# Patient Record
Sex: MALE | Race: WHITE | NOT HISPANIC OR LATINO | Employment: UNEMPLOYED | URBAN - METROPOLITAN AREA
[De-identification: names, ages, dates, MRNs, and addresses within clinical notes are randomized per-mention and may not be internally consistent; named-entity substitution may affect disease eponyms.]

---

## 2023-05-18 ENCOUNTER — TRANSFERRED RECORDS (OUTPATIENT)
Dept: HEALTH INFORMATION MANAGEMENT | Facility: CLINIC | Age: 63
End: 2023-05-18

## 2023-05-18 ENCOUNTER — HOSPITAL ENCOUNTER (INPATIENT)
Facility: CLINIC | Age: 63
LOS: 3 days | Discharge: PSYCHIATRIC OR SUBSTANCE USE FACILITY | DRG: 885 | End: 2023-05-26
Attending: FAMILY MEDICINE | Admitting: FAMILY MEDICINE
Payer: COMMERCIAL

## 2023-05-18 ENCOUNTER — TELEPHONE (OUTPATIENT)
Dept: BEHAVIORAL HEALTH | Facility: CLINIC | Age: 63
End: 2023-05-18

## 2023-05-18 DIAGNOSIS — R41.82 ALTERED MENTAL STATUS, UNSPECIFIED ALTERED MENTAL STATUS TYPE: ICD-10-CM

## 2023-05-18 DIAGNOSIS — Z20.822 LAB TEST NEGATIVE FOR COVID-19 VIRUS: ICD-10-CM

## 2023-05-18 DIAGNOSIS — R33.9 URINARY RETENTION: ICD-10-CM

## 2023-05-18 DIAGNOSIS — F20.9 SCHIZOPHRENIA, UNSPECIFIED TYPE (H): ICD-10-CM

## 2023-05-18 LAB
ALBUMIN SERPL BCG-MCNC: 4.2 G/DL (ref 3.5–5.2)
ALP SERPL-CCNC: 68 U/L (ref 40–129)
ALT SERPL W P-5'-P-CCNC: 16 U/L (ref 10–50)
ANION GAP SERPL CALCULATED.3IONS-SCNC: 13 MMOL/L (ref 7–15)
AST SERPL W P-5'-P-CCNC: 28 U/L (ref 10–50)
BASOPHILS # BLD AUTO: 0.1 10E3/UL (ref 0–0.2)
BASOPHILS NFR BLD AUTO: 1 %
BILIRUB SERPL-MCNC: 0.7 MG/DL
BUN SERPL-MCNC: 25.9 MG/DL (ref 8–23)
CALCIUM SERPL-MCNC: 9 MG/DL (ref 8.8–10.2)
CHLORIDE SERPL-SCNC: 103 MMOL/L (ref 98–107)
CREAT SERPL-MCNC: 1 MG/DL (ref 0.67–1.17)
DEPRECATED HCO3 PLAS-SCNC: 24 MMOL/L (ref 22–29)
EOSINOPHIL # BLD AUTO: 0 10E3/UL (ref 0–0.7)
EOSINOPHIL NFR BLD AUTO: 0 %
ERYTHROCYTE [DISTWIDTH] IN BLOOD BY AUTOMATED COUNT: 15.4 % (ref 10–15)
ETHANOL SERPL-MCNC: <0.01 G/DL
GFR SERPL CREATININE-BSD FRML MDRD: 85 ML/MIN/1.73M2
GLUCOSE SERPL-MCNC: 97 MG/DL (ref 70–99)
HCT VFR BLD AUTO: 42.9 % (ref 40–53)
HGB BLD-MCNC: 13.6 G/DL (ref 13.3–17.7)
HOLD SPECIMEN: NORMAL
HOLD SPECIMEN: NORMAL
IMM GRANULOCYTES # BLD: 0.1 10E3/UL
IMM GRANULOCYTES NFR BLD: 1 %
LYMPHOCYTES # BLD AUTO: 1.9 10E3/UL (ref 0.8–5.3)
LYMPHOCYTES NFR BLD AUTO: 19 %
MCH RBC QN AUTO: 26.9 PG (ref 26.5–33)
MCHC RBC AUTO-ENTMCNC: 31.7 G/DL (ref 31.5–36.5)
MCV RBC AUTO: 85 FL (ref 78–100)
MONOCYTES # BLD AUTO: 1 10E3/UL (ref 0–1.3)
MONOCYTES NFR BLD AUTO: 9 %
NEUTROPHILS # BLD AUTO: 7.2 10E3/UL (ref 1.6–8.3)
NEUTROPHILS NFR BLD AUTO: 70 %
NRBC # BLD AUTO: 0 10E3/UL
NRBC BLD AUTO-RTO: 0 /100
PLATELET # BLD AUTO: 179 10E3/UL (ref 150–450)
POTASSIUM SERPL-SCNC: 3.9 MMOL/L (ref 3.4–5.3)
PROT SERPL-MCNC: 6.2 G/DL (ref 6.4–8.3)
RBC # BLD AUTO: 5.05 10E6/UL (ref 4.4–5.9)
SARS-COV-2 RNA RESP QL NAA+PROBE: NEGATIVE
SODIUM SERPL-SCNC: 140 MMOL/L (ref 136–145)
WBC # BLD AUTO: 10.2 10E3/UL (ref 4–11)

## 2023-05-18 PROCEDURE — 99285 EMERGENCY DEPT VISIT HI MDM: CPT | Mod: 25 | Performed by: FAMILY MEDICINE

## 2023-05-18 PROCEDURE — 250N000013 HC RX MED GY IP 250 OP 250 PS 637: Performed by: FAMILY MEDICINE

## 2023-05-18 PROCEDURE — 85025 COMPLETE CBC W/AUTO DIFF WBC: CPT | Performed by: FAMILY MEDICINE

## 2023-05-18 PROCEDURE — 90791 PSYCH DIAGNOSTIC EVALUATION: CPT

## 2023-05-18 PROCEDURE — 87635 SARS-COV-2 COVID-19 AMP PRB: CPT | Performed by: FAMILY MEDICINE

## 2023-05-18 PROCEDURE — 99285 EMERGENCY DEPT VISIT HI MDM: CPT | Performed by: FAMILY MEDICINE

## 2023-05-18 PROCEDURE — 250N000013 HC RX MED GY IP 250 OP 250 PS 637: Performed by: EMERGENCY MEDICINE

## 2023-05-18 PROCEDURE — 36415 COLL VENOUS BLD VENIPUNCTURE: CPT | Performed by: FAMILY MEDICINE

## 2023-05-18 PROCEDURE — 82077 ASSAY SPEC XCP UR&BREATH IA: CPT | Performed by: FAMILY MEDICINE

## 2023-05-18 PROCEDURE — C9803 HOPD COVID-19 SPEC COLLECT: HCPCS | Performed by: FAMILY MEDICINE

## 2023-05-18 PROCEDURE — 80053 COMPREHEN METABOLIC PANEL: CPT | Performed by: FAMILY MEDICINE

## 2023-05-18 RX ORDER — LORAZEPAM 1 MG/1
1 TABLET ORAL 3 TIMES DAILY
Status: DISCONTINUED | OUTPATIENT
Start: 2023-05-18 | End: 2023-05-26 | Stop reason: HOSPADM

## 2023-05-18 RX ORDER — NAPROXEN 250 MG/1
250 TABLET ORAL 2 TIMES DAILY PRN
Status: DISCONTINUED | OUTPATIENT
Start: 2023-05-18 | End: 2023-05-26 | Stop reason: HOSPADM

## 2023-05-18 RX ORDER — LORAZEPAM 1 MG/1
1 TABLET ORAL 3 TIMES DAILY
Status: ON HOLD | COMMUNITY
End: 2023-11-10

## 2023-05-18 RX ORDER — OLANZAPINE 5 MG/1
5 TABLET ORAL AT BEDTIME
Status: ON HOLD | COMMUNITY
End: 2023-05-26

## 2023-05-18 RX ORDER — LANOLIN ALCOHOL/MO/W.PET/CERES
1000 CREAM (GRAM) TOPICAL DAILY
Status: ON HOLD | COMMUNITY
End: 2023-11-10

## 2023-05-18 RX ORDER — OLANZAPINE 10 MG/1
10 TABLET, ORALLY DISINTEGRATING ORAL ONCE
Status: COMPLETED | OUTPATIENT
Start: 2023-05-18 | End: 2023-05-18

## 2023-05-18 RX ORDER — DOCUSATE SODIUM 100 MG/1
100 CAPSULE, LIQUID FILLED ORAL 2 TIMES DAILY PRN
Status: DISCONTINUED | OUTPATIENT
Start: 2023-05-18 | End: 2023-05-24

## 2023-05-18 RX ORDER — LANOLIN ALCOHOL/MO/W.PET/CERES
1000 CREAM (GRAM) TOPICAL DAILY
Status: DISCONTINUED | OUTPATIENT
Start: 2023-05-19 | End: 2023-05-26 | Stop reason: HOSPADM

## 2023-05-18 RX ORDER — OLANZAPINE 2.5 MG/1
2.5 TABLET, FILM COATED ORAL EVERY MORNING
Status: ON HOLD | COMMUNITY
End: 2023-05-26

## 2023-05-18 RX ORDER — OLANZAPINE 5 MG/1
5 TABLET, ORALLY DISINTEGRATING ORAL AT BEDTIME
Status: DISCONTINUED | OUTPATIENT
Start: 2023-05-18 | End: 2023-05-24

## 2023-05-18 RX ADMIN — OLANZAPINE 10 MG: 10 TABLET, ORALLY DISINTEGRATING ORAL at 17:33

## 2023-05-18 RX ADMIN — OLANZAPINE 5 MG: 5 TABLET, ORALLY DISINTEGRATING ORAL at 22:36

## 2023-05-18 RX ADMIN — LORAZEPAM 1 MG: 1 TABLET ORAL at 20:09

## 2023-05-18 ASSESSMENT — COLUMBIA-SUICIDE SEVERITY RATING SCALE - C-SSRS
6. HAVE YOU EVER DONE ANYTHING, STARTED TO DO ANYTHING, OR PREPARED TO DO ANYTHING TO END YOUR LIFE?: NO
ATTEMPT LIFETIME: NO
2. HAVE YOU ACTUALLY HAD ANY THOUGHTS OF KILLING YOURSELF?: NO
TOTAL  NUMBER OF ABORTED OR SELF INTERRUPTED ATTEMPTS LIFETIME: NO
1. HAVE YOU WISHED YOU WERE DEAD OR WISHED YOU COULD GO TO SLEEP AND NOT WAKE UP?: NO
TOTAL  NUMBER OF INTERRUPTED ATTEMPTS LIFETIME: NO

## 2023-05-18 ASSESSMENT — ACTIVITIES OF DAILY LIVING (ADL)
ADLS_ACUITY_SCORE: 35
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
## 2023-05-18 NOTE — ED NOTES
"Pt stated \"I don't feel safe at home,\" later specifying \"I'm going to hell. I'm either in hell or going to hell.\"   "
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
## 2023-05-19 ENCOUNTER — TELEPHONE (OUTPATIENT)
Dept: BEHAVIORAL HEALTH | Facility: CLINIC | Age: 63
End: 2023-05-19
Payer: COMMERCIAL

## 2023-05-19 LAB
ATRIAL RATE - MUSE: 73 BPM
DIASTOLIC BLOOD PRESSURE - MUSE: NORMAL MMHG
INTERPRETATION ECG - MUSE: NORMAL
P AXIS - MUSE: 67 DEGREES
PR INTERVAL - MUSE: 142 MS
QRS DURATION - MUSE: 156 MS
QT - MUSE: 458 MS
QTC - MUSE: 504 MS
R AXIS - MUSE: -48 DEGREES
SYSTOLIC BLOOD PRESSURE - MUSE: NORMAL MMHG
T AXIS - MUSE: 81 DEGREES
VENTRICULAR RATE- MUSE: 73 BPM

## 2023-05-19 PROCEDURE — 99222 1ST HOSP IP/OBS MODERATE 55: CPT | Performed by: PSYCHIATRY & NEUROLOGY

## 2023-05-19 PROCEDURE — 250N000013 HC RX MED GY IP 250 OP 250 PS 637: Performed by: EMERGENCY MEDICINE

## 2023-05-19 PROCEDURE — 99284 EMERGENCY DEPT VISIT MOD MDM: CPT | Performed by: PSYCHIATRY & NEUROLOGY

## 2023-05-19 RX ORDER — OLANZAPINE 10 MG/1
10 TABLET, ORALLY DISINTEGRATING ORAL ONCE
Status: COMPLETED | OUTPATIENT
Start: 2023-05-19 | End: 2023-05-19

## 2023-05-19 RX ADMIN — OLANZAPINE 2.5 MG: 5 TABLET, ORALLY DISINTEGRATING ORAL at 07:21

## 2023-05-19 RX ADMIN — OLANZAPINE 10 MG: 10 TABLET, ORALLY DISINTEGRATING ORAL at 07:19

## 2023-05-19 RX ADMIN — SERTRALINE HYDROCHLORIDE 150 MG: 100 TABLET ORAL at 07:20

## 2023-05-19 RX ADMIN — LORAZEPAM 1 MG: 1 TABLET ORAL at 06:52

## 2023-05-19 RX ADMIN — CYANOCOBALAMIN TAB 1000 MCG 1000 MCG: 1000 TAB at 08:34

## 2023-05-19 RX ADMIN — LORAZEPAM 1 MG: 1 TABLET ORAL at 20:08

## 2023-05-19 RX ADMIN — NAPROXEN 250 MG: 250 TABLET ORAL at 08:34

## 2023-05-19 RX ADMIN — OLANZAPINE 5 MG: 5 TABLET, ORALLY DISINTEGRATING ORAL at 21:07

## 2023-05-19 RX ADMIN — LORAZEPAM 1 MG: 1 TABLET ORAL at 14:07

## 2023-05-19 ASSESSMENT — ACTIVITIES OF DAILY LIVING (ADL)
ADLS_ACUITY_SCORE: 35

## 2023-05-19 NOTE — PHARMACY-ADMISSION MEDICATION HISTORY
Pharmacist Admission Medication History    Admission medication history is complete. The information provided in this note is only as accurate as the sources available at the time of the update.    Medication reconciliation/reorder completed by provider prior to medication history? Yes    Information Source(s): Facility (U/NH/) medication list/MAR via phone - [facility] (Phone: [phone])    Pertinent Information: Patient refused his noon dose of lorazepam today 5/18/23 prior to coming to the ED.     Changes made to PTA medication list:    Added: vitamin B12, lorazepam, olanzapine, sertraline (per group home)    Deleted: None    Changed: None    Allergies reviewed with patient and updates made in EHR: no    Medication History Completed By: Rosalia Romo RPH 5/18/2023 8:47 PM    Prior to Admission medications    Medication Sig Last Dose Taking? Auth Provider Long Term End Date   cyanocobalamin (VITAMIN B-12) 1000 MCG tablet Take 1,000 mcg by mouth daily 5/18/2023 at AM Yes Unknown, Entered By History     LORazepam (ATIVAN) 1 MG tablet Take 1 mg by mouth 3 times daily 5/18/2023 at AM Yes Unknown, Entered By History     OLANZapine (ZYPREXA) 2.5 MG tablet Take 2.5 mg by mouth every morning 5/18/2023 at AM Yes Unknown, Entered By History     OLANZapine (ZYPREXA) 5 MG tablet Take 5 mg by mouth At Bedtime 5/17/2023 at PM Yes Unknown, Entered By History     sertraline (ZOLOFT) 50 MG tablet Take 150 mg by mouth daily 5/18/2023 at AM Yes Unknown, Entered By History

## 2023-05-19 NOTE — ED NOTES
IP MH Referral Acuity Rating Score (RARS)    LMHP complete at referral to IP MH, with DEC; and, daily while awaiting IP MH placement. Call score to PPS.  CRITERIA SCORING   New 72 HH and Involuntary for IP MH (not adolescent) 0/1   Boarding over 24 hours 0/1   Vulnerable adult at least 55+ with multiple co morbidities; or, Patient age 11 or under 1/1   Suicide ideation without relief of precipitating factors 0/1   Current plan for suicide 0/1   Current plan for homicide 0/1   Imminent risk or actual attempt to seriously harm another without relief of factors precipitating the attempt 1/1   Severe dysfunction in daily living (ex: complete neglect for self care, extreme disruption in vegetative function, extreme deterioration in social interactions) 0/1   Recent (last 2 weeks) or current physical aggression in the ED 1/1   Restraints or seclusion episode in ED 0/1   Verbal aggression, agitation, yelling, etc., while in the ED 1/1   Active psychosis with psychomotor agitation or catatonia 1/1   Need for constant or near constant redirection (from leaving, from others, etc).  0/1   Intrusive or disruptive behaviors 0/1   TOTAL Acuity Total Score: 5

## 2023-05-19 NOTE — TELEPHONE ENCOUNTER
R:  No appropriate [facility] beds available.    [facility] Access Inpatient Bed Call Log 5/19/23 @7:30 AM        Intake has called facilities that have not updated their bed status within the last 12 hours.        Adults:         [facility] is posting 0 beds.      [facility] is posting 0 [phone]      [facility] is posting 0 beds. [phone]     [facility] is posting 0 beds. [phone]      [facility] is posting 0 beds. [phone]     [facility] is posting 0 beds. [phone]     [facility] is posting 0 beds. [phone]     [facility] is posting 0 beds. [phone]     [facility] is posting 0 beds. [phone]     [facility] is posting 0 beds. [phone]         [facility] is posting  1 beds. Mixed unit 12+. Low acuity only.  [phone] -Pt being reviewed     [facility] is positing 0 beds. No aggression.  [phone]     [facility] is posting 0 beds. [phone] per call at 7:30 am, voicemail said NOT to leave a message     [facility] is posting 2 beds. Low acuity only. [phone]     [facility] is posting 2 beds. Low acuity only. No current aggression. [phone]     [facility] is posting 0 beds. Low acuity. [phone]     [facility] is posting 1 bed. 72 hr hold preferred. Low acuity. [phone]      [facility] is posting  4 beds. Very low acuity. No aggression. [phone]          [facility] is posting 3 beds. Vol only, No Hx of aggression, violence, or assault. No sexual offenders. No 72 hr holds. [phone]     [facility] is posting 8 beds. (Must have the cognitive ability to do programming. No aggressive or violent behavior or recent HX in the last 2 yrs. MH must be primary.) [phone]     [facility] is posting 5 beds. Low acuity only. Violence and aggression capped. [phone]     [facility] 0 [facility]. Low acuity, Neg Covid. [phone]       [facility] is posting 2 beds. Covid neg. Vol only. Combined adolescent and adult unit. No aggressive or violent behavior. No registered sex offenders. [phone] per call at 6:31 am to Kyra, they have 3 beds.     [facility] posting 0 beds. Negative covid.     [facility] is posting 1 bed. No lines, drains, tubes, Cpap s; no hx of aggresion; [phone] per call at 6:35 am to Merlin, they have 5 beds     [facility] is posting 10 beds. Call for details. [phone] per call at 6:38 am to Joe, no beds avail but they may have discharges later; per call at 1:31 pm to Candice, they have  a few  beds avail     [facility] is posting 3 beds. Mixed unit. [phone] per call at 6:40 am to Jasmina, they have 2 beds. No aggression or darryl.        Pt remains on work list pending appropriate bed availability.

## 2023-05-19 NOTE — ED NOTES
Marija from [facility] called to get an update about patient admission. Marija will be sending documents that have full medication list and Providers that currently see the patient. Patient has been residing at [facility] since 4/24/23.

## 2023-05-19 NOTE — PROGRESS NOTES
Pt has period where he yells periodically in his room. Pt denies pain, nutritional needs or bathroom use. 1:1 is present. Ativan given early, due to outburst of yelling.
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
## 2023-05-19 NOTE — ED PROVIDER NOTES
"[facility] ED Mental Health Handoff Note:       Brief HPI:  This is a 62 year old male signed out to me by the previous provider.  See initial ED Provider note for full details of the presentation.     Home meds reviewed and ordered/administered: Yes    Medically stable for inpatient mental health admission: Yes.    Evaluated by mental health: Yes. The recommendation is for inpatient mental health treatment. Bed search in process    Safety concerns: At the time I received sign out, there were no safety concerns.    Hold Status:  Active Orders   N/A       Exam:   Patient Vitals for the past 24 hrs:   BP Temp Temp src Pulse Resp SpO2 Height Weight   05/19/23 0734 -- -- -- -- -- -- 1.753 m (5' 9\") 79.4 kg (175 lb)   05/19/23 0729 118/77 97.9  F (36.6  C) Oral 95 18 98 % -- --   05/18/23 2117 (!) 164/76 -- -- 72 18 99 % -- --   05/18/23 1741 138/71 98.1  F (36.7  C) Oral 78 16 99 % -- --       ED Course:    Medications   LORazepam (ATIVAN) tablet 1 mg ( Oral Canceled Entry 5/19/23 0711)   OLANZapine zydis (zyPREXA) ODT half-tab 2.5 mg (2.5 mg Oral $Given 5/19/23 0721)   OLANZapine zydis (zyPREXA) ODT tab 5 mg (5 mg Oral $Given 5/18/23 2236)   sertraline (ZOLOFT) tablet 150 mg (150 mg Oral $Given 5/19/23 0720)   cyanocobalamin (VITAMIN B-12) tablet 1,000 mcg (1,000 mcg Oral $Given 5/19/23 0834)   naproxen (NAPROSYN) tablet 250 mg (250 mg Oral $Given 5/19/23 0834)   docusate sodium (COLACE) capsule 100 mg (has no administration in time range)   OLANZapine zydis (zyPREXA) ODT tab 10 mg (10 mg Oral $Given 5/18/23 1733)   OLANZapine zydis (zyPREXA) ODT tab 10 mg (10 mg Oral $Given 5/19/23 0719)       There no significant events during my shift.    Patient was signed out to the oncoming provider.    Impression:    ICD-10-CM    1. Altered mental status, unspecified altered mental status type  R41.82       2. Schizophrenia, unspecified type (H)  F20.9           Plan:    1. Awaiting inpatient mental health " admission/transfer.     Psychiatry consult requested neurology consult for evaluation of patient's parkinsonian symptoms with significant tremor.  Patient had reported being on Parkinson's medications in the past but stopped them on his own accord.  I am unable to see any records from neurology providers in the patient's chart.  Neurology consult order placed.      MD Huan José Jill C, MD  05/19/23 0916
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
## 2023-05-19 NOTE — ED NOTES
IP MH Referral Acuity Rating Score (RARS)    LMHP complete at referral to IP MH, with DEC; and, daily while awaiting IP MH placement. Call score to PPS.  CRITERIA SCORING   New 72 HH and Involuntary for IP MH (not adolescent) 0/1   Boarding over 24 hours 1/1   Vulnerable adult at least 55+ with multiple co morbidities; or, Patient age 11 or under 1/1   Suicide ideation without relief of precipitating factors 1/1   Current plan for suicide 0/1   Current plan for homicide 0/1   Imminent risk or actual attempt to seriously harm another without relief of factors precipitating the attempt 1/1   Severe dysfunction in daily living (ex: complete neglect for self care, extreme disruption in vegetative function, extreme deterioration in social interactions) 1/1   Recent (last 2 weeks) or current physical aggression in the ED 1/1   Restraints or seclusion episode in ED 0/1   Verbal aggression, agitation, yelling, etc., while in the ED 1/1   Active psychosis with psychomotor agitation or catatonia 1/1   Need for constant or near constant redirection (from leaving, from others, etc).  0/1   Intrusive or disruptive behaviors 0/1   TOTAL Acuity Total Score: 8
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
## 2023-05-19 NOTE — ED NOTES
"Pt refuses to eat, since being here. Pt states, \"it won't help\". Writer encouraged pt to eat and drink. Pt continues to have a  at bedside.   "

## 2023-05-19 NOTE — TELEPHONE ENCOUNTER
ADULTS      No appropriate beds are currently available within the  system. Bed search update @ 12:50AM:      [facility]: @ cap per website   [facility]: @ cap per website   [facility]: @ cap per website. Per Jasmina @ 00:55 they are full   [facility]: @ cap per website   [facility]: @ cap per website   [facility]: @ cap per website   [facility]: @ cap per website   [facility]: @ cap per website   [facility]: Posting 2 beds. Mixed unit/Low acuity only.   [facility]: @ cap per website   [facility]: @ cap per website   [facility]: Posting 2 beds. Per Marcia @ 00:56, they are full    [facility]: Does not review after 10PM.     [facility]: Posting 2 beds. Per previous call w/ Monica @ 11:11PM, they have low acuity beds in [facility]: @ cap per website   [facility]: Posting 3 beds. Per previous call w/ Monica @ 11:11PM, they have low acuity beds in [facility]: Posting 3 beds (No hx of aggression. Voluntary only).    [facility]: Posting 8 beds (Low acuity only. Must have the cognitive ability to do programming. No aggressive or violent behavior or recent HX in the last 2 yrs. MH must be primary).   [facility]: Posting 5 beds; Low acuity only/Capped on aggression.    [facility]: @ cap per website. Low acuity, Neg Covid.   [facility]: Posting 1 bed (Covid neg. Voluntary only. Mixed unit. No aggressive or violent behavior. No registered sex offenders).   [facility]: Posting 1 bed (No hx of aggression. No CD treatment or detox).    [facility]: Posting 6 beds. Facility is in [place].   [facility]: Posting 3 beds. Mixed unit.           Pt remains on work list until appropriate placement is available
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
## 2023-05-20 ENCOUNTER — TELEPHONE (OUTPATIENT)
Dept: BEHAVIORAL HEALTH | Facility: CLINIC | Age: 63
End: 2023-05-20
Payer: COMMERCIAL

## 2023-05-20 PROCEDURE — 250N000013 HC RX MED GY IP 250 OP 250 PS 637: Performed by: EMERGENCY MEDICINE

## 2023-05-20 RX ORDER — HALOPERIDOL 5 MG/1
5 TABLET ORAL
Status: COMPLETED | OUTPATIENT
Start: 2023-05-20 | End: 2023-05-20

## 2023-05-20 RX ADMIN — CYANOCOBALAMIN TAB 1000 MCG 1000 MCG: 1000 TAB at 09:41

## 2023-05-20 RX ADMIN — SERTRALINE HYDROCHLORIDE 150 MG: 100 TABLET ORAL at 09:41

## 2023-05-20 RX ADMIN — HALOPERIDOL 5 MG: 5 TABLET ORAL at 03:05

## 2023-05-20 RX ADMIN — OLANZAPINE 5 MG: 5 TABLET, ORALLY DISINTEGRATING ORAL at 21:18

## 2023-05-20 RX ADMIN — LORAZEPAM 1 MG: 1 TABLET ORAL at 21:18

## 2023-05-20 RX ADMIN — OLANZAPINE 2.5 MG: 5 TABLET, ORALLY DISINTEGRATING ORAL at 09:48

## 2023-05-20 RX ADMIN — LORAZEPAM 1 MG: 1 TABLET ORAL at 09:48

## 2023-05-20 RX ADMIN — NAPROXEN 250 MG: 250 TABLET ORAL at 14:38

## 2023-05-20 RX ADMIN — LORAZEPAM 1 MG: 1 TABLET ORAL at 13:18

## 2023-05-20 ASSESSMENT — ACTIVITIES OF DAILY LIVING (ADL)
ADLS_ACUITY_SCORE: 35

## 2023-05-20 NOTE — ED NOTES
Patient was lying in bed, masturbating. He was talking loud. He talked about what he was doing. He was sexually pre occupied. He was threatening to get of his room again, naked.

## 2023-05-20 NOTE — ED PROVIDER NOTES
[facility] ED Mental Health Handoff Note:       Brief HPI:  This is a 62 year old male signed out to me by the previous provider.  See initial ED Provider note for full details of the presentation.     Home meds reviewed and ordered/administered: Yes    Medically stable for inpatient mental health admission: Yes.    Evaluated by mental health: Yes. The recommendation is for inpatient mental health treatment. Bed search in process    Safety concerns: At the time I received sign out, there were no safety concerns.    Hold Status:  Active Orders   N/A       Exam:   Patient Vitals for the past 24 hrs:   BP Temp Temp src Pulse Resp SpO2   05/19/23 2320 135/65 -- -- 69 -- 99 %   05/19/23 1703 131/79 97.7  F (36.5  C) Oral 70 18 98 %       ED Course:    Medications   LORazepam (ATIVAN) tablet 1 mg (1 mg Oral $Given 5/19/23 2008)   OLANZapine zydis (zyPREXA) ODT half-tab 2.5 mg (2.5 mg Oral $Given 5/19/23 0721)   OLANZapine zydis (zyPREXA) ODT tab 5 mg (5 mg Oral $Given 5/19/23 2107)   sertraline (ZOLOFT) tablet 150 mg (150 mg Oral $Given 5/19/23 0720)   cyanocobalamin (VITAMIN B-12) tablet 1,000 mcg (1,000 mcg Oral $Given 5/19/23 0834)   naproxen (NAPROSYN) tablet 250 mg (250 mg Oral $Given 5/19/23 0834)   docusate sodium (COLACE) capsule 100 mg (has no administration in time range)   OLANZapine zydis (zyPREXA) ODT tab 10 mg (10 mg Oral $Given 5/18/23 1733)   OLANZapine zydis (zyPREXA) ODT tab 10 mg (10 mg Oral $Given 5/19/23 0719)   haloperidol (HALDOL) tablet 5 mg (5 mg Oral $Given 5/20/23 0305)       There no significant events during my shift.    Patient was signed out to the oncoming provider.    Impression:    ICD-10-CM    1. Altered mental status, unspecified altered mental status type  R41.82       2. Schizophrenia, unspecified type (H)  F20.9           Plan:    1. Awaiting inpatient mental health admission/transfer.          MD Huan José Jill C, MD  05/20/23 0883

## 2023-05-20 NOTE — ED PROVIDER NOTES
IP MH Referral Acuity Rating Score (RARS)    LMHP complete at referral to IP MH, with DEC; and, daily while awaiting IP MH placement. Call score to PPS.  CRITERIA SCORING   New 71 HH and Involuntary for IP MH (not adolescent) 0/1   Boarding over 24 hours 1/1   Vulnerable adult at least 55+ with multiple co morbidities; or, Patient age 11 or under 1/1   Suicide ideation without relief of precipitating factors 0/1   Current plan for suicide 0/1   Current plan for homicide 0/1   Imminent risk or actual attempt to seriously harm another without relief of factors precipitating the attempt 1/1   Severe dysfunction in daily living (ex: complete neglect for self care, extreme disruption in vegetative function, extreme deterioration in social interactions) 1/1   Recent (last 2 weeks) or current physical aggression in the ED 1/1   Restraints or seclusion episode in ED 0/1   Verbal aggression, agitation, yelling, etc., while in the ED 1/1   Active psychosis with psychomotor agitation or catatonia 1/1   Need for constant or near constant redirection (from leaving, from others, etc).  0/1   Intrusive or disruptive behaviors 0/1   TOTAL Acuity Total Score: 7

## 2023-05-20 NOTE — ED NOTES
"Pt was talking and singing when awake in the room. He would yell out. Pt once get up and run away from his room. Pt was assisted to go back to his room. Pt didn't eat breakfast and lunch. Meds given. Pt took it. This afternoon, pt was standing by the door, and yelled, \"I want a knife. I want to cut my balls.\" RN told pt that we can't give him knife. RN/staff wanted his safety. Directed to his room. Pt was ok and lie down on his bed.   "

## 2023-05-20 NOTE — TELEPHONE ENCOUNTER
R:  No appropriate beds within [place].  Bed Search update Statewide 9:00PM    [facility]: @ cap per website  [facility]: @cap per website.  - Per Garth, no beds  [facility]: @ cap per website  [facility]: @ cap per website- Per Garth, no beds  [facility]: @ cap per website  [facility]: @ cap per website- Per Garth, no beds  [facility]: @ cap per website  [facility]:  @ cap per website- Per Garth, no beds  [facility]: @ 3 bed posted.   Mixed unit with children.  Pt not appropriate due to acuity  [facility]: @ cap per website  [facility]: @ cap per website  [facility]: 2 beds posted- Per Garth, LOW ACUITY shared M and F available.  Not appropriate.  [facility]:  1 LOW ACUITY beds posted.  Not appropriate  [facility]:  1 bed posted.  Spoke to Winnie, bed no longer available  [facility]: 4 LOW ACUITY beds posted.  Per Winnie, not appropriate due to aggression with neighbor and acuity  [facility]: 4 LOW ACUITY beds posted.  Per Winnie, not appropriate  [facility]: 6 LOW ACUITY beds available.  Voluntary pts only.  Not appropriate  [facility]: 9 LOW ACUITY beds posted.  Not appropriate  [facility]: @ cap per website   [facility]: @ cap per website  [facility]: 2 bed available.  Voluntary pts only.  Mixed unit with children.   Not appropriate  [facility]: 10 beds posted  Out of state.    [facility]: 4 beds available.   Mixed unit with children.  Not appropriate      Pt remains on PPS work list awaiting appropriate placement.

## 2023-05-20 NOTE — TELEPHONE ENCOUNTER
R:  [facility] has no available [facility] to review pt for.   Bed Search completed @ 7:30am   [facility] is posting 0 [phone]  per call at 7:04 am to [facility],they are at cap   [facility]  is posting 0 beds. [phone]    [facility] is posting 0 beds. [phone]  per call at 7:05 am to Patsy, they have 3 male stepdown and 4 female stepdown beds avail   [facility] (to include [facility],  [place]) is posting 0 beds. [phone]    [facility] is posting 0 beds. [phone]    [facility] is posting 0 beds. [phone]    [facility] is posting  3 beds. Mixed unit 12+. Low acuity only.  [phone]    [facility] is posting 0 beds. [phone]     [facility] (to include [facility] and [place])  is posting 3 beds, but ONLY LOW acuity at [place].  [phone]    [facility] is posting 1 bed. 72 hr hold preferred. Low acuity. [phone]  per call at 7:14 am to [place], they have 1 bed.   [facility] is posting 6 beds. Vol only, No Hx of aggression, violence, or assault. No sexual offenders. No 72 hr holds. [phone]    [facility] is posting 9 beds. (Must have the cognitive ability to do programming. No aggressive or violent behavior or recent HX in the last 2 yrs. MH must be primary.) [phone]    [facility] is posting 0 beds. Low acuity only. Violence and aggression capped. [phone]    [facility] is posting 0 beds. Low acuity, Neg Covid. [phone]  per call at 7:18 am to Ollie Morgan, she will ask her charge Rn to call us back in 15-20 minutes approx    [facility] is posting 2 beds. Covid neg. Vol only. Combined adolescent and adult unit. No aggressive or violent behavior. No registered sex offenders. [phone] per call at 7:21 am to Yudith, they have 2 beds and may have more later if  they have d/c s    [facility] [place] posting 0 beds. Negative covid.    [facility] is posting 1 bed. No lines, drains, tubes, Cpap s; no hx of aggression; [phone] per call at 7:25 am to  Sarah, they do not accept pts on weekends   [facility] is posting 10 beds. Call for details. [phone]  per call at 7:27 am to Donovan, 2 beds for adults and 2 for adol/children   [facility] is posting 4 beds. Mixed unit. [phone] per call at 7:29 am to Mallika, they have 3 beds avail/no high acuity No aggression or darryl.     Pt remains on work list pending appropriate bed availability.

## 2023-05-20 NOTE — TELEPHONE ENCOUNTER
No appropriate beds are currently available within the  system. Bed search update @ 1:35AM:     Placement preference: Statewide. Pt is voluntary    [facility]: @ cap per website  [facility]: @ cap per website  [facility]: @ cap per website  [facility]: @ cap per website  [facility]: @ cap per website  [facility]: @ cap per website  [facility]: @ cap per website  [facility]: @ cap per website  [facility]: @ cap per website  [facility]: Posting 3 beds. Mixed unit/Low acuity only. Pt not appropriate for mixed unit  [facility]: @ cap per website  [facility]: @ cap per website  [facility]: Posting 2 beds.   [facility]: Does not review after 10PM.    [facility]: Posting 3 beds. Per Chris @ 01:48AM, they have low-mod acuity beds available but cannot accommodate sexually inappropriate behaviors. Pt not appropriate  [facility]: Posting 1 bed. Per Chris @ 01:48AM, no beds  [facility]: Posting 4 beds. Per Chris @ 01:48AM, low acuity only. Pt not appropriate  [facility]: Posting 6 beds (No hx of aggression. Voluntary admissions only).   [facility]: Posting 9 beds (Low acuity only. Must have the cognitive ability to do programming. No aggressive or violent behavior or recent HX in the last 2 yrs. MH must be primary). Pt not appropriate  [facility]: @ cap per website  [facility]: @ [facility] per [facility]. Low acuity, Neg Covid.  [facility]: Posting 2 beds (Covid neg. Voluntary only. Mixed unit. No aggressive or violent behavior. No registered sex offenders). Pt not appropriate  [facility]: Posting 1 bed (No hx of aggression. No lines, drains or tubes. No CD tx or detox).   [facility]: Posting 10 beds. Facility is in [place]. Per Donovan @ 01:53 they have beds available. Per chart review, pt is voluntary but requested statewide for placement.   [facility]: Posting 4 beds. Mixed unit/Low acuity. Pt not  appropriate    Called ED @ 01:55 to inquire about possible [facility] for placement. RN asked pt who declined seeking placement in [place]       Pt remains on work list until appropriate placement is available

## 2023-05-20 NOTE — PROGRESS NOTES
Outside chart reveiwed.  Cannot see that he saw neurology since 2015.  Was on Sinemet but appears impression was this was drug induced parkinsonism and it is noted in 11/29/22 note that he was not responding to sinemet and was felt to be worsening his psychiatric status.  May be worth considering a retrial in the future, but would not do while psychiatrically unstable.  Follow up in neurology and consideration of RAFFAELE scan may also be beneficial.  If tremors particularly problematic there is a action component as well so low doses or propranolol could be considered.  Neurology to sign off at this time.      Aria Haney, DO

## 2023-05-20 NOTE — PROGRESS NOTES
"Triage & Transition Services, Extended Care     Therapy Progress Note    Patient: Vinod goes by \"Vinod,\" uses he/him pronouns  Date of Service: May 20, 2023  Site of Service: [facility] ED   Patient was seen in-person.     Presenting problem:   Vinod is followed related to Long wait time for admission: over 24 hours. Please see initial DEC/[facility] Crisis Assessment completed by Leilani Shaver on 5/18/23 for complete assessment information.     Individuals Present: Vinod & Sudha Mead [facility]    Session start: 0938  Session end: 0954  Session duration in minutes: 16  Session number: 1  Anticipated number of sessions or this episode of care: 3  CPT utilized: 42920 - Psychotherapy (with patient) - 30 (16-37*) min    Current Presentation:      Pt presented with a blunt affect. Pts mood appeared to be congruent with this presentation. Pt was oriented to place and person. Thought out session it appears that Pt was responding to internal stimuli. Pt was experiencing thought blocking. Pt did present with disorganized thought content and appears to be a poor historian.  Pt did not endorse any current SI/SIB/HI. Pt endorsed delusions that his parents were trying to kill him and that it is the end of the world. Pt would occassionally start to mumble \"Blah, blah blah\" and widen his eyes and stick his tongue out. Pt also would randomly yell racial slurs and sexually inappropriate statements. Pt was somewhat redirectable. Pt appears to be only minimally caring for his ADLs. Per RN Pt did take morning medications but has not eaten.       Mental Status Exam:   Appearance: awake, alert and dressed in hospital scrubs  Attitude: guarded and somewhat cooperative  Eye Contact: intense at times  Mood: anxious  Affect: intensity is blunted and guarded  Speech: mumbling and rambling  Psychomotor Behavior: no evidence of tardive dyskinesia, dystonia, or tics  Thought Process:  disorganized and evidence of thought blocking " "present  Associations: loosening of associations present  Thought Content: patient appears to be responding to internal stimuli  Insight: limited  Judgement: poor  Oriented to: time, person, and place  Attention Span and Concentration: fair  Recent and Remote Memory: poor    Diagnosis:   298.9 (F29)  Unspecified Schizophrenia Spectrum   300.09 (F41.8) Other Specified Anxiety Disorder  - by history (OCD)     Therapeutic Intervention(s):   Provided active listening, unconditional positive regard, and validation. Identified and practiced coping skills. Pt appears to minimally engaged in therapeutic interventions due to his current disorganization and psychosis sx.     Treatment Objective(s) Addressed:   The focus of this session was on rapport building and assessing safety.     Progress Towards Goals:   Patient reports stable symptoms. Patient is not making progress towards treatment goals as evidenced by Pt is still experiencing acute psychosis sx and disorganization.     Case Management:   None at time of session.     \"Spoke with Dr. Puente, consulting psychiatrist.  He reports being asked to see patient to assess for capacity.  Dr. Puente reports patient does not have capacity.  Patient will need an alternate decision maker.      Spoke with patient's mother, Alberta ([phone]).  She reports her and her /patient's father had just visited with patient yesterday.  She reports being very concerned about his mental health.  She reports patient's psychiatrist, Dr. Santana, took him off his psychiatric medications.  She does not know why those medications were stopped.  She states he has decompensated very quickly.  Mother states patient never  and has no children.  She reports he has one sister, Sandra Treadwell, in [place] ([phone]).     Consulted with internal resources.  Due to patient's lack of capacity, patient's next of kin are his decision maker and his next of kin is his parents, Alberta and " "Ino [phone], Next of kin are unable to make decisions regarding neuroleptic medications.\"    General Recommendations:   Continue to monitor for harm. Consider: Consider 1:1 staffing, Increase frequency of staff rounding, Use a positive, direct and calm approach. Pt's tend to match the energy/mood of the staff. Keep focus positive and upbeat, Use clear and concise directions, too many words can be overwhelming and Provide the pt with options to provide a sense of control. Try to tell the pt what they can do instead of what they can't do    Plan:   Inpatient Mental Health: Pt appears to be experiencing acute psychosis sx and disorganization. Pts sx have been impacting Pts ability to care for self and daily living.       Plan for Care reviewed with Assigned Medical Provider? Yes. Provider, MD Pressley, response: in agreement.      Sudha Mead, [facility] on 5/20/2023 at 12:10 PM  Licensed Mental Health Professional (LMHP), [facility]  [phone]      "

## 2023-05-21 ENCOUNTER — TELEPHONE (OUTPATIENT)
Dept: BEHAVIORAL HEALTH | Facility: CLINIC | Age: 63
End: 2023-05-21
Payer: COMMERCIAL

## 2023-05-21 LAB
ALBUMIN UR-MCNC: 20 MG/DL
AMPHETAMINES UR QL SCN: ABNORMAL
APPEARANCE UR: CLEAR
BACTERIA #/AREA URNS HPF: ABNORMAL /HPF
BARBITURATES UR QL SCN: ABNORMAL
BENZODIAZ UR QL SCN: ABNORMAL
BILIRUB UR QL STRIP: NEGATIVE
BZE UR QL SCN: ABNORMAL
CANNABINOIDS UR QL SCN: ABNORMAL
COLOR UR AUTO: YELLOW
GLUCOSE UR STRIP-MCNC: NEGATIVE MG/DL
HGB UR QL STRIP: ABNORMAL
HYALINE CASTS: 9 /LPF
KETONES UR STRIP-MCNC: ABNORMAL MG/DL
LEUKOCYTE ESTERASE UR QL STRIP: NEGATIVE
MUCOUS THREADS #/AREA URNS LPF: PRESENT /LPF
NITRATE UR QL: NEGATIVE
OPIATES UR QL SCN: ABNORMAL
PH UR STRIP: 5.5 [PH] (ref 5–7)
RBC URINE: 1 /HPF
SP GR UR STRIP: 1.03 (ref 1–1.03)
TRANSITIONAL EPI: <1 /HPF
UROBILINOGEN UR STRIP-MCNC: NORMAL MG/DL
WBC URINE: 3 /HPF

## 2023-05-21 PROCEDURE — 81001 URINALYSIS AUTO W/SCOPE: CPT | Performed by: EMERGENCY MEDICINE

## 2023-05-21 PROCEDURE — 250N000013 HC RX MED GY IP 250 OP 250 PS 637: Performed by: EMERGENCY MEDICINE

## 2023-05-21 PROCEDURE — 250N000013 HC RX MED GY IP 250 OP 250 PS 637: Performed by: FAMILY MEDICINE

## 2023-05-21 PROCEDURE — 80307 DRUG TEST PRSMV CHEM ANLYZR: CPT | Performed by: FAMILY MEDICINE

## 2023-05-21 RX ORDER — ACETAMINOPHEN 325 MG/1
650 TABLET ORAL EVERY 4 HOURS PRN
Status: DISCONTINUED | OUTPATIENT
Start: 2023-05-21 | End: 2023-05-26 | Stop reason: HOSPADM

## 2023-05-21 RX ORDER — TAMSULOSIN HYDROCHLORIDE 0.4 MG/1
0.4 CAPSULE ORAL DAILY
Status: DISCONTINUED | OUTPATIENT
Start: 2023-05-21 | End: 2023-05-26 | Stop reason: HOSPADM

## 2023-05-21 RX ORDER — RISPERIDONE 0.5 MG/1
0.5 TABLET, ORALLY DISINTEGRATING ORAL 2 TIMES DAILY
Status: DISCONTINUED | OUTPATIENT
Start: 2023-05-21 | End: 2023-05-25

## 2023-05-21 RX ORDER — LORAZEPAM 1 MG/1
1 TABLET ORAL ONCE
Status: COMPLETED | OUTPATIENT
Start: 2023-05-21 | End: 2023-05-21

## 2023-05-21 RX ADMIN — LORAZEPAM 1 MG: 1 TABLET ORAL at 15:56

## 2023-05-21 RX ADMIN — OLANZAPINE 2.5 MG: 5 TABLET, ORALLY DISINTEGRATING ORAL at 06:08

## 2023-05-21 RX ADMIN — SERTRALINE HYDROCHLORIDE 150 MG: 100 TABLET ORAL at 06:18

## 2023-05-21 RX ADMIN — ACETAMINOPHEN 650 MG: 325 TABLET, FILM COATED ORAL at 18:58

## 2023-05-21 RX ADMIN — TAMSULOSIN HYDROCHLORIDE 0.4 MG: 0.4 CAPSULE ORAL at 18:58

## 2023-05-21 RX ADMIN — LORAZEPAM 1 MG: 1 TABLET ORAL at 10:35

## 2023-05-21 RX ADMIN — CYANOCOBALAMIN TAB 1000 MCG 1000 MCG: 1000 TAB at 06:18

## 2023-05-21 RX ADMIN — RISPERIDONE 0.5 MG: 0.5 TABLET, ORALLY DISINTEGRATING ORAL at 20:36

## 2023-05-21 RX ADMIN — OLANZAPINE 5 MG: 5 TABLET, ORALLY DISINTEGRATING ORAL at 20:20

## 2023-05-21 RX ADMIN — LORAZEPAM 1 MG: 1 TABLET ORAL at 06:18

## 2023-05-21 RX ADMIN — LORAZEPAM 1 MG: 1 TABLET ORAL at 19:34

## 2023-05-21 ASSESSMENT — ACTIVITIES OF DAILY LIVING (ADL)
ADLS_ACUITY_SCORE: 35

## 2023-05-21 NOTE — ED NOTES
"\"I'm just fine. I'm taking my punishment.\" Pt expresses delusions. Pt received from night shift. He is awake in room. Per report pt did not sleep at all throughout the night. He is malodorous, offered to change clothing which he accepted. Writer encouraged therapeutic environment. He denies acute complaint. He accepted breakfast. Nursing will continue to monitor. He remains on 1:1  "

## 2023-05-21 NOTE — ED NOTES
"Pt up and attempting to run out of department multiple times stating \"I've got to get out of here\", redirected back to room by ED staff and security. Pt tremulous, unsteady gait, 1:1 remains at bedside to redirect patient. Per MD give morning medications at this time.  "

## 2023-05-21 NOTE — ED PROVIDER NOTES
[facility] ED Mental Health Handoff Note:       Brief HPI:  This is a 62 year old male signed out to me by Dr. Arreola.  See initial ED Provider note for full details of the presentation. Interval history is pertinent for no recent events.  Patient presented with acute psychosis, agitation, and aggressive behavior.  Has been deemed in need of admission.  Currently awaiting bed placement and has calmed with meds in the ED. he also has a history of parkinsonism but is not currently on medications.  Per provider yesterday neurology consult was placed.  I reviewed their documentation, they state he had not seen neurology since 2015.  Previous diagnosis of parkinsonism which was felt to be medication induced.  Has not been on Sinemet or other meds for quite some time.  The recommendation is to use propanolol if needed for any tremor and have him follow-up as an outpatient, currently they have signed off.    Home meds reviewed and ordered/administered: Yes    Medically stable for inpatient mental health admission: Yes.    Evaluated by mental health: Yes. The recommendation is for inpatient mental health treatment. Bed search in process    Safety concerns: At the time I received sign out, the patient had been aggressive/combative/agitated, but has calmed.    Hold Status:  Active Orders   N/A            Exam:   Patient Vitals for the past 24 hrs:   BP Temp Temp src Pulse Resp SpO2   05/20/23 2120 121/52 97.7  F (36.5  C) Oral 76 16 97 %   05/20/23 0935 (!) 153/80 -- -- 73 16 96 %       General: Patient is in no acute distress and is resting comfortably.  HEENT: Normocephalic atraumatic  Neck: Supple  Cardiovascular: Heart rate normal  Pulmonary: Patient is in no respiratory distress  Extremities: No signs of any significant or life-threatening trauma.  Neurologic: No new focal neurologic deficits.      ED Course:    Medications   LORazepam (ATIVAN) tablet 1 mg (0 mg Oral Hold 5/21/23 0800)   OLANZapine zydis (zyPREXA)  ODT half-tab 2.5 mg (0 mg Oral Hold 5/21/23 0800)   OLANZapine zydis (zyPREXA) ODT tab 5 mg (5 mg Oral $Given 5/20/23 2118)   sertraline (ZOLOFT) tablet 150 mg (0 mg Oral Hold 5/21/23 0800)   cyanocobalamin (VITAMIN B-12) tablet 1,000 mcg (0 mcg Oral Hold 5/21/23 0800)   naproxen (NAPROSYN) tablet 250 mg (250 mg Oral $Given 5/20/23 1438)   docusate sodium (COLACE) capsule 100 mg (has no administration in time range)   OLANZapine zydis (zyPREXA) ODT tab 10 mg (10 mg Oral $Given 5/18/23 1733)   OLANZapine zydis (zyPREXA) ODT tab 10 mg (10 mg Oral $Given 5/19/23 0719)   haloperidol (HALDOL) tablet 5 mg (5 mg Oral $Given 5/20/23 0305)            There were no significant events during my shift.  As noted above, reviewed recommendations from neurology.    Patient was signed out to the oncoming provider      Impression:    ICD-10-CM    1. Altered mental status, unspecified altered mental status type  R41.82       2. Schizophrenia, unspecified type (H)  F20.9           Plan:    1. Awaiting inpatient mental health admission/transfer.      RESULTS:   Results for orders placed or performed during the hospital encounter of 05/18/23 (from the past 24 hour(s))   Urine Drugs of Abuse Screen     Status: Abnormal    Collection Time: 05/21/23  2:17 AM    Narrative    The following orders were created for panel order Urine Drugs of Abuse Screen.  Procedure                               Abnormality         Status                     ---------                               -----------         ------                     Drug abuse screen 1 urin...[[id number]]  Abnormal            Final result                 Please view results for these tests on the individual orders.   Drug abuse screen 1 urine (ED)     Status: Abnormal    Collection Time: 05/21/23  2:17 AM   Result Value Ref Range    Amphetamines Urine Screen Negative Screen Negative    Barbituates Urine Screen Negative Screen Negative    Benzodiazepine Urine Screen Positive (A)  Screen Negative    Cannabinoids Urine Screen Negative Screen Negative    Cocaine Urine Screen Negative Screen Negative    Opiates Urine Screen Negative Screen Negative             MD Ratna Gallardo David, MD  05/21/23 0726       Yemi Segundo MD  05/21/23 0728

## 2023-05-21 NOTE — ED NOTES
Patient signed out to me at the end of my partner shift.  Patient waiting for a mental health bed.    Nursing personnel brought to my attention the patient was straight cath last night for 1200 cc of urine after he was noted to not have any urine output.  At that time his bladder scan revealed a bladder of 999 cc.  Nursing personnel came to me and stated that he has not had any urine output since he was straight cath last night and therefore he was scanned again showing 400 cc of urine.  At this time the patient will be straight cath again and urology was consulted.    Over the phone the patient's case was discussed with urology and they recommended starting Flomax on the patient and putting in an indwelling Barrios catheter should the patient require a third catheterization.  Full urology consult will be done in the morning.    Orders Placed This Encounter   Procedures     Comprehensive metabolic panel     Ethyl Alcohol Level     Asymptomatic COVID-19 Virus (Coronavirus) by PCR     CBC with platelets and differential     [place] Draw     Extra Blue Top Tube     Extra Red Top Tube     Drug abuse screen 1 urine (ED)     UA with Microscopic reflex to Culture     EKG 12-lead, complete     Straight cath for urine     Straight cath for urine     [facility] ([facility]) Assessment Consult Order:     Psychiatry IP Consult: medication recommendations.  apparently was taking off of clozapine recently; Consultant may enter orders: Yes; Requesting provider? Attending physician; Name: louis early     Neurology General Adult IP Consult: h/o parkinsons, off meds, no neuro records, tremor and psychosis, eval parkinson's treatment; Consultant may enter orders: Yes; Requesting provider? ED Provider     Psychiatry IP Consult: capacity evaluation; Consultant may enter orders: Yes; Requesting provider? Hospitalist (if different from attending physician)     Urology IP Consult: urinary retention in Mental Health Admit patient  boarding in [facility] ER; Consultant may enter orders: Yes; Requesting provider? ED Provider     CBC with platelets differential     Urine Drugs of Abuse Screen       Results for orders placed or performed during the hospital encounter of 05/18/23   Comprehensive metabolic panel     Status: Abnormal   Result Value Ref Range    Sodium 140 136 - 145 mmol/L    Potassium 3.9 3.4 - 5.3 mmol/L    Chloride 103 98 - 107 mmol/L    Carbon Dioxide (CO2) 24 22 - 29 mmol/L    Anion Gap 13 7 - 15 mmol/L    Urea Nitrogen 25.9 (H) 8.0 - 23.0 mg/dL    Creatinine 1.00 0.67 - 1.17 mg/dL    Calcium 9.0 8.8 - 10.2 mg/dL    Glucose 97 70 - 99 mg/dL    Alkaline Phosphatase 68 40 - 129 U/L    AST 28 10 - 50 U/L    ALT 16 10 - 50 U/L    Protein Total 6.2 (L) 6.4 - 8.3 g/dL    Albumin 4.2 3.5 - 5.2 g/dL    Bilirubin Total 0.7 <=1.2 mg/dL    GFR Estimate 85 >60 mL/min/1.73m2   Ethyl Alcohol Level     Status: Normal   Result Value Ref Range    Alcohol ethyl <0.01 <=0.01 g/dL   Asymptomatic COVID-19 Virus (Coronavirus) by PCR Nasopharyngeal     Status: Normal    Specimen: Nasopharyngeal; Swab   Result Value Ref Range    SARS CoV2 PCR Negative Negative    Narrative    Testing was performed using the Xpert Xpress SARS-CoV-2 Assay on the Cepheid Gene-Xpert Instrument Systems. Additional information about this Emergency Use Authorization (EUA) assay can be found via the Lab Guide. This test should be ordered for the detection of SARS-CoV-2 in individuals who meet SARS-CoV-2 clinical and/or epidemiological criteria as well as from individuals without symptoms or other reasons to suspect COVID-19. Test performance for asymptomatic patients has only been established in anterior nasal swab specimens. This test is for in vitro diagnostic use under the FDA EUA for laboratories certified under CLIA to perform high complexity testing. This test has not been FDA cleared or approved. A negative result does not rule out the presence of PCR inhibitors in the  specimen or target RNA concentration below the limit of detection for the assay. The possibility of a false negative should be considered if the patient's recent exposure or clinical presentation suggests COVID-19. This test was validated by the [facility]. This laboratory is certified under the Clinical Laboratory Improvement Amendments (CLIA) as qualified to perform high complexity laboratory testing.     CBC with platelets and differential     Status: Abnormal   Result Value Ref Range    WBC Count 10.2 4.0 - 11.0 10e3/uL    RBC Count 5.05 4.40 - 5.90 10e6/uL    Hemoglobin 13.6 13.3 - 17.7 g/dL    Hematocrit 42.9 40.0 - 53.0 %    MCV 85 78 - 100 fL    MCH 26.9 26.5 - 33.0 pg    MCHC 31.7 31.5 - 36.5 g/dL    RDW 15.4 (H) 10.0 - 15.0 %    Platelet Count 179 150 - 450 10e3/uL    % Neutrophils 70 %    % Lymphocytes 19 %    % Monocytes 9 %    % Eosinophils 0 %    % Basophils 1 %    % Immature Granulocytes 1 %    NRBCs per 100 WBC 0 <1 /100    Absolute Neutrophils 7.2 1.6 - 8.3 10e3/uL    Absolute Lymphocytes 1.9 0.8 - 5.3 10e3/uL    Absolute Monocytes 1.0 0.0 - 1.3 10e3/uL    Absolute Eosinophils 0.0 0.0 - 0.7 10e3/uL    Absolute Basophils 0.1 0.0 - 0.2 10e3/uL    Absolute Immature Granulocytes 0.1 <=0.4 10e3/uL    Absolute NRBCs 0.0 10e3/uL   [place] Draw     Status: None    Narrative    The following orders were created for panel order [place] Draw.  Procedure                               Abnormality         Status                     ---------                               -----------         ------                     Extra Blue Top Tube[[id number]]                              Final result               Extra Red Top Tube[[id number]]                               Final result                 Please view results for these tests on the individual orders.   Extra Blue Top Tube     Status: None   Result Value Ref Range    Hold Specimen [facility]    Extra Red Top Tube     Status: None    Result Value Ref Range    Hold Specimen [facility]    Drug abuse screen 1 urine (ED)     Status: Abnormal   Result Value Ref Range    Amphetamines Urine Screen Negative Screen Negative    Barbituates Urine Screen Negative Screen Negative    Benzodiazepine Urine Screen Positive (A) Screen Negative    Cannabinoids Urine Screen Negative Screen Negative    Cocaine Urine Screen Negative Screen Negative    Opiates Urine Screen Negative Screen Negative   EKG 12-lead, complete     Status: None   Result Value Ref Range    Systolic Blood Pressure  mmHg    Diastolic Blood Pressure  mmHg    Ventricular Rate 73 BPM    Atrial Rate 73 BPM    [place] Interval 142 ms    QRS Duration 156 ms     ms    QTc 504 ms    P Axis 67 degrees    R AXIS -48 degrees    T Axis 81 degrees    Interpretation ECG       Sinus rhythm  Left axis deviation  Left bundle branch block  Abnormal ECG  Unconfirmed report - interpretation of this ECG is computer generated - see medical record for final interpretation  Confirmed by - EMERGENCY ROOM, PHYSICIAN ([id number]),  AG VELÁZQUEZ ([id number]) on 5/19/2023 10:58:21 AM     CBC with platelets differential     Status: Abnormal    Narrative    The following orders were created for panel order CBC with platelets differential.  Procedure                               Abnormality         Status                     ---------                               -----------         ------                     CBC with platelets and d...[[id number]]  Abnormal            Final result                 Please view results for these tests on the individual orders.   Urine Drugs of Abuse Screen     Status: Abnormal    Narrative    The following orders were created for panel order Urine Drugs of Abuse Screen.  Procedure                               Abnormality         Status                     ---------                               -----------         ------                     Drug abuse screen 1 urin...[[id number]]  Abnormal             Final result                 Please view results for these tests on the individual orders.       Patient will be catheterized and a urinalysis will be sent to the lab.    Patient will be observed for spontaneous urinary output over the next 8 to 10 hours.      Final diagnoses:   Altered mental status, unspecified altered mental status type   Schizophrenia, unspecified type (H)   Urinary retention       Michael Metz MD, MD Metz, Michael Thomas MD  05/21/23 7113

## 2023-05-21 NOTE — TELEPHONE ENCOUNTER
R:  No beds within [facility].    Bed Search completed @ 7:30am  & Again at 1pm.     No available beds for review identified.  Pt remains on adult worklist pending bed availability.

## 2023-05-21 NOTE — ED NOTES
Pt awake throughout the day, he denies acute complaints. He ambulates independently however requires prompting to eat and is notably tremulous. He has 1:1 at bedside for ADLs and safety. Per 1:1 pt goes to restroom and doesn't urinate, just flushes the toilet. Pt is malodorous and when offered a clean shirt, he says he doesn't need it. He appears in stable condition. Bladder scan = 455Ml , per MD pt to be Straight cath'ed now.

## 2023-05-21 NOTE — TELEPHONE ENCOUNTER
Called ED @ 00:39 to request UDS be collected when able for outside hospitals to review    No appropriate beds are currently available within the  system. Bed search update @ 12:30AM:        [facility]: @ cap per website  [facility]: @ cap per website  [facility]: @ cap per website  [facility]: @ cap per website  [facility]: @ cap per website  [facility]: @ cap per website  [facility]: @ cap per website  [facility]: @ cap per website  [facility]: @ cap per website  [facility]: Posting 3 beds. Mixed unit/Low acuity only.  [facility]: @ cap per website  [facility]: @ cap per website  [facility]: Posting 2 beds; Low acuity only. Per Marcia @ 00:30, they are full at least 10AM  [facility]: Does not review after 10PM.    [facility]: Posting beds at all 3 sites. Per Marni @ 00:31, they have beds available; no ayaka-psych beds, cannot accommodate any patients that required restraints; needs all labs for review  [facility]: Posting 5 beds (No hx of aggression. No sexual offenders. Voluntary patients only).  [facility]: Posting 9 beds (Low acuity only. Must have the cognitive ability to do programming. No aggressive or violent behavior or recent HX in the last 2 yrs. MH must be primary).  [facility]: @ cap per website  [facility]: @ cap per website. Low acuity, Neg Covid.  [facility]: Posting 2 beds (Covid neg. Voluntary only. Mixed unit. No aggressive or violent behavior. No registered sex offenders).  [facility]: Posting 1 bed (No hx of aggression/assault. No lines, drains or tubes. Does not provide detox or CD treatment).  [facility]: Posting 10 beds. Facility is in [place]. Pt is voluntary and does not want to seek placement here  [facility]: Posting 3 beds (Mixed unit/Low acuity).        Pt remains on work list until appropriate placement is available

## 2023-05-21 NOTE — ED NOTES
RN notified by 1:1 staff that patient has been making multiple attempts to urinate in urinal without success. Bladder scanner showed >999ml, pt reports hx of prostate issues and reports having to have catheter in the past. Agreeable to have straight cath.

## 2023-05-21 NOTE — ED NOTES
Patient remains restless and attempting to get up despite unsteady gait, offered books and music for distraction activities, pt appears to respond positively to sound of music soundtrack.

## 2023-05-21 NOTE — ED NOTES
Brief period of rest, approximately 15 minutes, however is awake in room otherwise. Appears in stable condition.

## 2023-05-21 NOTE — TELEPHONE ENCOUNTER
R:  No appropriate beds within [place].  Bed Search update Statewide 10:00PM    [facility]: @ cap per website  [facility]: @cap per website.  - Per Passang, no beds  [facility]: @ cap per website  [facility]: @ cap per website- Per Passang, no beds  [facility]: @ cap per website  [facility]: @ cap per website- Per Passang, no beds  [facility]: @ cap per website  [facility]:  @ cap per website- Per Passang, no beds  [facility]: @ 3 bed posted.   Mixed unit with children.  Pt not appropriate due to acuity  [facility]: @ cap per website  [facility]: @ cap per website  [facility]: 2 beds posted- Per Passang, LOW ACUITY shared M and F available.  Not appropriate.  [facility]:  1 LOW ACUITY beds posted.  Per Preeti, no beds available  [facility]:  7 bed posted.  Per Winnie, not appropriate due to vulnerability  [facility]: 3 LOW ACUITY beds posted.  Per Winnie, not appropriate  [facility]: 5 LOW ACUITY beds posted.  Per Winnie, not appropriate  [facility]: 6 LOW ACUITY beds available.  Voluntary pts only.  Not appropriate  [facility]: 9 LOW ACUITY beds posted.  Not appropriate  [facility]: @ cap per website   [facility]: @ cap per website  [facility]: 2 bed available.  Voluntary pts only.  Mixed unit with children.   Not appropriate  [facility]: 10 beds posted  Out of state.    [facility]: 3 beds available.   Mixed unit with children.  Not appropriate      Pt remains on PPS work list awaiting appropriate placement.

## 2023-05-21 NOTE — PROGRESS NOTES
"Triage & Transition Services, Extended Care     Therapy Progress Note    Patient: Vinod goes by \"Vinod,\" uses he/him pronouns  Date of Service: May 21, 2023  Site of Service: [facility] ED   Patient was seen in-person.      Presenting problem:   Vinod is followed related to Long wait time for admission: over 24 hours. Please see initial DEC/[facility] Crisis Assessment completed by Leilani Shaver on 5/18/23 for complete assessment information.      Individuals Present: Vinod & Sudha Mead [facility]    Session start: 1020  Session end: 1036  Session duration in minutes: 16  Session number: 2  Anticipated number of sessions or this episode of care: 3  CPT utilized: 40316 - Psychotherapy (with patient) - 30 (16-37*) min    Current Presentation:    Pt presented with a blunt affect. Pts mood appeared to be congruent with this presentation. Pt was oriented to place and person. Thought out session it appears that Pt was responding to internal stimuli. Pt was experiencing thought blocking. Pt did present with disorganized thought content and appears to be a poor historian.  Pt did not endorse any current SI/SIB/HI.  But when writer asked these safety assessment questions Pt stated \"this is not going to be good for anybody.\"  Per RN Pt was up most of the night did not sleep, patient did eat some of his breakfast this morning. Pt appeared to be hesitant to take medications but eventually agreed. Pt still presents with delusional thoughts and psychotic symptoms that are grossly impairing his ability to function in his daily activities.      Mental Status Exam:   Appearance: awake, alert and slightly unkempt  Attitude: guarded and slightly uncooperative  Eye Contact: fair  Mood: anxious  Affect: restricted range  Speech: mumbling  Psychomotor Behavior: no evidence of tardive dyskinesia, dystonia, or tics  Thought Process:  disorganized and evidence of thought blocking present  Associations: loosening of associations " "present  Thought Content: patient appears to be responding to internal stimuli  Insight: limited  Judgement: poor  Oriented to: time, person, and place  Attention Span and Concentration: limited  Recent and Remote Memory: limited    Diagnosis:   298.9 (F29)  Unspecified Schizophrenia Spectrum   300.09 (F41.8) Other Specified Anxiety Disorder  - by history (OCD)    Therapeutic Intervention(s):   Provided active listening, unconditional positive regard, and validation.  Pt appears to minimally engaged in therapeutic interventions due to his current disorganization and psychosis sx.     Treatment Objective(s) Addressed:   The focus of this session was on rapport building and assessing safety.     Progress Towards Goals:   Patient reports stable symptoms. Patient is not making progress towards treatment goals as evidenced by Pt is still experiencing acute psychosis sx and disorganization.     Case Management:   None at time of session.     \"Spoke with patient's mother, Alberta ([phone]).  She reports her and her /patient's father had just visited with patient yesterday.  She reports being very concerned about his mental health.  She reports patient's psychiatrist, Dr. Santana, took him off his psychiatric medications.  She does not know why those medications were stopped.  She states he has decompensated very quickly.  Mother states patient never  and has no children.  She reports he has one sister, Sandra Treadwell, in [place] ([phone]).     Consulted with internal resources.  Due to patient's lack of capacity, patient's next of kin are his decision maker and his next of kin is his parents, Alberta and Ino [phone], Next of kin are unable to make decisions regarding neuroleptic medications.\"      General Recommendations:   Continue to monitor for harm. Consider: Consider 1:1 staffing, Increase frequency of staff rounding, Use a positive, direct and calm approach. Pt's tend to match the " energy/mood of the staff. Keep focus positive and upbeat, Use clear and concise directions, too many words can be overwhelming and Provide the pt with options to provide a sense of control. Try to tell the pt what they can do instead of what they can't do    Plan:   Inpatient Mental Health: Pt appears to be experiencing acute psychosis sx and disorganization. Pts sx have been impacting Pts ability to care for self and daily living. Pt is currently voluntary.     Plan for Care reviewed with Assigned Medical Provider? Yes. Provider, MD Segundo, response: In agreement     Sudha Mead, [facility]   Licensed Mental Health Professional (LMHP), [facility]  [phone]

## 2023-05-22 ENCOUNTER — TELEPHONE (OUTPATIENT)
Dept: BEHAVIORAL HEALTH | Facility: CLINIC | Age: 63
End: 2023-05-22
Payer: COMMERCIAL

## 2023-05-22 ENCOUNTER — APPOINTMENT (OUTPATIENT)
Dept: CT IMAGING | Facility: CLINIC | Age: 63
DRG: 885 | End: 2023-05-22
Attending: EMERGENCY MEDICINE
Payer: COMMERCIAL

## 2023-05-22 LAB — VIT B12 SERPL-MCNC: 617 PG/ML (ref 232–1245)

## 2023-05-22 PROCEDURE — 250N000013 HC RX MED GY IP 250 OP 250 PS 637: Performed by: EMERGENCY MEDICINE

## 2023-05-22 PROCEDURE — 70450 CT HEAD/BRAIN W/O DYE: CPT

## 2023-05-22 PROCEDURE — 250N000011 HC RX IP 250 OP 636: Performed by: EMERGENCY MEDICINE

## 2023-05-22 PROCEDURE — 87389 HIV-1 AG W/HIV-1&-2 AB AG IA: CPT | Performed by: STUDENT IN AN ORGANIZED HEALTH CARE EDUCATION/TRAINING PROGRAM

## 2023-05-22 PROCEDURE — 84443 ASSAY THYROID STIM HORMONE: CPT | Performed by: STUDENT IN AN ORGANIZED HEALTH CARE EDUCATION/TRAINING PROGRAM

## 2023-05-22 PROCEDURE — 99222 1ST HOSP IP/OBS MODERATE 55: CPT | Mod: GC | Performed by: STUDENT IN AN ORGANIZED HEALTH CARE EDUCATION/TRAINING PROGRAM

## 2023-05-22 PROCEDURE — 258N000003 HC RX IP 258 OP 636: Performed by: EMERGENCY MEDICINE

## 2023-05-22 PROCEDURE — 82607 VITAMIN B-12: CPT | Performed by: EMERGENCY MEDICINE

## 2023-05-22 PROCEDURE — 250N000009 HC RX 250: Performed by: EMERGENCY MEDICINE

## 2023-05-22 PROCEDURE — 36415 COLL VENOUS BLD VENIPUNCTURE: CPT | Performed by: EMERGENCY MEDICINE

## 2023-05-22 RX ORDER — RISPERIDONE 0.5 MG/1
0.5 TABLET, ORALLY DISINTEGRATING ORAL ONCE
Status: COMPLETED | OUTPATIENT
Start: 2023-05-22 | End: 2023-05-22

## 2023-05-22 RX ADMIN — CYANOCOBALAMIN TAB 1000 MCG 1000 MCG: 1000 TAB at 10:48

## 2023-05-22 RX ADMIN — LORAZEPAM 1 MG: 1 TABLET ORAL at 19:39

## 2023-05-22 RX ADMIN — FOLIC ACID: 5 INJECTION, SOLUTION INTRAMUSCULAR; INTRAVENOUS; SUBCUTANEOUS at 11:06

## 2023-05-22 RX ADMIN — RISPERIDONE 0.5 MG: 0.5 TABLET, ORALLY DISINTEGRATING ORAL at 10:48

## 2023-05-22 RX ADMIN — RISPERIDONE 0.5 MG: 0.5 TABLET, ORALLY DISINTEGRATING ORAL at 19:39

## 2023-05-22 RX ADMIN — LORAZEPAM 1 MG: 1 TABLET ORAL at 14:49

## 2023-05-22 RX ADMIN — RISPERIDONE 0.5 MG: 0.5 TABLET, ORALLY DISINTEGRATING ORAL at 01:00

## 2023-05-22 RX ADMIN — THIAMINE HYDROCHLORIDE 400 MG: 100 INJECTION, SOLUTION INTRAMUSCULAR; INTRAVENOUS at 12:48

## 2023-05-22 RX ADMIN — LORAZEPAM 1 MG: 1 TABLET ORAL at 10:47

## 2023-05-22 RX ADMIN — OLANZAPINE 2.5 MG: 5 TABLET, ORALLY DISINTEGRATING ORAL at 10:49

## 2023-05-22 RX ADMIN — OLANZAPINE 5 MG: 5 TABLET, ORALLY DISINTEGRATING ORAL at 21:09

## 2023-05-22 RX ADMIN — SERTRALINE HYDROCHLORIDE 150 MG: 100 TABLET ORAL at 10:47

## 2023-05-22 RX ADMIN — TAMSULOSIN HYDROCHLORIDE 0.4 MG: 0.4 CAPSULE ORAL at 10:49

## 2023-05-22 ASSESSMENT — ACTIVITIES OF DAILY LIVING (ADL)
ADLS_ACUITY_SCORE: 35

## 2023-05-22 NOTE — ED NOTES
Patient yelling loudly in room. Repeating similar phrases. Provider notified of increased agitation. Medication ordered. Will continue to monitor.

## 2023-05-22 NOTE — ED NOTES
Patient had agreed to take morning medication, however, when nurse brought them patient refused to take them.

## 2023-05-22 NOTE — TELEPHONE ENCOUNTER
R:  No appropriate beds within [place].  Bed Search update Statewide 10:00PM    [facility]: @ cap per website  [facility]: @cap per website.  - Per Migue[place]na, no beds  [facility]: @ cap per website  [facility]: @ cap per website- Per Miguelina, no beds  [facility]: @ cap per website  [facility]: @ cap per website- Per Miguelina, no beds  [facility]: @ cap per website  [facility]:  @ cap per website- Per Miguelina, no beds  [facility]: @ 3 bed posted.   Mixed unit with children.  Pt not appropriate due to acuity  [facility]: @ cap per website  [facility]: @ cap per website  [facility]: 3 beds posted- Per Miguelina, no beds available  [facility]:  1 LOW ACUITY beds posted.    [facility]:  2 bed posted.  Per Patricio, at capacity  [facility]: 2 LOW ACUITY beds posted.  Per Patricio, at capacity  [facility]: 6 LOW ACUITY beds posted.  Per Patricio, not appropriate  [facility]: 5 LOW ACUITY beds available.  Voluntary pts only.  Not appropriate  [facility]: 9 LOW ACUITY beds posted.  Not appropriate  [facility]: @ cap per website   [facility]: @ cap per website  [facility]: 2 bed available.  Voluntary pts only.  Mixed unit with children.   Not appropriate  [facility]: 2 beds posted  Out of state.    [facility]: 4 beds available.   Mixed unit with children.  Not appropriate      Pt remains on PPS work list awaiting appropriate placement.

## 2023-05-22 NOTE — ED NOTES
When writer arrived in am patient responsive and up and out of bed.  Patient agreed to take meds, however, when writer went to administer, patient became unresponsive.  Writer attempted to awake patient.  RR regular, vitals stable.  Dr Luu informed.  Jus also unable to awaken patient and placed nasal airway and ordered head CT.  While waiting for CT patient has woken, however, still not verbalizing normally and slow to respond.

## 2023-05-22 NOTE — TELEPHONE ENCOUNTER
No appropriate beds are currently available within the  system. Bed search update @ 2:20AM:       [facility]: @ cap per website  [facility]: @ cap per website  [facility]: @ cap per website. Patsy @ 00:23 they cannot review until later this AM when their referral coordinator is in  [facility]: @ cap per website  [facility]: @ cap per website  [facility]: @ cap per website  [place]: @ cap per website  [facility]: @ cap per website  [facility]: @ cap per website  [facility]: Posting 3 beds. Mixed unit/Low acuity only. Pt not appropriate d/t acuity  [facility]: @ cap per website  [facility]: @ cap per website  [facility]: Posting 3 beds. Marcia @ 00:09 reports they are at [facility]: Does not review after 10PM.    [facility]: @ cap per website  [facility]: @ cap per website  [facility]: Posting 6 beds. Efren @ 00:10 reports low acuity, male bed avail - Pt not appropriate for current beds available  [facility]: Posting 5 beds (No hx of aggression. No sexual offenders. Voluntary patients only). Pt not appropriate d/t aggression towards another  resident   [facility]: Posting 7 beds (Low acuity only. Must have the cognitive ability to do programming. No aggressive or violent behavior or recent HX in the last 2 yrs. MH must be primary). Pt not appropriate d/t aggression towards another  resident   [facility]: @ cap per website  [facility]: @ cap per website. Low acuity, Neg Covid.  [facility]: Posting 2 beds (Covid neg. Voluntary only. Mixed unit. No aggressive or violent behavior. No registered sex offenders). Pt not appropriate d/t aggression towards another  resident   [facility]: @ cap per website (No hx of aggression/assault. No lines, drains or tubes. Does not provide detox or CD treatment).   [facility]: Posting 2 beds. Facility is in [place]. Pt does not want to seek  placement here  [facility]: Posting 4 beds (Mixed unit/Low acuity. No lines, tubes or drains). Pt not appropriate d/t aggression towards another  resident      Pt remains on work list until appropriate placement is available

## 2023-05-22 NOTE — ED NOTES
Patient continues to yell and banging hands on bed rail. Patient accepted Risperdal. Bladder scan for 223 ml. Patient has not voided since straight cath at 1730. Provider notified. Plan to bladder scan again at 0500 to evaluate if patient requires lowery.

## 2023-05-22 NOTE — CONSULTS
Urology Consult    Name: Vinod Lee    MRN: [id number]   YOB: 1960               Chief Complaint:     History is obtained from the patient and chart review          History of Present Illness:   Vinod Lee is a 62 year old male with PMH of parkinson's disease, schizophrenia, currently admitted for acute disorientation/darryl.    Yesterday patient was straight catheterized for 1L, has been undergoing CIC since requiring a few catheterizations. Per nursing staff this is not difficult. He is non-verbal and non-responsive this AM. He has no urologic hx per chart review.      Tamsulosin started yesterday.           Past Medical History:     Past Medical History:   Diagnosis Date     Parkinson's disease (H)      Schizophrenia (H)             Past Surgical History:   History reviewed. No pertinent surgical history.         Social History:     Social History     Tobacco Use     Smoking status: Never     Smokeless tobacco: Never   Vaping Use     Vaping status: Not on file   Substance Use Topics     Alcohol use: Not on file     Comment: GRACE            Family History:   History reviewed. No pertinent family history.         Allergies:   No Known Allergies         Medications:     Current Facility-Administered Medications   Medication     acetaminophen (TYLENOL) tablet 650 mg     cyanocobalamin (VITAMIN B-12) tablet 1,000 mcg     docusate sodium (COLACE) capsule 100 mg     LORazepam (ATIVAN) tablet 1 mg     naproxen (NAPROSYN) tablet 250 mg     OLANZapine zydis (zyPREXA) ODT half-tab 2.5 mg     OLANZapine zydis (zyPREXA) ODT tab 5 mg     risperiDONE (risperDAL M-TABS) ODT tab 0.5 mg     sertraline (ZOLOFT) tablet 150 mg     tamsulosin (FLOMAX) capsule 0.4 mg     Current Outpatient Medications   Medication Sig     cyanocobalamin (VITAMIN B-12) 1000 MCG tablet Take 1,000 mcg by mouth daily     LORazepam (ATIVAN) 1 MG tablet Take 1 mg by mouth 3 times daily     OLANZapine (ZYPREXA) 2.5 MG tablet Take 2.5 mg by  mouth every morning     OLANZapine (ZYPREXA) 5 MG tablet Take 5 mg by mouth At Bedtime     sertraline (ZOLOFT) 50 MG tablet Take 150 mg by mouth daily             Review of Systems:    ROS: 10 point ROS neg other than the symptoms noted above in the HPI           Physical Exam:   VS:  T: 98    HR: 76    BP: 151/65    RR: 16   GEN:  AOx0.  NAD.    CV:  RRR  LUNGS: Non-labored breathing.          Data:   All laboratory data reviewed:    Recent Labs   Lab 05/18/23 2025   WBC 10.2   HGB 13.6        Recent Labs   Lab 05/18/23 2025      POTASSIUM 3.9   CHLORIDE 103   CO2 24   BUN 25.9*   CR 1.00   GLC 97   BRENDA 9.0     Recent Labs   Lab 05/21/23  1808   COLOR Yellow   APPEARANCE Clear   URINEGLC Negative   URINEBILI Negative   URINEKETONE Trace*   SG 1.032   URINEPH 5.5   PROTEIN 20*   NITRITE Negative   LEUKEST Negative   RBCU 1   WBCU 3       All pertinent imaging reviewed:    CT scan of the abdomen:        Renal ultrasound:               Impression and Plan:   Impression:   Vinod Lee is a 62 year old male with PMH of parkinson's disease, schizophrenia, currently admitted for acute disorientation/darryl. Urology consulted for acute urinary retention.    Unclear what baseline voiding function is. He is on many medications that could be contributing including ativan, zyprexa, resperidone. Additionally his hx of parkinson's could be contributing as well. Given age, BPH could be a contributor. Would recommend attempting a trial of void when patient returns to baseline function closer to discharge, if he fails, would need to be discharged with CIC vs lowery catheter w/ plans for f/u in urology clinic.       Plan:  -continue CIC or place lowery catheter based on patient toleration of intermittent catheterization   -perform a trial of void when patient returns to baseline function closer to discharge/day of discharge, please perform trial of void in the AM  -continue tamsulosin  -if patient fails TOV on  discharge, place outpatient urology consult and have patient perform CIC or place a lowery catheter depending on their ability to perform CIC   -urology will s/o, please page/call with questoins    This patient's exam findings, labs, and imaging discussed with urology staff surgeon Dr. Melisa MD, who developed the treatment plan.    Chuy Michael MD  Urology Resident

## 2023-05-22 NOTE — TELEPHONE ENCOUNTER
R:  Pt continues to be agitated in ED - yelling and banging hands on bed rails.  Pt intermittently is compliant at times, and other times not - with taking meds.   Pt having difficulty voiding and has been straight cathed.       Intake paged Meagan @ 8:39am to present for unit 12  Dr Alba called intake back @ 10:11am and feels pt is too medically complex for [facility].    This am - notes reflect pt was unresponsive this am nasal airway, has a hx of parkinsons disease which is a type of Dementia, requiring straight caths in the ED, and not appropriate for [facility] and should go medical.       Dr alba would like this Pt   Also does not feel pt is appropriate for unit 12, but could go to a lower acuity unit IF ED Dr medically clears after a CT Scan this am, and review of all lab levels.  Agreed that ED DR will call intake once feels pt 'is indeed' medically stable for transfer and will represent to a lower acuity unit vs 12.

## 2023-05-22 NOTE — ED NOTES
"Patient yelling out loudly. Calling himself Sharla and repeating \"Sandra the mother of Sharla.\" Patient pulling on beard hair. Writer and attendant redirecting patient. Provider notified. Given bedtime Zyprexa early. Waiting on further medication orders.   "

## 2023-05-22 NOTE — ED NOTES
Pt bladder scanned for 301 ml of urine, MD notified.  Pt straight cathed for 250 ml using sterile technique and 16 Kinyarwanda coude.  Pt tolerated well.

## 2023-05-22 NOTE — ED NOTES
Patient continued to scream out during my shift that he was Satan.  Medications were reviewed and I decided to add Risperdal 0.5 mg orally disintegrating tablet twice daily to his medication regimen.  Patient received a dose here and after 60 minutes was napping and more controlled.    Michael Metz MD, Michael Nguyen MD  05/21/23 9645

## 2023-05-22 NOTE — ED PROVIDER NOTES
Emergency Department Patient Sign-out       Brief HPI:  This is a 62 year old male signed out to me by previous provider.  See initial ED Provider note for details of the presentation.          Patient is medically cleared for admission to a Behavioral Health unit.      The patient is not on a hold.      The patient has not required medication for agitation.    Awaiting Transfer to Mental Health Facility        Significant Events prior to my assuming care: per Epic    During the course of ED observation I was notified the patient had an episode of decreased responsiveness, and my bedside evaluation he was responsive to physical stimuli including placing nasal airway.  Patient had a follow-up CT head which was unremarkable.  Case discussed with behavioral health team and given his history of B12 deficiency we will go ahead and provide vitamin repletion here in the emergency department including thiamine folate and B12.  Urology consult noted, urine Barrios catheter placed.  Patient remains medically clear awaiting inpatient bed.      Exam:   Patient Vitals for the past 24 hrs:   BP Temp Temp src Pulse Resp SpO2   05/22/23 1543 (!) 144/77 96.8  F (36  C) Oral 86 20 95 %   05/22/23 0901 138/80 -- -- 75 17 100 %   05/22/23 0823 -- -- -- 80 19 98 %   05/22/23 0800 133/71 -- -- -- -- --   05/22/23 0732 (!) 151/65 -- -- 76 16 99 %   05/22/23 0231 128/74 98  F (36.7  C) Oral 70 18 96 %   05/21/23 1920 133/60 97.5  F (36.4  C) Oral 81 18 98 %           ED RESULTS:   Results for orders placed or performed during the hospital encounter of 05/18/23 (from the past 24 hour(s))   Urology IP Consult: urinary retention in Mental Health [facility]; Consultant may enter orders: Yes; Requesting provider? ED Provider     Status: None ()    Collection Time: 05/21/23  5:54 PM    Chuy Pickett MD     5/22/2023  8:14 AM  Urology Consult    Name: Vinod Lee    MRN: [id number]   Date of Birth:  1960               Chief Complaint:     History is obtained from the patient and chart review          History of Present Illness:   Vinod Lee is a 62 year old male with PMH of parkinson's   disease, schizophrenia, currently admitted for acute   disorientation/darryl.    Yesterday patient was straight catheterized for 1L, has been   undergoing CIC since requiring a few catheterizations. Per   nursing staff this is not difficult. He is non-verbal and   non-responsive this AM. He has no urologic hx per chart review.      Tamsulosin started yesterday.           Past Medical History:     Past Medical History:   Diagnosis Date     Parkinson's disease (H)      Schizophrenia (H)             Past Surgical History:   History reviewed. No pertinent surgical history.         Social History:     Social History     Tobacco Use     Smoking status: Never     Smokeless tobacco: Never   Vaping Use     Vaping status: Not on file   Substance Use Topics     Alcohol use: Not on file     Comment: GRACE            Family History:   History reviewed. No pertinent family history.         Allergies:   No Known Allergies         Medications:     Current Facility-Administered Medications   Medication     acetaminophen (TYLENOL) tablet 650 mg     cyanocobalamin (VITAMIN B-12) tablet 1,000 mcg     docusate sodium (COLACE) capsule 100 mg     LORazepam (ATIVAN) tablet 1 mg     naproxen (NAPROSYN) tablet 250 mg     OLANZapine zydis (zyPREXA) ODT half-tab 2.5 mg     OLANZapine zydis (zyPREXA) ODT tab 5 mg     risperiDONE (risperDAL M-TABS) ODT tab 0.5 mg     sertraline (ZOLOFT) tablet 150 mg     tamsulosin (FLOMAX) capsule 0.4 mg     Current Outpatient Medications   Medication Sig     cyanocobalamin (VITAMIN B-12) 1000 MCG tablet Take 1,000 mcg by   mouth daily     LORazepam (ATIVAN) 1 MG tablet Take 1 mg by mouth 3 times daily       OLANZapine (ZYPREXA) 2.5 MG tablet Take 2.5 mg by mouth every   morning     OLANZapine (ZYPREXA) 5 MG tablet  Take 5 mg by mouth At Bedtime     sertraline (ZOLOFT) 50 MG tablet Take 150 mg by mouth daily             Review of Systems:    ROS: 10 point ROS neg other than the symptoms noted above in the   HPI           Physical Exam:   VS:  T: 98    HR: 76    BP: 151/65    RR: 16   GEN:  AOx0.  NAD.    CV:  RRR  LUNGS: Non-labored breathing.          Data:   All laboratory data reviewed:    Recent Labs   Lab 05/18/23 2025   WBC 10.2   HGB 13.6        Recent Labs   Lab 05/18/23 2025      POTASSIUM 3.9   CHLORIDE 103   CO2 24   BUN 25.9*   CR 1.00   GLC 97   BRENDA 9.0     Recent Labs   Lab 05/21/23  1808   COLOR Yellow   APPEARANCE Clear   URINEGLC Negative   URINEBILI Negative   URINEKETONE Trace*   SG 1.032   URINEPH 5.5   PROTEIN 20*   NITRITE Negative   LEUKEST Negative   RBCU 1   WBCU 3       All pertinent imaging reviewed:    CT scan of the abdomen:        Renal ultrasound:               Impression and Plan:   Impression:   Vinod Lee is a 62 year old male with PMH of parkinson's   disease, schizophrenia, currently admitted for acute   disorientation/darryl. Urology consulted for acute urinary   retention.    Unclear what baseline voiding function is. He is on many   medications that could be contributing including ativan, zyprexa,   resperidone. Additionally his hx of parkinson's could be   contributing as well. Given age, BPH could be a contributor.   Would recommend attempting a trial of void when patient returns   to baseline function closer to discharge, if he fails, would need   to be discharged with CIC vs lowery catheter w/ plans for f/u in   urology clinic.       Plan:  -continue CIC or place lowery catheter based on patient toleration   of intermittent catheterization   -perform a trial of void when patient returns to baseline   function closer to discharge/day of discharge, please perform   trial of void in the AM  -continue tamsulosin  -if patient fails TOV on discharge, place outpatient  urology   consult and have patient perform CIC or place a lowery catheter   depending on their ability to perform CIC   -urology will s/o, please page/call with questoins    This patient's exam findings, labs, and imaging discussed with   urology staff surgeon Dr. Melisa MD, who developed the treatment   plan.    Chuy Michael MD  Urology Resident              UA with Microscopic reflex to Culture     Status: Abnormal    Collection Time: 05/21/23  6:08 PM    Specimen: Urine, Clean Catch   Result Value Ref Range    Color Urine Yellow Colorless, Straw, Light Yellow, Yellow    Appearance Urine Clear Clear    Glucose Urine Negative Negative mg/dL    Bilirubin Urine Negative Negative    Ketones Urine Trace (A) Negative mg/dL    Specific Gravity Urine 1.032 1.003 - 1.035    Blood Urine Small (A) Negative    pH Urine 5.5 5.0 - 7.0    Protein Albumin Urine 20 (A) Negative mg/dL    Urobilinogen Urine Normal Normal, 2.0 mg/dL    Nitrite Urine Negative Negative    Leukocyte Esterase Urine Negative Negative    Bacteria Urine Few (A) None Seen /HPF    Mucus Urine Present (A) None Seen /LPF    RBC Urine 1 <=2 /HPF    WBC Urine 3 <=5 /HPF    Transitional Epithelials Urine <1 <=1 /HPF    Hyaline Casts Urine 9 (H) <=2 /LPF    Narrative    Urine Culture not indicated   Head CT w/o contrast     Status: None    Collection Time: 05/22/23 10:07 AM    Narrative    CT OF THE HEAD WITHOUT CONTRAST   5/22/2023 10:07 AM     COMPARISON: None    HISTORY:  AMS     TECHNIQUE:  Axial CT images of the head from the skull base to the  vertex were acquired without IV contrast. Dose reduction techniques  were used.     FINDINGS:   INTRACRANIAL CONTENTS: No intracranial hemorrhage. Mild age-related  diffuse cerebral parenchymal volume loss. No midline shift. The  basilar cisterns are patent. No cerebellar tonsillar ectopia. No CT  evidence for an acute infarct.    VISUALIZED ORBITS/SINUSES/MASTOIDS: No significant orbital  abnormality.  Moderate  chronic mucosal thickening of the ethmoid air  cells. Mild mucosal thickening of the right sphenoid sinus.    OSSEOUS STRUCTURES/SOFT TISSUES: No significant abnormality.      Impression    IMPRESSION:  1.  No intracranial hemorrhage, mass lesions, hydrocephalus CT  evidence for an acute infarct.  2.  Mild age-related changes.  3.  Moderate chronic mucosal thickening of the ethmoid air cells. Mild  chronic mucosal thickening of the right sphenoid sinus.    SERGE EDEN MD         SYSTEM ID:  [id number]       ED MEDICATIONS:   Medications   LORazepam (ATIVAN) tablet 1 mg (1 mg Oral $Given 5/22/23 1449)   OLANZapine zydis (zyPREXA) ODT half-tab 2.5 mg (2.5 mg Oral $Given 5/22/23 1049)   OLANZapine zydis (zyPREXA) ODT tab 5 mg ( Oral Not Given 5/22/23 0030)   sertraline (ZOLOFT) tablet 150 mg (150 mg Oral $Given 5/22/23 1047)   cyanocobalamin (VITAMIN B-12) tablet 1,000 mcg (1,000 mcg Oral $Given 5/22/23 1048)   naproxen (NAPROSYN) tablet 250 mg (250 mg Oral $Given 5/20/23 1438)   docusate sodium (COLACE) capsule 100 mg (has no administration in time range)   tamsulosin (FLOMAX) capsule 0.4 mg (0.4 mg Oral $Given 5/22/23 1049)   acetaminophen (TYLENOL) tablet 650 mg (650 mg Oral $Given 5/21/23 1858)   risperiDONE (risperDAL M-TABS) ODT tab 0.5 mg (0.5 mg Sublingual $Given 5/22/23 1048)   OLANZapine zydis (zyPREXA) ODT tab 10 mg (10 mg Oral $Given 5/18/23 1733)   OLANZapine zydis (zyPREXA) ODT tab 10 mg (10 mg Oral $Given 5/19/23 0719)   haloperidol (HALDOL) tablet 5 mg (5 mg Oral $Given 5/20/23 0305)   LORazepam (ATIVAN) tablet 1 mg (1 mg Oral $Given 5/21/23 1035)   risperiDONE (risperDAL M-TABS) ODT tab 0.5 mg (0.5 mg Oral $Given 5/22/23 0100)   thiamine (B-1) 400 mg in sodium chloride 0.9 % 50 mL intermittent infusion (400 mg Intravenous $New Bag 5/22/23 1248)   dextrose 5% and 0.45% NaCl 1,000 mL with Infuvite Adult 10 mL, thiamine 100 mg, folic acid 1 mg infusion ( Intravenous Stopped 5/22/23 1256)          Impression:    ICD-10-CM    1. Altered mental status, unspecified altered mental status type  R41.82       2. Schizophrenia, unspecified type (H)  F20.9       3. Urinary retention  R33.9           Plan:    Pending studies include B12 level.        MD Jus Chaparro, Glenna TAYLOR MD  05/22/23 9476

## 2023-05-23 ENCOUNTER — APPOINTMENT (OUTPATIENT)
Dept: GENERAL RADIOLOGY | Facility: CLINIC | Age: 63
DRG: 885 | End: 2023-05-23
Attending: EMERGENCY MEDICINE
Payer: COMMERCIAL

## 2023-05-23 PROBLEM — R33.9 URINARY RETENTION: Status: ACTIVE | Noted: 2023-05-23

## 2023-05-23 PROBLEM — R41.82 ALTERED MENTAL STATUS, UNSPECIFIED ALTERED MENTAL STATUS TYPE: Status: ACTIVE | Noted: 2023-05-23

## 2023-05-23 PROBLEM — F20.9 SCHIZOPHRENIA, UNSPECIFIED TYPE (H): Status: ACTIVE | Noted: 2023-05-23

## 2023-05-23 LAB
ALBUMIN SERPL BCG-MCNC: 3.7 G/DL (ref 3.5–5.2)
ALBUMIN UR-MCNC: 20 MG/DL
ALP SERPL-CCNC: 61 U/L (ref 40–129)
ALT SERPL W P-5'-P-CCNC: 12 U/L (ref 10–50)
ANION GAP SERPL CALCULATED.3IONS-SCNC: 10 MMOL/L (ref 7–15)
APPEARANCE UR: CLEAR
AST SERPL W P-5'-P-CCNC: 14 U/L (ref 10–50)
BASOPHILS # BLD AUTO: 0.1 10E3/UL (ref 0–0.2)
BASOPHILS NFR BLD AUTO: 1 %
BILIRUB SERPL-MCNC: 0.5 MG/DL
BILIRUB UR QL STRIP: NEGATIVE
BUN SERPL-MCNC: 20.8 MG/DL (ref 8–23)
CALCIUM SERPL-MCNC: 8.9 MG/DL (ref 8.8–10.2)
CHLORIDE SERPL-SCNC: 106 MMOL/L (ref 98–107)
COLOR UR AUTO: YELLOW
CREAT SERPL-MCNC: 0.97 MG/DL (ref 0.67–1.17)
CRP SERPL-MCNC: 3.32 MG/L
DEPRECATED HCO3 PLAS-SCNC: 25 MMOL/L (ref 22–29)
EOSINOPHIL # BLD AUTO: 0 10E3/UL (ref 0–0.7)
EOSINOPHIL NFR BLD AUTO: 0 %
ERYTHROCYTE [DISTWIDTH] IN BLOOD BY AUTOMATED COUNT: 15.2 % (ref 10–15)
GFR SERPL CREATININE-BSD FRML MDRD: 88 ML/MIN/1.73M2
GLUCOSE BLDC GLUCOMTR-MCNC: 100 MG/DL (ref 70–99)
GLUCOSE SERPL-MCNC: 103 MG/DL (ref 70–99)
GLUCOSE UR STRIP-MCNC: NEGATIVE MG/DL
HCT VFR BLD AUTO: 38 % (ref 40–53)
HGB BLD-MCNC: 12.7 G/DL (ref 13.3–17.7)
HGB UR QL STRIP: ABNORMAL
HIV 1+2 AB+HIV1 P24 AG SERPL QL IA: NONREACTIVE
IMM GRANULOCYTES # BLD: 0 10E3/UL
IMM GRANULOCYTES NFR BLD: 0 %
KETONES UR STRIP-MCNC: NEGATIVE MG/DL
LEUKOCYTE ESTERASE UR QL STRIP: ABNORMAL
LYMPHOCYTES # BLD AUTO: 1.3 10E3/UL (ref 0.8–5.3)
LYMPHOCYTES NFR BLD AUTO: 16 %
MCH RBC QN AUTO: 28 PG (ref 26.5–33)
MCHC RBC AUTO-ENTMCNC: 33.4 G/DL (ref 31.5–36.5)
MCV RBC AUTO: 84 FL (ref 78–100)
MONOCYTES # BLD AUTO: 0.7 10E3/UL (ref 0–1.3)
MONOCYTES NFR BLD AUTO: 8 %
MUCOUS THREADS #/AREA URNS LPF: PRESENT /LPF
NEUTROPHILS # BLD AUTO: 6.4 10E3/UL (ref 1.6–8.3)
NEUTROPHILS NFR BLD AUTO: 75 %
NITRATE UR QL: NEGATIVE
NRBC # BLD AUTO: 0 10E3/UL
NRBC BLD AUTO-RTO: 0 /100
PH UR STRIP: 5.5 [PH] (ref 5–7)
PLATELET # BLD AUTO: 154 10E3/UL (ref 150–450)
POTASSIUM SERPL-SCNC: 3.5 MMOL/L (ref 3.4–5.3)
PROT SERPL-MCNC: 5.5 G/DL (ref 6.4–8.3)
RBC # BLD AUTO: 4.53 10E6/UL (ref 4.4–5.9)
RBC URINE: 5 /HPF
SODIUM SERPL-SCNC: 141 MMOL/L (ref 136–145)
SP GR UR STRIP: 1.03 (ref 1–1.03)
TSH SERPL DL<=0.005 MIU/L-ACNC: 1.8 UIU/ML (ref 0.3–4.2)
UROBILINOGEN UR STRIP-MCNC: NORMAL MG/DL
WBC # BLD AUTO: 8.5 10E3/UL (ref 4–11)
WBC URINE: 15 /HPF

## 2023-05-23 PROCEDURE — 250N000013 HC RX MED GY IP 250 OP 250 PS 637: Performed by: STUDENT IN AN ORGANIZED HEALTH CARE EDUCATION/TRAINING PROGRAM

## 2023-05-23 PROCEDURE — 80053 COMPREHEN METABOLIC PANEL: CPT | Performed by: STUDENT IN AN ORGANIZED HEALTH CARE EDUCATION/TRAINING PROGRAM

## 2023-05-23 PROCEDURE — 85004 AUTOMATED DIFF WBC COUNT: CPT | Performed by: EMERGENCY MEDICINE

## 2023-05-23 PROCEDURE — 71046 X-RAY EXAM CHEST 2 VIEWS: CPT

## 2023-05-23 PROCEDURE — 36415 COLL VENOUS BLD VENIPUNCTURE: CPT | Performed by: STUDENT IN AN ORGANIZED HEALTH CARE EDUCATION/TRAINING PROGRAM

## 2023-05-23 PROCEDURE — 250N000013 HC RX MED GY IP 250 OP 250 PS 637: Performed by: EMERGENCY MEDICINE

## 2023-05-23 PROCEDURE — 81001 URINALYSIS AUTO W/SCOPE: CPT | Performed by: STUDENT IN AN ORGANIZED HEALTH CARE EDUCATION/TRAINING PROGRAM

## 2023-05-23 PROCEDURE — 120N000002 HC R&B MED SURG/OB UMMC

## 2023-05-23 PROCEDURE — 82962 GLUCOSE BLOOD TEST: CPT

## 2023-05-23 PROCEDURE — 250N000011 HC RX IP 250 OP 636: Performed by: EMERGENCY MEDICINE

## 2023-05-23 PROCEDURE — 99222 1ST HOSP IP/OBS MODERATE 55: CPT | Mod: GC | Performed by: FAMILY MEDICINE

## 2023-05-23 PROCEDURE — 87086 URINE CULTURE/COLONY COUNT: CPT | Performed by: STUDENT IN AN ORGANIZED HEALTH CARE EDUCATION/TRAINING PROGRAM

## 2023-05-23 PROCEDURE — 93010 ELECTROCARDIOGRAM REPORT: CPT | Performed by: INTERNAL MEDICINE

## 2023-05-23 PROCEDURE — 86140 C-REACTIVE PROTEIN: CPT | Performed by: STUDENT IN AN ORGANIZED HEALTH CARE EDUCATION/TRAINING PROGRAM

## 2023-05-23 PROCEDURE — 96372 THER/PROPH/DIAG INJ SC/IM: CPT | Performed by: EMERGENCY MEDICINE

## 2023-05-23 PROCEDURE — 86780 TREPONEMA PALLIDUM: CPT | Performed by: STUDENT IN AN ORGANIZED HEALTH CARE EDUCATION/TRAINING PROGRAM

## 2023-05-23 PROCEDURE — 93005 ELECTROCARDIOGRAM TRACING: CPT

## 2023-05-23 RX ORDER — OLANZAPINE 5 MG/1
5 TABLET, ORALLY DISINTEGRATING ORAL 2 TIMES DAILY PRN
Status: DISCONTINUED | OUTPATIENT
Start: 2023-05-23 | End: 2023-05-24

## 2023-05-23 RX ORDER — NITROFURANTOIN 25; 75 MG/1; MG/1
100 CAPSULE ORAL EVERY 12 HOURS SCHEDULED
Status: DISCONTINUED | OUTPATIENT
Start: 2023-05-23 | End: 2023-05-26 | Stop reason: HOSPADM

## 2023-05-23 RX ORDER — LIDOCAINE 40 MG/G
CREAM TOPICAL
Status: DISCONTINUED | OUTPATIENT
Start: 2023-05-23 | End: 2023-05-26 | Stop reason: HOSPADM

## 2023-05-23 RX ORDER — OLANZAPINE 10 MG/2ML
10 INJECTION, POWDER, FOR SOLUTION INTRAMUSCULAR ONCE
Status: COMPLETED | OUTPATIENT
Start: 2023-05-23 | End: 2023-05-23

## 2023-05-23 RX ORDER — LORAZEPAM 2 MG/ML
1 INJECTION INTRAMUSCULAR ONCE
Status: COMPLETED | OUTPATIENT
Start: 2023-05-23 | End: 2023-05-23

## 2023-05-23 RX ADMIN — RISPERIDONE 0.5 MG: 0.5 TABLET, ORALLY DISINTEGRATING ORAL at 10:35

## 2023-05-23 RX ADMIN — LORAZEPAM 1 MG: 1 TABLET ORAL at 21:15

## 2023-05-23 RX ADMIN — LORAZEPAM 1 MG: 1 TABLET ORAL at 17:12

## 2023-05-23 RX ADMIN — OLANZAPINE 2.5 MG: 5 TABLET, ORALLY DISINTEGRATING ORAL at 10:36

## 2023-05-23 RX ADMIN — OLANZAPINE 5 MG: 5 TABLET, ORALLY DISINTEGRATING ORAL at 21:15

## 2023-05-23 RX ADMIN — NITROFURANTOIN (MONOHYDRATE/MACROCRYSTALS) 100 MG: 75; 25 CAPSULE ORAL at 21:14

## 2023-05-23 RX ADMIN — LORAZEPAM 1 MG: 2 INJECTION INTRAMUSCULAR; INTRAVENOUS at 11:09

## 2023-05-23 RX ADMIN — RISPERIDONE 0.5 MG: 0.5 TABLET, ORALLY DISINTEGRATING ORAL at 21:14

## 2023-05-23 RX ADMIN — OLANZAPINE 10 MG: 10 INJECTION, POWDER, FOR SOLUTION INTRAMUSCULAR at 06:42

## 2023-05-23 RX ADMIN — SERTRALINE HYDROCHLORIDE 150 MG: 100 TABLET ORAL at 10:35

## 2023-05-23 RX ADMIN — TAMSULOSIN HYDROCHLORIDE 0.4 MG: 0.4 CAPSULE ORAL at 17:12

## 2023-05-23 RX ADMIN — LORAZEPAM 1 MG: 1 TABLET ORAL at 10:35

## 2023-05-23 ASSESSMENT — ACTIVITIES OF DAILY LIVING (ADL)
ADLS_ACUITY_SCORE: 39
ADLS_ACUITY_SCORE: 35
WEAR_GLASSES_OR_BLIND: NO
ADLS_ACUITY_SCORE: 37
ADLS_ACUITY_SCORE: 35
ADLS_ACUITY_SCORE: 37
ADLS_ACUITY_SCORE: 35
CONCENTRATING,_REMEMBERING_OR_MAKING_DECISIONS_DIFFICULTY: YES

## 2023-05-23 NOTE — TELEPHONE ENCOUNTER
[facility] Access Inpatient Bed Call Log 5/23/2023 12:12 AM      Intake has called facilities that have not updated their bed status within the last 12 hours.     Adults:         [facility] is posting 0 beds.      [facility] is posting 0 beds. [phone]      [facility] is posting 0 beds. [phone]     [facility] is posting 0 beds. [phone]     [facility] is posting 0 beds. [phone]     [facility] is posting 0 bed. [phone]      [facility] is posting 0 beds. [phone]     [facility] is posting 0 beds. [phone]     [facility] is posting 2 beds. [phone]     [facility] is posting 0 beds. [phone]       [facility] is posting 6 beds. Mixed unit 12+. Low acuity only.  [phone]     [facility] is posting 0 beds. No aggression.  [phone]     [facility] is posting 0 beds. [phone]      [facility] is posting 1 beds. [phone]      [facility] is posting 2 bed. [phone]      [facility] is posting 1 beds. Low acuity. [phone]     [facility] is posting 2 beds. 72 hr hold preferred. [phone]     [facility] is posting 4 bed.  [phone]        [facility] is posting 7 bed. Voluntary only- no holds/commitments, No Hx of aggression, violence, or assault. No sexual offenders. No 72 hr holds. [phone]     [facility] is posting 7 beds. (Must have the cognitive ability to do programming. No aggressive or violent behavior or recent HX in the last 2 yrs. MH must be primary.) [phone]    [facility] is posting 4 beds. Low acuity only. Violence and aggression capped. [phone]      [facility] is posting 0 bed. Low acuity, Neg Covid. [phone]     [facility] is posting 2 beds. Covid neg. Vol only. Combined adolescent and adult unit. No aggressive or violent behavior. No registered sex offenders. [phone]     [facility] posting 0 beds. Negative covid.      [facility] is posting 6 beds. Call for details. [phone]      [facility] is posting 4 beds. [phone]       Pt remains on work list pending appropriate bed availability.
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
## 2023-05-23 NOTE — ED NOTES
Pt awake and very restless. Kept on getting out of bed. Unable to redirect. Tried distraction with music. Pt still very restless. Required IM Zyprexa due to restless and swinging at sitter. Will continue to monitor.
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
## 2023-05-23 NOTE — PLAN OF CARE
"Frequent masturbation when staff in room  1;1  Yels at staffs \"you want me to beat my meat dont you\"  Mitts on - hitting himself in the head    Stands up impulsively with penis out   Yelled \"Im going to cut off my balls\" \"im going to eternal damnation\"  Ate chk noodle soup and rice for dinner   L IV on hand   Barrios in place    "

## 2023-05-23 NOTE — PROGRESS NOTES
Triage & Transition Services, Extended Care     Vinod Lee  May 23, 2023    Vinod is followed related to long wait time for admission. Please see initial DEC Crisis Assessment completed for complete assessment information. Medical record is reviewed. While patient is in the ED, care team is working towards Learn and Demonstrate at [employer] Focused on Crisis Stabilization.     Attempted to meet with Patient but he was sleeping.    Spoke with Patient's  Vicky and gathered further collateral information.Per CM, Patient was on Clozaril and was able to maintain stability for 5 years but his outpatient psychiatrist stopped the Clozaril and Patient declined rapidly. She also reports that urinary retention is not normal for Patient and she is unaware of this happening before.     Notified CM and Parents of transfer to Med Surg 5.   Mother is Alberta, Father is Ino. Contact #: [phone]  KIRA Abel with [facility] [phone]    Plan:  Inpatient Medical: Will be transferring medical due to lowery cath being exclusionary criteria for inpatient mental health.     Plan for Care reviewed with Assigned Medical Provider? Yes. Provider, Dr. Palma, response: agreeable.    [facility] will follow and meet with patient/family/care team as able or requested.     Patient has been determined to not have a capacity. Due to this, per supervisors, next of kin have decision making power (his parents) including signing ROIs. Parents do not have access to email and are not planning to visit soon due to difficulties driving. Due to this, verbal permission was obtained to speak with Vicky Abel. This verbal KING is good for verbal information only NOT physical records.       Corinne Romitti, [facility], Extended Care   [phone]

## 2023-05-23 NOTE — UTILIZATION REVIEW
Admission Status; Secondary Review Determination         Under the authority of the Utilization Management Committee, the utilization review process indicated a secondary review on the above patient.  The review outcome is based on review of the medical records, discussions with staff, and applying clinical experience noted on the date of the review.        (x)      Inpatient Status Appropriate - This patient's medical care is consistent with medical management for inpatient care and reasonable inpatient medical practice.      RATIONALE FOR DETERMINATION   The patient is a 62-year-old male admitted on 5/18/2023.  H&P was done on 5/23/2023.  Patient had been in the ED prior to that time according to notes previously the patient did have a bladder scan with 1000 cc of fluid and was straight cathed.  Urology was consulted.  The patient has a current Tmax of 100.3 and 104 pulse at that time but is currently afebrile with a normal white count.  Patient does have a significant mental health diagnosis of schizophrenia and Parkinson's disease.  Patient is getting additional testing including a urinalysis.  Urology note is not available at this time.  CT of the head does not show any significant lesions or cause for the patient's encephalopathic findings on exam.  Neurology consultation is noted to have been done on 5/19/2023 and recommended holding off on Parkinson's medications.  Based on current diagnoses and current lab values, would recommend repeat neurology exam looking for causes for sensorium change and additional urology evaluation for urinary retention issues.  Strongly recommend additional utilization review in the next 1 to 2 days to follow-up these other consultations.      The severity of illness, intensity of service provided, expected LOS and risk for adverse outcome make the care complex, high risk and appropriate for hospital admission.        The information on this document is developed by the  utilization review team in order for the business office to ensure compliance.  This only denotes the appropriateness of proper admission status and does not reflect the quality of care rendered.         The definitions of Inpatient Status and Observation Status used in making the determination above are those provided in the CMS Coverage Manual, Chapter 1 and Chapter 6, section 70.4.      Sincerely,     Donovan Chang MD  Physician Advisor  Utilization Review/ Case Management  [facility].

## 2023-05-23 NOTE — PHARMACY-CONSULT NOTE
Pharmacy Delirium Chart Review    Upon chart review, the following medications may contribute to possible patient delirium:     Lorazepam: sedative hypnotics (including benzodiazepines) are known to cause several CNS side effects, including impaired memory, amnesia, drowsiness, and delirium. Lorazepam binds to the gamma-aminobutyric acid (CHRISTY)-A receptors,which increased frequency of chloride channel opening and produces CHRISTY s inhibitory effect in CNS.     Sertraline: <2% risk of psychomotor agitation, irritability and confusion     Risperidone: both activating and sedating side effects can occur, although lowest risk compared to other antipsychotics. Sedation is believed to be due to H1 antagonism leading to potential CNS depressant effects.     Please consult unit pharmacist with further questions.    WING SCOTT [facility]  Phone/Pager: *[phone]

## 2023-05-23 NOTE — TELEPHONE ENCOUNTER
10:28 AM      Case escalated to PPS Supervisor who confirms Barrios Catheter is exclusionary criteria  for IPMH.     Writer spoke with provider regarding patient has an exclusionary Barrios Catheter and will need to go medical.    2:35 PM    Dec  called to inform intake that patient will be discharging to medical unit.    Intake no longer following patient will be removed from work list.

## 2023-05-23 NOTE — PLAN OF CARE
ADMISSION to [facility]IT:    iVnod Lee was admitted from ED for urinary retention, catatonia   2 RN skin assessment: completed by Jeanie LUZ and margaret HAM  Result of skin assessment and interventions/actions: no skin concerns, pt changed and wiped up, lowery care done   Height, weight: completed? yes  Patient belongings & admission documents: see Flowsheets, completed? Clothes & slippers  MDRO education added to care plan:will do        Pt not talking much, nodding yes/no, able to shake head   Sprite given

## 2023-05-24 ENCOUNTER — TELEPHONE (OUTPATIENT)
Dept: BEHAVIORAL HEALTH | Facility: CLINIC | Age: 63
End: 2023-05-24
Payer: COMMERCIAL

## 2023-05-24 LAB
ALBUMIN SERPL BCG-MCNC: 3.6 G/DL (ref 3.5–5.2)
ALP SERPL-CCNC: 60 U/L (ref 40–129)
ALT SERPL W P-5'-P-CCNC: 11 U/L (ref 10–50)
ANION GAP SERPL CALCULATED.3IONS-SCNC: 10 MMOL/L (ref 7–15)
AST SERPL W P-5'-P-CCNC: 13 U/L (ref 10–50)
ATRIAL RATE - MUSE: 80 BPM
BILIRUB SERPL-MCNC: 0.3 MG/DL
BUN SERPL-MCNC: 18.8 MG/DL (ref 8–23)
CALCIUM SERPL-MCNC: 8.9 MG/DL (ref 8.8–10.2)
CHLORIDE SERPL-SCNC: 105 MMOL/L (ref 98–107)
CREAT SERPL-MCNC: 1.05 MG/DL (ref 0.67–1.17)
DEPRECATED HCO3 PLAS-SCNC: 27 MMOL/L (ref 22–29)
DIASTOLIC BLOOD PRESSURE - MUSE: NORMAL MMHG
ERYTHROCYTE [DISTWIDTH] IN BLOOD BY AUTOMATED COUNT: 15.3 % (ref 10–15)
GFR SERPL CREATININE-BSD FRML MDRD: 80 ML/MIN/1.73M2
GLUCOSE SERPL-MCNC: 105 MG/DL (ref 70–99)
HCT VFR BLD AUTO: 39.5 % (ref 40–53)
HGB BLD-MCNC: 12.6 G/DL (ref 13.3–17.7)
INTERPRETATION ECG - MUSE: NORMAL
MCH RBC QN AUTO: 27.8 PG (ref 26.5–33)
MCHC RBC AUTO-ENTMCNC: 31.9 G/DL (ref 31.5–36.5)
MCV RBC AUTO: 87 FL (ref 78–100)
P AXIS - MUSE: 59 DEGREES
PLATELET # BLD AUTO: 131 10E3/UL (ref 150–450)
POTASSIUM SERPL-SCNC: 3.6 MMOL/L (ref 3.4–5.3)
PR INTERVAL - MUSE: 152 MS
PROT SERPL-MCNC: 5.3 G/DL (ref 6.4–8.3)
QRS DURATION - MUSE: 152 MS
QT - MUSE: 430 MS
QTC - MUSE: 495 MS
R AXIS - MUSE: -21 DEGREES
RBC # BLD AUTO: 4.54 10E6/UL (ref 4.4–5.9)
SODIUM SERPL-SCNC: 142 MMOL/L (ref 136–145)
SYSTOLIC BLOOD PRESSURE - MUSE: NORMAL MMHG
T AXIS - MUSE: 111 DEGREES
T PALLIDUM AB SER QL: NONREACTIVE
VENTRICULAR RATE- MUSE: 80 BPM
WBC # BLD AUTO: 7.9 10E3/UL (ref 4–11)

## 2023-05-24 PROCEDURE — 250N000013 HC RX MED GY IP 250 OP 250 PS 637: Performed by: STUDENT IN AN ORGANIZED HEALTH CARE EDUCATION/TRAINING PROGRAM

## 2023-05-24 PROCEDURE — 120N000002 HC R&B MED SURG/OB UMMC

## 2023-05-24 PROCEDURE — 250N000013 HC RX MED GY IP 250 OP 250 PS 637

## 2023-05-24 PROCEDURE — 99233 SBSQ HOSP IP/OBS HIGH 50: CPT

## 2023-05-24 PROCEDURE — 250N000011 HC RX IP 250 OP 636: Performed by: STUDENT IN AN ORGANIZED HEALTH CARE EDUCATION/TRAINING PROGRAM

## 2023-05-24 PROCEDURE — 99232 SBSQ HOSP IP/OBS MODERATE 35: CPT | Mod: GC | Performed by: FAMILY MEDICINE

## 2023-05-24 PROCEDURE — 85027 COMPLETE CBC AUTOMATED: CPT | Performed by: STUDENT IN AN ORGANIZED HEALTH CARE EDUCATION/TRAINING PROGRAM

## 2023-05-24 PROCEDURE — 80053 COMPREHEN METABOLIC PANEL: CPT | Performed by: STUDENT IN AN ORGANIZED HEALTH CARE EDUCATION/TRAINING PROGRAM

## 2023-05-24 PROCEDURE — 36415 COLL VENOUS BLD VENIPUNCTURE: CPT | Performed by: STUDENT IN AN ORGANIZED HEALTH CARE EDUCATION/TRAINING PROGRAM

## 2023-05-24 RX ORDER — DIPHENHYDRAMINE HCL 25 MG
25 CAPSULE ORAL EVERY 6 HOURS PRN
Status: DISCONTINUED | OUTPATIENT
Start: 2023-05-24 | End: 2023-05-24

## 2023-05-24 RX ORDER — OLANZAPINE 10 MG/2ML
5 INJECTION, POWDER, FOR SOLUTION INTRAMUSCULAR ONCE
Status: COMPLETED | OUTPATIENT
Start: 2023-05-24 | End: 2023-05-24

## 2023-05-24 RX ORDER — DIPHENHYDRAMINE HYDROCHLORIDE 50 MG/ML
25 INJECTION INTRAMUSCULAR; INTRAVENOUS EVERY 6 HOURS PRN
Status: DISCONTINUED | OUTPATIENT
Start: 2023-05-24 | End: 2023-05-26 | Stop reason: HOSPADM

## 2023-05-24 RX ORDER — DIPHENHYDRAMINE HCL 25 MG
25 CAPSULE ORAL EVERY 6 HOURS PRN
Status: DISCONTINUED | OUTPATIENT
Start: 2023-05-24 | End: 2023-05-26 | Stop reason: HOSPADM

## 2023-05-24 RX ORDER — OLANZAPINE 5 MG/1
5 TABLET, ORALLY DISINTEGRATING ORAL 2 TIMES DAILY
Status: DISCONTINUED | OUTPATIENT
Start: 2023-05-24 | End: 2023-05-25

## 2023-05-24 RX ORDER — LORAZEPAM 2 MG/ML
1 INJECTION INTRAMUSCULAR 2 TIMES DAILY PRN
Status: DISCONTINUED | OUTPATIENT
Start: 2023-05-24 | End: 2023-05-26 | Stop reason: HOSPADM

## 2023-05-24 RX ORDER — POLYETHYLENE GLYCOL 3350 17 G/17G
17 POWDER, FOR SOLUTION ORAL DAILY
Status: DISCONTINUED | OUTPATIENT
Start: 2023-05-24 | End: 2023-05-26 | Stop reason: HOSPADM

## 2023-05-24 RX ORDER — AMOXICILLIN 250 MG
1 CAPSULE ORAL 2 TIMES DAILY PRN
Status: DISCONTINUED | OUTPATIENT
Start: 2023-05-24 | End: 2023-05-26 | Stop reason: HOSPADM

## 2023-05-24 RX ORDER — CARBOXYMETHYLCELLULOSE SODIUM 5 MG/ML
1 SOLUTION/ DROPS OPHTHALMIC
Status: DISCONTINUED | OUTPATIENT
Start: 2023-05-24 | End: 2023-05-26 | Stop reason: HOSPADM

## 2023-05-24 RX ORDER — OLANZAPINE 5 MG/1
5 TABLET, ORALLY DISINTEGRATING ORAL 3 TIMES DAILY PRN
Status: DISCONTINUED | OUTPATIENT
Start: 2023-05-24 | End: 2023-05-26 | Stop reason: HOSPADM

## 2023-05-24 RX ADMIN — OLANZAPINE 5 MG: 5 TABLET, ORALLY DISINTEGRATING ORAL at 19:24

## 2023-05-24 RX ADMIN — DOCUSATE SODIUM AND SENNOSIDES 1 TABLET: 8.6; 5 TABLET, FILM COATED ORAL at 19:25

## 2023-05-24 RX ADMIN — DIPHENHYDRAMINE HYDROCHLORIDE 25 MG: 50 INJECTION, SOLUTION INTRAMUSCULAR; INTRAVENOUS at 21:31

## 2023-05-24 RX ADMIN — RISPERIDONE 0.5 MG: 0.5 TABLET, ORALLY DISINTEGRATING ORAL at 09:08

## 2023-05-24 RX ADMIN — OLANZAPINE 5 MG: 5 TABLET, ORALLY DISINTEGRATING ORAL at 12:21

## 2023-05-24 RX ADMIN — NITROFURANTOIN (MONOHYDRATE/MACROCRYSTALS) 100 MG: 75; 25 CAPSULE ORAL at 09:57

## 2023-05-24 RX ADMIN — OLANZAPINE 2.5 MG: 5 TABLET, ORALLY DISINTEGRATING ORAL at 09:08

## 2023-05-24 RX ADMIN — POLYETHYLENE GLYCOL 3350 17 G: 17 POWDER, FOR SOLUTION ORAL at 09:57

## 2023-05-24 RX ADMIN — DIPHENHYDRAMINE HYDROCHLORIDE 25 MG: 25 CAPSULE ORAL at 19:25

## 2023-05-24 RX ADMIN — NITROFURANTOIN (MONOHYDRATE/MACROCRYSTALS) 100 MG: 75; 25 CAPSULE ORAL at 19:25

## 2023-05-24 RX ADMIN — LORAZEPAM 1 MG: 2 INJECTION INTRAMUSCULAR; INTRAVENOUS at 21:32

## 2023-05-24 RX ADMIN — OLANZAPINE 5 MG: 10 INJECTION, POWDER, FOR SOLUTION INTRAMUSCULAR at 21:40

## 2023-05-24 RX ADMIN — LORAZEPAM 1 MG: 1 TABLET ORAL at 19:29

## 2023-05-24 RX ADMIN — SERTRALINE HYDROCHLORIDE 150 MG: 100 TABLET ORAL at 09:08

## 2023-05-24 RX ADMIN — LORAZEPAM 1 MG: 1 TABLET ORAL at 13:12

## 2023-05-24 RX ADMIN — TAMSULOSIN HYDROCHLORIDE 0.4 MG: 0.4 CAPSULE ORAL at 09:08

## 2023-05-24 RX ADMIN — LORAZEPAM 1 MG: 2 INJECTION INTRAMUSCULAR; INTRAVENOUS at 21:31

## 2023-05-24 RX ADMIN — RISPERIDONE 0.5 MG: 0.5 TABLET, ORALLY DISINTEGRATING ORAL at 19:25

## 2023-05-24 RX ADMIN — CYANOCOBALAMIN TAB 1000 MCG 1000 MCG: 1000 TAB at 09:08

## 2023-05-24 RX ADMIN — LORAZEPAM 1 MG: 1 TABLET ORAL at 09:08

## 2023-05-24 ASSESSMENT — ACTIVITIES OF DAILY LIVING (ADL)
ADLS_ACUITY_SCORE: 37
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
## 2023-05-24 NOTE — PLAN OF CARE
"Goal Outcome Evaluation:    Vinod Lee is a 62 year old male admitted on 5/18/2023. He has a history of Parkinson's, schizophrenia and is admitted for evaluation of acute encephalopathy.         NURSING ASSESSMENT: [id number]    Respiratory: WDL    GI/: Pt had a lowery in, he pull out the lowery. Pt has no urine output since lowery removal.    LDAs: no line  Activity/Musculoskeletal: Pt is a SBA. Pt gait is steady but pt can be unsteady at time.     SKIN: Pt skin is WDL.    Psychosocial: Pt has a bedside attendant.     ADDITIONAL INFO/ NURSING PLAN:   Pt pull out lowery and took off cloth pt went out into the hallway naked per bedside attendant report. Pt said  \"I am going to hell\" \" You're a N word bitch and you're scary, I do not like N word\". to writer\" Continue POC   DISCHARGE DISPOSITION:  Estimated discharge date: TBD.  "

## 2023-05-24 NOTE — PLAN OF CARE
"Patient is alert and oriented x3, disoriented to time. Patient denies any pain. Left PIV dislodged, IV removed. Patient has a sitter in the room at bedside. Mittens on, patient has been trying to masturbate and pull on lowery. Lowery in place. Patient is able to make needs known. Continue with the plan of care.    /70   Pulse 82   Temp 97  F (36.1  C) (Oral)   Resp 16   Ht 1.753 m (5' 9\")   Wt 78.6 kg (173 lb 4.5 oz)   SpO2 96%   BMI 25.59 kg/m      "

## 2023-05-24 NOTE — TELEPHONE ENCOUNTER
4:42 PM Pt placed back on [facility] waitlist with the original date from initial presentation and placement on worklist.     Pt was removed from waitlist 5/23, but was removed due to lowery cath. Cath removed, Pt now able to transfer to [facility] when bed available.     Intake continues to seek placement.

## 2023-05-24 NOTE — CONSULTS
IP MH Referral Acuity Rating Score (RARS)    LMHP complete at referral to IP MH, with DEC; and, daily while awaiting IP MH placement. Call score to PPS.  CRITERIA SCORING   New 72 HH and Involuntary for IP MH (not adolescent) 1/1   Boarding over 24 hours 0/1   Vulnerable adult at least 55+ with multiple co morbidities; or, Patient age 11 or under 1/1   Suicide ideation without relief of precipitating factors 0/1   Current plan for suicide 0/1   Current plan for homicide 0/1   Imminent risk or actual attempt to seriously harm another without relief of factors precipitating the attempt 1/1   Severe dysfunction in daily living (ex: complete neglect for self care, extreme disruption in vegetative function, extreme deterioration in social interactions) 1/1   Recent (last 2 weeks) or current physical aggression in the ED 1/1   Restraints or seclusion episode in ED 1/1   Verbal aggression, agitation, yelling, etc., while in the ED 1/1   Active psychosis with psychomotor agitation or catatonia 1/1   Need for constant or near constant redirection (from leaving, from others, etc).  1/1   Intrusive or disruptive behaviors 1/1   TOTAL Acuity Total Score: 10
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
## 2023-05-24 NOTE — PLAN OF CARE
Goal Outcome Evaluation:      Plan of Care Reviewed With: patient    Overall Patient Progress: no change    Outcome Evaluation: DTV    Diagnosis: Urinary retention, active psychosis s/t schizophrenia  Mental Status: Alert, oriented to self at times. Speaking limited this shift.  Activity/dangle: Independent with 2 bedside attendants due to attempts to leave  Diet: Regular  Pain: Denies  Barrios/Voiding: Barrios partially removed by patient and then removed by nursing at about 1230. Bladder scanned for 207 at 1700. Due to void.  Tele/Restraints/Iso: Mitts on. 02/LDA: No PIV access  D/C Date: Estimated 1 day to psych  Other Info: Patient relatively calm most of shiftt, some yelling for short time at beginning of shift, otherwise staring at ceiling and nodding yes/no to questions rather than answering verbally. Mid shift staff reported patient attempting to masturbate in hallway. At 1900 staff reported he was becoming more agitated and scheduled HS meds given including ativan and risperidone, and PRN benadryl. Pt was yelling he was filthy, that he was Satan, and was trying to push past staff out of room at that time. Checked in later and patient was calm and had been for about 15 minutes.     At 2100 resource nurse reported he was in the hallway running and called code 21 while writer and preceptor were occupied. Pt had witnessed fall. At 2130, IM injections of ativan, benadryl and zyprexa were given. Pt tolerated well and apparently can be redirected well with music from [employer]. CT ordered due to possible head impact during fall.

## 2023-05-24 NOTE — PROGRESS NOTES
"VS: /70   Pulse 82   Temp 97  F (36.1  C) (Oral)   Resp 16   Ht 1.753 m (5' 9\")   Wt 78.6 kg (173 lb 4.5 oz)   SpO2 96%   BMI 25.59 kg/m       O2: Sating >90% on RA. Lung sounds clear. Denies chest pain and SOB.   Output: Barrios in place    Last BM: GRACE    Activity: Pt. IND steady gait.    Skin: No concerns    Pain: Pt. Denied pain this shift .    CMS: Pt. Alert to self  Disoriented to situation and place    Dressing: N/A   Diet: Regular Diet    LDA: NO PIV     Equipment: IV pole, FWW, gait belt, and personal belongings. Call light within reach and uses appropriately.   Plan:  TBD    Additional Info: At the start of shift pt. Appeared to be having grandiose delusions referring to himself as \"Satan\" Pt. Also was not following re-direction. Trying to go into other Patient rooms. Pt. Has bedside sitter 1:1 Continue POC.         "

## 2023-05-24 NOTE — PROGRESS NOTES
"[facility]   BRIEF PROGRESS NOTE    SUBJECTIVE  Patient partially pulled out lowery. Fully removed by RN team.   Ongoing psychosis.     OBJECTIVE:  /66 (BP Location: Left arm)   Pulse 82   Temp 98.1  F (36.7  C) (Oral)   Resp 16   Ht 1.753 m (5' 9\")   Wt 78.6 kg (173 lb 4.5 oz)   SpO2 96%   BMI 25.59 kg/m      Exam:   See progress note from today.      ASSESSMENT/PLAN:    Delirium/ AMS workup complete. Patient with ongoing psychosis requiring inpatient psych. On nitrofurantoin x5d for presumed UTI, awaiting cultures. Ongoing urinary retention, likely related to presumed UTI and psych medications, now with lowery out.     Patient medically stable for discharge to inpatient psych provided unit is able to bladder scan and cath if retaining urine.    - bladder scan and straight cath qshift if not voiding  - continue nitrofurantion 100mg BID x5d, follow-up ucx for susceptibilities  - charge RN to notify psych intake patient medically stable    Please see daily rounding note for full A/P.  William Adhikari MD  12:52 PM      "

## 2023-05-25 ENCOUNTER — TELEPHONE (OUTPATIENT)
Dept: BEHAVIORAL HEALTH | Facility: CLINIC | Age: 63
End: 2023-05-25

## 2023-05-25 ENCOUNTER — APPOINTMENT (OUTPATIENT)
Dept: CT IMAGING | Facility: CLINIC | Age: 63
DRG: 885 | End: 2023-05-25
Attending: STUDENT IN AN ORGANIZED HEALTH CARE EDUCATION/TRAINING PROGRAM
Payer: COMMERCIAL

## 2023-05-25 LAB
ALBUMIN SERPL BCG-MCNC: 3.7 G/DL (ref 3.5–5.2)
ALP SERPL-CCNC: 62 U/L (ref 40–129)
ALT SERPL W P-5'-P-CCNC: 11 U/L (ref 10–50)
ANION GAP SERPL CALCULATED.3IONS-SCNC: 9 MMOL/L (ref 7–15)
AST SERPL W P-5'-P-CCNC: 18 U/L (ref 10–50)
BACTERIA UR CULT: NO GROWTH
BILIRUB SERPL-MCNC: 0.3 MG/DL
BUN SERPL-MCNC: 20 MG/DL (ref 8–23)
CALCIUM SERPL-MCNC: 8.7 MG/DL (ref 8.8–10.2)
CHLORIDE SERPL-SCNC: 106 MMOL/L (ref 98–107)
CREAT SERPL-MCNC: 1.03 MG/DL (ref 0.67–1.17)
DEPRECATED HCO3 PLAS-SCNC: 26 MMOL/L (ref 22–29)
ERYTHROCYTE [DISTWIDTH] IN BLOOD BY AUTOMATED COUNT: 15.3 % (ref 10–15)
GFR SERPL CREATININE-BSD FRML MDRD: 82 ML/MIN/1.73M2
GLUCOSE BLDC GLUCOMTR-MCNC: 111 MG/DL (ref 70–99)
GLUCOSE SERPL-MCNC: 104 MG/DL (ref 70–99)
HCT VFR BLD AUTO: 34.5 % (ref 40–53)
HGB BLD-MCNC: 11.3 G/DL (ref 13.3–17.7)
MAGNESIUM SERPL-MCNC: 2.1 MG/DL (ref 1.7–2.3)
MCH RBC QN AUTO: 28.1 PG (ref 26.5–33)
MCHC RBC AUTO-ENTMCNC: 32.8 G/DL (ref 31.5–36.5)
MCV RBC AUTO: 86 FL (ref 78–100)
PHOSPHATE SERPL-MCNC: 3.8 MG/DL (ref 2.5–4.5)
PLATELET # BLD AUTO: 133 10E3/UL (ref 150–450)
POTASSIUM SERPL-SCNC: 3.5 MMOL/L (ref 3.4–5.3)
PROT SERPL-MCNC: 5.1 G/DL (ref 6.4–8.3)
RBC # BLD AUTO: 4.02 10E6/UL (ref 4.4–5.9)
SODIUM SERPL-SCNC: 141 MMOL/L (ref 136–145)
WBC # BLD AUTO: 8.7 10E3/UL (ref 4–11)

## 2023-05-25 PROCEDURE — 99232 SBSQ HOSP IP/OBS MODERATE 35: CPT | Mod: GC | Performed by: FAMILY MEDICINE

## 2023-05-25 PROCEDURE — 99207 PR NO CHARGE LOS: CPT

## 2023-05-25 PROCEDURE — 250N000013 HC RX MED GY IP 250 OP 250 PS 637

## 2023-05-25 PROCEDURE — 70450 CT HEAD/BRAIN W/O DYE: CPT

## 2023-05-25 PROCEDURE — 70450 CT HEAD/BRAIN W/O DYE: CPT | Mod: 26 | Performed by: RADIOLOGY

## 2023-05-25 PROCEDURE — 250N000013 HC RX MED GY IP 250 OP 250 PS 637: Performed by: STUDENT IN AN ORGANIZED HEALTH CARE EDUCATION/TRAINING PROGRAM

## 2023-05-25 PROCEDURE — 80053 COMPREHEN METABOLIC PANEL: CPT | Performed by: STUDENT IN AN ORGANIZED HEALTH CARE EDUCATION/TRAINING PROGRAM

## 2023-05-25 PROCEDURE — 85027 COMPLETE CBC AUTOMATED: CPT | Performed by: STUDENT IN AN ORGANIZED HEALTH CARE EDUCATION/TRAINING PROGRAM

## 2023-05-25 PROCEDURE — 120N000002 HC R&B MED SURG/OB UMMC

## 2023-05-25 PROCEDURE — 83735 ASSAY OF MAGNESIUM: CPT | Performed by: STUDENT IN AN ORGANIZED HEALTH CARE EDUCATION/TRAINING PROGRAM

## 2023-05-25 PROCEDURE — 36415 COLL VENOUS BLD VENIPUNCTURE: CPT | Performed by: STUDENT IN AN ORGANIZED HEALTH CARE EDUCATION/TRAINING PROGRAM

## 2023-05-25 PROCEDURE — 99233 SBSQ HOSP IP/OBS HIGH 50: CPT

## 2023-05-25 PROCEDURE — 84100 ASSAY OF PHOSPHORUS: CPT | Performed by: STUDENT IN AN ORGANIZED HEALTH CARE EDUCATION/TRAINING PROGRAM

## 2023-05-25 RX ADMIN — OLANZAPINE 7.5 MG: 5 TABLET, ORALLY DISINTEGRATING ORAL at 20:30

## 2023-05-25 RX ADMIN — NITROFURANTOIN (MONOHYDRATE/MACROCRYSTALS) 100 MG: 75; 25 CAPSULE ORAL at 20:30

## 2023-05-25 RX ADMIN — SERTRALINE HYDROCHLORIDE 150 MG: 100 TABLET ORAL at 11:15

## 2023-05-25 RX ADMIN — LORAZEPAM 1 MG: 1 TABLET ORAL at 18:24

## 2023-05-25 RX ADMIN — CYANOCOBALAMIN TAB 1000 MCG 1000 MCG: 1000 TAB at 11:15

## 2023-05-25 RX ADMIN — NITROFURANTOIN (MONOHYDRATE/MACROCRYSTALS) 100 MG: 75; 25 CAPSULE ORAL at 11:15

## 2023-05-25 RX ADMIN — TAMSULOSIN HYDROCHLORIDE 0.4 MG: 0.4 CAPSULE ORAL at 11:15

## 2023-05-25 RX ADMIN — OLANZAPINE 5 MG: 5 TABLET, ORALLY DISINTEGRATING ORAL at 11:13

## 2023-05-25 RX ADMIN — RISPERIDONE 0.5 MG: 0.5 TABLET, ORALLY DISINTEGRATING ORAL at 11:12

## 2023-05-25 RX ADMIN — LORAZEPAM 1 MG: 1 TABLET ORAL at 11:15

## 2023-05-25 RX ADMIN — LORAZEPAM 1 MG: 1 TABLET ORAL at 20:31

## 2023-05-25 ASSESSMENT — ACTIVITIES OF DAILY LIVING (ADL)
ADLS_ACUITY_SCORE: 37

## 2023-05-25 NOTE — PLAN OF CARE
Patient has been sleeping on and off this shift.  Gets up and walks independently in his room and hallway.  Has 2 bedside attendents.  Is tolerating regular diet.  Has been able to void a good amount today.  Call light is within reach.  Will continue with plan of care.

## 2023-05-25 NOTE — CONSULTS
[facility] COURT- PROBATE/MENTAL HEALTH DIVISION  TENTH ([place])   _________________________________________________________________________    In the Matter of the Civil Commitment of: Vinod Lee    :1960     Respondent: Vinod Lee     WRITTEN REQUEST FOR AUTHORIZATION TO IMPOSE TREATMENT AND REQUEST FOR HEARING  DC File No.  [id number]___ [place]  File No. __________       I, PRESTON Mcdonald CNP to the best of my knowledge, information, and belief respectfully represent:    1.I am a member of the treatment team for the respondent.    2.Respondent was born on 1960     3.Respondent is presently receiving care and treatment at: [facility]     4.Diagnostic studies performed by PRESTON Mcdonald CNP on respondent appear to indicate that respondent suffers from:    Schizophrenia     5.   PRESTON Mcdonald CNP  has determined neuroleptic medication(s) to be medically necessary.    6.   I have determined that the benefits of administration of the proposed treatment to the respondent outweigh the risks and therefore this procedure is reasonable.    7.   Respondent s written informed consent to the administration of the above procedure has notbeen obtained because of incompetency to make a rational decision regarding the proposedtreatment.    8.The objective of the proposed treatment is to treat the symptoms of the mental illness that interfere with the respondent s ability to function.    9.Petitioner requests that a hearing be scheduled, and that authorization to administer the proposed treatment be granted according to law.     Dated: May 25, 2023    Providers s Signature: _________________________  Print Name: PRESTON Mcdonald CNP PMHNP-BC    [facility] MED SURG  [facility] [facility] [id number] [phone]
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
## 2023-05-25 NOTE — PROGRESS NOTES
"[facility]   BRIEF PROGRESS NOTE    SUBJECTIVE  Responded to Code 21 called at approximately 2130. Patient was acutely agitated, pushing past sitters and into the hallway where he was running in his underwear with a blanket draped around his shoulders. He was shouting \"I'm evil, I'm Satan the Devil!\" and when asked if he would like to sit down, would regularly respond with illogical obscenities. While being escorted by the behavioral intervention team back to his room, the patient turned and ran once more - but unfortunately tripped on the blanket and fell forward onto his arms and hands.    He quickly stood back up (with some help from staff) and continued to argue and shout. When asked if he had hit his head he responded with more illogical obscenities, but when directly asked about pain he did deny pain. He was led back to the room by the behavioral intervention staff and was able to be directed back into bed while medications were prepared.    OBJECTIVE:  /58 (BP Location: Left arm)   Pulse 86   Temp 98.1  F (36.7  C) (Oral)   Resp 20   Ht 1.753 m (5' 9\")   Wt 78.6 kg (173 lb 4.5 oz)   SpO2 97%   BMI 25.59 kg/m      Exam:  Physical Exam  Vitals reviewed.   Constitutional:       General: He is not in acute distress.  HENT:      Head: Normocephalic and atraumatic.      Comments: No tenderness to palpation over skull, sinuses. No ecchymoses, erythema, lesions, or bleeding.     Nose: Nose normal.   Eyes:      Extraocular Movements: Extraocular movements intact.      Conjunctiva/sclera: Conjunctivae normal.   Cardiovascular:      Rate and Rhythm: Normal rate.   Pulmonary:      Effort: Pulmonary effort is normal. No respiratory distress.   Abdominal:      General: Abdomen is flat.      Palpations: Abdomen is soft.   Musculoskeletal:         General: No signs of injury. Normal range of motion.      Cervical back: Normal range of motion.   Skin:     Findings: No bruising, lesion or rash. " "  Neurological:      General: No focal deficit present.      Mental Status: He is oriented to person, place, and time.   Psychiatric:         Attention and Perception: He is inattentive.         Mood and Affect: Affect is labile, angry and inappropriate.         Speech: Speech is rapid and pressured and tangential.         Behavior: Behavior is uncooperative, agitated and hyperactive.         Thought Content: Thought content is delusional.         Cognition and Memory: Cognition is impaired.         Judgment: Judgment is impulsive and inappropriate.         ASSESSMENT/PLAN:  # Acute Psychosis  Given the florid psychosis with inability to respond to appropriate stimuli, answer questions appropriately, or demonstrate understanding of his actions it was determined that patient did not have medical decision-making capacity. As he was simultaneously posing a threat to his and others safety, it was determined necessary to proceed with PRN IM medication for agitation.  - IM Benadryl 25mg  - IM Ativan 1mg  - IM Zyprexa 5mg    # Fall  During the code 21 patient fell forward. Per witness reports, it did not appear that he struck the ground with his head directly, though it was difficult to completely rule out.  - CT head w/o contrast    Please see daily rounding note for full A/P.  DEYVI DELCID,   12:07 AM      ADDENDUM  4:30 AM    Paged by RN that patient has woken up slightly, demonstrated ability to follow commands, but was unable to speak. Assessed patient at bedside. As reported, patient's eyes were open and tracking appropriately with crossing of midline, he was able to squeeze hands on demand (albeit very weakly), was able to wiggle his toes, and could give a weak \"thumbs up\". He did not display waxy rigidity but was unable to keep his arm lifted up. Throughout the exam, he was lying in bed with eyes open, mouth open, and jaw quivering constantly. Vitally he was stable with O2 sats 98 or above, BP and HR all " normal.    Given his presentation, there is a long differential of potential etiologies for this presentation. It could be related to oversedation from prior medications, it could be anticholinergic delirium, it could be catatonia, it could be behaviors, it could be metabolic encephalopathy, or potentially underlying psychosis exacerbated by an arrhythmia. At this time, given vital stability, will proceed with close observation and increased monitoring.  -Vitals q2h  -Continuous pulse ox  -stat EKG  -BMP  -Psych consult
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
## 2023-05-25 NOTE — PLAN OF CARE
Goal Outcome Evaluation:    A&O to self - orientation/mentation can vary     Unable to void overnight, straight cathed once around 0400 for 475mL     Head CT completed overnight     Patient up SBA but can be unsteady at times. Refused to wear gait belt     2:1 sitter overnight for safety     No PIV    Patient unable to speak and answer questions intermittently (see previous note)

## 2023-05-25 NOTE — TELEPHONE ENCOUNTER
R:  I  Intake called Dr Portillo @ 12:25pm.  Dr Portillo said she will call intake back after meeting in 5 min.      pt is followed by [facility] - pager [phone], there is no specific doc, the group is familiar and following the pt     Dr Portillo called intake back @ 1:39pm and accepted pt.  Dr Portillo would like intake to discuss with charge for 1:1 or 2:1 and review.    Doc to Doc completed with Dr Portillo and Sabi Lepe @ 1: 44pm    Pt does continue to be delusional is hospitalized with decompensation of schizophrenia.      Code was called at midnight as pt believes he is the devil and was shouting in hallway    Pt placed in units queue and charge called with Disposition.  Charge will call intake back - unavailable    Unit 30 charge called intake @ 2:34pm.   Gave disposition and phone number to pts - Medical unit he is currently on      Unit 30 Charge called intake again @ 2:58pm and report they talked to medical floor nursing-   Pt all set to come to  and 1:1 from medical will [facility] to [facility] on PM Shift.   30 Charge gave placement info too -  To Medical unit
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
## 2023-05-25 NOTE — PROGRESS NOTES
9043  Writer attempted to wake patient to inform him of straight catheter procedure. Upon awaking patient unable to speak but could track movement around the room. Eyes reactive to light and able to squeeze with both hands. Jose Kaplan notified of inability to speak.     9628  Patient now able to verbalize words and seen walking hallway.

## 2023-05-25 NOTE — TELEPHONE ENCOUNTER
Potential bed avail on station 30   1246pm - Intake called med unit for doc to doc information, pt is followed by [facility] - pager [phone], there is no specific doc, the group is familiar and following the pt   1252pm - Intake placed call to Lindsey, she will call back in 5 mins

## 2023-05-25 NOTE — TELEPHONE ENCOUNTER
[facility] Access Inpatient Bed Call Log 5/25/2023 1:48 AM      Intake has called facilities that have not updated their bed status within the last 12 hours.     Adults:         [facility] is posting 0 beds.      [facility] is posting 0 beds. [phone]      [facility] is posting 0 beds. [phone]     [facility] is posting 0 beds. [phone] - 1:49am Per Rolando, they are capped.    [facility] is posting 0 beds. [phone]     [facility] is posting 0 bed. [phone]      [facility] is posting 0 beds. [phone]     [facility] is posting 0 beds. [phone]     [facility] is posting 2 beds. [phone]     [facility] is posting 0 beds. [phone]       [facility] is posting 4 beds. Mixed unit 12+. Low acuity only.  [phone]     [facility] is posting 0 beds. No aggression.  [phone]     [facility] is posting 0 beds. [phone]      [facility] is posting 1 beds. [phone]      [facility] is posting 2 bed. [phone]      [facility] is posting 1 beds. Low acuity. [phone]     Critical access hospital is posting 2 beds. 72 hr hold preferred. [phone] - 1:51am Per Sandra, call back after 7:30am.     [facility] is posting 3 bed.  [phone]        [facility] is posting 6 bed. Voluntary only- no holds/commitments, No Hx of aggression, violence, or assault. No sexual offenders. No 72 hr holds. [phone]     [facility] is posting 5 beds. (Must have the cognitive ability to do programming. No aggressive or violent behavior or recent HX in the last 2 yrs.  must be primary.) [phone]    [facility] is posting 2 beds. Low acuity only. Violence and aggression capped. [phone]      [facility] is posting 2 bed. Low acuity, Neg Covid. [phone]     [facility] is posting 2 beds. Covid neg. Vol only. Combined adolescent and adult  unit. No aggressive or violent behavior. No registered sex offenders. [phone]     [facility] posting 0 beds. Negative covid.      [facility] is posting 14 beds. Call for details. [phone]      [facility] is posting 4 beds. [phone]  - 1:53am Per Sheila, they are open to review.      Pt remains on work list pending appropriate bed availability.
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
## 2023-05-25 NOTE — CONSULTS
[facility] COURT- PROBATE/MENTAL HEALTH DIVISION  TENTH ([place])     _________________________________________________________________________  PETITION FOR JUDICIAL COMMITMENT    In the Matter of the Civil Commitment of:   Vinod Lee  1960    D.C. File No. [id number]__   C.A. File No. _____________     DONATO Jewell ,MS, DONATO of [facility] is interested in the respondent as a Licensed Mental Health Professional, and to the best of his/her knowledge, information, and belief, petitioner respectfully represents:     1. Respondent was born on 1960    2. Respondent lives at [address] [place] [address]  and has residence in [place], [place] for the purpose of judicial commitment;     3. Respondent s spouse and nearest dontae are:   NAME/RELATIONSHIP: Alberta and Ino, parents  CONTACT INFO:   [phone]      4. The observations of respondent s behavior showing that respondent is Mentally Ill  and there is no suitable alternative to involuntary commitment are set forth in Exhibit A and the examiner s statement attached.     5. An examiner s statement supporting respondent s commitment is attached.     WHEREFORE, Petitioner prays the Court commit the respondent to the Commissioner of Human Services, or other treatment facility according to law.     I declare under penalty of perjury under the laws of the [facility] that the foregoing is true and correct.     Executed on May 25, 2023 in the [facility] the [facility].         Signature: ________________________________           Examiner s name: DONATO Jewell MS, DOANTO          [facility] MED SURG  [facility] [address] [phone]  [phone]

## 2023-05-26 ENCOUNTER — HOSPITAL ENCOUNTER (INPATIENT)
Facility: CLINIC | Age: 63
LOS: 171 days | Discharge: HOME | DRG: 885 | End: 2023-11-13
Attending: PSYCHIATRY & NEUROLOGY | Admitting: PSYCHIATRY & NEUROLOGY
Payer: COMMERCIAL

## 2023-05-26 ENCOUNTER — DOCUMENTATION ONLY (OUTPATIENT)
Dept: OTHER | Facility: CLINIC | Age: 63
End: 2023-05-26
Payer: COMMERCIAL

## 2023-05-26 VITALS
HEIGHT: 69 IN | WEIGHT: 173.28 LBS | OXYGEN SATURATION: 98 % | DIASTOLIC BLOOD PRESSURE: 55 MMHG | SYSTOLIC BLOOD PRESSURE: 97 MMHG | TEMPERATURE: 98 F | RESPIRATION RATE: 18 BRPM | BODY MASS INDEX: 25.67 KG/M2 | HEART RATE: 92 BPM

## 2023-05-26 DIAGNOSIS — E53.8 VITAMIN B12 DEFICIENCY (NON ANEMIC): ICD-10-CM

## 2023-05-26 DIAGNOSIS — R21 RASH: ICD-10-CM

## 2023-05-26 DIAGNOSIS — E55.9 VITAMIN D DEFICIENCY: ICD-10-CM

## 2023-05-26 DIAGNOSIS — R52 PAIN: ICD-10-CM

## 2023-05-26 DIAGNOSIS — T88.7XXA SIDE EFFECT OF MEDICATION: ICD-10-CM

## 2023-05-26 DIAGNOSIS — K21.00 GASTROESOPHAGEAL REFLUX DISEASE WITH ESOPHAGITIS, UNSPECIFIED WHETHER HEMORRHAGE: ICD-10-CM

## 2023-05-26 DIAGNOSIS — R33.9 URINARY RETENTION: ICD-10-CM

## 2023-05-26 DIAGNOSIS — J44.9 CHRONIC OBSTRUCTIVE PULMONARY DISEASE, UNSPECIFIED COPD TYPE (H): ICD-10-CM

## 2023-05-26 DIAGNOSIS — K59.00 CONSTIPATION, UNSPECIFIED CONSTIPATION TYPE: ICD-10-CM

## 2023-05-26 DIAGNOSIS — F20.0 PARANOID SCHIZOPHRENIA, CHRONIC CONDITION WITH ACUTE EXACERBATION (H): Primary | ICD-10-CM

## 2023-05-26 PROBLEM — R45.1 AGITATION: Status: ACTIVE | Noted: 2023-05-26

## 2023-05-26 PROBLEM — R45.86 MOOD CHANGES: Status: ACTIVE | Noted: 2023-05-26

## 2023-05-26 PROBLEM — G21.11 NEUROLEPTIC-INDUCED PARKINSONISM (H): Status: ACTIVE | Noted: 2023-05-26

## 2023-05-26 PROBLEM — T43.505A NEUROLEPTIC-INDUCED PARKINSONISM (H): Status: ACTIVE | Noted: 2023-05-26

## 2023-05-26 LAB
GLUCOSE BLDC GLUCOMTR-MCNC: 102 MG/DL (ref 70–99)
GLUCOSE BLDC GLUCOMTR-MCNC: 127 MG/DL (ref 70–99)

## 2023-05-26 PROCEDURE — 250N000013 HC RX MED GY IP 250 OP 250 PS 637: Performed by: PSYCHIATRY & NEUROLOGY

## 2023-05-26 PROCEDURE — 250N000011 HC RX IP 250 OP 636: Performed by: STUDENT IN AN ORGANIZED HEALTH CARE EDUCATION/TRAINING PROGRAM

## 2023-05-26 PROCEDURE — 250N000013 HC RX MED GY IP 250 OP 250 PS 637

## 2023-05-26 PROCEDURE — 99223 1ST HOSP IP/OBS HIGH 75: CPT | Performed by: PHYSICIAN ASSISTANT

## 2023-05-26 PROCEDURE — 250N000013 HC RX MED GY IP 250 OP 250 PS 637: Performed by: STUDENT IN AN ORGANIZED HEALTH CARE EDUCATION/TRAINING PROGRAM

## 2023-05-26 PROCEDURE — 99223 1ST HOSP IP/OBS HIGH 75: CPT | Mod: AI | Performed by: PSYCHIATRY & NEUROLOGY

## 2023-05-26 PROCEDURE — 250N000013 HC RX MED GY IP 250 OP 250 PS 637: Performed by: PHYSICIAN ASSISTANT

## 2023-05-26 PROCEDURE — 99239 HOSP IP/OBS DSCHRG MGMT >30: CPT | Mod: GC | Performed by: FAMILY MEDICINE

## 2023-05-26 PROCEDURE — 250N000011 HC RX IP 250 OP 636: Mod: JZ | Performed by: PSYCHIATRY & NEUROLOGY

## 2023-05-26 PROCEDURE — 124N000002 HC R&B MH UMMC

## 2023-05-26 PROCEDURE — 99232 SBSQ HOSP IP/OBS MODERATE 35: CPT

## 2023-05-26 RX ORDER — OLANZAPINE 10 MG/1
10 TABLET ORAL 3 TIMES DAILY PRN
Status: DISCONTINUED | OUTPATIENT
Start: 2023-05-26 | End: 2023-05-26

## 2023-05-26 RX ORDER — OLANZAPINE 7.5 MG/1
7.5 TABLET, FILM COATED ORAL 2 TIMES DAILY
Status: ON HOLD | DISCHARGE
Start: 2023-05-26 | End: 2023-11-10

## 2023-05-26 RX ORDER — OLANZAPINE 10 MG/2ML
10 INJECTION, POWDER, FOR SOLUTION INTRAMUSCULAR 3 TIMES DAILY PRN
Status: DISCONTINUED | OUTPATIENT
Start: 2023-05-26 | End: 2023-05-26

## 2023-05-26 RX ORDER — AMOXICILLIN 250 MG
1 CAPSULE ORAL 2 TIMES DAILY PRN
Status: DISCONTINUED | OUTPATIENT
Start: 2023-05-26 | End: 2023-11-13 | Stop reason: HOSPADM

## 2023-05-26 RX ORDER — NITROFURANTOIN 25; 75 MG/1; MG/1
100 CAPSULE ORAL EVERY 12 HOURS
Status: ON HOLD | DISCHARGE
Start: 2023-05-26 | End: 2023-11-10

## 2023-05-26 RX ORDER — OLANZAPINE 10 MG/2ML
5 INJECTION, POWDER, FOR SOLUTION INTRAMUSCULAR 3 TIMES DAILY PRN
Status: DISCONTINUED | OUTPATIENT
Start: 2023-05-26 | End: 2023-06-05

## 2023-05-26 RX ORDER — POLYETHYLENE GLYCOL 3350 17 G/17G
17 POWDER, FOR SOLUTION ORAL DAILY
Status: DISCONTINUED | OUTPATIENT
Start: 2023-05-26 | End: 2023-07-26

## 2023-05-26 RX ORDER — GABAPENTIN 100 MG/1
100 CAPSULE ORAL EVERY 6 HOURS PRN
Status: DISCONTINUED | OUTPATIENT
Start: 2023-05-26 | End: 2023-11-13 | Stop reason: HOSPADM

## 2023-05-26 RX ORDER — LANOLIN ALCOHOL/MO/W.PET/CERES
1000 CREAM (GRAM) TOPICAL DAILY
Status: DISCONTINUED | OUTPATIENT
Start: 2023-05-26 | End: 2023-11-03

## 2023-05-26 RX ORDER — OLANZAPINE 5 MG/1
5 TABLET ORAL 3 TIMES DAILY PRN
Status: DISCONTINUED | OUTPATIENT
Start: 2023-05-26 | End: 2023-06-05

## 2023-05-26 RX ORDER — AMOXICILLIN 250 MG
1 CAPSULE ORAL 2 TIMES DAILY PRN
Status: ON HOLD | DISCHARGE
Start: 2023-05-26 | End: 2023-11-10

## 2023-05-26 RX ORDER — ACETAMINOPHEN 325 MG/1
650 TABLET ORAL EVERY 4 HOURS PRN
Status: DISCONTINUED | OUTPATIENT
Start: 2023-05-26 | End: 2023-11-13 | Stop reason: HOSPADM

## 2023-05-26 RX ORDER — LORAZEPAM 1 MG/1
1 TABLET ORAL 3 TIMES DAILY
Status: DISCONTINUED | OUTPATIENT
Start: 2023-05-26 | End: 2023-05-30

## 2023-05-26 RX ORDER — LORAZEPAM 1 MG/1
1 TABLET ORAL 3 TIMES DAILY
Status: DISCONTINUED | OUTPATIENT
Start: 2023-05-26 | End: 2023-05-26

## 2023-05-26 RX ORDER — OLANZAPINE 5 MG/1
5 TABLET ORAL 3 TIMES DAILY PRN
Status: DISCONTINUED | OUTPATIENT
Start: 2023-05-26 | End: 2023-05-26

## 2023-05-26 RX ORDER — TAMSULOSIN HYDROCHLORIDE 0.4 MG/1
0.4 CAPSULE ORAL DAILY
Status: DISCONTINUED | OUTPATIENT
Start: 2023-05-26 | End: 2023-05-26

## 2023-05-26 RX ORDER — NITROFURANTOIN 25; 75 MG/1; MG/1
100 CAPSULE ORAL EVERY 12 HOURS SCHEDULED
Status: COMPLETED | OUTPATIENT
Start: 2023-05-26 | End: 2023-05-28

## 2023-05-26 RX ORDER — TAMSULOSIN HYDROCHLORIDE 0.4 MG/1
0.4 CAPSULE ORAL DAILY
Status: DISCONTINUED | OUTPATIENT
Start: 2023-05-26 | End: 2023-11-13 | Stop reason: HOSPADM

## 2023-05-26 RX ORDER — TAMSULOSIN HYDROCHLORIDE 0.4 MG/1
0.4 CAPSULE ORAL DAILY
Status: ON HOLD | DISCHARGE
Start: 2023-05-27 | End: 2023-11-10

## 2023-05-26 RX ORDER — POLYETHYLENE GLYCOL 3350 17 G/17G
17 POWDER, FOR SOLUTION ORAL DAILY
Status: ON HOLD | DISCHARGE
Start: 2023-05-27 | End: 2023-11-10

## 2023-05-26 RX ORDER — TRAZODONE HYDROCHLORIDE 50 MG/1
50 TABLET, FILM COATED ORAL
Status: DISCONTINUED | OUTPATIENT
Start: 2023-05-26 | End: 2023-11-13 | Stop reason: HOSPADM

## 2023-05-26 RX ORDER — OLANZAPINE 5 MG/1
5 TABLET, ORALLY DISINTEGRATING ORAL 3 TIMES DAILY PRN
Status: ON HOLD | DISCHARGE
Start: 2023-05-26 | End: 2023-11-10

## 2023-05-26 RX ORDER — MAGNESIUM HYDROXIDE/ALUMINUM HYDROXICE/SIMETHICONE 120; 1200; 1200 MG/30ML; MG/30ML; MG/30ML
30 SUSPENSION ORAL EVERY 4 HOURS PRN
Status: DISCONTINUED | OUTPATIENT
Start: 2023-05-26 | End: 2023-09-24

## 2023-05-26 RX ADMIN — OLANZAPINE 7.5 MG: 5 TABLET, FILM COATED ORAL at 21:29

## 2023-05-26 RX ADMIN — OLANZAPINE 10 MG: 10 TABLET, FILM COATED ORAL at 16:08

## 2023-05-26 RX ADMIN — NITROFURANTOIN MONOHYDRATE/MACROCRYSTALS 100 MG: 75; 25 CAPSULE ORAL at 21:30

## 2023-05-26 RX ADMIN — LORAZEPAM 1 MG: 1 TABLET ORAL at 19:20

## 2023-05-26 RX ADMIN — OLANZAPINE 7.5 MG: 5 TABLET, ORALLY DISINTEGRATING ORAL at 08:42

## 2023-05-26 RX ADMIN — OLANZAPINE 10 MG: 10 INJECTION, POWDER, FOR SOLUTION INTRAMUSCULAR at 13:53

## 2023-05-26 RX ADMIN — CYANOCOBALAMIN TAB 1000 MCG 1000 MCG: 1000 TAB at 08:42

## 2023-05-26 RX ADMIN — SERTRALINE HYDROCHLORIDE 150 MG: 100 TABLET ORAL at 08:42

## 2023-05-26 RX ADMIN — LORAZEPAM 1 MG: 2 INJECTION INTRAMUSCULAR; INTRAVENOUS at 06:42

## 2023-05-26 RX ADMIN — TAMSULOSIN HYDROCHLORIDE 0.4 MG: 0.4 CAPSULE ORAL at 08:42

## 2023-05-26 RX ADMIN — LORAZEPAM 1 MG: 1 TABLET ORAL at 08:42

## 2023-05-26 RX ADMIN — TAMSULOSIN HYDROCHLORIDE 0.4 MG: 0.4 CAPSULE ORAL at 16:08

## 2023-05-26 RX ADMIN — NITROFURANTOIN (MONOHYDRATE/MACROCRYSTALS) 100 MG: 75; 25 CAPSULE ORAL at 10:21

## 2023-05-26 ASSESSMENT — ACTIVITIES OF DAILY LIVING (ADL)
ADLS_ACUITY_SCORE: 37
HYGIENE/GROOMING: WITH ASSISTANCE
ORAL_HYGIENE: WITH ASSISTANCE
WALKING_OR_CLIMBING_STAIRS: AMBULATION DIFFICULTY, ASSISTANCE 1 PERSON
ADLS_ACUITY_SCORE: 37
ORAL_HYGIENE: WITH ASSISTANCE
ADLS_ACUITY_SCORE: 37
TOILETING_ASSISTANCE: TOILETING DIFFICULTY, ASSISTANCE 1 PERSON
DRESSING/BATHING: BATHING DIFFICULTY, ASSISTANCE 1 PERSON;DRESSING DIFFICULTY, ASSISTANCE 1 PERSON
ADLS_ACUITY_SCORE: 45
CONCENTRATING,_REMEMBERING_OR_MAKING_DECISIONS_DIFFICULTY: YES
DIFFICULTY_EATING/SWALLOWING: YES
ADLS_ACUITY_SCORE: 37
WALKING_OR_CLIMBING_STAIRS_DIFFICULTY: YES
ADLS_ACUITY_SCORE: 37
EATING/SWALLOWING: EATING
ADLS_ACUITY_SCORE: 79
DOING_ERRANDS_INDEPENDENTLY_DIFFICULTY: YES
DRESS: WITH ASSISTANCE
ADLS_ACUITY_SCORE: 83
ADLS_ACUITY_SCORE: 37
ADLS_ACUITY_SCORE: 83
LAUNDRY: UNABLE TO COMPLETE
DRESSING/BATHING_DIFFICULTY: YES
HYGIENE/GROOMING: WITH ASSISTANCE
FALL_HISTORY_WITHIN_LAST_SIX_MONTHS: YES
EQUIPMENT_CURRENTLY_USED_AT_HOME: OTHER (SEE COMMENTS)
TOILETING_ISSUES: YES
DRESS: WITH ASSISTANCE
LAUNDRY: UNABLE TO COMPLETE
ADLS_ACUITY_SCORE: 83
ADLS_ACUITY_SCORE: 45
WEAR_GLASSES_OR_BLIND: NO

## 2023-05-26 ASSESSMENT — LIFESTYLE VARIABLES
SKIP TO QUESTIONS 9-10: 1
AUDIT-C TOTAL SCORE: 0

## 2023-05-26 NOTE — CARE PLAN
05/26/23 1245   Patient Belongings   Patient Belongings locker   Patient Belongings Put in Hospital Secure Location (Security or Locker, etc.) clothing;shoes   Belongings Search Yes   Clothing Search Yes     In Locker:  Grey shoes  Jeans   Belt  Socks  Purple donovan sling   Orange shirt  Underwear       ..A               Admission:  I am responsible for any personal items that are not sent to the safe or pharmacy.  [facility] is not responsible for loss, theft or damage of any property in my possession.    Signature:  _________________________________ Date: _______  Time: _____                                              Staff Signature:  ____________________________ Date: ________  Time: _____      2nd Staff person, if patient is unable/unwilling to sign:    Signature: ________________________________ Date: ________  Time: _____     Discharge:  [facility] has returned all of my personal belongings:    Signature: _________________________________ Date: ________  Time: _____                                          Staff Signature:  ____________________________ Date: ________  Time: _____

## 2023-05-26 NOTE — PROGRESS NOTES
"  VS: /63 (BP Location: Left arm)   Pulse 77   Temp 97.9  F (36.6  C) (Oral)   Resp 16   Ht 1.753 m (5' 9\")   Wt 78.6 kg (173 lb 4.5 oz)   SpO2 98%   BMI 25.59 kg/m       O2: RA, lung sounds clear and equal, denies CP and SOB   Output: Pull up in place for mild incontinence, voiding in BR   Last BM: 5/25 per report   Activity: Up ad jose, steady gait    Skin: Intact    Pain: Denies    Neuro: A/O self, denies n/t, inattentive and inappropriate   Dressing: None    Diet: Regular, good appetite    LDA: None    Equipment: 2 sitters at bedside, call light in reach   Plan: Transfer to inpatient psych   Additional Info:        "

## 2023-05-26 NOTE — PHARMACY-ADMISSION MEDICATION HISTORY
Please see Admission Medication History completed on 5/18/2023  under previous encounter at [facility] for information regarding prior to admission medications.     Andrzej BeckD
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
## 2023-05-26 NOTE — PLAN OF CARE
"Problem: Confusion Chronic  Goal: Optimal Cognitive Function  Outcome: Not Progressing    Vinod is labile, verbally abusive. He will be polite and say \"thank you\" one minute, but then seconds later scream, \"fuck you! I hate you!\" When asked about mental health symptoms, he refuses to respond, but occasionally seems to be mouthing words silently. It is unclear what he is mouthing. He appears internally preoccupied. He is disheveled. He has been drinking fluids but his appetite is poor. He refused to eat dinner. He ate one applesauce at . He spent the shift watching TV in the lounge, lying in bed resting, or sitting in his wheelchair. Staff are using a gait belt to help him ambulate but he seems to need minimal assistance ambulating.    Pt was saying the N word loudly to his O staff. When redirected, pt started screaming the word louder. He was repeating it over and over while smiling. At this time he accepted PRN Zyprexa, which did seem to calm him down, as he was not yelling as frequently after this.     At dinnertime, he was offered his scheduled Ativan but then then started to refuse to talk to staff at all. [facility] with his eyes closed and his hands started to shake, so he was wheeled back to his room. His still refused to respond to staff verbally, but he was able to squeeze my hands when instructed. Upper and lower extremities were strong and equal. He would occasionally nod his head when asked if he could hear us, but otherwise did not talk. When asked if he would take the Ativan, he replied by shaking his head no. He, overall, seemed calm. Vitals were WNL. /90. HR 73-80. RR 16. O2 %. .     This episode lasted for 45 minutes. After the episode, vitals were /72. HR 75-83. RR 16. O2 %. He denied pain. He said his name was Satan and then refused to answer any other questions, but he was awake and alert.     Later, he was masturbating, squeezing his penis " between his hands tightly, and screaming. He would jump out of his bed and try to run down the aragon. He accepted scheduled HS medications at this time. He was then straight cathed (see previous note) and is now lying in his bed talking to himself.

## 2023-05-26 NOTE — CARE PLAN
S:  Rapid Response is called and is updated to a Code Blue.     B:  Patient is a new transfer from medical.  Per report.  Patient is alert and ambulatory.  Patient walks independently.  Patient has verbal outburst.  Patient has a history of attempting to run down the hallway, naked.  Patient is redirectable.    A:  Patient arrived to the unit, nonverbal.  Staff of two males assisted the patinent with a  Search and change into scrubs.  Patient is unable to follow directions to sit down safely.  Patient became tired.  Staff assisted patient to his room and notified writer, they were concerned with the patients behavior.      Writer approached the patients room.  Patient is sitting in the wheelchair.  All four extremities are hyperflexed, rigid, and shaking forcefully, to be heard against the arms of the wheel chair.  Patient back and head is stiff.  Patient eyes are closed. Sternal rub is applied.  Patient is not responding.     R: Rapid response is called.  Patients provider is updated.  Patients provider requested the Rapid response to be updated to a Code Blue.  Code Blue team responded and took over.      Blood pressure (!) 176/57, pulse (!) 141, temperature 97.5  F (36.4  C), temperature source Oral, SpO2 98 %.    B

## 2023-05-26 NOTE — CONSULTS
Mental Health Transition and Triage-Consult and Liaison  Civil Commitment Status Plan of Care    Writer confirmed paperwork was received yesterday at [facility] for commitment.     They needed additional support documents. Those were sent to Chauncey at Intake. Verbal report completed via intake as well.     Updated Vicky that patient has been accepted to Station 30 and provided phone number to unit directly.     Next steps:   -Continue with 72 hour hold  -Continue with inpatient hospitalization recommendation  - to continue to follow for support and disposition. KIRA Perry would like to be contacted from  at [phone]  --Commitment LMHP signing off as PPS has been started. Please contact if further needs are required related to PPS.       AIDA DALTON, LICSW, Psychotherapist  Extended Care- Triage & Transition Services  Callback: [phone]
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
## 2023-05-26 NOTE — DISCHARGE INSTRUCTIONS
Behavioral Discharge Planning and Instructions    Summary: You were admitted on 5/26/2023  due to Schizophrenia.  You were treated by Dr. Rhodes and Dr. Melo and discharged on 11/13/2023 from [facility] [address] [place] [id number].     Main Diagnosis: Schizophrenia    You were dually committed to the [facility] and the Commissioner of Human Services on 07/06/2023 and you are being discharged on a Provisional Discharge Agreement which shall remain in effect for the duration of the Commitment which expires on 01/06/2024. You are also court ordered to take the medications that the doctor ordered for you. You also had a [place] court order granting electroconvulsive therapy, (ECT).    Health Care Follow-up:   Appointment type: Primary Care  Date/time: Friday November 24th, 2023 @ 8:40 AM In person  Provider:  Franklyn Estrella MD   Address:  [address] [address]  Phone: [phone]    Psychiatry appointment: Monday, November 20, 2023 @ 11am via Phone  **For this appointment, you will receive a phone call from your provider @ [phone].   Provider: Dolly Santana MD  Location: [facility] ([facility])  [address] [place] [address]  Phone: [phone]   Fax: [phone]  HUC TO FAX AVS to above appointment, please    Attend all scheduled appointments with your outpatient providers. Call at least 24 hours in advance if you need to reschedule an appointment to ensure continued access to your outpatient providers.     Follow up with your CADI , Regina Wong at [phone] or [email or web address].    Follow up with your mental health , Vicky Valdez at [phone] or [email or web address].    Major Treatments, Procedures and Findings:  You were provided with: a psychiatric assessment, assessed  "for medical stability, medication evaluation and/or management, milieu management, and medical interventions    You had a Code Blue with Rapid Response on 5/26/23.    You were seen by Internal Medicine on 5/26/23.    Symptoms to Report: feeling more aggressive, increased confusion, losing more sleep, or mood getting worse    Early warning signs can include: increased depression or anxiety sleep disturbances increased thoughts or behaviors of suicide or self-harm  increased unusual thinking, such as paranoia or hearing voices    Safety and Wellness:  Take all medicines as directed. Make no changes unless your doctor suggests them. Follow treatment recommendations. Refrain from alcohol and non-prescribed drugs. If there is a concern for safety, call 911.    Resources:   Crisis Intervention: 405.168.9604 or 253-580-7721 (TTY: 558.881.4163).  Call anytime for help.  National [place] on Mental Illness (www.[place].jordan.org): 518.470.1075 or 783-085-4573.  Suicide Awareness Voices of Education (SAVE) (www.save.org): 103-855-OPSO (9308)  National Suicide Prevention Line (www.mentalhealthmn.org): 597-377-SMEI (5510)  Mental Health Consumer/Survivor Network of [place] (www.mhcsn.net): 815.436.1576 or 021-347-6102  Mental Health Association of [place] (www.mentalhealth.org): 186.578.2591 or 634-088-0056  Text 4 Life: txt \"LIFE\" to 31384 for immediate support and crisis intervention  Crisis text line: Text \"[place]\" to 640661. Free, confidential, 24/7.  Crisis Intervention: 301.196.2611 or 924-353-1064. Call anytime for help.     General Medication Instructions:   See your medication sheet(s) for instructions.   Take all medicines as directed.  Make no changes unless your doctor suggests them.   Go to all your doctor visits.  Be sure to have all your required lab tests. This way, your medicines can be refilled on time.  Do not use any drugs not prescribed by your doctor.  Avoid alcohol.    Advance Directives:   Scanned document on file with " Roberto?    Is document scanned?    Honoring Choices Your Rights Handout:    Was more information offered?      The Treatment team has appreciated the opportunity to work with you. If you have any questions or concerns about your recent admission, you can contact the unit which can receive your call 24 hours a day, 7 days a week. They will be able to get in touch with a Provider if needed. The unit number is [phone]

## 2023-05-26 NOTE — PLAN OF CARE
Vitals: BP (!) 145/73  Pulse 77   Temp 97.6  F (36.4  C) (Oral)   Resp 16  SpO2 97%   On RA.     CMS/Neuro: disoriented to place, time and situation. Denies numbness and tingling. Impulses and innapropiate thoughts.     Resp/pulm: Denies SOB, chest pain, declined auscultating lungs.     GI/: Denies N/V. Unable void, bladder scanned 538mL at 7am. Attempted straight cathing x2 but was unable to obtain urine.     LDA: No PIV.     Activity: Independent, baseline tremors.     Pt yelling and escalating around 6am, gave PRN ativan. Pt de-escalated 30 minutes later.      2:1 sitters at bedside for safety. Safe trays no utensils, suicide precautions maintained. 72 hour hold until 5/27 1540.     Plan to transfer to psych today.
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
## 2023-05-26 NOTE — H&P
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 and inherent to software.    Misty Portillo MD    Initial Certification I certify that the inpatient psychiatric facility admission was medically necessary for treatment which could reasonably be expected to improve the patient s condition.      I estimate TBD days of hospitalization is necessary for proper treatment of the patient. My plans for post-hospital care this patient are:medications, appointments .     Misty Portillo MD
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
## 2023-05-26 NOTE — PLAN OF CARE
Urinary Retention:     Pt was able to void a large amount approximately 1500. Medical notified. Order for Barrios changed to PRN. Hat placed on toilet to monitor output. Pt remains on Status Individual Observation (2:1 ratio) for safety.
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
## 2023-05-27 LAB — LACTATE SERPL-SCNC: 1 MMOL/L (ref 0.7–2)

## 2023-05-27 PROCEDURE — 99233 SBSQ HOSP IP/OBS HIGH 50: CPT | Performed by: PHYSICIAN ASSISTANT

## 2023-05-27 PROCEDURE — 83605 ASSAY OF LACTIC ACID: CPT | Performed by: PHYSICIAN ASSISTANT

## 2023-05-27 PROCEDURE — 99233 SBSQ HOSP IP/OBS HIGH 50: CPT | Performed by: PSYCHIATRY & NEUROLOGY

## 2023-05-27 PROCEDURE — 99221 1ST HOSP IP/OBS SF/LOW 40: CPT | Performed by: STUDENT IN AN ORGANIZED HEALTH CARE EDUCATION/TRAINING PROGRAM

## 2023-05-27 PROCEDURE — 124N000002 HC R&B MH UMMC

## 2023-05-27 PROCEDURE — 250N000013 HC RX MED GY IP 250 OP 250 PS 637: Performed by: PSYCHIATRY & NEUROLOGY

## 2023-05-27 PROCEDURE — 36415 COLL VENOUS BLD VENIPUNCTURE: CPT | Performed by: PHYSICIAN ASSISTANT

## 2023-05-27 PROCEDURE — 250N000011 HC RX IP 250 OP 636: Performed by: PHYSICIAN ASSISTANT

## 2023-05-27 RX ORDER — OLANZAPINE 10 MG/2ML
10 INJECTION, POWDER, FOR SOLUTION INTRAMUSCULAR ONCE
Status: COMPLETED | OUTPATIENT
Start: 2023-05-27 | End: 2023-05-27

## 2023-05-27 RX ORDER — OLANZAPINE 10 MG/2ML
10 INJECTION, POWDER, FOR SOLUTION INTRAMUSCULAR 2 TIMES DAILY
Status: DISCONTINUED | OUTPATIENT
Start: 2023-05-27 | End: 2023-06-05

## 2023-05-27 RX ADMIN — OLANZAPINE 5 MG: 5 TABLET, FILM COATED ORAL at 21:29

## 2023-05-27 RX ADMIN — LORAZEPAM 1 MG: 1 TABLET ORAL at 12:22

## 2023-05-27 RX ADMIN — TRAZODONE HYDROCHLORIDE 50 MG: 50 TABLET ORAL at 21:28

## 2023-05-27 RX ADMIN — OLANZAPINE 10 MG: 10 INJECTION, POWDER, FOR SOLUTION INTRAMUSCULAR at 09:56

## 2023-05-27 RX ADMIN — NITROFURANTOIN MONOHYDRATE/MACROCRYSTALS 100 MG: 75; 25 CAPSULE ORAL at 19:24

## 2023-05-27 RX ADMIN — LORAZEPAM 1 MG: 1 TABLET ORAL at 19:24

## 2023-05-27 RX ADMIN — NITROFURANTOIN MONOHYDRATE/MACROCRYSTALS 100 MG: 75; 25 CAPSULE ORAL at 12:54

## 2023-05-27 RX ADMIN — OLANZAPINE 7.5 MG: 5 TABLET, ORALLY DISINTEGRATING ORAL at 19:24

## 2023-05-27 ASSESSMENT — ACTIVITIES OF DAILY LIVING (ADL)
ADLS_ACUITY_SCORE: 79
ADLS_ACUITY_SCORE: 73
HYGIENE/GROOMING: INDEPENDENT
ADLS_ACUITY_SCORE: 89
ADLS_ACUITY_SCORE: 79
ORAL_HYGIENE: WITH ASSISTANCE
ADLS_ACUITY_SCORE: 83
ADLS_ACUITY_SCORE: 79
ADLS_ACUITY_SCORE: 83
DRESS: WITH ASSISTANCE;SCRUBS (BEHAVIORAL HEALTH)
ADLS_ACUITY_SCORE: 83
LAUNDRY: UNABLE TO COMPLETE
ADLS_ACUITY_SCORE: 79
ADLS_ACUITY_SCORE: 79
HYGIENE/GROOMING: WITH ASSISTANCE

## 2023-05-27 NOTE — PLAN OF CARE
"Goal Outcome Evaluation:    Pt now yelling and repeating phrases such as \"SATAN\" SATAN over and over again. Pt agreed to walk to bathroom. Voided some in toilet but missed the measure hat.  ml. Medical flyer called to catheterize patient. Pt needs Caude cath for straight cath.    Pt making nonsensical comments, difficult to re direct to task. Pt is on medical bed due to physical limitations, and inability to move when he has these seizure like episodes.     Pt masturbating , unable to re direct. Pt swearing, I will try to give scheduled ativan now with Dr Caputo's okay.    Pt missed his morning antibiotic.(due to unresponsiveness) Discussed with pharmacy and I will still give morning Macrobid this afternoon per their recommendation.                  "
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
## 2023-05-27 NOTE — PROGRESS NOTES
RN flyer called to straight cath pt for urinary retention. Bladder scan from unit staff was 528 ml. Pt was difficult straight cath. Recommend coude tip if later catheterizations are needed. Emptied approximately 750 ml from pt bladder. Noted skin irritation around urethra and pink tinge to lube well removing catheter after. RN updated.

## 2023-05-27 NOTE — PROGRESS NOTES
"   05/26/23 2200   Group Therapy Session   Group Attendance attended group session   Time Session Began 1700   Time Session Ended 1800   Total Time (minutes) 60   Total # Attendees 6   Group Type psychotherapeutic   Group Topic Covered cognitive therapy techniques   Group Session Detail DBT process   Patient Response/Contribution cooperative with task;confused;discussed personal experience with topic;unable to interrupt patient;verbalizations were off topic     Pt reported mood as \"not so good.\" With support of peer, he did some abstract drawing. His hands were very tremulous. Writer suggested he make small marks and dots. This seemed to contain and focus him. He did say nonsensical things, such as not seeing the universe or not seeing what was the view out the window and a few other comments writer didn't understand.  "
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
## 2023-05-27 NOTE — PROGRESS NOTES
Unable to wake pt this morning. Pt VSS and RA sats 99% . Pt shaky and  Mouth very tremulous. Does not respond to sternal rub or loud voice. Pt arms rigid but can move them on his own and there is resistance on his part with movement. I asked Dr. Caputo to help with assessment as I have not met this pt before. Dr. Caputo saw pt and medical IM provider called for help in assessment. IM saw at bedside. IM reports this is similar to presentation of last evening. Will give now order of IM Zyprexa.    Pt has bilateral shakiness to arms, and very tremulous lips. No verbal response. Respirations even and easy.
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
## 2023-05-27 NOTE — CONSULTS
[facility]  Neurology Consultation    Patient Name:  Vinod Lee  MRN:  [id number]    :  1960  Date of Service:  May 27, 2023  Primary care provider:  No [facility]-Primary, Physician      Neurology consultation service was asked to see Vinod Lee by Dr. Jimenez to evaluate for episodes of shaking/ convulsion.    Chief Complaint: Periods of shaking and nonresponsiveness    History of Present Illness:   Vinod Lee is a 62 year old male with history of schizophrenia and drug-induced parkinsonism who was admitted to psychiatry  for encephalopathy by the medicine team.  He had a full work-up which was unremarkable in terms of metabolic, endocrine, structural/imaging, or infectious etiologies to explain his encephalopathy.  He was then transferred to inpatient psychiatry  since his symptoms were attributed to worsening psychosis in the context of his known schizophrenia.  The medicine team was consulted again yesterday, , for what they thought was most consistent with a psychogenic nonepileptic spell.  At that time he was unresponsive, had shaking in the bilateral upper extremities, though would occasionally move his arms up and down in a purposeful manner.  This episode lasted for at least 20 minutes if not longer, and he had no postictal period.    Today on my examination I talked with one of his nurses who described that earlier this morning she had difficulty waking up the patient.  He was laying flat, eyes closed, and had shaking movements of his bilateral hands.  He was not responding to sternal rub for over an hour.  He was given an additional dose of Zyprexa without much benefit, and then 15 to 20 minutes after the Zyprexa he awoke and was able to ambulate without difficulty or without any clear postictal state.    My colleague Dr. Haney was also consulted during this admission regarding his Parkinson medication.  He discovered that while the patient had  been prescribed Sinemet, he had not been taking it for the 3 months leading up to his hospitalization, and given the patient's psychosis he did not recommend resuming his carbidopa/levodopa.     ROS  A comprehensive ROS was performed and pertinent findings were included in HPI.     PMH  Past Medical History:   Diagnosis Date     Parkinson's disease (H)      Schizophrenia (H)      No past surgical history on file.    Medications   I have personally reviewed the patient's medication list.     Allergies  I have personally reviewed the patient's allergy list.     Social History  Social History     Socioeconomic History     Marital status: Single     Spouse name: Not on file     Number of children: Not on file     Years of education: Not on file     Highest education level: Not on file   Occupational History     Not on file   Tobacco Use     Smoking status: Never     Smokeless tobacco: Never   Vaping Use     Vaping status: Not on file   Substance and Sexual Activity     Alcohol use: Not on file     Comment: GRACE     Drug use: Not on file     Comment: GRACE     Sexual activity: Not Currently   Other Topics Concern     Not on file   Social History Narrative     Not on file     Social Determinants of Health     Financial Resource Strain: Not on file   Food Insecurity: Not on file   Transportation Needs: Not on file   Physical Activity: Not on file   Stress: Not on file   Social Connections: Not on file   Intimate Partner Violence: Not on file   Housing Stability: Not on file       Family History    No family history on file.      Physical Examination   Vitals: BP (!) 145/71 (BP Location: Left arm, Patient Position: Supine, Cuff Size: Adult Small)   Pulse 77   Temp 97.6  F (36.4  C) (Oral)   Resp 16   SpO2 98%   General: Walking through the wards without assistance  Head: NC/AT  Eyes: no icterus, op pink and moist  Cardiac: RRR. Extremities warm, no edema.   Respiratory: non-labored on RA  GI: S/NT/ND  Skin: No rash or  lesion on exposed skin  Neuro:  Mental status:   As patient was walking to the gardner, his nurse explained that the neurologist was here to see him so he should turn around and return to his room.  The patient turned and walked to his room and then sat down.  Soon thereafter the patient closed his eyes and did not answer any of my questions nor did he follow any commands.  He initially allowed me to lift his left eyelid to examine his pupil, and then actively squeezed his eyelids closed.  His hands were crossed, interlaced within 1 another, and he actively resisted any attempted movements.  Cranial nerves: Pupils are equal and reactive, gaze is conjugate, unable to further assess extraocular movements.  Face appears symmetrical, though I was not able to observe it with action.  I was not able to hear his voice, assess his tongue movements, palate, head turn, or shoulder shrug.  Motor: Unable to assess formally, though patient is able to move all extremities antigravity on observation.  Bilateral hand tremor present at rest.  Reflexes:   Unable to reliably assess reflexes as patient was contracted in response to touch.  Sensory: Unable to assess  Coordination: Unable to assess  Gait: Walked independently, stride was somewhat short, though base was normal.  Bilateral hand tremor noted, accentuated when walking    Investigations   I have personally reviewed pertinent labs, tests, and radiological imaging. Discussion of notable findings is included under Impression.     Was patient transferred from outside hospital?   No    Impression  #Psychogenic nonepileptic spells, probable  #Episodes of unresponsiveness  #Psychosis  Mr. Lee is a 62-year-old man with a history of psychosis and schizophrenia with either drug-induced or idiopathic parkinsonism.  He has now developed spells of unresponsiveness with the current evidence leaning towards these being secondary to his known psychiatric disease.  Given his normal lactate  following 45 minutes of shaking, as well as the absence of a postictal state, I do not suspect epileptic/electrographic seizures as the cause of these events.  If these spells remain similar to what has been observed, with normal vital signs and blood sugars, from a neurologic perspective there is no reason to intervene with sedating medications.    He is resting tremor was again observed today, which may be mistaken for convulsions when he is awake and resisting examinations or resisting interacting with others.  I would again recommend against treating with dopaminergic medications due to the patient's ongoing psychosis, especially since the benefit of Parkinson medications is symptomatic relief of some of the motor symptoms without changing the course or progression of disease.    Recommendations  -lactate completed and normal  -hold benzodiazepines for now with low suspicion for seizure disorder  - Provide reassurance during these shaking spells, as long as vitals are within normal limits, and if the appearance of these spells is similar to what has been observed, there is no urgency to intervene from a neurologic perspective      Thank you for involving Neurology in the care of Vinod Lee.  Please do not hesitate to call with questions.    Kranthi ePrdue MD    I spent 50 minutes on this consultation including chart review, examination of patient, and document preparation.

## 2023-05-27 NOTE — PLAN OF CARE
It was reported pt voided a large amount of urine at 1500 but when encouraged to urinate throughout the evening, he would start masturbating instead. He also kept squeezing his penis tightly with his hands and screaming. He was unable to urinate after sitting on the toilet for several minutes. When I asked if he can urinate, he shook his head no and screamed. Bladder was scanned and showed 528 ml retained. IM paged and ordered PRN straight cath which was completed by medical nurse and 750 ml was emptied.

## 2023-05-27 NOTE — PROGRESS NOTES
"Pt now opened eyes. Said his name and responded to my questions. Reports he feels \"very relaxed\"  Improvement noted in LOC . Zyprexa was given a little over one hour ago. Will continue to monitor closely.  "

## 2023-05-27 NOTE — PLAN OF CARE
Goal Outcome Evaluation:    Vinod was able to eat some lunch. Drank water, toast and applesauce. Pt restless, continues to be easily frustrated and repeats swear words over and over again. Will sometimes repeat  his name over and over again.    Pt was straight cathed at 1230 pm for 400 ml urine with pink bloody tinge to urine. Will need to monitor. Please note pt was catheterized last night and has also been masturbating a lot this shift.     Presently pt has finally calmed down some since Ativan was given . Still answers my questions and still talking some, but when neurologist arrived for interview, pt suddenly sat in chair and again would not respond. Once Neurologist left unit the Pt woke up again and started masturbating again.) Behavior very difficult to assess due to frequent changes in LOC.

## 2023-05-28 PROCEDURE — 250N000013 HC RX MED GY IP 250 OP 250 PS 637: Performed by: PSYCHIATRY & NEUROLOGY

## 2023-05-28 PROCEDURE — 250N000013 HC RX MED GY IP 250 OP 250 PS 637: Performed by: PHYSICIAN ASSISTANT

## 2023-05-28 PROCEDURE — 124N000002 HC R&B MH UMMC

## 2023-05-28 PROCEDURE — 250N000011 HC RX IP 250 OP 636: Mod: JZ | Performed by: PSYCHIATRY & NEUROLOGY

## 2023-05-28 RX ADMIN — OLANZAPINE 7.5 MG: 5 TABLET, ORALLY DISINTEGRATING ORAL at 18:57

## 2023-05-28 RX ADMIN — OLANZAPINE 5 MG: 5 TABLET, FILM COATED ORAL at 23:02

## 2023-05-28 RX ADMIN — LORAZEPAM 1 MG: 1 TABLET ORAL at 18:57

## 2023-05-28 RX ADMIN — POLYETHYLENE GLYCOL 3350 17 G: 17 POWDER, FOR SOLUTION ORAL at 08:00

## 2023-05-28 RX ADMIN — TRAZODONE HYDROCHLORIDE 50 MG: 50 TABLET ORAL at 03:12

## 2023-05-28 RX ADMIN — OLANZAPINE 10 MG: 10 INJECTION, POWDER, FOR SOLUTION INTRAMUSCULAR at 08:19

## 2023-05-28 RX ADMIN — ACETAMINOPHEN 650 MG: 325 TABLET, FILM COATED ORAL at 02:37

## 2023-05-28 RX ADMIN — NITROFURANTOIN MONOHYDRATE/MACROCRYSTALS 100 MG: 75; 25 CAPSULE ORAL at 18:57

## 2023-05-28 ASSESSMENT — ACTIVITIES OF DAILY LIVING (ADL)
ADLS_ACUITY_SCORE: 89
ADLS_ACUITY_SCORE: 89
LAUNDRY: WITH SUPERVISION
ADLS_ACUITY_SCORE: 89
ADLS_ACUITY_SCORE: 89
DRESS: WITH ASSISTANCE
ADLS_ACUITY_SCORE: 89
HYGIENE/GROOMING: WITH ASSISTANCE
ADLS_ACUITY_SCORE: 89
LAUNDRY: WITH SUPERVISION
ORAL_HYGIENE: WITH ASSISTANCE
ADLS_ACUITY_SCORE: 89
ADLS_ACUITY_SCORE: 89
HYGIENE/GROOMING: WITH ASSISTANCE
ORAL_HYGIENE: WITH ASSISTANCE
ADLS_ACUITY_SCORE: 89
ADLS_ACUITY_SCORE: 89
DRESS: WITH ASSISTANCE
ADLS_ACUITY_SCORE: 89
ADLS_ACUITY_SCORE: 89

## 2023-05-28 NOTE — PROGRESS NOTES
RN flyer called to straight cath pt for urinary retention. Bladder scan from unit staff was >800 ml. Coude tip cath used. Emptied approximately 1400 ml from pt bladder. Pt was premedicated with ativan and zyprexa. RN updated.

## 2023-05-28 NOTE — PROGRESS NOTES
Brief Medicine Note    Medicine service following up on neurology consult as well as patient's urinary retention.     Psychogenic nonepileptic spells, probable  Recurrent episodic shaking of bilateral arms  Psychosis  Patient had a few more episodes of shaking yesterday. Episode when I assessed him in the morning lasted for over an hour. Patient's lactic acid was not elevated despite over an hour of shaking, making epileptic type seizures very unlikely.   - appreciate neurology recs, rec reassurance. No urgent need to intervene from neurologic standpoint as long as spells appear similar in nature.   - rec continued treatment for psychosis per psychiatry    Urinary retention  Patient has been able to void intermittently, but it seems majority of the time required straight cath. Urology did assess patient on 5/22. Suspect multifactorial including medication related (Zyprexa), Parkinson's related. Also possible there is a behavioral component.   - recommend titrating Zyprexa to lowest dose appropriate per psychiatry  - Continue bladder scan every 4-6 hours, straight cath if volume is greater than 300 mL and patient unable to void, or if PVR >300 mL.   - cont tamsulosin  - consider indwelling lowery if patient becomes more reliable and less likely to pull it out from a psychiatric standpoint, and if nursing able to accommodate cares  - rec f/u OP with urology once discharged, if retention remains an issue.       Medicine service will sign off at this time.  Thank you for involving us in the care of this patient.  Please contact or consult us with any new medical questions or concerns.      Ernestine Jimenez PA-C  [facility]  5/28/2023 11:12 AM           Reason For Visit  Reason For Visit:   Patient presents for follow-up .   Chaperone: PHYLICIA CARLOS is accompanied by her spouse.        Progress Note  Progress Note:   Session Type: Individual   Date: 2/15/2018   Start Time: 11:15 am   End Time: 12:15 pm   Mood: Angry and Depressed.   Recent Behavior: No Problem Indicated.   Symptom Change: None.   Diagnoses: V61.10 Relationship distress with spouse   Mental Status: No Problem Indicated   Risk of Harm: None   Type of Treatment: Cognitive Behavioral Therapy and Couples therapy.   Session Summary: 60 min individual therapy    Phylicia and Luis A were engaged in the dreams within conflict intervention. Time was taken setting this up along with guidelines and rules for having the conversation. Initially, Luis A tended to assume that conflict I had selected, parenting Xochilt, was more of an issue for Phylicia. It was necessary to encourage Luis A to understand the task at hand was to simply talk about the meaning of parenting and child-rearing Xochilt for themselves and to explain the importance of it to the partner. Phylicia tended to drill down with Luis A regarding his upbringing and how this relates to his parenting beliefs. At one point, Luis A's voice began to escalate and he was encouraged to identify any emotions he was having in the moment and discuss. He initially denied but after some reflection acknowledged experiencing frustration and talked about his beliefs that Xochilt should have learned self-care skills long ago. Phylicia asked to learn more about this frustration and Luis A began to blame her and criticize, but then caught himself in this and was coached in trying to be less accusatory. It was pointed out that for him the list is more than a list of what Xochilt needs to do in order to be functional. It is a symbol of fear for her future and shame for their failings is parents. Phylicia then took a turn as the speaker and expressed her greater attention to emotional and  psychological functioning and well-being as opposed to strict behavioral indicators. Her main goal is that Xocihlt be happy. This was difficult for Luis A because he wanted to immediately question weather self-care was a part of happiness and it was necessary to encourage him to phase his question in more of an open-ended way, such as asking Phylicia what happiness means to her. One issue for Phylicia is the number of people in her life who have been suicidal and her sensitivity to that possibility. Luis A had difficulty eliciting Phylicia's thoughts and feelings about this issue. He tended to presume knowledge and awareness of how Phylicia was thinking and feeling. He was encouraged to paraphrase and summarize as a way of showing empathy and ensuring his understanding. Both spouses verbalized it was helpful to engage in this activity. Both acknowledged having learned something important about the other's position of which they had not been aware. Phylicia expressed a desire for more frequent appointments, noting that they need to get some skills to help them be able to resolve some problems sooner rather than later. We agreed to increase to 2 points per week a scheduling permits.   Plan: Continue with weekly counseling at this time.      Signatures   Electronically signed by : MAYLIN KEVIN, PhD; Feb 15 2018  2:27PM CST    Electronically signed by : MAYLIN KEVIN, PhD; Feb 22 2018  7:57AM CST

## 2023-05-28 NOTE — PLAN OF CARE
Problem: Sleep Disturbance  Goal: Adequate Sleep/Rest  Outcome: Progressing   Goal Outcome Evaluation:    Pt continues on 2:1 SIO for assault risk, impulsivity, verbal outbursts and threatening behavior, among many.     Pt has a pt care order for bladder scan every 4-6 hours due to urinary retention.   Pt was scanned at midnight for 95ml  and 260ml.  Drank about 100 of juice and sips of water with med pass.      pt did not get adequate sleep this shift. Pt was awake, restless, loud noises, climbing out of bed, thoughts disorganized, no safety awareness, difficult to redirect with 2:1 requiring 3:1 majority of the time.     Prn tylenol and trazodone given with minimum effect. Pt slept in short naps totaling 3.25 hours.

## 2023-05-28 NOTE — PROGRESS NOTES
Brief Medicine Cross Cover Note   27 May 2023      Paged by RN that pt straight cath for 1400cc.  Changed order to bladder scan Q4-6 hours and continue straight cath for residuals > 300.  Patient is unable to urinate independently.          Didi Wynne, CNP, APRN  Internal Medicine [facility]  Pager [phone]

## 2023-05-28 NOTE — PLAN OF CARE
"  Problem: Psychotic Signs/Symptoms  Goal: Improved Psychomotor Symptoms (Psychotic Signs/Symptoms)  Intervention: Manage Psychomotor Movement  Recent Flowsheet Documentation  Taken 5/28/2023 1000 by Nimo Amaya RN  Activity (Behavioral Health): up ad jose   Goal Outcome Evaluation:               Presentation: Patient is resting in bed.  Patient in on 2:1 SIO.  Patient is dressed in behavioral scrubs. Writer introduced their self.  Patient did not response.  Patients eyes are closed.  All extremities are tense.  Patient is encouraged to sit up for medication administration and breakfast.  Patient refused to sit up, to accept his oral medications, and to eat breakfast.  Patient kept his mouth tightly closed.Patient has an order for Emergency Mediation to administer IM Zyprexa 10 mg.  Patient is given the IM Zyprexa.      Patient has an order to bladder scan q. 4-6 hours.  Bladder scan at 0830 is 126 ml.  Patient is bladder scanned at 1315 with results of 219 ml. Patients abdomen  is soft. Writer requested the [facility] Nurse to assess a second time for accuracy.  The [facility] Nurse bladder scan is 50 ml.    Patients Blood pressure (!) 156/72, pulse 77, temperature 98.6  F (37  C), temperature source Axillary, resp. rate 20, SpO2 97 %.    Patient refused lunch at this time.  Internal Medicine is paged and updated on poor oral intake and Bladder scan results. No new orders received at this time.      At 1400, SIO staff reported the patient ate 100% of lunch.          Mental health symptoms: Due to patients refusal to participate.  Writer is unable to assess patient mental health symptoms.         Medication compliance: Patient refused his am medications.  Patient is given Zyprexa IM 10mg.           Other:  Patients parents called.  Patient was brought the unit phone to his room.  This is the one time, the patient opened his eyes.  Responded \"Yes\" when asked if he would like to speak with his mom and dad.  " Patient was able to maintain a brief conversation with his parents.  Parents had questions regarding patient care.  Patient has not signed an KING for parents.  The parents are encouraged to call back on Tuesday and speak with the unit Provider.       Continue with plan of care

## 2023-05-29 PROCEDURE — 250N000013 HC RX MED GY IP 250 OP 250 PS 637: Performed by: PHYSICIAN ASSISTANT

## 2023-05-29 PROCEDURE — 124N000002 HC R&B MH UMMC

## 2023-05-29 PROCEDURE — 250N000011 HC RX IP 250 OP 636: Mod: JZ | Performed by: PSYCHIATRY & NEUROLOGY

## 2023-05-29 PROCEDURE — 250N000013 HC RX MED GY IP 250 OP 250 PS 637: Performed by: PSYCHIATRY & NEUROLOGY

## 2023-05-29 RX ADMIN — LORAZEPAM 1 MG: 1 TABLET ORAL at 08:39

## 2023-05-29 RX ADMIN — GABAPENTIN 100 MG: 100 CAPSULE ORAL at 02:35

## 2023-05-29 RX ADMIN — TRAZODONE HYDROCHLORIDE 50 MG: 50 TABLET ORAL at 23:10

## 2023-05-29 RX ADMIN — OLANZAPINE 5 MG: 5 TABLET, FILM COATED ORAL at 10:59

## 2023-05-29 RX ADMIN — TAMSULOSIN HYDROCHLORIDE 0.4 MG: 0.4 CAPSULE ORAL at 08:39

## 2023-05-29 RX ADMIN — SERTRALINE HYDROCHLORIDE 50 MG: 50 TABLET ORAL at 08:39

## 2023-05-29 RX ADMIN — OLANZAPINE 10 MG: 10 INJECTION, POWDER, FOR SOLUTION INTRAMUSCULAR at 21:30

## 2023-05-29 RX ADMIN — POLYETHYLENE GLYCOL 3350 17 G: 17 POWDER, FOR SOLUTION ORAL at 08:39

## 2023-05-29 RX ADMIN — OLANZAPINE 5 MG: 5 TABLET, FILM COATED ORAL at 01:17

## 2023-05-29 RX ADMIN — CYANOCOBALAMIN TAB 1000 MCG 1000 MCG: 1000 TAB at 08:39

## 2023-05-29 RX ADMIN — OLANZAPINE 7.5 MG: 5 TABLET, ORALLY DISINTEGRATING ORAL at 08:39

## 2023-05-29 RX ADMIN — GABAPENTIN 100 MG: 100 CAPSULE ORAL at 23:10

## 2023-05-29 RX ADMIN — LORAZEPAM 1 MG: 1 TABLET ORAL at 13:17

## 2023-05-29 RX ADMIN — TRAZODONE HYDROCHLORIDE 50 MG: 50 TABLET ORAL at 02:35

## 2023-05-29 RX ADMIN — TRAZODONE HYDROCHLORIDE 50 MG: 50 TABLET ORAL at 01:17

## 2023-05-29 RX ADMIN — LORAZEPAM 1 MG: 1 TABLET ORAL at 21:42

## 2023-05-29 ASSESSMENT — ACTIVITIES OF DAILY LIVING (ADL)
ADLS_ACUITY_SCORE: 89

## 2023-05-29 NOTE — PROVIDER NOTIFICATION
Patient has increased impulsive behavior.  At 2300, patient began to walk the hallway.  Patient would suddenly run down the hallway.  Patient is not immediately redirected by the SIO staff.  Patient is given Zyprexa 5 mg PRN.  Is instruct not to run.  Patient has no evidence of learning.  Patient has returned to his room, by the end of the shift.  The next shift is aware of the patients status.  Continue to monitor.

## 2023-05-29 NOTE — PROGRESS NOTES
0215: Vinod was awake in room. Unable to sleep. Came out to [facility] with 2:1 staff.  Offered and accepted Gabapentin 100mg for anxiety and repeat trazodone 50mg for sleep. Also accepted pudding and water to take with medications.  Walked back to room with 2:1 staff using gait belt.  Given noise machine to help with relaxation.    Addendum:  Vinod was able to calm and was observed sleeping @ 0400.

## 2023-05-29 NOTE — CARE PLAN
05/28/23 1903   Group Therapy Session   Group Attendance refused to attend group session   Time Session Began 1715   Time Session Ended 1815   Total Time (minutes) 0   Total # Attendees 7   Group Type psychotherapeutic   Group Topic Covered coping skills/lifestyle management;emotions/expression   Group Session Detail CTC-Group members completed/discussed worksheets about their patterns regarding things that are constantly happening, why are they happening, and exploring steps to prevent them from happening.

## 2023-05-29 NOTE — PLAN OF CARE
"  Problem: Psychotic Symptoms  Goal: Psychotic Symptoms  Description: Signs and symptoms of listed problems will be absent or manageable.  Outcome: Progressing   Goal Outcome Evaluation:     Pt. Continues on a SIO 2:1 @1 feet, for disorientation.       Pt. Is restless and impulsive, abruptly walked out of his room and started swiftly pacing the hallway, intermittently jogging, speech is rambling, Tangential, and hyper verbal, often dictates to himself for tasks, thought process is disorganized and impulsive, PRN Zyprexa helpful. Out in the milieu pulling down his pants and masturbating,  easily redirected back to his room for the remainder of the shift. Continent of bowel and bladder, urinating in the toilet this morning  medium size BM x 1.  Bladder scan @1445 71ML Unsteady with his hands and shakes when drinking fluids, eat 100% of breakfast than starred at his tray and stated he's not finished yet, declined lunch, Denies auditory hallucinations, reports seeing scribbling lines on the ceiling and states \"Im not delusional. Denies anxiety and depression, denies pain and all other psych symptoms. Medication compliance and contracted for safety.            Input - 750 cc           "

## 2023-05-29 NOTE — PLAN OF CARE
"Problem: Psychotic Signs/Symptoms  Goal: Improved Behavioral Control (Psychotic Signs/Symptoms)  Outcome: Mariely Brock continues to be confused, irritable. He endorses AH, says he hears voices \"and I have heard them for years.\" He endorses VH, says he sees lines and flying airplanes on his ceiling. He denies depression/anxiety/HI. When asked about SI, he says \"I don't have it now, but I will later when you try to kill me!\" He does seem paranoid, suspicious. He is oriented to self but not time or situation, said he did not know what year it was or why he was here. He thought he was at [facility]. He is impulsive, often getting up quickly. He ambulates mostly well on his own, but can be unsteady when he decides to run fast up and down the halls. He needs to be redirected from masturbating. He has bilateral hand tremor. He was more withdrawn this shift, spent time resting in his room, talking to himself, and eating. Appetite is good.    He drank about 260 mL this shift. At 1930, he urinated in the toilet and bladder scan after this showed PVR of 4 mL.    He had 1 episode of shaking that started about 3 hours ago and has continued as of writing this note. His presentation is the same as it has been the past 3 days. He closes his eyes tightly and furrows his brows. Sometimes his eyelids flutter. His arms are down at his sides, with fists clenched and shaking (similar to the tremors he displays during intentional movements while awake). He does not speak or respond to his name. When offered medication, he shook his head \"no.\" When trying to get vitals at one point he pulled his arm away and shook his head and frowned. Vitals were stable throughout. Per provider recommendations, staff continue to provide reassurance throughout these episodes.    At , he declined his scheduled PO Ativan and Zyprexa, shaking his head \"no\" and frowning. Zyprexa 10 mg was given IM per psychiatric emergency order. He " remains on 2:1 for self-injury risk, assault risk, and medical equipment/ligature risk. He is a fall risk but ambulation is improving.

## 2023-05-29 NOTE — PLAN OF CARE
"Goal Outcome Evaluation:    Problem: Adult Behavioral Health Plan of Care  Goal: Plan of Care Review  Outcome: Progressing     Pt in bed at the beginning of the shift, breathing normal. Pt, became restless, started masturbating while stating \" I am relaxing\", Pt was redirected several times. Pt stated getting out of bed trying to get to the dinning room. Pt was assisted with staff, prn trazodone and Zyprexa given, pt was redirected, offered drinks and music therapy, Pt was tying to get out of the room walking fast and attempting to run, staff redirected the pt, Pt would lower himself to the floor and get up himself. Another dose of trazadone was given with gapabentin 0235 hr, eventually settled at 0400 hr, Pt is on 2:1. Will continue to monitor.  Pt slept for 2.25 hrs  Precaution: Suicide, SIB, fall, assault, elopement.   "
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
## 2023-05-30 ENCOUNTER — MEDICAL CORRESPONDENCE (OUTPATIENT)
Dept: HEALTH INFORMATION MANAGEMENT | Facility: CLINIC | Age: 63
End: 2023-05-30

## 2023-05-30 PROCEDURE — 250N000013 HC RX MED GY IP 250 OP 250 PS 637: Performed by: PHYSICIAN ASSISTANT

## 2023-05-30 PROCEDURE — 124N000002 HC R&B MH UMMC

## 2023-05-30 PROCEDURE — 250N000013 HC RX MED GY IP 250 OP 250 PS 637: Performed by: PSYCHIATRY & NEUROLOGY

## 2023-05-30 PROCEDURE — 99233 SBSQ HOSP IP/OBS HIGH 50: CPT | Performed by: PSYCHIATRY & NEUROLOGY

## 2023-05-30 RX ORDER — LORAZEPAM 1 MG/1
2 TABLET ORAL
Status: COMPLETED | OUTPATIENT
Start: 2023-05-30 | End: 2023-05-30

## 2023-05-30 RX ORDER — LORAZEPAM 2 MG/ML
2 INJECTION INTRAMUSCULAR
Status: COMPLETED | OUTPATIENT
Start: 2023-05-30 | End: 2023-05-31

## 2023-05-30 RX ORDER — TAMSULOSIN HYDROCHLORIDE 0.4 MG/1
0.4 CAPSULE ORAL DAILY
Status: DISCONTINUED | OUTPATIENT
Start: 2023-05-30 | End: 2023-05-30

## 2023-05-30 RX ORDER — AMOXICILLIN 250 MG
1 CAPSULE ORAL 2 TIMES DAILY PRN
Status: DISCONTINUED | OUTPATIENT
Start: 2023-05-30 | End: 2023-05-30

## 2023-05-30 RX ORDER — POLYETHYLENE GLYCOL 3350 17 G/17G
17 POWDER, FOR SOLUTION ORAL DAILY
Status: DISCONTINUED | OUTPATIENT
Start: 2023-05-30 | End: 2023-05-30

## 2023-05-30 RX ADMIN — POLYETHYLENE GLYCOL 3350 17 G: 17 POWDER, FOR SOLUTION ORAL at 12:19

## 2023-05-30 RX ADMIN — TRAZODONE HYDROCHLORIDE 50 MG: 50 TABLET ORAL at 01:42

## 2023-05-30 RX ADMIN — TAMSULOSIN HYDROCHLORIDE 0.4 MG: 0.4 CAPSULE ORAL at 12:19

## 2023-05-30 RX ADMIN — OLANZAPINE 5 MG: 5 TABLET, FILM COATED ORAL at 14:20

## 2023-05-30 RX ADMIN — TRAZODONE HYDROCHLORIDE 50 MG: 50 TABLET ORAL at 19:42

## 2023-05-30 RX ADMIN — ACETAMINOPHEN 650 MG: 325 TABLET, FILM COATED ORAL at 12:16

## 2023-05-30 RX ADMIN — OLANZAPINE 7.5 MG: 5 TABLET, ORALLY DISINTEGRATING ORAL at 12:18

## 2023-05-30 RX ADMIN — OLANZAPINE 7.5 MG: 5 TABLET, ORALLY DISINTEGRATING ORAL at 19:42

## 2023-05-30 RX ADMIN — CYANOCOBALAMIN TAB 1000 MCG 1000 MCG: 1000 TAB at 12:18

## 2023-05-30 RX ADMIN — OLANZAPINE 5 MG: 5 TABLET, FILM COATED ORAL at 01:43

## 2023-05-30 ASSESSMENT — ACTIVITIES OF DAILY LIVING (ADL)
ADLS_ACUITY_SCORE: 89
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
## 2023-05-30 NOTE — PLAN OF CARE
Vinod woke up around 2305 and jumped out of bed and started running up and down the halls very fast. He did not say why he was running. He said he was not tired and was afraid. He received his previously refused HS Ativan. He also accepted PRN trazodone for sleep and PRN gabapentin for anxiety. He eventually went back to bed.    At 2325 it was reported that he urinated in the toilet.
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
## 2023-05-30 NOTE — PLAN OF CARE
"    Assessment/Intervention/Current Symtoms and Care Coordination  Current Symptoms include the following: Psychosis      Discharge Plan or Goal  Pending stabilization & development of a safe discharge plan.  Considerations include: group home      Barriers to Discharge  Patient requires further psychiatric stabilization due to current symptomology      Referral Status  group home      Legal Status  I facilitated the zoom meeting for the pt with the PPS screener who is also his  of 5 years.  Pt to;d me he did not want to talk to her as it \"wouldn't do any good.\"  She is supporting the commitment.  She has sent the paperwork to the [facility]'s Office for a court hold.  Vicky Abel   /snf Liaison   [phone] or [phone]    Now on a court hold.    Papers from [facility] were sent via e-mail as fax was not working for them.  I gave pt his copy.  I placed a copy in the chart and sent a copy to scanning.                      "

## 2023-05-30 NOTE — PLAN OF CARE
Problem: Psychotic Symptoms  Goal: Psychotic Symptoms  Description: Signs and symptoms of listed problems will be absent or manageable.  Outcome: Progressing   Goal Outcome Evaluation:         Pt. Continues on a 2:1 SIO @ 1 feet for disorientation.       Pt. Stayed in his room all shift, awake at @1200 noon, unable to wake him earlier for his scheduled AM medications, Dr. Dominguez aware AM meds administered @1215, pt. Chewed and swallowed his meds.  Pt. Thought process is disorganized, speech is rapid/rambling and illogical, laying in bed staring at the ceiling and mumbling to himself, periods of shouting PRN Zyprexa administered, pt. appears to be responding to internal stimulus unable to answer assessment questions, denies pain but reports his penis being sore PRN Tylenol administered. Eat 50% of lunch in his own room. Ambulates around his room with a steady gait.      Pt. Sat on the toilet, unable to urinate bladder scan showed 487 ML, Medical flyer straight cath 325 ML clear yellow urine @1430 pt. Had minimal shouting during the procedure.    Input - 450 ML fluids
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
## 2023-05-30 NOTE — PLAN OF CARE
"  Problem: Adult Behavioral Health Plan of Care  Goal: Plan of Care Review  Outcome: Progressing     Problem: Psychotic Signs/Symptoms  Goal: Improved Sleep (Psychotic Signs/Symptoms)  Intervention: Promote Healthy Sleep Hygiene  Recent Flowsheet Documentation  Taken 5/30/2023 0617 by Sanjay Roberts RN  Sleep Hygiene Promotion:   noise level reduced   regular sleep pattern promoted     Problem: Sleep Disturbance  Goal: Adequate Sleep/Rest  Outcome: Progressing   Goal Outcome Evaluation:       Up and out on the unit with assist of two and a gait belt. Pt was attempting to run, but he was held back by the two [facility] staff who were holding onto the gait belt. He returned to room and laid down for a while. He then got up and went to the bathroom. He took off all his clothes and started masturbating. He started screaming laud. When asked why he was screaming, pt stated that \"I wants to tear myself apart from shred to shred\".  Pt was encouraged to put on his scrubs and return to bed, but he declined. He continues to scream at the top of his voice. Zyprexa, 5 mg and Trazodone, 50 mg administered with no relief. MD was contacted and two mg of ativan ordered, but pt declined to take them. Please see charge nurse's note on this. Pt was finally convinced to go to bed which he did. Bladder scan = 10 ml. Pt slept a total of 1.75 hours. Pt is currently in bed sleeping. No other concerns noted or verbalized. Will continue with same plan of care.                     "

## 2023-05-30 NOTE — PROGRESS NOTES
0300:  Vindo is awake in his room.  He is sitting on the toilet nude, refusing to get dress. He is screaming out loud at intervals, denies pain. Paranoid and delusional.  Contacted on call provider for prn medication..  Received order for ativan 2mg.  Vinod is refusing to take oral medication. Requesting order for IM ativan for agitation and paranoia.     Addendum:  Order received for IM ativan.  Vinod is now back in bed and sleeping.  Will continue to monitor.

## 2023-05-31 PROCEDURE — 250N000011 HC RX IP 250 OP 636: Performed by: PSYCHIATRY & NEUROLOGY

## 2023-05-31 PROCEDURE — 124N000002 HC R&B MH UMMC

## 2023-05-31 PROCEDURE — 99232 SBSQ HOSP IP/OBS MODERATE 35: CPT | Performed by: PSYCHIATRY & NEUROLOGY

## 2023-05-31 PROCEDURE — 250N000013 HC RX MED GY IP 250 OP 250 PS 637: Performed by: PSYCHIATRY & NEUROLOGY

## 2023-05-31 PROCEDURE — 250N000011 HC RX IP 250 OP 636: Mod: JZ | Performed by: PSYCHIATRY & NEUROLOGY

## 2023-05-31 PROCEDURE — 250N000013 HC RX MED GY IP 250 OP 250 PS 637: Performed by: PHYSICIAN ASSISTANT

## 2023-05-31 RX ADMIN — OLANZAPINE 7.5 MG: 5 TABLET, ORALLY DISINTEGRATING ORAL at 20:54

## 2023-05-31 RX ADMIN — OLANZAPINE 5 MG: 10 INJECTION, POWDER, FOR SOLUTION INTRAMUSCULAR at 06:42

## 2023-05-31 RX ADMIN — OLANZAPINE 7.5 MG: 5 TABLET, ORALLY DISINTEGRATING ORAL at 10:26

## 2023-05-31 RX ADMIN — TRAZODONE HYDROCHLORIDE 50 MG: 50 TABLET ORAL at 22:08

## 2023-05-31 RX ADMIN — LORAZEPAM 2 MG: 2 INJECTION INTRAMUSCULAR; INTRAVENOUS at 11:59

## 2023-05-31 RX ADMIN — OLANZAPINE 5 MG: 5 TABLET, FILM COATED ORAL at 18:17

## 2023-05-31 RX ADMIN — CYANOCOBALAMIN TAB 1000 MCG 1000 MCG: 1000 TAB at 07:56

## 2023-05-31 RX ADMIN — TAMSULOSIN HYDROCHLORIDE 0.4 MG: 0.4 CAPSULE ORAL at 07:55

## 2023-05-31 RX ADMIN — POLYETHYLENE GLYCOL 3350 17 G: 17 POWDER, FOR SOLUTION ORAL at 07:56

## 2023-05-31 ASSESSMENT — ACTIVITIES OF DAILY LIVING (ADL)
HYGIENE/GROOMING: WITH ASSISTANCE
ADLS_ACUITY_SCORE: 89

## 2023-05-31 NOTE — PLAN OF CARE
Assessment/Intervention/Current Symtoms and Care Coordination  Current Symptoms include the following: Psychosis      Discharge Plan or Goal  Pending stabilization & development of a safe discharge plan.  Considerations include:  assisted living      Barriers to Discharge  Patient requires further psychiatric stabilization due to current symptomology      Referral Status  Assisted living         Legal Status  Patient is on a court hold in [facility]      Preliminary hearing tomorrow morning.

## 2023-05-31 NOTE — PLAN OF CARE
"  Problem: Adult Behavioral Health Plan of Care  Goal: Plan of Care Review  Outcome: Progressing  Flowsheets (Taken 5/30/2023 1700)  Patient Agreement with Plan of Care: unable to participate     Problem: Confusion Chronic  Goal: Optimal Cognitive Function  Outcome: Progressing     Problem: Psychotic Signs/Symptoms  Goal: Improved Behavioral Control (Psychotic Signs/Symptoms)  Outcome: Progressing   Goal Outcome Evaluation:    Plan of Care Reviewed With: patient        Pt endorsed SI, but with no plan. He denied SIB, but noted pt pulling on the right fifth digit finger nail. He stated that it is painful, but choose not to rate his pain and he also declined taking Tylenol for pain intervention. When asked why he is pulling on his finger nail, pt stated \"I want to tear myself up from limb to limb and I want to start by pulling off the nail on my finger\". He was observed pulling on his 5th digit finger nail as he responded to the question. He also stated \" I am worst than Satan\". Pt's finger was assessed for injury, but none found. Pt was advised to stop pulling on his finger nail and to remember that he is better than satan. When asked if pt is hearing voices or seeing things out of the ordinally, pt stated \"not now\". He denied HI and contracted for safety. Pt is pleasant with a flat and restricted affect. Mood is restless. He appears unkempt. Thought process presents as disorganized, illogical, and thought blocking. Judgment and insight not appropriate to situation. He appears to be confused and disoriented to situation. Speech presents as rambling and tangential. Pt was unable to void X 6 hours. BUS = 638 ml. Pt voided later as staff awaits for a medical flyer. Re scanned pt's  bladder for 60 ml. Pt was medication compliant. No other concerns noted or verbalized. Will continue with same plan of care.                   "

## 2023-05-31 NOTE — PLAN OF CARE
"  Problem: Psychotic Symptoms  Goal: Psychotic Symptoms  Description: Signs and symptoms of listed problems will be absent or manageable.  Outcome: Progressing   Goal Outcome Evaluation             Earlier in the shift pt. Sat at the edge of the bed shaking, refused his AM medications stating \"I will never take meds again\" laid down in bed and continued to shake x 30 minutes  staring at the ceiling and mumbling. AM medications administered @87924 with much encouragement eat 50% of breakfast assist x 1. Ambulating around his room with his eyes shut shouting, Multiple attempts to urinate sat on the toilet for several minutes shouting out racial comments.. Pt. Started to bang his head against the wall PRN IM Ativan administered @1215 noon, helpful. Late afternoon pt. Urinated in the toilet. Continues to yell out racial comments \"niggers niggers\"  Slowly paces the room with his eye open. Eat 100% of lunch with hand tremors, over an hour duration.  Denies pain, denies anxiety and depression, denies all other psych symptoms, appears to be responding to internal stimulus.      Bladder scan @1400 showed 10ML   Input - 650 ML                 "
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
## 2023-05-31 NOTE — CARE PLAN
NONATTENDANCE OCCUPATIONAL THERAPY NOTE     05/31/23 1300   General Information   Has Not Attended OT as of: 05/30/23     Pt has not attended scheduled occupational therapy sessions. When appropriate, will encourage attendance, orient to groups and purpose and offer options for meeting their needs.
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
## 2023-06-01 PROCEDURE — 250N000011 HC RX IP 250 OP 636: Performed by: PSYCHIATRY & NEUROLOGY

## 2023-06-01 PROCEDURE — 250N000013 HC RX MED GY IP 250 OP 250 PS 637: Performed by: PHYSICIAN ASSISTANT

## 2023-06-01 PROCEDURE — 250N000013 HC RX MED GY IP 250 OP 250 PS 637: Performed by: PSYCHIATRY & NEUROLOGY

## 2023-06-01 PROCEDURE — 124N000002 HC R&B MH UMMC

## 2023-06-01 PROCEDURE — 99232 SBSQ HOSP IP/OBS MODERATE 35: CPT | Performed by: PSYCHIATRY & NEUROLOGY

## 2023-06-01 PROCEDURE — 250N000011 HC RX IP 250 OP 636: Mod: JZ | Performed by: PSYCHIATRY & NEUROLOGY

## 2023-06-01 RX ORDER — LORAZEPAM 2 MG/ML
1-2 INJECTION INTRAMUSCULAR EVERY 8 HOURS PRN
Status: DISCONTINUED | OUTPATIENT
Start: 2023-06-01 | End: 2023-06-02

## 2023-06-01 RX ORDER — HALOPERIDOL 5 MG/ML
5 INJECTION INTRAMUSCULAR EVERY 8 HOURS PRN
Status: DISCONTINUED | OUTPATIENT
Start: 2023-06-01 | End: 2023-06-14

## 2023-06-01 RX ORDER — DIPHENHYDRAMINE HCL 50 MG
50 CAPSULE ORAL EVERY 8 HOURS PRN
Status: DISCONTINUED | OUTPATIENT
Start: 2023-06-01 | End: 2023-06-14

## 2023-06-01 RX ORDER — HALOPERIDOL 5 MG/1
5 TABLET ORAL EVERY 8 HOURS PRN
Status: DISCONTINUED | OUTPATIENT
Start: 2023-06-01 | End: 2023-06-14

## 2023-06-01 RX ORDER — DIPHENHYDRAMINE HYDROCHLORIDE 50 MG/ML
50 INJECTION INTRAMUSCULAR; INTRAVENOUS EVERY 8 HOURS PRN
Status: DISCONTINUED | OUTPATIENT
Start: 2023-06-01 | End: 2023-06-14

## 2023-06-01 RX ORDER — LORAZEPAM 1 MG/1
1-2 TABLET ORAL EVERY 8 HOURS PRN
Status: DISCONTINUED | OUTPATIENT
Start: 2023-06-01 | End: 2023-06-02

## 2023-06-01 RX ADMIN — LORAZEPAM 2 MG: 2 INJECTION INTRAMUSCULAR; INTRAVENOUS at 20:38

## 2023-06-01 RX ADMIN — LORAZEPAM 2 MG: 2 INJECTION INTRAMUSCULAR; INTRAVENOUS at 13:44

## 2023-06-01 RX ADMIN — HALOPERIDOL LACTATE 5 MG: 5 INJECTION, SOLUTION INTRAMUSCULAR at 20:38

## 2023-06-01 RX ADMIN — HALOPERIDOL LACTATE 5 MG: 5 INJECTION, SOLUTION INTRAMUSCULAR at 13:44

## 2023-06-01 RX ADMIN — DIPHENHYDRAMINE HYDROCHLORIDE 50 MG: 50 INJECTION, SOLUTION INTRAMUSCULAR; INTRAVENOUS at 13:43

## 2023-06-01 RX ADMIN — OLANZAPINE 7.5 MG: 5 TABLET, ORALLY DISINTEGRATING ORAL at 19:37

## 2023-06-01 RX ADMIN — DIPHENHYDRAMINE HYDROCHLORIDE 50 MG: 50 INJECTION, SOLUTION INTRAMUSCULAR; INTRAVENOUS at 20:38

## 2023-06-01 RX ADMIN — OLANZAPINE 5 MG: 5 TABLET, FILM COATED ORAL at 07:11

## 2023-06-01 RX ADMIN — POLYETHYLENE GLYCOL 3350 17 G: 17 POWDER, FOR SOLUTION ORAL at 08:11

## 2023-06-01 RX ADMIN — OLANZAPINE 10 MG: 10 INJECTION, POWDER, FOR SOLUTION INTRAMUSCULAR at 08:39

## 2023-06-01 ASSESSMENT — ACTIVITIES OF DAILY LIVING (ADL)
ADLS_ACUITY_SCORE: 89
HYGIENE/GROOMING: WITH ASSISTANCE
ADLS_ACUITY_SCORE: 89
ORAL_HYGIENE: WITH ASSISTANCE
LAUNDRY: UNABLE TO COMPLETE
ADLS_ACUITY_SCORE: 90
LAUNDRY: UNABLE TO COMPLETE
DRESS: WITH ASSISTANCE
ORAL_HYGIENE: WITH ASSISTANCE
ADLS_ACUITY_SCORE: 89
ADLS_ACUITY_SCORE: 89
ADLS_ACUITY_SCORE: 90
ADLS_ACUITY_SCORE: 90
ADLS_ACUITY_SCORE: 89
ADLS_ACUITY_SCORE: 90
HYGIENE/GROOMING: WITH ASSISTANCE
ADLS_ACUITY_SCORE: 89
ADLS_ACUITY_SCORE: 89
DRESS: WITH ASSISTANCE
ADLS_ACUITY_SCORE: 89

## 2023-06-01 NOTE — PLAN OF CARE
"Goal Outcome Evaluation:    Patient continues on SIO 2:1 status d/t Self Injury Risk, Assault Risk, Medical Equipment/Ligature Risk, and Fall Risk. Presents with a labile and frustrated affect. Mood presents as occasionally irritable. Patient is confused, paranoid, suspicious, mistrustful, and guarded. Thought content presents as compulsion and suspiciousness. Thought process presents as disorganized, tangential, and occasionally illogical. Speech presents as rambling; and occasionally illogical and incoherent. Eye contact is fair. Patient denies depression, anxiety, suicidal ideation, SIB, homicidal ideation, and auditory/visual hallucinations. Vital signs stable. Bilateral arm and hand tremors noted. Patient adamantly refused bladder scan this shift. Staff reported that patient voided and had a bowel movement this shift. Medication compliant. Patient showered this shift. Did not attend group this shift. Ate evening meal. Patient spent the entire shift in his room with SIO 2:1 staff inattendance. Patient social with SIO 2:1 staff.        BP (!) 156/77 (BP Location: Right arm, Patient Position: Sitting, Cuff Size: Adult Regular)   Pulse 76   Temp 97  F (36.1  C) (Temporal)   Resp 17   SpO2 99%      Later in this shift patient was becoming increaslingly agitated. Patient was swearing, banged is head x1; and stating repeatedly \"I stink. I need a shower\" and attempting to go to the shower room. Patient had already showered earlier in the shift and was not redirectable. PRN IM Ativan, PRN IM Haldol, and PRN IM Benadryl administered for increased agitation. Patient was cooperative with IM medication administration.     Patient is calm upon follow-up. Will continue to monitor.             "
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
## 2023-06-01 NOTE — PLAN OF CARE
Assessment/Intervention/Current Symtoms and Care Coordination  Current Symptoms include the following: Psychosis and aggressive      Discharge Plan or Goal  Pending stabilization & development of a safe discharge plan.  Considerations include: assisted living      Barriers to Discharge  Patient requires further psychiatric stabilization due to current symptomology    Referral Status  assisted living          Legal Status  Patient is on a court hold in [facility]    Pt has court for preliminary hearing at 9Am this morning.    I received call from [place]  for update.  I received call from pt's . I informed pt that his  was on the phone but the pt was not in a clinical position to take this call.    I messaged Vicky to ask if a [place] petition had also been filed.  Vicky Gallego   /skilled nursing Liaison   [phone] or [phone]
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
## 2023-06-01 NOTE — PROVIDER NOTIFICATION
"   06/01/23 1346   Seclusion or Restraint Order   In Person Face to Face Assessment Conducted Yes-Eval of pt's immediate situation, reaction to intervention, complete review of systems assessment, behavioral assessment & review/assessment of hx, drugs & meds, recent labs, etc, behavioral condition, need to continue/terminate restraint/seclusion   Patient Experienced No adverse physical outcome from seclusion/restraint initiation  (No adverse outcomes from physical hold, pt not placed in restraints)     RN FACE TO FACE FOR PHYSICAL HOLD NOTE:    Within an hour of initiation of pt's physical hold, writer completed an in-person face to face assessment including an evaluation of the patient's immediate reaction to being physically held, as well as a behavioral assessment, and review/assessment of history, drugs and medications, recent labs, and behavioral condition.     The pt experienced no physical harm during this event. Pt also denies any pain to his head, as well as the remainder of his body. Pt states, \"I feel nothing!\" No signs of physical distress noted. No side effects of medications noted at this time. Pt's medical history was taken into account. At this time, pt observed to be less agitated and did not make any attempts to hurt self or others. Pt continues to make delusional and nonsensical statements while sitting on his bed. SIO staff present with patient. The intervention of physical hold was discontinued @ 1346.     Provider was informed of the results and updated on pt's current status. Will continue to monitor and assist as needed.     BP (!) 156/77 (BP Location: Right arm, Patient Position: Sitting, Cuff Size: Adult Regular)   Pulse 76   Temp 97  F (36.1  C) (Temporal)   Resp 17   SpO2 99%       "
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
## 2023-06-01 NOTE — PROVIDER NOTIFICATION
06/01/23 1346   Seclusion or Restraint Order   Continuation of Seclus/Restraint indicated at this time No   Describe actions taken Pt released from physical hold @ 1346   Restraint Type   Physical Hold (Comment) () Discontinued   Debriefing   Debriefing DO   Does patient understand why the event happened? Patient unable to answer   Does patient agree to safe behaviors? Patient unable to answer   What can we do differently so this doesn't happen again? Patient unable to answer   Plan of care reviewed and modified Yes     RN DISCONTINUATION OF PHYSICAL HOLD NOTE:     Debriefing occurred with pt; however, pt unable to participate in debriefing. Pt continued to make delusional and nonsensical statements. Pt unable to understand why the event happened. Pt unable to agree to safe behavior at this time. Pt continues on a 2:1 for SIB risk, assault risk, and medical/ligature risk. SIO staff present with pt.    Program modification/ideas to prevent similar or future events: Provider updated on this event. No other ideas for similar or future events at this time - staff will continue to offer less restrictive measures, if able, prior to physical hold for IM medication.     Pt was released from physical hold @ 1346. Will continue to monitor and assist as needed.

## 2023-06-01 NOTE — PLAN OF CARE
"  Problem: Adult Behavioral Health Plan of Care  Goal: Absence of New-Onset Illness or Injury  Intervention: Identify and Manage Fall Risk  Recent Flowsheet Documentation  Taken 6/1/2023 1405 by Casalenda, Gina R, RN  Safety Measures: safety rounds completed     Problem: Seclusion/Restraint, Behavioral  Goal: Absence of Harm or Injury  Outcome: Progressing     Pt denies anxiety and depression. Pt denies SI/SIB/HI/AH/VH. Pt appears to be responding to internal stimuli. Pt observed talking to self. Pt also states, \"I see scribbles on the walls.\" Pt denies pain. Pt observed to be confused but is A/O to person and place. Pt unable to verbally contract for safety; however, pt remains on a 2:1. Per NOC shift report, pt voided during change of shift, output unknown. Bladder scan was not done due to bladder scanner not being on unit and once bladder scanner was obtained, pt refused bladder scan. [facility] staff reported pt had a BM this morning - unsure as to whether pt also voided at this time. Writer contacted IM to inform them that writer has been unable to determine urine output and also has been unable to bladder scan pt due to his refusal throughout the day. Per IM, continue to monitor pt and continue to attempt to bladder scan each shift. Pt denies any urges to void. Pt accepted scheduled miralax but refused the remainder of his scheduled medications, including his scheduled flomax. Pt states, \"I have only been going to the bathroom to masturbate.\" Pt observed attempting to masturbate intermittently. Pt observed to be unkempt and unshaven; however, pt declined to shower and stated, \"I will just masturbate!\" Pt presents with a labile affect. Pt observed to be distrustful/suspicious, irritable, and tense. Pt's eye contact observed to be intermittent and tense. Pt's speech observed to be loud intermittently, as well as incoherent at times. Pt's speech observed to be flight of ideas, tangential, and rambling. Pt's " concentration observed to be impaired. Pt observed to be forgetful. Pt observed to be unable to understand reasoning. Pt observed to be uncooperative majority of this shift. Pt continues to make delusional statements. Pt's delusional statements observed to be sexual, Caodaism, paranoid, grandiose, and persecutory. Thought content observed to be perseveration, preoccupation, suspiciousness, and compulsion. Pt observed to be agitated and impulsive this shift. Pt had to be physically held x 2 this shift to receive IM medications - see writer's previous notes for further details. Pt continues to have a no roommate order. Pt remains on 2:1 SIO for SIB risk, assault risk, and medical/ligature risk. Will continue to monitor and assist as needed.

## 2023-06-01 NOTE — PROVIDER NOTIFICATION
06/01/23 0840   Seclusion or Restraint Order   Continuation of Seclus/Restraint indicated at this time No   Describe actions taken Pt released from physical hold @ 0840   Restraint Type   Physical Hold (Comment) () Discontinued   Debriefing   Debriefing DO   Does patient understand why the event happened? Patient unable to answer   Does patient agree to safe behaviors? Patient unable to answer   What can we do differently so this doesn't happen again? Patient unable to answer   Plan of care reviewed and modified Yes     RN DISCONTINUATION OF PHYSICAL HOLD NOTE:       Debriefing occurred with pt. Pt unable to participate in debriefing - continued to make delusional and nonsensical statements. Pt unable to understand why the event happened. Pt unable to agree to safe behavior at this time. Pt remains on 2:1 for SIB risk, assault risk, and medical/ligature risk. SIO staff present with pt.       Program modification/ideas to prevent similar future events: Provider made modifications to pt's medication - see MAR for further details. No other ideas for future events - continue to offer less restrictive measures, if able, prior to physical hold and IM medication.      Pt was released from physical hold @ 0840. Will continue to monitor and assist as needed.

## 2023-06-01 NOTE — PROVIDER NOTIFICATION
06/01/23 0840   Seclusion or Restraint Order   In Person Face to Face Assessment Conducted Yes-Eval of pt's immediate situation, reaction to intervention, complete review of systems assessment, behavioral assessment & review/assessment of hx, drugs & meds, recent labs, etc, behavioral condition, need to continue/terminate restraint/seclusion   Patient Experienced No adverse physical outcome from seclusion/restraint initiation  (No adverse physical outcomes from physical hold, pt not placed in restraints)       RN FACE TO FACE FOR PHYSICAL HOLD NOTE:      Within an hour of initiation of pt's physical hold, writer completed an in-person face to face assessment including an evaluation of the patient's immediate reaction to being physically held, as well as a behavioral assessment, and review/assessment of history, drugs and medications, recent labs, and behavioral condition.      The pt experienced no physical harm during this event. Pt was visualized moving all extremities independently with adequate circulation and ROM. Pt denied pain at this time. No signs of physical distress noted. No side effects of medication were noted at this time. Pt's medical history taken into account. At this time, pt observed to be less agitated, although, continues to make delusional and nonsensical statements. The intervention of physical hold was discontinued at 0840.     Pt's provider was notified of results of this face to face assessment. Will continue to monitor and assist as needed.      /68 (BP Location: Left arm, Patient Position: Sitting)   Pulse 79   Temp 98  F (36.7  C) (Temporal)   Resp 17   SpO2 99%

## 2023-06-02 PROCEDURE — 250N000011 HC RX IP 250 OP 636: Performed by: PSYCHIATRY & NEUROLOGY

## 2023-06-02 PROCEDURE — 99232 SBSQ HOSP IP/OBS MODERATE 35: CPT | Performed by: PSYCHIATRY & NEUROLOGY

## 2023-06-02 PROCEDURE — 250N000011 HC RX IP 250 OP 636: Mod: JZ | Performed by: PSYCHIATRY & NEUROLOGY

## 2023-06-02 PROCEDURE — 124N000002 HC R&B MH UMMC

## 2023-06-02 PROCEDURE — 250N000013 HC RX MED GY IP 250 OP 250 PS 637: Performed by: PSYCHIATRY & NEUROLOGY

## 2023-06-02 RX ORDER — PHENOBARBITAL 32.4 MG/1
32.4 TABLET ORAL 3 TIMES DAILY
Status: DISCONTINUED | OUTPATIENT
Start: 2023-06-02 | End: 2023-06-02

## 2023-06-02 RX ORDER — PHENOBARBITAL 32.4 MG/1
32.4 TABLET ORAL 3 TIMES DAILY
Status: DISCONTINUED | OUTPATIENT
Start: 2023-06-03 | End: 2023-06-07

## 2023-06-02 RX ADMIN — OLANZAPINE 10 MG: 10 INJECTION, POWDER, FOR SOLUTION INTRAMUSCULAR at 20:12

## 2023-06-02 RX ADMIN — HALOPERIDOL LACTATE 5 MG: 5 INJECTION, SOLUTION INTRAMUSCULAR at 19:07

## 2023-06-02 RX ADMIN — OLANZAPINE 10 MG: 10 INJECTION, POWDER, FOR SOLUTION INTRAMUSCULAR at 08:47

## 2023-06-02 RX ADMIN — LORAZEPAM 2 MG: 2 INJECTION INTRAMUSCULAR; INTRAVENOUS at 19:07

## 2023-06-02 RX ADMIN — OLANZAPINE 5 MG: 5 TABLET, FILM COATED ORAL at 16:21

## 2023-06-02 RX ADMIN — DIPHENHYDRAMINE HYDROCHLORIDE 50 MG: 50 INJECTION, SOLUTION INTRAMUSCULAR; INTRAVENOUS at 19:07

## 2023-06-02 ASSESSMENT — ACTIVITIES OF DAILY LIVING (ADL)
ADLS_ACUITY_SCORE: 90
ORAL_HYGIENE: WITH ASSISTANCE
ADLS_ACUITY_SCORE: 90
DRESS: WITH ASSISTANCE
ADLS_ACUITY_SCORE: 90
ADLS_ACUITY_SCORE: 90
HYGIENE/GROOMING: WITH ASSISTANCE
LAUNDRY: UNABLE TO COMPLETE
ADLS_ACUITY_SCORE: 90

## 2023-06-02 NOTE — PLAN OF CARE
Assessment/Intervention/Current Symtoms and Care Coordination  Current Symptoms include the following: Psychosis, manic, disorganization, and aggressive      Discharge Plan or Goal  Pending stabilization & development of a safe discharge plan.  Considerations include:  return to assisted living      Barriers to Discharge  Patient requires further psychiatric stabilization due to current symptomology      Referral Status  Assisted living      Legal Status  Patient is on a court hold in [facility]    I clarified that there is a [facility] petition.

## 2023-06-02 NOTE — PLAN OF CARE
Goal Outcome Evaluation:       Problem: Psychotic Symptoms  Goal: Social and Therapeutic (Psychotic Symptoms)  Description: Signs and symptoms of listed problems will be absent or manageable.  Outcome: Progressing   Pt in bed at the beginning of the shift, breathing normal. Pt slept throughout the shift. No pt complains or concerns at this time. No PRN's given, Pt is on 2:1. Will continue to monitor. Pt slept for 6.75 hrs    Bladder scan performed vol-259 ml. Pt encourage to void, was able to use the bathroom x1.

## 2023-06-02 NOTE — PLAN OF CARE
"See previous progress notes regarding pts behavior this am.   Since pt was assisted from the shower to his room, he has been primarily resting in bed. Intermittent periods of not responding verbally. Occasionally impulsive and tries to get out of bed quickly. Less preoccupied with masturbating. Has commented about it but not actively grabbing at his penis since the shower this morning.     AOx1-  States he is \"at [facility]\", time is \"a billion years from now\".   Continues to be disorganized and illogical. States, \"Its time to be fearful. Are you ready? Did you really invite me thru the [place]?\"  He describes his mood as \"neutral\". He denies feeling anxious or depressed. When asked about hallucinations, states \"not yet\" and smiles.     Pt was reapproached this afternoon with scheduled medications but again refused to acknowledge writer.     He denies any pain.   Notable tremor.     Refused breakfast. Ate most of his lunch tray. Has been drinking fluids with encouragement and prompting. Due to tremors, ate better when assisted by his 2:1 staff.     He continues on SIO 2:1 for self injury risk/assault risk/medical equipment ligature risk. He uses a standard medical bed due to mobility issues.   Refused VS assessment.     Problem: Psychotic Signs/Symptoms  Goal: Optimal Cognitive Function (Psychotic Signs/Symptoms)  Outcome: Not Progressing     Problem: Adult Behavioral Health Plan of Care  Goal: Plan of Care Review  Outcome: Progressing   Goal Outcome Evaluation:                        "

## 2023-06-02 NOTE — PROGRESS NOTES
Per SIO staff-pt had a bowel movement and voided a large amount, unmeasured as pt removed the measuring hat from his toilet.
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
## 2023-06-02 NOTE — PROGRESS NOTES
"Pt assisted to the shower this morning with 2:1 staff.   He has been chanting delusional and nonsensical statements-  \"Im supposed to tear my hand off...\", \"Lord of the Flies!\"  Also reportedly perseverating on masturbating. Difficult to redirect.  At approximately 0840 pt placed himself on the floor of the shower, prone, and refused to verbally respond to staff.     Writer approached pt in the shower area with his scheduled medications including Zyprexa. He was offered po zyprexa, however he continues to not respond to staff verbally. He covered his face with his arm. Per chart review, pt has had similar presentations in the recent days.   Per emergency order, pt was informed that Zyprexa IM would be administered.   Zyprexa 10mg IM administered into his left ventrogluteal area without any resistance. Pt tolerated well.   Will continue to monitor closely for pt to respond to medication and cooperate with exiting the shower area.     0930-Pt continued to lay on the floor of the bathroom, unable/unwilling to follow staff commands. His eyes would intermittently open. He eventually verbalized, \"I dont know what to do.\"   Staff assisted him to a wheelchair and transported him to his room. He was assisted to dress and to lay in bed. He is now spontaneously speaking and responding to staff.   "

## 2023-06-02 NOTE — PROGRESS NOTES
"Bladder scan completed-resulted 300ml.   Attempted to have pt void and he was cooperative but unable to void. He reports no urge to void.   Medical flyer RN paged to complete straight cath per orders.     Addendum 1420-  Medical flyjorge alberto RN arrived. Pt was approached at the bedside.   He is lying in bed, very rigid, eyes closed. Mild tremor noted.   He is again not responding to verbal cues/prompts. Holding his eyes tightly closed. When writer and medical flyer attempted to position his legs in attempt to catheterize, pt became even more rigid, nodded his head \"no\" and swiped his hand to shoe us away.     SIO staff were asked to inform Nursing when pt is more alert and communicative so that we can reattempt to have him void or complete straight catheterization.   "

## 2023-06-02 NOTE — PROGRESS NOTES
Pt has not voided yet this shift.   Approached him at the bedside with bladder scan.   Pt is lying on his side, very rigid and will not follow prompts to lie flat so that I can obtain a bladder scan at this time. Will reattempt as pt will tolerate.

## 2023-06-03 PROCEDURE — 250N000011 HC RX IP 250 OP 636: Mod: JZ | Performed by: PSYCHIATRY & NEUROLOGY

## 2023-06-03 PROCEDURE — 124N000002 HC R&B MH UMMC

## 2023-06-03 PROCEDURE — 250N000013 HC RX MED GY IP 250 OP 250 PS 637: Performed by: PSYCHIATRY & NEUROLOGY

## 2023-06-03 PROCEDURE — 250N000013 HC RX MED GY IP 250 OP 250 PS 637: Performed by: PHYSICIAN ASSISTANT

## 2023-06-03 RX ADMIN — TRAZODONE HYDROCHLORIDE 50 MG: 50 TABLET ORAL at 00:27

## 2023-06-03 RX ADMIN — CYANOCOBALAMIN TAB 1000 MCG 1000 MCG: 1000 TAB at 08:02

## 2023-06-03 RX ADMIN — OLANZAPINE 5 MG: 10 INJECTION, POWDER, FOR SOLUTION INTRAMUSCULAR at 16:42

## 2023-06-03 RX ADMIN — DIPHENHYDRAMINE HYDROCHLORIDE 50 MG: 50 INJECTION, SOLUTION INTRAMUSCULAR; INTRAVENOUS at 21:40

## 2023-06-03 RX ADMIN — OLANZAPINE 2.5 MG: 5 TABLET, ORALLY DISINTEGRATING ORAL at 08:02

## 2023-06-03 RX ADMIN — OLANZAPINE 7.5 MG: 5 TABLET, ORALLY DISINTEGRATING ORAL at 20:15

## 2023-06-03 RX ADMIN — TAMSULOSIN HYDROCHLORIDE 0.4 MG: 0.4 CAPSULE ORAL at 08:02

## 2023-06-03 RX ADMIN — PHENOBARBITAL 32.4 MG: 32.4 TABLET ORAL at 08:02

## 2023-06-03 RX ADMIN — OLANZAPINE 5 MG: 10 INJECTION, POWDER, FOR SOLUTION INTRAMUSCULAR at 00:27

## 2023-06-03 RX ADMIN — GABAPENTIN 100 MG: 100 CAPSULE ORAL at 00:27

## 2023-06-03 RX ADMIN — OLANZAPINE 5 MG: 5 TABLET, FILM COATED ORAL at 13:30

## 2023-06-03 RX ADMIN — PHENOBARBITAL 32.4 MG: 32.4 TABLET ORAL at 20:16

## 2023-06-03 RX ADMIN — HALOPERIDOL LACTATE 5 MG: 5 INJECTION, SOLUTION INTRAMUSCULAR at 21:40

## 2023-06-03 RX ADMIN — PHENOBARBITAL 32.4 MG: 32.4 TABLET ORAL at 13:32

## 2023-06-03 ASSESSMENT — ACTIVITIES OF DAILY LIVING (ADL)
DRESS: WITH ASSISTANCE
ADLS_ACUITY_SCORE: 90
ADLS_ACUITY_SCORE: 90
LAUNDRY: UNABLE TO COMPLETE
ADLS_ACUITY_SCORE: 90
HYGIENE/GROOMING: WITH ASSISTANCE
ADLS_ACUITY_SCORE: 90
ORAL_HYGIENE: WITH ASSISTANCE
ADLS_ACUITY_SCORE: 90

## 2023-06-03 NOTE — PLAN OF CARE
Problem: Suicidal Behavior  Goal: Suicidal Behavior is Absent or Managed  Outcome: Progressing   Goal Outcome Evaluation:    Pt was restless at the beginning of the beginning of the shift, repeatedly in the bathroom washing his teeth. Pt was redirected several times by staff but declined, offers oral tablets for Zyprexa, trazodone and gabapentin but pt declined. Pt was reproached with IM Zyprexa and was agreeable to have it with oral gabapentin and trazodone.  Pt settled afterwords and was able to sleep throughout the shift, Pt remain 2:1 SIO. Pt slept for 5.75 hrs.  0700 hr- pt trying to use but unable to void, bladder scan done vol. 190 ml, pt calm and cooperative with care.

## 2023-06-03 NOTE — PLAN OF CARE
"Goal Outcome Evaluation: Patient oriented to self and [place]. Did not know the date/year/day. Disrobed early am and wanted to go out into the hallway. Was redirected several times and patient then went back to bed for 2 hours of sleep. Ate only nichols for breakfast and reluctantly took his am medication. \"Why should I take them, they're just sugar pills. They don't do anything for me.\" Accepted doing a bladder scan at 1100, which was 222. Patient has not voided so far this shift. Has made statements that he is a bad person, especially at meal time and that he would not eat. Ate 20 % of lunch, and drank small amount of water. Coaxed into drinking a chocolate Ensure, which he said that he liked. Speech was clear. Content of speech is rambling and often contains a sexual reference. Patient was calmed by listening to music. 2nd bladder scan done with a reading of 404 ml. Patient sat on toilet and had loose stool and voided 400 ml in hat, dark gopi.     Stated that he was the first man to become pregnant.                  "

## 2023-06-03 NOTE — PLAN OF CARE
"Goal Outcome Evaluation    Patient continues on SIO 2:1 status d/t Self Injury Risk, Assault Risk, Medical Equipment/Ligature Risk, and Fall Risk. Presents with a labile affect. Mood presents as labile, tense, irritable, and restless. Patient is confused, paranoid, suspicious, mistrustful, and guarded. Thought content presents as suspiciousness. Thought process presents as disorganized and illogical. Speech presents as rambling; and occasionally illogical and incoherent. Eye contact is fair. Patient refused to answer assesment questions upon check-in. Vital signs stable. Bilateral arm and hand tremors noted. Tremors appear to be decreasing in intensity. Bladder scan this shift was 5 mL. Medication compliant. Ate 75% of evening meal. Did not attend group this shift. Patient spent the entire shift in his room with SIO 2:1 staff inattendance. Patient social with SIO 2:1 staff.     /66   Pulse 78   Temp 98.4  F (36.9  C) (Oral)   Resp 14   SpO2 95%      Early in this shift patient again became increasingly agitated. Patient was screaming, cursing, and attempted to scratch bilateral eyes. Patient was not redirectable. PRN Zyprexa x1 administered. Patient was able to self calm. Will continue to monitor.    Late this shift patient again became increasingly agitated. Was sitting on his toilet masturbating and screaming \"Fuck you \" repeatedly and non-stop. PRN IM Haldol and PRN IM Benadryl administered for increased agitation. Patient was cooperative with IM medication administration. Patient is calm upon follow-up. Will continue to monitor.                 "

## 2023-06-04 PROCEDURE — 124N000002 HC R&B MH UMMC

## 2023-06-04 PROCEDURE — 250N000013 HC RX MED GY IP 250 OP 250 PS 637: Performed by: PSYCHIATRY & NEUROLOGY

## 2023-06-04 PROCEDURE — 250N000013 HC RX MED GY IP 250 OP 250 PS 637: Performed by: PHYSICIAN ASSISTANT

## 2023-06-04 PROCEDURE — 250N000011 HC RX IP 250 OP 636: Mod: JZ | Performed by: PSYCHIATRY & NEUROLOGY

## 2023-06-04 RX ADMIN — OLANZAPINE 5 MG: 5 TABLET, FILM COATED ORAL at 16:46

## 2023-06-04 RX ADMIN — OLANZAPINE 10 MG: 10 INJECTION, POWDER, FOR SOLUTION INTRAMUSCULAR at 08:01

## 2023-06-04 RX ADMIN — CYANOCOBALAMIN TAB 1000 MCG 1000 MCG: 1000 TAB at 07:49

## 2023-06-04 RX ADMIN — PHENOBARBITAL 32.4 MG: 32.4 TABLET ORAL at 20:09

## 2023-06-04 RX ADMIN — OLANZAPINE 7.5 MG: 5 TABLET, ORALLY DISINTEGRATING ORAL at 20:09

## 2023-06-04 RX ADMIN — PHENOBARBITAL 32.4 MG: 32.4 TABLET ORAL at 07:49

## 2023-06-04 RX ADMIN — TAMSULOSIN HYDROCHLORIDE 0.4 MG: 0.4 CAPSULE ORAL at 07:49

## 2023-06-04 RX ADMIN — PHENOBARBITAL 32.4 MG: 32.4 TABLET ORAL at 13:04

## 2023-06-04 ASSESSMENT — ACTIVITIES OF DAILY LIVING (ADL)
ADLS_ACUITY_SCORE: 90
ORAL_HYGIENE: WITH ASSISTANCE
HYGIENE/GROOMING: WITH ASSISTANCE
ADLS_ACUITY_SCORE: 90
DRESS: WITH ASSISTANCE
LAUNDRY: UNABLE TO COMPLETE
ADLS_ACUITY_SCORE: 90
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
## 2023-06-04 NOTE — PLAN OF CARE
Goal Outcome Evaluation:    Pt in bed at the beginning of the shift, breathing normal. Pt slept throughout the shift. No pt complains or concerns at this time. No PRN's given, Pt is on 2:1.  Pt woke up at 0500 hr, went to the bathroom and started masturbating, but spend about 40 minutes with the staff trying to be redirected. Pt refused gabapentin and Zyprexa for agitation and restlessness. Pt declined the to take medications, and declined the bladder scanning. Pt was closing his eyes while ambulating, redirected, was put to bed, resting comfortably in his room,  Will continue to monitor. Pt slept for 6.25 hrs

## 2023-06-05 PROCEDURE — 250N000013 HC RX MED GY IP 250 OP 250 PS 637: Performed by: PSYCHIATRY & NEUROLOGY

## 2023-06-05 PROCEDURE — 124N000002 HC R&B MH UMMC

## 2023-06-05 PROCEDURE — 250N000013 HC RX MED GY IP 250 OP 250 PS 637: Performed by: PHYSICIAN ASSISTANT

## 2023-06-05 PROCEDURE — 250N000011 HC RX IP 250 OP 636: Performed by: PSYCHIATRY & NEUROLOGY

## 2023-06-05 PROCEDURE — 99233 SBSQ HOSP IP/OBS HIGH 50: CPT | Performed by: PSYCHIATRY & NEUROLOGY

## 2023-06-05 PROCEDURE — 250N000011 HC RX IP 250 OP 636: Mod: JZ | Performed by: PSYCHIATRY & NEUROLOGY

## 2023-06-05 RX ORDER — OLANZAPINE 10 MG/1
10 TABLET, ORALLY DISINTEGRATING ORAL 2 TIMES DAILY
Status: DISCONTINUED | OUTPATIENT
Start: 2023-06-05 | End: 2023-06-11

## 2023-06-05 RX ORDER — OLANZAPINE 10 MG/2ML
10 INJECTION, POWDER, FOR SOLUTION INTRAMUSCULAR 2 TIMES DAILY
Status: DISCONTINUED | OUTPATIENT
Start: 2023-06-05 | End: 2023-06-11

## 2023-06-05 RX ORDER — OLANZAPINE 5 MG/1
5-10 TABLET ORAL 3 TIMES DAILY PRN
Status: DISCONTINUED | OUTPATIENT
Start: 2023-06-05 | End: 2023-11-07

## 2023-06-05 RX ORDER — OLANZAPINE 10 MG/2ML
5-10 INJECTION, POWDER, FOR SOLUTION INTRAMUSCULAR 3 TIMES DAILY PRN
Status: DISCONTINUED | OUTPATIENT
Start: 2023-06-05 | End: 2023-11-07

## 2023-06-05 RX ORDER — LORAZEPAM 1 MG/1
1-2 TABLET ORAL 3 TIMES DAILY PRN
Status: DISCONTINUED | OUTPATIENT
Start: 2023-06-05 | End: 2023-06-07

## 2023-06-05 RX ORDER — LORAZEPAM 2 MG/ML
1-2 INJECTION INTRAMUSCULAR 3 TIMES DAILY PRN
Status: DISCONTINUED | OUTPATIENT
Start: 2023-06-05 | End: 2023-06-07

## 2023-06-05 RX ADMIN — LORAZEPAM 2 MG: 2 INJECTION INTRAMUSCULAR; INTRAVENOUS at 14:34

## 2023-06-05 RX ADMIN — ACETAMINOPHEN 650 MG: 325 TABLET, FILM COATED ORAL at 09:00

## 2023-06-05 RX ADMIN — TAMSULOSIN HYDROCHLORIDE 0.4 MG: 0.4 CAPSULE ORAL at 07:48

## 2023-06-05 RX ADMIN — PHENOBARBITAL 32.4 MG: 32.4 TABLET ORAL at 07:48

## 2023-06-05 RX ADMIN — CYANOCOBALAMIN TAB 1000 MCG 1000 MCG: 1000 TAB at 07:48

## 2023-06-05 RX ADMIN — HALOPERIDOL LACTATE 5 MG: 5 INJECTION, SOLUTION INTRAMUSCULAR at 16:35

## 2023-06-05 RX ADMIN — OLANZAPINE 5 MG: 5 TABLET, FILM COATED ORAL at 12:58

## 2023-06-05 RX ADMIN — DIPHENHYDRAMINE HYDROCHLORIDE 50 MG: 50 INJECTION, SOLUTION INTRAMUSCULAR; INTRAVENOUS at 16:35

## 2023-06-05 RX ADMIN — DIPHENHYDRAMINE HYDROCHLORIDE 50 MG: 50 INJECTION, SOLUTION INTRAMUSCULAR; INTRAVENOUS at 08:43

## 2023-06-05 RX ADMIN — OLANZAPINE 10 MG: 10 INJECTION, POWDER, FOR SOLUTION INTRAMUSCULAR at 20:07

## 2023-06-05 RX ADMIN — HALOPERIDOL LACTATE 5 MG: 5 INJECTION, SOLUTION INTRAMUSCULAR at 08:42

## 2023-06-05 RX ADMIN — OLANZAPINE 7.5 MG: 5 TABLET, ORALLY DISINTEGRATING ORAL at 07:48

## 2023-06-05 RX ADMIN — PHENOBARBITAL 32.4 MG: 32.4 TABLET ORAL at 13:01

## 2023-06-05 ASSESSMENT — ACTIVITIES OF DAILY LIVING (ADL)
ADLS_ACUITY_SCORE: 90
ADLS_ACUITY_SCORE: 90
LAUNDRY: UNABLE TO COMPLETE
ADLS_ACUITY_SCORE: 90
HYGIENE/GROOMING: WITH ASSISTANCE
ADLS_ACUITY_SCORE: 90
DRESS: WITH ASSISTANCE
ADLS_ACUITY_SCORE: 90
ORAL_HYGIENE: WITH ASSISTANCE
ADLS_ACUITY_SCORE: 90

## 2023-06-05 NOTE — PROGRESS NOTES
SPIRITUAL HEALTH SERVICES  SPIRITUAL ASSESSMENT Progress Note  [facility]     REFERRAL SOURCE: I did visit this morning patient Vinod per epic consult order. Pt was with one on one and sleeping in his room. I introduced myself as the unit  and shared all info about the SH[facility]. However, pt mentioned a lot of stuff out of the reality such as, he claim himself as God and as Srinivas Bernardo and as the Holy Spirit. Pt talked about Daphnie and his zhane back ground is Mosque. When I asked him if his zhane back ground Mosque and he said yes. When I asked him if he needs a  he said, I do need a  for Confession and forgiveness. I told him that I will arrange a  to visit him during his stay in the unit he is in and he agreed on that.     PLAN: I will arrange the  to come over and visit the pt as he possible as he can.    Leyla Spence M.Div. ([place]), M.Th., D.Min., [facility]  Staff   Pager [phone]
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
## 2023-06-05 NOTE — PLAN OF CARE
Assessment/Intervention/Current Symtoms and Care Coordination  Current Symptoms include the following: Psychosis and aggressive    I reviewed chart to see if we ever got the court order for continuing hold and the time of hte final hearing with gaviota.  I did not see this.  I called the  TCM who is also the PPS.  She said the order has not been issued yet.  She thinks the next hearing is 6/13 but they do not have notice/order  of this.    I informed TCM that the MD would like someone to pursue guardianship and she will talk to the team.    I e-mailed her the [address] phone and fax and asked her to inform the court of this change.       I called the AL facility and spoke kwadwo Strickland the nurse. They are holding on to his belongings until they know how he is doing. I did say he is doing poorly and is transferring to a more intensive unit/ They wants [facility] to keep them updated.  [facility] ([place] [facility])  [facility]  [email or web address]  [address] [place] [address]   ~28.4 mi  [phone]  Fax: [phone]      Discharge Plan or Goal  Pending stabilization & development of a safe discharge plan.  Considerations include: Return to group home      Barriers to Discharge  Patient requires further psychiatric stabilization due to current symptomology      Referral Status  Psychiatry      Legal Status  Patient is on a court hold in [facility]

## 2023-06-05 NOTE — PLAN OF CARE
Goal Outcome Evaluation:    Problem: Sleep Disturbance  Goal: Adequate Sleep/Rest  6/5/2023 0323 by MUKUL MARQUES  Outcome: Progressing  6/5/2023 0323 by MUKUL MARQUES  Outcome: Progressing   Pt in bed at the beginning of the shift, breathing normal. Pt slept throughout the shift. No pt complains or concerns at this time. No PRN's given, Pt is on 2:1.  Pt  Voided x2,  ml.  Will continue to monitor. Pt slept for 6.25 hrs.

## 2023-06-05 NOTE — PLAN OF CARE
"Goal Outcome Evaluation:    Patient continues on SIO 2:1 status d/t Self Injury Risk, Assault Risk, Medical Equipment/Ligature Risk, and Fall Risk. Presents with a blunted affect. Reports mood as \"Bad.\" Mood presents as distrustful and suspicious. Patient is confused, paranoid, suspicious, mistrustful, and guarded. Thought content presents as suspiciousness. Thought process presents as disorganized and illogical. Speech presents as rambling and occasionally illogical. Eye contact is fair. Patient refused to answer assesment questions upon check-in. Vital signs stable. Bilateral arm and hand tremors noted. Tremors appear to be decreasing in intensity. Bladder scan this shift was 1 mL. Medication compliant. Ate 75% of evening meal. Patient spent the entire shift in his room with SIO 2:1 staff in attendance. Patient social with SIO 2:1 staff.    /64   Pulse 87   Temp 98.6  F (37  C) (Oral)   Resp 14   SpO2 96%      Patient had episode of yelling and swearing x1 this shift. HS medications administered. Patient was able to self calm following medication administration. No further episodes noted.                    "
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
## 2023-06-05 NOTE — PROVIDER NOTIFICATION
"   06/05/23 1453   Seclusion or Restraint Order   In Person Face to Face Assessment Conducted Yes-Eval of pt's immediate situation, reaction to intervention, complete review of systems assessment, behavioral assessment & review/assessment of hx, drugs & meds, recent labs, etc, behavioral condition, need to continue/terminate restraint/seclusion     Pt lying supine in 5 point restraints, walked to bed and lay down per staff direction during code. Pt was asked if he was in any pain, states \"fuck you you stupid ugly cunt.\" Pt VS obtained at this time, within normal limits, yelling at staff and attempting to move mask off his mouth and spit at staff in room.     Within an hour after restraint an in person face to face assessment was completed at 1453, including an evaluation of the patient's immediate reaction to the intervention, behavioral assessment and review/assessment of history, drugs and medications, recent labs, etc., and behavioral condition.  The patient experienced: No observed physical sequelae, unwilling to respond to writer assessment at this time due to agitation.  The intervention of restraint or seclusion needs to continue, attending provider Alberto was notified of results of face to face assessment within 1 hour of initiation of restraints.   "

## 2023-06-05 NOTE — TELEPHONE ENCOUNTER
3:10 PM Intake received call from Dr. Rhodes that patient will transfer to station 12 from station 30.     3:43 PM Intake called charge 30 Sandra with transfer disposition. 3:47 PM Intake called station 12 with update on transfer, awaiting station 12 charge RN to call back.      4:35 PM Per chart review, report has been called.
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
## 2023-06-06 PROCEDURE — 250N000011 HC RX IP 250 OP 636: Performed by: PSYCHIATRY & NEUROLOGY

## 2023-06-06 PROCEDURE — 250N000013 HC RX MED GY IP 250 OP 250 PS 637: Performed by: PHYSICIAN ASSISTANT

## 2023-06-06 PROCEDURE — 250N000009 HC RX 250: Performed by: PSYCHIATRY & NEUROLOGY

## 2023-06-06 PROCEDURE — 250N000013 HC RX MED GY IP 250 OP 250 PS 637: Performed by: PSYCHIATRY & NEUROLOGY

## 2023-06-06 PROCEDURE — 99232 SBSQ HOSP IP/OBS MODERATE 35: CPT | Performed by: PSYCHIATRY & NEUROLOGY

## 2023-06-06 PROCEDURE — 124N000002 HC R&B MH UMMC

## 2023-06-06 RX ORDER — LIDOCAINE HYDROCHLORIDE 20 MG/ML
JELLY TOPICAL ONCE
Status: COMPLETED | OUTPATIENT
Start: 2023-06-06 | End: 2023-06-06

## 2023-06-06 RX ADMIN — TRAZODONE HYDROCHLORIDE 50 MG: 50 TABLET ORAL at 03:49

## 2023-06-06 RX ADMIN — TAMSULOSIN HYDROCHLORIDE 0.4 MG: 0.4 CAPSULE ORAL at 08:40

## 2023-06-06 RX ADMIN — PHENOBARBITAL 32.4 MG: 32.4 TABLET ORAL at 08:40

## 2023-06-06 RX ADMIN — LORAZEPAM 2 MG: 2 INJECTION INTRAMUSCULAR; INTRAVENOUS at 16:44

## 2023-06-06 RX ADMIN — PHENOBARBITAL 32.4 MG: 32.4 TABLET ORAL at 13:08

## 2023-06-06 RX ADMIN — LIDOCAINE HYDROCHLORIDE 10 ML: 20 JELLY TOPICAL at 23:55

## 2023-06-06 RX ADMIN — POLYETHYLENE GLYCOL 3350 17 G: 17 POWDER, FOR SOLUTION ORAL at 08:40

## 2023-06-06 RX ADMIN — OLANZAPINE 10 MG: 10 INJECTION, POWDER, FOR SOLUTION INTRAMUSCULAR at 20:35

## 2023-06-06 RX ADMIN — OLANZAPINE 10 MG: 10 TABLET, ORALLY DISINTEGRATING ORAL at 08:40

## 2023-06-06 RX ADMIN — GABAPENTIN 100 MG: 100 CAPSULE ORAL at 03:50

## 2023-06-06 RX ADMIN — ALUMINUM HYDROXIDE, MAGNESIUM HYDROXIDE, AND SIMETHICONE 30 ML: 200; 200; 20 SUSPENSION ORAL at 13:08

## 2023-06-06 RX ADMIN — DIPHENHYDRAMINE HYDROCHLORIDE 50 MG: 50 INJECTION, SOLUTION INTRAMUSCULAR; INTRAVENOUS at 16:44

## 2023-06-06 RX ADMIN — HALOPERIDOL LACTATE 5 MG: 5 INJECTION, SOLUTION INTRAMUSCULAR at 16:43

## 2023-06-06 ASSESSMENT — ACTIVITIES OF DAILY LIVING (ADL)
DRESS: WITH ASSISTANCE
HYGIENE/GROOMING: WITH ASSISTANCE
DRESS: WITH ASSISTANCE
ADLS_ACUITY_SCORE: 90
ORAL_HYGIENE: WITH ASSISTANCE
ADLS_ACUITY_SCORE: 90
ORAL_HYGIENE: WITH ASSISTANCE
ADLS_ACUITY_SCORE: 90
HYGIENE/GROOMING: WITH ASSISTANCE
ADLS_ACUITY_SCORE: 90
LAUNDRY: UNABLE TO COMPLETE
ADLS_ACUITY_SCORE: 90
ADLS_ACUITY_SCORE: 90

## 2023-06-06 NOTE — PLAN OF CARE
"Assessment/Intervention/Current Symtoms and Care Coordination:  Reviewed chart. Met with patient. Received notice of final hearing, copy given to patient and copy put in chart.      Discharge Plan or Goal:  Return to [facility], [address] [place] [address] (28[place]4 miles away)     Barriers to Discharge:  Patient requires further psychiatric stabilization due to current symptomology      Referral Status:  Psychiatry     Legal Status:  Court Hold    [facility]: [place]  File Number: [id number]  Final hearin/13 at 9am    Contacts:  Assisted Living: [facility], [phone]  Per H&P: Vicky Valdez , [phone], psychiatric provider Dr. Santana at [facility], [phone], primary care provider Attila Urena, DO       Upcoming Meetings and Dates/Important Information and next steps:  From Station 30, \"I informed TCM that the MD would like someone to pursue guardianship and she will talk to the team.\"           "

## 2023-06-06 NOTE — PROGRESS NOTES
"Patient has transferred to station 12 via gurney in 5 point restraints. Upon arrival restraints were removed. Patient was assisted off the gurney to a standing position. He then abruptly turned around and started talking to staff. He made statements such as \"I am not trying to commit suicide.\" He is in a room with a medical bed. He was reported to be SBA of one, upon admission his gait is steady and he lifted himself onto the side table as if it were a chair. He has been restless in the room. He remains on the 2:1 staffing for safety and falls.   "
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
## 2023-06-06 NOTE — PLAN OF CARE
Problem: Sleep Disturbance  Goal: Adequate Sleep/Rest  Outcome: Progressing   Goal Outcome Evaluation:      Patient slept 1.75 during the night.  Affect was flat mood calm on approached. Endorse  anxiety and depression patient was not willing  to rate his anxiety and depression writer offer PRN patient refused at first, later took the medication at 0350 am PRN Gabapentin 100 mg for anxiety and Trazodone 50 mg for for sleep. Patient was offer bathroom right twice voided once decline from using the urinal. Post  bladder scanning was done at 0316 result was 329 ml. Medical flyer was informed came and straight catheter the patient urine output was 500 ml  Kavita in color no sediment noted at 0415. Shortly Patient fell asleep.
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
## 2023-06-06 NOTE — PROVIDER NOTIFICATION
06/06/23 1650   Seclusion or Restraint Order   Attending Physician's Name Patito   In Person Face to Face Assessment Conducted Yes-Eval of pt's immediate situation, reaction to intervention, complete review of systems assessment, behavioral assessment & review/assessment of hx, drugs & meds, recent labs, etc, behavioral condition, need to continue/terminate restraint/seclusion   Patient Experienced No adverse physical outcome from seclusion/restraint initiation   Continuation of Seclus/Restraint indicated at this time Yes   In person face to face assessment completed with no adverse physical outcome reported or observed. Upon approach patient in restraints positioned prone due to spitting at staff,  visibly agitated, resisting restraints, and using profanity towards RN writer and staff. Patient refused to answer RN's assessment questions only responding with profanity. RN writer attempted to reassure and reorient patient to restraints and expectations for behavior but he was not receptive and remained agitated. Provider Dr. Caputo notified of results of face to face and restraint continued at this time due to harm towards others as well as being on an SIO for fall risk. Will continue to monitor.
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
## 2023-06-07 LAB
ALBUMIN SERPL BCG-MCNC: 3.9 G/DL (ref 3.5–5.2)
ALP SERPL-CCNC: 61 U/L (ref 40–129)
ALT SERPL W P-5'-P-CCNC: 18 U/L (ref 10–50)
ANION GAP SERPL CALCULATED.3IONS-SCNC: 10 MMOL/L (ref 7–15)
AST SERPL W P-5'-P-CCNC: 22 U/L (ref 10–50)
BASOPHILS # BLD AUTO: 0.1 10E3/UL (ref 0–0.2)
BASOPHILS NFR BLD AUTO: 1 %
BILIRUB SERPL-MCNC: 0.5 MG/DL
BUN SERPL-MCNC: 17.7 MG/DL (ref 8–23)
CALCIUM SERPL-MCNC: 9 MG/DL (ref 8.8–10.2)
CHLORIDE SERPL-SCNC: 101 MMOL/L (ref 98–107)
CK SERPL-CCNC: 245 U/L (ref 39–308)
CREAT SERPL-MCNC: 0.97 MG/DL (ref 0.67–1.17)
DEPRECATED HCO3 PLAS-SCNC: 26 MMOL/L (ref 22–29)
EOSINOPHIL # BLD AUTO: 0 10E3/UL (ref 0–0.7)
EOSINOPHIL NFR BLD AUTO: 0 %
ERYTHROCYTE [DISTWIDTH] IN BLOOD BY AUTOMATED COUNT: 14.8 % (ref 10–15)
FOLATE SERPL-MCNC: 9.1 NG/ML (ref 4.6–34.8)
GFR SERPL CREATININE-BSD FRML MDRD: 88 ML/MIN/1.73M2
GLUCOSE SERPL-MCNC: 132 MG/DL (ref 70–99)
HCT VFR BLD AUTO: 39.9 % (ref 40–53)
HGB BLD-MCNC: 12.9 G/DL (ref 13.3–17.7)
IMM GRANULOCYTES # BLD: 0 10E3/UL
IMM GRANULOCYTES NFR BLD: 1 %
LACTATE SERPL-SCNC: 1.5 MMOL/L (ref 0.7–2)
LYMPHOCYTES # BLD AUTO: 1 10E3/UL (ref 0.8–5.3)
LYMPHOCYTES NFR BLD AUTO: 15 %
MCH RBC QN AUTO: 27.7 PG (ref 26.5–33)
MCHC RBC AUTO-ENTMCNC: 32.3 G/DL (ref 31.5–36.5)
MCV RBC AUTO: 86 FL (ref 78–100)
MONOCYTES # BLD AUTO: 0.5 10E3/UL (ref 0–1.3)
MONOCYTES NFR BLD AUTO: 7 %
NEUTROPHILS # BLD AUTO: 5 10E3/UL (ref 1.6–8.3)
NEUTROPHILS NFR BLD AUTO: 76 %
NRBC # BLD AUTO: 0 10E3/UL
NRBC BLD AUTO-RTO: 0 /100
PLATELET # BLD AUTO: 195 10E3/UL (ref 150–450)
POTASSIUM SERPL-SCNC: 4.5 MMOL/L (ref 3.4–5.3)
PROT SERPL-MCNC: 5.9 G/DL (ref 6.4–8.3)
RBC # BLD AUTO: 4.65 10E6/UL (ref 4.4–5.9)
SODIUM SERPL-SCNC: 137 MMOL/L (ref 136–145)
VIT B12 SERPL-MCNC: 560 PG/ML (ref 232–1245)
WBC # BLD AUTO: 6.6 10E3/UL (ref 4–11)

## 2023-06-07 PROCEDURE — 250N000013 HC RX MED GY IP 250 OP 250 PS 637: Performed by: PHYSICIAN ASSISTANT

## 2023-06-07 PROCEDURE — 250N000013 HC RX MED GY IP 250 OP 250 PS 637: Performed by: PSYCHIATRY & NEUROLOGY

## 2023-06-07 PROCEDURE — 85025 COMPLETE CBC W/AUTO DIFF WBC: CPT | Performed by: PSYCHIATRY & NEUROLOGY

## 2023-06-07 PROCEDURE — 82746 ASSAY OF FOLIC ACID SERUM: CPT | Performed by: PSYCHIATRY & NEUROLOGY

## 2023-06-07 PROCEDURE — 250N000011 HC RX IP 250 OP 636: Performed by: PSYCHIATRY & NEUROLOGY

## 2023-06-07 PROCEDURE — 124N000002 HC R&B MH UMMC

## 2023-06-07 PROCEDURE — 82550 ASSAY OF CK (CPK): CPT | Performed by: PSYCHIATRY & NEUROLOGY

## 2023-06-07 PROCEDURE — 83605 ASSAY OF LACTIC ACID: CPT | Performed by: PSYCHIATRY & NEUROLOGY

## 2023-06-07 PROCEDURE — 82607 VITAMIN B-12: CPT | Performed by: PSYCHIATRY & NEUROLOGY

## 2023-06-07 PROCEDURE — 36415 COLL VENOUS BLD VENIPUNCTURE: CPT | Performed by: PSYCHIATRY & NEUROLOGY

## 2023-06-07 PROCEDURE — 80053 COMPREHEN METABOLIC PANEL: CPT | Performed by: PSYCHIATRY & NEUROLOGY

## 2023-06-07 PROCEDURE — 99233 SBSQ HOSP IP/OBS HIGH 50: CPT | Performed by: PSYCHIATRY & NEUROLOGY

## 2023-06-07 RX ORDER — PHENOBARBITAL 32.4 MG/1
32.4 TABLET ORAL 2 TIMES DAILY
Status: COMPLETED | OUTPATIENT
Start: 2023-06-07 | End: 2023-06-08

## 2023-06-07 RX ORDER — PHENOBARBITAL 16.2 MG/1
16.2 TABLET ORAL ONCE
Status: COMPLETED | OUTPATIENT
Start: 2023-06-07 | End: 2023-06-07

## 2023-06-07 RX ORDER — CHLORPROMAZINE HYDROCHLORIDE 25 MG/ML
25 INJECTION INTRAMUSCULAR
Status: DISCONTINUED | OUTPATIENT
Start: 2023-06-07 | End: 2023-06-12

## 2023-06-07 RX ADMIN — PHENOBARBITAL 16.2 MG: 16.2 TABLET ORAL at 16:33

## 2023-06-07 RX ADMIN — CYANOCOBALAMIN TAB 1000 MCG 1000 MCG: 1000 TAB at 08:32

## 2023-06-07 RX ADMIN — POLYETHYLENE GLYCOL 3350 17 G: 17 POWDER, FOR SOLUTION ORAL at 08:31

## 2023-06-07 RX ADMIN — TAMSULOSIN HYDROCHLORIDE 0.4 MG: 0.4 CAPSULE ORAL at 08:32

## 2023-06-07 RX ADMIN — OLANZAPINE 10 MG: 10 TABLET, ORALLY DISINTEGRATING ORAL at 08:31

## 2023-06-07 RX ADMIN — SENNOSIDES AND DOCUSATE SODIUM 1 TABLET: 50; 8.6 TABLET ORAL at 20:27

## 2023-06-07 RX ADMIN — HALOPERIDOL 5 MG: 5 TABLET ORAL at 18:44

## 2023-06-07 RX ADMIN — OLANZAPINE 10 MG: 10 INJECTION, POWDER, FOR SOLUTION INTRAMUSCULAR at 22:06

## 2023-06-07 RX ADMIN — DIPHENHYDRAMINE HYDROCHLORIDE 50 MG: 50 INJECTION, SOLUTION INTRAMUSCULAR; INTRAVENOUS at 22:06

## 2023-06-07 RX ADMIN — OLANZAPINE 10 MG: 10 TABLET, ORALLY DISINTEGRATING ORAL at 20:26

## 2023-06-07 RX ADMIN — Medication 5 MG: at 20:26

## 2023-06-07 RX ADMIN — PHENOBARBITAL 32.4 MG: 32.4 TABLET ORAL at 20:26

## 2023-06-07 RX ADMIN — PHENOBARBITAL 32.4 MG: 32.4 TABLET ORAL at 08:31

## 2023-06-07 ASSESSMENT — ACTIVITIES OF DAILY LIVING (ADL)
ADLS_ACUITY_SCORE: 90
ORAL_HYGIENE: PROMPTS
HYGIENE/GROOMING: PROMPTS
DRESS: WITH ASSISTANCE
ADLS_ACUITY_SCORE: 90
LAUNDRY: UNABLE TO COMPLETE
ADLS_ACUITY_SCORE: 90
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
## 2023-06-07 NOTE — PLAN OF CARE
"Assessment/Intervention/Current Symtoms and Care Coordination:  Reviewed chart. Met with team to review patient's psychiatric functioning and care. Patient continues to present with delusional and paranoid thought content.      Discharge Plan or Goal:  Return to [facility], [address] [place] [address] (28[place]4 miles away)     Barriers to Discharge:  Patient requires further psychiatric stabilization due to current symptomology      Referral Status:  Psychiatry     Legal Status:  Court Hold    County: [place]  File Number: [id number]  Final hearin/13 at 9am    Contacts:  Assisted Living: [facility], [phone]  Per H&P: Vicky Valdez , [phone], psychiatric provider Dr. Santana at [facility], [phone], primary care provider Attila Urena, DO       Upcoming Meetings and Dates/Important Information and next steps:  From Station 30, \"I informed TCM that the MD would like someone to pursue guardianship and she will talk to the team.\"             "
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
## 2023-06-07 NOTE — PLAN OF CARE
"Nursing Assessment    Recent Vitals: B/P: 124/81, T: 97.9, P: 72, R: 16     Sleep:  Hours of sleep at night: 4    General Shift Summary  Patient presents with a flat to hostile affect. Responses are slow. Behavior is irritable. Concentration is poor. Incite and judgment are limited. Patient is alert to self. Disoriented to place, time, situation. Upon bladder scan at 1200 there was 276 ml of urine present, writer took the highest reading over three scans. Patient didn't eat breakfast but ate 100% of lunch. He showered after lunch and stated he had a bowel movement in the shower. Upon bladder scan at 1315 there was only 14 ml present. Writer did several scans and frequently got 0 ml. Patient declined urinating in the shower however the shower did have a strong urine smell after patient was done. No bowel movement was present in the shower.    When approaching patient he stated \"Who are you?\" Writer informed patient that I was his nurse. He stated \"I haven't seen you before\". Writer told patient that she had interacted with him a few times today but had a yellow gown on, he stated \"No my nurse had a different hair color.\".     Patient refused his morning medications initially. After a few minutes of education and encouragement he accepted all his medications.    Tamar Mars RN MSN  "

## 2023-06-07 NOTE — PROVIDER NOTIFICATION
06/06/23 2209   Seclusion or Restraint Order   Patient Experienced No adverse physical outcome from seclusion/restraint initiation   Continuation of Seclus/Restraint indicated at this time No   Describe actions taken   (Pt released from physical hold.)   Restraint Type   Physical Hold (Comment) () Discontinued     Post procedure physical hold was discontinued as patient ceased attempting to strike out at staff and displayed decreased agitation. Patient refused to speak with wtr any further about discontinuation criteria and only stated that he was going back to sleep.
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
## 2023-06-07 NOTE — PROGRESS NOTES
Face to Face    Face to face assessment performed by this writer at 2105. Pt did not sustain any injures while going into restraints. Writer reviewed pt's labs, medications, vital signs, medical conditions and diagnoses. No apparent medical conditions or past history that would make pt unsafe to be in restraints. Pt swearing and cursing the writer during face to face assessment. Pt informed of behavior that led to restraints and expectations on unit. An explanation of discontinuation criteria was provided but patient continued cursing and was loud. Continuation of restraints was needed at this time due to patient's aggressive behavior towards others.
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
## 2023-06-07 NOTE — PLAN OF CARE
Pt asleep at start of shift.     Breathing quiet and unlabored when sleeping.     Pt had no c/o pain or discomfort during the HS.     Appears to have slept 4 hours.     Straight cath output 675mL with physical hold (see note).    0651 bladder scan 0mL. Pt pleasant and cooperative but suspicious of wtrs intent.     Pt continues on 2:1 SIO/10ft space distance.     Goal Outcome Evaluation:  Problem: Sleep Disturbance  Goal: Adequate Sleep/Rest  Outcome: Progressing
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
## 2023-06-08 PROCEDURE — 99233 SBSQ HOSP IP/OBS HIGH 50: CPT | Mod: GC | Performed by: PSYCHIATRY & NEUROLOGY

## 2023-06-08 PROCEDURE — 99231 SBSQ HOSP IP/OBS SF/LOW 25: CPT | Performed by: INTERNAL MEDICINE

## 2023-06-08 PROCEDURE — 250N000013 HC RX MED GY IP 250 OP 250 PS 637: Performed by: STUDENT IN AN ORGANIZED HEALTH CARE EDUCATION/TRAINING PROGRAM

## 2023-06-08 PROCEDURE — 250N000013 HC RX MED GY IP 250 OP 250 PS 637: Performed by: PSYCHIATRY & NEUROLOGY

## 2023-06-08 PROCEDURE — 250N000013 HC RX MED GY IP 250 OP 250 PS 637: Performed by: PHYSICIAN ASSISTANT

## 2023-06-08 PROCEDURE — 93010 ELECTROCARDIOGRAM REPORT: CPT | Performed by: INTERNAL MEDICINE

## 2023-06-08 PROCEDURE — 93005 ELECTROCARDIOGRAM TRACING: CPT

## 2023-06-08 PROCEDURE — 124N000002 HC R&B MH UMMC

## 2023-06-08 RX ORDER — PHENOBARBITAL 16.2 MG/1
32.4 TABLET ORAL AT BEDTIME
Status: DISCONTINUED | OUTPATIENT
Start: 2023-06-09 | End: 2023-06-09

## 2023-06-08 RX ORDER — CLOZAPINE 25 MG/1
25 TABLET ORAL AT BEDTIME
Status: DISCONTINUED | OUTPATIENT
Start: 2023-06-08 | End: 2023-06-09

## 2023-06-08 RX ORDER — PHENOBARBITAL 16.2 MG/1
16.2 TABLET ORAL
Status: DISCONTINUED | OUTPATIENT
Start: 2023-06-09 | End: 2023-06-09

## 2023-06-08 RX ADMIN — CYANOCOBALAMIN TAB 1000 MCG 1000 MCG: 1000 TAB at 08:15

## 2023-06-08 RX ADMIN — TAMSULOSIN HYDROCHLORIDE 0.4 MG: 0.4 CAPSULE ORAL at 08:15

## 2023-06-08 RX ADMIN — Medication 5 MG: at 19:41

## 2023-06-08 RX ADMIN — OLANZAPINE 10 MG: 10 TABLET, ORALLY DISINTEGRATING ORAL at 19:41

## 2023-06-08 RX ADMIN — PHENOBARBITAL 32.4 MG: 32.4 TABLET ORAL at 19:41

## 2023-06-08 RX ADMIN — OLANZAPINE 10 MG: 10 TABLET, ORALLY DISINTEGRATING ORAL at 08:15

## 2023-06-08 RX ADMIN — CLOZAPINE 25 MG: 25 TABLET ORAL at 19:41

## 2023-06-08 RX ADMIN — PHENOBARBITAL 32.4 MG: 32.4 TABLET ORAL at 08:15

## 2023-06-08 RX ADMIN — POLYETHYLENE GLYCOL 3350 17 G: 17 POWDER, FOR SOLUTION ORAL at 08:15

## 2023-06-08 ASSESSMENT — ACTIVITIES OF DAILY LIVING (ADL)
ORAL_HYGIENE: INDEPENDENT
ADLS_ACUITY_SCORE: 90
ADLS_ACUITY_SCORE: 70
ADLS_ACUITY_SCORE: 90
HYGIENE/GROOMING: INDEPENDENT
ADLS_ACUITY_SCORE: 70
ORAL_HYGIENE: INDEPENDENT
ADLS_ACUITY_SCORE: 70
HYGIENE/GROOMING: INDEPENDENT
ADLS_ACUITY_SCORE: 70
LAUNDRY: UNABLE TO COMPLETE
ADLS_ACUITY_SCORE: 70
ADLS_ACUITY_SCORE: 90
ADLS_ACUITY_SCORE: 90
DRESS: INDEPENDENT
DRESS: INDEPENDENT
LAUNDRY: UNABLE TO COMPLETE
ADLS_ACUITY_SCORE: 90
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
## 2023-06-08 NOTE — PROGRESS NOTES
Brief Progress Note:  Cardiology called to discuss Qtc prolongation on EKG. The patient is being considered for antipsychotic therapies with potential for Qtc prolongation.     On review of EKGs from this hospital and reports from others, patient has a chronic LBBB with a persistently, uncorrected QTc of 490-510 ms. Likely the reason of perceived prolongation is the prolonged QTc interval, which is not normally accounted for in standard correction equations. After using various correction calculations for QRS and using the JTc, the corrected rate and QRS QTc obtained was no more than 460 ms. Given that the patient has previously and recently been on clozapine with this similar QTc, it is likely safe to restart from a Cardiology perspective.    Discussed with Dr. Foley.    Darrell Musa, PGY-4  Cardiovascular Disease Fellow

## 2023-06-08 NOTE — PLAN OF CARE
"Assessment/Intervention/Current Symtoms and Care Coordination:  Reviewed chart. Met with team to review patient's psychiatric functioning and care. Patient continues to present with delusional and paranoid thought content. Called [facility] to request an amendment to the meek to include blood draws for clozaril, will communicate the need for an amended meek petition with Dr. Rhodes.      Discharge Plan or Goal:  Return to [facility] and [facility], [address] [place] [address] (28.4 miles away)     Barriers to Discharge:  Patient requires further psychiatric stabilization due to current symptomology      Referral Status:  Psychiatry     Legal Status:  Court Hold    [facility]: [place]  File Number: [id number]  Final hearin/13 at 9am    Contacts:  Assisted Living: [facility], [phone]  Per H&P: Vicky Valdez , [phone], psychiatric provider Dr. Santana at [facility], [phone], primary care provider Attila Urena, DO       Upcoming Meetings and Dates/Important Information and next steps:  From Station 30, \"I informed TCM that the MD would like someone to pursue guardianship and she will talk to the team.\"               "
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
## 2023-06-08 NOTE — PROGRESS NOTES
"Patient awake, restless. PA informed writer that patient was restless and anxious, writer approached the patient with prn gabapentin 100 mg and trazodone 50 mg but patient declined stating \"these are psychiatric medications, I don't want psych meds.\" Writer educated patient that gabapentin is indicated for anxiety as well as pain but patient continue to decline. Another RN who works with patient on station 30 was called and approached patient but patient still refused. Medications wasted and marked refused. Will continue to monitor patient and follow plan of care.    García Up, ALEXANDER, RN    "
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
## 2023-06-08 NOTE — PROVIDER NOTIFICATION
"   06/08/23 1425   Seclusion or Restraint Order   In Person Face to Face Assessment Conducted Yes-Eval of pt's immediate situation, reaction to intervention, complete review of systems assessment, behavioral assessment & review/assessment of hx, drugs & meds, recent labs, etc, behavioral condition, need to continue/terminate restraint/seclusion   Patient Experienced No adverse physical outcome from seclusion/restraint initiation   Continuation of Seclus/Restraint indicated at this time Yes     Within an hour after restraint an in person face to face assessment was completed at 1425, including an evaluation of the patient's immediate reaction to the intervention, behavioral assessment and review/assessment of history, drugs and medications, recent labs, etc., and behavioral condition.  The patient experienced: no adverse sequelae.  The intervention of restraint or seclusion needs to continue.  Pt was laying in front of the door. Pt responded to RN writer's request to speak through the door. Pt stood up and RN writer asked if he understood why he was in seclusion. Pt stated \"yes, bc I am an evil degenerate\". Pt asked if he postured at staff with fists raised to which pt responded yes. Pt advised that behavior is not appropriate and he must show safe behavior to be let out of seclusion to which pt responded by head banging once on the window. Pt then went and sat on bed and yelled.  "

## 2023-06-08 NOTE — PROGRESS NOTES
"Writer was called to patient's room and patient was found flushing the toilet with his head in it. Writer called for patient asking why he was doing this. Patient got up and stated he was a \"I'm a menace!\" \"I'm disgusting and filthy! I need this!\" Patient was offered a shower but he declined. Provider Jim was updated on patients status and informed that patient is attempting to flush his head in the toilet. Writer has visually assessed patient. Patient's airway does not seem to be compromised. There has been no choking or coughing noted. He has continuously responded to staff. Breathing rate and rhythm are WDL.  "

## 2023-06-08 NOTE — PLAN OF CARE
"  Problem: Adult Behavioral Health Plan of Care  Goal: Adheres to Safety Considerations for Self and Others  Outcome: Progressing     Problem: Psychotic Symptoms  Goal: Psychotic Symptoms  Description: Signs and symptoms of listed problems will be absent or manageable.  Outcome: Not Progressing   Goal Outcome Evaluation:    Patient up and visible on unit pacing frequently, requested to take shower and stated \" I smell stinky\", staff setup patient and he took shower for about 30 minutes, he got to room after getting dressed, patient took off running to bathroom, disrobed and took another shower for the second 30 minutes. Patient is responding to internal stimuli; talking to self, when writer approached him to complete assessments, he stated \" I'm not going to say anything, you can read my mind, you know my thoughts\". Patient appeared paranoid, he look at his dinner and said it was human testicles and blood, writer explained what each portion of food was and assisted patient to eat dinner, he ate 100%. Patient pacing unit with [facility] staffs and saying I don't want to do that to you but not directed towards staffs, I should be ashame of myself, I should be dead; he started turning and walking backwards and trying to sit on floor, patient disrobing, he became agitated, PRN Haldol 5 mg given and medication was not effective. He attempted to use the bathroom x 2 for BM with no result, patient started squeezing his pennis, he needed frequent redirections before he could stop, bladder scanned was 23 ml this shift and he was able to void for the second time in bathroom. PRN Senna given with HS medications.  Writer observed resting tremors that increases when he is holding cup, he endorse anxiety stating he feels anxious all the time, denies all other mental health symptoms but wishes he was dead, patient talking down about himself, [facility] staffs continue at 2:1 for redirections, falls risk and safety for patient and others, " continue to monitor.  Vitals this shift: /78   Pulse 93   Temp 97.6  F (36.4  C)   Resp 16   SpO2 99%.    At 9:30 PM, patient continue to paced Unit attempting to open other patients doors, he was redirected multiple times, he sat in dinning area and started hitting chair on wall, patient was not cooperative with redirections, he lifted his hands and attempted to punch writer, staff went hands on and walked patient to his room, patient started punching, kicking and spitting on staff. Code 21 called, patient in 5 points and IM medication administered. On call provider give orders and paced one time order for Thorazine

## 2023-06-08 NOTE — PROVIDER NOTIFICATION
06/07/23 2200   Seclusion or Restraint Order   Order Obtained Yes   Attending Physician Notified Yes  (On Call)   Attending Physician's Name Gaby Alvarez   Patient Experienced No adverse physical outcome from seclusion/restraint initiation   Continuation of Seclus/Restraint indicated at this time Yes   Describe actions taken Pt placed in 5 point for physical agression toward others  (Patient kicking, spitting and attempting to punch staffs)   Assessment   Less Restrictive Alternative Decreased stimulation;Verbal de-escalation;Medication administration;Reality orientation;Reassurance / Support;Immediate action required, no least restrictive measures could be attempted   Risk Factors O  (Elderly patient at falls risk)   Justification   Clinical Justification All   Restraint Type   5th Point Chest (BH) Start   Physical Hold (Comment) (BH) Start   Patient was restraint in 5 points for physical aggression towards self and others, he is not appropriate for seclusion due to high falls risk as he has been hitting chair while sitting, spinning self around and unsteady with near falls multiple time this shift. Patient has been unable to comply with redirections, writer attempted multiple offers of non pharmaceutical measures that was not effective. IM Benadryl and Zyprexa administered at 22:06, patient continue on [facility] for safety monitoring.

## 2023-06-08 NOTE — PROVIDER NOTIFICATION
06/07/23 2234   Seclusion or Restraint Order   In Person Face to Face Assessment Conducted Yes-Eval of pt's immediate situation, reaction to intervention, complete review of systems assessment, behavioral assessment & review/assessment of hx, drugs & meds, recent labs, etc, behavioral condition, need to continue/terminate restraint/seclusion   Face to face assessment performed. Pt did not sustain any injures while going into restrainst.  Pt's labs, medications, vital signs, medical conditions and diagnoses were reviewed. No apparent medical conditions or past history that would make pt unsafe to be in restraint. Pt informed of behavior that led to restraint and expectations on unit and pt was sleeping with even breathing pattern with no injury observed. An explanation of discontinuation criteria was explained and no respond from pt.  Provider was notified of face to face results at 2215  ordered prn Thorazine once if pt continue with aggression. .

## 2023-06-08 NOTE — PROVIDER NOTIFICATION
06/07/23 9101   Restraint Type   Wrist - R (BH) Discontinued   Wrist - L (BH) Discontinued   Ankle - R (BH) Discontinued   Ankle - L (BH) Discontinued   5th Point Chest (BH) Discontinued   Debriefing   Debriefing DO   Does patient understand why the event happened? No  (Patient disorganize and has no insight)   Does patient agree to safe behaviors? Other (comment)  (sleeping)   What can we do differently so this doesn't happen again? Patient unable to answer   Plan of care reviewed and modified No     Patient out of restraint at 10:55, he was sleeping with 15 min checks x 2. Continue on 2:1
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
## 2023-06-09 LAB
ATRIAL RATE - MUSE: 80 BPM
DIASTOLIC BLOOD PRESSURE - MUSE: NORMAL MMHG
INTERPRETATION ECG - MUSE: NORMAL
P AXIS - MUSE: 52 DEGREES
PR INTERVAL - MUSE: 152 MS
QRS DURATION - MUSE: 144 MS
QT - MUSE: 430 MS
QTC - MUSE: 495 MS
R AXIS - MUSE: -1 DEGREES
SYSTOLIC BLOOD PRESSURE - MUSE: NORMAL MMHG
T AXIS - MUSE: 106 DEGREES
VENTRICULAR RATE- MUSE: 80 BPM

## 2023-06-09 PROCEDURE — 124N000002 HC R&B MH UMMC

## 2023-06-09 PROCEDURE — 99207 PR NO CHARGE LOS: CPT | Performed by: STUDENT IN AN ORGANIZED HEALTH CARE EDUCATION/TRAINING PROGRAM

## 2023-06-09 PROCEDURE — 250N000013 HC RX MED GY IP 250 OP 250 PS 637: Performed by: STUDENT IN AN ORGANIZED HEALTH CARE EDUCATION/TRAINING PROGRAM

## 2023-06-09 PROCEDURE — 250N000013 HC RX MED GY IP 250 OP 250 PS 637: Performed by: PSYCHIATRY & NEUROLOGY

## 2023-06-09 PROCEDURE — 250N000013 HC RX MED GY IP 250 OP 250 PS 637: Performed by: PHYSICIAN ASSISTANT

## 2023-06-09 PROCEDURE — 99233 SBSQ HOSP IP/OBS HIGH 50: CPT | Performed by: PSYCHIATRY & NEUROLOGY

## 2023-06-09 RX ORDER — CLOZAPINE 50 MG/1
50 TABLET ORAL AT BEDTIME
Status: DISCONTINUED | OUTPATIENT
Start: 2023-06-09 | End: 2023-06-09

## 2023-06-09 RX ORDER — PHENOBARBITAL 16.2 MG/1
16.2 TABLET ORAL
Status: COMPLETED | OUTPATIENT
Start: 2023-06-10 | End: 2023-06-11

## 2023-06-09 RX ORDER — CLOZAPINE 50 MG/1
50 TABLET ORAL AT BEDTIME
Status: ACTIVE | OUTPATIENT
Start: 2023-06-09 | End: 2023-06-10

## 2023-06-09 RX ORDER — PHENOBARBITAL 16.2 MG/1
32.4 TABLET ORAL AT BEDTIME
Status: DISCONTINUED | OUTPATIENT
Start: 2023-06-10 | End: 2023-06-09

## 2023-06-09 RX ORDER — PHENOBARBITAL 16.2 MG/1
16.2 TABLET ORAL AT BEDTIME
Status: COMPLETED | OUTPATIENT
Start: 2023-06-10 | End: 2023-06-10

## 2023-06-09 RX ORDER — PHENOBARBITAL 32.4 MG/1
32.4 TABLET ORAL AT BEDTIME
Status: CANCELLED | OUTPATIENT
Start: 2023-06-09

## 2023-06-09 RX ORDER — CLOZAPINE 100 MG/1
100 TABLET ORAL AT BEDTIME
Status: COMPLETED | OUTPATIENT
Start: 2023-06-11 | End: 2023-06-11

## 2023-06-09 RX ADMIN — CLOZAPINE 50 MG: 50 TABLET ORAL at 18:34

## 2023-06-09 RX ADMIN — OLANZAPINE 10 MG: 10 TABLET, ORALLY DISINTEGRATING ORAL at 10:47

## 2023-06-09 RX ADMIN — DIPHENHYDRAMINE HYDROCHLORIDE 50 MG: 50 CAPSULE ORAL at 13:47

## 2023-06-09 RX ADMIN — HALOPERIDOL 5 MG: 5 TABLET ORAL at 13:47

## 2023-06-09 RX ADMIN — PHENOBARBITAL 32.4 MG: 16.2 TABLET ORAL at 18:37

## 2023-06-09 RX ADMIN — PHENOBARBITAL 16.2 MG: 16.2 TABLET ORAL at 10:47

## 2023-06-09 RX ADMIN — Medication 5 MG: at 18:34

## 2023-06-09 RX ADMIN — CYANOCOBALAMIN TAB 1000 MCG 1000 MCG: 1000 TAB at 10:47

## 2023-06-09 RX ADMIN — TAMSULOSIN HYDROCHLORIDE 0.4 MG: 0.4 CAPSULE ORAL at 10:47

## 2023-06-09 RX ADMIN — OLANZAPINE 10 MG: 10 TABLET, ORALLY DISINTEGRATING ORAL at 18:34

## 2023-06-09 RX ADMIN — POLYETHYLENE GLYCOL 3350 17 G: 17 POWDER, FOR SOLUTION ORAL at 10:47

## 2023-06-09 ASSESSMENT — ACTIVITIES OF DAILY LIVING (ADL)
DRESS: SCRUBS (BEHAVIORAL HEALTH);INDEPENDENT;PROMPTS
HYGIENE/GROOMING: INDEPENDENT;PROMPTS
ADLS_ACUITY_SCORE: 70
ADLS_ACUITY_SCORE: 90
ADLS_ACUITY_SCORE: 70
ADLS_ACUITY_SCORE: 70
HYGIENE/GROOMING: INDEPENDENT;PROMPTS
DRESS: SCRUBS (BEHAVIORAL HEALTH);INDEPENDENT
ADLS_ACUITY_SCORE: 70
ORAL_HYGIENE: INDEPENDENT;PROMPTS
ORAL_HYGIENE: INDEPENDENT;PROMPTS
ADLS_ACUITY_SCORE: 90
ADLS_ACUITY_SCORE: 70
ADLS_ACUITY_SCORE: 90
ADLS_ACUITY_SCORE: 70
ADLS_ACUITY_SCORE: 70

## 2023-06-09 NOTE — PLAN OF CARE
"\"I don't know what to do\"- repeated frequently.  \"I might as well be dead.\"  \"I don't know what's real and what isn't.\"  \"Shake it up, shake it up, shake your booty,\" while bottle of ensure was offered.  \"Jacking off, jacking off\"- singing while taking penis in one hand, pulling pants downward with the other in front of 3 staff.     Secluded until 16:15. Repeatedly sticking head in toilet bowl, attempting to make himself vomit, yelling angrily. Finally agreed to behave peacefully. No insight regarding the reason for the seclusion.  Later, more redirectable.   Reported strong feelings of guilt because of what is on his mind (inappropriate sexual thoughts. Denied current imperatives to harm. (Hx acting on his command hallucinations to hit , punch, throw furniture.) Intensified reactions, impulsivity.    Scoffed at being called \"Dude\" and \"Magan\" by psych associates. Able to state that it was disrespectful to him.    Spitting on floor, swinging at a peer's back and several staff but redirectable/ divertable.     Tremors began on childhood, now are severe enough to make eating difficult. Might occupational therapy have access to eating utensils made adaptive with  larger, easier to hold handles?    Accepted oral medications without issue although initially said no. (Restarted Clozaril which he had been on for 25 years.)      Voided light yellow urine at 16:30 and 20:00. Refused bladder scans as unneeded.    MSSA:18, 20. Did not sleep during the evening.     Oriented to person and being in a hospital only.    Cardiology consult: per cardiology, corrected rate QRS QTC: 460. (LBBB:unchanged.)  Neurology consult: pending    Plan: Monitor and document mood and behaviour, thought process and content. Establish and maintain therapeutic relationship. Educate about diagnoses, medications, treatment, legal status, plan of care. Address preexisting and concurrent medical concerns.     P:Disturbed thought process  G:Rational " thought process  O:No progress
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
## 2023-06-09 NOTE — CONSULTS
Neurology has been reconsulted to assess Mr. Miranda's behavioral agitation/sundowning that seems to have worsened throughout his hospitalization.  The current question posed to neurology is whether the reduction in his carbidopa-levodopa could have contributed to worsening of his agitation.  When neurology was first consulted 5/19 the recommendation was to not start carbidopa-levodopa (Sinemet) since it was not clear if he had been taking those prior to admission.  On my chart review, there is a reference that the patient has been off of Sinemet since 11/29/2022.  And even prior to that time, it is not clear whether he was taking his medication regularly.    Assessment/ Recommendations:  - Based on the medication administration available to us, he has not been taking levodopa, or any dopamine analog, for many months.  While there is a clinical entity of dopamine withdrawal syndrome, these symptoms include reduced movement, increased stiffness, lack of mobility etc.  Therefore his current symptoms including behavioral agitation would not be suggestive of a dopamine withdrawal side effect.  Adding dopamine in the form of levodopa would only worsen his agitation and would not be recommended as described prior.     - In agreement with continued treatment of psychosis and behavioral agitation per psychiatry team.     Please page neurology if other questions arise or to discuss these recommendations further.     Kranthi Perdue MD  [phone]

## 2023-06-09 NOTE — PLAN OF CARE
"Goal Outcome Evaluation:     Pt is delusional and thinks he killed a 100 people yesterday-but can't say how.  Although he feels very remorseful for it.  He keeps saying \"What should I do?\" \"I don't know what to do\".  He needs constant redirection.  Pt's MSSA was a 5 his vitals signs stable.  He is difficult to assess because of his parkinson's at baseline he has tremors, is delusion and disorganized.  Pt asked to go to the bathroom and tried to put his head into the toilet.  Pt 's was bladder scanned for 426 at 0700.  Pt went to the bathroom after he was bladder scanned.  Will report to day shift.                 "

## 2023-06-09 NOTE — PHARMACY-CONSULT NOTE
Pharmacy Consult Note    Pharmacy was consulted by: Ingrid Rhodes MD    Reason for consult:  Pt has hx of Parkinsons vs parkinsonism 2/2 substances, treatment refractory schizophrenia, prolonged QTc, aspiration pneumonia (possibly 2/2 clozapine?). A thorough chart review of previous medication trials would be helpful given side effects from medications and several med changes since 8/2022 when parents say he was last at his baseline. Unclear why clozapine was discontinued but he had several years of stability on combination of clozapine and Seroquel. Appears that Sinemet was also discontinued due to concerns it was contributing to psychosis, but it appears that psychosis has worsened since discontinuation. Please provide treatment recs for management of severe psychosis and agitation.     D/I: Vinod Lee is a 62 year-old with treatment refractory schizophrenia per history. Additional diagnostic considerations include bipolar affective disorder, darryl; unspecified encephalopathy; and rule-out catatonia. Mr. Lee was initially admitted to the medicine service 5/18-5/26/23 for evaluation of acute encephalopathy; transferred to inpatient psychiatry 5/26 for ongoing management of psychosis and agitation.      Past psychiatric history: Schizophrenia with prominent delusions, auditory hallucinations, disorganization; Per chart review other diagnoses have included: anxiety, depression, obsessive-compulsive disorder     Past medical history:  Parkinson s disease vs parkinsonism secondary to medications, urinary retention and BPH, history of prolonged QTc. Through historical chart review: history of moderate persistent asthma without complication, B12 deficiency, restless leg syndrome, essential tremor, essential hypertension, obstructive sleep apnea, acid reflux, Mirizzi s syndrome, chronic knee pain, chronic diarrhea.     Allergies: No known allergies; Chart review through Care Everywhere noted allergy listings of  phenothiazines - tardive dyskinesia; montelukast - psychosis. These represent side effects.     Vitals: /82   Pulse 73   Temp 97.2  F (36.2  C) (Temporal)   Resp 16  Ht 5 9    Wt 78.6 kg (173 lb 4.5 oz)   SpO2 98%   BMI 25.5 kg/m      Labs: (from 6/7) B12 560; folate 9.1, ANC 5000; CBC WNL except hemoglobin/hematocrit 12.9/39.9; CMP WNL. (from 5/22/23) TSH WNL.    From chart review: 3/23/23 Vit D 11; B12 165; Hgb A1c 5.2%; Chol 180, HDL 41, LDL 94,     QTc on 6/8 = 495 with chronic left bundle branch block (LBBB). Per Cardiology consult 6/8, correction calculations for QTc are no more than 460 ms.     Current Medications (as of 6/9 am):  Clozapine 25 mg PO QHS (restarted 6/8, dose increasing)  Olanzapine ODT 10 mg PO/(IM) BID   Phenobarbital 16.2 mg PO QAM; 32.4 mg PO QHS (being tapered)  Cyanocobalamin 1,000 mcg PO daily  Melatonin 5 mg PO QHS  Polyethylene glycol 17 g PO daily  Tamsulosin 0.4 mg PO daily    PRN:  Acetaminophen 650 mg PO q4h PRN mild, moderate pain  Maalox 30 ml PO q4h PRN indigestion  Gabapentin 100 mg PO q6h PRN anxiety  Chlorpromazine 25 mg IM Once PRN agitation   Haloperidol 5 mg PO/IM w/ Diphenhydramine 50 mg PO/IM q8h PRN agitation  Olanzapine 5-10 mg PO/IM TID PRN agitation associated with psychosis or darryl  Senna-docusate 1 tablet PO BID PRN constipation  Trazodone 50 mg PO qHS PRN sleep; may repeat after 60 minutes    Administration of PRNs for agitation:  6/7 2207 Olanzapine IM 10 mg; diphenhydramine 50 mg IM  6/7 1844 Haloperidol 5 mg PO. Note says not effective.   6/6 1654 Lorazepam 2 mg IM; diphenhydramine 50 mg IM; haloperidol 5 mg IM  6/5 1635 diphenhydramine 50 mg IM; haloperidol 5 mg IM  6/5 1434 Lorazepam 2 mg IM  6/5 1301 Olanzapine PO 5 mg  6/5 0843 diphenhydramine 50 mg IM; haloperidol 5 mg IM  6/4 1646 Olanzapine PO 5 mg  6/3 2140 diphenhydramine 50 mg IM; haloperidol 5 mg IM  6/3 1643 Olanzapine IM 5 mg  6/3 1509 Olanzapine PO 5 mg  6/3 0028 Olanzapine IM  5 mg  6/2 1907 diphenhydramine 50 mg IM; haloperidol 5 mg IM  6/2 1635 Olanzapine PO 5 mg  6/1 2038 diphenhydramine 50 mg IM; haloperidol 5 mg IM  6/1 1343 diphenhydramine 50 mg IM; haloperidol 5 mg IM  6/1 1000 Olanzapine PO 5 mg  5/31 1817 Olanzapine PO 50 mg  5/31 0643 Olanzapine IM 5 mg    Additional medication information: A chart review was completed for additional information on medication trials and side effects. Information available included notes available through [facility] Everywhere for 4/12/05 and 12/22/09-4/25/23.  Of note, information from outpatient psychiatry appointments during this timeframe were not available for review.     Clozapine - 25-year history, until recent taper/discontinuation April/May 2023. The H&P notes a discontinuation date of 5/7/23. The clozapine dose listed as an outpatient was 300 mg qhs up until 10/4/18 when the prescribed dose was 400 mg qhs. In 2019, a dose of 100 mg qam and 400 mg qhs begins appearing on med lists, which continues up until beginning the taper to discontinue.  Notes from the 4/10/23 hospitalization where the clozapine taper began mention prolonged QTc of 513 (with LBBB) on admission and questioning the past diagnosis of schizophrenia. A diagnosis of OCD was felt more consistent with the symptoms and sertraline was being used to target OCD symptoms. A note commented on concern clozapine was worsening OCD. Clozapine was tapered over 2 weeks to 25 mg BID with a plan to discontinue as an outpatient.   Levels:  Med lists show 100 mg qam and 400 mg qhs at the time of both levels. Particularly with 4/8/23 lab and draw time of 1409, it is unclear if levels epresent a trough.  4/8/23 [facility] ED, lab collected at 1409: Cloz 458, norcloz 286 = 744  11/28/22 [facility], lab collected at 0816 [facility] Cloz 730; norcloz 338 = 1068    Risperidone -Hospitalization 3/21/23 mention PRN risperidone was found helpful for anxiety. Discharged with 1 mg BID PRN for  agitation/severe anxiety. Discontinued during 4/10/23 hospitalization.  Olanzapine - initiated during 4/10/23 hospitalization. Did not find any previous mention in notes.  Quetiapine - Earliest note available through [facility] Everywhere was 4/12/05. Quetiapine included on available med lists from 4/12/05 - 8/8/22 ranged in dose from 100 mg daily to 400 mg BID.  Most recent dose appeared to be 150 mg qhs. Discontinued during hospitalization around 8/8/22. Restarted at 100 mg qhs during 11/22 hospitalization. During 3/21/23 hospitalization dose changed to 100 mg qam and 200 mg qhs. Discontinued during 4/10/23 hospitalization.  Ziprasidone - Available notes indicate ziprasidone initiated some time prior to 4/2005. ED visit 4/12/05 for a syncopal episode the week prior, thought possibly related to ziprasidone. Note comments ziprasidone had already been stopped and quetiapine resumed.  Lurasidone - 40 mg daily appears in a med list 3/2/15. Was discontinued from med list 7/2/15 with a comment the patient reported not taking.  Chlorpromazine, fluphenazine - Available notes did not mention prior trials of either of these medications. However, a listed allergy/side effect of phenothiazines - tardive dyskinesia is included in Care Everywhere records.    Additional medications:  Lithium - patient says did not help (per Dr. Portillo s H&P)  Duloxetine - Appeared on med lists beginning 4/2016 and continued at 60 mg daily. Discontinued during hospitalization 3/21/23  Propranolol LA - history of use for tremor as well as a period of essential hypertension. Dose ranged from  mg daily.   Gabapentin - Appeared on medication list from 4/12/05 and continued throughout notes until discontinuation 8/2022. Doses listed included: 300 mg BID, 400 mg TID-QID, 800 mg TID-QID, 900 mg HS. Indication not mentioned in notes until 3/9/22 when it was commented neurology was now prescribing given restless leg component. In 2020 during evaluations for  SOB and dizziness, there is mention of the psychiatrist trying to decrease the dose.   Lorazepam/Clonazepam - Lorazepam 1 mg PO TID and 2 mg PO qHS appeared in notes dating back to 4/12/05. The lists indicated a scheduled frequency, but notes referenced some PRN use. Lorazepam appears to have been switched to clonazepam 0.5 mg PO TID in 2019. Doses adjustments and switches between lorazepam and clonazepam occurred throughout recent hospitalizations.     Medications for sleep:  Mirtazapine - during 11/25/22 hospitalization, 7.5 mg qhs tried without success for sleep  Eszopiclone - during 4/10/23 hospitalization  Zolpidem - discontinuation of zolpidem mentioned during 3/9/23 hospitalization  Suvorexant - during 11/25/22 hospitalization  Temazepam - 15-30 mg can be found periodically on med lists from 4211-7369    Assessment:   1. Mr. Lee was psychiatrically stable for an extended period on a regimen that included clozapine, quetiapine, duloxetine, gabapentin, lorazepam, propranolol LA for tremor. Medication adjustments beginning approximately 2020 largely stemmed from concerns for SOB, dizziness, weakness, parkinsonism. During 2022, a diagnosis of pneumonia, ongoing dizziness, weakness, recurrent falls led to ongoing medication adjustments.      Given the lengthy history and stability on clozapine, a consideration may be whether the relatively short taper  and discontinuation of clozapine over a 3-4 week period contributed to a withdrawal-associated psychosis.  (Warren VILLANUEVA, Anna LUZ. Ther Adv Psychopharmacol 2021;11: [id number])    2. The choice of any antipsychotic medication requires a thoughtful evaluation of risk vs benefit given the concern for potential side effects to impact current medical concerns (parkinsonism, urinary retention, QTc prolongation).   - Cardiology s note provided that in the setting of chronic LBBB, corrected QTc has been no more than 460 ms. Non-corrected QTc has been consistent  throughout a period that has included discontinuation of clozapine, initiation of olanzapine and administration of PRN haloperidol. From past notes it appears prolonged QTc was a concern, however, clozapine discontinuation may have related more to symptoms/diagnosis.   - Mr. Lee s current urinary retention is thought multifactorial. The anticholinergic activity of current medications may contribute and should be minimized as possible. Diphenhydramine, olanzapine, and clozapine are anticholinergic. However, antipsychotic medications with less anticholinergic activity may not be optimal given history and other side effect concerns. From chart review, Mr. Lee has been on tamsulosin 0.4 mg daily since 12/2019 for BPH with urinary hesitancy. In 2022, there was concern to increase the dose given increased risk of orthostasis and falls. Tamsulosin could be increased to 0.8 mg, but if this is considered would wait until clozapine titration is complete and orthostatic vitals are stable.  - Neurology has been consulted regarding the symptoms of Parkinsons, sundowning, cognitive decline secondary to Sinemet discontinuation. Presently, resumption of Sinemet has not been recommended due to psychiatric concerns. Regarding antipsychotic medications, clozapine is associated with the least risk of causing/exacerbating parkinsonism. Mr. Lee also has an extended history of being on quetiapine, which is also associated with a lower risk of EPS compared to other antipsychotic meds.    3. For agitation Mr. Lee has been receiving olanzapine 5-10 mg PO/IM, haloperidol 5mg with diphenhydramine 50 mg PO/IM. Additionally, chlorpromazine 25 mg IM once PRN was ordered 6/7 but not administered. Lorazepam for agitation was discontinued 6/7. It is difficult to determine from chart review an overall sense of effectiveness when PRNs have been administered. If the olanzapine PRN is felt to be helpful in certain circumstances,  clarification should be added whether it is first-line, or under what circumstances it should be given. Given the concerns for urinary retention, minimizing anticholinergic burden through elimination/reduction of diphenhydramine would also be ideal.     4. Miscellaneous: Through the chart review a Vitamin D level of 11 was noted from 3/23/23. Pharmacy fill information indicates he was prescribed a replacement dose of 50,000 units weekly x7 weeks.     Recommendations:  1. Continue with slow titration of clozapine, which was restarted 6/8/23. Based on historical doses and levels, would initially target 300-500 mg as tolerated with cross-taper off the scheduled olanzapine.   - QTc prolongation associated with antipsychotic medication is dose-dependent. Although QTc will need to be corrected given LBBB, would continue to monitor EKGs and recheck after reaching an initial target dose of clozapine.   - With notes indicating the last dose of clozapine was 5/7/23, treatment was interrupted >/= 30 days. ANC monitoring should following the recommendations of a new patient: weekly x6 months; q2 weeks months 6-12; monthly after 12 months.   2. For agitation, discontinue the chlorpromazine once PRN order. If the PRN olanzapine is considered helpful in certain circumstances, simplify the PRN orders to a dose of 10 mg and add additional clarification around when to give versus haloperidol/diphenhydramine. Could consider discontinuation of diphenhydramine or a reduction to 25 mg in combination with haloperidol to reduce anticholinergic burden.  3. With next scheduled lab draw, would recheck a Vitamin D level to assess appropriateness of beginning a maintenance dose of Vitamin D (1,000 units daily) versus a need to re-initiate a replacement dose.    Thank you for the opportunity to participate in Mr. Lee's care. Please reach out to the unit pharmacist [phone]) with any questions.    Araceli Ortiz, PharmD, BCPP

## 2023-06-09 NOTE — PLAN OF CARE
Assessment/Intervention/Current Symtoms and Care Coordination:  [facility] called [facility], Alek Patiño, to discuss adding a blood draw order to the neuroleptic order request. Agreed that a letter was the most expedient way to do this.   [facility] sent the letter to Mr. Patiño at [email or web address]  Letter placed in pt chart    Discharge Plan or Goal:  Pending stabilization & development of a safe discharge plan.  Pt will return to [facility], [address] [place] [address] (28.4 miles away)       Barriers to Discharge:  Patient requires further psychiatric stabilization due to current symptomology and Medication management with possible adjustments  Pt is in the commitment process including a request for a [place] order; pt likely will require this order before additional stabilization.     Referral Status:  Pt is in the commitment process     Legal Status:  Court hold   [facility]: [place]  File Number: [id number]    Final hearing 6/13 at 9 am    Contacts:  Assisted Living: [facility], [phone]  Vicky Valdez, , at [phone]   Psychiatrist: Dr. Santana at [facility], [phone]   primary care provider, Attila Urena DO      Mom: Aleja Lee at [phone]    Upcoming Meetings and Dates/Important Information and next steps:  Final hearing 6/13/at 9 am

## 2023-06-09 NOTE — PLAN OF CARE
"  Problem: Adult Behavioral Health Plan of Care  Goal: Develops/Participates in Therapeutic [place] to Support Successful Transition  Intervention: Foster Therapeutic [place]  Recent Flowsheet Documentation  Taken 6/9/2023 1100 by Cecille Burris RN  Trust Relationship/Rapport:    care explained    choices provided    emotional support provided    empathic listening provided    questions answered    questions encouraged    reassurance provided    thoughts/feelings acknowledged   Goal Outcome Evaluation:    Plan of Care Reviewed With: patient      Pt presents as unkepmt and disorganized with mood lability. He was unable to participate in the MSE, as he is confused and tangential. He did deny SI/HI. He endorsed auditory hallucinations stating \"they keep making me think these thoughts\". He stated that he did not want to hear them anymore. He repeatedly asked staff \"what should I do now\", even after staff would redirect him.   Pt was very irritable upon waking and called out very loudly in his room without provocation.   He took a shower this shift.   He was med complaint with encouragement, and took PRN Haldol benadryl combo @1345 for agitation. He took the  meds without issue.   He refused vitals and bladder scanner this shift. Pt did void x2 this shift. MSSA was partially completed, but he did score 6 from the questions that were answered.   Pt had no other acute physical or behavioral concerns this shift                                                                                  "
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
## 2023-06-09 NOTE — PLAN OF CARE
Problem: Adult Behavioral Health Plan of Care  Goal: Develops/Participates in Therapeutic [place] to Support Successful Transition  Intervention: Foster Therapeutic [place]  Recent Flowsheet Documentation  Taken 6/9/2023 4298 by Cecille Burris RN  Trust Relationship/Rapport:    care explained    choices provided    emotional support provided    empathic listening provided    questions answered    questions encouraged    reassurance provided    thoughts/feelings acknowledged     Goal Outcome Evaluation:    Plan of Care Reviewed With: patient        Pt had a good shift. He was calm and pleasant. He only had one yelling outburst after dinner this shift. He denied SI, but was not able to participate in the rest of the MSE due to confusion. He did appear to be internally preoccupied, as he quietly talked to himself throughout the shift.   He did not allow writer to get vitals (declined x3), and scored 4 on the MSSA (not all answers were selected).  He ate and drank well and took all of his scheduled meds without issue. He was familiar of the scheduled meds and said he had taken Clozaril, Zyprexa, and Phenobarbital in the past.   He allowed writer to do a bladder scan (volume was 341). Med flyer came and did straight cath without issue (305 ml).  Pt remained in his room most of the shift, and was cooperative with requests.   Pt had no other acute physical or behavioral concerns this shift

## 2023-06-09 NOTE — SIGNIFICANT EVENT
SPIRITUAL HEALTH SERVICES Significant Event  [employer] of ANOINTING  [facility] ([facility] UR12    Pt anointed by Father Sherlyn Kee   Pager [phone]

## 2023-06-10 PROCEDURE — 250N000013 HC RX MED GY IP 250 OP 250 PS 637: Performed by: PSYCHIATRY & NEUROLOGY

## 2023-06-10 PROCEDURE — 124N000002 HC R&B MH UMMC

## 2023-06-10 PROCEDURE — 250N000013 HC RX MED GY IP 250 OP 250 PS 637: Performed by: PHYSICIAN ASSISTANT

## 2023-06-10 RX ADMIN — PHENOBARBITAL 16.2 MG: 16.2 TABLET ORAL at 19:40

## 2023-06-10 RX ADMIN — CLOZAPINE 75 MG: 25 TABLET ORAL at 19:41

## 2023-06-10 RX ADMIN — SENNOSIDES AND DOCUSATE SODIUM 1 TABLET: 50; 8.6 TABLET ORAL at 19:41

## 2023-06-10 RX ADMIN — POLYETHYLENE GLYCOL 3350 17 G: 17 POWDER, FOR SOLUTION ORAL at 09:27

## 2023-06-10 RX ADMIN — PHENOBARBITAL 16.2 MG: 16.2 TABLET ORAL at 09:26

## 2023-06-10 RX ADMIN — HALOPERIDOL 5 MG: 5 TABLET ORAL at 10:37

## 2023-06-10 RX ADMIN — TAMSULOSIN HYDROCHLORIDE 0.4 MG: 0.4 CAPSULE ORAL at 09:26

## 2023-06-10 RX ADMIN — HALOPERIDOL 5 MG: 5 TABLET ORAL at 18:00

## 2023-06-10 RX ADMIN — OLANZAPINE 10 MG: 5 TABLET, FILM COATED ORAL at 12:27

## 2023-06-10 RX ADMIN — Medication 5 MG: at 19:40

## 2023-06-10 RX ADMIN — DIPHENHYDRAMINE HYDROCHLORIDE 50 MG: 50 CAPSULE ORAL at 10:37

## 2023-06-10 RX ADMIN — CYANOCOBALAMIN TAB 1000 MCG 1000 MCG: 1000 TAB at 09:27

## 2023-06-10 RX ADMIN — OLANZAPINE 10 MG: 10 TABLET, ORALLY DISINTEGRATING ORAL at 09:27

## 2023-06-10 RX ADMIN — DIPHENHYDRAMINE HYDROCHLORIDE 50 MG: 50 CAPSULE ORAL at 18:00

## 2023-06-10 RX ADMIN — OLANZAPINE 10 MG: 10 TABLET, ORALLY DISINTEGRATING ORAL at 19:40

## 2023-06-10 ASSESSMENT — ACTIVITIES OF DAILY LIVING (ADL)
ADLS_ACUITY_SCORE: 70
ORAL_HYGIENE: INDEPENDENT
ADLS_ACUITY_SCORE: 90
ADLS_ACUITY_SCORE: 70
LAUNDRY: UNABLE TO COMPLETE
ADLS_ACUITY_SCORE: 90
HYGIENE/GROOMING: INDEPENDENT
ADLS_ACUITY_SCORE: 90
ADLS_ACUITY_SCORE: 90
ADLS_ACUITY_SCORE: 70
DRESS: SCRUBS (BEHAVIORAL HEALTH)
ADLS_ACUITY_SCORE: 70
ADLS_ACUITY_SCORE: 70
ADLS_ACUITY_SCORE: 90
ADLS_ACUITY_SCORE: 70
ADLS_ACUITY_SCORE: 90

## 2023-06-10 NOTE — PROVIDER NOTIFICATION
"   06/10/23 1133   Restraint Monitoring Q15 Minutes   Psychological Status YE;O  (Threatening)   Physical Comfort D   Circulation NS   Continuous Observation Yes     Attempted to process out of seclusion, and the pt stated, \"Ill never be ready\", writer asked why and he stated \"the thoughts\". Then he began to yell louder and state that he will be in the room forever, and writer \"wants him to die in the room\". Seclusion continued, will continue to monitor.   "
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
## 2023-06-10 NOTE — PLAN OF CARE
"  Problem: Confusion Chronic  Goal: Optimal Cognitive Function  Outcome: Not Progressing   Goal Outcome Evaluation:         Started the shift pacing the halls. Had several episodes of shouting. Ran towards the exit doors and charged at staff. Was redirected to his room where he continued to shout. He took PRN medications with staff assistance. He ate dinner, he needed assist of 1 staff to feed him. Ate 100% of dinner. Spent time in the lounge in the evening, requested items to make a list of what he would like to purchase when he is discharged. He has periods of lucidness during the evening. He was engaging with staff and peers. He had been medication compliant. He denies AH/VH. Requested PRN medication for constipation. Voided large amount x1 this shift. Moderate amount x1.   RN attempted to bladder scan patient at 9125. Vinod started shouting, \"NO catheter, No catheter, let me see what is in that machine, I know there is a catheter in there.\" After patient inspected the machine, he stated, \"Get out of here, your not going to do that to me, I don't need to get this done anymore, now get out of here, I don't want it.\" Patient lightly pushed the machine and staff out of the room.                "

## 2023-06-10 NOTE — PROVIDER NOTIFICATION
06/10/23 1136   Seclusion or Restraint Order   Continuation of Seclus/Restraint indicated at this time Yes     Seclusion continue because patient is still agitated and threatening to kick and hit staff. He was not able to demonstrate safe behaviors.
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
## 2023-06-10 NOTE — PROVIDER NOTIFICATION
06/10/23 1215   Debriefing   Debriefing DO   Does patient understand why the event happened? Patient unable to answer   Does patient agree to safe behaviors? Other (comment)  (chronic confusion issues)   What can we do differently so this doesn't happen again? Other (comment)  (chronic confusinon issues)   Plan of care reviewed and modified Yes     Patient is calm and sitting in bed.He was unable to answer question, seclusion is discontinued at this time.

## 2023-06-10 NOTE — PLAN OF CARE
Goal Outcome Evaluation:      Pt slept 6 hours.  He remains on an SIO for safety.  Pt was bladder scanned for 426ccc he was straight cathed for 315cc.  He refused to try to use the bathroom.
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
## 2023-06-11 PROCEDURE — 250N000013 HC RX MED GY IP 250 OP 250 PS 637: Performed by: PHYSICIAN ASSISTANT

## 2023-06-11 PROCEDURE — 250N000013 HC RX MED GY IP 250 OP 250 PS 637: Performed by: PSYCHIATRY & NEUROLOGY

## 2023-06-11 PROCEDURE — 124N000002 HC R&B MH UMMC

## 2023-06-11 RX ORDER — OLANZAPINE 10 MG/2ML
10 INJECTION, POWDER, FOR SOLUTION INTRAMUSCULAR 2 TIMES DAILY
Status: DISCONTINUED | OUTPATIENT
Start: 2023-06-11 | End: 2023-10-31

## 2023-06-11 RX ADMIN — HALOPERIDOL 5 MG: 5 TABLET ORAL at 17:20

## 2023-06-11 RX ADMIN — OLANZAPINE 10 MG: 10 TABLET, ORALLY DISINTEGRATING ORAL at 07:50

## 2023-06-11 RX ADMIN — CLOZAPINE 100 MG: 100 TABLET ORAL at 19:24

## 2023-06-11 RX ADMIN — Medication 5 MG: at 19:17

## 2023-06-11 RX ADMIN — GABAPENTIN 100 MG: 100 CAPSULE ORAL at 20:49

## 2023-06-11 RX ADMIN — TAMSULOSIN HYDROCHLORIDE 0.4 MG: 0.4 CAPSULE ORAL at 07:50

## 2023-06-11 RX ADMIN — PHENOBARBITAL 16.2 MG: 16.2 TABLET ORAL at 07:50

## 2023-06-11 RX ADMIN — TRAZODONE HYDROCHLORIDE 50 MG: 50 TABLET ORAL at 20:49

## 2023-06-11 RX ADMIN — POLYETHYLENE GLYCOL 3350 17 G: 17 POWDER, FOR SOLUTION ORAL at 07:50

## 2023-06-11 RX ADMIN — OLANZAPINE 15 MG: 10 TABLET, ORALLY DISINTEGRATING ORAL at 19:17

## 2023-06-11 RX ADMIN — CYANOCOBALAMIN TAB 1000 MCG 1000 MCG: 1000 TAB at 07:50

## 2023-06-11 RX ADMIN — DIPHENHYDRAMINE HYDROCHLORIDE 50 MG: 50 CAPSULE ORAL at 17:20

## 2023-06-11 ASSESSMENT — ACTIVITIES OF DAILY LIVING (ADL)
ADLS_ACUITY_SCORE: 70
ADLS_ACUITY_SCORE: 70
ORAL_HYGIENE: INDEPENDENT;PROMPTS
LAUNDRY: UNABLE TO COMPLETE
ADLS_ACUITY_SCORE: 70
ADLS_ACUITY_SCORE: 70
ORAL_HYGIENE: INDEPENDENT
DRESS: SCRUBS (BEHAVIORAL HEALTH)
ADLS_ACUITY_SCORE: 90
ADLS_ACUITY_SCORE: 70
ADLS_ACUITY_SCORE: 70
HYGIENE/GROOMING: INDEPENDENT;PROMPTS
ADLS_ACUITY_SCORE: 70
HYGIENE/GROOMING: INDEPENDENT
ADLS_ACUITY_SCORE: 70
ADLS_ACUITY_SCORE: 90
ADLS_ACUITY_SCORE: 70
ADLS_ACUITY_SCORE: 70
DRESS: SCRUBS (BEHAVIORAL HEALTH);INDEPENDENT;PROMPTS

## 2023-06-11 NOTE — PLAN OF CARE
Pt awake  at start of shift, showered and change bed sheets.     Breathing quiet and unlabored when sleeping.     Pt had no c/o pain or discomfort during the HS.     Appears to have slept 0.25 hours.     Pt continues on 2:1 SIO/10 ft space distance.     Pt pleasant and cooperative with VS.    Voided x 2.    MSSA  2330/7  0430/8  No medication  intervention     Goal Outcome Evaluation:  Problem: Sleep Disturbance  Goal: Adequate Sleep/Rest  Outcome: Progressing
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
## 2023-06-12 PROCEDURE — 124N000002 HC R&B MH UMMC

## 2023-06-12 PROCEDURE — 250N000013 HC RX MED GY IP 250 OP 250 PS 637: Performed by: PHYSICIAN ASSISTANT

## 2023-06-12 PROCEDURE — 250N000013 HC RX MED GY IP 250 OP 250 PS 637: Performed by: PSYCHIATRY & NEUROLOGY

## 2023-06-12 PROCEDURE — 99233 SBSQ HOSP IP/OBS HIGH 50: CPT | Performed by: PSYCHIATRY & NEUROLOGY

## 2023-06-12 RX ORDER — OFLOXACIN 3 MG/ML
1 SOLUTION/ DROPS OPHTHALMIC 4 TIMES DAILY
Status: COMPLETED | OUTPATIENT
Start: 2023-06-13 | End: 2023-06-17

## 2023-06-12 RX ORDER — POLYMYXIN B SULFATE AND TRIMETHOPRIM 1; 10000 MG/ML; [USP'U]/ML
1 SOLUTION OPHTHALMIC 3 TIMES DAILY
Status: DISCONTINUED | OUTPATIENT
Start: 2023-06-12 | End: 2023-06-12

## 2023-06-12 RX ORDER — GABAPENTIN 100 MG/1
100 CAPSULE ORAL 3 TIMES DAILY
Status: DISCONTINUED | OUTPATIENT
Start: 2023-06-12 | End: 2023-06-14

## 2023-06-12 RX ORDER — CLOZAPINE 200 MG/1
200 TABLET ORAL AT BEDTIME
Status: COMPLETED | OUTPATIENT
Start: 2023-06-15 | End: 2023-06-15

## 2023-06-12 RX ADMIN — Medication 5 MG: at 19:58

## 2023-06-12 RX ADMIN — OLANZAPINE 15 MG: 10 TABLET, ORALLY DISINTEGRATING ORAL at 08:19

## 2023-06-12 RX ADMIN — GABAPENTIN 100 MG: 100 CAPSULE ORAL at 11:50

## 2023-06-12 RX ADMIN — CLOZAPINE 125 MG: 100 TABLET ORAL at 19:58

## 2023-06-12 RX ADMIN — GABAPENTIN 100 MG: 100 CAPSULE ORAL at 18:21

## 2023-06-12 RX ADMIN — GABAPENTIN 100 MG: 100 CAPSULE ORAL at 19:58

## 2023-06-12 RX ADMIN — HALOPERIDOL 5 MG: 5 TABLET ORAL at 18:22

## 2023-06-12 RX ADMIN — TRAZODONE HYDROCHLORIDE 50 MG: 50 TABLET ORAL at 21:31

## 2023-06-12 RX ADMIN — CYANOCOBALAMIN TAB 1000 MCG 1000 MCG: 1000 TAB at 08:20

## 2023-06-12 RX ADMIN — HALOPERIDOL 5 MG: 5 TABLET ORAL at 11:50

## 2023-06-12 RX ADMIN — DIPHENHYDRAMINE HYDROCHLORIDE 50 MG: 50 CAPSULE ORAL at 18:21

## 2023-06-12 RX ADMIN — TAMSULOSIN HYDROCHLORIDE 0.4 MG: 0.4 CAPSULE ORAL at 08:20

## 2023-06-12 RX ADMIN — POLYETHYLENE GLYCOL 3350 17 G: 17 POWDER, FOR SOLUTION ORAL at 08:20

## 2023-06-12 RX ADMIN — DIPHENHYDRAMINE HYDROCHLORIDE 50 MG: 50 CAPSULE ORAL at 11:50

## 2023-06-12 RX ADMIN — OLANZAPINE 15 MG: 10 TABLET, ORALLY DISINTEGRATING ORAL at 19:58

## 2023-06-12 ASSESSMENT — ACTIVITIES OF DAILY LIVING (ADL)
ADLS_ACUITY_SCORE: 90
ADLS_ACUITY_SCORE: 90
ORAL_HYGIENE: INDEPENDENT;PROMPTS
DRESS: SCRUBS (BEHAVIORAL HEALTH)
ADLS_ACUITY_SCORE: 90
ADLS_ACUITY_SCORE: 80
ADLS_ACUITY_SCORE: 90
DRESS: SCRUBS (BEHAVIORAL HEALTH);INDEPENDENT;PROMPTS
ADLS_ACUITY_SCORE: 90
ORAL_HYGIENE: INDEPENDENT;PROMPTS
ADLS_ACUITY_SCORE: 90
HYGIENE/GROOMING: INDEPENDENT;PROMPTS
HYGIENE/GROOMING: INDEPENDENT;PROMPTS
ADLS_ACUITY_SCORE: 80
ADLS_ACUITY_SCORE: 90

## 2023-06-12 NOTE — PLAN OF CARE
Pt asleep  at start of shift.     Breathing quiet and unlabored when sleeping.     Pt had no c/o pain or discomfort during the HS.     Appears to have slept 6.25 hours.     [facility]  2345:  5  0330: 7    Patient dysregulated but pleasant when awake.       Pt continues on 2:1 SIO/10 ft space distance.     Goal Outcome Evaluation:  Problem: Sleep Disturbance  Goal: Adequate Sleep/Rest  Outcome: Progressing
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
## 2023-06-12 NOTE — PLAN OF CARE
"Assessment/Intervention/Current Symtoms and Care Coordination:  Reviewed chart. Met with team to review patient's psychiatric functioning and care. Patient continues to present with delusional and paranoid thought content. Final hearing tomorrow at 9am.      Discharge Plan or Goal:  Return to [facility], [address] [place] [address] (28[place]4 miles away)     Barriers to Discharge:  Patient requires further psychiatric stabilization due to current symptomology      Referral Status:  Psychiatry     Legal Status:  Court Hold    County: [place]  File Number: [id number]  Final hearin/13 at 9am    Contacts:  Assisted Living: [facility], [phone]  Per H&P: Vicky Valdez , [phone], psychiatric provider Dr. Santana at [facility], [phone], primary care provider Attila Urena, DO       Upcoming Meetings and Dates/Important Information and next steps:  From [facility], \"I informed TCM that the MD would like someone to pursue guardianship and she will talk to the team.\"                 "

## 2023-06-12 NOTE — PROGRESS NOTES
"Pt slept most of the night tonight (6.25 hours) compared to the night before (0 hours). Pt woke up once and told SIO staff \"I know I did something to destruct the universe, but I don't remember what I did\" over and over. Pt was led back to his room once staff noticed he was unsteady on his feet pacing in the aragon. The remainder of the night he was sleeping. He was also pleasant with the staff and took redirection well this shift. However, pt was observed not voiding this shift.   "
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
## 2023-06-12 NOTE — PLAN OF CARE
Problem: Psychotic Signs/Symptoms  Goal: Decreased Sensory Symptoms (Psychotic Signs/Symptoms)  Outcome: Progressing   Goal Outcome Evaluation:    Plan of Care Reviewed With: patient        Pt had a good shift. He presents as disorganized with mood lability.   He was less delusional this shift than in previous. He was not able to fully participate in the MSE due to chronic confusion. He did appear to be internally preoccupied, as he was seen laughing and whispering to himself. He was more pleasant than previous shifts and was smiling/laughing, and attempted to play cards with the staff.   Pt was med complaint without issue, and he took PRN Benadryl/Haldol combo @1720 as he was continuing to refuse the bladder scanner. He also took Trazodone and Gabapentin @2050 for sleep and anxiety.   He agreed to vitals this shift. MSSA score was 13, but he did not sleep the night before and that caused the score to be high.   Pt bladder scan was 118 mL for the highest reading. He stated he voided x2 and staff reported him using the restroom more than twice this shift.   Pt ate and drank well, had a shower before bed, denied pain, and had no other acute physical or behavioral concerns this shift. /67 (BP Location: Left arm, Patient Position: Supine, Cuff Size: Adult Regular)   Pulse 99   Temp 98.2  F (36.8  C) (Temporal)   Resp 16   SpO2 97%

## 2023-06-13 PROCEDURE — 99207 PR NO BILLABLE SERVICE THIS VISIT: CPT | Performed by: PHYSICIAN ASSISTANT

## 2023-06-13 PROCEDURE — 250N000009 HC RX 250: Performed by: PHYSICIAN ASSISTANT

## 2023-06-13 PROCEDURE — 124N000002 HC R&B MH UMMC

## 2023-06-13 PROCEDURE — 250N000013 HC RX MED GY IP 250 OP 250 PS 637: Performed by: PHYSICIAN ASSISTANT

## 2023-06-13 PROCEDURE — 99232 SBSQ HOSP IP/OBS MODERATE 35: CPT | Performed by: PHYSICIAN ASSISTANT

## 2023-06-13 PROCEDURE — 250N000013 HC RX MED GY IP 250 OP 250 PS 637: Performed by: PSYCHIATRY & NEUROLOGY

## 2023-06-13 RX ORDER — LIDOCAINE HYDROCHLORIDE 20 MG/ML
JELLY TOPICAL EVERY 4 HOURS PRN
Status: DISCONTINUED | OUTPATIENT
Start: 2023-06-13 | End: 2023-11-13 | Stop reason: HOSPADM

## 2023-06-13 RX ADMIN — OFLOXACIN 1 DROP: 3 SOLUTION OPHTHALMIC at 12:35

## 2023-06-13 RX ADMIN — CLOZAPINE 150 MG: 100 TABLET ORAL at 19:18

## 2023-06-13 RX ADMIN — CYANOCOBALAMIN TAB 1000 MCG 1000 MCG: 1000 TAB at 13:23

## 2023-06-13 RX ADMIN — TRAZODONE HYDROCHLORIDE 50 MG: 50 TABLET ORAL at 19:18

## 2023-06-13 RX ADMIN — GABAPENTIN 100 MG: 100 CAPSULE ORAL at 19:18

## 2023-06-13 RX ADMIN — GABAPENTIN 100 MG: 100 CAPSULE ORAL at 13:22

## 2023-06-13 RX ADMIN — OLANZAPINE 15 MG: 10 TABLET, ORALLY DISINTEGRATING ORAL at 19:18

## 2023-06-13 RX ADMIN — TAMSULOSIN HYDROCHLORIDE 0.4 MG: 0.4 CAPSULE ORAL at 13:22

## 2023-06-13 RX ADMIN — Medication 5 MG: at 19:18

## 2023-06-13 RX ADMIN — OLANZAPINE 15 MG: 10 TABLET, ORALLY DISINTEGRATING ORAL at 09:13

## 2023-06-13 ASSESSMENT — ACTIVITIES OF DAILY LIVING (ADL)
ORAL_HYGIENE: INDEPENDENT;PROMPTS
HYGIENE/GROOMING: PROMPTS;INDEPENDENT
ADLS_ACUITY_SCORE: 90
ADLS_ACUITY_SCORE: 80
ADLS_ACUITY_SCORE: 90
ADLS_ACUITY_SCORE: 80
DRESS: WITH ASSISTANCE;SCRUBS (BEHAVIORAL HEALTH)
HYGIENE/GROOMING: INDEPENDENT;PROMPTS
DRESS: SCRUBS (BEHAVIORAL HEALTH);INDEPENDENT;PROMPTS
ADLS_ACUITY_SCORE: 90
ADLS_ACUITY_SCORE: 80
ADLS_ACUITY_SCORE: 80
ORAL_HYGIENE: PROMPTS;INDEPENDENT

## 2023-06-13 NOTE — PROVIDER NOTIFICATION
06/12/23 [id number]   Seclusion or Restraint Order   In Person Face to Face Assessment Conducted Yes-Eval of pt's immediate situation, reaction to intervention, complete review of systems assessment, behavioral assessment & review/assessment of hx, drugs & meds, recent labs, etc, behavioral condition, need to continue/terminate restraint/seclusion     Patients face to face assessment complete. Reviewed most recent medication history, laboratory findings, and progress notes.  Patient did not experience physical sequelae related to seclusion/restraint initiation. Provider on call was paged to discuss findings. Laying on the mat, grabbing onto air.

## 2023-06-13 NOTE — CONSULTS
Name:  Vinod Lee  : 1960  Date: 23  Time: 11:41pm  Location of patient: [facility]  Location of doctor: [place]  Spoke with: Cecille  HPI: The patient is a 62-year-old male with a past psychiatric history of paranoid schizophrenia. Staff report the patient has been agitated all day, not able to be redirected, posturing, threatening staff and peers, kicking, spitting, and trying to harm self by attempting to gouge out his eyes and put his head in the toilet. He was given all available PRN medications for agitation, but staff noted needing to seclude the patient starting at 10:50pm CST.   Plan/Recommendations: Order placed for restraint/seclusion starting at 2250; 4 hours. Nursing staff to monitor condition.

## 2023-06-13 NOTE — PROGRESS NOTES
"Vinod was approached at the bedside this morning. He was initially lying calmly in bed.   Attempted to administer scheduled medication, including zyprexa (psych emergency). Pt began reciting delusional statements- \"That's poison, acid!\" He refused the oral medication.   Reapproached with Zyprexa IM (emergency order), however pt continued to be oppositional and refused to cooperate with administration of IM. He lifted his leg toward staff, posturing to kick.   To avoid restraint, asked a male RN colleague to assist. Pt was reapproached with oral Zyprexa and took the pill willingly.   He is now in his room, shouting loudly. He was offered an ice water, but promptly threw the cup across his room.   Will reattempt remaining scheduled medications that he has refused later when he is calmer.     Left eye appears irritated, upper eye lid swollen and inner eyeball red. He has a small abrasion on the upper lid. He refused abx eye drops, shouting \"its acid, dummy!\"  "

## 2023-06-13 NOTE — PLAN OF CARE
"  Problem: Adult Behavioral Health Plan of Care  Goal: Develops/Participates in Therapeutic Coram to Support Successful Transition  Intervention: Foster Therapeutic Coram  Recent Flowsheet Documentation  Taken 6/12/2023 1900 by Cecille Burris RN  Trust Relationship/Rapport:    care explained    choices provided    emotional support provided    empathic listening provided    questions answered    questions encouraged    reassurance provided    thoughts/feelings acknowledged     Goal Outcome Evaluation:    Plan of Care Reviewed With: patient      Pt presents as disorganized with mood lability. He denied all mental health symptoms including AVH. He is internally preoccupied as the pt was talking to himself and making several delusional statements. He was not easily redirected and was very verbally insulting. Writer had to feed the pt dinner as he was unable to hold the utensils d/t his tremors. He was accepting and kept telling the writer \"thank you hunny\".     After dinner he kept making statements to the staff that \"he could see their evil eyes and he can see their soul\", \"they are here to torment him\", but then would say \"no I am talking to myself\". He went and sat in his room for a bit before his PM meds were administered.    Pt was given headphones  After he took his evenings meds he was given a pair of headphones. He went and laid on his bed for approx 15 mins. It was reported that the pt was on his bed hollering with exaggerated tremors. Writer went to the pt room and observed what appeared to be the pt crying, with gross tremors in his  mouth, upper and lower extremities. His eyes appeared to be  red and watering. When writer approached the pt, his fingers and nails were bloody and he had a small open area on his L eye lid. Pt began speaking loudly stating that he had \"acid in my eyes and my face is melting\" repeatedly.    Writer removed the headphones, and he began speaking softer stating \"cant you see " "the acid on my face ma'am it is melting my whole face off\".   The pt had beads of sweat on his forehead that writer wiped off. Writer got a cold wet washcloths and pat the pt closed eyes and hands to remove the blood.  Vitals were taken with a slightly elevated B/P and pulse (see flowsheet), but temp was WNL.   Pt bladder scan completed with 32mL .   IM came to see the pt and will consult with On Call for a plan.   Pt was walking the halls calmly for some time, and pleasant with staff. He appeared to become agitated without provcation and began lunging and posturing at staff. He then continued to walk the aragon. Trazodone PRN was given to the pt @2130. He was eating his snack and siting calmly in his bed. Writer left the room, and within 15 mins, staff reported that he was putting his head in the toilet and repeatedly saying I need to throw up. Writer came in and used a towel to help wipe his head/face off. While helping the pt he spit on another RN's face. Writer held the pt hand and sang songs with him. He spit on the pts arm and then wiped it off stating \"Im sorry\".   He continually singled out two male staff by trying to hit and kick them.   Writer attempted to move the pt to 147 so he would not put his head in the toilet. He initially laid down calmly, but then was up singling out the male staff trying to kick them again. Writer attempted to reason and redirect the pt and he ended up walked to seclusion (136-1) as he was not able to remain in his room with safe behaviors.   Pt was given several PRN medications including   Benadryl haldol combo, gabapentin, and trazodone this shift.   Pt had no other acute physical or behavioral concerns this shift.   BP (!) 167/101   Pulse 100   Temp 98.4  F (36.9  C) (Tympanic)   Resp 18   SpO2 99%     "

## 2023-06-13 NOTE — PROGRESS NOTES
At  0130 this night shift, patient lying quietly on mattress on floor of 136A.  Calm and cooperative.  2:1 staff with him outside his door.  Door to hallway and 136A left open as no longer in a seclusion situation. Seclusion ended at 0130.      Continued to be calm and cooperative.  Stated he felt he had been in seclusion due to attacking staff who he thought were going to harm him.  Refused bladder scan and did not void during the seclusion period.  Declined water to drink as he thinks there is acid in the water.  He is now in room 147 without a toilet.  He had been putting his head in the toilet as he thought the toilet water was safe to drink.    He now is quiet and appears to be sleeping and comfortable in 147.  Will continue with 2:1 staffing for now and will continue to monitor and support patient.    Vinod remains in 147 and appears to have slept a total of 1.75 hours this night shift.

## 2023-06-13 NOTE — PROVIDER NOTIFICATION
"   06/12/23 2300   Seclusion or Restraint Order   In Person Face to Face Assessment Conducted Yes-Eval of pt's immediate situation, reaction to intervention, complete review of systems assessment, behavioral assessment & review/assessment of hx, drugs & meds, recent labs, etc, behavioral condition, need to continue/terminate restraint/seclusion   Patient Experienced No adverse physical outcome from seclusion/restraint initiation   Continuation of Seclus/Restraint indicated at this time Yes   Describe actions taken Walked to seclusion       Seclusion or Restraint    In Person Face to Face Assessment Conducted Yes-Eval of pt's immediate situation, reaction to intervention, complete review of systems assessment, behavioral assessment & review/assessment of hx, drugs & meds, recent labs, etc, behavioral condition, need to continue/terminate restraint/seclusion   Patient Experienced No adverse physical outcome from seclusion/restraint initiation   Continuation of Seclus/Restraint indicated at this time Yes      Face-to-face assessment completed. Most recent medication history, laboratory results, and progress notes. Current mental status and behaviour discussed with provider on-call: [facility] to return call.        .   Patient expressed  no insight: stated he felt badly because \"I destroyed the whole universe.\" (Was hitting and kicking and spitting at staff preceding seclusion.)  No physical sequelae related to seclusion initiation. No injury reported or observed.    Denied pain, calm behaviour lying on mat.  1:1 observation continues.  "

## 2023-06-13 NOTE — PLAN OF CARE
"Assessment/Intervention/Current Symtoms and Care Coordination:  Reviewed chart. Received final hearing notice from [facility], a copy was given to the patient and a copy was put in the patient's chart.      Discharge Plan or Goal:  Return to [facility], [address] [place] [address] (28.4 miles away)     Barriers to Discharge:  Patient requires further psychiatric stabilization due to current symptomology      Referral Status:  Psychiatry     Legal Status:  Court Hold    County: [place]  File Number: [id number]  Final hearin/29 at 9am    Contacts:  Assisted Living: [facility], [phone]  Per H&P: Vicky Valdez , [phone], psychiatric provider Dr. Santana at [facility], [phone], primary care provider Attila Urena, DO       Upcoming Meetings and Dates/Important Information and next steps:  From [facility], \"I informed TCM that the MD would like someone to pursue guardianship and she will talk to the team.\"                   "

## 2023-06-13 NOTE — CARE PLAN
Pt is laying on mat and in and out of a sleeping state. Spoke with writer using few words. Appears to be calm but will continue to assess behavior to determine if it is safe to remove Pt to return to his room.

## 2023-06-13 NOTE — CARE PLAN
Pt awake, laying on the mat and fidgeting. Pt looks up at writer periodically and turns on the mat. Pt does not engage in conversation with writer.

## 2023-06-13 NOTE — PLAN OF CARE
"Vinod continues on 2:1 SIO -see order.     Pt continues to present with disorganization, paranoia and mood lability. He has been intermittently cooperative with care and selective with medications- eventually complied with po medications after multiple attempts. He refused lab draw.  He showered with prompting this morning.   Bladder scan pre-shower was 722. He voided a small amount in the shower. Bladder scan post shower was 527. Medical flyer Nurse notified and responded to straight cath Vinod (650ml urine returned). He hollered several times during the procedure and postured with his fists toward his 2:1 staff, but was redirectable and overall cooperative. Encouraged pt to use a 2qhour toileting schedule, and attempt to void.     He is argumentative at times. Makes paranoid and delusional comments such as \"Its poison, acid (referring to medications or pointing to water on the floor)., \"Youre full of hatred and rage toward me.\" Has been posturing and attempting to strike at staff intermittently. Other times he is pleasant, referred to a female psych associate as \"hunny bunch\". Endorses AH but unable to elaborate. Otherwise difficult to assess MSE, as his responses are irrelevant. Pt was reportedly on the floor licking the floor on the corner of the room this afternoon. Resistive to redirection but eventually was able to be distracted and assisted back to his bed.     He has allowed abx for his eyes to be administered once this shift. His left eye continues to appear irritated and red, with a swollen upper eye lid. He denies visual changes. When assessed for pain, he states \"a little bit\" but declined intervention. Offered an ice pack which he declined.     Has been restless at times, paces the halls.  He has been ambulating without assistive devices, with a generally steady gait. Had one episode this afternoon when psych associate reports pt appeared to get weak while walking and \"leaned into\" staff who helped him " to his room and to rest in bed. He reported that he had felt dizzy. VS WNL. Tremors noted of his BUE and his jaw intermittently trembling.     Pt does have an order for a medical bed. Discussed with Charge Rn and unit manager. Will defer to Core Staff to consider prioritizing medical bed vs need for mag lock (seclusion capable) room.     He is eating and hydrating well. Ate about 50% of breakfast and 100% of lunch.     /78 (BP Location: Right arm, Patient Position: Sitting, Cuff Size: Adult Regular)   Pulse 94   Temp 98.5  F (36.9  C) (Tympanic)   Resp 18   SpO2 99%       Problem: Adult Behavioral Health Plan of Care  Goal: Plan of Care Review  Outcome: Progressing  Flowsheets (Taken 6/13/2023 1255)  Patient Agreement with Plan of Care: agrees     Problem: Psychotic Signs/Symptoms  Goal: Improved Behavioral Control (Psychotic Signs/Symptoms)  Outcome: Progressing   Goal Outcome Evaluation:    Plan of Care Reviewed With: patient

## 2023-06-13 NOTE — CONSULTS
"[facility]  Consult Note - Hospitalist Service  Date of Admission:  5/26/2023  Consult Requested by: Primary Psychiatry Team  Reason for Consult: Eye injury    Assessment & Plan   Vinod Lee is a 62 year old male patient with a past medical history significant for schizophrenia and parkinson's disease who was initially admitted to the hospital 5/18/23 for altered mental status and aggression. Broad work-up was initiated for encephalopathy including metabolic, endocrine, intracranial, infectious causes which were all negative.  Patient was also evaluated for urinary retention with urology consult, suspect retention is multifactorial secondary to Parkinson's disease, delirium, and antipsychotic medications. After ruling out medical causes and ensuring medical stability, patient was transferred to psychiatric bed 5/26/23. His stay on inpatient psyche has been significant for probable nonepileptic spells (recurrent episodic shaking of bilateral arms). Medicine was re-consulted 6/12/23 secondary to concerns regarding eye injury.    Bilateral Superficial Abrasions to Eyelids  Suspected Corneal Abrasions  Called to see patient after he experienced a panic attack. Staff placed some headphones on patient to help facilitate scheduled meds. Patient reportedly was found in his room shortly after with concern for abrasions on his eyelids. Patient stated that he had \"acid in my eyes and my face is melting\" repeatedly. Stated \"cant you see the acid on my face ma'am it is melting my whole face off\". He was able to be re-directed by staff. Went to see patient promptly who was pacing in the halls. He said to this writer \"so you are a new person huh?\" and then proceeded to run away from this writer down the aragon. Very agitated but was able to sit in a chair for a brief moment with encouragement. Then jumped up again and resumed pacing. Would not allow me to examine him. I was able to " view some superficial abrasions to bilateral eyelids (L>R) which were no longer bleeding; mildly injected sclera on the right, moderate on the left. Pt reported no vision changes.   - I have concern for possible corneal abrasions and did discuss with on call psychiatry. Generally I would recommend antibiotic ointment or eye drops. Pt will not tolerate ointment, might allow eye drops per their report. Psychiatry felt we should try drops if he will allow. No ointments or creams.   - Discussed challenging case with ophthalmology who recommended polymyxin drops bilaterally TID. Unfortunately these have been on shortage so ordered ofloxacin drops instead. They did not recommend any further interventions overnight.   - The patient will not allow fluorescein stain or more formal ophthalmologic evaluation at this time.   - Will need close monitoring and frequent re-evaluations. Please re-page medicine if eyes appear more red, weeping, draining, if abrasions appear to be getting red, or if eyes appear to be getting swollen, or if any vision changes are reported.    Psychogenic nonepileptic spells, probable  Recurrent episodic shaking of bilateral arms  Patient with no known history of seizures or pseudoseizures. After approximately 1 hour upon arrival to psychiatric unit 5/26, patient became unresponsive, and developed rigidity and shaking to bilateral upper extremities.  Vitals were stable upon multiple rechecks. Patient would  is the side of the wheelchair, and would spontaneously move arms up and down. He was given 10 mg IM Zyprexa for pseudoseizures ordered by RRT provider, with resolution of rigidity and shaking to intermittent lucidity and alertness. A few more of these episodes have occurred with normal lactate. Neurology was consulted 5/27/23 with low suspicion for seizures.  - Appreciate neurology recs. No urgent need to intervene from neurologic standpoint as long as spells appear similar in nature.   -  Continue treatment for psychosis     Urinary retention  Patient has been able to void intermittently, but it seems majority of the time required straight cath. Urology did assess patient on 5/22. Suspect multifactorial including medication related (Zyprexa), Parkinson's related. Also possible there is a behavioral component.   - Recommend titrating Zyprexa to lowest dose appropriate per psychiatry  - Continue bladder scan every 4-6 hours, straight cath if volume is greater than 300 mL and patient unable to void, or if PVR >300 mL.   - Continue tamsulosin  - Consider indwelling lowery if patient becomes more reliable and less likely to pull it out from a psychiatric standpoint, and if nursing able to accommodate cares.  - Recommend f/u OP with urology once discharged, if retention remains an issue.      Acute psychosis  Schizophrenia  Parkinsons disease  Altered mental status upon initial presentation  Broad work-up work-up for organic causes of encephalopathy obtained during medical stay including UA, chest x-ray, HIV, syphilis, CRP, CBC, CMP, overall no significant acute medical concerns from this work-up.  Neurology was consulted on 5/19 and 5/27. Violent thoughts and behaviors were observed PTA by patient's parents and at assisted living facility.    - Continued management per psychiatry   - I question whether there might be a component of Lewy Body dementia vs frontotemporal dementia. MRI brain might be helpful but would need improvement in his agitation before he can tolerate. Considering re-discussing with neurology tomorrow.     The patient's care was discussed with the primary psychiatrist and attending medicine provider Dr. Campbell who expressed agreement with the above listed plan.    Medicine team will continue to follow.    Clinically Significant Risk Factors                         # Overweight: Estimated body mass index is 25.59 kg/m  as calculated from the following:    Height as of 5/19/23:  "1.753 m (5' 9\").    Weight as of 5/23/23: 78.6 kg (173 lb 4.5 oz).           Bryant Barrios PA-C  Hospitalist Service  Securely message with [email or web address] (more info)  Text page via [facility] Paging/Directory   ______________________________________________________________________    Chief Complaint   Eye injury    History is obtained from the patient, psyche techs and EMR.    History of Present Illness   Vinod Lee is a 62 year old male patient with a past medical history significant for schizophrenia and parkinson's disease who was initially admitted to the hospital 5/18/23 for altered mental status and aggression. Broad work-up was initiated for encephalopathy including metabolic, endocrine, intracranial, infectious causes which were all negative.  Patient was also evaluated for urinary retention with urology consult, suspect retention is multifactorial secondary to Parkinson's disease, delirium, and antipsychotic medications. After ruling out medical causes and ensuring medical stability, patient was transferred to psychiatric bed 5/26/23. His stay on inpatient psyche has been significant for probable nonepileptic spells (recurrent episodic shaking of bilateral arms). Medicine was re-consulted 6/12/23 secondary to concerns regarding eye injury.    Called to see patient after he experienced a panic attack. Staff placed some headphones on to help facilitate scheduled meds. Patient reportedly was found in his room with concern for abrasions on his eyelids. He stated that he had \"acid in my eyes and my face is melting\" repeatedly. Stated \"cant you see the acid on my face ma'am it is melting my whole face off\". He was able to be re-directed by staff. Went to see patient promptly who was pacing in the halls. He said to this writer \"so you are a new person huh?\" and then proceeded to run away from this writer down the aragon. Very agitated but was able to sit in a chair for a brief moment with encouragement. Then jumped up " again and resumed pacing. Would not allow me to examine him. I was able to view some superficial abrasions to bilateral eyelids (L>R) which were no longer bleeding; mildly injected sclera on the right, moderate on the left. Pt reported no vision changes.    Past Medical History    Past Medical History:   Diagnosis Date     Parkinson's disease (H)      Schizophrenia (H)        Past Surgical History   No past surgical history on file.    Medications   Current Facility-Administered Medications   Medication     - Psychiatric Emergency -     acetaminophen (TYLENOL) tablet 650 mg     alum & mag hydroxide-simethicone (MAALOX) suspension 30 mL     cloZAPine (CLOZARIL) tablet 150 mg    Followed by     [START ON 6/14/2023] cloZAPine (CLOZARIL) tablet 175 mg    Followed by     [START ON 6/15/2023] cloZAPine (CLOZARIL) tablet 200 mg    Followed by     [START ON 6/16/2023] cloZAPine (CLOZARIL) tablet 225 mg    Followed by     [START ON 6/17/2023] cloZAPine (CLOZARIL) tablet 250 mg    Followed by     [START ON 6/18/2023] cloZAPine (CLOZARIL) tablet 275 mg    Followed by     [START ON 6/19/2023] cloZAPine (CLOZARIL) tablet 300 mg     cyanocobalamin (VITAMIN B-12) tablet 1,000 mcg     haloperidol (HALDOL) tablet 5 mg    And     diphenhydrAMINE (BENADRYL) capsule 50 mg     haloperidol lactate (HALDOL) injection 5 mg    And     diphenhydrAMINE (BENADRYL) injection 50 mg     gabapentin (NEURONTIN) capsule 100 mg     gabapentin (NEURONTIN) capsule 100 mg     melatonin tablet 5 mg     ofloxacin (OCUFLOX) 0.3 % ophthalmic solution 1 drop     OLANZapine zydis (zyPREXA) ODT tab 15 mg    Or     OLANZapine (zyPREXA) injection 10 mg     OLANZapine (zyPREXA) tablet 5-10 mg    Or     OLANZapine (zyPREXA) injection 5-10 mg     polyethylene glycol (MIRALAX) Packet 17 g     senna-docusate (SENOKOT-S/PERICOLACE) 8.6-50 MG per tablet 1 tablet     tamsulosin (FLOMAX) capsule 0.4 mg     traZODone (DESYREL) tablet 50 mg          Review of Systems     Review of systems not obtained due to patient factors - lack of cooperation    Social History   I have reviewed this patient's social history and updated it with pertinent information if needed.  Social History     Tobacco Use     Smoking status: Never     Smokeless tobacco: Never     Allergies   No Known Allergies     Physical Exam   Vital Signs: Temp: 98.4  F (36.9  C) Temp src: Tympanic BP: (!) 167/101 Pulse: 100   Resp: 18 SpO2: 99 % O2 Device: None (Room air)    Weight: 0 lbs 0 oz     Patient would not cooperate for exam or allow me to touch him. Did not cooperate with questioning.    GENERAL: Well nourished, well developed. Pacing the halls.   HEENT: Normocephalic. Abrasions noted medial bilateral eyelids worse on the left with no active bleeding. Mildly injected sclera on the right and moderately injected sclera medially on the left. Extra-ocular eye movements appear grossly intact. Vision does not appear to be grossly impaired. Mucous membranes moist.   Cardiac: Skin warm and dry, well perfused.  RESPIRATORY: Effort normal on room air. No audible wheezing.  GI: Abdomen non distended.  NEUROLOGICAL: Does not cooperate with exam but moving all extremities, pacing the halls with normal gate, EOM intact, pupils appears equal and round, no facial droop or slurred speech.  MUSCULOSKELETAL: No joint swelling or tenderness. Moves all extremities.   EXTREMITIES: No gross deformities. No peripheral edema.   SKIN: Grossly warm, dry, and intact. No jaundice. No rashes.     Medical Decision Making       45 MINUTES SPENT BY ME on the date of service doing chart review, history, exam, documentation & further activities per the note.      Data   Imaging results reviewed over the past 24 hrs:   No results found for this or any previous visit (from the past 24 hour(s)).  Recent Labs   Lab 06/07/23  1348   WBC 6.6   HGB 12.9*   MCV 86         POTASSIUM 4.5   CHLORIDE 101   CO2 26   BUN 17.7   CR 0.97   ANIONGAP  10   BRENDA 9.0   *   ALBUMIN 3.9   PROTTOTAL 5.9*   BILITOTAL 0.5   ALKPHOS 61   ALT 18   AST 22      Advanced care planning/counseling discussion

## 2023-06-13 NOTE — CARE PLAN
Because of unsafe and short staffing, we are discontinuing seclusion, however keeping him in the room with door open and unlocked.

## 2023-06-13 NOTE — PROVIDER NOTIFICATION
06/12/23 0980   Justification   Clinical Justification Others     Pt was not redirectable for half of the shift. After multiple attempts of deescalating, redirecting, medication administration failed, the pt was put into a seclusion room (136-1). He was posturing, running towards staff, kicking at them and spitting on them. He walked to seclusion with staff and, was given a blanket, pillow and urinal and he laid down on the floor.    [facility] was paged and @1050 still awaiting call back   No harm to patient or staff occurred

## 2023-06-13 NOTE — CARE PLAN
"Pt asked writer \"Am I doing good?\" Writer said yes, but we need to wait a little longer for RN. Pt agreed and has been laying on mat, fidgeting and turning.   "

## 2023-06-13 NOTE — PROGRESS NOTES
"Brief Medicine Note    Medicine consulted overnight for self-inflicted eye injury. Patient \"clawed\" at his left eye in setting of severe psychiatric decompensation. My colleague's examination was consistent with bilateral superficial eyelid abrasions and possible corneal abrasions. Ophthalmology was contacted and recommended antibiotic eye gtts.     Discussed with RN today. No new eye concerns today. He continues to be very decompensated, impulsive, agitated. We both agreed that it is safest for myself and in the best interest of the patient to hold on serial eye examinations by Internal Medicine at this time. Examinations by unknown staff are very stressful for him and can provoke his agitation. Unit staff is able to check on the patient's eyes at regular intervals throughout the day during their routine assessments and med administration.    He has been having intermittent urinary retention over the past few weeks and has already been evaluated by Urology. There are no new issues related to this. It is likely secondary to his medications, Parkinson's, and his behavioral issues.       Plan:    - Based on discussion with RN and chart notes it is highly likely that the patient will not comply with eye drops due to his delusions / psychosis. He will need close monitoring by staff.    - Counseled RN on signs/symptoms to monitor for including worsening redness, swelling, purulent discharge, pain, vision changes, etc. Psychiatry team will contact us if they have new concerns.    - Continue monitoring urine output per previous plan. Contact medicine if output is declining.    - If there is ongoing concern for dementia, I recommend directly consulting Neurology        Medicine will sign off. Please do not hesitate to contact if new questions or concerns arise.       Danielle Trevizo PA-C  Hospitalist Service  Pager: [phone]        "

## 2023-06-14 LAB
BASOPHILS # BLD AUTO: 0.1 10E3/UL (ref 0–0.2)
BASOPHILS NFR BLD AUTO: 1 %
DEPRECATED CALCIDIOL+CALCIFEROL SERPL-MC: 20 UG/L (ref 20–75)
EOSINOPHIL # BLD AUTO: 0 10E3/UL (ref 0–0.7)
EOSINOPHIL NFR BLD AUTO: 0 %
ERYTHROCYTE [DISTWIDTH] IN BLOOD BY AUTOMATED COUNT: 14.4 % (ref 10–15)
HCT VFR BLD AUTO: 37.3 % (ref 40–53)
HGB BLD-MCNC: 12 G/DL (ref 13.3–17.7)
HOLD SPECIMEN: NORMAL
IMM GRANULOCYTES # BLD: 0.1 10E3/UL
IMM GRANULOCYTES NFR BLD: 2 %
LYMPHOCYTES # BLD AUTO: 1.3 10E3/UL (ref 0.8–5.3)
LYMPHOCYTES NFR BLD AUTO: 16 %
MCH RBC QN AUTO: 27.4 PG (ref 26.5–33)
MCHC RBC AUTO-ENTMCNC: 32.2 G/DL (ref 31.5–36.5)
MCV RBC AUTO: 85 FL (ref 78–100)
MONOCYTES # BLD AUTO: 0.9 10E3/UL (ref 0–1.3)
MONOCYTES NFR BLD AUTO: 10 %
NEUTROPHILS # BLD AUTO: 6 10E3/UL (ref 1.6–8.3)
NEUTROPHILS NFR BLD AUTO: 71 %
NRBC # BLD AUTO: 0 10E3/UL
NRBC BLD AUTO-RTO: 0 /100
PLATELET # BLD AUTO: 212 10E3/UL (ref 150–450)
RBC # BLD AUTO: 4.38 10E6/UL (ref 4.4–5.9)
WBC # BLD AUTO: 8.3 10E3/UL (ref 4–11)

## 2023-06-14 PROCEDURE — 85025 COMPLETE CBC W/AUTO DIFF WBC: CPT | Performed by: PSYCHIATRY & NEUROLOGY

## 2023-06-14 PROCEDURE — 99233 SBSQ HOSP IP/OBS HIGH 50: CPT | Performed by: PSYCHIATRY & NEUROLOGY

## 2023-06-14 PROCEDURE — 250N000013 HC RX MED GY IP 250 OP 250 PS 637: Performed by: PSYCHIATRY & NEUROLOGY

## 2023-06-14 PROCEDURE — 82306 VITAMIN D 25 HYDROXY: CPT | Performed by: PSYCHIATRY & NEUROLOGY

## 2023-06-14 PROCEDURE — 99233 SBSQ HOSP IP/OBS HIGH 50: CPT | Mod: 25 | Performed by: PSYCHIATRY & NEUROLOGY

## 2023-06-14 PROCEDURE — 36415 COLL VENOUS BLD VENIPUNCTURE: CPT | Performed by: PSYCHIATRY & NEUROLOGY

## 2023-06-14 PROCEDURE — 250N000011 HC RX IP 250 OP 636: Performed by: PSYCHIATRY & NEUROLOGY

## 2023-06-14 PROCEDURE — 124N000002 HC R&B MH UMMC

## 2023-06-14 RX ORDER — DIPHENHYDRAMINE HYDROCHLORIDE 50 MG/ML
50 INJECTION INTRAMUSCULAR; INTRAVENOUS EVERY 6 HOURS
Status: DISCONTINUED | OUTPATIENT
Start: 2023-06-14 | End: 2023-06-14

## 2023-06-14 RX ORDER — GABAPENTIN 300 MG/1
300 CAPSULE ORAL 3 TIMES DAILY
Status: DISCONTINUED | OUTPATIENT
Start: 2023-06-14 | End: 2023-06-14

## 2023-06-14 RX ORDER — GABAPENTIN 600 MG/1
300 TABLET ORAL 3 TIMES DAILY
Status: DISCONTINUED | OUTPATIENT
Start: 2023-06-14 | End: 2023-08-04

## 2023-06-14 RX ORDER — DIPHENHYDRAMINE HCL 50 MG
50 CAPSULE ORAL EVERY 6 HOURS PRN
Status: DISCONTINUED | OUTPATIENT
Start: 2023-06-14 | End: 2023-11-13 | Stop reason: HOSPADM

## 2023-06-14 RX ORDER — DIPHENHYDRAMINE HYDROCHLORIDE 50 MG/ML
50 INJECTION INTRAMUSCULAR; INTRAVENOUS EVERY 6 HOURS PRN
Status: DISCONTINUED | OUTPATIENT
Start: 2023-06-14 | End: 2023-11-13 | Stop reason: HOSPADM

## 2023-06-14 RX ORDER — HALOPERIDOL 5 MG/ML
5 INJECTION INTRAMUSCULAR EVERY 6 HOURS PRN
Status: DISCONTINUED | OUTPATIENT
Start: 2023-06-14 | End: 2023-06-14

## 2023-06-14 RX ORDER — DIVALPROEX SODIUM 125 MG/1
500 CAPSULE, COATED PELLETS ORAL EVERY 12 HOURS SCHEDULED
Status: DISCONTINUED | OUTPATIENT
Start: 2023-06-14 | End: 2023-06-21

## 2023-06-14 RX ORDER — HALOPERIDOL 5 MG/1
5 TABLET ORAL EVERY 6 HOURS PRN
Status: DISCONTINUED | OUTPATIENT
Start: 2023-06-14 | End: 2023-11-13 | Stop reason: HOSPADM

## 2023-06-14 RX ORDER — PROPRANOLOL HYDROCHLORIDE 10 MG/1
10 TABLET ORAL 3 TIMES DAILY
Status: DISCONTINUED | OUTPATIENT
Start: 2023-06-14 | End: 2023-07-01

## 2023-06-14 RX ORDER — HALOPERIDOL 5 MG/ML
5 INJECTION INTRAMUSCULAR EVERY 6 HOURS PRN
Status: DISCONTINUED | OUTPATIENT
Start: 2023-06-14 | End: 2023-11-13 | Stop reason: HOSPADM

## 2023-06-14 RX ADMIN — OLANZAPINE 15 MG: 10 TABLET, ORALLY DISINTEGRATING ORAL at 20:43

## 2023-06-14 RX ADMIN — OFLOXACIN 1 DROP: 3 SOLUTION OPHTHALMIC at 21:22

## 2023-06-14 RX ADMIN — DIVALPROEX SODIUM 500 MG: 125 CAPSULE, COATED PELLETS ORAL at 20:43

## 2023-06-14 RX ADMIN — OFLOXACIN 1 DROP: 3 SOLUTION OPHTHALMIC at 00:31

## 2023-06-14 RX ADMIN — OLANZAPINE 15 MG: 10 TABLET, ORALLY DISINTEGRATING ORAL at 08:46

## 2023-06-14 RX ADMIN — CLOZAPINE 175 MG: 100 TABLET ORAL at 20:42

## 2023-06-14 RX ADMIN — HALOPERIDOL LACTATE 5 MG: 5 INJECTION, SOLUTION INTRAMUSCULAR at 09:29

## 2023-06-14 RX ADMIN — ACETAMINOPHEN 650 MG: 325 TABLET, FILM COATED ORAL at 17:36

## 2023-06-14 RX ADMIN — HALOPERIDOL 5 MG: 5 TABLET ORAL at 17:36

## 2023-06-14 RX ADMIN — Medication 10 MG: at 20:43

## 2023-06-14 RX ADMIN — Medication 300 MG: at 14:04

## 2023-06-14 RX ADMIN — DIPHENHYDRAMINE HYDROCHLORIDE 50 MG: 50 CAPSULE ORAL at 17:36

## 2023-06-14 RX ADMIN — DIPHENHYDRAMINE HYDROCHLORIDE 50 MG: 50 INJECTION, SOLUTION INTRAMUSCULAR; INTRAVENOUS at 09:29

## 2023-06-14 ASSESSMENT — ACTIVITIES OF DAILY LIVING (ADL)
ADLS_ACUITY_SCORE: 90
ORAL_HYGIENE: INDEPENDENT;PROMPTS
ADLS_ACUITY_SCORE: 90
ADLS_ACUITY_SCORE: 90
DRESS: SCRUBS (BEHAVIORAL HEALTH);INDEPENDENT;PROMPTS
ADLS_ACUITY_SCORE: 90
HYGIENE/GROOMING: INDEPENDENT;PROMPTS
ADLS_ACUITY_SCORE: 90

## 2023-06-14 NOTE — PROVIDER NOTIFICATION
06/14/23 1630   Seclusion or Restraint Order   Length of Order 4   Order Obtained Yes   Attending Physician Notified Yes   Attending Physician's Name Meagan   Describe actions taken Walked to his room   Assessment   Less Restrictive Alternative Verbal de-escalation;Modified programming;Reassurance / Support   Risk Factors CI;MC   Justification   Clinical Justification Others   Education   Discontinuation Criteria Cessation of behavior that precipitated seclusion or restraint   Criteria Explained Yes   Patient's Response NL   Restraint Type   Physical Hold (Comment) () Start     Prior to the initiation of the brief physical hold, patient was intermittently agitated and was redirectable at times. This time patient was pacing the hallway and without provocation, he started swinging and hitting at staff. He spit at another staff and staff went hands on him. He was placed a brief physical hold and was walked to his room in two-person walking BCS technique. Once in the room, physical hold was discontinued. Patient's provider Dr. Rhodes was updated and physical hold restraint order obtained. Staff will continue to monitor patient and provide support and redirections to mitigate risk of assault.

## 2023-06-14 NOTE — PLAN OF CARE
Assessment/Intervention/Current Symtoms and Care Coordination:  Reviewed chart. Received notice of [facility] hearing from [facility], a copy was given to the patient and a copy was put in the patient's chart. Collaborated with Vicky Valdez via email, will call tomorrow with update.      Discharge Plan or Goal:  Return to [facility] [place], [address] [place] [address] (28.4 miles away)     Barriers to Discharge:  Patient requires further psychiatric stabilization due to current symptomology      Referral Status:  Psychiatry     Legal Status:  Court Hold    County: [place]  File Number: [id number]  [facility] hearin/29 at 9am    Contacts:  Assisted Living: [facility], [phone]  Per H&P: Vicky Valdez , [phone], psychiatric provider Dr. Santana at [facility], [phone], primary care provider Attila Urena, DO       Upcoming Meetings and Dates/Important Information and next steps:  Call Vicky Valdez tomorrow

## 2023-06-14 NOTE — PROVIDER NOTIFICATION
"   06/14/23 1130   Seclusion or Restraint Order   In Person Face to Face Assessment Conducted Yes-Eval of pt's immediate situation, reaction to intervention, complete review of systems assessment, behavioral assessment & review/assessment of hx, drugs & meds, recent labs, etc, behavioral condition, need to continue/terminate restraint/seclusion   Patient Experienced No adverse physical outcome from seclusion/restraint initiation   Continuation of Seclus/Restraint indicated at this time Yes   Restraint Monitoring Q15 Minutes   Psychological Status O  (Lying on floor calm and quiet)   Physical Comfort D   Circulation NS   Continuous Observation Yes   Restraint Type   Seclusion (BH) Continued     RN face to face assessment completed. No stated or observed s/s of injury. Writer attempts to brief with patient, but he told writer to \"fuck off bitch you plotting while I am ponding\". He then turned around and acted like he was going to remove his pants, but did not. Seclusion continued at this time. MD aware and orders obtained.   "

## 2023-06-14 NOTE — PROVIDER NOTIFICATION
06/14/23 1226   Debriefing   Debriefing DO   Does patient understand why the event happened? Yes   Does patient agree to safe behaviors? Yes   What can we do differently so this doesn't happen again? Other (comment)   Plan of care reviewed and modified Other (comment)  (I will geet up and go eat)     Pt met with MD. He still continued to perseverate on being poisoned and having nuts and bolts in the medication. After meeting with the  He stated that he was ready to go back to his room, and have lunch. Seclusion is no longer needed at this time, and the pt walked back to his room with no issues

## 2023-06-14 NOTE — PROVIDER NOTIFICATION
06/14/23 0941   Justification   Clinical Justification Others     Pt was placed in seclusion after the brief physical hold was completed to get him to the seclusion room, as he was fighting and spitting at staff while in the physical hold.   Order obtained for seclusion and no injury to patient or staff.

## 2023-06-14 NOTE — PROVIDER NOTIFICATION
06/14/23 0941   Restraint Type   Physical Hold (Comment) () Discontinued     Physical hold no longer needed as the pt is in seclusion at this time

## 2023-06-14 NOTE — PROVIDER NOTIFICATION
"   06/14/23 1103   Justification   Clinical Justification Others     Pt was in the seclusion room 136-b with the door open and the main seclusion area door open with his 2:1 SIO staff. He had a cup of water in there with him. He sat up and spoke to the staff, scooted forward and threw the full cup of water at the staff member, and was threatening to staff. Seclusion was initiated @1103. When writer asked the pt why he would do that he stated, \"fuck you, you fucking stupid nigger\", and stuck both middle fingers up at the writer. Pt was swearing and yelling so seclusion is necessary at this time.   Order obtained no injury to pt or staff occurred.   "

## 2023-06-14 NOTE — PROVIDER NOTIFICATION
06/14/23 1631   Debriefing   Debriefing DO   Does patient understand why the event happened? Patient unable to answer   Does patient agree to safe behaviors? Yes   What can we do differently so this doesn't happen again? Other (comment)   Plan of care reviewed and modified Yes     Once in the room, patient ceased kicking, hitting and spitting behaviors that let to the initiation of the physical hold. Once safety is maintained and the patient is no longer a threat to staff, physical hold was discontinued. Patient was asked how staff can be helpful to him to avoid future physical holds and patient didn't provide answer but swear at staff with profanities. Staff exited the room safely using BCS technique. Patient remain in his room with no attempt to come after staff.

## 2023-06-14 NOTE — PLAN OF CARE
Pt asleep  at start of shift.     Breathing quiet and unlabored when sleeping.     Pt had no c/o pain or discomfort during the HS.     Appears to have slept 3.5 hours.     Pt continues on 2:1 SIO/10 ft space distance.     Pt accepted his abx eyedrop administration. Left eye appears redder then the right eye Pt stated that the drops made his eyes feel better.     Pt voided, refused bladder scan during the HS. He went in the bathroom and PA had to redirect him from acting on his thoughts and dunking his head in the toilet.     Pt passed gas frequently in his room and his abdomen was distended.     Patient was pleasant and cooperative with wtr.     Goal Outcome Evaluation:  Problem: Sleep Disturbance  Goal: Adequate Sleep/Rest  Outcome: Not Progressing

## 2023-06-14 NOTE — PROGRESS NOTES
Patient came out of his room and started swinging at this writer. The blows were blocked by writers arms and then patient was escorted back  to his room using accepted [facility] standards and patient was place on his bed in the prone position. Hand holds were released, and all staff exited patient room.  Patient immediately got up from the bed, walked out of his room and again began attacking staff. Patient was again returned to his room using accepted hand holds and placed supine on his bed. Patient then began spitting at staff. Patient was offered oral medication but he refused. Patient was given I.M. medication and was transferred via wheelchair to the seclusion room.
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
## 2023-06-14 NOTE — PROVIDER NOTIFICATION
"   06/14/23 0930   Justification   Clinical Justification Others   Hakan beeper was pressed as the pt became very agitated and began punching staff. Several staff came to the pt room and he was placed in a physical hold. He was making delusional statements about the medication being \"metal, its nuts and bolts (repeatedly)\".  \"You dumbasses  are all trying to kill me\".  He became very agitated when the pt was screaming vulgarities directed at staff. He began spitting at staff so towels were placed over the staff members arms and shoulders and they had face shields on as well. Oral medication was offered x3 and he refused. IM PRN benadryl/haldol was administered in his L gluteus without issue. Pt was transferred to to a wheelchair, brief physical hold was continued (in wheelchair), and he was brought to seclusion. MD notified order obtained and no injury to patient or staff noted.   "

## 2023-06-14 NOTE — PROVIDER NOTIFICATION
"   06/14/23 0950   Restraint Monitoring Q15 Minutes   Psychological Status O;YE   Physical Comfort D   Circulation NS   Continuous Observation Yes     Pt was laying on the floor-mat and when asked if he was doing ok, he yelled at writer and \"fuck off you stupid bitch\". Writer asked the pt not to talk that way, and he again started swearing stating \"shut the fuck up dumbass\". Seclusion continued   "

## 2023-06-14 NOTE — PLAN OF CARE
"  Problem: Adult Behavioral Health Plan of Care  Goal: Develops/Participates in Therapeutic [place] to Support Successful Transition  Intervention: Foster Therapeutic [place]  Recent Flowsheet Documentation  Taken 6/13/2023 1858 by Cecille Burris RN  Trust Relationship/Rapport:    care explained    choices provided    emotional support provided    empathic listening provided    questions answered    questions encouraged    reassurance provided    thoughts/feelings acknowledged   Goal Outcome Evaluation:    Plan of Care Reviewed With: patient      Pt presents as irritable and disorganized with mood lability. Pt denied all mental symptoms including AVH. He continues to be internally preoccupied. At the beginning of the shift, the pt was laying behind his bed on the floor. He was sleeping on and off. He woke up right before dinner. He first declined his tray. Approx 20 mins later writer brought the tray into the pt and he ate 75%. He remained in his room for most of the shift. He did become slightly agitated and was going after the staff (hitting and posturing). He was making delusional comments about \"blowing up the universe, and only saving the 25 people that are left in this hospital\". He continues to perseverate on the food, water and medicine being acid/poison. He was redirectable with encouragement.  He took his scheduled medication with a PRN trazodone @ 1920.   Pt refused the bladder scanner x2. He would not allow vitals and became very agitated when anyone would come near him.   Writer paged and spoke with Bryant HORN from medicine about the pt refusing and what the next steps would be.   She asked for the staff to try if amenable, but if they are unable to try for the next shift. She also asked for the staff to let IM know how he does with using the restroom, as they may increase his Flomax.   Pt had no other acute physical or behavioral concerns this shift.     "

## 2023-06-14 NOTE — PROVIDER NOTIFICATION
06/14/23 1631   Restraint Type   Physical Hold (Comment) (BH) Discontinued     Brief physical hold lasted about a minute and was discontinued as patient met with the discontinuation criteria. The discontinuation criteria was the cessation of patient's violent behavior toward staff. See debrief note for more details.

## 2023-06-14 NOTE — PROVIDER NOTIFICATION
06/14/23 1000   Seclusion or Restraint Order   In Person Face to Face Assessment Conducted Yes-Eval of pt's immediate situation, reaction to intervention, complete review of systems assessment, behavioral assessment & review/assessment of hx, drugs & meds, recent labs, etc, behavioral condition, need to continue/terminate restraint/seclusion   Patient Experienced No adverse physical outcome from seclusion/restraint initiation   Continuation of Seclus/Restraint indicated at this time Yes       Within an hour after restraint an in person face to face assessment was completed at 1000, including an evaluation of the patient's immediate reaction to the intervention, behavioral assessment and review/assessment of history, drugs and medications, recent labs, etc., and behavioral condition.  The patient experienced: no adverse sequelae reported or observed  The intervention of restraint or seclusion needs to continue. Pt was screaming and would not engage with RN writer to complete assessment.
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
## 2023-06-14 NOTE — PROVIDER NOTIFICATION
06/14/23 1658   Seclusion or Restraint Order   Attending Physician's Name Meagan   In Person Face to Face Assessment Conducted Yes-Eval of pt's immediate situation, reaction to intervention, complete review of systems assessment, behavioral assessment & review/assessment of hx, drugs & meds, recent labs, etc, behavioral condition, need to continue/terminate restraint/seclusion   Patient Experienced No adverse physical outcome from seclusion/restraint initiation     Face-to-face assessment completed with no adverse physical outcome. No reported or observed symptoms upon assessment. At time of face-to-face, pt was sleeping in his bed, not in restraint or seclusion. Proper chest rise observed. On-call provider notified regarding results of face-to-face. Will continue to monitor.

## 2023-06-14 NOTE — PLAN OF CARE
"  Problem: Adult Behavioral Health Plan of Care  Goal: Optimized Coping Skills in Response to Life Stressors  Outcome: Not Progressing   Goal Outcome Evaluation:    Plan of Care Reviewed With: patient      Pt presents as disorganized and labile. He started out pleasant and calm upon waking this morning. When the pt was given his breakfast he was rude and became quite irritable and verbally insulting. When writer attempted to give his medications, he began chewing them and was unable to chew the capsules. He immediatly started accusing  writer of poisoning him, and trying to give him \"nuts and bolts\".  Writer attempted again with the medications and he threw them across the floor and began trying to punch and kick the staff. He was redirected to his bed, and staff exited the room.   Shortly after that incident, another staff was in his room and without provocation the pt began punching the staff in his back  and grabbed his duress beeper. The other SIO staff pressed their beeper, staff arrived, and the brief physical hold was initiated to administer medication and get him to seclusion. He also had a second seclusion this shift (see provider notification)  When he came out of seclusion he ate his meals and remained in his room for the rest of his shift. He took his scheduled gabapentin, and allowed writer to take vitals and do a bladder scan that was 207.  Pt had no other acute physical or behavioral concerns this shift.   BP (!) 140/79 (BP Location: Right arm, Patient Position: Supine, Cuff Size: Adult Regular)   Pulse 50   Temp 98.5  F (36.9  C) (Tympanic)   Resp 16   SpO2 99%           "

## 2023-06-15 PROCEDURE — 250N000013 HC RX MED GY IP 250 OP 250 PS 637: Performed by: PHYSICIAN ASSISTANT

## 2023-06-15 PROCEDURE — 99233 SBSQ HOSP IP/OBS HIGH 50: CPT | Performed by: PSYCHIATRY & NEUROLOGY

## 2023-06-15 PROCEDURE — 250N000013 HC RX MED GY IP 250 OP 250 PS 637: Performed by: PSYCHIATRY & NEUROLOGY

## 2023-06-15 PROCEDURE — 124N000002 HC R&B MH UMMC

## 2023-06-15 RX ORDER — BISACODYL 10 MG
10 SUPPOSITORY, RECTAL RECTAL DAILY PRN
Status: DISCONTINUED | OUTPATIENT
Start: 2023-06-15 | End: 2023-11-13 | Stop reason: HOSPADM

## 2023-06-15 RX ADMIN — Medication 10 MG: at 22:18

## 2023-06-15 RX ADMIN — PROPRANOLOL HYDROCHLORIDE 10 MG: 10 TABLET ORAL at 13:53

## 2023-06-15 RX ADMIN — DIPHENHYDRAMINE HYDROCHLORIDE 50 MG: 50 CAPSULE ORAL at 14:29

## 2023-06-15 RX ADMIN — SENNOSIDES AND DOCUSATE SODIUM 1 TABLET: 50; 8.6 TABLET ORAL at 13:53

## 2023-06-15 RX ADMIN — PROPRANOLOL HYDROCHLORIDE 10 MG: 10 TABLET ORAL at 22:18

## 2023-06-15 RX ADMIN — OFLOXACIN 1 DROP: 3 SOLUTION OPHTHALMIC at 16:02

## 2023-06-15 RX ADMIN — DIVALPROEX SODIUM 500 MG: 125 CAPSULE, COATED PELLETS ORAL at 22:18

## 2023-06-15 RX ADMIN — CLOZAPINE 200 MG: 200 TABLET ORAL at 22:18

## 2023-06-15 RX ADMIN — OFLOXACIN 1 DROP: 3 SOLUTION OPHTHALMIC at 22:19

## 2023-06-15 RX ADMIN — OLANZAPINE 15 MG: 10 TABLET, ORALLY DISINTEGRATING ORAL at 22:18

## 2023-06-15 RX ADMIN — POLYETHYLENE GLYCOL 3350 17 G: 17 POWDER, FOR SOLUTION ORAL at 08:54

## 2023-06-15 RX ADMIN — DIPHENHYDRAMINE HYDROCHLORIDE 50 MG: 50 CAPSULE ORAL at 03:35

## 2023-06-15 RX ADMIN — HALOPERIDOL 5 MG: 5 TABLET ORAL at 03:35

## 2023-06-15 RX ADMIN — Medication 300 MG: at 13:53

## 2023-06-15 RX ADMIN — OLANZAPINE 15 MG: 10 TABLET, ORALLY DISINTEGRATING ORAL at 08:54

## 2023-06-15 RX ADMIN — BISACODYL 10 MG: 10 SUPPOSITORY RECTAL at 15:00

## 2023-06-15 RX ADMIN — HALOPERIDOL 5 MG: 5 TABLET ORAL at 14:29

## 2023-06-15 RX ADMIN — Medication 300 MG: at 22:18

## 2023-06-15 ASSESSMENT — ACTIVITIES OF DAILY LIVING (ADL)
LAUNDRY: UNABLE TO COMPLETE
HYGIENE/GROOMING: INDEPENDENT;SHOWER;PROMPTS
ADLS_ACUITY_SCORE: 90
ORAL_HYGIENE: INDEPENDENT
ADLS_ACUITY_SCORE: 90
LAUNDRY: UNABLE TO COMPLETE
ADLS_ACUITY_SCORE: 90
HYGIENE/GROOMING: HANDWASHING;INDEPENDENT;SHOWER
ADLS_ACUITY_SCORE: 90
DRESS: SCRUBS (BEHAVIORAL HEALTH)
ADLS_ACUITY_SCORE: 90
ORAL_HYGIENE: INDEPENDENT;PROMPTS
DRESS: WITH ASSISTANCE;INDEPENDENT

## 2023-06-15 NOTE — PLAN OF CARE
Assessment/Intervention/Current Symtoms and Care Coordination:  Reviewed chart. Met with team to review patient's care. Called his , Vicky Valdez, and left a voicemail with an update on his treatment and encouraging her to return call with any questions or concerns.      Discharge Plan or Goal:  Return to [facility], [address] [place] [address] (28.4 miles away)     Barriers to Discharge:  Patient requires further psychiatric stabilization due to current symptomology      Referral Status:  Psychiatry     Legal Status:  Court Hold    County: [place]  File Number: [id number]  Sánchez hearin/29 at 9am    Contacts:  Assisted Living: [facility], [phone]  Per H&P: Vicky Valdez , [phone], psychiatric provider Dr. Santana at [facility], [phone], primary care provider Attila Urena, DO       Upcoming Meetings and Dates/Important Information and next steps:  Sánchez hearing on  at 9am

## 2023-06-15 NOTE — PROVIDER NOTIFICATION
06/14/23 1856   Seclusion or Restraint Order   In Person Face to Face Assessment Conducted Yes-Eval of pt's immediate situation, reaction to intervention, complete review of systems assessment, behavioral assessment & review/assessment of hx, drugs & meds, recent labs, etc, behavioral condition, need to continue/terminate restraint/seclusion   Patient Experienced No adverse physical outcome from seclusion/restraint initiation   Continuation of Seclus/Restraint indicated at this time No     In person face to face assessment completed with no reported or observed adverse physical outcome. At the time of assessment the patient was just getting ready to shower and free of physical hold. On call provider Dr. Portillo notified of results of face to face. Will continue to monitor.

## 2023-06-15 NOTE — PROVIDER NOTIFICATION
06/14/23 1841   Restraint Type   Physical Hold (Comment) () Discontinued   Debriefing   Debriefing DO   Does patient understand why the event happened? Patient unable to answer   Does patient agree to safe behaviors? Yes   What can we do differently so this doesn't happen again? Other (comment)   Plan of care reviewed and modified Yes     Once in the room, patient ceased kicking, hitting and spitting behaviors that let to the initiation of the physical hold. Once safety is maintained and the patient is no longer a threat to staff, physical hold was discontinued. Patient was asked how staff can be helpful to him to avoid future physical holds and patient didn't provide answer but swear at staff with profanities. Staff exited the room safely using BCS technique. Patient remain in his room with no attempt to come after staff.

## 2023-06-15 NOTE — PROGRESS NOTES
"Pt in bed sleeping off and on throughout the night. Pt got up at 0300 and said \"I do something once I wake up, but I don't know what it is,\" over and over. SIO staff notified his RN and he was given medication, which he took willingly. Pt then conversed with staff for a little while, explaining he felt like he was going to explode, while his hands were over his abdomen. SIO staff asked if he needed to use the bathroom and pt voided a large amount of urine during this time.   Afterwards, he told writer that he still felt constipated. Writer offered medication help for constipation and pt agreed. When RN came in to give this medication, pt then started to say \"I can't trust you. This is not the right medication. And I can't refuse because I'm on a court order.\" Staff told him this medication was his call and if he didn't want it he didn't need to take it. Pt refused because it was not the \"right medication\". Staff soon left pt's room and pt started to talk to self \"You need a job, dumbass. You need a job, dumbass.\" Pt would loudly chant this sporadically in between other whispers to self, but continue to relax in his bed.     At 0440, pt is currently calm, quiet and appears asleep.   "

## 2023-06-15 NOTE — PROGRESS NOTES
CLINICAL NUTRITION SERVICES - BRIEF NOTE     Nutrition Prescription    RECOMMENDATIONS FOR MDs/PROVIDERS TO ORDER:  RD to sign off at this time. Please consult RD if further nutrition concerns arise.     Recommendations already ordered by Registered Dietitian (RD):  -Magic cup TID   -Ensure TID      REASON FOR ASSESSMENT  Vinod Lee is a/an 62 year old male assessed by the dietitian for Provider Order - Pt is chewing his medications and we would like to crush some of them and add to magic cup. Could you assist with this?    EVALUATION OF THE PROGRESS TOWARD GOALS   Diet: Regular      NEW FINDINGS   Called unit to speak with patients nurse, they are requesting three magic cups daily. Order placed, placed correct Ensure Enlive order.    Weights:  Wt Readings from Last 15 Encounters:   05/23/23 78.6 kg (173 lb 4.5 oz)   No weight history to assess.      Per chart review, patient has been tolerating % at meal times. Labs and meds reviewed.     Kirsten Monroe, MPH, RD, LD  Pediatric & Adult Behavioral Health Dietitian   Pager: [phone]  Weekend/holiday pager: [phone]

## 2023-06-15 NOTE — PROVIDER NOTIFICATION
06/14/23 1840   Seclusion or Restraint Order   Length of Order 4   Order Obtained Yes   Attending Physician Notified Yes   Attending Physician's Name Lindsey   Describe actions taken Walked to his room   Assessment   Less Restrictive Alternative Immediate action required, no least restrictive measures could be attempted   Justification   Clinical Justification Others     Prior to the initiation of the brief physical hold, patient was intermittently agitated and was redirectable at times. Writer come out of the medication room after hearing staff saying to patient Vinod stop. At the time, patient was swinging at staff and trying to punch staff and staff were giving him a space. Writer came behind the patient and bare hugged from behind. Other staff member joined the writer and help patient walked to his room.He was placed a brief physical hold and was walked to his room in two-person walking BCS technique. Once in the room, physical hold was discontinued. On call provider Dr. Portillo was updated and physical hold restraint order was obtained. Staff will continue to monitor patient and provide support and redirections to mitigate risk of assault.
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
## 2023-06-15 NOTE — PLAN OF CARE
Goal Outcome Evaluation:      Pt woke up agitated at 0300.  He was administered benadryl and haldol with good results @ 0330.  Shortly afterwards he voided a large amount of urine in the toilet.  He refused to be bladder scanned.  No behaviors.  He remains on an SIO 2:1 for safety. No c/o pain this shift.  He slept 4.5 hours.

## 2023-06-16 ENCOUNTER — APPOINTMENT (OUTPATIENT)
Dept: GENERAL RADIOLOGY | Facility: CLINIC | Age: 63
DRG: 885 | End: 2023-06-16
Attending: PSYCHIATRY & NEUROLOGY
Payer: COMMERCIAL

## 2023-06-16 LAB
ALBUMIN UR-MCNC: NEGATIVE MG/DL
APPEARANCE UR: CLEAR
BACTERIA #/AREA URNS HPF: ABNORMAL /HPF
BILIRUB UR QL STRIP: NEGATIVE
COLOR UR AUTO: YELLOW
GLUCOSE UR STRIP-MCNC: NEGATIVE MG/DL
HGB UR QL STRIP: NEGATIVE
HYALINE CASTS: 1 /LPF
KETONES UR STRIP-MCNC: 20 MG/DL
LEUKOCYTE ESTERASE UR QL STRIP: NEGATIVE
MUCOUS THREADS #/AREA URNS LPF: PRESENT /LPF
NITRATE UR QL: NEGATIVE
PH UR STRIP: 5.5 [PH] (ref 5–7)
RBC URINE: <1 /HPF
SP GR UR STRIP: 1.02 (ref 1–1.03)
UROBILINOGEN UR STRIP-MCNC: NORMAL MG/DL
WBC URINE: 2 /HPF

## 2023-06-16 PROCEDURE — 99207 PR NO BILLABLE SERVICE THIS VISIT: CPT | Performed by: PHYSICIAN ASSISTANT

## 2023-06-16 PROCEDURE — 124N000002 HC R&B MH UMMC

## 2023-06-16 PROCEDURE — 93005 ELECTROCARDIOGRAM TRACING: CPT

## 2023-06-16 PROCEDURE — 99233 SBSQ HOSP IP/OBS HIGH 50: CPT | Performed by: PSYCHIATRY & NEUROLOGY

## 2023-06-16 PROCEDURE — 93010 ELECTROCARDIOGRAM REPORT: CPT | Performed by: INTERNAL MEDICINE

## 2023-06-16 PROCEDURE — 250N000013 HC RX MED GY IP 250 OP 250 PS 637: Performed by: PSYCHIATRY & NEUROLOGY

## 2023-06-16 PROCEDURE — 73630 X-RAY EXAM OF FOOT: CPT | Mod: RT

## 2023-06-16 PROCEDURE — 81003 URINALYSIS AUTO W/O SCOPE: CPT | Performed by: PSYCHIATRY & NEUROLOGY

## 2023-06-16 RX ORDER — GINSENG 100 MG
CAPSULE ORAL DAILY
Status: COMPLETED | OUTPATIENT
Start: 2023-06-16 | End: 2023-06-22

## 2023-06-16 RX ADMIN — DIVALPROEX SODIUM 500 MG: 125 CAPSULE, COATED PELLETS ORAL at 22:00

## 2023-06-16 RX ADMIN — Medication 300 MG: at 08:52

## 2023-06-16 RX ADMIN — Medication 10 MG: at 22:00

## 2023-06-16 RX ADMIN — PROPRANOLOL HYDROCHLORIDE 10 MG: 10 TABLET ORAL at 08:50

## 2023-06-16 RX ADMIN — Medication 300 MG: at 13:51

## 2023-06-16 RX ADMIN — PROPRANOLOL HYDROCHLORIDE 10 MG: 10 TABLET ORAL at 22:01

## 2023-06-16 RX ADMIN — Medication 300 MG: at 22:00

## 2023-06-16 RX ADMIN — HALOPERIDOL 5 MG: 5 TABLET ORAL at 17:27

## 2023-06-16 RX ADMIN — OLANZAPINE 15 MG: 10 TABLET, ORALLY DISINTEGRATING ORAL at 08:52

## 2023-06-16 RX ADMIN — OFLOXACIN 1 DROP: 3 SOLUTION OPHTHALMIC at 22:02

## 2023-06-16 RX ADMIN — DIPHENHYDRAMINE HYDROCHLORIDE 50 MG: 50 CAPSULE ORAL at 17:27

## 2023-06-16 RX ADMIN — CLOZAPINE 225 MG: 200 TABLET ORAL at 21:58

## 2023-06-16 RX ADMIN — PROPRANOLOL HYDROCHLORIDE 10 MG: 10 TABLET ORAL at 13:51

## 2023-06-16 RX ADMIN — DIVALPROEX SODIUM 500 MG: 125 CAPSULE, COATED PELLETS ORAL at 14:11

## 2023-06-16 RX ADMIN — OFLOXACIN 1 DROP: 3 SOLUTION OPHTHALMIC at 16:37

## 2023-06-16 RX ADMIN — OLANZAPINE 15 MG: 10 TABLET, ORALLY DISINTEGRATING ORAL at 22:00

## 2023-06-16 ASSESSMENT — ACTIVITIES OF DAILY LIVING (ADL)
ADLS_ACUITY_SCORE: 66
ADLS_ACUITY_SCORE: 66
DRESS: SCRUBS (BEHAVIORAL HEALTH);INDEPENDENT
ADLS_ACUITY_SCORE: 66
LAUNDRY: UNABLE TO COMPLETE
ADLS_ACUITY_SCORE: 66
ORAL_HYGIENE: INDEPENDENT;PROMPTS
HYGIENE/GROOMING: HANDWASHING;SHOWER;INDEPENDENT
ADLS_ACUITY_SCORE: 76
HYGIENE/GROOMING: SHOWER;INDEPENDENT;PROMPTS
ADLS_ACUITY_SCORE: 66
ADLS_ACUITY_SCORE: 76
ADLS_ACUITY_SCORE: 66
ADLS_ACUITY_SCORE: 66
ADLS_ACUITY_SCORE: 76
DRESS: SCRUBS (BEHAVIORAL HEALTH)
ORAL_HYGIENE: INDEPENDENT
ADLS_ACUITY_SCORE: 76
ADLS_ACUITY_SCORE: 66

## 2023-06-16 NOTE — CONSULTS
[facility]  ORTHOPAEDIC SURGERY CONSULT - HISTORY AND PHYSICAL    DATE OF CONSULT: 6/16/2023 4:40 PM    REQUESTING PROVIDER: Ingrid Rhodes MD, MD - [facility] medicine service staff.    CC: Right first toe fracture    DATE OF INJURY: Unsure patient reports that he kicked a door approximately a few days ago    HISTORY OF PRESENT ILLNESS:   The orthopaedic surgery service was consulted by Ingrid Askew MD for evaluation and treatment recommendations of right distal phalanx toe fracture.    Vinod Lee is a 62 year old  male with pmhx complex psychological history who is currently admitted to the psych unit and is receiving orthopedic consult for evaluation of pain and swelling of the right first toe.  Patient is a poor historian and thus a detailed history is unobtainable.  According to what patient reports along with staff and review of the notes patient struck his foot on a door a few days ago.  Since then patient has had swelling and some reported tenderness and the right toe.  Patient developed blisters on the toe which when he has popped over the dorsal aspect.  Patient currently was noted ambulating on the foot without any socks and no visible discomfort.  Patient denies any other injuries at this time.  Patient was cooperative for exam sitting allowing me to examine the toe.    Denies numbness, tingling, or weakness to the affected extremities.  Denies fevers, chills, nausea, vomiting, diarrhea, constipation, chest pain, shortness of breath.    PAST MEDICAL HISTORY:   Past Medical History:   Diagnosis Date     Parkinson's disease (H)      Schizophrenia (H)      [Patient denies any personal history of bleeding disorders, clotting disorders, or adverse reactions to anesthesia].    PAST SURGICAL HISTORY:    No past surgical history on file.    MEDICATIONS:   Prior to Admission medications    Medication Sig Last Dose Taking? Auth Provider Long Term End Date   cyanocobalamin (VITAMIN  B-12) 1000 MCG tablet Take 1,000 mcg by mouth daily Unknown Yes Unknown, Entered By History     LORazepam (ATIVAN) 1 MG tablet Take 1 mg by mouth 3 times daily Unknown Yes Unknown, Entered By History     nitroFURantoin macrocrystal-monohydrate (MACROBID) 100 MG capsule Take 1 capsule (100 mg) by mouth every 12 hours Unknown Yes Corinna Bowling MD     OLANZapine (ZYPREXA) 7.5 MG tablet Take 1 tablet (7.5 mg) by mouth 2 times daily Unknown Yes Corinna Bowling MD Yes    OLANZapine zydis (ZYPREXA) 5 MG ODT Take 1 tablet (5 mg) by mouth 3 times daily as needed Unknown Yes Corinna Bowling MD Yes    polyethylene glycol (MIRALAX) 17 g packet Take 17 g by mouth daily Unknown Yes Corinna Bowling MD     senna-docusate (SENOKOT-S/PERICOLACE) 8.6-50 MG tablet Take 1 tablet by mouth 2 times daily as needed for constipation Unknown Yes Corinna Bowling MD     sertraline (ZOLOFT) 50 MG tablet Take 3 tablets (150 mg) by mouth daily Unknown Yes Corinna Bowling MD Yes    tamsulosin (FLOMAX) 0.4 MG capsule Take 1 capsule (0.4 mg) by mouth daily Unknown Yes Corinna Bowling MD         ALLERGIES:   Patient has no known allergies.    SOCIAL HISTORY:   Social History     Socioeconomic History     Marital status: Single     Spouse name: Not on file     Number of children: Not on file     Years of education: Not on file     Highest education level: Not on file   Occupational History     Not on file   Tobacco Use     Smoking status: Never     Smokeless tobacco: Never   Vaping Use     Vaping status: Not on file   Substance and Sexual Activity     Alcohol use: Not on file     Comment: GRACE     Drug use: Not on file     Comment: GRACE     Sexual activity: Not Currently   Other Topics Concern     Not on file   Social History Narrative     Not on file     Social Determinants of Health     Financial Resource Strain: Not on file   Food Insecurity: Not on file   Transportation Needs: Not on file   Physical Activity: Not on file   Stress: Not on file    Social Connections: Not on file   Intimate Partner Violence: Not on file   Housing Stability: Not on file     Patient is currently admitted to psychiatric unit  FAMILY HISTORY:  No family history on file.    Patient denies known family history of bleeding, clotting, or anesthesia related complications.     REVIEW OF SYSTEMS:   Positive for findings reported in the HPI. Otherwise a 10-point reviews of systems was negative except as noted above in the HPI.     PHYSICAL EXAM:   Vitals:    06/15/23 1350 06/15/23 1500 06/15/23 2136 06/16/23 0850   BP: (!) 153/73 (!) 150/78 127/72 139/80   BP Location: Right arm Left arm Left arm Right arm   Patient Position: Supine  Sitting Supine   Cuff Size: Adult Regular  Adult Regular Adult Regular   Pulse: 101 87 84 102   Resp: 15 16 16 16   Temp: 98.3  F (36.8  C)  98.2  F (36.8  C)    TempSrc: Oral  Temporal    SpO2: 99% 99% 100%      General: Awake, alert, appropriate, following commands, NAD.  Lungs: Breathing comfortably and nonlabored, no wheezes or stridor noted.  Heart/Cardiovascular: Regular pulse, no peripheral cyanosis.  Back: Nontender to palpation throughout, no step-offs or deformities appreciated. Bilateral SI joints non-tender.   Pelvis: Stable to AP and Lateral compression, non-tender.  Right Upper Extremity: No deformity, skin intact. No significant tenderness to palpation over clavicle, AC joint, shoulder, arm, elbow, forearm, wrist. Normal ROM shoulder, elbow, wrist without pain. Motor intact distally with finger flexion/extension/intrinsics/EPL, OK sign 5/5 strength. SILT ax/m/r/u nerve distributions. Radial pulse palpable, 2+.   Left Upper Extremity: No deformity, skin intact. No significant tenderness to palpation over clavicle, AC joint, shoulder, arm, elbow, forearm, wrist. Normal ROM shoulder, elbow, wrist without pain. Motor intact distally with finger flexion/extension/intrinsics/EPL, OK sign 5/5 strength. SILT ax/m/r/u nerve distributions. Radial pulse  palpable, 2+.   Right Lower Extremity: No deformity, skin intact. No significant tenderness to palpation over thigh, knee, leg, ankle/foot. No pain with ROM hip/knee/ankle. Motor intact distally TA/GSC/EHL/FHL with 5/5 strength. SILT sp/dp/tibial/saph/sural nerves. DP/PT pulses palpable, 2+, toes warm and well perfused.   Left Lower Extremity: Ruptured blister noted over the dorsal aspect of the first toe proximal to the nailbed.  No purulent drainage noted.  No tenderness elicited with palpation.  Distal aspect of the first toe is swollen with ecchymosis.. No significant tenderness to palpation over thigh, knee, leg, ankle/foot. No pain with ROM hip/knee/ankle. Motor intact distally TA/GSC/EHL/FHL with 5/5 strength. SILT sp/dp/tibial/saph/sural nerves. DP/PT pulses palpable, 2+, toes warm and well perfused.     LABS:  Hemoglobin   Date Value Ref Range Status   06/14/2023 12.0 (L) 13.3 - 17.7 g/dL Final   06/07/2023 12.9 (L) 13.3 - 17.7 g/dL Final     WBC Count   Date Value Ref Range Status   06/14/2023 8.3 4.0 - 11.0 10e3/uL Final     Platelet Count   Date Value Ref Range Status   06/14/2023 212 150 - 450 10e3/uL Final     No results found for: INR  Creatinine   Date Value Ref Range Status   06/07/2023 0.97 0.67 - 1.17 mg/dL Final     Glucose   Date Value Ref Range Status   06/07/2023 132 (H) 70 - 99 mg/dL Final     GLUCOSE BY METER POCT   Date Value Ref Range Status   05/26/2023 102 (H) 70 - 99 mg/dL Final     No results found for: CRP  No results found for: SED      IMAGING:  X-rays of the right foot reviewed and patient does have a comminuted fracture of the distal phalanx of the first toe on the right foot.  No other acute fractures noted on exam exam    IMPRESSION:   Vinod Lee is a 62 year old  male w/ PMHx complex psychiatric history who has a fracture to the distal phalanx of the right toe after a recent trauma to the foot the patient reports.  Patient denies any other pain or discomfort.  Images  reviewed and plan will be for irrigation of the popped fracture blister with sterile saline and the toes should be dressed and a 4 x 4 gauze dressing.  Patient will need a postop shoe which she can be weightbearing as tolerated in.  Would recommend a course of antibiotics for approximately 7 days.  Would recommend Augmentin or clindamycin.  Podiatry will be made aware of patient and will see patient while he is admitted or if patient is discharged in the near future a follow-up appointment will need to be established.    Remainder of care per primary team.  Primary team should make sure that patient is up-to-date on his tetanus shot.  If not patient will require a booster shot.    Patient discussed with Dr. Buckner, the attending on call.    Thank you,    Elder Colón MD   PGY1  Department of Orthopaedic Surgery  Pager [phone]

## 2023-06-16 NOTE — PROGRESS NOTES
Brief Cardiology Note:  Asked to reevaluate QT interval while on clozapine. Prior corrected calculated QTc (based on J point give LBBB) 460 ms. EKG evaluated from 6/16. Poor quality likely limited computer interpretation. My interpretation yielded results of no more than QTc of 440 ms based on J point. QT is likely a better surrogate than QTc given LBBB at baseline. Visually, QT interval appears shorter than prior.     Discussed with Dr. Potter.     Darrell Musa, PGY-4  Cardiovascular Disease Fellow

## 2023-06-16 NOTE — PLAN OF CARE
Problem: Psychotic Symptoms  Goal: Social and Therapeutic (Psychotic Symptoms)  Description: Signs and symptoms of listed problems will be absent or manageable.  Outcome: Progressing  Flowsheets (Taken 6/15/2023 2311)  Psychotic Symptoms Assessed: all  Psychotic Symptoms Present:   anxiety   mood   affect   insight   thought process     Problem: Adult Behavioral Health Plan of Care  Goal: Plan of Care Review  Recent Flowsheet Documentation  Taken 6/15/2023 2200 by Taurus Forbes RN  Patient Agreement with Plan of Care: agrees with comment (describe)   Goal Outcome Evaluation:    Plan of Care Reviewed With: patient          Patient more visible in the milieu this evening, much more calm and at times interacting and social with staff and peers listening to music, watching television, and for a short time participated in art therapy group. Patient upon approach remains flat in affect, at times tense and paranoid of staff, calm and cooperative with verbal assessment with reassurance. Patient oriented to person and place but disoriented with poor insight into hospitalization and care plan. Patient continues to demonstrate paranoid and delusional thought process, perseverative that he is being poisoned by the medications and that staff is here to harm him. Patient provided much reassurance and use of calming techniques through the evening and was able to demonstrate safe behaviors with no agitation or violence observed. Patient continues to deny SI/SIB, AH/VH, anxiety, depression, or thoughts of harming others.     Patient cooperative with vital sign assessments which were stable. Patient bladder scanned twice this evening with not more than 300 ml observed and no indicated for catheterization. Patient did have an observed urinary void but no bowel movements this evening. Patient diet appeared good eating 75% of dinner. Patient with prompting and continued reassurance accepting of HS medications, with no stated or  "observed side effects though the patient stated \"your giving me nuts and bolts\". Patient with prompting able to complete ADL's this evening including showering.         "
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
## 2023-06-16 NOTE — PROGRESS NOTES
Brief Medicine Note    Contacted by nursing regarding right toe wound.  The patient was repeatedly hitting his toe into the door during an episode of agitation last night he then developed a large blood blister on the toe.  This blood blister popped open this morning and he now has an open area on the toe.  He has been ambulating without issue.  RN evaluated the site, cleaned the area, and applied a sterile dressing.  No obvious infection.    Unfortunately the patient has been having significant agitation and impulsivity since admission.  He continues to exhibit aggressive behaviors that have compromised the safety of staff.  As there are no acute infections concerns at this time, I will hold on formal examination due to safety concerns.    Plan:   - Routine wound care was discussed with RN.  I would apply a clean sterile dressing once daily until the area has scabbed over.  The toe should be kept clean and dry. It is reasonable to apply topical bacitracin once daily for 7 days to help prevent infection.   -Notify medicine for any purulent drainage, worsening redness, worsening pain, fevers or other concerning signs or symptoms.    Medicine will sign off. Please do not hesitate to contact if new questions or concerns arise.     Danielle Trevizo PA-C  Hospitalist Service  Pager: [phone]

## 2023-06-16 NOTE — PROGRESS NOTES
"Writer went into patient's room and found a puddle of orangeish/red fluid on the floor, about 7inches by 5 inches large. Writer asked what it was and patient said \"That's my acid! Don't step on it! It will burn you!\". Writer asked where it came from and he stated his toe. Upon assessment, patient's right great toe is open at the top just behind the nail. The area appears to be a large blood blister that popped. Writer cleansed area and applied a dressing. Internal medicine was notified.  "
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
## 2023-06-16 NOTE — PROGRESS NOTES
Patient demonstrating increasing delusional thought and impulsive behaviors. Patient started to act on his delusional thoughts attempted to stick his head in the toilet, and scratch at his eyes. RN writer and staff attempting to redirect the behaviors though patient growing increasingly agitated and gesturing to hit staff. PRN Benadryl and Haldol offered which he was receptive of with reassurance of therapeutic goal. Patient was encouraged to focus on watching a movie and provided with dinner to also aid in distraction and calming. Will continued to monitor.

## 2023-06-16 NOTE — PLAN OF CARE
Goal Outcome Evaluation:       Pt appeared to be a sleep @ all safety checks.  Pt slept 7 hours. He remains on an SIO for safety.  Pt was bladder scanned for 190 ml's.

## 2023-06-16 NOTE — PLAN OF CARE
Assessment/Intervention/Current Symtoms and Care Coordination:  Reviewed chart. Met with team to review patient's care.      Discharge Plan or Goal:  Return to [facility], [address] [place] [address] (28.4 miles away)     Barriers to Discharge:  Patient requires further psychiatric stabilization due to current symptomology      Referral Status:  Psychiatry     Legal Status:  Court Hold    County: [place]  File Number: [id number]  Sánchez hearin/29 at 9am    Contacts:  Assisted Living: [facility], [phone]  Per H&P: Vicky Valdez , [phone], psychiatric provider Dr. Santana at [facility], [phone], primary care provider Attila Urena, DO       Upcoming Meetings and Dates/Important Information and next steps:  Sán[place] hearing on  at 9am

## 2023-06-17 PROCEDURE — 99232 SBSQ HOSP IP/OBS MODERATE 35: CPT | Performed by: PHYSICIAN ASSISTANT

## 2023-06-17 PROCEDURE — 250N000013 HC RX MED GY IP 250 OP 250 PS 637: Performed by: PSYCHIATRY & NEUROLOGY

## 2023-06-17 PROCEDURE — 250N000013 HC RX MED GY IP 250 OP 250 PS 637: Performed by: PHYSICIAN ASSISTANT

## 2023-06-17 PROCEDURE — 250N000009 HC RX 250: Performed by: PHYSICIAN ASSISTANT

## 2023-06-17 PROCEDURE — 250N000011 HC RX IP 250 OP 636: Mod: JZ | Performed by: PSYCHIATRY & NEUROLOGY

## 2023-06-17 PROCEDURE — 124N000002 HC R&B MH UMMC

## 2023-06-17 RX ADMIN — OLANZAPINE 15 MG: 10 TABLET, ORALLY DISINTEGRATING ORAL at 10:45

## 2023-06-17 RX ADMIN — AMOXICILLIN AND CLAVULANATE POTASSIUM 1 TABLET: 875; 125 TABLET, FILM COATED ORAL at 22:09

## 2023-06-17 RX ADMIN — DIVALPROEX SODIUM 500 MG: 125 CAPSULE, COATED PELLETS ORAL at 22:07

## 2023-06-17 RX ADMIN — DIPHENHYDRAMINE HYDROCHLORIDE 50 MG: 50 INJECTION, SOLUTION INTRAMUSCULAR; INTRAVENOUS at 16:13

## 2023-06-17 RX ADMIN — Medication 300 MG: at 22:07

## 2023-06-17 RX ADMIN — Medication 10 MG: at 22:08

## 2023-06-17 RX ADMIN — HALOPERIDOL LACTATE 5 MG: 5 INJECTION, SOLUTION INTRAMUSCULAR at 16:12

## 2023-06-17 RX ADMIN — POLYETHYLENE GLYCOL 3350 17 G: 17 POWDER, FOR SOLUTION ORAL at 13:05

## 2023-06-17 RX ADMIN — Medication 300 MG: at 13:05

## 2023-06-17 RX ADMIN — OFLOXACIN 1 DROP: 3 SOLUTION OPHTHALMIC at 13:05

## 2023-06-17 RX ADMIN — PROPRANOLOL HYDROCHLORIDE 10 MG: 10 TABLET ORAL at 13:05

## 2023-06-17 RX ADMIN — DIVALPROEX SODIUM 500 MG: 125 CAPSULE, COATED PELLETS ORAL at 13:05

## 2023-06-17 RX ADMIN — CLOZAPINE 250 MG: 200 TABLET ORAL at 22:07

## 2023-06-17 RX ADMIN — BACITRACIN ZINC: 500 OINTMENT TOPICAL at 10:45

## 2023-06-17 RX ADMIN — OLANZAPINE 15 MG: 10 TABLET, ORALLY DISINTEGRATING ORAL at 22:07

## 2023-06-17 RX ADMIN — SENNOSIDES AND DOCUSATE SODIUM 1 TABLET: 50; 8.6 TABLET ORAL at 15:41

## 2023-06-17 ASSESSMENT — ACTIVITIES OF DAILY LIVING (ADL)
ADLS_ACUITY_SCORE: 66
LAUNDRY: UNABLE TO COMPLETE
ADLS_ACUITY_SCORE: 66
DRESS: SCRUBS (BEHAVIORAL HEALTH)
ADLS_ACUITY_SCORE: 66
ADLS_ACUITY_SCORE: 66
HYGIENE/GROOMING: HANDWASHING;INDEPENDENT
ORAL_HYGIENE: INDEPENDENT
ADLS_ACUITY_SCORE: 66
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
## 2023-06-17 NOTE — CONSULTS
Brief Medicine Note    Medicine consulted by Dr. Rhodes of Psychiatry for right toe fracture. Patient injured his right toe while banging his foot into the wall or door of his room.     X-ray of the right foot yesterday revealed an acute comminuted and placed fracture throughout the first right distal phalanx with intra-articular extension.    Orthopedics consulted yesterday and evaluated the patient.  They recommend a postop shoe with weightbearing as tolerated.  They will be asking podiatry to see the patient either during this hospitalization or in clinic.  Orthopedics is additionally recommending a 7-day course of prophylactic Augmentin or clindamycin for his open blister near the fracture site.    Plan:   - Agree with plan of care as already in place by Orthopedics   - Patient received post-op shoe yesterday   - I ordered Augmentin twice daily to complete a 7-day course    - He is overdue for his tetanus vaccination, this was due in 2010.  He additionally is in need of a booster due to his foot injury as recommended by orthopedics.  Tdap booster ordered, please administer as able.       Plan of care discussed with RN. Medicine will sign off.  Please do not hesitate to contact if new questions or concerns arise.      Danielle Trevizo PA-C  Hospitalist Service  Pager: [phone]
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
## 2023-06-17 NOTE — PLAN OF CARE
Goal Outcome Evaluation:       Pt remains on an SIO for safety 2:1.  Pt had difficulty falling a sleep.  He voided 400 ml's @ 0200.  He was bladder scanned for 423 @ 0700 and at 0708 he voided 600 ml's of light yellow urine.
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
## 2023-06-18 PROCEDURE — 250N000013 HC RX MED GY IP 250 OP 250 PS 637: Performed by: PSYCHIATRY & NEUROLOGY

## 2023-06-18 PROCEDURE — 124N000002 HC R&B MH UMMC

## 2023-06-18 RX ADMIN — OLANZAPINE 15 MG: 10 TABLET, ORALLY DISINTEGRATING ORAL at 12:45

## 2023-06-18 RX ADMIN — OLANZAPINE 15 MG: 10 TABLET, ORALLY DISINTEGRATING ORAL at 19:48

## 2023-06-18 RX ADMIN — CLOZAPINE 275 MG: 200 TABLET ORAL at 19:48

## 2023-06-18 ASSESSMENT — ACTIVITIES OF DAILY LIVING (ADL)
ADLS_ACUITY_SCORE: 66
HYGIENE/GROOMING: INDEPENDENT;PROMPTS
ADLS_ACUITY_SCORE: 66
ADLS_ACUITY_SCORE: 66
ADLS_ACUITY_SCORE: 86
ADLS_ACUITY_SCORE: 66
ADLS_ACUITY_SCORE: 76
DRESS: INDEPENDENT
ORAL_HYGIENE: INDEPENDENT
ADLS_ACUITY_SCORE: 86
ADLS_ACUITY_SCORE: 76
DRESS: SCRUBS (BEHAVIORAL HEALTH);INDEPENDENT;PROMPTS
ORAL_HYGIENE: INDEPENDENT;PROMPTS
LAUNDRY: UNABLE TO COMPLETE
ADLS_ACUITY_SCORE: 76
ADLS_ACUITY_SCORE: 86
ADLS_ACUITY_SCORE: 66
HYGIENE/GROOMING: INDEPENDENT;PROMPTS
ADLS_ACUITY_SCORE: 66
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
## 2023-06-19 LAB
ATRIAL RATE - MUSE: 93 BPM
DIASTOLIC BLOOD PRESSURE - MUSE: NORMAL MMHG
INTERPRETATION ECG - MUSE: NORMAL
P AXIS - MUSE: 45 DEGREES
PR INTERVAL - MUSE: 144 MS
QRS DURATION - MUSE: 148 MS
QT - MUSE: 434 MS
QTC - MUSE: 539 MS
R AXIS - MUSE: -22 DEGREES
SYSTOLIC BLOOD PRESSURE - MUSE: NORMAL MMHG
T AXIS - MUSE: 106 DEGREES
VENTRICULAR RATE- MUSE: 93 BPM

## 2023-06-19 PROCEDURE — 250N000013 HC RX MED GY IP 250 OP 250 PS 637: Performed by: PHYSICIAN ASSISTANT

## 2023-06-19 PROCEDURE — 124N000002 HC R&B MH UMMC

## 2023-06-19 PROCEDURE — 99233 SBSQ HOSP IP/OBS HIGH 50: CPT | Performed by: PSYCHIATRY & NEUROLOGY

## 2023-06-19 PROCEDURE — 250N000013 HC RX MED GY IP 250 OP 250 PS 637: Performed by: PSYCHIATRY & NEUROLOGY

## 2023-06-19 RX ADMIN — PROPRANOLOL HYDROCHLORIDE 10 MG: 10 TABLET ORAL at 20:16

## 2023-06-19 RX ADMIN — Medication 300 MG: at 16:17

## 2023-06-19 RX ADMIN — AMOXICILLIN AND CLAVULANATE POTASSIUM 1 TABLET: 875; 125 TABLET, FILM COATED ORAL at 20:16

## 2023-06-19 RX ADMIN — HALOPERIDOL 5 MG: 5 TABLET ORAL at 16:31

## 2023-06-19 RX ADMIN — POLYETHYLENE GLYCOL 3350 17 G: 17 POWDER, FOR SOLUTION ORAL at 16:32

## 2023-06-19 RX ADMIN — Medication 10 MG: at 20:16

## 2023-06-19 RX ADMIN — Medication 300 MG: at 20:17

## 2023-06-19 RX ADMIN — DIVALPROEX SODIUM 500 MG: 125 CAPSULE, COATED PELLETS ORAL at 20:15

## 2023-06-19 RX ADMIN — PROPRANOLOL HYDROCHLORIDE 10 MG: 10 TABLET ORAL at 16:16

## 2023-06-19 RX ADMIN — CLOZAPINE 300 MG: 200 TABLET ORAL at 17:04

## 2023-06-19 RX ADMIN — DIPHENHYDRAMINE HYDROCHLORIDE 50 MG: 50 CAPSULE ORAL at 16:31

## 2023-06-19 RX ADMIN — OLANZAPINE 15 MG: 10 TABLET, ORALLY DISINTEGRATING ORAL at 08:33

## 2023-06-19 RX ADMIN — HALOPERIDOL 5 MG: 5 TABLET ORAL at 00:41

## 2023-06-19 RX ADMIN — DIPHENHYDRAMINE HYDROCHLORIDE 50 MG: 50 CAPSULE ORAL at 00:41

## 2023-06-19 RX ADMIN — OLANZAPINE 15 MG: 10 TABLET, ORALLY DISINTEGRATING ORAL at 20:16

## 2023-06-19 RX ADMIN — TAMSULOSIN HYDROCHLORIDE 0.4 MG: 0.4 CAPSULE ORAL at 16:19

## 2023-06-19 ASSESSMENT — ACTIVITIES OF DAILY LIVING (ADL)
ADLS_ACUITY_SCORE: 76
ORAL_HYGIENE: PROMPTS;INDEPENDENT;WITH ASSISTANCE
ADLS_ACUITY_SCORE: 76
ADLS_ACUITY_SCORE: 76
DRESS: PROMPTS;WITH ASSISTANCE;INDEPENDENT
ADLS_ACUITY_SCORE: 76
HYGIENE/GROOMING: PROMPTS;INDEPENDENT;WITH ASSISTANCE
ADLS_ACUITY_SCORE: 76
ORAL_HYGIENE: INDEPENDENT;PROMPTS
ADLS_ACUITY_SCORE: 86
ADLS_ACUITY_SCORE: 76
DRESS: SCRUBS (BEHAVIORAL HEALTH)
LAUNDRY: UNABLE TO COMPLETE
ADLS_ACUITY_SCORE: 86
HYGIENE/GROOMING: INDEPENDENT;PROMPTS
ADLS_ACUITY_SCORE: 76
LAUNDRY: UNABLE TO COMPLETE
ADLS_ACUITY_SCORE: 76
ADLS_ACUITY_SCORE: 86
ADLS_ACUITY_SCORE: 76

## 2023-06-19 NOTE — PLAN OF CARE
Assessment/Intervention/Current Symtoms and Care Coordination:  Reviewed chart. Met with team to review patient's care.      Discharge Plan or Goal:  Return to [facility], [address] [place] [address] (28.4 miles away)     Barriers to Discharge:  Patient requires further psychiatric stabilization due to current symptomology      Referral Status:  Psychiatry     Legal Status:  Court Hold    County: [place]  File Number: [id number]  Sán[place] hearin/29 at 9am    Contacts:  Assisted Living: [facility], [phone]  Per H&P: Vicky Valdez , [phone], psychiatric provider Dr. Santana at [facility], [phone], primary care provider Attila Urena, DO       Upcoming Meetings and Dates/Important Information and next steps:  [place] hearing on  at 9am
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
## 2023-06-19 NOTE — PLAN OF CARE
Goal Outcome Evaluation:       Pt was spitting and hitting at staff.  When laying in bed he was banging his head on the wall ( not hard enough for any injuries).  Pt was getting increasingly more agitated difficult to redirect. Pt was delusional and disorganized.  He was administered Benadryl 50 mg and Haldol 5 mg po with good results.  He stopped spitting, hitting out at staff, and head banging. He was a wake on and off throughout the shift.  He slept 4.75 hours. Pt voided 600 ml's of dark tea colored urine, he was bladder scanned for 0 ml's at 0645 a.m.

## 2023-06-19 NOTE — PLAN OF CARE
"  Problem: Psychotic Symptoms  Goal: Psychotic Symptoms  Description: Signs and symptoms of listed problems will be absent or manageable.  Outcome: Progressing    Patient is alert and oriented to person, and place but not cooperative with medication administration. He told writer that he is refusing his medication because they are \"bad for me, they are poison and toxic\" He agreed to take his Zyprexa but refused that rest. Writer, charge RN and other RN offered his other medications on different occasion, but refused with each attempt. He refused his bladder scan initially but later allowed writer to do it and it was (125ml) at about 12pm. He told writer that he used the restroom but writer did not see any urine in the hat that was put in his restroom. Writer was able to assess his right big toe and do wound care. His toe appears to be red, and swollen with a dark blood blister. He did not complain of toe pain or any other pain. Shortly before lunch, patient came to the med window and asked for his other scheduled medications. Writer got the medication and took them to his room, patient put the medication in his mouth and after chewing them for a few seconds, he spat them on the floor and said \" that is poison, am not taking them\" and continued spitting on the floor. He told writer that he is not doing well, and when writer asked what is going on, he replied by saying \"everything is wrong\" and continued listening to music. At some point while writer and charge RN were doing his bladder scan, patient starting calling charge nurse names like \"odalys henriquez/ ervin\"  He was redirected and was able to talk about something else. He did not attend group but was out in the [facility] area for a short time pacing. Patient is tense, mood is calm and affect is blunt and irritable. He did not eat much of his breakfast, but ate all of his lunch without issues. Patient did not shower this shift. Patient denies hearing voices and all " psych symptoms of SI/HI/SIB.

## 2023-06-19 NOTE — PROGRESS NOTES
Writer along with assigned RN attempted to give scheduled meds for 0800 and 1400 multiple times, with refusals. He took the Zyprexa in the morning.   When RN brought his other AM meds around 1200, he put them in his mouth began to chew them, and then spit them on the ground.   He made some racial statements towards writer and primary RN but was redirected.   He pretended he was sleeping when his 1400 meds were brought more than once, and he was pretending that he was sleeping.

## 2023-06-20 LAB
BASOPHILS # BLD AUTO: 0.1 10E3/UL (ref 0–0.2)
BASOPHILS NFR BLD AUTO: 1 %
EOSINOPHIL # BLD AUTO: 0 10E3/UL (ref 0–0.7)
EOSINOPHIL NFR BLD AUTO: 0 %
ERYTHROCYTE [DISTWIDTH] IN BLOOD BY AUTOMATED COUNT: 14.1 % (ref 10–15)
HCT VFR BLD AUTO: 39.6 % (ref 40–53)
HGB BLD-MCNC: 12.5 G/DL (ref 13.3–17.7)
IMM GRANULOCYTES # BLD: 0.1 10E3/UL
IMM GRANULOCYTES NFR BLD: 1 %
LYMPHOCYTES # BLD AUTO: 1.4 10E3/UL (ref 0.8–5.3)
LYMPHOCYTES NFR BLD AUTO: 16 %
MCH RBC QN AUTO: 27.7 PG (ref 26.5–33)
MCHC RBC AUTO-ENTMCNC: 31.6 G/DL (ref 31.5–36.5)
MCV RBC AUTO: 88 FL (ref 78–100)
MONOCYTES # BLD AUTO: 0.9 10E3/UL (ref 0–1.3)
MONOCYTES NFR BLD AUTO: 11 %
NEUTROPHILS # BLD AUTO: 6.3 10E3/UL (ref 1.6–8.3)
NEUTROPHILS NFR BLD AUTO: 71 %
NRBC # BLD AUTO: 0 10E3/UL
NRBC BLD AUTO-RTO: 0 /100
PLATELET # BLD AUTO: 239 10E3/UL (ref 150–450)
RBC # BLD AUTO: 4.51 10E6/UL (ref 4.4–5.9)
WBC # BLD AUTO: 8.8 10E3/UL (ref 4–11)

## 2023-06-20 PROCEDURE — 999N000111 HC STATISTIC OT IP EVAL DEFER: Performed by: OCCUPATIONAL THERAPIST

## 2023-06-20 PROCEDURE — 250N000013 HC RX MED GY IP 250 OP 250 PS 637: Performed by: PSYCHIATRY & NEUROLOGY

## 2023-06-20 PROCEDURE — 99232 SBSQ HOSP IP/OBS MODERATE 35: CPT | Performed by: PSYCHIATRY & NEUROLOGY

## 2023-06-20 PROCEDURE — 36415 COLL VENOUS BLD VENIPUNCTURE: CPT | Performed by: PSYCHIATRY & NEUROLOGY

## 2023-06-20 PROCEDURE — 250N000013 HC RX MED GY IP 250 OP 250 PS 637: Performed by: PHYSICIAN ASSISTANT

## 2023-06-20 PROCEDURE — 80164 ASSAY DIPROPYLACETIC ACD TOT: CPT | Performed by: PSYCHIATRY & NEUROLOGY

## 2023-06-20 PROCEDURE — 124N000002 HC R&B MH UMMC

## 2023-06-20 PROCEDURE — 85014 HEMATOCRIT: CPT | Performed by: PSYCHIATRY & NEUROLOGY

## 2023-06-20 PROCEDURE — 250N000011 HC RX IP 250 OP 636: Performed by: PSYCHIATRY & NEUROLOGY

## 2023-06-20 RX ADMIN — OLANZAPINE 15 MG: 10 TABLET, ORALLY DISINTEGRATING ORAL at 08:00

## 2023-06-20 RX ADMIN — Medication 300 MG: at 21:13

## 2023-06-20 RX ADMIN — AMOXICILLIN AND CLAVULANATE POTASSIUM 1 TABLET: 875; 125 TABLET, FILM COATED ORAL at 21:13

## 2023-06-20 RX ADMIN — OLANZAPINE 15 MG: 10 TABLET, ORALLY DISINTEGRATING ORAL at 21:15

## 2023-06-20 RX ADMIN — DIVALPROEX SODIUM 500 MG: 125 CAPSULE, COATED PELLETS ORAL at 21:15

## 2023-06-20 RX ADMIN — CLOZAPINE 325 MG: 200 TABLET ORAL at 18:00

## 2023-06-20 RX ADMIN — DIVALPROEX SODIUM 500 MG: 125 CAPSULE, COATED PELLETS ORAL at 11:56

## 2023-06-20 RX ADMIN — AMOXICILLIN AND CLAVULANATE POTASSIUM 1 TABLET: 875; 125 TABLET, FILM COATED ORAL at 11:56

## 2023-06-20 RX ADMIN — BACITRACIN ZINC: 500 OINTMENT TOPICAL at 12:00

## 2023-06-20 RX ADMIN — DIPHENHYDRAMINE HYDROCHLORIDE 50 MG: 50 INJECTION, SOLUTION INTRAMUSCULAR; INTRAVENOUS at 17:23

## 2023-06-20 RX ADMIN — Medication 10 MG: at 21:14

## 2023-06-20 RX ADMIN — PROPRANOLOL HYDROCHLORIDE 10 MG: 10 TABLET ORAL at 21:14

## 2023-06-20 RX ADMIN — Medication 300 MG: at 11:57

## 2023-06-20 RX ADMIN — CYANOCOBALAMIN TAB 1000 MCG 1000 MCG: 1000 TAB at 11:57

## 2023-06-20 RX ADMIN — TAMSULOSIN HYDROCHLORIDE 0.4 MG: 0.4 CAPSULE ORAL at 11:59

## 2023-06-20 RX ADMIN — HALOPERIDOL LACTATE 5 MG: 5 INJECTION, SOLUTION INTRAMUSCULAR at 17:22

## 2023-06-20 ASSESSMENT — ACTIVITIES OF DAILY LIVING (ADL)
ADLS_ACUITY_SCORE: 86
LAUNDRY: UNABLE TO COMPLETE
HYGIENE/GROOMING: INDEPENDENT
DRESS: SCRUBS (BEHAVIORAL HEALTH)
DRESS: SCRUBS (BEHAVIORAL HEALTH)
ADLS_ACUITY_SCORE: 86
ADLS_ACUITY_SCORE: 66
LAUNDRY: UNABLE TO COMPLETE
ADLS_ACUITY_SCORE: 76
ADLS_ACUITY_SCORE: 76
ADLS_ACUITY_SCORE: 86
ADLS_ACUITY_SCORE: 86
HYGIENE/GROOMING: SHOWER;INDEPENDENT;PROMPTS
ADLS_ACUITY_SCORE: 86
ORAL_HYGIENE: INDEPENDENT;PROMPTS
ORAL_HYGIENE: INDEPENDENT
ADLS_ACUITY_SCORE: 76
ADLS_ACUITY_SCORE: 66
ADLS_ACUITY_SCORE: 86
ADLS_ACUITY_SCORE: 86

## 2023-06-20 NOTE — PLAN OF CARE
Defer. per chart review and discussion with interdisciplinary team. pt. having intermittent tremors, though per discussion with RN and chart notes 6/19/23- 6/20/23 pt. able to eat 80%-100 % of meals. No acute IP skilled OT needs identified at this time. OT orders completed.
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
## 2023-06-20 NOTE — PLAN OF CARE
Assessment/Intervention/Current Symtoms and Care Coordination:  Reviewed chart. Met with team to review patient's care. Called Vicky Valdez,  with [facility], and left a voicemail with an update on patient's functioning, indicating that patient is presenting with some more insight, somewhat less aggression, and some more lucidity.      Discharge Plan or Goal:  Return to [facility], [address] [place] [address] (28[place]4 miles away)     Barriers to Discharge:  Patient requires further psychiatric stabilization due to current symptomology      Referral Status:  Psychiatry     Legal Status:  Court Hold    County: [place]  File Number: [id number]  Sánchez hearin/29 at 9am    Contacts:  Assisted Living: [facility], [phone]  Per H&P: Vicky Valdez , [phone], psychiatric provider Dr. Santana at [facility], [phone], primary care provider Attila Urena, DO       Upcoming Meetings and Dates/Important Information and next steps:  Sánchez hearing on  at 9am

## 2023-06-20 NOTE — PROVIDER NOTIFICATION
"   06/20/23 1722   Restraint Type   Seclusion () Continued       RN writer attempted to offer Vinod emergency medications to target his psychotic agitation and the associated violent behavior that led to his seclusion episode.  Vinod was offered oral neuroleptic medications- which he adamantly refused to accept multiple times.  In addition, he appeared physically tense and exhibited verbally aggressive behaviors.  As such, he was given a dose of PRN Haldol 5 mg and Benadryl 50 mg by IM injection.  A \"code green\" was called and responding behavioral staff were utilized to safely administer these medications.  Will attempt to process Vinod out of seclusion once these medications have taken effect, and the patient has an opportunity to calm down.  "

## 2023-06-20 NOTE — PROVIDER NOTIFICATION
"   06/20/23 1750 06/20/23 1800   Restraint Monitoring Q2 Hours   Range of Motion   (n/a.  Pt is able to move freely in seclusion)  --    Fluids O  --    Food/Meal DI  --    Elimination D  --    Restraint Type   Seclusion (BH) Discontinued  --    Debriefing   Debriefing  --  DO   Does patient understand why the event happened?  --  No   Does patient agree to safe behaviors?  --  Patient unable to answer   What can we do differently so this doesn't happen again?  --  Patient unable to answer   Plan of care reviewed and modified  --  Yes       Vinod appears much less paranoid and agitated.  He allowed RN writer to assist him with eating and drinking the food and fluids from his dinner tray.  He ate 100% of his tray and reported that he is \"feeling better\".  He accepted his HS dose of Clozapine, which was given early at 18:00.  He is too disorganized to adequately engage in formal debriefing at this time.  Given that he is longer exhibiting agitation or violent behaviors, his seclusion episode was discontinued.  Will continue to monitor closely.  "
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
## 2023-06-20 NOTE — PLAN OF CARE
"Goal Outcome Evaluation:                                                                              Plan of Care Reviewed With: patient      Pt was is his room at the beginning of the shift and later came out and requested his medications. His 1400 and some of 0800 medications was administered. Pt later received his HS clozapine early after getting an approval from Dr. Hillman. Pt later relaxed in his room and started having self injury behavior by trying to ashley into his eyes but quickly redirected by SIO staff. Pt paced the hallway with this writer and when pt attempts to touch his eyes he was quickly redirect. Pt stated \"you think that you can stop me from doing this\"?    Pt denies all mental health psych symptoms and contracted for safety in a dismissive way. Pt speech illogical and disorganized, sometime rambling.  Pt cooperated with all medication and pt took his meds with pudding and only side effect observed was tremor and excess saliva which is not a new side effects.   Medically, pt voided 450 ml about 1920 and pt scanned for 368 ml at 2045. Pt strongly encouraged to void and pt voided 375 ml at 2051 and some spilled on his pants. Pt cleaned and scant amount of serous sanguineous blood observed in pt underwear. Pt urine inspected and was clear yellow without blood. Pt not cooperative to assess his groin area. Pt ate 75% of dinner and 100% of snack, no BM issue per pt and even breathing pattern with stable vital sign. /68 (BP Location: Left arm, Patient Position: Sitting, Cuff Size: Adult Regular)   Pulse 91   Temp 98.5  F (36.9  C) (Temporal)   Resp 16   SpO2 96%                                                                                                                                                                                                                                                                                                                                                   "

## 2023-06-20 NOTE — PLAN OF CARE
Problem: Sleep Disturbance  Goal: Adequate Sleep/Rest  Outcome: Progressing   Goal Outcome Evaluation:    Patient slept 4.75 during the night. Patient is on SIO for self injury risk, and every 15 minutes safety checks was done. Had no outburst behavior noted no PRN was given. No concern noted. Bladder  scanned done at 0605 am 429 ml noted patient use the bath room voided 600 ml of urine  tea color.Will continue to monitor.  Patient refuse his Lab done.
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
## 2023-06-20 NOTE — PROVIDER NOTIFICATION
06/20/23 1600   Seclusion or Restraint Order   In Person Face to Face Assessment Conducted Yes-Eval of pt's immediate situation, reaction to intervention, complete review of systems assessment, behavioral assessment & review/assessment of hx, drugs & meds, recent labs, etc, behavioral condition, need to continue/terminate restraint/seclusion   Patient Experienced No adverse physical outcome from seclusion/restraint initiation   Continuation of Seclus/Restraint indicated at this time Yes     In person face to face assessment completed with no observed or reported adverse physical outcome. Upon assessment patient pacing in his room, appearing tense in affect. RN writer attempted to talk to the patient, he responded with profanity towards the writer then spit on the floor and walked away avoiding further interaction. Seclusion continued at this time with on call provider notified of results of face to face. Will continue to monitor.

## 2023-06-21 LAB — VALPROATE SERPL-MCNC: 30.2 UG/ML

## 2023-06-21 PROCEDURE — 250N000013 HC RX MED GY IP 250 OP 250 PS 637: Performed by: PSYCHIATRY & NEUROLOGY

## 2023-06-21 PROCEDURE — 250N000011 HC RX IP 250 OP 636: Mod: JZ | Performed by: PSYCHIATRY & NEUROLOGY

## 2023-06-21 PROCEDURE — 124N000002 HC R&B MH UMMC

## 2023-06-21 PROCEDURE — 99233 SBSQ HOSP IP/OBS HIGH 50: CPT | Performed by: PSYCHIATRY & NEUROLOGY

## 2023-06-21 PROCEDURE — 250N000013 HC RX MED GY IP 250 OP 250 PS 637: Performed by: PHYSICIAN ASSISTANT

## 2023-06-21 RX ORDER — DIVALPROEX SODIUM 125 MG/1
500 CAPSULE, COATED PELLETS ORAL DAILY
Status: DISCONTINUED | OUTPATIENT
Start: 2023-06-22 | End: 2023-07-03

## 2023-06-21 RX ORDER — DIVALPROEX SODIUM 125 MG/1
1000 CAPSULE, COATED PELLETS ORAL AT BEDTIME
Status: DISCONTINUED | OUTPATIENT
Start: 2023-06-21 | End: 2023-07-28

## 2023-06-21 RX ADMIN — PROPRANOLOL HYDROCHLORIDE 10 MG: 10 TABLET ORAL at 08:52

## 2023-06-21 RX ADMIN — Medication 300 MG: at 14:14

## 2023-06-21 RX ADMIN — OLANZAPINE 15 MG: 10 TABLET, ORALLY DISINTEGRATING ORAL at 08:51

## 2023-06-21 RX ADMIN — BACITRACIN ZINC: 500 OINTMENT TOPICAL at 09:21

## 2023-06-21 RX ADMIN — OLANZAPINE 10 MG: 10 INJECTION, POWDER, FOR SOLUTION INTRAMUSCULAR at 21:53

## 2023-06-21 RX ADMIN — CYANOCOBALAMIN TAB 1000 MCG 1000 MCG: 1000 TAB at 08:51

## 2023-06-21 RX ADMIN — POLYETHYLENE GLYCOL 3350 17 G: 17 POWDER, FOR SOLUTION ORAL at 08:53

## 2023-06-21 RX ADMIN — TAMSULOSIN HYDROCHLORIDE 0.4 MG: 0.4 CAPSULE ORAL at 08:51

## 2023-06-21 RX ADMIN — DIVALPROEX SODIUM 500 MG: 125 CAPSULE, COATED PELLETS ORAL at 08:50

## 2023-06-21 RX ADMIN — PROPRANOLOL HYDROCHLORIDE 10 MG: 10 TABLET ORAL at 14:14

## 2023-06-21 RX ADMIN — Medication 300 MG: at 08:51

## 2023-06-21 RX ADMIN — AMOXICILLIN AND CLAVULANATE POTASSIUM 1 TABLET: 875; 125 TABLET, FILM COATED ORAL at 08:51

## 2023-06-21 ASSESSMENT — ACTIVITIES OF DAILY LIVING (ADL)
ORAL_HYGIENE: INDEPENDENT
ADLS_ACUITY_SCORE: 66
ADLS_ACUITY_SCORE: 66
HYGIENE/GROOMING: INDEPENDENT
ADLS_ACUITY_SCORE: 66
DRESS: INDEPENDENT
ADLS_ACUITY_SCORE: 66
LAUNDRY: UNABLE TO COMPLETE
DRESS: INDEPENDENT
ADLS_ACUITY_SCORE: 66
HYGIENE/GROOMING: INDEPENDENT
ADLS_ACUITY_SCORE: 66
ADLS_ACUITY_SCORE: 66
ORAL_HYGIENE: INDEPENDENT
ADLS_ACUITY_SCORE: 66
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
## 2023-06-21 NOTE — PLAN OF CARE
Pt  awake at start of shift.     Breathing quiet and unlabored when sleeping.     Pt had no c/o pain or discomfort during the HS.     Appears to have slept 5 hours.     Pt continues on 2:1SIO/5ft space distance.    Patient c/o of a stomach ache and  was encouraged to attempt to void by PA. Patient voided a large amount and appeared to fee some relief after voiding.     Patient declined a bladder scan and any PRN medication to facilitate sleep.     Goal Outcome Evaluation:  Problem: Psychotic Signs/Symptoms  Goal: Improved Sleep (Psychotic Signs/Symptoms)  Outcome: Progressing     Problem: Sleep Disturbance  Goal: Adequate Sleep/Rest  Outcome: Progressing
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
## 2023-06-21 NOTE — PLAN OF CARE
Assessment/Intervention/Current Symtoms and Care Coordination:  Reviewed chart. Met with team to review patient's care. Observed patient attending groups and spending time in the milieu, suggesting some progress in daily functioning.      Discharge Plan or Goal:  Return to [facility] [place], [address] [place] [address] (28[place]4 miles away)     Barriers to Discharge:  Patient requires further psychiatric stabilization due to current symptomology      Referral Status:  Psychiatry     Legal Status:  Court Hold    [facility]: [place]  File Number: [id number]  Sánchez hearin/29 at 9am    Contacts:  Assisted Living: [facility], [phone]  Per H&P: Vicky Valdez , [phone], psychiatric provider Dr. Santana at [facility], [phone], primary care provider Attila Urena, DO       Upcoming Meetings and Dates/Important Information and next steps:  Sánchez hearing on  at 9am

## 2023-06-21 NOTE — CARE PLAN
"   06/21/23 1400   General Information   Date Initially Attended OT 06/21/23 06/21/23 1446   Group Therapy Session   Group Attendance attended group session   Time Session Began 1315   Time Session Ended 1415   Total Time (minutes) 18 (No charge)   Total # Attendees 3   Group Type Occupational Therapy   Group Topic Covered balanced lifestyle;coping skills/lifestyle management;leisure exploration/use of leisure time;relaxation techniques   Group Session Detail OT Clinic Group   Patient Participation Detail Intervention: OT Clinic with 2 peers. Pt participated in a OT Clinic group to facilitate coping skills exploration and creative expression through personally meaningful activities, and to encourage utilization of these healthy coping skills to promote overall health and wellness. Group included clinical observation of social, cognitive and kinesthetic performance skills to inform treatment and safe discharge planning.    Patient Response: Patient wandered over to table where group was happening and took interest in a coloring project. Selected one of 4 options to begin working on and selected colors for each area of the picture. Did well staying within the lines for the most part. Became more frustrated towards the end as tremors in his hands made it more difficulty and stated \"I give up all together\". Writer encouraged patient that it is okay to take a break when needed and that the project will be saved to be able to work on in future groups. Was somewhat self-deprecating in nature regarding comments on the work completed on the project. Was not accepting of all positive feedback offered by writer and other staff, stating \"don't offer me platitudes\".     Mood/Affect: Pleasant overall    Plan: Patient encouraged to maintain attendance for continued ongoing support in working towards occupational therapy goals to support overall treatment/care.          "
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
## 2023-06-22 LAB
BASOPHILS # BLD AUTO: 0.1 10E3/UL (ref 0–0.2)
BASOPHILS NFR BLD AUTO: 1 %
EOSINOPHIL # BLD AUTO: 0 10E3/UL (ref 0–0.7)
EOSINOPHIL NFR BLD AUTO: 0 %
ERYTHROCYTE [DISTWIDTH] IN BLOOD BY AUTOMATED COUNT: 14.2 % (ref 10–15)
HCT VFR BLD AUTO: 38.5 % (ref 40–53)
HGB BLD-MCNC: 12.4 G/DL (ref 13.3–17.7)
IMM GRANULOCYTES # BLD: 0 10E3/UL
IMM GRANULOCYTES NFR BLD: 0 %
LYMPHOCYTES # BLD AUTO: 1.1 10E3/UL (ref 0.8–5.3)
LYMPHOCYTES NFR BLD AUTO: 13 %
MCH RBC QN AUTO: 28 PG (ref 26.5–33)
MCHC RBC AUTO-ENTMCNC: 32.2 G/DL (ref 31.5–36.5)
MCV RBC AUTO: 87 FL (ref 78–100)
MONOCYTES # BLD AUTO: 0.7 10E3/UL (ref 0–1.3)
MONOCYTES NFR BLD AUTO: 8 %
NEUTROPHILS # BLD AUTO: 7 10E3/UL (ref 1.6–8.3)
NEUTROPHILS NFR BLD AUTO: 78 %
NRBC # BLD AUTO: 0 10E3/UL
NRBC BLD AUTO-RTO: 0 /100
PLATELET # BLD AUTO: 222 10E3/UL (ref 150–450)
RBC # BLD AUTO: 4.43 10E6/UL (ref 4.4–5.9)
WBC # BLD AUTO: 9 10E3/UL (ref 4–11)

## 2023-06-22 PROCEDURE — 99233 SBSQ HOSP IP/OBS HIGH 50: CPT | Mod: GC | Performed by: PSYCHIATRY & NEUROLOGY

## 2023-06-22 PROCEDURE — 250N000011 HC RX IP 250 OP 636: Mod: JZ | Performed by: PSYCHIATRY & NEUROLOGY

## 2023-06-22 PROCEDURE — 36415 COLL VENOUS BLD VENIPUNCTURE: CPT | Performed by: PSYCHIATRY & NEUROLOGY

## 2023-06-22 PROCEDURE — 250N000013 HC RX MED GY IP 250 OP 250 PS 637: Performed by: STUDENT IN AN ORGANIZED HEALTH CARE EDUCATION/TRAINING PROGRAM

## 2023-06-22 PROCEDURE — 250N000013 HC RX MED GY IP 250 OP 250 PS 637: Performed by: PSYCHIATRY & NEUROLOGY

## 2023-06-22 PROCEDURE — 124N000002 HC R&B MH UMMC

## 2023-06-22 PROCEDURE — 250N000013 HC RX MED GY IP 250 OP 250 PS 637: Performed by: PHYSICIAN ASSISTANT

## 2023-06-22 PROCEDURE — 85025 COMPLETE CBC W/AUTO DIFF WBC: CPT | Performed by: PSYCHIATRY & NEUROLOGY

## 2023-06-22 RX ADMIN — Medication 300 MG: at 20:17

## 2023-06-22 RX ADMIN — Medication 300 MG: at 14:39

## 2023-06-22 RX ADMIN — PROPRANOLOL HYDROCHLORIDE 10 MG: 10 TABLET ORAL at 20:18

## 2023-06-22 RX ADMIN — PROPRANOLOL HYDROCHLORIDE 10 MG: 10 TABLET ORAL at 14:39

## 2023-06-22 RX ADMIN — ACETAMINOPHEN 650 MG: 325 TABLET, FILM COATED ORAL at 07:01

## 2023-06-22 RX ADMIN — Medication 10 MG: at 20:17

## 2023-06-22 RX ADMIN — OLANZAPINE 10 MG: 10 INJECTION, POWDER, FOR SOLUTION INTRAMUSCULAR at 10:46

## 2023-06-22 RX ADMIN — DIVALPROEX SODIUM 1000 MG: 125 CAPSULE, COATED PELLETS ORAL at 20:35

## 2023-06-22 RX ADMIN — OLANZAPINE 15 MG: 10 TABLET, ORALLY DISINTEGRATING ORAL at 20:18

## 2023-06-22 RX ADMIN — AMOXICILLIN AND CLAVULANATE POTASSIUM 1 TABLET: 875; 125 TABLET, FILM COATED ORAL at 20:18

## 2023-06-22 RX ADMIN — CLOZAPINE 350 MG: 200 TABLET ORAL at 12:08

## 2023-06-22 ASSESSMENT — ACTIVITIES OF DAILY LIVING (ADL)
ADLS_ACUITY_SCORE: 66
ORAL_HYGIENE: INDEPENDENT
ADLS_ACUITY_SCORE: 66
ADLS_ACUITY_SCORE: 66
DRESS: INDEPENDENT
ADLS_ACUITY_SCORE: 66
DRESS: INDEPENDENT
HYGIENE/GROOMING: INDEPENDENT
ADLS_ACUITY_SCORE: 66
LAUNDRY: UNABLE TO COMPLETE
ADLS_ACUITY_SCORE: 66
ADLS_ACUITY_SCORE: 66
LAUNDRY: UNABLE TO COMPLETE
ORAL_HYGIENE: INDEPENDENT
ADLS_ACUITY_SCORE: 66
HYGIENE/GROOMING: INDEPENDENT

## 2023-06-22 NOTE — PLAN OF CARE
Assessment/Intervention/Current Symtoms and Care Coordination:  Reviewed chart. Met with team to review patient's care. Patient appears less aggressive as evidenced by more time spent in the milieu with peers.      Discharge Plan or Goal:  Return to [facility], [address] [place] [address] (28.4 miles away)     Barriers to Discharge:  Patient requires further psychiatric stabilization due to current symptomology      Referral Status:  Psychiatry     Legal Status:  Court Hold    County: [place]  File Number: [id number]  Sánchez hearin/29 at 9am    Contacts:  Assisted Living: [facility], [phone]  Per H&P: Vicky Valdez , [phone], psychiatric provider Dr. Santana at [facility], [phone], primary care provider Attila Urena, DO       Upcoming Meetings and Dates/Important Information and next steps:  Sánchez hearing on  at 9am
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
## 2023-06-22 NOTE — PROVIDER NOTIFICATION
06/22/23 1048   Seclusion or Restraint Order   In Person Face to Face Assessment Conducted Yes-Eval of pt's immediate situation, reaction to intervention, complete review of systems assessment, behavioral assessment & review/assessment of hx, drugs & meds, recent labs, etc, behavioral condition, need to continue/terminate restraint/seclusion   Patient Experienced No adverse physical outcome from seclusion/restraint initiation   Continuation of Seclus/Restraint indicated at this time No   Within an hour after restraint an in person face to face assessment was completed at 1048, including an evaluation of the patient's immediate reaction to the intervention, behavioral assessment and review/assessment of history, drugs and medications, recent labs, etc., and behavioral condition.  The patient experienced: no adverse sequelae.  The intervention of restraint needs to terminate.  Pt  refused oral Pozo medication. Code green called to assist with IM medication. Team assisted pt to lay down on bed on left side for IM medication. Pt did not resist. Medication given w/o incident. Pt remained in behavioral control and was left sitting on bed. No c/o voiced by patient. Provider notified of face to face assessment findings..
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
## 2023-06-22 NOTE — PROVIDER NOTIFICATION
"   06/21/23 2200   Seclusion or Restraint Order   In Person Face to Face Assessment Conducted Yes-Eval of pt's immediate situation, reaction to intervention, complete review of systems assessment, behavioral assessment & review/assessment of hx, drugs & meds, recent labs, etc, behavioral condition, need to continue/terminate restraint/seclusion       RN writer conducted a chart review including but not limited to: MAR (both scheduled medications and PRN medications given in the last few days); recent lab results; recent vital signs; past medical history (both acute and chronic physical health conditions).  Physiological abnormalities revealed in this chart review that can account for this patient s non compliant behavior could be related to delusional thinking in concerns to his medications. During face to face patient requested for a screw  to help \"Get the nuts and bolts out of his room.\". Continuation of seclusion is not indicated at this time. Only a brief physical hold for medication administration was necessary. No apparent injury occurred during the physical hold.  This assessment, as documented above, was reviewed with the patient s attending psychiatrist, Dr. Rhodes.  Will continue to monitor closely.  "

## 2023-06-22 NOTE — PLAN OF CARE
"Nursing Assessment    Recent Vitals: B/P: 128/79, T: 97.4, P: 101, R: 17    General Shift Summary  Patient has been in and out of his room. During the morning he repeated and yelled \"Shave, shave, shave\" dozens of times. When offering him the razor he would yell \"impossible\". Patient voiced wanting a straight razor to get a closer shave but he was declined this. He urinated in his restroom around 0800 and once around 1015. Amount was not captured since the hat was not in the toilet. Each occurrence looked to be a moderate amount of urine. Patient sat in his bathroom for at least half an hour repeating \"Fuck me, fuck me in the ass, fuck me, fuck me in the butt\". He frequently has spit on his floor or in the toilet saying there is acid in him. A code green was called after patient continued to refuse his morning medications at 1040. He received IM Zyprexa during this time, see prior note. Incite and judgement are poor. Patient is disoriented to situation. Concentration is poor.    Writer attempted to bladder scan patient at 1430 however the scanner continued to power down when attempting to scan. Bladder scan is charging.    Hygiene is fair. He is eating well.    Tamar Mars, RN MSN  "

## 2023-06-22 NOTE — PLAN OF CARE
"  Problem: Suicidal Behavior  Goal: Suicidal Behavior is Absent or Managed  Outcome: Progressing  Note: Patient manifests no suicidal behavior     Problem: Sleep Disturbance  Goal: Adequate Sleep/Rest  Outcome: Progressing  Note: Patient observed sleeping during the safety rounds     Problem: Behavioral Disturbance  Goal: Behavioral Disturbance  Description: Signs and symptoms of listed problems will be absent or manageable by discharge or transition of care.  Outcome: Progressing  Note: Patient manifests no gross behavioral disturbance   Goal Outcome Evaluation:       Patient slept minimally well without manifesting disruptive behavior, Patient complaints of chest pain upon awakening at 0645. VS B/P 132/74, HR 89, Resp 18, 02 sat 99%% R/A Temp 97.6. Patient described the pain as \"very small and non radiating.\"   Patient was given prn tylenol 650 mg, writer went back to ask the patient to rate the pain on a scale of 1-10 and patient states \"0\" when asked if the tylenol works so fast to bring the pain down to 0, patient responds \"not the tylenol, must be something else.\" patient looks comfortable at this time. Will continue to monitor.         García Up DNP, RN    "

## 2023-06-22 NOTE — PROVIDER NOTIFICATION
Patient alternates between sitting on his bed, lying down and pacing in his room. He is still tense, restless and respond with profanity each time writer tried to talk to him or offer medication.
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
## 2023-06-23 PROCEDURE — 250N000013 HC RX MED GY IP 250 OP 250 PS 637: Performed by: PSYCHIATRY & NEUROLOGY

## 2023-06-23 PROCEDURE — 99233 SBSQ HOSP IP/OBS HIGH 50: CPT | Performed by: PSYCHIATRY & NEUROLOGY

## 2023-06-23 PROCEDURE — 124N000002 HC R&B MH UMMC

## 2023-06-23 PROCEDURE — 93010 ELECTROCARDIOGRAM REPORT: CPT | Performed by: INTERNAL MEDICINE

## 2023-06-23 PROCEDURE — 93005 ELECTROCARDIOGRAM TRACING: CPT

## 2023-06-23 PROCEDURE — 250N000013 HC RX MED GY IP 250 OP 250 PS 637: Performed by: PHYSICIAN ASSISTANT

## 2023-06-23 RX ORDER — CLOZAPINE 150 MG/1
450 TABLET, ORALLY DISINTEGRATING ORAL DAILY
Status: DISCONTINUED | OUTPATIENT
Start: 2023-06-26 | End: 2023-07-05

## 2023-06-23 RX ORDER — SENNOSIDES 8.6 MG
8.6 TABLET ORAL 2 TIMES DAILY
Status: DISCONTINUED | OUTPATIENT
Start: 2023-06-23 | End: 2023-07-31

## 2023-06-23 RX ADMIN — DIVALPROEX SODIUM 1000 MG: 125 CAPSULE, COATED PELLETS ORAL at 19:50

## 2023-06-23 RX ADMIN — CLOZAPINE 375 MG: 100 TABLET, ORALLY DISINTEGRATING ORAL at 19:36

## 2023-06-23 RX ADMIN — CYANOCOBALAMIN TAB 1000 MCG 1000 MCG: 1000 TAB at 08:28

## 2023-06-23 RX ADMIN — SENNOSIDES 8.6 MG: 8.6 TABLET ORAL at 19:18

## 2023-06-23 RX ADMIN — OLANZAPINE 15 MG: 10 TABLET, ORALLY DISINTEGRATING ORAL at 19:18

## 2023-06-23 RX ADMIN — Medication 300 MG: at 15:03

## 2023-06-23 RX ADMIN — OLANZAPINE 10 MG: 5 TABLET, FILM COATED ORAL at 02:23

## 2023-06-23 RX ADMIN — PROPRANOLOL HYDROCHLORIDE 10 MG: 10 TABLET ORAL at 15:03

## 2023-06-23 RX ADMIN — POLYETHYLENE GLYCOL 3350 17 G: 17 POWDER, FOR SOLUTION ORAL at 08:29

## 2023-06-23 RX ADMIN — Medication 300 MG: at 08:27

## 2023-06-23 RX ADMIN — PROPRANOLOL HYDROCHLORIDE 10 MG: 10 TABLET ORAL at 19:18

## 2023-06-23 RX ADMIN — DIVALPROEX SODIUM 500 MG: 125 CAPSULE, COATED PELLETS ORAL at 08:27

## 2023-06-23 RX ADMIN — AMOXICILLIN AND CLAVULANATE POTASSIUM 1 TABLET: 875; 125 TABLET, FILM COATED ORAL at 08:27

## 2023-06-23 RX ADMIN — Medication 300 MG: at 19:18

## 2023-06-23 RX ADMIN — AMOXICILLIN AND CLAVULANATE POTASSIUM 1 TABLET: 875; 125 TABLET, FILM COATED ORAL at 19:18

## 2023-06-23 RX ADMIN — TAMSULOSIN HYDROCHLORIDE 0.4 MG: 0.4 CAPSULE ORAL at 08:27

## 2023-06-23 RX ADMIN — OLANZAPINE 15 MG: 10 TABLET, ORALLY DISINTEGRATING ORAL at 08:29

## 2023-06-23 RX ADMIN — Medication 10 MG: at 19:18

## 2023-06-23 ASSESSMENT — ACTIVITIES OF DAILY LIVING (ADL)
ADLS_ACUITY_SCORE: 66
DRESS: SCRUBS (BEHAVIORAL HEALTH)
HYGIENE/GROOMING: INDEPENDENT
LAUNDRY: UNABLE TO COMPLETE
ADLS_ACUITY_SCORE: 66
ORAL_HYGIENE: INDEPENDENT
ADLS_ACUITY_SCORE: 66
HYGIENE/GROOMING: INDEPENDENT
ORAL_HYGIENE: INDEPENDENT
ADLS_ACUITY_SCORE: 66
ADLS_ACUITY_SCORE: 66
DRESS: SCRUBS (BEHAVIORAL HEALTH)
ADLS_ACUITY_SCORE: 66
LAUNDRY: UNABLE TO COMPLETE
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
## 2023-06-23 NOTE — PLAN OF CARE
Problem: Psychotic Symptoms  Goal: Psychotic Symptoms  Description: Signs and symptoms of listed problems will be absent or manageable.  Outcome: Progressing      Patient continues to avoid social interaction with peers, spent the beginning of the evening in his room talking to SIO staff, he was also observed pacing in his room. Patient agreed to shower and spent about one hour in the shower room, reported urinating in the shower. During shift assessment writer notice bilateral upper extremities tremors but patient denies pain or discomfort. Patient endorses internal stimuli and was observed talking to himself on multiple occasions. Patient ate about 75% of dinner with no nausea or stomach discomfort. Patient has dressing intact to right great toe, no drainage noted at site and refused further check from writer. Patient on clozapine taper and clozapine 375 mg given today at 1936, patient remained medication compliant and no cheeking noted. Patient shower again around 1958 and told staff that he voided large amount in the shower, refused bladder when offer twice by writer. Patient had an eventful evening, he started sleeping around 2130. On SIO 2:1 for disrobing, hypersexuality and masturbation.      /66 (BP Location: Left arm, Patient Position: Supine, Cuff Size: Adult Regular)   Pulse 67   Temp 97.1  F (36.2  C)   Resp 16   SpO2 95%
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
## 2023-06-23 NOTE — PLAN OF CARE
Assessment/Intervention/Current Symtoms and Care Coordination:  Reviewed chart. Met with team to review patient's care. Called and emailed Efren Miner with [facility] to set up a Zoom interview for the examination, left voicemail at 12:52pm. Tentative examination set for Monday,  at 1pm, will update when more information is received from the examiner.     Discharge Plan or Goal:  Return to [facility], [address] [place] [address] (28[place]4 miles away)     Barriers to Discharge:  Patient requires further psychiatric stabilization due to current symptomology      Referral Status:  Psychiatry     Legal Status:  Court Hold    County: [place]  File Number: [id number]  Sánchez hearin/29 at 9am    Contacts:  Assisted Living: [facility], [phone]  Per H&P: Vicky Valdez , [phone], psychiatric provider Dr. Santana at [facility], [phone], primary care provider Attila Urena, DO    Upcoming Meetings and Dates/Important Information and next steps:  Sánchez hearing on  at 9am

## 2023-06-23 NOTE — PLAN OF CARE
"  Problem: Sleep Disturbance  Goal: Adequate Sleep/Rest  Outcome: Not Progressing  Note: Patient has been sleeping on /off, more awake than he slept thus far     Problem: Behavioral Disturbance  Goal: Behavioral Disturbance  Description: Signs and symptoms of listed problems will be absent or manageable by discharge or transition of care.  Outcome: Progressing  Note: Patient manifests no gross behavioral disturbance, medicated for some agitation.    Goal Outcome Evaluation:       Patient slept on/off, marred his sleep with occasional wakefulness, patient appears delusional, confused as he continued to request for shaver to shave his already shaved hair, also requesting to shower at 0200 and remains perseverative on these requests. Patient also vacillated between his room and the hallway, talking loud, appears agitated when redirected using \"f\" words freely. Patient was offered prn Zyprexa 10 mg at about 0223 with good effect. No further behavioral manifestation. Patient continues to have SIO 2:1 staff monitoring. Achieved 3.75 hours of sleep, voided in the bathroom early into the shift, unable to bladder scan patient now as he seems to be enjoying his sleep. Will do that when patient is awake.             García Up DNP, RN  "

## 2023-06-23 NOTE — PROGRESS NOTES
Foam wedge offered to pt to use as a back rest against his wall while sitting up in bed to increase comfort.  Pt declined, though was accepting of having it left in room if he would like to try it later.    Also available for activity - balloon, pool noodle for movement/exercise/ seated activity (supplies kept in office area).

## 2023-06-23 NOTE — PLAN OF CARE
Problem: Psychotic Symptoms  Goal: Psychotic Symptoms  Description: Signs and symptoms of listed problems will be absent or manageable.  Outcome: Progressing.    Patient was up pacing in the [facility] area at the start of this shift. He was compliant with medication administration and vitals check. Writer assisted him with breakfast and during that time administered his medication with pudding. He is alert and oriented to person and place with a flat, guarded affect. His mood is calm and speech is normal. He was in his room relaxing for most of this shift, and had did not have behavioral issues. He was encouraged to take a shower, but declined. He did not attend group and did not interact with peers. Patient denies hearing voices, SI, HI, SIB, anxiety and paranoid thinking.      Writer completed wound care on his right toe. Patient's toe is still swollen, but there was no drainage or blood present. The skin around the toe is clean, dry and intact. He did not complain of pain and no prn given. EKG was done and the result showed normal sinus rhythm, left bundle block and QT/QTc 414/506. His morning dose of propranolol was not given because his blood pressure did not meet parameter. Patient's clozapine administration time was change from bedtime to daily at 1200. The reason of the time change is so that nurses can attempt to give it before night time. Patient refused his bladder scan on multiple attempts. Writer tried again to complete patient bladder scan and he agreed this time. However, as writer was doing the scan, patient told writer that he wants to urinate and defecate. It was overall a good shift.

## 2023-06-24 PROCEDURE — 250N000013 HC RX MED GY IP 250 OP 250 PS 637: Performed by: PSYCHIATRY & NEUROLOGY

## 2023-06-24 PROCEDURE — 124N000002 HC R&B MH UMMC

## 2023-06-24 PROCEDURE — 250N000013 HC RX MED GY IP 250 OP 250 PS 637: Performed by: PHYSICIAN ASSISTANT

## 2023-06-24 RX ORDER — ATROPINE SULFATE 10 MG/ML
1 SOLUTION/ DROPS OPHTHALMIC 2 TIMES DAILY PRN
Status: DISCONTINUED | OUTPATIENT
Start: 2023-06-24 | End: 2023-11-13 | Stop reason: HOSPADM

## 2023-06-24 RX ADMIN — SENNOSIDES 8.6 MG: 8.6 TABLET ORAL at 20:32

## 2023-06-24 RX ADMIN — OLANZAPINE 15 MG: 10 TABLET, ORALLY DISINTEGRATING ORAL at 20:32

## 2023-06-24 RX ADMIN — OLANZAPINE 15 MG: 10 TABLET, ORALLY DISINTEGRATING ORAL at 08:18

## 2023-06-24 RX ADMIN — DIVALPROEX SODIUM 500 MG: 125 CAPSULE, COATED PELLETS ORAL at 11:56

## 2023-06-24 RX ADMIN — PROPRANOLOL HYDROCHLORIDE 10 MG: 10 TABLET ORAL at 14:57

## 2023-06-24 RX ADMIN — POLYETHYLENE GLYCOL 3350 17 G: 17 POWDER, FOR SOLUTION ORAL at 08:19

## 2023-06-24 RX ADMIN — Medication 10 MG: at 20:31

## 2023-06-24 RX ADMIN — Medication 300 MG: at 14:57

## 2023-06-24 RX ADMIN — Medication 300 MG: at 08:19

## 2023-06-24 RX ADMIN — CYANOCOBALAMIN TAB 1000 MCG 1000 MCG: 1000 TAB at 08:18

## 2023-06-24 RX ADMIN — TAMSULOSIN HYDROCHLORIDE 0.4 MG: 0.4 CAPSULE ORAL at 08:19

## 2023-06-24 RX ADMIN — PROPRANOLOL HYDROCHLORIDE 10 MG: 10 TABLET ORAL at 08:18

## 2023-06-24 RX ADMIN — PROPRANOLOL HYDROCHLORIDE 10 MG: 10 TABLET ORAL at 20:31

## 2023-06-24 RX ADMIN — SENNOSIDES 8.6 MG: 8.6 TABLET ORAL at 08:19

## 2023-06-24 RX ADMIN — CLOZAPINE 400 MG: 100 TABLET, ORALLY DISINTEGRATING ORAL at 11:57

## 2023-06-24 ASSESSMENT — ACTIVITIES OF DAILY LIVING (ADL)
ADLS_ACUITY_SCORE: 66
LAUNDRY: UNABLE TO COMPLETE
ADLS_ACUITY_SCORE: 86
ADLS_ACUITY_SCORE: 86
DRESS: SCRUBS (BEHAVIORAL HEALTH)
ORAL_HYGIENE: INDEPENDENT;PROMPTS
ADLS_ACUITY_SCORE: 66
ADLS_ACUITY_SCORE: 66
ADLS_ACUITY_SCORE: 86
ADLS_ACUITY_SCORE: 66
ADLS_ACUITY_SCORE: 66
HYGIENE/GROOMING: INDEPENDENT;PROMPTS
ADLS_ACUITY_SCORE: 66
HYGIENE/GROOMING: INDEPENDENT
ADLS_ACUITY_SCORE: 66
ORAL_HYGIENE: INDEPENDENT
DRESS: SCRUBS (BEHAVIORAL HEALTH);INDEPENDENT;PROMPTS
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
## 2023-06-24 NOTE — PROVIDER NOTIFICATION
06/24/23 1300   Seclusion or Restraint Order   In Person Face to Face Assessment Conducted Yes-Eval of pt's immediate situation, reaction to intervention, complete review of systems assessment, behavioral assessment & review/assessment of hx, drugs & meds, recent labs, etc, behavioral condition, need to continue/terminate restraint/seclusion     Face to face completed. No acute distress observed. Patient denies pain or discomfort regarding the incident. Labs/vitals reviewed. Provided updated regarding the findings.

## 2023-06-24 NOTE — PLAN OF CARE
Problem: Adult Behavioral Health Plan of Care  Goal: Develops/Participates in [facility] [place] to Support Successful Transition  Intervention: Foster [facility] [place]  Recent Flowsheet Documentation  Taken 6/24/2023 1700 by Cecille Burris RN  Trust Relationship/Rapport:    care explained    choices provided    emotional support provided    empathic listening provided    questions answered    questions encouraged    reassurance provided    thoughts/feelings acknowledged   Goal Outcome Evaluation:    Plan of Care Reviewed With: patient        Pt had an uneventful shift. He denied all mental health symptoms including AVH, but did appear to be responding. He continues with the negative self talk, and negative talk towards others as well. Pt remained in his room most of the shift. He was calm and laid on his bed. He came out after dinner and sat on the ground, calmly stating that he did not want to get up. Writer was able to convince the pt to get up, with pudding. He went t his room, and meds were administered with pudding. The only meds he did not take was the gabapentin and the Depakote.   He allowed for vitals and bladder scan (232 mL).   He denied pain, ate and drank well, and had no other acute physical or behavioral concerns this shift.    /76 (BP Location: Right arm, Patient Position: Supine, Cuff Size: Adult Regular)   Pulse 92   Temp 97.2  F (36.2  C) (Temporal)   Resp 16   SpO2 99%
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
## 2023-06-24 NOTE — PROVIDER NOTIFICATION
06/24/23 1334   Restraint Type   Seclusion (BH) Discontinued   Debriefing   Debriefing DO   Does patient understand why the event happened? Patient unable to answer   Does patient agree to safe behaviors? Patient unable to answer   What can we do differently so this doesn't happen again?   (patient was unable to participate; continue providing reassurance and support.)   Plan of care reviewed and modified Yes     Patient is lying in bed sleeping calmly. He does not appear to be agitated or threatening. Patient unable to answer safety question at this time. Seclusion is discontinued.

## 2023-06-24 NOTE — PLAN OF CARE
Problem: Psychotic Symptoms  Goal: Psychotic Symptoms  Description: Signs and symptoms of listed problems will be absent or manageable.  Outcome: Progressing     Problem: Behavioral Disturbance  Goal: Behavioral Disturbance  Description: Signs and symptoms of listed problems will be absent or manageable by discharge or transition of care.  Outcome: Progressing  Note: Patient manifests no suicidal behavior   Goal Outcome Evaluation:       Patient slept on/off but more awake than he slept (3.5 hours), patient declined medication intervention, up and briefly came out in the hallway on few occasions during which time patient appeared talkative and loud but was redirectable by the SIO staff. Otherwise no gross behavior.           García pU, DNP, RN

## 2023-06-25 PROCEDURE — 250N000013 HC RX MED GY IP 250 OP 250 PS 637: Performed by: PHYSICIAN ASSISTANT

## 2023-06-25 PROCEDURE — 124N000002 HC R&B MH UMMC

## 2023-06-25 PROCEDURE — 250N000013 HC RX MED GY IP 250 OP 250 PS 637: Performed by: PSYCHIATRY & NEUROLOGY

## 2023-06-25 RX ADMIN — PROPRANOLOL HYDROCHLORIDE 10 MG: 10 TABLET ORAL at 20:42

## 2023-06-25 RX ADMIN — CLOZAPINE 425 MG: 100 TABLET, ORALLY DISINTEGRATING ORAL at 12:08

## 2023-06-25 RX ADMIN — DIVALPROEX SODIUM 500 MG: 125 CAPSULE, COATED PELLETS ORAL at 08:04

## 2023-06-25 RX ADMIN — Medication 300 MG: at 08:05

## 2023-06-25 RX ADMIN — TAMSULOSIN HYDROCHLORIDE 0.4 MG: 0.4 CAPSULE ORAL at 08:04

## 2023-06-25 RX ADMIN — CYANOCOBALAMIN TAB 1000 MCG 1000 MCG: 1000 TAB at 08:05

## 2023-06-25 RX ADMIN — SENNOSIDES 8.6 MG: 8.6 TABLET ORAL at 08:04

## 2023-06-25 RX ADMIN — OLANZAPINE 15 MG: 10 TABLET, ORALLY DISINTEGRATING ORAL at 20:43

## 2023-06-25 RX ADMIN — POLYETHYLENE GLYCOL 3350 17 G: 17 POWDER, FOR SOLUTION ORAL at 08:05

## 2023-06-25 RX ADMIN — Medication 10 MG: at 20:43

## 2023-06-25 RX ADMIN — Medication 300 MG: at 20:42

## 2023-06-25 RX ADMIN — PROPRANOLOL HYDROCHLORIDE 10 MG: 10 TABLET ORAL at 14:36

## 2023-06-25 RX ADMIN — OLANZAPINE 15 MG: 10 TABLET, ORALLY DISINTEGRATING ORAL at 08:05

## 2023-06-25 RX ADMIN — DIVALPROEX SODIUM 1000 MG: 125 CAPSULE, COATED PELLETS ORAL at 20:43

## 2023-06-25 RX ADMIN — SENNOSIDES 8.6 MG: 8.6 TABLET ORAL at 20:43

## 2023-06-25 RX ADMIN — Medication 300 MG: at 14:36

## 2023-06-25 ASSESSMENT — ACTIVITIES OF DAILY LIVING (ADL)
ADLS_ACUITY_SCORE: 66
LAUNDRY: UNABLE TO COMPLETE
ADLS_ACUITY_SCORE: 66
ORAL_HYGIENE: INDEPENDENT
ADLS_ACUITY_SCORE: 86
ADLS_ACUITY_SCORE: 66
ADLS_ACUITY_SCORE: 86
HYGIENE/GROOMING: INDEPENDENT
ADLS_ACUITY_SCORE: 86
ADLS_ACUITY_SCORE: 66
ADLS_ACUITY_SCORE: 86
ADLS_ACUITY_SCORE: 66
DRESS: SCRUBS (BEHAVIORAL HEALTH)
ORAL_HYGIENE: INDEPENDENT
ADLS_ACUITY_SCORE: 86
DRESS: SCRUBS (BEHAVIORAL HEALTH)
HYGIENE/GROOMING: INDEPENDENT
ADLS_ACUITY_SCORE: 66
ADLS_ACUITY_SCORE: 66
LAUNDRY: UNABLE TO COMPLETE

## 2023-06-25 NOTE — PLAN OF CARE
Problem: Suicidal Behavior  Goal: Suicidal Behavior is Absent or Managed  Outcome: Progressing  Note: Patient manifests no suicidal behavior     Problem: Sleep Disturbance  Goal: Adequate Sleep/Rest  Outcome: Progressing  Note: Patient appears to be sleeping at the start of the shift   Goal Outcome Evaluation:       Patient slept for 5.25 hours, per the SIO staff, patient woke up many times, confused, fixated on getting a razor to shave, patient remains perseverative despite being informed multiple times that he could only get the razor to shave at 0730. Patient also was said to be upset thinking black monsters were going to come out of the drains and toilet to kill him. Patient was encouraged to use the bathroom, but refused rather engaged the SIO staff in a long conversations, they are still trying to talk him into using the bathroom at this time, writer was informed that this is the pattern of cueing every morning before patient agrees to use the bathroom. Will continue to check with staff to see if patient finally voids.           García Up, DNP, RN

## 2023-06-25 NOTE — PLAN OF CARE
"  Problem: Behavioral Disturbance  Goal: Behavioral Disturbance  Description: Signs and symptoms of listed problems will be absent or manageable by discharge or transition of care.  Outcome: Progressing   Goal Outcome Evaluation:    Patient was up and in his room at the start of this shift. He later spent time sitting in the lounge area watching tv and talking to himself. He is alert and oriented to person and place with normal speech. He denied all mental health symptoms of AVH, SI, SIB, and Hallucination. Patient was observed talking to himself, making statements like \"nothing is the same anymore, god is dead because I killed him\".  He engaged in some negative talk towards staff members, saying things like \" I see a lot of you minorities here. It seem this hospital hire a lot you guys\". His mood is calm, and affect is bright. He is medication compliant, taking them with pudding. Writer fed him breakfast and lunch, and he ate 100% of all his meals. Wound care was completed. Patient's right toe is swollen but there was no drainage present, and the toe is clean and dry. Patient was able to urinate on his own once in the morning and again in the afternoon. He took a shower after lunch without issues and he did not have behavioral concerns. Patient did not complain of pain and no prn given. His parent called and he was able to talk to them fine. He was overall easy to redirect this shift. It was a good day. Writer did not bladder scan patient because he was able to urinate without issues.     His morning dose of propanolol was not given because his blood pressure did not meet parameter.           "

## 2023-06-25 NOTE — PROGRESS NOTES
"Pt woke up multiple times through the night hyper fixated on the use of the razor to shave. Writer informed Pt multiple times that this was to be done after 7:30am. Pt in his confusion still wanted this to happen and would tell him he was lying. Writer made multiple attempts through the night to calm Pt. At 6:30 Pt woke again and was upset and was claiming that black monsters were going to come out of the drains and the toilet to get him and beat him to death. Writer made multiple attempts to make the Pt calm.     Pt goes from one fixation to another. He self talks and starts to repeat over and over that \"Its impossible\" and \"I just can't\".  No matter how many times writer and staff attempt to explain that things are okay, Pt gets more and more upset.     Pt then said, \"It's a good song to masturbate to\" and \"Why do I always go to something sexual\".     Pts agitation level goes from calm and talking to self talk and anger.  "

## 2023-06-26 LAB
ATRIAL RATE - MUSE: 90 BPM
DIASTOLIC BLOOD PRESSURE - MUSE: NORMAL MMHG
INTERPRETATION ECG - MUSE: NORMAL
P AXIS - MUSE: 63 DEGREES
PR INTERVAL - MUSE: 142 MS
QRS DURATION - MUSE: 148 MS
QT - MUSE: 414 MS
QTC - MUSE: 506 MS
R AXIS - MUSE: 5 DEGREES
SYSTOLIC BLOOD PRESSURE - MUSE: NORMAL MMHG
T AXIS - MUSE: 101 DEGREES
VENTRICULAR RATE- MUSE: 90 BPM

## 2023-06-26 PROCEDURE — 250N000013 HC RX MED GY IP 250 OP 250 PS 637: Performed by: PHYSICIAN ASSISTANT

## 2023-06-26 PROCEDURE — 99232 SBSQ HOSP IP/OBS MODERATE 35: CPT | Performed by: PSYCHIATRY & NEUROLOGY

## 2023-06-26 PROCEDURE — 124N000002 HC R&B MH UMMC

## 2023-06-26 PROCEDURE — 250N000013 HC RX MED GY IP 250 OP 250 PS 637: Performed by: PSYCHIATRY & NEUROLOGY

## 2023-06-26 RX ADMIN — Medication 10 MG: at 20:58

## 2023-06-26 RX ADMIN — SENNOSIDES 8.6 MG: 8.6 TABLET ORAL at 20:58

## 2023-06-26 RX ADMIN — TAMSULOSIN HYDROCHLORIDE 0.4 MG: 0.4 CAPSULE ORAL at 10:13

## 2023-06-26 RX ADMIN — CYANOCOBALAMIN TAB 1000 MCG 1000 MCG: 1000 TAB at 10:13

## 2023-06-26 RX ADMIN — CLOZAPINE 450 MG: 150 TABLET, ORALLY DISINTEGRATING ORAL at 14:30

## 2023-06-26 RX ADMIN — POLYETHYLENE GLYCOL 3350 17 G: 17 POWDER, FOR SOLUTION ORAL at 10:11

## 2023-06-26 RX ADMIN — DIVALPROEX SODIUM 500 MG: 125 CAPSULE, COATED PELLETS ORAL at 10:12

## 2023-06-26 RX ADMIN — PROPRANOLOL HYDROCHLORIDE 10 MG: 10 TABLET ORAL at 10:25

## 2023-06-26 RX ADMIN — OLANZAPINE 15 MG: 10 TABLET, ORALLY DISINTEGRATING ORAL at 20:58

## 2023-06-26 RX ADMIN — PROPRANOLOL HYDROCHLORIDE 10 MG: 10 TABLET ORAL at 20:58

## 2023-06-26 RX ADMIN — Medication 300 MG: at 10:13

## 2023-06-26 RX ADMIN — Medication 300 MG: at 20:58

## 2023-06-26 RX ADMIN — SENNOSIDES 8.6 MG: 8.6 TABLET ORAL at 10:13

## 2023-06-26 RX ADMIN — Medication 300 MG: at 14:30

## 2023-06-26 RX ADMIN — OLANZAPINE 15 MG: 10 TABLET, ORALLY DISINTEGRATING ORAL at 10:13

## 2023-06-26 RX ADMIN — DIVALPROEX SODIUM 1000 MG: 125 CAPSULE, COATED PELLETS ORAL at 20:58

## 2023-06-26 RX ADMIN — OLANZAPINE 10 MG: 5 TABLET, FILM COATED ORAL at 15:48

## 2023-06-26 ASSESSMENT — ACTIVITIES OF DAILY LIVING (ADL)
ADLS_ACUITY_SCORE: 66
ORAL_HYGIENE: INDEPENDENT
ADLS_ACUITY_SCORE: 66
ORAL_HYGIENE: INDEPENDENT
ADLS_ACUITY_SCORE: 66
HYGIENE/GROOMING: INDEPENDENT
ADLS_ACUITY_SCORE: 66
HYGIENE/GROOMING: INDEPENDENT
DRESS: SCRUBS (BEHAVIORAL HEALTH)
LAUNDRY: UNABLE TO COMPLETE
DRESS: SCRUBS (BEHAVIORAL HEALTH)

## 2023-06-26 NOTE — PLAN OF CARE
"Assessment/Intervention/Current Symtoms and Care Coordination:  Reviewed chart. Met with team to review patient's care. Met with patient to discuss upcoming examination and who he was meeting with, at which point has asked, \"What about Vicky? Am I not working with Vicky anymore?\" Told patient he is still working with his [facility]  and this is an examination with the [facility] courts. Set patient up with Dr. Miner at 1pm for the examination.     Discharge Plan or Goal:  Return to [facility], [address] [place] [address] (28.4 miles away)     Barriers to Discharge:  Patient requires further psychiatric stabilization due to current symptomology      Referral Status:  Psychiatry     Legal Status:  Court Hold    [facility]: [place]  File Number: [id number]  Sánchez hearin/29 at 9am    Contacts:  Assisted Living: [facility], [phone]  Per H&P: Vicky Valdez , [phone], psychiatric provider Dr. Santana at [facility], [phone], primary care provider Attila Urena, DO    Upcoming Meetings and Dates/Important Information and next steps:  Sánchez hearing on  at 9am                                    "
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
## 2023-06-26 NOTE — PLAN OF CARE
Problem: Suicidal Behavior  Goal: Suicidal Behavior is Absent or Managed  Outcome: Progressing  Note: Patient manifests no suicidal behavior     Problem: Behavioral Disturbance  Goal: Behavioral Disturbance  Description: Signs and symptoms of listed problems will be absent or manageable by discharge or transition of care.  Outcome: Progressing  Note: Patient manifests no gross behavioral disturbance.   Goal Outcome Evaluation:       Patient observed sleeping during the safety rounds, patient was minimally cooperative with cares, still appears to be confused but less perseverative than the previous nights. Patient manifests no gross behavioral disturbance, endorses no SI/HI, A/VH or SIB. Overall, patient achieved 6.25 hours of sleep. Continue with 2:1 SIO staff monitoring.           García Up DNP, RN

## 2023-06-26 NOTE — PROGRESS NOTES
PSYCHIATRY PROGRESS NOTE         DATE OF SERVICE:   6/26/2023         CHIEF COMPLAINT:   Delusions, hallucinations, mood lability, agitation          OBJECTIVE:     Nursing reports : Patient slept 6.25 hours, minimally cooperative with care i taking medications, agitated, with labile mood, paranoid delusions about people and monsters out to get him,talking to himself      reports working on outpatient referrals, zoom interview wit [facility] today at 1 pm, [facility] hearing 6/29/23 at 9 am  Contacts:  Assisted Living: [facility], [phone]  Per H&P: Vicky [facility] , [phone], psychiatric provider Dr. Santana at [facility], [phone], primary care provider Attila Urena,     Medicine consult by JUSTINA Gibson  6/17/2023  Brief Medicine Note  Medicine consulted by Dr. Rhodes of Psychiatry for right toe fracture. Patient injured his right toe while banging his foot into the wall or door of his room.    X-ray of the right foot yesterday revealed an acute comminuted and placed fracture throughout the first right distal phalanx with intra-articular extension.  Orthopedics consulted yesterday and evaluated the patient.  They recommend a postop shoe with weightbearing as tolerated.  They will be asking podiatry to see the patient either during this hospitalization or in clinic.  Orthopedics is additionally recommending a 7-day course of prophylactic Augmentin or clindamycin for his open blister near the fracture site.   Plan:   - Agree with plan of care as already in place by Orthopedics   - Patient received post-op shoe yesterday   - I ordered Augmentin twice daily to complete a 7-day course    - He is overdue for his tetanus vaccination, this was due in 2010.  He additionally is in need of a booster due to his foot injury as recommended by orthopedics.  Tdap booster ordered, please administer as able.            SUBJECTIVE:      Patient is  "agitated, with paranoid delusions.  He believes that he is poisoned.  When I ask him who poisoned him, he repeats over and over:\" Everybody, everybody, everybody...\" He says that he sees monsters, but when I ask him where he sees them he repeats:\"Monsters, monsters monsters....\" He denies thoughts of hurting himself.  When I ask him about hurting other people he says yes when I ask him who he wants to hurt he says that he wants to hurt the weakest person.  When I ask him why he would want to hurt someone who is weak, he says because he is evil and misogynist, and then he repeats:\"Evil, evil,evil...\" He says he is constipated.  He is on SIO for safety.  I asked him if he understands that he is under commitment and he says it does not matter.  I reminded him that his final hearing is on June 29.  Dr Rhodes, she is attending psychiatrist recommended full commitment with [facility] and forced blood draw, and [facility].  He is now on Clozaril titration with target dose of 500 mg a day.  It was increased to 450 mg this morning.  He is also on Depakote 1000 mg at night and 500 mg in the morning and Zyprexa 15 mg twice daily, and propranolol 10 mg 3 times daily for anxiety.           MEDICATIONS:       - Psychiatric Emergency -   Other See Admin Instructions     cloZAPine  450 mg Oral Daily     cyanocobalamin  1,000 mcg Oral Daily     divalproex sodium delayed-release  1,000 mg Oral At Bedtime     divalproex sodium delayed-release  500 mg Oral Daily     gabapentin  300 mg Oral TID     melatonin  10 mg Oral At Bedtime     OLANZapine zydis  15 mg Oral BID    Or     OLANZapine  10 mg Intramuscular BID     polyethylene glycol  17 g Oral Daily     propranolol  10 mg Oral TID     sennosides  8.6 mg Oral BID     tamsulosin  0.4 mg Oral Daily     acetaminophen, alum & mag hydroxide-simethicone, atropine, bisacodyl, haloperidol **AND** [DISCONTINUED] LORazepam **AND** diphenhydrAMINE, haloperidol " lactate **AND** [DISCONTINUED] LORazepam **AND** diphenhydrAMINE, gabapentin, lidocaine, OLANZapine **OR** OLANZapine, senna-docusate, traZODone    Medication adherence: Yes  Medication side effects: No  Benefit: Symptom reduction         ROS:   As per history of present illness, otherwise reminder of review of systems is negative for: General, eyes, ears, nose, throat, neck, respiratory, cardiovascular, gastrointestinal, genitourinary, meniscal skeletal, neurological, hematological, dermatological and endocrine system.         MENTAL STATUS EXAM:   /76   Pulse 84   Temp 97  F (36.1  C) (Temporal)   Resp 18   SpO2 100%     Appearance: Limited hygiene  Orientation:to self, place, not in time  Speech: First normal, then starts yelling, then normal, then starts yelling  Language ability: intact  Thought process: Disorganized  Thought content: Positive for delusions and hallucinations  Associations: loose   Suicidal Ideation:denies   Homicidal Ideation: Positive, wants to hurt the weakest person  Mood:  Labile   Affect: labile   Intellectual functioning:average  Fund of Knowledge: consistent with education and experience   Attention/Concentration: decreased  Memory: intact  Psychomotor Behavior:  Agitated  Muscle Strength and Tone: no atrophy or involuntary movement  Gait and Station: steady  Insight and judgement:impaired          LABS:   personally reviewed.     Lab Results   Component Value Date     06/07/2023     05/25/2023     05/24/2023    CO2 26 06/07/2023    CO2 26 05/25/2023    CO2 27 05/24/2023    BUN 17.7 06/07/2023    BUN 20.0 05/25/2023    BUN 18.8 05/24/2023     No results found for: CKTOTAL, CKMB, TROPONINI  Lab Results   Component Value Date    WBC 9.0 06/22/2023    WBC 8.8 06/20/2023    WBC 8.3 06/14/2023    HGB 12.4 06/22/2023    HGB 12.5 06/20/2023    HGB 12.0 06/14/2023    HCT 38.5 06/22/2023    HCT 39.6 06/20/2023    HCT 37.3 06/14/2023    MCV 87 06/22/2023    MCV 88  06/20/2023    MCV 85 06/14/2023     06/22/2023     06/20/2023     06/14/2023       [facility] Reference Range & Units 05/25/23 05:25 05/26/23 13:49 05/26/23 18:02   Sodium 136 - 145 mmol/L 141       Potassium 3.4 - 5.3 mmol/L 3.5       Chloride 98 - 107 mmol/L 106       Carbon Dioxide (CO2) 22 - 29 mmol/L 26       Urea Nitrogen 8.0 - 23.0 mg/dL 20.0       Creatinine 0.67 - 1.17 mg/dL 1.03       GFR Estimate >60 mL/min/1.73m2 82       Calcium 8.8 - 10.2 mg/dL 8.7 (L)       Anion Gap 7 - 15 mmol/L 9       Magnesium 1.7 - 2.3 mg/dL 2.1       Phosphorus 2.5 - 4.5 mg/dL 3.8       Albumin 3.5 - 5.2 g/dL 3.7       Protein Total 6.4 - 8.3 g/dL 5.1 (L)       Alkaline Phosphatase 40 - 129 U/L 62       ALT 10 - 50 U/L 11       AST 10 - 50 U/L 18       Bilirubin Total <=1.2 mg/dL 0.3       Glucose 70 - 99 mg/dL 104 (H)       GLUCOSE BY METER POCT 70 - 99 mg/dL   127 (H) 102 (H)   WBC 4.0 - 11.0 10e3/uL 8.7       Hemoglobin 13.3 - 17.7 g/dL 11.3 (L)       Hematocrit 40.0 - 53.0 % 34.5 (L)       Platelet Count 150 - 450 10e3/uL 133 (L)       RBC Count 4.40 - 5.90 10e6/uL 4.02 (L)       MCV 78 - 100 fL 86       MCH 26.5 - 33.0 pg 28.1       MCHC 31.5 - 36.5 g/dL 32.8       RDW 10.0 - 15.0 % 15.3 (H)          EKG  5/23/23  4:44 PM 5/19/23  8:27 AM         Systolic Blood Pressure mmHg        Diastolic Blood Pressure mmHg        Ventricular Rate BPM 80  73     Atrial Rate BPM 80  73     ND Interval ms 152  142     QRS Duration ms 152  156     QT ms 430  458     QTc ms 495  504     P [place] degrees 59  67     R AXIS degrees -21  -48     T Axis degrees 111  81     Interpretation ECG   Sinus rhythm   Left bundle branch block   Abnormal ECG   When compared with ECG of 19-MAY-2023 08:27,   No significant change was found   Confirmed by fellow Mckay Dennis ([id number]) on 5/24/2023 10:35:48         CT HEAD W/O CONTRAST 5/25/2023 12:39 AM   Provided History: Patient running in hallway, fell forward, difficult  to tell if he hit  his head. Acutely psychotic, unable to get history   Comparison: CT head 5/20/2023.  Technique: Using multidetector thin collimation helical acquisition  technique, axial, coronal and sagittal CT images from the skull base  to the vertex were obtained without intravenous contrast.    Findings:    No intracranial hemorrhage. No mass effect. No midline shift. No  extra-axial fluid collection. The gray to white matter differentiation  of the cerebral hemispheres is preserved. Ventricles are proportionate  to the sulci. No sulcal effacement. The basal cisterns are patent.  Mild diffuse cerebral atrophy.   Polypoid mucosal thickening of the right maxillary sinus.The  visualized paranasal sinuses are otherwise clear. The mastoid air  cells are clear. Orbits appear unremarkable. No acute fracture.                                                    IImpression: No acute intracranial pathology.           DIAGNOSIS:     Paranoid schizophrenia chronic with acute exacerbation   Neuroleptic induced Parkinson's disease  Agitation     Patient Active Problem List   Diagnosis     Urinary retention     Schizophrenia, unspecified type (H)     Altered mental status, unspecified altered mental status type     Mood changes     Paranoid schizophrenia, chronic condition with acute exacerbation (H)     Neuroleptic-induced parkinsonism (H)     Agitation          PLAN:   Patient is agitated, psychotic, unable to function and care about himself.  He has diagnosis of schizophrenia since 1980s.  He was on Clozaril for 25 years.  It was tapered and discontinued in May 7, 2023 due to prolonged QT QTc of 527.  His psychotic symptoms have worsened since then.  He is on SIO for safety.  He is going through commitment process, requesting [facility] and forced blood draw for Clozaril.  He is under psychiatric emergency.  He is followed by medicine for nonpsychiatric problems.  These are recommendations for treatment:      Medications:  Clozapine 450 mg  daily, dose increased on 6/26/2023, target dose 500 mg daily for schizophrenia  Zyprexa 15 mg bid for schizophrenia  Atropine 1% ophthalmic solution 1 drop twice daily as needed for secretion  Depakote  mg every morning for mood lability  Depakote DR 1000 mg nightly for mood lability  Gabapentin 300 mg 3 times daily for anxiety  Propranolol 10 mg 3 times daily for anxiety  Trazodone 50 -100 mg nightly as needed for sleep  Melatonin 10 mg nightly for sleep  Vitamin B12 1000 mcg daily  Miralax 17 g daily constipation  Senokot 8.6 mg twice daily for constipation  Hannah-Colace 1 p.o. twice daily as needed for constipation  Dulcolax suppository 10 mg rectally daily as needed for constipation  Flomax 0.4 mg for urinary retention  Tylenol 650 mg every 4 hours as needed for pain  Haldol 5 mg every 6 hours as needed with Benadryl 50 mg every 6 hours as needed for agitation  Precautions:   Suicide precautions  SIB precautions   Assault precautions  Elopement precautions  Fall precautions   SIO 2:1 for safety  No roommate  Medical bad   Legal:court hold[facility] hearing [facility] 6/29/23 [facility]  Full code   will collect collateral information and make outpatient referrals  Staff to provide emotional support and redirect as needed  Patient encouraged to attend groups  Lab results: Reviewed personally  Consultation: medicine consult completed  Continue SIO for safety    Risk Assessment: commitment with Sánchez and fourth blood draw recommended for safety, stabilization and medication management    Coordination of Care:   Patient seen, medical record reviewed, care coordinated with the team.    Total time:  More than 35 minutes spent on this visit with more than 50% time  spent on coordination of care with staff, reviewing medical record, psychoeducation, redirection, providing supportive therapy regarding above issues,entering orders and preparing documentation for the visit.    This document is created with  the help of Dragon dictation system.  All grammatical/typing errors or context distortion are unintentional and inherent to software.    Misty Portillo MD        Re-Certification I certify that the inpatient psychiatric facility services furnished since the previous certification were, and continue to be, medically necessary for, either, treatment which could reasonably be expected to improve the patient s condition or diagnostic study and that the hospital records indicate that the services furnished were, either, intensive treatment services, admission and related services necessary for diagnostic study, or equivalent services.     I certify that the patient continues to need, on a daily basis, active treatment furnished directly by or requiring the supervision of inpatient psychiatric facility personnel.   I estimate TBD days of hospitalization is necessary for proper treatment of the patient. My plans for post-hospital care for this patient are : Medications, appointments     Misty Portillo MD

## 2023-06-26 NOTE — PLAN OF CARE
"Goal Outcome Evaluation:  Problem: Plan of Care - These are the overarching goals to be used throughout the patient stay.    Goal: Optimal Comfort and Wellbeing  Outcome: Progressing  Patient was in his room relaxing at the start of this shift. He is alert and oriented with a full range affect. His mood is calm, and he is medication compliant. Wound care was done and he tolerated that fine. Patient was able to urinate this shift. He was in and out of his room and was seen pacing in the hallway and talking to himself. He called his sister, and it was reported that he did a lot of negative self talk, calling himself 'stupid and dumb\".  He did not complain of pain and no prn given. Patient did not have behavioral issues this shift. He did not endorse anxiety, depression, SI/SIB/HI. He ate 100% of his breakfast and lunch. He was encouraged to take a shower but he declined.     His afternoon dose of propranolol was not given because his blood pressure did not meet order parameter.     BP 99/58 (BP Location: Right arm, Patient Position: Left side, Cuff Size: Adult Regular)   Pulse 87   Temp 97  F (36.1  C) (Temporal)   Resp 18   SpO2 99%   "

## 2023-06-27 LAB — VALPROATE SERPL-MCNC: 61.6 UG/ML

## 2023-06-27 PROCEDURE — 124N000002 HC R&B MH UMMC

## 2023-06-27 PROCEDURE — 80164 ASSAY DIPROPYLACETIC ACD TOT: CPT | Performed by: PSYCHIATRY & NEUROLOGY

## 2023-06-27 PROCEDURE — 250N000013 HC RX MED GY IP 250 OP 250 PS 637: Performed by: PSYCHIATRY & NEUROLOGY

## 2023-06-27 PROCEDURE — 250N000013 HC RX MED GY IP 250 OP 250 PS 637: Performed by: PHYSICIAN ASSISTANT

## 2023-06-27 PROCEDURE — 36415 COLL VENOUS BLD VENIPUNCTURE: CPT | Performed by: PSYCHIATRY & NEUROLOGY

## 2023-06-27 PROCEDURE — 99232 SBSQ HOSP IP/OBS MODERATE 35: CPT | Performed by: PSYCHIATRY & NEUROLOGY

## 2023-06-27 RX ADMIN — DIVALPROEX SODIUM 500 MG: 125 CAPSULE, COATED PELLETS ORAL at 08:32

## 2023-06-27 RX ADMIN — OLANZAPINE 15 MG: 10 TABLET, ORALLY DISINTEGRATING ORAL at 20:10

## 2023-06-27 RX ADMIN — SENNOSIDES 8.6 MG: 8.6 TABLET ORAL at 20:10

## 2023-06-27 RX ADMIN — Medication 300 MG: at 13:01

## 2023-06-27 RX ADMIN — CLOZAPINE 450 MG: 150 TABLET, ORALLY DISINTEGRATING ORAL at 12:13

## 2023-06-27 RX ADMIN — POLYETHYLENE GLYCOL 3350 17 G: 17 POWDER, FOR SOLUTION ORAL at 08:32

## 2023-06-27 RX ADMIN — PROPRANOLOL HYDROCHLORIDE 10 MG: 10 TABLET ORAL at 20:11

## 2023-06-27 RX ADMIN — DIVALPROEX SODIUM 1000 MG: 125 CAPSULE, COATED PELLETS ORAL at 20:10

## 2023-06-27 RX ADMIN — OLANZAPINE 15 MG: 10 TABLET, ORALLY DISINTEGRATING ORAL at 08:32

## 2023-06-27 RX ADMIN — SENNOSIDES 8.6 MG: 8.6 TABLET ORAL at 08:32

## 2023-06-27 RX ADMIN — PROPRANOLOL HYDROCHLORIDE 10 MG: 10 TABLET ORAL at 13:01

## 2023-06-27 RX ADMIN — Medication 10 MG: at 20:10

## 2023-06-27 RX ADMIN — PROPRANOLOL HYDROCHLORIDE 10 MG: 10 TABLET ORAL at 08:31

## 2023-06-27 RX ADMIN — CYANOCOBALAMIN TAB 1000 MCG 1000 MCG: 1000 TAB at 08:32

## 2023-06-27 RX ADMIN — Medication 300 MG: at 08:31

## 2023-06-27 RX ADMIN — TAMSULOSIN HYDROCHLORIDE 0.4 MG: 0.4 CAPSULE ORAL at 08:32

## 2023-06-27 RX ADMIN — Medication 300 MG: at 20:11

## 2023-06-27 ASSESSMENT — ACTIVITIES OF DAILY LIVING (ADL)
ADLS_ACUITY_SCORE: 86
ADLS_ACUITY_SCORE: 66
ADLS_ACUITY_SCORE: 66
ADLS_ACUITY_SCORE: 76
ADLS_ACUITY_SCORE: 66
ADLS_ACUITY_SCORE: 86
ADLS_ACUITY_SCORE: 66
ADLS_ACUITY_SCORE: 86
HYGIENE/GROOMING: PROMPTS;WITH ASSISTANCE
ORAL_HYGIENE: PROMPTS;INDEPENDENT
DRESS: SCRUBS (BEHAVIORAL HEALTH);WITH ASSISTANCE
LAUNDRY: UNABLE TO COMPLETE
ADLS_ACUITY_SCORE: 86
DRESS: SCRUBS (BEHAVIORAL HEALTH)
ADLS_ACUITY_SCORE: 86
ADLS_ACUITY_SCORE: 86
HYGIENE/GROOMING: HANDWASHING;SHOWER;INDEPENDENT;PROMPTS
ORAL_HYGIENE: INDEPENDENT
ADLS_ACUITY_SCORE: 66

## 2023-06-27 NOTE — PLAN OF CARE
Assessment/Intervention/Current Symtoms and Care Coordination:  Reviewed chart. Met with team to review patient's care. Spoke to Katherine Brewer to discuss examination results, results were emailed. Copy given to patient, copy put in chart. Gave , Vicky Valdez, an update on patient via phone. Talked to his mom, Alberta, on the phone to give an update and request his sister, Zayda, phone number.     Discharge Plan or Goal:  Return to [facility], [address] [place] [address] (28[place]4 miles away)     Barriers to Discharge:  Patient requires further psychiatric stabilization due to current symptomology      Referral Status:  Psychiatry     Legal Status:  Court Hold    [facility]: [place]  File Number: [id number]  Sánchez hearin/29 at 9am    Contacts:  Assisted Living: [facility], [phone]  Per H&P: Vicky Valdez , [phone], psychiatric provider Dr. Santana at [facility], [phone], primary care provider Attila Urena, DO    Upcoming Meetings and Dates/Important Information and next steps:  Sánchez hearing on  at 9am

## 2023-06-27 NOTE — PROGRESS NOTES
PSYCHIATRY PROGRESS NOTE         DATE OF SERVICE:   6/27/2023         CHIEF COMPLAINT:     Mood lability, agitation, psychosis, going through commitment process, prolonged QT QTc on Clozaril and Zyprexa          OBJECTIVE:     Nursing reports : Patient slept about 6-1/2 hours, takes medications, last night agitated, restless, pacing, I gouging the without injury, put his head in the toilet, spitting, got Zyprexa 10 mg, restless, eventually was able to de-escalate.  This morning resting in bed, occasionally pacing, mood labile, disorganized and repetitive speech     reports working on outpatient referrals, zoom interview [facility] today at 1 pm, [facility] hearing 6/29/23 at 9 am  Contacts:  Assisted Living: [facility], [phone]  Per H&P: [facility] , [phone], psychiatric provider Dr. Santana at [facility], [phone], primary care provider Attila Urena,     Medicine consult by JUSTINA Gibson  6/17/2023  Brief Medicine Note  Medicine consulted by Dr. Rhodes of Psychiatry for right toe fracture. Patient injured his right toe while banging his foot into the wall or door of his room.    X-ray of the right foot yesterday revealed an acute comminuted and placed fracture throughout the first right distal phalanx with intra-articular extension.  Orthopedics consulted yesterday and evaluated the patient.  They recommend a postop shoe with weightbearing as tolerated.  They will be asking podiatry to see the patient either during this hospitalization or in clinic.  Orthopedics is additionally recommending a 7-day course of prophylactic Augmentin or clindamycin for his open blister near the fracture site.   Plan:   - Agree with plan of care as already in place by Orthopedics   - Patient received post-op shoe yesterday   - I ordered Augmentin twice daily to complete a 7-day course    - He is overdue for his tetanus vaccination, this was due in  "2010.  He additionally is in need of a booster due to his foot injury as recommended by orthopedics.  Tdap booster ordered, please administer as able.            SUBJECTIVE:      Vinod is resting in bed.  He continues to have mood lability, paranoia, hallucinations.  He continues to see monsters.  He continues to believe that I am poisoning him because I am giving him medications.  When I tried to explain the purpose of medication and side effects and benefits and how it can improve his functioning, he repeats:\" Bullshit, bullshit, bullshit...\" He continues to believe that he is not good.  He tells God that he does not deserve it.  He is talking to himself.  He has severe persistent mental illness.  He is on Clozaril.  He had EKG on June 23, 2023.  QT QTc is 414/506.  He has left bundle branch block.  QT QTc was 434/539 on June 16.  He is on multiple neuroleptics, but he is so symptomatic that he needs medications.  He says that he had ECT and it did not help.  He is on SIO for safety.         MEDICATIONS:       - Psychiatric Emergency -   Other See Admin Instructions     cloZAPine  450 mg Oral Daily     cyanocobalamin  1,000 mcg Oral Daily     divalproex sodium delayed-release  1,000 mg Oral At Bedtime     divalproex sodium delayed-release  500 mg Oral Daily     gabapentin  300 mg Oral TID     melatonin  10 mg Oral At Bedtime     OLANZapine zydis  15 mg Oral BID    Or     OLANZapine  10 mg Intramuscular BID     polyethylene glycol  17 g Oral Daily     propranolol  10 mg Oral TID     sennosides  8.6 mg Oral BID     tamsulosin  0.4 mg Oral Daily     acetaminophen, alum & mag hydroxide-simethicone, atropine, bisacodyl, haloperidol **AND** [DISCONTINUED] LORazepam **AND** diphenhydrAMINE, haloperidol lactate **AND** [DISCONTINUED] LORazepam **AND** diphenhydrAMINE, gabapentin, lidocaine, OLANZapine **OR** OLANZapine, senna-docusate, traZODone    Medication adherence: Yes  Medication side effects: Prolonged QT " QTc  Benefit: Limited symptom reduction on Clozaril Zyprexa and Depakote         ROS:   As per history of present illness, otherwise reminder of review of systems is negative for: General, eyes, ears, nose, throat, neck, respiratory, cardiovascular, gastrointestinal, genitourinary, meniscal skeletal, neurological, hematological, dermatological and endocrine system.         MENTAL STATUS EXAM:   /61   Pulse 89   Temp 97.2  F (36.2  C)   Resp 18   SpO2 100%     Appearance: Limited hygiene  Orientation:to self, place, not in time  Speech: First normal, then starts yelling, then normal, then starts yelling  Language ability: intact  Thought process: Disorganized  Thought content: Positive for delusions and hallucinations  Associations: loose   Suicidal Ideation:denies   Homicidal Ideation: Positive, wants to hurt the weakest person  Mood:  Labile   Affect: labile   Intellectual functioning:average  Fund of Knowledge: consistent with education and experience   Attention/Concentration: decreased  Memory: intact  Psychomotor Behavior:  Agitated  Muscle Strength and Tone: no atrophy or involuntary movement  Gait and Station: steady  Insight and judgement:impaired          LABS:   personally reviewed.     Lab Results   Component Value Date     06/07/2023     05/25/2023     05/24/2023    CO2 26 06/07/2023    CO2 26 05/25/2023    CO2 27 05/24/2023    BUN 17.7 06/07/2023    BUN 20.0 05/25/2023    BUN 18.8 05/24/2023     No results found for: CKTOTAL, CKMB, TROPONINI  Lab Results   Component Value Date    WBC 9.0 06/22/2023    WBC 8.8 06/20/2023    WBC 8.3 06/14/2023    HGB 12.4 06/22/2023    HGB 12.5 06/20/2023    HGB 12.0 06/14/2023    HCT 38.5 06/22/2023    HCT 39.6 06/20/2023    HCT 37.3 06/14/2023    MCV 87 06/22/2023    MCV 88 06/20/2023    MCV 85 06/14/2023     06/22/2023     06/20/2023     06/14/2023       Latest Reference Range & Units 05/25/23 05:25 05/26/23 13:49  05/26/23 18:02   Sodium 136 - 145 mmol/L 141       Potassium 3.4 - 5.3 mmol/L 3.5       Chloride 98 - 107 mmol/L 106       Carbon Dioxide (CO2) 22 - 29 mmol/L 26       Urea Nitrogen 8.0 - 23.0 mg/dL 20.0       Creatinine 0.67 - 1.17 mg/dL 1.03       GFR Estimate >60 mL/min/1.73m2 82       Calcium 8.8 - 10.2 mg/dL 8.7 (L)       Anion Gap 7 - 15 mmol/L 9       Magnesium 1.7 - 2.3 mg/dL 2.1       Phosphorus 2.5 - 4.5 mg/dL 3.8       Albumin 3.5 - 5.2 g/dL 3.7       Protein Total 6.4 - 8.3 g/dL 5.1 (L)       Alkaline Phosphatase 40 - 129 U/L 62       ALT 10 - 50 U/L 11       AST 10 - 50 U/L 18       Bilirubin Total <=1.2 mg/dL 0.3       Glucose 70 - 99 mg/dL 104 (H)       GLUCOSE BY METER POCT 70 - 99 mg/dL   127 (H) 102 (H)   WBC 4.0 - 11.0 10e3/uL 8.7       Hemoglobin 13.3 - 17.7 g/dL 11.3 (L)       Hematocrit 40.0 - 53.0 % 34.5 (L)       Platelet Count 150 - 450 10e3/uL 133 (L)       RBC Count 4.40 - 5.90 10e6/uL 4.02 (L)       MCV 78 - 100 fL 86       MCH 26.5 - 33.0 pg 28.1       MCHC 31.5 - 36.5 g/dL 32.8       RDW 10.0 - 15.0 % 15.3 (H)          EKG  5/23/23  4:44 PM 5/19/23  8:27 AM         Systolic Blood Pressure mmHg        Diastolic Blood Pressure mmHg        Ventricular Rate BPM 80  73     Atrial Rate BPM 80  73     NV Interval ms 152  142     QRS Duration ms 152  156     QT ms 430  458     QTc ms 495  504     P [place] degrees 59  67     R AXIS degrees -21  -48     T Axis degrees 111  81     Interpretation ECG   Sinus rhythm   Left bundle branch block   Abnormal ECG   When compared with ECG of 19-MAY-2023 08:27,   No significant change was found   Confirmed by fellow Mckay Dennis [id number]) on 5/24/2023 10:35:48       EKG on 6/16/2023  QT QTc 434/539, sinus rhythm with premature atrial complexes, left bundle branch block  EKG on 6/23/2023  QT QTc 414/506, sinus rhythm, left bundle branch block      CT HEAD W/O CONTRAST 5/25/2023 12:39 AM   Provided History: Patient running in hallway, fell forward, difficult  to  tell if he hit his head. Acutely psychotic, unable to get history   Comparison: CT head 5/20/2023.  Technique: Using multidetector thin collimation helical acquisition  technique, axial, coronal and sagittal CT images from the skull base  to the vertex were obtained without intravenous contrast.    Findings:    No intracranial hemorrhage. No mass effect. No midline shift. No  extra-axial fluid collection. The gray to white matter differentiation  of the cerebral hemispheres is preserved. Ventricles are proportionate  to the sulci. No sulcal effacement. The basal cisterns are patent.  Mild diffuse cerebral atrophy.   Polypoid mucosal thickening of the right maxillary sinus.The  visualized paranasal sinuses are otherwise clear. The mastoid air  cells are clear. Orbits appear unremarkable. No acute fracture.                                                    IImpression: No acute intracranial pathology.           DIAGNOSIS:     Paranoid schizophrenia chronic with acute exacerbation   Neuroleptic induced Parkinson's disease  Agitation     Patient Active Problem List   Diagnosis     Urinary retention     Schizophrenia, unspecified type (H)     Altered mental status, unspecified altered mental status type     Mood changes     Paranoid schizophrenia, chronic condition with acute exacerbation (H)     Neuroleptic-induced parkinsonism (H)     Agitation          PLAN:   Patient is agitated, psychotic, unable to function and care about himself.  He has diagnosis of schizophrenia since 1980s.  He was on Clozaril for 25 years.  It was tapered and discontinued in May 7, 2023 due to prolonged QT QTc of 527.  His psychotic symptoms have worsened since then.  He is on SIO for safety.  He is going through commitment process, requesting [facility] and forced blood draw for Clozaril.  He is under psychiatric emergency.  He is followed by medicine for nonpsychiatric problems.  These are recommendations for  treatment:    Medications:  Clozapine 450 mg daily, dose increased on 6/26/2023, target dose 500 mg daily for schizophrenia  Zyprexa 15 mg bid for schizophrenia  Atropine 1% ophthalmic solution 1 drop twice daily as needed for secretion  Depakote  mg every morning for mood lability  Depakote DR 1000 mg nightly for mood lability  Gabapentin 300 mg 3 times daily for anxiety  Propranolol 10 mg 3 times daily for anxiety  Trazodone 50 -100 mg nightly as needed for sleep  Melatonin 10 mg nightly for sleep  Vitamin B12 1000 mcg daily  Miralax 17 g daily constipation  Senokot 8.6 mg twice daily for constipation  Hannah-Colace 1 p.o. twice daily as needed for constipation  Dulcolax suppository 10 mg rectally daily as needed for constipation  Flomax 0.4 mg for urinary retention  Tylenol 650 mg every 4 hours as needed for pain  Haldol 5 mg every 6 hours as needed with Benadryl 50 mg every 6 hours as needed for agitation  Precautions:   Suicide precautions  SIB precautions   Assault precautions  Elopement precautions  Fall precautions   SIO 2:1 for safety  No roommate  Medical bad   Legal:court [facility] hearing on 6/29/23 at [facility]  Full code   will collect collateral information and make outpatient referrals  Staff to provide emotional support and redirect as needed  Patient encouraged to attend groups  Lab results: Reviewed personally  Consultation: medicine consult completed  Continue SIO for safety    Risk Assessment: commitment with Sánchez and fourth blood draw recommended for safety, stabilization and medication management    Coordination of Care:   Patient seen, medical record reviewed, care coordinated with the team.      This document is created with the help of Dragon dictation system.  All grammatical/typing errors or context distortion are unintentional and inherent to software.    Misty Portillo MD        Re-Certification I certify that the inpatient psychiatric facility services  furnished since the previous certification were, and continue to be, medically necessary for, either, treatment which could reasonably be expected to improve the patient s condition or diagnostic study and that the hospital records indicate that the services furnished were, either, intensive treatment services, admission and related services necessary for diagnostic study, or equivalent services.     I certify that the patient continues to need, on a daily basis, active treatment furnished directly by or requiring the supervision of inpatient psychiatric facility personnel.   I estimate TBD days of hospitalization is necessary for proper treatment of the patient. My plans for post-hospital care for this patient are : Medications, appointments     Misty Portillo MD

## 2023-06-27 NOTE — PLAN OF CARE
Problem: Suicidal Behavior  Goal: Suicidal Behavior is Absent or Managed  Outcome: Progressing  Note: Patient manifests no suicidal behavior     Problem: Behavioral Disturbance  Goal: Behavioral Disturbance  Description: Signs and symptoms of listed problems will be absent or manageable by discharge or transition of care.  Outcome: Progressing  Note: Patient manifests no gross behavioral disturbance   Goal Outcome Evaluation:       Patient had uneventful night, slept for the most part (6.5 hrs) with no complaints and made no request. Patient manifests no gross behavior, cooperative with SIO staff. Patient endorses no SI/HI, A/VH or SIB. Will continue with monitoring.            García Up DNP, RN

## 2023-06-27 NOTE — PLAN OF CARE
"Vinod is oriented to self, stated date was June 29, 2023 (vs June 27th).   He has been primarily resting In his room this shift-napping, listening to music or watching TV. Occasionally he comes out to paces a couple of laps in the halls.   He continues to demonstrate a labile mood, but overall has been calmer and more approachable than previous interactions. He has not had any aggressive episodes noted this shift. When writer briefly held his hand, he commented \"I do not want to smash your fingers.\"  He continues to have some tangential, disorganized and repetitive speech. He reports his mood is \"fine, fine, fine...thanks to the Lord Srinivas Chang but I don't deserve it.\" We discussed his haircut and writer commented that when he first came into the hospital he had longer hair and a beard, to which he responded, \"I looked like Sophia Mak and I peed in his lap, peed in his lap, peed in his lap.\"  He denies anxiety, depression, SI/HI/SIB. When assessed for hallucinations, pt states \"Well, no, because they are REAL.\" He is observed talking and rambling to himself in his room intermittently.     He took all of his scheduled medications, whole in pudding without incident.     Daily wound care completed to right great toe. Toe is swollen with small closed blood filled blisters. No open wounds or drainage noted to old dressing. Pt denies any pain. He wears the post op boots as tolerated and is tolerating weight bearing/ambulating without any concerns.     He is voiding freely, with no c/o urinary retention. His SIO staff reports several occurrences of urination this shift. He has not had a Bm this shift.     He is eating and hydrating well. He ate almost all of his meal trays today.     VS WNL. /61   Pulse 89   Temp 97.2  F (36.2  C)   Resp 18   SpO2 100%      Viond continues on SIO 2:1 staffing observation for self injury risk/assault risk/medical equipment and ligature risk.       Problem: Adult Behavioral " Health Plan of Care  Goal: Plan of Care Review  Outcome: Progressing  Flowsheets (Taken 6/27/2023 7745)  Patient Agreement with Plan of Care: agrees     Problem: Psychotic Signs/Symptoms  Goal: Improved Behavioral Control (Psychotic Signs/Symptoms)  Outcome: Progressing   Goal Outcome Evaluation:    Plan of Care Reviewed With: patient

## 2023-06-27 NOTE — PLAN OF CARE
Problem: Adult Behavioral Health Plan of Care  Goal: Plan of Care Review  Outcome: Progressing  Flowsheets (Taken 6/26/2023 2171)  Patient Agreement with Plan of Care: agrees   Goal Outcome Evaluation:    Plan of Care Reviewed With: patient      At the beginning of shift,patient was restless, agitated, pacing, minimally physical aggression(pushing and kicking staff) intermittently but redirectable with SIO staff. Often patient was seen eye-gouging but patient was redirectable, no injuries sustained, frequently for few minutes pt was observed putting his head down the toilet, spitting. Zyprexa 10 mg given, pt remained restless and pacing and agitated for a while but gradually calmed down and napped before getting up for dinner. He presented with flat affect, calm, agitated, labile mood. His speech was rambling, disoriented to situation, able to make needs known. Patient denied c/o pain, endorsed auditory hallucinations but not able to further describe the voices. He denied all other mental health symptoms, contracted for safety, was med compliant.Pt slept most of the evening, no other behaviors observed, took all HS meds in pudding.     SIO staff reports pt had Xlarge BM and frequently voided throughout the shift, bladder scan not needed as pt voided frequently.

## 2023-06-28 PROCEDURE — 250N000013 HC RX MED GY IP 250 OP 250 PS 637: Performed by: PSYCHIATRY & NEUROLOGY

## 2023-06-28 PROCEDURE — 99231 SBSQ HOSP IP/OBS SF/LOW 25: CPT | Performed by: PSYCHIATRY & NEUROLOGY

## 2023-06-28 PROCEDURE — 124N000002 HC R&B MH UMMC

## 2023-06-28 PROCEDURE — 250N000013 HC RX MED GY IP 250 OP 250 PS 637: Performed by: PHYSICIAN ASSISTANT

## 2023-06-28 RX ADMIN — SENNOSIDES 8.6 MG: 8.6 TABLET ORAL at 20:37

## 2023-06-28 RX ADMIN — PROPRANOLOL HYDROCHLORIDE 10 MG: 10 TABLET ORAL at 08:19

## 2023-06-28 RX ADMIN — DIPHENHYDRAMINE HYDROCHLORIDE 50 MG: 50 CAPSULE ORAL at 00:17

## 2023-06-28 RX ADMIN — CLOZAPINE 450 MG: 150 TABLET, ORALLY DISINTEGRATING ORAL at 13:01

## 2023-06-28 RX ADMIN — OLANZAPINE 15 MG: 10 TABLET, ORALLY DISINTEGRATING ORAL at 08:19

## 2023-06-28 RX ADMIN — HALOPERIDOL 5 MG: 5 TABLET ORAL at 22:21

## 2023-06-28 RX ADMIN — Medication 300 MG: at 13:14

## 2023-06-28 RX ADMIN — SENNOSIDES 8.6 MG: 8.6 TABLET ORAL at 08:19

## 2023-06-28 RX ADMIN — PROPRANOLOL HYDROCHLORIDE 10 MG: 10 TABLET ORAL at 20:37

## 2023-06-28 RX ADMIN — DIVALPROEX SODIUM 500 MG: 125 CAPSULE, COATED PELLETS ORAL at 08:19

## 2023-06-28 RX ADMIN — Medication 300 MG: at 08:19

## 2023-06-28 RX ADMIN — Medication 10 MG: at 20:37

## 2023-06-28 RX ADMIN — DIVALPROEX SODIUM 1000 MG: 125 CAPSULE, COATED PELLETS ORAL at 20:36

## 2023-06-28 RX ADMIN — CYANOCOBALAMIN TAB 1000 MCG 1000 MCG: 1000 TAB at 08:19

## 2023-06-28 RX ADMIN — HALOPERIDOL 5 MG: 5 TABLET ORAL at 00:17

## 2023-06-28 RX ADMIN — TAMSULOSIN HYDROCHLORIDE 0.4 MG: 0.4 CAPSULE ORAL at 08:18

## 2023-06-28 RX ADMIN — DIPHENHYDRAMINE HYDROCHLORIDE 50 MG: 50 CAPSULE ORAL at 22:21

## 2023-06-28 RX ADMIN — PROPRANOLOL HYDROCHLORIDE 10 MG: 10 TABLET ORAL at 13:02

## 2023-06-28 RX ADMIN — OLANZAPINE 15 MG: 10 TABLET, ORALLY DISINTEGRATING ORAL at 20:37

## 2023-06-28 RX ADMIN — Medication 300 MG: at 20:37

## 2023-06-28 ASSESSMENT — ACTIVITIES OF DAILY LIVING (ADL)
ADLS_ACUITY_SCORE: 76
LAUNDRY: UNABLE TO COMPLETE
ORAL_HYGIENE: INDEPENDENT
ADLS_ACUITY_SCORE: 76
ADLS_ACUITY_SCORE: 76
HYGIENE/GROOMING: PROMPTS
DRESS: INDEPENDENT
ADLS_ACUITY_SCORE: 76
ADLS_ACUITY_SCORE: 76

## 2023-06-28 NOTE — CARE PLAN
Pt still restless in bed, but not out of bed to check the door handle. Will continue to monitor for calming/agitated behavior.

## 2023-06-28 NOTE — PROVIDER NOTIFICATION
06/27/23 1413   Seclusion or Restraint Order   Length of Order 4   Order Obtained Yes   Attending Physician Notified Yes   Attending Physician's Name Dr. Alvarez (Kemar Array   Leadership   Seclus/Restraint >12H or 2x/24H Notified   Assessment   Less Restrictive Alternative Decreased stimulation;Verbal de-escalation;Reality orientation;Reassurance / Support;Immediate action required, no least restrictive measures could be attempted   Risk Factors CI;MC   Justification   Clinical Justification Others   Education   Discontinuation Criteria Cessation of behavior that precipitated seclusion or restraint   Criteria Explained Yes   Patient's Response NR   Family Notification O   Restraint Type   Seclusion (BH) Start   Physical Hold (Comment) () Start     RN alerted by SIO staff that patient more agitated and demonstrating violent behaviors attempting to kick and hit staff with closed fists. Staff and RN writer attempted several times to redirect patient with reorientation, reassurance and guiding back to his room encouraging rest but he continued coming out of his room attempt to attack staff. Due to imminent risk of harm towards others staff took control of patient via BCS two person hold and walked him back into his room back to his bed and staff were able to safely leave the room and seclusion was started. Patient attempted to follow staff out of his room and forcefully pulling on the room door, attempting this several times. On call Provider Dr. Alvarez contacted and placed orders for seclusion and physical hold. Will continued to monitor.

## 2023-06-28 NOTE — PLAN OF CARE
Goal Outcome Evaluation:     Read progress notes.  Pt remains on an SIO 2:1 for safety.  He slept 0 hours.  Pt remain in seclusion the majority of the shift.  The maglocks remained locked until 0640.

## 2023-06-28 NOTE — CARE PLAN
Pt is still coming to the door and pulling on handle. Does not want to speak with writer, does appear to be distracted/Agitated.

## 2023-06-28 NOTE — CARE PLAN
Pt was in room and became agitated by another Pt on the unit slamming his door and yelling at staff. Pt ran to his door and immediately started to yell and kick both staff that were sitting on his 2:1. Pt was taken to his room without injury and seclusion was started.

## 2023-06-28 NOTE — PROVIDER NOTIFICATION
06/27/23 8018   Seclusion or Restraint Order   In Person Face to Face Assessment Conducted Yes-Eval of pt's immediate situation, reaction to intervention, complete review of systems assessment, behavioral assessment & review/assessment of hx, drugs & meds, recent labs, etc, behavioral condition, need to continue/terminate restraint/seclusion     Provider paged to give update. Waiting call back. Labs reviewed, vitals reviewed. Patient denies any s/s of discomfort. No s/s acute distress or discomfort noted.

## 2023-06-28 NOTE — CARE PLAN
Pt walking up to maglock door to try and get out x2. Again, resisting any instruction from writer. Seems agitated and is restless.

## 2023-06-28 NOTE — PROGRESS NOTES
Pt spoke with writer and was calm and agreed to behave and attempt to sleep. After taking medication from RN it was decided that Pt could have seclusion discontinued. It was about 3 minutes of staff exiting Pts room and writer was writing the discontinuation of seclusion when Pt opened door and started hitting writer. Pt hit writer twice, paused as if to turn away and then kicked writer before writer could put down laptop.    Writer and other SIO staff took control and returned Pt to room. Upon entering Pt room and attempting to place Pt on bed, Pt was turned loose and pushed off writer as writer was helping him to the bed. Staff immediately exited the room and continued seclusion.

## 2023-06-28 NOTE — CARE PLAN
Pt laying in bed, periodically coming to the door and jiggling the door handle. Seems restless and agitated.

## 2023-06-28 NOTE — CARE PLAN
Pt is upset and getting up and coming to the door, pulling at door handle and occasionally yelling at writer. Pt does not appear to be in any discomfort, but still showing signs of agitation.

## 2023-06-28 NOTE — CARE PLAN
"Pt got out of bed and started to bang on the door and repeated \"I don't know what to do now,\" over and over. Pt is restless and preoccupied, talking to self, has not been responding to writer when asking questions.   "
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
## 2023-06-28 NOTE — CARE PLAN
Pt restless on his bed. Pt staring at writer but won't engage in conversation. Intermittent whispering to self.
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
## 2023-06-28 NOTE — PROVIDER NOTIFICATION
06/28/23 1335   Seclusion or Restraint Order   In Person Face to Face Assessment Conducted Yes-Eval of pt's immediate situation, reaction to intervention, complete review of systems assessment, behavioral assessment & review/assessment of hx, drugs & meds, recent labs, etc, behavioral condition, need to continue/terminate restraint/seclusion     Order for restraint was obtained within an hour. Writer conducted a chart review including but not limited to: MAR (both scheduled medications and PRN medications given in the last few days); recent lab results; recent vital signs; past medical history (both acute and chronic physical health conditions).  There were no physiological abnormalities revealed in this chart review that can account for this patient s violent behavior.  Continuation of seclusion is indicated at this time.  No apparent injury occurred during the initiation of seclusion. Patient continues to remain as tense and hostile. This assessment, as documented above, was reviewed with the patient s attending psychiatrist, Dr. Portillo  Will continue to monitor closely.

## 2023-06-28 NOTE — CARE PLAN
Writer was able to speak with Pt and explained that he needed to continue to stay calm and RN offered medication. Pt was willing to take medication and then told writer that he would continue to be calm and have a better night. It was decided at this time that the Pt can demonstrate calm behavior and seclusion is being discontinued at this point.
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
## 2023-06-28 NOTE — CARE PLAN
Pt sitting on bed watching writer. Appears to be more calm at the moment, waiting on medication from RN.
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
## 2023-06-28 NOTE — CARE PLAN
Pt is laying in his bed, calming down, but still checking the maglock handle. Still unreceptive to staff input.

## 2023-06-28 NOTE — CARE PLAN
Pt laying on his bed, whispering to self every once in awhile. Seems to be calmer than before. Will continue to monitor behaviors.

## 2023-06-28 NOTE — CARE PLAN
"Pt is still restless in bed, as well as checking the maglock handle 2-3x. Writer asked pt if he would talk with her, pt answered \"No because I can't\".   "

## 2023-06-28 NOTE — CARE PLAN
Pt was laying in bed, then came to the door and tried to open it. Pt still whispering to self and not responding to writer when asked questions.

## 2023-06-28 NOTE — CARE PLAN
Pt agitated and attempting to open his maglock door. Writer attempted to talk to him but pt is not receptive.
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
## 2023-06-28 NOTE — CARE PLAN
Pt laying awake on bed, restless. Pt still staring at writer and refusing to answer any questions from writer or RN.

## 2023-06-28 NOTE — PROVIDER NOTIFICATION
06/28/23 1345 06/28/23 1400 06/28/23 1415   Education   Discontinuation Criteria  --   --   --    Criteria Explained  --   --   --    Patient's Response  --   --   --    Family Notification  --   --   --    Restraint Monitoring Q15 Minutes   Psychological Status YE PA PA   Physical Comfort Other  (no concerns noted) D D   Circulation NS NS NS   Continuous Observation Yes Yes Yes   Restraint Monitoring Q2 Hours   Range of Motion  --   --  D   Fluids  --   --  O   Food/Meal  --   --  D   Elimination  --   --  O   Restraint Type   Seclusion (BH) Continued Continued Continued   Debriefing   Debriefing  --   --   --    Does patient understand why the event happened?  --   --   --    Does patient agree to safe behaviors?  --   --   --    What can we do differently so this doesn't happen again?  --   --   --    Plan of care reviewed and modified  --   --   --       06/28/23 1418   Education   Discontinuation Criteria Cessation of behavior that precipitated seclusion or restraint   Criteria Explained Yes   Patient's Response VU  (Pt apologise)   Family Notification O   Restraint Monitoring Q15 Minutes   Psychological Status  --    Physical Comfort  --    Circulation  --    Continuous Observation  --    Restraint Monitoring Q2 Hours   Range of Motion  --    Fluids  --    Food/Meal  --    Elimination  --    Restraint Type   Seclusion (BH) Discontinued   Debriefing   Debriefing DO   Does patient understand why the event happened? Yes   Does patient agree to safe behaviors? Yes   What can we do differently so this doesn't happen again? Other (comment)  (Nothing, pt  hs hx of aggressive behavior  without following redirection.)   Plan of care reviewed and modified Yes      RN met with pt in the seclusion. He was  calm and  falling asleep. debriefing completed and pt verbalized understanding why he was in seclusion and apologized. Seclusion discontinue at 1418.  No adverse reaction or injury observed or reported. Pt denies  pain and discomfort.

## 2023-06-28 NOTE — CARE PLAN
Pt appears asleep, therefore seclusion in [place] has been discontinued and is staffed as 2:1 SIO.

## 2023-06-28 NOTE — CARE PLAN
Pt laying in bed staring at writer. Pt came up to door and tried to get out. Writer told pt that he needed to change his behavior so he was calm, but pt did not seem receptive of this.

## 2023-06-29 LAB
BASOPHILS # BLD AUTO: 0.1 10E3/UL (ref 0–0.2)
BASOPHILS NFR BLD AUTO: 1 %
EOSINOPHIL # BLD AUTO: 0 10E3/UL (ref 0–0.7)
EOSINOPHIL NFR BLD AUTO: 0 %
ERYTHROCYTE [DISTWIDTH] IN BLOOD BY AUTOMATED COUNT: 14.4 % (ref 10–15)
HCT VFR BLD AUTO: 40.7 % (ref 40–53)
HGB BLD-MCNC: 13.3 G/DL (ref 13.3–17.7)
IMM GRANULOCYTES # BLD: 0.1 10E3/UL
IMM GRANULOCYTES NFR BLD: 1 %
LYMPHOCYTES # BLD AUTO: 2.2 10E3/UL (ref 0.8–5.3)
LYMPHOCYTES NFR BLD AUTO: 19 %
MCH RBC QN AUTO: 27.7 PG (ref 26.5–33)
MCHC RBC AUTO-ENTMCNC: 32.7 G/DL (ref 31.5–36.5)
MCV RBC AUTO: 85 FL (ref 78–100)
MONOCYTES # BLD AUTO: 1.2 10E3/UL (ref 0–1.3)
MONOCYTES NFR BLD AUTO: 11 %
NEUTROPHILS # BLD AUTO: 8.1 10E3/UL (ref 1.6–8.3)
NEUTROPHILS NFR BLD AUTO: 68 %
NRBC # BLD AUTO: 0 10E3/UL
NRBC BLD AUTO-RTO: 0 /100
PLAT MORPH BLD: NORMAL
PLATELET # BLD AUTO: 195 10E3/UL (ref 150–450)
RBC # BLD AUTO: 4.8 10E6/UL (ref 4.4–5.9)
RBC MORPH BLD: NORMAL
WBC # BLD AUTO: 11.4 10E3/UL (ref 4–11)

## 2023-06-29 PROCEDURE — 36415 COLL VENOUS BLD VENIPUNCTURE: CPT | Performed by: PSYCHIATRY & NEUROLOGY

## 2023-06-29 PROCEDURE — 85025 COMPLETE CBC W/AUTO DIFF WBC: CPT | Performed by: PSYCHIATRY & NEUROLOGY

## 2023-06-29 PROCEDURE — 124N000002 HC R&B MH UMMC

## 2023-06-29 PROCEDURE — 250N000013 HC RX MED GY IP 250 OP 250 PS 637: Performed by: PSYCHIATRY & NEUROLOGY

## 2023-06-29 PROCEDURE — 99232 SBSQ HOSP IP/OBS MODERATE 35: CPT | Performed by: PSYCHIATRY & NEUROLOGY

## 2023-06-29 PROCEDURE — 250N000013 HC RX MED GY IP 250 OP 250 PS 637: Performed by: PHYSICIAN ASSISTANT

## 2023-06-29 PROCEDURE — 99231 SBSQ HOSP IP/OBS SF/LOW 25: CPT | Performed by: PHYSICIAN ASSISTANT

## 2023-06-29 RX ADMIN — SENNOSIDES 8.6 MG: 8.6 TABLET ORAL at 10:06

## 2023-06-29 RX ADMIN — HALOPERIDOL 5 MG: 5 TABLET ORAL at 18:36

## 2023-06-29 RX ADMIN — Medication 300 MG: at 20:12

## 2023-06-29 RX ADMIN — TAMSULOSIN HYDROCHLORIDE 0.4 MG: 0.4 CAPSULE ORAL at 10:06

## 2023-06-29 RX ADMIN — DIPHENHYDRAMINE HYDROCHLORIDE 50 MG: 50 CAPSULE ORAL at 18:36

## 2023-06-29 RX ADMIN — Medication 10 MG: at 20:11

## 2023-06-29 RX ADMIN — OLANZAPINE 15 MG: 10 TABLET, ORALLY DISINTEGRATING ORAL at 10:04

## 2023-06-29 RX ADMIN — SENNOSIDES 8.6 MG: 8.6 TABLET ORAL at 20:12

## 2023-06-29 RX ADMIN — OLANZAPINE 10 MG: 5 TABLET, FILM COATED ORAL at 18:36

## 2023-06-29 RX ADMIN — Medication 300 MG: at 14:33

## 2023-06-29 RX ADMIN — PROPRANOLOL HYDROCHLORIDE 10 MG: 10 TABLET ORAL at 14:33

## 2023-06-29 RX ADMIN — CYANOCOBALAMIN TAB 1000 MCG 1000 MCG: 1000 TAB at 10:03

## 2023-06-29 RX ADMIN — CLOZAPINE 450 MG: 150 TABLET, ORALLY DISINTEGRATING ORAL at 12:08

## 2023-06-29 RX ADMIN — DIVALPROEX SODIUM 1000 MG: 125 CAPSULE, COATED PELLETS ORAL at 20:10

## 2023-06-29 RX ADMIN — Medication 300 MG: at 10:03

## 2023-06-29 ASSESSMENT — ACTIVITIES OF DAILY LIVING (ADL)
DRESS: SCRUBS (BEHAVIORAL HEALTH);INDEPENDENT
LAUNDRY: UNABLE TO COMPLETE
ADLS_ACUITY_SCORE: 76
ADLS_ACUITY_SCORE: 66
ORAL_HYGIENE: INDEPENDENT;PROMPTS
ADLS_ACUITY_SCORE: 76
LAUNDRY: UNABLE TO COMPLETE
ADLS_ACUITY_SCORE: 66
ADLS_ACUITY_SCORE: 76
ADLS_ACUITY_SCORE: 66
ADLS_ACUITY_SCORE: 76
DRESS: SCRUBS (BEHAVIORAL HEALTH)
ORAL_HYGIENE: INDEPENDENT
ADLS_ACUITY_SCORE: 76
HYGIENE/GROOMING: INDEPENDENT;PROMPTS
ADLS_ACUITY_SCORE: 76
ADLS_ACUITY_SCORE: 76
HYGIENE/GROOMING: INDEPENDENT
ADLS_ACUITY_SCORE: 66
ADLS_ACUITY_SCORE: 76

## 2023-06-29 NOTE — PLAN OF CARE
Assessment/Intervention/Current Symtoms and Care Coordination:  Reviewed chart. Met with team to review patient's care. Patient had a [place] hearing this morning, he was not interested in participating in this process, evidenced by shutting the computer at the beginning of the hearing.     Discharge Plan or Goal:  Return to [facility], [address] [place] [address] (28.4 miles away)     Barriers to Discharge:  Patient requires further psychiatric stabilization due to current symptomology      Referral Status:  Psychiatry     Legal Status:  Court Hold    County: [place]  File Number: [id number]  [place] hearin/29 at 9am    Contacts:  Assisted Living: [facility], [phone]  Per H&P: Vicky Valdez , [phone], psychiatric provider Dr. Santana at [facility], [phone], primary care provider Attila Urena, DO    Upcoming Meetings and Dates/Important Information and next steps:

## 2023-06-29 NOTE — PLAN OF CARE
"  Problem: Adult Behavioral Health Plan of Care  Goal: Plan of Care Review  Outcome: Not Progressing   Goal Outcome Evaluation:    ~1605, SIO staff noticed a bruise on pt's head and called RN. Upon RN assessment, a quarter size  bruise noted on pt's medial side of head with unknown origin. Pt stated that he is not sure of the source . SIO staff reported that it was noticed just now when  the pt woke up from his nap. Area is blanchable and Pt denies headache, dizziness and pain with touch.     Dr Portillo updated and she verbalized to put an order in for internal medicine to see the pt. Order put in for pt to be seen within 4 hours.  Hiwot sidhu NP called back and stated that pt will be seen within the next couple hours by Lili IRVING PA-C    RN went into the pt's room  to update him  that he will be seen by IM .  Upon entry his room, pt was seen napping with  his head against the wall with two pillows in between. RN asked if he used a pillow earlier when he was napping and he responded,\"  I don't know.  RN asked SIO staff and was told that the pt's head  was wrapped up with a blanket and his head was not visually seen.     Around 1700, Pt was seen by Lili IRVING PA-C and no new order at this time.     Held scheduled HS Zyprexa due to PRN given during aggressive behavior leading to a physical hold. Pt will go over maximum dose if given the schedule. DR david updated and he verbalized okay to hold the HS dose.     Pt's blood pressure was 115/58 at HS. 2000 dose propanol held due to diastolic not meeting the parameter for medication administration.    Pt voided and denies any abdominal discomfort.  Refused  Offer  to do his right toe dressing  Change this shift.                           "

## 2023-06-29 NOTE — PROVIDER NOTIFICATION
06/28/23 2208   Seclusion or Restraint Order   In Person Face to Face Assessment Conducted Yes-Eval of pt's immediate situation, reaction to intervention, complete review of systems assessment, behavioral assessment & review/assessment of hx, drugs & meds, recent labs, etc, behavioral condition, need to continue/terminate restraint/seclusion   Patient Experienced No adverse physical outcome from seclusion/restraint initiation   Continuation of Seclus/Restraint indicated at this time No       Face to face assessment performed. Pt did not sustain any injures. Pt appearing agitated. Provider notified.

## 2023-06-29 NOTE — PROVIDER NOTIFICATION
06/28/23 7819   Restraint Type   Physical Hold (Comment) () Discontinued   Debriefing   Debriefing DO   Does patient understand why the event happened? No   Does patient agree to safe behaviors? Patient refuses to answer   What can we do differently so this doesn't happen again? Patient refuses to answer   Plan of care reviewed and modified Yes     Brief physical hold discontinued when patient was back into his room and staff were able to gain safe distance to use verbal de escalation and physical space to deter patient from attempting to hit staff. Patient was receptive to PRN Haldol and Benadryl and educated on discontinuation and expectations for safe behaviors. Will continue to monitor.

## 2023-06-29 NOTE — PLAN OF CARE
"  Problem: Adult Behavioral Health Plan of Care  Goal: Plan of Care Review  Outcome: Not Progressing  Flowsheets  Taken 6/28/2023 2114  Plan of Care Reviewed With: patient  Overall Patient Progress: no change  Patient Agreement with Plan of Care: agrees  Taken 6/28/2023 1713  Patient Agreement with Plan of Care: agrees with comment (describe)     Problem: Adult Behavioral Health Plan of Care  Goal: Develops/Participates in Therapeutic [place] to Support Successful Transition  Intervention: Foster Therapeutic [place]  Recent Flowsheet Documentation  Taken 6/28/2023 1713 by Taurus Forbes RN  Trust Relationship/Rapport:    care explained    choices provided    emotional support provided    empathic listening provided    questions answered    questions encouraged    thoughts/feelings acknowledged    reassurance provided   Goal Outcome Evaluation:    Plan of Care Reviewed With: patient Plan of Care Reviewed With: patient    Overall Patient Progress: no changeOverall Patient Progress: no change     Patient remains on 2:1 SIO monitoring for SIB and assaultive behaviors. Patient this evening only had one episode of slight aggression, becoming angry during medication administration and attempting to slap the medication out of RN writers hand stating \"its poison you bastard\" then repeating the phrase 3 times. With time and reassurance patient was able to demonstrate calm safe behaviors and was accepting of the medications.    Patient more visible in the milieu this evening watching television and walking the halls. Patient upon approach flat in affect, labile in mood during conversation from happy and joking to paranoid and irritable. Patient receptive to a lot of reassurance and reality orientation. Patient demonstrates paranoid thought process and delusional thoughts. Patient denies all symptoms and had no observed SI/SIB, AH/Vh, anxiety, depression, or thoughts of harming others. Patient states goal for discharge but " demonstrates poor insight into care plan.    Patient cooperative with vital sign assessments which were stable. Patient did early in the evening have a witnessed fall while in the shower, no injury observed or reported and patient did not hit his head or have loss of consciousness (see previous note). Attending provider Dr. Portillo notified with no further actions ordered.     Patient diet appeared good eating 50 % of dinner and 100% of snack, requiring staff assistance with foods requiring utensils due to hand tremors. Patient with prompting accepting of HS medications though demonstrated agitation and paranoia requiring much reassurance that the medications were not bleach or poison. Patient independent with showering which he completed twice this evening. Patient not requiring bladder scans this evening able to void on own 3 times this evening.

## 2023-06-29 NOTE — CONSULTS
"Brief Medicine Note     Notified for ASAP consult for a reported bruise on head without any known trauma. Please see prior medicine consults for past medical history and details.  Per nursing, patient was sleeping, and upon awakening he was found to have a blanchable quarter size \"bruise\" on his head.     Patient is a poor historian, and limited by participation. Patient states he was sleeping, and denies any injuries or falls. Denies any headache, changes in vision, weakness or numbness. Patient is not on any blood thinners on chart review. Nursing did report that patient had a witnessed fall yesterday in the shower. He had fallen down on his buttock and rolled to his side, without any head strike. Patient is currently on a 2:1, though they try to give patient some privacy when using the bathroom, otherwise he has been on constant observation without any other possible injury.     Exam: limited by patient participation   General: Lying in bed. In NAD. Drooling.   HEENT: mild indentation approximately 3 cm in diameter over L upper frontal region. Mild redness, blanchable. No ecchymosis or hematoma. Non-tender to palpation. Please see media tab for photo.    Neuro: Pupils equal and round at 3 mm, minimally reactive to light. EOMI. Although limited by participation, Strength was intact in all extremities and symmetric. Sensation intact. Remainder of neuro exam declined by patient.   CV: declined by patient  GI: declined by patient  Resp: Non-labored breathing on RA.      Plan: In short, Vinod Lee is a 63 yo M with PMHx of schizophrenia and parkinson's disease who was initially admitted to medicine on 5/28/23 for AMS, urinary retention and aggression with significant work up that ruled out underlying medical cause, pt was transferred to inpatient psychiatry for psychosis likely 2/2 decompensated schizophrenia. Medicine has been consulted on several occasions, currently for skin changes without any witnessed injury. "      # Skin changes - suspect area likely pressure related, from head pressing up against the hard wall during a nap. No ecchymosis noted and its non-tender. Patient denies any headache and neuro exam in non-focal from what patient would participate. When nursing came to reassess, they felt it looked better than earlier. Would not recommend any intervention at this time. Please encourage patient to not put pressure over the area. Please notify medicine if area is worse, or changing, or if patient develops severe HA, neurologic or mental status changes, or any other concerning symptoms.      Medicine will sign off. Please re consult medicine if any acute medical concerns arise.     25 MINUTES SPENT BY ME on the date of service doing chart review, history, exam, documentation & further activities per the note.     Lili HORN   [facility] Medicine

## 2023-06-29 NOTE — PROVIDER NOTIFICATION
06/28/23 2208   Seclusion or Restraint Order   Length of Order 4   Order Obtained Yes   Attending Physician Notified Yes   Attending Physician's Name Dr. Alvarez ([facility])   In Person Face to Face Assessment Conducted Yes-Eval of pt's immediate situation, reaction to intervention, complete review of systems assessment, behavioral assessment & review/assessment of hx, drugs & meds, recent labs, etc, behavioral condition, need to continue/terminate restraint/seclusion   Patient Experienced No adverse physical outcome from seclusion/restraint initiation   Continuation of Seclus/Restraint indicated at this time No   Leadership   Seclus/Restraint >12H or 2x/24H Notified   Assessment   Less Restrictive Alternative Decreased stimulation;Verbal de-escalation;Reality orientation;Reassurance / Support;Immediate action required, no least restrictive measures could be attempted   Risk Factors CI   Justification   Clinical Justification Others   Education   Discontinuation Criteria Cessation of behavior that precipitated seclusion or restraint   Criteria Explained Yes   Patient's Response RT   Family Notification O   Restraint Type   Physical Hold (Comment) () Start   Staff alerted RN that patient becoming more delusional and paranoid of staff, attempting to rosalie and hit stiff with closed fists several times. RN writer attempted to assess the patient and offer reassurance and PRN medications but he continued with the behaviors attempting to RN writer and staff. Staff attempted to get distance from the patient but he continued to rosalie staff around with closed fists. RN writer and SIO staff  too brief physical control and walk patient back to his room. Patient continued the behaviors again coming out of his room and attempting to shove a chair at staff, slapping at their hands when trying to divert the chair. Patient eventually noticed that another patient was leaving the shower and walked into the shower. Staff  accommodated patient with the shower as it has helped him to calm during past episodes of agitation.  Patient after using the shower was offered PRN Haldol and Benadryl for agitation, initially refusing but then was accepting of the medications with reassurance and offering it with pudding. Patient did continue with agitation for some time attempting to push staff in the milieu though staff were able to walk away and get into the nursing station where they could safely reassure the patient and he would return to his room. Patient continued this routine several times. On call provider Dr. Alvarez notified and placed order for the physical hold. Will continue to monitor.

## 2023-06-29 NOTE — PLAN OF CARE
Problem: Sleep Disturbance  Goal: Adequate Sleep/Rest  Outcome: Progressing   Goal Outcome Evaluation:    Patient appeared to sleep 5.5 hours this night shift.  No prns or snacks given or requested.  No concerns were reported or noted.  Remains on 2:1 staffing and has CBC this morning.

## 2023-06-29 NOTE — PLAN OF CARE
Problem: Psychotic Symptoms  Goal: Psychotic Symptoms  Description: Signs and symptoms of listed problems will be absent or manageable.  Outcome: Progressing  Patient was in his room at the start of this shift sleeping. He was compliant with vitals check but refused to take his medication or engage in any assessment.  Writer attempted to administrated his medication on multiple occasion, and finally at 10:00am he agreed to take some of them. He refused to have his lab draw initially this morning, but shortly before lunch after much encouragement, he agreed to have his CBC lab done. He was not bladder scanned this shift because he was able to void on his own. He did not eat breakfast this morning despite multiple attempts and encouragement from writer. He ate 100% of his lunch and took his schedule noon medication without issues. He refused to have writer complete wound care on his right toe. He was seen pacing the hallway for a brief period and took a shower afterwards. Patient denies hearing voices, anxiety, SI/SIB/ racing thought and hallucinations. He refused to talk to his  this morning, and when it was time for his court hearing, he refused to speak with the .     He did not complain of pain and no prn given.   His morning dose of propranolol was held because it did not meet parameter.                  .

## 2023-06-30 PROCEDURE — 124N000002 HC R&B MH UMMC

## 2023-06-30 PROCEDURE — 250N000013 HC RX MED GY IP 250 OP 250 PS 637: Performed by: PHYSICIAN ASSISTANT

## 2023-06-30 PROCEDURE — 99232 SBSQ HOSP IP/OBS MODERATE 35: CPT | Performed by: PSYCHIATRY & NEUROLOGY

## 2023-06-30 PROCEDURE — 250N000013 HC RX MED GY IP 250 OP 250 PS 637: Performed by: PSYCHIATRY & NEUROLOGY

## 2023-06-30 RX ADMIN — SENNOSIDES 8.6 MG: 8.6 TABLET ORAL at 09:08

## 2023-06-30 RX ADMIN — DIVALPROEX SODIUM 1000 MG: 125 CAPSULE, COATED PELLETS ORAL at 20:07

## 2023-06-30 RX ADMIN — POLYETHYLENE GLYCOL 3350 17 G: 17 POWDER, FOR SOLUTION ORAL at 09:07

## 2023-06-30 RX ADMIN — Medication 300 MG: at 20:07

## 2023-06-30 RX ADMIN — OLANZAPINE 15 MG: 10 TABLET, ORALLY DISINTEGRATING ORAL at 20:09

## 2023-06-30 RX ADMIN — OLANZAPINE 15 MG: 10 TABLET, ORALLY DISINTEGRATING ORAL at 09:07

## 2023-06-30 RX ADMIN — HALOPERIDOL 5 MG: 5 TABLET ORAL at 01:30

## 2023-06-30 RX ADMIN — DIPHENHYDRAMINE HYDROCHLORIDE 50 MG: 50 CAPSULE ORAL at 01:30

## 2023-06-30 RX ADMIN — Medication 300 MG: at 09:07

## 2023-06-30 RX ADMIN — CYANOCOBALAMIN TAB 1000 MCG 1000 MCG: 1000 TAB at 09:08

## 2023-06-30 RX ADMIN — TAMSULOSIN HYDROCHLORIDE 0.4 MG: 0.4 CAPSULE ORAL at 09:08

## 2023-06-30 RX ADMIN — CLOZAPINE 450 MG: 150 TABLET, ORALLY DISINTEGRATING ORAL at 13:14

## 2023-06-30 RX ADMIN — Medication 300 MG: at 13:16

## 2023-06-30 RX ADMIN — PROPRANOLOL HYDROCHLORIDE 10 MG: 10 TABLET ORAL at 09:08

## 2023-06-30 RX ADMIN — Medication 10 MG: at 20:07

## 2023-06-30 RX ADMIN — DIVALPROEX SODIUM 500 MG: 125 CAPSULE, COATED PELLETS ORAL at 09:08

## 2023-06-30 RX ADMIN — HALOPERIDOL 5 MG: 5 TABLET ORAL at 14:17

## 2023-06-30 RX ADMIN — PROPRANOLOL HYDROCHLORIDE 10 MG: 10 TABLET ORAL at 13:16

## 2023-06-30 RX ADMIN — PROPRANOLOL HYDROCHLORIDE 10 MG: 10 TABLET ORAL at 20:08

## 2023-06-30 RX ADMIN — SENNOSIDES 8.6 MG: 8.6 TABLET ORAL at 20:08

## 2023-06-30 ASSESSMENT — ACTIVITIES OF DAILY LIVING (ADL)
ADLS_ACUITY_SCORE: 86
ADLS_ACUITY_SCORE: 66
HYGIENE/GROOMING: INDEPENDENT;PROMPTS
ADLS_ACUITY_SCORE: 66
ADLS_ACUITY_SCORE: 86
ORAL_HYGIENE: PROMPTS
LAUNDRY: UNABLE TO COMPLETE
ADLS_ACUITY_SCORE: 66
ORAL_HYGIENE: INDEPENDENT;PROMPTS
ADLS_ACUITY_SCORE: 66
ADLS_ACUITY_SCORE: 86
ADLS_ACUITY_SCORE: 86
ADLS_ACUITY_SCORE: 66
ADLS_ACUITY_SCORE: 86
ADLS_ACUITY_SCORE: 86
LAUNDRY: UNABLE TO COMPLETE
DRESS: PROMPTS
HYGIENE/GROOMING: PROMPTS
DRESS: INDEPENDENT;PROMPTS
ADLS_ACUITY_SCORE: 66

## 2023-06-30 NOTE — PROVIDER NOTIFICATION
06/29/23 1800   Seclusion or Restraint Order   Length of Order 4   Order Obtained Yes   Attending Physician Notified Yes   Attending Physician's Name DR Nacho Dean   Describe actions taken Brief physical hold   Leadership   Seclus/Restraint >12H or 2x/24H Notified   Assessment   Less Restrictive Alternative Decreased stimulation;Verbal de-escalation;Immediate action required, no least restrictive measures could be attempted   Risk Factors CI   Justification   Clinical Justification Others   Education   Discontinuation Criteria Cessation of behavior that precipitated seclusion or restraint   Criteria Explained Yes   Patient's Response RT   Family Notification O   Reported to RN that pt  became physically aggressive towards SIO staff.  Pt attempted multiple times to kick and punch so staff had to do a physical hold. On call provider DR david updated and order received for a physical hold.   Due to  continuous demonstration of aggressive behavior towards staff, pt was offered emergency medication  and he accepted oral  Haldol, benadryl and  Zyprexa which was administered at 1836.

## 2023-06-30 NOTE — PROVIDER NOTIFICATION
06/30/23 1417   Seclusion or Restraint Order   Length of Order 4   Order Obtained Yes   Attending Physician Notified Yes   Attending Physician's Name Dr. Felipe   In Person Face to Face Assessment Conducted Yes-Eval of pt's immediate situation, reaction to intervention, complete review of systems assessment, behavioral assessment & review/assessment of hx, drugs & meds, recent labs, etc, behavioral condition, need to continue/terminate restraint/seclusion   Patient Experienced No adverse physical outcome from seclusion/restraint initiation   Continuation of Seclus/Restraint indicated at this time Yes

## 2023-06-30 NOTE — PLAN OF CARE
Assessment/Intervention/Current Symtoms and Care Coordination:  Reviewed chart. Met with team to review patient's care. Researched assisted living with behavioral health care options for discharge, will wait until patient's symptoms have improved before sending referrals.     Discharge Plan or Goal:  When patient is more stable a referral for [facility] will be made     Barriers to Discharge:  Patient requires further psychiatric stabilization due to current symptomology      Referral Status:  Psychiatry     Legal Status:  Court Hold    County: [place]  File Number: [id number]  [place] hearin/29 at 9am    Contacts:  Assisted Living: [facility], [phone]  Per H&P: Vicky Valdez , [phone], psychiatric provider Dr. Santana at [facility], [phone], primary care provider Attila Urena, DO    Upcoming Meetings and Dates/Important Information and next steps:  [place] hearing on  at 9am

## 2023-06-30 NOTE — PROVIDER NOTIFICATION
06/30/23 1417   Justification   Clinical Justification Others     Pt observed punching and kicking multiple staff and refusing redirections from staff upon attempt.

## 2023-06-30 NOTE — PROVIDER NOTIFICATION
06/30/23 9631   Debriefing   Debriefing DO   Does patient understand why the event happened? No   Does patient agree to safe behaviors? Patient refuses to answer   What can we do differently so this doesn't happen again? Patient refuses to answer   Plan of care reviewed and modified Yes     Pt was able to take PRN haldol and was unable to comprehend and understand reason for seclusion. Pt contracted for safety at this time upon receiving PRN. Staff will continue to monitor and support with current POC.
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
## 2023-06-30 NOTE — PLAN OF CARE
Problem: Sleep Disturbance  Goal: Adequate Sleep/Rest  Outcome: Progressing     Patient appeared  agitated and restlessness  at the the start of this shift. He was redirectable ,cooperative  but forgetful.  He was given Haldol 5 mg and Benadryl 50 mg at 0130 which was helpful.Patient denied SI/HI, A/VH or SIB. He continues on SIO for intrusiveness. Patient slept for about 5.5 hrs so far. Will continue with monitoring

## 2023-06-30 NOTE — PROVIDER NOTIFICATION
06/29/23 1845   Seclusion or Restraint Order   In Person Face to Face Assessment Conducted Yes-Eval of pt's immediate situation, reaction to intervention, complete review of systems assessment, behavioral assessment & review/assessment of hx, drugs & meds, recent labs, etc, behavioral condition, need to continue/terminate restraint/seclusion   Patient Experienced No adverse physical outcome from seclusion/restraint initiation   Continuation of Seclus/Restraint indicated at this time No     In person face to face assessment completed with no adverse physical outcome reported or observed. Upon assessment patient resting quietly in his bed not currently in restraints or seclusion. Patient responsive to voice and cooperative with assessment. Patient denied any injury or pain following the brief physical hold. On call provider Dr. Griffith notified of results of face to face assessment results. Will continue to monitor.
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
## 2023-07-01 PROCEDURE — 250N000013 HC RX MED GY IP 250 OP 250 PS 637: Performed by: PHYSICIAN ASSISTANT

## 2023-07-01 PROCEDURE — 93010 ELECTROCARDIOGRAM REPORT: CPT | Performed by: INTERNAL MEDICINE

## 2023-07-01 PROCEDURE — 250N000013 HC RX MED GY IP 250 OP 250 PS 637: Performed by: PSYCHIATRY & NEUROLOGY

## 2023-07-01 PROCEDURE — 93005 ELECTROCARDIOGRAM TRACING: CPT

## 2023-07-01 PROCEDURE — 124N000002 HC R&B MH UMMC

## 2023-07-01 RX ADMIN — Medication 10 MG: at 20:59

## 2023-07-01 RX ADMIN — CYANOCOBALAMIN TAB 1000 MCG 1000 MCG: 1000 TAB at 10:08

## 2023-07-01 RX ADMIN — Medication 300 MG: at 13:51

## 2023-07-01 RX ADMIN — OLANZAPINE 15 MG: 10 TABLET, ORALLY DISINTEGRATING ORAL at 20:59

## 2023-07-01 RX ADMIN — TAMSULOSIN HYDROCHLORIDE 0.4 MG: 0.4 CAPSULE ORAL at 10:08

## 2023-07-01 RX ADMIN — SENNOSIDES 8.6 MG: 8.6 TABLET ORAL at 20:59

## 2023-07-01 RX ADMIN — POLYETHYLENE GLYCOL 3350 17 G: 17 POWDER, FOR SOLUTION ORAL at 10:08

## 2023-07-01 RX ADMIN — DIVALPROEX SODIUM 500 MG: 125 CAPSULE, COATED PELLETS ORAL at 10:08

## 2023-07-01 RX ADMIN — DIVALPROEX SODIUM 1000 MG: 125 CAPSULE, COATED PELLETS ORAL at 20:58

## 2023-07-01 RX ADMIN — PROPRANOLOL HYDROCHLORIDE 10 MG: 10 TABLET ORAL at 10:08

## 2023-07-01 RX ADMIN — SENNOSIDES 8.6 MG: 8.6 TABLET ORAL at 10:09

## 2023-07-01 RX ADMIN — Medication 300 MG: at 20:59

## 2023-07-01 RX ADMIN — OLANZAPINE 15 MG: 10 TABLET, ORALLY DISINTEGRATING ORAL at 10:09

## 2023-07-01 RX ADMIN — Medication 300 MG: at 10:08

## 2023-07-01 RX ADMIN — CLOZAPINE 450 MG: 150 TABLET, ORALLY DISINTEGRATING ORAL at 13:51

## 2023-07-01 ASSESSMENT — ACTIVITIES OF DAILY LIVING (ADL)
ADLS_ACUITY_SCORE: 86
DRESS: PROMPTS
ADLS_ACUITY_SCORE: 86
ORAL_HYGIENE: PROMPTS
ADLS_ACUITY_SCORE: 86
HYGIENE/GROOMING: PROMPTS
ADLS_ACUITY_SCORE: 86
LAUNDRY: UNABLE TO COMPLETE
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
## 2023-07-01 NOTE — PLAN OF CARE
"One episodes of staring to hit and kick at staff. He calmed after staff backed off and gave him space for half an hour. C/o his medication being \"poisoned gravel\".   Stated his medication is \"useless.\"   Word substitution again: \"chain saw\" for shaver.   Did not know the date, or which hospital he is in.    Tremors continue, R>L, with inability to bring spoonfuls of food to his mouth. No drooling observed.    Mostly calm and isolative in room.    Denied pain in R first toe (fx). Urinated x 2. Continues to eat well.     Plan: Monitor and document mood and behaviour, thought process and content. Establish and maintain therapeutic relationship. Educate about diagnoses, medications, treatment, legal status, plan of care. Address preexisting and concurrent medical concerns.     P:Unstable mood  G:Stable mood  O: Progressing      "

## 2023-07-01 NOTE — PROGRESS NOTES
EKG Interpretation:      Interpreted by PRESTON Bass CNP  Time reviewed:1216   Symptoms at time of EKG: None   Rhythm: Normal sinus  Rate: Normal  Axis: Left Axis Deviation  Ectopy: None  Conduction: Normal and Left bundle branch block (complete)  ST Segments/ T Waves: No ST-T wave changes and No acute ischemic changes  Q Waves: None  Comparison to prior: Unchanged    Clinical Impression: no acute changes

## 2023-07-02 PROCEDURE — 250N000013 HC RX MED GY IP 250 OP 250 PS 637: Performed by: PSYCHIATRY & NEUROLOGY

## 2023-07-02 PROCEDURE — 124N000002 HC R&B MH UMMC

## 2023-07-02 PROCEDURE — 250N000013 HC RX MED GY IP 250 OP 250 PS 637: Performed by: PHYSICIAN ASSISTANT

## 2023-07-02 RX ADMIN — CLOZAPINE 450 MG: 150 TABLET, ORALLY DISINTEGRATING ORAL at 13:52

## 2023-07-02 RX ADMIN — Medication 300 MG: at 20:37

## 2023-07-02 RX ADMIN — DIVALPROEX SODIUM 1000 MG: 125 CAPSULE, COATED PELLETS ORAL at 20:38

## 2023-07-02 RX ADMIN — SENNOSIDES 8.6 MG: 8.6 TABLET ORAL at 09:28

## 2023-07-02 RX ADMIN — DIVALPROEX SODIUM 500 MG: 125 CAPSULE, COATED PELLETS ORAL at 09:28

## 2023-07-02 RX ADMIN — Medication 10 MG: at 20:38

## 2023-07-02 RX ADMIN — POLYETHYLENE GLYCOL 3350 17 G: 17 POWDER, FOR SOLUTION ORAL at 09:29

## 2023-07-02 RX ADMIN — OLANZAPINE 5 MG: 5 TABLET, FILM COATED ORAL at 10:06

## 2023-07-02 RX ADMIN — OLANZAPINE 15 MG: 10 TABLET, ORALLY DISINTEGRATING ORAL at 09:28

## 2023-07-02 RX ADMIN — CYANOCOBALAMIN TAB 1000 MCG 1000 MCG: 1000 TAB at 09:28

## 2023-07-02 RX ADMIN — OLANZAPINE 15 MG: 10 TABLET, ORALLY DISINTEGRATING ORAL at 20:38

## 2023-07-02 RX ADMIN — Medication 300 MG: at 13:52

## 2023-07-02 RX ADMIN — Medication 300 MG: at 09:28

## 2023-07-02 RX ADMIN — SENNOSIDES 8.6 MG: 8.6 TABLET ORAL at 20:38

## 2023-07-02 RX ADMIN — TAMSULOSIN HYDROCHLORIDE 0.4 MG: 0.4 CAPSULE ORAL at 09:29

## 2023-07-02 ASSESSMENT — ACTIVITIES OF DAILY LIVING (ADL)
ADLS_ACUITY_SCORE: 86
ORAL_HYGIENE: PROMPTS
ADLS_ACUITY_SCORE: 86
LAUNDRY: UNABLE TO COMPLETE
ADLS_ACUITY_SCORE: 86
HYGIENE/GROOMING: PROMPTS
DRESS: PROMPTS;SCRUBS (BEHAVIORAL HEALTH)

## 2023-07-02 NOTE — PROVIDER NOTIFICATION
07/02/23 1103   Restraint Type   Seclusion (BH) Discontinued   Debriefing   Debriefing DO   Does patient understand why the event happened? Patient unable to answer   Does patient agree to safe behaviors? Patient unable to answer   What can we do differently so this doesn't happen again? Patient unable to answer   Plan of care reviewed and modified Yes           Mag lock seclusion discontinued by writer at 1103. Patient was laying calmly in his bed. Physically demonstrating safe behaviors. Patient us unable to state alternative interventions for safe behaviors at this time due to CI.  No stated or observed s/s of injury observed.
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
## 2023-07-02 NOTE — PLAN OF CARE
Goal Outcome Evaluation:  Problem: Sleep Disturbance  Goal: Adequate Sleep/Rest  Outcome: Progressing    Patient in bed at beginning of shift,  patient slept through shift. Pt slept for 7 hours.     No pt complaints or concerns at this time.      No PRNs given.

## 2023-07-02 NOTE — PROVIDER NOTIFICATION
07/02/23 1038   Seclusion or Restraint Order   In Person Face to Face Assessment Conducted Yes-Eval of pt's immediate situation, reaction to intervention, complete review of systems assessment, behavioral assessment & review/assessment of hx, drugs & meds, recent labs, etc, behavioral condition, need to continue/terminate restraint/seclusion   Patient Experienced No adverse physical outcome from seclusion/restraint initiation   Continuation of Seclus/Restraint indicated at this time No   Describe actions taken seclusion discontinue         In person face to face assessment conducted and no adverse physical outcome from restraint initiation reported or observed. Continuation of seclusion is not indicated at this time. Staff assisted patient to come down. No injury sustained by staff or patient The provider was notified of the results of the face to face.

## 2023-07-02 NOTE — PROVIDER NOTIFICATION
07/02/23 1038   Restraint Type   Seclusion (BH) Discontinued   Debriefing   Debriefing DO   Does patient understand why the event happened? Patient unable to answer   Does patient agree to safe behaviors? Patient unable to answer   What can we do differently so this doesn't happen again? Patient unable to answer   Plan of care reviewed and modified Yes     Mag lock seclusion discontinued by writer. Patient had sat down in his bed and laid down. He is physically agree to safe behaviors, but is unable to state future effective interventions due to CI. No stated or observed injury to patient. Continue to monitor and assess.
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
## 2023-07-02 NOTE — PLAN OF CARE
"Nursing assessment completed. Patient up early, initially somewhat irritable with writer, stating \"It's a bad day and I won't take my medicine, what is the point\". He continued to make negative statements for a short time, but was redirected. He was agreeable to taking his am scheduled medications at about 0940, without issue. He told writer that he will not allow writer to assess his R toe. Writer can see that there is a clean bandage on his R toe. Does not appear red or swollen. He denies pain. States he had \"2 large sausage bowel movements\" yesterday. Denies need for additional stool softeners. Voiding independently.   PRN PO zyprexa given at 1005 for agitation. He stated he wanted \"cyanide and to hit people\". He was agreeable to PRN. Shortly afterwards, he attempted to stand on his bed. [facility] staff present, alerted writer, and guided patient to sit down, which he did cooperate with. Asking for a chainsaw for shaving. Patient was briefly secluded X2 in his room from [id number] and [id number], after multiple times kicking and hitting staff without reception to redirection. PRN zyprexa administered at 1005 appears to be inadequately effective.   Patient fluctuated during this shift between presenting as calm and cooperative, or agitated and attempting to strike staff on multiple occasions. He is receptive to redirection only part of the time. Listening to music and calming music appears to be helpful at times. He declines to allow writer to assess his toe or change his bandage. Denies pain, symptoms or side effects.                        "

## 2023-07-03 PROCEDURE — 250N000013 HC RX MED GY IP 250 OP 250 PS 637: Performed by: PHYSICIAN ASSISTANT

## 2023-07-03 PROCEDURE — 124N000002 HC R&B MH UMMC

## 2023-07-03 PROCEDURE — H2032 ACTIVITY THERAPY, PER 15 MIN: HCPCS

## 2023-07-03 PROCEDURE — 99233 SBSQ HOSP IP/OBS HIGH 50: CPT | Performed by: PSYCHIATRY & NEUROLOGY

## 2023-07-03 PROCEDURE — 250N000013 HC RX MED GY IP 250 OP 250 PS 637: Performed by: PSYCHIATRY & NEUROLOGY

## 2023-07-03 RX ORDER — ATROPINE SULFATE 10 MG/ML
2 SOLUTION/ DROPS OPHTHALMIC 3 TIMES DAILY
Status: DISCONTINUED | OUTPATIENT
Start: 2023-07-03 | End: 2023-11-13 | Stop reason: HOSPADM

## 2023-07-03 RX ORDER — DIVALPROEX SODIUM 125 MG/1
1000 CAPSULE, COATED PELLETS ORAL DAILY
Status: DISCONTINUED | OUTPATIENT
Start: 2023-07-04 | End: 2023-07-28

## 2023-07-03 RX ADMIN — Medication 300 MG: at 14:16

## 2023-07-03 RX ADMIN — OLANZAPINE 15 MG: 10 TABLET, ORALLY DISINTEGRATING ORAL at 08:58

## 2023-07-03 RX ADMIN — Medication 10 MG: at 19:28

## 2023-07-03 RX ADMIN — OLANZAPINE 15 MG: 10 TABLET, ORALLY DISINTEGRATING ORAL at 19:28

## 2023-07-03 RX ADMIN — Medication 300 MG: at 19:28

## 2023-07-03 RX ADMIN — CLOZAPINE 450 MG: 150 TABLET, ORALLY DISINTEGRATING ORAL at 12:59

## 2023-07-03 RX ADMIN — SENNOSIDES 8.6 MG: 8.6 TABLET ORAL at 19:29

## 2023-07-03 RX ADMIN — DIVALPROEX SODIUM 500 MG: 125 CAPSULE, COATED PELLETS ORAL at 08:58

## 2023-07-03 RX ADMIN — Medication 300 MG: at 08:58

## 2023-07-03 RX ADMIN — DIVALPROEX SODIUM 1000 MG: 125 CAPSULE, COATED PELLETS ORAL at 19:28

## 2023-07-03 RX ADMIN — TAMSULOSIN HYDROCHLORIDE 0.4 MG: 0.4 CAPSULE ORAL at 08:58

## 2023-07-03 ASSESSMENT — ACTIVITIES OF DAILY LIVING (ADL)
ORAL_HYGIENE: INDEPENDENT;PROMPTS
ADLS_ACUITY_SCORE: 86
HYGIENE/GROOMING: SHOWER;INDEPENDENT;PROMPTS
ADLS_ACUITY_SCORE: 86
DRESS: SCRUBS (BEHAVIORAL HEALTH);INDEPENDENT;PROMPTS
DRESS: SCRUBS (BEHAVIORAL HEALTH);INDEPENDENT;PROMPTS
ADLS_ACUITY_SCORE: 86
ADLS_ACUITY_SCORE: 86
ORAL_HYGIENE: INDEPENDENT;PROMPTS
HYGIENE/GROOMING: INDEPENDENT;PROMPTS
ADLS_ACUITY_SCORE: 86
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
## 2023-07-04 LAB
ATRIAL RATE - MUSE: 94 BPM
DIASTOLIC BLOOD PRESSURE - MUSE: NORMAL MMHG
INTERPRETATION ECG - MUSE: NORMAL
P AXIS - MUSE: 57 DEGREES
PR INTERVAL - MUSE: 148 MS
QRS DURATION - MUSE: 150 MS
QT - MUSE: 400 MS
QTC - MUSE: 500 MS
R AXIS - MUSE: -31 DEGREES
SYSTOLIC BLOOD PRESSURE - MUSE: NORMAL MMHG
T AXIS - MUSE: 94 DEGREES
VENTRICULAR RATE- MUSE: 94 BPM

## 2023-07-04 PROCEDURE — 36415 COLL VENOUS BLD VENIPUNCTURE: CPT | Performed by: PSYCHIATRY & NEUROLOGY

## 2023-07-04 PROCEDURE — 80159 DRUG ASSAY CLOZAPINE: CPT | Performed by: PSYCHIATRY & NEUROLOGY

## 2023-07-04 PROCEDURE — 250N000013 HC RX MED GY IP 250 OP 250 PS 637: Performed by: PSYCHIATRY & NEUROLOGY

## 2023-07-04 PROCEDURE — 124N000002 HC R&B MH UMMC

## 2023-07-04 PROCEDURE — 250N000009 HC RX 250: Performed by: PSYCHIATRY & NEUROLOGY

## 2023-07-04 PROCEDURE — 250N000013 HC RX MED GY IP 250 OP 250 PS 637: Performed by: PHYSICIAN ASSISTANT

## 2023-07-04 RX ADMIN — DIVALPROEX SODIUM 1000 MG: 125 CAPSULE, COATED PELLETS ORAL at 08:04

## 2023-07-04 RX ADMIN — CLOZAPINE 450 MG: 150 TABLET, ORALLY DISINTEGRATING ORAL at 12:27

## 2023-07-04 RX ADMIN — Medication 300 MG: at 19:14

## 2023-07-04 RX ADMIN — Medication 300 MG: at 12:27

## 2023-07-04 RX ADMIN — SENNOSIDES 8.6 MG: 8.6 TABLET ORAL at 19:14

## 2023-07-04 RX ADMIN — SENNOSIDES 8.6 MG: 8.6 TABLET ORAL at 08:05

## 2023-07-04 RX ADMIN — Medication 300 MG: at 08:05

## 2023-07-04 RX ADMIN — Medication 10 MG: at 19:14

## 2023-07-04 RX ADMIN — DIVALPROEX SODIUM 1000 MG: 125 CAPSULE, COATED PELLETS ORAL at 19:14

## 2023-07-04 RX ADMIN — TAMSULOSIN HYDROCHLORIDE 0.4 MG: 0.4 CAPSULE ORAL at 08:05

## 2023-07-04 RX ADMIN — ATROPINE SULFATE 2 DROP: 10 SOLUTION/ DROPS OPHTHALMIC at 12:27

## 2023-07-04 RX ADMIN — OLANZAPINE 15 MG: 10 TABLET, ORALLY DISINTEGRATING ORAL at 08:05

## 2023-07-04 RX ADMIN — OLANZAPINE 15 MG: 10 TABLET, ORALLY DISINTEGRATING ORAL at 19:14

## 2023-07-04 ASSESSMENT — ACTIVITIES OF DAILY LIVING (ADL)
HYGIENE/GROOMING: INDEPENDENT
ADLS_ACUITY_SCORE: 86
DRESS: SCRUBS (BEHAVIORAL HEALTH);INDEPENDENT;PROMPTS
HYGIENE/GROOMING: INDEPENDENT;PROMPTS
ADLS_ACUITY_SCORE: 86
ORAL_HYGIENE: INDEPENDENT;PROMPTS
ADLS_ACUITY_SCORE: 66
ADLS_ACUITY_SCORE: 86
ORAL_HYGIENE: INDEPENDENT
DRESS: SCRUBS (BEHAVIORAL HEALTH);INDEPENDENT
ADLS_ACUITY_SCORE: 86
ADLS_ACUITY_SCORE: 66

## 2023-07-04 NOTE — PLAN OF CARE
"  Problem: Psychotic Signs/Symptoms  Goal: Improved Psychomotor Symptoms (Psychotic Signs/Symptoms)  Outcome: Progressing  Intervention: Manage Psychomotor Movement  Recent Flowsheet Documentation  Taken 7/4/2023 1100 by Cecille Burris RN  Patient Performed Hygiene: dressed  Activity (Behavioral Health):    activity encouraged    up ad jose   Goal Outcome Evaluation:    Plan of Care Reviewed With: patient      Pt had a good shift. He appears to be less disorganized with some insight. He does continue to make paranoid and delusional persecutory statements about \"blowing up and destroying the universe\". Pt is less intense, and aggressive with staff.   It is pt birthday, and his family came to visit him. This was preplanned as they are coming in from out of town. They brought some snacks for the pt, and visited with him some time.   Writer spoke with the pt family, and was able to give them all an update and let them know he is improving. They are looking to get information about his upcoming court dates, and information on guardianship vs. POA.   Pt denied all mental health symptoms including AVH, but does appear to be internally preoccupied. He will talk to himself in his room, and walking up and down the halls.   Pt was med complaint, denied pain, ate and drank well, did not allow writer to look at his R toe wound care this shift, and did not have any other acute physical or behavioral concerns this shift.      "
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
## 2023-07-04 NOTE — PLAN OF CARE
"  Problem: Adult Behavioral Health Plan of Care  Goal: Develops/Participates in Therapeutic [place] to Support Successful Transition  7/3/2023 2145 by Cecille Burris RN  Outcome: Progressing   Goal Outcome Evaluation:    Plan of Care Reviewed With: patient      Pt had a good shift. He presents as disorganized with a flat affect. He denied all mental health symptoms including AVH but does appear to be internally preoccupied. He is still making delusional comments about blowing up the world and universe.   He declined having his R toe dressing change today stating \"no,no it will be just fine\".   Pt is aware that his family will coming tomorrow for his birthday, and is looking forward to the visit.   Pt showered for the second time today, and spent over thirty mins in the shower.   He was med complaint with all meds ate and drank well, and had no other acute physical or behavioral concerns this shift.   /73 (BP Location: Left arm)   Pulse 108   Temp 98.6  F (37  C) (Oral)   Resp 18   Wt 81.6 kg (179 lb 12.8 oz)   SpO2 98%   BMI 26.55 kg/m         "

## 2023-07-04 NOTE — PLAN OF CARE
"  Problem: Adult Behavioral Health Plan of Care  Goal: Patient-Specific Goal (Individualization)  Description: You can add care plan individualizations to a care plan. Examples of Individualization might be:  \"Parent requests to be called daily at 9am for status\", \"I have a hard time hearing out of my right ear\", or \"Do not touch me to wake me up as it startles me\".  7/4/2023 1814 by Cecille Burris RN  Outcome: Progressing   Goal Outcome Evaluation:    Plan of Care Reviewed With: patient        Pt had a good shift. He remained calm and cooperative this shift. He made less delusional statements, and was laughing and making jokes about his birthday with writer and staff. He was med compliant, and had no behavioral outbursts. He took a shower for approx one hour, and stated that it wasn't long enough and he was still dirty.   He was interactive with a peer for a brief moment, and sat in the lounge watching the 4th of July Party Over Here on TV.   He ate and drank well, denied pain, declined vitals, declined the wound care to toe, and stated \"it can wait until tomorrow, its my birthday\".   "

## 2023-07-04 NOTE — PLAN OF CARE
Assessment/Intervention/Current Symtoms and Care Coordination:  Reviewed chart. Met with team to review patient's care.     Discharge Plan or Goal:  When patient is more stable a referral for [facility] will be made     Barriers to Discharge:  Patient requires further psychiatric stabilization due to current symptomology. He continues to present as disorganized and tangential with paranoid thought content. He presents with mood lability. He vacillates between being pleasant and being aggressive with no clear environmental triggers.     Referral Status:  Psychiatry     Legal Status:  Court Hold    County: [place]  File Number: [id number]    Contacts:  : Vicky Valdez with [facility], [phone]  Psychiatrist: Dr. Santana at [facility], [phone] PCP: Attila Urena DO  Mom: Alberta, [phone] (H) [phone] (M)   Dad: Ino, [phone] (H)  Sister, Sandra Treadwell, [phone] (KING)     Upcoming Meetings and Dates/Important Information and next steps:

## 2023-07-05 PROCEDURE — 250N000009 HC RX 250: Performed by: PSYCHIATRY & NEUROLOGY

## 2023-07-05 PROCEDURE — 250N000013 HC RX MED GY IP 250 OP 250 PS 637: Performed by: PHYSICIAN ASSISTANT

## 2023-07-05 PROCEDURE — 250N000013 HC RX MED GY IP 250 OP 250 PS 637: Performed by: PSYCHIATRY & NEUROLOGY

## 2023-07-05 PROCEDURE — 99232 SBSQ HOSP IP/OBS MODERATE 35: CPT | Mod: GC | Performed by: PSYCHIATRY & NEUROLOGY

## 2023-07-05 PROCEDURE — 124N000002 HC R&B MH UMMC

## 2023-07-05 RX ADMIN — ATROPINE SULFATE 2 DROP: 10 SOLUTION/ DROPS OPHTHALMIC at 15:12

## 2023-07-05 RX ADMIN — SENNOSIDES 8.6 MG: 8.6 TABLET ORAL at 19:54

## 2023-07-05 RX ADMIN — Medication 300 MG: at 19:54

## 2023-07-05 RX ADMIN — DIPHENHYDRAMINE HYDROCHLORIDE 50 MG: 50 CAPSULE ORAL at 17:02

## 2023-07-05 RX ADMIN — OLANZAPINE 15 MG: 10 TABLET, ORALLY DISINTEGRATING ORAL at 10:35

## 2023-07-05 RX ADMIN — TAMSULOSIN HYDROCHLORIDE 0.4 MG: 0.4 CAPSULE ORAL at 08:14

## 2023-07-05 RX ADMIN — ATROPINE SULFATE 1 DROP: 10 SOLUTION/ DROPS OPHTHALMIC at 17:05

## 2023-07-05 RX ADMIN — DIVALPROEX SODIUM 1000 MG: 125 CAPSULE, COATED PELLETS ORAL at 19:54

## 2023-07-05 RX ADMIN — CYANOCOBALAMIN TAB 1000 MCG 1000 MCG: 1000 TAB at 08:14

## 2023-07-05 RX ADMIN — DIVALPROEX SODIUM 1000 MG: 125 CAPSULE, COATED PELLETS ORAL at 10:34

## 2023-07-05 RX ADMIN — OLANZAPINE 15 MG: 10 TABLET, ORALLY DISINTEGRATING ORAL at 19:54

## 2023-07-05 RX ADMIN — Medication 300 MG: at 08:14

## 2023-07-05 RX ADMIN — SENNOSIDES 8.6 MG: 8.6 TABLET ORAL at 08:14

## 2023-07-05 RX ADMIN — ATROPINE SULFATE 2 DROP: 10 SOLUTION/ DROPS OPHTHALMIC at 19:54

## 2023-07-05 RX ADMIN — Medication 10 MG: at 19:54

## 2023-07-05 RX ADMIN — CLOZAPINE 475 MG: 25 TABLET, ORALLY DISINTEGRATING ORAL at 14:11

## 2023-07-05 RX ADMIN — Medication 300 MG: at 14:12

## 2023-07-05 RX ADMIN — ATROPINE SULFATE 2 DROP: 10 SOLUTION/ DROPS OPHTHALMIC at 10:34

## 2023-07-05 RX ADMIN — POLYETHYLENE GLYCOL 3350 17 G: 17 POWDER, FOR SOLUTION ORAL at 10:35

## 2023-07-05 RX ADMIN — HALOPERIDOL 5 MG: 5 TABLET ORAL at 17:02

## 2023-07-05 ASSESSMENT — ACTIVITIES OF DAILY LIVING (ADL)
ADLS_ACUITY_SCORE: 66
HYGIENE/GROOMING: INDEPENDENT
ORAL_HYGIENE: INDEPENDENT
ADLS_ACUITY_SCORE: 66
ORAL_HYGIENE: INDEPENDENT
ADLS_ACUITY_SCORE: 86
LAUNDRY: UNABLE TO COMPLETE
DRESS: INDEPENDENT
ADLS_ACUITY_SCORE: 86
ADLS_ACUITY_SCORE: 86
ADLS_ACUITY_SCORE: 66
ADLS_ACUITY_SCORE: 66
ADLS_ACUITY_SCORE: 86
DRESS: INDEPENDENT
ADLS_ACUITY_SCORE: 66
HYGIENE/GROOMING: INDEPENDENT
ADLS_ACUITY_SCORE: 66
LAUNDRY: UNABLE TO COMPLETE
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
## 2023-07-05 NOTE — PLAN OF CARE
Problem: Adult Behavioral Health Plan of Care  Goal: Plan of Care Review  Outcome: Progressing     Problem: Sleep Disturbance  Goal: Adequate Sleep/Rest  Outcome: Progressing   Goal Outcome Evaluation:       Pt is noted to be sleeping comfortable during all safety checks with even and unlabored respirations. Pt slept for a total of 4.25 hours. No abnormal behaviors noted. Will continue with same plan of care.

## 2023-07-05 NOTE — PLAN OF CARE
Assessment/Intervention/Current Symtoms and Care Coordination:  Reviewed chart. Met with team to review patient's care. Called his sister, Sandra, to give an update on court information and answer questions she had - left voicemail and waiting for call back.     Discharge Plan or Goal:  When patient is more stable a referral for [facility] will be made     Barriers to Discharge:  Patient requires further psychiatric stabilization due to current symptomology. He continues to present as disorganized and tangential with paranoid thought content. He presents with mood lability. He vacillates between being pleasant and being aggressive with no clear environmental triggers.     Referral Status:  Psychiatry     Legal Status:  Court Hold    [facility]: [place]  File Number: [id number]    Contacts:  : Vicky Valdez with [facility], [phone]  Psychiatrist: Dr. Santana at [facility], [phone] PCP: Attila Urena DO  Mom: Alberta, [phone] (H) [phone] (M)   Dad: Ferminbradley, [phone] (H)  Sister, Sandra Treadwell, [phone] (KING)     Upcoming Meetings and Dates/Important Information and next steps:    Fax petition for [facility] to [phone] when it is complete

## 2023-07-05 NOTE — PLAN OF CARE
"Nursing Assessment    Recent Vitals: B/P: 122/71, T: 98.1, P: 88, R: 16     Sleep:  Hours of sleep at night: 4.5    General Shift Summary  Patient has isolated to his room. He presents with as withdrawn, guarded, dismissive. Affect is flat. He is paranoid/suspicious and hopeless. Patient made statements such as \"Just do it, you're going to kill me anyway.\". Patient complied with morning medications after a third attempt. He thought the medications were poison and acid the second attempt at giving them. He took an hour and 15 minute shower. Writer knocked on the shower door a few times and encouraged patient to come out but he refused.  He was cooperative with taking his afternoon medications. His noon Clozapine was given late due to him being in the shower. Patient has not shown violent, threatening, or agitated behaviors during the day. Patient urinated twice during the shift, amount not captured.    Plan is to continue with increase Clozapine and monitor patient's status.    Tamar Mars RN MSN  "
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
## 2023-07-06 LAB
BASOPHILS # BLD AUTO: 0.1 10E3/UL (ref 0–0.2)
BASOPHILS NFR BLD AUTO: 1 %
EOSINOPHIL # BLD AUTO: 0 10E3/UL (ref 0–0.7)
EOSINOPHIL NFR BLD AUTO: 0 %
ERYTHROCYTE [DISTWIDTH] IN BLOOD BY AUTOMATED COUNT: 14.7 % (ref 10–15)
HCT VFR BLD AUTO: 36.1 % (ref 40–53)
HGB BLD-MCNC: 11.5 G/DL (ref 13.3–17.7)
IMM GRANULOCYTES # BLD: 0 10E3/UL
IMM GRANULOCYTES NFR BLD: 0 %
LYMPHOCYTES # BLD AUTO: 1.2 10E3/UL (ref 0.8–5.3)
LYMPHOCYTES NFR BLD AUTO: 17 %
MCH RBC QN AUTO: 28 PG (ref 26.5–33)
MCHC RBC AUTO-ENTMCNC: 31.9 G/DL (ref 31.5–36.5)
MCV RBC AUTO: 88 FL (ref 78–100)
MONOCYTES # BLD AUTO: 0.7 10E3/UL (ref 0–1.3)
MONOCYTES NFR BLD AUTO: 10 %
NEUTROPHILS # BLD AUTO: 5.3 10E3/UL (ref 1.6–8.3)
NEUTROPHILS NFR BLD AUTO: 72 %
NRBC # BLD AUTO: 0 10E3/UL
NRBC BLD AUTO-RTO: 0 /100
PLATELET # BLD AUTO: 161 10E3/UL (ref 150–450)
RBC # BLD AUTO: 4.11 10E6/UL (ref 4.4–5.9)
WBC # BLD AUTO: 7.4 10E3/UL (ref 4–11)

## 2023-07-06 PROCEDURE — 250N000013 HC RX MED GY IP 250 OP 250 PS 637: Performed by: PSYCHIATRY & NEUROLOGY

## 2023-07-06 PROCEDURE — 85025 COMPLETE CBC W/AUTO DIFF WBC: CPT | Performed by: PSYCHIATRY & NEUROLOGY

## 2023-07-06 PROCEDURE — 250N000013 HC RX MED GY IP 250 OP 250 PS 637: Performed by: STUDENT IN AN ORGANIZED HEALTH CARE EDUCATION/TRAINING PROGRAM

## 2023-07-06 PROCEDURE — 99233 SBSQ HOSP IP/OBS HIGH 50: CPT | Mod: GC | Performed by: PSYCHIATRY & NEUROLOGY

## 2023-07-06 PROCEDURE — 124N000002 HC R&B MH UMMC

## 2023-07-06 PROCEDURE — 250N000013 HC RX MED GY IP 250 OP 250 PS 637: Performed by: PHYSICIAN ASSISTANT

## 2023-07-06 PROCEDURE — 36415 COLL VENOUS BLD VENIPUNCTURE: CPT | Performed by: PSYCHIATRY & NEUROLOGY

## 2023-07-06 RX ADMIN — POLYETHYLENE GLYCOL 3350 17 G: 17 POWDER, FOR SOLUTION ORAL at 09:40

## 2023-07-06 RX ADMIN — Medication 300 MG: at 20:34

## 2023-07-06 RX ADMIN — ATROPINE SULFATE 2 DROP: 10 SOLUTION/ DROPS OPHTHALMIC at 14:48

## 2023-07-06 RX ADMIN — TAMSULOSIN HYDROCHLORIDE 0.4 MG: 0.4 CAPSULE ORAL at 09:40

## 2023-07-06 RX ADMIN — Medication 300 MG: at 14:47

## 2023-07-06 RX ADMIN — DIVALPROEX SODIUM 1000 MG: 125 CAPSULE, COATED PELLETS ORAL at 09:40

## 2023-07-06 RX ADMIN — OLANZAPINE 15 MG: 10 TABLET, ORALLY DISINTEGRATING ORAL at 20:35

## 2023-07-06 RX ADMIN — Medication 10 MG: at 21:24

## 2023-07-06 RX ADMIN — DIVALPROEX SODIUM 1000 MG: 125 CAPSULE, COATED PELLETS ORAL at 19:48

## 2023-07-06 RX ADMIN — SENNOSIDES 8.6 MG: 8.6 TABLET ORAL at 20:34

## 2023-07-06 RX ADMIN — ATROPINE SULFATE 2 DROP: 10 SOLUTION/ DROPS OPHTHALMIC at 09:40

## 2023-07-06 RX ADMIN — SENNOSIDES 8.6 MG: 8.6 TABLET ORAL at 09:40

## 2023-07-06 RX ADMIN — CYANOCOBALAMIN TAB 1000 MCG 1000 MCG: 1000 TAB at 09:40

## 2023-07-06 RX ADMIN — CLOZAPINE 500 MG: 100 TABLET, ORALLY DISINTEGRATING ORAL at 12:38

## 2023-07-06 RX ADMIN — Medication 300 MG: at 09:40

## 2023-07-06 RX ADMIN — ATROPINE SULFATE 2 DROP: 10 SOLUTION/ DROPS OPHTHALMIC at 21:25

## 2023-07-06 RX ADMIN — OLANZAPINE 15 MG: 10 TABLET, ORALLY DISINTEGRATING ORAL at 09:40

## 2023-07-06 ASSESSMENT — ACTIVITIES OF DAILY LIVING (ADL)
ADLS_ACUITY_SCORE: 66
LAUNDRY: UNABLE TO COMPLETE
ADLS_ACUITY_SCORE: 66
HYGIENE/GROOMING: INDEPENDENT
ADLS_ACUITY_SCORE: 66
ORAL_HYGIENE: INDEPENDENT
ADLS_ACUITY_SCORE: 66
DRESS: INDEPENDENT
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
## 2023-07-06 NOTE — PLAN OF CARE
"Nursing Assessment    Recent Vitals: B/P: 124/70, T: 96.9, P: 49, R: 16     Mental Status Exam:  Appearance:  Well groomed  Behavior/relationship to examiner/demeanor:  Oppositional, Agitated and Hostile  Affect (objective appearance):  Labile and Blunted/Flat  Mood (subjective report):  frustrated  Gait:  Abnormal - slow but steady  Speech rate, volume, coherence:  Normal, Soft, Normal  Abnormal Perception:  Illusions  Attention/Concentration:  Fair,  Oriented to person  Insight:  Poor  Judgment:  Limited    Sleep:  Hours of sleep at night: 5.5    General Shift Summary  Patient has been primarily in his room resting in bed. Interactions have not been appropriate.. At 1050 patient came out of his room spitting at and hitting staff. Staff quickly blocked themselves and redirected patient to his bed. Writer assessed patient and he stated \"I want to hurt others\" \"I was told to do it\". Patient then went and laid back down in bed. Around 1115, patient had another episode of hitting staff and attempting to spit on them. Patient continues to need constant redirection and has a strong need for a 2:1 SIO. Other times patient would be calm, laying in bed, and listening to music. He urinated 850 ml in the hat at 0900. He is eating well.    Patient has been cooperative with medications. He requires encouragement and states \"You're giving me poison.\" \"I'll just take it, you're going to shoot me up anyway.\" \"Now one of these pills is the poison. I'll find out which one it is.\". Patient required three attempts to give him his morning medications. The first attempt he swat at writers hand and threatened to hurt staff.    Patient took a long shower. He attempted to come out of the shower several times naked. Staff continued to redirect him back to the shower and offered assistance with putting clothes on each time but he refused. Patient eventually agreed to putting clothes on and required staff assistance.    Plan is to continue to " increase Clozapine and monitor side effects.    Tamar Mars, RN MSN

## 2023-07-06 NOTE — PLAN OF CARE
"Nursing Assessment    Recent Vitals: B/P: 117/74, T: 97.7, P: 98, R: 16    General Shift Summary  Patient spent most of the evening in his room, listening to music while laying in bed. Patient presents with a flat affect.  Patient made no threats to others and made no attempts to assault others this evening. He made multiple self depreciating statements. \"Kill me, kill me\" \"I'm horrible horrible\". Thought process is suspicious/paranoid. When attempting to provide medications he made comments such as \"It's poison, it's poison.\" \"Get that away from me\". Patient grabbed the medication cup with meds from writer and threw it in the trash. He refused his pudding with the Depakote sprinkles in it as well stating \"You have more then one poisonous pill in there. You'll only live to 1000.\". Writer removed the trash from his room with the pills in it and replaced it with a new bag. Patient then ate his pudding with the Depakote in it.     Later, patient came to med window requesting his trash bag back. \"I need those pills back. Where's that bag?\". Patient started to reach for the door handle and then started to move to the door next to the medication room. Writer informed him that the medications have been disposed of but I could get him new ones. He stated \"Are you internal medicine, the same one that just tried to give me those meds?\". Writer explained who I was and provided him with new medications. Patient was compliant with taking them but continued to want to take the medications from the trash as well. Patient said \"We need to talk in my room.\". Writer went with patient to his room and explained that the meds he threw in the trash were dirty and no longer safe to take. Patient said \"Well you don't need to worry about that, I'm filthy.\" \"God loves me, loves me. I thought he didn't.\". When asking why he said \"I've been a glutton. The worst there is.\" \"He was on probation. But now he is free.\". At the end of the " "conversation writer told Vinod it was good talking with him and he stated \"I'm sure it wasn't\".    Hygiene is good. He is eating well. Attention/concentration is poor. Incite and judgment are limited. He is oriented to self but disoriented to situation. Normal gait. Speech is soft but normal rate and volume. 850 ml output was emptied from his hat at 1830 with at least two occurrences of patient urinating and it missing the hat after this.    Tamar Mars RN MSN  "

## 2023-07-06 NOTE — PLAN OF CARE
Problem: Suicide Risk  Goal: Absence of Self-Harm  Outcome: Progressing  Note: Patient manifests no self-harm behavior     Problem: Behavioral Disturbance  Goal: Behavioral Disturbance  Description: Signs and symptoms of listed problems will be absent or manageable by discharge or transition of care.  Outcome: Progressing  Note: Patient manifests no behavioral disturbance   Goal Outcome Evaluation:       Patient was up x1, came out to the hallway and quickly returned to bed, he was up to use the bathroom at about 0420 during which time he appears to be sleep walking, observed to be speaking to the wall in the bathroom and was redirected back to bed, patient started sleeping immediately he got in bed. Patient manifests no untoward behavior, patient is still 2:1 SIO staff monitoring. Overall patient achieved 5.5 hours of sleep.         García Up DNP, RN

## 2023-07-07 LAB
CLOZAPINE SERPL-MCNC: 272 NG/ML
CLOZAPINE+NOR SERPL-MCNC: 272 NG/ML
CNO SERPL-MCNC: <100 NG/ML
NORCLOZAPINE SERPL-MCNC: <100 NG/ML

## 2023-07-07 PROCEDURE — 99232 SBSQ HOSP IP/OBS MODERATE 35: CPT | Performed by: PSYCHIATRY & NEUROLOGY

## 2023-07-07 PROCEDURE — 250N000013 HC RX MED GY IP 250 OP 250 PS 637: Performed by: STUDENT IN AN ORGANIZED HEALTH CARE EDUCATION/TRAINING PROGRAM

## 2023-07-07 PROCEDURE — 250N000013 HC RX MED GY IP 250 OP 250 PS 637: Performed by: PHYSICIAN ASSISTANT

## 2023-07-07 PROCEDURE — 93005 ELECTROCARDIOGRAM TRACING: CPT

## 2023-07-07 PROCEDURE — 124N000002 HC R&B MH UMMC

## 2023-07-07 PROCEDURE — 250N000013 HC RX MED GY IP 250 OP 250 PS 637: Performed by: PSYCHIATRY & NEUROLOGY

## 2023-07-07 RX ORDER — CLOZAPINE 100 MG/1
100 TABLET, ORALLY DISINTEGRATING ORAL ONCE
Status: COMPLETED | OUTPATIENT
Start: 2023-07-07 | End: 2023-07-07

## 2023-07-07 RX ADMIN — CLOZAPINE 350 MG: 100 TABLET, ORALLY DISINTEGRATING ORAL at 12:37

## 2023-07-07 RX ADMIN — SENNOSIDES 8.6 MG: 8.6 TABLET ORAL at 20:04

## 2023-07-07 RX ADMIN — Medication 10 MG: at 20:03

## 2023-07-07 RX ADMIN — OLANZAPINE 15 MG: 10 TABLET, ORALLY DISINTEGRATING ORAL at 08:07

## 2023-07-07 RX ADMIN — ATROPINE SULFATE 2 DROP: 10 SOLUTION/ DROPS OPHTHALMIC at 09:32

## 2023-07-07 RX ADMIN — DIVALPROEX SODIUM 1000 MG: 125 CAPSULE, COATED PELLETS ORAL at 20:04

## 2023-07-07 RX ADMIN — CYANOCOBALAMIN TAB 1000 MCG 1000 MCG: 1000 TAB at 08:07

## 2023-07-07 RX ADMIN — CLOZAPINE 100 MG: 100 TABLET, ORALLY DISINTEGRATING ORAL at 13:14

## 2023-07-07 RX ADMIN — DIVALPROEX SODIUM 1000 MG: 125 CAPSULE, COATED PELLETS ORAL at 09:33

## 2023-07-07 RX ADMIN — POLYETHYLENE GLYCOL 3350 17 G: 17 POWDER, FOR SOLUTION ORAL at 08:07

## 2023-07-07 RX ADMIN — TAMSULOSIN HYDROCHLORIDE 0.4 MG: 0.4 CAPSULE ORAL at 08:07

## 2023-07-07 RX ADMIN — ATROPINE SULFATE 2 DROP: 10 SOLUTION/ DROPS OPHTHALMIC at 13:17

## 2023-07-07 RX ADMIN — Medication 300 MG: at 13:14

## 2023-07-07 RX ADMIN — Medication 300 MG: at 08:07

## 2023-07-07 RX ADMIN — SENNOSIDES 8.6 MG: 8.6 TABLET ORAL at 08:07

## 2023-07-07 RX ADMIN — Medication 300 MG: at 20:04

## 2023-07-07 RX ADMIN — ATROPINE SULFATE 2 DROP: 10 SOLUTION/ DROPS OPHTHALMIC at 20:38

## 2023-07-07 RX ADMIN — OLANZAPINE 15 MG: 10 TABLET, ORALLY DISINTEGRATING ORAL at 20:04

## 2023-07-07 ASSESSMENT — ACTIVITIES OF DAILY LIVING (ADL)
ADLS_ACUITY_SCORE: 66
HYGIENE/GROOMING: INDEPENDENT;SHOWER
ADLS_ACUITY_SCORE: 66
ADLS_ACUITY_SCORE: 66
LAUNDRY: UNABLE TO COMPLETE
ADLS_ACUITY_SCORE: 66
ADLS_ACUITY_SCORE: 66
ORAL_HYGIENE: INDEPENDENT
ADLS_ACUITY_SCORE: 66
DRESS: SCRUBS (BEHAVIORAL HEALTH)
ADLS_ACUITY_SCORE: 66

## 2023-07-07 NOTE — PLAN OF CARE
Problem: Adult Behavioral Health Plan of Care  Goal: Absence of New-Onset Illness or Injury  Outcome: Progressing  Note: Patient reports no new-onset illness or injury     Problem: Psychotic Signs/Symptoms  Goal: Improved Behavioral Control (Psychotic Signs/Symptoms)  Outcome: Progressing  Note: Patient manifests no behavioral dyscontrol   Goal Outcome Evaluation:        Patient had uneventful night, slept 5.25 hours with no gross behavioral manifestation. All precautions in place. Patient remains SIO 2:1 staff monitoring.             García Up DNP, RN
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
## 2023-07-07 NOTE — PLAN OF CARE
Assessment/Intervention/Current Symtoms and Care Coordination:  Reviewed chart. Met with team to review patient's care. With Vinod's permission, called [facility] to request a continuance for his hearing on Monday. Called his sister to request a written document requesting a continuance, per the court's instruction.       Discharge Plan or Goal:  When patient is more stable a referral for [facility] in [place] will be made, referral for [facility] in [place] will be made      Barriers to Discharge:  Patient requires further psychiatric stabilization due to current symptomology. He continues to present as disorganized and tangential with paranoid thought content. He presents with mood lability. He vacillates between being pleasant and being aggressive with no clear environmental triggers.     Referral Status:  Psychiatry     Legal Status:  Commitment with [facility]: [place]  File Number: [id number]  Issued 07/06/23 and is not to exceed six months    [place] petition sent 7/6     Contacts:  : Vicky Valdez with [facility], [phone]  Psychiatrist: Dr. Santana at [facility], [phone] PCP: Attila Urena DO  Mom: Kuldipalaina, [phone] (H) [phone] (M)   Dad: Ino, [phone] (H)  Sister, Sandra Treadwell, [phone] (KING)   [facility]'s Office Fax: [phone]  [facility]: [phone]  CADI  with [facility]: Regina Wong, [phone]      Upcoming Meetings and Dates/Important Information and next steps:  Arraignment on 7/10 at 8:30am via Zoom for disorderly conduct charge from nursing home incident - his sister Sandra will come for this

## 2023-07-07 NOTE — PLAN OF CARE
"Nursing Assessment    Recent Vitals: B/P: 125/70, T: 97.7, P: 94, R: 16     Sleep:  Hours of sleep at night: 5.25 plus time slept late in the evening prior to nights (about 3 hours).    General Shift Summary  Patient has primarily been in his room, laying in bed awake. He presents with a flat affect. Speech is soft and he rambling or repeats himself when talking. Patient is delusional, paranoid, and suspicious. He frequently thinks that staff is putting poison in his medications, are going to give him \"shots\", or have poisoned his food. Patient has made multiple negative statements about himself, such as stating he is an \"aweful\" person. Has made threats to hurt staff however has not acted out on any threats this shift. Had an EKG which was obtained without issues. Oriented to self but disoriented to situation. Concentration is good.    Writer provided patient his Clozapine with water. Patient has chewed the Clozapine pills in the past with water. Upon chewing, he spit one pill out that was partially dissolved stating \"That tastes like chalk\". He was encouraged to drink water and swallow the rest, patient complied. Provider was notified that patient received a partial dose and writer was unsure if the partially dissolved spit out pill was 150mg or 100mg. A one time order for 100mg Clozapine was placed and patient was provided this. He received between 450mg to 500mg of Clozapine at 1300.    Patient was compliant with his morning medications. No hesitation. He is eating well at nearly 100% plus pudding snacks. Hygiene is good. Urine output is good.    Plan is to continue to monitor patient on Clozapine.    Tamar Mars RN MSN  "

## 2023-07-08 PROCEDURE — 124N000002 HC R&B MH UMMC

## 2023-07-08 PROCEDURE — 250N000013 HC RX MED GY IP 250 OP 250 PS 637: Performed by: PHYSICIAN ASSISTANT

## 2023-07-08 PROCEDURE — 250N000013 HC RX MED GY IP 250 OP 250 PS 637: Performed by: PSYCHIATRY & NEUROLOGY

## 2023-07-08 RX ADMIN — Medication 300 MG: at 20:10

## 2023-07-08 RX ADMIN — SENNOSIDES 8.6 MG: 8.6 TABLET ORAL at 08:32

## 2023-07-08 RX ADMIN — Medication 10 MG: at 20:11

## 2023-07-08 RX ADMIN — CYANOCOBALAMIN TAB 1000 MCG 1000 MCG: 1000 TAB at 08:32

## 2023-07-08 RX ADMIN — SENNOSIDES 8.6 MG: 8.6 TABLET ORAL at 20:11

## 2023-07-08 RX ADMIN — Medication 300 MG: at 14:10

## 2023-07-08 RX ADMIN — TAMSULOSIN HYDROCHLORIDE 0.4 MG: 0.4 CAPSULE ORAL at 08:32

## 2023-07-08 RX ADMIN — OLANZAPINE 15 MG: 10 TABLET, ORALLY DISINTEGRATING ORAL at 20:11

## 2023-07-08 RX ADMIN — DIVALPROEX SODIUM 1000 MG: 125 CAPSULE, COATED PELLETS ORAL at 08:32

## 2023-07-08 RX ADMIN — POLYETHYLENE GLYCOL 3350 17 G: 17 POWDER, FOR SOLUTION ORAL at 08:32

## 2023-07-08 RX ADMIN — Medication 300 MG: at 08:32

## 2023-07-08 RX ADMIN — DIVALPROEX SODIUM 1000 MG: 125 CAPSULE, COATED PELLETS ORAL at 20:11

## 2023-07-08 RX ADMIN — ATROPINE SULFATE 2 DROP: 10 SOLUTION/ DROPS OPHTHALMIC at 08:32

## 2023-07-08 RX ADMIN — OLANZAPINE 15 MG: 10 TABLET, ORALLY DISINTEGRATING ORAL at 08:32

## 2023-07-08 RX ADMIN — ATROPINE SULFATE 2 DROP: 10 SOLUTION/ DROPS OPHTHALMIC at 20:10

## 2023-07-08 RX ADMIN — CLOZAPINE 525 MG: 100 TABLET, ORALLY DISINTEGRATING ORAL at 14:10

## 2023-07-08 ASSESSMENT — ACTIVITIES OF DAILY LIVING (ADL)
ADLS_ACUITY_SCORE: 66

## 2023-07-08 NOTE — PLAN OF CARE
"Problem: Psychotic Symptoms  Goal: Psychotic Symptoms  Description: Signs and symptoms of listed problems will be absent or manageable.  Outcome: Progressing    Patient took a long shower at the start of this shift. He is alert and oriented to person, and place with a flat affect that brightens upon approach. Patient is calm and compliant with vitals and medication administration. He took all scheduled mediation with pudding, and with little encouragement from writer. He was not social with peers, but he was seen talking with staff, and spent time sitting in the lounge area. He ate 100% of dinner. Patient denied all psych symptoms stating \"its all an illusion\". Patient did not have behavioral issues this shift, and he was able to void on his own. Patient is in bed sleeping at this time.     Patient complained of mild stomach pain, but stated he does not want prn. No prn was given this shift.    /75 (BP Location: Right arm, Patient Position: Sitting, Cuff Size: Adult Regular)   Pulse 100   Temp 97.7  F (36.5  C) (Temporal)   Resp 16   Wt 81.6 kg (179 lb 12.8 oz)   SpO2 98%   BMI 26.55 kg/m    "

## 2023-07-08 NOTE — PLAN OF CARE
Problem: Psychotic Signs/Symptoms  Goal: Improved Behavioral Control (Psychotic Signs/Symptoms)  Outcome: Progressing  Note: Patient manifests no behavioral dyscontrol     Problem: Suicidal Behavior  Goal: Suicidal Behavior is Absent or Managed  Outcome: Progressing  Note: Patient manifests no suicidal behavior   Goal Outcome Evaluation:       Patient slept 5.75 hours, remains calm and cooperative with no behavioral concerns. All precautions in place. Patient endorses no SI/HI, A/VH or SIB. 2:1 [facility] staff monitoring continues.      García Up DNP, RN

## 2023-07-09 PROCEDURE — 250N000013 HC RX MED GY IP 250 OP 250 PS 637: Performed by: PHYSICIAN ASSISTANT

## 2023-07-09 PROCEDURE — 250N000013 HC RX MED GY IP 250 OP 250 PS 637: Performed by: PSYCHIATRY & NEUROLOGY

## 2023-07-09 PROCEDURE — 124N000002 HC R&B MH UMMC

## 2023-07-09 RX ADMIN — Medication 10 MG: at 20:03

## 2023-07-09 RX ADMIN — TAMSULOSIN HYDROCHLORIDE 0.4 MG: 0.4 CAPSULE ORAL at 07:58

## 2023-07-09 RX ADMIN — CYANOCOBALAMIN TAB 1000 MCG 1000 MCG: 1000 TAB at 07:58

## 2023-07-09 RX ADMIN — OLANZAPINE 15 MG: 10 TABLET, ORALLY DISINTEGRATING ORAL at 07:57

## 2023-07-09 RX ADMIN — OLANZAPINE 15 MG: 10 TABLET, ORALLY DISINTEGRATING ORAL at 20:03

## 2023-07-09 RX ADMIN — ATROPINE SULFATE 2 DROP: 10 SOLUTION/ DROPS OPHTHALMIC at 08:57

## 2023-07-09 RX ADMIN — Medication 300 MG: at 07:58

## 2023-07-09 RX ADMIN — Medication 300 MG: at 20:03

## 2023-07-09 RX ADMIN — CLOZAPINE 550 MG: 100 TABLET, ORALLY DISINTEGRATING ORAL at 13:44

## 2023-07-09 RX ADMIN — DIVALPROEX SODIUM 1000 MG: 125 CAPSULE, COATED PELLETS ORAL at 07:58

## 2023-07-09 RX ADMIN — ATROPINE SULFATE 2 DROP: 10 SOLUTION/ DROPS OPHTHALMIC at 14:53

## 2023-07-09 RX ADMIN — DIVALPROEX SODIUM 1000 MG: 125 CAPSULE, COATED PELLETS ORAL at 20:03

## 2023-07-09 RX ADMIN — DIPHENHYDRAMINE HYDROCHLORIDE 50 MG: 50 CAPSULE ORAL at 17:03

## 2023-07-09 RX ADMIN — SENNOSIDES 8.6 MG: 8.6 TABLET ORAL at 20:03

## 2023-07-09 RX ADMIN — SENNOSIDES 8.6 MG: 8.6 TABLET ORAL at 07:58

## 2023-07-09 RX ADMIN — POLYETHYLENE GLYCOL 3350 17 G: 17 POWDER, FOR SOLUTION ORAL at 07:58

## 2023-07-09 RX ADMIN — HALOPERIDOL 5 MG: 5 TABLET ORAL at 17:04

## 2023-07-09 RX ADMIN — ATROPINE SULFATE 2 DROP: 10 SOLUTION/ DROPS OPHTHALMIC at 20:51

## 2023-07-09 RX ADMIN — Medication 300 MG: at 13:44

## 2023-07-09 ASSESSMENT — ACTIVITIES OF DAILY LIVING (ADL)
ORAL_HYGIENE: INDEPENDENT
ADLS_ACUITY_SCORE: 66
HYGIENE/GROOMING: INDEPENDENT
ADLS_ACUITY_SCORE: 66
ORAL_HYGIENE: INDEPENDENT
HYGIENE/GROOMING: INDEPENDENT;SHOWER
DRESS: SCRUBS (BEHAVIORAL HEALTH);INDEPENDENT
ADLS_ACUITY_SCORE: 66
DRESS: SCRUBS (BEHAVIORAL HEALTH)
LAUNDRY: UNABLE TO COMPLETE
ADLS_ACUITY_SCORE: 66
ADLS_ACUITY_SCORE: 66
LAUNDRY: UNABLE TO COMPLETE
ADLS_ACUITY_SCORE: 66
ADLS_ACUITY_SCORE: 66

## 2023-07-09 NOTE — PLAN OF CARE
"  Problem: Adult Behavioral Health Plan of Care  Goal: Adheres to Safety Considerations for Self and Others  Outcome: Progressing   Pt had an uneventful evening relaxing in room. Walked the aragon a few times. Very directable with no aggression towards staff or peers. Pt made statements regarding the medications that, \"you put poison in the medication, I'm going to die now\". Pt supported and engaged in alternate topics or activities. Medication compliant with no stated or observed side effects from medications. Dressing changed on left great toe, bacitracin applied to dressing per order.  Milieu Participation:Behavior: calm, cooperative with staff    Safety: status 15 SI/Self harm: denies  Aggression/agitation/HI: denies, exhibited safe behavior  AVH: denies Affect: blunted Mood: calm    Physical Complaints/Issues: denies    PRN Med: No PRNs administered this shift  Medication AE: denies    I & O: eating and drinking well 100%  Sleep: denies concerns  LBM: denies concerns  Visits/calls: none    Vitals:   /75   P 100   R 16   O 298%    T 97.7   Pain denies                         "

## 2023-07-09 NOTE — PLAN OF CARE
Problem: Suicidal Behavior  Goal: Suicidal Behavior is Absent or Managed  Outcome: Progressing  Note: Patient manifests no suicidal behavior     Problem: Behavioral Disturbance  Goal: Behavioral Disturbance  Description: Signs and symptoms of listed problems will be absent or manageable by discharge or transition of care.  Note: Patient remains calm thus far, manifests no behavioral disturbance   Goal Outcome Evaluation:       Patient seems to have slept 6.75 hours with no complaints, patient was cooperative with safety checks,  all precautions in place. No behavioral disturbance.            García Up, DNP, RN
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
## 2023-07-09 NOTE — CARE PLAN
07/09/23 1842   Group Therapy Session   Group Attendance excused from group session   Time Session Began 1600   Time Session Ended 1650   Total Time (minutes) 0   Total # Attendees 2   Group Type psychotherapeutic   Group Topic Covered coping skills/lifestyle management;emotions/expression;self-care activities   Group Session Detail Group members discussed/completed worksheets on stress exploration. Worksheet included listing common annoyances or strains on life, healthy coping strategies, and protective factors.     AIDA Goins, LGSW  Clinical Treatment Coordinator  [facility]

## 2023-07-09 NOTE — PLAN OF CARE
Problem: Psychotic Symptoms  Goal: Psychotic Symptoms  Description: Signs and symptoms of listed problems will be absent or manageable.  Outcome: Progressing  Patient took a shower at the start of this shift. He is calm, pleasant and he is compliant with medication administration. He was social with staff this shift and was seen talking and laughing. He denies hearing voices and agreed to demonstrate safe behaviors while on the unit. He was able to void twice this shift. Patient spent most of this shift in and out of his room, and was seen pacing in the hallway. He did not complain of pain and no prn given. He ate 100% of his breakfast and lunch. Wound care was done on patient's toe. Patient denied all psych symptoms.

## 2023-07-10 PROCEDURE — 250N000013 HC RX MED GY IP 250 OP 250 PS 637: Performed by: PSYCHIATRY & NEUROLOGY

## 2023-07-10 PROCEDURE — 250N000013 HC RX MED GY IP 250 OP 250 PS 637: Performed by: PHYSICIAN ASSISTANT

## 2023-07-10 PROCEDURE — 99233 SBSQ HOSP IP/OBS HIGH 50: CPT | Performed by: PSYCHIATRY & NEUROLOGY

## 2023-07-10 PROCEDURE — 124N000002 HC R&B MH UMMC

## 2023-07-10 RX ORDER — CLOZAPINE 150 MG/1
600 TABLET, ORALLY DISINTEGRATING ORAL DAILY
Status: DISCONTINUED | OUTPATIENT
Start: 2023-07-11 | End: 2023-07-20

## 2023-07-10 RX ADMIN — TAMSULOSIN HYDROCHLORIDE 0.4 MG: 0.4 CAPSULE ORAL at 08:06

## 2023-07-10 RX ADMIN — CLOZAPINE 575 MG: 100 TABLET, ORALLY DISINTEGRATING ORAL at 12:10

## 2023-07-10 RX ADMIN — Medication 300 MG: at 20:14

## 2023-07-10 RX ADMIN — OLANZAPINE 15 MG: 10 TABLET, ORALLY DISINTEGRATING ORAL at 08:06

## 2023-07-10 RX ADMIN — POLYETHYLENE GLYCOL 3350 17 G: 17 POWDER, FOR SOLUTION ORAL at 08:06

## 2023-07-10 RX ADMIN — Medication 300 MG: at 13:05

## 2023-07-10 RX ADMIN — SENNOSIDES 8.6 MG: 8.6 TABLET ORAL at 20:14

## 2023-07-10 RX ADMIN — ATROPINE SULFATE 2 DROP: 10 SOLUTION/ DROPS OPHTHALMIC at 20:14

## 2023-07-10 RX ADMIN — DIPHENHYDRAMINE HYDROCHLORIDE 50 MG: 50 CAPSULE ORAL at 13:05

## 2023-07-10 RX ADMIN — ATROPINE SULFATE 2 DROP: 10 SOLUTION/ DROPS OPHTHALMIC at 08:08

## 2023-07-10 RX ADMIN — OLANZAPINE 15 MG: 10 TABLET, ORALLY DISINTEGRATING ORAL at 20:15

## 2023-07-10 RX ADMIN — SENNOSIDES 8.6 MG: 8.6 TABLET ORAL at 08:06

## 2023-07-10 RX ADMIN — DIVALPROEX SODIUM 1000 MG: 125 CAPSULE, COATED PELLETS ORAL at 20:15

## 2023-07-10 RX ADMIN — ACETAMINOPHEN 650 MG: 325 TABLET, FILM COATED ORAL at 13:05

## 2023-07-10 RX ADMIN — OLANZAPINE 5 MG: 5 TABLET, FILM COATED ORAL at 02:16

## 2023-07-10 RX ADMIN — TRAZODONE HYDROCHLORIDE 50 MG: 50 TABLET ORAL at 02:16

## 2023-07-10 RX ADMIN — CYANOCOBALAMIN TAB 1000 MCG 1000 MCG: 1000 TAB at 08:06

## 2023-07-10 RX ADMIN — DIVALPROEX SODIUM 1000 MG: 125 CAPSULE, COATED PELLETS ORAL at 08:05

## 2023-07-10 RX ADMIN — Medication 300 MG: at 08:06

## 2023-07-10 RX ADMIN — HALOPERIDOL 5 MG: 5 TABLET ORAL at 13:05

## 2023-07-10 RX ADMIN — Medication 10 MG: at 20:14

## 2023-07-10 RX ADMIN — ATROPINE SULFATE 2 DROP: 10 SOLUTION/ DROPS OPHTHALMIC at 13:11

## 2023-07-10 ASSESSMENT — ACTIVITIES OF DAILY LIVING (ADL)
ADLS_ACUITY_SCORE: 66
ADLS_ACUITY_SCORE: 77
ADLS_ACUITY_SCORE: 66
ADLS_ACUITY_SCORE: 77
ADLS_ACUITY_SCORE: 77
ORAL_HYGIENE: INDEPENDENT;PROMPTS
DRESS: INDEPENDENT;PROMPTS
HYGIENE/GROOMING: INDEPENDENT
ADLS_ACUITY_SCORE: 66
ORAL_HYGIENE: INDEPENDENT
DRESS: PROMPTS;INDEPENDENT
ADLS_ACUITY_SCORE: 77
HYGIENE/GROOMING: INDEPENDENT
LAUNDRY: UNABLE TO COMPLETE
LAUNDRY: UNABLE TO COMPLETE
ADLS_ACUITY_SCORE: 66
ADLS_ACUITY_SCORE: 77

## 2023-07-10 NOTE — PLAN OF CARE
Goal Outcome Evaluation:    Plan of Care Reviewed With: patient        Pt mostly isolative in his room but occasionally came and paced the hallway. Pt cooperated with medications with some resistance but was able to convinced pt. Pt denies all mental health psych symptoms and contracted for safety in a dismissive way. Pt presented with flat affect and occassionally brighten during interaction. Speech pressured and rambling, disorganized and incoherent thought process. Pt visited by sister and visit went well. Pt was walking the hallway backward and with difficulties to redirect. Pt became tensed and irritable when redirected and prn haldol and benadryl administered orally. Pt stayed in his room calmly after reassessment.   Pt voided  x3 during the day shift and no BM but received scheduled miralax. Ate 100% of both breakfast and lunch and adequate fluid intake. Even breathing pattern and vital sign stable. /74 (BP Location: Right arm, Patient Position: Sitting, Cuff Size: Adult Regular)   Pulse 68   Temp 97.8  F (36.6  C) (Temporal)   Resp 16   Wt 81.6 kg (179 lb 12.8 oz)   SpO2 100%   BMI 26.55 kg/m

## 2023-07-10 NOTE — PLAN OF CARE
Problem: Sleep Disturbance  Goal: Adequate Sleep/Rest  Outcome: not Progressing  Note: Patient awake most of the night, despite receiving prn trazodone.     Problem: Behavioral Disturbance  Goal: Behavioral Disturbance  Description: Signs and symptoms of listed problems will be absent or manageable by discharge or transition of care.  Outcome: Progressing  Note: Patient manifests no suicidal behavior   Goal Outcome Evaluation:       Patient slept poorly this night (2.5hrs) despite being given trazodone at about 0216, patient also had Zyprexa 5 mg for agitation (effective) as patient later became calm with no disruptive behavior. Patient seems to have voided in the bathroom. [facility] staff will continue with monitoring.             García Up DNP, RN
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
## 2023-07-10 NOTE — PLAN OF CARE
Received pt napping in his room. Calm and cooperative with cares upon awaken.   withdrawn to self and presented with flat affect and poor concentration  voided x 2 with no bowel movement  this shift- denies abdominal discomfort.  denies Pain,  SI/HI, delusions, and hallucination   Pt is medication compliant with no side effect.         Problem: Psychotic Signs/Symptoms  Goal: Improved Behavioral Control (Psychotic Signs/Symptoms)  Outcome: Progressing  Goal: Optimal Cognitive Function (Psychotic Signs/Symptoms)  Intervention: Support and Promote Cognitive Ability  Recent Flowsheet Documentation  Taken 7/10/2023 1847 by Basia Woodson RN  Trust Relationship/Rapport:    care explained    choices provided    questions answered  Goal: Improved Psychomotor Symptoms (Psychotic Signs/Symptoms)  Intervention: Manage Psychomotor Movement  Recent Flowsheet Documentation  Taken 7/10/2023 1847 by Basia Woodson RN  Activity (Behavioral Health):    up ad jose    up in chair  Goal: Enhanced Social, Occupational or Functional Skills (Psychotic Signs/Symptoms)  Intervention: Promote Social, Occupational and Functional Ability  Recent Flowsheet Documentation  Taken 7/10/2023 1847 by Basia Woodson RN  Trust Relationship/Rapport:    care explained    choices provided    questions answered   Goal Outcome Evaluation:    Plan of Care Reviewed With: patient

## 2023-07-10 NOTE — PROGRESS NOTES
Pt slept .25 hours for the shift. Pt was up multiple times going to his bathroom and cleaning the tile with his toothbrush. Pt also came out of his room every 10-15 minutes trying to take a shower and make phone calls to find out how many holes are in the curtain of [facility].     Pt needed to be redirected multiple times during the shift informing him that the phone and shower would not be available until 0730. Pt at one point attempted to reach for SIO staffs zahida and keys in an attempt to get into the shower himself. Pt was again redirected and told this behavior was not acceptable.    Pt is hyper fixated on shower and making phone call for information on a Beetles song. And cleaning his grout in his bathroom with the toothbrush.

## 2023-07-10 NOTE — PLAN OF CARE
Assessment/Intervention/Current Symtoms and Care Coordination:  Reviewed chart. Met with team to review patient's care. Set patient up with his court hearing. Patient's disorderly conduct charges were dismissed due to his mental illness.     Discharge Plan or Goal:  When patient is more stable a referral for [facility] in [place] will be made, referral for [facility] in [place] will be made      Barriers to Discharge:  Patient requires further psychiatric stabilization due to current symptomology. He continues to present as disorganized and tangential with paranoid thought content. He presents with mood lability. He vacillates between being pleasant and being aggressive with no clear environmental triggers.     Referral Status:  Psychiatry     Legal Status:  Commitment with [facility]: [facility]  File Number: [id number]  Issued 07/06/23 and is not to exceed six months    [facility]     Contacts:  : Vicky Valdez with [facility], [phone]  Psychiatrist: Dr. Santana at [facility], [phone] PCP: Attila Urena DO  Mom: Alberta, [phone] (H) [phone] (M)   Dad: Ino, [phone] (H)  Sister, Sandra Treadwell, [phone] (KING)   [facility]'s Office Fax: [phone]  [facility]: [phone]  CADI  with [facility]: Regina Wong, [phone]      Upcoming Meetings and Dates/Important Information and next steps:

## 2023-07-11 LAB
ATRIAL RATE - MUSE: 91 BPM
DIASTOLIC BLOOD PRESSURE - MUSE: NORMAL MMHG
INTERPRETATION ECG - MUSE: NORMAL
P AXIS - MUSE: 63 DEGREES
PR INTERVAL - MUSE: 138 MS
QRS DURATION - MUSE: 146 MS
QT - MUSE: 418 MS
QTC - MUSE: 514 MS
R AXIS - MUSE: -32 DEGREES
SYSTOLIC BLOOD PRESSURE - MUSE: NORMAL MMHG
T AXIS - MUSE: 94 DEGREES
VENTRICULAR RATE- MUSE: 91 BPM

## 2023-07-11 PROCEDURE — 250N000013 HC RX MED GY IP 250 OP 250 PS 637: Performed by: PHYSICIAN ASSISTANT

## 2023-07-11 PROCEDURE — 250N000013 HC RX MED GY IP 250 OP 250 PS 637: Performed by: PSYCHIATRY & NEUROLOGY

## 2023-07-11 PROCEDURE — 99231 SBSQ HOSP IP/OBS SF/LOW 25: CPT | Performed by: PSYCHIATRY & NEUROLOGY

## 2023-07-11 PROCEDURE — 124N000002 HC R&B MH UMMC

## 2023-07-11 RX ADMIN — SENNOSIDES 8.6 MG: 8.6 TABLET ORAL at 08:38

## 2023-07-11 RX ADMIN — CYANOCOBALAMIN TAB 1000 MCG 1000 MCG: 1000 TAB at 08:38

## 2023-07-11 RX ADMIN — ATROPINE SULFATE 2 DROP: 10 SOLUTION/ DROPS OPHTHALMIC at 20:00

## 2023-07-11 RX ADMIN — DIVALPROEX SODIUM 1000 MG: 125 CAPSULE, COATED PELLETS ORAL at 19:58

## 2023-07-11 RX ADMIN — TAMSULOSIN HYDROCHLORIDE 0.4 MG: 0.4 CAPSULE ORAL at 08:38

## 2023-07-11 RX ADMIN — CLOZAPINE 600 MG: 150 TABLET, ORALLY DISINTEGRATING ORAL at 13:22

## 2023-07-11 RX ADMIN — ATROPINE SULFATE 2 DROP: 10 SOLUTION/ DROPS OPHTHALMIC at 08:51

## 2023-07-11 RX ADMIN — OLANZAPINE 5 MG: 5 TABLET, FILM COATED ORAL at 13:24

## 2023-07-11 RX ADMIN — DIVALPROEX SODIUM 1000 MG: 125 CAPSULE, COATED PELLETS ORAL at 08:37

## 2023-07-11 RX ADMIN — POLYETHYLENE GLYCOL 3350 17 G: 17 POWDER, FOR SOLUTION ORAL at 08:37

## 2023-07-11 RX ADMIN — Medication 10 MG: at 19:59

## 2023-07-11 RX ADMIN — DIPHENHYDRAMINE HYDROCHLORIDE 50 MG: 50 CAPSULE ORAL at 14:51

## 2023-07-11 RX ADMIN — OLANZAPINE 15 MG: 10 TABLET, ORALLY DISINTEGRATING ORAL at 08:38

## 2023-07-11 RX ADMIN — Medication 300 MG: at 08:37

## 2023-07-11 RX ADMIN — OLANZAPINE 15 MG: 10 TABLET, ORALLY DISINTEGRATING ORAL at 19:58

## 2023-07-11 RX ADMIN — Medication 300 MG: at 13:22

## 2023-07-11 RX ADMIN — SENNOSIDES 8.6 MG: 8.6 TABLET ORAL at 19:58

## 2023-07-11 RX ADMIN — Medication 300 MG: at 19:58

## 2023-07-11 RX ADMIN — HALOPERIDOL 5 MG: 5 TABLET ORAL at 14:51

## 2023-07-11 ASSESSMENT — ACTIVITIES OF DAILY LIVING (ADL)
DRESS: INDEPENDENT
ADLS_ACUITY_SCORE: 77
ADLS_ACUITY_SCORE: 67
ADLS_ACUITY_SCORE: 77
ADLS_ACUITY_SCORE: 77
HYGIENE/GROOMING: INDEPENDENT
ORAL_HYGIENE: INDEPENDENT
ADLS_ACUITY_SCORE: 77
ADLS_ACUITY_SCORE: 67

## 2023-07-11 NOTE — PLAN OF CARE
Assessment/Intervention/Current Symtoms and Care Coordination:  Reviewed chart. Met with team to review patient's care. Called [facility] pre-petition to get an update on the [employer] petition sent on 7/6. Emailed with his , Vicky, who was unaware of it but looking for more information.     Discharge Plan or Goal:  When patient is more stable a referral for [facility] in [place] will be made, referral for [facility] in [place] will be made      Barriers to Discharge:  Patient requires further psychiatric stabilization due to current symptomology. He continues to present as disorganized and tangential with paranoid thought content. He presents with mood lability. He vacillates between being pleasant and being aggressive with no clear environmental triggers.     Referral Status:  Psychiatry     Legal Status:  Commitment with [facility]: [place]  File Number: [id number]  Issued 07/06/23 and is not to exceed six months    [place] petition sent 7/6     Contacts:  : Vicky Nettested with [facility], [phone]  Psychiatrist: Dr. Santana at [facility], [phone] PCP: Attila Urena DO  Mom: Alberta, [phone] (H) [phone] (M)   Dad: Ino, [phone] (H)  Sister, Sandra Treadwell, [phone] (KING)   [facility]'s Office Fax: [phone]  [facility]: [phone]  CADI  with [facility]: Regina Wong, [phone]      Upcoming Meetings and Dates/Important Information and next steps:

## 2023-07-11 NOTE — PLAN OF CARE
"Nursing assessment completed. Patient up early initially wanting to shower, then stated he was scared to shower. Cooperative with taking am medications, but did ask \"what will happen if I refuse\". He stated \"there is poison in that cup\" referring to his miralax. Patient showered for an extended period in the late morning. He voided independently in the shower. He rested calmly in his room for a few hours and then entered the lounge to walk the halls with his SIO staff. He abruptly looked at writer and stated, \"there is that ugly bitch!\". He was not redirectable, and turned to a staff member and another patient and stated, \"you two are a bunch of ni---- loving white guys\". Staff redirected the inappropriate comments and attempted to walk him back to his room. He stated \"I'm going to be mean and cause trouble so you kick me out\". \"You suck shit and you suck ashley!\".  Per staff, patient made more than one attempt to shadow box by another patient (RP), which staff redirected. Writer gave patient his afternoon medications along with a PRN zyprexa, which he took cooperatively. He assisted staff with changing his linens. Voided independently in his bathroom. Patient had large BM. (Staff reported >12 inches). Patient then put his head in the toilet in attempt to eat his BM. SIO Staff prevented this and patient agreed to take PRN haldol and benadryl.    Patient continued to have periods of difficult to manage poor boundaries throughout the afternoon.                             "

## 2023-07-11 NOTE — PROGRESS NOTES
"Pt slept 6.25 hours overnight. When pt would wake up, he would ask writer \"What time is it? Can I get in the shower now?\" Writer and other staff reminded him of the time and that he can shower at 0730. Pt was receptive to this information and quickly fell back asleep. Pt slept very well, no interruptions. Pt did not drink or eat this shift. Pt tried to void at 0645 and was unsuccessful.   "

## 2023-07-11 NOTE — PLAN OF CARE
Problem: Sleep Disturbance  Goal: Adequate Sleep/Rest  Outcome: Progressing   Goal Outcome Evaluation:             Patient continues to be on [facility]IO with 2:1 staffing for safety as ordered. Patient appeared to be asleep for 6.25 hours during this shift. Patient did not void this shift, although attempted. Patient did not complain of pain. Patient cooperative.
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
## 2023-07-12 LAB
BASOPHILS # BLD AUTO: 0.1 10E3/UL (ref 0–0.2)
BASOPHILS NFR BLD AUTO: 1 %
EOSINOPHIL # BLD AUTO: 0 10E3/UL (ref 0–0.7)
EOSINOPHIL NFR BLD AUTO: 0 %
ERYTHROCYTE [DISTWIDTH] IN BLOOD BY AUTOMATED COUNT: 14.5 % (ref 10–15)
HCT VFR BLD AUTO: 37.7 % (ref 40–53)
HGB BLD-MCNC: 12 G/DL (ref 13.3–17.7)
IMM GRANULOCYTES # BLD: 0.1 10E3/UL
IMM GRANULOCYTES NFR BLD: 1 %
LYMPHOCYTES # BLD AUTO: 1.6 10E3/UL (ref 0.8–5.3)
LYMPHOCYTES NFR BLD AUTO: 17 %
MCH RBC QN AUTO: 28 PG (ref 26.5–33)
MCHC RBC AUTO-ENTMCNC: 31.8 G/DL (ref 31.5–36.5)
MCV RBC AUTO: 88 FL (ref 78–100)
MONOCYTES # BLD AUTO: 1.1 10E3/UL (ref 0–1.3)
MONOCYTES NFR BLD AUTO: 11 %
NEUTROPHILS # BLD AUTO: 7 10E3/UL (ref 1.6–8.3)
NEUTROPHILS NFR BLD AUTO: 70 %
NRBC # BLD AUTO: 0 10E3/UL
NRBC BLD AUTO-RTO: 0 /100
PLATELET # BLD AUTO: 156 10E3/UL (ref 150–450)
RBC # BLD AUTO: 4.29 10E6/UL (ref 4.4–5.9)
WBC # BLD AUTO: 10 10E3/UL (ref 4–11)

## 2023-07-12 PROCEDURE — 250N000013 HC RX MED GY IP 250 OP 250 PS 637: Performed by: PHYSICIAN ASSISTANT

## 2023-07-12 PROCEDURE — 124N000002 HC R&B MH UMMC

## 2023-07-12 PROCEDURE — 250N000013 HC RX MED GY IP 250 OP 250 PS 637: Performed by: PSYCHIATRY & NEUROLOGY

## 2023-07-12 PROCEDURE — 93005 ELECTROCARDIOGRAM TRACING: CPT

## 2023-07-12 PROCEDURE — 36415 COLL VENOUS BLD VENIPUNCTURE: CPT | Performed by: PSYCHIATRY & NEUROLOGY

## 2023-07-12 PROCEDURE — 99233 SBSQ HOSP IP/OBS HIGH 50: CPT | Mod: GC | Performed by: PSYCHIATRY & NEUROLOGY

## 2023-07-12 PROCEDURE — 85025 COMPLETE CBC W/AUTO DIFF WBC: CPT | Performed by: PSYCHIATRY & NEUROLOGY

## 2023-07-12 RX ADMIN — TAMSULOSIN HYDROCHLORIDE 0.4 MG: 0.4 CAPSULE ORAL at 08:51

## 2023-07-12 RX ADMIN — ATROPINE SULFATE 2 DROP: 10 SOLUTION/ DROPS OPHTHALMIC at 08:52

## 2023-07-12 RX ADMIN — SENNOSIDES 8.6 MG: 8.6 TABLET ORAL at 08:51

## 2023-07-12 RX ADMIN — ATROPINE SULFATE 2 DROP: 10 SOLUTION/ DROPS OPHTHALMIC at 19:32

## 2023-07-12 RX ADMIN — SENNOSIDES 8.6 MG: 8.6 TABLET ORAL at 19:13

## 2023-07-12 RX ADMIN — CYANOCOBALAMIN TAB 1000 MCG 1000 MCG: 1000 TAB at 08:51

## 2023-07-12 RX ADMIN — DIVALPROEX SODIUM 1000 MG: 125 CAPSULE, COATED PELLETS ORAL at 08:51

## 2023-07-12 RX ADMIN — Medication 300 MG: at 13:02

## 2023-07-12 RX ADMIN — POLYETHYLENE GLYCOL 3350 17 G: 17 POWDER, FOR SOLUTION ORAL at 08:51

## 2023-07-12 RX ADMIN — DIPHENHYDRAMINE HYDROCHLORIDE 50 MG: 50 CAPSULE ORAL at 08:57

## 2023-07-12 RX ADMIN — Medication 300 MG: at 19:13

## 2023-07-12 RX ADMIN — DIVALPROEX SODIUM 1000 MG: 125 CAPSULE, COATED PELLETS ORAL at 19:24

## 2023-07-12 RX ADMIN — HALOPERIDOL 5 MG: 5 TABLET ORAL at 08:57

## 2023-07-12 RX ADMIN — Medication 300 MG: at 08:51

## 2023-07-12 RX ADMIN — CLOZAPINE 600 MG: 150 TABLET, ORALLY DISINTEGRATING ORAL at 13:02

## 2023-07-12 RX ADMIN — OLANZAPINE 15 MG: 10 TABLET, ORALLY DISINTEGRATING ORAL at 08:51

## 2023-07-12 RX ADMIN — Medication 10 MG: at 19:13

## 2023-07-12 RX ADMIN — OLANZAPINE 15 MG: 10 TABLET, ORALLY DISINTEGRATING ORAL at 19:13

## 2023-07-12 ASSESSMENT — ACTIVITIES OF DAILY LIVING (ADL)
ADLS_ACUITY_SCORE: 67
ADLS_ACUITY_SCORE: 66
ADLS_ACUITY_SCORE: 67
DRESS: INDEPENDENT
HYGIENE/GROOMING: INDEPENDENT
ADLS_ACUITY_SCORE: 67
ORAL_HYGIENE: INDEPENDENT
ADLS_ACUITY_SCORE: 66

## 2023-07-12 NOTE — PLAN OF CARE
Assessment/Intervention/Current Symtoms and Care Coordination:  Reviewed chart. Met with team to review patient's care. Emailed , Vicky, and re-faxed [facility] petition.      Discharge Plan or Goal:  When patient is more stable a referral for [facility] in [place] will be made, referral for [facility] in [place] will be made      Barriers to Discharge:  Patient requires further psychiatric stabilization due to current symptomology. He continues to present as disorganized and tangential with paranoid thought content. He presents with mood lability. He vacillates between being pleasant and being aggressive with no clear environmental triggers.     Referral Status:  Psychiatry     Legal Status:  Commitment with [facility]: [place]  File Number: [id number]  Issued 07/06/23 and is not to exceed six months    [facility] petition sent 7/6, sent again on 7/12    Contacts:  : Vicky Suhtested with [facility], [phone]  Psychiatrist: Dr. Santana at [facility], [phone] PCP: Attila Urena DO  Mom: Alberta, [phone] (H) [phone] (M)   Dad: Ino, [phone] (H)  Sister, Sandra Treadwell, [phone] (KING)   [facility]'s Office Fax: [phone]  [facility]: [phone]  [facility]  with [facility]: Regina Wong, [phone]      Upcoming Meetings and Dates/Important Information and next steps:
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
## 2023-07-12 NOTE — PLAN OF CARE
Problem: Psychotic Signs/Symptoms  Goal: Improved Behavioral Control (Psychotic Signs/Symptoms)  Outcome: Progressing     Problem: Adult Behavioral Health Plan of Care  Goal: Plan of Care Review  Recent Flowsheet Documentation  Taken 7/11/2023 1853 by Ryan Salter RN  Patient Agreement with Plan of Care: agrees   Goal Outcome Evaluation:    Plan of Care Reviewed With: patient      The patient spent the entire evening in his room except for coming out at 2240 requesting a shower. Staff redirected him with success. He was calm and spoke softly between periods of intermittent sleep, except for a brief loud swearing while taking his medication. He initially refused the pills, saying that they were poison. The second time around, the writer was successful. He denied SI/HI, AVHs, anxiety, depression, and discomfort. He claimed that he overate and finished everything on his tray. Pt sleeps well and is not constipated. Pt didn't engage with his peers or participate in the evening activity. He voided x1. The patient's affect was flat, and his attitude was calm. He took his regular meds and experienced no adverse side effects.

## 2023-07-12 NOTE — PROGRESS NOTES
"Pt slept 3.75 hours on the overnight shift. His sleep was restless and interrupted with him being hyper-focused on having a shower and knowing what time it was. Pt would wake up every 10-15 minutes to ask SIO staff the time. Pt was confused at one pont in the night where he said it was \"PM\" rather than AM. Staff was able to de-escalate in communication with pt and pt then laid down, but was restless and heard talking to self.   Pt did not drink or eat anything, nor did pt void this shift.   "

## 2023-07-12 NOTE — PLAN OF CARE
Problem: Confusion Chronic  Goal: Optimal Cognitive Function  Outcome: Not Progressing     Problem: Psychotic Signs/Symptoms  Goal: Improved Behavioral Control (Psychotic Signs/Symptoms)  Outcome: Not Progressing   Goal Outcome Evaluation:         Patient continues on SIO with 2:1 staffing for safety. Patient slept for 3.75  hours on  This shift. Patient voided once towards the end of the shift. Denies pain. No any other concern reported or noticed.
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
## 2023-07-12 NOTE — PLAN OF CARE
Nursing assessment completed. Patient initially calm and listening to music with staff,   Hitting and kicking at staff. With significant encouragement, he was agreeable to taking PRN haldol and benadryl 0900 for agitation. He was paranoid the  was getting poisoned. Writer spoke with central pre admissions regarding case.

## 2023-07-13 LAB
ALBUMIN SERPL BCG-MCNC: 3.6 G/DL (ref 3.5–5.2)
ALP SERPL-CCNC: 73 U/L (ref 40–129)
ALT SERPL W P-5'-P-CCNC: 12 U/L (ref 0–70)
ANION GAP SERPL CALCULATED.3IONS-SCNC: 10 MMOL/L (ref 7–15)
AST SERPL W P-5'-P-CCNC: 16 U/L (ref 0–45)
ATRIAL RATE - MUSE: 85 BPM
BILIRUB SERPL-MCNC: 0.2 MG/DL
BUN SERPL-MCNC: 28.6 MG/DL (ref 8–23)
CALCIUM SERPL-MCNC: 8.6 MG/DL (ref 8.8–10.2)
CHLORIDE SERPL-SCNC: 104 MMOL/L (ref 98–107)
CREAT SERPL-MCNC: 1.01 MG/DL (ref 0.67–1.17)
DEPRECATED HCO3 PLAS-SCNC: 26 MMOL/L (ref 22–29)
DIASTOLIC BLOOD PRESSURE - MUSE: NORMAL MMHG
GFR SERPL CREATININE-BSD FRML MDRD: 83 ML/MIN/1.73M2
GLUCOSE SERPL-MCNC: 84 MG/DL (ref 70–99)
HOLD SPECIMEN: NORMAL
INTERPRETATION ECG - MUSE: NORMAL
P AXIS - MUSE: 58 DEGREES
POTASSIUM SERPL-SCNC: 4.4 MMOL/L (ref 3.4–5.3)
PR INTERVAL - MUSE: 144 MS
PROT SERPL-MCNC: 5.6 G/DL (ref 6.4–8.3)
QRS DURATION - MUSE: 152 MS
QT - MUSE: 430 MS
QTC - MUSE: 511 MS
R AXIS - MUSE: -22 DEGREES
SODIUM SERPL-SCNC: 140 MMOL/L (ref 136–145)
SYSTOLIC BLOOD PRESSURE - MUSE: NORMAL MMHG
T AXIS - MUSE: 86 DEGREES
VALPROATE SERPL-MCNC: 66.2 UG/ML
VENTRICULAR RATE- MUSE: 85 BPM

## 2023-07-13 PROCEDURE — 36415 COLL VENOUS BLD VENIPUNCTURE: CPT | Performed by: PSYCHIATRY & NEUROLOGY

## 2023-07-13 PROCEDURE — 124N000002 HC R&B MH UMMC

## 2023-07-13 PROCEDURE — 80053 COMPREHEN METABOLIC PANEL: CPT | Performed by: PSYCHIATRY & NEUROLOGY

## 2023-07-13 PROCEDURE — 250N000013 HC RX MED GY IP 250 OP 250 PS 637: Performed by: PSYCHIATRY & NEUROLOGY

## 2023-07-13 PROCEDURE — 250N000013 HC RX MED GY IP 250 OP 250 PS 637: Performed by: PHYSICIAN ASSISTANT

## 2023-07-13 PROCEDURE — 80159 DRUG ASSAY CLOZAPINE: CPT | Performed by: STUDENT IN AN ORGANIZED HEALTH CARE EDUCATION/TRAINING PROGRAM

## 2023-07-13 PROCEDURE — 99233 SBSQ HOSP IP/OBS HIGH 50: CPT | Mod: GC | Performed by: PSYCHIATRY & NEUROLOGY

## 2023-07-13 PROCEDURE — 80164 ASSAY DIPROPYLACETIC ACD TOT: CPT | Performed by: PSYCHIATRY & NEUROLOGY

## 2023-07-13 PROCEDURE — 36415 COLL VENOUS BLD VENIPUNCTURE: CPT | Performed by: STUDENT IN AN ORGANIZED HEALTH CARE EDUCATION/TRAINING PROGRAM

## 2023-07-13 RX ADMIN — OLANZAPINE 15 MG: 10 TABLET, ORALLY DISINTEGRATING ORAL at 08:15

## 2023-07-13 RX ADMIN — DIVALPROEX SODIUM 1000 MG: 125 CAPSULE, COATED PELLETS ORAL at 08:15

## 2023-07-13 RX ADMIN — CLOZAPINE 600 MG: 150 TABLET, ORALLY DISINTEGRATING ORAL at 11:57

## 2023-07-13 RX ADMIN — ATROPINE SULFATE 2 DROP: 10 SOLUTION/ DROPS OPHTHALMIC at 13:22

## 2023-07-13 RX ADMIN — Medication 10 MG: at 20:04

## 2023-07-13 RX ADMIN — Medication 300 MG: at 08:15

## 2023-07-13 RX ADMIN — Medication 300 MG: at 20:04

## 2023-07-13 RX ADMIN — Medication 300 MG: at 13:22

## 2023-07-13 RX ADMIN — OLANZAPINE 15 MG: 10 TABLET, ORALLY DISINTEGRATING ORAL at 20:04

## 2023-07-13 RX ADMIN — CYANOCOBALAMIN TAB 1000 MCG 1000 MCG: 1000 TAB at 08:16

## 2023-07-13 RX ADMIN — DIVALPROEX SODIUM 1000 MG: 125 CAPSULE, COATED PELLETS ORAL at 20:04

## 2023-07-13 RX ADMIN — ATROPINE SULFATE 2 DROP: 10 SOLUTION/ DROPS OPHTHALMIC at 08:17

## 2023-07-13 RX ADMIN — SENNOSIDES 8.6 MG: 8.6 TABLET ORAL at 08:15

## 2023-07-13 RX ADMIN — SENNOSIDES 8.6 MG: 8.6 TABLET ORAL at 20:04

## 2023-07-13 RX ADMIN — TAMSULOSIN HYDROCHLORIDE 0.4 MG: 0.4 CAPSULE ORAL at 08:16

## 2023-07-13 RX ADMIN — ATROPINE SULFATE 2 DROP: 10 SOLUTION/ DROPS OPHTHALMIC at 20:04

## 2023-07-13 RX ADMIN — POLYETHYLENE GLYCOL 3350 17 G: 17 POWDER, FOR SOLUTION ORAL at 08:15

## 2023-07-13 RX ADMIN — OLANZAPINE 10 MG: 5 TABLET, FILM COATED ORAL at 02:23

## 2023-07-13 ASSESSMENT — ACTIVITIES OF DAILY LIVING (ADL)
LAUNDRY: UNABLE TO COMPLETE
ADLS_ACUITY_SCORE: 86
DRESS: INDEPENDENT
ADLS_ACUITY_SCORE: 66
ADLS_ACUITY_SCORE: 76
ADLS_ACUITY_SCORE: 66
ADLS_ACUITY_SCORE: 76
ORAL_HYGIENE: PROMPTS;INDEPENDENT
ADLS_ACUITY_SCORE: 66
ADLS_ACUITY_SCORE: 76
HYGIENE/GROOMING: WITH SUPERVISION
DRESS: PROMPTS;INDEPENDENT
ORAL_HYGIENE: WITH SUPERVISION
ADLS_ACUITY_SCORE: 76
HYGIENE/GROOMING: PROMPTS;INDEPENDENT
LAUNDRY: UNABLE TO COMPLETE

## 2023-07-13 NOTE — PLAN OF CARE
"Assessment/Intervention/Current Symtoms and Care Coordination:  Reviewed chart. Met with team to review patient's care. Discussed [facility] petition with , Vicky. She reported that the \"court admin will get it on the court calendar. Our  requested a court date ASAP.\"     Discharge Plan or Goal:  When patient is more stable a referral for [facility] in [place] will be made, referral for [facility] in [place] will be made      Barriers to Discharge:  Patient requires further psychiatric stabilization due to current symptomology. He continues to present as disorganized and tangential with paranoid thought content. He presents with mood lability. He vacillates between being pleasant and being aggressive with no clear environmental triggers.     Referral Status:  Psychiatry     Legal Status:  Commitment with [facility]: [place]  File Number: [id number]  Issued 07/06/23 and is not to exceed six months    [facility] petition sent 7/6, sent again on 7/12    Contacts:  : Vicky Suhtested with [facility], [phone]  Psychiatrist: Dr. Santana at [facility], [phone] PCP: Attila Urena DO  Mom: Alberta, [phone] (H) [phone] (M)   Dad: Ino, [phone] (H)  Sister, Sandra Treadwell, [phone] (KING)   [facility]'s Office Fax: [phone]  [facility]: [phone]  CADI  with [facility]: Regina Wong, [phone]      Upcoming Meetings and Dates/Important Information and next steps:                                                        "

## 2023-07-13 NOTE — PROGRESS NOTES
"Pt has been pacing back and forth in the aragon, fighting to lay down and sleep since beginning of the night shift. Pt is responding to internal stimuli and says he's feeling restless \"I don't know what to do, I don't know what to do... except drink water from the toilet. I did that already.\" Writer and SIO staff did not witness pt drink from the toilet.   When pt would sleep (no more than 5 minutes at a time) he would wake up and ask his SIO staff for a shower. Pt is hyper-focused on taking a shower and calling someone. Pt has gone to the unit phone numerous times waiting for a dial tone. Pt has been educated on unit rules that the shower and phone turn on at 0730. Pt argues with SIO staff and writer about what time it is; thinking it's PM rather than AM. Pt was given medications, but was hesitant to take them at first. \"They said I shouldn't but I should. I don't believe I've taken these before.\"   After awhile, pt was laying in his bed and was heard talking to self \"My grandparents were brother and sister...brother and sister... That's incest. That's why we have that eye problem... the eye problem\" and \"noting is wrong, nothing at all...nothing at all. God is helping me, helping me, helping me.\"     "
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
## 2023-07-13 NOTE — PLAN OF CARE
Problem: Suicide Risk  Goal: Absence of Self-Harm  Outcome: Not Progressing   Goal Outcome Evaluation:         Patient continues on SIO with 2:1 staffing for safety. Patient slept for 4.75  hours on  This shift. Patient was restless and seem agitated. PRN Zyprexa was administered which was effective.  Patient voided once towards the end of the shift. Denies pain. No any other concern reported or noticed.
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
## 2023-07-13 NOTE — PLAN OF CARE
Goal Outcome Evaluation:    Plan of Care Reviewed With: patient      Problem: Psychotic Symptoms  Goal: Psychotic Symptoms  Description: Signs and symptoms of listed problems will be absent or manageable.  Outcome: Progressing      Vinod continues to be on SIO 2:1 this shift. He did initiate and took a very long shower, (over 1 our long) before breakfast. Pt had adequate intake of both meals. He denied SI, HI and he contracted for safety. Pt is confused, A&O 1. He had a bowel movement today.  Pt denied pain, and he had adequate intake of his meals. Pt was medication compliant, takes meds in budding and his vital signs were within defined limits. Pt has a new order for med consult for ECT. Will continue to monitor.

## 2023-07-13 NOTE — PLAN OF CARE
Pt was calm and withdrawn to self this shift. Denies  pain and any other discomfort  Appeared confused and disorganized but was calm without  destructive behaviors.   No harm and assault  to self and others this shift.   Pt denies  SI/HI, delusions, and hallucination   Voided x2, medication compliant with no side effect verbalized or observed.        Problem: Psychotic Signs/Symptoms  Goal: Improved Behavioral Control (Psychotic Signs/Symptoms)  Outcome: Progressing  Goal: Optimal Cognitive Function (Psychotic Signs/Symptoms)  Intervention: Support and Promote Cognitive Ability  Recent Flowsheet Documentation  Taken 7/13/2023 1752 by Basia Woodson RN  Trust Relationship/Rapport:    care explained    choices provided  Goal: Enhanced Social, Occupational or Functional Skills (Psychotic Signs/Symptoms)  Intervention: Promote Social, Occupational and Functional Ability  Recent Flowsheet Documentation  Taken 7/13/2023 1752 by Basia Woodson RN  Trust Relationship/Rapport:    care explained    choices provided   Goal Outcome Evaluation:    Plan of Care Reviewed With: patient

## 2023-07-14 PROCEDURE — 250N000013 HC RX MED GY IP 250 OP 250 PS 637: Performed by: PSYCHIATRY & NEUROLOGY

## 2023-07-14 PROCEDURE — 250N000013 HC RX MED GY IP 250 OP 250 PS 637: Performed by: PHYSICIAN ASSISTANT

## 2023-07-14 PROCEDURE — 124N000002 HC R&B MH UMMC

## 2023-07-14 PROCEDURE — G0177 OPPS/PHP; TRAIN & EDUC SERV: HCPCS

## 2023-07-14 PROCEDURE — 99233 SBSQ HOSP IP/OBS HIGH 50: CPT | Performed by: PSYCHIATRY & NEUROLOGY

## 2023-07-14 RX ORDER — LORAZEPAM 1 MG/1
1 TABLET ORAL 3 TIMES DAILY
Status: DISCONTINUED | OUTPATIENT
Start: 2023-07-14 | End: 2023-07-14

## 2023-07-14 RX ORDER — LORAZEPAM 1 MG/1
1 TABLET ORAL ONCE
Status: COMPLETED | OUTPATIENT
Start: 2023-07-14 | End: 2023-07-14

## 2023-07-14 RX ORDER — LORAZEPAM 0.5 MG/1
0.5 TABLET ORAL 3 TIMES DAILY
Status: DISCONTINUED | OUTPATIENT
Start: 2023-07-15 | End: 2023-08-04

## 2023-07-14 RX ORDER — LORAZEPAM 2 MG/1
2 TABLET ORAL ONCE
Status: DISCONTINUED | OUTPATIENT
Start: 2023-07-14 | End: 2023-07-14

## 2023-07-14 RX ADMIN — OLANZAPINE 15 MG: 10 TABLET, ORALLY DISINTEGRATING ORAL at 10:37

## 2023-07-14 RX ADMIN — TAMSULOSIN HYDROCHLORIDE 0.4 MG: 0.4 CAPSULE ORAL at 10:37

## 2023-07-14 RX ADMIN — ATROPINE SULFATE 2 DROP: 10 SOLUTION/ DROPS OPHTHALMIC at 10:39

## 2023-07-14 RX ADMIN — SENNOSIDES 8.6 MG: 8.6 TABLET ORAL at 10:36

## 2023-07-14 RX ADMIN — DIPHENHYDRAMINE HYDROCHLORIDE 50 MG: 50 CAPSULE ORAL at 10:39

## 2023-07-14 RX ADMIN — ATROPINE SULFATE 1 DROP: 10 SOLUTION/ DROPS OPHTHALMIC at 19:02

## 2023-07-14 RX ADMIN — SENNOSIDES 8.6 MG: 8.6 TABLET ORAL at 20:48

## 2023-07-14 RX ADMIN — DIVALPROEX SODIUM 1000 MG: 125 CAPSULE, COATED PELLETS ORAL at 20:48

## 2023-07-14 RX ADMIN — HALOPERIDOL 5 MG: 5 TABLET ORAL at 10:39

## 2023-07-14 RX ADMIN — Medication 10 MG: at 20:48

## 2023-07-14 RX ADMIN — LORAZEPAM 1 MG: 1 TABLET ORAL at 14:18

## 2023-07-14 RX ADMIN — POLYETHYLENE GLYCOL 3350 17 G: 17 POWDER, FOR SOLUTION ORAL at 10:36

## 2023-07-14 RX ADMIN — OLANZAPINE 15 MG: 10 TABLET, ORALLY DISINTEGRATING ORAL at 20:48

## 2023-07-14 RX ADMIN — Medication 300 MG: at 20:48

## 2023-07-14 RX ADMIN — DIVALPROEX SODIUM 1000 MG: 125 CAPSULE, COATED PELLETS ORAL at 10:37

## 2023-07-14 RX ADMIN — CLOZAPINE 600 MG: 150 TABLET, ORALLY DISINTEGRATING ORAL at 14:18

## 2023-07-14 RX ADMIN — Medication 300 MG: at 10:37

## 2023-07-14 RX ADMIN — CYANOCOBALAMIN TAB 1000 MCG 1000 MCG: 1000 TAB at 10:37

## 2023-07-14 RX ADMIN — Medication 300 MG: at 14:18

## 2023-07-14 RX ADMIN — LORAZEPAM 1 MG: 1 TABLET ORAL at 20:48

## 2023-07-14 ASSESSMENT — ACTIVITIES OF DAILY LIVING (ADL)
ADLS_ACUITY_SCORE: 86

## 2023-07-14 NOTE — CARE PLAN
07/14/23 1634   Group Therapy Session   Group Attendance attended group session   Total Time (minutes) 80   Total # Attendees 3, 1   Group Type task skill   Group Topic Covered coping skills/lifestyle management;structured socialization   Group Session Detail OT clinic   Patient Response/Contribution cooperative with task;organized     Pt approached toward the group at the end of the OT clinic group, and expressed interest in joining for the last 20 minutes.  Writer set him up with a blank sheet of scratch art paper and a stencil, and pt proceeded to work on this for 20 minutes, not wanting to stop to eat lunch, though did with prompts.  Pt did not need assistance to hold stencil steady, or to readjust as needed after moving it off of the paper.    Writer approached pt in the afternoon offering him more time to finish his project, and he immediately got out of bed and walked to the table in the lounge to do so.  Writer and 2:1 staff joined in activity, while socializing and listening to music.  Pt was in a bright mood and positive throughout the session.

## 2023-07-14 NOTE — PLAN OF CARE
"Nursing assessment completed. Patient intermittently agitated and paranoid throughout the morning. Patient attempting to stand on his bed on numerous occasions to \"get the rope to the ceiling\". He denies suicidal ideation. Fixated on, and attempting to get to the sprinkler device on the ceiling. This is intervened by his 2:1 SIO staff. Patient told a staff \"nice to meet you now I'm going to gouge your eyes out\". Unsteady on feet. Stand by assist at all times. Patient showered for an extended period. Showered with dressing over toe and declined to allow writer to assess to after shower. Refused to allow dressing change out of wet dressing from the shower. He denies pain. No s/s redness or swelling around the toe. Pt was receptive to taking am medications with PRN haldol and benadryl for agitation.   Patient attended OT group in the afternoon, for the first time. He worked on scratch art calmly.  Once time dose of ativan given for ativan challenge with scheduled 1400 medications.  Evenings  Patient slept for several hours into the evening. He did wake up, eat and calmly watched a documentary on the OT IPad in his room. He was observed to be drooling, and did allow writer to administer his atropine gtts.   He fell back asleep until HS medications, which he woke up to take and eat his evening snack. He was conversational and offered no complaints or agitation. He denies SI/SIB or thoughts to hurt others at this time. Medication compliant. Continue to monitor and assess.                           "
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
## 2023-07-14 NOTE — PLAN OF CARE
Problem: Sleep Disturbance  Goal: Adequate Sleep/Rest  Outcome: Progressing  Note: Patient seems to be sleeping during safety checks     Problem: Behavioral Disturbance  Goal: Behavioral Disturbance  Description: Signs and symptoms of listed problems will be absent or manageable by discharge or transition of care.  Outcome: Progressing  Note: Patient manifests no behavioral disturbance   Goal Outcome Evaluation:       Patient passed the night uneventfully, slept fairly well (5.75 hrs) with no issues of immediate concerns. Patient was reported to have used and voided well in the bathroom at about 0300. Patient demonstrates no behavioral dyscontrol. All precautions in place. Patient reports no side effect from his medication regimen.           García Up, DNP, RN

## 2023-07-14 NOTE — PLAN OF CARE
Assessment/Intervention/Current Symtoms and Care Coordination:  Reviewed chart. Met with team to review patient's care. No update from [facility] yet.     Discharge Plan or Goal:  When patient is more stable a referral for [facility] in [place] will be made, referral for [facility] in [place] will be made      Barriers to Discharge:  Patient requires further psychiatric stabilization due to current symptomology. He continues to present as disorganized and tangential with paranoid thought content. He presents with mood lability. He vacillates between being pleasant and being aggressive with no clear environmental triggers.     Referral Status:  Psychiatry     Legal Status:  Commitment with [facility]: [place]  File Number: [id number]  Issued 07/06/23 and is not to exceed six months    Ventura-Pavon petition sent 7/6, sent again on 7/12    Contacts:  : Vicky Valdez with [facility], [phone]  Psychiatrist: Dr. Santana at [facility], [phone] PCP: Attila Urena DO  Mom: Alberta, [phone] (H) [phone] (M)   Dad: Ino, [phone] (H)  Sister, Sandra Treadwell, [phone] ([place])   [facility] Office Fax: [phone]  [facility]: [phone]  CADERIN  with [facility]: Regina Wong, [phone]      Upcoming Meetings and Dates/Important Information and next steps:

## 2023-07-14 NOTE — PLAN OF CARE
ECT Psychiatry Brief Note    Received consult from primary team for ECT for psychosis and possible catatonia.  Patient is psychiatrically committed and Ventura-Pavon was filed but has yet to be approved.  Discussed with primary team resident - no indications for emergent ECT prior to Ventura-Pavon going through.  We will therefore defer ECT consult for now.    Please reach out to us again once patient is under Ventura-Pavon status.    Fly Richard MD  Neuromodulation Medicine Fellow, PGY-6  Department of Psychiatry  [facility] / [facility] [place]  Preferred Contact: F[employer]reball

## 2023-07-15 PROCEDURE — 250N000013 HC RX MED GY IP 250 OP 250 PS 637: Performed by: PSYCHIATRY & NEUROLOGY

## 2023-07-15 PROCEDURE — 250N000013 HC RX MED GY IP 250 OP 250 PS 637: Performed by: PHYSICIAN ASSISTANT

## 2023-07-15 PROCEDURE — 124N000002 HC R&B MH UMMC

## 2023-07-15 RX ADMIN — OLANZAPINE 15 MG: 10 TABLET, ORALLY DISINTEGRATING ORAL at 20:35

## 2023-07-15 RX ADMIN — LORAZEPAM 0.5 MG: 0.5 TABLET ORAL at 08:47

## 2023-07-15 RX ADMIN — CYANOCOBALAMIN TAB 1000 MCG 1000 MCG: 1000 TAB at 08:48

## 2023-07-15 RX ADMIN — Medication 10 MG: at 20:35

## 2023-07-15 RX ADMIN — Medication 300 MG: at 14:02

## 2023-07-15 RX ADMIN — OLANZAPINE 15 MG: 10 TABLET, ORALLY DISINTEGRATING ORAL at 08:47

## 2023-07-15 RX ADMIN — LORAZEPAM 0.5 MG: 0.5 TABLET ORAL at 14:02

## 2023-07-15 RX ADMIN — TAMSULOSIN HYDROCHLORIDE 0.4 MG: 0.4 CAPSULE ORAL at 08:48

## 2023-07-15 RX ADMIN — SENNOSIDES 8.6 MG: 8.6 TABLET ORAL at 20:36

## 2023-07-15 RX ADMIN — Medication 300 MG: at 08:47

## 2023-07-15 RX ADMIN — CLOZAPINE 600 MG: 150 TABLET, ORALLY DISINTEGRATING ORAL at 14:02

## 2023-07-15 RX ADMIN — POLYETHYLENE GLYCOL 3350 17 G: 17 POWDER, FOR SOLUTION ORAL at 08:47

## 2023-07-15 RX ADMIN — ATROPINE SULFATE 1 DROP: 10 SOLUTION/ DROPS OPHTHALMIC at 17:09

## 2023-07-15 RX ADMIN — Medication 300 MG: at 20:35

## 2023-07-15 RX ADMIN — ATROPINE SULFATE 2 DROP: 10 SOLUTION/ DROPS OPHTHALMIC at 20:59

## 2023-07-15 RX ADMIN — DIVALPROEX SODIUM 1000 MG: 125 CAPSULE, COATED PELLETS ORAL at 20:36

## 2023-07-15 RX ADMIN — LORAZEPAM 0.5 MG: 0.5 TABLET ORAL at 20:35

## 2023-07-15 RX ADMIN — SENNOSIDES 8.6 MG: 8.6 TABLET ORAL at 08:47

## 2023-07-15 RX ADMIN — DIVALPROEX SODIUM 1000 MG: 125 CAPSULE, COATED PELLETS ORAL at 08:48

## 2023-07-15 RX ADMIN — ATROPINE SULFATE 2 DROP: 10 SOLUTION/ DROPS OPHTHALMIC at 14:02

## 2023-07-15 RX ADMIN — ATROPINE SULFATE 2 DROP: 10 SOLUTION/ DROPS OPHTHALMIC at 08:48

## 2023-07-15 ASSESSMENT — ACTIVITIES OF DAILY LIVING (ADL)
ORAL_HYGIENE: PROMPTS;INDEPENDENT
ADLS_ACUITY_SCORE: 86
HYGIENE/GROOMING: PROMPTS;INDEPENDENT
ADLS_ACUITY_SCORE: 86
DRESS: PROMPTS;INDEPENDENT
ADLS_ACUITY_SCORE: 86

## 2023-07-15 NOTE — PLAN OF CARE
Problem: Plan of Care - These are the overarching goals to be used throughout the patient stay.    Goal: Absence of Hospital-Acquired Illness or Injury  Outcome: Progressing  Note: Patient reports no YVONNE     Problem: Behavioral Disturbance  Goal: Behavioral Disturbance  Description: Signs and symptoms of listed problems will be absent or manageable by discharge or transition of care.  Outcome: Progressing  Note: Patient manifests no behavioral disturbance   Goal Outcome Evaluation:           Patient passed the night unremarkably, slept through the night with no complaints of pain, anxiety or discomfort. Patient was cooperative with safety checks with no demonstrated physical or verbal aggression toward staff or peers. Patient endorses no SI/HI, A/VH or SIB, reports no side effect from his medication regimen. Patient slept for 7 hours. Will continue to monitor and follow plan of care.                  García Up DNP, RN

## 2023-07-15 NOTE — PLAN OF CARE
"  Problem: Psychotic Signs/Symptoms  Goal: Improved Behavioral Control (Psychotic Signs/Symptoms)  Outcome: Progressing  Flowsheets (Taken 7/15/2023 1846)  Mutually Determined Action Steps (Improved Behavioral Control): verbalizes gratifying activity   Milieu Participation:Behavior: Pt calm, directable but disorganized in thought this shift. Agreeable to taking all medications. No medication side effects noted by pt or observed by writer with the exception of some drooling. Pt received PRN Atropine drops. No other PRN's given.  Pt swatted writer one time saying, \"I had to do that sorry\". When going for a aragon walk writer asked pt if he would like another glass of ice water, pt stated, \"no thanks I don't drive\". Pt voiding adequately. No BM's this shift. Pt does have Scratch Art available and used it during day shift. It will be located at [employer] workstation. Pt declined art time this shift. Pt denies SI, SIB, AVH, depression and anxiety.   Pt received wound care of rt great toe on day shift. Wound examined and bandage reapplied. Outer perimeter of toe wounds are pink, dry, and peeling with overall reduction in wound diameter at the sites by a couple of millimeters approximately. Pt denies pain.      Safety: SIO 2:1 Affect: blunted Mood: calm  Physical Complaints/Issues: denies  I & O: eating and drinking well  Sleep: denies concerns    ADLs: prompts  Visits/calls: none    Vitals:    BP: 139/70  Temp: 97.8  F (36.6  C)  Temp src: Temporal  Pulse: 99  SpO2: 98 % (07/15 1847)                          "

## 2023-07-15 NOTE — PLAN OF CARE
Nursing assessment completed. Patient resting for much of the morning and afternoon. He is easily awoken and cooperative with scheduled medications. He is agreeable to working on scratch art activity in his room with his 2:1 SIO. Voiding independently. Unable to confirm or deny BM history. Staff did not report seeing he had a BM today. He was agreeable to writer assessing and cleaning his R toe wound. There are 2 dark in color circles, roughly the size of quarters on his great toe. These appear improved from when writer observed wound several weeks ago. Patient has previously been consistently declining assessments or wound care. He denied pain. No other area around the dark circles are red. No drainage or odor.   Patient makes several negative self talk comments. He has no behavioral issues this shift. Denies SI/SIB or thoughts to harm others. Continue to monitor and assess.

## 2023-07-16 LAB — SARS-COV-2 RNA RESP QL NAA+PROBE: NEGATIVE

## 2023-07-16 PROCEDURE — 99231 SBSQ HOSP IP/OBS SF/LOW 25: CPT

## 2023-07-16 PROCEDURE — 250N000013 HC RX MED GY IP 250 OP 250 PS 637: Performed by: PHYSICIAN ASSISTANT

## 2023-07-16 PROCEDURE — 250N000013 HC RX MED GY IP 250 OP 250 PS 637: Performed by: PSYCHIATRY & NEUROLOGY

## 2023-07-16 PROCEDURE — 87635 SARS-COV-2 COVID-19 AMP PRB: CPT

## 2023-07-16 PROCEDURE — 124N000002 HC R&B MH UMMC

## 2023-07-16 RX ORDER — BENZONATATE 100 MG/1
100 CAPSULE ORAL 3 TIMES DAILY PRN
Status: DISCONTINUED | OUTPATIENT
Start: 2023-07-16 | End: 2023-11-13 | Stop reason: HOSPADM

## 2023-07-16 RX ADMIN — CLOZAPINE 600 MG: 150 TABLET, ORALLY DISINTEGRATING ORAL at 14:05

## 2023-07-16 RX ADMIN — ATROPINE SULFATE 2 DROP: 10 SOLUTION/ DROPS OPHTHALMIC at 19:39

## 2023-07-16 RX ADMIN — CYANOCOBALAMIN TAB 1000 MCG 1000 MCG: 1000 TAB at 10:12

## 2023-07-16 RX ADMIN — Medication 10 MG: at 19:28

## 2023-07-16 RX ADMIN — OLANZAPINE 15 MG: 10 TABLET, ORALLY DISINTEGRATING ORAL at 19:27

## 2023-07-16 RX ADMIN — Medication 300 MG: at 14:05

## 2023-07-16 RX ADMIN — ATROPINE SULFATE 2 DROP: 10 SOLUTION/ DROPS OPHTHALMIC at 14:06

## 2023-07-16 RX ADMIN — Medication 300 MG: at 10:12

## 2023-07-16 RX ADMIN — TAMSULOSIN HYDROCHLORIDE 0.4 MG: 0.4 CAPSULE ORAL at 10:12

## 2023-07-16 RX ADMIN — SENNOSIDES 8.6 MG: 8.6 TABLET ORAL at 10:12

## 2023-07-16 RX ADMIN — DIVALPROEX SODIUM 1000 MG: 125 CAPSULE, COATED PELLETS ORAL at 19:27

## 2023-07-16 RX ADMIN — POLYETHYLENE GLYCOL 3350 17 G: 17 POWDER, FOR SOLUTION ORAL at 10:11

## 2023-07-16 RX ADMIN — ATROPINE SULFATE 2 DROP: 10 SOLUTION/ DROPS OPHTHALMIC at 10:13

## 2023-07-16 RX ADMIN — LORAZEPAM 0.5 MG: 0.5 TABLET ORAL at 14:05

## 2023-07-16 RX ADMIN — Medication 300 MG: at 19:27

## 2023-07-16 RX ADMIN — DIVALPROEX SODIUM 1000 MG: 125 CAPSULE, COATED PELLETS ORAL at 10:12

## 2023-07-16 RX ADMIN — SENNOSIDES 8.6 MG: 8.6 TABLET ORAL at 19:27

## 2023-07-16 RX ADMIN — LORAZEPAM 0.5 MG: 0.5 TABLET ORAL at 19:27

## 2023-07-16 RX ADMIN — OLANZAPINE 15 MG: 10 TABLET, ORALLY DISINTEGRATING ORAL at 10:12

## 2023-07-16 RX ADMIN — LORAZEPAM 0.5 MG: 0.5 TABLET ORAL at 10:12

## 2023-07-16 ASSESSMENT — ACTIVITIES OF DAILY LIVING (ADL)
ADLS_ACUITY_SCORE: 86
ADLS_ACUITY_SCORE: 87
ADLS_ACUITY_SCORE: 86
ADLS_ACUITY_SCORE: 87
ADLS_ACUITY_SCORE: 86
ADLS_ACUITY_SCORE: 86

## 2023-07-16 NOTE — PLAN OF CARE
Goal Outcome Evaluation:      Pt remains on an SIO for safety 2:1.  He slept for 6.75 hours.  Pt appeared to be coughing on and off throughout the night.

## 2023-07-16 NOTE — PLAN OF CARE
Nursing assessment completed. Patient with developing cough. Internal medicine consulted and orders placed. Wound care consult for great R toe ordered. Internal medicine came to assess patient, which patient allowed. He allowed respiratory and covid specimen collection- sent to the lab. Pt allowed writer to assess and clean his toe wound. Denies pain. Cleansed and redressed. No swelling or redness noted.   Patient overall calm and cooperative throughout the shift. Did shower and change his linens with significant encouragement. He did hesitate for roughly one hour to shower, going back and forth between his room and the shower, stating he was scared the hospital would blow up if he showered. He ate lunch in the lounge. He refused his afternoon medications, at one point attempting to kick writer and push staff out of his room. After giving him a few minutes, he was agreeable to taking scheduled afternoon medications.

## 2023-07-16 NOTE — PROGRESS NOTES
Brief Medicine Follow Up Note    Following up regarding R toe skin changes, new cough.     Today's vital signs, medications, and nursing notes were reviewed. Labs reviewed most recent serum and urine laboratories.     /70 (Patient Position: Right side)   Pulse 99   Temp 97.8  F (36.6  C) (Temporal)   Resp 20   Wt 81.6 kg (179 lb 12.8 oz)   SpO2 98%   BMI 26.55 kg/m    General: NAD, lying in bed. Drooling on shirt. Awake, alert, cooperative.   Eyes: lids and lashes normal, sclera clear and conjunctiva normal  ENT: normocephalic, without obvious abnormality  Respiratory: No increased work of breathing, good air exchange, clear to auscultation bilaterally, no crackles or wheezing  Cardiovascular: regular rate and rhythm, normal S1 and S2, no S3, no S4 and no murmur noted  GI: No obvious distension  Musculoskeletal: no lower extremity pitting edema present, no deformities.  Neurologic: Moving all extremities equally and spontaneously. No obvious focal neuro deficits.  Neuropsychiatric: General: normal, calm and normal eye contact  Level of consciousness: alert / normal  Affect: normal and pleasant  Skin: no bruising or bleeding, no redness, warmth, or swelling, no rashes and no lesions on visualized skin. Please see photos below for R great toe wound, now appears to be healing w/ eschar formation overlying wound areas.            A/P:  Vinod Lee is a 63 year old male with a past medical history of schizophrenia, Parkinson's Disease, who was initially admitted on 5/18/23 for AMS, aggression. Broad workup negative, and ultimately transferred to [address] on 5/26/23 for further psychiatric monitoring and management. Medicine has been involved intermittently throughout his hospitalization, and is most recently being consulted on 7/16 for evaluation of skin changes of R toe and a cough.    Cough, intermittent  Pt developed hoarse voice and cough over the past several days. Per RN, several other patients on  "the unit had reportedly developed similar sx. However, pt afebrile, not tachycardic, no hypotension and no chest pain. Pt reports mild dyspnea, but on exam no wheezing, rhonchi, or absent breath sounds in lungs, and no coughing noted on exam. No indication at this point to obtain CXR given reassuring lung auscultation, no cough on exam, HDS, and not hypoxic on RA.  - RVP pending  - COVID pending  - D/w RN to have pt isolate and wear a mask in his room until the above tests have finalized   - If any of the above have resulted positive, please notify Medicine to discuss findings  - Tessalon pearls PRN for cough    R Great Toe Wound  Recent R Great Toe Fracture, improving  Per Chart Review, pt sustained a acute comminuted and displaced fracture through the first R distal phalanx w/ intraarticular extension after banging his foot into wall/door of his room during an episode of agitation. Was placed on 7-day course of Augmentin, and Ortho saw him. They irrigated his fracture blister, and had given him a post-op shoe to use. However, Medicine notified on 7/16 that the R toe wound has a \"black\" appearance. Photos as above; more c/w eschar formation, and potentially necrosis of damaged tissue, but otherwise no findings to suggest cellulitis of surrounding tissues, streaking up foot, or involvement of surrounding healthy tissue. Has remained HDS, afebrile, wound appears to be healing appropriately.  - WOCN consult placed  - D/w RN to continue dressing changes and saline flushes periodically as ordered by Ortho originally until WOCN can see pt  - Notify Medicine if worsening of R great toe, increased pain, drainage, fever, or if c/f cellulitis    Medicine will sign off. No further recommendations at this time.  Please feel free to reconsult if any new medical issues or concerns.     Bebeto Kaplan PA-C  [facility]  Contact information available via [facility] Paging/Directory    "

## 2023-07-17 LAB
C PNEUM DNA SPEC QL NAA+PROBE: NOT DETECTED
CLOZAPINE SERPL-MCNC: 350 NG/ML
CLOZAPINE+NOR SERPL-MCNC: 564 NG/ML
CNO SERPL-MCNC: 108 NG/ML
FLUAV H1 2009 PAND RNA SPEC QL NAA+PROBE: NOT DETECTED
FLUAV H1 RNA SPEC QL NAA+PROBE: NOT DETECTED
FLUAV H3 RNA SPEC QL NAA+PROBE: NOT DETECTED
FLUAV RNA SPEC QL NAA+PROBE: NOT DETECTED
FLUBV RNA SPEC QL NAA+PROBE: NOT DETECTED
HADV DNA SPEC QL NAA+PROBE: NOT DETECTED
HCOV PNL SPEC NAA+PROBE: NOT DETECTED
HMPV RNA SPEC QL NAA+PROBE: NOT DETECTED
HPIV1 RNA SPEC QL NAA+PROBE: NOT DETECTED
HPIV2 RNA SPEC QL NAA+PROBE: NOT DETECTED
HPIV3 RNA SPEC QL NAA+PROBE: NOT DETECTED
HPIV4 RNA SPEC QL NAA+PROBE: NOT DETECTED
M PNEUMO DNA SPEC QL NAA+PROBE: NOT DETECTED
NORCLOZAPINE SERPL-MCNC: 106 NG/ML
RSV RNA SPEC QL NAA+PROBE: NOT DETECTED
RSV RNA SPEC QL NAA+PROBE: NOT DETECTED
RV+EV RNA SPEC QL NAA+PROBE: NOT DETECTED

## 2023-07-17 PROCEDURE — G0463 HOSPITAL OUTPT CLINIC VISIT: HCPCS

## 2023-07-17 PROCEDURE — 250N000013 HC RX MED GY IP 250 OP 250 PS 637: Performed by: PSYCHIATRY & NEUROLOGY

## 2023-07-17 PROCEDURE — 87633 RESP VIRUS 12-25 TARGETS: CPT

## 2023-07-17 PROCEDURE — 250N000013 HC RX MED GY IP 250 OP 250 PS 637: Performed by: PHYSICIAN ASSISTANT

## 2023-07-17 PROCEDURE — 250N000013 HC RX MED GY IP 250 OP 250 PS 637

## 2023-07-17 PROCEDURE — 124N000002 HC R&B MH UMMC

## 2023-07-17 PROCEDURE — 99231 SBSQ HOSP IP/OBS SF/LOW 25: CPT | Mod: GC | Performed by: PSYCHIATRY & NEUROLOGY

## 2023-07-17 RX ADMIN — OLANZAPINE 15 MG: 10 TABLET, ORALLY DISINTEGRATING ORAL at 09:25

## 2023-07-17 RX ADMIN — Medication 300 MG: at 19:29

## 2023-07-17 RX ADMIN — OLANZAPINE 15 MG: 10 TABLET, ORALLY DISINTEGRATING ORAL at 19:30

## 2023-07-17 RX ADMIN — Medication 300 MG: at 13:51

## 2023-07-17 RX ADMIN — SENNOSIDES 8.6 MG: 8.6 TABLET ORAL at 09:00

## 2023-07-17 RX ADMIN — ATROPINE SULFATE 2 DROP: 10 SOLUTION/ DROPS OPHTHALMIC at 09:01

## 2023-07-17 RX ADMIN — ATROPINE SULFATE 2 DROP: 10 SOLUTION/ DROPS OPHTHALMIC at 19:30

## 2023-07-17 RX ADMIN — DIVALPROEX SODIUM 1000 MG: 125 CAPSULE, COATED PELLETS ORAL at 08:59

## 2023-07-17 RX ADMIN — Medication 300 MG: at 09:00

## 2023-07-17 RX ADMIN — LORAZEPAM 0.5 MG: 0.5 TABLET ORAL at 09:01

## 2023-07-17 RX ADMIN — LORAZEPAM 0.5 MG: 0.5 TABLET ORAL at 13:52

## 2023-07-17 RX ADMIN — CLOZAPINE 600 MG: 150 TABLET, ORALLY DISINTEGRATING ORAL at 13:51

## 2023-07-17 RX ADMIN — CYANOCOBALAMIN TAB 1000 MCG 1000 MCG: 1000 TAB at 09:01

## 2023-07-17 RX ADMIN — SENNOSIDES 8.6 MG: 8.6 TABLET ORAL at 19:29

## 2023-07-17 RX ADMIN — TAMSULOSIN HYDROCHLORIDE 0.4 MG: 0.4 CAPSULE ORAL at 09:00

## 2023-07-17 RX ADMIN — LORAZEPAM 0.5 MG: 0.5 TABLET ORAL at 19:30

## 2023-07-17 RX ADMIN — BENZONATATE 100 MG: 100 CAPSULE ORAL at 05:58

## 2023-07-17 RX ADMIN — OLANZAPINE 10 MG: 5 TABLET, FILM COATED ORAL at 05:58

## 2023-07-17 RX ADMIN — ATROPINE SULFATE 2 DROP: 10 SOLUTION/ DROPS OPHTHALMIC at 13:51

## 2023-07-17 RX ADMIN — DIVALPROEX SODIUM 1000 MG: 125 CAPSULE, COATED PELLETS ORAL at 19:29

## 2023-07-17 RX ADMIN — Medication 10 MG: at 19:29

## 2023-07-17 ASSESSMENT — ACTIVITIES OF DAILY LIVING (ADL)
LAUNDRY: UNABLE TO COMPLETE
ADLS_ACUITY_SCORE: 87
LAUNDRY: WITH SUPERVISION
HYGIENE/GROOMING: PROMPTS
ORAL_HYGIENE: PROMPTS
ADLS_ACUITY_SCORE: 87
DRESS: PROMPTS
DRESS: PROMPTS
ADLS_ACUITY_SCORE: 87
HYGIENE/GROOMING: PROMPTS
ADLS_ACUITY_SCORE: 87
ORAL_HYGIENE: PROMPTS
ADLS_ACUITY_SCORE: 87

## 2023-07-17 NOTE — PLAN OF CARE
Pt asleep at start of shift.     Coughing repeatedly when sleeping. Pt declined any medication for his cough,     Pt had no c/o pain during the HS.     Appears to have slept 5.75 hours.     Pt continues on 2:1 SIO/ 5 ft space distance.     At 0508 patient requested and was administered Tessalon Pearls 100 mg and d/t agitation was also administered Zyprexa 10 mg.     Goal Outcome Evaluation:  Problem: Adult Behavioral Health Plan of Care  Goal: Adheres to Safety Considerations for Self and Others  Intervention: Develop and Maintain Individualized Safety Plan  Recent Flowsheet Documentation  Taken 7/17/2023 0000 by Tameka Hatch, RN  Safety Measures:    1:1 observation maintained    safety rounds completed  Goal: Absence of New-Onset Illness or Injury  Intervention: Identify and Manage Fall Risk  Recent Flowsheet Documentation  Taken 7/17/2023 0000 by Tameka Hatch, RN  Safety Measures:    1:1 observation maintained    safety rounds completed     Problem: Sleep Disturbance  Goal: Adequate Sleep/Rest  Outcome: Progressing
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
## 2023-07-17 NOTE — PLAN OF CARE
Pt was cooperative with cares. Seen by a wound nurse and wound order  is discontinued.  Compliant with his medications and swallowed them without difficulty. Denies discomfort and pain   No coughing reported or noted this shift. Attempted to walk out of room naked and was redirected back to the room without issue.   Lab contacted to send a tube for respiratory panel collection and was informed that it will be tube.       Problem: Confusion Chronic  Goal: Optimal Cognitive Function  Outcome: Progressing   Goal Outcome Evaluation:    Plan of Care Reviewed With: patient

## 2023-07-17 NOTE — PLAN OF CARE
Assessment/Intervention/Current Symtoms and Care Coordination:  Reviewed chart. Met with team to review patient's care. Received paperwork for Juliana, hearing set for 23 at 8:30am. He was appointed a Prerna Brown. Copy given to patient and copy put in chart.     Discharge Plan or Goal:  When patient is more stable a referral for [facility] in [place] will be made, referral for [facility] in [place] will be made      Barriers to Discharge:  Patient requires further psychiatric stabilization due to current symptomology. He continues to present as disorganized and tangential with paranoid thought content. He presents with mood lability. He vacillates between being pleasant and being aggressive with no clear environmental triggers.     Referral Status:  Psychiatry     Legal Status:  Commitment with [facility]: [place]  File Number: [id number]  Issued 23 and is not to exceed six months          Contacts:  : Vicky Valdez with [facility], [phone]  Psychiatrist: Dr. Santana at [facility], [phone] PCP: Attila Urena DO  Mom: Alberta, [phone] (H) [phone] (M)   Dad: Ino, [phone] (H)  Sister, Sandra Treadwell, [phone] (KING)   [facility]'s Office Fax: [phone]  [facility]: [phone]  CADI  with [facility]: Regina Wong, [phone]  henri Zamorano    Upcoming Meetings and Dates/Important Information and next steps:  Juliana Hearin/27 at 8:30am

## 2023-07-17 NOTE — CONSULTS
"WOC Consult    S: WOC Consult for right great toe wound.     B: Per Bebeto Kaplan PA-C on 7/16/2023: Vinod Lee is a 63 year old male with a past medical history of schizophrenia, Parkinson's Disease, who was initially admitted on 5/18/23 for AMS, aggression. Broad workup negative, and ultimately transferred to [address] on 5/26/23 for further psychiatric monitoring and management. Medicine has been involved intermittently throughout his hospitalization, and is most recently being consulted on 7/16 for evaluation of skin changes of R toe and a cough.    A: Medicine notified on 7/16 that the R toe wound has a \"black\" appearance. Photos as above; more c/w eschar formation, and potentially necrosis of damaged tissue, but otherwise no findings to suggest cellulitis of surrounding tissues, streaking up foot, or involvement of surrounding healthy tissue. Has remained HDS, afebrile, wound appears to be healing appropriately.    WOC Consult found dried drainage which easily washed away with saline and gauze to reveal intact skin. No wounds present.        7/17 7/17    P: WOC will sign off. No wound care indicated.     Cecille Hayes RN CWOCN  Available via [employer]: [facility]  Office: [phone]      "

## 2023-07-17 NOTE — PLAN OF CARE
Problem: Psychotic Symptoms  Goal: Psychotic Symptoms  Description: Signs and symptoms of listed problems will be absent or manageable.  Outcome: Not Progressing   Goal Outcome Evaluation:    Pt had an episode of attempting to strike staff. Was receptive to redirection and did not attempt again. Pt continues to demonstrate disorganized speech with bizarre, occasionally paranoid, statements.    Did not hear pt coughing, with AM shift reporting a developing cough. Pt reported to be voiding, had at least 1 BM. Isolating to room, often sleeping. Pt displaying hand and mouth twitching.      Big R toe dressing CDI. IM saw pt for toe wound in AM, with finding that wound appears to be healing appropriately.     Cooperative with scheduled medications. No PRNs given.     /76 (BP Location: Right arm, Cuff Size: Adult Regular)   Pulse 100   Temp 98  F (36.7  C) (Temporal)   Resp 20   Wt 81.6 kg (179 lb 12.8 oz)   SpO2 96%   BMI 26.55 kg/m

## 2023-07-18 PROCEDURE — 250N000013 HC RX MED GY IP 250 OP 250 PS 637: Performed by: PSYCHIATRY & NEUROLOGY

## 2023-07-18 PROCEDURE — 99231 SBSQ HOSP IP/OBS SF/LOW 25: CPT | Mod: GC | Performed by: PSYCHIATRY & NEUROLOGY

## 2023-07-18 PROCEDURE — 124N000002 HC R&B MH UMMC

## 2023-07-18 PROCEDURE — 99223 1ST HOSP IP/OBS HIGH 75: CPT | Mod: 25 | Performed by: PSYCHIATRY & NEUROLOGY

## 2023-07-18 PROCEDURE — 250N000013 HC RX MED GY IP 250 OP 250 PS 637: Performed by: PHYSICIAN ASSISTANT

## 2023-07-18 RX ADMIN — OLANZAPINE 15 MG: 10 TABLET, ORALLY DISINTEGRATING ORAL at 08:19

## 2023-07-18 RX ADMIN — POLYETHYLENE GLYCOL 3350 17 G: 17 POWDER, FOR SOLUTION ORAL at 08:19

## 2023-07-18 RX ADMIN — DIVALPROEX SODIUM 1000 MG: 125 CAPSULE, COATED PELLETS ORAL at 08:18

## 2023-07-18 RX ADMIN — DIVALPROEX SODIUM 1000 MG: 125 CAPSULE, COATED PELLETS ORAL at 19:18

## 2023-07-18 RX ADMIN — OLANZAPINE 15 MG: 10 TABLET, ORALLY DISINTEGRATING ORAL at 19:18

## 2023-07-18 RX ADMIN — LORAZEPAM 0.5 MG: 0.5 TABLET ORAL at 13:14

## 2023-07-18 RX ADMIN — SENNOSIDES 8.6 MG: 8.6 TABLET ORAL at 19:18

## 2023-07-18 RX ADMIN — Medication 10 MG: at 19:18

## 2023-07-18 RX ADMIN — LORAZEPAM 0.5 MG: 0.5 TABLET ORAL at 19:18

## 2023-07-18 RX ADMIN — ATROPINE SULFATE 2 DROP: 10 SOLUTION/ DROPS OPHTHALMIC at 19:19

## 2023-07-18 RX ADMIN — LORAZEPAM 0.5 MG: 0.5 TABLET ORAL at 08:19

## 2023-07-18 RX ADMIN — Medication 300 MG: at 08:19

## 2023-07-18 RX ADMIN — ATROPINE SULFATE 2 DROP: 10 SOLUTION/ DROPS OPHTHALMIC at 13:21

## 2023-07-18 RX ADMIN — CYANOCOBALAMIN TAB 1000 MCG 1000 MCG: 1000 TAB at 08:18

## 2023-07-18 RX ADMIN — Medication 300 MG: at 13:14

## 2023-07-18 RX ADMIN — TAMSULOSIN HYDROCHLORIDE 0.4 MG: 0.4 CAPSULE ORAL at 08:19

## 2023-07-18 RX ADMIN — Medication 300 MG: at 19:18

## 2023-07-18 RX ADMIN — CLOZAPINE 600 MG: 150 TABLET, ORALLY DISINTEGRATING ORAL at 13:14

## 2023-07-18 RX ADMIN — SENNOSIDES 8.6 MG: 8.6 TABLET ORAL at 08:19

## 2023-07-18 RX ADMIN — ATROPINE SULFATE 2 DROP: 10 SOLUTION/ DROPS OPHTHALMIC at 08:18

## 2023-07-18 ASSESSMENT — ACTIVITIES OF DAILY LIVING (ADL)
HYGIENE/GROOMING: PROMPTS;INDEPENDENT
ORAL_HYGIENE: PROMPTS;INDEPENDENT
ADLS_ACUITY_SCORE: 87
DRESS: PROMPTS
ORAL_HYGIENE: PROMPTS
ADLS_ACUITY_SCORE: 87
DRESS: PROMPTS;INDEPENDENT
HYGIENE/GROOMING: PROMPTS
ADLS_ACUITY_SCORE: 87

## 2023-07-18 NOTE — PLAN OF CARE
Goal Outcome Evaluation:    Plan of Care Reviewed With: patient        Pt mostly isolative in his room and occasionally paced the hallway. Pt watched TV in his room and did not attend groups despite encouragement from this writer. Pt had no intrusive episode and also no physical or verbal aggression during the evening shift. Pt denies all mental health psych symptoms and contracted for safety. Presented with flat affect and occasionally brighten during interaction. Speech clear and linear with no rambling episode reported or observed. Adequate food and fluid intake, voiding freely and no BM issue per pt.
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
## 2023-07-18 NOTE — PLAN OF CARE
"  Problem: Sleep Disturbance  Goal: Adequate Sleep/Rest  Outcome: Progressing  Note: Patient observed sleeping during safety checks     Problem: Behavioral Disturbance  Goal: Behavioral Disturbance  Description: Signs and symptoms of listed problems will be absent or manageable by discharge or transition of care.  Outcome: Progressing  Note: Patient manifests no behavioral disturbance   Goal Outcome Evaluation:       Patient had uneventful night, achieved 6 hours of sleep. Patient was heard repeatedly echoing \"shower, shower, shower\" when ever awake from sleep. Otherwise patient was cooperative with safety checks demonstrates no untoward/negative behavior. Patient was reassured that he would be given a shower from 0730. Will continue to monitor and follow plan of care.           García Up DNP, RN    "
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
## 2023-07-18 NOTE — PLAN OF CARE
Problem: Adult Behavioral Health Plan of Care  Goal: Plan of Care Review  Outcome: Progressing  Flowsheets (Taken 7/18/2023 1054)  Patient Agreement with Plan of Care: agrees   Goal Outcome Evaluation:    Plan of Care Reviewed With: patient      Pt continues to be on 2:1 SIO for self injury and assault risk. Pt  spent more than one hr in the shower at the beginning of day shift, he stated feeling refreshed after the shower. He has been intermittently isolative in his room napping in between cares and often seen in milieu with withdrawn and avoiding social interactions. He paces in hallway occasionally, presents with flat affect and calm mood. No behavior outburst noted. He denied depression and anxiety, denied all MH symptoms, pt was med compliant, adequately consuming his meals and drinking fluids adequately. Wound care to right toe discharge yesterday, toe noted to have scars r/t wound but no open area.   Continue monitoring pt.

## 2023-07-18 NOTE — PLAN OF CARE
Assessment/Intervention/Current Symtoms and Care Coordination:  Reviewed chart. Met with team to review patient's care. Patient was observed in the milieu throughout the shift.     Discharge Plan or Goal:  When patient is more stable a referral for [facility] in [place] will be made, referral for [facility] in [place] will be made      Barriers to Discharge:  Patient requires further psychiatric stabilization due to current symptomology. He continues to present as disorganized and tangential with paranoid thought content. He presents with mood lability. He vacillates between being pleasant and being aggressive with no clear environmental triggers.     Referral Status:  Referral for housing pending psychiatric stabilization  Considerations include: [facility] in [place], [facility] in [place]      Legal Status:  Commitment with [facility]: [place]  File Number: [id number]  Issued 23 and is not to exceed six months    Juliana Hearin/27 at 8:30am  : Prerna Zamorano    Contacts:  : Vicky Valdez with [facility], [phone]  Psychiatrist: Dr. Santana at [facility], [phone] PCP: Attila Urena DO  Mom: Alberta, [phone] (H) [phone] (M)   Dad: Ino, [phone] (H)  Sister, Sandra Treadwell, [phone] (KING)   [facility]'s Office Fax: [phone]  Pia May: [phone]  CADI  with [facility]: Regina Wong, [phone]      Upcoming Meetings and Dates/Important Information and next steps:  Juliana hearing  at 8:30am  Referrals for housing pending psychiatric stabilization

## 2023-07-18 NOTE — PLAN OF CARE
"Problem: Adult Behavioral Health Plan of Care  Goal: Adheres to Safety Considerations for Self and Others  Outcome: Progressing   Goal Outcome Evaluation:    Pt continuing to have disorganized speech. Occasionally making comments of a paranoid nature, eg \"the food is poison.\" No instances of physically aggression reported or observed. Isolating to room. Flat affect. Denied anxiety, depression, A/VH, SI/HI. Took scheduled medication without issue. No PRNs given. Pt voiding and had a BM. Maintains 2:1 for assault risk.     /79 (BP Location: Right arm, Patient Position: Sitting, Cuff Size: Adult Regular)   Pulse 96   Temp 97.7  F (36.5  C) (Temporal)   Resp 18   Wt 81.6 kg (179 lb 12.8 oz)   SpO2 98%   BMI 26.55 kg/m      "

## 2023-07-18 NOTE — CARE PLAN
07/18/23 1508   Group Therapy Session   Group Attendance attended group session   Time Session Began 1015   Time Session Ended 1100   Total Time (minutes) 45   Total # Attendees 1   Group Type task skill   Group Topic Covered coping skills/lifestyle management;structured socialization   Group Session Detail OT clinic   Patient Response/Contribution cooperative with task     Pt was seated at the table prior to the start of group, so writer took advantage of the time to engage him in activity.  Offered new activity, though pt seemed most at ease with doing something familiar, the scratch art activity he has been enjoying the last several days.  Pt engages in task fairly independently, becomes set in how he likes to do things, even if there could be a better way to proceed.  Continues to make self depreciative comments, though doesn't focus on this when conversation is changed.  Pt acknowledges feeling confused and unsure what to do often.  Responds well when suggestions are made to assist.

## 2023-07-19 LAB
BASOPHILS # BLD AUTO: 0.1 10E3/UL (ref 0–0.2)
BASOPHILS NFR BLD AUTO: 1 %
EOSINOPHIL # BLD AUTO: 0 10E3/UL (ref 0–0.7)
EOSINOPHIL NFR BLD AUTO: 0 %
ERYTHROCYTE [DISTWIDTH] IN BLOOD BY AUTOMATED COUNT: 14.5 % (ref 10–15)
HCT VFR BLD AUTO: 43 % (ref 40–53)
HGB BLD-MCNC: 13.5 G/DL (ref 13.3–17.7)
IMM GRANULOCYTES # BLD: 0.1 10E3/UL
IMM GRANULOCYTES NFR BLD: 1 %
LYMPHOCYTES # BLD AUTO: 1.6 10E3/UL (ref 0.8–5.3)
LYMPHOCYTES NFR BLD AUTO: 13 %
MCH RBC QN AUTO: 28 PG (ref 26.5–33)
MCHC RBC AUTO-ENTMCNC: 31.4 G/DL (ref 31.5–36.5)
MCV RBC AUTO: 89 FL (ref 78–100)
MONOCYTES # BLD AUTO: 1.3 10E3/UL (ref 0–1.3)
MONOCYTES NFR BLD AUTO: 10 %
NEUTROPHILS # BLD AUTO: 9 10E3/UL (ref 1.6–8.3)
NEUTROPHILS NFR BLD AUTO: 75 %
NRBC # BLD AUTO: 0 10E3/UL
NRBC BLD AUTO-RTO: 0 /100
PLATELET # BLD AUTO: 150 10E3/UL (ref 150–450)
RBC # BLD AUTO: 4.82 10E6/UL (ref 4.4–5.9)
WBC # BLD AUTO: 12.1 10E3/UL (ref 4–11)

## 2023-07-19 PROCEDURE — 36415 COLL VENOUS BLD VENIPUNCTURE: CPT | Performed by: PSYCHIATRY & NEUROLOGY

## 2023-07-19 PROCEDURE — 99231 SBSQ HOSP IP/OBS SF/LOW 25: CPT | Mod: GC | Performed by: PSYCHIATRY & NEUROLOGY

## 2023-07-19 PROCEDURE — 250N000013 HC RX MED GY IP 250 OP 250 PS 637: Performed by: PSYCHIATRY & NEUROLOGY

## 2023-07-19 PROCEDURE — 250N000013 HC RX MED GY IP 250 OP 250 PS 637: Performed by: PHYSICIAN ASSISTANT

## 2023-07-19 PROCEDURE — 124N000002 HC R&B MH UMMC

## 2023-07-19 PROCEDURE — 85025 COMPLETE CBC W/AUTO DIFF WBC: CPT | Performed by: PSYCHIATRY & NEUROLOGY

## 2023-07-19 RX ADMIN — SENNOSIDES 8.6 MG: 8.6 TABLET ORAL at 08:26

## 2023-07-19 RX ADMIN — Medication 10 MG: at 19:36

## 2023-07-19 RX ADMIN — SENNOSIDES 8.6 MG: 8.6 TABLET ORAL at 19:36

## 2023-07-19 RX ADMIN — LORAZEPAM 0.5 MG: 0.5 TABLET ORAL at 13:40

## 2023-07-19 RX ADMIN — CYANOCOBALAMIN TAB 1000 MCG 1000 MCG: 1000 TAB at 08:26

## 2023-07-19 RX ADMIN — Medication 300 MG: at 19:36

## 2023-07-19 RX ADMIN — ATROPINE SULFATE 2 DROP: 10 SOLUTION/ DROPS OPHTHALMIC at 08:25

## 2023-07-19 RX ADMIN — GABAPENTIN 100 MG: 100 CAPSULE ORAL at 15:13

## 2023-07-19 RX ADMIN — ATROPINE SULFATE 2 DROP: 10 SOLUTION/ DROPS OPHTHALMIC at 13:51

## 2023-07-19 RX ADMIN — Medication 300 MG: at 13:40

## 2023-07-19 RX ADMIN — DIVALPROEX SODIUM 1000 MG: 125 CAPSULE, COATED PELLETS ORAL at 19:36

## 2023-07-19 RX ADMIN — OLANZAPINE 15 MG: 10 TABLET, ORALLY DISINTEGRATING ORAL at 08:26

## 2023-07-19 RX ADMIN — DIVALPROEX SODIUM 1000 MG: 125 CAPSULE, COATED PELLETS ORAL at 08:25

## 2023-07-19 RX ADMIN — LORAZEPAM 0.5 MG: 0.5 TABLET ORAL at 19:36

## 2023-07-19 RX ADMIN — OLANZAPINE 15 MG: 10 TABLET, ORALLY DISINTEGRATING ORAL at 19:36

## 2023-07-19 RX ADMIN — Medication 300 MG: at 08:25

## 2023-07-19 RX ADMIN — LORAZEPAM 0.5 MG: 0.5 TABLET ORAL at 08:26

## 2023-07-19 RX ADMIN — CLOZAPINE 600 MG: 150 TABLET, ORALLY DISINTEGRATING ORAL at 13:40

## 2023-07-19 RX ADMIN — ATROPINE SULFATE 2 DROP: 10 SOLUTION/ DROPS OPHTHALMIC at 19:37

## 2023-07-19 RX ADMIN — TAMSULOSIN HYDROCHLORIDE 0.4 MG: 0.4 CAPSULE ORAL at 08:25

## 2023-07-19 ASSESSMENT — ACTIVITIES OF DAILY LIVING (ADL)
ADLS_ACUITY_SCORE: 87
DRESS: PROMPTS
ADLS_ACUITY_SCORE: 87
ADLS_ACUITY_SCORE: 87
HYGIENE/GROOMING: PROMPTS
ADLS_ACUITY_SCORE: 87
ADLS_ACUITY_SCORE: 87
ADLS_ACUITY_SCORE: 84
ORAL_HYGIENE: PROMPTS
ADLS_ACUITY_SCORE: 87
ADLS_ACUITY_SCORE: 87
ADLS_ACUITY_SCORE: 84
LAUNDRY: UNABLE TO COMPLETE
HYGIENE/GROOMING: PROMPTS
ADLS_ACUITY_SCORE: 87
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
## 2023-07-19 NOTE — PLAN OF CARE
Pt asleep at start of shift.     Breathing quiet and unlabored when sleeping.     Pt had no c/o pain or discomfort during the HS.     Appears to have slept 7 hours.     Pt continues on 2:1 SIO/ 10 ft space distance.      Goal Outcome Evaluation:  Problem: Adult Behavioral Health Plan of Care  Goal: Adheres to Safety Considerations for Self and Others  Intervention: Develop and Maintain Individualized Safety Plan  Recent Flowsheet Documentation  Taken 7/19/2023 0000 by Tameka Hatch, RN  Safety Measures: safety rounds completed  Goal: Absence of New-Onset Illness or Injury  Intervention: Identify and Manage Fall Risk  Recent Flowsheet Documentation  Taken 7/19/2023 0000 by Tameka Hatch, RN  Safety Measures: safety rounds completed     Problem: Sleep Disturbance  Goal: Adequate Sleep/Rest  Outcome: Progressing

## 2023-07-19 NOTE — PLAN OF CARE
Assessment/Intervention/Current Symtoms and Care Coordination:  Reviewed chart. Met with team to review patient's care. Patient was observed in the milieu throughout the shift.     Discharge Plan or Goal:  When patient is more stable a referral for [facility] in [place] will be made, referral for [facility] in [place] will be made      Barriers to Discharge:  Patient requires further psychiatric stabilization due to current symptomology. He continues to present as disorganized and tangential with paranoid thought content. He presents with mood lability. He vacillates between being pleasant and being aggressive with no clear environmental triggers.     Referral Status:  Referral for housing pending psychiatric stabilization  Considerations include: [facility] in [place], [facility] in [place]      Legal Status:  Commitment with [facility]: [place]  File Number: [id number]  Issued 23 and is not to exceed six months    Juliana Hearin/27 at 8:30am  : Prerna Zamorano    Contacts:  : Vicky Valdez with [facility], [phone]  Psychiatrist: Dr. Santana at [facility], [phone] PCP: Attila Urena DO  Mom: Alberta, [phone] (H) [phone] (M)   Dad: Ino, [phone] (H)  Sister, Sandra Treadwell, [phone] ([place])   [facility]'s Office Fax: [phone]  Pia May: [phone]  CADI  with [facility]: Regina Wong, [phone]      Upcoming Meetings and Dates/Important Information and next steps:  Juliana hearing  at 8:30am  Referrals for housing pending psychiatric stabilization

## 2023-07-20 PROCEDURE — 93010 ELECTROCARDIOGRAM REPORT: CPT | Performed by: INTERNAL MEDICINE

## 2023-07-20 PROCEDURE — 99233 SBSQ HOSP IP/OBS HIGH 50: CPT | Mod: GC | Performed by: PSYCHIATRY & NEUROLOGY

## 2023-07-20 PROCEDURE — 93005 ELECTROCARDIOGRAM TRACING: CPT

## 2023-07-20 PROCEDURE — 250N000009 HC RX 250: Performed by: PSYCHIATRY & NEUROLOGY

## 2023-07-20 PROCEDURE — 250N000013 HC RX MED GY IP 250 OP 250 PS 637: Performed by: PSYCHIATRY & NEUROLOGY

## 2023-07-20 PROCEDURE — 124N000002 HC R&B MH UMMC

## 2023-07-20 PROCEDURE — 250N000013 HC RX MED GY IP 250 OP 250 PS 637: Performed by: PHYSICIAN ASSISTANT

## 2023-07-20 RX ADMIN — LORAZEPAM 0.5 MG: 0.5 TABLET ORAL at 08:19

## 2023-07-20 RX ADMIN — Medication 300 MG: at 08:19

## 2023-07-20 RX ADMIN — Medication 10 MG: at 20:23

## 2023-07-20 RX ADMIN — ATROPINE SULFATE 2 DROP: 10 SOLUTION/ DROPS OPHTHALMIC at 20:24

## 2023-07-20 RX ADMIN — Medication 300 MG: at 20:23

## 2023-07-20 RX ADMIN — LORAZEPAM 0.5 MG: 0.5 TABLET ORAL at 20:23

## 2023-07-20 RX ADMIN — CLOZAPINE 600 MG: 150 TABLET, ORALLY DISINTEGRATING ORAL at 12:28

## 2023-07-20 RX ADMIN — SENNOSIDES 8.6 MG: 8.6 TABLET ORAL at 08:19

## 2023-07-20 RX ADMIN — TAMSULOSIN HYDROCHLORIDE 0.4 MG: 0.4 CAPSULE ORAL at 08:19

## 2023-07-20 RX ADMIN — OLANZAPINE 15 MG: 10 TABLET, ORALLY DISINTEGRATING ORAL at 08:19

## 2023-07-20 RX ADMIN — Medication 300 MG: at 14:07

## 2023-07-20 RX ADMIN — ATROPINE SULFATE 2 DROP: 10 SOLUTION/ DROPS OPHTHALMIC at 12:27

## 2023-07-20 RX ADMIN — POLYETHYLENE GLYCOL 3350 17 G: 17 POWDER, FOR SOLUTION ORAL at 08:19

## 2023-07-20 RX ADMIN — SENNOSIDES 8.6 MG: 8.6 TABLET ORAL at 20:24

## 2023-07-20 RX ADMIN — LORAZEPAM 0.5 MG: 0.5 TABLET ORAL at 14:07

## 2023-07-20 RX ADMIN — DIVALPROEX SODIUM 1000 MG: 125 CAPSULE, COATED PELLETS ORAL at 20:23

## 2023-07-20 RX ADMIN — DIVALPROEX SODIUM 1000 MG: 125 CAPSULE, COATED PELLETS ORAL at 08:19

## 2023-07-20 RX ADMIN — OLANZAPINE 15 MG: 10 TABLET, ORALLY DISINTEGRATING ORAL at 20:23

## 2023-07-20 RX ADMIN — CYANOCOBALAMIN TAB 1000 MCG 1000 MCG: 1000 TAB at 08:19

## 2023-07-20 ASSESSMENT — ACTIVITIES OF DAILY LIVING (ADL)
ADLS_ACUITY_SCORE: 84

## 2023-07-20 NOTE — PLAN OF CARE
Assessment/Intervention/Current Symtoms and Care Coordination:  Reviewed chart. Met with team to review patient's care.     Discharge Plan or Goal:  When patient is more stable a referral for [facility] in [place] will be made, referral for [facility] in [place] will be made      Barriers to Discharge:  Patient requires further psychiatric stabilization due to current symptomology. He continues to present as disorganized and tangential with paranoid thought content. He presents with mood lability. He vacillates between being pleasant and being aggressive with no clear environmental triggers.     Referral Status:  Referral for housing pending psychiatric stabilization  Considerations include: [facility] in [place], [facility] in [place]      Legal Status:  Commitment with [facility]: [place]  File Number: [id number]  Issued 23 and is not to exceed six months    Juliana Hearin/27 at 8:30am  : Prerna Zamorano    Contacts:  : Vicky Valdez with [facility], [phone]  Psychiatrist: Dr. Santana at [facility], [phone] PCP: Attila Urena DO  Mom: Alberta, [phone] (H) [phone] (M)   Dad: Ino, [phone] (H)  Sister, Sandra Treadwell, [phone] (KING)   [facility]'s Office Fax: [phone]  Pia May: [phone]  CADI  with [facility]: Regina Wong, [phone]      Upcoming Meetings and Dates/Important Information and next steps:  Juliana hearing  at 8:30am  Referrals for housing pending psychiatric stabilization   If Juliana order is received, email copy to Cathy Malcolm and Dr. Angie Orr
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
## 2023-07-20 NOTE — PLAN OF CARE
Problem: Suicidal Behavior  Goal: Suicidal Behavior is Absent or Managed  Outcome: Progressing  Note: Patient manifests no suicidal behavior     Problem: Behavioral Disturbance  Goal: Behavioral Disturbance  Description: Signs and symptoms of listed problems will be absent or manageable by discharge or transition of care.  Outcome: Progressing  Note: Patient manifests no behavioral disturbance thus far   Goal Outcome Evaluation:    Patient slept well with no complaints of pain or anxiety. Patient was observed sleeping with deep, non labored respiration during safety checks. Cooperative with cares, demonstrates no negative behavior. Patient is still 2:1 SIO staff monitoring, endorses no SI/HI, A/VH or SIB. All precautions in place. No evidence of withdrawal or respiratory distress. No symptoms of darryl or psychosis reported or observed. Overall, patient achieved 6 hours of good quality sleep.                García Up DNP, RN
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
## 2023-07-21 PROCEDURE — 99233 SBSQ HOSP IP/OBS HIGH 50: CPT | Performed by: PSYCHIATRY & NEUROLOGY

## 2023-07-21 PROCEDURE — 250N000013 HC RX MED GY IP 250 OP 250 PS 637: Performed by: PHYSICIAN ASSISTANT

## 2023-07-21 PROCEDURE — 250N000013 HC RX MED GY IP 250 OP 250 PS 637: Performed by: PSYCHIATRY & NEUROLOGY

## 2023-07-21 PROCEDURE — 250N000013 HC RX MED GY IP 250 OP 250 PS 637: Performed by: STUDENT IN AN ORGANIZED HEALTH CARE EDUCATION/TRAINING PROGRAM

## 2023-07-21 PROCEDURE — 124N000002 HC R&B MH UMMC

## 2023-07-21 RX ADMIN — TAMSULOSIN HYDROCHLORIDE 0.4 MG: 0.4 CAPSULE ORAL at 08:42

## 2023-07-21 RX ADMIN — SENNOSIDES 8.6 MG: 8.6 TABLET ORAL at 08:43

## 2023-07-21 RX ADMIN — ATROPINE SULFATE 2 DROP: 10 SOLUTION/ DROPS OPHTHALMIC at 19:30

## 2023-07-21 RX ADMIN — LORAZEPAM 0.5 MG: 0.5 TABLET ORAL at 13:04

## 2023-07-21 RX ADMIN — TRAZODONE HYDROCHLORIDE 50 MG: 50 TABLET ORAL at 01:13

## 2023-07-21 RX ADMIN — Medication 300 MG: at 08:42

## 2023-07-21 RX ADMIN — POLYETHYLENE GLYCOL 3350 17 G: 17 POWDER, FOR SOLUTION ORAL at 08:54

## 2023-07-21 RX ADMIN — Medication 300 MG: at 19:30

## 2023-07-21 RX ADMIN — ATROPINE SULFATE 2 DROP: 10 SOLUTION/ DROPS OPHTHALMIC at 09:00

## 2023-07-21 RX ADMIN — OLANZAPINE 15 MG: 10 TABLET, ORALLY DISINTEGRATING ORAL at 08:44

## 2023-07-21 RX ADMIN — DIVALPROEX SODIUM 1000 MG: 125 CAPSULE, COATED PELLETS ORAL at 08:40

## 2023-07-21 RX ADMIN — LORAZEPAM 0.5 MG: 0.5 TABLET ORAL at 19:30

## 2023-07-21 RX ADMIN — CYANOCOBALAMIN TAB 1000 MCG 1000 MCG: 1000 TAB at 08:34

## 2023-07-21 RX ADMIN — LORAZEPAM 0.5 MG: 0.5 TABLET ORAL at 08:43

## 2023-07-21 RX ADMIN — SENNOSIDES 8.6 MG: 8.6 TABLET ORAL at 19:30

## 2023-07-21 RX ADMIN — CLOZAPINE 625 MG: 25 TABLET, ORALLY DISINTEGRATING ORAL at 13:05

## 2023-07-21 RX ADMIN — Medication 10 MG: at 19:30

## 2023-07-21 RX ADMIN — ATROPINE SULFATE 2 DROP: 10 SOLUTION/ DROPS OPHTHALMIC at 13:07

## 2023-07-21 RX ADMIN — ACETAMINOPHEN 650 MG: 325 TABLET, FILM COATED ORAL at 01:10

## 2023-07-21 RX ADMIN — OLANZAPINE 15 MG: 10 TABLET, ORALLY DISINTEGRATING ORAL at 19:30

## 2023-07-21 RX ADMIN — Medication 300 MG: at 13:05

## 2023-07-21 RX ADMIN — DIVALPROEX SODIUM 1000 MG: 125 CAPSULE, COATED PELLETS ORAL at 19:29

## 2023-07-21 ASSESSMENT — ACTIVITIES OF DAILY LIVING (ADL)
ADLS_ACUITY_SCORE: 84
ORAL_HYGIENE: PROMPTS
ADLS_ACUITY_SCORE: 84
HYGIENE/GROOMING: PROMPTS
ADLS_ACUITY_SCORE: 84
ADLS_ACUITY_SCORE: 84
LAUNDRY: UNABLE TO COMPLETE
ADLS_ACUITY_SCORE: 84
DRESS: PROMPTS
ADLS_ACUITY_SCORE: 84
ADLS_ACUITY_SCORE: 84
HYGIENE/GROOMING: PROMPTS
ADLS_ACUITY_SCORE: 84
ADLS_ACUITY_SCORE: 84

## 2023-07-21 NOTE — PLAN OF CARE
Problem: Adult Behavioral Health Plan of Care  Goal: Plan of Care Review  Outcome: Progressing     Problem: Psychotic Signs/Symptoms  Goal: Improved Behavioral Control (Psychotic Signs/Symptoms)  Outcome: Progressing     Problem: Confusion Chronic  Goal: Optimal Cognitive Function  Outcome: Progressing   Goal Outcome Evaluation:        Pt is isolative and withdrawn to room all shift, but  occasionally makes quick turn arounds on the unit before returning to room. He denies pain, anxiety, depression, SI/SIB/HI/AVH, and contracts for safety. Pt is pleasant on approach. Judgment and insight not appropriate to situation. Thought process presents as delusional.  Thought content present as disorganized and tangential with paranoia.Pt thinks that his medications are poison meant to kill him. Pt is medication compliant. He took his bed time medications during the first attempt, although mumbling and referring to the medications as poison. No aggressive behaviors noted .No concerns noted or verbalized. Will continue with same plan of care.

## 2023-07-21 NOTE — PLAN OF CARE
Assessment/Intervention/Current Symtoms and Care Coordination:  Reviewed chart.       Discharge Plan or Goal:  When patient is more stable a referral for [facility] in [place] will be made, referral for [facility] in [place] will be made      Barriers to Discharge:  Patient requires further psychiatric stabilization due to current symptomology. He continues to present as disorganized and tangential with paranoid thought content. He presents with mood lability. He vacillates between being pleasant and being aggressive with no clear environmental triggers.      Referral Status:  Referral for housing pending psychiatric stabilization  Considerations include: [facility] in [place], [facility] in [place]      Legal Status:  Commitment with [facility]: [place]  File Number: [id number]  Issued 23 and is not to exceed six months     Juliana Hearin/27 at 8:30am  : Prerna Zamorano     Contacts:  : Vicky Valdez with [facility], [phone]  Psychiatrist: Dr. Santana at [facility], [phone] PCP: Attila Urena DO  Mom: Alberta, [phone] (H) [phone] (M)   Dad: Ino, [phone] (H)  Sister, Sandra Treadwell, [phone] (KING)   [facility]'s Office Fax: [phone]  Pia May: [phone]  CADI  with [facility]: Regina Wong, [phone]        Upcoming Meetings and Dates/Important Information and next steps:  Juliana hearing  at 8:30am  Referrals for housing pending psychiatric stabilization   If Juliana order is received, email copy to Cathy Malcolm and Dr. Angie Orr
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
## 2023-07-21 NOTE — PLAN OF CARE
"Patient has been cooperative and medication compliant this shift. Isolative to room entire shift. Patient took his AM medications in pudding without issue. Patient asked if there was poison in the medications, and patient was reassured several times that there was no poison. Patient also willingly took his afternoon medications, along with the clozapine that was recently ordered, all in pudding. Patient was also given atropine drops and patient stated \"there was probably poison in those drops\", patient was assured otherwise, patient stated \"Ok well if something bad happens, all the blame will be put on you\". Patient denied SI, AVH. When patient was asked if he had HI, he said he wanted to hurt his SIO staff, patient stated he had urges to \"gouge their eyes out\", when patient was asked if he had any serious intent to act on this today he said \"not today\". Patient was asked if he could be safe on the unit today patient stated \"no I don't feel safe\" when patient was asked if he could keep others safe by not hurting them, he was unable to answer. Patient did not display any aggressive or escalated behaviors this shift. Immediately after our conversation, patient reported that he didn't remember what our conversation was about, and patient stated he has issues with short term memory. Patient reported he had a bowel movement yesterday, and reported he doesn't remember if he had one today.   Problem: Psychotic Symptoms  Goal: Psychotic Symptoms  Description: Signs and symptoms of listed problems will be absent or manageable.  Outcome: Progressing  Flowsheets (Taken 7/21/2023 1415)  Psychotic Symptoms Present:    anxiety    memory deficits    thought process     Problem: Psychotic Symptoms  Goal: Social and Therapeutic (Psychotic Symptoms)  Description: Signs and symptoms of listed problems will be absent or manageable.  Outcome: Progressing  Flowsheets (Taken 7/21/2023 1415)  Psychotic Symptoms Present:    anxiety    " memory deficits    thought process     Problem: Psychotic Signs/Symptoms  Goal: Improved Behavioral Control (Psychotic Signs/Symptoms)  Outcome: Progressing   Goal Outcome Evaluation:

## 2023-07-21 NOTE — PLAN OF CARE
"  Problem: Psychotic Symptoms  Goal: Psychotic Symptoms  Description: Signs and symptoms of listed problems will be absent or manageable.  Outcome: Progressing   Goal Outcome Evaluation:       Pt.continues to be on status individual observation (SIO) with assigned staff due to disorientation, impulsivity, disrobing, and inappropriate sexual behavior. He spent most of the shift isolative and withdrawn to his room sleeping/napping. Affect was flat/blunted. He was cooperative with care. Adherent to scheduled medication management. No medication adverse side effects reported or observed. However, pt.requested for a clean basin/bowel to manage \"drooling\". Vinod did not require prn medications or seclusion/restraint to manage behavior. Oral intake of food and fluid was adequate. Denied SI/SIB/hallucinations. Will continue to monitor.                  "

## 2023-07-21 NOTE — PLAN OF CARE
Problem: Plan of Care - These are the overarching goals to be used throughout the patient stay.    Goal: Absence of Hospital-Acquired Illness or Injury  Outcome: Progressing  Note: Patient reports no YVONNE     Problem: Behavioral Disturbance  Goal: Behavioral Disturbance  Description: Signs and symptoms of listed problems will be absent or manageable by discharge or transition of care.  Outcome: Progressing  Note: Patient manifests no behavioral disturbance   Goal Outcome Evaluation:       Patient slept on/off, used the bathroom and occasionally heard requesting for shower. Patient received prn Trazodone 50 mg and tylenol 650 mg at 0110 for sleep and general comfort. Patient was cooperative with cares with no behavioral disturbance or physical/verbal aggression. All precautions in place. Overall, achieved 5 hours of sleep.           García Up DNP, RN

## 2023-07-22 PROCEDURE — 124N000002 HC R&B MH UMMC

## 2023-07-22 PROCEDURE — 250N000013 HC RX MED GY IP 250 OP 250 PS 637: Performed by: PSYCHIATRY & NEUROLOGY

## 2023-07-22 PROCEDURE — 250N000013 HC RX MED GY IP 250 OP 250 PS 637: Performed by: PHYSICIAN ASSISTANT

## 2023-07-22 RX ADMIN — LORAZEPAM 0.5 MG: 0.5 TABLET ORAL at 08:15

## 2023-07-22 RX ADMIN — Medication 10 MG: at 20:37

## 2023-07-22 RX ADMIN — OLANZAPINE 15 MG: 10 TABLET, ORALLY DISINTEGRATING ORAL at 08:15

## 2023-07-22 RX ADMIN — POLYETHYLENE GLYCOL 3350 17 G: 17 POWDER, FOR SOLUTION ORAL at 08:16

## 2023-07-22 RX ADMIN — TAMSULOSIN HYDROCHLORIDE 0.4 MG: 0.4 CAPSULE ORAL at 08:15

## 2023-07-22 RX ADMIN — SENNOSIDES 8.6 MG: 8.6 TABLET ORAL at 08:15

## 2023-07-22 RX ADMIN — OLANZAPINE 15 MG: 10 TABLET, ORALLY DISINTEGRATING ORAL at 20:37

## 2023-07-22 RX ADMIN — DIVALPROEX SODIUM 1000 MG: 125 CAPSULE, COATED PELLETS ORAL at 20:37

## 2023-07-22 RX ADMIN — SENNOSIDES 8.6 MG: 8.6 TABLET ORAL at 20:37

## 2023-07-22 RX ADMIN — CLOZAPINE 650 MG: 100 TABLET, ORALLY DISINTEGRATING ORAL at 13:20

## 2023-07-22 RX ADMIN — Medication 300 MG: at 14:59

## 2023-07-22 RX ADMIN — CYANOCOBALAMIN TAB 1000 MCG 1000 MCG: 1000 TAB at 08:15

## 2023-07-22 RX ADMIN — Medication 300 MG: at 08:15

## 2023-07-22 RX ADMIN — ATROPINE SULFATE 2 DROP: 10 SOLUTION/ DROPS OPHTHALMIC at 20:47

## 2023-07-22 RX ADMIN — ATROPINE SULFATE 2 DROP: 10 SOLUTION/ DROPS OPHTHALMIC at 15:05

## 2023-07-22 RX ADMIN — Medication 300 MG: at 20:37

## 2023-07-22 RX ADMIN — ATROPINE SULFATE 2 DROP: 10 SOLUTION/ DROPS OPHTHALMIC at 08:28

## 2023-07-22 RX ADMIN — LORAZEPAM 0.5 MG: 0.5 TABLET ORAL at 14:59

## 2023-07-22 RX ADMIN — DIVALPROEX SODIUM 1000 MG: 125 CAPSULE, COATED PELLETS ORAL at 08:15

## 2023-07-22 RX ADMIN — LORAZEPAM 0.5 MG: 0.5 TABLET ORAL at 20:37

## 2023-07-22 ASSESSMENT — ACTIVITIES OF DAILY LIVING (ADL)
ADLS_ACUITY_SCORE: 84
HYGIENE/GROOMING: HANDWASHING;SHOWER;INDEPENDENT
ADLS_ACUITY_SCORE: 84
DRESS: SCRUBS (BEHAVIORAL HEALTH);INDEPENDENT
ADLS_ACUITY_SCORE: 84
ADLS_ACUITY_SCORE: 84
LAUNDRY: WITH SUPERVISION
ADLS_ACUITY_SCORE: 84
ADLS_ACUITY_SCORE: 64
ORAL_HYGIENE: INDEPENDENT
ADLS_ACUITY_SCORE: 84

## 2023-07-22 NOTE — CARE PLAN
"   07/22/23 1223   Group Therapy Session   Group Attendance attended group session   Time Session Began 1115   Time Session Ended 1200   Total Time (minutes) 15   Total # Attendees 2-3   Group Type community;recreation;task skill   Group Topic Covered leisure exploration/use of leisure time;balanced lifestyle;structured socialization   Group Session Detail OT Leisure Group: Group activities to exercise cognitive skills while exploring leisure and socializing opportunities. \"Betcha Can't\" is a card game that asks players to name items in a category in a time frame (\"Name 8 potato chip flavors\"); collaborative exploration of team work, problem-solving, tracking, and following new directions.   Patient Participation Detail Pt participated in a group activity playing Betcha Can't; pt was joined by SIO. Pt was polite and social, joking that the other players left as they saw the pt arrive. Pt showed interest in the game and appeared attentive to the instructions. Pt said \"You're making the game as easy as it can be for me\"; writer responded to let pt know they were making it as fun as possible. Pt required reminders of the instructions, as pt appeared frequently forgetful during their verbal processing. Pt left group, announcing \"That's all I got. Thank you.\"       "

## 2023-07-22 NOTE — PLAN OF CARE
Problem: Suicidal Behavior  Goal: Suicidal Behavior is Absent or Managed  Outcome: Progressing  Note: Patient manifests no suicidal behavior     Problem: Behavioral Disturbance  Goal: Behavioral Disturbance  Description: Signs and symptoms of listed problems will be absent or manageable by discharge or transition of care.  Outcome: Progressing  Note: Patient manifests no behavioral disturbance   Goal Outcome Evaluation:       Patient slept on/off, remains in his room the entire night, voided in the bathroom with no issues reported. Patient was cooperative with safety checks, manifests no behavior. All precautions in place, Will continue to monitor.           García Up DNP, RN

## 2023-07-22 NOTE — PLAN OF CARE
RN Assessment   Patient had a good shift for the most part. He did make several derogatory and racially driven comments to staff, but for the most part was redirectable. Spent majority of the shift in bed napping. Expressed frustration with the amounts of medications he had to take, but otherwise denied any concerns. Denied HI/SI.  Had a x large BM this morning and took a shower. Ate all meals. No physical health complaints.   /80 (BP Location: Right arm)   Pulse 86   Temp 96.8  F (36  C) (Temporal)   Resp 16   Wt 81.6 kg (179 lb 12.8 oz)   SpO2 98%   BMI 26.55 kg/m

## 2023-07-23 PROCEDURE — 124N000002 HC R&B MH UMMC

## 2023-07-23 PROCEDURE — 250N000013 HC RX MED GY IP 250 OP 250 PS 637: Performed by: PSYCHIATRY & NEUROLOGY

## 2023-07-23 PROCEDURE — 250N000013 HC RX MED GY IP 250 OP 250 PS 637: Performed by: PHYSICIAN ASSISTANT

## 2023-07-23 RX ORDER — NAPROXEN SODIUM 220 MG
220 TABLET ORAL 2 TIMES DAILY WITH MEALS
Status: DISCONTINUED | OUTPATIENT
Start: 2023-07-23 | End: 2023-07-23

## 2023-07-23 RX ORDER — NAPROXEN 250 MG/1
250 TABLET ORAL 2 TIMES DAILY WITH MEALS
Status: DISCONTINUED | OUTPATIENT
Start: 2023-07-23 | End: 2023-11-13 | Stop reason: HOSPADM

## 2023-07-23 RX ADMIN — CLOZAPINE 675 MG: 100 TABLET, ORALLY DISINTEGRATING ORAL at 11:42

## 2023-07-23 RX ADMIN — Medication 300 MG: at 19:30

## 2023-07-23 RX ADMIN — Medication 300 MG: at 08:00

## 2023-07-23 RX ADMIN — SENNOSIDES 8.6 MG: 8.6 TABLET ORAL at 19:31

## 2023-07-23 RX ADMIN — LORAZEPAM 0.5 MG: 0.5 TABLET ORAL at 08:00

## 2023-07-23 RX ADMIN — SENNOSIDES 8.6 MG: 8.6 TABLET ORAL at 08:00

## 2023-07-23 RX ADMIN — ATROPINE SULFATE 2 DROP: 10 SOLUTION/ DROPS OPHTHALMIC at 19:31

## 2023-07-23 RX ADMIN — POLYETHYLENE GLYCOL 3350 17 G: 17 POWDER, FOR SOLUTION ORAL at 08:05

## 2023-07-23 RX ADMIN — OLANZAPINE 15 MG: 10 TABLET, ORALLY DISINTEGRATING ORAL at 08:01

## 2023-07-23 RX ADMIN — NAPROXEN 250 MG: 250 TABLET ORAL at 11:00

## 2023-07-23 RX ADMIN — DIVALPROEX SODIUM 1000 MG: 125 CAPSULE, COATED PELLETS ORAL at 19:30

## 2023-07-23 RX ADMIN — ATROPINE SULFATE 2 DROP: 10 SOLUTION/ DROPS OPHTHALMIC at 08:05

## 2023-07-23 RX ADMIN — Medication 10 MG: at 19:31

## 2023-07-23 RX ADMIN — ATROPINE SULFATE 2 DROP: 10 SOLUTION/ DROPS OPHTHALMIC at 13:51

## 2023-07-23 RX ADMIN — CYANOCOBALAMIN TAB 1000 MCG 1000 MCG: 1000 TAB at 07:53

## 2023-07-23 RX ADMIN — DIVALPROEX SODIUM 1000 MG: 125 CAPSULE, COATED PELLETS ORAL at 07:54

## 2023-07-23 RX ADMIN — TAMSULOSIN HYDROCHLORIDE 0.4 MG: 0.4 CAPSULE ORAL at 07:54

## 2023-07-23 RX ADMIN — NAPROXEN 250 MG: 250 TABLET ORAL at 17:47

## 2023-07-23 RX ADMIN — OLANZAPINE 15 MG: 10 TABLET, ORALLY DISINTEGRATING ORAL at 19:30

## 2023-07-23 RX ADMIN — Medication 300 MG: at 13:50

## 2023-07-23 RX ADMIN — LORAZEPAM 0.5 MG: 0.5 TABLET ORAL at 13:51

## 2023-07-23 RX ADMIN — LORAZEPAM 0.5 MG: 0.5 TABLET ORAL at 19:30

## 2023-07-23 ASSESSMENT — ACTIVITIES OF DAILY LIVING (ADL)
ADLS_ACUITY_SCORE: 64
ADLS_ACUITY_SCORE: 64
ADLS_ACUITY_SCORE: 74
HYGIENE/GROOMING: PROMPTS
LAUNDRY: WITH SUPERVISION
ADLS_ACUITY_SCORE: 74
ORAL_HYGIENE: INDEPENDENT
ADLS_ACUITY_SCORE: 64
ADLS_ACUITY_SCORE: 74
ADLS_ACUITY_SCORE: 64
ADLS_ACUITY_SCORE: 74
ORAL_HYGIENE: INDEPENDENT
ADLS_ACUITY_SCORE: 74
DRESS: INDEPENDENT
ADLS_ACUITY_SCORE: 64
ADLS_ACUITY_SCORE: 74
ADLS_ACUITY_SCORE: 64
HYGIENE/GROOMING: PROMPTS

## 2023-07-23 NOTE — PLAN OF CARE
"RN Assessment   Minimal changes since AM assessment. Spent majority of the shift in his room. Made delusional comments about destroying the world. \"I destroyed the whole word, why did I do this? Not whole world is gone.\" Also made paranoid statements in regards to [employer] candy being filled with poison, but was easily reassured. No SI/HI. Accepted all scheduled medications. No physical health complaints this shift. Ate all meals + pudding.   /79   Pulse 74   Temp 97.5  F (36.4  C)   Resp 16   Wt 81.6 kg (179 lb 12.8 oz)   SpO2 96%   BMI 26.55 kg/m         "

## 2023-07-23 NOTE — PLAN OF CARE
"Problem: Adult Behavioral Health Plan of Care  Goal: Adheres to Safety Considerations for Self and Others  Outcome: Progressing  Flowsheets (Taken 7/23/2023 1131)  Adheres to Safety Considerations for Self and Others: making progress toward outcome     Problem: Psychotic Signs/Symptoms  Goal: Improved Behavioral Control (Psychotic Signs/Symptoms)  Outcome: Progressing   Goal Outcome Evaluation:    Plan of Care Reviewed With: patient     Patient isolated to his room most of shift, resting in his bed. Patient denies thoughts of harming himself or wishing to be dead. Patient endorses urges to \"gouge staff's eyes out\". Patient states he doesn't particularly want to but \"it's just an urge I have\". Patient initially could not articulate the correct date, or the name of the hospital, or why he is here. Later when patient was asked this again he was able to state the reason he is here. Patient requested \"Aleve\" for mild back pain. Patient was offered Acetaminophen but patient refused, stating it doesn't work, insisting the only thing that works is Aleve. Patient denies ever seeing a provider about this pain but just reports he gets mild back pain at times. [facility] psych associate reported that he has facial grimacing whenever he changes positions. An order was obtained for naproxen, which was then offered to patient, and he refused it stating that it was \"filled with poison\". When writer and psych associate were trying to convince patient that it was not poison, patient stated \"You guys are trying to trick me, now get that out of here!\". 10 minutes later writer approached again and he still declined, stating it was poison. About 1.5 hours later writer approached patient again asking how his back pain was and he said \"about the same\" and writer offered the Naproxen again and he said he would try it at this time. He then did take his Naproxen and afternoon Clozaril in pudding without issue, stating that the pudding/med cup writer " "had this time was \"safe\". Patient denied audio hallucinations. When asking about visual hallucinations, he denied them, but reported seeing a \"white haze\" when writer asked more about this white haze he denied seeing it at that moment.                  "

## 2023-07-23 NOTE — PLAN OF CARE
Problem: Suicidal Behavior  Goal: Suicidal Behavior is Absent or Managed  Outcome: Progressing  Note: Patient manifests no suicidal behavior     Problem: Behavioral Disturbance  Goal: Behavioral Disturbance  Description: Signs and symptoms of listed problems will be absent or manageable by discharge or transition of care.  Outcome: Progressing  Note: Patient manifests no behavioral disturbance   Goal Outcome Evaluation:       Patient slept well, awakes couple times but remained calm, perseverated on wanting to have a shower, staff continued to remind him that shower starts from 0730 in the morning. Patient demonstrates no negative behavior. All precautions in place. No evidence of withdrawal or respiratory distress. Patient endorses no SI/HI, A/VH or SIB. Patient slept for 6 hours.  Will continue with monitoring            García Up DNP, RN

## 2023-07-24 LAB
ATRIAL RATE - MUSE: 87 BPM
DIASTOLIC BLOOD PRESSURE - MUSE: NORMAL MMHG
INTERPRETATION ECG - MUSE: NORMAL
P AXIS - MUSE: 57 DEGREES
PR INTERVAL - MUSE: 148 MS
QRS DURATION - MUSE: 158 MS
QT - MUSE: 432 MS
QTC - MUSE: 519 MS
R AXIS - MUSE: -30 DEGREES
SYSTOLIC BLOOD PRESSURE - MUSE: NORMAL MMHG
T AXIS - MUSE: 90 DEGREES
VENTRICULAR RATE- MUSE: 87 BPM

## 2023-07-24 PROCEDURE — 250N000013 HC RX MED GY IP 250 OP 250 PS 637: Performed by: PSYCHIATRY & NEUROLOGY

## 2023-07-24 PROCEDURE — H2032 ACTIVITY THERAPY, PER 15 MIN: HCPCS

## 2023-07-24 PROCEDURE — 250N000013 HC RX MED GY IP 250 OP 250 PS 637: Performed by: PHYSICIAN ASSISTANT

## 2023-07-24 PROCEDURE — 124N000002 HC R&B MH UMMC

## 2023-07-24 PROCEDURE — 93005 ELECTROCARDIOGRAM TRACING: CPT

## 2023-07-24 PROCEDURE — 93010 ELECTROCARDIOGRAM REPORT: CPT | Performed by: INTERNAL MEDICINE

## 2023-07-24 PROCEDURE — 99232 SBSQ HOSP IP/OBS MODERATE 35: CPT | Mod: GC | Performed by: PSYCHIATRY & NEUROLOGY

## 2023-07-24 RX ADMIN — Medication 300 MG: at 13:52

## 2023-07-24 RX ADMIN — DIVALPROEX SODIUM 1000 MG: 125 CAPSULE, COATED PELLETS ORAL at 09:18

## 2023-07-24 RX ADMIN — ACETAMINOPHEN 650 MG: 325 TABLET, FILM COATED ORAL at 00:18

## 2023-07-24 RX ADMIN — ATROPINE SULFATE 2 DROP: 10 SOLUTION/ DROPS OPHTHALMIC at 13:52

## 2023-07-24 RX ADMIN — ATROPINE SULFATE 2 DROP: 10 SOLUTION/ DROPS OPHTHALMIC at 09:21

## 2023-07-24 RX ADMIN — SENNOSIDES 8.6 MG: 8.6 TABLET ORAL at 09:18

## 2023-07-24 RX ADMIN — LORAZEPAM 0.5 MG: 0.5 TABLET ORAL at 19:55

## 2023-07-24 RX ADMIN — OLANZAPINE 15 MG: 10 TABLET, ORALLY DISINTEGRATING ORAL at 19:55

## 2023-07-24 RX ADMIN — DIVALPROEX SODIUM 1000 MG: 125 CAPSULE, COATED PELLETS ORAL at 19:54

## 2023-07-24 RX ADMIN — CYANOCOBALAMIN TAB 1000 MCG 1000 MCG: 1000 TAB at 09:19

## 2023-07-24 RX ADMIN — TAMSULOSIN HYDROCHLORIDE 0.4 MG: 0.4 CAPSULE ORAL at 09:20

## 2023-07-24 RX ADMIN — Medication 300 MG: at 09:18

## 2023-07-24 RX ADMIN — CLOZAPINE 700 MG: 100 TABLET, ORALLY DISINTEGRATING ORAL at 12:04

## 2023-07-24 RX ADMIN — SENNOSIDES 8.6 MG: 8.6 TABLET ORAL at 19:55

## 2023-07-24 RX ADMIN — NAPROXEN 250 MG: 250 TABLET ORAL at 17:22

## 2023-07-24 RX ADMIN — POLYETHYLENE GLYCOL 3350 17 G: 17 POWDER, FOR SOLUTION ORAL at 09:17

## 2023-07-24 RX ADMIN — LORAZEPAM 0.5 MG: 0.5 TABLET ORAL at 13:52

## 2023-07-24 RX ADMIN — Medication 300 MG: at 19:55

## 2023-07-24 RX ADMIN — LORAZEPAM 0.5 MG: 0.5 TABLET ORAL at 09:20

## 2023-07-24 RX ADMIN — OLANZAPINE 15 MG: 10 TABLET, ORALLY DISINTEGRATING ORAL at 09:19

## 2023-07-24 RX ADMIN — TRAZODONE HYDROCHLORIDE 50 MG: 50 TABLET ORAL at 00:18

## 2023-07-24 RX ADMIN — NAPROXEN 250 MG: 250 TABLET ORAL at 09:18

## 2023-07-24 RX ADMIN — ATROPINE SULFATE 2 DROP: 10 SOLUTION/ DROPS OPHTHALMIC at 19:56

## 2023-07-24 RX ADMIN — Medication 10 MG: at 19:55

## 2023-07-24 ASSESSMENT — ACTIVITIES OF DAILY LIVING (ADL)
ADLS_ACUITY_SCORE: 74

## 2023-07-24 NOTE — PLAN OF CARE
Problem: Sleep Disturbance  Goal: Adequate Sleep/Rest  Outcome: Progressing  Note: Patient slept well after receiving sleep and pain medication     Problem: Behavioral Disturbance  Goal: Behavioral Disturbance  Description: Signs and symptoms of listed problems will be absent or manageable by discharge or transition of care.  Outcome: Progressing  Note: Patient manifests no behavioral dysconrtol   Goal Outcome Evaluation:       Patient requested for trazodone at about 0018, he was also given prn tylenol with good effect. Patient slept 6.25 hours with no behavior issues, patient endorses no SI/HI, A/VH, overall, patient is improving over his baseline.    García Up DNP, RN.

## 2023-07-24 NOTE — PROGRESS NOTES
EKG Tech/ NST Flyer went to unit to do EKG. At 10:18 patient refused EKG, staff aware. Went back to unit later around 12:20 to try to do EKG. Patient seemed to be agreeable, when trying to place leads patient then starting hitting and kicking writer. EKG not done.    Sandra Simpson   [facility] NST/EKG Tech
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
## 2023-07-24 NOTE — CARE PLAN
"   07/24/23 1700   Group Therapy Session   Group Attendance attended group session   Time Session Began 1615   Time Session Ended 1700   Total Time (minutes) 45   Total # Attendees 3   Group Type expressive therapy   Group Topic Covered emotions/expression   Patient Response/Contribution cooperative with task       Art Therapy directive was to create art in response to pts written answer(s) on handout related to the five senses.  Goals of directive: emotional expression, emotional regulation, mindfulness  Pt was able to answer to abstract questions on handout including: \"if you were a natural object what would you be?\" (Pt answered: a star)  However was confused regarding art process and continued to color in a mandala page from a previous group rather then  Process art related to handout. Pt was disorganized, distractible, mood was calm.                         "
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
## 2023-07-24 NOTE — PLAN OF CARE
Problem: Psychotic Symptoms  Goal: Psychotic Symptoms  Description: Signs and symptoms of listed problems will be absent or manageable.  Outcome: Progressing     Problem: Confusion Chronic  Goal: Optimal Cognitive Function  Outcome: Progressing   Goal Outcome Evaluation:    Plan of Care Reviewed With: patient    Patient is alert and oriented x 2, Patient was calm, seemed to be responding to internal stimuli. Patient's mood was blunted with flat affect. Patient has poor insight to illness, Patient is disorganized, poorly gloomed with impaired concentration. Patient was isolative, he continues on 1-1 SIO. Patient was compliant with medications, and meal intake was adequate. Patient denies SI/SIB/HI, contracts for safety. Patient denies depression and anxiety, he denies pain during this shift, no other concerns. Will continue to monitor.

## 2023-07-24 NOTE — PLAN OF CARE
Goal Outcome Evaluation:       Patient was re approached multiple times before he took his 0800 medications in chocolate pudding, surprisingly patient took the subsequent medications at 12 noon and  0200 whole without much prompt. Patient ate breakfast poorly but ate all his lunch with no difficulty. Patient observed with shakiness of his right hand when he hold a cup of water or when using spoon to eat. No resting hand tremor observed. Patient was calm most of the shift, but the [facility] staff reported that patient was agitated one time and struct out at staff but he was redirectable and no injury to the staff. Patient vacillated between his room and the lounge area but more in the room than he appeared in the lounge/hallway. Patient took a walk in the hallway once. Patient rated pain as 0/10, Anxiety as 2/10 depression 0/10 (none) he denies suicide and homicide ideation and when asked about voices, states sometimes, not now. Patient is calm in his room resting at this time. Patient attended no group. Continue with monitoring.    García Up DNP, RN.

## 2023-07-24 NOTE — PLAN OF CARE
Problem: Suicide Risk  Goal: Absence of Self-Harm  Outcome: Progressing  Note: Patient had no self-harm     Problem: Suicidal Behavior  Goal: Suicidal Behavior is Absent or Managed  Outcome: Progressing  Note: Patient manifests no suicidal behavior   Goal Outcome Evaluation:            Patient was re approached multiple times before he took his 0800 medications in chocolate pudding, surprisingly patient took the subsequent medications at 12 noon and  0200 whole without much prompt. Patient ate breakfast poorly but ate all his lunch with no difficulty. Patient observed with shakiness of his right hand when he hold a cup of water or when using spoon to eat. No resting hand tremor observed. Patient was calm most of the shift, but the SIO staff reported that patient was agitated one time and struct out at staff but he was redirectable and no injury to the staff (Please see Ade's the charge' nurse note). Patient vacillated between his room and the lounge area but more in the room than he appeared in the lounge/hallway. Patient took a walk in the hallway once. Patient rated pain as 0/10, Anxiety as 2/10 depression 0/10 (none) he denies suicide and homicide ideation and when asked about voices, states sometimes, not now. Patient is calm in his room resting at this time. Patient attended no group. Continue with monitoring.     García Up DNP, RN.

## 2023-07-25 LAB
ATRIAL RATE - MUSE: 96 BPM
DIASTOLIC BLOOD PRESSURE - MUSE: NORMAL MMHG
INTERPRETATION ECG - MUSE: NORMAL
P AXIS - MUSE: 58 DEGREES
PR INTERVAL - MUSE: 150 MS
QRS DURATION - MUSE: 152 MS
QT - MUSE: 396 MS
QTC - MUSE: 500 MS
R AXIS - MUSE: -43 DEGREES
SYSTOLIC BLOOD PRESSURE - MUSE: NORMAL MMHG
T AXIS - MUSE: 81 DEGREES
VENTRICULAR RATE- MUSE: 96 BPM

## 2023-07-25 PROCEDURE — 250N000013 HC RX MED GY IP 250 OP 250 PS 637: Performed by: PSYCHIATRY & NEUROLOGY

## 2023-07-25 PROCEDURE — 250N000013 HC RX MED GY IP 250 OP 250 PS 637: Performed by: PHYSICIAN ASSISTANT

## 2023-07-25 PROCEDURE — 124N000002 HC R&B MH UMMC

## 2023-07-25 PROCEDURE — 99232 SBSQ HOSP IP/OBS MODERATE 35: CPT | Mod: GC | Performed by: PSYCHIATRY & NEUROLOGY

## 2023-07-25 RX ADMIN — OLANZAPINE 15 MG: 10 TABLET, ORALLY DISINTEGRATING ORAL at 08:22

## 2023-07-25 RX ADMIN — LORAZEPAM 0.5 MG: 0.5 TABLET ORAL at 13:42

## 2023-07-25 RX ADMIN — TAMSULOSIN HYDROCHLORIDE 0.4 MG: 0.4 CAPSULE ORAL at 08:21

## 2023-07-25 RX ADMIN — Medication 300 MG: at 08:20

## 2023-07-25 RX ADMIN — ATROPINE SULFATE 2 DROP: 10 SOLUTION/ DROPS OPHTHALMIC at 08:22

## 2023-07-25 RX ADMIN — NAPROXEN 250 MG: 250 TABLET ORAL at 17:27

## 2023-07-25 RX ADMIN — Medication 300 MG: at 19:55

## 2023-07-25 RX ADMIN — LORAZEPAM 0.5 MG: 0.5 TABLET ORAL at 08:21

## 2023-07-25 RX ADMIN — SENNOSIDES 8.6 MG: 8.6 TABLET ORAL at 19:55

## 2023-07-25 RX ADMIN — ATROPINE SULFATE 2 DROP: 10 SOLUTION/ DROPS OPHTHALMIC at 20:09

## 2023-07-25 RX ADMIN — DIVALPROEX SODIUM 1000 MG: 125 CAPSULE, COATED PELLETS ORAL at 08:19

## 2023-07-25 RX ADMIN — ATROPINE SULFATE 2 DROP: 10 SOLUTION/ DROPS OPHTHALMIC at 13:42

## 2023-07-25 RX ADMIN — SENNOSIDES 8.6 MG: 8.6 TABLET ORAL at 08:22

## 2023-07-25 RX ADMIN — NAPROXEN 250 MG: 250 TABLET ORAL at 08:20

## 2023-07-25 RX ADMIN — Medication 300 MG: at 13:42

## 2023-07-25 RX ADMIN — CLOZAPINE 700 MG: 100 TABLET, ORALLY DISINTEGRATING ORAL at 13:41

## 2023-07-25 RX ADMIN — DIVALPROEX SODIUM 1000 MG: 125 CAPSULE, COATED PELLETS ORAL at 20:46

## 2023-07-25 RX ADMIN — POLYETHYLENE GLYCOL 3350 17 G: 17 POWDER, FOR SOLUTION ORAL at 08:20

## 2023-07-25 RX ADMIN — LORAZEPAM 0.5 MG: 0.5 TABLET ORAL at 19:59

## 2023-07-25 RX ADMIN — CYANOCOBALAMIN TAB 1000 MCG 1000 MCG: 1000 TAB at 08:22

## 2023-07-25 RX ADMIN — Medication 10 MG: at 19:55

## 2023-07-25 RX ADMIN — OLANZAPINE 15 MG: 10 TABLET, ORALLY DISINTEGRATING ORAL at 19:55

## 2023-07-25 ASSESSMENT — ACTIVITIES OF DAILY LIVING (ADL)
ADLS_ACUITY_SCORE: 64
ADLS_ACUITY_SCORE: 64
HYGIENE/GROOMING: SHOWER
ADLS_ACUITY_SCORE: 64
ADLS_ACUITY_SCORE: 74
ADLS_ACUITY_SCORE: 64
ADLS_ACUITY_SCORE: 74
ORAL_HYGIENE: INDEPENDENT
DRESS: SCRUBS (BEHAVIORAL HEALTH);INDEPENDENT
ADLS_ACUITY_SCORE: 64
ADLS_ACUITY_SCORE: 74
ADLS_ACUITY_SCORE: 74
ADLS_ACUITY_SCORE: 64
HYGIENE/GROOMING: INDEPENDENT
ADLS_ACUITY_SCORE: 64
ADLS_ACUITY_SCORE: 74
DRESS: SCRUBS (BEHAVIORAL HEALTH)
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
## 2023-07-25 NOTE — PLAN OF CARE
Assessment/Intervention/Current Symtoms and Care Coordination:  Reviewed chart. Met with team to review patient's care. Sent an update to his county , Vicky Valdez.     Discharge Plan or Goal:  When patient is more stable a referral for [facility] in [place] will be made, referral for [facility] in [place] will be made      Barriers to Discharge:  Patient requires further psychiatric stabilization due to current symptomology. He continues to present as disorganized and tangential with paranoid thought content. He presents with mood lability. He vacillates between being pleasant and being aggressive with no clear environmental triggers. Patient endorses auditory hallucinations.     Referral Status:  Referral for housing pending psychiatric stabilization  Considerations include: [facility] in [place], [facility] in [place]      Legal Status:  Commitment with [facility]: [place]  File Number: [id number]  Issued 23 and is not to exceed six months    Lorenzo-Librado Hearin/27 at 8:30am  : Prerna Zamorano    Contacts:  : Vicky Valdez with [facility], [phone]  Psychiatrist: Dr. Santana at [facility], [phone] PCP: Attila Urena DO  Mom: Alberta, [phone] (H) [phone] (M)   Dad: Ino, [phone] (H)  Sister, Sandra Treadwell, [phone] (KING)   [facility]'s Office Fax: [phone]  Pia May: [phone]  CADI  with [facility]: Regina Wong, [phone]      Upcoming Meetings and Dates/Important Information and next steps:  Lorenzo-Librado hearing  at 8:30am  Referrals for housing pending psychiatric stabilization   If Juliana order is received, email copy to Cathy Malcolm and Dr. Angie Orr
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
## 2023-07-25 NOTE — PLAN OF CARE
"Goal Outcome Evaluation:    Plan of Care Reviewed With: patient      Problem: Adult Behavioral Health Plan of Care  Goal: Plan of Care Review  Outcome: Progressing  Flowsheets (Taken 7/25/2023 0920)  Patient Agreement with Plan of Care: agrees   Pt presents with a flat/blunted affect, distrustful/suspicious mood. Pt was compliant with all morning scheduled medications. Pt accused writer of putting poison in the pudding used to administered medications as writer was exiting room. During check in later, pt denied pain, anxiety, depression, SI, responded yes to HI, when asked if those thoughts were to a specific individual, answered \" I think so\". Pt declined to elaborate on who the homicidal thoughts were for. Pt denied visual hallucinations, endorsed hearing voices stating \" only the real kind, not fake, a really voice in my mind\". Observed coloring with a peer in the lounge. Continues on SIO 5 feet for safety.               "
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
## 2023-07-26 LAB
BASOPHILS # BLD AUTO: 0.1 10E3/UL (ref 0–0.2)
BASOPHILS NFR BLD AUTO: 1 %
EOSINOPHIL # BLD AUTO: 0 10E3/UL (ref 0–0.7)
EOSINOPHIL NFR BLD AUTO: 0 %
ERYTHROCYTE [DISTWIDTH] IN BLOOD BY AUTOMATED COUNT: 14.6 % (ref 10–15)
HCT VFR BLD AUTO: 40.9 % (ref 40–53)
HGB BLD-MCNC: 12.9 G/DL (ref 13.3–17.7)
IMM GRANULOCYTES # BLD: 0.2 10E3/UL
IMM GRANULOCYTES NFR BLD: 2 %
LYMPHOCYTES # BLD AUTO: 1.5 10E3/UL (ref 0.8–5.3)
LYMPHOCYTES NFR BLD AUTO: 16 %
MCH RBC QN AUTO: 28.1 PG (ref 26.5–33)
MCHC RBC AUTO-ENTMCNC: 31.5 G/DL (ref 31.5–36.5)
MCV RBC AUTO: 89 FL (ref 78–100)
MONOCYTES # BLD AUTO: 1 10E3/UL (ref 0–1.3)
MONOCYTES NFR BLD AUTO: 11 %
NEUTROPHILS # BLD AUTO: 6.6 10E3/UL (ref 1.6–8.3)
NEUTROPHILS NFR BLD AUTO: 70 %
NRBC # BLD AUTO: 0 10E3/UL
NRBC BLD AUTO-RTO: 0 /100
PLATELET # BLD AUTO: 140 10E3/UL (ref 150–450)
RBC # BLD AUTO: 4.59 10E6/UL (ref 4.4–5.9)
WBC # BLD AUTO: 9.4 10E3/UL (ref 4–11)

## 2023-07-26 PROCEDURE — 36415 COLL VENOUS BLD VENIPUNCTURE: CPT | Performed by: PSYCHIATRY & NEUROLOGY

## 2023-07-26 PROCEDURE — 124N000002 HC R&B MH UMMC

## 2023-07-26 PROCEDURE — 250N000013 HC RX MED GY IP 250 OP 250 PS 637: Performed by: PHYSICIAN ASSISTANT

## 2023-07-26 PROCEDURE — 250N000013 HC RX MED GY IP 250 OP 250 PS 637: Performed by: PSYCHIATRY & NEUROLOGY

## 2023-07-26 PROCEDURE — G0177 OPPS/PHP; TRAIN & EDUC SERV: HCPCS

## 2023-07-26 PROCEDURE — 99233 SBSQ HOSP IP/OBS HIGH 50: CPT | Performed by: PSYCHIATRY & NEUROLOGY

## 2023-07-26 PROCEDURE — 85025 COMPLETE CBC W/AUTO DIFF WBC: CPT | Performed by: PSYCHIATRY & NEUROLOGY

## 2023-07-26 RX ORDER — POLYETHYLENE GLYCOL 3350 17 G/17G
17 POWDER, FOR SOLUTION ORAL 2 TIMES DAILY
Status: DISCONTINUED | OUTPATIENT
Start: 2023-07-26 | End: 2023-11-13 | Stop reason: HOSPADM

## 2023-07-26 RX ADMIN — LORAZEPAM 0.5 MG: 0.5 TABLET ORAL at 19:27

## 2023-07-26 RX ADMIN — Medication 300 MG: at 20:34

## 2023-07-26 RX ADMIN — DIVALPROEX SODIUM 1000 MG: 125 CAPSULE, COATED PELLETS ORAL at 08:43

## 2023-07-26 RX ADMIN — ATROPINE SULFATE 2 DROP: 10 SOLUTION/ DROPS OPHTHALMIC at 13:48

## 2023-07-26 RX ADMIN — DIVALPROEX SODIUM 1000 MG: 125 CAPSULE, COATED PELLETS ORAL at 19:28

## 2023-07-26 RX ADMIN — OLANZAPINE 15 MG: 10 TABLET, ORALLY DISINTEGRATING ORAL at 19:25

## 2023-07-26 RX ADMIN — POLYETHYLENE GLYCOL 3350 17 G: 17 POWDER, FOR SOLUTION ORAL at 08:47

## 2023-07-26 RX ADMIN — CLOZAPINE 700 MG: 100 TABLET, ORALLY DISINTEGRATING ORAL at 13:40

## 2023-07-26 RX ADMIN — Medication 300 MG: at 13:40

## 2023-07-26 RX ADMIN — NAPROXEN 250 MG: 250 TABLET ORAL at 08:44

## 2023-07-26 RX ADMIN — ATROPINE SULFATE 2 DROP: 10 SOLUTION/ DROPS OPHTHALMIC at 19:29

## 2023-07-26 RX ADMIN — Medication 10 MG: at 19:27

## 2023-07-26 RX ADMIN — LORAZEPAM 0.5 MG: 0.5 TABLET ORAL at 08:47

## 2023-07-26 RX ADMIN — NAPROXEN 250 MG: 250 TABLET ORAL at 17:51

## 2023-07-26 RX ADMIN — ATROPINE SULFATE 2 DROP: 10 SOLUTION/ DROPS OPHTHALMIC at 08:47

## 2023-07-26 RX ADMIN — POLYETHYLENE GLYCOL 3350 17 G: 17 POWDER, FOR SOLUTION ORAL at 19:27

## 2023-07-26 RX ADMIN — CYANOCOBALAMIN TAB 1000 MCG 1000 MCG: 1000 TAB at 08:47

## 2023-07-26 RX ADMIN — Medication 300 MG: at 10:13

## 2023-07-26 RX ADMIN — LORAZEPAM 0.5 MG: 0.5 TABLET ORAL at 13:40

## 2023-07-26 RX ADMIN — SENNOSIDES 8.6 MG: 8.6 TABLET ORAL at 08:45

## 2023-07-26 RX ADMIN — TAMSULOSIN HYDROCHLORIDE 0.4 MG: 0.4 CAPSULE ORAL at 08:47

## 2023-07-26 RX ADMIN — SENNOSIDES 8.6 MG: 8.6 TABLET ORAL at 19:27

## 2023-07-26 RX ADMIN — OLANZAPINE 15 MG: 10 TABLET, ORALLY DISINTEGRATING ORAL at 10:13

## 2023-07-26 ASSESSMENT — ACTIVITIES OF DAILY LIVING (ADL)
ORAL_HYGIENE: PROMPTS
ADLS_ACUITY_SCORE: 64
HYGIENE/GROOMING: PROMPTS
DRESS: SCRUBS (BEHAVIORAL HEALTH);INDEPENDENT
ADLS_ACUITY_SCORE: 64
HYGIENE/GROOMING: SHOWER
ADLS_ACUITY_SCORE: 64
ADLS_ACUITY_SCORE: 64
DRESS: SCRUBS (BEHAVIORAL HEALTH)
ADLS_ACUITY_SCORE: 74
ADLS_ACUITY_SCORE: 64
LAUNDRY: WITH SUPERVISION
ADLS_ACUITY_SCORE: 64
ADLS_ACUITY_SCORE: 74
ADLS_ACUITY_SCORE: 64
ADLS_ACUITY_SCORE: 74
ADLS_ACUITY_SCORE: 64
ADLS_ACUITY_SCORE: 64
ORAL_HYGIENE: PROMPTS
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
## 2023-07-26 NOTE — CARE PLAN
07/26/23 1457   Group Therapy Session   Group Attendance attended group session   Time Session Began 1315   Time Session Ended 1415   Total Time (minutes) 45   Total # Attendees 5   Group Type Occupational Therapy   Group Topic Covered balanced lifestyle;coping skills/lifestyle management;leisure exploration/use of leisure time;relaxation techniques   Group Session Detail OT Clinic Group   Patient Participation Detail Intervention: OT Clinic with 4 peers. Pt participated in a OT Clinic group to facilitate coping skills exploration and creative expression through personally meaningful activities, and to encourage utilization of these healthy coping skills to promote overall health and wellness. Group included clinical observation of social, cognitive and kinesthetic performance skills to inform treatment and safe discharge planning.    Patient Response: As writer was walking onto unit, patient was exiting their room and walking around with both hands up and pointing his middle fingers at anyone he walked by. A few minutes later however, walked over to join the group at the table with SIO staff present and selected a coloring porject to work on, which he worked on for about 45 mins of the group. Did well during this time, mostly working quietly. Was accepting of other's compliments of his project at times, and others began to talk about self and project in a self-deprecating manner. Was however redirectable to focus on being positive and not being hard on himself. Was appropriate with other peers I the group. Left group a little early to accept a phone call.     Mood/Affect: Pleasant overall     Plan: Patient encouraged to maintain attendance for continued ongoing support in working towards occupational therapy goals to support overall treatment/care.           Attempt to contact pt and father. LMVM on cell and home phone.

## 2023-07-26 NOTE — PLAN OF CARE
Pt was isolative in the room at the start of shift. Later attended group and [facility] after the group meeting was over.   Stomach noted to be distended. Pt denies pain or discomfort with assessment and stated that he had a bowel 2-3 days ago. Schedule bowel medication administered. Pt  verbalized that he voided this morning.  Tremors noted on his right hand and pt denies  any problem associated with the tremors.    Pt initially refused his morning medications, stated that medication is poison. Multiple reproached needed before  he took the medications.   Pt denies SI,HI, SIB but verbalized that he is hopeless and nothing anyone does can change it.  No aggressive behavior demonstrated this shift.  Will continue to monitor and assist as needed.     Problem: Psychotic Signs/Symptoms  Goal: Optimal Cognitive Function (Psychotic Signs/Symptoms)  Intervention: Support and Promote Cognitive Ability  Recent Flowsheet Documentation  Taken 7/26/2023 1200 by Basia Woodson RN  Trust Relationship/Rapport:   emotional support provided   empathic listening provided   questions answered   questions encouraged   reassurance provided   thoughts/feelings acknowledged  Goal: Improved Psychomotor Symptoms (Psychotic Signs/Symptoms)  Intervention: Manage Psychomotor Movement  Recent Flowsheet Documentation  Taken 7/26/2023 1200 by Basia Woodson RN  Activity (Behavioral Health):   up ad jose   up in chair  Goal: Enhanced Social, Occupational or Functional Skills (Psychotic Signs/Symptoms)  Intervention: Promote Social, Occupational and Functional Ability  Recent Flowsheet Documentation  Taken 7/26/2023 1200 by Basia Woodson RN  Trust Relationship/Rapport:   emotional support provided   empathic listening provided   questions answered   questions encouraged   reassurance provided   thoughts/feelings acknowledged   Goal Outcome Evaluation:    Plan of Care Reviewed With: patient
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
## 2023-07-26 NOTE — PLAN OF CARE
Night Shift Summary (7/25/23 into 07/26/23)    Pt asleep at start of shift.     Breathing quiet and unlabored when sleeping.     Pt had no c/o pain or discomfort during the HS.     Appears to have slept 6.5 hours.     Pt continues on 1:1 SIO/5 ft space distance.     Goal Outcome Evaluation:  Problem: Adult Behavioral Health Plan of Care  Goal: Adheres to Safety Considerations for Self and Others  Intervention: Develop and Maintain Individualized Safety Plan  Recent Flowsheet Documentation  Taken 7/26/2023 0400 by Tameka Hatch, RN  Safety Measures: safety rounds completed  Goal: Absence of New-Onset Illness or Injury  Intervention: Identify and Manage Fall Risk  Recent Flowsheet Documentation  Taken 7/26/2023 0400 by Tameka Hatch, RN  Safety Measures: safety rounds completed     Problem: Sleep Disturbance  Goal: Adequate Sleep/Rest  Outcome: Progressing

## 2023-07-26 NOTE — PLAN OF CARE
Assessment/Intervention/Current Symtoms and Care Coordination:  Reviewed chart. Met with team to review patient's care. Sent an update to his [facility] , Vicky Valdez. Writer checked in with patient about his court hearing tomorrow, he had no questions for this writer about his upcoming hearing. Received the Exam Report from [facility], copy given to patient and copy put in chart.     Discharge Plan or Goal:  When patient is more stable a referral for [facility] in [place] will be made, referral for [facility] in [place] will be made      Barriers to Discharge:  Patient requires further psychiatric stabilization due to current symptomology. He continues to present as disorganized and tangential with paranoid thought content. He presents with mood lability. He vacillates between being pleasant and being aggressive with no clear environmental triggers. Patient endorses auditory hallucinations.     Referral Status:  Referral for housing pending psychiatric stabilization  Considerations include: [facility] in [place], [facility] in [place]      Legal Status:  Commitment with [facility]: [place]  File Number: [id number]  Issued 23 and is not to exceed six months    Ventura-Pavon Hearin/27 at 8:30am  : Prerna Zamorano    Contacts:  : Vicky Valdez with [facility], [phone]  Psychiatrist: Dr. Santana at [facility], [phone] PCP: Attila Urena DO  Mom: Alberta, [phone] (H) [phone] (M)   Dad: Ino, [phone] (H)  Sister, Sandra Treadwell, [phone] ([facility])   [facility]'s Office Fax: [phone]  Pia May: [phone]  CADI  with [facility]: Regina Wong, [phone]      Upcoming Meetings and Dates/Important Information and next steps:  V[place]-Pavon hearing  at 8:30am  Referrals for housing pending psychiatric stabilization   If Ventura-Pavon order is  received, email copy to Cathy Malcolm and Dr. Angie Orr

## 2023-07-27 PROCEDURE — 99207 PR NO BILLABLE SERVICE THIS VISIT: CPT

## 2023-07-27 PROCEDURE — 124N000002 HC R&B MH UMMC

## 2023-07-27 PROCEDURE — 99233 SBSQ HOSP IP/OBS HIGH 50: CPT | Performed by: PSYCHIATRY & NEUROLOGY

## 2023-07-27 PROCEDURE — 250N000013 HC RX MED GY IP 250 OP 250 PS 637

## 2023-07-27 PROCEDURE — 250N000009 HC RX 250: Performed by: PSYCHIATRY & NEUROLOGY

## 2023-07-27 PROCEDURE — 250N000013 HC RX MED GY IP 250 OP 250 PS 637: Performed by: PSYCHIATRY & NEUROLOGY

## 2023-07-27 PROCEDURE — 250N000013 HC RX MED GY IP 250 OP 250 PS 637: Performed by: PHYSICIAN ASSISTANT

## 2023-07-27 RX ADMIN — DIVALPROEX SODIUM 1000 MG: 125 CAPSULE, COATED PELLETS ORAL at 19:26

## 2023-07-27 RX ADMIN — NAPROXEN 250 MG: 250 TABLET ORAL at 08:11

## 2023-07-27 RX ADMIN — Medication 300 MG: at 08:11

## 2023-07-27 RX ADMIN — OLANZAPINE 15 MG: 10 TABLET, ORALLY DISINTEGRATING ORAL at 08:11

## 2023-07-27 RX ADMIN — POLYETHYLENE GLYCOL 3350 17 G: 17 POWDER, FOR SOLUTION ORAL at 08:11

## 2023-07-27 RX ADMIN — NAPROXEN 250 MG: 250 TABLET ORAL at 17:41

## 2023-07-27 RX ADMIN — ATROPINE SULFATE 2 DROP: 10 SOLUTION/ DROPS OPHTHALMIC at 19:27

## 2023-07-27 RX ADMIN — LORAZEPAM 0.5 MG: 0.5 TABLET ORAL at 19:25

## 2023-07-27 RX ADMIN — POLYETHYLENE GLYCOL 3350 17 G: 17 POWDER, FOR SOLUTION ORAL at 19:25

## 2023-07-27 RX ADMIN — SENNOSIDES 8.6 MG: 8.6 TABLET ORAL at 08:11

## 2023-07-27 RX ADMIN — Medication 10 MG: at 19:25

## 2023-07-27 RX ADMIN — TAMSULOSIN HYDROCHLORIDE 0.4 MG: 0.4 CAPSULE ORAL at 08:11

## 2023-07-27 RX ADMIN — BENZONATATE 100 MG: 100 CAPSULE ORAL at 02:06

## 2023-07-27 RX ADMIN — OLANZAPINE 15 MG: 10 TABLET, ORALLY DISINTEGRATING ORAL at 19:25

## 2023-07-27 RX ADMIN — CYANOCOBALAMIN TAB 1000 MCG 1000 MCG: 1000 TAB at 08:11

## 2023-07-27 RX ADMIN — CLOZAPINE 700 MG: 100 TABLET, ORALLY DISINTEGRATING ORAL at 11:39

## 2023-07-27 RX ADMIN — Medication 300 MG: at 21:00

## 2023-07-27 RX ADMIN — DIVALPROEX SODIUM 1000 MG: 125 CAPSULE, COATED PELLETS ORAL at 08:11

## 2023-07-27 RX ADMIN — ATROPINE SULFATE 2 DROP: 10 SOLUTION/ DROPS OPHTHALMIC at 08:21

## 2023-07-27 RX ADMIN — TRAZODONE HYDROCHLORIDE 50 MG: 50 TABLET ORAL at 02:05

## 2023-07-27 RX ADMIN — Medication 300 MG: at 13:27

## 2023-07-27 RX ADMIN — LORAZEPAM 0.5 MG: 0.5 TABLET ORAL at 13:27

## 2023-07-27 RX ADMIN — SENNOSIDES 8.6 MG: 8.6 TABLET ORAL at 19:25

## 2023-07-27 RX ADMIN — ATROPINE SULFATE 2 DROP: 10 SOLUTION/ DROPS OPHTHALMIC at 13:27

## 2023-07-27 RX ADMIN — LORAZEPAM 0.5 MG: 0.5 TABLET ORAL at 08:11

## 2023-07-27 RX ADMIN — OLANZAPINE 10 MG: 5 TABLET, FILM COATED ORAL at 03:21

## 2023-07-27 ASSESSMENT — ACTIVITIES OF DAILY LIVING (ADL)
ADLS_ACUITY_SCORE: 74
HYGIENE/GROOMING: PROMPTS
ADLS_ACUITY_SCORE: 74
ADLS_ACUITY_SCORE: 84
LAUNDRY: UNABLE TO COMPLETE
ADLS_ACUITY_SCORE: 74
DRESS: PROMPTS
LAUNDRY: WITH SUPERVISION
ADLS_ACUITY_SCORE: 74
ADLS_ACUITY_SCORE: 74
ORAL_HYGIENE: PROMPTS
HYGIENE/GROOMING: PROMPTS
ADLS_ACUITY_SCORE: 74
ADLS_ACUITY_SCORE: 84
ADLS_ACUITY_SCORE: 74
ADLS_ACUITY_SCORE: 74
DRESS: SCRUBS (BEHAVIORAL HEALTH)
ADLS_ACUITY_SCORE: 74
ORAL_HYGIENE: PROMPTS
ADLS_ACUITY_SCORE: 84
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
## 2023-07-27 NOTE — SIGNIFICANT EVENT
At 1215 hrs, a witnessed fall occurred while patient was taking a shower. Per PA, patient was walking towards the door when he slipped on the floor landing on his bottom, without hitting his head. The writer went to assess patient, he confirmed that he fell but he said he hit his head during the fall. LOC baseline, he denied pain, headache, dizziness, and pain. No redness noted on the head. ROM baseline on all extremities. No redness/bruises noted on the body. He denied pain/discomfort on any body part. Notified provider and IM. Kept on close monitoring. Temp: 97.7  F (36.5  C) Temp src: Temporal BP: 120/78 Pulse: 104   Resp: 16 SpO2: 98 % O2 Device: None (Room air).     At 1305 hrs, received a call from IM. Per conversation, she will review the patient's chart and will put in some orders as needed. Patient was seen by IM at 1315 hrs. Compass report completed.
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
## 2023-07-27 NOTE — PLAN OF CARE
Assessment/Intervention/Current Symtoms and Care Coordination:  Reviewed chart. Met with team to review patient's care. Emailed , Vicky Valdez, for an update on the signed Ventura-Pavon order. No signed order yet. Let her know tentative plan to start ECT Wednesday if we can get the order by tomorrow, she stated she would try to get court admin working on it.     Discharge Plan or Goal:  When patient is more stable a referral for [facility] in [place] will be made, referral for [facility] in [place] will be made      Barriers to Discharge:  Patient requires further psychiatric stabilization due to current symptomology. He continues to present as disorganized and tangential with paranoid thought content. He presents with mood lability. He vacillates between being pleasant and being aggressive with no clear environmental triggers. Patient endorses auditory hallucinations.     Referral Status:  Referral for housing pending psychiatric stabilization  Considerations include: [facility] in [place], [facility] in [place]      Legal Status:  Commitment with [facility]: [facility]  File Number: [id number]  Issued 23 and is not to exceed six months    Ventura-Pavon Hearin/27 at 8:30am  : Prerna Zamorano    Contacts:  : Vicky Valdez with [facility], [phone]  Psychiatrist: Dr. Santana at [facility], [phone] PCP: Attila Urena DO  Mom: Alberta, [phone] (H) [phone] (M)   Dad: Ino, [phone] (H)  Sister, Sandra Treadwell, [phone] (KING)   [facility]'s Office Fax: [phone]  Pia May: [phone]  CADI  with [facility]: Regina Wong, [phone]      Upcoming Meetings and Dates/Important Information and next steps:  Referrals for housing pending psychiatric stabilization   If Ventura-Pavon order is received, email copy to Cathy Malcolm and Dr. Adams  Dominga

## 2023-07-28 LAB
ALT SERPL W P-5'-P-CCNC: 14 U/L (ref 0–70)
AST SERPL W P-5'-P-CCNC: 18 U/L (ref 0–45)
BASOPHILS # BLD AUTO: 0.1 10E3/UL (ref 0–0.2)
BASOPHILS NFR BLD AUTO: 1 %
EOSINOPHIL # BLD AUTO: 0 10E3/UL (ref 0–0.7)
EOSINOPHIL NFR BLD AUTO: 0 %
ERYTHROCYTE [DISTWIDTH] IN BLOOD BY AUTOMATED COUNT: 14.7 % (ref 10–15)
HCT VFR BLD AUTO: 38.7 % (ref 40–53)
HGB BLD-MCNC: 12.2 G/DL (ref 13.3–17.7)
IMM GRANULOCYTES # BLD: 0.2 10E3/UL
IMM GRANULOCYTES NFR BLD: 2 %
LYMPHOCYTES # BLD AUTO: 1.3 10E3/UL (ref 0.8–5.3)
LYMPHOCYTES NFR BLD AUTO: 13 %
MCH RBC QN AUTO: 27.9 PG (ref 26.5–33)
MCHC RBC AUTO-ENTMCNC: 31.5 G/DL (ref 31.5–36.5)
MCV RBC AUTO: 89 FL (ref 78–100)
MONOCYTES # BLD AUTO: 1.1 10E3/UL (ref 0–1.3)
MONOCYTES NFR BLD AUTO: 11 %
NEUTROPHILS # BLD AUTO: 7 10E3/UL (ref 1.6–8.3)
NEUTROPHILS NFR BLD AUTO: 73 %
NRBC # BLD AUTO: 0 10E3/UL
NRBC BLD AUTO-RTO: 0 /100
PLATELET # BLD AUTO: 135 10E3/UL (ref 150–450)
RBC # BLD AUTO: 4.37 10E6/UL (ref 4.4–5.9)
WBC # BLD AUTO: 9.8 10E3/UL (ref 4–11)

## 2023-07-28 PROCEDURE — 85025 COMPLETE CBC W/AUTO DIFF WBC: CPT | Performed by: PSYCHIATRY & NEUROLOGY

## 2023-07-28 PROCEDURE — 99222 1ST HOSP IP/OBS MODERATE 55: CPT

## 2023-07-28 PROCEDURE — 124N000002 HC R&B MH UMMC

## 2023-07-28 PROCEDURE — 99233 SBSQ HOSP IP/OBS HIGH 50: CPT | Performed by: PSYCHIATRY & NEUROLOGY

## 2023-07-28 PROCEDURE — 250N000013 HC RX MED GY IP 250 OP 250 PS 637: Performed by: PSYCHIATRY & NEUROLOGY

## 2023-07-28 PROCEDURE — 36415 COLL VENOUS BLD VENIPUNCTURE: CPT | Performed by: PSYCHIATRY & NEUROLOGY

## 2023-07-28 PROCEDURE — 80159 DRUG ASSAY CLOZAPINE: CPT | Performed by: PSYCHIATRY & NEUROLOGY

## 2023-07-28 PROCEDURE — 84460 ALANINE AMINO (ALT) (SGPT): CPT | Performed by: PSYCHIATRY & NEUROLOGY

## 2023-07-28 PROCEDURE — 84450 TRANSFERASE (AST) (SGOT): CPT | Performed by: PSYCHIATRY & NEUROLOGY

## 2023-07-28 PROCEDURE — 250N000013 HC RX MED GY IP 250 OP 250 PS 637: Performed by: PHYSICIAN ASSISTANT

## 2023-07-28 RX ORDER — DIVALPROEX SODIUM 125 MG/1
250 CAPSULE, COATED PELLETS ORAL 3 TIMES DAILY
Status: DISCONTINUED | OUTPATIENT
Start: 2023-07-29 | End: 2023-08-04

## 2023-07-28 RX ORDER — DIVALPROEX SODIUM 125 MG/1
500 CAPSULE, COATED PELLETS ORAL AT BEDTIME
Status: DISCONTINUED | OUTPATIENT
Start: 2023-07-28 | End: 2023-07-28

## 2023-07-28 RX ORDER — DIVALPROEX SODIUM 125 MG/1
500 CAPSULE, COATED PELLETS ORAL 3 TIMES DAILY
Status: DISCONTINUED | OUTPATIENT
Start: 2023-07-29 | End: 2023-07-28

## 2023-07-28 RX ADMIN — OLANZAPINE 15 MG: 10 TABLET, ORALLY DISINTEGRATING ORAL at 08:14

## 2023-07-28 RX ADMIN — POLYETHYLENE GLYCOL 3350 17 G: 17 POWDER, FOR SOLUTION ORAL at 20:14

## 2023-07-28 RX ADMIN — SENNOSIDES 8.6 MG: 8.6 TABLET ORAL at 08:14

## 2023-07-28 RX ADMIN — NAPROXEN 250 MG: 250 TABLET ORAL at 17:55

## 2023-07-28 RX ADMIN — Medication 10 MG: at 20:14

## 2023-07-28 RX ADMIN — SENNOSIDES 8.6 MG: 8.6 TABLET ORAL at 20:14

## 2023-07-28 RX ADMIN — Medication 300 MG: at 20:14

## 2023-07-28 RX ADMIN — Medication 300 MG: at 13:38

## 2023-07-28 RX ADMIN — ATROPINE SULFATE 2 DROP: 10 SOLUTION/ DROPS OPHTHALMIC at 13:49

## 2023-07-28 RX ADMIN — POLYETHYLENE GLYCOL 3350 17 G: 17 POWDER, FOR SOLUTION ORAL at 08:13

## 2023-07-28 RX ADMIN — CYANOCOBALAMIN TAB 1000 MCG 1000 MCG: 1000 TAB at 08:14

## 2023-07-28 RX ADMIN — TAMSULOSIN HYDROCHLORIDE 0.4 MG: 0.4 CAPSULE ORAL at 08:14

## 2023-07-28 RX ADMIN — DIVALPROEX SODIUM 1000 MG: 125 CAPSULE, COATED PELLETS ORAL at 08:13

## 2023-07-28 RX ADMIN — CLOZAPINE 700 MG: 100 TABLET, ORALLY DISINTEGRATING ORAL at 12:30

## 2023-07-28 RX ADMIN — Medication 300 MG: at 08:27

## 2023-07-28 RX ADMIN — LORAZEPAM 0.5 MG: 0.5 TABLET ORAL at 13:38

## 2023-07-28 RX ADMIN — LORAZEPAM 0.5 MG: 0.5 TABLET ORAL at 20:14

## 2023-07-28 RX ADMIN — LORAZEPAM 0.5 MG: 0.5 TABLET ORAL at 08:14

## 2023-07-28 RX ADMIN — ATROPINE SULFATE 2 DROP: 10 SOLUTION/ DROPS OPHTHALMIC at 20:15

## 2023-07-28 RX ADMIN — ATROPINE SULFATE 2 DROP: 10 SOLUTION/ DROPS OPHTHALMIC at 08:28

## 2023-07-28 RX ADMIN — NAPROXEN 250 MG: 250 TABLET ORAL at 08:14

## 2023-07-28 RX ADMIN — OLANZAPINE 15 MG: 10 TABLET, ORALLY DISINTEGRATING ORAL at 20:14

## 2023-07-28 ASSESSMENT — ACTIVITIES OF DAILY LIVING (ADL)
ADLS_ACUITY_SCORE: 84
DRESS: PROMPTS
ADLS_ACUITY_SCORE: 74
ADLS_ACUITY_SCORE: 84
LAUNDRY: UNABLE TO COMPLETE
ADLS_ACUITY_SCORE: 84
HYGIENE/GROOMING: INDEPENDENT;PROMPTS
HYGIENE/GROOMING: PROMPTS
ORAL_HYGIENE: INDEPENDENT;PROMPTS
ADLS_ACUITY_SCORE: 84
DRESS: SCRUBS (BEHAVIORAL HEALTH);INDEPENDENT
LAUNDRY: WITH SUPERVISION
ADLS_ACUITY_SCORE: 84
ORAL_HYGIENE: PROMPTS
ADLS_ACUITY_SCORE: 84
ADLS_ACUITY_SCORE: 84
ADLS_ACUITY_SCORE: 74
ADLS_ACUITY_SCORE: 84

## 2023-07-28 NOTE — PLAN OF CARE
Problem: Psychotic Signs/Symptoms  Goal: Improved Behavioral Control (Psychotic Signs/Symptoms)  Outcome: Progressing   Goal Outcome Evaluation:    Patient has been clam and cooperative during this shift. He denies having pain. He has been isolative to his room and was observed to be sleeping on and off. He was medication compliant during the morning medication administration. Patient was refusing to take his 1:00 PM medication and was stating that there was poison in his medication. He was able to take it with after verbally being promoted. Patient was observed to be drooling a lot more than usual and he was given scheduled atropine to help with that.     Per Provider patient is starting ECT on August 2nd. Patient is currently taking a shower.       Blood pressure 129/79, pulse 80, temperature 97  F (36.1  C), temperature source Temporal, resp. rate 18, weight 81.6 kg (179 lb 12.8 oz), SpO2 100 %.

## 2023-07-28 NOTE — CONSULTS
[facility], [place]  Internal Medicine Consult    Vinod Lee MRN# [id number]   Age: 63 year old YOB: 1960     Date of Admission: 5/26/2023  Date of Consult:  7/28/2023    Requesting Service: Behavioral Health - Ingrid Rhodes MD  Reason for Consult: Pre ECT Medicine Evaluation   Indication for ECT: Schizophrenia     Chief Complaint: Schizophrenia   HPI: Vinod Lee is a 63 year old, right-handed,  male with a medical history of Neuroleptic-induced parkinsonism, QTc prolongation, sleep apnea, prostatic hypertrophy, urinary retention; and a psychiatric history of schizophrenia since the 1980s     Review of Systems:   Cardiovascular: negative  Pulmonary: No shortness of breath, dyspnea on exertion, cough, or hemoptysis  Neurological: negative    Past Medical History:   Prior Anesthesia: Yes  If yes, any complications: Unknown, nothing recorded in EMR and patient is not a good historian     Prior ECT: No    Cardiovascular: CAD No, MI No, HTN No, CHF No, pacemaker or ICD No  Pulmonary: Asthma/COPD No, Prior respiratory failure or need for emergent intubation Unknown, On theophylline No (note that theophylline use is a contraindication to proceeding with ECT, needs to be tapered off prior)  Neurological: Brain tumor No, TIA/CVA Unknown, Dementia No, Neuromuscular Disease (including post polio syndrome) No, Seizures and/or Epilepsy No, Head Injury No (fall without head strike yesterday 7/27, no indication for further workup), Intracranial Hemorrhage No  Diabetes: No    Past Surgical History:   No past surgical history on file.    Allergies:      Allergies   Allergen Reactions    Bee Venom Unknown    Montelukast Unknown    Phenothiazines Unknown       Medication list reviewed.    Physical Exam:  /79   Pulse 80   Temp 97  F (36.1  C) (Temporal)   Resp 18   Wt 81.6 kg (179 lb 12.8 oz)   SpO2 100%   BMI 26.55 kg/m     Cardiovascular: RRR. S1, S2. No  "murmurs, rubs, gallops.   Pulmonary: Effort normal. Lungs CTAB with no wheezing, rales, rhonchi.   Neurological: A&Ox3. No focal deficits. PERRLA.     Data:  EKG:Normal and 82, Normal sinus rhythm  Bundle branch block - Left   prolonged QT interval - 497 on 7/28; previous Qtc 500 on 7/24/2023.     Head Imaging: not indicated     Labs were obtained within the last 30 days on 7/28/2023 and are as follows:   CBC:  Recent Labs   Lab Test 07/28/23  0831   WBC 9.8   RBC 4.37*   HGB 12.2*   HCT 38.7*   MCV 89   MCH 27.9   MCHC 31.5   RDW 14.7   *     CMP:  Recent Labs   Lab Test 07/28/23  0831 07/13/23  0700   NA  --  140   POTASSIUM  --  4.4   CHLORIDE  --  104   BRENDA  --  8.6*   CO2  --  26   BUN  --  28.6*   CR  --  1.01   GLC  --  84   AST 18 16   ALT 14 12   BILITOTAL  --  0.2   ALBUMIN  --  3.6   PROTTOTAL  --  5.6*   ALKPHOS  --  73     Thrombocytopenia   New thrombocytopenia in the setting of initiating Depakote per psychiatry. Platelets 135 today (7/28).   - monitor s/s of bleeding    Constipation   Currently on Miralax and senna twice daily. Last BM \"a few days ago\" per Vinod. No abdominal pain; passing gas; eating and drinking normally.   - adding milk of magnesia PRN      Recommendations:   Diuretics and oral hypoglycemics should be held the morning of ECT. Must recommend appropriate adjustments to insulin regimen.    All other antihypertensive medications can be continued.     Per consult from psych, \"Will hold Depakote 24 hours prior to ECT,\".    Patient does not have absolute medical contraindications to proceeding with ECT at this time. Treatment plan per psychiatry.     Medicine will sign off at this time. Please reach out with any future questions and concerns.     Pinky Hernandez NP  [facility]  Hospitalist Service  Pager: [phone]  "

## 2023-07-28 NOTE — CARE PLAN
Occupational Therapy Group Note:     07/28/23 1541   Group Therapy Session   Group Attendance refused to attend group session   Time Session Began 1105   Time Session Ended 1110   Total Time (minutes) 5 (no charge)   Total # Attendees 1   Group Type task skill   Group Topic Covered coping skills/lifestyle management;leisure exploration/use of leisure time   Group Session Detail OT Task Skill: Fuzzy Poster coloring or Scratch Art   Patient Response/Contribution refused to participate   Patient Participation Detail Writer approached patient in his room. SIO present during encounter. Writer provided patient the opportunity to engage in a hands-on artistic activity. Writer provided two artistic options in which patient has engaged in the past and is familiar with. Patient needed repeat of information, visual demonstration of options, and encouragement throughout brief interaction. Patient exhibited negative self-talk and seemed disappointed in his artistic abilities. Despite encouragement, patient declined to participate in any hands-on task this date.
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
## 2023-07-28 NOTE — PLAN OF CARE
The patient appeared asleep at the beginning of the shift. Patient is on SIO 1:1 5 Ft due to fall risk. psyche associate reported two redden area noted around the face where patient is lying down writer went to double checks  patient went back to sleep writer was unable to assessed the area. Pt slept 4.75 hours during this night. Will continue to monitor.  Goal Outcome Evaluation:

## 2023-07-29 PROCEDURE — 124N000002 HC R&B MH UMMC

## 2023-07-29 PROCEDURE — 250N000013 HC RX MED GY IP 250 OP 250 PS 637: Performed by: PSYCHIATRY & NEUROLOGY

## 2023-07-29 PROCEDURE — 250N000013 HC RX MED GY IP 250 OP 250 PS 637: Performed by: PHYSICIAN ASSISTANT

## 2023-07-29 RX ADMIN — LORAZEPAM 0.5 MG: 0.5 TABLET ORAL at 14:16

## 2023-07-29 RX ADMIN — Medication 300 MG: at 19:37

## 2023-07-29 RX ADMIN — ATROPINE SULFATE 2 DROP: 10 SOLUTION/ DROPS OPHTHALMIC at 19:37

## 2023-07-29 RX ADMIN — Medication 300 MG: at 08:59

## 2023-07-29 RX ADMIN — SENNOSIDES 8.6 MG: 8.6 TABLET ORAL at 08:59

## 2023-07-29 RX ADMIN — CLOZAPINE 650 MG: 100 TABLET, ORALLY DISINTEGRATING ORAL at 12:43

## 2023-07-29 RX ADMIN — DIVALPROEX SODIUM 250 MG: 125 CAPSULE, COATED PELLETS ORAL at 08:59

## 2023-07-29 RX ADMIN — DIVALPROEX SODIUM 250 MG: 125 CAPSULE, COATED PELLETS ORAL at 14:16

## 2023-07-29 RX ADMIN — OLANZAPINE 15 MG: 10 TABLET, ORALLY DISINTEGRATING ORAL at 08:59

## 2023-07-29 RX ADMIN — Medication 10 MG: at 19:37

## 2023-07-29 RX ADMIN — LORAZEPAM 0.5 MG: 0.5 TABLET ORAL at 08:59

## 2023-07-29 RX ADMIN — Medication 300 MG: at 14:16

## 2023-07-29 RX ADMIN — POLYETHYLENE GLYCOL 3350 17 G: 17 POWDER, FOR SOLUTION ORAL at 08:59

## 2023-07-29 RX ADMIN — DIVALPROEX SODIUM 250 MG: 125 CAPSULE, COATED PELLETS ORAL at 19:37

## 2023-07-29 RX ADMIN — NAPROXEN 250 MG: 250 TABLET ORAL at 18:12

## 2023-07-29 RX ADMIN — POLYETHYLENE GLYCOL 3350 17 G: 17 POWDER, FOR SOLUTION ORAL at 19:37

## 2023-07-29 RX ADMIN — CYANOCOBALAMIN TAB 1000 MCG 1000 MCG: 1000 TAB at 08:59

## 2023-07-29 RX ADMIN — OLANZAPINE 15 MG: 10 TABLET, ORALLY DISINTEGRATING ORAL at 19:37

## 2023-07-29 RX ADMIN — LORAZEPAM 0.5 MG: 0.5 TABLET ORAL at 19:37

## 2023-07-29 RX ADMIN — SENNOSIDES 8.6 MG: 8.6 TABLET ORAL at 19:37

## 2023-07-29 RX ADMIN — ATROPINE SULFATE 2 DROP: 10 SOLUTION/ DROPS OPHTHALMIC at 14:16

## 2023-07-29 RX ADMIN — TAMSULOSIN HYDROCHLORIDE 0.4 MG: 0.4 CAPSULE ORAL at 08:59

## 2023-07-29 RX ADMIN — NAPROXEN 250 MG: 250 TABLET ORAL at 08:59

## 2023-07-29 RX ADMIN — ATROPINE SULFATE 2 DROP: 10 SOLUTION/ DROPS OPHTHALMIC at 09:00

## 2023-07-29 ASSESSMENT — ACTIVITIES OF DAILY LIVING (ADL)
DRESS: SCRUBS (BEHAVIORAL HEALTH);INDEPENDENT
ADLS_ACUITY_SCORE: 74
DRESS: INDEPENDENT;SCRUBS (BEHAVIORAL HEALTH)
ORAL_HYGIENE: INDEPENDENT
ADLS_ACUITY_SCORE: 74
ADLS_ACUITY_SCORE: 74
HYGIENE/GROOMING: PROMPTS;INDEPENDENT
ADLS_ACUITY_SCORE: 74
ADLS_ACUITY_SCORE: 74
ORAL_HYGIENE: PROMPTS;INDEPENDENT
ADLS_ACUITY_SCORE: 74
ADLS_ACUITY_SCORE: 74
HYGIENE/GROOMING: SHOWER
ADLS_ACUITY_SCORE: 64
ADLS_ACUITY_SCORE: 74
ADLS_ACUITY_SCORE: 64
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
## 2023-07-29 NOTE — PLAN OF CARE
Goal Outcome Evaluation:       Pt remains on an SIO for safety.Pt appeared to be a sleep @ all safety checks.  Pt slept 7 hours.
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
## 2023-07-30 PROCEDURE — 250N000013 HC RX MED GY IP 250 OP 250 PS 637: Performed by: PHYSICIAN ASSISTANT

## 2023-07-30 PROCEDURE — 124N000002 HC R&B MH UMMC

## 2023-07-30 PROCEDURE — 250N000013 HC RX MED GY IP 250 OP 250 PS 637: Performed by: PSYCHIATRY & NEUROLOGY

## 2023-07-30 RX ADMIN — HALOPERIDOL 5 MG: 5 TABLET ORAL at 02:37

## 2023-07-30 RX ADMIN — SENNOSIDES 8.6 MG: 8.6 TABLET ORAL at 20:03

## 2023-07-30 RX ADMIN — LORAZEPAM 0.5 MG: 0.5 TABLET ORAL at 08:16

## 2023-07-30 RX ADMIN — CYANOCOBALAMIN TAB 1000 MCG 1000 MCG: 1000 TAB at 08:15

## 2023-07-30 RX ADMIN — DIVALPROEX SODIUM 250 MG: 125 CAPSULE, COATED PELLETS ORAL at 20:03

## 2023-07-30 RX ADMIN — OLANZAPINE 15 MG: 10 TABLET, ORALLY DISINTEGRATING ORAL at 20:04

## 2023-07-30 RX ADMIN — ATROPINE SULFATE 2 DROP: 10 SOLUTION/ DROPS OPHTHALMIC at 08:15

## 2023-07-30 RX ADMIN — Medication 300 MG: at 20:02

## 2023-07-30 RX ADMIN — Medication 300 MG: at 08:15

## 2023-07-30 RX ADMIN — POLYETHYLENE GLYCOL 3350 17 G: 17 POWDER, FOR SOLUTION ORAL at 20:02

## 2023-07-30 RX ADMIN — ATROPINE SULFATE 2 DROP: 10 SOLUTION/ DROPS OPHTHALMIC at 13:49

## 2023-07-30 RX ADMIN — LORAZEPAM 0.5 MG: 0.5 TABLET ORAL at 13:49

## 2023-07-30 RX ADMIN — DIPHENHYDRAMINE HYDROCHLORIDE 50 MG: 50 CAPSULE ORAL at 02:37

## 2023-07-30 RX ADMIN — Medication 10 MG: at 20:03

## 2023-07-30 RX ADMIN — HALOPERIDOL 5 MG: 5 TABLET ORAL at 10:38

## 2023-07-30 RX ADMIN — Medication 300 MG: at 13:49

## 2023-07-30 RX ADMIN — DIPHENHYDRAMINE HYDROCHLORIDE 50 MG: 50 CAPSULE ORAL at 10:38

## 2023-07-30 RX ADMIN — TAMSULOSIN HYDROCHLORIDE 0.4 MG: 0.4 CAPSULE ORAL at 08:15

## 2023-07-30 RX ADMIN — LORAZEPAM 0.5 MG: 0.5 TABLET ORAL at 20:03

## 2023-07-30 RX ADMIN — SENNOSIDES 8.6 MG: 8.6 TABLET ORAL at 08:16

## 2023-07-30 RX ADMIN — DIVALPROEX SODIUM 250 MG: 125 CAPSULE, COATED PELLETS ORAL at 08:16

## 2023-07-30 RX ADMIN — OLANZAPINE 15 MG: 10 TABLET, ORALLY DISINTEGRATING ORAL at 08:15

## 2023-07-30 RX ADMIN — ATROPINE SULFATE 2 DROP: 10 SOLUTION/ DROPS OPHTHALMIC at 20:02

## 2023-07-30 RX ADMIN — OLANZAPINE 10 MG: 5 TABLET, FILM COATED ORAL at 17:25

## 2023-07-30 RX ADMIN — CLOZAPINE 650 MG: 100 TABLET, ORALLY DISINTEGRATING ORAL at 12:21

## 2023-07-30 RX ADMIN — TRAZODONE HYDROCHLORIDE 50 MG: 50 TABLET ORAL at 00:23

## 2023-07-30 RX ADMIN — NAPROXEN 250 MG: 250 TABLET ORAL at 08:15

## 2023-07-30 RX ADMIN — NAPROXEN 250 MG: 250 TABLET ORAL at 17:25

## 2023-07-30 RX ADMIN — POLYETHYLENE GLYCOL 3350 17 G: 17 POWDER, FOR SOLUTION ORAL at 08:16

## 2023-07-30 RX ADMIN — DIVALPROEX SODIUM 250 MG: 125 CAPSULE, COATED PELLETS ORAL at 13:49

## 2023-07-30 ASSESSMENT — ACTIVITIES OF DAILY LIVING (ADL)
ADLS_ACUITY_SCORE: 64
ADLS_ACUITY_SCORE: 64
ORAL_HYGIENE: INDEPENDENT
LAUNDRY: UNABLE TO COMPLETE
ADLS_ACUITY_SCORE: 64
HYGIENE/GROOMING: INDEPENDENT
ORAL_HYGIENE: INDEPENDENT
ADLS_ACUITY_SCORE: 64
HYGIENE/GROOMING: SHOWER
DRESS: INDEPENDENT
ADLS_ACUITY_SCORE: 64
DRESS: SCRUBS (BEHAVIORAL HEALTH);INDEPENDENT
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
## 2023-07-30 NOTE — PLAN OF CARE
Goal Outcome Evaluation:       Pt remains on an SIO for safety.  Pt was administered Trazodone 50 mg po @ 0020 for sleep.  Pt was agitated hitting out at staff he was administered Haldol 5 mg and benadryl 50 mg po with minimal results although he quit hitting staff.  Pt slept one hour.
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
## 2023-07-31 LAB
BASOPHILS # BLD AUTO: 0.1 10E3/UL (ref 0–0.2)
BASOPHILS NFR BLD AUTO: 1 %
EOSINOPHIL # BLD AUTO: 0 10E3/UL (ref 0–0.7)
EOSINOPHIL NFR BLD AUTO: 0 %
ERYTHROCYTE [DISTWIDTH] IN BLOOD BY AUTOMATED COUNT: 14.6 % (ref 10–15)
HCT VFR BLD AUTO: 35.4 % (ref 40–53)
HGB BLD-MCNC: 11.1 G/DL (ref 13.3–17.7)
IMM GRANULOCYTES # BLD: 0.1 10E3/UL
IMM GRANULOCYTES NFR BLD: 1 %
LYMPHOCYTES # BLD AUTO: 1.5 10E3/UL (ref 0.8–5.3)
LYMPHOCYTES NFR BLD AUTO: 15 %
MCH RBC QN AUTO: 27.7 PG (ref 26.5–33)
MCHC RBC AUTO-ENTMCNC: 31.4 G/DL (ref 31.5–36.5)
MCV RBC AUTO: 88 FL (ref 78–100)
MONOCYTES # BLD AUTO: 1 10E3/UL (ref 0–1.3)
MONOCYTES NFR BLD AUTO: 10 %
NEUTROPHILS # BLD AUTO: 7.2 10E3/UL (ref 1.6–8.3)
NEUTROPHILS NFR BLD AUTO: 73 %
NRBC # BLD AUTO: 0 10E3/UL
NRBC BLD AUTO-RTO: 0 /100
PLATELET # BLD AUTO: 138 10E3/UL (ref 150–450)
RBC # BLD AUTO: 4.01 10E6/UL (ref 4.4–5.9)
WBC # BLD AUTO: 9.9 10E3/UL (ref 4–11)

## 2023-07-31 PROCEDURE — 85025 COMPLETE CBC W/AUTO DIFF WBC: CPT | Performed by: PSYCHIATRY & NEUROLOGY

## 2023-07-31 PROCEDURE — 250N000013 HC RX MED GY IP 250 OP 250 PS 637: Performed by: PSYCHIATRY & NEUROLOGY

## 2023-07-31 PROCEDURE — 36415 COLL VENOUS BLD VENIPUNCTURE: CPT | Performed by: PSYCHIATRY & NEUROLOGY

## 2023-07-31 PROCEDURE — 124N000002 HC R&B MH UMMC

## 2023-07-31 PROCEDURE — 93005 ELECTROCARDIOGRAM TRACING: CPT

## 2023-07-31 PROCEDURE — 99233 SBSQ HOSP IP/OBS HIGH 50: CPT | Mod: GC | Performed by: PSYCHIATRY & NEUROLOGY

## 2023-07-31 PROCEDURE — 250N000013 HC RX MED GY IP 250 OP 250 PS 637: Performed by: PHYSICIAN ASSISTANT

## 2023-07-31 PROCEDURE — 250N000013 HC RX MED GY IP 250 OP 250 PS 637: Performed by: STUDENT IN AN ORGANIZED HEALTH CARE EDUCATION/TRAINING PROGRAM

## 2023-07-31 RX ORDER — SENNOSIDES 8.6 MG
2 TABLET ORAL 2 TIMES DAILY
Status: DISCONTINUED | OUTPATIENT
Start: 2023-07-31 | End: 2023-11-13 | Stop reason: HOSPADM

## 2023-07-31 RX ADMIN — Medication 300 MG: at 19:37

## 2023-07-31 RX ADMIN — ATROPINE SULFATE 2 DROP: 10 SOLUTION/ DROPS OPHTHALMIC at 08:11

## 2023-07-31 RX ADMIN — DIVALPROEX SODIUM 250 MG: 125 CAPSULE, COATED PELLETS ORAL at 19:36

## 2023-07-31 RX ADMIN — DIVALPROEX SODIUM 250 MG: 125 CAPSULE, COATED PELLETS ORAL at 14:30

## 2023-07-31 RX ADMIN — ATROPINE SULFATE 2 DROP: 10 SOLUTION/ DROPS OPHTHALMIC at 19:38

## 2023-07-31 RX ADMIN — NAPROXEN 250 MG: 250 TABLET ORAL at 18:31

## 2023-07-31 RX ADMIN — DIVALPROEX SODIUM 250 MG: 125 CAPSULE, COATED PELLETS ORAL at 08:10

## 2023-07-31 RX ADMIN — POLYETHYLENE GLYCOL 3350 17 G: 17 POWDER, FOR SOLUTION ORAL at 08:10

## 2023-07-31 RX ADMIN — LORAZEPAM 0.5 MG: 0.5 TABLET ORAL at 14:30

## 2023-07-31 RX ADMIN — OLANZAPINE 15 MG: 10 TABLET, ORALLY DISINTEGRATING ORAL at 08:11

## 2023-07-31 RX ADMIN — SENNOSIDES 2 TABLET: 8.6 TABLET ORAL at 19:37

## 2023-07-31 RX ADMIN — Medication 10 MG: at 19:37

## 2023-07-31 RX ADMIN — OLANZAPINE 15 MG: 10 TABLET, ORALLY DISINTEGRATING ORAL at 19:37

## 2023-07-31 RX ADMIN — CLOZAPINE 650 MG: 100 TABLET, ORALLY DISINTEGRATING ORAL at 12:48

## 2023-07-31 RX ADMIN — LORAZEPAM 0.5 MG: 0.5 TABLET ORAL at 19:37

## 2023-07-31 RX ADMIN — Medication 300 MG: at 14:30

## 2023-07-31 RX ADMIN — CYANOCOBALAMIN TAB 1000 MCG 1000 MCG: 1000 TAB at 08:10

## 2023-07-31 RX ADMIN — TAMSULOSIN HYDROCHLORIDE 0.4 MG: 0.4 CAPSULE ORAL at 08:11

## 2023-07-31 RX ADMIN — Medication 300 MG: at 08:10

## 2023-07-31 RX ADMIN — POLYETHYLENE GLYCOL 3350 17 G: 17 POWDER, FOR SOLUTION ORAL at 19:37

## 2023-07-31 RX ADMIN — NAPROXEN 250 MG: 250 TABLET ORAL at 08:11

## 2023-07-31 RX ADMIN — LORAZEPAM 0.5 MG: 0.5 TABLET ORAL at 08:10

## 2023-07-31 RX ADMIN — ATROPINE SULFATE 2 DROP: 10 SOLUTION/ DROPS OPHTHALMIC at 14:31

## 2023-07-31 RX ADMIN — SENNOSIDES 8.6 MG: 8.6 TABLET ORAL at 08:10

## 2023-07-31 ASSESSMENT — ACTIVITIES OF DAILY LIVING (ADL)
ADLS_ACUITY_SCORE: 64
ADLS_ACUITY_SCORE: 84
ADLS_ACUITY_SCORE: 84
ADLS_ACUITY_SCORE: 64
HYGIENE/GROOMING: PROMPTS
ADLS_ACUITY_SCORE: 64
ORAL_HYGIENE: PROMPTS
LAUNDRY: UNABLE TO COMPLETE
DRESS: PROMPTS
ADLS_ACUITY_SCORE: 64
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
## 2023-07-31 NOTE — PLAN OF CARE
Goal Outcome Evaluation:      Pt slept 6.25 hours.  He stayed in his bed.  He is on an SIO 2:1 for aggression.
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
## 2023-07-31 NOTE — PROGRESS NOTES
Pt was isolative and slept most of the day with waking up occasionally to eat and than back to bed.  Pt denies any mental health concerns. Pt is taking medications as ordered.  New medication changes as follows:    Please administer GABAPENTIN, DEPAKOTE, and ATIVAN at 6 pm (instead of 8 pm on pre-ECT days (Sun, Tues, and Thurs once ECT is started)     Please HOLD GABAPENTIN, DEPAKOTE, and ATIVAN on mornings of ECT (Monday, Wednesday, Friday) until after treatment completed, then may administer     Pt remains on 2:1 for his aggressive behavior. Pt did not exhibit any aggressive behavior on this shift.  Pt has periods of confusion but at baseline.

## 2023-07-31 NOTE — PRE-PROCEDURE
Dr Rojas Rodriguez, anesthesiologist reviewed pt's pre-op physical & approved pt to be treated in ECT suite.  Margie Samuels RN

## 2023-08-01 LAB
ATRIAL RATE - MUSE: 82 BPM
CLOZAPINE SERPL-MCNC: 405 NG/ML
CLOZAPINE+NOR SERPL-MCNC: 655 NG/ML
CNO SERPL-MCNC: 112 NG/ML
DIASTOLIC BLOOD PRESSURE - MUSE: NORMAL MMHG
INTERPRETATION ECG - MUSE: NORMAL
NORCLOZAPINE SERPL-MCNC: 138 NG/ML
P AXIS - MUSE: 59 DEGREES
PR INTERVAL - MUSE: 150 MS
QRS DURATION - MUSE: 154 MS
QT - MUSE: 426 MS
QTC - MUSE: 497 MS
R AXIS - MUSE: -38 DEGREES
SYSTOLIC BLOOD PRESSURE - MUSE: NORMAL MMHG
T AXIS - MUSE: 97 DEGREES
VENTRICULAR RATE- MUSE: 82 BPM

## 2023-08-01 PROCEDURE — 250N000013 HC RX MED GY IP 250 OP 250 PS 637: Performed by: PHYSICIAN ASSISTANT

## 2023-08-01 PROCEDURE — 124N000002 HC R&B MH UMMC

## 2023-08-01 PROCEDURE — 250N000009 HC RX 250: Performed by: PSYCHIATRY & NEUROLOGY

## 2023-08-01 PROCEDURE — 250N000013 HC RX MED GY IP 250 OP 250 PS 637: Performed by: PSYCHIATRY & NEUROLOGY

## 2023-08-01 PROCEDURE — 250N000013 HC RX MED GY IP 250 OP 250 PS 637: Performed by: STUDENT IN AN ORGANIZED HEALTH CARE EDUCATION/TRAINING PROGRAM

## 2023-08-01 RX ADMIN — CLOZAPINE 650 MG: 100 TABLET, ORALLY DISINTEGRATING ORAL at 10:31

## 2023-08-01 RX ADMIN — NAPROXEN 250 MG: 250 TABLET ORAL at 17:11

## 2023-08-01 RX ADMIN — ATROPINE SULFATE 2 DROP: 10 SOLUTION/ DROPS OPHTHALMIC at 12:49

## 2023-08-01 RX ADMIN — DIPHENHYDRAMINE HYDROCHLORIDE 50 MG: 50 CAPSULE ORAL at 10:31

## 2023-08-01 RX ADMIN — OLANZAPINE 15 MG: 10 TABLET, ORALLY DISINTEGRATING ORAL at 17:10

## 2023-08-01 RX ADMIN — GABAPENTIN 100 MG: 100 CAPSULE ORAL at 10:31

## 2023-08-01 RX ADMIN — NAPROXEN 250 MG: 250 TABLET ORAL at 08:02

## 2023-08-01 RX ADMIN — SENNOSIDES 2 TABLET: 8.6 TABLET ORAL at 17:11

## 2023-08-01 RX ADMIN — HALOPERIDOL 5 MG: 5 TABLET ORAL at 10:31

## 2023-08-01 RX ADMIN — LORAZEPAM 0.5 MG: 0.5 TABLET ORAL at 12:49

## 2023-08-01 RX ADMIN — Medication 300 MG: at 08:01

## 2023-08-01 RX ADMIN — POLYETHYLENE GLYCOL 3350 17 G: 17 POWDER, FOR SOLUTION ORAL at 08:02

## 2023-08-01 RX ADMIN — DIVALPROEX SODIUM 250 MG: 125 CAPSULE, COATED PELLETS ORAL at 08:01

## 2023-08-01 RX ADMIN — DIVALPROEX SODIUM 250 MG: 125 CAPSULE, COATED PELLETS ORAL at 17:11

## 2023-08-01 RX ADMIN — OLANZAPINE 15 MG: 10 TABLET, ORALLY DISINTEGRATING ORAL at 08:01

## 2023-08-01 RX ADMIN — TAMSULOSIN HYDROCHLORIDE 0.4 MG: 0.4 CAPSULE ORAL at 08:01

## 2023-08-01 RX ADMIN — ATROPINE SULFATE 2 DROP: 10 SOLUTION/ DROPS OPHTHALMIC at 17:11

## 2023-08-01 RX ADMIN — LORAZEPAM 0.5 MG: 0.5 TABLET ORAL at 17:10

## 2023-08-01 RX ADMIN — POLYETHYLENE GLYCOL 3350 17 G: 17 POWDER, FOR SOLUTION ORAL at 17:11

## 2023-08-01 RX ADMIN — CYANOCOBALAMIN TAB 1000 MCG 1000 MCG: 1000 TAB at 08:01

## 2023-08-01 RX ADMIN — DIVALPROEX SODIUM 250 MG: 125 CAPSULE, COATED PELLETS ORAL at 12:49

## 2023-08-01 RX ADMIN — Medication 300 MG: at 12:49

## 2023-08-01 RX ADMIN — Medication 300 MG: at 17:10

## 2023-08-01 RX ADMIN — LORAZEPAM 0.5 MG: 0.5 TABLET ORAL at 08:01

## 2023-08-01 RX ADMIN — SENNOSIDES 2 TABLET: 8.6 TABLET ORAL at 08:01

## 2023-08-01 ASSESSMENT — ACTIVITIES OF DAILY LIVING (ADL)
ADLS_ACUITY_SCORE: 84
DRESS: PROMPTS;SCRUBS (BEHAVIORAL HEALTH)
ADLS_ACUITY_SCORE: 84
HYGIENE/GROOMING: PROMPTS
ADLS_ACUITY_SCORE: 84
DRESS: PROMPTS
ADLS_ACUITY_SCORE: 84
ORAL_HYGIENE: PROMPTS
ORAL_HYGIENE: PROMPTS
ADLS_ACUITY_SCORE: 84
HYGIENE/GROOMING: PROMPTS;SHOWER
ADLS_ACUITY_SCORE: 84
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
## 2023-08-01 NOTE — PLAN OF CARE
Goal Outcome Evaluation:    Plan of Care Reviewed With: patient        Pt mostly isolative in his room and briefly paced the hallway. Pt watched TV in his room and intermittently naps, did not attend groups despite encouragement from this writer.  Pt denies all mental health psych symptoms and contracted for safety. Presented with flat affect and occasionally brighten during interaction. Pt alert and oriented to self and location, disorganized and incoherent thought process in the context of putting of towels in the trash bag, picking up garbage in the trash. Pt speech rambling, incoherent and loose of association and intermittent eye contact. Adequate food and fluid intake, voiding freely and continued with senna and miralax twice daily. Even breathing pattern and vital sign stable. /67   Pulse 100   Temp 96.9  F (36.1  C) (Temporal)   Resp 18   Wt 86.5 kg (190 lb 9.6 oz)   SpO2 100%   BMI 28.15 kg/m

## 2023-08-01 NOTE — PLAN OF CARE
Assessment/Intervention/Current Symtoms and Care Coordination:  Reviewed chart. Met with team to review patient's care. Called [facility], the order was just signed and should be here by the end of the day, per .   Received Findings of Fact, Conclusions of Law and Order for Judgment and Judgment from , Vicky Valdez. Copy given to patient and copy put in chart. Copy sent to Dr. Angie Orr.     Discharge Plan or Goal:  When patient is more stable a referral for [facility] in [place] will be made, referral for [facility] [place] will be made      Barriers to Discharge:  Patient requires further psychiatric stabilization due to current symptomology. He continues to present as disorganized and tangential with paranoid thought content. He presents with mood lability. He vacillates between being pleasant and being aggressive with no clear environmental triggers. Patient endorses auditory hallucinations.     Referral Status:  Referral for housing pending psychiatric stabilization  Considerations include: [facility] [place], [facility] [place]      Legal Status:  Commitment with [facility]: [place]  File Number: [id number]  Issued 07/06/23 and is not to exceed six months    Contacts:  : Vicky Valdez with [facility], [phone]  Psychiatrist: Dr. Santana at [facility], [phone] PCP: Attila Urena DO  Mom: Alberta, [phone] (H) [phone] (M)   Dad: Ino, [phone] (H)  Sister, Sandra Treadwell, [phone] (KING)   [facility] Fax: [phone]  Pia May: [phone]  CADI  with [facility]: Regina Wong, [phone]      Upcoming Meetings and Dates/Important Information and next steps:  Referrals for housing pending psychiatric stabilization

## 2023-08-01 NOTE — PLAN OF CARE
Problem: Psychotic Signs/Symptoms  Goal: Improved Sleep (Psychotic Signs/Symptoms)  Intervention: Promote Healthy Sleep Hygiene  Recent Flowsheet Documentation  Taken 8/1/2023 0600 by Ramon Cotton RN  Sleep Hygiene Promotion: noise level reduced   Goal Outcome Evaluation:    The patient appeared to be asleep almost the entire night. He slept for about 6.75 hours with no problems noted.

## 2023-08-02 ENCOUNTER — ANESTHESIA EVENT (OUTPATIENT)
Dept: BEHAVIORAL HEALTH | Facility: CLINIC | Age: 63
DRG: 885 | End: 2023-08-02
Payer: COMMERCIAL

## 2023-08-02 ENCOUNTER — APPOINTMENT (OUTPATIENT)
Dept: BEHAVIORAL HEALTH | Facility: CLINIC | Age: 63
DRG: 885 | End: 2023-08-02
Attending: PSYCHIATRY & NEUROLOGY
Payer: COMMERCIAL

## 2023-08-02 ENCOUNTER — ANESTHESIA (OUTPATIENT)
Dept: BEHAVIORAL HEALTH | Facility: CLINIC | Age: 63
DRG: 885 | End: 2023-08-02
Payer: COMMERCIAL

## 2023-08-02 LAB
ANION GAP SERPL CALCULATED.3IONS-SCNC: 12 MMOL/L (ref 7–15)
ATRIAL RATE - MUSE: 87 BPM
BASOPHILS # BLD AUTO: 0.1 10E3/UL (ref 0–0.2)
BASOPHILS NFR BLD AUTO: 1 %
BUN SERPL-MCNC: 24.8 MG/DL (ref 8–23)
CALCIUM SERPL-MCNC: 8.7 MG/DL (ref 8.8–10.2)
CHLORIDE SERPL-SCNC: 107 MMOL/L (ref 98–107)
CREAT SERPL-MCNC: 0.99 MG/DL (ref 0.67–1.17)
DEPRECATED HCO3 PLAS-SCNC: 24 MMOL/L (ref 22–29)
DIASTOLIC BLOOD PRESSURE - MUSE: NORMAL MMHG
EOSINOPHIL # BLD AUTO: 0 10E3/UL (ref 0–0.7)
EOSINOPHIL NFR BLD AUTO: 0 %
ERYTHROCYTE [DISTWIDTH] IN BLOOD BY AUTOMATED COUNT: 14.5 % (ref 10–15)
GFR SERPL CREATININE-BSD FRML MDRD: 85 ML/MIN/1.73M2
GLUCOSE SERPL-MCNC: 93 MG/DL (ref 70–99)
HCT VFR BLD AUTO: 36.7 % (ref 40–53)
HGB BLD-MCNC: 12 G/DL (ref 13.3–17.7)
IMM GRANULOCYTES # BLD: 0 10E3/UL
IMM GRANULOCYTES NFR BLD: 0 %
INTERPRETATION ECG - MUSE: NORMAL
LYMPHOCYTES # BLD AUTO: 1.4 10E3/UL (ref 0.8–5.3)
LYMPHOCYTES NFR BLD AUTO: 18 %
MCH RBC QN AUTO: 28.8 PG (ref 26.5–33)
MCHC RBC AUTO-ENTMCNC: 32.7 G/DL (ref 31.5–36.5)
MCV RBC AUTO: 88 FL (ref 78–100)
MONOCYTES # BLD AUTO: 0.8 10E3/UL (ref 0–1.3)
MONOCYTES NFR BLD AUTO: 10 %
NEUTROPHILS # BLD AUTO: 5.5 10E3/UL (ref 1.6–8.3)
NEUTROPHILS NFR BLD AUTO: 71 %
NRBC # BLD AUTO: 0 10E3/UL
NRBC BLD AUTO-RTO: 0 /100
P AXIS - MUSE: 56 DEGREES
PLATELET # BLD AUTO: 150 10E3/UL (ref 150–450)
POTASSIUM SERPL-SCNC: 4.9 MMOL/L (ref 3.4–5.3)
PR INTERVAL - MUSE: 140 MS
QRS DURATION - MUSE: 156 MS
QT - MUSE: 422 MS
QTC - MUSE: 507 MS
R AXIS - MUSE: -48 DEGREES
RBC # BLD AUTO: 4.17 10E6/UL (ref 4.4–5.9)
SODIUM SERPL-SCNC: 143 MMOL/L (ref 136–145)
SYSTOLIC BLOOD PRESSURE - MUSE: NORMAL MMHG
T AXIS - MUSE: 95 DEGREES
VENTRICULAR RATE- MUSE: 87 BPM
WBC # BLD AUTO: 7.8 10E3/UL (ref 4–11)

## 2023-08-02 PROCEDURE — 370N000017 HC ANESTHESIA TECHNICAL FEE, PER MIN: Performed by: ANESTHESIOLOGY

## 2023-08-02 PROCEDURE — GZB3ZZZ ELECTROCONVULSIVE THERAPY, BILATERAL-MULTIPLE SEIZURE: ICD-10-PCS | Performed by: PSYCHIATRY & NEUROLOGY

## 2023-08-02 PROCEDURE — 124N000002 HC R&B MH UMMC

## 2023-08-02 PROCEDURE — 250N000009 HC RX 250: Performed by: ANESTHESIOLOGY

## 2023-08-02 PROCEDURE — 250N000013 HC RX MED GY IP 250 OP 250 PS 637: Performed by: STUDENT IN AN ORGANIZED HEALTH CARE EDUCATION/TRAINING PROGRAM

## 2023-08-02 PROCEDURE — 80048 BASIC METABOLIC PNL TOTAL CA: CPT | Performed by: STUDENT IN AN ORGANIZED HEALTH CARE EDUCATION/TRAINING PROGRAM

## 2023-08-02 PROCEDURE — 250N000013 HC RX MED GY IP 250 OP 250 PS 637: Performed by: PHYSICIAN ASSISTANT

## 2023-08-02 PROCEDURE — 85025 COMPLETE CBC W/AUTO DIFF WBC: CPT | Performed by: PSYCHIATRY & NEUROLOGY

## 2023-08-02 PROCEDURE — 90870 ELECTROCONVULSIVE THERAPY: CPT | Performed by: PSYCHIATRY & NEUROLOGY

## 2023-08-02 PROCEDURE — 250N000011 HC RX IP 250 OP 636: Performed by: ANESTHESIOLOGY

## 2023-08-02 PROCEDURE — 250N000013 HC RX MED GY IP 250 OP 250 PS 637: Performed by: PSYCHIATRY & NEUROLOGY

## 2023-08-02 PROCEDURE — 99233 SBSQ HOSP IP/OBS HIGH 50: CPT | Mod: GC | Performed by: PSYCHIATRY & NEUROLOGY

## 2023-08-02 PROCEDURE — 36415 COLL VENOUS BLD VENIPUNCTURE: CPT | Performed by: PSYCHIATRY & NEUROLOGY

## 2023-08-02 PROCEDURE — 90870 ELECTROCONVULSIVE THERAPY: CPT

## 2023-08-02 RX ORDER — ONDANSETRON 4 MG/1
4 TABLET, ORALLY DISINTEGRATING ORAL EVERY 30 MIN PRN
Status: DISCONTINUED | OUTPATIENT
Start: 2023-08-02 | End: 2023-08-02

## 2023-08-02 RX ORDER — ONDANSETRON 2 MG/ML
4 INJECTION INTRAMUSCULAR; INTRAVENOUS EVERY 30 MIN PRN
Status: DISCONTINUED | OUTPATIENT
Start: 2023-08-02 | End: 2023-08-02

## 2023-08-02 RX ORDER — NICARDIPINE HCL-0.9% SOD CHLOR 1 MG/10 ML
SYRINGE (ML) INTRAVENOUS PRN
Status: DISCONTINUED | OUTPATIENT
Start: 2023-08-02 | End: 2023-08-02

## 2023-08-02 RX ADMIN — ATROPINE SULFATE 2 DROP: 10 SOLUTION/ DROPS OPHTHALMIC at 19:47

## 2023-08-02 RX ADMIN — NAPROXEN 250 MG: 250 TABLET ORAL at 13:30

## 2023-08-02 RX ADMIN — SUCCINYLCHOLINE CHLORIDE 80 MG: 20 INJECTION, SOLUTION INTRAMUSCULAR; INTRAVENOUS; PARENTERAL at 12:37

## 2023-08-02 RX ADMIN — SENNOSIDES 2 TABLET: 8.6 TABLET ORAL at 19:45

## 2023-08-02 RX ADMIN — DIVALPROEX SODIUM 250 MG: 125 CAPSULE, COATED PELLETS ORAL at 13:29

## 2023-08-02 RX ADMIN — LORAZEPAM 0.5 MG: 0.5 TABLET ORAL at 19:45

## 2023-08-02 RX ADMIN — LORAZEPAM 0.5 MG: 0.5 TABLET ORAL at 13:29

## 2023-08-02 RX ADMIN — Medication 500 MCG: at 12:37

## 2023-08-02 RX ADMIN — ATROPINE SULFATE 2 DROP: 10 SOLUTION/ DROPS OPHTHALMIC at 15:27

## 2023-08-02 RX ADMIN — ATROPINE SULFATE 2 DROP: 10 SOLUTION/ DROPS OPHTHALMIC at 09:57

## 2023-08-02 RX ADMIN — CYANOCOBALAMIN TAB 1000 MCG 1000 MCG: 1000 TAB at 13:29

## 2023-08-02 RX ADMIN — Medication 300 MG: at 19:47

## 2023-08-02 RX ADMIN — Medication 300 MG: at 13:29

## 2023-08-02 RX ADMIN — POLYETHYLENE GLYCOL 3350 17 G: 17 POWDER, FOR SOLUTION ORAL at 19:45

## 2023-08-02 RX ADMIN — POLYETHYLENE GLYCOL 3350 17 G: 17 POWDER, FOR SOLUTION ORAL at 13:29

## 2023-08-02 RX ADMIN — CLOZAPINE 650 MG: 100 TABLET, ORALLY DISINTEGRATING ORAL at 13:29

## 2023-08-02 RX ADMIN — OLANZAPINE 15 MG: 10 TABLET, ORALLY DISINTEGRATING ORAL at 09:57

## 2023-08-02 RX ADMIN — NAPROXEN 250 MG: 250 TABLET ORAL at 17:34

## 2023-08-02 RX ADMIN — DIVALPROEX SODIUM 250 MG: 125 CAPSULE, COATED PELLETS ORAL at 19:45

## 2023-08-02 RX ADMIN — TAMSULOSIN HYDROCHLORIDE 0.4 MG: 0.4 CAPSULE ORAL at 13:29

## 2023-08-02 RX ADMIN — METHOHEXITAL SODIUM 90 MG: 500 INJECTION, POWDER, LYOPHILIZED, FOR SOLUTION INTRAMUSCULAR; INTRAVENOUS; RECTAL at 12:37

## 2023-08-02 RX ADMIN — SENNOSIDES 2 TABLET: 8.6 TABLET ORAL at 13:29

## 2023-08-02 RX ADMIN — Medication 10 MG: at 19:45

## 2023-08-02 RX ADMIN — OLANZAPINE 15 MG: 10 TABLET, ORALLY DISINTEGRATING ORAL at 19:45

## 2023-08-02 ASSESSMENT — ACTIVITIES OF DAILY LIVING (ADL)
ADLS_ACUITY_SCORE: 84
ADLS_ACUITY_SCORE: 64
HYGIENE/GROOMING: INDEPENDENT;PROMPTS
HYGIENE/GROOMING: INDEPENDENT
ADLS_ACUITY_SCORE: 84
ORAL_HYGIENE: INDEPENDENT;PROMPTS
ADLS_ACUITY_SCORE: 84
ADLS_ACUITY_SCORE: 84
ADLS_ACUITY_SCORE: 64
ADLS_ACUITY_SCORE: 84
DRESS: PROMPTS;INDEPENDENT
LAUNDRY: UNABLE TO COMPLETE
ORAL_HYGIENE: PROMPTS;INDEPENDENT
DRESS: INDEPENDENT
LAUNDRY: UNABLE TO COMPLETE
ADLS_ACUITY_SCORE: 84
ADLS_ACUITY_SCORE: 84
ADLS_ACUITY_SCORE: 64

## 2023-08-02 ASSESSMENT — ENCOUNTER SYMPTOMS: DYSRHYTHMIAS: 1
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
## 2023-08-02 NOTE — ANESTHESIA CARE TRANSFER NOTE
Patient: Vinod Lee    Procedure: * No procedures listed *       Diagnosis: * No pre-op diagnosis entered *  Diagnosis Additional Information: No value filed.    Anesthesia Type:   General     Note:    Oropharynx: oropharynx clear of all foreign objects  Level of Consciousness: drowsy  Oxygen Supplementation: room air    Independent Airway: airway patency satisfactory and stable  Dentition: dentition unchanged  Vital Signs Stable: post-procedure vital signs reviewed and stable  Report to RN Given: handoff report given  Patient transferred to: Phase II    Handoff Report: Identifed the Patient, Identified the Reponsible Provider, Reviewed the pertinent medical history, Discussed the surgical course, Reviewed Intra-OP anesthesia mangement and issues during anesthesia, Set expectations for post-procedure period and Allowed opportunity for questions and acknowledgement of understanding      Vitals:  Vitals Value Taken Time   /83 08/02/23 1249   Temp 36.6  C (97.8  F) 08/02/23 1249   Pulse 101 08/02/23 1249   Resp 18 08/02/23 1249   SpO2 98 % 08/02/23 1249       Electronically Signed By: Kayla Haney MD  August 2, 2023  12:51 PM

## 2023-08-02 NOTE — PLAN OF CARE
Assessment/Intervention/Current Symtoms and Care Coordination:  Reviewed chart. Met with team to review patient's current functioning, needs, progress, and impediments to discharge. Sent phillip and Juliana paperwork to ECT staff for coordination.     Discharge Plan or Goal:  When patient is more stable a referral for [facility] in [place] will be made, referral for [facility] in [place] will be made      Barriers to Discharge:  Patient requires further psychiatric stabilization due to current symptomology. He continues to present as disorganized and tangential with paranoid thought content. He presents with mood lability. He vacillates between being pleasant and being aggressive with no clear environmental triggers. Patient endorses auditory hallucinations.     Referral Status:  Referral for housing pending psychiatric stabilization  Considerations include: [facility] in [place], [facility] in [place]      Legal Status:  Commitment with [facility]: [place]  File Number: [id number]  Issued 07/06/23 and is not to exceed six months    Contacts:  : Vicky Valdez with [facility], [phone]  Psychiatrist: Dr. Santana at [facility], [phone] PCP: Attila Urena DO  Mom: Alberta, [phone] (H) [phone] (M)   Dad: Ino, [phone] (H)  Sister, Sandra Treadwell, [phone] (KING)   [facility]'s Office Fax: [phone]  Pia May: [phone]  CADI  with [facility]: Regina Wong, [phone]      Upcoming Meetings and Dates/Important Information and next steps:  Referrals for housing pending psychiatric stabilization

## 2023-08-02 NOTE — ANESTHESIA CARE TRANSFER NOTE
Patient: Vinod Lee    Procedure: * No procedures listed *       Diagnosis: * No pre-op diagnosis entered *  Diagnosis Additional Information: No value filed.    Anesthesia Type:   General     Note:    Oropharynx: oropharynx clear of all foreign objects  Level of Consciousness: drowsy  Oxygen Supplementation: room air    Independent Airway: airway patency satisfactory and stable  Dentition: dentition unchanged  Vital Signs Stable: post-procedure vital signs reviewed and stable  Report to RN Given: handoff report given  Patient transferred to: Phase II    Handoff Report: Identifed the Patient, Identified the Reponsible Provider, Reviewed the pertinent medical history, Discussed the surgical course, Reviewed Intra-OP anesthesia mangement and issues during anesthesia, Set expectations for post-procedure period and Allowed opportunity for questions and acknowledgement of understanding      Vitals:  Vitals Value Taken Time   /77 08/02/23 1259   Temp 36.1  C (97  F) 08/02/23 1259   Pulse 96 08/02/23 1259   Resp 16 08/02/23 1259   SpO2 98 % 08/02/23 1259       Electronically Signed By: Kayla Haney MD  August 2, 2023  12:59 PM

## 2023-08-02 NOTE — PLAN OF CARE
"  Problem: Psychotic Signs/Symptoms  Goal: Improved Behavioral Control (Psychotic Signs/Symptoms)  Outcome: Not Progressing     Denied all psych symptoms. Presents as disorganized. Occasionally making paranoid statements, eg \"the pills are poison.\" Isolated to room for much of shift.     ECT in the morning. Took all liquids and foods out of room. Given medications without difficulty at approximately 1730. No PRNS given.     Exhibiting hypersalivation. Given scheduled atropine.     No episodes of aggression noted this shift. Maintains 2:1 for assault risk. Had BM during AM shift.     /74 (BP Location: Right arm, Patient Position: Sitting, Cuff Size: Adult Regular)   Pulse 87   Temp 97.8  F (36.6  C) (Temporal)   Resp 16   Wt 86.5 kg (190 lb 9.6 oz)   SpO2 97%   BMI 28.15 kg/m        "

## 2023-08-02 NOTE — PROGRESS NOTES
Patient adequate for discharge back to unit. Report called to unit nurse Tamar. IV removed and hemostasis achieved less than 2 min. VSS. Patient safely transported back to unit with  staff persons & security.

## 2023-08-02 NOTE — PROGRESS NOTES
08/01/23 1914   Group Therapy Session   Group Attendance attended group session   Time Session Began 1615   Time Session Ended 1700   Total Time (minutes) 25   Total # Attendees 5   Group Type task skill;life skill   Group Session Detail Education and group discussion provided on the importance of changing negative thinking to positive with hands on task to make an Affirmation or Gratitude Calendar for the month of August for fostering hope, improving mood, reality-based activity, creative expression, fine motor skills, self-expression, building self- esteem, coping, healthy leisure and socialization.   Patient Participation Detail Pt was very quiet, but engaged in working on his calendar.  He was able to complete it, adding gratitudes and self affirmations.  He declined to decorate it further and left group once he was finished.  No charge.

## 2023-08-02 NOTE — PLAN OF CARE
Problem: Sleep Disturbance  Goal: Adequate Sleep/Rest  Outcome: Progressing      Patient continues on SIO for intrusiveness, hypersexual, nudity in the lounge. Patient is scheduled for ECT in the morning.Vs this morning were./74 (BP Location: Right arm, Patient Position: Sitting, Cuff Size: Adult Regular)   Pulse 75   Temp 98.1  F (36.7  C) (Temporal)   Resp 16   Wt 86.5 kg (190 lb 9.6 oz)   SpO2 97%   BMI 28.15 kg/m     Patient slept a total of 5.75 hrs tonight.    Writer got a call from the ECT nurse who wanted a copy of patient's price gao that has been reviewed by Risk management before they can get him in for treatment or better still send a copy price gao to them so they can send it to risk management to review. Patient's vital signs were done but ECT check list is pending when patient is clear to go for ECT.
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
## 2023-08-02 NOTE — ANESTHESIA PREPROCEDURE EVALUATION
Anesthesia Pre-Procedure Evaluation    Patient: Vinod Lee   MRN: [id number] : 1960        Procedure :           Past Medical History:   Diagnosis Date    Parkinson's disease (H)     Schizophrenia (H)       No past surgical history on file.   Allergies   Allergen Reactions    Bee Venom Unknown    Montelukast Unknown    Phenothiazines Unknown      Social History     Tobacco Use    Smoking status: Not on file    Smokeless tobacco: Never   Substance Use Topics    Alcohol use: Not on file     Comment: GRACE      Wt Readings from Last 1 Encounters:   23 86.5 kg (190 lb 9.6 oz)        Anesthesia Evaluation   Pt has had prior anesthetic.         ROS/MED HX  ENT/Pulmonary:     (+) sleep apnea,                                      Neurologic:     (+)                 Parkinson's disease, features: parkinsonism, from neuroleptics,              Cardiovascular:     (+)  hypertension- -   -  - -                        dysrhythmias, Long QT,        Previous cardiac testing   Echo: Date: Results:    Stress Test:  Date: Results:    ECG Reviewed:  Date: 23 Results:  LBBB QT intervial is 498  Cath:  Date: Results:      METS/Exercise Tolerance:     Hematologic:     (+)      anemia,          Musculoskeletal:  - neg musculoskeletal ROS     GI/Hepatic:  - neg GI/hepatic ROS     Renal/Genitourinary:     (+)        BPH,      Endo:  - neg endo ROS     Psychiatric/Substance Use: Comment: Patient is aggressive and homicidal    (+) psychiatric history schizophrenia       Infectious Disease:  - neg infectious disease ROS     Malignancy:  - neg malignancy ROS     Other:  - neg other ROS          Physical Exam    Airway   unable to assess          Respiratory Devices and Support         Dental    unable to assess        Cardiovascular          Rhythm and rate: regular     Pulmonary   pulmonary exam normal                OUTSIDE LABS:  CBC:   Lab Results   Component Value Date    WBC 7.8 2023    WBC 9.9 2023    HGB  12.0 (L) 08/02/2023    HGB 11.1 (L) 07/31/2023    HCT 36.7 (L) 08/02/2023    HCT 35.4 (L) 07/31/2023     08/02/2023     (L) 07/31/2023     BMP:   Lab Results   Component Value Date     07/13/2023     06/07/2023    POTASSIUM 4.4 07/13/2023    POTASSIUM 4.5 06/07/2023    CHLORIDE 104 07/13/2023    CHLORIDE 101 06/07/2023    CO2 26 07/13/2023    CO2 26 06/07/2023    BUN 28.6 (H) 07/13/2023    BUN 17.7 06/07/2023    CR 1.01 07/13/2023    CR 0.97 06/07/2023    GLC 84 07/13/2023     (H) 06/07/2023     COAGS: No results found for: PTT, INR, FIBR  POC: No results found for: BGM, HCG, HCGS  HEPATIC:   Lab Results   Component Value Date    ALBUMIN 3.6 07/13/2023    PROTTOTAL 5.6 (L) 07/13/2023    ALT 14 07/28/2023    AST 18 07/28/2023    ALKPHOS 73 07/13/2023    BILITOTAL 0.2 07/13/2023     OTHER:   Lab Results   Component Value Date    LACT 1.5 06/07/2023    BRENDA 8.6 (L) 07/13/2023    PHOS 3.8 05/25/2023    MAG 2.1 05/25/2023    TSH 1.80 05/22/2023       Anesthesia Plan    ASA Status:  3    NPO Status:  NPO Appropriate    Anesthesia Type: General.     - Airway: Native airway              Consents    Anesthesia Plan(s) and associated risks, benefits, and realistic alternatives discussed. Questions answered and patient/representative(s) expressed understanding.     - Discussed:     - Discussed with:  Healthcare Power of             Postoperative Care    Pain management: Oral pain medications, IV analgesics.        Comments:           H&P reviewed: Unable to attach H&P to encounter due to EHR limitations. H&P Update: appropriate H&P reviewed, patient examined. No interval changes since H&P (within 30 days).         Kayla Haney MD

## 2023-08-02 NOTE — PLAN OF CARE
"Nursing Assessment    Recent Vitals: B/P: 141/87, T: 97.1, P: 90, R: 18     Sleep:  Hours of sleep at night: 5.75    General Shift Summary  Patient has primarily isolated to his room coming out briefly to pace the aragon. He presents with a flat affect. Mood varies from calm and cooperative to irritable however redirectable. Patient attempted to get on his bed a few times to jump on it. Staff redirected him during each occasion and he was cooperative with a few prompts. Writer held patient's morning medications except his Zyprexa. He made one comment about the Zyprexa being \"poison\" and stated he would take the medication anyways. He was cooperative with being NPO prior to ECT. Patient went down to ECT around 1200 and was back up to the unit around 1310. Patient seemed shaky and had gross tremors. He voiced feeling anxious. He was provided with his morning medications and seemed eager to take the medications making no comments about the medications being \"poison\". Patient was provided his lunch after. He required assistance due to his tremors. Insight and judgment are poor. Concentration is fair. Hygiene is good. Steady but slow gate.    Tamar Mars, RN MSN  "

## 2023-08-02 NOTE — ANESTHESIA POSTPROCEDURE EVALUATION
Patient: Vinod Lee    Procedure: * No procedures listed *       Anesthesia Type:  General    Note:  Disposition: Outpatient   Postop Pain Control: Uneventful            Sign Out: Well controlled pain   PONV: No   Neuro/Psych: Uneventful            Sign Out: Acceptable/Baseline neuro status   Airway/Respiratory: Uneventful            Sign Out: Acceptable/Baseline resp. status   CV/Hemodynamics: Uneventful            Sign Out: Acceptable CV status; No obvious hypovolemia; No obvious fluid overload   Other NRE: NONE   DID A NON-ROUTINE EVENT OCCUR? No           Last vitals:  Vitals:    08/02/23 1259 08/02/23 1300 08/02/23 1314   BP: 139/77  (!) 141/87   Pulse: 96 90 90   Resp: 16 18 18   Temp: 36.1  C (97  F)  36.2  C (97.1  F)   SpO2: 98% 99% 99%       Electronically Signed By: Kayla Haney MD  August 2, 2023  1:14 PM
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
## 2023-08-03 PROCEDURE — 250N000013 HC RX MED GY IP 250 OP 250 PS 637: Performed by: PSYCHIATRY & NEUROLOGY

## 2023-08-03 PROCEDURE — 124N000002 HC R&B MH UMMC

## 2023-08-03 PROCEDURE — 250N000013 HC RX MED GY IP 250 OP 250 PS 637: Performed by: STUDENT IN AN ORGANIZED HEALTH CARE EDUCATION/TRAINING PROGRAM

## 2023-08-03 PROCEDURE — 250N000013 HC RX MED GY IP 250 OP 250 PS 637: Performed by: PHYSICIAN ASSISTANT

## 2023-08-03 PROCEDURE — 99207 PR NO BILLABLE SERVICE THIS VISIT: CPT

## 2023-08-03 PROCEDURE — 99232 SBSQ HOSP IP/OBS MODERATE 35: CPT | Mod: GC | Performed by: PSYCHIATRY & NEUROLOGY

## 2023-08-03 RX ADMIN — Medication 300 MG: at 08:38

## 2023-08-03 RX ADMIN — Medication 300 MG: at 19:22

## 2023-08-03 RX ADMIN — OLANZAPINE 15 MG: 10 TABLET, ORALLY DISINTEGRATING ORAL at 19:22

## 2023-08-03 RX ADMIN — OLANZAPINE 10 MG: 5 TABLET, FILM COATED ORAL at 17:56

## 2023-08-03 RX ADMIN — LORAZEPAM 0.5 MG: 0.5 TABLET ORAL at 19:22

## 2023-08-03 RX ADMIN — ATROPINE SULFATE 2 DROP: 10 SOLUTION/ DROPS OPHTHALMIC at 19:22

## 2023-08-03 RX ADMIN — SENNOSIDES 2 TABLET: 8.6 TABLET ORAL at 07:54

## 2023-08-03 RX ADMIN — POLYETHYLENE GLYCOL 3350 17 G: 17 POWDER, FOR SOLUTION ORAL at 08:06

## 2023-08-03 RX ADMIN — CYANOCOBALAMIN TAB 1000 MCG 1000 MCG: 1000 TAB at 07:52

## 2023-08-03 RX ADMIN — Medication 300 MG: at 13:09

## 2023-08-03 RX ADMIN — NAPROXEN 250 MG: 250 TABLET ORAL at 07:53

## 2023-08-03 RX ADMIN — OLANZAPINE 15 MG: 10 TABLET, ORALLY DISINTEGRATING ORAL at 07:54

## 2023-08-03 RX ADMIN — DIVALPROEX SODIUM 250 MG: 125 CAPSULE, COATED PELLETS ORAL at 07:54

## 2023-08-03 RX ADMIN — Medication 10 MG: at 19:22

## 2023-08-03 RX ADMIN — DIVALPROEX SODIUM 250 MG: 125 CAPSULE, COATED PELLETS ORAL at 19:22

## 2023-08-03 RX ADMIN — DIVALPROEX SODIUM 250 MG: 125 CAPSULE, COATED PELLETS ORAL at 13:08

## 2023-08-03 RX ADMIN — LORAZEPAM 0.5 MG: 0.5 TABLET ORAL at 07:54

## 2023-08-03 RX ADMIN — ATROPINE SULFATE 2 DROP: 10 SOLUTION/ DROPS OPHTHALMIC at 08:03

## 2023-08-03 RX ADMIN — LORAZEPAM 0.5 MG: 0.5 TABLET ORAL at 13:09

## 2023-08-03 RX ADMIN — NAPROXEN 250 MG: 250 TABLET ORAL at 17:56

## 2023-08-03 RX ADMIN — GABAPENTIN 100 MG: 100 CAPSULE ORAL at 09:33

## 2023-08-03 RX ADMIN — CLOZAPINE 650 MG: 100 TABLET, ORALLY DISINTEGRATING ORAL at 11:27

## 2023-08-03 RX ADMIN — TAMSULOSIN HYDROCHLORIDE 0.4 MG: 0.4 CAPSULE ORAL at 07:53

## 2023-08-03 RX ADMIN — ATROPINE SULFATE 2 DROP: 10 SOLUTION/ DROPS OPHTHALMIC at 14:07

## 2023-08-03 ASSESSMENT — ACTIVITIES OF DAILY LIVING (ADL)
HYGIENE/GROOMING: INDEPENDENT
ADLS_ACUITY_SCORE: 64
LAUNDRY: UNABLE TO COMPLETE
ADLS_ACUITY_SCORE: 64
ORAL_HYGIENE: INDEPENDENT
ADLS_ACUITY_SCORE: 64
LAUNDRY: UNABLE TO COMPLETE
ADLS_ACUITY_SCORE: 64
DRESS: INDEPENDENT
ADLS_ACUITY_SCORE: 64
ORAL_HYGIENE: INDEPENDENT
DRESS: INDEPENDENT
ADLS_ACUITY_SCORE: 64
HYGIENE/GROOMING: INDEPENDENT

## 2023-08-03 NOTE — PLAN OF CARE
Goal Outcome Evaluation:      Pt put his clothes on and went to sleep @ 0545 he no longer appeared to be a threat to staff or himself ramona discontinued. Pt remains on an SIO for safety.  Per SIO  staff pt appeared to be a bit more confused today more difficult to redirect.  This shift he slept 4.75 hours.

## 2023-08-03 NOTE — PROGRESS NOTES
"Discussed with Dr. Rene and bedside RN regarding rash on right foot that appears and appears to be purplish in color and almost \"petechiae.\" It was noted during interview, but seemed to be resolving upon walking. Within the past 24 hrs, pt has had ECT and seemed more confused than usual. Pt seems to have had a right great toe wound with recent right great toe fracture  seen by Medicine on 7/16.   - Medicine will seen on AM  - Trend CBC  - Vinod site for spread  - Check B/L foot pulses   - Notify for systemic infection  - Any worsening pain, fever, streaking of site-> get VS and notify Medicine for assessment    Eva Morgan, CNP, APRN  Internal Medicine [facility] Hospitalist  [facility]  [phone]  Securely message with the Vocera Web Console   Text page via [employer] Paging/Directory   Text page via [email or web address]            "

## 2023-08-03 NOTE — PROGRESS NOTES
Patient face to face assessment completed. I reviewed most recent medication history, laboratory findings, vital signs, and progress notes. Current condition and behavioral situation was discussed with attending provider. Patient did not experience physical sequelae related to seclusion initiation.Patient was standing in the room, pacing and appeared confused, disorganized and disoriented X3. Patient had no insight into mental status at this time. Will continue to monitor and follow plan of care.
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
## 2023-08-03 NOTE — PLAN OF CARE
"Goal Outcome Evaluation:       Pt disorganized and delusional.  This writer tried to talk to pt about his behavior.  Pt was told he can not threaten staff and hit at staff.  He could not come out of his room naked.  If he could follow directions. Put on clothes and not threaten or hit staff the maglock would be open. Pt made statements \"I am the devil\".  \"I can do what I want\".  Pt talking nonsensical statements and is not understanding information given.  This writer will continue to reassess pt.                   "

## 2023-08-03 NOTE — PROGRESS NOTES
This writer approached pt @ 0500 and talked to him through the doors.  He was naked, disorganized and agitated.  He was re approached again about 15 minutes later.Patient face to face assessment completed. I reviewed most recent medication history, laboratory findings, vital sign and progress notes. Current condition and behavioral situation was discussed with attending provider. Patient did not experience physical sequelae related to seclusion initiation. . Patient had no insight into mental status at this time. This writer discussed behaviors needed to get out of seclusion.  He has no insight to his behavior.

## 2023-08-03 NOTE — CARE PLAN
"Occupational Therapy Group Note:     08/03/23 1600   Group Therapy Session   Group Attendance attended group session   Time Session Began 1105   Time Session Ended 1200   Total Time (minutes) 10  (no charge)   Total # Attendees 3-4   Group Type expressive therapy   Group Topic Covered coping skills/lifestyle management   Group Session Detail OT clinic   Patient Response/Contribution cooperative with task   Patient Participation Detail Pt actively participated in the last x10 minutes (no charge) of occupational therapy clinic to facilitate coping skill exploration, creative expression within personally meaningful activities, and clinical observation of social, cognitive, and kinesthetic performance skills. Pt response: Initiated a self-directed, creative expression task with assistance in task set-up. Observed using watercolors to paint a stick figure person, then slowly added detail to his painting, commenting on what each detail looked like to him (i.e. \"he looks like he's throwing a discus!\"). Attentive to task x10 minutes until the lunch cart arrived. When it was time to clean up, he impulsively attempted to drink the water that his paint brush had been in and was redirected by SIO staff. Otherwise calm and attentive for his brief duration in group.       "
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
## 2023-08-03 NOTE — PLAN OF CARE
"  Problem: Adult Behavioral Health Plan of Care  Goal: Adheres to Safety Considerations for Self and Others  Outcome: Not Progressing   Goal Outcome Evaluation:    Patient was complaint with medication administration in the morning. He denied having any thoughts of wanting to harm himself or others, anxiety and depression. He took a shower. Right after he came out of the shower he was stating \" I didn't have enough time to take a shower\". He then went to his room and attempted to jump on his bed. He was redirectable. He appeared anxious and irritable. He was given PRN for anxiety.    Patient has a raised rash on his right foot and both of his feet appear to be purplish in color. Provider was notified regarding this. His SPO2 was checked and was 95%. There is a consult with [facility].    Patient took a shower again, and he stayed in the shower for an hour and half. Patient has attempted to jump on his bed several times. He was expressing that he was feeling anxous and he was given his afternoon medication.He was stating \" there is delusions that are telling me there is poison in here\"    Blood pressure 128/73, pulse 87, temperature 98.4  F (36.9  C), temperature source Temporal, resp. rate 16, weight 86.5 kg (190 lb 9.6 oz), SpO2 97 %.    "

## 2023-08-03 NOTE — PROGRESS NOTES
"During night shift pt multiple times made direct threats that he was going to \"pummel\" staff. He was de-escalated many times during the night shift.     Pt at approximately 4:30 am took off all his clothes. He repeatedly attempted to leave his room naked and push through staff members. He was not able to be de-escalated from this problematic behavior.  "

## 2023-08-04 ENCOUNTER — ANESTHESIA EVENT (OUTPATIENT)
Dept: BEHAVIORAL HEALTH | Facility: CLINIC | Age: 63
DRG: 885 | End: 2023-08-04
Payer: COMMERCIAL

## 2023-08-04 ENCOUNTER — APPOINTMENT (OUTPATIENT)
Dept: BEHAVIORAL HEALTH | Facility: CLINIC | Age: 63
DRG: 885 | End: 2023-08-04
Attending: PSYCHIATRY & NEUROLOGY
Payer: COMMERCIAL

## 2023-08-04 ENCOUNTER — ANESTHESIA (OUTPATIENT)
Dept: BEHAVIORAL HEALTH | Facility: CLINIC | Age: 63
DRG: 885 | End: 2023-08-04
Payer: COMMERCIAL

## 2023-08-04 LAB
ANION GAP SERPL CALCULATED.3IONS-SCNC: 10 MMOL/L (ref 7–15)
BASOPHILS # BLD AUTO: 0 10E3/UL (ref 0–0.2)
BASOPHILS NFR BLD AUTO: 0 %
BUN SERPL-MCNC: 37.8 MG/DL (ref 8–23)
CALCIUM SERPL-MCNC: 8.3 MG/DL (ref 8.8–10.2)
CHLORIDE SERPL-SCNC: 106 MMOL/L (ref 98–107)
CREAT SERPL-MCNC: 1.05 MG/DL (ref 0.67–1.17)
DEPRECATED HCO3 PLAS-SCNC: 24 MMOL/L (ref 22–29)
EOSINOPHIL # BLD AUTO: 0 10E3/UL (ref 0–0.7)
EOSINOPHIL NFR BLD AUTO: 0 %
ERYTHROCYTE [DISTWIDTH] IN BLOOD BY AUTOMATED COUNT: 14.8 % (ref 10–15)
GFR SERPL CREATININE-BSD FRML MDRD: 79 ML/MIN/1.73M2
GLUCOSE SERPL-MCNC: 124 MG/DL (ref 70–99)
HCT VFR BLD AUTO: 35 % (ref 40–53)
HGB BLD-MCNC: 11.3 G/DL (ref 13.3–17.7)
IMM GRANULOCYTES # BLD: 0 10E3/UL
IMM GRANULOCYTES NFR BLD: 0 %
LYMPHOCYTES # BLD AUTO: 1 10E3/UL (ref 0.8–5.3)
LYMPHOCYTES NFR BLD AUTO: 9 %
MCH RBC QN AUTO: 28.4 PG (ref 26.5–33)
MCHC RBC AUTO-ENTMCNC: 32.3 G/DL (ref 31.5–36.5)
MCV RBC AUTO: 88 FL (ref 78–100)
MONOCYTES # BLD AUTO: 0.8 10E3/UL (ref 0–1.3)
MONOCYTES NFR BLD AUTO: 8 %
NEUTROPHILS # BLD AUTO: 8.3 10E3/UL (ref 1.6–8.3)
NEUTROPHILS NFR BLD AUTO: 83 %
NRBC # BLD AUTO: 0 10E3/UL
NRBC BLD AUTO-RTO: 0 /100
PLATELET # BLD AUTO: 145 10E3/UL (ref 150–450)
POTASSIUM SERPL-SCNC: 4.9 MMOL/L (ref 3.4–5.3)
RBC # BLD AUTO: 3.98 10E6/UL (ref 4.4–5.9)
SODIUM SERPL-SCNC: 140 MMOL/L (ref 136–145)
WBC # BLD AUTO: 10.2 10E3/UL (ref 4–11)

## 2023-08-04 PROCEDURE — 250N000013 HC RX MED GY IP 250 OP 250 PS 637: Performed by: PSYCHIATRY & NEUROLOGY

## 2023-08-04 PROCEDURE — 90870 ELECTROCONVULSIVE THERAPY: CPT | Performed by: PSYCHIATRY & NEUROLOGY

## 2023-08-04 PROCEDURE — 250N000013 HC RX MED GY IP 250 OP 250 PS 637: Performed by: STUDENT IN AN ORGANIZED HEALTH CARE EDUCATION/TRAINING PROGRAM

## 2023-08-04 PROCEDURE — 370N000017 HC ANESTHESIA TECHNICAL FEE, PER MIN

## 2023-08-04 PROCEDURE — 85025 COMPLETE CBC W/AUTO DIFF WBC: CPT | Performed by: PSYCHIATRY & NEUROLOGY

## 2023-08-04 PROCEDURE — 90870 ELECTROCONVULSIVE THERAPY: CPT

## 2023-08-04 PROCEDURE — 80048 BASIC METABOLIC PNL TOTAL CA: CPT

## 2023-08-04 PROCEDURE — 250N000009 HC RX 250: Performed by: ANESTHESIOLOGY

## 2023-08-04 PROCEDURE — 99222 1ST HOSP IP/OBS MODERATE 55: CPT

## 2023-08-04 PROCEDURE — 250N000011 HC RX IP 250 OP 636: Performed by: ANESTHESIOLOGY

## 2023-08-04 PROCEDURE — 99233 SBSQ HOSP IP/OBS HIGH 50: CPT | Mod: 25 | Performed by: PSYCHIATRY & NEUROLOGY

## 2023-08-04 PROCEDURE — 124N000002 HC R&B MH UMMC

## 2023-08-04 PROCEDURE — 36415 COLL VENOUS BLD VENIPUNCTURE: CPT

## 2023-08-04 PROCEDURE — 250N000013 HC RX MED GY IP 250 OP 250 PS 637: Performed by: PHYSICIAN ASSISTANT

## 2023-08-04 RX ORDER — FLUMAZENIL 0.1 MG/ML
0.2 INJECTION, SOLUTION INTRAVENOUS ONCE
Status: CANCELLED
Start: 2023-08-04

## 2023-08-04 RX ORDER — TRIAMCINOLONE ACETONIDE 0.25 MG/G
CREAM TOPICAL 2 TIMES DAILY
Status: COMPLETED | OUTPATIENT
Start: 2023-08-04 | End: 2023-08-18

## 2023-08-04 RX ORDER — DIVALPROEX SODIUM 250 MG/1
250 TABLET, DELAYED RELEASE ORAL
Status: DISCONTINUED | OUTPATIENT
Start: 2023-08-05 | End: 2023-08-25

## 2023-08-04 RX ORDER — DIVALPROEX SODIUM 250 MG/1
250 TABLET, DELAYED RELEASE ORAL
Status: DISCONTINUED | OUTPATIENT
Start: 2023-08-04 | End: 2023-08-11

## 2023-08-04 RX ORDER — MINERAL OIL/HYDROPHIL PETROLAT
OINTMENT (GRAM) TOPICAL
Status: DISCONTINUED | OUTPATIENT
Start: 2023-08-04 | End: 2023-10-05

## 2023-08-04 RX ORDER — GABAPENTIN 300 MG/1
300 CAPSULE ORAL
Status: DISCONTINUED | OUTPATIENT
Start: 2023-08-04 | End: 2023-08-11

## 2023-08-04 RX ORDER — DIVALPROEX SODIUM 250 MG/1
250 TABLET, DELAYED RELEASE ORAL
Status: DISCONTINUED | OUTPATIENT
Start: 2023-08-06 | End: 2023-08-25

## 2023-08-04 RX ORDER — GABAPENTIN 300 MG/1
300 CAPSULE ORAL
Status: DISCONTINUED | OUTPATIENT
Start: 2023-08-05 | End: 2023-08-25

## 2023-08-04 RX ORDER — LORAZEPAM 0.5 MG/1
0.5 TABLET ORAL
Status: DISCONTINUED | OUTPATIENT
Start: 2023-08-04 | End: 2023-08-11

## 2023-08-04 RX ORDER — LORAZEPAM 0.5 MG/1
0.5 TABLET ORAL
Status: DISCONTINUED | OUTPATIENT
Start: 2023-08-06 | End: 2023-08-25

## 2023-08-04 RX ORDER — LORAZEPAM 0.5 MG/1
0.5 TABLET ORAL
Status: DISCONTINUED | OUTPATIENT
Start: 2023-08-05 | End: 2023-08-25

## 2023-08-04 RX ORDER — GABAPENTIN 300 MG/1
300 CAPSULE ORAL
Status: DISCONTINUED | OUTPATIENT
Start: 2023-08-06 | End: 2023-08-25

## 2023-08-04 RX ORDER — GABAPENTIN 300 MG/1
300 CAPSULE ORAL
Status: DISCONTINUED | OUTPATIENT
Start: 2023-08-04 | End: 2023-08-04

## 2023-08-04 RX ADMIN — CLOZAPINE 650 MG: 100 TABLET, ORALLY DISINTEGRATING ORAL at 13:22

## 2023-08-04 RX ADMIN — Medication 300 MG: at 13:25

## 2023-08-04 RX ADMIN — NAPROXEN 250 MG: 250 TABLET ORAL at 19:49

## 2023-08-04 RX ADMIN — OLANZAPINE 15 MG: 10 TABLET, ORALLY DISINTEGRATING ORAL at 10:16

## 2023-08-04 RX ADMIN — NAPROXEN 250 MG: 250 TABLET ORAL at 10:17

## 2023-08-04 RX ADMIN — GABAPENTIN 300 MG: 300 CAPSULE ORAL at 19:46

## 2023-08-04 RX ADMIN — ATROPINE SULFATE 2 DROP: 10 SOLUTION/ DROPS OPHTHALMIC at 10:16

## 2023-08-04 RX ADMIN — ATROPINE SULFATE 2 DROP: 10 SOLUTION/ DROPS OPHTHALMIC at 13:22

## 2023-08-04 RX ADMIN — TAMSULOSIN HYDROCHLORIDE 0.4 MG: 0.4 CAPSULE ORAL at 10:17

## 2023-08-04 RX ADMIN — Medication 300 MG: at 10:17

## 2023-08-04 RX ADMIN — DIVALPROEX SODIUM 250 MG: 125 CAPSULE, COATED PELLETS ORAL at 13:22

## 2023-08-04 RX ADMIN — LORAZEPAM 0.5 MG: 0.5 TABLET ORAL at 10:17

## 2023-08-04 RX ADMIN — Medication 10 MG: at 19:46

## 2023-08-04 RX ADMIN — METHOHEXITAL SODIUM 100 MG: 500 INJECTION, POWDER, LYOPHILIZED, FOR SOLUTION INTRAMUSCULAR; INTRAVENOUS; RECTAL at 09:05

## 2023-08-04 RX ADMIN — SUCCINYLCHOLINE CHLORIDE 80 MG: 20 INJECTION, SOLUTION INTRAMUSCULAR; INTRAVENOUS; PARENTERAL at 09:05

## 2023-08-04 RX ADMIN — LORAZEPAM 0.5 MG: 0.5 TABLET ORAL at 13:22

## 2023-08-04 RX ADMIN — SENNOSIDES 2 TABLET: 8.6 TABLET ORAL at 19:46

## 2023-08-04 RX ADMIN — DIVALPROEX SODIUM 250 MG: 125 CAPSULE, COATED PELLETS ORAL at 10:17

## 2023-08-04 RX ADMIN — LORAZEPAM 0.5 MG: 0.5 TABLET ORAL at 19:45

## 2023-08-04 RX ADMIN — OLANZAPINE 15 MG: 10 TABLET, ORALLY DISINTEGRATING ORAL at 19:49

## 2023-08-04 RX ADMIN — POLYETHYLENE GLYCOL 3350 17 G: 17 POWDER, FOR SOLUTION ORAL at 19:46

## 2023-08-04 RX ADMIN — CYANOCOBALAMIN TAB 1000 MCG 1000 MCG: 1000 TAB at 10:17

## 2023-08-04 RX ADMIN — DIVALPROEX SODIUM 250 MG: 250 TABLET, DELAYED RELEASE ORAL at 19:47

## 2023-08-04 ASSESSMENT — ACTIVITIES OF DAILY LIVING (ADL)
HYGIENE/GROOMING: HANDWASHING;SHOWER;PROMPTS
ADLS_ACUITY_SCORE: 84
LAUNDRY: UNABLE TO COMPLETE
HYGIENE/GROOMING: INDEPENDENT;PROMPTS
ADLS_ACUITY_SCORE: 74
ADLS_ACUITY_SCORE: 84
ADLS_ACUITY_SCORE: 64
DRESS: INDEPENDENT;PROMPTS
ADLS_ACUITY_SCORE: 64
ADLS_ACUITY_SCORE: 84
ADLS_ACUITY_SCORE: 64
DRESS: SCRUBS (BEHAVIORAL HEALTH);INDEPENDENT
ADLS_ACUITY_SCORE: 64
ADLS_ACUITY_SCORE: 64
ADLS_ACUITY_SCORE: 74
ORAL_HYGIENE: PROMPTS
ADLS_ACUITY_SCORE: 74
ORAL_HYGIENE: INDEPENDENT;PROMPTS
ADLS_ACUITY_SCORE: 64

## 2023-08-04 ASSESSMENT — ENCOUNTER SYMPTOMS: DYSRHYTHMIAS: 1
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
## 2023-08-04 NOTE — ANESTHESIA CARE TRANSFER NOTE
Patient: Vinod Lee    Procedure: * No procedures listed *       Diagnosis: * No pre-op diagnosis entered *  Diagnosis Additional Information: No value filed.    Anesthesia Type:   General     Note:    Oropharynx: spontaneously breathing  Level of Consciousness: drowsy  Oxygen Supplementation: room air    Independent Airway: airway patency satisfactory and stable  Dentition: dentition unchanged  Vital Signs Stable: post-procedure vital signs reviewed and stable    Patient transferred to: PACU    Handoff Report: Identifed the Patient, Identified the Reponsible Provider, Reviewed the pertinent medical history, Discussed the surgical course, Reviewed Intra-OP anesthesia mangement and issues during anesthesia, Set expectations for post-procedure period and Allowed opportunity for questions and acknowledgement of understanding      Vitals:  Vitals Value Taken Time   BP     Temp     Pulse     Resp     SpO2         Electronically Signed By: Chauncey Joaquin MD  August 4, 2023  9:20 AM

## 2023-08-04 NOTE — PROGRESS NOTES
Patient adequate for discharge . Report called to unit nurse Malik COX      . Iv removed and hemostasis achieved less than 2 min.  Patient safely transported back to unit with  staff person x2 and .
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
## 2023-08-04 NOTE — CARE PLAN
"   08/04/23 1457   Group Therapy Session   Group Attendance attended group session   Time Session Began 1115   Time Session Ended 1200   Total Time (minutes) 15   Total # Attendees 5   Group Type community;recreation;task skill   Group Topic Covered structured socialization;leisure exploration/use of leisure time;cognitive activities   Group Session Detail OT Leisure Group: Group activities to exercise cognitive skills while exploring leisure and socializing opportunities; \"Letter Pool\" - challenges players to name items in a category that begin with a randomly chosen letter.   Patient Participation Detail Pt participated in a group activity playing Letter Pool; pt joined late, accompanied by SIO. Pt required guidance and verbal cues to follow instructions of the game, and benefits from concrete and step-by-step instructions. Pt demonstrated difficulty shifting thoughts and tasks; when a peer had the challenge to name salad dressings, pt was listing countries, reporting that they were still thinking about countries. When it was pt's turn to list items in a category, peers chimed in answers to assist pt with the challenge; pt would say out loud \"I ruined it. I ruined it for everyone\". Peers tried to reassure pt that the game was still going on and that the pt did a good job. Pt left group after a few rounds, 15 minutes (no charge).       "

## 2023-08-04 NOTE — PROGRESS NOTES
Patient face to face assessment completed. I reviewed most recent medication history, laboratory findings, vital signs, and progress notes. Current condition and behavioral situation was discussed with attending provider. Patient did not experience physical sequelae related to seclusion initiation. Patient was standing, in his room, and responding to verbal interaction in a rather confused manner. Patient disorganized and disoriented X3. Patient had no insight into mental status at this time.     García Up DNP, RN.

## 2023-08-04 NOTE — PLAN OF CARE
Assessment/Intervention/Current Symtoms and Care Coordination:  Reviewed chart. Met with team to review patient's current functioning, needs, progress, and impediments to discharge.     Discharge Plan or Goal:  When patient is more stable a referral for [facility] in [place] will be made, referral for [facility] in [place] will be made      Barriers to Discharge:  Patient requires further psychiatric stabilization due to current symptomology. He continues to present as disorganized and tangential with paranoid thought content. He presents with mood lability. He vacillates between being pleasant and being aggressive with no clear environmental triggers. Patient endorses auditory hallucinations.     Referral Status:  Referral for housing pending psychiatric stabilization  Considerations include: [facility] in [place], [facility] in [place]      Legal Status:  Commitment with [facility]: [place]  File Number: [id number]  Issued 07/06/23 and is not to exceed six months    Contacts:  : Vicky Valdez with [facility], [phone]  Psychiatrist: Dr. Santana at [facility], [phone] PCP: Attila Urena DO  Mom: Alberta, [phone] (H) [phone] (M)   Dad: Ino, [phone] (H)  Sister, Sandra Treadwell, [phone] (KING)   [facility]'s Office Fax: [phone]  Pia May: [phone]  CADI  with [facility]: Regina Wong, [phone]      Upcoming Meetings and Dates/Important Information and next steps:  Referrals for housing pending psychiatric stabilization

## 2023-08-04 NOTE — PROGRESS NOTES
[facility], [place]   Psychiatric Progress Note  Hospital Day: 70        Interim History:   The patient's care was discussed with the treatment team during the daily team meeting and/or staff's chart notes were reviewed.  Staff report that Vinod was attempting to jump off of his bed, attempting to drink water out of toilet, an increase in tremors were observed. Evidence of rash on right foot observed. IM to see today. Showering for up to a couple of hours at a time. Had ECT #2 today.     Upon interview, Vinod continues to make delusional and paranoid statements. He said that he believes he is God or Srinivas Bernardo. States that he has a desire to remain clean. Alert and oriented. Aware that it was the beginning of August but did not know exact date.     Suicidal ideation: no    Homicidal ideation: None today    Psychotic symptoms: no AH or VH reported during interview. Delusions present and other psychotic symptoms suspected.    Medication side effects reported: None.     Acute medical concerns: none. He denies any pain or discomfort today.      Other issues reported by patient: Patient had no further questions or concerns.           Medications:      atropine  2 drop Sublingual TID    cloZAPine  650 mg Oral Daily    cyanocobalamin  1,000 mcg Oral Daily    divalproex sodium delayed-release  250 mg Oral TID    gabapentin  300 mg Oral TID    LORazepam  0.5 mg Oral TID    melatonin  10 mg Oral At Bedtime    naproxen  250 mg Oral BID w/meals    OLANZapine zydis  15 mg Oral BID    Or    OLANZapine  10 mg Intramuscular BID    polyethylene glycol  17 g Oral BID    sennosides  2 tablet Oral BID    tamsulosin  0.4 mg Oral Daily          Allergies:     Allergies   Allergen Reactions    Bee Venom Unknown    Montelukast Unknown    Phenothiazines Unknown          Labs:     No results found for this or any previous visit (from the past 24 hour(s)).         Psychiatric Examination:     /63 (BP  "Location: Right arm, Patient Position: Sitting, Cuff Size: Adult Regular)   Pulse 87   Temp 97.6  F (36.4  C) (Temporal)   Resp 16   Wt 86.5 kg (190 lb 9.6 oz)   SpO2 95%   BMI 28.15 kg/m    Weight is 190 lbs 9.6 oz  Body mass index is 28.15 kg/m .    Weight over time:  Vitals:    07/03/23 1100 07/30/23 0902   Weight: 81.6 kg (179 lb 12.8 oz) 86.5 kg (190 lb 9.6 oz)       Orthostatic Vitals         Most Recent      Sitting Orthostatic /61 07/25 0800    Sitting Orthostatic Pulse (bpm) 85 07/25 0800          Cardiometabolic risk assessment. 06/07/23    Reviewed patient profile for cardiometabolic risk factors    Date taken /Value  REFERENCE RANGE   Abdominal Obesity  (Waist Circumference)   See nursing flowsheet Women ?35 in (88 cm)   Men ?40 in (102 cm)      Triglycerides  No results found for: TRIG    ?150 mg/dL (1.7 mmol/L) or current treatment for elevated triglycerides   HDL cholesterol  No results found for: HDL]   Women <50 mg/dL (1.3 mmol/L) in women or current treatment for low HDL cholesterol  Men <40 mg/dL (1 mmol/L) in men or current treatment for low HDL cholesterol     Fasting plasma glucose (FPG) Lab Results   Component Value Date     05/26/2023      FPG ?100 mg/dL (5.6 mmol/L) or treatment for elevated blood glucose   Blood pressure  BP Readings from Last 3 Encounters:   08/04/23 117/63   05/26/23 97/55    Blood pressure ?130/85 mmHg or treatment for elevated blood pressure   Family History  See family history     Mental Status Exam:  Appearance: awake, groggy, disheveled. No evidence of pain of acute distress.   Attitude:  suspicious, impulsive  Eye Contact:  fair, less intense  Mood:  \"alright\"  Affect:  calm and cooperative, restricted, reactive  Speech: mostly coherent  Psychomotor Behavior:  No evidence of psychomotor agitation or restlessness today. No evidence of dystonia, or tics.  Throught Process: disorganized  Associations:  loosening of associations present  Thought " Content:  Delusions. Likely auditory, tacticle and olfactory hallucinations. Cannot rule out visual hallucinations. Evidence of obsessive thinking and likely compulsions to harm others.   Insight:  limited  Judgement:  poor  Oriented to:  self, date, place  Attention Span and Concentration:  fair  Recent and Remote Memory:  poor         Precautions:     Behavioral Orders   Procedures    Assault precautions    Code 1 - Restrict to Unit    Code 2 - 1:1 Staff Supervision     For ECT only    Electroconvulsive therapy     Series of up to 12 treatments. Begin Date: 8/2/23     Treating Psychiatrist providing ECT:  unknown     Notified on:  7/28/23 via this order  NOTE: We received verbal confirmation from Replaced by [facility] that Lorenzo Pavon has been approved but awaiting official court order and risk management's review  Initial consult was completed by Dr. Hicks on 7/18/23    Electroconvulsive therapy     Series of up to 12 treatments. Begin Date: 8/2/23     Treating Psychiatrist providing ECT:  Dominga     Notified on:  8/2/23    Elopement precautions    Fall precautions    Routine Programming     As clinically indicated    Self Injury Precaution    Status 15     Every 15 minutes.    Status Individual Observation     Patient SIO status reviewed with team/RN.  Please also refer to RN/team documentation for add'l detail.    -SIO staff to monitor following which have contributed to patient being on SIO:  Agitation  Pt is impulsive   Pt has ran out of his room naked  Pt has Parkinson symptoms place him in a high fall risk  Pt has verbal outburst of sexual and threaten statements  Pt requires immediate redirection when masturbating    -Possible interventions SIO staff could use to support patient's treatment progress:  Engage pt in activities    -When following observed, team will review discontinuation of SIO:  Absence of aggression toward others for > 24 hours    Comments: SIO 2:1 (1 male and 1 female staff due agitation and assault  behaviors)     Order Specific Question:   CONTINUOUS 24 hours / day     Answer:   5 feet     Order Specific Question:   Indications for SIO     Answer:   Severe intrusiveness     Order Specific Question:   Indications for SIO     Answer:   Assault risk    Suicide precautions     Patients on Suicide Precautions should have a Combination Diet ordered that includes a Diet selection(s) AND a Behavioral Tray selection for Safe Tray - with utensils, or Safe Tray - NO utensils            Diagnoses:     #Unspecified psychosis likely schizophrenia per history, rule out Bipolar affective disorder, manic  #Unspecified encephalopathy   -R/O catatonia   -Episodes of unresponsiveness, concern for PNES   #Parkinsonism 2/2 neuroleptic medications, rule out Parkinson's Disease  #Urinary retention and BPH  -Possible UTI -- UC resulted on 5/25 w/out growth  #Hx of prolonged QTc with clozapine  #Mild thrombocytopenia         Assessment and hospital summary:  Patient was admitted to psychiatric unit for safety, stabilization and medication management. He has had schizophrenia since the 1980. He was on Clozaril x 25 years, and it was tapered and discontinued on May 7, 2023  due to prolonged qtc of 527, and his psychotic  symptoms have worsened since then. Sinemet was also discontinued recently due to concerns that it was contributing to paranoia and AH. He is agitated, aggressive, dangerous to self and others. He remains on SIO 2 to 1, and is under psychiatric emergency and court hold. There are concerns for organic etiology given pattern of sundowning, history of parkinsonism, and ongoing disorientation/confusion. 6/8: EKG repeated, cardiology consult regarding safety of resuming clozapine in the context of prolonged QTc and refractory schizophrenia pending response. Per cardiology, correct QTc is no more than 460. They do not have concerns about AP retrial. Neurology IP consult placed for evaluation of sundowning and cognitive decline  secondary to Sinemet discontinuation. MSSA initiated. Per Neurology, discontinuation of Sinemet would not account for these symptoms. They do not recommend retrial at this time. : Clozapine titration continued.     Chart reviewed which revealed the followin2022: He was on clozapine, Seroquel, Cymbalta, and Carbidopa-levodopa and hospitalized for pneumonia. Psych consulted and Seroquel was stopped. Treated with Abx and discharged to TCU.     2022: Hospitalized at [facility]. Per chart review:    Vinod Lee is a 61 yo male with longstanding history of schizophrenia. He was admitted from [facility] ED, where he presented due to increased delusional thoughts while on a visit to his parents for Thanksgiving. He began experiencing increasing paranoid thoughts that someone might be poisoning him and began fearing that he might accidentally harm someone. He reported to his parents that he was having thoughts about hitting someone or beating someone up and told them he could not guarantee they would be safe with him. Parents encouraged patient to go to the ED, which he did voluntarily.     Per patient report and chart review, he was hospitalized several times in the  and reports he was civilly committed at one point in the , however he has been quite stable for the past several decades. He reports he will experience some paranoia and delusional thinking at times, but generally has good insight into his symptoms. He has been maintained on clozapine for about 25 years and prior to several months ago was also concurrently prescribed quetiapine.      In the last several months, patient has had a number of medication changes. Approximately 6 months ago, patient was diagnosed with parkinsonism related to antipsychotic use and was started on carbidopa-levidopa. He experienced no improvement in tremors, and thus carbidopa- levidopa dose was increased 1.5 weeks ago.      In addition, patient was medically  "hospitalized in August 2022 for confusion, weakness, repeated falls, ongoing weight loss, and SOB. He was found to have a cavitary lesion of the lung and suspected aspiration pneumonia. He was treated with antibiotics. At that time, he was taking current dose of clozapine and duloxetine, as well as propranolol ER, quetiapine, gabapentin, and clonazepam. Psychiatry was consulted due to concern for oversedation, and propranolol ER, gabapentin, and quetiapine were discontinued. Conazepam was reduced from 0.5 mg TID to BID dosing and recommended to be discontinued, however, it does not appear this occurred. His sedation improved significantly. QTc prolongation was also noted, but improved throughout his stay. He was ultimately discharged to a TCU for several months before returning home. He reports his weakness has greatly improved and states he \"graduated\" physical therapy, though he continues to be diligent in completed recommended exercises.      He reports that over the past several months, his delusions and anxiety have been worse than usual, with symptoms becoming much more acute since the carbidopa-levidopa dose was increased. He has felt increasingly paranoid about being poisoned, noting this is a delusion that has been stable over time, but was previously less intrusive. He has insight during the interview that his paranoid thinking is not reality based, but states it has been harder to separate delusions from reality and he becomes very worried that he might hurt someone, despite no history of violent behavior. He reports that the paranoia about his food being poisoned in combination with the tremors in his hands have made it difficult for him to eat. He has also had quite low appetite for the past 6 months to a year . He reports that due to the combination of these factors, he has lost about 50 pounds in the last year.He denies any problems with sleep initiation or maintenance. He reports energy is fairly " "good and \"better than it used to be.\" He reports some short term memory issues and on interview, does have some difficulty remembering details of recent events. He is unsure if he has received cognitive testing in the past.    He denies any suicidal ideation and reports he has not experienced SI for decades. He denies homicidal thoughts and is very clear that he had and has no desire to harm anyone else, but was afraid he might somehow do so. He denies any hallucinations and states \"that's never been a problem for me.\" He is not observed responding to internal stimuli. He is alert and oriented in all spheres.        ========    BRIEF HOSPITAL COURSE: This 62 y.o. male admitted 11/25/2022 to  [facility] for the assessment and treatment of the above presentation. This was a voluntary admission.     In summary, he was tapered off the Sinemet, which he reports to be both ineffective and potentially worsening the anxiety and paranoia ideations. Instead Benadryl 25 mg TID was started to help with extrapyramidal side effects. He struggled with sleep during his stay here. Remeron 7.5mg QHS was tried without success. Seroquel 100mg qhs was restarted with addition of suvorexant 10mg qhs. Given his Seroquel PRN use prior history of prolonged QTC, he will benefit from another EKG repeat on the medical unit.     Unfortunately, he tested positive for influenza A and developed hypoxemia. Now on 2L oxygen with desats when prone requiring 3L oxygen. Subsequently, the plan will to be tapering him off clonazepam due to hypoxia (and also prior plan to taper). The patient tolerated these changes without side effects.     The patient minimally benefited from the structured therapeutic milieu as he attended programming seldom as he tested positive for influenza, he needed to isolate with droplet precautions. Pt was engaged and worked on issues that were triggering/brought pt to the hospital and has excellent insight into his anxiety and " paranoid delusions. Pt denied suicidal ideations throughout all/majority of their hospitalization. Pt denied HI throughout all of hospitalization. There were no concerns with behavioral disruptions/outbursts. The patient has shown improvement in general.     At the time of discharge, hospitalization course was reviewed with Vinod Lee with plan to transfer to medicine. Please consult psychiatry and I will continue to follow while patient is on the medical unit. He is now off sinemet since 11/29 21:00 and I would expect anxiety and paranoia to improve more moving forward. Psychiatrically, once anxiety and paranoia is baseline, can D/C to home once medically stable. He DOES NOT NEED A 1:1 on the medical unit from a mental health perspective, we initiated 1:1 11/30/2022 due to significant fatigue, gait instability (he was unable to stand without assist) and feeding assist.    04/2023: Clozapine was gradually tapered and discontinued, unclear reasons why it was discontinued per outside records, though Dr. Portillo's H&P indicates that it was due to prolonged QTc.       Today's Changes:  -Medicine consult for purplish discoloration of bilat feet and new rash on right foot   -May consider reducing clozapine if worsening sx of OCD  -Modified orders to adjust administration times of Gabapentin, Depakote, and Ativan for ECT  - Next ECT scheduled for 8/7. NPO at midnight.     Target psychiatric symptoms and interventions:  -Psych emergency declared on 5/27, now fully committed  -ECT Mon/Wed/Fri per Janelle   -Continue clozapine as above  -Continue scheduled Zyprexa 15mg BID  -Continue 1:1 for safety of staff and patients, reduced from 2:1 7/23/23  - weekly CBC to monitor platelets      -Continue Gabapentin 300 mg TID as staff believe it has been effective for treatment of his anxiety  -Discussed complex case at length with Dr. Hatch (primary provider on geriatic unit) who provided recs (see second opinion note  dated 6/14). Appreciate assistance. I have since made the following changes:         1) Add propranolol 10 mg TID         2) Added Depakote 1000 mg qhs (to be administered in magic cup)         3) Nutrition consult to order magic cup         4) Increased melatonin to 10 mg QHS    Risks, benefits, and alternatives discussed at length with patient.     Acute Medical Problems and Treatments:  Delirium vs progression of dementia  Neurology consult placed on 6/8 for evaluation of sundowning and cognitive decline secondary to Sinemet discontinuation: Resuming Sinemet not recommended for behavioral concerns. Please see Neuro note for details.     Thrombocytopenia   Likely secondary to Depakote.  According to the, incidents of Depakote induced thrombocytopenia as 27%.  Depakote dose reduced from 1000 twice daily to 250 3 times daily on 7/28/2023  - weekly CBCs    Constipation  Likely worsened by clozapine  - daily miralax   - daily Senokot 2 tablets BID      Right first toe fracture:  Seen by Ortho on 6/16:  Per note: Vinod Lee is a 62 year old  male w/ PMHx complex psychiatric history who has a fracture to the distal phalanx of the right toe after a recent trauma to the foot the patient reports.  Patient denies any other pain or discomfort.  Images reviewed and plan will be for irrigation of the popped fracture blister with sterile saline and the toes should be dressed and a 4 x 4 gauze dressing.  Patient will need a postop shoe which she can be weightbearing as tolerated in.  Would recommend a course of antibiotics for approximately 7 days.  Would recommend Augmentin or clindamycin.  Podiatry will be made aware of patient and will see patient while he is admitted or if patient is discharged in the near future a follow-up appointment will need to be established.     Remainder of care per primary team.  Primary team should make sure that patient is up-to-date on his tetanus shot.  If not patient will require a booster  shot.    Hx of prolonged QTc:  Continue weekly EKGs. See above for details.     Urinary retention, resolved  Per patient care order:   If patient is refusing straight caths, please notify IM provider immediately to determine whether this constitutes a medical emergency. If IM declares medical emergency, may restrain patient for straight cath. Discussed this with patient's parents/substitute decision makers on 6/7 who are in support of this plan.    Benzodiazepine dependence:  Phenobarbital taper completed    Pertinent labs/imaging:  Weekly CBC with diff    Behavioral/Psychological/Social:  - Encourage unit programming    Safety:  - Continue precautions as noted above  - Status 15 minute checks  - Continue 1:1 for staff and pt safety    Legal Status: Fully committed with [facility]. [facility]ozo medications: clozapine, olanzapine, risperidone, quetiapine    -Lorenzo Pavon in process, hearing was on 7/27. Awaiting court documentation.     Disposition Plan   Reason for ongoing admission: poses an imminent risk to self, poses an imminent risk to others and is unable to care for self due to severe psychosis or darryl  Discharge location:  assisted living facility  Discharge Medications: not ordered  Follow-up Appointments: not scheduled    Entered by: Ingrid Rhodes MD on 08/04/2023  at 9:39 AM

## 2023-08-04 NOTE — ANESTHESIA POSTPROCEDURE EVALUATION
Patient: Vinod Lee    Procedure: * No procedures listed *       Anesthesia Type:  General    Note:  Disposition: Outpatient   Postop Pain Control: Uneventful            Sign Out: Well controlled pain   PONV: No   Neuro/Psych: Uneventful            Sign Out: Acceptable/Baseline neuro status   Airway/Respiratory: Uneventful            Sign Out: Acceptable/Baseline resp. status   CV/Hemodynamics: Uneventful            Sign Out: Acceptable CV status; No obvious hypovolemia; No obvious fluid overload   Other NRE: NONE   DID A NON-ROUTINE EVENT OCCUR? No           Last vitals:  Vitals:    08/03/23 1700 08/03/23 1705 08/04/23 0814   BP:   114/70   Pulse: 72 76 96   Resp:   16   Temp:   36.1  C (97  F)   SpO2:   99%       Electronically Signed By: Chauncey Joaquin MD  August 4, 2023  9:20 AM

## 2023-08-04 NOTE — PLAN OF CARE
Problem: Adult Behavioral Health Plan of Care  Goal: Develops/Participates in Therapeutic Republic to Support Successful Transition  Intervention: Foster Therapeutic Republic  Recent Flowsheet Documentation  Taken 8/4/2023 1700 by Cecille Burris RN  Trust Relationship/Rapport:   care explained   choices provided   emotional support provided   empathic listening provided   questions answered   questions encouraged   reassurance provided   thoughts/feelings acknowledged   Goal Outcome Evaluation:    Plan of Care Reviewed With: patient        Pt had a good shift. He appeared less tense and did not have any behavioral outbursts or aggression.   Pt denied any anxiety or depression, self harm or harm towards others, and AVH. He appeared to be less internally preoccupied than previous shifts.   Pt ate and drank well, and was med complaint with oral meds. He refused the foot creams, and atropine.   Pt moved to 137.   Pt had no other acute behavioral concerns at this time.   /56 (BP Location: Right arm, Patient Position: Prone, Cuff Size: Adult Regular)   Pulse 97   Temp 97.4  F (36.3  C) (Temporal)   Resp 16   Wt 86.5 kg (190 lb 9.6 oz)   SpO2 97%   BMI 28.15 kg/m

## 2023-08-04 NOTE — ANESTHESIA PREPROCEDURE EVALUATION
Anesthesia Pre-Procedure Evaluation    Patient: Vinod Lee   MRN: [id number] : 1960        Procedure :           Past Medical History:   Diagnosis Date    Parkinson's disease (H)     Schizophrenia (H)       No past surgical history on file.   Allergies   Allergen Reactions    Bee Venom Unknown    Montelukast Unknown    Phenothiazines Unknown      Social History     Tobacco Use    Smoking status: Not on file    Smokeless tobacco: Never   Substance Use Topics    Alcohol use: Not on file     Comment: GRACE      Wt Readings from Last 1 Encounters:   23 86.5 kg (190 lb 9.6 oz)        Anesthesia Evaluation   Pt has had prior anesthetic.         ROS/MED HX  ENT/Pulmonary:     (+) sleep apnea,                                      Neurologic:     (+)                 Parkinson's disease, features: parkinsonism, from neuroleptics,              Cardiovascular:     (+)  hypertension- -   -  - -                        dysrhythmias, Long QT,        Previous cardiac testing   Echo: Date: Results:    Stress Test:  Date: Results:    ECG Reviewed:  Date: 23 Results:  LBBB QT intervial is 498  Cath:  Date: Results:      METS/Exercise Tolerance:     Hematologic:     (+)      anemia,          Musculoskeletal:  - neg musculoskeletal ROS     GI/Hepatic:  - neg GI/hepatic ROS     Renal/Genitourinary:     (+)        BPH,      Endo:  - neg endo ROS     Psychiatric/Substance Use: Comment: Patient is aggressive and homicidal    (+) psychiatric history schizophrenia       Infectious Disease:  - neg infectious disease ROS     Malignancy:  - neg malignancy ROS     Other:  - neg other ROS          Physical Exam    Airway   unable to assess          Respiratory Devices and Support         Dental    unable to assess        Cardiovascular          Rhythm and rate: regular     Pulmonary   pulmonary exam normal                OUTSIDE LABS:  CBC:   Lab Results   Component Value Date    WBC 7.8 2023    WBC 9.9 2023    HGB  12.0 (L) 08/02/2023    HGB 11.1 (L) 07/31/2023    HCT 36.7 (L) 08/02/2023    HCT 35.4 (L) 07/31/2023     08/02/2023     (L) 07/31/2023     BMP:   Lab Results   Component Value Date     08/02/2023     07/13/2023    POTASSIUM 4.9 08/02/2023    POTASSIUM 4.4 07/13/2023    CHLORIDE 107 08/02/2023    CHLORIDE 104 07/13/2023    CO2 24 08/02/2023    CO2 26 07/13/2023    BUN 24.8 (H) 08/02/2023    BUN 28.6 (H) 07/13/2023    CR 0.99 08/02/2023    CR 1.01 07/13/2023    GLC 93 08/02/2023    GLC 84 07/13/2023     COAGS: No results found for: PTT, INR, FIBR  POC: No results found for: BGM, HCG, HCGS  HEPATIC:   Lab Results   Component Value Date    ALBUMIN 3.6 07/13/2023    PROTTOTAL 5.6 (L) 07/13/2023    ALT 14 07/28/2023    AST 18 07/28/2023    ALKPHOS 73 07/13/2023    BILITOTAL 0.2 07/13/2023     OTHER:   Lab Results   Component Value Date    LACT 1.5 06/07/2023    BRENDA 8.7 (L) 08/02/2023    PHOS 3.8 05/25/2023    MAG 2.1 05/25/2023    TSH 1.80 05/22/2023       Anesthesia Plan    ASA Status:  3    NPO Status:  NPO Appropriate    Anesthesia Type: General.     - Airway: Native airway              Consents    Anesthesia Plan(s) and associated risks, benefits, and realistic alternatives discussed. Questions answered and patient/representative(s) expressed understanding.     - Discussed:     - Discussed with:  Healthcare Power of             Postoperative Care    Pain management: Oral pain medications, IV analgesics.        Comments:           H&P reviewed: Unable to attach H&P to encounter due to EHR limitations. H&P Update: appropriate H&P reviewed, patient examined. No interval changes since H&P (within 30 days).         Chauncey Joaquin MD
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
## 2023-08-05 PROCEDURE — 250N000013 HC RX MED GY IP 250 OP 250 PS 637

## 2023-08-05 PROCEDURE — 124N000002 HC R&B MH UMMC

## 2023-08-05 PROCEDURE — 250N000013 HC RX MED GY IP 250 OP 250 PS 637: Performed by: STUDENT IN AN ORGANIZED HEALTH CARE EDUCATION/TRAINING PROGRAM

## 2023-08-05 PROCEDURE — 250N000013 HC RX MED GY IP 250 OP 250 PS 637: Performed by: PSYCHIATRY & NEUROLOGY

## 2023-08-05 PROCEDURE — 250N000013 HC RX MED GY IP 250 OP 250 PS 637: Performed by: PHYSICIAN ASSISTANT

## 2023-08-05 PROCEDURE — G0177 OPPS/PHP; TRAIN & EDUC SERV: HCPCS

## 2023-08-05 RX ADMIN — DIVALPROEX SODIUM 250 MG: 250 TABLET, DELAYED RELEASE ORAL at 08:03

## 2023-08-05 RX ADMIN — GABAPENTIN 300 MG: 300 CAPSULE ORAL at 08:03

## 2023-08-05 RX ADMIN — ATROPINE SULFATE 1 DROP: 10 SOLUTION/ DROPS OPHTHALMIC at 16:39

## 2023-08-05 RX ADMIN — CLOZAPINE 650 MG: 100 TABLET, ORALLY DISINTEGRATING ORAL at 13:15

## 2023-08-05 RX ADMIN — HALOPERIDOL 5 MG: 5 TABLET ORAL at 09:43

## 2023-08-05 RX ADMIN — POLYETHYLENE GLYCOL 3350 17 G: 17 POWDER, FOR SOLUTION ORAL at 08:04

## 2023-08-05 RX ADMIN — NAPROXEN 250 MG: 250 TABLET ORAL at 19:07

## 2023-08-05 RX ADMIN — ATROPINE SULFATE 2 DROP: 10 SOLUTION/ DROPS OPHTHALMIC at 21:13

## 2023-08-05 RX ADMIN — DIVALPROEX SODIUM 250 MG: 250 TABLET, DELAYED RELEASE ORAL at 13:15

## 2023-08-05 RX ADMIN — GABAPENTIN 300 MG: 300 CAPSULE ORAL at 21:12

## 2023-08-05 RX ADMIN — DIPHENHYDRAMINE HYDROCHLORIDE 50 MG: 50 CAPSULE ORAL at 09:43

## 2023-08-05 RX ADMIN — LORAZEPAM 0.5 MG: 0.5 TABLET ORAL at 21:12

## 2023-08-05 RX ADMIN — Medication 10 MG: at 19:07

## 2023-08-05 RX ADMIN — SENNOSIDES 2 TABLET: 8.6 TABLET ORAL at 19:07

## 2023-08-05 RX ADMIN — NAPROXEN 250 MG: 250 TABLET ORAL at 08:04

## 2023-08-05 RX ADMIN — WHITE PETROLATUM: 1.75 OINTMENT TOPICAL at 11:19

## 2023-08-05 RX ADMIN — ATROPINE SULFATE 2 DROP: 10 SOLUTION/ DROPS OPHTHALMIC at 13:15

## 2023-08-05 RX ADMIN — SENNOSIDES 2 TABLET: 8.6 TABLET ORAL at 08:03

## 2023-08-05 RX ADMIN — OLANZAPINE 15 MG: 10 TABLET, ORALLY DISINTEGRATING ORAL at 21:12

## 2023-08-05 RX ADMIN — CYANOCOBALAMIN TAB 1000 MCG 1000 MCG: 1000 TAB at 08:04

## 2023-08-05 RX ADMIN — ATROPINE SULFATE 2 DROP: 10 SOLUTION/ DROPS OPHTHALMIC at 08:05

## 2023-08-05 RX ADMIN — GABAPENTIN 300 MG: 300 CAPSULE ORAL at 13:15

## 2023-08-05 RX ADMIN — TAMSULOSIN HYDROCHLORIDE 0.4 MG: 0.4 CAPSULE ORAL at 08:04

## 2023-08-05 RX ADMIN — LORAZEPAM 0.5 MG: 0.5 TABLET ORAL at 08:04

## 2023-08-05 RX ADMIN — TRIAMCINOLONE ACETONIDE: 0.25 CREAM TOPICAL at 21:13

## 2023-08-05 RX ADMIN — DIVALPROEX SODIUM 250 MG: 250 TABLET, DELAYED RELEASE ORAL at 19:07

## 2023-08-05 RX ADMIN — OLANZAPINE 15 MG: 10 TABLET, ORALLY DISINTEGRATING ORAL at 08:04

## 2023-08-05 RX ADMIN — WHITE PETROLATUM: 1.75 OINTMENT TOPICAL at 19:07

## 2023-08-05 RX ADMIN — POLYETHYLENE GLYCOL 3350 17 G: 17 POWDER, FOR SOLUTION ORAL at 19:07

## 2023-08-05 RX ADMIN — LORAZEPAM 0.5 MG: 0.5 TABLET ORAL at 13:15

## 2023-08-05 RX ADMIN — TRIAMCINOLONE ACETONIDE: 0.25 CREAM TOPICAL at 08:04

## 2023-08-05 ASSESSMENT — ACTIVITIES OF DAILY LIVING (ADL)
ORAL_HYGIENE: INDEPENDENT
ADLS_ACUITY_SCORE: 84
ADLS_ACUITY_SCORE: 84
DRESS: SCRUBS (BEHAVIORAL HEALTH);INDEPENDENT
ADLS_ACUITY_SCORE: 84
HYGIENE/GROOMING: INDEPENDENT;PROMPTS
ADLS_ACUITY_SCORE: 84
ADLS_ACUITY_SCORE: 84
HYGIENE/GROOMING: HANDWASHING;INDEPENDENT
ADLS_ACUITY_SCORE: 64
ADLS_ACUITY_SCORE: 84
DRESS: INDEPENDENT;SCRUBS (BEHAVIORAL HEALTH);PROMPTS
ORAL_HYGIENE: INDEPENDENT;PROMPTS
ADLS_ACUITY_SCORE: 84
LAUNDRY: WITH SUPERVISION

## 2023-08-05 NOTE — PLAN OF CARE
Problem: Adult Behavioral Health Plan of Care  Goal: Develops/Participates in [facility] [place] to Support Successful Transition  Intervention: Foster The[facility] [place]  Recent Flowsheet Documentation  Taken 8/5/2023 1600 by Cecille Burris RN  Trust Relationship/Rapport:   care explained   choices provided   emotional support provided   empathic listening provided   questions answered   questions encouraged   reassurance provided   thoughts/feelings acknowledged   Goal Outcome Evaluation:    Plan of Care Reviewed With: patient        Pt had a good shift. He declined any mental health symptoms. He did appear to be responding, as he was having some thought blocking and response lag. He remained in behavioral control this shift, and had no outbursts of aggression or agitation.   Pt attempted to get on the bed a a few times, but was able to be redirected without issue.  Pt was med complaint without issue. He was given one PRN dose of atropine for excess secretion that appeared to be effective.   Pt ate and drank well, denied pain, and had no other acute hysical or behavioral concerns this shift.   BP (!) 144/67 (BP Location: Right arm, Patient Position: Sitting, Cuff Size: Adult Regular)   Pulse 93   Temp (!) 95.6  F (35.3  C) (Temporal)   Resp 16   Wt 86.5 kg (190 lb 9.6 oz)   SpO2 99%   BMI 28.15 kg/m
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
## 2023-08-06 PROCEDURE — 250N000013 HC RX MED GY IP 250 OP 250 PS 637: Performed by: STUDENT IN AN ORGANIZED HEALTH CARE EDUCATION/TRAINING PROGRAM

## 2023-08-06 PROCEDURE — 250N000013 HC RX MED GY IP 250 OP 250 PS 637: Performed by: PHYSICIAN ASSISTANT

## 2023-08-06 PROCEDURE — 124N000002 HC R&B MH UMMC

## 2023-08-06 PROCEDURE — 250N000013 HC RX MED GY IP 250 OP 250 PS 637: Performed by: PSYCHIATRY & NEUROLOGY

## 2023-08-06 RX ADMIN — NAPROXEN 250 MG: 250 TABLET ORAL at 17:18

## 2023-08-06 RX ADMIN — GABAPENTIN 300 MG: 300 CAPSULE ORAL at 17:18

## 2023-08-06 RX ADMIN — Medication 10 MG: at 20:56

## 2023-08-06 RX ADMIN — CLOZAPINE 650 MG: 100 TABLET, ORALLY DISINTEGRATING ORAL at 13:24

## 2023-08-06 RX ADMIN — WHITE PETROLATUM: 1.75 OINTMENT TOPICAL at 08:18

## 2023-08-06 RX ADMIN — TRIAMCINOLONE ACETONIDE: 0.25 CREAM TOPICAL at 21:08

## 2023-08-06 RX ADMIN — CYANOCOBALAMIN TAB 1000 MCG 1000 MCG: 1000 TAB at 08:19

## 2023-08-06 RX ADMIN — POLYETHYLENE GLYCOL 3350 17 G: 17 POWDER, FOR SOLUTION ORAL at 20:56

## 2023-08-06 RX ADMIN — ATROPINE SULFATE 2 DROP: 10 SOLUTION/ DROPS OPHTHALMIC at 20:57

## 2023-08-06 RX ADMIN — LORAZEPAM 0.5 MG: 0.5 TABLET ORAL at 17:18

## 2023-08-06 RX ADMIN — LORAZEPAM 0.5 MG: 0.5 TABLET ORAL at 13:25

## 2023-08-06 RX ADMIN — DIVALPROEX SODIUM 250 MG: 250 TABLET, DELAYED RELEASE ORAL at 08:18

## 2023-08-06 RX ADMIN — ATROPINE SULFATE 2 DROP: 10 SOLUTION/ DROPS OPHTHALMIC at 08:17

## 2023-08-06 RX ADMIN — SENNOSIDES 2 TABLET: 8.6 TABLET ORAL at 08:18

## 2023-08-06 RX ADMIN — SENNOSIDES 2 TABLET: 8.6 TABLET ORAL at 20:56

## 2023-08-06 RX ADMIN — LORAZEPAM 0.5 MG: 0.5 TABLET ORAL at 08:18

## 2023-08-06 RX ADMIN — WHITE PETROLATUM: 1.75 OINTMENT TOPICAL at 20:29

## 2023-08-06 RX ADMIN — DIVALPROEX SODIUM 250 MG: 250 TABLET, DELAYED RELEASE ORAL at 17:18

## 2023-08-06 RX ADMIN — NAPROXEN 250 MG: 250 TABLET ORAL at 08:19

## 2023-08-06 RX ADMIN — GABAPENTIN 300 MG: 300 CAPSULE ORAL at 13:26

## 2023-08-06 RX ADMIN — TRIAMCINOLONE ACETONIDE: 0.25 CREAM TOPICAL at 09:24

## 2023-08-06 RX ADMIN — POLYETHYLENE GLYCOL 3350 17 G: 17 POWDER, FOR SOLUTION ORAL at 08:19

## 2023-08-06 RX ADMIN — ATROPINE SULFATE 2 DROP: 10 SOLUTION/ DROPS OPHTHALMIC at 13:36

## 2023-08-06 RX ADMIN — DIVALPROEX SODIUM 250 MG: 250 TABLET, DELAYED RELEASE ORAL at 13:26

## 2023-08-06 RX ADMIN — TAMSULOSIN HYDROCHLORIDE 0.4 MG: 0.4 CAPSULE ORAL at 08:19

## 2023-08-06 RX ADMIN — OLANZAPINE 15 MG: 10 TABLET, ORALLY DISINTEGRATING ORAL at 08:18

## 2023-08-06 RX ADMIN — ATROPINE SULFATE 1 DROP: 10 SOLUTION/ DROPS OPHTHALMIC at 16:32

## 2023-08-06 RX ADMIN — OLANZAPINE 15 MG: 10 TABLET, ORALLY DISINTEGRATING ORAL at 20:56

## 2023-08-06 RX ADMIN — GABAPENTIN 300 MG: 300 CAPSULE ORAL at 08:18

## 2023-08-06 ASSESSMENT — ACTIVITIES OF DAILY LIVING (ADL)
ADLS_ACUITY_SCORE: 84
ADLS_ACUITY_SCORE: 64
ADLS_ACUITY_SCORE: 84
HYGIENE/GROOMING: HANDWASHING;INDEPENDENT
ADLS_ACUITY_SCORE: 84
ORAL_HYGIENE: INDEPENDENT
DRESS: SCRUBS (BEHAVIORAL HEALTH);INDEPENDENT
ADLS_ACUITY_SCORE: 84
LAUNDRY: WITH SUPERVISION

## 2023-08-06 NOTE — PLAN OF CARE
Pt asleep  at start of shift.     Breathing quiet and unlabored when sleeping.     Pt had no c/o pain or discomfort during the HS.     Appears to have slept 5 hours.     Pt continues on 1:1 SIO w/acuity/5 ft space distance .     Patient was redirected several times early in the night for reorientation to time.     Goal Outcome Evaluation:  Problem: Adult Behavioral Health Plan of Care  Goal: Adheres to Safety Considerations for Self and Others  Intervention: Develop and Maintain Individualized Safety Plan  Recent Flowsheet Documentation  Taken 8/6/2023 0000 by Tameka Hatch, RN  Safety Measures:   safety rounds completed   1:1 observation maintained  Goal: Absence of New-Onset Illness or Injury  Intervention: Identify and Manage Fall Risk  Recent Flowsheet Documentation  Taken 8/6/2023 0000 by Tameka Hatch, RN  Safety Measures:   safety rounds completed   1:1 observation maintained     Problem: Seclusion/Restraint, Behavioral  Goal: Absence of Harm or Injury  Outcome: Progressing
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
## 2023-08-06 NOTE — PROGRESS NOTES
"   08/05/23 1922   Group Therapy Session   Group Attendance attended group session   Time Session Began 1615   Time Session Ended 1700   Total Time (minutes) 45   Total # Attendees 2   Group Type life skill;recreation   Group Session Detail Education and discussion provided on memory, various memory strategies, the importance of cognitive processing for aging well with hands on opportunities to practice with a Memory Card Game for cognitive processing, memory practice, coping, reality-based activity, mood stabilization, improving cognitive performance, encouraging a daily routine, meaningful activity engagement, an opportunity to experience success, and socialization.   Patient Participation Detail Pt had SIO present and he assisted pt to get a couple of matches in the memory activity.  Pt was highly confused, unable to follow 1-2 step directives consistently.  This did not improve with repetition as pt was very disorganized and he had no understanding of the activity.  Pt would often move memory cards, impulsively flip a bunch over (despite cues to flip only 2 cards each turn) and hold cards in his hand instead of returning them to the table.  Pt was aware that he was struggling and verbalized aloud a couple of times, \"I'm confused, I really don't know what I'm doing.\"  To his credit, he did remain for the full session despite the frustration he was feeling.       "

## 2023-08-07 ENCOUNTER — ANESTHESIA (OUTPATIENT)
Dept: BEHAVIORAL HEALTH | Facility: CLINIC | Age: 63
DRG: 885 | End: 2023-08-07
Payer: COMMERCIAL

## 2023-08-07 ENCOUNTER — ANESTHESIA EVENT (OUTPATIENT)
Dept: BEHAVIORAL HEALTH | Facility: CLINIC | Age: 63
DRG: 885 | End: 2023-08-07
Payer: COMMERCIAL

## 2023-08-07 ENCOUNTER — APPOINTMENT (OUTPATIENT)
Dept: BEHAVIORAL HEALTH | Facility: CLINIC | Age: 63
DRG: 885 | End: 2023-08-07
Attending: PSYCHIATRY & NEUROLOGY
Payer: COMMERCIAL

## 2023-08-07 PROCEDURE — 250N000011 HC RX IP 250 OP 636: Performed by: ANESTHESIOLOGY

## 2023-08-07 PROCEDURE — 250N000013 HC RX MED GY IP 250 OP 250 PS 637: Performed by: STUDENT IN AN ORGANIZED HEALTH CARE EDUCATION/TRAINING PROGRAM

## 2023-08-07 PROCEDURE — 250N000013 HC RX MED GY IP 250 OP 250 PS 637: Performed by: PSYCHIATRY & NEUROLOGY

## 2023-08-07 PROCEDURE — 124N000002 HC R&B MH UMMC

## 2023-08-07 PROCEDURE — 90870 ELECTROCONVULSIVE THERAPY: CPT

## 2023-08-07 PROCEDURE — 250N000009 HC RX 250: Performed by: ANESTHESIOLOGY

## 2023-08-07 PROCEDURE — 250N000013 HC RX MED GY IP 250 OP 250 PS 637: Performed by: PHYSICIAN ASSISTANT

## 2023-08-07 PROCEDURE — 90870 ELECTROCONVULSIVE THERAPY: CPT | Performed by: PSYCHIATRY & NEUROLOGY

## 2023-08-07 PROCEDURE — 370N000017 HC ANESTHESIA TECHNICAL FEE, PER MIN: Performed by: ANESTHESIOLOGY

## 2023-08-07 PROCEDURE — 250N000009 HC RX 250: Performed by: PSYCHIATRY & NEUROLOGY

## 2023-08-07 RX ORDER — FLUMAZENIL 0.1 MG/ML
0.2 INJECTION, SOLUTION INTRAVENOUS ONCE
Status: DISCONTINUED | OUTPATIENT
Start: 2023-08-07 | End: 2023-08-07

## 2023-08-07 RX ORDER — ONDANSETRON 2 MG/ML
4 INJECTION INTRAMUSCULAR; INTRAVENOUS EVERY 30 MIN PRN
Status: DISCONTINUED | OUTPATIENT
Start: 2023-08-07 | End: 2023-08-07

## 2023-08-07 RX ORDER — SODIUM CHLORIDE, SODIUM LACTATE, POTASSIUM CHLORIDE, CALCIUM CHLORIDE 600; 310; 30; 20 MG/100ML; MG/100ML; MG/100ML; MG/100ML
INJECTION, SOLUTION INTRAVENOUS CONTINUOUS
Status: DISCONTINUED | OUTPATIENT
Start: 2023-08-07 | End: 2023-08-07

## 2023-08-07 RX ORDER — NICARDIPINE HCL-0.9% SOD CHLOR 1 MG/10 ML
SYRINGE (ML) INTRAVENOUS PRN
Status: DISCONTINUED | OUTPATIENT
Start: 2023-08-07 | End: 2023-08-07

## 2023-08-07 RX ORDER — ONDANSETRON 4 MG/1
4 TABLET, ORALLY DISINTEGRATING ORAL EVERY 30 MIN PRN
Status: DISCONTINUED | OUTPATIENT
Start: 2023-08-07 | End: 2023-08-07

## 2023-08-07 RX ADMIN — NAPROXEN 250 MG: 250 TABLET ORAL at 18:16

## 2023-08-07 RX ADMIN — ATROPINE SULFATE 2 DROP: 10 SOLUTION/ DROPS OPHTHALMIC at 19:36

## 2023-08-07 RX ADMIN — POLYETHYLENE GLYCOL 3350 17 G: 17 POWDER, FOR SOLUTION ORAL at 12:21

## 2023-08-07 RX ADMIN — SUCCINYLCHOLINE CHLORIDE 100 MG: 20 INJECTION, SOLUTION INTRAMUSCULAR; INTRAVENOUS; PARENTERAL at 10:33

## 2023-08-07 RX ADMIN — METHOHEXITAL SODIUM 100 MG: 500 INJECTION, POWDER, LYOPHILIZED, FOR SOLUTION INTRAMUSCULAR; INTRAVENOUS; RECTAL at 10:32

## 2023-08-07 RX ADMIN — TRIAMCINOLONE ACETONIDE: 0.25 CREAM TOPICAL at 19:36

## 2023-08-07 RX ADMIN — ATROPINE SULFATE 2 DROP: 10 SOLUTION/ DROPS OPHTHALMIC at 12:20

## 2023-08-07 RX ADMIN — NAPROXEN 250 MG: 250 TABLET ORAL at 12:18

## 2023-08-07 RX ADMIN — SENNOSIDES 2 TABLET: 8.6 TABLET ORAL at 19:33

## 2023-08-07 RX ADMIN — CYANOCOBALAMIN TAB 1000 MCG 1000 MCG: 1000 TAB at 12:17

## 2023-08-07 RX ADMIN — GABAPENTIN 300 MG: 300 CAPSULE ORAL at 19:34

## 2023-08-07 RX ADMIN — LORAZEPAM 0.5 MG: 0.5 TABLET ORAL at 19:35

## 2023-08-07 RX ADMIN — OLANZAPINE 15 MG: 10 TABLET, ORALLY DISINTEGRATING ORAL at 19:35

## 2023-08-07 RX ADMIN — WHITE PETROLATUM: 1.75 OINTMENT TOPICAL at 12:22

## 2023-08-07 RX ADMIN — ACETAMINOPHEN 650 MG: 325 TABLET, FILM COATED ORAL at 14:13

## 2023-08-07 RX ADMIN — Medication 400 MCG: at 10:33

## 2023-08-07 RX ADMIN — Medication 10 MG: at 19:34

## 2023-08-07 RX ADMIN — CLOZAPINE 650 MG: 100 TABLET, ORALLY DISINTEGRATING ORAL at 12:20

## 2023-08-07 RX ADMIN — Medication 600 MCG: at 10:37

## 2023-08-07 RX ADMIN — OLANZAPINE 15 MG: 10 TABLET, ORALLY DISINTEGRATING ORAL at 09:28

## 2023-08-07 RX ADMIN — GABAPENTIN 300 MG: 300 CAPSULE ORAL at 12:19

## 2023-08-07 RX ADMIN — LORAZEPAM 0.5 MG: 0.5 TABLET ORAL at 12:19

## 2023-08-07 RX ADMIN — SENNOSIDES 2 TABLET: 8.6 TABLET ORAL at 12:14

## 2023-08-07 RX ADMIN — FLUMAZENIL 0.2 MG: 0.1 INJECTION, SOLUTION INTRAVENOUS at 10:26

## 2023-08-07 RX ADMIN — TAMSULOSIN HYDROCHLORIDE 0.4 MG: 0.4 CAPSULE ORAL at 12:12

## 2023-08-07 RX ADMIN — TRIAMCINOLONE ACETONIDE: 0.25 CREAM TOPICAL at 12:22

## 2023-08-07 RX ADMIN — DIVALPROEX SODIUM 250 MG: 250 TABLET, DELAYED RELEASE ORAL at 19:34

## 2023-08-07 RX ADMIN — DIVALPROEX SODIUM 250 MG: 250 TABLET, DELAYED RELEASE ORAL at 12:18

## 2023-08-07 ASSESSMENT — ACTIVITIES OF DAILY LIVING (ADL)
ADLS_ACUITY_SCORE: 64

## 2023-08-07 NOTE — PROGRESS NOTES
I spoke with patient's mom, Alberta, on the phone and updated her on Vinod's hospitalization. She says he sounded much more like himself on the phone yesterday when he called her to apologize for the negative things he said.   She said she was happy to hear that he was getting ECT. She had questions regarding how many ECT sessions he was going to get and what medications and doses he is currently on and didn't have any other questions for now and said she appreciated the call.

## 2023-08-07 NOTE — ANESTHESIA POSTPROCEDURE EVALUATION
Patient: Vinod Lee    Procedure: * No procedures listed *       Anesthesia Type:  General    Note:     Postop Pain Control:    PONV: Yes            Sign Out: PONV/POV resolved with treatment   Neuro/Psych: Uneventful            Sign Out: Acceptable/Baseline neuro status   Airway/Respiratory: Uneventful            Sign Out: Acceptable/Baseline resp. status   CV/Hemodynamics: Uneventful            Sign Out: Acceptable CV status; No obvious hypovolemia; No obvious fluid overload   Other NRE: NONE   DID A NON-ROUTINE EVENT OCCUR? No           Last vitals:  Vitals:    08/06/23 1834 08/07/23 0845 08/07/23 0932   BP: 125/75 106/66    Pulse: 100 83    Resp:   18   Temp: 37  C (98.6  F) 36.3  C (97.3  F)    SpO2: 97% 97%        Electronically Signed By: Ascencion Hart DO  August 7, 2023  10:43 AM
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
## 2023-08-07 NOTE — PROGRESS NOTES
Patient was taken to ECT at about 10:00 am accompanied by the security and the SIO staff. Patient received his scheduled Zyprexa 15 mg at 0928 for behavioral stabilization prior to ECT.    García Up DNP, RN.

## 2023-08-07 NOTE — PLAN OF CARE
Pt awake at start of shift.     Breathing quiet and unlabored when sleeping.     Pt had no c/o pain or discomfort during the HS.     Appears to have slept 4.75 hours.     Pt continues on 1:1 SIO with Acuity/5 ft space distance.     Pt was NPO after 0000.    Goal Outcome Evaluation:  Problem: Adult Behavioral Health Plan of Care  Goal: Adheres to Safety Considerations for Self and Others  Intervention: Develop and Maintain Individualized Safety Plan  Recent Flowsheet Documentation  Taken 8/7/2023 0000 by Tameka Hatch, RN  Safety Measures:   safety rounds completed   1:1 observation maintained  Goal: Absence of New-Onset Illness or Injury  Intervention: Identify and Manage Fall Risk  Recent Flowsheet Documentation  Taken 8/7/2023 0000 by Tameka Hatch, RN  Safety Measures:   safety rounds completed   1:1 observation maintained     Problem: Seclusion/Restraint, Behavioral  Goal: Absence of Harm or Injury  Outcome: Progressing

## 2023-08-07 NOTE — PROGRESS NOTES
"[facility], [place]   Psychiatric Progress Note  Hospital Day: 73        Interim History:   The patient's care was discussed with the treatment team during the daily team meeting and/or staff's chart notes were reviewed.  Staff report that Vinod continues to make paranoid statements, but no aggressive gestures towards staff overnight. Difficult engaging appropriately in OT group yesterday with some disorganized statements. BM yesterday evening. Slept 7 hours.     I met with Vinod in his room. He said that he is \"horrible\" today.  Denies constipation worried that we hate him and says he does not recall getting ECT ever. Aware of month/place/year. He says he is concerned that we are trying to \"make an exception\" for him and \"give me a lobotomy to get revenge on me.\" Was lying in bed for duration of interview.     Suicidal ideation: no    Homicidal ideation: None today    Psychotic symptoms: no AH or VH reported during interview. Delusions present and other psychotic symptoms suspected.    Medication side effects reported: None.     Acute medical concerns: none. He denies any pain or discomfort today.      Other issues reported by patient: Patient had no further questions or concerns.           Medications:      atropine  2 drop Sublingual TID    cloZAPine  650 mg Oral Daily    cyanocobalamin  1,000 mcg Oral Daily    divalproex sodium delayed-release  250 mg Oral 3 times per day on Sun Tue Thu    divalproex sodium delayed-release  250 mg Oral 3 times per day on Mon Wed Fri    divalproex sodium delayed-release  250 mg Oral 3 times per day on Sat    gabapentin  300 mg Oral 3 times per day on Sun Tue Thu    gabapentin  300 mg Oral 3 times per day on Mon Wed Fri    gabapentin  300 mg Oral 3 times per day on Sat    LORazepam  0.5 mg Oral 3 times per day on Sun Tue Thu    LORazepam  0.5 mg Oral 3 times per day on Mon Wed Fri    LORazepam  0.5 mg Oral 3 times per day on Sat    melatonin  10 mg Oral " At Bedtime    mineral oil-hydrophilic petrolatum   Topical BID IS    naproxen  250 mg Oral BID w/meals    OLANZapine zydis  15 mg Oral BID    Or    OLANZapine  10 mg Intramuscular BID    polyethylene glycol  17 g Oral BID    sennosides  2 tablet Oral BID    tamsulosin  0.4 mg Oral Daily    triamcinolone   Topical BID          Allergies:     Allergies   Allergen Reactions    Bee Venom Unknown    Montelukast Unknown    Phenothiazines Unknown          Labs:     No results found for this or any previous visit (from the past 24 hour(s)).         Psychiatric Examination:     /66 (Patient Position: Supine)   Pulse 83   Temp 97.3  F (36.3  C) (Temporal)   Resp 16   Wt 86.5 kg (190 lb 9.6 oz)   SpO2 97%   BMI 28.15 kg/m    Weight is 190 lbs 9.6 oz  Body mass index is 28.15 kg/m .    Weight over time:  Vitals:    07/03/23 1100 07/30/23 0902   Weight: 81.6 kg (179 lb 12.8 oz) 86.5 kg (190 lb 9.6 oz)       Orthostatic Vitals         Most Recent      Sitting Orthostatic /61 07/25 0800    Sitting Orthostatic Pulse (bpm) 85 07/25 0800          Cardiometabolic risk assessment. 06/07/23    Reviewed patient profile for cardiometabolic risk factors    Date taken /Value  REFERENCE RANGE   Abdominal Obesity  (Waist Circumference)   See nursing flowsheet Women ?35 in (88 cm)   Men ?40 in (102 cm)      Triglycerides  No results found for: TRIG    ?150 mg/dL (1.7 mmol/L) or current treatment for elevated triglycerides   HDL cholesterol  No results found for: HDL]   Women <50 mg/dL (1.3 mmol/L) in women or current treatment for low HDL cholesterol  Men <40 mg/dL (1 mmol/L) in men or current treatment for low HDL cholesterol     Fasting plasma glucose (FPG) Lab Results   Component Value Date     05/26/2023      FPG ?100 mg/dL (5.6 mmol/L) or treatment for elevated blood glucose   Blood pressure  BP Readings from Last 3 Encounters:   08/07/23 106/66   05/26/23 97/55    Blood pressure ?130/85 mmHg or treatment for  "elevated blood pressure   Family History  See family history     Mental Status Exam:  Appearance: awake, groggy, disheveled. No evidence of pain of acute distress.   Attitude:  suspicious, impulsive  Eye Contact:  fair, less intense  Mood:  \"horrible\"  Affect:  calm and cooperative, restricted, reactive  Speech: mostly coherent  Psychomotor Behavior:  No evidence of psychomotor agitation or restlessness today. No evidence of dystonia, or tics.  Throught Process: disorganized  Associations:  loosening of associations present  Thought Content:  Delusions. Likely auditory, tacticle and olfactory hallucinations. Cannot rule out visual hallucinations. Evidence of obsessive thinking and likely compulsions to harm others.   Insight:  limited  Judgement:  poor  Oriented to:  self, date, place  Attention Span and Concentration:  fair  Recent and Remote Memory:  poor         Precautions:     Behavioral Orders   Procedures    Assault precautions    Code 1 - Restrict to Unit    Code 2 - 1:1 Staff Supervision     For ECT only    Electroconvulsive therapy     Series of up to 12 treatments. Begin Date: 8/2/23     Treating Psychiatrist providing ECT:  unknown     Notified on:  7/28/23 via this order  NOTE: We received verbal confirmation from [facility] that [facility]on has been approved but awaiting official court order and risk management's review  Initial consult was completed by Dr. Hicks on 7/18/23    Electroconvulsive therapy     Series of up to 12 treatments. Begin Date: 8/2/23     Treating Psychiatrist providing ECT:  Dominga     Notified on:  8/2/23    Elopement precautions    Fall precautions    Routine Programming     As clinically indicated    Self Injury Precaution    Status 15     Every 15 minutes.    Status Individual Observation     Patient SIO status reviewed with team/RN.  Please also refer to RN/team documentation for add'l detail.    -SIO staff to monitor following which have contributed to patient being on " SIO:  Agitation  Pt is impulsive   Pt has ran out of his room naked  Pt has Parkinson symptoms place him in a high fall risk  Pt has verbal outburst of sexual and threaten statements  Pt requires immediate redirection when masturbating    -Possible interventions SIO staff could use to support patient's treatment progress:  Engage pt in activities    -When following observed, team will review discontinuation of SIO:  Absence of aggression toward others for > 24 hours    Comments: SIO 1:1     Order Specific Question:   CONTINUOUS 24 hours / day     Answer:   5 feet     Order Specific Question:   Indications for SIO     Answer:   Severe intrusiveness     Order Specific Question:   Indications for SIO     Answer:   Assault risk    Suicide precautions     Patients on Suicide Precautions should have a Combination Diet ordered that includes a Diet selection(s) AND a Behavioral Tray selection for Safe Tray - with utensils, or Safe Tray - NO utensils            Diagnoses:     #Unspecified psychosis likely schizophrenia per history, rule out Bipolar affective disorder, manic  #Unspecified encephalopathy   -R/O catatonia   -Episodes of unresponsiveness, concern for PNES   #Parkinsonism 2/2 neuroleptic medications, rule out Parkinson's Disease  #Urinary retention and BPH  -Possible UTI -- UC resulted on 5/25 w/out growth  #Hx of prolonged QTc with clozapine  #Mild thrombocytopenia         Assessment and hospital summary:  Patient was admitted to psychiatric unit for safety, stabilization and medication management. He has had schizophrenia since the 1980. He was on Clozaril x 25 years, and it was tapered and discontinued on May 7, 2023  due to prolonged qtc of 527, and his psychotic  symptoms have worsened since then. Sinemet was also discontinued recently due to concerns that it was contributing to paranoia and AH. He is agitated, aggressive, dangerous to self and others. He remains on SIO 2 to 1, and is under psychiatric  emergency and court hold. There are concerns for organic etiology given pattern of sundowning, history of parkinsonism, and ongoing disorientation/confusion. : EKG repeated, cardiology consult regarding safety of resuming clozapine in the context of prolonged QTc and refractory schizophrenia pending response. Per cardiology, correct QTc is no more than 460. They do not have concerns about AP retrial. Neurology IP consult placed for evaluation of sundowning and cognitive decline secondary to Sinemet discontinuation. MSSA initiated. Per Neurology, discontinuation of Sinemet would not account for these symptoms. They do not recommend retrial at this time. : Clozapine titration continued.   Its possible that clozapine dose is contributing to worsened compulsive behaviors noted throughout hospitalization which could improve with lowering the dose.     Chart reviewed which revealed the followin2022: He was on clozapine, Seroquel, Cymbalta, and Carbidopa-levodopa and hospitalized for pneumonia. Psych consulted and Seroquel was stopped. Treated with Abx and discharged to TCU.     2022: Hospitalized at [place]. Per chart review:    Vinod Lee is a 61 yo male with longstanding history of schizophrenia. He was admitted from [facility] ED, where he presented due to increased delusional thoughts while on a visit to his parents for Thanksgiving. He began experiencing increasing paranoid thoughts that someone might be poisoning him and began fearing that he might accidentally harm someone. He reported to his parents that he was having thoughts about hitting someone or beating someone up and told them he could not guarantee they would be safe with him. Parents encouraged patient to go to the ED, which he did voluntarily.     Per patient report and chart review, he was hospitalized several times in the  and reports he was civilly committed at one point in the , however he has been quite stable for  "the past several decades. He reports he will experience some paranoia and delusional thinking at times, but generally has good insight into his symptoms. He has been maintained on clozapine for about 25 years and prior to several months ago was also concurrently prescribed quetiapine.      In the last several months, patient has had a number of medication changes. Approximately 6 months ago, patient was diagnosed with parkinsonism related to antipsychotic use and was started on carbidopa-levidopa. He experienced no improvement in tremors, and thus carbidopa- levidopa dose was increased 1.5 weeks ago.      In addition, patient was medically hospitalized in August 2022 for confusion, weakness, repeated falls, ongoing weight loss, and SOB. He was found to have a cavitary lesion of the lung and suspected aspiration pneumonia. He was treated with antibiotics. At that time, he was taking current dose of clozapine and duloxetine, as well as propranolol ER, quetiapine, gabapentin, and clonazepam. Psychiatry was consulted due to concern for oversedation, and propranolol ER, gabapentin, and quetiapine were discontinued. Conazepam was reduced from 0.5 mg TID to BID dosing and recommended to be discontinued, however, it does not appear this occurred. His sedation improved significantly. QTc prolongation was also noted, but improved throughout his stay. He was ultimately discharged to a TCU for several months before returning home. He reports his weakness has greatly improved and states he \"graduated\" physical therapy, though he continues to be diligent in completed recommended exercises.      He reports that over the past several months, his delusions and anxiety have been worse than usual, with symptoms becoming much more acute since the carbidopa-levidopa dose was increased. He has felt increasingly paranoid about being poisoned, noting this is a delusion that has been stable over time, but was previously less intrusive. He " "has insight during the interview that his paranoid thinking is not reality based, but states it has been harder to separate delusions from reality and he becomes very worried that he might hurt someone, despite no history of violent behavior. He reports that the paranoia about his food being poisoned in combination with the tremors in his hands have made it difficult for him to eat. He has also had quite low appetite for the past 6 months to a year . He reports that due to the combination of these factors, he has lost about 50 pounds in the last year.He denies any problems with sleep initiation or maintenance. He reports energy is fairly good and \"better than it used to be.\" He reports some short term memory issues and on interview, does have some difficulty remembering details of recent events. He is unsure if he has received cognitive testing in the past.    He denies any suicidal ideation and reports he has not experienced SI for decades. He denies homicidal thoughts and is very clear that he had and has no desire to harm anyone else, but was afraid he might somehow do so. He denies any hallucinations and states \"that's never been a problem for me.\" He is not observed responding to internal stimuli. He is alert and oriented in all spheres.        ========    BRIEF HOSPITAL COURSE: This 62 y.o. male admitted 11/25/2022 to  [facility] for the assessment and treatment of the above presentation. This was a voluntary admission.     In summary, he was tapered off the Sinemet, which he reports to be both ineffective and potentially worsening the anxiety and paranoia ideations. Instead Benadryl 25 mg TID was started to help with extrapyramidal side effects. He struggled with sleep during his stay here. Remeron 7.5mg QHS was tried without success. Seroquel 100mg qhs was restarted with addition of suvorexant 10mg qhs. Given his Seroquel PRN use prior history of prolonged QTC, he will benefit from another EKG repeat on the " medical unit.     Unfortunately, he tested positive for influenza A and developed hypoxemia. Now on 2L oxygen with desats when prone requiring 3L oxygen. Subsequently, the plan will to be tapering him off clonazepam due to hypoxia (and also prior plan to taper). The patient tolerated these changes without side effects.     The patient minimally benefited from the structured therapeutic milieu as he attended programming seldom as he tested positive for influenza, he needed to isolate with droplet precautions. Pt was engaged and worked on issues that were triggering/brought pt to the hospital and has excellent insight into his anxiety and paranoid delusions. Pt denied suicidal ideations throughout all/majority of their hospitalization. Pt denied HI throughout all of hospitalization. There were no concerns with behavioral disruptions/outbursts. The patient has shown improvement in general.     At the time of discharge, hospitalization course was reviewed with Vinod Lee with plan to transfer to medicine. Please consult psychiatry and I will continue to follow while patient is on the medical unit. He is now off sinemet since 11/29 21:00 and I would expect anxiety and paranoia to improve more moving forward. Psychiatrically, once anxiety and paranoia is baseline, can D/C to home once medically stable. He DOES NOT NEED A 1:1 on the medical unit from a mental health perspective, we initiated 1:1 11/30/2022 due to significant fatigue, gait instability (he was unable to stand without assist) and feeding assist.    04/2023: Clozapine was gradually tapered and discontinued, unclear reasons why it was discontinued per outside records, though Dr. Portillo's H&P indicates that it was due to prolonged QTc.       Today's Changes:  - no changes, ECT today   - paged cardiology fellow to review EKG     Target psychiatric symptoms and interventions:  -Psych emergency declared on 5/27, now fully committed  -ECT Mon/Wed/Fri per  Janelle   -Continue clozapine as above  -Continue scheduled Zyprexa 15mg BID  -Continue 1:1 for safety of staff and patients, reduced from 2:1 7/23/23  - weekly CBC to monitor platelets      -Continue Gabapentin 300 mg TID as staff believe it has been effective for treatment of his anxiety  -Discussed complex case at length with Dr. Hatch (primary provider on geriatic unit) who provided recs (see second opinion note dated 6/14). Appreciate assistance. I have since made the following changes:         1) Add propranolol 10 mg TID         2) Added Depakote 1000 mg qhs (to be administered in magic cup)         3) Nutrition consult to order magic cup         4) Increased melatonin to 10 mg QHS    Risks, benefits, and alternatives discussed at length with patient.     Acute Medical Problems and Treatments:  Delirium vs progression of dementia  Neurology consult placed on 6/8 for evaluation of sundowning and cognitive decline secondary to Sinemet discontinuation: Resuming Sinemet not recommended for behavioral concerns. Please see Neuro note for details.     Thrombocytopenia   Likely secondary to Depakote.  According to the, incidents of Depakote induced thrombocytopenia as 27%.  Depakote dose reduced from 1000 twice daily to 250 3 times daily on 7/28/2023  - weekly CBCs    Constipation  Likely worsened by clozapine, improved since modifying regimen.   - daily miralax   - daily Senokot 2 tablets BID      Right first toe fracture:  Seen by Ortho on 6/16:  Per note: Vinod Lee is a 62 year old  male w/ PMHx complex psychiatric history who has a fracture to the distal phalanx of the right toe after a recent trauma to the foot the patient reports.  Patient denies any other pain or discomfort.  Images reviewed and plan will be for irrigation of the popped fracture blister with sterile saline and the toes should be dressed and a 4 x 4 gauze dressing.  Patient will need a postop shoe which she can be weightbearing as  "tolerated in.  Would recommend a course of antibiotics for approximately 7 days.  Would recommend Augmentin or clindamycin.  Podiatry will be made aware of patient and will see patient while he is admitted or if patient is discharged in the near future a follow-up appointment will need to be established.     Remainder of care per primary team.  Primary team should make sure that patient is up-to-date on his tetanus shot.  If not patient will require a booster shot.    Hx of prolonged QTc:  Continue weekly EKGs. See above for details.     Urinary retention, resolved  Per patient care order:   If patient is refusing straight caths, please notify IM provider immediately to determine whether this constitutes a medical emergency. If IM declares medical emergency, may restrain patient for straight cath. Discussed this with patient's parents/substitute decision makers on 6/7 who are in support of this plan.    # Psoriasis hx  # Purplish discoloration of B/LE feet-resolved   Discussed with Dr. Rene and bedside RN regarding rash on right foot that appears and appears to be purplish in color and almost \"petechiae.\" It was noted during interview, but seemed to be resolving upon walking. Within the past 24 hrs, pt has had ECT and seemed more confused than usual. Pt seems to have had a right great toe wound with recent right great toe fracture seen by Medicine on 7/16. Seen on 8/4 with improving color on B/L legs at rest and appears to have small, scaly patches of varying coin sizes that have not grown past marked borders. See photos. Per further chart review, has had psoriasis history. WBC WNL, no fevers, HDS. This appears to more consistent with a psoriasis like picture.   - Start triamcinolone topical 0.025%   -Apply twice daily until lesions for 2 weeks or until lesions resolve/  -Monitor for skin thinning, striae, rebound lesion flares.   - Start Eucerin cream              - Apply 30 minutes PRIOR to triamcinolone " topical cream above or PRN as needed if dry skin/after bathing   - Medicine will sign off.   - For worsening pain, spread, itchiness, fevers, please contact Medicine and possibly Dermatology.    Benzodiazepine dependence:  Phenobarbital taper completed    Pertinent labs/imaging:  Weekly CBC with diff     Behavioral/Psychological/Social:  - Encourage unit programming    Safety:  - Continue precautions as noted above  - Status 15 minute checks  - Continue 1:1 for staff and pt safety    Legal Status: Fully committed with [facility] and [facility]. [place] medications: clozapine, olanzapine, risperidone, quetiapine      Disposition Plan   Reason for ongoing admission: poses an imminent risk to self, poses an imminent risk to others and is unable to care for self due to severe psychosis or darryl  Discharge location:  assisted living facility  Discharge Medications: not ordered  Follow-up Appointments: not scheduled    Entered by: Merlin Richey DO on 08/07/2023  at 9:09 AM    Attestation:

## 2023-08-07 NOTE — PROGRESS NOTES
Patient returned from ECT at about 11:35, alert and oriented X 3. Immediate vital signs B/P 125/84, HR 98, Resp. 20 Temp 98.2 02 sat 94% R/A. Patient observed to be having circumoral tremor and mild drooling. Patient was cooperative. SIO staff in room with the patient at this time. Will continue to monitor and follow plan of care.      García Up DNP, RN.   12-Jan-2023 12:08

## 2023-08-07 NOTE — PLAN OF CARE
RN Assessment   Overall had a good shift. Minimal changes in MSE. Remains disorganized, made paranoid statement, and appeared to be responding, but remained calm and made no aggressive gestures towards staff. No BM this shift, however stated that had one yesterday. Urinating without difficulty. Ate all meals.   NPO after midnight for ECT tomorrow AM  Adherent to scheduled medications. Increase in sialorrhea noted this shift. Was given atropine PRN dose.   /75 (BP Location: Left arm, Cuff Size: Adult Regular)   Pulse 100   Temp 98.6  F (37  C) (Oral)   Resp 16   Wt 86.5 kg (190 lb 9.6 oz)   SpO2 97%   BMI 28.15 kg/m

## 2023-08-07 NOTE — PROGRESS NOTES
Patients VSS, A/O, IV removed, meets phase 2 criteria and is able to move to [facility] at this time. Report given to RN, pt transported by staff with .

## 2023-08-07 NOTE — PLAN OF CARE
Assessment/Intervention/Current Symtoms and Care Coordination:  Reviewed chart. Met with team to review patient's current functioning, needs, progress, and impediments to discharge.   Provided update to his [facility] , Vicky.     Discharge Plan or Goal:  When patient is more stable a referral for [facility] in [place] will be made, referral for [facility] in [place] will be made      Barriers to Discharge:  Patient requires further psychiatric stabilization due to current symptomology. He continues to present as disorganized and tangential with paranoid thought content. He presents with mood lability. He vacillates between being pleasant and being aggressive with no clear environmental triggers. Patient endorses auditory hallucinations.     Referral Status:  Referral for housing pending psychiatric stabilization  Considerations include: [facility] in [place], [facility] in [place]      [facility] Status:  [facility]    [facility]: [place]  File Number: [id number]  Issued 07/06/23 and is not to exceed six months    Contacts:  : Vicky Valdez with [facility], [phone]  Psychiatrist: Dr. Santana at [facility], [phone] PCP: Attila Urena DO  Mom: Alberta, [phone] (H) [phone] (M)   Dad: Ino, [phone] (H)  Sister, Sandra Treadwell, [phone] (KING)   [facility]'s Office Fax: [phone]  Pia May: [phone]  PAMELA  with [facility]: Regina Wong, [phone]      Upcoming Meetings and Dates/Important Information and next steps:  Referrals for housing pending psychiatric stabilization

## 2023-08-07 NOTE — ANESTHESIA PREPROCEDURE EVALUATION
Anesthesia Pre-Procedure Evaluation    Patient: Vinod Lee   MRN: [id number] : 1960        Procedure :           Past Medical History:   Diagnosis Date    Parkinson's disease (H)     Schizophrenia (H)       No past surgical history on file.   Allergies   Allergen Reactions    Bee Venom Unknown    Montelukast Unknown    Phenothiazines Unknown      Social History     Tobacco Use    Smoking status: Not on file    Smokeless tobacco: Never   Substance Use Topics    Alcohol use: Not on file     Comment: GRACE      Wt Readings from Last 1 Encounters:   23 86.5 kg (190 lb 9.6 oz)           Physical Exam    Airway        Mallampati: III   TM distance: > 3 FB   Neck ROM: full   Mouth opening: > 3 cm    Respiratory Devices and Support         Dental       (+) Modest Abnormalities - crowns, retainers, 1 or 2 missing teeth      Cardiovascular          Rhythm and rate: regular     Pulmonary   pulmonary exam normal                OUTSIDE LABS:  CBC:   Lab Results   Component Value Date    WBC 10.2 2023    WBC 7.8 2023    HGB 11.3 (L) 2023    HGB 12.0 (L) 2023    HCT 35.0 (L) 2023    HCT 36.7 (L) 2023     (L) 2023     2023     BMP:   Lab Results   Component Value Date     2023     2023    POTASSIUM 4.9 2023    POTASSIUM 4.9 2023    CHLORIDE 106 2023    CHLORIDE 107 2023    CO2 24 2023    CO2 24 2023    BUN 37.8 (H) 2023    BUN 24.8 (H) 2023    CR 1.05 2023    CR 0.99 2023     (H) 2023    GLC 93 2023     COAGS: No results found for: PTT, INR, FIBR  POC: No results found for: BGM, HCG, HCGS  HEPATIC:   Lab Results   Component Value Date    ALBUMIN 3.6 2023    PROTTOTAL 5.6 (L) 2023    ALT 14 2023    AST 18 2023    ALKPHOS 73 2023    BILITOTAL 0.2 2023     OTHER:   Lab Results   Component Value Date    LACT 1.5 2023     BRENDA 8.3 (L) 08/04/2023    PHOS 3.8 05/25/2023    MAG 2.1 05/25/2023    TSH 1.80 05/22/2023       Anesthesia Plan    ASA Status:  3       Anesthesia Type: General.   Induction: Intravenous.           Consents            Postoperative Care            Comments:                Ascencion Hart DO

## 2023-08-07 NOTE — PROGRESS NOTES
SIO staff called the attention of this writer to see Vinod, who was very restless and attempting to jump in bed and jump out in a hurry. On observation, patient appears restless but was redirectable, patient wants to continue to stay in the stay in the bathroom but staff would not allow him, continue to redirect to stay in bed. Staff described a time when patient wanted to hit his head against the wall by lurching  forward but staff steadied him.     García Up, DNP, RN

## 2023-08-07 NOTE — PLAN OF CARE
Problem: Suicidal Behavior  Goal: Suicidal Behavior is Absent or Managed  Outcome: Progressing  Note: Patient appears confused but not suicidal     Problem: Behavioral Disturbance  Goal: Behavioral Disturbance  Description: Signs and symptoms of listed problems will be absent or manageable by discharge or transition of care.  Outcome: Not Progressing  Flowsheets (Taken 8/7/2023 4187)  Behavioral Disturbance Present:   psychomotor activity   affect   anxiety  Note: Patient demonstrated some bizarre behavior this afternoon after ECT   Goal Outcome Evaluation:       Patient continues to demonstrate some bizarre and unusual behavior this afternoon after he returned from ECT and being initially cooperative with cares. Patient received his 0800 medication and 12 Noon medication at about 12:20, offered prn tylenol following this gross agitation and now settling down. Writer deems it fit to delay his 1400 schedule medications because of the delay in receiving the morning medications because of ECT. This was conveyed to the charge RN who is in support of this delay. Hopefully might be Ok giving the medications at 4 pm. Meanwhile the prn Tylenol seems to be effective as patient is napping in his room at this time (Please see previous notes about the sudden agitation and other superimposing behaviors patient manifested).             García Up DNP, RN
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
## 2023-08-08 PROCEDURE — 93010 ELECTROCARDIOGRAM REPORT: CPT | Performed by: INTERNAL MEDICINE

## 2023-08-08 PROCEDURE — 250N000013 HC RX MED GY IP 250 OP 250 PS 637: Performed by: PHYSICIAN ASSISTANT

## 2023-08-08 PROCEDURE — 99232 SBSQ HOSP IP/OBS MODERATE 35: CPT | Performed by: PSYCHIATRY & NEUROLOGY

## 2023-08-08 PROCEDURE — G0177 OPPS/PHP; TRAIN & EDUC SERV: HCPCS

## 2023-08-08 PROCEDURE — 250N000013 HC RX MED GY IP 250 OP 250 PS 637: Performed by: PSYCHIATRY & NEUROLOGY

## 2023-08-08 PROCEDURE — 93005 ELECTROCARDIOGRAM TRACING: CPT

## 2023-08-08 PROCEDURE — 124N000002 HC R&B MH UMMC

## 2023-08-08 PROCEDURE — 250N000013 HC RX MED GY IP 250 OP 250 PS 637: Performed by: STUDENT IN AN ORGANIZED HEALTH CARE EDUCATION/TRAINING PROGRAM

## 2023-08-08 RX ADMIN — TRIAMCINOLONE ACETONIDE: 0.25 CREAM TOPICAL at 19:31

## 2023-08-08 RX ADMIN — OLANZAPINE 15 MG: 10 TABLET, ORALLY DISINTEGRATING ORAL at 07:59

## 2023-08-08 RX ADMIN — CYANOCOBALAMIN TAB 1000 MCG 1000 MCG: 1000 TAB at 07:59

## 2023-08-08 RX ADMIN — TAMSULOSIN HYDROCHLORIDE 0.4 MG: 0.4 CAPSULE ORAL at 07:59

## 2023-08-08 RX ADMIN — SENNOSIDES 2 TABLET: 8.6 TABLET ORAL at 07:59

## 2023-08-08 RX ADMIN — POLYETHYLENE GLYCOL 3350 17 G: 17 POWDER, FOR SOLUTION ORAL at 19:27

## 2023-08-08 RX ADMIN — POLYETHYLENE GLYCOL 3350 17 G: 17 POWDER, FOR SOLUTION ORAL at 07:58

## 2023-08-08 RX ADMIN — CLOZAPINE 650 MG: 100 TABLET, ORALLY DISINTEGRATING ORAL at 12:50

## 2023-08-08 RX ADMIN — GABAPENTIN 300 MG: 300 CAPSULE ORAL at 13:05

## 2023-08-08 RX ADMIN — GABAPENTIN 300 MG: 300 CAPSULE ORAL at 07:58

## 2023-08-08 RX ADMIN — DIVALPROEX SODIUM 250 MG: 250 TABLET, DELAYED RELEASE ORAL at 07:59

## 2023-08-08 RX ADMIN — OLANZAPINE 15 MG: 10 TABLET, ORALLY DISINTEGRATING ORAL at 19:27

## 2023-08-08 RX ADMIN — DIVALPROEX SODIUM 250 MG: 250 TABLET, DELAYED RELEASE ORAL at 13:05

## 2023-08-08 RX ADMIN — ATROPINE SULFATE 2 DROP: 10 SOLUTION/ DROPS OPHTHALMIC at 19:32

## 2023-08-08 RX ADMIN — LORAZEPAM 0.5 MG: 0.5 TABLET ORAL at 13:05

## 2023-08-08 RX ADMIN — LORAZEPAM 0.5 MG: 0.5 TABLET ORAL at 17:22

## 2023-08-08 RX ADMIN — WHITE PETROLATUM: 1.75 OINTMENT TOPICAL at 19:29

## 2023-08-08 RX ADMIN — GABAPENTIN 300 MG: 300 CAPSULE ORAL at 17:22

## 2023-08-08 RX ADMIN — WHITE PETROLATUM: 1.75 OINTMENT TOPICAL at 07:59

## 2023-08-08 RX ADMIN — TRIAMCINOLONE ACETONIDE: 0.25 CREAM TOPICAL at 07:59

## 2023-08-08 RX ADMIN — DIVALPROEX SODIUM 250 MG: 250 TABLET, DELAYED RELEASE ORAL at 17:22

## 2023-08-08 RX ADMIN — NAPROXEN 250 MG: 250 TABLET ORAL at 17:22

## 2023-08-08 RX ADMIN — Medication 10 MG: at 19:27

## 2023-08-08 RX ADMIN — LORAZEPAM 0.5 MG: 0.5 TABLET ORAL at 07:58

## 2023-08-08 RX ADMIN — NAPROXEN 250 MG: 250 TABLET ORAL at 07:58

## 2023-08-08 RX ADMIN — ATROPINE SULFATE 1 DROP: 10 SOLUTION/ DROPS OPHTHALMIC at 14:39

## 2023-08-08 RX ADMIN — SENNOSIDES 2 TABLET: 8.6 TABLET ORAL at 19:27

## 2023-08-08 RX ADMIN — ATROPINE SULFATE 2 DROP: 10 SOLUTION/ DROPS OPHTHALMIC at 07:59

## 2023-08-08 ASSESSMENT — ACTIVITIES OF DAILY LIVING (ADL)
ADLS_ACUITY_SCORE: 84
DRESS: PROMPTS;INDEPENDENT
ADLS_ACUITY_SCORE: 84
ADLS_ACUITY_SCORE: 64
ADLS_ACUITY_SCORE: 84
HYGIENE/GROOMING: PROMPTS;INDEPENDENT
ADLS_ACUITY_SCORE: 84
LAUNDRY: UNABLE TO COMPLETE
ADLS_ACUITY_SCORE: 64
ORAL_HYGIENE: PROMPTS;INDEPENDENT
ADLS_ACUITY_SCORE: 64
ADLS_ACUITY_SCORE: 64
ORAL_HYGIENE: PROMPTS;INDEPENDENT
ADLS_ACUITY_SCORE: 84
ADLS_ACUITY_SCORE: 84
ADLS_ACUITY_SCORE: 64
ADLS_ACUITY_SCORE: 64
HYGIENE/GROOMING: PROMPTS;INDEPENDENT

## 2023-08-08 NOTE — PLAN OF CARE
Goal Outcome Evaluation:         Problem: Sleep Disturbance  Goal: Adequate Sleep/Rest  Outcome: Progressing    Patient appeared to sleep 5 hours this night shift.  No prns or snacks given or requested.  No concerns were reported or noted.  EKG today at 1000 and ECT tomorrow morning.

## 2023-08-08 NOTE — PLAN OF CARE
"Problem: Adult Behavioral Health Plan of Care  Goal: Patient-Specific Goal (Individualization)  Outcome: Progressing   Goal Outcome Evaluation:    Disorganized, occasionally demonstrating bizarre behavior. Receptive to redirection. Withdrawn. Showered. Generally cooperative. No aggressive gestures noted towards staff.    When attempted to give pt scheduled Miralax, pt threw it into the toilet and stated \"I don't have to take all of my medications.\" Given scheduled senokot. No BM reported this shift.     Pt displaying tremors and sialorrhea. Given scheduled atropine. Refused scheduled aquaphor.     /74 (BP Location: Left arm, Patient Position: Sitting, Cuff Size: Adult Regular)   Pulse 109   Temp 97  F (36.1  C) (Temporal)   Resp 18   Wt 86.5 kg (190 lb 9.6 oz)   SpO2 98%   BMI 28.15 kg/m      "

## 2023-08-08 NOTE — PLAN OF CARE
"Assessment/Intervention/Current Symtoms and Care Coordination:  Reviewed chart. Met with team to review patient's current functioning, needs, progress, and impediments to discharge.   Patient was visible in the milieu and participated in coloring in the afternoon. He reported feeling, \"Not very good.\" He was very disorganized in his thought process.     Discharge Plan or Goal:  When patient is more stable a referral for [facility] in [place] will be made, referral for [facility] in [place] will be made      Barriers to Discharge:  Patient requires further psychiatric stabilization due to current symptomology. He continues to present as disorganized and tangential with paranoid thought content. He presents with mood lability. He vacillates between being pleasant and being aggressive with no clear environmental triggers. Patient endorses auditory hallucinations.     Referral Status:  Referral for housing pending psychiatric stabilization  Considerations include: [facility] in [place], [facility] in [place]      Legal Status:  Commitment with [facility]: [place]  File Number: [id number]  Issued 07/06/23 and is not to exceed six months    Contacts:  : Vicky Valdez with [facility], [phone]  Psychiatrist: Dr. Santana at [facility], [phone] PCP: Attila Urena DO  Mom: Alberta, [phone] (H) [phone] (M)   Dad: Ino, [phone] (H)  Sister, Sandra Treadwell, [phone] (KING)   [facility]'s Office Fax: [phone]  Pia May: [phone]  CADI  with [facility]: Regina Wong, [phone]      Upcoming Meetings and Dates/Important Information and next steps:  Referrals for housing pending psychiatric stabilization                                                 "

## 2023-08-08 NOTE — PROGRESS NOTES
PSYCHIATRY PROGRESS NOTE         DATE OF SERVICE:   8/8/2023         CHIEF COMPLAINT:     Patient does not remember he had ECT yesterday, perseverates that he is filthy pervert.          OBJECTIVE:     Nursing reports : Patient had 5 hours of sleep, took medications, scheduled for ECT tomorrow, had EKG today     reports working on outpatient referrals, patient is under commitment with [facility]  Contacts:  Assisted Living: [facility], [phone]  Per H&P: Vicky Gallego , [phone], psychiatric provider Dr. Santana at [facility], [phone], primary care provider Attila Urena,     Medicine consult by JUSTINA Gibson  6/17/2023  Brief Medicine Note  Medicine consulted by Dr. Rhodes of Psychiatry for right toe fracture. Patient injured his right toe while banging his foot into the wall or door of his room.    X-ray of the right foot yesterday revealed an acute comminuted and placed fracture throughout the first right distal phalanx with intra-articular extension.  Orthopedics consulted yesterday and evaluated the patient.  They recommend a postop shoe with weightbearing as tolerated.  They will be asking podiatry to see the patient either during this hospitalization or in clinic.  Orthopedics is additionally recommending a 7-day course of prophylactic Augmentin or clindamycin for his open blister near the fracture site.   Plan:   - Agree with plan of care as already in place by Orthopedics   - Patient received post-op shoe yesterday   - I ordered Augmentin twice daily to complete a 7-day course    - He is overdue for his tetanus vaccination, this was due in 2010.  He additionally is in need of a booster due to his foot injury as recommended by orthopedics.  Tdap booster ordered, please administer as able.     Medicine consult by Eva Morgan CNP on 8/4/2023  Assessment & Plan  Vniod Lee is a 63 year old male admitted on 5/26/2023. He has a  "past medical history of schizophrenia, Parkinson's Disease, who was initially admitted on 5/18/23 for AMS, aggression. Broad workup negative, and ultimately transferred to station 12N on 5/26/23 for further psychiatric monitoring and management. Medicine has been involved intermittently throughout his hospitalization and is most recently being consulted on 8/3 for evaluation of skin changes of R foot and purplish discoloration of both feet below the ankles.  Medicine will sign off at this time.  Please contact us if patient develops assisted systemic symptoms, increasing rash, or new discoloration of feet. Please also consider a dermatology consult if patient does not improve on therapy as below.  # Rash, right foot concerning for psoriasis  # Psoriasis hx  # Purplish discoloration of B/LE feet-resolved   Discussed with Dr. Rene and bedside RN regarding rash on right foot that appears and appears to be purplish in color and almost \"petechiae.\" It was noted during interview, but seemed to be resolving upon walking. Within the past 24 hrs, pt has had ECT and seemed more confused than usual. Pt seems to have had a right great toe wound with recent right great toe fracture seen by Medicine on 7/16. Seen on 8/4 with improving color on B/L legs at rest and appears to have small, scaly patches of varying coin sizes that have not grown past marked borders. See photos. Per further chart review, has had psoriasis history. WBC WNL, no fevers, HDS. This appears to more consistent with a psoriasis like picture.   - Start triamcinolone topical 0.025%   -Apply twice daily until lesions for 2 weeks or until lesions resolve/  -Monitor for skin thinning, striae, rebound lesion flares.   - Start Eucerin cream              - Apply 30 minutes PRIOR to triamcinolone topical cream above or PRN as needed if dry skin/after bathing   - Medicine will sign off.   - For worsening pain, spread, itchiness, fevers, please contact Medicine and " possibly Dermatology.         ECT # 3 by Dr. Orr on 8/7/2023  ECT number at [facility]: 3   Treatment number this series: 3   Lifetime total treatment number: 3     Type of ECT:  Bilateral, standard   ECT Strip Summary:   Energy Level: Titration: 25.6 mC;   312 mC; 1 msec; 25 Hz; 7.8 sec; 800 mA - Hyperventilate  Motor Seizure Duration:  0 seconds  EEG Seizure Duration:  0 seconds    Repeat: 576 mC; 1 msec; 45 Hz; 7.8 sec; 800 mA - Hyperventilate   Motor Seizure Duration:  22 seconds  EEG Seizure Duration:  22 seconds           SUBJECTIVE:      Vinod  says he does not remember that he had ECT yesterday.  He knows it is August 2023.  He does not know the date.  He knows that he is at [facility].  He denies thoughts of hurting himself or others.  Presently he denies hallucinations.  He says that he has paranoid delusions.  He says that he did terrible things in the past.  He calls himself reece leslie because he says that he for medicated with animals and he is checked at the head of the [place].  He says he did many other bad things.  I ask him if he is thinking about doing that now and he denies that.  I tried to refocus him on present time and working toward improvement for the future.  He says it is difficult to do that.  He is not yelling.  He appears depressed.  He has chronic persistent mental illness.  He reports decreased energy and decreased concentration.  He slept about 5 hours last night.  He had EKG this morning.  He continues to have prolonged QT QTc.  He is on Clozaril.  He had WBC of 10.2 on August 4, 2023, absolute neutrophil count is 8.3.  He has decreased hemoglobin of 11.3 and RBC of 3.98 and platelet count of 145 and hematocrit of 35.  He was evaluated by Nicolas Hancock nurse practitioner 8/4/2023         MEDICATIONS:      atropine  2 drop Sublingual TID    cloZAPine  650 mg Oral Daily    cyanocobalamin  1,000 mcg Oral Daily    divalproex sodium delayed-release  250 mg Oral 3 times per day on  Sun Tue Thu    divalproex sodium delayed-release  250 mg Oral 3 times per day on Mon Wed Fri    divalproex sodium delayed-release  250 mg Oral 3 times per day on Sat    gabapentin  300 mg Oral 3 times per day on Sun Tue Thu    gabapentin  300 mg Oral 3 times per day on Mon Wed Fri    gabapentin  300 mg Oral 3 times per day on Sat    LORazepam  0.5 mg Oral 3 times per day on Sun Tue Thu    LORazepam  0.5 mg Oral 3 times per day on Mon Wed Fri    LORazepam  0.5 mg Oral 3 times per day on Sat    melatonin  10 mg Oral At Bedtime    mineral oil-hydrophilic petrolatum   Topical BID IS    naproxen  250 mg Oral BID w/meals    OLANZapine zydis  15 mg Oral BID    Or    OLANZapine  10 mg Intramuscular BID    polyethylene glycol  17 g Oral BID    sennosides  2 tablet Oral BID    tamsulosin  0.4 mg Oral Daily    triamcinolone   Topical BID     acetaminophen, alum & mag hydroxide-simethicone, atropine, benzonatate, bisacodyl, haloperidol **AND** [DISCONTINUED] LORazepam **AND** diphenhydrAMINE, haloperidol lactate **AND** [DISCONTINUED] LORazepam **AND** diphenhydrAMINE, gabapentin, lidocaine, magnesium hydroxide, mineral oil-white petrolatum, OLANZapine **OR** OLANZapine, senna-docusate, traZODone    Medication adherence: Yes  Medication side effects: Prolonged QT QTc 430/514 on 8/8/2023, patient has had multiple as needed neuroleptics, which can further contribute to prolongation of QT QTc  Benefit: Limited symptom reduction on Clozaril, Zyprexa and Depakote, now on ECT due to side effects of medications, and limited response          ROS:   Review of systems is negative for: General, eyes, ears, nose, throat, neck, respiratory, cardiovascular, gastrointestinal, genitourinary, musculoskeletal, neurological, hematological and endocrine system.         MENTAL STATUS EXAM:   /77   Pulse 80   Temp 97.4  F (36.3  C) (Temporal)   Resp 16   Wt 86.5 kg (190 lb 9.6 oz)   SpO2 97%   BMI 28.15 kg/m      Appearance: Fair  hygiene  Orientation: To self and place, month and year, not day and date  Speech: Normal in rate and tone  Language ability: Normal syntax and vocabulary  Thought process: Perseverative  Thought content: Paranoid delusions, denies hallucinations  Associations: Connected  Suicidal Ideation: Denies  Homicidal Ideation: Denies  Mood: Depressed  Affect: irritable  Intellectual functioning:average  Fund of Knowledge: consistent with education and experience   Attention/Concentration: decreased  Memory: intact  Psychomotor Behavior: Less agitated,   Muscle Strength and Tone: no atrophy or involuntary movement  Gait and Station: steady  Insight and judgement:impaired          LABS:   personally reviewed.     Lab Results   Component Value Date     06/07/2023     05/25/2023     05/24/2023    CO2 26 06/07/2023    CO2 26 05/25/2023    CO2 27 05/24/2023    BUN 17.7 06/07/2023    BUN 20.0 05/25/2023    BUN 18.8 05/24/2023     No results found for: CKTOTAL, CKMB, TROPONINI  Lab Results   Component Value Date    WBC 9.0 06/22/2023    WBC 8.8 06/20/2023    WBC 8.3 06/14/2023    HGB 12.4 06/22/2023    HGB 12.5 06/20/2023    HGB 12.0 06/14/2023    HCT 38.5 06/22/2023    HCT 39.6 06/20/2023    HCT 37.3 06/14/2023    MCV 87 06/22/2023    MCV 88 06/20/2023    MCV 85 06/14/2023     06/22/2023     06/20/2023     06/14/2023       Latest Reference Range & Units 05/25/23 05:25 05/26/23 13:49 05/26/23 18:02   Sodium 136 - 145 mmol/L 141       Potassium 3.4 - 5.3 mmol/L 3.5       Chloride 98 - 107 mmol/L 106       Carbon Dioxide (CO2) 22 - 29 mmol/L 26       Urea Nitrogen 8.0 - 23.0 mg/dL 20.0       Creatinine 0.67 - 1.17 mg/dL 1.03       GFR Estimate >60 mL/min/1.73m2 82       Calcium 8.8 - 10.2 mg/dL 8.7 (L)       Anion Gap 7 - 15 mmol/L 9       Magnesium 1.7 - 2.3 mg/dL 2.1       Phosphorus 2.5 - 4.5 mg/dL 3.8       Albumin 3.5 - 5.2 g/dL 3.7       Protein Total 6.4 - 8.3 g/dL 5.1 (L)       Alkaline  Phosphatase 40 - 129 U/L 62       ALT 10 - 50 U/L 11       AST 10 - 50 U/L 18       Bilirubin Total <=1.2 mg/dL 0.3       Glucose 70 - 99 mg/dL 104 (H)       GLUCOSE BY METER POCT 70 - 99 mg/dL   127 (H) 102 (H)   WBC 4.0 - 11.0 10e3/uL 8.7       Hemoglobin 13.3 - 17.7 g/dL 11.3 (L)       Hematocrit 40.0 - 53.0 % 34.5 (L)       Platelet Count 150 - 450 10e3/uL 133 (L)       RBC Count 4.40 - 5.90 10e6/uL 4.02 (L)       MCV 78 - 100 fL 86       MCH 26.5 - 33.0 pg 28.1       MCHC 31.5 - 36.5 g/dL 32.8       RDW 10.0 - 15.0 % 15.3 (H)          EKG  5/23/23  4:44 PM 5/19/23  8:27 AM         Systolic Blood Pressure mmHg        Diastolic Blood Pressure mmHg        Ventricular Rate BPM 80  73     Atrial Rate BPM 80  73     MI Interval ms 152  142     QRS Duration ms 152  156     QT ms 430  458     QTc ms 495  504     P [place] degrees 59  67     R AXIS degrees -21  -48     T Axis degrees 111  81     Interpretation ECG   Sinus rhythm   Left bundle branch block   Abnormal ECG   When compared with ECG of 19-MAY-2023 08:27,   No significant change was found   Confirmed by fellow Mckay Dennis ([id number]) on 5/24/2023 10:35:48       EKG on 6/16/2023  QT QTc 434/539, sinus rhythm with premature atrial complexes, left bundle branch block  EKG on 6/23/2023  QT QTc 414/506, sinus rhythm, left bundle branch block    EKG on 8/7/2023   QT Qtc 430/514, sinus rhythm with premature atrial complexes, left bundle branch block  8/8/23 10:16 AM 7/31/23 10:05 AM       Systolic Blood Pressure mmHg      Diastolic Blood Pressure mmHg      Ventricular Rate BPM 86 87    Atrial Rate BPM 86 87    MI Interval ms 144 140    QRS Duration ms 154 156    QT ms 430 422    QTc ms 514 507    P Axis degrees 59 56    R AXIS degrees 1 -48    T Axis degrees 104 95    Interpretation ECG  Sinus rhythm with Premature atrial complexes  Left bundle branch block  Abnormal ECG  When compared with ECG of 31-JUL-2023 10:05,  Premature atrial complexes are now Present  QRS axis  Shifted right      CT HEAD W/O CONTRAST 5/25/2023 12:39 AM   Provided History: Patient running in hallway, fell forward, difficult  to tell if he hit his head. Acutely psychotic, unable to get history   Comparison: CT head 5/20/2023.  Technique: Using multidetector thin collimation helical acquisition  technique, axial, coronal and sagittal CT images from the skull base  to the vertex were obtained without intravenous contrast.    Findings:    No intracranial hemorrhage. No mass effect. No midline shift. No  extra-axial fluid collection. The gray to white matter differentiation  of the cerebral hemispheres is preserved. Ventricles are proportionate  to the sulci. No sulcal effacement. The basal cisterns are patent.  Mild diffuse cerebral atrophy.   Polypoid mucosal thickening of the right maxillary sinus.The  visualized paranasal sinuses are otherwise clear. The mastoid air  cells are clear. Orbits appear unremarkable. No acute fracture.                                                    IImpression: No acute intracranial pathology.           DIAGNOSIS:     Paranoid schizophrenia chronic with acute exacerbation   Neuroleptic induced Parkinson's disease  Agitation     Patient Active Problem List   Diagnosis    Urinary retention    Schizophrenia, unspecified type (H)    Altered mental status, unspecified altered mental status type    Mood changes    Paranoid schizophrenia, chronic condition with acute exacerbation (H)    Neuroleptic-induced parkinsonism (H)    Agitation          PLAN:   Patient is agitated, psychotic, unable to function and care about himself.  He has diagnosis of schizophrenia since 1980s.  He was on Clozaril for 25 years.  It was tapered and discontinued in May 7, 2023 due to prolonged QT QTc of 527.  His psychotic symptoms have worsened since then.   He is under commitment , requested [facility] and forced blood draw for Clozaril.  He is under commitment with [facility] neuroleptic and Lorenzo Barriospherd, ECT  order.  He has severe persistent mental illness.  He is on Clozaril.  He had EKG on June 23, 2023.  QT QTc is 414/506.  He has left bundle branch block.  QT QTc was 434/539 on June 16.  He had EKG this morning and it continues to show prolonged QT QTc of 430/514.  He is on multiple neuroleptics, but he is so symptomatic that he needs these medications.  He is undergoing. ECT , since it does not seem that he responds to neuroleptics and his qt qtc is raising again.  He is on SIO for safety.he had 3 ECT sessions.  He is scheduled for ECT tomorrow.  He is followed by medicine for nonpsychiatric problems.  These are recommendations for treatment:    Medications:  Clozapine 650 mg daily for schizophrenia Zyprexa 15 mg bid for schizophrenia  Atropine 1% ophthalmic solution 1 drop twice daily as needed for secretion  Depakote  mg 3 times daily for mood stabilization, give after ECT on Monday, Wednesday and Friday   Gabapentin 300 mg 3 times daily for anxiety, give after ECT on Monday, Wednesday and Friday  Ativan 0.5 mg 3 times daily for anxiety, give after ECT on Monday, Wednesday and Friday  Trazodone 50 -100 mg nightly as needed for sleep  Melatonin 10 mg nightly for sleep  Miralax 17 g daily constipation  Senokot 8.6 mg twice daily for constipation  Hannah-Colace 1 p.o. twice daily as needed for constipation  Dulcolax suppository 10 mg rectally daily as needed for constipation  Flomax 0.4 mg for urinary retention  Tylenol 650 mg every 4 hours as needed for pain  Haldol 5 mg every 6 hours as needed with Benadryl 50 mg every 6 hours as needed for agitation  Precautions:   Suicide precautions  SIB precautions   Assault precautions  Elopement precautions  Fall precautions   SIO for safety  No roommate  Medical bad   Legal: Commitment with [facility] neuroleptic order and Lorenzo Holloway ECT order  Full code   will collect collateral information and make outpatient referrals  Staff to provide emotional support  and redirect as needed  Patient encouraged to attend groups  Lab results: Reviewed personally  Consultation: medicine consult completed  Continue SIO for safety    Risk Assessment: commitment with [facility] and for forced blood draw and ECT recommended for safety, stabilization and medication management, chronic persistent mental illness patient with limited response to medication, side effects of medications, undergoing ECT    Coordination of Care:   Patient seen, medical record reviewed, care coordinated with the team.    Total time: More than 35 minutes spent on this visit with more than 50% of time spent on coordination of care with staff, team meeting, reviewing medical record, psychoeducation about side effects and benefits of treatment, providing supportive therapy regarding above symptoms, orders and documentation      This document is created with the help of Dragon dictation system.  All grammatical/typing errors or context distortion are unintentional and inherent to software.    Misty Portillo MD        Re-Certification I certify that the inpatient psychiatric facility services furnished since the previous certification were, and continue to be, medically necessary for, either, treatment which could reasonably be expected to improve the patient s condition or diagnostic study and that the hospital records indicate that the services furnished were, either, intensive treatment services, admission and related services necessary for diagnostic study, or equivalent services.     I certify that the patient continues to need, on a daily basis, active treatment furnished directly by or requiring the supervision of inpatient psychiatric facility personnel.   I estimate TBD days of hospitalization is necessary for proper treatment of the patient. My plans for post-hospital care for this patient are : Medications, appointments     Misty Portillo MD

## 2023-08-08 NOTE — PLAN OF CARE
"Vinod continues on SIO 1:1 with acuity staff monitoring for intrusiveness and assault risk.   Overall Vinod has had a good shift. He was oriented to self only, unable to identify location but after reminded he was in the hospital stated, \"that makes me feel better.\"  He guessed it was August 15th 2023.   Remains disorganized and paranoid-reports that his \"apartment is contaminated.\"   Describes his mood is \"ok, fine\", then states he is \"not well, crummy\" but unable to elaborate or specify how he is not feeling well. Blunted affect.   He has been resting in bed intermittently. Periods of restlessness and preoccupation with being in his bathroom, brushing his teeth for long periods of time. When writer asked why he was brushing so much he responded, \"fast and quick, but slow and methodical, just have to do it this way.\" Appears internally preoccupied by internal stimuli however he denies hallucinations. Spent some time out in the lounge this afternoon, engaged with staff-working on coloring sheets and [employer] cards.   He has not been aggressive this shift.   He is eating and hydrating well. No issues urinating.   Per psych associate, pt passed a BM today.   He has no recollection of ECT, \"I don't think Violette had it yet.\"  ECT#4 tomorrow 8/9/23.   Vinod stated he wasn't going to take his afternoon medications but was easily convinced to comply and has been adherent with all scheduled meds except a dose of atropine drops.     VS WNL. /77   Pulse 80   Temp 97.4  F (36.3  C) (Temporal)   Resp 16   Wt 86.5 kg (190 lb 9.6 oz)   SpO2 97%   BMI 28.15 kg/m      Vinod reports low back pain which he does not rate.     Problem: Plan of Care - These are the overarching goals to be used throughout the patient stay.    Goal: Plan of Care Review  Description: The Plan of Care Review/Shift note should be completed every shift.  The Outcome Evaluation is a brief statement about your assessment that the patient is improving, " declining, or no change.  This information will be displayed automatically on your shift note.  Outcome: Progressing     Problem: Psychotic Signs/Symptoms  Goal: Improved Behavioral Control (Psychotic Signs/Symptoms)  Outcome: Progressing   Goal Outcome Evaluation:    Plan of Care Reviewed With: patient

## 2023-08-08 NOTE — CARE PLAN
"   08/08/23 1623   Group Therapy Session   Group Attendance attended group session   Time Session Began 1315   Time Session Ended 1400   Total Time (minutes) 45   Total # Attendees 2   Group Type community;recreation   Group Topic Covered balanced lifestyle;cognitive activities;leisure exploration/use of leisure time;structured socialization   Group Session Detail OT Leisure Group: Group activities to exercise cognitive skills while exploring leisure and socializing opportunities   Patient Participation Detail Pt participated in group activities focused on exploring leisure and recreational group games. Pt benefits from step-by-step directions and reminders of the instructions or categories to list the items (pt would confuse categories like \"list condiments\" by listing different types of food), as pt would go through turns without awareness of turn-taking within the group, and demonstrated difficulty sequencing tasks. When pt misread a category on a card (pt read \"Girl Games\" instead of \"Girl Names\"), pt responded by saying \"I've already ruined it\" - writer reassured pt that the game will continue and that the pt has been helpful. When group ended, pt reported that they couldn't read, due to having broken their glasses the night before. Pt said that they were \"an idiot\"; writer reassured pt of their progress and gains during the group game.       "

## 2023-08-09 ENCOUNTER — APPOINTMENT (OUTPATIENT)
Dept: BEHAVIORAL HEALTH | Facility: CLINIC | Age: 63
DRG: 885 | End: 2023-08-09
Attending: PSYCHIATRY & NEUROLOGY
Payer: COMMERCIAL

## 2023-08-09 ENCOUNTER — ANESTHESIA EVENT (OUTPATIENT)
Dept: BEHAVIORAL HEALTH | Facility: CLINIC | Age: 63
DRG: 885 | End: 2023-08-09
Payer: COMMERCIAL

## 2023-08-09 ENCOUNTER — ANESTHESIA (OUTPATIENT)
Dept: BEHAVIORAL HEALTH | Facility: CLINIC | Age: 63
DRG: 885 | End: 2023-08-09
Payer: COMMERCIAL

## 2023-08-09 LAB
ATRIAL RATE - MUSE: 86 BPM
DIASTOLIC BLOOD PRESSURE - MUSE: NORMAL MMHG
INTERPRETATION ECG - MUSE: NORMAL
P AXIS - MUSE: 59 DEGREES
PR INTERVAL - MUSE: 144 MS
QRS DURATION - MUSE: 154 MS
QT - MUSE: 430 MS
QTC - MUSE: 514 MS
R AXIS - MUSE: 1 DEGREES
SYSTOLIC BLOOD PRESSURE - MUSE: NORMAL MMHG
T AXIS - MUSE: 104 DEGREES
VENTRICULAR RATE- MUSE: 86 BPM

## 2023-08-09 PROCEDURE — 250N000011 HC RX IP 250 OP 636: Performed by: PSYCHIATRY & NEUROLOGY

## 2023-08-09 PROCEDURE — 250N000013 HC RX MED GY IP 250 OP 250 PS 637: Performed by: STUDENT IN AN ORGANIZED HEALTH CARE EDUCATION/TRAINING PROGRAM

## 2023-08-09 PROCEDURE — 250N000011 HC RX IP 250 OP 636: Performed by: NURSE ANESTHETIST, CERTIFIED REGISTERED

## 2023-08-09 PROCEDURE — 90870 ELECTROCONVULSIVE THERAPY: CPT

## 2023-08-09 PROCEDURE — 250N000009 HC RX 250: Performed by: PSYCHIATRY & NEUROLOGY

## 2023-08-09 PROCEDURE — 250N000009 HC RX 250: Performed by: NURSE ANESTHETIST, CERTIFIED REGISTERED

## 2023-08-09 PROCEDURE — 250N000013 HC RX MED GY IP 250 OP 250 PS 637: Performed by: PSYCHIATRY & NEUROLOGY

## 2023-08-09 PROCEDURE — 124N000002 HC R&B MH UMMC

## 2023-08-09 PROCEDURE — 370N000017 HC ANESTHESIA TECHNICAL FEE, PER MIN: Performed by: STUDENT IN AN ORGANIZED HEALTH CARE EDUCATION/TRAINING PROGRAM

## 2023-08-09 PROCEDURE — 90870 ELECTROCONVULSIVE THERAPY: CPT | Performed by: PSYCHIATRY & NEUROLOGY

## 2023-08-09 PROCEDURE — 99232 SBSQ HOSP IP/OBS MODERATE 35: CPT | Mod: 25 | Performed by: PSYCHIATRY & NEUROLOGY

## 2023-08-09 RX ORDER — FLUMAZENIL 0.1 MG/ML
0.2 INJECTION, SOLUTION INTRAVENOUS ONCE
Status: DISCONTINUED | OUTPATIENT
Start: 2023-08-09 | End: 2023-08-09

## 2023-08-09 RX ORDER — HYDRALAZINE HYDROCHLORIDE 20 MG/ML
2.5-5 INJECTION INTRAMUSCULAR; INTRAVENOUS EVERY 10 MIN PRN
Status: CANCELLED | OUTPATIENT
Start: 2023-08-09

## 2023-08-09 RX ORDER — ETOMIDATE 2 MG/ML
INJECTION INTRAVENOUS PRN
Status: DISCONTINUED | OUTPATIENT
Start: 2023-08-09 | End: 2023-08-09

## 2023-08-09 RX ORDER — CLONAZEPAM 0.5 MG/1
0.5 TABLET ORAL ONCE
Status: DISCONTINUED | OUTPATIENT
Start: 2023-08-09 | End: 2023-08-09

## 2023-08-09 RX ORDER — ONDANSETRON 2 MG/ML
4 INJECTION INTRAMUSCULAR; INTRAVENOUS EVERY 30 MIN PRN
Status: CANCELLED | OUTPATIENT
Start: 2023-08-09

## 2023-08-09 RX ORDER — ONDANSETRON 4 MG/1
4 TABLET, ORALLY DISINTEGRATING ORAL EVERY 30 MIN PRN
Status: DISCONTINUED | OUTPATIENT
Start: 2023-08-09 | End: 2023-08-09

## 2023-08-09 RX ORDER — ONDANSETRON 4 MG/1
4 TABLET, ORALLY DISINTEGRATING ORAL EVERY 30 MIN PRN
Status: CANCELLED | OUTPATIENT
Start: 2023-08-09

## 2023-08-09 RX ORDER — PROPOFOL 10 MG/ML
INJECTION, EMULSION INTRAVENOUS PRN
Status: DISCONTINUED | OUTPATIENT
Start: 2023-08-09 | End: 2023-08-09

## 2023-08-09 RX ORDER — LABETALOL HYDROCHLORIDE 5 MG/ML
10 INJECTION, SOLUTION INTRAVENOUS
Status: DISCONTINUED | OUTPATIENT
Start: 2023-08-09 | End: 2023-08-09

## 2023-08-09 RX ORDER — NICARDIPINE HCL-0.9% SOD CHLOR 1 MG/10 ML
SYRINGE (ML) INTRAVENOUS PRN
Status: DISCONTINUED | OUTPATIENT
Start: 2023-08-09 | End: 2023-08-09

## 2023-08-09 RX ORDER — ALBUTEROL SULFATE 0.83 MG/ML
2.5 SOLUTION RESPIRATORY (INHALATION) EVERY 4 HOURS PRN
Status: DISCONTINUED | OUTPATIENT
Start: 2023-08-09 | End: 2023-08-09

## 2023-08-09 RX ORDER — LABETALOL HYDROCHLORIDE 5 MG/ML
10 INJECTION, SOLUTION INTRAVENOUS
Status: CANCELLED | OUTPATIENT
Start: 2023-08-09

## 2023-08-09 RX ORDER — ONDANSETRON 2 MG/ML
4 INJECTION INTRAMUSCULAR; INTRAVENOUS EVERY 30 MIN PRN
Status: DISCONTINUED | OUTPATIENT
Start: 2023-08-09 | End: 2023-08-09

## 2023-08-09 RX ORDER — HYDRALAZINE HYDROCHLORIDE 20 MG/ML
2.5-5 INJECTION INTRAMUSCULAR; INTRAVENOUS EVERY 10 MIN PRN
Status: DISCONTINUED | OUTPATIENT
Start: 2023-08-09 | End: 2023-08-09

## 2023-08-09 RX ORDER — ALBUTEROL SULFATE 0.83 MG/ML
2.5 SOLUTION RESPIRATORY (INHALATION) EVERY 4 HOURS PRN
Status: CANCELLED | OUTPATIENT
Start: 2023-08-09

## 2023-08-09 RX ADMIN — GABAPENTIN 300 MG: 300 CAPSULE ORAL at 18:44

## 2023-08-09 RX ADMIN — POLYETHYLENE GLYCOL 3350 17 G: 17 POWDER, FOR SOLUTION ORAL at 15:01

## 2023-08-09 RX ADMIN — FLUMAZENIL 0.2 MG: 0.1 INJECTION, SOLUTION INTRAVENOUS at 13:41

## 2023-08-09 RX ADMIN — LORAZEPAM 0.5 MG: 0.5 TABLET ORAL at 18:43

## 2023-08-09 RX ADMIN — LORAZEPAM 0.5 MG: 0.5 TABLET ORAL at 14:54

## 2023-08-09 RX ADMIN — TRIAMCINOLONE ACETONIDE: 0.25 CREAM TOPICAL at 18:44

## 2023-08-09 RX ADMIN — CLOZAPINE 650 MG: 100 TABLET, ORALLY DISINTEGRATING ORAL at 14:58

## 2023-08-09 RX ADMIN — WHITE PETROLATUM: 1.75 OINTMENT TOPICAL at 18:58

## 2023-08-09 RX ADMIN — OLANZAPINE 15 MG: 10 TABLET, ORALLY DISINTEGRATING ORAL at 18:44

## 2023-08-09 RX ADMIN — Medication 200 MCG: at 13:46

## 2023-08-09 RX ADMIN — DIVALPROEX SODIUM 250 MG: 250 TABLET, DELAYED RELEASE ORAL at 18:43

## 2023-08-09 RX ADMIN — MIDAZOLAM 2 MG: 1 INJECTION INTRAMUSCULAR; INTRAVENOUS at 14:00

## 2023-08-09 RX ADMIN — TRIAMCINOLONE ACETONIDE: 0.25 CREAM TOPICAL at 15:00

## 2023-08-09 RX ADMIN — GABAPENTIN 300 MG: 300 CAPSULE ORAL at 14:55

## 2023-08-09 RX ADMIN — WHITE PETROLATUM: 1.75 OINTMENT TOPICAL at 14:59

## 2023-08-09 RX ADMIN — Medication 300 MCG: at 13:52

## 2023-08-09 RX ADMIN — SENNOSIDES 2 TABLET: 8.6 TABLET ORAL at 18:44

## 2023-08-09 RX ADMIN — ATROPINE SULFATE 2 DROP: 10 SOLUTION/ DROPS OPHTHALMIC at 14:55

## 2023-08-09 RX ADMIN — ETOMIDATE 14 MG: 2 INJECTION INTRAVENOUS at 13:46

## 2023-08-09 RX ADMIN — NAPROXEN 250 MG: 250 TABLET ORAL at 18:44

## 2023-08-09 RX ADMIN — SUCCINYLCHOLINE CHLORIDE 80 MG: 20 INJECTION, SOLUTION INTRAMUSCULAR; INTRAVENOUS; PARENTERAL at 13:46

## 2023-08-09 RX ADMIN — ATROPINE SULFATE 2 DROP: 10 SOLUTION/ DROPS OPHTHALMIC at 18:44

## 2023-08-09 RX ADMIN — DIVALPROEX SODIUM 250 MG: 250 TABLET, DELAYED RELEASE ORAL at 14:54

## 2023-08-09 RX ADMIN — CLONAZEPAM 0.5 MG: 0.5 TABLET ORAL at 14:26

## 2023-08-09 RX ADMIN — PROPOFOL 100 MG: 10 INJECTION, EMULSION INTRAVENOUS at 13:52

## 2023-08-09 ASSESSMENT — ENCOUNTER SYMPTOMS: DYSRHYTHMIAS: 1

## 2023-08-09 ASSESSMENT — ACTIVITIES OF DAILY LIVING (ADL)
ADLS_ACUITY_SCORE: 84
LAUNDRY: UNABLE TO COMPLETE
ORAL_HYGIENE: PROMPTS
ADLS_ACUITY_SCORE: 84
DRESS: PROMPTS
ADLS_ACUITY_SCORE: 84
HYGIENE/GROOMING: PROMPTS
ADLS_ACUITY_SCORE: 84

## 2023-08-09 NOTE — PLAN OF CARE
Problem: Adult Behavioral Health Plan of Care  Goal: Plan of Care Review  Recent Flowsheet Documentation  Taken 8/8/2023 1700 by Sanjay Roberts RN  Patient Agreement with Plan of Care: agrees   Goal Outcome Evaluation:    Plan of Care Reviewed With: patient        Pt is alert and oriented to self only. He does not seem to remember where he is right now. Staff oriented pt on present location, time and year. He denies pain, anxiety, depression, SI/SIB/HI/AVH, and contracts for safety. Pt has been sleeping all through this shift, but arouses to voice. He is on a 1:1 SIO staff monitor with acuity staff for safety. He remains calm, cooperative, and medication compliant. No abnormal behaviors noted. Will continue with same plan of care.

## 2023-08-09 NOTE — ANESTHESIA POSTPROCEDURE EVALUATION
Patient: Vinod Lee    Procedure: * No procedures listed *       Anesthesia Type:  General    Note:     Postop Pain Control: Uneventful            Sign Out: Well controlled pain   PONV: No   Neuro/Psych: Uneventful            Sign Out: Acceptable/Baseline neuro status   Airway/Respiratory: Uneventful            Sign Out: Acceptable/Baseline resp. status   CV/Hemodynamics: Uneventful            Sign Out: Acceptable CV status; No obvious hypovolemia; No obvious fluid overload   Other NRE: NONE   DID A NON-ROUTINE EVENT OCCUR? No           Last vitals:  Vitals:    08/09/23 1359 08/09/23 1414 08/09/23 1425   BP: 116/70 113/72 138/84   Pulse: 89 88 92   Resp: 23 22 16   Temp: 36.7  C (98.1  F) 36.6  C (97.9  F) 36.4  C (97.6  F)   SpO2: 93% 92% 92%       Electronically Signed By: Kayla Parmar MD  August 9, 2023  2:48 PM

## 2023-08-09 NOTE — ANESTHESIA PREPROCEDURE EVALUATION
Anesthesia Pre-Procedure Evaluation    Patient: Vinod Lee   MRN: [id number] : 1960        Procedure :   Electroconvulsive Therapy       Past Medical History:   Diagnosis Date    Parkinson's disease (H)     Schizophrenia (H)       No past surgical history on file.   Allergies   Allergen Reactions    Bee Venom Unknown    Montelukast Unknown    Phenothiazines Unknown      Social History     Tobacco Use    Smoking status: Not on file    Smokeless tobacco: Never   Substance Use Topics    Alcohol use: Not on file     Comment: GRACE      Wt Readings from Last 1 Encounters:   23 86.5 kg (190 lb 9.6 oz)        Anesthesia Evaluation            ROS/MED HX  ENT/Pulmonary:     (+) sleep apnea,                                      Neurologic:     (+)                 Parkinson's disease, features: parkinsonism, from neuroleptics,              Cardiovascular: Comment: 23 QTc 514    (+)  hypertension- -   -  - -                        dysrhythmias, Long QT,        Previous cardiac testing   Echo: Date: Results:    Stress Test:  Date: Results:    ECG Reviewed:  Date: 23 Results:  LBBB QT intervial is 498  Cath:  Date: Results:      METS/Exercise Tolerance:     Hematologic:     (+)      anemia,          Musculoskeletal:  - neg musculoskeletal ROS     GI/Hepatic:  - neg GI/hepatic ROS     Renal/Genitourinary:     (+)        BPH,      Endo:  - neg endo ROS     Psychiatric/Substance Use: Comment: Patient is aggressive and homicidal    (+) psychiatric history schizophrenia       Infectious Disease:  - neg infectious disease ROS     Malignancy:  - neg malignancy ROS     Other:  - neg other ROS             OUTSIDE LABS:  CBC:   Lab Results   Component Value Date    WBC 10.2 2023    WBC 7.8 2023    HGB 11.3 (L) 2023    HGB 12.0 (L) 2023    HCT 35.0 (L) 2023    HCT 36.7 (L) 2023     (L) 2023     2023     BMP:   Lab Results   Component Value Date      08/04/2023     08/02/2023    POTASSIUM 4.9 08/04/2023    POTASSIUM 4.9 08/02/2023    CHLORIDE 106 08/04/2023    CHLORIDE 107 08/02/2023    CO2 24 08/04/2023    CO2 24 08/02/2023    BUN 37.8 (H) 08/04/2023    BUN 24.8 (H) 08/02/2023    CR 1.05 08/04/2023    CR 0.99 08/02/2023     (H) 08/04/2023    GLC 93 08/02/2023     COAGS: No results found for: PTT, INR, FIBR  POC: No results found for: BGM, HCG, HCGS  HEPATIC:   Lab Results   Component Value Date    ALBUMIN 3.6 07/13/2023    PROTTOTAL 5.6 (L) 07/13/2023    ALT 14 07/28/2023    AST 18 07/28/2023    ALKPHOS 73 07/13/2023    BILITOTAL 0.2 07/13/2023     OTHER:   Lab Results   Component Value Date    LACT 1.5 06/07/2023    BRENDA 8.3 (L) 08/04/2023    PHOS 3.8 05/25/2023    MAG 2.1 05/25/2023    TSH 1.80 05/22/2023       Anesthesia Plan    ASA Status:  3    NPO Status:  NPO Appropriate    Anesthesia Type: General.     - Airway: Mask Only   Induction: Intravenous.   Maintenance: N/A.        Consents            Postoperative Care    Pain management: Multi-modal analgesia.        Comments:           H&P reviewed: Unable to attach H&P to encounter due to EHR limitations. H&P Update: appropriate H&P reviewed, patient examined. No interval changes since H&P (within 30 days).         Leslie Goldberg, MD

## 2023-08-09 NOTE — PLAN OF CARE
Assessment/Intervention/Current Symtoms and Care Coordination:  Reviewed chart. Met with team to review patient's current functioning, needs, progress, and impediments to discharge.   Received call from [facility] requesting facesheet, initial psych consult, H&P, current MAR, labs with results, and 7 days of psychiatry and nursing progress notes. Attained majority of documents, will send them tomorrow.     Discharge Plan or Goal:  When patient is more stable a referral for [facility] in [place] will be made, referral for [facility] in [place] will be made      Barriers to Discharge:  Patient requires further psychiatric stabilization due to current symptomology. He continues to present as disorganized and tangential with paranoid thought content. He presents with mood lability. He vacillates between being pleasant and being aggressive with no clear environmental triggers. Patient endorses auditory hallucinations.     Referral Status:  Referral for housing pending psychiatric stabilization  Considerations include: [facility] in [place], [facility] in [place]      Legal Status:  Commitment with [facility]: [place]  File Number: [id number]  Issued 07/06/23 and is not to exceed six months    Contacts:  : Vicky Valdez with [facility], [phone]  Psychiatrist: Dr. Santana at [facility], [phone] PCP: Attila Urena DO  Mom: Alberta, [phone] (H) [phone] (M)   Dad: Ino, [phone] (H)  Sister, Sandra Treadwell, [phone] (KING)   [facility]'s Office Fax: [phone]  [place]: [phone]  CADI  with [facility]: Regina Wong, [phone]      Upcoming Meetings and Dates/Important Information and next steps:  Referrals for housing pending psychiatric stabilization

## 2023-08-09 NOTE — PROGRESS NOTES
Patients VSS, A/O, IV removed, meets phase 2 criteria and is able to move to [facility] at this time. Report given to KENNY Mercado. Pt transported by staff with .
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
## 2023-08-09 NOTE — PLAN OF CARE
"Pt left for ECT approx 1254 and returned at approx 1445. RN writer paged provider for guidance on medications to be given since pt was NPO and returning late. Provider stated to hold the AM medications and give the meds from noon till on.  Pt given miralax, colzapine, atropine, depakote, neurontin and ativan.    Pt ate 100% of lunch tray, was cooperative and medication compliant. Pt denies pain, depression, anxiety and SI, SIB, HI, AH, VH and thoughts of hurting others. Pt contracts for safety. Pt mood is sad with flat affect. Pt shares his distrust for hospital staff stating that everything is bullshit and he doesn't trust 99% of \"what goes on here\".     "

## 2023-08-09 NOTE — PLAN OF CARE
Pt asleep at start of shift.     Breathing quiet and unlabored when sleeping.     Pt had no c/o pain or discomfort during the HS.     Appears to have slept 7 hours.     Pt continues on 1:1 SIO with Acuity/5 ft space distance.     NPO after 0000    Goal Outcome Evaluation:  Problem: Adult Behavioral Health Plan of Care  Goal: Adheres to Safety Considerations for Self and Others  Intervention: Develop and Maintain Individualized Safety Plan  Recent Flowsheet Documentation  Taken 8/9/2023 0000 by Tameka Hatch, RN  Safety Measures: safety rounds completed  Goal: Absence of New-Onset Illness or Injury  Intervention: Identify and Manage Fall Risk  Recent Flowsheet Documentation  Taken 8/9/2023 0000 by Tameka Hatch, RN  Safety Measures: safety rounds completed     Problem: Sleep Disturbance  Goal: Adequate Sleep/Rest  Outcome: Progressing

## 2023-08-09 NOTE — ANESTHESIA CARE TRANSFER NOTE
Patient: Vinod Lee    Procedure: * No procedures listed *       Diagnosis: * No pre-op diagnosis entered *  Diagnosis Additional Information: No value filed.    Anesthesia Type:   General     Note:    Oropharynx: oropharynx clear of all foreign objects  Level of Consciousness: drowsy  Oxygen Supplementation: room air    Independent Airway: airway patency satisfactory and stable  Dentition: dentition unchanged  Vital Signs Stable: post-procedure vital signs reviewed and stable  Report to RN Given: handoff report given  Patient transferred to: PACU    Handoff Report: Identifed the Patient, Identified the Reponsible Provider, Reviewed the pertinent medical history, Discussed the surgical course, Reviewed Intra-OP anesthesia mangement and issues during anesthesia, Set expectations for post-procedure period and Allowed opportunity for questions and acknowledgement of understanding      Vitals:  Vitals Value Taken Time   /70 08/09/23 1359   Temp 36.7  C (98.1  F) 08/09/23 1359   Pulse 89 08/09/23 1359   Resp 23 08/09/23 1359   SpO2 93 % 08/09/23 1359       Electronically Signed By: Kayla Parmar MD  August 9, 2023  2:03 PM

## 2023-08-09 NOTE — CARE PLAN
08/09/23 1222   Individualization/Patient Specific Goals   Patient Personal Strengths community support;expressive of emotions;family/social support;interests/hobbies;resilient;resourceful;socioeconomic stability   Patient Vulnerabilities lacks insight into illness;traumatic event;poor impulse control   Interprofessional Rounds   Summary Vinod has started ECT. He is still experiencing psychosis. Thought process is disorganized. He presents with compulsions. Engaging in bizarre behaviors.   Participants CTC;psychiatrist;nursing   Behavioral Team Discussion   Participants Dr. Rhodes, Hina Arellano CTC, Jess Rogers RN   Anticipated length of stay 30 days   Continued Stay Criteria/Rationale Patient is still experiencing psychosis that puts him at risk of significant harm to himself or others if discharged prior to stabilization     PRECAUTIONS AND SAFETY    Behavioral Orders   Procedures    Assault precautions    Code 1 - Restrict to Unit    Code 2 - 1:1 Staff Supervision     For ECT only    Electroconvulsive therapy     Series of up to 12 treatments. Begin Date: 8/2/23     Treating Psychiatrist providing ECT:  unknown     Notified on:  7/28/23 via this order  NOTE: We received verbal confirmation from [facility] that Lorenzo Pavon has been approved but awaiting official court order and risk management's review  Initial consult was completed by Dr. Hicks on 7/18/23    Electroconvulsive therapy     Series of up to 12 treatments. Begin Date: 8/2/23     Treating Psychiatrist providing ECT:  Dominga     Notified on:  8/2/23    Elopement precautions    Fall precautions    Routine Programming     As clinically indicated    Self Injury Precaution    Status 15     Every 15 minutes.    Status Individual Observation     Patient SIO status reviewed with team/RN.  Please also refer to RN/team documentation for add'l detail.    -SIO staff to monitor following which have contributed to patient being on  SIO:  Agitation  Pt is impulsive   Pt has ran out of his room naked  Pt has Parkinson symptoms place him in a high fall risk  Pt has verbal outburst of sexual and threaten statements  Pt requires immediate redirection when masturbating    -Possible interventions SIO staff could use to support patient's treatment progress:  Engage pt in activities    -When following observed, team will review discontinuation of SIO:  Absence of aggression toward others for > 24 hours    Comments: SIO 1:1     Order Specific Question:   CONTINUOUS 24 hours / day     Answer:   5 feet     Order Specific Question:   Indications for SIO     Answer:   Severe intrusiveness     Order Specific Question:   Indications for SIO     Answer:   Assault risk    Suicide precautions     Patients on Suicide Precautions should have a Combination Diet ordered that includes a Diet selection(s) AND a Behavioral Tray selection for Safe Tray - with utensils, or Safe Tray - NO utensils         Safety  Safety WDL: WDL  Patient Location: hallway, patient room, own  Observed Behavior: sitting, walking  Observed Behavior (Comment): kicking, hitting staff, talking to staff, watching television  Airway Safety Measures: all equipment/monitors on and audible, emergency medication sheet, mask at bedside, manual resuscitator/mask/valve at bedside, manual resuscitator/mask/valve in room, oxygen flowmeter, suction at bedside, suction equipment, suction regulator  Safety Measures: clinical history reviewed, environmental rounds completed, safety rounds completed  Diversional Activity: music  Suicidality: SIO (Status Individual Observation)  (NOTE - order will specify distance  Seizure precautions: clutter free environment  Assault: status continuous sight  Elopement Assessment: Statements about wanting to leave  Elopement Interventions: status continuous sight  Sexual: status 15, private room  Additional Documentation:  (also on sib prec)

## 2023-08-10 PROCEDURE — 250N000013 HC RX MED GY IP 250 OP 250 PS 637: Performed by: PSYCHIATRY & NEUROLOGY

## 2023-08-10 PROCEDURE — 124N000002 HC R&B MH UMMC

## 2023-08-10 PROCEDURE — 99232 SBSQ HOSP IP/OBS MODERATE 35: CPT | Performed by: PSYCHIATRY & NEUROLOGY

## 2023-08-10 PROCEDURE — 250N000013 HC RX MED GY IP 250 OP 250 PS 637: Performed by: PHYSICIAN ASSISTANT

## 2023-08-10 PROCEDURE — 250N000013 HC RX MED GY IP 250 OP 250 PS 637: Performed by: STUDENT IN AN ORGANIZED HEALTH CARE EDUCATION/TRAINING PROGRAM

## 2023-08-10 RX ADMIN — TAMSULOSIN HYDROCHLORIDE 0.4 MG: 0.4 CAPSULE ORAL at 08:23

## 2023-08-10 RX ADMIN — SENNOSIDES 2 TABLET: 8.6 TABLET ORAL at 19:25

## 2023-08-10 RX ADMIN — LORAZEPAM 0.5 MG: 0.5 TABLET ORAL at 14:12

## 2023-08-10 RX ADMIN — ATROPINE SULFATE 2 DROP: 10 SOLUTION/ DROPS OPHTHALMIC at 14:13

## 2023-08-10 RX ADMIN — DIVALPROEX SODIUM 250 MG: 250 TABLET, DELAYED RELEASE ORAL at 15:43

## 2023-08-10 RX ADMIN — CYANOCOBALAMIN TAB 1000 MCG 1000 MCG: 1000 TAB at 08:24

## 2023-08-10 RX ADMIN — DIVALPROEX SODIUM 250 MG: 250 TABLET, DELAYED RELEASE ORAL at 17:49

## 2023-08-10 RX ADMIN — GABAPENTIN 300 MG: 300 CAPSULE ORAL at 17:48

## 2023-08-10 RX ADMIN — Medication 10 MG: at 19:25

## 2023-08-10 RX ADMIN — LORAZEPAM 0.5 MG: 0.5 TABLET ORAL at 08:23

## 2023-08-10 RX ADMIN — GABAPENTIN 300 MG: 300 CAPSULE ORAL at 09:10

## 2023-08-10 RX ADMIN — OLANZAPINE 15 MG: 10 TABLET, ORALLY DISINTEGRATING ORAL at 19:25

## 2023-08-10 RX ADMIN — ATROPINE SULFATE 2 DROP: 10 SOLUTION/ DROPS OPHTHALMIC at 19:25

## 2023-08-10 RX ADMIN — LORAZEPAM 0.5 MG: 0.5 TABLET ORAL at 17:49

## 2023-08-10 RX ADMIN — CLOZAPINE 650 MG: 100 TABLET, ORALLY DISINTEGRATING ORAL at 12:25

## 2023-08-10 RX ADMIN — OLANZAPINE 15 MG: 10 TABLET, ORALLY DISINTEGRATING ORAL at 08:23

## 2023-08-10 RX ADMIN — ATROPINE SULFATE 2 DROP: 10 SOLUTION/ DROPS OPHTHALMIC at 08:39

## 2023-08-10 RX ADMIN — TRIAMCINOLONE ACETONIDE: 0.25 CREAM TOPICAL at 19:25

## 2023-08-10 RX ADMIN — NAPROXEN 250 MG: 250 TABLET ORAL at 17:49

## 2023-08-10 RX ADMIN — SENNOSIDES 2 TABLET: 8.6 TABLET ORAL at 08:23

## 2023-08-10 RX ADMIN — ACETAMINOPHEN 650 MG: 325 TABLET, FILM COATED ORAL at 22:43

## 2023-08-10 RX ADMIN — DIVALPROEX SODIUM 250 MG: 250 TABLET, DELAYED RELEASE ORAL at 08:22

## 2023-08-10 RX ADMIN — GABAPENTIN 300 MG: 300 CAPSULE ORAL at 14:12

## 2023-08-10 RX ADMIN — NAPROXEN 250 MG: 250 TABLET ORAL at 08:23

## 2023-08-10 ASSESSMENT — ACTIVITIES OF DAILY LIVING (ADL)
HYGIENE/GROOMING: INDEPENDENT
ADLS_ACUITY_SCORE: 84
ADLS_ACUITY_SCORE: 64
ADLS_ACUITY_SCORE: 84
DRESS: INDEPENDENT;SCRUBS (BEHAVIORAL HEALTH)
ORAL_HYGIENE: INDEPENDENT
ADLS_ACUITY_SCORE: 84
ADLS_ACUITY_SCORE: 64
ADLS_ACUITY_SCORE: 64
ADLS_ACUITY_SCORE: 84

## 2023-08-10 NOTE — PLAN OF CARE
Assessment/Intervention/Current Symtoms and Care Coordination:  Reviewed chart. Met with team to review patient's current functioning, needs, progress, and impediments to discharge.   Sent facesheet, initial psych consult, H&P, current MAR, labs with results, and 7 days of psychiatry and nursing progress notes to [facility] via secure fax.    Discharge Plan or Goal:  When patient is more stable a referral for [facility] in [place] will be made, referral for [facility] in [place] will be made      Barriers to Discharge:  Patient requires further psychiatric stabilization due to current symptomology. He continues to present as disorganized and tangential with paranoid thought content. He presents with mood lability. He vacillates between being pleasant and being aggressive with no clear environmental triggers. Patient endorses auditory hallucinations.     Referral Status:  Referral for housing pending psychiatric stabilization  Considerations include: [facility] in [place], [facility] [place] in [place]      Legal Status:  Commitment with [facility]: [place]  File Number: [id number]  Issued 07/06/23 and is not to exceed six months    Contacts:  : Vicky Valdez with [facility], [phone]  Psychiatrist: Dr. Santana at [facility], [phone] PCP: Attila Urena DO  Mom: Alberta, [phone] (H) [phone] (M)   Dad: Ino, [phone] (H)  Sister, Sandra Treadwell, [phone] (KING)   [facility]'s Office Fax: [phone]  Pia May: [phone]  CADI  with [facility]: Regina Wong, [phone]      Upcoming Meetings and Dates/Important Information and next steps:  Referrals for housing pending psychiatric stabilization

## 2023-08-10 NOTE — PLAN OF CARE
Goal Outcome Evaluation:       Pt a wake x1 briefly otherwise appeared to be a sleep at all other safety checks.  Pt slept 6.5 hours.  Pt remains on an SIO for safety with acuity staff.  He was up to the bathroom x2.  No prn medication administered this shift.  No behaviors noted.  Pt noted to have mouth tremors while sleeping.

## 2023-08-10 NOTE — PLAN OF CARE
Problem: Psychotic Symptoms  Goal: Social and Therapeutic (Psychotic Symptoms)  Description: Signs and symptoms of listed problems will be absent or manageable.  Outcome: Progressing    Patient is alert and oriented to person and place with a bright, positive affect. His mood is calm and he is medication compliant. He was present [place] [facility] [place] not social with peers. He attended morning group and it seem to have gone fine. He ate 100% of his breakfast and lunch this shift. He refused his morning dose of Miralax, Kenalog, and Aquaphor. He did not complain of pain and no prn given. He denies all psych symptoms of, SI/SIB/HI, A/V hallucinations, anxiety and depression. Patient did not have behavioral issues this shift. Patient moved from station 12 downstairs to station 32 upstairs, and he did well with the move. He spent the rest of the shift pacing with his sio staff.     1400 Depakote was not given because it was not in the pyxis. Message sent to pharmacy.

## 2023-08-10 NOTE — PROGRESS NOTES
[facility], [place]   Psychiatric Progress Note  Hospital Day: 76        Interim History:   The patient's care was discussed with the treatment team during the daily team meeting and/or staff's chart notes were reviewed.  Staff report that Vinod continues to make paranoid statements and aggressive gestures towards staff overnight, attempted to strike staff. BM yesterday evening. Slept 6.5 h    I met with Vinod in his room. He said initially he was doing well, progressively got more agitated throughout the interview.  He says that he is having difficulties with both long-term and short-term memory, was able to remember the parents names as well as his own name.  He knew that it was August 2023 and he is in [facility] in [place].  He says that he is having tremors from the treatment but denied other side effects.  Denies SI.  He endorses feeling like the treatment team is trying to incriminate him with his responses.    Suicidal ideation: no    Homicidal ideation: None today    Psychotic symptoms: no AH or VH reported during interview. Delusions present and other psychotic symptoms suspected.    Medication side effects reported: None.     Acute medical concerns: none. He denies any pain or discomfort today.      Other issues reported by patient: Patient had no further questions or concerns.           Medications:      atropine  2 drop Sublingual TID    cloZAPine  650 mg Oral Daily    cyanocobalamin  1,000 mcg Oral Daily    divalproex sodium delayed-release  250 mg Oral 3 times per day on Sun Tue Thu    divalproex sodium delayed-release  250 mg Oral 3 times per day on Mon Wed Fri    divalproex sodium delayed-release  250 mg Oral 3 times per day on Sat    gabapentin  300 mg Oral 3 times per day on Sun Tue Thu    gabapentin  300 mg Oral 3 times per day on Mon Wed Fri    gabapentin  300 mg Oral 3 times per day on Sat    LORazepam  0.5 mg Oral 3 times per day on Sun Tue Thu     LORazepam  0.5 mg Oral 3 times per day on Mon Wed Fri    LORazepam  0.5 mg Oral 3 times per day on Sat    melatonin  10 mg Oral At Bedtime    mineral oil-hydrophilic petrolatum   Topical BID IS    naproxen  250 mg Oral BID w/meals    OLANZapine zydis  15 mg Oral BID    Or    OLANZapine  10 mg Intramuscular BID    polyethylene glycol  17 g Oral BID    sennosides  2 tablet Oral BID    tamsulosin  0.4 mg Oral Daily    triamcinolone   Topical BID          Allergies:     Allergies   Allergen Reactions    Bee Venom Unknown    Montelukast Unknown    Phenothiazines Unknown          Labs:     No results found for this or any previous visit (from the past 24 hour(s)).         Psychiatric Examination:     /72 (BP Location: Right arm, Patient Position: Supine, Cuff Size: Adult Regular)   Pulse (!) 16   Temp 97.3  F (36.3  C) (Temporal)   Resp 16   Wt 86.5 kg (190 lb 9.6 oz)   SpO2 96%   BMI 28.15 kg/m    Weight is 190 lbs 9.6 oz  Body mass index is 28.15 kg/m .    Weight over time:  Vitals:    07/03/23 1100 07/30/23 0902   Weight: 81.6 kg (179 lb 12.8 oz) 86.5 kg (190 lb 9.6 oz)       Orthostatic Vitals         Most Recent      Sitting Orthostatic /61 07/25 0800    Sitting Orthostatic Pulse (bpm) 85 07/25 0800          Cardiometabolic risk assessment. 06/07/23    Reviewed patient profile for cardiometabolic risk factors    Date taken /Value  REFERENCE RANGE   Abdominal Obesity  (Waist Circumference)   See nursing flowsheet Women ?35 in (88 cm)   Men ?40 in (102 cm)      Triglycerides  No results found for: TRIG    ?150 mg/dL (1.7 mmol/L) or current treatment for elevated triglycerides   HDL cholesterol  No results found for: HDL]   Women <50 mg/dL (1.3 mmol/L) in women or current treatment for low HDL cholesterol  Men <40 mg/dL (1 mmol/L) in men or current treatment for low HDL cholesterol     Fasting plasma glucose (FPG) Lab Results   Component Value Date     05/26/2023      FPG ?100 mg/dL (5.6 mmol/L)  "or treatment for elevated blood glucose   Blood pressure  BP Readings from Last 3 Encounters:   08/10/23 127/72   05/26/23 97/55    Blood pressure ?130/85 mmHg or treatment for elevated blood pressure   Family History  See family history     Mental Status Exam:  Appearance: awake, groggy, disheveled. No evidence of pain of acute distress.   Attitude:  suspicious, impulsive  Eye Contact:  fair, less intense  Mood:  \"horrible\"  Affect:  calm and cooperative, restricted, reactive  Speech: mostly coherent  Psychomotor Behavior:  No evidence of psychomotor agitation or restlessness today. No evidence of dystonia, or tics.  Throught Process: disorganized  Associations:  loosening of associations present  Thought Content:  Delusions. Likely auditory, tacticle and olfactory hallucinations. Cannot rule out visual hallucinations. Evidence of obsessive thinking and likely compulsions to harm others.   Insight:  limited  Judgement:  poor  Oriented to:  self, date, place  Attention Span and Concentration:  fair  Recent and Remote Memory:  poor         Precautions:     Behavioral Orders   Procedures    Assault precautions    Code 1 - Restrict to Unit    Code 2 - 1:1 Staff Supervision     For ECT only    Electroconvulsive therapy     Series of up to 12 treatments. Begin Date: 8/2/23     Treating Psychiatrist providing ECT:  unknown     Notified on:  7/28/23 via this order  NOTE: We received verbal confirmation from [facility] that Lorenzo Pavon has been approved but awaiting official court order and risk management's review  Initial consult was completed by Dr. Hicks on 7/18/23    Electroconvulsive therapy     Series of up to 12 treatments. Begin Date: 8/2/23     Treating Psychiatrist providing ECT:  Dominga     Notified on:  8/2/23    Elopement precautions    Fall precautions    Routine Programming     As clinically indicated    Self Injury Precaution    Status 15     Every 15 minutes.    Status Individual Observation     Patient " SIO status reviewed with team/RN.  Please also refer to RN/team documentation for add'l detail.    -SIO staff to monitor following which have contributed to patient being on SIO:  Agitation  Pt is impulsive   Pt has ran out of his room naked  Pt has Parkinson symptoms place him in a high fall risk  Pt has verbal outburst of sexual and threaten statements  Pt requires immediate redirection when masturbating    -Possible interventions SIO staff could use to support patient's treatment progress:  Engage pt in activities    -When following observed, team will review discontinuation of SIO:  Absence of aggression toward others for > 24 hours    Comments: SIO 1:1     Order Specific Question:   CONTINUOUS 24 hours / day     Answer:   5 feet     Order Specific Question:   Indications for SIO     Answer:   Severe intrusiveness     Order Specific Question:   Indications for SIO     Answer:   Assault risk    Suicide precautions     Patients on Suicide Precautions should have a Combination Diet ordered that includes a Diet selection(s) AND a Behavioral Tray selection for Safe Tray - with utensils, or Safe Tray - NO utensils            Diagnoses:     #Unspecified psychosis likely schizophrenia per history, rule out Bipolar affective disorder, manic  #Unspecified encephalopathy   -R/O catatonia   -Episodes of unresponsiveness, concern for PNES   #Parkinsonism 2/2 neuroleptic medications, rule out Parkinson's Disease  #Urinary retention and BPH  -Possible UTI -- UC resulted on 5/25 w/out growth  #Hx of prolonged QTc with clozapine  #Mild thrombocytopenia         Assessment and hospital summary:  Patient was admitted to psychiatric unit for safety, stabilization and medication management. He has had schizophrenia since the 1980. He was on Clozaril x 25 years, and it was tapered and discontinued on May 7, 2023  due to prolonged qtc of 527, and his psychotic  symptoms have worsened since then. Sinemet was also discontinued recently  due to concerns that it was contributing to paranoia and AH. He is agitated, aggressive, dangerous to self and others. He remains on SIO 2 to 1, and is under psychiatric emergency and court hold. There are concerns for organic etiology given pattern of sundowning, history of parkinsonism, and ongoing disorientation/confusion. : EKG repeated, cardiology consult regarding safety of resuming clozapine in the context of prolonged QTc and refractory schizophrenia pending response. Per cardiology, correct QTc is no more than 460. They do not have concerns about AP retrial. Neurology IP consult placed for evaluation of sundowning and cognitive decline secondary to Sinemet discontinuation. MSSA initiated. Per Neurology, discontinuation of Sinemet would not account for these symptoms. They do not recommend retrial at this time. : Clozapine titration continued.   Its possible that clozapine dose is contributing to worsened compulsive behaviors noted throughout hospitalization which could improve with lowering the dose.     Chart reviewed which revealed the followin2022: He was on clozapine, Seroquel, Cymbalta, and Carbidopa-levodopa and hospitalized for pneumonia. Psych consulted and Seroquel was stopped. Treated with Abx and discharged to TCU.     2022: Hospitalized at [facility]. Per chart review:    Vinod Lee is a 63 yo male with longstanding history of schizophrenia. He was admitted from [facility] ED, where he presented due to increased delusional thoughts while on a visit to his parents for Thanksgiving. He began experiencing increasing paranoid thoughts that someone might be poisoning him and began fearing that he might accidentally harm someone. He reported to his parents that he was having thoughts about hitting someone or beating someone up and told them he could not guarantee they would be safe with him. Parents encouraged patient to go to the ED, which he did voluntarily.     Per patient  "report and chart review, he was hospitalized several times in the 1980s and reports he was civilly committed at one point in the 1980s, however he has been quite stable for the past several decades. He reports he will experience some paranoia and delusional thinking at times, but generally has good insight into his symptoms. He has been maintained on clozapine for about 25 years and prior to several months ago was also concurrently prescribed quetiapine.      In the last several months, patient has had a number of medication changes. Approximately 6 months ago, patient was diagnosed with parkinsonism related to antipsychotic use and was started on carbidopa-levidopa. He experienced no improvement in tremors, and thus carbidopa- levidopa dose was increased 1.5 weeks ago.      In addition, patient was medically hospitalized in August 2022 for confusion, weakness, repeated falls, ongoing weight loss, and SOB. He was found to have a cavitary lesion of the lung and suspected aspiration pneumonia. He was treated with antibiotics. At that time, he was taking current dose of clozapine and duloxetine, as well as propranolol ER, quetiapine, gabapentin, and clonazepam. Psychiatry was consulted due to concern for oversedation, and propranolol ER, gabapentin, and quetiapine were discontinued. Conazepam was reduced from 0.5 mg TID to BID dosing and recommended to be discontinued, however, it does not appear this occurred. His sedation improved significantly. QTc prolongation was also noted, but improved throughout his stay. He was ultimately discharged to a TCU for several months before returning home. He reports his weakness has greatly improved and states he \"graduated\" physical therapy, though he continues to be diligent in completed recommended exercises.      He reports that over the past several months, his delusions and anxiety have been worse than usual, with symptoms becoming much more acute since the carbidopa-levidopa " "dose was increased. He has felt increasingly paranoid about being poisoned, noting this is a delusion that has been stable over time, but was previously less intrusive. He has insight during the interview that his paranoid thinking is not reality based, but states it has been harder to separate delusions from reality and he becomes very worried that he might hurt someone, despite no history of violent behavior. He reports that the paranoia about his food being poisoned in combination with the tremors in his hands have made it difficult for him to eat. He has also had quite low appetite for the past 6 months to a year . He reports that due to the combination of these factors, he has lost about 50 pounds in the last year.He denies any problems with sleep initiation or maintenance. He reports energy is fairly good and \"better than it used to be.\" He reports some short term memory issues and on interview, does have some difficulty remembering details of recent events. He is unsure if he has received cognitive testing in the past.    He denies any suicidal ideation and reports he has not experienced SI for decades. He denies homicidal thoughts and is very clear that he had and has no desire to harm anyone else, but was afraid he might somehow do so. He denies any hallucinations and states \"that's never been a problem for me.\" He is not observed responding to internal stimuli. He is alert and oriented in all spheres.        ========    BRIEF HOSPITAL COURSE: This 62 y.o. male admitted 11/25/2022 to  [facility] for the assessment and treatment of the above presentation. This was a voluntary admission.     In summary, he was tapered off the Sinemet, which he reports to be both ineffective and potentially worsening the anxiety and paranoia ideations. Instead Benadryl 25 mg TID was started to help with extrapyramidal side effects. He struggled with sleep during his stay here. Remeron 7.5mg QHS was tried without success. Seroquel " 100mg qhs was restarted with addition of suvorexant 10mg qhs. Given his Seroquel PRN use prior history of prolonged QTC, he will benefit from another EKG repeat on the medical unit.     Unfortunately, he tested positive for influenza A and developed hypoxemia. Now on 2L oxygen with desats when prone requiring 3L oxygen. Subsequently, the plan will to be tapering him off clonazepam due to hypoxia (and also prior plan to taper). The patient tolerated these changes without side effects.     The patient minimally benefited from the structured therapeutic milieu as he attended programming seldom as he tested positive for influenza, he needed to isolate with droplet precautions. Pt was engaged and worked on issues that were triggering/brought pt to the hospital and has excellent insight into his anxiety and paranoid delusions. Pt denied suicidal ideations throughout all/majority of their hospitalization. Pt denied HI throughout all of hospitalization. There were no concerns with behavioral disruptions/outbursts. The patient has shown improvement in general.     At the time of discharge, hospitalization course was reviewed with Vinod Lee with plan to transfer to medicine. Please consult psychiatry and I will continue to follow while patient is on the medical unit. He is now off sinemet since 11/29 21:00 and I would expect anxiety and paranoia to improve more moving forward. Psychiatrically, once anxiety and paranoia is baseline, can D/C to home once medically stable. He DOES NOT NEED A 1:1 on the medical unit from a mental health perspective, we initiated 1:1 11/30/2022 due to significant fatigue, gait instability (he was unable to stand without assist) and feeding assist.    04/2023: Clozapine was gradually tapered and discontinued, unclear reasons why it was discontinued per outside records, though Dr. Portillo's H&P indicates that it was due to prolonged QTc.       Today's Changes:  - no changes, ECT  tomorrow      Target psychiatric symptoms and interventions:  -Psych emergency declared on 5/27, now fully committed  -ECT Mon/Wed/Fri per Janelle   -Continue clozapine as above  -Continue scheduled Zyprexa 15mg BID  -Continue 1:1 for safety of staff and patients, reduced from 2:1 7/23/23  - weekly CBC to monitor platelets      -Continue Gabapentin 300 mg TID as staff believe it has been effective for treatment of his anxiety  -Discussed complex case at length with Dr. Hatch (primary provider on geriatic unit) who provided recs (see second opinion note dated 6/14). Appreciate assistance. I have since made the following changes:         1) Add propranolol 10 mg TID         2) Added Depakote 1000 mg qhs (to be administered in magic cup)         3) Nutrition consult to order magic cup         4) Increased melatonin to 10 mg QHS    Risks, benefits, and alternatives discussed at length with patient.     Acute Medical Problems and Treatments:  Delirium vs progression of dementia  Neurology consult placed on 6/8 for evaluation of sundowning and cognitive decline secondary to Sinemet discontinuation: Resuming Sinemet not recommended for behavioral concerns. Please see Neuro note for details.     Thrombocytopenia   Likely secondary to Depakote.  According to the, incidents of Depakote induced thrombocytopenia as 27%.  Depakote dose reduced from 1000 twice daily to 250 3 times daily on 7/28/2023  - weekly CBCs    Constipation  Likely worsened by clozapine, improved since modifying regimen.   - daily miralax   - daily Senokot 2 tablets BID      Right first toe fracture:  Seen by Ortho on 6/16:  Per note: Vinod Lee is a 62 year old  male w/ PMHx complex psychiatric history who has a fracture to the distal phalanx of the right toe after a recent trauma to the foot the patient reports.  Patient denies any other pain or discomfort.  Images reviewed and plan will be for irrigation of the popped fracture blister with  "sterile saline and the toes should be dressed and a 4 x 4 gauze dressing.  Patient will need a postop shoe which she can be weightbearing as tolerated in.  Would recommend a course of antibiotics for approximately 7 days.  Would recommend Augmentin or clindamycin.  Podiatry will be made aware of patient and will see patient while he is admitted or if patient is discharged in the near future a follow-up appointment will need to be established.     Remainder of care per primary team.  Primary team should make sure that patient is up-to-date on his tetanus shot.  If not patient will require a booster shot.    Hx of prolonged QTc:  Continue weekly EKGs. See above for details.     Urinary retention, resolved  Per patient care order:   If patient is refusing straight caths, please notify IM provider immediately to determine whether this constitutes a medical emergency. If IM declares medical emergency, may restrain patient for straight cath. Discussed this with patient's parents/substitute decision makers on 6/7 who are in support of this plan.    # Psoriasis hx  # Purplish discoloration of B/LE feet-resolved   Discussed with Dr. Rene and bedside RN regarding rash on right foot that appears and appears to be purplish in color and almost \"petechiae.\" It was noted during interview, but seemed to be resolving upon walking. Within the past 24 hrs, pt has had ECT and seemed more confused than usual. Pt seems to have had a right great toe wound with recent right great toe fracture seen by Medicine on 7/16. Seen on 8/4 with improving color on B/L legs at rest and appears to have small, scaly patches of varying coin sizes that have not grown past marked borders. See photos. Per further chart review, has had psoriasis history. WBC WNL, no fevers, HDS. This appears to more consistent with a psoriasis like picture.   - Start triamcinolone topical 0.025%   -Apply twice daily until lesions for 2 weeks or until lesions " resolve/  -Monitor for skin thinning, striae, rebound lesion flares.   - Start Eucerin cream              - Apply 30 minutes PRIOR to triamcinolone topical cream above or PRN as needed if dry skin/after bathing   - Medicine will sign off.   - For worsening pain, spread, itchiness, fevers, please contact Medicine and possibly Dermatology.    Benzodiazepine dependence:  Phenobarbital taper completed    Pertinent labs/imaging:  Weekly CBC with diff     Behavioral/Psychological/Social:  - Encourage unit programming    Safety:  - Continue precautions as noted above  - Status 15 minute checks  - Continue 1:1 for staff and pt safety    Legal Status: Fully committed with Sánchez and Lorenzo Pavon. Sánchez medications: clozapine, olanzapine, risperidone, quetiapine      Disposition Plan   Reason for ongoing admission: poses an imminent risk to self, poses an imminent risk to others and is unable to care for self due to severe psychosis or darryl  Discharge location:  assisted living facility  Discharge Medications: not ordered  Follow-up Appointments: not scheduled    Entered by: Merlin Richey DO on 08/10/2023  at 9:08 AM    Attestation:

## 2023-08-10 NOTE — CARE PLAN
"   08/10/23 1305   Group Therapy Session   Group Attendance attended group session   Time Session Began 1115   Time Session Ended 1200   Total Time (minutes) 25   Total # Attendees 1   Group Type recreation;community;task skill   Group Topic Covered cognitive activities;balanced lifestyle;leisure exploration/use of leisure time;structured socialization   Group Session Detail OT Leisure Group: Group activities to exercise cognitive skills while exploring leisure and socializing opportunities; \"[place] in [place]\" - game that is played using a standard deck of cards.   Patient Participation Detail Pt was joined by MAYELA to a group activity playing a card game. Pt reported being unable to teach writer any card games from pt's past; writer asked if pt was familiar with \"[place]\"; pt reported being unfamiliar with the game. Pt required verbal instructions and demonstration, along with visual cues to scan the cards and occasional guidance to strategies for playing their turn. Pt was able to recall \"house rules\" (knock on the table to indicate the end of turn), as well instructions and rules to make plays independently during their turn. Throughout the session, pt would say \"I've already messed up the game\" and occasionally want to discontinue playing; pt required reassurance and encouragement, as well as redirection to continue. Pt won the game, frequently saying that they \"lost\" and left after group.       "

## 2023-08-10 NOTE — PLAN OF CARE
Problem: Psychotic Symptoms  Goal: Psychotic Symptoms  Description: Signs and symptoms of listed problems will be absent or manageable.  Outcome: Not Progressing   Goal Outcome Evaluation:    Oriented only to self, disorganized speech. Pt intermittently agitated, attempted to strike staff. Gave scheduled medications a bit early to address behavior. Generally receptive to redirection. Intermittently making paranoid statements, eg that his medication is poison.     Pt displaying large amount of drooling and noticeable hand and mouth tremors. Showered. Had a large BM during shift.     Maintains 1:1 and acuity staff for assault risk and intrusiveness. Refused scheduled melatonin and Miralax. Denies pain.    /70   Pulse 94   Temp 98.1  F (36.7  C) (Temporal)   Resp 16   Wt 86.5 kg (190 lb 9.6 oz)   SpO2 95%   BMI 28.15 kg/m

## 2023-08-11 ENCOUNTER — ANESTHESIA (OUTPATIENT)
Dept: BEHAVIORAL HEALTH | Facility: CLINIC | Age: 63
DRG: 885 | End: 2023-08-11
Payer: COMMERCIAL

## 2023-08-11 ENCOUNTER — APPOINTMENT (OUTPATIENT)
Dept: BEHAVIORAL HEALTH | Facility: CLINIC | Age: 63
DRG: 885 | End: 2023-08-11
Attending: PSYCHIATRY & NEUROLOGY
Payer: COMMERCIAL

## 2023-08-11 ENCOUNTER — ANESTHESIA EVENT (OUTPATIENT)
Dept: BEHAVIORAL HEALTH | Facility: CLINIC | Age: 63
DRG: 885 | End: 2023-08-11
Payer: COMMERCIAL

## 2023-08-11 LAB
BASOPHILS # BLD AUTO: 0.1 10E3/UL (ref 0–0.2)
BASOPHILS NFR BLD AUTO: 1 %
EOSINOPHIL # BLD AUTO: 0 10E3/UL (ref 0–0.7)
EOSINOPHIL NFR BLD AUTO: 0 %
ERYTHROCYTE [DISTWIDTH] IN BLOOD BY AUTOMATED COUNT: 14.6 % (ref 10–15)
HCT VFR BLD AUTO: 37.6 % (ref 40–53)
HGB BLD-MCNC: 11.9 G/DL (ref 13.3–17.7)
IMM GRANULOCYTES # BLD: 0.1 10E3/UL
IMM GRANULOCYTES NFR BLD: 1 %
LYMPHOCYTES # BLD AUTO: 1.5 10E3/UL (ref 0.8–5.3)
LYMPHOCYTES NFR BLD AUTO: 13 %
MCH RBC QN AUTO: 27.5 PG (ref 26.5–33)
MCHC RBC AUTO-ENTMCNC: 31.6 G/DL (ref 31.5–36.5)
MCV RBC AUTO: 87 FL (ref 78–100)
MONOCYTES # BLD AUTO: 1 10E3/UL (ref 0–1.3)
MONOCYTES NFR BLD AUTO: 9 %
NEUTROPHILS # BLD AUTO: 8.6 10E3/UL (ref 1.6–8.3)
NEUTROPHILS NFR BLD AUTO: 76 %
NRBC # BLD AUTO: 0 10E3/UL
NRBC BLD AUTO-RTO: 0 /100
PLATELET # BLD AUTO: 197 10E3/UL (ref 150–450)
RBC # BLD AUTO: 4.32 10E6/UL (ref 4.4–5.9)
WBC # BLD AUTO: 11.3 10E3/UL (ref 4–11)

## 2023-08-11 PROCEDURE — 250N000009 HC RX 250: Performed by: STUDENT IN AN ORGANIZED HEALTH CARE EDUCATION/TRAINING PROGRAM

## 2023-08-11 PROCEDURE — 90870 ELECTROCONVULSIVE THERAPY: CPT

## 2023-08-11 PROCEDURE — 36415 COLL VENOUS BLD VENIPUNCTURE: CPT | Performed by: PSYCHIATRY & NEUROLOGY

## 2023-08-11 PROCEDURE — 250N000013 HC RX MED GY IP 250 OP 250 PS 637: Performed by: PSYCHIATRY & NEUROLOGY

## 2023-08-11 PROCEDURE — 85025 COMPLETE CBC W/AUTO DIFF WBC: CPT | Performed by: PSYCHIATRY & NEUROLOGY

## 2023-08-11 PROCEDURE — 250N000013 HC RX MED GY IP 250 OP 250 PS 637: Performed by: STUDENT IN AN ORGANIZED HEALTH CARE EDUCATION/TRAINING PROGRAM

## 2023-08-11 PROCEDURE — 370N000017 HC ANESTHESIA TECHNICAL FEE, PER MIN: Performed by: STUDENT IN AN ORGANIZED HEALTH CARE EDUCATION/TRAINING PROGRAM

## 2023-08-11 PROCEDURE — 250N000013 HC RX MED GY IP 250 OP 250 PS 637: Performed by: PHYSICIAN ASSISTANT

## 2023-08-11 PROCEDURE — 250N000009 HC RX 250: Performed by: PSYCHIATRY & NEUROLOGY

## 2023-08-11 PROCEDURE — 99232 SBSQ HOSP IP/OBS MODERATE 35: CPT | Mod: 25 | Performed by: PSYCHIATRY & NEUROLOGY

## 2023-08-11 PROCEDURE — G0177 OPPS/PHP; TRAIN & EDUC SERV: HCPCS

## 2023-08-11 PROCEDURE — 250N000011 HC RX IP 250 OP 636: Performed by: STUDENT IN AN ORGANIZED HEALTH CARE EDUCATION/TRAINING PROGRAM

## 2023-08-11 PROCEDURE — 124N000002 HC R&B MH UMMC

## 2023-08-11 PROCEDURE — 90870 ELECTROCONVULSIVE THERAPY: CPT | Performed by: PSYCHIATRY & NEUROLOGY

## 2023-08-11 RX ORDER — GABAPENTIN 300 MG/1
300 CAPSULE ORAL
Status: DISCONTINUED | OUTPATIENT
Start: 2023-08-11 | End: 2023-08-14

## 2023-08-11 RX ORDER — DIVALPROEX SODIUM 500 MG/1
500 TABLET, DELAYED RELEASE ORAL ONCE
Status: COMPLETED | OUTPATIENT
Start: 2023-08-11 | End: 2023-08-11

## 2023-08-11 RX ORDER — LORAZEPAM 0.5 MG/1
0.5 TABLET ORAL
Status: DISCONTINUED | OUTPATIENT
Start: 2023-08-11 | End: 2023-08-25

## 2023-08-11 RX ORDER — DIMENHYDRINATE 50 MG/ML
25 INJECTION, SOLUTION INTRAMUSCULAR; INTRAVENOUS
Status: CANCELLED | OUTPATIENT
Start: 2023-08-11

## 2023-08-11 RX ORDER — SODIUM CHLORIDE, SODIUM LACTATE, POTASSIUM CHLORIDE, CALCIUM CHLORIDE 600; 310; 30; 20 MG/100ML; MG/100ML; MG/100ML; MG/100ML
INJECTION, SOLUTION INTRAVENOUS CONTINUOUS
Status: CANCELLED | OUTPATIENT
Start: 2023-08-11

## 2023-08-11 RX ORDER — NICARDIPINE HCL-0.9% SOD CHLOR 1 MG/10 ML
SYRINGE (ML) INTRAVENOUS PRN
Status: DISCONTINUED | OUTPATIENT
Start: 2023-08-11 | End: 2023-08-11

## 2023-08-11 RX ORDER — LABETALOL HYDROCHLORIDE 5 MG/ML
INJECTION, SOLUTION INTRAVENOUS PRN
Status: DISCONTINUED | OUTPATIENT
Start: 2023-08-11 | End: 2023-08-11

## 2023-08-11 RX ORDER — ONDANSETRON 4 MG/1
4 TABLET, ORALLY DISINTEGRATING ORAL EVERY 30 MIN PRN
Status: CANCELLED | OUTPATIENT
Start: 2023-08-11

## 2023-08-11 RX ORDER — ACETAMINOPHEN 325 MG/1
975 TABLET ORAL ONCE
Status: CANCELLED | OUTPATIENT
Start: 2023-08-11 | End: 2023-08-11

## 2023-08-11 RX ORDER — FLUMAZENIL 0.1 MG/ML
0.2 INJECTION, SOLUTION INTRAVENOUS ONCE
Status: DISCONTINUED | OUTPATIENT
Start: 2023-08-11 | End: 2023-08-11

## 2023-08-11 RX ORDER — CLONAZEPAM 0.5 MG/1
0.5 TABLET ORAL
Status: DISCONTINUED | OUTPATIENT
Start: 2023-08-14 | End: 2023-08-11

## 2023-08-11 RX ORDER — LORAZEPAM 0.5 MG/1
1 TABLET ORAL ONCE
Status: DISCONTINUED | OUTPATIENT
Start: 2023-08-11 | End: 2023-08-11

## 2023-08-11 RX ORDER — FLUMAZENIL 0.1 MG/ML
INJECTION, SOLUTION INTRAVENOUS PRN
Status: DISCONTINUED | OUTPATIENT
Start: 2023-08-11 | End: 2023-08-11

## 2023-08-11 RX ORDER — ESMOLOL HYDROCHLORIDE 10 MG/ML
INJECTION INTRAVENOUS PRN
Status: DISCONTINUED | OUTPATIENT
Start: 2023-08-11 | End: 2023-08-11

## 2023-08-11 RX ORDER — DIVALPROEX SODIUM 250 MG/1
250 TABLET, DELAYED RELEASE ORAL
Status: DISCONTINUED | OUTPATIENT
Start: 2023-08-11 | End: 2023-08-14

## 2023-08-11 RX ORDER — ETOMIDATE 2 MG/ML
INJECTION INTRAVENOUS PRN
Status: DISCONTINUED | OUTPATIENT
Start: 2023-08-11 | End: 2023-08-11

## 2023-08-11 RX ORDER — GABAPENTIN 300 MG/1
600 CAPSULE ORAL ONCE
Status: COMPLETED | OUTPATIENT
Start: 2023-08-11 | End: 2023-08-11

## 2023-08-11 RX ORDER — ONDANSETRON 2 MG/ML
4 INJECTION INTRAMUSCULAR; INTRAVENOUS EVERY 30 MIN PRN
Status: CANCELLED | OUTPATIENT
Start: 2023-08-11

## 2023-08-11 RX ADMIN — ATROPINE SULFATE 2 DROP: 10 SOLUTION/ DROPS OPHTHALMIC at 20:16

## 2023-08-11 RX ADMIN — Medication 10 MG: at 20:15

## 2023-08-11 RX ADMIN — Medication 300 MCG: at 12:31

## 2023-08-11 RX ADMIN — NAPROXEN 250 MG: 250 TABLET ORAL at 18:48

## 2023-08-11 RX ADMIN — GABAPENTIN 300 MG: 300 CAPSULE ORAL at 20:14

## 2023-08-11 RX ADMIN — Medication 400 MCG: at 12:36

## 2023-08-11 RX ADMIN — GABAPENTIN 600 MG: 300 CAPSULE ORAL at 13:33

## 2023-08-11 RX ADMIN — Medication 300 MCG: at 12:33

## 2023-08-11 RX ADMIN — SENNOSIDES 2 TABLET: 8.6 TABLET ORAL at 13:00

## 2023-08-11 RX ADMIN — DIVALPROEX SODIUM 250 MG: 250 TABLET, DELAYED RELEASE ORAL at 20:14

## 2023-08-11 RX ADMIN — NAPROXEN 250 MG: 250 TABLET ORAL at 14:26

## 2023-08-11 RX ADMIN — SENNOSIDES 2 TABLET: 8.6 TABLET ORAL at 20:15

## 2023-08-11 RX ADMIN — OLANZAPINE 15 MG: 10 TABLET, ORALLY DISINTEGRATING ORAL at 13:00

## 2023-08-11 RX ADMIN — LABETALOL HYDROCHLORIDE 20 MG: 5 INJECTION, SOLUTION INTRAVENOUS at 12:28

## 2023-08-11 RX ADMIN — TAMSULOSIN HYDROCHLORIDE 0.4 MG: 0.4 CAPSULE ORAL at 13:00

## 2023-08-11 RX ADMIN — CYANOCOBALAMIN TAB 1000 MCG 1000 MCG: 1000 TAB at 13:35

## 2023-08-11 RX ADMIN — SUCCINYLCHOLINE CHLORIDE 80 MG: 20 INJECTION, SOLUTION INTRAMUSCULAR; INTRAVENOUS; PARENTERAL at 12:31

## 2023-08-11 RX ADMIN — FLUMAZENIL 0.2 MG: 0.1 INJECTION, SOLUTION INTRAVENOUS at 12:30

## 2023-08-11 RX ADMIN — OLANZAPINE 15 MG: 10 TABLET, ORALLY DISINTEGRATING ORAL at 20:13

## 2023-08-11 RX ADMIN — LORAZEPAM 0.5 MG: 0.5 TABLET ORAL at 20:15

## 2023-08-11 RX ADMIN — ESMOLOL HYDROCHLORIDE 30 MG: 10 INJECTION, SOLUTION INTRAVENOUS at 12:29

## 2023-08-11 RX ADMIN — CLOZAPINE 650 MG: 100 TABLET, ORALLY DISINTEGRATING ORAL at 13:32

## 2023-08-11 RX ADMIN — Medication 300 MCG: at 12:39

## 2023-08-11 RX ADMIN — POLYETHYLENE GLYCOL 3350 17 G: 17 POWDER, FOR SOLUTION ORAL at 20:11

## 2023-08-11 RX ADMIN — CLONAZEPAM 0.5 MG: 0.5 TABLET ORAL at 13:04

## 2023-08-11 RX ADMIN — DIVALPROEX SODIUM 500 MG: 500 TABLET, DELAYED RELEASE ORAL at 13:32

## 2023-08-11 RX ADMIN — FLUMAZENIL 0.2 MG: 0.1 INJECTION, SOLUTION INTRAVENOUS at 12:37

## 2023-08-11 RX ADMIN — ETOMIDATE 14 MG: 2 INJECTION INTRAVENOUS at 12:31

## 2023-08-11 ASSESSMENT — ACTIVITIES OF DAILY LIVING (ADL)
ADLS_ACUITY_SCORE: 64
ADLS_ACUITY_SCORE: 64
ORAL_HYGIENE: INDEPENDENT
DRESS: INDEPENDENT
ADLS_ACUITY_SCORE: 64
LAUNDRY: UNABLE TO COMPLETE
ADLS_ACUITY_SCORE: 64
ADLS_ACUITY_SCORE: 64
HYGIENE/GROOMING: INDEPENDENT
ADLS_ACUITY_SCORE: 64

## 2023-08-11 ASSESSMENT — ENCOUNTER SYMPTOMS: DYSRHYTHMIAS: 1

## 2023-08-11 NOTE — ANESTHESIA PREPROCEDURE EVALUATION
Anesthesia Pre-Procedure Evaluation    Patient: Vinod Lee   MRN: [id number] : 1960        Procedure :           Past Medical History:   Diagnosis Date    Parkinson's disease (H)     Schizophrenia (H)       No past surgical history on file.   Allergies   Allergen Reactions    Bee Venom Unknown    Montelukast Unknown    Phenothiazines Unknown      Social History     Tobacco Use    Smoking status: Not on file    Smokeless tobacco: Never   Substance Use Topics    Alcohol use: Not on file     Comment: GRACE      Wt Readings from Last 1 Encounters:   23 86.5 kg (190 lb 9.6 oz)        Anesthesia Evaluation            ROS/MED HX  ENT/Pulmonary:     (+) sleep apnea,                                      Neurologic:     (+)                 Parkinson's disease, features: parkinsonism, from neuroleptics,              Cardiovascular: Comment: 23 QTc 514    (+)  hypertension- -   -  - -                        dysrhythmias, Long QT,        Previous cardiac testing   Echo: Date: Results:    Stress Test:  Date: Results:    ECG Reviewed:  Date: 23 Results:  LBBB QT intervial is 498  Cath:  Date: Results:      METS/Exercise Tolerance:     Hematologic:     (+)      anemia,          Musculoskeletal:  - neg musculoskeletal ROS     GI/Hepatic:  - neg GI/hepatic ROS     Renal/Genitourinary:     (+)        BPH,      Endo:  - neg endo ROS     Psychiatric/Substance Use: Comment: Patient is aggressive and homicidal    (+) psychiatric history schizophrenia       Infectious Disease:  - neg infectious disease ROS     Malignancy:  - neg malignancy ROS     Other:  - neg other ROS          Physical Exam    Airway  airway exam normal           Respiratory Devices and Support         Dental           Cardiovascular   cardiovascular exam normal          Pulmonary   pulmonary exam normal                OUTSIDE LABS:  CBC:   Lab Results   Component Value Date    WBC 11.3 (H) 2023    WBC 10.2 2023    HGB 11.9 (L)  08/11/2023    HGB 11.3 (L) 08/04/2023    HCT 37.6 (L) 08/11/2023    HCT 35.0 (L) 08/04/2023     08/11/2023     (L) 08/04/2023     BMP:   Lab Results   Component Value Date     08/04/2023     08/02/2023    POTASSIUM 4.9 08/04/2023    POTASSIUM 4.9 08/02/2023    CHLORIDE 106 08/04/2023    CHLORIDE 107 08/02/2023    CO2 24 08/04/2023    CO2 24 08/02/2023    BUN 37.8 (H) 08/04/2023    BUN 24.8 (H) 08/02/2023    CR 1.05 08/04/2023    CR 0.99 08/02/2023     (H) 08/04/2023    GLC 93 08/02/2023     COAGS: No results found for: PTT, INR, FIBR  POC: No results found for: BGM, HCG, HCGS  HEPATIC:   Lab Results   Component Value Date    ALBUMIN 3.6 07/13/2023    PROTTOTAL 5.6 (L) 07/13/2023    ALT 14 07/28/2023    AST 18 07/28/2023    ALKPHOS 73 07/13/2023    BILITOTAL 0.2 07/13/2023     OTHER:   Lab Results   Component Value Date    LACT 1.5 06/07/2023    BRENDA 8.3 (L) 08/04/2023    PHOS 3.8 05/25/2023    MAG 2.1 05/25/2023    TSH 1.80 05/22/2023       Anesthesia Plan    ASA Status:  3    NPO Status:  NPO Appropriate    Anesthesia Type: General.     - Airway: Mask Only   Induction: Intravenous.   Maintenance: N/A.        Consents    Anesthesia Plan(s) and associated risks, benefits, and realistic alternatives discussed. Questions answered and patient/representative(s) expressed understanding.     - Discussed:     - Discussed with:  Patient      - Extended Intubation/Ventilatory Support Discussed: No.      - Patient is DNR/DNI Status: No     Use of blood products discussed: No .     Postoperative Care    Pain management: Oral pain medications.   PONV prophylaxis: Ondansetron (or other 5HT-3)     Comments:                Jayla Gresham MD

## 2023-08-11 NOTE — PLAN OF CARE
"  Problem: Adult Behavioral Health Plan of Care  Goal: Patient-Specific Goal (Individualization)  Description: You can add care plan individualizations to a care plan. Examples of Individualization might be:  \"Parent requests to be called daily at 9am for status\", \"I have a hard time hearing out of my right ear\", or \"Do not touch me to wake me up as it startles me\".  Outcome: Progressing     Problem: Sleep Disturbance  Goal: Adequate Sleep/Rest  Outcome: Progressing   Goal Outcome Evaluation:       Pt in bed sleeping upon arrival. Up and out on the unit pacing a couple of times. Pt requested to take a shower, but he was advised to wait and take one during the day. At about 02:45, pt was out on the unit again getting slightly agitated. Zyprexa offered, but pt declined. Pt slept for about 2 hours. He continues to be on a 1:1 SIO with an acuity staff for assault, fall, and elopement precautions. Pt is scheduled for ECT today and has been NPO. Pt had a bowel movement last night. No other concerns noted or verbalized. Will continue to monitor and treat as ordered.                   "

## 2023-08-11 NOTE — PROGRESS NOTES
"[facility], [place]   Psychiatric Progress Note  Hospital Day: 77        Interim History:   The patient's care was discussed with the treatment team during the daily team meeting and/or staff's chart notes were reviewed.  Staff report that Vinod denies all psych symptoms of, SI/SIB/HI, A/V hallucinations, anxiety and depression. Patient did not have behavioral issues during day shift. Patient moved from station 12 downstairs to station 32 upstairs, and he did well with the move. He spent the rest of the day shift pacing with his sio staff.  During evening shift, he continues to exhibit disorientation and disorganization. States that he is living in an illusion and that he is not in reality. No instances of striking, though pt pt attempt to grab RN badge once. Receptive to redirection. Occasionally pacing hallway, out in milieu watching football and coloring. Good appetitive. Showered. Only slept for 2 hours during night shift. ECT #5 today.      During my interview with Vinod, he said that he is feeling \"pretty good today.\" He was alert and oriented x 3. He asked me to write number of ECT sessions thus far on his folder for him. He admits feeling less concerned about being poisoned. Very agreeable to transfer down to ECT. He was less irritable with writer today.     Suicidal ideation: no    Homicidal ideation: None today    Psychotic symptoms: no AH or VH reported during interview. Delusions present and other psychotic symptoms suspected.    Medication side effects reported: Diarrhea last evening, none today.     Acute medical concerns: none. He denies any pain or discomfort today.      Other issues reported by patient: Patient had no further questions or concerns.           Medications:      atropine  2 drop Sublingual TID    [START ON 8/14/2023] clonazePAM  0.5 mg Oral Q Mon Wed Fri AM    cloZAPine  650 mg Oral Daily    cyanocobalamin  1,000 mcg Oral Daily    divalproex sodium " delayed-release  250 mg Oral 3 times per day on Mon Wed Fri    divalproex sodium delayed-release  250 mg Oral 3 times per day on Sun Tue Thu    divalproex sodium delayed-release  250 mg Oral 3 times per day on Sat    divalproex sodium delayed-release  500 mg Oral Once    gabapentin  300 mg Oral 3 times per day on Mon Wed Fri    gabapentin  300 mg Oral 3 times per day on Sun Tue Thu    gabapentin  300 mg Oral 3 times per day on Sat    gabapentin  600 mg Oral Once    LORazepam  0.5 mg Oral 3 times per day on Mon Wed Fri    LORazepam  0.5 mg Oral 3 times per day on Sun Tue Thu    LORazepam  0.5 mg Oral 3 times per day on Sat    LORazepam  1 mg Oral Once    melatonin  10 mg Oral At Bedtime    mineral oil-hydrophilic petrolatum   Topical BID IS    naproxen  250 mg Oral BID w/meals    OLANZapine zydis  15 mg Oral BID    Or    OLANZapine  10 mg Intramuscular BID    polyethylene glycol  17 g Oral BID    sennosides  2 tablet Oral BID    tamsulosin  0.4 mg Oral Daily    triamcinolone   Topical BID          Allergies:     Allergies   Allergen Reactions    Bee Venom Unknown    Montelukast Unknown    Phenothiazines Unknown          Labs:     Recent Results (from the past 24 hour(s))   CBC with platelets and differential    Collection Time: 08/11/23  9:11 AM   Result Value Ref Range    WBC Count 11.3 (H) 4.0 - 11.0 10e3/uL    RBC Count 4.32 (L) 4.40 - 5.90 10e6/uL    Hemoglobin 11.9 (L) 13.3 - 17.7 g/dL    Hematocrit 37.6 (L) 40.0 - 53.0 %    MCV 87 78 - 100 fL    MCH 27.5 26.5 - 33.0 pg    MCHC 31.6 31.5 - 36.5 g/dL    RDW 14.6 10.0 - 15.0 %    Platelet Count 197 150 - 450 10e3/uL    % Neutrophils 76 %    % Lymphocytes 13 %    % Monocytes 9 %    % Eosinophils 0 %    % Basophils 1 %    % Immature Granulocytes 1 %    NRBCs per 100 WBC 0 <1 /100    Absolute Neutrophils 8.6 (H) 1.6 - 8.3 10e3/uL    Absolute Lymphocytes 1.5 0.8 - 5.3 10e3/uL    Absolute Monocytes 1.0 0.0 - 1.3 10e3/uL    Absolute Eosinophils 0.0 0.0 - 0.7 10e3/uL     "Absolute Basophils 0.1 0.0 - 0.2 10e3/uL    Absolute Immature Granulocytes 0.1 <=0.4 10e3/uL    Absolute NRBCs 0.0 10e3/uL            Psychiatric Examination:     /73 (BP Location: Left arm, Patient Position: Sitting, Cuff Size: Adult Regular)   Pulse 89   Temp 97.7  F (36.5  C) (Temporal)   Resp 16   Wt 86.5 kg (190 lb 9.6 oz)   SpO2 99%   BMI 28.15 kg/m    Weight is 190 lbs 9.6 oz  Body mass index is 28.15 kg/m .    Weight over time:  Vitals:    07/03/23 1100 07/30/23 0902   Weight: 81.6 kg (179 lb 12.8 oz) 86.5 kg (190 lb 9.6 oz)       Orthostatic Vitals         Most Recent      Sitting Orthostatic /61 07/25 0800    Sitting Orthostatic Pulse (bpm) 85 07/25 0800          Cardiometabolic risk assessment. 06/07/23    Reviewed patient profile for cardiometabolic risk factors    Date taken /Value  REFERENCE RANGE   Abdominal Obesity  (Waist Circumference)   See nursing flowsheet Women ?35 in (88 cm)   Men ?40 in (102 cm)      Triglycerides  No results found for: TRIG    ?150 mg/dL (1.7 mmol/L) or current treatment for elevated triglycerides   HDL cholesterol  No results found for: HDL]   Women <50 mg/dL (1.3 mmol/L) in women or current treatment for low HDL cholesterol  Men <40 mg/dL (1 mmol/L) in men or current treatment for low HDL cholesterol     Fasting plasma glucose (FPG) Lab Results   Component Value Date     05/26/2023      FPG ?100 mg/dL (5.6 mmol/L) or treatment for elevated blood glucose   Blood pressure  BP Readings from Last 3 Encounters:   08/11/23 117/73   05/26/23 97/55    Blood pressure ?130/85 mmHg or treatment for elevated blood pressure   Family History  See family history     Mental Status Exam:  Appearance: awake, groggy, disheveled. No evidence of pain of acute distress.   Attitude:  cooperative, pleasant  Eye Contact:  fair, less intense  Mood:  \"pretty good\"  Affect:  calm and cooperative, restricted, reactive  Speech: mostly coherent  Psychomotor Behavior:  No " evidence of psychomotor agitation or restlessness today. No evidence of dystonia, or tics.  Throught Process: linear, illogical  Associations:  no loosening of associations present  Thought Content:  Delusions. Likely auditory, tacticle and olfactory hallucinations. Cannot rule out visual hallucinations. Evidence of obsessive thinking and likely compulsions to harm others.   Insight:  limited  Judgement:  poor  Oriented to:  self, date, place  Attention Span and Concentration:  fair  Recent and Remote Memory:  poor         Precautions:     Behavioral Orders   Procedures    Assault precautions    Code 1 - Restrict to Unit    Code 2    Code 2 - 1:1 Staff Supervision     For ECT only    Electroconvulsive therapy     Series of up to 12 treatments. Begin Date: 8/2/23     Treating Psychiatrist providing ECT:  unknown     Notified on:  7/28/23 via this order  NOTE: We received verbal confirmation from [facility] that Lorenzo Pavon has been approved but awaiting official court order and risk management's review  Initial consult was completed by Dr. Hicks on 7/18/23    Electroconvulsive therapy     Series of up to 12 treatments. Begin Date: 8/2/23     Treating Psychiatrist providing ECT:  Dominga     Notified on:  8/2/23    Elopement precautions    Fall precautions    Routine Programming     As clinically indicated    Self Injury Precaution    Status 15     Every 15 minutes.    Status Individual Observation     Patient SIO status reviewed with team/RN.  Please also refer to RN/team documentation for add'l detail.    -SIO staff to monitor following which have contributed to patient being on SIO:  Agitation  Pt is impulsive   Pt has ran out of his room naked  Pt has Parkinson symptoms place him in a high fall risk  Pt has verbal outburst of sexual and threaten statements  Pt requires immediate redirection when masturbating    -Possible interventions SIO staff could use to support patient's treatment progress:  Engage pt in  activities    -When following observed, team will review discontinuation of SIO:  Absence of aggression toward others for > 24 hours    Comments: SIO 1:1     Order Specific Question:   CONTINUOUS 24 hours / day     Answer:   5 feet     Order Specific Question:   Indications for SIO     Answer:   Severe intrusiveness     Order Specific Question:   Indications for SIO     Answer:   Assault risk    Suicide precautions     Patients on Suicide Precautions should have a Combination Diet ordered that includes a Diet selection(s) AND a Behavioral Tray selection for Safe Tray - with utensils, or Safe Tray - NO utensils            Diagnoses:     #Unspecified psychosis likely schizophrenia per history, rule out Bipolar affective disorder, manic  #Unspecified encephalopathy   -R/O catatonia   -Episodes of unresponsiveness, concern for PNES   #Parkinsonism 2/2 neuroleptic medications, rule out Parkinson's Disease  #Urinary retention and BPH  -Possible UTI -- UC resulted on 5/25 w/out growth  #Hx of prolonged QTc with clozapine  #Mild thrombocytopenia  #R/O OCD         Assessment and hospital summary:  Patient was admitted to psychiatric unit for safety, stabilization and medication management. He has had schizophrenia since the 1980. He was on Clozaril x 25 years, and it was tapered and discontinued on May 7, 2023  due to prolonged qtc of 527, and his psychotic  symptoms have worsened since then. Sinemet was also discontinued recently due to concerns that it was contributing to paranoia and AH. He is agitated, aggressive, dangerous to self and others. He remains on SIO 2 to 1, and is under psychiatric emergency and court hold. There are concerns for organic etiology given pattern of sundowning, history of parkinsonism, and ongoing disorientation/confusion. 6/8: EKG repeated, cardiology consult regarding safety of resuming clozapine in the context of prolonged QTc and refractory schizophrenia pending response. Per cardiology,  correct QTc is no more than 460. They do not have concerns about AP retrial. Neurology IP consult placed for evaluation of sundowning and cognitive decline secondary to Sinemet discontinuation. MSSA initiated. Per Neurology, discontinuation of Sinemet would not account for these symptoms. They do not recommend retrial at this time. : Clozapine titration continued.   Its possible that clozapine dose is contributing to worsened compulsive behaviors noted throughout hospitalization which could improve with lowering the dose.     Chart reviewed which revealed the followin2022: He was on clozapine, Seroquel, Cymbalta, and Carbidopa-levodopa and hospitalized for pneumonia. Psych consulted and Seroquel was stopped. Treated with Abx and discharged to [facility].     2022: Hospitalized at [facility]. Per chart review:    Vinod Lee is a 63 yo male with longstanding history of schizophrenia. He was admitted from [facility] ED, where he presented due to increased delusional thoughts while on a visit to his parents for Thanksgiving. He began experiencing increasing paranoid thoughts that someone might be poisoning him and began fearing that he might accidentally harm someone. He reported to his parents that he was having thoughts about hitting someone or beating someone up and told them he could not guarantee they would be safe with him. Parents encouraged patient to go to the ED, which he did voluntarily.     Per patient report and chart review, he was hospitalized several times in the  and reports he was civilly committed at one point in the , however he has been quite stable for the past several decades. He reports he will experience some paranoia and delusional thinking at times, but generally has good insight into his symptoms. He has been maintained on clozapine for about 25 years and prior to several months ago was also concurrently prescribed quetiapine.      In the last several months, patient has  "had a number of medication changes. Approximately 6 months ago, patient was diagnosed with parkinsonism related to antipsychotic use and was started on carbidopa-levidopa. He experienced no improvement in tremors, and thus carbidopa- levidopa dose was increased 1.5 weeks ago.      In addition, patient was medically hospitalized in August 2022 for confusion, weakness, repeated falls, ongoing weight loss, and SOB. He was found to have a cavitary lesion of the lung and suspected aspiration pneumonia. He was treated with antibiotics. At that time, he was taking current dose of clozapine and duloxetine, as well as propranolol ER, quetiapine, gabapentin, and clonazepam. Psychiatry was consulted due to concern for oversedation, and propranolol ER, gabapentin, and quetiapine were discontinued. Conazepam was reduced from 0.5 mg TID to BID dosing and recommended to be discontinued, however, it does not appear this occurred. His sedation improved significantly. QTc prolongation was also noted, but improved throughout his stay. He was ultimately discharged to a TCU for several months before returning home. He reports his weakness has greatly improved and states he \"graduated\" physical therapy, though he continues to be diligent in completed recommended exercises.      He reports that over the past several months, his delusions and anxiety have been worse than usual, with symptoms becoming much more acute since the carbidopa-levidopa dose was increased. He has felt increasingly paranoid about being poisoned, noting this is a delusion that has been stable over time, but was previously less intrusive. He has insight during the interview that his paranoid thinking is not reality based, but states it has been harder to separate delusions from reality and he becomes very worried that he might hurt someone, despite no history of violent behavior. He reports that the paranoia about his food being poisoned in combination with the " "tremors in his hands have made it difficult for him to eat. He has also had quite low appetite for the past 6 months to a year . He reports that due to the combination of these factors, he has lost about 50 pounds in the last year.He denies any problems with sleep initiation or maintenance. He reports energy is fairly good and \"better than it used to be.\" He reports some short term memory issues and on interview, does have some difficulty remembering details of recent events. He is unsure if he has received cognitive testing in the past.    He denies any suicidal ideation and reports he has not experienced SI for decades. He denies homicidal thoughts and is very clear that he had and has no desire to harm anyone else, but was afraid he might somehow do so. He denies any hallucinations and states \"that's never been a problem for me.\" He is not observed responding to internal stimuli. He is alert and oriented in all spheres.        ========    BRIEF HOSPITAL COURSE: This 62 y.o. male admitted 11/25/2022 to  [facility] for the assessment and treatment of the above presentation. This was a voluntary admission.     In summary, he was tapered off the Sinemet, which he reports to be both ineffective and potentially worsening the anxiety and paranoia ideations. Instead Benadryl 25 mg TID was started to help with extrapyramidal side effects. He struggled with sleep during his stay here. Remeron 7.5mg QHS was tried without success. Seroquel 100mg qhs was restarted with addition of suvorexant 10mg qhs. Given his Seroquel PRN use prior history of prolonged QTC, he will benefit from another EKG repeat on the medical unit.     Unfortunately, he tested positive for influenza A and developed hypoxemia. Now on 2L oxygen with desats when prone requiring 3L oxygen. Subsequently, the plan will to be tapering him off clonazepam due to hypoxia (and also prior plan to taper). The patient tolerated these changes without side effects.     The " patient minimally benefited from the structured therapeutic milieu as he attended programming seldom as he tested positive for influenza, he needed to isolate with droplet precautions. Pt was engaged and worked on issues that were triggering/brought pt to the hospital and has excellent insight into his anxiety and paranoid delusions. Pt denied suicidal ideations throughout all/majority of their hospitalization. Pt denied HI throughout all of hospitalization. There were no concerns with behavioral disruptions/outbursts. The patient has shown improvement in general.     At the time of discharge, hospitalization course was reviewed with Vinod Lee with plan to transfer to medicine. Please consult psychiatry and I will continue to follow while patient is on the medical unit. He is now off sinemet since 11/29 21:00 and I would expect anxiety and paranoia to improve more moving forward. Psychiatrically, once anxiety and paranoia is baseline, can D/C to home once medically stable. He DOES NOT NEED A 1:1 on the medical unit from a mental health perspective, we initiated 1:1 11/30/2022 due to significant fatigue, gait instability (he was unable to stand without assist) and feeding assist.    04/2023: Clozapine was gradually tapered and discontinued, unclear reasons why it was discontinued per outside records, though Dr. Portillo's H&P indicates that it was due to prolonged QTc.       Today's Changes:  - Administer one time dose of Depakote, Ativan, and Gabapentin at higher doses as patient will miss AM dose and ECT is scheduled for late morning.       Target psychiatric symptoms and interventions:  -Psych emergency declared on 5/27, now fully committed  -ECT Mon/Wed/Fri per Janelle   -Continue clozapine as above  -Continue scheduled Zyprexa 15mg BID  -Continue 1:1 for safety of staff and patients, reduced from 2:1 7/23/23  - weekly CBC to monitor platelets      -Continue Gabapentin 300 mg TID as staff believe it has  been effective for treatment of his anxiety  -Discussed complex case at length with Dr. Hatch (primary provider on geriatic unit) who provided recs (see second opinion note dated 6/14). Appreciate assistance. I have since made the following changes:         1) Add propranolol 10 mg TID         2) Added Depakote 1000 mg qhs (to be administered in magic cup)         3) Nutrition consult to order magic cup         4) Increased melatonin to 10 mg QHS    Risks, benefits, and alternatives discussed at length with patient.     Acute Medical Problems and Treatments:  Delirium vs progression of dementia  Neurology consult placed on 6/8 for evaluation of sundowning and cognitive decline secondary to Sinemet discontinuation: Resuming Sinemet not recommended for behavioral concerns. Please see Neuro note for details.     Thrombocytopenia   Likely secondary to Depakote.  According to the, incidents of Depakote induced thrombocytopenia as 27%.  Depakote dose reduced from 1000 twice daily to 250 3 times daily on 7/28/2023  - weekly CBCs    Constipation  Likely worsened by clozapine, improved since modifying regimen.   - daily miralax   - daily Senokot 2 tablets BID      Right first toe fracture:  Seen by Ortho on 6/16:  Per note: Vinod Lee is a 62 year old  male w/ PMHx complex psychiatric history who has a fracture to the distal phalanx of the right toe after a recent trauma to the foot the patient reports.  Patient denies any other pain or discomfort.  Images reviewed and plan will be for irrigation of the popped fracture blister with sterile saline and the toes should be dressed and a 4 x 4 gauze dressing.  Patient will need a postop shoe which she can be weightbearing as tolerated in.  Would recommend a course of antibiotics for approximately 7 days.  Would recommend Augmentin or clindamycin.  Podiatry will be made aware of patient and will see patient while he is admitted or if patient is discharged in the near future a  "follow-up appointment will need to be established.     Remainder of care per primary team.  Primary team should make sure that patient is up-to-date on his tetanus shot.  If not patient will require a booster shot.    Hx of prolonged QTc:  Continue weekly EKGs. See above for details.     Urinary retention, resolved  Per patient care order:   If patient is refusing straight caths, please notify IM provider immediately to determine whether this constitutes a medical emergency. If IM declares medical emergency, may restrain patient for straight cath. Discussed this with patient's parents/substitute decision makers on 6/7 who are in support of this plan.    # Psoriasis hx  # Purplish discoloration of B/LE feet-resolved   Discussed with Dr. Rene and bedside RN regarding rash on right foot that appears and appears to be purplish in color and almost \"petechiae.\" It was noted during interview, but seemed to be resolving upon walking. Within the past 24 hrs, pt has had ECT and seemed more confused than usual. Pt seems to have had a right great toe wound with recent right great toe fracture seen by Medicine on 7/16. Seen on 8/4 with improving color on B/L legs at rest and appears to have small, scaly patches of varying coin sizes that have not grown past marked borders. See photos. Per further chart review, has had psoriasis history. WBC WNL, no fevers, HDS. This appears to more consistent with a psoriasis like picture.   - Start triamcinolone topical 0.025%   -Apply twice daily until lesions for 2 weeks or until lesions resolve/  -Monitor for skin thinning, striae, rebound lesion flares.   - Start Eucerin cream              - Apply 30 minutes PRIOR to triamcinolone topical cream above or PRN as needed if dry skin/after bathing   - Medicine will sign off.   - For worsening pain, spread, itchiness, fevers, please contact Medicine and possibly Dermatology.    Benzodiazepine dependence:  Phenobarbital taper " completed    Pertinent labs/imaging:  Weekly CBC with diff     Behavioral/Psychological/Social:  - Encourage unit programming    Safety:  - Continue precautions as noted above  - Status 15 minute checks  - Continue 1:1 for staff and pt safety    Legal Status: Fully committed with Sánchez and Lorenzo Pavon. Sánchez medications: clozapine, olanzapine, risperidone, quetiapine      Disposition Plan   Reason for ongoing admission: poses an imminent risk to self, poses an imminent risk to others and is unable to care for self due to severe psychosis or darryl  Discharge location:  assisted living facility  Discharge Medications: not ordered  Follow-up Appointments: not scheduled    Entered by: Ingrid Rhodes MD on 08/11/2023  at 12:54 PM

## 2023-08-11 NOTE — PLAN OF CARE
Problem: Adult Behavioral Health Plan of Care  Goal: Patient-Specific Goal (Individualization)  Outcome: Progressing   Goal Outcome Evaluation:    Pt continuing to exhibit disorientation and disorganization. States that he is living in an illusion and that he is not in reality. No instances of striking this shift, though pt pt attempt to grab writer zahida once. Receptive to redirection. Occasionally pacing hallway, out in milieu watching football and coloring. Good appetitive. Showered.     Displaying sialorrhea and mouth and hand tremors.     Maintains 1:1 and acuity staff for assault risk and intrusiveness. Refused scheduled Aquaphor and Miralax. Denies pain.     ECT tomorrow.     /77   Pulse 95   Temp 97  F (36.1  C) (Temporal)   Resp 16   Wt 86.5 kg (190 lb 9.6 oz)   SpO2 99%   BMI 28.15 kg/m
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
## 2023-08-11 NOTE — PLAN OF CARE
Goal Outcome Evaluation:      Problem: Adult Behavioral Health Plan of Care  Goal: Optimized Coping Skills in Response to Life Stressors  Outcome: Progressing     Pt was present in the milieu. Staff reported that patient becomes confused that the 32 staff pictures are not the 12 staff. Pt seemed to enjoy looking out the window that overlooks the river. He also did art work in his room, watched t.v. and attended group.  Pt is talkative at times and rambles. He said that he did not sleep well last night. Denied pain, depression, SI. Pt said he is feeling anxious- was offered a cold pack and he declined. Pt had ECT today. VSS. Pt was accepting of most medications this afternoon. He refused Atropine gtts, Miralax, Aquaphor and Kenalog. Pt ate 100% of lunch. Said he doesn't believe ECT is helpful. Also made paranoid statement that the boaters on the river are working for staff. Has been resting in room this afternoon and appears comfortable.

## 2023-08-11 NOTE — ANESTHESIA POSTPROCEDURE EVALUATION
Patient: Vinod Lee    Procedure: * ECT       Anesthesia Type:  General    Note:     Postop Pain Control: Uneventful            Sign Out: Well controlled pain   PONV: No   Neuro/Psych: Uneventful            Sign Out: Acceptable/Baseline neuro status   Airway/Respiratory: Uneventful            Sign Out: Acceptable/Baseline resp. status   CV/Hemodynamics: Uneventful            Sign Out: Acceptable CV status; No obvious hypovolemia; No obvious fluid overload   Other NRE: NONE   DID A NON-ROUTINE EVENT OCCUR? No           Last vitals:  Vitals:    08/11/23 1251 08/11/23 1300 08/11/23 1323   BP: (!) 145/47 118/83 128/66   Pulse: 79 80 77   Resp: 12 16 15   Temp: 36.2  C (97.2  F) 36.1  C (97  F) 37.3  C (99.2  F)   SpO2: 90% 93% 95%       Electronically Signed By: Jayla Gresham MD  August 11, 2023  1:48 PM  
4 = No assist / stand by assistance
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
## 2023-08-11 NOTE — PLAN OF CARE
Assessment/Intervention/Current Symtoms and Care Coordination:  Reviewed chart. Met with team to review patient's current functioning, needs, progress, and impediments to discharge. Patient attended ECT and was visible in the milieu in addition to attending OT group in the morning.     Discharge Plan or Goal:  When patient is more stable a referral for [facility] in [place] will be made, referral for [facility] in [place] will be made      Barriers to Discharge:  Patient requires further psychiatric stabilization due to current symptomology. He continues to present as disorganized and tangential with paranoid thought content. He presents with mood lability. He vacillates between being pleasant and being aggressive with no clear environmental triggers. Patient endorses auditory hallucinations.     Referral Status:  Referral for housing pending psychiatric stabilization  Considerations include: [facility] in [place], [facility] in [place]      Legal Status:  Commitment with [facility]    [facility]: [place]  File Number: [id number]  Issued 07/06/23 and is not to exceed six months    Contacts:  : Vicky Valdez with [facility], [phone]  Psychiatrist: Dr. Santana at [facility], [phone] PCP: Attila Urena DO  Mom: Alberta, [phone] (H) [phone] (M)   Dad: Ino, [phone] (H)  Sister, Sandra Treadwell, [phone] (KING)   [facility]'s Office Fax: [phone]  Pia May: [phone]  CADI  with [facility]: Regina Wong, [phone]    Upcoming Meetings and Dates/Important Information and next steps:  Referrals for housing pending psychiatric stabilization

## 2023-08-12 PROCEDURE — 124N000002 HC R&B MH UMMC

## 2023-08-12 PROCEDURE — 250N000013 HC RX MED GY IP 250 OP 250 PS 637: Performed by: STUDENT IN AN ORGANIZED HEALTH CARE EDUCATION/TRAINING PROGRAM

## 2023-08-12 PROCEDURE — 250N000013 HC RX MED GY IP 250 OP 250 PS 637: Performed by: PSYCHIATRY & NEUROLOGY

## 2023-08-12 PROCEDURE — 250N000013 HC RX MED GY IP 250 OP 250 PS 637: Performed by: PHYSICIAN ASSISTANT

## 2023-08-12 RX ADMIN — CYANOCOBALAMIN TAB 1000 MCG 1000 MCG: 1000 TAB at 08:54

## 2023-08-12 RX ADMIN — OLANZAPINE 15 MG: 10 TABLET, ORALLY DISINTEGRATING ORAL at 19:55

## 2023-08-12 RX ADMIN — SENNOSIDES 2 TABLET: 8.6 TABLET ORAL at 08:53

## 2023-08-12 RX ADMIN — ATROPINE SULFATE 2 DROP: 10 SOLUTION/ DROPS OPHTHALMIC at 19:54

## 2023-08-12 RX ADMIN — NAPROXEN 250 MG: 250 TABLET ORAL at 18:24

## 2023-08-12 RX ADMIN — ATROPINE SULFATE 2 DROP: 10 SOLUTION/ DROPS OPHTHALMIC at 14:16

## 2023-08-12 RX ADMIN — POLYETHYLENE GLYCOL 3350 17 G: 17 POWDER, FOR SOLUTION ORAL at 19:56

## 2023-08-12 RX ADMIN — Medication 10 MG: at 19:54

## 2023-08-12 RX ADMIN — SENNOSIDES 2 TABLET: 8.6 TABLET ORAL at 19:55

## 2023-08-12 RX ADMIN — GABAPENTIN 300 MG: 300 CAPSULE ORAL at 20:01

## 2023-08-12 RX ADMIN — GABAPENTIN 300 MG: 300 CAPSULE ORAL at 14:53

## 2023-08-12 RX ADMIN — GABAPENTIN 300 MG: 300 CAPSULE ORAL at 10:45

## 2023-08-12 RX ADMIN — LORAZEPAM 0.5 MG: 0.5 TABLET ORAL at 10:45

## 2023-08-12 RX ADMIN — DIVALPROEX SODIUM 250 MG: 250 TABLET, DELAYED RELEASE ORAL at 08:54

## 2023-08-12 RX ADMIN — LORAZEPAM 0.5 MG: 0.5 TABLET ORAL at 14:53

## 2023-08-12 RX ADMIN — TAMSULOSIN HYDROCHLORIDE 0.4 MG: 0.4 CAPSULE ORAL at 08:53

## 2023-08-12 RX ADMIN — DIVALPROEX SODIUM 250 MG: 250 TABLET, DELAYED RELEASE ORAL at 14:05

## 2023-08-12 RX ADMIN — LORAZEPAM 0.5 MG: 0.5 TABLET ORAL at 20:03

## 2023-08-12 RX ADMIN — CLOZAPINE 650 MG: 100 TABLET, ORALLY DISINTEGRATING ORAL at 14:05

## 2023-08-12 RX ADMIN — DIVALPROEX SODIUM 250 MG: 250 TABLET, DELAYED RELEASE ORAL at 20:00

## 2023-08-12 RX ADMIN — OLANZAPINE 15 MG: 10 TABLET, ORALLY DISINTEGRATING ORAL at 08:53

## 2023-08-12 RX ADMIN — TRIAMCINOLONE ACETONIDE: 0.25 CREAM TOPICAL at 19:52

## 2023-08-12 RX ADMIN — NAPROXEN 250 MG: 250 TABLET ORAL at 08:53

## 2023-08-12 ASSESSMENT — ACTIVITIES OF DAILY LIVING (ADL)
LAUNDRY: UNABLE TO COMPLETE
ADLS_ACUITY_SCORE: 64
ORAL_HYGIENE: PROMPTS
ADLS_ACUITY_SCORE: 64
DRESS: SCRUBS (BEHAVIORAL HEALTH)
ADLS_ACUITY_SCORE: 64
HYGIENE/GROOMING: INDEPENDENT
ADLS_ACUITY_SCORE: 64

## 2023-08-12 NOTE — PLAN OF CARE
Problem: Psychotic Signs/Symptoms  Goal: Improved Behavioral Control (Psychotic Signs/Symptoms)  Outcome: Progressing     Patient is alert and oriented to person and place with a flat affect. He was irritable at the start of this shift, refusing to take his morning medication and eat breakfast. Writer was able to talk him into taking his medication and he did so without issues. He was visible in the [facility] area, attended group and was social with his sio staff. He ate 100% of his breakfast and lunch, and had a large BM this shift. He did not complain of pain and no prn given. He denies hearing voices, thoughts of harming self and others. It was overall a good shift. He refused his morning dose of Miralax, Aquaphor and atropine drops.

## 2023-08-12 NOTE — PLAN OF CARE
Problem: Psychotic Signs/Symptoms  Goal: Improved Sleep (Psychotic Signs/Symptoms)  Outcome: Progressing  Flowsheets (Taken 8/12/2023 0608)  Mutually Determined Action Steps (Improved Sleep): sleeps 4-6 hours at night  Intervention: Promote Healthy Sleep Hygiene  Recent Flowsheet Documentation  Taken 8/12/2023 0600 by Ramon Cotton RN  Sleep Hygiene Promotion:   noise level reduced   awakenings minimized   Goal Outcome Evaluation:    The patient was awake at the beginning of the shift but appeared to fall asleep around midnight and slept for about 6.25 hours. He continued on SIO 1:1 for severe intrusiveness and assault risks and had no safety or other concerns.

## 2023-08-12 NOTE — CARE PLAN
Occupational Therapy Group Note:     08/12/23 1558   Group Therapy Session   Group Attendance attended group session   Time Session Began 1115   Time Session Ended 1205   Total Time (minutes) 25 (no charge)   Total # Attendees 3   Group Type task skill   Group Topic Covered coping skills/lifestyle management;emotions/expression;structured socialization   Group Session Detail OT Group: Watercolor Paintings   Patient Response/Contribution refused to participate;disorganized   Patient Participation Detail Patient engaged sporadically in a watercolor task activity in order to promote self expression, maximize autonomy within meaningful tasks, encourage productive use of time, and to maximize attention and focus on a therapeutic task. Patient entered group room accompanied by SIO staff. Patient briefly engaged in artistic task; however, patient was perseverative on minor mistakes and appearance of his artwork. Patient exhibited significant negative self-talk and voiced his perceived lack of artistic capabilities. Patient was supported and encouraged by SIO staff, writer, and peers throughout group to engage in activity with minimal success. Patient appeared more argumentative when praise was provided to him. Various other artistic modalities were offered with patient declining. Minimal productive and meaningful engagement in task was noted. No charge.

## 2023-08-12 NOTE — CARE PLAN
"   08/12/23 0054   Group Therapy Session   Group Attendance attended group session   Total Time (minutes) 45   Total # Attendees 3   Group Type psychoeducation;task skill   Group Topic Covered structured socialization   Group Session Detail topic/check in, OT clinic   Patient Response/Contribution cooperative with task     Topic (6232-7880)  Attempted to engage pt in discussion group on personal strengths/accomplishments, though per his usual, was unable to identify anything, big or small.  Pt got caught up in the fact he didn't even know  date, \"so it's no use\".  Pt becomes more argumentative when given more praise.    Pt and SIO staff were set up with a wooden game (flat panel sling shot game) that pt seemed to enjoy.  Pt seemed to benefit from the 2 step, repetitive nature to the game.  Appeared to be in fair mood once distracted by activity.   (Game kept in back office area for staff use with pt)  "

## 2023-08-12 NOTE — PLAN OF CARE
"Had BL ECT #5 today, 31 sec seizure. Consequently tired and hungry. He stated his tremors are \"the same\" and they were not observable decreased or increased- no mouth tremors but again sialorrhea.    Denies hallucinations but continues to voices the conviction that his medication contains poison. Also unchanged belief that he is evil because of his thoughts of sex with animals and children.   Calmer behaviour. No aggression towards self or others.  Oriented to month, year, location. Per , referrals in progress.     Independent urination, feeding self in spite of tremors with set-up, independent ambulation except when walking backwards which he did once.     [facility] and information sent to [facility] per  note.    Continues with 1:1 assistance and acuity staff for safety.    Plan: Monitor and document mood and behaviour, thought process and content. Establish and maintain therapeutic relationship. Educate about diagnoses, medications, treatment, legal status, plan of care. Address preexisting and concurrent medical concerns.     P: Impaired cognition, thought disturbances  G:Coping with impaired cognition, decreased thought disturbances  O: Improving  "

## 2023-08-13 PROCEDURE — 250N000013 HC RX MED GY IP 250 OP 250 PS 637: Performed by: PHYSICIAN ASSISTANT

## 2023-08-13 PROCEDURE — 250N000013 HC RX MED GY IP 250 OP 250 PS 637: Performed by: PSYCHIATRY & NEUROLOGY

## 2023-08-13 PROCEDURE — 124N000002 HC R&B MH UMMC

## 2023-08-13 PROCEDURE — 250N000013 HC RX MED GY IP 250 OP 250 PS 637: Performed by: STUDENT IN AN ORGANIZED HEALTH CARE EDUCATION/TRAINING PROGRAM

## 2023-08-13 RX ADMIN — TAMSULOSIN HYDROCHLORIDE 0.4 MG: 0.4 CAPSULE ORAL at 09:22

## 2023-08-13 RX ADMIN — OLANZAPINE 15 MG: 10 TABLET, ORALLY DISINTEGRATING ORAL at 20:10

## 2023-08-13 RX ADMIN — DIVALPROEX SODIUM 250 MG: 250 TABLET, DELAYED RELEASE ORAL at 17:49

## 2023-08-13 RX ADMIN — Medication 10 MG: at 20:10

## 2023-08-13 RX ADMIN — TRAZODONE HYDROCHLORIDE 50 MG: 50 TABLET ORAL at 23:27

## 2023-08-13 RX ADMIN — TRIAMCINOLONE ACETONIDE: 0.25 CREAM TOPICAL at 20:10

## 2023-08-13 RX ADMIN — NAPROXEN 250 MG: 250 TABLET ORAL at 17:50

## 2023-08-13 RX ADMIN — CLOZAPINE 650 MG: 100 TABLET, ORALLY DISINTEGRATING ORAL at 13:43

## 2023-08-13 RX ADMIN — LORAZEPAM 0.5 MG: 0.5 TABLET ORAL at 09:21

## 2023-08-13 RX ADMIN — GABAPENTIN 300 MG: 300 CAPSULE ORAL at 14:27

## 2023-08-13 RX ADMIN — NAPROXEN 250 MG: 250 TABLET ORAL at 09:22

## 2023-08-13 RX ADMIN — GABAPENTIN 300 MG: 300 CAPSULE ORAL at 09:21

## 2023-08-13 RX ADMIN — TRIAMCINOLONE ACETONIDE: 0.25 CREAM TOPICAL at 09:25

## 2023-08-13 RX ADMIN — DIVALPROEX SODIUM 250 MG: 250 TABLET, DELAYED RELEASE ORAL at 14:26

## 2023-08-13 RX ADMIN — CYANOCOBALAMIN TAB 1000 MCG 1000 MCG: 1000 TAB at 09:22

## 2023-08-13 RX ADMIN — SENNOSIDES 2 TABLET: 8.6 TABLET ORAL at 20:10

## 2023-08-13 RX ADMIN — TRAZODONE HYDROCHLORIDE 50 MG: 50 TABLET ORAL at 00:53

## 2023-08-13 RX ADMIN — DIVALPROEX SODIUM 250 MG: 250 TABLET, DELAYED RELEASE ORAL at 09:22

## 2023-08-13 RX ADMIN — ATROPINE SULFATE 2 DROP: 10 SOLUTION/ DROPS OPHTHALMIC at 20:10

## 2023-08-13 RX ADMIN — POLYETHYLENE GLYCOL 3350 17 G: 17 POWDER, FOR SOLUTION ORAL at 20:10

## 2023-08-13 RX ADMIN — SENNOSIDES 2 TABLET: 8.6 TABLET ORAL at 09:21

## 2023-08-13 RX ADMIN — LORAZEPAM 0.5 MG: 0.5 TABLET ORAL at 14:26

## 2023-08-13 RX ADMIN — POLYETHYLENE GLYCOL 3350 17 G: 17 POWDER, FOR SOLUTION ORAL at 09:23

## 2023-08-13 RX ADMIN — LORAZEPAM 0.5 MG: 0.5 TABLET ORAL at 17:50

## 2023-08-13 RX ADMIN — WHITE PETROLATUM: 1.75 OINTMENT TOPICAL at 17:49

## 2023-08-13 RX ADMIN — GABAPENTIN 300 MG: 300 CAPSULE ORAL at 17:50

## 2023-08-13 RX ADMIN — OLANZAPINE 15 MG: 10 TABLET, ORALLY DISINTEGRATING ORAL at 09:22

## 2023-08-13 ASSESSMENT — ACTIVITIES OF DAILY LIVING (ADL)
ADLS_ACUITY_SCORE: 64
DRESS: INDEPENDENT
ADLS_ACUITY_SCORE: 64
ORAL_HYGIENE: PROMPTS
HYGIENE/GROOMING: INDEPENDENT
ADLS_ACUITY_SCORE: 64
LAUNDRY: UNABLE TO COMPLETE
ADLS_ACUITY_SCORE: 64
ADLS_ACUITY_SCORE: 64

## 2023-08-13 NOTE — PLAN OF CARE
"Politely offered apology  \"I know you very just trying to protect me from the poison in the hand \" after accepting only atropine drops and Miralax and refusing other HS medication. Then did consume 3/4 of the chocolate pudding  containing the medications. Said \"It tastes funny and has poison in it too.\"     Denies auditory hallucinations.     Generally calm behaviour, polite, no aggression. Oriented to person, month, year, and being hospitalised.     Hand tremors seem a little more pronounced, no sialorrhea.  Remains with 1:1 assistance and acuity staff.     Referrals in progress. ECT #6 8/14/23.    P: Disturbed thought process  G: Rational thought process  O: Unchanged         "

## 2023-08-13 NOTE — PLAN OF CARE
"Goal Outcome Evaluation:    Pt was reluctant to take medications this morning.  Pt states, \"some of the medications are poisoned.\"   Pt continues with 1:1 staff and acuity staff.      Pt ate meals In [facility].  Pt called another patient in the lobby a, \"monkey\" and the \"N\" word.  Pt was redirected and states understanding of not saying in appropriate things to other people but not likely to have retained information.      Pt denies mental health concerns but appears paranoid and slightly confused.                             "

## 2023-08-13 NOTE — PLAN OF CARE
Goal Outcome Evaluation:    Problem: Sleep Disturbance  Goal: Adequate Sleep/Rest  Outcome: Progressing     Pt difficult falling a sleep, PRN Trazodone 50 mg given at 0053, effective, slept for 5.5 hours. Pt continues on SIO 2:1 for safety due to Severe intrusiveness and assault risk.

## 2023-08-14 ENCOUNTER — ANESTHESIA EVENT (OUTPATIENT)
Dept: BEHAVIORAL HEALTH | Facility: CLINIC | Age: 63
DRG: 885 | End: 2023-08-14
Payer: COMMERCIAL

## 2023-08-14 ENCOUNTER — APPOINTMENT (OUTPATIENT)
Dept: BEHAVIORAL HEALTH | Facility: CLINIC | Age: 63
DRG: 885 | End: 2023-08-14
Attending: PSYCHIATRY & NEUROLOGY
Payer: COMMERCIAL

## 2023-08-14 ENCOUNTER — ANESTHESIA (OUTPATIENT)
Dept: BEHAVIORAL HEALTH | Facility: CLINIC | Age: 63
DRG: 885 | End: 2023-08-14
Payer: COMMERCIAL

## 2023-08-14 PROCEDURE — 90870 ELECTROCONVULSIVE THERAPY: CPT

## 2023-08-14 PROCEDURE — 250N000013 HC RX MED GY IP 250 OP 250 PS 637: Performed by: PSYCHIATRY & NEUROLOGY

## 2023-08-14 PROCEDURE — 124N000002 HC R&B MH UMMC

## 2023-08-14 PROCEDURE — 250N000009 HC RX 250: Performed by: ANESTHESIOLOGY

## 2023-08-14 PROCEDURE — 250N000013 HC RX MED GY IP 250 OP 250 PS 637: Performed by: PHYSICIAN ASSISTANT

## 2023-08-14 PROCEDURE — 250N000011 HC RX IP 250 OP 636: Performed by: ANESTHESIOLOGY

## 2023-08-14 PROCEDURE — 250N000009 HC RX 250: Performed by: PSYCHIATRY & NEUROLOGY

## 2023-08-14 PROCEDURE — 99232 SBSQ HOSP IP/OBS MODERATE 35: CPT | Mod: 25 | Performed by: PSYCHIATRY & NEUROLOGY

## 2023-08-14 PROCEDURE — 370N000017 HC ANESTHESIA TECHNICAL FEE, PER MIN: Performed by: ANESTHESIOLOGY

## 2023-08-14 PROCEDURE — 250N000013 HC RX MED GY IP 250 OP 250 PS 637: Performed by: STUDENT IN AN ORGANIZED HEALTH CARE EDUCATION/TRAINING PROGRAM

## 2023-08-14 PROCEDURE — 90870 ELECTROCONVULSIVE THERAPY: CPT | Performed by: PSYCHIATRY & NEUROLOGY

## 2023-08-14 RX ORDER — HYDROMORPHONE HCL IN WATER/PF 6 MG/30 ML
0.4 PATIENT CONTROLLED ANALGESIA SYRINGE INTRAVENOUS EVERY 5 MIN PRN
Status: CANCELLED | OUTPATIENT
Start: 2023-08-14

## 2023-08-14 RX ORDER — DIVALPROEX SODIUM 250 MG/1
250 TABLET, DELAYED RELEASE ORAL
Status: DISCONTINUED | OUTPATIENT
Start: 2023-08-14 | End: 2023-08-25

## 2023-08-14 RX ORDER — NICARDIPINE HCL-0.9% SOD CHLOR 1 MG/10 ML
SYRINGE (ML) INTRAVENOUS PRN
Status: DISCONTINUED | OUTPATIENT
Start: 2023-08-14 | End: 2023-08-14

## 2023-08-14 RX ORDER — ONDANSETRON 4 MG/1
4 TABLET, ORALLY DISINTEGRATING ORAL EVERY 30 MIN PRN
Status: CANCELLED | OUTPATIENT
Start: 2023-08-14

## 2023-08-14 RX ORDER — SODIUM CHLORIDE, SODIUM LACTATE, POTASSIUM CHLORIDE, CALCIUM CHLORIDE 600; 310; 30; 20 MG/100ML; MG/100ML; MG/100ML; MG/100ML
INJECTION, SOLUTION INTRAVENOUS CONTINUOUS
Status: CANCELLED | OUTPATIENT
Start: 2023-08-14

## 2023-08-14 RX ORDER — ETOMIDATE 2 MG/ML
INJECTION INTRAVENOUS PRN
Status: DISCONTINUED | OUTPATIENT
Start: 2023-08-14 | End: 2023-08-14

## 2023-08-14 RX ORDER — ONDANSETRON 2 MG/ML
4 INJECTION INTRAMUSCULAR; INTRAVENOUS EVERY 30 MIN PRN
Status: CANCELLED | OUTPATIENT
Start: 2023-08-14

## 2023-08-14 RX ORDER — OXYCODONE HYDROCHLORIDE 10 MG/1
10 TABLET ORAL
Status: CANCELLED | OUTPATIENT
Start: 2023-08-14

## 2023-08-14 RX ORDER — GABAPENTIN 300 MG/1
300 CAPSULE ORAL
Status: DISCONTINUED | OUTPATIENT
Start: 2023-08-14 | End: 2023-08-25

## 2023-08-14 RX ORDER — HYDROMORPHONE HCL IN WATER/PF 6 MG/30 ML
0.2 PATIENT CONTROLLED ANALGESIA SYRINGE INTRAVENOUS EVERY 5 MIN PRN
Status: CANCELLED | OUTPATIENT
Start: 2023-08-14

## 2023-08-14 RX ORDER — OXYCODONE HYDROCHLORIDE 5 MG/1
5 TABLET ORAL
Status: CANCELLED | OUTPATIENT
Start: 2023-08-14

## 2023-08-14 RX ORDER — FLUMAZENIL 0.1 MG/ML
0.2 INJECTION, SOLUTION INTRAVENOUS ONCE
Status: COMPLETED | OUTPATIENT
Start: 2023-08-14 | End: 2023-08-14

## 2023-08-14 RX ORDER — GABAPENTIN 300 MG/1
600 CAPSULE ORAL ONCE
Status: COMPLETED | OUTPATIENT
Start: 2023-08-14 | End: 2023-08-14

## 2023-08-14 RX ORDER — DIVALPROEX SODIUM 500 MG/1
500 TABLET, DELAYED RELEASE ORAL ONCE
Status: COMPLETED | OUTPATIENT
Start: 2023-08-14 | End: 2023-08-14

## 2023-08-14 RX ADMIN — NAPROXEN 250 MG: 250 TABLET ORAL at 17:54

## 2023-08-14 RX ADMIN — WHITE PETROLATUM: 1.75 OINTMENT TOPICAL at 17:54

## 2023-08-14 RX ADMIN — GABAPENTIN 600 MG: 300 CAPSULE ORAL at 15:17

## 2023-08-14 RX ADMIN — TRAZODONE HYDROCHLORIDE 50 MG: 50 TABLET ORAL at 00:43

## 2023-08-14 RX ADMIN — LORAZEPAM 0.5 MG: 0.5 TABLET ORAL at 15:11

## 2023-08-14 RX ADMIN — DIVALPROEX SODIUM 250 MG: 250 TABLET, DELAYED RELEASE ORAL at 20:17

## 2023-08-14 RX ADMIN — ATROPINE SULFATE 2 DROP: 10 SOLUTION/ DROPS OPHTHALMIC at 15:24

## 2023-08-14 RX ADMIN — Medication 500 MCG: at 14:12

## 2023-08-14 RX ADMIN — FLUMAZENIL 0.2 MG: 0.1 INJECTION, SOLUTION INTRAVENOUS at 14:08

## 2023-08-14 RX ADMIN — POLYETHYLENE GLYCOL 3350 17 G: 17 POWDER, FOR SOLUTION ORAL at 20:18

## 2023-08-14 RX ADMIN — POLYETHYLENE GLYCOL 3350 17 G: 17 POWDER, FOR SOLUTION ORAL at 08:25

## 2023-08-14 RX ADMIN — OLANZAPINE 15 MG: 10 TABLET, ORALLY DISINTEGRATING ORAL at 08:25

## 2023-08-14 RX ADMIN — Medication 500 MCG: at 14:17

## 2023-08-14 RX ADMIN — NAPROXEN 250 MG: 250 TABLET ORAL at 08:25

## 2023-08-14 RX ADMIN — OLANZAPINE 15 MG: 10 TABLET, ORALLY DISINTEGRATING ORAL at 20:18

## 2023-08-14 RX ADMIN — ATROPINE SULFATE 2 DROP: 10 SOLUTION/ DROPS OPHTHALMIC at 20:18

## 2023-08-14 RX ADMIN — SENNOSIDES 2 TABLET: 8.6 TABLET ORAL at 20:18

## 2023-08-14 RX ADMIN — CYANOCOBALAMIN TAB 1000 MCG 1000 MCG: 1000 TAB at 08:26

## 2023-08-14 RX ADMIN — TRIAMCINOLONE ACETONIDE: 0.25 CREAM TOPICAL at 08:42

## 2023-08-14 RX ADMIN — ATROPINE SULFATE 2 DROP: 10 SOLUTION/ DROPS OPHTHALMIC at 08:43

## 2023-08-14 RX ADMIN — LORAZEPAM 0.5 MG: 0.5 TABLET ORAL at 20:17

## 2023-08-14 RX ADMIN — Medication 10 MG: at 20:18

## 2023-08-14 RX ADMIN — CLOZAPINE 650 MG: 100 TABLET, ORALLY DISINTEGRATING ORAL at 15:10

## 2023-08-14 RX ADMIN — ETOMIDATE 14 MG: 2 INJECTION INTRAVENOUS at 14:14

## 2023-08-14 RX ADMIN — SENNOSIDES 2 TABLET: 8.6 TABLET ORAL at 08:26

## 2023-08-14 RX ADMIN — TAMSULOSIN HYDROCHLORIDE 0.4 MG: 0.4 CAPSULE ORAL at 08:26

## 2023-08-14 RX ADMIN — DIVALPROEX SODIUM 500 MG: 500 TABLET, DELAYED RELEASE ORAL at 15:16

## 2023-08-14 RX ADMIN — SUCCINYLCHOLINE CHLORIDE 80 MG: 20 INJECTION, SOLUTION INTRAMUSCULAR; INTRAVENOUS; PARENTERAL at 14:13

## 2023-08-14 RX ADMIN — GABAPENTIN 300 MG: 300 CAPSULE ORAL at 20:18

## 2023-08-14 RX ADMIN — TRIAMCINOLONE ACETONIDE: 0.25 CREAM TOPICAL at 20:18

## 2023-08-14 ASSESSMENT — ACTIVITIES OF DAILY LIVING (ADL)
ADLS_ACUITY_SCORE: 64
ADLS_ACUITY_SCORE: 64
LAUNDRY: UNABLE TO COMPLETE
ADLS_ACUITY_SCORE: 64
ADLS_ACUITY_SCORE: 64
HYGIENE/GROOMING: INDEPENDENT
ORAL_HYGIENE: PROMPTS
HYGIENE/GROOMING: INDEPENDENT
ADLS_ACUITY_SCORE: 64
DRESS: INDEPENDENT
ORAL_HYGIENE: PROMPTS
ADLS_ACUITY_SCORE: 64
DRESS: INDEPENDENT

## 2023-08-14 ASSESSMENT — ENCOUNTER SYMPTOMS: DYSRHYTHMIAS: 1

## 2023-08-14 NOTE — ANESTHESIA POSTPROCEDURE EVALUATION
Patient: Vinod Lee    Procedure: * No procedures listed *       Anesthesia Type:  General    Note:  Disposition: Inpatient   Postop Pain Control: Uneventful            Sign Out: Well controlled pain   PONV: No   Neuro/Psych: Uneventful            Sign Out: Acceptable/Baseline neuro status   Airway/Respiratory: Uneventful            Sign Out: Acceptable/Baseline resp. status   CV/Hemodynamics: Uneventful            Sign Out: Acceptable CV status; No obvious hypovolemia; No obvious fluid overload   Other NRE: NONE   DID A NON-ROUTINE EVENT OCCUR? No       Last vitals:  Vitals:    08/14/23 0842 08/14/23 1221 08/14/23 1423   BP: 121/76  (!) 158/87   Pulse: 80  98   Resp:  18 15   Temp: 36.3  C (97.3  F)  36.3  C (97.3  F)   SpO2: 100%  96%       Electronically Signed By: Michael Chester MD  August 14, 2023  2:31 PM

## 2023-08-14 NOTE — ANESTHESIA CARE TRANSFER NOTE
Patient: Vinod Lee    Procedure: * No procedures listed *       Diagnosis: * No pre-op diagnosis entered *  Diagnosis Additional Information: No value filed.    Anesthesia Type:   General     Note:      Level of Consciousness: drowsy  Oxygen Supplementation: room air    Independent Airway: airway patency satisfactory and stable  Dentition: dentition unchanged  Vital Signs Stable: post-procedure vital signs reviewed and stable  Report to RN Given: handoff report given  Patient transferred to: PACU  Comments: Vital signs per nursing  Handoff Report: Identifed the Patient, Identified the Reponsible Provider, Reviewed the pertinent medical history, Discussed the surgical course, Reviewed Intra-OP anesthesia mangement and issues during anesthesia, Set expectations for post-procedure period and Allowed opportunity for questions and acknowledgement of understanding  Vitals:  Vitals Value Taken Time   BP     Temp     Pulse     Resp     SpO2         Electronically Signed By: Michael Chester MD  August 14, 2023  2:30 PM

## 2023-08-14 NOTE — PLAN OF CARE
Assessment/Intervention/Current Symtoms and Care Coordination:  Reviewed chart. Met with team to review patient's current functioning, needs, progress, and impediments to discharge. Patient signed [facility] for [facility]. Reported no preference for discharge location, will continue to work with patient on safe discharge plan.     Discharge Plan or Goal:  When patient is more stable a referral for [facility] in [place] will be made, referral for [facility] in [place] will be made      Barriers to Discharge:  Patient requires further psychiatric stabilization due to current symptomology. He continues to present as disorganized and tangential with paranoid thought content. He presents with mood lability. He vacillates between being pleasant and being aggressive with no clear environmental triggers. Patient endorses auditory hallucinations.     Referral Status:  Referral for housing pending psychiatric stabilization  Considerations include: [facility] in [place], [facility] in [place]      [facility] Status:  [facility]    [facility]: [place]  File Number: [id number]  Issued 07/06/23 and is not to exceed six months    Contacts:  : Vicky Valdez with [facility], [phone]  Psychiatrist: Dr. Santana at [facility], [phone] PCP: Attila Urena DO  Mom: Alberta, [phone] (H) [phone] (M)   Dad: Ino, [phone] (H)  Sister, Sandra Treadwell, [phone] (KING)   [facility]'s Office Fax: [phone]  Pia May: [phone] (KING)  CADI  with [facility]: Regina Wong, [phone]    Upcoming Meetings and Dates/Important Information and next steps:  Referrals for housing pending psychiatric stabilization
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
## 2023-08-14 NOTE — PROGRESS NOTES
Patient arrived in PACU at 1423    Patient meets phase I recovery  criteria at 1438 , switched to phase II recovery at 1439.      The following medications were given in ECT:    Anesthetic:   Etomidate 14 mg at 1414    Muscle Relaxant:   Succinylcholine  80 mg at 1413    Blood Pressure:   Nicardipine 500 mcg at 1412 and 500 mcg at 1417    Patient meets PACU discharge criteria at 1453.    Re-orientation time: 22 minutes    Pt discharged from the recovery room via wheelchair back to station 32 [address]   with staff at 1456       Report given to Deborah COX           Feel free to call with any questions at *[phone]    Krystyna Ordaz RN

## 2023-08-14 NOTE — PLAN OF CARE
Tremors unchanged- no facial tremors but again sialorrhea.     Endorsed command hallucinations which he heeded. Disrupted his  dinner, said it was because the voices told him to get up and leave it.  Voices also told he he should stay up all night, which he said he intended to do.   Continues to voice the conviction that his medication contains poison. Unchanged belief that he is evil because of his thoughts of sex with animals and children.     Calmer behaviour. No aggression towards self or others.    Oriented to month, year, location. Per , referrals in progress.      Independent urination, feeding self in spite of tremors with set-up, independent ambulation, independent shower with set up.     Required reoffer of medication x 1- took it although as usual voiced the conviction it contains poison.     Referrals to [facility] and information sent to [facility] per  note.     Continues with 1:1 assistance and acuity staff for safety and falls prevention.    Referrals for [facility] in [place] and [facility] in [place] planned. [facility] pre-admissions also informed per  note.     ECT # 6 8/14/23.    Plan: Monitor and document mood and behaviour, thought process and content. Establish and maintain therapeutic relationship. Educate about diagnoses, medications, treatment, legal status, plan of care. Address preexisting and concurrent medical concerns.     P:Disturbed thought process  G:Rational thought process  0: Not progressing

## 2023-08-14 NOTE — ANESTHESIA PREPROCEDURE EVALUATION
Anesthesia Pre-Procedure Evaluation    Patient: Vinod Lee   MRN: [id number] : 1960        Procedure :           Past Medical History:   Diagnosis Date     LBBB (left bundle branch block)      Parkinson's disease (H)      Schizophrenia (H)       No past surgical history on file.   Allergies   Allergen Reactions     Bee Venom Unknown     Montelukast Unknown     Phenothiazines Unknown      Social History     Tobacco Use     Smoking status: Not on file     Smokeless tobacco: Never   Substance Use Topics     Alcohol use: Not on file     Comment: GRACE      Wt Readings from Last 1 Encounters:   23 85.7 kg (188 lb 14.4 oz)        Anesthesia Evaluation   Pt has had prior anesthetic.         ROS/MED HX  ENT/Pulmonary:     (+) sleep apnea,                                      Neurologic:     (+)                 Parkinson's disease, features: parkinsonism, from neuroleptics,              Cardiovascular: Comment: 23 QTc 514    (+)  hypertension- -   -  - -                        dysrhythmias, Long QT,        Previous cardiac testing   Echo: Date: Results:    Stress Test:  Date: Results:    ECG Reviewed:  Date: 23 Results:  LBBB QT intervial is 498  Cath:  Date: Results:      METS/Exercise Tolerance:     Hematologic:     (+)      anemia,          Musculoskeletal:  - neg musculoskeletal ROS     GI/Hepatic:  - neg GI/hepatic ROS     Renal/Genitourinary:     (+)        BPH,      Endo:  - neg endo ROS     Psychiatric/Substance Use: Comment: Patient is aggressive and homicidal    (+) psychiatric history schizophrenia       Infectious Disease:  - neg infectious disease ROS     Malignancy:  - neg malignancy ROS     Other:  - neg other ROS        Physical Exam    Airway  airway exam normal           Respiratory Devices and Support         Dental           Cardiovascular   cardiovascular exam normal          Pulmonary   pulmonary exam normal            OUTSIDE LABS:  CBC:   Lab Results   Component Value Date     WBC 11.3 (H) 08/11/2023    WBC 10.2 08/04/2023    HGB 11.9 (L) 08/11/2023    HGB 11.3 (L) 08/04/2023    HCT 37.6 (L) 08/11/2023    HCT 35.0 (L) 08/04/2023     08/11/2023     (L) 08/04/2023     BMP:   Lab Results   Component Value Date     08/04/2023     08/02/2023    POTASSIUM 4.9 08/04/2023    POTASSIUM 4.9 08/02/2023    CHLORIDE 106 08/04/2023    CHLORIDE 107 08/02/2023    CO2 24 08/04/2023    CO2 24 08/02/2023    BUN 37.8 (H) 08/04/2023    BUN 24.8 (H) 08/02/2023    CR 1.05 08/04/2023    CR 0.99 08/02/2023     (H) 08/04/2023    GLC 93 08/02/2023     COAGS: No results found for: PTT, INR, FIBR  POC: No results found for: BGM, HCG, HCGS  HEPATIC:   Lab Results   Component Value Date    ALBUMIN 3.6 07/13/2023    PROTTOTAL 5.6 (L) 07/13/2023    ALT 14 07/28/2023    AST 18 07/28/2023    ALKPHOS 73 07/13/2023    BILITOTAL 0.2 07/13/2023     OTHER:   Lab Results   Component Value Date    LACT 1.5 06/07/2023    BRENDA 8.3 (L) 08/04/2023    PHOS 3.8 05/25/2023    MAG 2.1 05/25/2023    TSH 1.80 05/22/2023       Anesthesia Plan    ASA Status:  3    NPO Status:  NPO Appropriate    Anesthesia Type: General.   Induction: Intravenous.   Maintenance: TIVA.        Consents    Anesthesia Plan(s) and associated risks, benefits, and realistic alternatives discussed. Questions answered and patient/representative(s) expressed understanding.     - Discussed: Risks, Benefits and Alternatives for BOTH SEDATION and the PROCEDURE were discussed     - Discussed with:  Patient      - Extended Intubation/Ventilatory Support Discussed: No.      - Patient is DNR/DNI Status: No     Use of blood products discussed: No .     Postoperative Care       PONV prophylaxis: Ondansetron (or other 5HT-3)     Comments:              Michael Chester MD

## 2023-08-15 PROCEDURE — 250N000013 HC RX MED GY IP 250 OP 250 PS 637: Performed by: PHYSICIAN ASSISTANT

## 2023-08-15 PROCEDURE — 250N000013 HC RX MED GY IP 250 OP 250 PS 637: Performed by: STUDENT IN AN ORGANIZED HEALTH CARE EDUCATION/TRAINING PROGRAM

## 2023-08-15 PROCEDURE — 250N000013 HC RX MED GY IP 250 OP 250 PS 637: Performed by: PSYCHIATRY & NEUROLOGY

## 2023-08-15 PROCEDURE — 99231 SBSQ HOSP IP/OBS SF/LOW 25: CPT | Performed by: PSYCHIATRY & NEUROLOGY

## 2023-08-15 PROCEDURE — 250N000009 HC RX 250: Performed by: PSYCHIATRY & NEUROLOGY

## 2023-08-15 PROCEDURE — 124N000002 HC R&B MH UMMC

## 2023-08-15 RX ADMIN — GABAPENTIN 300 MG: 300 CAPSULE ORAL at 17:06

## 2023-08-15 RX ADMIN — LORAZEPAM 0.5 MG: 0.5 TABLET ORAL at 13:12

## 2023-08-15 RX ADMIN — TAMSULOSIN HYDROCHLORIDE 0.4 MG: 0.4 CAPSULE ORAL at 08:40

## 2023-08-15 RX ADMIN — ATROPINE SULFATE 2 DROP: 10 SOLUTION/ DROPS OPHTHALMIC at 14:30

## 2023-08-15 RX ADMIN — GABAPENTIN 100 MG: 100 CAPSULE ORAL at 04:43

## 2023-08-15 RX ADMIN — ATROPINE SULFATE 2 DROP: 10 SOLUTION/ DROPS OPHTHALMIC at 20:28

## 2023-08-15 RX ADMIN — OLANZAPINE 15 MG: 10 TABLET, ORALLY DISINTEGRATING ORAL at 20:27

## 2023-08-15 RX ADMIN — CYANOCOBALAMIN TAB 1000 MCG 1000 MCG: 1000 TAB at 08:39

## 2023-08-15 RX ADMIN — NAPROXEN 250 MG: 250 TABLET ORAL at 08:39

## 2023-08-15 RX ADMIN — NAPROXEN 250 MG: 250 TABLET ORAL at 17:06

## 2023-08-15 RX ADMIN — GABAPENTIN 300 MG: 300 CAPSULE ORAL at 08:38

## 2023-08-15 RX ADMIN — LORAZEPAM 0.5 MG: 0.5 TABLET ORAL at 08:39

## 2023-08-15 RX ADMIN — TRAZODONE HYDROCHLORIDE 50 MG: 50 TABLET ORAL at 23:35

## 2023-08-15 RX ADMIN — Medication 10 MG: at 20:27

## 2023-08-15 RX ADMIN — SENNOSIDES 2 TABLET: 8.6 TABLET ORAL at 08:38

## 2023-08-15 RX ADMIN — ATROPINE SULFATE 2 DROP: 10 SOLUTION/ DROPS OPHTHALMIC at 08:40

## 2023-08-15 RX ADMIN — OLANZAPINE 15 MG: 10 TABLET, ORALLY DISINTEGRATING ORAL at 08:39

## 2023-08-15 RX ADMIN — WHITE PETROLATUM: 1.75 OINTMENT TOPICAL at 17:15

## 2023-08-15 RX ADMIN — POLYETHYLENE GLYCOL 3350 17 G: 17 POWDER, FOR SOLUTION ORAL at 20:27

## 2023-08-15 RX ADMIN — DIVALPROEX SODIUM 250 MG: 250 TABLET, DELAYED RELEASE ORAL at 08:42

## 2023-08-15 RX ADMIN — GABAPENTIN 300 MG: 300 CAPSULE ORAL at 13:12

## 2023-08-15 RX ADMIN — DIVALPROEX SODIUM 250 MG: 250 TABLET, DELAYED RELEASE ORAL at 13:12

## 2023-08-15 RX ADMIN — POLYETHYLENE GLYCOL 3350 17 G: 17 POWDER, FOR SOLUTION ORAL at 08:45

## 2023-08-15 RX ADMIN — TRIAMCINOLONE ACETONIDE: 0.25 CREAM TOPICAL at 20:28

## 2023-08-15 RX ADMIN — CLOZAPINE 650 MG: 100 TABLET, ORALLY DISINTEGRATING ORAL at 13:12

## 2023-08-15 RX ADMIN — DIVALPROEX SODIUM 250 MG: 250 TABLET, DELAYED RELEASE ORAL at 17:06

## 2023-08-15 RX ADMIN — SENNOSIDES 2 TABLET: 8.6 TABLET ORAL at 20:27

## 2023-08-15 RX ADMIN — LORAZEPAM 0.5 MG: 0.5 TABLET ORAL at 17:07

## 2023-08-15 ASSESSMENT — ACTIVITIES OF DAILY LIVING (ADL)
ADLS_ACUITY_SCORE: 64
ADLS_ACUITY_SCORE: 64
ORAL_HYGIENE: INDEPENDENT
LAUNDRY: UNABLE TO COMPLETE
ADLS_ACUITY_SCORE: 64
ADLS_ACUITY_SCORE: 64
HYGIENE/GROOMING: INDEPENDENT
ADLS_ACUITY_SCORE: 64
ADLS_ACUITY_SCORE: 64
LAUNDRY: UNABLE TO COMPLETE
ADLS_ACUITY_SCORE: 64
ADLS_ACUITY_SCORE: 64
ORAL_HYGIENE: PROMPTS;INDEPENDENT
ADLS_ACUITY_SCORE: 64
ADLS_ACUITY_SCORE: 64
HYGIENE/GROOMING: INDEPENDENT
ADLS_ACUITY_SCORE: 64
DRESS: INDEPENDENT
ADLS_ACUITY_SCORE: 64
DRESS: INDEPENDENT

## 2023-08-15 NOTE — PLAN OF CARE
"Assessment/Intervention/Current Symtoms and Care Coordination:  Reviewed chart. Met with team to review patient's current functioning, needs, progress, and impediments to discharge.  Referral sent via secure fax to [facility] at [phone]. Checked in with patient about referral, he was in his bed, he stated, \"Just go away.\" Writer left patient's room.     Discharge Plan or Goal:  When patient is more stable a referral for [facility] in [place] will be made, referral for [facility] in [place] will be made      Barriers to Discharge:  Patient requires further psychiatric stabilization due to current symptomology. He continues to present as disorganized and tangential with paranoid thought content. He presents with mood lability. He vacillates between being pleasant and being aggressive with no clear environmental triggers. Patient endorses auditory hallucinations.     Referral Status:  Referral for housing pending psychiatric stabilization  Considerations include: [facility] in [place], Referral sent to [facility] in [place] 8/15     Legal Status:  Commitment with [facility]: [place]  File Number: [id number]  Issued 07/06/23 and is not to exceed six months    Contacts:  : Vicky Valdez with [facility], [phone]  Psychiatrist: Dr. Santana at [facility], [phone] PCP: Attila Urena DO  Mom: Alberta, [phone] (H) [phone] (M)   Dad: Ino, [phone] (H)  Sister, Sandra Treadwell, [phone] (KING)   [facility]'s Office Fax: [phone]  [facility]: [phone] ([place])  CADI  with [facility]: Regina Wong, [phone]    Upcoming Meetings and Dates/Important Information and next steps:  Referrals for housing pending psychiatric stabilization                                       "
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
## 2023-08-15 NOTE — PLAN OF CARE
Problem: Psychotic Symptoms  Goal: Psychotic Symptoms  Description: Signs and symptoms of listed problems will be absent or manageable.  Outcome: Not Progressing   Goal Outcome Evaluation:    Pt appeared to be sleeping much of the shift. Making paranoid statements, eg that his medication is poison, that someone is going to beat him up. Appears confused at times. Blunted affect. Denied all psych symptoms.     Pt is reluctant to eat. When asked why, states that the food is poison. After numerous prompts, pt eight 75% of his lunch. Did not appear to eat any of his breakfast.     Continues to display tremors.    Pt refused x4 to take scheduled medication with numerous staff attempting. Pt accepted medication during the 5th attempt. Refused vitals.    Continues 1:1 and acuity for assault risk and fall prevention. No noted instance of aggression.

## 2023-08-15 NOTE — PLAN OF CARE
Goal Outcome Evaluation:    Plan of Care Reviewed With: patient        Pt observed pacing the the hallway, coloring, and occasionally running but redirected by staff. Pt endorsed auditory hallucination by stating he is hearing voices asking him to pull someone eyes. Pt denies all other mental psych symptoms and contracted for safety. Pt described mood as good but presented with flat affect. Pt demonstrated some restlessness by walking backward. Pt received his scheduled medication at 5 pm which included ativan and pt was able to remain calm the rest of the shift. Adequate fluid and food intake, voiding freely and no bowel movement issue per pt. /58 (BP Location: Right arm)   Pulse 95   Temp 97.7  F (36.5  C) (Temporal)   Resp 17   Wt 85.7 kg (188 lb 14.4 oz)   SpO2 97%   BMI 27.90 kg/m

## 2023-08-15 NOTE — PROGRESS NOTES
"[facility], [place]   Psychiatric Progress Note  Hospital Day: 80        Interim History:   The patient's care was discussed with the treatment team during the daily team meeting and/or staff's chart notes were reviewed.  Staff report that Vinod denies all psych symptoms of, SI/SIB/HI, A/V hallucinations, anxiety and depression. No seclusion/restraint episodes. Less agitated overall, though still exhibiting symptoms of psychosis.     During my interview with Vinod, he said that he did not recognize writer. He said that his memory \"is both worsening and improving.\" He did not elaborate further. He continues to express concerns about being poisoned. He accuses writer of attempting to poison him \"slowly because you hate me. Because of all of the horrible things I have done.\" He said that he has not been tortured in the hospital yet, but knows that it is coming. He did not believe writer when informed that he has had a total of 5 ECT treatments. He said \"I have had zero.\" Tremors in mouth and hands noted bilaterally.     Suicidal ideation: no    Homicidal ideation: None today    Psychotic symptoms: no AH or VH reported during interview. Delusions present and other psychotic symptoms suspected.    Medication side effects reported: none    Acute medical concerns: none. He denies any pain or discomfort today.      Other issues reported by patient: Patient had no further questions or concerns.           Medications:       atropine  2 drop Sublingual TID     cloZAPine  650 mg Oral Daily     cyanocobalamin  1,000 mcg Oral Daily     divalproex sodium delayed-release  250 mg Oral 3 times per day on Mon Wed Fri     divalproex sodium delayed-release  250 mg Oral 3 times per day on Sun Tue Thu     divalproex sodium delayed-release  250 mg Oral 3 times per day on Sat     gabapentin  300 mg Oral 3 times per day on Mon Wed Fri     gabapentin  300 mg Oral 3 times per day on Sun Tue Thu     gabapentin  300 mg " Oral 3 times per day on Sat     LORazepam  0.5 mg Oral 3 times per day on Mon Wed Fri     LORazepam  0.5 mg Oral 3 times per day on Sun Tue Thu     LORazepam  0.5 mg Oral 3 times per day on Sat     melatonin  10 mg Oral At Bedtime     mineral oil-hydrophilic petrolatum   Topical BID IS     naproxen  250 mg Oral BID w/meals     OLANZapine zydis  15 mg Oral BID    Or     OLANZapine  10 mg Intramuscular BID     polyethylene glycol  17 g Oral BID     sennosides  2 tablet Oral BID     tamsulosin  0.4 mg Oral Daily     triamcinolone   Topical BID          Allergies:     Allergies   Allergen Reactions     Bee Venom Unknown     Montelukast Unknown     Phenothiazines Unknown          Labs:     No results found for this or any previous visit (from the past 24 hour(s)).         Psychiatric Examination:     /75 (BP Location: Right arm)   Pulse 96   Temp 98.1  F (36.7  C) (Temporal)   Resp 17   Wt 85.7 kg (188 lb 14.4 oz)   SpO2 99%   BMI 27.90 kg/m    Weight is 188 lbs 14.4 oz  Body mass index is 27.9 kg/m .    Weight over time:  Vitals:    07/03/23 1100 07/30/23 0902 08/13/23 0900   Weight: 81.6 kg (179 lb 12.8 oz) 86.5 kg (190 lb 9.6 oz) 85.7 kg (188 lb 14.4 oz)       Orthostatic Vitals         Most Recent      Sitting Orthostatic /61 07/25 0800    Sitting Orthostatic Pulse (bpm) 85 07/25 0800          Cardiometabolic risk assessment. 06/07/23    Reviewed patient profile for cardiometabolic risk factors    Date taken /Value  REFERENCE RANGE   Abdominal Obesity  (Waist Circumference)   See nursing flowsheet Women ?35 in (88 cm)   Men ?40 in (102 cm)      Triglycerides  No results found for: TRIG    ?150 mg/dL (1.7 mmol/L) or current treatment for elevated triglycerides   HDL cholesterol  No results found for: HDL]   Women <50 mg/dL (1.3 mmol/L) in women or current treatment for low HDL cholesterol  Men <40 mg/dL (1 mmol/L) in men or current treatment for low HDL cholesterol     Fasting plasma glucose (FPG)  "Lab Results   Component Value Date     05/26/2023      FPG ?100 mg/dL (5.6 mmol/L) or treatment for elevated blood glucose   Blood pressure  BP Readings from Last 3 Encounters:   08/14/23 116/75   05/26/23 97/55    Blood pressure ?130/85 mmHg or treatment for elevated blood pressure   Family History  See family history     Mental Status Exam:  Appearance: awake, groggy, disheveled. No evidence of pain of acute distress.   Attitude:  somewhat cooperative, more suspicious of writer today  Eye Contact:  fair, less intense  Mood:  \"not too well\"  Affect:  calm and cooperative, restricted, reactive  Speech: mostly coherent  Psychomotor Behavior:  No evidence of psychomotor agitation or restlessness today. No evidence of dystonia, or tics.  Throught Process: linear, illogical  Associations:  no loosening of associations present  Thought Content:  Delusions. Likely auditory, tacticle and olfactory hallucinations. Cannot rule out visual hallucinations. Evidence of obsessive thinking and likely compulsions to harm others.   Insight:  limited  Judgement:  poor  Oriented to:  self, date, place  Attention Span and Concentration:  fair  Recent and Remote Memory:  poor         Precautions:     Behavioral Orders   Procedures     Assault precautions     Code 1 - Restrict to Unit     Code 2     Code 2 - 1:1 Staff Supervision     For ECT only     Electroconvulsive therapy     Series of up to 12 treatments. Begin Date: 8/2/23     Treating Psychiatrist providing ECT:  unknown     Notified on:  7/28/23 via this order  NOTE: We received verbal confirmation from [facility] that Lorenzo Pavon has been approved but awaiting official court order and risk management's review  Initial consult was completed by Dr. Hicks on 7/18/23     Electroconvulsive therapy     Series of up to 12 treatments. Begin Date: 8/2/23     Treating Psychiatrist providing ECT:  Dominga     Notified on:  8/2/23     Elopement precautions     Fall precautions     " Routine Programming     As clinically indicated     Self Injury Precaution     Status 15     Every 15 minutes.     Status Individual Observation     Patient SIO status reviewed with team/RN.  Please also refer to RN/team documentation for add'l detail.    -SIO staff to monitor following which have contributed to patient being on SIO:  Agitation  Pt is impulsive   Pt has ran out of his room naked  Pt has Parkinson symptoms place him in a high fall risk  Pt has verbal outburst of sexual and threaten statements  Pt requires immediate redirection when masturbating    -Possible interventions SIO staff could use to support patient's treatment progress:  Engage pt in activities    -When following observed, team will review discontinuation of SIO:  Absence of aggression toward others for > 24 hours    Comments: SIO 1:1     Order Specific Question:   CONTINUOUS 24 hours / day     Answer:   5 feet     Order Specific Question:   Indications for SIO     Answer:   Severe intrusiveness     Order Specific Question:   Indications for SIO     Answer:   Assault risk     Suicide precautions     Patients on Suicide Precautions should have a Combination Diet ordered that includes a Diet selection(s) AND a Behavioral Tray selection for Safe Tray - with utensils, or Safe Tray - NO utensils            Diagnoses:     #Unspecified psychosis likely schizophrenia per history, rule out Bipolar affective disorder, manic  #Unspecified encephalopathy   -R/O catatonia   -Episodes of unresponsiveness, concern for PNES   #Parkinsonism 2/2 neuroleptic medications, rule out Parkinson's Disease  #Urinary retention and BPH  -Possible UTI -- UC resulted on 5/25 w/out growth  #Hx of prolonged QTc with clozapine  #Mild thrombocytopenia  #R/O OCD         Assessment and hospital summary:  Patient was admitted to psychiatric unit for safety, stabilization and medication management. He has had schizophrenia since the 1980. He was on Clozaril x 25 years, and it  was tapered and discontinued on May 7, 2023  due to prolonged qtc of 527, and his psychotic  symptoms have worsened since then. Sinemet was also discontinued recently due to concerns that it was contributing to paranoia and AH. He is agitated, aggressive, dangerous to self and others. He remains on SIO 2 to 1, and is under psychiatric emergency and court hold. There are concerns for organic etiology given pattern of sundowning, history of parkinsonism, and ongoing disorientation/confusion. : EKG repeated, cardiology consult regarding safety of resuming clozapine in the context of prolonged QTc and refractory schizophrenia pending response. Per cardiology, correct QTc is no more than 460. They do not have concerns about AP retrial. Neurology IP consult placed for evaluation of sundowning and cognitive decline secondary to Sinemet discontinuation. MSSA initiated. Per Neurology, discontinuation of Sinemet would not account for these symptoms. They do not recommend retrial at this time. : Clozapine titration continued.   Its possible that clozapine dose is contributing to worsened compulsive behaviors noted throughout hospitalization which could improve with lowering the dose.     Chart reviewed which revealed the followin2022: He was on clozapine, Seroquel, Cymbalta, and Carbidopa-levodopa and hospitalized for pneumonia. Psych consulted and Seroquel was stopped. Treated with Abx and discharged to TCU.     2022: Hospitalized at [facility]. Per chart review:    Vinod Lee is a 63 yo male with longstanding history of schizophrenia. He was admitted from [facility] ED, where he presented due to increased delusional thoughts while on a visit to his parents for Thanksgiving. He began experiencing increasing paranoid thoughts that someone might be poisoning him and began fearing that he might accidentally harm someone. He reported to his parents that he was having thoughts about hitting someone or beating  "someone up and told them he could not guarantee they would be safe with him. Parents encouraged patient to go to the ED, which he did voluntarily.     Per patient report and chart review, he was hospitalized several times in the 1980s and reports he was civilly committed at one point in the 1980s, however he has been quite stable for the past several decades. He reports he will experience some paranoia and delusional thinking at times, but generally has good insight into his symptoms. He has been maintained on clozapine for about 25 years and prior to several months ago was also concurrently prescribed quetiapine.      In the last several months, patient has had a number of medication changes. Approximately 6 months ago, patient was diagnosed with parkinsonism related to antipsychotic use and was started on carbidopa-levidopa. He experienced no improvement in tremors, and thus carbidopa- levidopa dose was increased 1.5 weeks ago.      In addition, patient was medically hospitalized in August 2022 for confusion, weakness, repeated falls, ongoing weight loss, and SOB. He was found to have a cavitary lesion of the lung and suspected aspiration pneumonia. He was treated with antibiotics. At that time, he was taking current dose of clozapine and duloxetine, as well as propranolol ER, quetiapine, gabapentin, and clonazepam. Psychiatry was consulted due to concern for oversedation, and propranolol ER, gabapentin, and quetiapine were discontinued. Conazepam was reduced from 0.5 mg TID to BID dosing and recommended to be discontinued, however, it does not appear this occurred. His sedation improved significantly. QTc prolongation was also noted, but improved throughout his stay. He was ultimately discharged to a TCU for several months before returning home. He reports his weakness has greatly improved and states he \"graduated\" physical therapy, though he continues to be diligent in completed recommended exercises.      He " "reports that over the past several months, his delusions and anxiety have been worse than usual, with symptoms becoming much more acute since the carbidopa-levidopa dose was increased. He has felt increasingly paranoid about being poisoned, noting this is a delusion that has been stable over time, but was previously less intrusive. He has insight during the interview that his paranoid thinking is not reality based, but states it has been harder to separate delusions from reality and he becomes very worried that he might hurt someone, despite no history of violent behavior. He reports that the paranoia about his food being poisoned in combination with the tremors in his hands have made it difficult for him to eat. He has also had quite low appetite for the past 6 months to a year . He reports that due to the combination of these factors, he has lost about 50 pounds in the last year.He denies any problems with sleep initiation or maintenance. He reports energy is fairly good and \"better than it used to be.\" He reports some short term memory issues and on interview, does have some difficulty remembering details of recent events. He is unsure if he has received cognitive testing in the past.    He denies any suicidal ideation and reports he has not experienced SI for decades. He denies homicidal thoughts and is very clear that he had and has no desire to harm anyone else, but was afraid he might somehow do so. He denies any hallucinations and states \"that's never been a problem for me.\" He is not observed responding to internal stimuli. He is alert and oriented in all spheres.        ========    BRIEF HOSPITAL COURSE: This 62 y.o. male admitted 11/25/2022 to  [facility] for the assessment and treatment of the above presentation. This was a voluntary admission.     In summary, he was tapered off the Sinemet, which he reports to be both ineffective and potentially worsening the anxiety and paranoia ideations. Instead Benadryl " 25 mg TID was started to help with extrapyramidal side effects. He struggled with sleep during his stay here. Remeron 7.5mg QHS was tried without success. Seroquel 100mg qhs was restarted with addition of suvorexant 10mg qhs. Given his Seroquel PRN use prior history of prolonged QTC, he will benefit from another EKG repeat on the medical unit.     Unfortunately, he tested positive for influenza A and developed hypoxemia. Now on 2L oxygen with desats when prone requiring 3L oxygen. Subsequently, the plan will to be tapering him off clonazepam due to hypoxia (and also prior plan to taper). The patient tolerated these changes without side effects.     The patient minimally benefited from the structured therapeutic milieu as he attended programming seldom as he tested positive for influenza, he needed to isolate with droplet precautions. Pt was engaged and worked on issues that were triggering/brought pt to the hospital and has excellent insight into his anxiety and paranoid delusions. Pt denied suicidal ideations throughout all/majority of their hospitalization. Pt denied HI throughout all of hospitalization. There were no concerns with behavioral disruptions/outbursts. The patient has shown improvement in general.     At the time of discharge, hospitalization course was reviewed with Vinod Lee with plan to transfer to medicine. Please consult psychiatry and I will continue to follow while patient is on the medical unit. He is now off sinemet since 11/29 21:00 and I would expect anxiety and paranoia to improve more moving forward. Psychiatrically, once anxiety and paranoia is baseline, can D/C to home once medically stable. He DOES NOT NEED A 1:1 on the medical unit from a mental health perspective, we initiated 1:1 11/30/2022 due to significant fatigue, gait instability (he was unable to stand without assist) and feeding assist.    04/2023: Clozapine was gradually tapered and discontinued, unclear reasons why it  was discontinued per outside records, though Dr. Portillo's H&P indicates that it was due to prolonged QTc.       Today's Changes:  - Administer one time dose of Depakote, Ativan, and Gabapentin at higher doses as patient will miss AM dose and ECT is scheduled for late morning.   -Order EKG for AM. Due to LBBB, QTc on EKG reading is inaccurate. Please reach out to cardiology fellow to provide corrected QTc.       Target psychiatric symptoms and interventions:  -Psych emergency declared on 5/27, now fully committed  -ECT Mon/Wed/Fri per Janelle   -Continue clozapine as above  -Continue scheduled Zyprexa 15mg BID  -Continue 1:1 for safety of staff and patients, reduced from 2:1 7/23/23  - weekly CBC to monitor platelets      -Continue Gabapentin 300 mg TID as staff believe it has been effective for treatment of his anxiety  -Discussed complex case at length with Dr. Hatch (primary provider on geriatic unit) who provided recs (see second opinion note dated 6/14). Appreciate assistance. I have since made the following changes:         1) Add propranolol 10 mg TID         2) Added Depakote 1000 mg qhs (to be administered in magic cup)         3) Nutrition consult to order magic cup         4) Increased melatonin to 10 mg QHS    Risks, benefits, and alternatives discussed at length with patient.     Acute Medical Problems and Treatments:  Delirium vs progression of dementia  Neurology consult placed on 6/8 for evaluation of sundowning and cognitive decline secondary to Sinemet discontinuation: Resuming Sinemet not recommended for behavioral concerns. Please see Neuro note for details.     Thrombocytopenia   Likely secondary to Depakote.  According to the, incidents of Depakote induced thrombocytopenia as 27%.  Depakote dose reduced from 1000 twice daily to 250 3 times daily on 7/28/2023  - weekly CBCs    Constipation  Likely worsened by clozapine, improved since modifying regimen.   - daily miralax   - daily Senokot  "2 tablets BID      Right first toe fracture:  Seen by Ortho on 6/16:  Per note: Vinod Lee is a 62 year old  male w/ PMHx complex psychiatric history who has a fracture to the distal phalanx of the right toe after a recent trauma to the foot the patient reports.  Patient denies any other pain or discomfort.  Images reviewed and plan will be for irrigation of the popped fracture blister with sterile saline and the toes should be dressed and a 4 x 4 gauze dressing.  Patient will need a postop shoe which she can be weightbearing as tolerated in.  Would recommend a course of antibiotics for approximately 7 days.  Would recommend Augmentin or clindamycin.  Podiatry will be made aware of patient and will see patient while he is admitted or if patient is discharged in the near future a follow-up appointment will need to be established.     Remainder of care per primary team.  Primary team should make sure that patient is up-to-date on his tetanus shot.  If not patient will require a booster shot.    Hx of prolonged QTc:  Continue weekly EKGs. See above for details.     Urinary retention, resolved  Per patient care order:   If patient is refusing straight caths, please notify IM provider immediately to determine whether this constitutes a medical emergency. If IM declares medical emergency, may restrain patient for straight cath. Discussed this with patient's parents/substitute decision makers on 6/7 who are in support of this plan.    # Psoriasis hx  # Purplish discoloration of B/LE feet-resolved   Discussed with Dr. Rene and bedside RN regarding rash on right foot that appears and appears to be purplish in color and almost \"petechiae.\" It was noted during interview, but seemed to be resolving upon walking. Within the past 24 hrs, pt has had ECT and seemed more confused than usual. Pt seems to have had a right great toe wound with recent right great toe fracture seen by Medicine on 7/16. Seen on 8/4 with improving " color on B/L legs at rest and appears to have small, scaly patches of varying coin sizes that have not grown past marked borders. See photos. Per further chart review, has had psoriasis history. WBC WNL, no fevers, HDS. This appears to more consistent with a psoriasis like picture.   - Start triamcinolone topical 0.025%   -Apply twice daily until lesions for 2 weeks or until lesions resolve/  -Monitor for skin thinning, striae, rebound lesion flares.   - Start Eucerin cream              - Apply 30 minutes PRIOR to triamcinolone topical cream above or PRN as needed if dry skin/after bathing   - Medicine will sign off.   - For worsening pain, spread, itchiness, fevers, please contact Medicine and possibly Dermatology.    Benzodiazepine dependence:  Phenobarbital taper completed    Pertinent labs/imaging:  Weekly CBC with diff     Behavioral/Psychological/Social:  - Encourage unit programming    Safety:  - Continue precautions as noted above  - Status 15 minute checks  - Continue 1:1 for staff and pt safety    Legal Status: Fully committed with Sánchez and Lorenzo Pavon. [place] medications: clozapine, olanzapine, risperidone, quetiapine      Disposition Plan   Reason for ongoing admission: poses an imminent risk to self, poses an imminent risk to others and is unable to care for self due to severe psychosis or darryl  Discharge location:  assisted living facility  Discharge Medications: not ordered  Follow-up Appointments: not scheduled    Entered by: Ingrid Rhodes MD on 08/14/2023  at 7:42 PM

## 2023-08-15 NOTE — PLAN OF CARE
NOC Shift Report     Pt in bed at beginning of shift, breathing quiet and unlabored. Pt got up 3 times during the night but went to bed quickly after. At 0443 he received PRN Gabapentin to help with anxious and irritable symptoms such as attempting to jump on his bed and threatening to hit staff. Pt slept 6.5 hours. Per report, while in his room he pushed a staff member twice around 0545. Patient is now resting in bed.

## 2023-08-16 ENCOUNTER — ANESTHESIA (OUTPATIENT)
Dept: BEHAVIORAL HEALTH | Facility: CLINIC | Age: 63
DRG: 885 | End: 2023-08-16
Payer: COMMERCIAL

## 2023-08-16 ENCOUNTER — ANESTHESIA EVENT (OUTPATIENT)
Dept: BEHAVIORAL HEALTH | Facility: CLINIC | Age: 63
DRG: 885 | End: 2023-08-16
Payer: COMMERCIAL

## 2023-08-16 ENCOUNTER — APPOINTMENT (OUTPATIENT)
Dept: BEHAVIORAL HEALTH | Facility: CLINIC | Age: 63
DRG: 885 | End: 2023-08-16
Attending: PSYCHIATRY & NEUROLOGY
Payer: COMMERCIAL

## 2023-08-16 PROCEDURE — 90870 ELECTROCONVULSIVE THERAPY: CPT

## 2023-08-16 PROCEDURE — 250N000011 HC RX IP 250 OP 636: Performed by: STUDENT IN AN ORGANIZED HEALTH CARE EDUCATION/TRAINING PROGRAM

## 2023-08-16 PROCEDURE — 250N000013 HC RX MED GY IP 250 OP 250 PS 637: Performed by: PSYCHIATRY & NEUROLOGY

## 2023-08-16 PROCEDURE — 250N000013 HC RX MED GY IP 250 OP 250 PS 637: Performed by: STUDENT IN AN ORGANIZED HEALTH CARE EDUCATION/TRAINING PROGRAM

## 2023-08-16 PROCEDURE — 250N000009 HC RX 250: Performed by: STUDENT IN AN ORGANIZED HEALTH CARE EDUCATION/TRAINING PROGRAM

## 2023-08-16 PROCEDURE — 250N000013 HC RX MED GY IP 250 OP 250 PS 637: Performed by: PHYSICIAN ASSISTANT

## 2023-08-16 PROCEDURE — 99024 POSTOP FOLLOW-UP VISIT: CPT | Mod: 25 | Performed by: PSYCHIATRY & NEUROLOGY

## 2023-08-16 PROCEDURE — 250N000009 HC RX 250: Performed by: PSYCHIATRY & NEUROLOGY

## 2023-08-16 PROCEDURE — 370N000017 HC ANESTHESIA TECHNICAL FEE, PER MIN: Performed by: STUDENT IN AN ORGANIZED HEALTH CARE EDUCATION/TRAINING PROGRAM

## 2023-08-16 PROCEDURE — 90870 ELECTROCONVULSIVE THERAPY: CPT | Performed by: PSYCHIATRY & NEUROLOGY

## 2023-08-16 PROCEDURE — 124N000002 HC R&B MH UMMC

## 2023-08-16 RX ORDER — NICARDIPINE HCL-0.9% SOD CHLOR 1 MG/10 ML
SYRINGE (ML) INTRAVENOUS PRN
Status: DISCONTINUED | OUTPATIENT
Start: 2023-08-16 | End: 2023-08-16

## 2023-08-16 RX ORDER — CAFFEINE AND SODIUM BENZOATE 125 MG/ML
250 INJECTION, SOLUTION INTRAMUSCULAR; INTRAVENOUS ONCE
Status: DISCONTINUED | OUTPATIENT
Start: 2023-08-16 | End: 2023-08-16

## 2023-08-16 RX ORDER — ETOMIDATE 2 MG/ML
INJECTION INTRAVENOUS PRN
Status: DISCONTINUED | OUTPATIENT
Start: 2023-08-16 | End: 2023-08-16

## 2023-08-16 RX ORDER — FLUMAZENIL 0.1 MG/ML
0.2 INJECTION, SOLUTION INTRAVENOUS ONCE
Status: DISCONTINUED | OUTPATIENT
Start: 2023-08-16 | End: 2023-08-16

## 2023-08-16 RX ADMIN — ATROPINE SULFATE 1 DROP: 10 SOLUTION/ DROPS OPHTHALMIC at 08:03

## 2023-08-16 RX ADMIN — LORAZEPAM 0.5 MG: 0.5 TABLET ORAL at 20:06

## 2023-08-16 RX ADMIN — NAPROXEN 250 MG: 250 TABLET ORAL at 17:36

## 2023-08-16 RX ADMIN — OLANZAPINE 15 MG: 10 TABLET, ORALLY DISINTEGRATING ORAL at 09:37

## 2023-08-16 RX ADMIN — SENNOSIDES 2 TABLET: 8.6 TABLET ORAL at 09:36

## 2023-08-16 RX ADMIN — TRIAMCINOLONE ACETONIDE: 0.25 CREAM TOPICAL at 20:10

## 2023-08-16 RX ADMIN — DIVALPROEX SODIUM 250 MG: 250 TABLET, DELAYED RELEASE ORAL at 14:20

## 2023-08-16 RX ADMIN — ETOMIDATE 14 MG: 2 INJECTION INTRAVENOUS at 08:50

## 2023-08-16 RX ADMIN — LORAZEPAM 0.5 MG: 0.5 TABLET ORAL at 09:37

## 2023-08-16 RX ADMIN — Medication 10 MG: at 20:06

## 2023-08-16 RX ADMIN — WHITE PETROLATUM: 1.75 OINTMENT TOPICAL at 17:36

## 2023-08-16 RX ADMIN — Medication 1000 MCG: at 08:50

## 2023-08-16 RX ADMIN — CYANOCOBALAMIN TAB 1000 MCG 1000 MCG: 1000 TAB at 09:37

## 2023-08-16 RX ADMIN — GABAPENTIN 300 MG: 300 CAPSULE ORAL at 20:07

## 2023-08-16 RX ADMIN — CLOZAPINE 650 MG: 100 TABLET, ORALLY DISINTEGRATING ORAL at 12:14

## 2023-08-16 RX ADMIN — NAPROXEN 250 MG: 250 TABLET ORAL at 09:36

## 2023-08-16 RX ADMIN — TAMSULOSIN HYDROCHLORIDE 0.4 MG: 0.4 CAPSULE ORAL at 09:37

## 2023-08-16 RX ADMIN — DIVALPROEX SODIUM 250 MG: 250 TABLET, DELAYED RELEASE ORAL at 20:08

## 2023-08-16 RX ADMIN — POLYETHYLENE GLYCOL 3350 17 G: 17 POWDER, FOR SOLUTION ORAL at 20:08

## 2023-08-16 RX ADMIN — GABAPENTIN 300 MG: 300 CAPSULE ORAL at 09:37

## 2023-08-16 RX ADMIN — LORAZEPAM 0.5 MG: 0.5 TABLET ORAL at 14:20

## 2023-08-16 RX ADMIN — FLUMAZENIL 0.2 MG: 0.1 INJECTION, SOLUTION INTRAVENOUS at 08:47

## 2023-08-16 RX ADMIN — SENNOSIDES 2 TABLET: 8.6 TABLET ORAL at 20:06

## 2023-08-16 RX ADMIN — ATROPINE SULFATE 2 DROP: 10 SOLUTION/ DROPS OPHTHALMIC at 20:05

## 2023-08-16 RX ADMIN — SUCCINYLCHOLINE CHLORIDE 80 MG: 20 INJECTION, SOLUTION INTRAMUSCULAR; INTRAVENOUS; PARENTERAL at 08:50

## 2023-08-16 RX ADMIN — ATROPINE SULFATE 2 DROP: 10 SOLUTION/ DROPS OPHTHALMIC at 09:36

## 2023-08-16 RX ADMIN — OLANZAPINE 15 MG: 10 TABLET, ORALLY DISINTEGRATING ORAL at 20:05

## 2023-08-16 RX ADMIN — CAFFEINE AND SODIUM BENZOATE 250 MG: 125 INJECTION, SOLUTION INTRAMUSCULAR; INTRAVENOUS at 08:47

## 2023-08-16 RX ADMIN — DIVALPROEX SODIUM 250 MG: 250 TABLET, DELAYED RELEASE ORAL at 09:36

## 2023-08-16 RX ADMIN — GABAPENTIN 300 MG: 300 CAPSULE ORAL at 14:20

## 2023-08-16 ASSESSMENT — ACTIVITIES OF DAILY LIVING (ADL)
HYGIENE/GROOMING: INDEPENDENT
ADLS_ACUITY_SCORE: 64
DRESS: INDEPENDENT
LAUNDRY: UNABLE TO COMPLETE
ADLS_ACUITY_SCORE: 64
ORAL_HYGIENE: INDEPENDENT
ADLS_ACUITY_SCORE: 64
ADLS_ACUITY_SCORE: 64
HYGIENE/GROOMING: INDEPENDENT
ADLS_ACUITY_SCORE: 64

## 2023-08-16 ASSESSMENT — ENCOUNTER SYMPTOMS: DYSRHYTHMIAS: 1

## 2023-08-16 NOTE — ANESTHESIA CARE TRANSFER NOTE
Patient: Vinod Lee    Procedure: * No procedures listed *       Diagnosis: * No pre-op diagnosis entered *  Diagnosis Additional Information: No value filed.    Anesthesia Type:   General     Note:    Oropharynx: oropharynx clear of all foreign objects  Level of Consciousness: iatrogenic sedation  Oxygen Supplementation: room air    Independent Airway: airway patency satisfactory and stable  Dentition: dentition unchanged  Vital Signs Stable: post-procedure vital signs reviewed and stable  Report to RN Given: handoff report given  Patient transferred to: PACU    Handoff Report: Identifed the Patient, Identified the Reponsible Provider, Reviewed the pertinent medical history, Discussed the surgical course, Reviewed Intra-OP anesthesia mangement and issues during anesthesia, Set expectations for post-procedure period and Allowed opportunity for questions and acknowledgement of understanding      Vitals:  Vitals Value Taken Time   BP     Temp     Pulse     Resp     SpO2         Electronically Signed By: Phill Travis MD  August 16, 2023  9:02 AM

## 2023-08-16 NOTE — PROGRESS NOTES
Pt NPO and all food taken out of the room and pt and staff educated on pt status and they verbalized understanding.

## 2023-08-16 NOTE — PLAN OF CARE
Problem: Psychotic Signs/Symptoms  Goal: Improved Sleep (Psychotic Signs/Symptoms)  Outcome: Adequate for Care Transition  Flowsheets (Taken 8/16/2023 0616)  Mutually Determined Action Steps (Improved Sleep): sleeps 4-6 hours at night  Intervention: Promote Healthy Sleep Hygiene  Recent Flowsheet Documentation  Taken 8/16/2023 0600 by Ramon Cotton RN  Sleep Hygiene Promotion: noise level reduced   Goal Outcome Evaluation:    The patient appeared to be sleeping throughout the night and slept for 6.75 hours. He continued on SIO 1:1 for severe intrusiveness and had no behavioral or safety concerns. He took Trazodone PRN at about 2335 and maintained his NPO status for his scheduled ECT.

## 2023-08-16 NOTE — PROGRESS NOTES
PSYCHIATRY PROGRESS NOTE         DATE OF SERVICE:   8/16/2023         CHIEF COMPLAINT:     Pt continues to deny having ECT, reports delusions and hallucinations , drooling on Clozaril          OBJECTIVE:     Nursing reports : tolerated  ECT well, slept through the night, took Atropine for drooling, refused foot creams      reports working on outpatient referrals, patient is under commitment with [facility]  Contacts:  Assisted Living: [facility], [phone]  Per H&P: Vicky Gallego , [phone], psychiatric provider Dr. Santana at [facility], [phone], primary care provider Attila Urena,     Medicine consult by JUSTINA Gibson  6/17/2023  Brief Medicine Note  Medicine consulted by Dr. Rhodes of Psychiatry for right toe fracture. Patient injured his right toe while banging his foot into the wall or door of his room.    X-ray of the right foot yesterday revealed an acute comminuted and placed fracture throughout the first right distal phalanx with intra-articular extension.  Orthopedics consulted yesterday and evaluated the patient.  They recommend a postop shoe with weightbearing as tolerated.  They will be asking podiatry to see the patient either during this hospitalization or in clinic.  Orthopedics is additionally recommending a 7-day course of prophylactic Augmentin or clindamycin for his open blister near the fracture site.   Plan:   - Agree with plan of care as already in place by Orthopedics   - Patient received post-op shoe yesterday   - I ordered Augmentin twice daily to complete a 7-day course    - He is overdue for his tetanus vaccination, this was due in 2010.  He additionally is in need of a booster due to his foot injury as recommended by orthopedics.  Tdap booster ordered, please administer as able.     Medicine consult by Eva Morgan CNP on 8/4/2023  Assessment & Plan  Vinod Lee is a 63 year old male admitted on  "5/26/2023. He has a past medical history of schizophrenia, Parkinson's Disease, who was initially admitted on 5/18/23 for AMS, aggression. Broad workup negative, and ultimately transferred to station [facility] on 5/26/23 for further psychiatric monitoring and management. Medicine has been involved intermittently throughout his hospitalization and is most recently being consulted on 8/3 for evaluation of skin changes of R foot and purplish discoloration of both feet below the ankles.  Medicine will sign off at this time.  Please contact us if patient develops assisted systemic symptoms, increasing rash, or new discoloration of feet. Please also consider a dermatology consult if patient does not improve on therapy as below.  # Rash, right foot concerning for psoriasis  # Psoriasis hx  # Purplish discoloration of B/LE feet-resolved   Discussed with Dr. Rene and bedside RN regarding rash on right foot that appears and appears to be purplish in color and almost \"petechiae.\" It was noted during interview, but seemed to be resolving upon walking. Within the past 24 hrs, pt has had ECT and seemed more confused than usual. Pt seems to have had a right great toe wound with recent right great toe fracture seen by Medicine on 7/16. Seen on 8/4 with improving color on B/L legs at rest and appears to have small, scaly patches of varying coin sizes that have not grown past marked borders. See photos. Per further chart review, has had psoriasis history. WBC WNL, no fevers, HDS. This appears to more consistent with a psoriasis like picture.   - Start triamcinolone topical 0.025%   -Apply twice daily until lesions for 2 weeks or until lesions resolve/  -Monitor for skin thinning, striae, rebound lesion flares.   - Start Eucerin cream              - Apply 30 minutes PRIOR to triamcinolone topical cream above or PRN as needed if dry skin/after bathing   - Medicine will sign off.   - For worsening pain, spread, itchiness, fevers, please " "contact Medicine and possibly Dermatology.     ECT # 7 by Deja Hicks on 8/16/2023   ECT [facility] at [facility]: 3   Treatment number this series: 3   Lifetime total treatment number: 3     Type of ECT:  Bilateral, standard   ECT Strip Summary:     Titration: 25.6 mC;   **HYPERVENTILATE please**   Energy Level: 576 mC; 1 msec; 45 Hz; 8 sec; 800 mA    Motor Seizure Duration: 21  seconds    EEG Seizure Duration: 40 seconds - low amplitude  Give klonopin 0.5 mg as he is ready to leave the PACU (helps agitation once back on unit)  Complication: none          SUBJECTIVE:    Vinod says that 2 minutes ago he heard voice telling him to hurt others, but he says he would not do that. He talks about gouging someone's eyes , but he says he would not do that. He reports paranoid delusions about people being after him for things he did in the past, but he does not perseverates on that. He does not get agitated. He denies suicidal thoughts. He says he will never leave the hospital \"because he is evil abnoxious person\" Then he talks about having \"diarrhea and smelly shit\". He talks about \"washing hands forever if he touched shit\"He takes atropin for drolling. He says it did not stop after first drop, but later he took second drop and it helped.   He had 7 ECT sessions, but he says he has not had it. He is alert and oriented x 3          MEDICATIONS:      atropine  2 drop Sublingual TID    cloZAPine  650 mg Oral Daily    cyanocobalamin  1,000 mcg Oral Daily    divalproex sodium delayed-release  250 mg Oral 3 times per day on Mon Wed Fri    divalproex sodium delayed-release  250 mg Oral 3 times per day on Sun Tue Thu    divalproex sodium delayed-release  250 mg Oral 3 times per day on Sat    gabapentin  300 mg Oral 3 times per day on Mon Wed Fri    gabapentin  300 mg Oral 3 times per day on Sun Tue Thu    gabapentin  300 mg Oral 3 times per day on Sat    LORazepam  0.5 mg Oral 3 times per day on Mon Wed Fri    LORazepam  0.5 mg Oral 3 times " per day on Sun Tue Thu    LORazepam  0.5 mg Oral 3 times per day on Sat    melatonin  10 mg Oral At Bedtime    mineral oil-hydrophilic petrolatum   Topical BID IS    naproxen  250 mg Oral BID w/meals    OLANZapine zydis  15 mg Oral BID    Or    OLANZapine  10 mg Intramuscular BID    polyethylene glycol  17 g Oral BID    sennosides  2 tablet Oral BID    tamsulosin  0.4 mg Oral Daily    triamcinolone   Topical BID     acetaminophen, alum & mag hydroxide-simethicone, atropine, benzonatate, bisacodyl, haloperidol **AND** [DISCONTINUED] LORazepam **AND** diphenhydrAMINE, haloperidol lactate **AND** [DISCONTINUED] LORazepam **AND** diphenhydrAMINE, gabapentin, lidocaine, magnesium hydroxide, mineral oil-white petrolatum, OLANZapine **OR** OLANZapine, senna-docusate, traZODone    Medication adherence: Yes  Medication side effects: Prolonged QT QTc 430/514 on 8/8/2023, patient has had multiple as needed neuroleptics, which can further contribute to prolongation of QT QTc  Benefit: Limited symptom reduction on Clozaril, Zyprexa and Depakote, now on ECT due to side effects of medications, and limited response          ROS:   Review of systems is negative for: General, eyes, ears, nose, throat, neck, respiratory, cardiovascular, gastrointestinal, genitourinary, musculoskeletal, neurological, hematological and endocrine system.         MENTAL STATUS EXAM:   /80 (BP Location: Left arm, Patient Position: Semi-Carlos's, Cuff Size: Adult Regular)   Pulse 89   Temp 96.8  F (36  C) (Temporal)   Resp 14   Wt 85.7 kg (188 lb 14.4 oz)   SpO2 95%   BMI 27.90 kg/m      Appearance: Fair hygiene  Orientation: to self , place and fully in time   Speech: Normal in rate and tone  Language ability: Normal syntax and vocabulary  Thought process: concrete   Thought content: positive for delusions and things  hallucinations  Associations: Connected  Suicidal Ideation: Denies  Homicidal Ideation: says he heard some voices telling him to  hurt others but he would not do that  Mood: Depressed  Affect: less irritable   Intellectual functioning:average  Fund of Knowledge: consistent with education and experience   Attention/Concentration: decreased  Memory: intact  Psychomotor Behavior: not agitated during the interview   Muscle Strength and Tone: no atrophy or involuntary movement  Gait and Station: steady  Insight and judgement:impaired          LABS:   personally reviewed.     Lab Results   Component Value Date     06/07/2023     05/25/2023     05/24/2023    CO2 26 06/07/2023    CO2 26 05/25/2023    CO2 27 05/24/2023    BUN 17.7 06/07/2023    BUN 20.0 05/25/2023    BUN 18.8 05/24/2023     No results found for: CKTOTAL, CKMB, TROPONINI  Lab Results   Component Value Date    WBC 9.0 06/22/2023    WBC 8.8 06/20/2023    WBC 8.3 06/14/2023    HGB 12.4 06/22/2023    HGB 12.5 06/20/2023    HGB 12.0 06/14/2023    HCT 38.5 06/22/2023    HCT 39.6 06/20/2023    HCT 37.3 06/14/2023    MCV 87 06/22/2023    MCV 88 06/20/2023    MCV 85 06/14/2023     06/22/2023     06/20/2023     06/14/2023       [facility] Reference Range & Units 05/25/23 05:25 05/26/23 13:49 05/26/23 18:02   Sodium 136 - 145 mmol/L 141       Potassium 3.4 - 5.3 mmol/L 3.5       Chloride 98 - 107 mmol/L 106       Carbon Dioxide (CO2) 22 - 29 mmol/L 26       Urea Nitrogen 8.0 - 23.0 mg/dL 20.0       Creatinine 0.67 - 1.17 mg/dL 1.03       GFR Estimate >60 mL/min/1.73m2 82       Calcium 8.8 - 10.2 mg/dL 8.7 (L)       Anion Gap 7 - 15 mmol/L 9       Magnesium 1.7 - 2.3 mg/dL 2.1       Phosphorus 2.5 - 4.5 mg/dL 3.8       Albumin 3.5 - 5.2 g/dL 3.7       Protein Total 6.4 - 8.3 g/dL 5.1 (L)       Alkaline Phosphatase 40 - 129 U/L 62       ALT 10 - 50 U/L 11       AST 10 - 50 U/L 18       Bilirubin Total <=1.2 mg/dL 0.3       Glucose 70 - 99 mg/dL 104 (H)       GLUCOSE BY METER POCT 70 - 99 mg/dL   127 (H) 102 (H)   WBC 4.0 - 11.0 10e3/uL 8.7       Hemoglobin 13.3 -  17.7 g/dL 11.3 (L)       Hematocrit 40.0 - 53.0 % 34.5 (L)       Platelet Count 150 - 450 10e3/uL 133 (L)       RBC Count 4.40 - 5.90 10e6/uL 4.02 (L)       MCV 78 - 100 fL 86       MCH 26.5 - 33.0 pg 28.1       MCHC 31.5 - 36.5 g/dL 32.8       RDW 10.0 - 15.0 % 15.3 (H)          EKG  5/23/23  4:44 PM 5/19/23  8:27 AM         Systolic Blood Pressure mmHg        Diastolic Blood Pressure mmHg        Ventricular Rate BPM 80  73     Atrial Rate BPM 80  73     NE Interval ms 152  142     QRS Duration ms 152  156     QT ms 430  458     QTc ms 495  504     P [place] degrees 59  67     R AXIS degrees -21  -48     T Axis degrees 111  81     Interpretation ECG   Sinus rhythm   Left bundle branch block   Abnormal ECG   When compared with ECG of 19-MAY-2023 08:27,   No significant change was found   Confirmed by fellow Mckay Dennis ([id number]) on 5/24/2023 10:35:48       EKG on 6/16/2023  QT QTc 434/539, sinus rhythm with premature atrial complexes, left bundle branch block  EKG on 6/23/2023  QT QTc 414/506, sinus rhythm, left bundle branch block    EKG on 8/7/2023   QT Qtc 430/514, sinus rhythm with premature atrial complexes, left bundle branch block  8/8/23 10:16 AM 7/31/23 10:05 AM       Systolic Blood Pressure mmHg      Diastolic Blood Pressure mmHg      Ventricular Rate BPM 86 87    Atrial Rate BPM 86 87    NE Interval ms 144 140    QRS Duration ms 154 156    QT ms 430 422    QTc ms 514 507    P Axis degrees 59 56    R AXIS degrees 1 -48    T Axis degrees 104 95    Interpretation ECG  Sinus rhythm with Premature atrial complexes  Left bundle branch block  Abnormal ECG  When compared with ECG of 31-JUL-2023 10:05,  Premature atrial complexes are now Present  QRS axis Shifted right      CT HEAD W/O CONTRAST 5/25/2023 12:39 AM   Provided History: Patient running in hallway, fell forward, difficult  to tell if he hit his head. Acutely psychotic, unable to get history   Comparison: CT head 5/20/2023.  Technique: Using multidetector  thin collimation helical acquisition  technique, axial, coronal and sagittal CT images from the skull base  to the vertex were obtained without intravenous contrast.    Findings:    No intracranial hemorrhage. No mass effect. No midline shift. No  extra-axial fluid collection. The gray to white matter differentiation  of the cerebral hemispheres is preserved. Ventricles are proportionate  to the sulci. No sulcal effacement. The basal cisterns are patent.  Mild diffuse cerebral atrophy.   Polypoid mucosal thickening of the right maxillary sinus.The  visualized paranasal sinuses are otherwise clear. The mastoid air  cells are clear. Orbits appear unremarkable. No acute fracture   Impression: No acute intracranial pathology.           DIAGNOSIS:     Paranoid schizophrenia chronic with acute exacerbation   Neuroleptic induced Parkinson's disease       Patient Active Problem List   Diagnosis    Urinary retention    Schizophrenia, unspecified type (H)    Altered mental status, unspecified altered mental status type    Mood changes    Paranoid schizophrenia, chronic condition with acute exacerbation (H)    Neuroleptic-induced parkinsonism (H)    Agitation          PLAN:   Patient  has diagnosis of schizophrenia since 1980s.  He was on Clozaril for 25 years.  It was tapered and discontinued in May 7, 2023 due to prolonged QT QTc of 527.  His psychotic symptoms have worsened since then.   He is under commitment , requested [facility] and forced blood draw for Clozaril.  He is under commitment with [facility] neuroleptic and Lorenzo Holloway, ECT order.  He has severe persistent mental illness.  He is on Clozaril.  He had EKG on June 23, 2023.  QT QTc is 414/506.  He has left bundle branch block.  QT QTc was 434/539 on June 16.  He had EKG this morning and it continues to show prolonged QT QTc of 430/514.  He is on multiple neuroleptics, but he is so symptomatic that he needs these medications.  He is undergoing. ECT , since it does  not seem that he responds to neuroleptics and his qt qtc is raising again.  He is on SIO for safety. He had 7 ECT sessions.  He imay need 10 sessions before more improvement. He is followed by medicine for nonpsychiatric problems.  These are recommendations for treatment:    Medications:  Clozapine 650 mg daily for schizophrenia Zyprexa 15 mg bid for schizophrenia  Atropine 1% ophthalmic solution 1 drop twice daily as needed for secretion  Depakote  mg 3 times daily for mood stabilization, give after ECT on Monday, Wednesday and Friday   Gabapentin 300 mg 3 times daily for anxiety, give after ECT on Monday, Wednesday and Friday  Ativan 0.5 mg 3 times daily for anxiety, give after ECT on Monday, Wednesday and Friday  Trazodone 50 -100 mg nightly as needed for sleep  Melatonin 10 mg nightly for sleep  Miralax 17 g daily constipation  Senokot 8.6 mg twice daily for constipation  Hannah-Colace 1 p.o. twice daily as needed for constipation  Dulcolax suppository 10 mg rectally daily as needed for constipation  Flomax 0.4 mg for urinary retention  Tylenol 650 mg every 4 hours as needed for pain  Haldol 5 mg every 6 hours as needed with Benadryl 50 mg every 6 hours as needed for agitation  Precautions:   Suicide precautions  SIB precautions   Assault precautions  Elopement precautions  Fall precautions   SIO for safety  No roommate  Medical bad   Legal: Commitment with [facility] neuroleptic order and Lorenzo Holloway ECT order  Full code   will collect collateral information and make outpatient referrals  Staff to provide emotional support and redirect as needed  Patient encouraged to attend groups  Lab results: Reviewed personally  Consultation: medicine consult completed  Continue SIO for safety    Risk Assessment: commitment with [facility] and for forced blood draw and ECT recommended for safety, stabilization and medication management, chronic persistent mental illness patient with limited response to  medication, side effects of medications, undergoing ECT    Coordination of Care:   Patient seen, medical record reviewed, care coordinated with the team.    This document is created with the help of Dragon dictation system.  All grammatical/typing errors or context distortion are unintentional and inherent to software.    Misty Portillo MD        Re-Certification I certify that the inpatient psychiatric facility services furnished since the previous certification were, and continue to be, medically necessary for, either, treatment which could reasonably be expected to improve the patient s condition or diagnostic study and that the hospital records indicate that the services furnished were, either, intensive treatment services, admission and related services necessary for diagnostic study, or equivalent services.     I certify that the patient continues to need, on a daily basis, active treatment furnished directly by or requiring the supervision of inpatient psychiatric facility personnel.   I estimate TBD days of hospitalization is necessary for proper treatment of the patient. My plans for post-hospital care for this patient are : Medications, appointments     Misty Portillo MD

## 2023-08-16 NOTE — ANESTHESIA POSTPROCEDURE EVALUATION
Patient: Vinod Lee    Procedure: * No procedures listed *       Anesthesia Type:  General    Note:  Disposition: Outpatient   Postop Pain Control: Uneventful            Sign Out: Well controlled pain   PONV: No   Neuro/Psych: Uneventful            Sign Out: Acceptable/Baseline neuro status   Airway/Respiratory: Uneventful            Sign Out: Acceptable/Baseline resp. status   CV/Hemodynamics: Uneventful            Sign Out: Acceptable CV status; No obvious hypovolemia; No obvious fluid overload   Other NRE:    DID A NON-ROUTINE EVENT OCCUR?            Last vitals:  Vitals:    08/16/23 0901 08/16/23 0911 08/16/23 0926   BP: (!) 183/87 (!) 142/79    Pulse: 104 91 89   Resp: 23 18 14   Temp: 36.2  C (97.2  F) 36.1  C (96.9  F) 36  C (96.8  F)   SpO2: 96% 92%        Electronically Signed By: Phill Travis MD  August 16, 2023  9:17 AM
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
## 2023-08-16 NOTE — CARE PLAN
08/16/23 1454   Individualization/Patient Specific Goals   Patient Personal Strengths community support;expressive of emotions;family/social support;humor;interests/hobbies;relationship stability;resilient;resourceful;socioeconomic stability   Patient Vulnerabilities lacks insight into illness;traumatic event   Interprofessional Rounds   Summary Vinod presents with paranoid and persecutory delusional thought content, at times endorses auditory hallucinations, and is intermittently agitated. He continues to receive ECT treatments.   Participants CTC;psychiatrist;nursing   Behavioral Team Discussion   Participants [facility]   Anticipated length of stay 30 days   Continued Stay Criteria/Rationale Vinod presents with paranoid and persecutory delusional thought content, at times endorses auditory hallucinations, and is intermittently agitated. He continues to receive ECT treatments.   Anticipated Discharge Disposition psychiatric hospital;nursing home;nursing/skilled nursing care;assisted living     PRECAUTIONS AND SAFETY    Behavioral Orders   Procedures    Assault precautions    Code 1 - Restrict to Unit    Code 2    Code 2 - 1:1 Staff Supervision     For ECT only    Electroconvulsive therapy     Series of up to 12 treatments. Begin Date: 8/2/23     Treating Psychiatrist providing ECT:  unknown     Notified on:  7/28/23 via this order  NOTE: We received verbal confirmation from [facility] that Lorenzo Pavon has been approved but awaiting official court order and risk management's review  Initial consult was completed by Dr. Hicks on 7/18/23    Electroconvulsive therapy     Series of up to 12 treatments. Begin Date: 8/2/23     Treating Psychiatrist providing ECT:  Dominga     Notified on:  8/2/23    Elopement precautions    Fall precautions    Routine Programming     As clinically indicated    Self Injury Precaution    Status 15     Every 15 minutes.    Status Individual Observation      Patient SIO status reviewed with team/RN.  Please also refer to RN/team documentation for add'l detail.    -SIO staff to monitor following which have contributed to patient being on SIO:  Agitation  Pt is impulsive   Pt has ran out of his room naked  Pt has Parkinson symptoms place him in a high fall risk  Pt has verbal outburst of sexual and threaten statements  Pt requires immediate redirection when masturbating    -Possible interventions SIO staff could use to support patient's treatment progress:  Engage pt in activities    -When following observed, team will review discontinuation of SIO:  Absence of aggression toward others for > 24 hours    Comments: SIO 1:1     Order Specific Question:   CONTINUOUS 24 hours / day     Answer:   5 feet     Order Specific Question:   Indications for SIO     Answer:   Severe intrusiveness     Order Specific Question:   Indications for SIO     Answer:   Assault risk    Suicide precautions     Patients on Suicide Precautions should have a Combination Diet ordered that includes a Diet selection(s) AND a Behavioral Tray selection for Safe Tray - with utensils, or Safe Tray - NO utensils         Safety  Safety WDL: [facility]  Patient Location: patient room, own  Observed Behavior: sitting  Observed Behavior (Comment): kicking, hitting staff, talking to staff, watching television  Airway Safety Measures: all equipment/monitors on and audible  Safety Measures: 1:1 observation maintained  Diversional Activity: music  Suicidality: Status 15, Behavioral scrubs (pajamas)  Seizure precautions: clutter free environment  Assault: status 15, private room, behavioral scrubs (pajamas)  Elopement Assessment: Statements about wanting to leave  Elopement Interventions: status 15, behavioral scrubs (pajamas)  Sexual: status 15, status continuous sight  Additional Documentation:  (SIB)

## 2023-08-16 NOTE — PROGRESS NOTES
"Approached pt for [facility] at 1215 and 1415. Pt stated \"I can't do that\" and adamantly declined on both attempts.  "

## 2023-08-16 NOTE — PLAN OF CARE
"Nursing Assessment    Recent Vitals: B/P: 135/80, T: 96.8, P: 89, R: 14     Sleep:  Hours of sleep at night: 6.75    General Shift Summary  Patient has primarily isolated to his room, coming out for lunch and ECT. He presents with a flat affect. Prior to ECT he made threats to staff about wanting to gouge their eyes out. When asking patient about these thoughts to hurt others he denied having any. Patient had a large amount of drool noted prior to ECT, he was given PRN Atropine drops. Patient went down to ECT around 0825. No complications reported. Patient ate about 90% of his breakfast once coming back from ECT. When attempting to provide Vinod his scheduled Atropine drops, patient was sceptical and stated \"I don't trust you. You're giving me something else.\" He was provided education and then stated \"Okay fine I'll take it, only because I know you'll force it.\". He continued to refuse his Miralax. Patient said his last bowel movement was yesterday. He refused his foot creams. Writer assessed patient's feet and the skin is clean, dry, and intact. Vinod thought his noon Clozapine was poison, writer educated patient and he accepted the medication. He ate 100% of his lunch. Hygiene is fair. Incite and judgement are poor.    Tamar Mars RN MSN  "

## 2023-08-16 NOTE — ANESTHESIA PREPROCEDURE EVALUATION
Anesthesia Pre-Procedure Evaluation    Patient: Vinod Lee   MRN: [id number] : 1960        Procedure :           Past Medical History:   Diagnosis Date    LBBB (left bundle branch block)     Parkinson's disease (H)     Schizophrenia (H)       No past surgical history on file.   Allergies   Allergen Reactions    Bee Venom Unknown    Montelukast Unknown    Phenothiazines Unknown      Social History     Tobacco Use    Smoking status: Not on file    Smokeless tobacco: Never   Substance Use Topics    Alcohol use: Not on file     Comment: GRACE      Wt Readings from Last 1 Encounters:   23 85.7 kg (188 lb 14.4 oz)        Anesthesia Evaluation   Pt has had prior anesthetic.         ROS/MED HX  ENT/Pulmonary:     (+) sleep apnea,                                      Neurologic:     (+)                 Parkinson's disease, features: parkinsonism, from neuroleptics,              Cardiovascular: Comment: 23 QTc 514    (+)  hypertension- -   -  - -                        dysrhythmias, Long QT,        Previous cardiac testing   Echo: Date: Results:    Stress Test:  Date: Results:    ECG Reviewed:  Date: 23 Results:  LBBB QT intervial is 498  Cath:  Date: Results:      METS/Exercise Tolerance:     Hematologic:     (+)      anemia,          Musculoskeletal:  - neg musculoskeletal ROS     GI/Hepatic:  - neg GI/hepatic ROS     Renal/Genitourinary:     (+)        BPH,      Endo:  - neg endo ROS     Psychiatric/Substance Use: Comment: Patient is aggressive and homicidal    (+) psychiatric history schizophrenia       Infectious Disease:  - neg infectious disease ROS     Malignancy:  - neg malignancy ROS     Other:  - neg other ROS          Physical Exam    Airway  airway exam normal           Respiratory Devices and Support         Dental           Cardiovascular   cardiovascular exam normal          Pulmonary   pulmonary exam normal              OUTSIDE LABS:  CBC:   Lab Results   Component Value Date    WBC  11.3 (H) 08/11/2023    WBC 10.2 08/04/2023    HGB 11.9 (L) 08/11/2023    HGB 11.3 (L) 08/04/2023    HCT 37.6 (L) 08/11/2023    HCT 35.0 (L) 08/04/2023     08/11/2023     (L) 08/04/2023     BMP:   Lab Results   Component Value Date     08/04/2023     08/02/2023    POTASSIUM 4.9 08/04/2023    POTASSIUM 4.9 08/02/2023    CHLORIDE 106 08/04/2023    CHLORIDE 107 08/02/2023    CO2 24 08/04/2023    CO2 24 08/02/2023    BUN 37.8 (H) 08/04/2023    BUN 24.8 (H) 08/02/2023    CR 1.05 08/04/2023    CR 0.99 08/02/2023     (H) 08/04/2023    GLC 93 08/02/2023     COAGS: No results found for: PTT, INR, FIBR  POC: No results found for: BGM, HCG, HCGS  HEPATIC:   Lab Results   Component Value Date    ALBUMIN 3.6 07/13/2023    PROTTOTAL 5.6 (L) 07/13/2023    ALT 14 07/28/2023    AST 18 07/28/2023    ALKPHOS 73 07/13/2023    BILITOTAL 0.2 07/13/2023     OTHER:   Lab Results   Component Value Date    LACT 1.5 06/07/2023    BRENDA 8.3 (L) 08/04/2023    PHOS 3.8 05/25/2023    MAG 2.1 05/25/2023    TSH 1.80 05/22/2023       Anesthesia Plan    ASA Status:  3    NPO Status:  NPO Appropriate    Anesthesia Type: General.   Induction: Intravenous.   Maintenance: TIVA.        Consents    Anesthesia Plan(s) and associated risks, benefits, and realistic alternatives discussed. Questions answered and patient/representative(s) expressed understanding.     - Discussed: Risks, Benefits and Alternatives for BOTH SEDATION and the PROCEDURE were discussed     - Discussed with:  Patient      - Extended Intubation/Ventilatory Support Discussed: No.      - Patient is DNR/DNI Status: No     Use of blood products discussed: No .     Postoperative Care       PONV prophylaxis: Ondansetron (or other 5HT-3)     Comments:                Phill Travis MD

## 2023-08-16 NOTE — PLAN OF CARE
Assessment/Intervention/Current Symtoms and Care Coordination:  Reviewed chart. Met with team to review patient's current functioning, needs, progress, and impediments to discharge.  Sent the last seven days of progress notes and current medication list to central preadmission via secure fax.       Discharge Plan or Goal:  Patient continues to present with persecutory delusions, auditory hallucinations, and intermittent agitation. When symptoms have reduced he is has been referred to an assisted living facility and forms sent to central preadmission for state-run facilities.      Barriers to Discharge:  Patient requires further psychiatric stabilization due to current symptomology. He continues to present as disorganized and tangential with paranoid thought content. He presents with mood lability. He vacillates between being pleasant and being aggressive with no clear environmental triggers. Patient endorses auditory hallucinations.     Referral Status:  Referral for housing pending psychiatric stabilization  Considerations include: [facility] in [place], Referral sent to [facility] in [place] 8/15     Legal Status:  Commitment with [facility]: [facility]  File Number: [id number]  Issued 07/06/23 and is not to exceed six months    Contacts:  : Vicky Valdez with [facility], [phone]  Psychiatrist: Dr. Santana at [facility], [phone] PCP: Attila Urena DO  Mom: Alberta, [phone] (H) [phone] (M)   Dad: Ino, [phone] (H)  Sister, Sandra Treadwell, [phone] (KING)   [facility]'s Office Fax: [phone]  [facility]: [phone] ([place])  CADI  with [facility]: Regina Wong, [phone]    Upcoming Meetings and Dates/Important Information and next steps:  Referrals for housing pending psychiatric stabilization

## 2023-08-17 PROCEDURE — 124N000002 HC R&B MH UMMC

## 2023-08-17 PROCEDURE — 99233 SBSQ HOSP IP/OBS HIGH 50: CPT | Performed by: PSYCHIATRY & NEUROLOGY

## 2023-08-17 PROCEDURE — 250N000013 HC RX MED GY IP 250 OP 250 PS 637: Performed by: STUDENT IN AN ORGANIZED HEALTH CARE EDUCATION/TRAINING PROGRAM

## 2023-08-17 PROCEDURE — 250N000013 HC RX MED GY IP 250 OP 250 PS 637: Performed by: PSYCHIATRY & NEUROLOGY

## 2023-08-17 PROCEDURE — 99222 1ST HOSP IP/OBS MODERATE 55: CPT | Performed by: PHYSICIAN ASSISTANT

## 2023-08-17 PROCEDURE — 250N000013 HC RX MED GY IP 250 OP 250 PS 637: Performed by: PHYSICIAN ASSISTANT

## 2023-08-17 RX ORDER — FLUOXETINE 10 MG/1
10 CAPSULE ORAL DAILY
Status: DISCONTINUED | OUTPATIENT
Start: 2023-08-18 | End: 2023-08-25

## 2023-08-17 RX ORDER — CLOTRIMAZOLE 1 %
CREAM (GRAM) TOPICAL 2 TIMES DAILY
Status: DISCONTINUED | OUTPATIENT
Start: 2023-08-17 | End: 2023-08-25

## 2023-08-17 RX ADMIN — ATROPINE SULFATE 1 DROP: 10 SOLUTION/ DROPS OPHTHALMIC at 18:11

## 2023-08-17 RX ADMIN — OLANZAPINE 15 MG: 10 TABLET, ORALLY DISINTEGRATING ORAL at 08:53

## 2023-08-17 RX ADMIN — ATROPINE SULFATE 2 DROP: 10 SOLUTION/ DROPS OPHTHALMIC at 09:02

## 2023-08-17 RX ADMIN — CLOZAPINE 650 MG: 100 TABLET, ORALLY DISINTEGRATING ORAL at 13:01

## 2023-08-17 RX ADMIN — NAPROXEN 250 MG: 250 TABLET ORAL at 17:45

## 2023-08-17 RX ADMIN — LORAZEPAM 0.5 MG: 0.5 TABLET ORAL at 17:45

## 2023-08-17 RX ADMIN — GABAPENTIN 100 MG: 100 CAPSULE ORAL at 02:27

## 2023-08-17 RX ADMIN — TRAZODONE HYDROCHLORIDE 50 MG: 50 TABLET ORAL at 23:39

## 2023-08-17 RX ADMIN — CYANOCOBALAMIN TAB 1000 MCG 1000 MCG: 1000 TAB at 08:53

## 2023-08-17 RX ADMIN — GABAPENTIN 300 MG: 300 CAPSULE ORAL at 08:53

## 2023-08-17 RX ADMIN — Medication 10 MG: at 20:43

## 2023-08-17 RX ADMIN — ACETAMINOPHEN 650 MG: 325 TABLET, FILM COATED ORAL at 23:39

## 2023-08-17 RX ADMIN — LORAZEPAM 0.5 MG: 0.5 TABLET ORAL at 08:52

## 2023-08-17 RX ADMIN — SENNOSIDES 2 TABLET: 8.6 TABLET ORAL at 20:43

## 2023-08-17 RX ADMIN — NAPROXEN 250 MG: 250 TABLET ORAL at 08:52

## 2023-08-17 RX ADMIN — GABAPENTIN 300 MG: 300 CAPSULE ORAL at 14:30

## 2023-08-17 RX ADMIN — CLOTRIMAZOLE: 1 CREAM TOPICAL at 20:43

## 2023-08-17 RX ADMIN — TRAZODONE HYDROCHLORIDE 50 MG: 50 TABLET ORAL at 02:27

## 2023-08-17 RX ADMIN — DIVALPROEX SODIUM 250 MG: 250 TABLET, DELAYED RELEASE ORAL at 08:53

## 2023-08-17 RX ADMIN — ATROPINE SULFATE 2 DROP: 10 SOLUTION/ DROPS OPHTHALMIC at 14:30

## 2023-08-17 RX ADMIN — ATROPINE SULFATE 2 DROP: 10 SOLUTION/ DROPS OPHTHALMIC at 20:52

## 2023-08-17 RX ADMIN — OLANZAPINE 15 MG: 10 TABLET, ORALLY DISINTEGRATING ORAL at 20:43

## 2023-08-17 RX ADMIN — SENNOSIDES 2 TABLET: 8.6 TABLET ORAL at 08:53

## 2023-08-17 RX ADMIN — TAMSULOSIN HYDROCHLORIDE 0.4 MG: 0.4 CAPSULE ORAL at 08:53

## 2023-08-17 RX ADMIN — GABAPENTIN 300 MG: 300 CAPSULE ORAL at 17:45

## 2023-08-17 RX ADMIN — LORAZEPAM 0.5 MG: 0.5 TABLET ORAL at 14:29

## 2023-08-17 ASSESSMENT — ACTIVITIES OF DAILY LIVING (ADL)
ADLS_ACUITY_SCORE: 64
DRESS: INDEPENDENT
ADLS_ACUITY_SCORE: 64
HYGIENE/GROOMING: INDEPENDENT
ADLS_ACUITY_SCORE: 64
ADLS_ACUITY_SCORE: 64
ORAL_HYGIENE: INDEPENDENT
LAUNDRY: UNABLE TO COMPLETE
ADLS_ACUITY_SCORE: 64

## 2023-08-17 NOTE — CONSULTS
[facility]  Consult Note - Hospitalist Service  Date of Admission:  5/26/2023  Consult Requested by: Ingrid Rhodes MD  Reason for Consult: groin itchiness and redness    Assessment & Plan   Vinod Lee is a 63 year old male admitted on 5/26/2023 to inpatient psychiatry. a past medical history significant for schizophrenia and parkinson's disease who was initially admitted to the hospital 5/18/23 for altered mental status and aggression.      Tinea cruris  Patient states that he has had itchiness to his groin for about a month.  He states he has had this occur before.  On exam, well demarcated erythematous region is present to the left groin, with perimeter marked by small amount of scale.  Patient denies pain or discharge from the area.   -Topical clotrimazole 1% twice daily x3 weeks.    Medicine will sign off at this time.  Please inform us if symptoms worsen.  Please contact or consult us with any new medical questions or concerns.   The patient's care was discussed with the Bedside Nurse and Patient.    Clinically Significant Risk Factors                                    Ernestine Jimenez PA-C  Hospitalist Service  Securely message with [employer] (more info)  Text page via [facility] Paging/Directory   ______________________________________________________________________    Chief Complaint   Groin rash    History is obtained from the patient    History of Present Illness   Vinod Lee is a 63 year old male who is seen on the psychiatric unit for medical evaluation. Patient states that he has had itchiness to his groin for about a month.  He states he has had this occur before. Patient denies pain or discharge from the area.  Previously treated appropriately with a topical treatment.      Past Medical History    Past Medical History:   Diagnosis Date    LBBB (left bundle branch block)     Parkinson's disease (H)     Schizophrenia (H)        Past Surgical History   No past  surgical history on file.    Medications   Medications Prior to Admission   Medication Sig Dispense Refill Last Dose    cyanocobalamin (VITAMIN B-12) 1000 MCG tablet Take 1,000 mcg by mouth daily   Unknown    LORazepam (ATIVAN) 1 MG tablet Take 1 mg by mouth 3 times daily   Unknown    nitroFURantoin macrocrystal-monohydrate (MACROBID) 100 MG capsule Take 1 capsule (100 mg) by mouth every 12 hours   Unknown    OLANZapine (ZYPREXA) 7.5 MG tablet Take 1 tablet (7.5 mg) by mouth 2 times daily   Unknown    OLANZapine zydis (ZYPREXA) 5 MG ODT Take 1 tablet (5 mg) by mouth 3 times daily as needed   Unknown    polyethylene glycol (MIRALAX) 17 g packet Take 17 g by mouth daily   Unknown    senna-docusate (SENOKOT-S/PERICOLACE) 8.6-50 MG tablet Take 1 tablet by mouth 2 times daily as needed for constipation   Unknown    sertraline (ZOLOFT) 50 MG tablet Take 3 tablets (150 mg) by mouth daily   Unknown    tamsulosin (FLOMAX) 0.4 MG capsule Take 1 capsule (0.4 mg) by mouth daily   Unknown          Review of Systems    Review of systems was not needed on this patient     Physical Exam   Vital Signs: Temp: 97.1  F (36.2  C) Temp src: Temporal BP: 115/77 Pulse: 83     SpO2: 98 % O2 Device: None (Room air)    Weight: 188 lbs 14.4 oz    Focused physical exam:  Skin: well demarcated erythematous region is present to the left groin, with perimeter marked by small amount of scale      Medical Decision Making       35 MINUTES SPENT BY ME on the date of service doing chart review, history, exam, documentation & further activities per the note.      Data

## 2023-08-17 NOTE — PLAN OF CARE
Assessment/Intervention/Current Symtoms and Care Coordination:  Reviewed chart. Met with team to review patient's current functioning, needs, progress, and impediments to discharge.  Talked to Jack and Central Preadmission. He needed information about ECT schedule. He was referred to [facility], they do not have a contract with a local hospital for ECT so cannot accommodate it. Formerly [facility] and [facility] both can accommodate once weekly maintenance ECT but have no beds open at this time. Central Preadmission will call back on 8/31 to get an update on the status of ECT and either refer to an ECT compatible [facility] or refer to [facility] if ECT is no longer recommended. If, at any time between now and then, ECT is no longer recommended, I will call them at [phone].       Discharge Plan or Goal:  Patient continues to present with persecutory delusions, auditory hallucinations, and intermittent agitation. When symptoms have reduced he is has been referred to an assisted living facility and forms sent to central preadmission for state-run facilities.      Barriers to Discharge:  Patient requires further psychiatric stabilization due to current symptomology. He continues to present as disorganized and tangential with paranoid thought content. He presents with mood lability. He vacillates between being pleasant and being aggressive with no clear environmental triggers. Patient endorses auditory hallucinations.     Referral Status:  Referral for housing pending psychiatric stabilization  Considerations include: [facility] in [place], Referral sent to [facility] in [place] 8/15     Legal Status:  Commitment with [facility]: [place]  File Number: [id number]  Issued 07/06/23 and is not to exceed six months    Contacts:  : Vicky Valdez with [facility], [phone]  Psychiatrist: Dr. Santana at [facility], [phone] PCP: Attila  DO Romel  Mom: Alberta, [phone] (H) [phone] (M)   Dad: Ino, [phone] (H)  Sister, Sandra Treadwell, [phone] (KING)   [facility]'s Office Fax: [phone]  [place] [facility]: [phone] (KING)  CADI  with [facility]: Regina Wong, [phone]    Upcoming Meetings and Dates/Important Information and next steps:  Central preadmission calling back 8/31 for an update on ECT status.
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
## 2023-08-17 NOTE — PLAN OF CARE
Problem: Suicidal Behavior  Goal: Suicidal Behavior is Absent or Managed  Outcome: Progressing  Note: Patient manifests no suicidal behavior     Problem: Behavioral Disturbance  Goal: Behavioral Disturbance  Description: Signs and symptoms of listed problems will be absent or manageable by discharge or transition of care.  Outcome: Progressing  Note: Patient sleeping at this time, thus far manifests no behavioral disturbance   Goal Outcome Evaluation:       Patient slept optimally this night (6.75 hours), up at about 0225, defecated in the toilet, stool was mal odorous, staff had to spray the room and bathroom with a deodorizer patient was cooperative with redirection, received prn trazodone 50 mg and gabapentin 100 mg for sleep and anxiety respectfully with good effect. Patient was up at 0615 requested for toothpaste and brush for oral hygiene. Patient vacillated between his room and the nurses station for a while before finally retreated to his room. Patient manifests no behavioral dyscontrol.            García Up DNP, RN   Magdiel Bonilla is a 37 y/o indiv with colonic UC, currently on Humira and Balsalazide (6 tab), here for eval of her condition. . Pt is referred by Marc Romo. . She was dxd with UC in Summer 2018.  She did very well until she started on sulfasalzine, which worked very well for her; she had a lot of gastric/vaginal discomfort.  This has been complicated by EN and pyoderma gangrenosum.  She started on Humira on 9/2021, which has resolved her PG and EN.   . Today on 12/5/2022, pt reports that she did well after colonoscopy with dilation on 11/2022.  Had taken clindamycin prior to the scope.  Overall, doing very well. She has adopted a child.  She has seen a scant amount of bleeding. . 11/4/2022 - A moderate stenosis measuring 3 cm (in length) x 1 cm (inner diameter) was found in the sigmoid colon and was non- traversed.  A TTS dilator was passed through the scope.  Dilation with a 6-7-8 mm, an 8-9-10 mm and a 10-11-12 mm  colonic balloon dilator was performed.  The dilation site was examined following endoscope reinsertion and showed  moderate mucosal disruption.  Findings: - Diffuse inflammation was found in the rectum and in the recto-sigmoid colon. . 3/2022 (Dr. Hare): Colon, Recto-Sigmoid, Biopsy: PATCHY CHRONIC ACTIVE COLITIS, CONSISTENT WITH PARTIALLY TREATED ULCERATIVE COLITIS. Negative for Infectious Organisms, Dysplasia or Malignancy. .
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
## 2023-08-17 NOTE — PLAN OF CARE
"  Problem: Psychotic Symptoms  Goal: Psychotic Symptoms  Description: Signs and symptoms of listed problems will be absent or manageable.  Outcome: Progressing   Goal Outcome Evaluation:    Plan of Care Reviewed With: patient, caregiver          Pt continues on 1:1 for impulsivity and assault risk.   Pt spent all evening in his room either resting I bed or lighting to music.  Pt was compliant with meds and cares. Took meds whole in pudding.   Treatment cream and ointment applied to right foot/ great toe.  No open areas noted. Skin dry but intact.     Pt ate dinner in room, appetite 100%.   Writer assisted with changing pt's bed linens. \" Thank you my bed is new now\"  Pt's precautions remain SI/SIB, fall, assault and elopement.   Safety maintained. No safety issues this shift.   "

## 2023-08-18 ENCOUNTER — APPOINTMENT (OUTPATIENT)
Dept: BEHAVIORAL HEALTH | Facility: CLINIC | Age: 63
DRG: 885 | End: 2023-08-18
Attending: PSYCHIATRY & NEUROLOGY
Payer: COMMERCIAL

## 2023-08-18 ENCOUNTER — ANESTHESIA (OUTPATIENT)
Dept: BEHAVIORAL HEALTH | Facility: CLINIC | Age: 63
DRG: 885 | End: 2023-08-18
Payer: COMMERCIAL

## 2023-08-18 ENCOUNTER — ANESTHESIA EVENT (OUTPATIENT)
Dept: BEHAVIORAL HEALTH | Facility: CLINIC | Age: 63
DRG: 885 | End: 2023-08-18
Payer: COMMERCIAL

## 2023-08-18 LAB
BASOPHILS # BLD AUTO: 0.1 10E3/UL (ref 0–0.2)
BASOPHILS NFR BLD AUTO: 1 %
EOSINOPHIL # BLD AUTO: 0 10E3/UL (ref 0–0.7)
EOSINOPHIL NFR BLD AUTO: 0 %
ERYTHROCYTE [DISTWIDTH] IN BLOOD BY AUTOMATED COUNT: 14.2 % (ref 10–15)
HCT VFR BLD AUTO: 36.8 % (ref 40–53)
HGB BLD-MCNC: 11.8 G/DL (ref 13.3–17.7)
IMM GRANULOCYTES # BLD: 0.1 10E3/UL
IMM GRANULOCYTES NFR BLD: 1 %
LYMPHOCYTES # BLD AUTO: 1.6 10E3/UL (ref 0.8–5.3)
LYMPHOCYTES NFR BLD AUTO: 14 %
MCH RBC QN AUTO: 28.1 PG (ref 26.5–33)
MCHC RBC AUTO-ENTMCNC: 32.1 G/DL (ref 31.5–36.5)
MCV RBC AUTO: 88 FL (ref 78–100)
MONOCYTES # BLD AUTO: 0.8 10E3/UL (ref 0–1.3)
MONOCYTES NFR BLD AUTO: 8 %
NEUTROPHILS # BLD AUTO: 8.3 10E3/UL (ref 1.6–8.3)
NEUTROPHILS NFR BLD AUTO: 76 %
NRBC # BLD AUTO: 0 10E3/UL
NRBC BLD AUTO-RTO: 0 /100
PLATELET # BLD AUTO: 177 10E3/UL (ref 150–450)
RBC # BLD AUTO: 4.2 10E6/UL (ref 4.4–5.9)
WBC # BLD AUTO: 10.8 10E3/UL (ref 4–11)

## 2023-08-18 PROCEDURE — 250N000011 HC RX IP 250 OP 636: Performed by: ANESTHESIOLOGY

## 2023-08-18 PROCEDURE — 250N000013 HC RX MED GY IP 250 OP 250 PS 637: Performed by: PHYSICIAN ASSISTANT

## 2023-08-18 PROCEDURE — 250N000013 HC RX MED GY IP 250 OP 250 PS 637: Performed by: PSYCHIATRY & NEUROLOGY

## 2023-08-18 PROCEDURE — 250N000013 HC RX MED GY IP 250 OP 250 PS 637: Performed by: STUDENT IN AN ORGANIZED HEALTH CARE EDUCATION/TRAINING PROGRAM

## 2023-08-18 PROCEDURE — 370N000017 HC ANESTHESIA TECHNICAL FEE, PER MIN: Performed by: ANESTHESIOLOGY

## 2023-08-18 PROCEDURE — 90870 ELECTROCONVULSIVE THERAPY: CPT | Performed by: PSYCHIATRY & NEUROLOGY

## 2023-08-18 PROCEDURE — 250N000009 HC RX 250: Performed by: PSYCHIATRY & NEUROLOGY

## 2023-08-18 PROCEDURE — 85041 AUTOMATED RBC COUNT: CPT | Performed by: PSYCHIATRY & NEUROLOGY

## 2023-08-18 PROCEDURE — 36415 COLL VENOUS BLD VENIPUNCTURE: CPT | Performed by: PSYCHIATRY & NEUROLOGY

## 2023-08-18 PROCEDURE — 85014 HEMATOCRIT: CPT | Performed by: PSYCHIATRY & NEUROLOGY

## 2023-08-18 PROCEDURE — 99233 SBSQ HOSP IP/OBS HIGH 50: CPT | Mod: 25 | Performed by: PSYCHIATRY & NEUROLOGY

## 2023-08-18 PROCEDURE — 124N000002 HC R&B MH UMMC

## 2023-08-18 PROCEDURE — 250N000009 HC RX 250: Performed by: ANESTHESIOLOGY

## 2023-08-18 RX ORDER — CAFFEINE AND SODIUM BENZOATE 125 MG/ML
250 INJECTION, SOLUTION INTRAMUSCULAR; INTRAVENOUS ONCE
Status: DISCONTINUED | OUTPATIENT
Start: 2023-08-18 | End: 2023-08-18

## 2023-08-18 RX ORDER — REMIFENTANIL HYDROCHLORIDE 1 MG/ML
85 INJECTION, POWDER, LYOPHILIZED, FOR SOLUTION INTRAVENOUS
Status: CANCELLED
Start: 2023-08-18

## 2023-08-18 RX ORDER — NICARDIPINE HCL-0.9% SOD CHLOR 1 MG/10 ML
SYRINGE (ML) INTRAVENOUS PRN
Status: DISCONTINUED | OUTPATIENT
Start: 2023-08-18 | End: 2023-08-18

## 2023-08-18 RX ORDER — ETOMIDATE 2 MG/ML
INJECTION INTRAVENOUS PRN
Status: DISCONTINUED | OUTPATIENT
Start: 2023-08-18 | End: 2023-08-18

## 2023-08-18 RX ORDER — FLUMAZENIL 0.1 MG/ML
0.2 INJECTION, SOLUTION INTRAVENOUS ONCE
Status: DISCONTINUED | OUTPATIENT
Start: 2023-08-18 | End: 2023-08-18

## 2023-08-18 RX ADMIN — GABAPENTIN 300 MG: 300 CAPSULE ORAL at 20:38

## 2023-08-18 RX ADMIN — SENNOSIDES 2 TABLET: 8.6 TABLET ORAL at 20:38

## 2023-08-18 RX ADMIN — CLOZAPINE 650 MG: 100 TABLET, ORALLY DISINTEGRATING ORAL at 15:01

## 2023-08-18 RX ADMIN — TRIAMCINOLONE ACETONIDE: 0.25 CREAM TOPICAL at 08:49

## 2023-08-18 RX ADMIN — Medication 10 MG: at 20:38

## 2023-08-18 RX ADMIN — ETOMIDATE 14 MG: 2 INJECTION INTRAVENOUS at 13:42

## 2023-08-18 RX ADMIN — OLANZAPINE 15 MG: 10 TABLET, ORALLY DISINTEGRATING ORAL at 08:43

## 2023-08-18 RX ADMIN — CLOTRIMAZOLE: 1 CREAM TOPICAL at 20:41

## 2023-08-18 RX ADMIN — CLOTRIMAZOLE: 1 CREAM TOPICAL at 08:53

## 2023-08-18 RX ADMIN — NAPROXEN 250 MG: 250 TABLET ORAL at 08:43

## 2023-08-18 RX ADMIN — TRAZODONE HYDROCHLORIDE 50 MG: 50 TABLET ORAL at 23:57

## 2023-08-18 RX ADMIN — WHITE PETROLATUM: 1.75 OINTMENT TOPICAL at 17:48

## 2023-08-18 RX ADMIN — FLUOXETINE 10 MG: 10 CAPSULE ORAL at 08:43

## 2023-08-18 RX ADMIN — Medication 1000 MCG: at 13:42

## 2023-08-18 RX ADMIN — NAPROXEN 250 MG: 250 TABLET ORAL at 17:49

## 2023-08-18 RX ADMIN — WHITE PETROLATUM: 1.75 OINTMENT TOPICAL at 08:44

## 2023-08-18 RX ADMIN — OLANZAPINE 15 MG: 10 TABLET, ORALLY DISINTEGRATING ORAL at 20:38

## 2023-08-18 RX ADMIN — LORAZEPAM 0.5 MG: 0.5 TABLET ORAL at 15:01

## 2023-08-18 RX ADMIN — CAFFEINE AND SODIUM BENZOATE 250 MG: 125 INJECTION, SOLUTION INTRAMUSCULAR; INTRAVENOUS at 13:35

## 2023-08-18 RX ADMIN — POLYETHYLENE GLYCOL 3350 17 G: 17 POWDER, FOR SOLUTION ORAL at 20:39

## 2023-08-18 RX ADMIN — GABAPENTIN 300 MG: 300 CAPSULE ORAL at 15:02

## 2023-08-18 RX ADMIN — ATROPINE SULFATE 2 DROP: 10 SOLUTION/ DROPS OPHTHALMIC at 08:44

## 2023-08-18 RX ADMIN — CYANOCOBALAMIN TAB 1000 MCG 1000 MCG: 1000 TAB at 08:43

## 2023-08-18 RX ADMIN — ATROPINE SULFATE 2 DROP: 10 SOLUTION/ DROPS OPHTHALMIC at 15:02

## 2023-08-18 RX ADMIN — ACETAMINOPHEN 650 MG: 325 TABLET, FILM COATED ORAL at 23:57

## 2023-08-18 RX ADMIN — TAMSULOSIN HYDROCHLORIDE 0.4 MG: 0.4 CAPSULE ORAL at 08:44

## 2023-08-18 RX ADMIN — SUCCINYLCHOLINE CHLORIDE 80 MG: 20 INJECTION, SOLUTION INTRAMUSCULAR; INTRAVENOUS; PARENTERAL at 13:42

## 2023-08-18 RX ADMIN — SENNOSIDES 2 TABLET: 8.6 TABLET ORAL at 08:43

## 2023-08-18 RX ADMIN — ATROPINE SULFATE 2 DROP: 10 SOLUTION/ DROPS OPHTHALMIC at 20:41

## 2023-08-18 RX ADMIN — POLYETHYLENE GLYCOL 3350 17 G: 17 POWDER, FOR SOLUTION ORAL at 08:43

## 2023-08-18 RX ADMIN — FLUMAZENIL 0.2 MG: 0.1 INJECTION, SOLUTION INTRAVENOUS at 13:35

## 2023-08-18 RX ADMIN — LORAZEPAM 0.5 MG: 0.5 TABLET ORAL at 20:38

## 2023-08-18 ASSESSMENT — ACTIVITIES OF DAILY LIVING (ADL)
ADLS_ACUITY_SCORE: 64
DRESS: SCRUBS (BEHAVIORAL HEALTH);INDEPENDENT
LAUNDRY: UNABLE TO COMPLETE
ADLS_ACUITY_SCORE: 64
HYGIENE/GROOMING: INDEPENDENT
ADLS_ACUITY_SCORE: 64
ORAL_HYGIENE: INDEPENDENT
ORAL_HYGIENE: INDEPENDENT
ADLS_ACUITY_SCORE: 64
ADLS_ACUITY_SCORE: 64
HYGIENE/GROOMING: INDEPENDENT
ADLS_ACUITY_SCORE: 64
DRESS: SCRUBS (BEHAVIORAL HEALTH);INDEPENDENT
ADLS_ACUITY_SCORE: 64
ADLS_ACUITY_SCORE: 64

## 2023-08-18 ASSESSMENT — ENCOUNTER SYMPTOMS: DYSRHYTHMIAS: 1

## 2023-08-18 NOTE — ANESTHESIA POSTPROCEDURE EVALUATION
Patient: Vinod Lee    Procedure: * No procedures listed *       Anesthesia Type:  General    Note:  Disposition: Inpatient   Postop Pain Control: Uneventful            Sign Out: Well controlled pain   PONV: No   Neuro/Psych: Uneventful            Sign Out: Acceptable/Baseline neuro status   Airway/Respiratory: Uneventful            Sign Out: Acceptable/Baseline resp. status   CV/Hemodynamics: Uneventful            Sign Out: Acceptable CV status; No obvious hypovolemia; No obvious fluid overload   Other NRE: NONE   DID A NON-ROUTINE EVENT OCCUR? No           Last vitals:  Vitals:    08/18/23 1354 08/18/23 1404 08/18/23 1419   BP: (!) 154/86 (!) 145/84 130/78   Pulse: 105 99 93   Resp: 26 19 18   Temp: 36.7  C (98.1  F) 36.7  C (98.1  F) 36.8  C (98.2  F)   SpO2: 96% 97% 98%       Electronically Signed By: Akira Fowler MD  August 18, 2023  2:34 PM

## 2023-08-18 NOTE — PLAN OF CARE
"Goal Outcome Evaluation:    Plan of Care Reviewed With: patient              Pt mostly isolative in his room, came for assessment and went back into his room. Pt denies all mental health psych symptoms during the morning assessment. During noon assessment prior to ECT, pt called this writer \"[place]\" and pt stated \"the voices asked me to say that\" and redirected and pt stated he will not listen to those voices again. Pt NPO since yesterday for ECT today. Pt presented with flat affect and cooperative with meds and redirections. Pt returned from ECT and alert and oriented x3, calm and cooperative, answering questions appropriately. Complied with medications and speech moderate in tone. Ate 100% of his lunch and no BM issue per pt. Even breathing pattern and vital sign stable. /78 (BP Location: Right arm, Patient Position: Semi-Carlos's, Cuff Size: Adult Regular)   Pulse 93   Temp 98.2  F (36.8  C) (Temporal)   Resp 18   Wt 85.7 kg (188 lb 14.4 oz)   SpO2 98%   BMI 27.90 kg/m                "

## 2023-08-18 NOTE — ANESTHESIA CARE TRANSFER NOTE
Patient: Vinod Lee    Procedure: * No procedures listed *       Diagnosis: * No pre-op diagnosis entered *  Diagnosis Additional Information: No value filed.    Anesthesia Type:   General     Note:    Oropharynx: oropharynx clear of all foreign objects  Level of Consciousness: drowsy      Independent Airway: airway patency satisfactory and stable  Dentition: dentition changed  Vital Signs Stable: post-procedure vital signs reviewed and stable  Report to RN Given: handoff report given  Patient transferred to: Phase II    Handoff Report: Identifed the Patient, Identified the Reponsible Provider, Reviewed the pertinent medical history, Discussed the surgical course, Reviewed Intra-OP anesthesia mangement and issues during anesthesia, Set expectations for post-procedure period and Allowed opportunity for questions and acknowledgement of understanding      Vitals:  Vitals Value Taken Time   BP     Temp     Pulse     Resp     SpO2         Electronically Signed By: Akira Fowelr MD  August 18, 2023  1:53 PM

## 2023-08-18 NOTE — PLAN OF CARE
Problem: Suicidal Behavior  Goal: Suicidal Behavior is Absent or Managed  Outcome: Progressing  Note: Patient manifests no suicidal behavior     Problem: Behavioral Disturbance  Goal: Behavioral Disturbance  Description: Signs and symptoms of listed problems will be absent or manageable by discharge or transition of care.  Outcome: Progressing  Note: Patient manifests no behavioral disturbance   Goal Outcome Evaluation:       Patient was up at the start of the shift, offered prn Trazodone and Tylenol before midnight, had been NPO since midnight for ECT. Patient is being appropriately directed by the [facility] staff assigned to him. Though patient appears confused and sometimes indulges in purposeless wandering, he demonstrates no aggressive or assaultive behavior. Patient reports no side effect from his medication regimen, endorses no SI/HI, A/VH or SIB. Overall, achieved 5.75 hours of sleep. Will continue to monitor and follow plan of care.           García Up DNP, RN

## 2023-08-18 NOTE — PROGRESS NOTES
Patient adequate for discharge back to unit. Report called to unit nurse Kris. IV removed and hemostasis achieved less than 2 min.  VSS. Patient safely transported back to unit with  staff persons & security.

## 2023-08-18 NOTE — ANESTHESIA PREPROCEDURE EVALUATION
Anesthesia Pre-Procedure Evaluation    Patient: Vinod Lee   MRN: [id number] : 1960        Procedure :           Past Medical History:   Diagnosis Date    LBBB (left bundle branch block)     Parkinson's disease (H)     Schizophrenia (H)       No past surgical history on file.   Allergies   Allergen Reactions    Bee Venom Unknown    Montelukast Unknown    Phenothiazines Unknown      Social History     Tobacco Use    Smoking status: Not on file    Smokeless tobacco: Never   Substance Use Topics    Alcohol use: Not on file     Comment: GRACE      Wt Readings from Last 1 Encounters:   23 85.7 kg (188 lb 14.4 oz)        Anesthesia Evaluation   Pt has had prior anesthetic.         ROS/MED HX  ENT/Pulmonary:     (+) sleep apnea,                                      Neurologic:     (+)                 Parkinson's disease, features: parkinsonism, from neuroleptics,              Cardiovascular: Comment: 23 QTc 514    (+)  hypertension- -   -  - -                        dysrhythmias, Long QT,        Previous cardiac testing   Echo: Date: Results:    Stress Test:  Date: Results:    ECG Reviewed:  Date: 23 Results:  LBBB QT intervial is 498  Cath:  Date: Results:      METS/Exercise Tolerance:     Hematologic:     (+)      anemia,          Musculoskeletal:  - neg musculoskeletal ROS     GI/Hepatic:  - neg GI/hepatic ROS     Renal/Genitourinary:     (+)        BPH,      Endo:  - neg endo ROS     Psychiatric/Substance Use: Comment: Patient is aggressive and homicidal    (+) psychiatric history schizophrenia       Infectious Disease:  - neg infectious disease ROS     Malignancy:  - neg malignancy ROS     Other:  - neg other ROS          Physical Exam    Airway  airway exam normal           Respiratory Devices and Support         Dental           Cardiovascular   cardiovascular exam normal          Pulmonary   pulmonary exam normal                OUTSIDE LABS:  CBC:   Lab Results   Component Value Date    WBC  10.8 08/18/2023    WBC 11.3 (H) 08/11/2023    HGB 11.8 (L) 08/18/2023    HGB 11.9 (L) 08/11/2023    HCT 36.8 (L) 08/18/2023    HCT 37.6 (L) 08/11/2023     08/18/2023     08/11/2023     BMP:   Lab Results   Component Value Date     08/04/2023     08/02/2023    POTASSIUM 4.9 08/04/2023    POTASSIUM 4.9 08/02/2023    CHLORIDE 106 08/04/2023    CHLORIDE 107 08/02/2023    CO2 24 08/04/2023    CO2 24 08/02/2023    BUN 37.8 (H) 08/04/2023    BUN 24.8 (H) 08/02/2023    CR 1.05 08/04/2023    CR 0.99 08/02/2023     (H) 08/04/2023    GLC 93 08/02/2023     COAGS: No results found for: PTT, INR, FIBR  POC: No results found for: BGM, HCG, HCGS  HEPATIC:   Lab Results   Component Value Date    ALBUMIN 3.6 07/13/2023    PROTTOTAL 5.6 (L) 07/13/2023    ALT 14 07/28/2023    AST 18 07/28/2023    ALKPHOS 73 07/13/2023    BILITOTAL 0.2 07/13/2023     OTHER:   Lab Results   Component Value Date    LACT 1.5 06/07/2023    BRENDA 8.3 (L) 08/04/2023    PHOS 3.8 05/25/2023    MAG 2.1 05/25/2023    TSH 1.80 05/22/2023       Anesthesia Plan    ASA Status:  3    NPO Status:  NPO Appropriate    Anesthesia Type: General.   Induction: Intravenous.   Maintenance: TIVA.        Consents    Anesthesia Plan(s) and associated risks, benefits, and realistic alternatives discussed. Questions answered and patient/representative(s) expressed understanding.     - Discussed: Risks, Benefits and Alternatives for BOTH SEDATION and the PROCEDURE were discussed     - Discussed with:  Patient      - Extended Intubation/Ventilatory Support Discussed: No.      - Patient is DNR/DNI Status: No     Use of blood products discussed: No .     Postoperative Care       PONV prophylaxis: Ondansetron (or other 5HT-3)     Comments:                Akira Fowler MD

## 2023-08-18 NOTE — PROGRESS NOTES
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 Sánchez and Lorenzo Pavon. Sánchez medications: clozapine, olanzapine, risperidone, quetiapine      Disposition Plan   Reason for ongoing admission: poses an imminent risk to self, poses an imminent risk to others and is unable to care for self due to severe psychosis or darryl  Discharge location:  assisted living facility  Discharge Medications: not ordered  Follow-up Appointments: not scheduled    Entered by: Ingrid Rhodes MD on 08/18/2023  at 3:24 PM
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
## 2023-08-19 PROCEDURE — 250N000013 HC RX MED GY IP 250 OP 250 PS 637: Performed by: PHYSICIAN ASSISTANT

## 2023-08-19 PROCEDURE — 250N000013 HC RX MED GY IP 250 OP 250 PS 637: Performed by: PSYCHIATRY & NEUROLOGY

## 2023-08-19 PROCEDURE — 250N000009 HC RX 250: Performed by: PSYCHIATRY & NEUROLOGY

## 2023-08-19 PROCEDURE — 124N000002 HC R&B MH UMMC

## 2023-08-19 PROCEDURE — 250N000013 HC RX MED GY IP 250 OP 250 PS 637: Performed by: STUDENT IN AN ORGANIZED HEALTH CARE EDUCATION/TRAINING PROGRAM

## 2023-08-19 RX ADMIN — LORAZEPAM 0.5 MG: 0.5 TABLET ORAL at 08:20

## 2023-08-19 RX ADMIN — SENNOSIDES 2 TABLET: 8.6 TABLET ORAL at 08:25

## 2023-08-19 RX ADMIN — OLANZAPINE 15 MG: 10 TABLET, ORALLY DISINTEGRATING ORAL at 20:19

## 2023-08-19 RX ADMIN — SENNOSIDES 2 TABLET: 8.6 TABLET ORAL at 20:18

## 2023-08-19 RX ADMIN — LORAZEPAM 0.5 MG: 0.5 TABLET ORAL at 14:20

## 2023-08-19 RX ADMIN — NAPROXEN 250 MG: 250 TABLET ORAL at 17:34

## 2023-08-19 RX ADMIN — Medication 10 MG: at 20:19

## 2023-08-19 RX ADMIN — ATROPINE SULFATE 2 DROP: 10 SOLUTION/ DROPS OPHTHALMIC at 20:31

## 2023-08-19 RX ADMIN — ATROPINE SULFATE 1 DROP: 10 SOLUTION/ DROPS OPHTHALMIC at 17:15

## 2023-08-19 RX ADMIN — LORAZEPAM 0.5 MG: 0.5 TABLET ORAL at 20:50

## 2023-08-19 RX ADMIN — CYANOCOBALAMIN TAB 1000 MCG 1000 MCG: 1000 TAB at 08:19

## 2023-08-19 RX ADMIN — NAPROXEN 250 MG: 250 TABLET ORAL at 08:24

## 2023-08-19 RX ADMIN — ATROPINE SULFATE 2 DROP: 10 SOLUTION/ DROPS OPHTHALMIC at 14:18

## 2023-08-19 RX ADMIN — OLANZAPINE 15 MG: 10 TABLET, ORALLY DISINTEGRATING ORAL at 08:24

## 2023-08-19 RX ADMIN — OLANZAPINE 5 MG: 5 TABLET, FILM COATED ORAL at 13:03

## 2023-08-19 RX ADMIN — CLOTRIMAZOLE: 1 CREAM TOPICAL at 09:34

## 2023-08-19 RX ADMIN — CLOTRIMAZOLE: 1 CREAM TOPICAL at 20:47

## 2023-08-19 RX ADMIN — TAMSULOSIN HYDROCHLORIDE 0.4 MG: 0.4 CAPSULE ORAL at 08:27

## 2023-08-19 RX ADMIN — GABAPENTIN 300 MG: 300 CAPSULE ORAL at 20:53

## 2023-08-19 RX ADMIN — GABAPENTIN 300 MG: 300 CAPSULE ORAL at 08:19

## 2023-08-19 RX ADMIN — ATROPINE SULFATE 2 DROP: 10 SOLUTION/ DROPS OPHTHALMIC at 09:08

## 2023-08-19 RX ADMIN — POLYETHYLENE GLYCOL 3350 17 G: 17 POWDER, FOR SOLUTION ORAL at 20:18

## 2023-08-19 RX ADMIN — GABAPENTIN 300 MG: 300 CAPSULE ORAL at 14:20

## 2023-08-19 RX ADMIN — CLOZAPINE 650 MG: 100 TABLET, ORALLY DISINTEGRATING ORAL at 12:50

## 2023-08-19 RX ADMIN — FLUOXETINE 10 MG: 10 CAPSULE ORAL at 08:19

## 2023-08-19 RX ADMIN — POLYETHYLENE GLYCOL 3350 17 G: 17 POWDER, FOR SOLUTION ORAL at 08:28

## 2023-08-19 ASSESSMENT — ACTIVITIES OF DAILY LIVING (ADL)
ADLS_ACUITY_SCORE: 64
ADLS_ACUITY_SCORE: 74
ADLS_ACUITY_SCORE: 64
HYGIENE/GROOMING: PROMPTS
ADLS_ACUITY_SCORE: 74
ADLS_ACUITY_SCORE: 64
HYGIENE/GROOMING: PROMPTS
ADLS_ACUITY_SCORE: 74
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
## 2023-08-19 NOTE — CARE PLAN
08/19/23 1253   Group Therapy Session   Group Attendance attended group session   Time Session Began 1115   Time Session Ended 1200   Total Time (minutes) 45   Total # Attendees 2   Group Type community;recreation;life skill   Group Topic Covered cognitive activities;balanced lifestyle;leisure exploration/use of leisure time;structured socialization   Group Session Detail OT Leisure Group: Group activities to exercise cognitive skills while exploring leisure and socializing opportunities   Patient Participation Detail Pt participated in a group activity exploring a variety of word games; was joined by 2 SIOs. Pt expressed that they enjoyed [employer] and remarked that the games played in group aren't as easy as a trivia game. Pt was typically quiet during their turns, with long pauses to contribute answers or responses to the case questions. Pt would occasionally express that they did not come up with any answers to earn points, with group members reassuring pt about pt's progress and pointing out pt's creative answers.

## 2023-08-19 NOTE — PLAN OF CARE
"  Problem: Psychotic Signs/Symptoms  Goal: Improved Behavioral Control (Psychotic Signs/Symptoms)  Outcome: Progressing  Flowsheets (Taken 8/19/2023 5528)  Mutually Determined Action Steps (Improved Behavioral Control): verbalizes gratifying activity   Milieu Participation:Behavior: Pt spent time in and out of room this shift. Social with staff and minimally social with peers. Disorganized in thought at times referring to voices in his head telling him to do various things throughout the shift. Pt stated voices told me to, \"pull the sheets off my bed, run down the aragon, the river outside the window does not exist, the whole thing is a delusion\". Pt also engaged in lively conversation with staff and writer. Reducing stimulation and allowing extra time for patient to process aids in redirection and regulation during periods of escalation. Pt was redirectable this shift, and appreciated reminders if forgetful when off topic during conversations. Pt recommended a complete linen change and assisted in making bed with staff. Medication compliant. Takes pills with pudding. Requires some assistance at times with hand tremors.    Safety: Continues and SIO 2:1 SI/Self harm: denies  Aggression/agitation/HI: denies, exhibited safe behavior  AVH: denies Affect: blunted Mood: calm    Physical Complaints/Issues: denies    PRN Med: Atropine oral 1 drop for mouth drulling  Medication AE: denies    I & O: eating and drinking well 80% of dinner  Sleep: denies concerns  LBM:  pt stated this afternoon. Denies concerns.  ADLs: independent  Visits/calls: none    Vitals:  Blood pressure 107/58, pulse 92, temperature 97.5  F (36.4  C), resp. rate 16, weight 85.7 kg (188 lb 14.4 oz), SpO2 100 %.   Denies pain                        "

## 2023-08-19 NOTE — PLAN OF CARE
Problem: Adult Behavioral Health Plan of Care  Goal: Adheres to Safety Considerations for Self and Others  Outcome: Progressing  Intervention: Develop and Maintain Individualized Safety Plan  Recent Flowsheet Documentation  Taken 8/18/2023 1819 by Amanda Prado RN  Safety Measures: (2:1 SIO)   clinical history reviewed   safety rounds completed   suicide check-in completed   other (see comments)   Goal Outcome Evaluation:    Plan of Care Reviewed With: patient             Patient had a few episodes of agitation but was redirectable, observed pacing the hallway a few times this shift. Patient presents with a flat affect but bright upon approach, mood is labile. Patient denied all mental health symptoms, denied pain or headache after ECT. Patient is eating and drinking adequately, denied any concerns with bowel and bladder. Medication compliant. No severe agitation, behavioral outbursts or acute safety concerns this shift. Staff will continue to follow the plan of care.
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
## 2023-08-20 PROCEDURE — 124N000002 HC R&B MH UMMC

## 2023-08-20 PROCEDURE — 250N000013 HC RX MED GY IP 250 OP 250 PS 637: Performed by: PHYSICIAN ASSISTANT

## 2023-08-20 PROCEDURE — 250N000013 HC RX MED GY IP 250 OP 250 PS 637: Performed by: STUDENT IN AN ORGANIZED HEALTH CARE EDUCATION/TRAINING PROGRAM

## 2023-08-20 PROCEDURE — 250N000013 HC RX MED GY IP 250 OP 250 PS 637: Performed by: PSYCHIATRY & NEUROLOGY

## 2023-08-20 RX ADMIN — ATROPINE SULFATE 2 DROP: 10 SOLUTION/ DROPS OPHTHALMIC at 14:06

## 2023-08-20 RX ADMIN — CLOTRIMAZOLE: 1 CREAM TOPICAL at 19:52

## 2023-08-20 RX ADMIN — CLOTRIMAZOLE: 1 CREAM TOPICAL at 09:10

## 2023-08-20 RX ADMIN — GABAPENTIN 300 MG: 300 CAPSULE ORAL at 17:59

## 2023-08-20 RX ADMIN — ATROPINE SULFATE 2 DROP: 10 SOLUTION/ DROPS OPHTHALMIC at 19:52

## 2023-08-20 RX ADMIN — LORAZEPAM 0.5 MG: 0.5 TABLET ORAL at 14:06

## 2023-08-20 RX ADMIN — SENNOSIDES 2 TABLET: 8.6 TABLET ORAL at 19:51

## 2023-08-20 RX ADMIN — NAPROXEN 250 MG: 250 TABLET ORAL at 08:06

## 2023-08-20 RX ADMIN — CYANOCOBALAMIN TAB 1000 MCG 1000 MCG: 1000 TAB at 08:06

## 2023-08-20 RX ADMIN — WHITE PETROLATUM: 1.75 OINTMENT TOPICAL at 17:58

## 2023-08-20 RX ADMIN — TAMSULOSIN HYDROCHLORIDE 0.4 MG: 0.4 CAPSULE ORAL at 09:10

## 2023-08-20 RX ADMIN — LORAZEPAM 0.5 MG: 0.5 TABLET ORAL at 08:06

## 2023-08-20 RX ADMIN — CLOZAPINE 650 MG: 100 TABLET, ORALLY DISINTEGRATING ORAL at 12:26

## 2023-08-20 RX ADMIN — ATROPINE SULFATE 2 DROP: 10 SOLUTION/ DROPS OPHTHALMIC at 08:21

## 2023-08-20 RX ADMIN — LORAZEPAM 0.5 MG: 0.5 TABLET ORAL at 17:59

## 2023-08-20 RX ADMIN — GABAPENTIN 300 MG: 300 CAPSULE ORAL at 08:06

## 2023-08-20 RX ADMIN — OLANZAPINE 15 MG: 10 TABLET, ORALLY DISINTEGRATING ORAL at 08:06

## 2023-08-20 RX ADMIN — ATROPINE SULFATE 1 DROP: 10 SOLUTION/ DROPS OPHTHALMIC at 17:19

## 2023-08-20 RX ADMIN — FLUOXETINE 10 MG: 10 CAPSULE ORAL at 08:06

## 2023-08-20 RX ADMIN — WHITE PETROLATUM: 1.75 OINTMENT TOPICAL at 08:07

## 2023-08-20 RX ADMIN — SENNOSIDES 2 TABLET: 8.6 TABLET ORAL at 08:06

## 2023-08-20 RX ADMIN — POLYETHYLENE GLYCOL 3350 17 G: 17 POWDER, FOR SOLUTION ORAL at 19:53

## 2023-08-20 RX ADMIN — POLYETHYLENE GLYCOL 3350 17 G: 17 POWDER, FOR SOLUTION ORAL at 08:06

## 2023-08-20 RX ADMIN — GABAPENTIN 300 MG: 300 CAPSULE ORAL at 14:06

## 2023-08-20 RX ADMIN — Medication 10 MG: at 19:52

## 2023-08-20 RX ADMIN — NAPROXEN 250 MG: 250 TABLET ORAL at 17:59

## 2023-08-20 RX ADMIN — OLANZAPINE 15 MG: 10 TABLET, ORALLY DISINTEGRATING ORAL at 19:51

## 2023-08-20 ASSESSMENT — ACTIVITIES OF DAILY LIVING (ADL)
DRESS: SCRUBS (BEHAVIORAL HEALTH)
ADLS_ACUITY_SCORE: 74
HYGIENE/GROOMING: PROMPTS
ADLS_ACUITY_SCORE: 74
HYGIENE/GROOMING: PROMPTS
ADLS_ACUITY_SCORE: 74
DRESS: SCRUBS (BEHAVIORAL HEALTH)
ADLS_ACUITY_SCORE: 74
LAUNDRY: UNABLE TO COMPLETE
ORAL_HYGIENE: INDEPENDENT
ORAL_HYGIENE: INDEPENDENT

## 2023-08-20 NOTE — PLAN OF CARE
"  Problem: Psychotic Signs/Symptoms  Goal: Enhanced Social, Occupational or Functional Skills (Psychotic Signs/Symptoms)  Intervention: Promote Social, Occupational and Functional Ability  Recent Flowsheet Documentation  Taken 8/20/2023 8005 by Irena Patterson RN  Trust Relationship/Rapport:   care explained   choices provided   Goal Outcome Evaluation:    Plan of Care Reviewed With: patient      Patient was calm on approach. He denied all mental health symptoms. In the middle of conversation, however, he told the writer that he has the urge to hurt people. He then asked the writer to leave his room. Patient was redirected. He walked and tried to run in the hallway for a couple of times. When the writer gave him water after he took his medications, he said, \"I know it's not water, it's something else.\"     Pt was drooling, PRN atropine given after a lot of prompting. PRN was effective. Scheduled medications given with the chocolate pudding. Patient was eating and drinking well. Patient still on SIO.     Patient is for ECT tomorrow. Instructed patient not drink or eat after midnight. Removed food and drinks from patient's room. NPO post midnight sign posted on the door.       "

## 2023-08-20 NOTE — PLAN OF CARE
Problem: Suicidal Behavior  Goal: Suicidal Behavior is Absent or Managed  Outcome: Progressing  Note: Patient manifests no suicide behavior     Problem: Suicide Risk  Goal: Absence of Self-Harm  Outcome: Progressing  Note: Patient inflicts no harm to self   Goal Outcome Evaluation:       Patient was up briefly at the start of the shift, walked up and down the aragon and then returned to his room. Patient appears confused but was redirectable. Patient reports no adverse effect from his medications, endorses no SI/HI, V/AH or SIB. All precautions in place. Patient achieved 6.75 hours of good quality sleep.Will continue to monitor and follow plan of care.           García Up DNP, RN

## 2023-08-20 NOTE — PLAN OF CARE
RN Assessment:    Pt presented with restricted affect. Pt appeared calm during assessment. Pt was alert; however pt did not answer questions pertaining to orientation. Pt denied having SI, HI, and thoughts of SIB. Pt endorsed having auditory hallucinations. Pt stated he was hearing a voice tell him what to do. Pt denied having physical pain. Pt had no new medical concerns. Pt slept well last night. When pt was asked about medication efficacy pt stated he did not know if he was feeling any benefits from the medications that are currently ordered. When pt was asked about medication side effects pt stated he was having medication side effects; however, pt could not elaborate on what side effects he was having. Pt was isolative and withdrawn this shift. Continue to monitor for safety and changes in medical condition.      Pt continues to have illogical and tangental speech. Pt continues to be paranoid. Pt stated he felt that the medications writer gave him were poison. Pt also stated he felt that the writer hated him. Writer advised pt that the medications were not poison and that the writer had no ill will towards pt. Pt was accepting of writer's message.    Pt showered this shift.    Pt continues on SIO.

## 2023-08-21 ENCOUNTER — APPOINTMENT (OUTPATIENT)
Dept: BEHAVIORAL HEALTH | Facility: CLINIC | Age: 63
DRG: 885 | End: 2023-08-21
Attending: PSYCHIATRY & NEUROLOGY
Payer: COMMERCIAL

## 2023-08-21 ENCOUNTER — ANESTHESIA EVENT (OUTPATIENT)
Dept: BEHAVIORAL HEALTH | Facility: CLINIC | Age: 63
DRG: 885 | End: 2023-08-21
Payer: COMMERCIAL

## 2023-08-21 ENCOUNTER — ANESTHESIA (OUTPATIENT)
Dept: BEHAVIORAL HEALTH | Facility: CLINIC | Age: 63
DRG: 885 | End: 2023-08-21
Payer: COMMERCIAL

## 2023-08-21 PROCEDURE — 250N000013 HC RX MED GY IP 250 OP 250 PS 637: Performed by: PSYCHIATRY & NEUROLOGY

## 2023-08-21 PROCEDURE — 250N000009 HC RX 250: Performed by: PSYCHIATRY & NEUROLOGY

## 2023-08-21 PROCEDURE — 99232 SBSQ HOSP IP/OBS MODERATE 35: CPT | Mod: 25 | Performed by: PSYCHIATRY & NEUROLOGY

## 2023-08-21 PROCEDURE — 90870 ELECTROCONVULSIVE THERAPY: CPT

## 2023-08-21 PROCEDURE — 250N000013 HC RX MED GY IP 250 OP 250 PS 637: Performed by: STUDENT IN AN ORGANIZED HEALTH CARE EDUCATION/TRAINING PROGRAM

## 2023-08-21 PROCEDURE — 370N000017 HC ANESTHESIA TECHNICAL FEE, PER MIN: Performed by: ANESTHESIOLOGY

## 2023-08-21 PROCEDURE — 250N000011 HC RX IP 250 OP 636: Performed by: ANESTHESIOLOGY

## 2023-08-21 PROCEDURE — 250N000009 HC RX 250: Performed by: ANESTHESIOLOGY

## 2023-08-21 PROCEDURE — 250N000013 HC RX MED GY IP 250 OP 250 PS 637: Performed by: PHYSICIAN ASSISTANT

## 2023-08-21 PROCEDURE — 124N000002 HC R&B MH UMMC

## 2023-08-21 RX ORDER — CAFFEINE AND SODIUM BENZOATE 125 MG/ML
250 INJECTION, SOLUTION INTRAMUSCULAR; INTRAVENOUS ONCE
Status: DISCONTINUED | OUTPATIENT
Start: 2023-08-21 | End: 2023-08-21

## 2023-08-21 RX ORDER — ETOMIDATE 2 MG/ML
INJECTION INTRAVENOUS PRN
Status: DISCONTINUED | OUTPATIENT
Start: 2023-08-21 | End: 2023-08-21

## 2023-08-21 RX ORDER — ACETAMINOPHEN 325 MG/1
975 TABLET ORAL
Status: DISCONTINUED | OUTPATIENT
Start: 2023-08-21 | End: 2023-08-21

## 2023-08-21 RX ORDER — NICARDIPINE HCL-0.9% SOD CHLOR 1 MG/10 ML
SYRINGE (ML) INTRAVENOUS PRN
Status: DISCONTINUED | OUTPATIENT
Start: 2023-08-21 | End: 2023-08-21

## 2023-08-21 RX ORDER — REMIFENTANIL HYDROCHLORIDE 1 MG/ML
85 INJECTION, POWDER, LYOPHILIZED, FOR SOLUTION INTRAVENOUS
Status: COMPLETED | OUTPATIENT
Start: 2023-08-21 | End: 2023-08-21

## 2023-08-21 RX ORDER — ONDANSETRON 4 MG/1
4 TABLET, ORALLY DISINTEGRATING ORAL EVERY 30 MIN PRN
Status: DISCONTINUED | OUTPATIENT
Start: 2023-08-21 | End: 2023-08-21

## 2023-08-21 RX ORDER — ONDANSETRON 2 MG/ML
4 INJECTION INTRAMUSCULAR; INTRAVENOUS EVERY 30 MIN PRN
Status: DISCONTINUED | OUTPATIENT
Start: 2023-08-21 | End: 2023-08-21

## 2023-08-21 RX ORDER — FLUMAZENIL 0.1 MG/ML
0.2 INJECTION, SOLUTION INTRAVENOUS ONCE
Status: COMPLETED | OUTPATIENT
Start: 2023-08-21 | End: 2023-08-21

## 2023-08-21 RX ADMIN — POLYETHYLENE GLYCOL 3350 17 G: 17 POWDER, FOR SOLUTION ORAL at 13:33

## 2023-08-21 RX ADMIN — FLUMAZENIL 0.2 MG: 0.1 INJECTION, SOLUTION INTRAVENOUS at 10:18

## 2023-08-21 RX ADMIN — WHITE PETROLATUM: 1.75 OINTMENT TOPICAL at 17:07

## 2023-08-21 RX ADMIN — POLYETHYLENE GLYCOL 3350 17 G: 17 POWDER, FOR SOLUTION ORAL at 19:37

## 2023-08-21 RX ADMIN — NAPROXEN 250 MG: 250 TABLET ORAL at 13:35

## 2023-08-21 RX ADMIN — Medication 200 MCG: at 10:31

## 2023-08-21 RX ADMIN — GABAPENTIN 300 MG: 300 CAPSULE ORAL at 19:37

## 2023-08-21 RX ADMIN — ATROPINE SULFATE 2 DROP: 10 SOLUTION/ DROPS OPHTHALMIC at 09:01

## 2023-08-21 RX ADMIN — OLANZAPINE 15 MG: 10 TABLET, ORALLY DISINTEGRATING ORAL at 09:09

## 2023-08-21 RX ADMIN — WHITE PETROLATUM: 1.75 OINTMENT TOPICAL at 13:37

## 2023-08-21 RX ADMIN — Medication 85 MCG: at 10:24

## 2023-08-21 RX ADMIN — OLANZAPINE 10 MG: 5 TABLET, FILM COATED ORAL at 14:52

## 2023-08-21 RX ADMIN — OLANZAPINE 15 MG: 10 TABLET, ORALLY DISINTEGRATING ORAL at 19:38

## 2023-08-21 RX ADMIN — LORAZEPAM 0.5 MG: 0.5 TABLET ORAL at 19:38

## 2023-08-21 RX ADMIN — Medication 800 MCG: at 10:24

## 2023-08-21 RX ADMIN — GABAPENTIN 300 MG: 300 CAPSULE ORAL at 13:34

## 2023-08-21 RX ADMIN — LORAZEPAM 0.5 MG: 0.5 TABLET ORAL at 13:34

## 2023-08-21 RX ADMIN — TRAZODONE HYDROCHLORIDE 50 MG: 50 TABLET ORAL at 00:25

## 2023-08-21 RX ADMIN — CLOZAPINE 650 MG: 100 TABLET, ORALLY DISINTEGRATING ORAL at 13:33

## 2023-08-21 RX ADMIN — TRAZODONE HYDROCHLORIDE 50 MG: 50 TABLET ORAL at 23:33

## 2023-08-21 RX ADMIN — CLOTRIMAZOLE: 1 CREAM TOPICAL at 19:37

## 2023-08-21 RX ADMIN — CAFFEINE AND SODIUM BENZOATE 250 MG: 125 INJECTION, SOLUTION INTRAMUSCULAR; INTRAVENOUS at 10:21

## 2023-08-21 RX ADMIN — ATROPINE SULFATE 2 DROP: 10 SOLUTION/ DROPS OPHTHALMIC at 19:37

## 2023-08-21 RX ADMIN — Medication 10 MG: at 19:38

## 2023-08-21 RX ADMIN — ACETAMINOPHEN 650 MG: 325 TABLET, FILM COATED ORAL at 23:33

## 2023-08-21 RX ADMIN — ETOMIDATE 12 MG: 2 INJECTION INTRAVENOUS at 10:24

## 2023-08-21 RX ADMIN — CYANOCOBALAMIN TAB 1000 MCG 1000 MCG: 1000 TAB at 09:10

## 2023-08-21 RX ADMIN — CLOTRIMAZOLE: 1 CREAM TOPICAL at 08:59

## 2023-08-21 RX ADMIN — ATROPINE SULFATE 2 DROP: 10 SOLUTION/ DROPS OPHTHALMIC at 13:54

## 2023-08-21 RX ADMIN — SENNOSIDES 2 TABLET: 8.6 TABLET ORAL at 13:34

## 2023-08-21 RX ADMIN — SUCCINYLCHOLINE CHLORIDE 80 MG: 20 INJECTION, SOLUTION INTRAMUSCULAR; INTRAVENOUS; PARENTERAL at 10:24

## 2023-08-21 RX ADMIN — NAPROXEN 250 MG: 250 TABLET ORAL at 17:06

## 2023-08-21 RX ADMIN — TAMSULOSIN HYDROCHLORIDE 0.4 MG: 0.4 CAPSULE ORAL at 13:34

## 2023-08-21 RX ADMIN — FLUOXETINE 10 MG: 10 CAPSULE ORAL at 13:34

## 2023-08-21 RX ADMIN — SENNOSIDES 2 TABLET: 8.6 TABLET ORAL at 19:38

## 2023-08-21 ASSESSMENT — ACTIVITIES OF DAILY LIVING (ADL)
ADLS_ACUITY_SCORE: 74
ORAL_HYGIENE: PROMPTS;INDEPENDENT
ADLS_ACUITY_SCORE: 74
LAUNDRY: UNABLE TO COMPLETE
ADLS_ACUITY_SCORE: 75
DRESS: SCRUBS (BEHAVIORAL HEALTH)
ADLS_ACUITY_SCORE: 75
ADLS_ACUITY_SCORE: 75
HYGIENE/GROOMING: PROMPTS
ORAL_HYGIENE: INDEPENDENT
ADLS_ACUITY_SCORE: 75
ADLS_ACUITY_SCORE: 74
ADLS_ACUITY_SCORE: 75
ADLS_ACUITY_SCORE: 75
ADLS_ACUITY_SCORE: 74
ADLS_ACUITY_SCORE: 74
ADLS_ACUITY_SCORE: 75
HYGIENE/GROOMING: PROMPTS;INDEPENDENT
DRESS: SCRUBS (BEHAVIORAL HEALTH)

## 2023-08-21 ASSESSMENT — ENCOUNTER SYMPTOMS: DYSRHYTHMIAS: 1

## 2023-08-21 NOTE — ANESTHESIA CARE TRANSFER NOTE
Patient: Vinod Lee    Procedure: * No procedures listed *       Diagnosis: * No pre-op diagnosis entered *  Diagnosis Additional Information: No value filed.    Anesthesia Type:   General     Note:    Oropharynx: oropharynx clear of all foreign objects  Level of Consciousness: drowsy  Oxygen Supplementation: room air    Independent Airway: airway patency satisfactory and stable  Dentition: dentition unchanged  Vital Signs Stable: post-procedure vital signs reviewed and stable  Report to RN Given: handoff report given  Patient transferred to: Phase II    Handoff Report: Identifed the Patient, Identified the Reponsible Provider, Reviewed the pertinent medical history, Discussed the surgical course, Reviewed Intra-OP anesthesia mangement and issues during anesthesia, Set expectations for post-procedure period and Allowed opportunity for questions and acknowledgement of understanding      Vitals:  Vitals Value Taken Time   BP     Temp     Pulse     Resp     SpO2         Electronically Signed By: TERRIE PADRON MD  August 21, 2023  10:36 AM

## 2023-08-21 NOTE — PROGRESS NOTES
"[facility], [place]   Psychiatric Progress Note  Hospital Day: 87        Interim History:   The patient's care was discussed with the treatment team during the daily team meeting and/or staff's chart notes were reviewed.     Day shift:  Patient was calm on approach. He denied all mental health symptoms. In the middle of conversation, however, he told the writer that he has the urge to hurt people. He then asked the writer to leave his room. Patient was redirected. He walked and tried to run in the hallway for a couple of times. When the writer gave him water after he took his medications, he said, \"I know it's not water, it's something else.\"   Pt was drooling, PRN atropine given after a lot of prompting. PRN was effective. Scheduled medications given with the chocolate pudding. Patient was eating and drinking well. Patient still on SIO.     Evening shift:  Patient slept reasonably well this night after receiving prn Trazodone at the start of the shift just prior to midnight. Patient has been NPO since midnight for ECT scheduled for 0920 this morning. Patient remains 1:1 SIO plus acuity staff who effectively redirected the patient from intruding into other patient's rooms while awake. Patient endorses no SI/HI, A/VH or SIB. Patient achieved 6 hours of sleep. All precautions in place, enforced. Patient demonstrates no assaultive, verbal or physically aggressive behavior. Will continue with monitoring.     Suicidal ideation: no    Homicidal ideation: as above    Psychotic symptoms: no AH or VH reported during interview. Delusions present and other psychotic symptoms suspected.    Medication side effects reported: none    Acute medical concerns: none. He denies any pain or discomfort today.      Other issues reported by patient: Patient had no further questions or concerns.           Medications:      atropine  2 drop Sublingual TID    clotrimazole   Topical BID    cloZAPine  650 mg Oral " Daily    cyanocobalamin  1,000 mcg Oral Daily    [Held by provider] divalproex sodium delayed-release  250 mg Oral 3 times per day on Mon Wed Fri    [Held by provider] divalproex sodium delayed-release  250 mg Oral 3 times per day on Sun Tue Thu    [Held by provider] divalproex sodium delayed-release  250 mg Oral 3 times per day on Sat    FLUoxetine  10 mg Oral Daily    gabapentin  300 mg Oral 3 times per day on Mon Wed Fri    gabapentin  300 mg Oral 3 times per day on Sun Tue Thu    gabapentin  300 mg Oral 3 times per day on Sat    LORazepam  0.5 mg Oral 3 times per day on Mon Wed Fri    LORazepam  0.5 mg Oral 3 times per day on Sun Tue Thu    LORazepam  0.5 mg Oral 3 times per day on Sat    melatonin  10 mg Oral At Bedtime    mineral oil-hydrophilic petrolatum   Topical BID IS    naproxen  250 mg Oral BID w/meals    OLANZapine zydis  15 mg Oral BID    Or    OLANZapine  10 mg Intramuscular BID    polyethylene glycol  17 g Oral BID    sennosides  2 tablet Oral BID    tamsulosin  0.4 mg Oral Daily          Allergies:     Allergies   Allergen Reactions    Bee Venom Unknown    Montelukast Unknown    Phenothiazines Unknown          Labs:     No results found for this or any previous visit (from the past 24 hour(s)).           Psychiatric Examination:     /83 (BP Location: Right arm, Patient Position: Sitting, Cuff Size: Adult Regular)   Pulse 100   Temp 97.8  F (36.6  C) (Oral)   Resp 18   Wt 85.7 kg (188 lb 14.4 oz)   SpO2 98%   BMI 27.90 kg/m    Weight is 188 lbs 14.4 oz  Body mass index is 27.9 kg/m .    Weight over time:  Vitals:    07/03/23 1100 07/30/23 0902 08/13/23 0900   Weight: 81.6 kg (179 lb 12.8 oz) 86.5 kg (190 lb 9.6 oz) 85.7 kg (188 lb 14.4 oz)       Orthostatic Vitals         Most Recent      Sitting Orthostatic /61 07/25 0800    Sitting Orthostatic Pulse (bpm) 85 07/25 0800          Cardiometabolic risk assessment. 06/07/23    Reviewed patient profile for cardiometabolic risk factors     Date taken /Value  REFERENCE RANGE   Abdominal Obesity  (Waist Circumference)   See nursing flowsheet Women ?35 in (88 cm)   Men ?40 in (102 cm)      Triglycerides  No results found for: TRIG    ?150 mg/dL (1.7 mmol/L) or current treatment for elevated triglycerides   HDL cholesterol  No results found for: HDL]   Women <50 mg/dL (1.3 mmol/L) in women or current treatment for low HDL cholesterol  Men <40 mg/dL (1 mmol/L) in men or current treatment for low HDL cholesterol     Fasting plasma glucose (FPG) Lab Results   Component Value Date     05/26/2023      FPG ?100 mg/dL (5.6 mmol/L) or treatment for elevated blood glucose   Blood pressure  BP Readings from Last 3 Encounters:   08/21/23 132/83   05/26/23 97/55    Blood pressure ?130/85 mmHg or treatment for elevated blood pressure   Family History  See family history     Mental Status Exam:  Appearance: awake, groggy, disheveled. No evidence of pain of acute distress.   Attitude:  somewhat cooperative, more suspicious of writer today  Eye Contact:  fair, less intense  Mood:  calm  Affect:  mood congruent  Speech: mostly coherent  Psychomotor Behavior:  No evidence of psychomotor agitation or restlessness today. No evidence of dystonia, or tics.  Throught Process: linear, illogical  Associations:  no loosening of associations present  Thought Content:  Delusions. Likely auditory, tacticle and olfactory hallucinations. Cannot rule out visual hallucinations. Evidence of obsessive thinking and likely compulsions to harm others.   Insight:  limited  Judgement:  poor  Oriented to:  self, date, place  Attention Span and Concentration:  fair  Recent and Remote Memory:  poor         Precautions:     Behavioral Orders   Procedures    Assault precautions    Code 1 - Restrict to Unit    Code 2    Code 2 - 1:1 Staff Supervision     For ECT only    Electroconvulsive therapy     Series of up to 12 treatments. Begin Date: 8/2/23     Treating Psychiatrist providing ECT:   unknown     Notified on:  7/28/23 via this order  NOTE: We received verbal confirmation from [facility] that Lorenzo Pavon has been approved but awaiting official court order and risk management's review  Initial consult was completed by Dr. Hicks on 7/18/23    Electroconvulsive therapy     Series of up to 12 treatments. Begin Date: 8/2/23     Treating Psychiatrist providing ECT:  Dominga     Notified on:  8/2/23    Elopement precautions    Fall precautions    Routine Programming     As clinically indicated    Self Injury Precaution    Status 15     Every 15 minutes.    Status Individual Observation     Patient SIO status reviewed with team/RN.  Please also refer to RN/team documentation for add'l detail.    -SIO staff to monitor following which have contributed to patient being on SIO:  Agitation  Pt is impulsive   Pt has ran out of his room naked  Pt has Parkinson symptoms place him in a high fall risk  Pt has verbal outburst of sexual and threaten statements  Pt requires immediate redirection when masturbating    -Possible interventions SIO staff could use to support patient's treatment progress:  Engage pt in activities    -When following observed, team will review discontinuation of SIO:  Absence of aggression toward others for > 24 hours    Comments: SIO 1:1     Order Specific Question:   CONTINUOUS 24 hours / day     Answer:   5 feet     Order Specific Question:   Indications for SIO     Answer:   Severe intrusiveness     Order Specific Question:   Indications for SIO     Answer:   Assault risk    Suicide precautions     Patients on Suicide Precautions should have a Combination Diet ordered that includes a Diet selection(s) AND a Behavioral Tray selection for Safe Tray - with utensils, or Safe Tray - NO utensils            Diagnoses:     #Unspecified psychosis likely schizophrenia per history, rule out Bipolar affective disorder, manic  #Unspecified encephalopathy   -R/O catatonia   -Episodes of  unresponsiveness, concern for PNES   #Parkinsonism 2/2 neuroleptic medications, rule out Parkinson's Disease  #Urinary retention and BPH  -Possible UTI -- UC resulted on  w/out growth  #Hx of prolonged QTc with clozapine  #Mild thrombocytopenia  #R/O OCD         Assessment and hospital summary:  Patient was admitted to psychiatric unit for safety, stabilization and medication management. He has had schizophrenia since the . He was on Clozaril x 25 years, and it was tapered and discontinued on May 7, 2023  due to prolonged qtc of 527, and his psychotic  symptoms have worsened since then. Sinemet was also discontinued recently due to concerns that it was contributing to paranoia and AH. He is agitated, aggressive, dangerous to self and others. He remains on SIO 2 to 1, and is under psychiatric emergency and court hold. There are concerns for organic etiology given pattern of sundowning, history of parkinsonism, and ongoing disorientation/confusion. : EKG repeated, cardiology consult regarding safety of resuming clozapine in the context of prolonged QTc and refractory schizophrenia pending response. Per cardiology, correct QTc is no more than 460. They do not have concerns about AP retrial. Neurology IP consult placed for evaluation of sundowning and cognitive decline secondary to Sinemet discontinuation. MSSA initiated. Per Neurology, discontinuation of Sinemet would not account for these symptoms. They do not recommend retrial at this time. : Clozapine titration continued.   Its possible that clozapine dose is contributing to worsened compulsive behaviors noted throughout hospitalization which could improve with lowering the dose.     Chart reviewed which revealed the followin2022: He was on clozapine, Seroquel, Cymbalta, and Carbidopa-levodopa and hospitalized for pneumonia. Psych consulted and Seroquel was stopped. Treated with Abx and discharged to TCU.     2022: Hospitalized at [facility]  Per chart review:    Vinod Lee is a 63 yo male with longstanding history of schizophrenia. He was admitted from [facility] ED, where he presented due to increased delusional thoughts while on a visit to his parents for Thanksgiving. He began experiencing increasing paranoid thoughts that someone might be poisoning him and began fearing that he might accidentally harm someone. He reported to his parents that he was having thoughts about hitting someone or beating someone up and told them he could not guarantee they would be safe with him. Parents encouraged patient to go to the ED, which he did voluntarily.     Per patient report and chart review, he was hospitalized several times in the 1980s and reports he was civilly committed at one point in the 1980s, however he has been quite stable for the past several decades. He reports he will experience some paranoia and delusional thinking at times, but generally has good insight into his symptoms. He has been maintained on clozapine for about 25 years and prior to several months ago was also concurrently prescribed quetiapine.      In the last several months, patient has had a number of medication changes. Approximately 6 months ago, patient was diagnosed with parkinsonism related to antipsychotic use and was started on carbidopa-levidopa. He experienced no improvement in tremors, and thus carbidopa- levidopa dose was increased 1.5 weeks ago.      In addition, patient was medically hospitalized in August 2022 for confusion, weakness, repeated falls, ongoing weight loss, and SOB. He was found to have a cavitary lesion of the lung and suspected aspiration pneumonia. He was treated with antibiotics. At that time, he was taking current dose of clozapine and duloxetine, as well as propranolol ER, quetiapine, gabapentin, and clonazepam. Psychiatry was consulted due to concern for oversedation, and propranolol ER, gabapentin, and quetiapine were discontinued. Conazepam  "was reduced from 0.5 mg TID to BID dosing and recommended to be discontinued, however, it does not appear this occurred. His sedation improved significantly. QTc prolongation was also noted, but improved throughout his stay. He was ultimately discharged to a TCU for several months before returning home. He reports his weakness has greatly improved and states he \"graduated\" physical therapy, though he continues to be diligent in completed recommended exercises.      He reports that over the past several months, his delusions and anxiety have been worse than usual, with symptoms becoming much more acute since the carbidopa-levidopa dose was increased. He has felt increasingly paranoid about being poisoned, noting this is a delusion that has been stable over time, but was previously less intrusive. He has insight during the interview that his paranoid thinking is not reality based, but states it has been harder to separate delusions from reality and he becomes very worried that he might hurt someone, despite no history of violent behavior. He reports that the paranoia about his food being poisoned in combination with the tremors in his hands have made it difficult for him to eat. He has also had quite low appetite for the past 6 months to a year . He reports that due to the combination of these factors, he has lost about 50 pounds in the last year.He denies any problems with sleep initiation or maintenance. He reports energy is fairly good and \"better than it used to be.\" He reports some short term memory issues and on interview, does have some difficulty remembering details of recent events. He is unsure if he has received cognitive testing in the past.    He denies any suicidal ideation and reports he has not experienced SI for decades. He denies homicidal thoughts and is very clear that he had and has no desire to harm anyone else, but was afraid he might somehow do so. He denies any hallucinations and states " "\"that's never been a problem for me.\" He is not observed responding to internal stimuli. He is alert and oriented in all spheres.        ========    BRIEF HOSPITAL COURSE: This 62 y.o. male admitted 11/25/2022 to  [facility] for the assessment and treatment of the above presentation. This was a voluntary admission.     In summary, he was tapered off the Sinemet, which he reports to be both ineffective and potentially worsening the anxiety and paranoia ideations. Instead Benadryl 25 mg TID was started to help with extrapyramidal side effects. He struggled with sleep during his stay here. Remeron 7.5mg QHS was tried without success. Seroquel 100mg qhs was restarted with addition of suvorexant 10mg qhs. Given his Seroquel PRN use prior history of prolonged QTC, he will benefit from another EKG repeat on the medical unit.     Unfortunately, he tested positive for influenza A and developed hypoxemia. Now on 2L oxygen with desats when prone requiring 3L oxygen. Subsequently, the plan will to be tapering him off clonazepam due to hypoxia (and also prior plan to taper). The patient tolerated these changes without side effects.     The patient minimally benefited from the structured therapeutic milieu as he attended programming seldom as he tested positive for influenza, he needed to isolate with droplet precautions. Pt was engaged and worked on issues that were triggering/brought pt to the hospital and has excellent insight into his anxiety and paranoid delusions. Pt denied suicidal ideations throughout all/majority of their hospitalization. Pt denied HI throughout all of hospitalization. There were no concerns with behavioral disruptions/outbursts. The patient has shown improvement in general.     At the time of discharge, hospitalization course was reviewed with Vinod Lee with plan to transfer to medicine. Please consult psychiatry and I will continue to follow while patient is on the medical unit. He is now off sinemet " since 11/29 21:00 and I would expect anxiety and paranoia to improve more moving forward. Psychiatrically, once anxiety and paranoia is baseline, can D/C to home once medically stable. He DOES NOT NEED A 1:1 on the medical unit from a mental health perspective, we initiated 1:1 11/30/2022 due to significant fatigue, gait instability (he was unable to stand without assist) and feeding assist.    04/2023: Clozapine was gradually tapered and discontinued, unclear reasons why it was discontinued per outside records, though Dr. Portillo's H&P indicates that it was due to prolonged QTc.       Today's Changes:  - HOLDING Depakote due to short seizures. Will have low threshold to restart if aggression worsens  - Add low dose Prozac today to target OCD symptoms. Will consider dose reduction in clozapine as well, if OCD sx do not improve.       Target psychiatric symptoms and interventions:  -Psych emergency declared on 5/27, now fully committed  -ECT Mon/Wed/Fri per Janelle   -Continue clozapine as above  -Continue scheduled Zyprexa 15mg BID  -Continue 1:1 for safety of staff and patients, reduced from 2:1 7/23/23  - weekly CBC to monitor platelets      -Continue Gabapentin 300 mg TID as staff believe it has been effective for treatment of his anxiety  -Discussed complex case at length with Dr. Hatch (primary provider on geriatic unit) who provided recs (see second opinion note dated 6/14). Appreciate assistance. I have since made the following changes:         1) Add propranolol 10 mg TID         2) Added Depakote 1000 mg qhs (to be administered in magic cup)         3) Nutrition consult to order magic cup         4) Increased melatonin to 10 mg QHS    Risks, benefits, and alternatives discussed at length with patient.     Acute Medical Problems and Treatments:  Delirium vs progression of dementia  Neurology consult placed on 6/8 for evaluation of sundowning and cognitive decline secondary to Sinemet discontinuation:  Resuming Sinemet not recommended for behavioral concerns. Please see Neuro note for details.     Thrombocytopenia   Likely secondary to Depakote.  According to the, incidents of Depakote induced thrombocytopenia as 27%.  Depakote dose reduced from 1000 twice daily to 250 3 times daily on 7/28/2023  - weekly CBCs    Constipation  Likely worsened by clozapine, improved since modifying regimen.   - daily miralax   - daily Senokot 2 tablets BID      Right first toe fracture:  Seen by Ortho on 6/16:  Per note: Vinod Lee is a 62 year old  male w/ PMHx complex psychiatric history who has a fracture to the distal phalanx of the right toe after a recent trauma to the foot the patient reports.  Patient denies any other pain or discomfort.  Images reviewed and plan will be for irrigation of the popped fracture blister with sterile saline and the toes should be dressed and a 4 x 4 gauze dressing.  Patient will need a postop shoe which she can be weightbearing as tolerated in.  Would recommend a course of antibiotics for approximately 7 days.  Would recommend Augmentin or clindamycin.  Podiatry will be made aware of patient and will see patient while he is admitted or if patient is discharged in the near future a follow-up appointment will need to be established.     Remainder of care per primary team.  Primary team should make sure that patient is up-to-date on his tetanus shot.  If not patient will require a booster shot.    Hx of prolonged QTc:  Continue weekly EKGs. See above for details.     Urinary retention, resolved  Per patient care order:   If patient is refusing straight caths, please notify IM provider immediately to determine whether this constitutes a medical emergency. If IM declares medical emergency, may restrain patient for straight cath. Discussed this with patient's parents/substitute decision makers on 6/7 who are in support of this plan.    # Psoriasis hx  # Purplish discoloration of B/LE feet-resolved  "  Discussed with Dr. Rene and bedside RN regarding rash on right foot that appears and appears to be purplish in color and almost \"petechiae.\" It was noted during interview, but seemed to be resolving upon walking. Within the past 24 hrs, pt has had ECT and seemed more confused than usual. Pt seems to have had a right great toe wound with recent right great toe fracture seen by Medicine on 7/16. Seen on 8/4 with improving color on B/L legs at rest and appears to have small, scaly patches of varying coin sizes that have not grown past marked borders. See photos. Per further chart review, has had psoriasis history. WBC WNL, no fevers, HDS. This appears to more consistent with a psoriasis like picture.   - Start triamcinolone topical 0.025%   -Apply twice daily until lesions for 2 weeks or until lesions resolve/  -Monitor for skin thinning, striae, rebound lesion flares.   - Start Eucerin cream              - Apply 30 minutes PRIOR to triamcinolone topical cream above or PRN as needed if dry skin/after bathing   - Medicine will sign off.   - For worsening pain, spread, itchiness, fevers, please contact Medicine and possibly Dermatology.    Benzodiazepine dependence:  Phenobarbital taper completed    Pertinent labs/imaging:  Weekly CBC with diff     Behavioral/Psychological/Social:  - Encourage unit programming    Safety:  - Continue precautions as noted above  - Status 15 minute checks  - Continue 1:1 for staff and pt safety    Legal Status: Fully committed with Sánchez and Lorenzo Pavon. [place] medications: clozapine, olanzapine, risperidone, quetiapine      Disposition Plan   Reason for ongoing admission: poses an imminent risk to self, poses an imminent risk to others and is unable to care for self due to severe psychosis or darryl  Discharge location:  assisted living facility  Discharge Medications: not ordered  Follow-up Appointments: not scheduled    Entered by: Merlin Richey DO on 08/21/2023  at 8:45 " AM

## 2023-08-21 NOTE — ANESTHESIA PREPROCEDURE EVALUATION
Anesthesia Pre-Procedure Evaluation    Patient: Vinod Lee   MRN: [id number] : 1960        Procedure :           Past Medical History:   Diagnosis Date    LBBB (left bundle branch block)     Parkinson's disease (H)     Schizophrenia (H)       No past surgical history on file.   Allergies   Allergen Reactions    Bee Venom Unknown    Montelukast Unknown    Phenothiazines Unknown      Social History     Tobacco Use    Smoking status: Not on file    Smokeless tobacco: Never   Substance Use Topics    Alcohol use: Not on file     Comment: GRACE      Wt Readings from Last 1 Encounters:   23 85.7 kg (188 lb 14.4 oz)        Anesthesia Evaluation   Pt has had prior anesthetic.         ROS/MED HX  ENT/Pulmonary:     (+) sleep apnea,                                      Neurologic:     (+)                 Parkinson's disease, features: parkinsonism, from neuroleptics,              Cardiovascular: Comment: 23 QTc 514    (+)  hypertension- -   -  - -                        dysrhythmias, Long QT,        Previous cardiac testing   Echo: Date: Results:    Stress Test:  Date: Results:    ECG Reviewed:  Date: 23 Results:  LBBB QT intervial is 498  Cath:  Date: Results:      METS/Exercise Tolerance:     Hematologic:     (+)      anemia,          Musculoskeletal:  - neg musculoskeletal ROS     GI/Hepatic:  - neg GI/hepatic ROS     Renal/Genitourinary:     (+)        BPH,      Endo:  - neg endo ROS     Psychiatric/Substance Use: Comment: Patient is aggressive and homicidal    (+) psychiatric history schizophrenia       Infectious Disease:  - neg infectious disease ROS     Malignancy:  - neg malignancy ROS     Other:  - neg other ROS          Physical Exam    Airway  airway exam normal           Respiratory Devices and Support         Dental           Cardiovascular   cardiovascular exam normal          Pulmonary   pulmonary exam normal                OUTSIDE LABS:  CBC:   Lab Results   Component Value Date    WBC  10.8 08/18/2023    WBC 11.3 (H) 08/11/2023    HGB 11.8 (L) 08/18/2023    HGB 11.9 (L) 08/11/2023    HCT 36.8 (L) 08/18/2023    HCT 37.6 (L) 08/11/2023     08/18/2023     08/11/2023     BMP:   Lab Results   Component Value Date     08/04/2023     08/02/2023    POTASSIUM 4.9 08/04/2023    POTASSIUM 4.9 08/02/2023    CHLORIDE 106 08/04/2023    CHLORIDE 107 08/02/2023    CO2 24 08/04/2023    CO2 24 08/02/2023    BUN 37.8 (H) 08/04/2023    BUN 24.8 (H) 08/02/2023    CR 1.05 08/04/2023    CR 0.99 08/02/2023     (H) 08/04/2023    GLC 93 08/02/2023     COAGS: No results found for: PTT, INR, FIBR  POC: No results found for: BGM, HCG, HCGS  HEPATIC:   Lab Results   Component Value Date    ALBUMIN 3.6 07/13/2023    PROTTOTAL 5.6 (L) 07/13/2023    ALT 14 07/28/2023    AST 18 07/28/2023    ALKPHOS 73 07/13/2023    BILITOTAL 0.2 07/13/2023     OTHER:   Lab Results   Component Value Date    LACT 1.5 06/07/2023    BRENDA 8.3 (L) 08/04/2023    PHOS 3.8 05/25/2023    MAG 2.1 05/25/2023    TSH 1.80 05/22/2023       Anesthesia Plan    ASA Status:  3    NPO Status:  NPO Appropriate    Anesthesia Type: General.     - Airway: Mask Only   Induction: Intravenous.   Maintenance: N/A.        Consents    Anesthesia Plan(s) and associated risks, benefits, and realistic alternatives discussed. Questions answered and patient/representative(s) expressed understanding.     - Discussed:     - Discussed with:  Patient      - Extended Intubation/Ventilatory Support Discussed: No.      - Patient is DNR/DNI Status: No     Use of blood products discussed: No .     Postoperative Care    Pain management: Multi-modal analgesia.   PONV prophylaxis: Ondansetron (or other 5HT-3)     Comments:                TERRIE PADRON MD

## 2023-08-21 NOTE — PLAN OF CARE
Problem: Psychotic Signs/Symptoms  Goal: Improved Behavioral Control (Psychotic Signs/Symptoms)  Outcome: Progressing  Note: Patient demonstrates no behavioral dyscontrol     Problem: Suicidal Behavior  Goal: Suicidal Behavior is Absent or Managed  Outcome: Progressing  Note: Patient manifests no suicidal behavior   Goal Outcome Evaluation:       Patient slept reasonably well this night after receiving prn Trazodone at the start of the shift just prior to midnight. Patient has been NPO since midnight for ECT scheduled for 0920 this morning. Patient remains 1:1 SIO plus acuity staff who effectively redirected the patient from intruding into other patient's rooms while awake. Patient endorses no SI/HI, A/VH or SIB. Patient achieved 6 hours of sleep. All precautions in place, enforced. Patient demonstrates no assaultive, verbal or physically aggressive behavior. Will continue with monitoring.           García Up DNP, RN

## 2023-08-21 NOTE — ANESTHESIA POSTPROCEDURE EVALUATION
Patient: Vinod Lee    Procedure: * No procedures listed *       Anesthesia Type:  General    Note:  Disposition: Outpatient   Postop Pain Control: Uneventful            Sign Out: Well controlled pain   PONV: No   Neuro/Psych: Uneventful            Sign Out: Acceptable/Baseline neuro status   Airway/Respiratory: Uneventful            Sign Out: Acceptable/Baseline resp. status   CV/Hemodynamics: Uneventful            Sign Out: Acceptable CV status; No obvious hypovolemia; No obvious fluid overload   Other NRE: NONE   DID A NON-ROUTINE EVENT OCCUR? No           Last vitals:  Vitals:    08/21/23 0841 08/21/23 1034 08/21/23 1045   BP: 132/83 (!) 162/84 (!) 147/92   Pulse: 100 115 108   Resp: 18 21 22   Temp: 36.6  C (97.8  F) 37  C (98.6  F) 36.8  C (98.3  F)   SpO2: 98% 97% 94%       Electronically Signed By: TERRIE PADRON MD  August 21, 2023  10:51 AM
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
## 2023-08-21 NOTE — PROGRESS NOTES
Patients VSS, A/O, IV removed, meets phase 2 criteria and is able to move to [facility] at this time. Report given to KENNY Early. Pt transported by staff with .

## 2023-08-21 NOTE — PLAN OF CARE
Assessment/Intervention/Current Symtoms and Care Coordination:  Reviewed chart. Met with team to review patient's current functioning, needs, progress, and impediments to discharge.    Called [facility] in [place] to ask about services offered and if they have the capacity to facilitate ECT in the community. Left voicemail and requested a call back.     Discharge Plan or Goal:  Patient continues to present with persecutory delusions, auditory hallucinations, and intermittent agitation. When symptoms have reduced he has been referred to an assisted living facility and forms sent to central preadmission for state-run facilities.      Barriers to Discharge:  Patient requires further psychiatric stabilization due to current symptomology. He continues to present as disorganized and tangential with paranoid thought content. He presents with mood lability. He vacillates between being pleasant and being aggressive with no clear environmental triggers. Patient endorses auditory hallucinations.     Referral Status:  Referral for housing pending psychiatric stabilization  Considerations include: [facility] in [place], Referral sent to [facility] in [place] 8/15     Legal Status:  Commitment with [facility]: [place]  File Number: [id number]  Issued 07/06/23 and is not to exceed six months    Contacts:  : Vicky Valdez with [facility], [phone]  Psychiatrist: Dr. Santana at [facility], [phone] PCP: Attila Urena DO  Mom: Alberta, [phone] (H) [phone] (M)   Dad: Ino, [phone] (H)  Sister, Sandra Treadwell, [phone] (KING)   [facility]'s Office Fax: [phone]  [facility]: [phone] (KING)  CADI  with [facility]: Regina Wong, [phone]    Upcoming Meetings and Dates/Important Information and next steps:  Central preadmission calling back 8/31 for an update on ECT status.

## 2023-08-21 NOTE — PLAN OF CARE
Problem: Adult Behavioral Health Plan of Care  Goal: Individualized Daily Interaction Plan (IDIP)  Outcome: Progressing   Goal Outcome Evaluation:    Plan of Care Reviewed With: patient      Pt still on SIO 2:1, he's alert, oriented to self, place time but not situation. He has spent all shift in his room resting. At beginning of shift, pt was NPO for ECT, zyprexa scheduled given before ECT, at ECT pt had a 35 sec seizure with no complications. Pt back to unit alert but forgetful, couldn't remember he had a ECT treatment, oriented x3, disoriented to situation. Pt with rambling speech, compliant with meds using pudding, had a flat blunted affect with calm mood. He denied all mental symptoms, reported occasionally hearing voices but couldn't elaborate on voices. He was restless but redirectable by SIO staff.

## 2023-08-22 PROCEDURE — 93005 ELECTROCARDIOGRAM TRACING: CPT

## 2023-08-22 PROCEDURE — 250N000013 HC RX MED GY IP 250 OP 250 PS 637: Performed by: PHYSICIAN ASSISTANT

## 2023-08-22 PROCEDURE — 250N000013 HC RX MED GY IP 250 OP 250 PS 637: Performed by: STUDENT IN AN ORGANIZED HEALTH CARE EDUCATION/TRAINING PROGRAM

## 2023-08-22 PROCEDURE — 124N000002 HC R&B MH UMMC

## 2023-08-22 PROCEDURE — 250N000013 HC RX MED GY IP 250 OP 250 PS 637: Performed by: PSYCHIATRY & NEUROLOGY

## 2023-08-22 PROCEDURE — 93010 ELECTROCARDIOGRAM REPORT: CPT | Performed by: INTERNAL MEDICINE

## 2023-08-22 RX ADMIN — GABAPENTIN 300 MG: 300 CAPSULE ORAL at 09:09

## 2023-08-22 RX ADMIN — SENNOSIDES 2 TABLET: 8.6 TABLET ORAL at 09:06

## 2023-08-22 RX ADMIN — Medication 10 MG: at 19:56

## 2023-08-22 RX ADMIN — WHITE PETROLATUM: 1.75 OINTMENT TOPICAL at 09:13

## 2023-08-22 RX ADMIN — GABAPENTIN 300 MG: 300 CAPSULE ORAL at 13:20

## 2023-08-22 RX ADMIN — CLOTRIMAZOLE: 1 CREAM TOPICAL at 19:57

## 2023-08-22 RX ADMIN — OLANZAPINE 10 MG: 5 TABLET, FILM COATED ORAL at 02:34

## 2023-08-22 RX ADMIN — TAMSULOSIN HYDROCHLORIDE 0.4 MG: 0.4 CAPSULE ORAL at 09:06

## 2023-08-22 RX ADMIN — SENNOSIDES 2 TABLET: 8.6 TABLET ORAL at 19:56

## 2023-08-22 RX ADMIN — FLUOXETINE 10 MG: 10 CAPSULE ORAL at 09:06

## 2023-08-22 RX ADMIN — CLOZAPINE 650 MG: 100 TABLET, ORALLY DISINTEGRATING ORAL at 13:20

## 2023-08-22 RX ADMIN — POLYETHYLENE GLYCOL 3350 17 G: 17 POWDER, FOR SOLUTION ORAL at 19:56

## 2023-08-22 RX ADMIN — OLANZAPINE 15 MG: 10 TABLET, ORALLY DISINTEGRATING ORAL at 09:09

## 2023-08-22 RX ADMIN — GABAPENTIN 300 MG: 300 CAPSULE ORAL at 17:37

## 2023-08-22 RX ADMIN — ATROPINE SULFATE 2 DROP: 10 SOLUTION/ DROPS OPHTHALMIC at 09:13

## 2023-08-22 RX ADMIN — LORAZEPAM 0.5 MG: 0.5 TABLET ORAL at 09:08

## 2023-08-22 RX ADMIN — LORAZEPAM 0.5 MG: 0.5 TABLET ORAL at 13:20

## 2023-08-22 RX ADMIN — ATROPINE SULFATE 2 DROP: 10 SOLUTION/ DROPS OPHTHALMIC at 13:19

## 2023-08-22 RX ADMIN — OLANZAPINE 15 MG: 10 TABLET, ORALLY DISINTEGRATING ORAL at 19:56

## 2023-08-22 RX ADMIN — CYANOCOBALAMIN TAB 1000 MCG 1000 MCG: 1000 TAB at 09:07

## 2023-08-22 RX ADMIN — POLYETHYLENE GLYCOL 3350 17 G: 17 POWDER, FOR SOLUTION ORAL at 09:06

## 2023-08-22 RX ADMIN — NAPROXEN 250 MG: 250 TABLET ORAL at 09:08

## 2023-08-22 RX ADMIN — WHITE PETROLATUM: 1.75 OINTMENT TOPICAL at 17:34

## 2023-08-22 RX ADMIN — ATROPINE SULFATE 2 DROP: 10 SOLUTION/ DROPS OPHTHALMIC at 19:53

## 2023-08-22 RX ADMIN — NAPROXEN 250 MG: 250 TABLET ORAL at 17:36

## 2023-08-22 RX ADMIN — CLOTRIMAZOLE: 1 CREAM TOPICAL at 09:13

## 2023-08-22 RX ADMIN — LORAZEPAM 0.5 MG: 0.5 TABLET ORAL at 17:37

## 2023-08-22 ASSESSMENT — ACTIVITIES OF DAILY LIVING (ADL)
ADLS_ACUITY_SCORE: 75
ADLS_ACUITY_SCORE: 75
DRESS: SCRUBS (BEHAVIORAL HEALTH)
ADLS_ACUITY_SCORE: 75
ORAL_HYGIENE: PROMPTS;INDEPENDENT
ADLS_ACUITY_SCORE: 75
LAUNDRY: UNABLE TO COMPLETE
HYGIENE/GROOMING: PROMPTS;INDEPENDENT
ADLS_ACUITY_SCORE: 75

## 2023-08-22 NOTE — PLAN OF CARE
Problem: Psychotic Symptoms  Goal: Psychotic Symptoms  Description: Signs and symptoms of listed problems will be absent or manageable.  Outcome: Progressing   Goal Outcome Evaluation:       Pt continues on SIO 2:1. Pt was sleeping at the beginning of the shift until after 0900. He ate adequately for breakfast and lunch. Denied pain, depression, SI/HI/SIB and contracted for safety. He endorsed  anxiety 3/10 and hearing voices but he could hardly remember what they were saying. He was medication compliant. He voiced not willing to get out of his room today. Pt calm, cooperative with flat affect. No PRN given this shift. Will continue to monitor.

## 2023-08-22 NOTE — PLAN OF CARE
Problem: Adult Behavioral Health Plan of Care  Goal: Individualized Daily Interaction Plan (IDIP)  Outcome: Progressing     Problem: Psychotic Symptoms  Goal: Psychotic Symptoms  Description: Signs and symptoms of listed problems will be absent or manageable.  Outcome: Progressing   Goal Outcome Evaluation:    Plan of Care Reviewed With: patient          Pt endorsed anxiety and rated at 3/10, but denied depression, SI/SIB/HI/AVH, and contracted for safety. Pt is pleasant with a flat affect. Mood presents as calm and cooperative. Pt is isolative and withdrawn to room, but comes out occasionally for brief moments. He continues to be on a 1:1 SIO with acuity for SI, SIB, Fall, Assault, and Elopement risk. No behavioral concerns noted.  Will continue with same plan of care.

## 2023-08-22 NOTE — PLAN OF CARE
Problem: Adult Behavioral Health Plan of Care  Goal: Absence of New-Onset Illness or Injury  Outcome: Progressing  Note: Patient reports no new-onset illness or injury     Problem: Suicidal Behavior  Goal: Suicidal Behavior is Absent or Managed  Outcome: Progressing  Note: Patient manifests no suicidal behavior   Goal Outcome Evaluation:       Patient was awake almost all night, patient was given prn trazodone 50 mg and tylenol 650 mg for comfort early into the shift (2333) with just a fair effect, patient was up and started running in the hallway, difficult to redirect, hence the SIO staff were compelled to run with him in the hallway, at that point patient was given prn Olanzapine 10 mg at 0234 with good effect in modulating his behavior by slowing him down. Patient endorses no SI/HI, A/VH or SIB. Patient informed his SIO staff that he was losing his memory, Staff asked which memory type he was talking about? Patient not only described a short term memory, he lost track of the discussion he initiated with the PA. Patient abruptly forgot what he was saying and had to be reminded about the current conversation by the PA. Patient might benefit from a MOCA assessment.         García Up, ALEXANDER, RN

## 2023-08-22 NOTE — PLAN OF CARE
"Assessment/Intervention/Current Symtoms and Care Coordination:  Reviewed chart. Met with team to review patient's current functioning, needs, progress, and impediments to discharge.    Received voicemail from Jasmin White at [facility]. They can facilitate transportation to ECT at their facility. They have openings in their [place] location for CADI waivers.     Called [facility] to see if his CADI is still open. Per the  it is still open. Transferred to his CADI CM's supervisor, left voicemail asking for verification.     Vinod signed an KING for [facility]. When signing these papers he stated happily, \"I didn't think I was ever going to get out of here!\" He asked about financial barriers to assisted living facilities and stated, \"I don't have any money.\" Writer encouraged him that he has community services to assist with these aspects of discharge.     Discharge Plan or Goal:  Patient continues to present with persecutory delusions, auditory hallucinations, and intermittent agitation. When symptoms have reduced he has been referred to an assisted living facility and forms sent to central preadmission for [facility]-run facilities.      Barriers to Discharge:  Patient requires further psychiatric stabilization due to current symptomology. He continues to present as disorganized and tangential with paranoid thought content. He presents with mood lability. He vacillates between being pleasant and being aggressive with no clear environmental triggers. Patient endorses auditory hallucinations.     Referral Status:  Referral for housing pending psychiatric stabilization  Considerations include: [facility] in [place], Referral sent to [facility] in [place] 8/15     Legal Status:  Commitment with [facility]: [place]  File Number: [id number]  Issued 07/06/23 and is not to exceed six months    Contacts:  : Vicky Valdez with [facility], [phone]  Psychiatrist: " Dr. Santana at [facility], [phone] PCP: Attila Urena DO  Mom: Alberta, [phone] (H) [phone] (M)   Dad: Ino, [phone] (H)  Sister, Sandra Treadwell, [phone] (KING)   [facility]'s Office Fax: [phone]  [facility]: [phone] (KING)  [place]: Jasmin phone [phone], fax [phone]  CADI  with [facility]: Regina Wong, [phone] (out until 8/28)    Upcoming Meetings and Dates/Important Information and next steps:  Central preadmission calling back 8/31 for an update on ECT status.

## 2023-08-23 ENCOUNTER — ANESTHESIA (OUTPATIENT)
Dept: BEHAVIORAL HEALTH | Facility: CLINIC | Age: 63
DRG: 885 | End: 2023-08-23
Payer: COMMERCIAL

## 2023-08-23 ENCOUNTER — ANESTHESIA EVENT (OUTPATIENT)
Dept: BEHAVIORAL HEALTH | Facility: CLINIC | Age: 63
DRG: 885 | End: 2023-08-23
Payer: COMMERCIAL

## 2023-08-23 ENCOUNTER — APPOINTMENT (OUTPATIENT)
Dept: BEHAVIORAL HEALTH | Facility: CLINIC | Age: 63
DRG: 885 | End: 2023-08-23
Attending: PSYCHIATRY & NEUROLOGY
Payer: COMMERCIAL

## 2023-08-23 LAB
ATRIAL RATE - MUSE: 84 BPM
DIASTOLIC BLOOD PRESSURE - MUSE: NORMAL MMHG
INTERPRETATION ECG - MUSE: NORMAL
P AXIS - MUSE: 71 DEGREES
PR INTERVAL - MUSE: 148 MS
QRS DURATION - MUSE: 160 MS
QT - MUSE: 448 MS
QTC - MUSE: 529 MS
R AXIS - MUSE: -6 DEGREES
SYSTOLIC BLOOD PRESSURE - MUSE: NORMAL MMHG
T AXIS - MUSE: 106 DEGREES
VENTRICULAR RATE- MUSE: 84 BPM

## 2023-08-23 PROCEDURE — 250N000013 HC RX MED GY IP 250 OP 250 PS 637: Performed by: STUDENT IN AN ORGANIZED HEALTH CARE EDUCATION/TRAINING PROGRAM

## 2023-08-23 PROCEDURE — 370N000017 HC ANESTHESIA TECHNICAL FEE, PER MIN: Performed by: ANESTHESIOLOGY

## 2023-08-23 PROCEDURE — 93010 ELECTROCARDIOGRAM REPORT: CPT | Performed by: INTERNAL MEDICINE

## 2023-08-23 PROCEDURE — 90870 ELECTROCONVULSIVE THERAPY: CPT

## 2023-08-23 PROCEDURE — 93005 ELECTROCARDIOGRAM TRACING: CPT

## 2023-08-23 PROCEDURE — 250N000009 HC RX 250: Performed by: ANESTHESIOLOGY

## 2023-08-23 PROCEDURE — 250N000013 HC RX MED GY IP 250 OP 250 PS 637: Performed by: PSYCHIATRY & NEUROLOGY

## 2023-08-23 PROCEDURE — 90870 ELECTROCONVULSIVE THERAPY: CPT | Performed by: PSYCHIATRY & NEUROLOGY

## 2023-08-23 PROCEDURE — 124N000002 HC R&B MH UMMC

## 2023-08-23 PROCEDURE — 250N000011 HC RX IP 250 OP 636: Performed by: ANESTHESIOLOGY

## 2023-08-23 PROCEDURE — 99024 POSTOP FOLLOW-UP VISIT: CPT | Mod: 25 | Performed by: PSYCHIATRY & NEUROLOGY

## 2023-08-23 PROCEDURE — 250N000013 HC RX MED GY IP 250 OP 250 PS 637: Performed by: PHYSICIAN ASSISTANT

## 2023-08-23 RX ORDER — REMIFENTANIL HYDROCHLORIDE 1 MG/ML
85 INJECTION, POWDER, LYOPHILIZED, FOR SOLUTION INTRAVENOUS
Status: COMPLETED | OUTPATIENT
Start: 2023-08-23 | End: 2023-08-23

## 2023-08-23 RX ORDER — FLUMAZENIL 0.1 MG/ML
0.2 INJECTION, SOLUTION INTRAVENOUS ONCE
Status: CANCELLED
Start: 2023-08-24 | End: 2023-08-24

## 2023-08-23 RX ORDER — CAFFEINE AND SODIUM BENZOATE 125 MG/ML
250 INJECTION, SOLUTION INTRAMUSCULAR; INTRAVENOUS ONCE
Status: CANCELLED
Start: 2023-08-23 | End: 2023-08-23

## 2023-08-23 RX ORDER — NICARDIPINE HCL-0.9% SOD CHLOR 1 MG/10 ML
SYRINGE (ML) INTRAVENOUS PRN
Status: DISCONTINUED | OUTPATIENT
Start: 2023-08-23 | End: 2023-08-23

## 2023-08-23 RX ORDER — ONDANSETRON 2 MG/ML
4 INJECTION INTRAMUSCULAR; INTRAVENOUS EVERY 30 MIN PRN
Status: CANCELLED | OUTPATIENT
Start: 2023-08-23

## 2023-08-23 RX ORDER — REMIFENTANIL HYDROCHLORIDE 1 MG/ML
85 INJECTION, POWDER, LYOPHILIZED, FOR SOLUTION INTRAVENOUS
Status: CANCELLED
Start: 2023-08-24 | End: 2023-08-24

## 2023-08-23 RX ORDER — ONDANSETRON 4 MG/1
4 TABLET, ORALLY DISINTEGRATING ORAL EVERY 30 MIN PRN
Status: CANCELLED | OUTPATIENT
Start: 2023-08-23

## 2023-08-23 RX ORDER — REMIFENTANIL HYDROCHLORIDE 1 MG/ML
INJECTION, POWDER, LYOPHILIZED, FOR SOLUTION INTRAVENOUS PRN
Status: DISCONTINUED | OUTPATIENT
Start: 2023-08-23 | End: 2023-08-23

## 2023-08-23 RX ORDER — ALBUTEROL SULFATE 0.83 MG/ML
2.5 SOLUTION RESPIRATORY (INHALATION) EVERY 4 HOURS PRN
Status: CANCELLED | OUTPATIENT
Start: 2023-08-23

## 2023-08-23 RX ORDER — ACETAMINOPHEN 325 MG/1
975 TABLET ORAL
Status: CANCELLED | OUTPATIENT
Start: 2023-08-23

## 2023-08-23 RX ORDER — ETOMIDATE 2 MG/ML
INJECTION INTRAVENOUS PRN
Status: DISCONTINUED | OUTPATIENT
Start: 2023-08-23 | End: 2023-08-23

## 2023-08-23 RX ORDER — SODIUM CHLORIDE, SODIUM LACTATE, POTASSIUM CHLORIDE, CALCIUM CHLORIDE 600; 310; 30; 20 MG/100ML; MG/100ML; MG/100ML; MG/100ML
INJECTION, SOLUTION INTRAVENOUS CONTINUOUS
Status: CANCELLED | OUTPATIENT
Start: 2023-08-23

## 2023-08-23 RX ORDER — CAFFEINE AND SODIUM BENZOATE 125 MG/ML
250 INJECTION, SOLUTION INTRAMUSCULAR; INTRAVENOUS ONCE
Status: DISCONTINUED | OUTPATIENT
Start: 2023-08-23 | End: 2023-08-23

## 2023-08-23 RX ORDER — FLUMAZENIL 0.1 MG/ML
0.2 INJECTION, SOLUTION INTRAVENOUS ONCE
Status: DISCONTINUED | OUTPATIENT
Start: 2023-08-23 | End: 2023-08-23

## 2023-08-23 RX ORDER — CAFFEINE AND SODIUM BENZOATE 125 MG/ML
INJECTION, SOLUTION INTRAMUSCULAR; INTRAVENOUS PRN
Status: DISCONTINUED | OUTPATIENT
Start: 2023-08-23 | End: 2023-08-23

## 2023-08-23 RX ADMIN — FLUOXETINE 10 MG: 10 CAPSULE ORAL at 11:27

## 2023-08-23 RX ADMIN — SENNOSIDES 2 TABLET: 8.6 TABLET ORAL at 19:12

## 2023-08-23 RX ADMIN — ATROPINE SULFATE 2 DROP: 10 SOLUTION/ DROPS OPHTHALMIC at 11:28

## 2023-08-23 RX ADMIN — LORAZEPAM 0.5 MG: 0.5 TABLET ORAL at 11:57

## 2023-08-23 RX ADMIN — OLANZAPINE 15 MG: 10 TABLET, ORALLY DISINTEGRATING ORAL at 19:11

## 2023-08-23 RX ADMIN — Medication 400 MCG: at 10:54

## 2023-08-23 RX ADMIN — OLANZAPINE 15 MG: 10 TABLET, ORALLY DISINTEGRATING ORAL at 11:25

## 2023-08-23 RX ADMIN — TAMSULOSIN HYDROCHLORIDE 0.4 MG: 0.4 CAPSULE ORAL at 11:26

## 2023-08-23 RX ADMIN — NAPROXEN 250 MG: 250 TABLET ORAL at 11:26

## 2023-08-23 RX ADMIN — ETOMIDATE 12 MG: 2 INJECTION INTRAVENOUS at 10:45

## 2023-08-23 RX ADMIN — GABAPENTIN 300 MG: 300 CAPSULE ORAL at 19:12

## 2023-08-23 RX ADMIN — WHITE PETROLATUM: 1.75 OINTMENT TOPICAL at 11:28

## 2023-08-23 RX ADMIN — POLYETHYLENE GLYCOL 3350 17 G: 17 POWDER, FOR SOLUTION ORAL at 11:28

## 2023-08-23 RX ADMIN — Medication 200 MCG: at 10:50

## 2023-08-23 RX ADMIN — Medication 10 MG: at 19:12

## 2023-08-23 RX ADMIN — Medication 800 MCG: at 10:45

## 2023-08-23 RX ADMIN — CLOTRIMAZOLE: 1 CREAM TOPICAL at 19:12

## 2023-08-23 RX ADMIN — CLOTRIMAZOLE: 1 CREAM TOPICAL at 11:32

## 2023-08-23 RX ADMIN — GABAPENTIN 300 MG: 300 CAPSULE ORAL at 11:27

## 2023-08-23 RX ADMIN — CLOZAPINE 650 MG: 100 TABLET, ORALLY DISINTEGRATING ORAL at 11:26

## 2023-08-23 RX ADMIN — GABAPENTIN 300 MG: 300 CAPSULE ORAL at 15:39

## 2023-08-23 RX ADMIN — REMIFENTANIL HYDROCHLORIDE 85 MCG: 1 INJECTION, POWDER, LYOPHILIZED, FOR SOLUTION INTRAVENOUS at 10:45

## 2023-08-23 RX ADMIN — POLYETHYLENE GLYCOL 3350 17 G: 17 POWDER, FOR SOLUTION ORAL at 19:15

## 2023-08-23 RX ADMIN — SENNOSIDES 2 TABLET: 8.6 TABLET ORAL at 11:27

## 2023-08-23 RX ADMIN — NAPROXEN 250 MG: 250 TABLET ORAL at 19:11

## 2023-08-23 RX ADMIN — LORAZEPAM 0.5 MG: 0.5 TABLET ORAL at 19:12

## 2023-08-23 RX ADMIN — ATROPINE SULFATE 2 DROP: 10 SOLUTION/ DROPS OPHTHALMIC at 19:13

## 2023-08-23 RX ADMIN — CAFFEINE AND SODIUM BENZOATE 250 MG: 125 INJECTION, SOLUTION INTRAMUSCULAR; INTRAVENOUS at 10:45

## 2023-08-23 RX ADMIN — CYANOCOBALAMIN TAB 1000 MCG 1000 MCG: 1000 TAB at 11:26

## 2023-08-23 RX ADMIN — SUCCINYLCHOLINE CHLORIDE 60 MG: 20 INJECTION, SOLUTION INTRAMUSCULAR; INTRAVENOUS; PARENTERAL at 10:45

## 2023-08-23 RX ADMIN — WHITE PETROLATUM: 1.75 OINTMENT TOPICAL at 19:13

## 2023-08-23 RX ADMIN — ATROPINE SULFATE 2 DROP: 10 SOLUTION/ DROPS OPHTHALMIC at 15:39

## 2023-08-23 RX ADMIN — LORAZEPAM 0.5 MG: 0.5 TABLET ORAL at 15:39

## 2023-08-23 ASSESSMENT — ACTIVITIES OF DAILY LIVING (ADL)
ADLS_ACUITY_SCORE: 75
ADLS_ACUITY_SCORE: 75
HYGIENE/GROOMING: PROMPTS;INDEPENDENT
ORAL_HYGIENE: PROMPTS;INDEPENDENT
ORAL_HYGIENE: PROMPTS;INDEPENDENT
DRESS: SCRUBS (BEHAVIORAL HEALTH);INDEPENDENT
ADLS_ACUITY_SCORE: 75
LAUNDRY: UNABLE TO COMPLETE
LAUNDRY: UNABLE TO COMPLETE
ADLS_ACUITY_SCORE: 75
ADLS_ACUITY_SCORE: 75
DRESS: SCRUBS (BEHAVIORAL HEALTH)
HYGIENE/GROOMING: PROMPTS;INDEPENDENT
ADLS_ACUITY_SCORE: 75

## 2023-08-23 ASSESSMENT — ENCOUNTER SYMPTOMS: DYSRHYTHMIAS: 1

## 2023-08-23 NOTE — PLAN OF CARE
Assessment/Intervention/Current Symtoms and Care Coordination:  Reviewed chart. Met with team to review patient's current functioning, needs, progress, and impediments to discharge.    Sent referral to [facility] in [place]. Called their , Jasmin, and left voicemail encouraging her to call with questions.     Discharge Plan or Goal:  Patient continues to present with persecutory delusions, auditory hallucinations, and intermittent agitation. When symptoms have reduced he has been referred to an assisted living facility and forms sent to central preadmission for [facility]-run facilities.      Barriers to Discharge:  Patient requires further psychiatric stabilization due to current symptomology. He continues to present as disorganized and tangential with paranoid thought content. He presents with mood lability. He vacillates between being pleasant and being aggressive with no clear environmental triggers. Patient endorses auditory hallucinations.     Referral Status:  Referral for housing pending psychiatric stabilization  Considerations include: [facility] in [place], Referral [facility] to [facility] in [place] 8/15     Legal Status:  [facility] [place]  File Number: [id number]  Issued 07/06/23 and is not to exceed six months    Contacts:  : Vicky Valdez with [facility], [phone]  Psychiatrist: Dr. Santana at [facility], [phone] PCP: Attila Urena DO  Mom: Alberta, [phone] (H) [phone] (M)   Dad: Ino, [phone] (H)  Sister, Sandra Treadwell, [phone] ([place])   [facility]'s Office Fax: [phone]  [facility]: [phone] ([place])  [place]: Jasmin phone [phone], fax [phone]  CADI  with [facility]: Regina Wong, [phone] (out until 8/28)    Upcoming Meetings and Dates/Important Information and next steps:  Central preadmission calling back  8/31 for an update on ECT status.

## 2023-08-23 NOTE — PROVIDER NOTIFICATION
08/23/23 2242   Individualization/Patient Specific Goals   Patient Personal Strengths community support;coping skills;expressive of emotions;family/social support;humor;interests/hobbies;resilient;resourceful;socioeconomic stability   Interprofessional Rounds   Summary Vinod is on 10 of 12 of the initial ECT sessions. [facility], [facility] in [place], and [facility] in [place].   Participants CTC;psychiatrist;nursing   Behavioral Team Discussion   Participants Dr. Rhodes, Dr. Richey, Hina Albarran, Irma HITCHCOCK RN   Progress Vinod continues to receive ECT. Referrals have been sent for discharge placement.   Anticipated length of stay 14 days   Continued Stay Criteria/Rationale Completing ECT and securing placement for discharge.   Anticipated Discharge Disposition assisted living;another healthcare facility;group home;nursing home;nursing/skilled nursing care;psychiatric hospital     PRECAUTIONS AND SAFETY    Behavioral Orders   Procedures    Assault precautions    Code 1 - Restrict to Unit    Code 2    Code 2 - 1:1 Staff Supervision     For ECT only    Electroconvulsive therapy     Series of up to 12 treatments. Begin Date: 8/2/23     Treating Psychiatrist providing ECT:  unknown     Notified on:  7/28/23 via this order  NOTE: We received verbal confirmation from [facility] that Lorenzo Pavon has been approved but awaiting official court order and risk management's review  Initial consult was completed by Dr. Hicks on 7/18/23    Electroconvulsive therapy     Series of up to 12 treatments. Begin Date: 8/2/23     Treating Psychiatrist providing ECT:  Dominga     Notified on:  8/2/23    Elopement precautions    Fall precautions    Routine Programming     As clinically indicated    Self Injury Precaution    Status 15     Every 15 minutes.    Status Individual Observation     Patient SIO status reviewed with team/RN.  Please also refer to RN/team documentation for add'l  detail.    -SIO staff to monitor following which have contributed to patient being on SIO:  Agitation  Pt is impulsive   Pt has ran out of his room naked  Pt has Parkinson symptoms place him in a high fall risk  Pt has verbal outburst of sexual and threaten statements  Pt requires immediate redirection when masturbating    -Possible interventions SIO staff could use to support patient's treatment progress:  Engage pt in activities    -When following observed, team will review discontinuation of SIO:  Absence of aggression toward others for > 24 hours    Comments: SIO 2:1     Order Specific Question:   CONTINUOUS 24 hours / day     Answer:   5 feet     Order Specific Question:   Indications for SIO     Answer:   Severe intrusiveness     Order Specific Question:   Indications for SIO     Answer:   Assault risk    Suicide precautions     Patients on Suicide Precautions should have a Combination Diet ordered that includes a Diet selection(s) AND a Behavioral Tray selection for Safe Tray - with utensils, or Safe Tray - NO utensils         Safety  Safety WDL: WDL  Patient Location: patient room, own  Observed Behavior: sleeping  Observed Behavior (Comment): pt in his room  Airway Safety Measures: all equipment/monitors on and audible  Safety Measures: safety rounds completed, 1:1 observation maintained  Diversional Activity: television  Suicidality: Status 15, SIO (Status Individual Observation)  (NOTE - order will specify distance  Seizure precautions: clutter free environment  Assault: status continuous sight, status 15  Elopement Assessment: Statements about wanting to leave  Elopement Interventions: status continuous sight  Sexual: status 15, status continuous sight, private room  Additional Documentation:  (Fall precautions)

## 2023-08-23 NOTE — ANESTHESIA POSTPROCEDURE EVALUATION
Patient: Vinod Lee    Procedure: * No procedures listed *       Anesthesia Type:  General    Note:  Disposition: Outpatient   Postop Pain Control: Uneventful            Sign Out: Well controlled pain   PONV: No   Neuro/Psych: Uneventful            Sign Out: Acceptable/Baseline neuro status   Airway/Respiratory: Uneventful            Sign Out: Acceptable/Baseline resp. status   CV/Hemodynamics: Uneventful            Sign Out: Acceptable CV status   Other NRE: NONE   DID A NON-ROUTINE EVENT OCCUR? No           Last vitals:  Vitals:    08/21/23 1754 08/22/23 1825 08/23/23 0803   BP: 134/78 120/68 137/83   Pulse: 87 87 85   Resp: 16  16   Temp: 36.1  C (97  F) 36  C (96.8  F) 36.4  C (97.5  F)   SpO2: 100% 100% 97%       Electronically Signed By: Christopher J. Behrens, MD  August 23, 2023  11:00 AM

## 2023-08-23 NOTE — PLAN OF CARE
Pt asleep at start of shift.     Breathing quiet and unlabored when sleeping.     Pt had no c/o pain or discomfort during the HS.     Appears to have slept 7 hours.     Pt continues on 2:1 SIO/5 ft space distance.      NPO @ 0000.    Goal Outcome Evaluation:  Problem: Adult Behavioral Health Plan of Care  Goal: Adheres to Safety Considerations for Self and Others  Intervention: Develop and Maintain Individualized Safety Plan  Recent Flowsheet Documentation  Taken 8/23/2023 0000 by Tameka Hatch, RN  Safety Measures:   safety rounds completed   1:1 observation maintained  Goal: Absence of New-Onset Illness or Injury  Intervention: Identify and Manage Fall Risk  Recent Flowsheet Documentation  Taken 8/23/2023 0000 by Tameka Hatch, RN  Safety Measures:   safety rounds completed   1:1 observation maintained

## 2023-08-23 NOTE — ANESTHESIA CARE TRANSFER NOTE
Patient: Vinod Lee    Procedure: * No procedures listed *       Diagnosis: * No pre-op diagnosis entered *  Diagnosis Additional Information: No value filed.    Anesthesia Type:   General     Note:    Oropharynx: oropharynx clear of all foreign objects  Level of Consciousness: awake  Oxygen Supplementation: nasal cannula    Independent Airway: airway patency satisfactory and stable  Dentition: dentition unchanged      Patient transferred to: PACU    Handoff Report: Identifed the Patient, Identified the Reponsible Provider, Reviewed the pertinent medical history, Discussed the surgical course, Reviewed Intra-OP anesthesia mangement and issues during anesthesia, Set expectations for post-procedure period and Allowed opportunity for questions and acknowledgement of understanding      Vitals:  Vitals Value Taken Time   BP     Temp     Pulse     Resp     SpO2         Electronically Signed By: Christopher J. Behrens, MD  August 23, 2023  10:59 AM

## 2023-08-23 NOTE — PROGRESS NOTES
"[facility], [place]   Psychiatric Progress Note  Hospital Day: 89        Interim History:   The patient's care was discussed with the treatment team during the daily team meeting and/or staff's chart notes were reviewed.     Per nursing staff:  Pt denies anxiety, depression, SI/SIB/HI/AVH, and contracts for safety. Pt is pleasant with a flat affect. Mood presents as labile, ranging from being pleasant to suddenly being aggressive. Pt remains isolative and withdrawn to room all shift.  He continues to be on a 1:1 SIO with acuity for SI, SIB, Fall, Assault, and Elopement risk. No behavioral outburst noted.  Pt is medication compliant. Will continue with same plan of care.     Per interview today he denied dizziness, chest pain, headache.  He said \"yes\" when asked if he was having thoughts of hurting other people but did not answer subsequent questions to elaborate this.    Suicidal ideation: no    Homicidal ideation: as above    Psychotic symptoms: no AH or VH reported during interview. Delusions present and other psychotic symptoms suspected.    Medication side effects reported: none    Acute medical concerns: none. He denies any pain or discomfort today.      Other issues reported by patient: Patient had no further questions or concerns.           Medications:      atropine  2 drop Sublingual TID    clotrimazole   Topical BID    cloZAPine  650 mg Oral Daily    cyanocobalamin  1,000 mcg Oral Daily    [Held by provider] divalproex sodium delayed-release  250 mg Oral 3 times per day on Mon Wed Fri    [Held by provider] divalproex sodium delayed-release  250 mg Oral 3 times per day on Sun Tue Thu    [Held by provider] divalproex sodium delayed-release  250 mg Oral 3 times per day on Sat    FLUoxetine  10 mg Oral Daily    gabapentin  300 mg Oral 3 times per day on Mon Wed Fri    gabapentin  300 mg Oral 3 times per day on Sun Tue Thu    gabapentin  300 mg Oral 3 times per day on Sat    " LORazepam  0.5 mg Oral 3 times per day on Mon Wed Fri    LORazepam  0.5 mg Oral 3 times per day on Sun Tue Thu    LORazepam  0.5 mg Oral 3 times per day on Sat    melatonin  10 mg Oral At Bedtime    mineral oil-hydrophilic petrolatum   Topical BID IS    naproxen  250 mg Oral BID w/meals    OLANZapine zydis  15 mg Oral BID    Or    OLANZapine  10 mg Intramuscular BID    polyethylene glycol  17 g Oral BID    sennosides  2 tablet Oral BID    tamsulosin  0.4 mg Oral Daily          Allergies:     Allergies   Allergen Reactions    Bee Venom Unknown    Montelukast Unknown    Phenothiazines Unknown          Labs:     Recent Results (from the past 24 hour(s))   EKG 12-lead, complete    Collection Time: 08/23/23 11:48 AM   Result Value Ref Range    Systolic Blood Pressure  mmHg    Diastolic Blood Pressure  mmHg    Ventricular Rate 97 BPM    Atrial Rate 97 BPM    VT Interval 152 ms    QRS Duration 160 ms     ms    QTc 528 ms    P Axis 60 degrees    R AXIS -27 degrees    T Axis 100 degrees    Interpretation ECG       Sinus rhythm  Left bundle branch block  Abnormal ECG  When compared with ECG of 22-AUG-2023 10:47,  No significant change was found                Psychiatric Examination:     /63 (BP Location: Left arm, Patient Position: Sitting, Cuff Size: Adult Regular)   Pulse 96   Temp 97.6  F (36.4  C) (Temporal)   Resp 16   Wt 85.7 kg (188 lb 14.4 oz)   SpO2 92%   BMI 27.90 kg/m    Weight is 188 lbs 14.4 oz  Body mass index is 27.9 kg/m .    Weight over time:  Vitals:    07/03/23 1100 07/30/23 0902 08/13/23 0900   Weight: 81.6 kg (179 lb 12.8 oz) 86.5 kg (190 lb 9.6 oz) 85.7 kg (188 lb 14.4 oz)       Orthostatic Vitals         Most Recent      Sitting Orthostatic /61 07/25 0800    Sitting Orthostatic Pulse (bpm) 85 07/25 0800          Cardiometabolic risk assessment. 06/07/23    Reviewed patient profile for cardiometabolic risk factors    Date taken /Value  REFERENCE RANGE   Abdominal Obesity  (Waist  Circumference)   See nursing flowsheet Women ?35 in (88 cm)   Men ?40 in (102 cm)      Triglycerides  No results found for: TRIG    ?150 mg/dL (1.7 mmol/L) or current treatment for elevated triglycerides   HDL cholesterol  No results found for: HDL]   Women <50 mg/dL (1.3 mmol/L) in women or current treatment for low HDL cholesterol  Men <40 mg/dL (1 mmol/L) in men or current treatment for low HDL cholesterol     Fasting plasma glucose (FPG) Lab Results   Component Value Date     05/26/2023      FPG ?100 mg/dL (5.6 mmol/L) or treatment for elevated blood glucose   Blood pressure  BP Readings from Last 3 Encounters:   08/23/23 130/63   05/26/23 97/55    Blood pressure ?130/85 mmHg or treatment for elevated blood pressure   Family History  See family history     Mental Status Exam:  Appearance: awake, groggy, disheveled. No evidence of pain of acute distress.   Attitude:  somewhat cooperative, more suspicious of writer today  Eye Contact:  fair, less intense  Mood:  calm  Affect:  mood congruent  Speech: mostly coherent  Psychomotor Behavior:  No evidence of psychomotor agitation or restlessness today. No evidence of dystonia, or tics.  Throught Process: linear, illogical  Associations:  no loosening of associations present  Thought Content:  Delusions. Likely auditory, tacticle and olfactory hallucinations. Cannot rule out visual hallucinations. Evidence of obsessive thinking and likely compulsions to harm others.   Insight:  limited  Judgement:  poor  Oriented to:  self, date, place  Attention Span and Concentration:  fair  Recent and Remote Memory:  poor         Precautions:     Behavioral Orders   Procedures    Assault precautions    Code 1 - Restrict to Unit    Code 2    Code 2 - 1:1 Staff Supervision     For ECT only    Electroconvulsive therapy     Series of up to 12 treatments. Begin Date: 8/2/23     Treating Psychiatrist providing ECT:  unknown     Notified on:  7/28/23 via this order  NOTE: We  received verbal confirmation from [facility] that Lorenzo Pavon has been approved but awaiting official court order and risk management's review  Initial consult was completed by Dr. Hicks on 7/18/23    Electroconvulsive therapy     Series of up to 12 treatments. Begin Date: 8/2/23     Treating Psychiatrist providing ECT:  Dominga     Notified on:  8/2/23    Elopement precautions    Fall precautions    Routine Programming     As clinically indicated    Self Injury Precaution    Status 15     Every 15 minutes.    Status Individual Observation     Patient SIO status reviewed with team/RN.  Please also refer to RN/team documentation for add'l detail.    -SIO staff to monitor following which have contributed to patient being on SIO:  Agitation  Pt is impulsive   Pt has ran out of his room naked  Pt has Parkinson symptoms place him in a high fall risk  Pt has verbal outburst of sexual and threaten statements  Pt requires immediate redirection when masturbating    -Possible interventions SIO staff could use to support patient's treatment progress:  Engage pt in activities    -When following observed, team will review discontinuation of SIO:  Absence of aggression toward others for > 24 hours    Comments: SIO 2:1     Order Specific Question:   CONTINUOUS 24 hours / day     Answer:   5 feet     Order Specific Question:   Indications for SIO     Answer:   Severe intrusiveness     Order Specific Question:   Indications for SIO     Answer:   Assault risk    Suicide precautions     Patients on Suicide Precautions should have a Combination Diet ordered that includes a Diet selection(s) AND a Behavioral Tray selection for Safe Tray - with utensils, or Safe Tray - NO utensils            Diagnoses:     #Unspecified psychosis likely schizophrenia per history, rule out Bipolar affective disorder, manic  #Unspecified encephalopathy   -R/O catatonia   -Episodes of unresponsiveness, concern for PNES   #Parkinsonism 2/2 neuroleptic  medications, rule out Parkinson's Disease  #Urinary retention and BPH  -Possible UTI -- UC resulted on  w/out growth  #Hx of prolonged QTc with clozapine  #Mild thrombocytopenia  #R/O OCD         Assessment and hospital summary:  Patient was admitted to psychiatric unit for safety, stabilization and medication management. He has had schizophrenia since the . He was on Clozaril x 25 years, and it was tapered and discontinued on May 7, 2023  due to prolonged qtc of 527, and his psychotic  symptoms have worsened since then. Sinemet was also discontinued recently due to concerns that it was contributing to paranoia and AH. He is agitated, aggressive, dangerous to self and others. He remains on SIO 2 to 1, and is under psychiatric emergency and court hold. There are concerns for organic etiology given pattern of sundowning, history of parkinsonism, and ongoing disorientation/confusion. : EKG repeated, cardiology consult regarding safety of resuming clozapine in the context of prolonged QTc and refractory schizophrenia pending response. Per cardiology, correct QTc is no more than 460. They do not have concerns about AP retrial. Neurology IP consult placed for evaluation of sundowning and cognitive decline secondary to Sinemet discontinuation. MSSA initiated. Per Neurology, discontinuation of Sinemet would not account for these symptoms. They do not recommend retrial at this time. : Clozapine titration continued.   Its possible that clozapine dose is contributing to worsened compulsive behaviors noted throughout hospitalization which could improve with lowering the dose.     Chart reviewed which revealed the followin2022: He was on clozapine, Seroquel, Cymbalta, and Carbidopa-levodopa and hospitalized for pneumonia. Psych consulted and Seroquel was stopped. Treated with Abx and discharged to TCU.     2022: Hospitalized at [facility]. Per chart review:    Vinod Lee is a 63 yo male with longstanding  history of schizophrenia. He was admitted from [facility] ED, where he presented due to increased delusional thoughts while on a visit to his parents for Thanksgiving. He began experiencing increasing paranoid thoughts that someone might be poisoning him and began fearing that he might accidentally harm someone. He reported to his parents that he was having thoughts about hitting someone or beating someone up and told them he could not guarantee they would be safe with him. Parents encouraged patient to go to the ED, which he did voluntarily.     Per patient report and chart review, he was hospitalized several times in the 1980s and reports he was civilly committed at one point in the 1980s, however he has been quite stable for the past several decades. He reports he will experience some paranoia and delusional thinking at times, but generally has good insight into his symptoms. He has been maintained on clozapine for about 25 years and prior to several months ago was also concurrently prescribed quetiapine.      In the last several months, patient has had a number of medication changes. Approximately 6 months ago, patient was diagnosed with parkinsonism related to antipsychotic use and was started on carbidopa-levidopa. He experienced no improvement in tremors, and thus carbidopa- levidopa dose was increased 1.5 weeks ago.      In addition, patient was medically hospitalized in August 2022 for confusion, weakness, repeated falls, ongoing weight loss, and SOB. He was found to have a cavitary lesion of the lung and suspected aspiration pneumonia. He was treated with antibiotics. At that time, he was taking current dose of clozapine and duloxetine, as well as propranolol ER, quetiapine, gabapentin, and clonazepam. Psychiatry was consulted due to concern for oversedation, and propranolol ER, gabapentin, and quetiapine were discontinued. Conazepam was reduced from 0.5 mg TID to BID dosing and recommended to be  "discontinued, however, it does not appear this occurred. His sedation improved significantly. QTc prolongation was also noted, but improved throughout his stay. He was ultimately discharged to a TCU for several months before returning home. He reports his weakness has greatly improved and states he \"graduated\" physical therapy, though he continues to be diligent in completed recommended exercises.      He reports that over the past several months, his delusions and anxiety have been worse than usual, with symptoms becoming much more acute since the carbidopa-levidopa dose was increased. He has felt increasingly paranoid about being poisoned, noting this is a delusion that has been stable over time, but was previously less intrusive. He has insight during the interview that his paranoid thinking is not reality based, but states it has been harder to separate delusions from reality and he becomes very worried that he might hurt someone, despite no history of violent behavior. He reports that the paranoia about his food being poisoned in combination with the tremors in his hands have made it difficult for him to eat. He has also had quite low appetite for the past 6 months to a year . He reports that due to the combination of these factors, he has lost about 50 pounds in the last year.He denies any problems with sleep initiation or maintenance. He reports energy is fairly good and \"better than it used to be.\" He reports some short term memory issues and on interview, does have some difficulty remembering details of recent events. He is unsure if he has received cognitive testing in the past.    He denies any suicidal ideation and reports he has not experienced SI for decades. He denies homicidal thoughts and is very clear that he had and has no desire to harm anyone else, but was afraid he might somehow do so. He denies any hallucinations and states \"that's never been a problem for me.\" He is not observed responding to " internal stimuli. He is alert and oriented in all spheres.        ========    BRIEF HOSPITAL COURSE: This 62 y.o. male admitted 11/25/2022 to  [place] for the assessment and treatment of the above presentation. This was a voluntary admission.     In summary, he was tapered off the Sinemet, which he reports to be both ineffective and potentially worsening the anxiety and paranoia ideations. Instead Benadryl 25 mg TID was started to help with extrapyramidal side effects. He struggled with sleep during his stay here. Remeron 7.5mg QHS was tried without success. Seroquel 100mg qhs was restarted with addition of suvorexant 10mg qhs. Given his Seroquel PRN use prior history of prolonged QTC, he will benefit from another EKG repeat on the medical unit.     Unfortunately, he tested positive for influenza A and developed hypoxemia. Now on 2L oxygen with desats when prone requiring 3L oxygen. Subsequently, the plan will to be tapering him off clonazepam due to hypoxia (and also prior plan to taper). The patient tolerated these changes without side effects.     The patient minimally benefited from the structured therapeutic milieu as he attended programming seldom as he tested positive for influenza, he needed to isolate with droplet precautions. Pt was engaged and worked on issues that were triggering/brought pt to the hospital and has excellent insight into his anxiety and paranoid delusions. Pt denied suicidal ideations throughout all/majority of their hospitalization. Pt denied HI throughout all of hospitalization. There were no concerns with behavioral disruptions/outbursts. The patient has shown improvement in general.     At the time of discharge, hospitalization course was reviewed with Vinod Lee with plan to transfer to medicine. Please consult psychiatry and I will continue to follow while patient is on the medical unit. He is now off sinemet since 11/29 21:00 and I would expect anxiety and paranoia to improve  more moving forward. Psychiatrically, once anxiety and paranoia is baseline, can D/C to home once medically stable. He DOES NOT NEED A 1:1 on the medical unit from a mental health perspective, we initiated 1:1 11/30/2022 due to significant fatigue, gait instability (he was unable to stand without assist) and feeding assist.    04/2023: Clozapine was gradually tapered and discontinued, unclear reasons why it was discontinued per outside records, though Dr. Portillo's H&P indicates that it was due to prolonged QTc.       Today's Changes:  - HOLDING Depakote due to short seizures. Will have low threshold to restart if aggression worsens  -EKG ordered today    Target psychiatric symptoms and interventions:  -Psych emergency declared on 5/27, now fully committed  -ECT Mon/Wed/Fri per Janelle   -Continue clozapine as above  -Continue scheduled Zyprexa 15mg BID  -Continue 1:1 for safety of staff and patients, reduced from 2:1 7/23/23  - weekly CBC to monitor platelets      -Continue Gabapentin 300 mg TID as staff believe it has been effective for treatment of his anxiety  -Discussed complex case at length with Dr. Hatch (primary provider on geriatic unit) who provided recs (see second opinion note dated 6/14). Appreciate assistance. I have since made the following changes:         1) Add propranolol 10 mg TID         2) Added Depakote 1000 mg qhs (to be administered in magic cup)         3) Nutrition consult to order magic cup         4) Increased melatonin to 10 mg QHS    Risks, benefits, and alternatives discussed at length with patient.     Acute Medical Problems and Treatments:  Delirium vs progression of dementia  Neurology consult placed on 6/8 for evaluation of sundowning and cognitive decline secondary to Sinemet discontinuation: Resuming Sinemet not recommended for behavioral concerns. Please see Neuro note for details.     Thrombocytopenia   Likely secondary to Depakote.  According to the, incidents of  "Depakote induced thrombocytopenia as 27%.  Depakote dose reduced from 1000 twice daily to 250 3 times daily on 7/28/2023  - weekly CBCs    Constipation  Likely worsened by clozapine, improved since modifying regimen.   - daily miralax   - daily Senokot 2 tablets BID      Right first toe fracture:  Seen by Ortho on 6/16:  Per note: Vinod Lee is a 62 year old  male w/ PMHx complex psychiatric history who has a fracture to the distal phalanx of the right toe after a recent trauma to the foot the patient reports.  Patient denies any other pain or discomfort.  Images reviewed and plan will be for irrigation of the popped fracture blister with sterile saline and the toes should be dressed and a 4 x 4 gauze dressing.  Patient will need a postop shoe which she can be weightbearing as tolerated in.  Would recommend a course of antibiotics for approximately 7 days.  Would recommend Augmentin or clindamycin.  Podiatry will be made aware of patient and will see patient while he is admitted or if patient is discharged in the near future a follow-up appointment will need to be established.     Remainder of care per primary team.  Primary team should make sure that patient is up-to-date on his tetanus shot.  If not patient will require a booster shot.    Hx of prolonged QTc:  Continue weekly EKGs. See above for details.     Urinary retention, resolved  Per patient care order:   If patient is refusing straight caths, please notify IM provider immediately to determine whether this constitutes a medical emergency. If IM declares medical emergency, may restrain patient for straight cath. Discussed this with patient's parents/substitute decision makers on 6/7 who are in support of this plan.    # Psoriasis hx  # Purplish discoloration of B/LE feet-resolved   Discussed with Dr. Rene and bedside RN regarding rash on right foot that appears and appears to be purplish in color and almost \"petechiae.\" It was noted during interview, " but seemed to be resolving upon walking. Within the past 24 hrs, pt has had ECT and seemed more confused than usual. Pt seems to have had a right great toe wound with recent right great toe fracture seen by Medicine on 7/16. Seen on 8/4 with improving color on B/L legs at rest and appears to have small, scaly patches of varying coin sizes that have not grown past marked borders. See photos. Per further chart review, has had psoriasis history. WBC WNL, no fevers, HDS. This appears to more consistent with a psoriasis like picture.   - Start triamcinolone topical 0.025%   -Apply twice daily until lesions for 2 weeks or until lesions resolve/  -Monitor for skin thinning, striae, rebound lesion flares.   - Start Eucerin cream              - Apply 30 minutes PRIOR to triamcinolone topical cream above or PRN as needed if dry skin/after bathing   - Medicine will sign off.   - For worsening pain, spread, itchiness, fevers, please contact Medicine and possibly Dermatology.    Benzodiazepine dependence:  Phenobarbital taper completed    Pertinent labs/imaging:  Weekly CBC with diff     Behavioral/Psychological/Social:  - Encourage unit programming    Safety:  - Continue precautions as noted above  - Status 15 minute checks  - Continue 1:1 for staff and pt safety    Legal Status: Fully committed with Sánchez and Lorenzo Pavon. [place] medications: clozapine, olanzapine, risperidone, quetiapine      Disposition Plan   Reason for ongoing admission: poses an imminent risk to self, poses an imminent risk to others and is unable to care for self due to severe psychosis or darryl  Discharge location:  assisted living facility  Discharge Medications: not ordered  Follow-up Appointments: not scheduled    Entered by: Merlin Richey DO on 08/23/2023  at 12:10 PM

## 2023-08-23 NOTE — ANESTHESIA PREPROCEDURE EVALUATION
Anesthesia Pre-Procedure Evaluation    Patient: Vinod Lee   MRN: [id number] : 1960        Procedure :           Past Medical History:   Diagnosis Date    LBBB (left bundle branch block)     Parkinson's disease (H)     Schizophrenia (H)       No past surgical history on file.   Allergies   Allergen Reactions    Bee Venom Unknown    Montelukast Unknown    Phenothiazines Unknown      Social History     Tobacco Use    Smoking status: Not on file    Smokeless tobacco: Never   Substance Use Topics    Alcohol use: Not on file     Comment: GRACE      Wt Readings from Last 1 Encounters:   23 85.7 kg (188 lb 14.4 oz)        Anesthesia Evaluation   Pt has had prior anesthetic.         ROS/MED HX  ENT/Pulmonary:     (+) sleep apnea,                                      Neurologic:     (+)                 Parkinson's disease, features: parkinsonism, from neuroleptics,              Cardiovascular: Comment: 23 QTc 514    (+)  hypertension- -   -  - -                        dysrhythmias, Long QT,        Previous cardiac testing   Echo: Date: Results:    Stress Test:  Date: Results:    ECG Reviewed:  Date: 23 Results:  LBBB QT intervial is 498  Cath:  Date: Results:      METS/Exercise Tolerance:     Hematologic:     (+)      anemia,          Musculoskeletal:  - neg musculoskeletal ROS     GI/Hepatic:  - neg GI/hepatic ROS     Renal/Genitourinary:     (+)        BPH,      Endo:  - neg endo ROS     Psychiatric/Substance Use: Comment: Patient is aggressive and homicidal    (+) psychiatric history schizophrenia       Infectious Disease:  - neg infectious disease ROS     Malignancy:  - neg malignancy ROS     Other:  - neg other ROS          Physical Exam    Airway  airway exam normal           Respiratory Devices and Support         Dental           Cardiovascular   cardiovascular exam normal          Pulmonary   pulmonary exam normal                OUTSIDE LABS:  CBC:   Lab Results   Component Value Date    WBC  10.8 08/18/2023    WBC 11.3 (H) 08/11/2023    HGB 11.8 (L) 08/18/2023    HGB 11.9 (L) 08/11/2023    HCT 36.8 (L) 08/18/2023    HCT 37.6 (L) 08/11/2023     08/18/2023     08/11/2023     BMP:   Lab Results   Component Value Date     08/04/2023     08/02/2023    POTASSIUM 4.9 08/04/2023    POTASSIUM 4.9 08/02/2023    CHLORIDE 106 08/04/2023    CHLORIDE 107 08/02/2023    CO2 24 08/04/2023    CO2 24 08/02/2023    BUN 37.8 (H) 08/04/2023    BUN 24.8 (H) 08/02/2023    CR 1.05 08/04/2023    CR 0.99 08/02/2023     (H) 08/04/2023    GLC 93 08/02/2023     COAGS: No results found for: PTT, INR, FIBR  POC: No results found for: BGM, HCG, HCGS  HEPATIC:   Lab Results   Component Value Date    ALBUMIN 3.6 07/13/2023    PROTTOTAL 5.6 (L) 07/13/2023    ALT 14 07/28/2023    AST 18 07/28/2023    ALKPHOS 73 07/13/2023    BILITOTAL 0.2 07/13/2023     OTHER:   Lab Results   Component Value Date    LACT 1.5 06/07/2023    BRENDA 8.3 (L) 08/04/2023    PHOS 3.8 05/25/2023    MAG 2.1 05/25/2023    TSH 1.80 05/22/2023       Anesthesia Plan    ASA Status:  3    NPO Status:  NPO Appropriate    Anesthesia Type: General.     - Airway: Mask Only   Induction: Intravenous.   Maintenance: N/A.        Consents    Anesthesia Plan(s) and associated risks, benefits, and realistic alternatives discussed. Questions answered and patient/representative(s) expressed understanding.     - Discussed:     - Discussed with:  Patient      - Extended Intubation/Ventilatory Support Discussed: No.      - Patient is DNR/DNI Status: No     Use of blood products discussed: No .     Postoperative Care    Pain management: Multi-modal analgesia.   PONV prophylaxis: Ondansetron (or other 5HT-3)     Comments:                Christopher J. Behrens, MD

## 2023-08-23 NOTE — PROGRESS NOTES
Patient adequate for discharge . Report called to unit nurse  Ana Maria COX . Iv removed and hemostasis achieved less than 2 min.  Patient safely transported back to unit with  staff persons x 2 and .
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
## 2023-08-23 NOTE — PLAN OF CARE
Problem: Adult Behavioral Health Plan of Care  Goal: Individualized Daily Interaction Plan (IDIP)  Outcome: Progressing     Problem: Psychotic Signs/Symptoms  Goal: Improved Behavioral Control (Psychotic Signs/Symptoms)  Outcome: Progressing   Goal Outcome Evaluation:    Plan of Care Reviewed With: patient          Pt denies anxiety, depression, SI/SIB/HI/AVH, and contracts for safety. Pt is pleasant with a flat affect. Mood presents as labile, ranging from being pleasant to suddenly being aggressive. Pt remains isolative and withdrawn to room all shift.  He continues to be on a 1:1 SIO with acuity for SI, SIB, Fall, Assault, and Elopement risk. No behavioral outburst noted.  Pt is medication compliant. Will continue with same plan of care.

## 2023-08-23 NOTE — PLAN OF CARE
Goal Outcome Evaluation:    Plan of Care Reviewed With: patient      Problem: Psychotic Symptoms  Goal: Psychotic Symptoms  Description: Signs and symptoms of listed problems will be absent or manageable.  Outcome: Progressing  Note: Pt presented as disorganized and paranoid. He stated he was fearful people were out to get him and believe writer had poisoned his water, which had miralax in it and was explained to pt. He denied all psych symptoms and isolated to his room during the shift. Had ECT this morning and laid in bed when he returned to the unit. Med compliant.

## 2023-08-24 LAB
ATRIAL RATE - MUSE: 97 BPM
DIASTOLIC BLOOD PRESSURE - MUSE: NORMAL MMHG
INTERPRETATION ECG - MUSE: NORMAL
P AXIS - MUSE: 60 DEGREES
PR INTERVAL - MUSE: 152 MS
QRS DURATION - MUSE: 160 MS
QT - MUSE: 416 MS
QTC - MUSE: 528 MS
R AXIS - MUSE: -27 DEGREES
SYSTOLIC BLOOD PRESSURE - MUSE: NORMAL MMHG
T AXIS - MUSE: 100 DEGREES
VENTRICULAR RATE- MUSE: 97 BPM

## 2023-08-24 PROCEDURE — 250N000013 HC RX MED GY IP 250 OP 250 PS 637: Performed by: PSYCHIATRY & NEUROLOGY

## 2023-08-24 PROCEDURE — 99233 SBSQ HOSP IP/OBS HIGH 50: CPT | Performed by: PSYCHIATRY & NEUROLOGY

## 2023-08-24 PROCEDURE — 250N000013 HC RX MED GY IP 250 OP 250 PS 637: Performed by: STUDENT IN AN ORGANIZED HEALTH CARE EDUCATION/TRAINING PROGRAM

## 2023-08-24 PROCEDURE — 250N000013 HC RX MED GY IP 250 OP 250 PS 637: Performed by: PHYSICIAN ASSISTANT

## 2023-08-24 PROCEDURE — 124N000002 HC R&B MH UMMC

## 2023-08-24 PROCEDURE — 250N000009 HC RX 250: Performed by: PSYCHIATRY & NEUROLOGY

## 2023-08-24 RX ADMIN — OLANZAPINE 15 MG: 10 TABLET, ORALLY DISINTEGRATING ORAL at 08:33

## 2023-08-24 RX ADMIN — Medication 10 MG: at 19:01

## 2023-08-24 RX ADMIN — WHITE PETROLATUM: 1.75 OINTMENT TOPICAL at 18:11

## 2023-08-24 RX ADMIN — OLANZAPINE 10 MG: 5 TABLET, FILM COATED ORAL at 11:06

## 2023-08-24 RX ADMIN — LORAZEPAM 0.5 MG: 0.5 TABLET ORAL at 13:18

## 2023-08-24 RX ADMIN — CLOTRIMAZOLE: 1 CREAM TOPICAL at 09:50

## 2023-08-24 RX ADMIN — LORAZEPAM 0.5 MG: 0.5 TABLET ORAL at 18:11

## 2023-08-24 RX ADMIN — GABAPENTIN 300 MG: 300 CAPSULE ORAL at 18:11

## 2023-08-24 RX ADMIN — GABAPENTIN 300 MG: 300 CAPSULE ORAL at 13:18

## 2023-08-24 RX ADMIN — ATROPINE SULFATE 2 DROP: 10 SOLUTION/ DROPS OPHTHALMIC at 19:01

## 2023-08-24 RX ADMIN — FLUOXETINE 10 MG: 10 CAPSULE ORAL at 08:32

## 2023-08-24 RX ADMIN — TAMSULOSIN HYDROCHLORIDE 0.4 MG: 0.4 CAPSULE ORAL at 08:33

## 2023-08-24 RX ADMIN — OLANZAPINE 15 MG: 10 TABLET, ORALLY DISINTEGRATING ORAL at 19:01

## 2023-08-24 RX ADMIN — NAPROXEN 250 MG: 250 TABLET ORAL at 18:11

## 2023-08-24 RX ADMIN — ATROPINE SULFATE 2 DROP: 10 SOLUTION/ DROPS OPHTHALMIC at 08:31

## 2023-08-24 RX ADMIN — LORAZEPAM 0.5 MG: 0.5 TABLET ORAL at 08:32

## 2023-08-24 RX ADMIN — CYANOCOBALAMIN TAB 1000 MCG 1000 MCG: 1000 TAB at 08:33

## 2023-08-24 RX ADMIN — CLOZAPINE 650 MG: 100 TABLET, ORALLY DISINTEGRATING ORAL at 11:07

## 2023-08-24 RX ADMIN — ATROPINE SULFATE 2 DROP: 10 SOLUTION/ DROPS OPHTHALMIC at 14:20

## 2023-08-24 RX ADMIN — SENNOSIDES 2 TABLET: 8.6 TABLET ORAL at 19:01

## 2023-08-24 RX ADMIN — ATROPINE SULFATE 1 DROP: 10 SOLUTION/ DROPS OPHTHALMIC at 12:35

## 2023-08-24 RX ADMIN — CLOTRIMAZOLE: 1 CREAM TOPICAL at 19:01

## 2023-08-24 RX ADMIN — WHITE PETROLATUM: 1.75 OINTMENT TOPICAL at 09:49

## 2023-08-24 RX ADMIN — NAPROXEN 250 MG: 250 TABLET ORAL at 08:32

## 2023-08-24 RX ADMIN — SENNOSIDES 2 TABLET: 8.6 TABLET ORAL at 08:32

## 2023-08-24 RX ADMIN — POLYETHYLENE GLYCOL 3350 17 G: 17 POWDER, FOR SOLUTION ORAL at 08:31

## 2023-08-24 RX ADMIN — GABAPENTIN 300 MG: 300 CAPSULE ORAL at 08:33

## 2023-08-24 ASSESSMENT — ACTIVITIES OF DAILY LIVING (ADL)
ADLS_ACUITY_SCORE: 75
DRESS: SCRUBS (BEHAVIORAL HEALTH)
ADLS_ACUITY_SCORE: 75
LAUNDRY: UNABLE TO COMPLETE
ADLS_ACUITY_SCORE: 75
ADLS_ACUITY_SCORE: 75
ORAL_HYGIENE: INDEPENDENT;PROMPTS
ADLS_ACUITY_SCORE: 75
ADLS_ACUITY_SCORE: 75
HYGIENE/GROOMING: PROMPTS
ADLS_ACUITY_SCORE: 75

## 2023-08-24 NOTE — PLAN OF CARE
Assessment/Intervention/Current Symtoms and Care Coordination:  Reviewed chart. Met with team to review patient's current functioning, needs, progress, and impediments to discharge.    Called Jasmin at [facility] to discuss discharge option. They are not a locked facility. They do not have a memory care unit. Writer will call back next week to discuss if they're a good fit based on what his discharge needs may be after the initial ECT trial is completed.     Discharge Plan or Goal:  Patient continues to present with persecutory delusions, auditory hallucinations, and intermittent agitation. When symptoms have reduced he has been referred to an assisted living facility and forms sent to central preadmission for state-run facilities.      Barriers to Discharge:  Patient requires further psychiatric stabilization due to current symptomology. He continues to present as disorganized and tangential with paranoid thought content. He presents with mood lability. He vacillates between being pleasant and being aggressive with no clear environmental triggers. Patient endorses auditory hallucinations.     Referral Status:  Referral for housing pending psychiatric stabilization  Considerations include: [facility] in [place], Referral sent to [facility] in [place] 8/15     Legal Status:  Commitment with [facility]: [place]  File Number: [id number]  Issued 07/06/23 and is not to exceed six months    Contacts:  : Vicky Valdez with [facility], [phone]  Psychiatrist: Dr. Santana at [facility], [phone] PCP: Attila Urena DO  Mom: Alberta, [phone] (H) [phone] (M)   Dad: Ino, [phone] (H)  Sister, Sandra Treadwell, [phone] (KING)   [facility]'s Office Fax: [phone]  [facility]: [phone] ([place])  [facility]: Jasmin phone [phone], fax [phone]  CADI  with [facility]: Regina Wong  [phone] (out until 8/28)    Upcoming Meetings and Dates/Important Information and next steps:  Central preadmission calling back 8/31 for an update on ECT status.

## 2023-08-24 NOTE — PLAN OF CARE
"Pt presents as disorganized and paranoid.  Pt stating that he is in an illusion, that he will be \"pummeled\" soon, that he will never leave, and that his medication is poison. Pt is not receptive when told that this things are not true. Upon assessment denied anxiety, depression, SI/HI. Upon assessment of A/VH, pt indicated that he was hearing voices, but that he could not determine what they were saying. Generally cooperative. Blunted affect. No instances of aggression. Refused scheduled Miralax, stating that he has been going to the bathroom and does not need the medication. Unkempt    Hand tremors present.     2:1 SIO maintained for assault risk    ECT tomorrow morning    Problem: Psychotic Signs/Symptoms  Goal: Improved Behavioral Control (Psychotic Signs/Symptoms)  Outcome: Progressing   Goal Outcome Evaluation:    "

## 2023-08-24 NOTE — PLAN OF CARE
Problem: Suicide Risk  Goal: Absence of Self-Harm  Outcome: Progressing   Goal Outcome Evaluation:    Plan of Care Reviewed With: patient             Patient appeared calm and was pleasant on approach. Patient was disorganized, he was telling the writer random things, rambling was also noted. He c/o pain 2/10 - did not respond when asked what part of the body hurts. He denied all mental health symptoms. Tremors noted on both hands. He was medication compliant. He took all his pills with pudding.     At 1115 hrs, patient appeared agitated. He reported hearing voices telling him that people are going to hurt him. Reassured patient that he's in a safe place. PRN olanzapine given. Patient took a nap after and reported that voices were gone after medication. Patient did not go out of his room this shift despite encouragement.     PRN atropine given at 1235 hrs for drooling.

## 2023-08-24 NOTE — PLAN OF CARE
Presents as disorganized. Calm on approach. Generally cooperative. Denies anxiety, depression, A/VH, SI/HI. Blunted affect. Withdrawn, isolating to room.  No overly paranoid statements made to writer. No noted instances of aggressive.     Hand and mouth tremors. Drooling, given scheduled atropine.      Took scheduled medication except only drank half of Miralax.     Maintains 2:1 for assault risk     Problem: Behavioral Disturbance  Goal: Behavioral Disturbance  Description: Signs and symptoms of listed problems will be absent or manageable by discharge or transition of care.  Outcome: Progressing     Problem: Psychotic Signs/Symptoms  Goal: Improved Behavioral Control (Psychotic Signs/Symptoms)  Outcome: Progressing   Goal Outcome Evaluation:    /64 (BP Location: Right arm)   Pulse 95   Temp 97.6  F (36.4  C) (Temporal)   Resp 16   Wt 85.7 kg (188 lb 14.4 oz)   SpO2 98%   BMI 27.90 kg/m

## 2023-08-24 NOTE — PLAN OF CARE
Goal Outcome Evaluation:      Pt appeared to be a sleep @ all safety checks.  Pt slept 7 hours.  Pt remains on an SIO 2:1 for safety.

## 2023-08-25 ENCOUNTER — ANESTHESIA EVENT (OUTPATIENT)
Dept: BEHAVIORAL HEALTH | Facility: CLINIC | Age: 63
DRG: 885 | End: 2023-08-25
Payer: COMMERCIAL

## 2023-08-25 ENCOUNTER — APPOINTMENT (OUTPATIENT)
Dept: BEHAVIORAL HEALTH | Facility: CLINIC | Age: 63
DRG: 885 | End: 2023-08-25
Attending: PSYCHIATRY & NEUROLOGY
Payer: COMMERCIAL

## 2023-08-25 ENCOUNTER — ANESTHESIA (OUTPATIENT)
Dept: BEHAVIORAL HEALTH | Facility: CLINIC | Age: 63
DRG: 885 | End: 2023-08-25
Payer: COMMERCIAL

## 2023-08-25 LAB
BASOPHILS # BLD AUTO: 0.1 10E3/UL (ref 0–0.2)
BASOPHILS NFR BLD AUTO: 1 %
EOSINOPHIL # BLD AUTO: 0 10E3/UL (ref 0–0.7)
EOSINOPHIL NFR BLD AUTO: 0 %
ERYTHROCYTE [DISTWIDTH] IN BLOOD BY AUTOMATED COUNT: 14.3 % (ref 10–15)
HCT VFR BLD AUTO: 40 % (ref 40–53)
HGB BLD-MCNC: 12.7 G/DL (ref 13.3–17.7)
IMM GRANULOCYTES # BLD: 0 10E3/UL
IMM GRANULOCYTES NFR BLD: 1 %
LYMPHOCYTES # BLD AUTO: 1.6 10E3/UL (ref 0.8–5.3)
LYMPHOCYTES NFR BLD AUTO: 19 %
MCH RBC QN AUTO: 27.6 PG (ref 26.5–33)
MCHC RBC AUTO-ENTMCNC: 31.8 G/DL (ref 31.5–36.5)
MCV RBC AUTO: 87 FL (ref 78–100)
MONOCYTES # BLD AUTO: 0.8 10E3/UL (ref 0–1.3)
MONOCYTES NFR BLD AUTO: 10 %
NEUTROPHILS # BLD AUTO: 6 10E3/UL (ref 1.6–8.3)
NEUTROPHILS NFR BLD AUTO: 69 %
NRBC # BLD AUTO: 0 10E3/UL
NRBC BLD AUTO-RTO: 0 /100
PLATELET # BLD AUTO: 188 10E3/UL (ref 150–450)
RBC # BLD AUTO: 4.6 10E6/UL (ref 4.4–5.9)
WBC # BLD AUTO: 8.5 10E3/UL (ref 4–11)

## 2023-08-25 PROCEDURE — 370N000017 HC ANESTHESIA TECHNICAL FEE, PER MIN: Performed by: ANESTHESIOLOGY

## 2023-08-25 PROCEDURE — 93010 ELECTROCARDIOGRAM REPORT: CPT | Performed by: INTERNAL MEDICINE

## 2023-08-25 PROCEDURE — 250N000013 HC RX MED GY IP 250 OP 250 PS 637: Performed by: STUDENT IN AN ORGANIZED HEALTH CARE EDUCATION/TRAINING PROGRAM

## 2023-08-25 PROCEDURE — 250N000009 HC RX 250: Performed by: PSYCHIATRY & NEUROLOGY

## 2023-08-25 PROCEDURE — 258N000003 HC RX IP 258 OP 636: Performed by: PSYCHIATRY & NEUROLOGY

## 2023-08-25 PROCEDURE — 250N000013 HC RX MED GY IP 250 OP 250 PS 637: Performed by: PSYCHIATRY & NEUROLOGY

## 2023-08-25 PROCEDURE — 85025 COMPLETE CBC W/AUTO DIFF WBC: CPT | Performed by: PSYCHIATRY & NEUROLOGY

## 2023-08-25 PROCEDURE — 250N000013 HC RX MED GY IP 250 OP 250 PS 637

## 2023-08-25 PROCEDURE — 36415 COLL VENOUS BLD VENIPUNCTURE: CPT | Performed by: PSYCHIATRY & NEUROLOGY

## 2023-08-25 PROCEDURE — 99233 SBSQ HOSP IP/OBS HIGH 50: CPT | Mod: 25 | Performed by: PSYCHIATRY & NEUROLOGY

## 2023-08-25 PROCEDURE — 250N000011 HC RX IP 250 OP 636: Performed by: ANESTHESIOLOGY

## 2023-08-25 PROCEDURE — 93005 ELECTROCARDIOGRAM TRACING: CPT

## 2023-08-25 PROCEDURE — 250N000013 HC RX MED GY IP 250 OP 250 PS 637: Performed by: PHYSICIAN ASSISTANT

## 2023-08-25 PROCEDURE — 250N000009 HC RX 250: Performed by: ANESTHESIOLOGY

## 2023-08-25 PROCEDURE — 90870 ELECTROCONVULSIVE THERAPY: CPT | Performed by: PSYCHIATRY & NEUROLOGY

## 2023-08-25 PROCEDURE — 90870 ELECTROCONVULSIVE THERAPY: CPT

## 2023-08-25 PROCEDURE — 124N000002 HC R&B MH UMMC

## 2023-08-25 RX ORDER — LORAZEPAM 0.5 MG/1
0.5 TABLET ORAL 3 TIMES DAILY
Status: DISCONTINUED | OUTPATIENT
Start: 2023-08-25 | End: 2023-10-25

## 2023-08-25 RX ORDER — REMIFENTANIL HYDROCHLORIDE 1 MG/ML
85 INJECTION, POWDER, LYOPHILIZED, FOR SOLUTION INTRAVENOUS
Status: CANCELLED
Start: 2023-08-26 | End: 2023-08-26

## 2023-08-25 RX ORDER — BENZTROPINE MESYLATE 1 MG/1
1 TABLET ORAL 2 TIMES DAILY
Status: DISCONTINUED | OUTPATIENT
Start: 2023-08-25 | End: 2023-09-14

## 2023-08-25 RX ORDER — FLUMAZENIL 0.1 MG/ML
0.2 INJECTION, SOLUTION INTRAVENOUS ONCE
Status: CANCELLED
Start: 2023-08-26 | End: 2023-08-26

## 2023-08-25 RX ORDER — DIVALPROEX SODIUM 250 MG/1
250 TABLET, DELAYED RELEASE ORAL EVERY 8 HOURS SCHEDULED
Status: DISCONTINUED | OUTPATIENT
Start: 2023-08-25 | End: 2023-10-04 | Stop reason: ALTCHOICE

## 2023-08-25 RX ORDER — CLOTRIMAZOLE 1 %
CREAM (GRAM) TOPICAL 2 TIMES DAILY
Status: DISCONTINUED | OUTPATIENT
Start: 2023-08-25 | End: 2023-08-30

## 2023-08-25 RX ORDER — CAFFEINE AND SODIUM BENZOATE 125 MG/ML
250 INJECTION, SOLUTION INTRAMUSCULAR; INTRAVENOUS ONCE
Status: DISCONTINUED | OUTPATIENT
Start: 2023-08-25 | End: 2023-08-25

## 2023-08-25 RX ORDER — ETOMIDATE 2 MG/ML
INJECTION INTRAVENOUS PRN
Status: DISCONTINUED | OUTPATIENT
Start: 2023-08-25 | End: 2023-08-25

## 2023-08-25 RX ORDER — ONDANSETRON 2 MG/ML
4 INJECTION INTRAMUSCULAR; INTRAVENOUS EVERY 30 MIN PRN
Status: CANCELLED | OUTPATIENT
Start: 2023-08-25

## 2023-08-25 RX ORDER — REMIFENTANIL HYDROCHLORIDE 1 MG/ML
85 INJECTION, POWDER, LYOPHILIZED, FOR SOLUTION INTRAVENOUS
Status: COMPLETED | OUTPATIENT
Start: 2023-08-25 | End: 2023-08-25

## 2023-08-25 RX ORDER — FLUMAZENIL 0.1 MG/ML
0.2 INJECTION, SOLUTION INTRAVENOUS ONCE
Status: DISCONTINUED | OUTPATIENT
Start: 2023-08-25 | End: 2023-08-25

## 2023-08-25 RX ORDER — NICARDIPINE HCL-0.9% SOD CHLOR 1 MG/10 ML
SYRINGE (ML) INTRAVENOUS PRN
Status: DISCONTINUED | OUTPATIENT
Start: 2023-08-25 | End: 2023-08-25

## 2023-08-25 RX ORDER — ONDANSETRON 4 MG/1
4 TABLET, ORALLY DISINTEGRATING ORAL EVERY 30 MIN PRN
Status: CANCELLED | OUTPATIENT
Start: 2023-08-25

## 2023-08-25 RX ORDER — GABAPENTIN 300 MG/1
300 CAPSULE ORAL 3 TIMES DAILY
Status: DISCONTINUED | OUTPATIENT
Start: 2023-08-25 | End: 2023-11-07

## 2023-08-25 RX ORDER — SODIUM CHLORIDE, SODIUM LACTATE, POTASSIUM CHLORIDE, CALCIUM CHLORIDE 600; 310; 30; 20 MG/100ML; MG/100ML; MG/100ML; MG/100ML
INJECTION, SOLUTION INTRAVENOUS CONTINUOUS
Status: CANCELLED | OUTPATIENT
Start: 2023-08-25

## 2023-08-25 RX ADMIN — OLANZAPINE 15 MG: 10 TABLET, ORALLY DISINTEGRATING ORAL at 09:48

## 2023-08-25 RX ADMIN — GABAPENTIN 300 MG: 300 CAPSULE ORAL at 20:12

## 2023-08-25 RX ADMIN — OLANZAPINE 15 MG: 10 TABLET, ORALLY DISINTEGRATING ORAL at 20:11

## 2023-08-25 RX ADMIN — POLYETHYLENE GLYCOL 3350 17 G: 17 POWDER, FOR SOLUTION ORAL at 09:49

## 2023-08-25 RX ADMIN — BENZTROPINE MESYLATE 1 MG: 1 TABLET ORAL at 11:42

## 2023-08-25 RX ADMIN — WHITE PETROLATUM: 1.75 OINTMENT TOPICAL at 18:00

## 2023-08-25 RX ADMIN — SODIUM CHLORIDE 85 MCG: 9 INJECTION, SOLUTION INTRAVENOUS at 08:49

## 2023-08-25 RX ADMIN — CLOZAPINE 650 MG: 100 TABLET, ORALLY DISINTEGRATING ORAL at 11:39

## 2023-08-25 RX ADMIN — LORAZEPAM 0.5 MG: 0.5 TABLET ORAL at 13:48

## 2023-08-25 RX ADMIN — CYANOCOBALAMIN TAB 1000 MCG 1000 MCG: 1000 TAB at 09:49

## 2023-08-25 RX ADMIN — BENZTROPINE MESYLATE 1 MG: 1 TABLET ORAL at 20:11

## 2023-08-25 RX ADMIN — SENNOSIDES 2 TABLET: 8.6 TABLET ORAL at 20:11

## 2023-08-25 RX ADMIN — SENNOSIDES 2 TABLET: 8.6 TABLET ORAL at 09:48

## 2023-08-25 RX ADMIN — ETOMIDATE 12 MG: 2 INJECTION INTRAVENOUS at 08:42

## 2023-08-25 RX ADMIN — CLOTRIMAZOLE: 1 CREAM TOPICAL at 09:51

## 2023-08-25 RX ADMIN — NAPROXEN 250 MG: 250 TABLET ORAL at 17:59

## 2023-08-25 RX ADMIN — LORAZEPAM 0.5 MG: 0.5 TABLET ORAL at 09:48

## 2023-08-25 RX ADMIN — GABAPENTIN 300 MG: 300 CAPSULE ORAL at 13:49

## 2023-08-25 RX ADMIN — DIVALPROEX SODIUM 250 MG: 250 TABLET, DELAYED RELEASE ORAL at 20:11

## 2023-08-25 RX ADMIN — TAMSULOSIN HYDROCHLORIDE 0.4 MG: 0.4 CAPSULE ORAL at 09:49

## 2023-08-25 RX ADMIN — GABAPENTIN 300 MG: 300 CAPSULE ORAL at 09:47

## 2023-08-25 RX ADMIN — POLYETHYLENE GLYCOL 3350 17 G: 17 POWDER, FOR SOLUTION ORAL at 20:13

## 2023-08-25 RX ADMIN — LORAZEPAM 0.5 MG: 0.5 TABLET ORAL at 20:11

## 2023-08-25 RX ADMIN — SUCCINYLCHOLINE CHLORIDE 80 MG: 20 INJECTION, SOLUTION INTRAMUSCULAR; INTRAVENOUS; PARENTERAL at 08:46

## 2023-08-25 RX ADMIN — ATROPINE SULFATE 2 DROP: 10 SOLUTION/ DROPS OPHTHALMIC at 13:49

## 2023-08-25 RX ADMIN — NAPROXEN 250 MG: 250 TABLET ORAL at 09:49

## 2023-08-25 RX ADMIN — Medication 500 MCG: at 08:47

## 2023-08-25 RX ADMIN — FLUMAZENIL 0.2 MG: 0.1 INJECTION, SOLUTION INTRAVENOUS at 08:40

## 2023-08-25 RX ADMIN — CAFFEINE AND SODIUM BENZOATE 250 MG: 125 INJECTION, SOLUTION INTRAMUSCULAR; INTRAVENOUS at 08:45

## 2023-08-25 RX ADMIN — WHITE PETROLATUM: 1.75 OINTMENT TOPICAL at 09:58

## 2023-08-25 RX ADMIN — Medication 10 MG: at 20:12

## 2023-08-25 RX ADMIN — ATROPINE SULFATE 2 DROP: 10 SOLUTION/ DROPS OPHTHALMIC at 20:12

## 2023-08-25 RX ADMIN — FLUOXETINE 10 MG: 10 CAPSULE ORAL at 09:49

## 2023-08-25 RX ADMIN — ATROPINE SULFATE 2 DROP: 10 SOLUTION/ DROPS OPHTHALMIC at 09:50

## 2023-08-25 RX ADMIN — CLOTRIMAZOLE: 0.01 CREAM TOPICAL at 20:12

## 2023-08-25 ASSESSMENT — ACTIVITIES OF DAILY LIVING (ADL)
ADLS_ACUITY_SCORE: 75
ADLS_ACUITY_SCORE: 75
HYGIENE/GROOMING: INDEPENDENT
ORAL_HYGIENE: PROMPTS
ADLS_ACUITY_SCORE: 75
LAUNDRY: UNABLE TO COMPLETE
ADLS_ACUITY_SCORE: 75
ADLS_ACUITY_SCORE: 65
ADLS_ACUITY_SCORE: 65
ADLS_ACUITY_SCORE: 75
DRESS: INDEPENDENT
ADLS_ACUITY_SCORE: 75
ADLS_ACUITY_SCORE: 65

## 2023-08-25 NOTE — PLAN OF CARE
Assessment/Intervention/Current Symtoms and Care Coordination:  Reviewed chart. Met with team to review patient's current functioning, needs, progress, and impediments to discharge.    Discharge Plan or Goal:  Patient continues to present with persecutory delusions, auditory hallucinations, and intermittent agitation. When symptoms have reduced he has been referred to an assisted living facility and forms sent to central preadmission for state-run facilities.      Barriers to Discharge:  Patient requires further psychiatric stabilization due to current symptomology. He continues to present as disorganized and tangential with paranoid thought content.     Referral Status:  Referral for housing pending psychiatric stabilization  Considerations include: Referral to [facility] in [place] sent 8/22, Referral sent to [facility] in [place] 8/15     Legal Status:  Commitment with [facility]: [place]  File Number: [id number]  Issued 07/06/23 and is not to exceed six months    Contacts:  : Vicky Valdez with [facility], [phone]  Psychiatrist: Dr. Santana at [facility], [phone] PCP: Attila Urena DO  Mom: Alberta, [phone] (H) [phone] (M)   Dad: Ino, [phone] (H)  Sister, Sandra Treadwell, [phone] (KING)   [facility]'s Office Fax: [phone]  [facility]: [phone] (KING)  [facility]: Jasmin phone [phone], fax [phone]  CADI  with [facility]: Regina Wong, [phone] (out until 8/28)    Upcoming Meetings and Dates/Important Information and next steps:  Central preadmission calling back 8/31 for an update on ECT status.
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
## 2023-08-25 NOTE — PLAN OF CARE
Problem: Confusion Chronic  Goal: Optimal Cognitive Function  Outcome: Progressing   Goal Outcome Evaluation:    Patient returned from ECT around 0900. Vitals were stable upon arrival, but patient was a little anxious and restless. As the shift progressed patient began calmed and cooperative. No verbal or physical aggression towards staff or peers. Patient spent the entire shift in his room. Patient took all scheduled medications without difficulty. Patient ate less than 40% of breakfast and 100% of lunch. Patient had an EKG done this afternoon. Patient denied SI,SIB denied feeling depressed, but have some anxiety due to pain. Patient unable to tell writer where the pain is, other than that,  patient was good the entire shift.

## 2023-08-25 NOTE — ANESTHESIA POSTPROCEDURE EVALUATION
Patient: Vinod Lee    Procedure: * No procedures listed *       Anesthesia Type:  General    Note:     Postop Pain Control: Uneventful            Sign Out: Well controlled pain   PONV: No   Neuro/Psych: Uneventful            Sign Out: Acceptable/Baseline neuro status   Airway/Respiratory: Uneventful            Sign Out: Acceptable/Baseline resp. status   CV/Hemodynamics: Uneventful            Sign Out: Acceptable CV status; No obvious hypovolemia; No obvious fluid overload   Other NRE: NONE   DID A NON-ROUTINE EVENT OCCUR? No           Last vitals:  Vitals:    08/24/23 0800 08/24/23 1850 08/25/23 0800   BP: 118/64 134/85 (!) 147/76   Pulse: 84 96 94   Resp:   16   Temp: 36.2  C (97.2  F) 36.4  C (97.5  F) 36.4  C (97.5  F)   SpO2: 98% 99% 98%       Electronically Signed By: Ascencion Hart DO  August 25, 2023  8:54 AM

## 2023-08-25 NOTE — PROGRESS NOTES
"[facility], [place]   Psychiatric Progress Note  Hospital Day: 91        Interim History:   The patient's care was discussed with the treatment team during the daily team meeting and/or staff's chart notes were reviewed. Presents as disorganized. Calm on approach. Generally cooperative. Denies anxiety, depression, A/VH, SI/HI. Blunted affect. Withdrawn, isolating to room.  No overly paranoid statements made to writer. No noted instances of aggressive.      Hand and mouth tremors. Drooling, given scheduled atropine.       Took scheduled medication except only drank half of Miralax.      Maintains 2:1 for assault risk      Upon interview, Vinod continues to report vague thoughts of harming others. Continues to express concerns about being evil, and feels that staff will \"beat the shit\" out of him, \"its only a matter of time before that happens.\"     Suicidal ideation: no    Homicidal ideation: as above    Psychotic symptoms: no AH or VH reported during interview. Delusions present and other psychotic symptoms suspected.    Medication side effects reported: none    Acute medical concerns: none. He denies any pain or discomfort today.      Other issues reported by patient: Patient had no further questions or concerns.           Medications:      atropine  2 drop Sublingual TID    clotrimazole   Topical BID    cloZAPine  650 mg Oral Daily    cyanocobalamin  1,000 mcg Oral Daily    [Held by provider] divalproex sodium delayed-release  250 mg Oral 3 times per day on Mon Wed Fri    [Held by provider] divalproex sodium delayed-release  250 mg Oral 3 times per day on Sun Tue Thu    [Held by provider] divalproex sodium delayed-release  250 mg Oral 3 times per day on Sat    FLUoxetine  10 mg Oral Daily    gabapentin  300 mg Oral 3 times per day on Mon Wed Fri    gabapentin  300 mg Oral 3 times per day on Sun Tue Thu    gabapentin  300 mg Oral 3 times per day on Sat    LORazepam  0.5 mg Oral 3 times " per day on Mon Wed Fri    LORazepam  0.5 mg Oral 3 times per day on Sun Tue Thu    LORazepam  0.5 mg Oral 3 times per day on Sat    melatonin  10 mg Oral At Bedtime    mineral oil-hydrophilic petrolatum   Topical BID IS    naproxen  250 mg Oral BID w/meals    OLANZapine zydis  15 mg Oral BID    Or    OLANZapine  10 mg Intramuscular BID    polyethylene glycol  17 g Oral BID    sennosides  2 tablet Oral BID    tamsulosin  0.4 mg Oral Daily          Allergies:     Allergies   Allergen Reactions    Bee Venom Unknown    Montelukast Unknown    Phenothiazines Unknown          Labs:     Recent Results (from the past 24 hour(s))   CBC with platelets and differential    Collection Time: 08/25/23  8:02 AM   Result Value Ref Range    WBC Count 8.5 4.0 - 11.0 10e3/uL    RBC Count 4.60 4.40 - 5.90 10e6/uL    Hemoglobin 12.7 (L) 13.3 - 17.7 g/dL    Hematocrit 40.0 40.0 - 53.0 %    MCV 87 78 - 100 fL    MCH 27.6 26.5 - 33.0 pg    MCHC 31.8 31.5 - 36.5 g/dL    RDW 14.3 10.0 - 15.0 %    Platelet Count 188 150 - 450 10e3/uL    % Neutrophils 69 %    % Lymphocytes 19 %    % Monocytes 10 %    % Eosinophils 0 %    % Basophils 1 %    % Immature Granulocytes 1 %    NRBCs per 100 WBC 0 <1 /100    Absolute Neutrophils 6.0 1.6 - 8.3 10e3/uL    Absolute Lymphocytes 1.6 0.8 - 5.3 10e3/uL    Absolute Monocytes 0.8 0.0 - 1.3 10e3/uL    Absolute Eosinophils 0.0 0.0 - 0.7 10e3/uL    Absolute Basophils 0.1 0.0 - 0.2 10e3/uL    Absolute Immature Granulocytes 0.0 <=0.4 10e3/uL    Absolute NRBCs 0.0 10e3/uL              Psychiatric Examination:     BP (!) 147/76 (BP Location: Left arm, Patient Position: Sitting, Cuff Size: Adult Regular)   Pulse 94   Temp 97.5  F (36.4  C) (Temporal)   Resp 16   Wt 85.7 kg (188 lb 14.4 oz)   SpO2 98%   BMI 27.90 kg/m    Weight is 188 lbs 14.4 oz  Body mass index is 27.9 kg/m .    Weight over time:  Vitals:    07/03/23 1100 07/30/23 0902 08/13/23 0900   Weight: 81.6 kg (179 lb 12.8 oz) 86.5 kg (190 lb 9.6 oz) 85.7  kg (188 lb 14.4 oz)       Orthostatic Vitals         Most Recent      Sitting Orthostatic /61 07/25 0800    Sitting Orthostatic Pulse (bpm) 85 07/25 0800          Cardiometabolic risk assessment. 06/07/23    Reviewed patient profile for cardiometabolic risk factors    Date taken /Value  REFERENCE RANGE   Abdominal Obesity  (Waist Circumference)   See nursing flowsheet Women ?35 in (88 cm)   Men ?40 in (102 cm)      Triglycerides  No results found for: TRIG    ?150 mg/dL (1.7 mmol/L) or current treatment for elevated triglycerides   HDL cholesterol  No results found for: HDL]   Women <50 mg/dL (1.3 mmol/L) in women or current treatment for low HDL cholesterol  Men <40 mg/dL (1 mmol/L) in men or current treatment for low HDL cholesterol     Fasting plasma glucose (FPG) Lab Results   Component Value Date     05/26/2023      FPG ?100 mg/dL (5.6 mmol/L) or treatment for elevated blood glucose   Blood pressure  BP Readings from Last 3 Encounters:   08/25/23 (!) 147/76   05/26/23 97/55    Blood pressure ?130/85 mmHg or treatment for elevated blood pressure   Family History  See family history     Mental Status Exam:  Appearance: awake, groggy, disheveled. No evidence of pain of acute distress.   Attitude:  somewhat cooperative, more suspicious of writer today  Eye Contact:  fair, less intense  Mood:  calm  Affect:  mood congruent  Speech: mostly coherent  Psychomotor Behavior:  No evidence of psychomotor agitation or restlessness today. No evidence of dystonia, or tics.  Throught Process: linear, illogical  Associations:  no loosening of associations present  Thought Content:  Delusions. Likely auditory, tacticle and olfactory hallucinations. Cannot rule out visual hallucinations. Evidence of obsessive thinking and likely compulsions to harm others.   Insight:  limited  Judgement:  poor  Oriented to:  self, date, place  Attention Span and Concentration:  fair  Recent and Remote Memory:  poor          Precautions:     Behavioral Orders   Procedures    Assault precautions    Code 1 - Restrict to Unit    Code 2    Code 2 - 1:1 Staff Supervision     For ECT only    Electroconvulsive therapy     Series of up to 12 treatments. Begin Date: 8/2/23     Treating Psychiatrist providing ECT:  unknown     Notified on:  7/28/23 via this order  NOTE: We received verbal confirmation from [facility] that Lorenzo Pavon has been approved but awaiting official court order and risk management's review  Initial consult was completed by Dr. Hicks on 7/18/23    Electroconvulsive therapy     Series of up to 12 treatments. Begin Date: 8/2/23     Treating Psychiatrist providing ECT:  Dominga     Notified on:  8/2/23    Elopement precautions    Fall precautions    Routine Programming     As clinically indicated    Self Injury Precaution    Status 15     Every 15 minutes.    Status Individual Observation     Patient SIO status reviewed with team/RN.  Please also refer to RN/team documentation for add'l detail.    -SIO staff to monitor following which have contributed to patient being on SIO:  Agitation  Pt is impulsive   Pt has ran out of his room naked  Pt has Parkinson symptoms place him in a high fall risk  Pt has verbal outburst of sexual and threaten statements  Pt requires immediate redirection when masturbating    -Possible interventions SIO staff could use to support patient's treatment progress:  Engage pt in activities    -When following observed, team will review discontinuation of SIO:  Absence of aggression toward others for > 24 hours    Comments: SIO 2:1     Order Specific Question:   CONTINUOUS 24 hours / day     Answer:   5 feet     Order Specific Question:   Indications for SIO     Answer:   Severe intrusiveness     Order Specific Question:   Indications for SIO     Answer:   Assault risk    Suicide precautions     Patients on Suicide Precautions should have a Combination Diet ordered that includes a Diet selection(s) AND a  Behavioral Tray selection for Safe Tray - with utensils, or Safe Tray - NO utensils            Diagnoses:     #Unspecified psychosis likely schizophrenia per history, rule out Bipolar affective disorder, manic  #Unspecified encephalopathy   -R/O catatonia   -Episodes of unresponsiveness, concern for PNES   #Parkinsonism 2/2 neuroleptic medications, rule out Parkinson's Disease  #Urinary retention and BPH  -Possible UTI -- UC resulted on 5/25 w/out growth  #Hx of prolonged QTc with clozapine  #Mild thrombocytopenia  #R/O OCD         Assessment and hospital summary:  Patient was admitted to psychiatric unit for safety, stabilization and medication management. He has had schizophrenia since the 1980. He was on Clozaril x 25 years, and it was tapered and discontinued on May 7, 2023  due to prolonged qtc of 527, and his psychotic  symptoms have worsened since then. Sinemet was also discontinued recently due to concerns that it was contributing to paranoia and AH. He is agitated, aggressive, dangerous to self and others. He remains on SIO 2 to 1, and is under psychiatric emergency and court hold. There are concerns for organic etiology given pattern of sundowning, history of parkinsonism, and ongoing disorientation/confusion. 6/8: EKG repeated, cardiology consult regarding safety of resuming clozapine in the context of prolonged QTc and refractory schizophrenia pending response. Per cardiology, correct QTc is no more than 460. They do not have concerns about AP retrial. Neurology IP consult placed for evaluation of sundowning and cognitive decline secondary to Sinemet discontinuation. MSSA initiated. Per Neurology, discontinuation of Sinemet would not account for these symptoms. They do not recommend retrial at this time. 6/22: Clozapine titration continued.   Its possible that clozapine dose is contributing to worsened compulsive behaviors noted throughout hospitalization which could improve with lowering the dose.      Chart reviewed which revealed the followin2022: He was on clozapine, Seroquel, Cymbalta, and Carbidopa-levodopa and hospitalized for pneumonia. Psych consulted and Seroquel was stopped. Treated with Abx and discharged to TCU.     2022: Hospitalized at [facility]. Per chart review:    Vinod Lee is a 61 yo male with longstanding history of schizophrenia. He was admitted from [facility] ED, where he presented due to increased delusional thoughts while on a visit to his parents for Thanksgiving. He began experiencing increasing paranoid thoughts that someone might be poisoning him and began fearing that he might accidentally harm someone. He reported to his parents that he was having thoughts about hitting someone or beating someone up and told them he could not guarantee they would be safe with him. Parents encouraged patient to go to the ED, which he did voluntarily.     Per patient report and chart review, he was hospitalized several times in the  and reports he was civilly committed at one point in the , however he has been quite stable for the past several decades. He reports he will experience some paranoia and delusional thinking at times, but generally has good insight into his symptoms. He has been maintained on clozapine for about 25 years and prior to several months ago was also concurrently prescribed quetiapine.      In the last several months, patient has had a number of medication changes. Approximately 6 months ago, patient was diagnosed with parkinsonism related to antipsychotic use and was started on carbidopa-levidopa. He experienced no improvement in tremors, and thus carbidopa- levidopa dose was increased 1.5 weeks ago.      In addition, patient was medically hospitalized in 2022 for confusion, weakness, repeated falls, ongoing weight loss, and SOB. He was found to have a cavitary lesion of the lung and suspected aspiration pneumonia. He was treated with  "antibiotics. At that time, he was taking current dose of clozapine and duloxetine, as well as propranolol ER, quetiapine, gabapentin, and clonazepam. Psychiatry was consulted due to concern for oversedation, and propranolol ER, gabapentin, and quetiapine were discontinued. Conazepam was reduced from 0.5 mg TID to BID dosing and recommended to be discontinued, however, it does not appear this occurred. His sedation improved significantly. QTc prolongation was also noted, but improved throughout his stay. He was ultimately discharged to a TCU for several months before returning home. He reports his weakness has greatly improved and states he \"graduated\" physical therapy, though he continues to be diligent in completed recommended exercises.      He reports that over the past several months, his delusions and anxiety have been worse than usual, with symptoms becoming much more acute since the carbidopa-levidopa dose was increased. He has felt increasingly paranoid about being poisoned, noting this is a delusion that has been stable over time, but was previously less intrusive. He has insight during the interview that his paranoid thinking is not reality based, but states it has been harder to separate delusions from reality and he becomes very worried that he might hurt someone, despite no history of violent behavior. He reports that the paranoia about his food being poisoned in combination with the tremors in his hands have made it difficult for him to eat. He has also had quite low appetite for the past 6 months to a year . He reports that due to the combination of these factors, he has lost about 50 pounds in the last year.He denies any problems with sleep initiation or maintenance. He reports energy is fairly good and \"better than it used to be.\" He reports some short term memory issues and on interview, does have some difficulty remembering details of recent events. He is unsure if he has received cognitive " "testing in the past.    He denies any suicidal ideation and reports he has not experienced SI for decades. He denies homicidal thoughts and is very clear that he had and has no desire to harm anyone else, but was afraid he might somehow do so. He denies any hallucinations and states \"that's never been a problem for me.\" He is not observed responding to internal stimuli. He is alert and oriented in all spheres.        ========    BRIEF HOSPITAL COURSE: This 62 y.o. male admitted 11/25/2022 to  [facility] for the assessment and treatment of the above presentation. This was a voluntary admission.     In summary, he was tapered off the Sinemet, which he reports to be both ineffective and potentially worsening the anxiety and paranoia ideations. Instead Benadryl 25 mg TID was started to help with extrapyramidal side effects. He struggled with sleep during his stay here. Remeron 7.5mg QHS was tried without success. Seroquel 100mg qhs was restarted with addition of suvorexant 10mg qhs. Given his Seroquel PRN use prior history of prolonged QTC, he will benefit from another EKG repeat on the medical unit.     Unfortunately, he tested positive for influenza A and developed hypoxemia. Now on 2L oxygen with desats when prone requiring 3L oxygen. Subsequently, the plan will to be tapering him off clonazepam due to hypoxia (and also prior plan to taper). The patient tolerated these changes without side effects.     The patient minimally benefited from the structured therapeutic milieu as he attended programming seldom as he tested positive for influenza, he needed to isolate with droplet precautions. Pt was engaged and worked on issues that were triggering/brought pt to the hospital and has excellent insight into his anxiety and paranoid delusions. Pt denied suicidal ideations throughout all/majority of their hospitalization. Pt denied HI throughout all of hospitalization. There were no concerns with behavioral disruptions/outbursts. " The patient has shown improvement in general.     At the time of discharge, hospitalization course was reviewed with Vinod Lee with plan to transfer to medicine. Please consult psychiatry and I will continue to follow while patient is on the medical unit. He is now off sinemet since 11/29 21:00 and I would expect anxiety and paranoia to improve more moving forward. Psychiatrically, once anxiety and paranoia is baseline, can D/C to home once medically stable. He DOES NOT NEED A 1:1 on the medical unit from a mental health perspective, we initiated 1:1 11/30/2022 due to significant fatigue, gait instability (he was unable to stand without assist) and feeding assist.    04/2023: Clozapine was gradually tapered and discontinued, unclear reasons why it was discontinued per outside records, though Dr. Portillo's H&P indicates that it was due to prolonged QTc.       Today's Changes:  - HOLDING Depakote due to short seizures. Will have low threshold to restart if aggression worsens    Target psychiatric symptoms and interventions:  -Psych emergency declared on 5/27, now fully committed  -ECT Mon/Wed/Fri per Janelle   -Continue clozapine as above  -Continue scheduled Zyprexa 15mg BID  -Continue 1:1 for safety of staff and patients, reduced from 2:1 7/23/23  - weekly CBC to monitor platelets      -Continue Gabapentin 300 mg TID as staff believe it has been effective for treatment of his anxiety  -Discussed complex case at length with Dr. Hatch (primary provider on geriatic unit) who provided recs (see second opinion note dated 6/14). Appreciate assistance. I have since made the following changes:         1) Add propranolol 10 mg TID         2) Added Depakote 1000 mg qhs (to be administered in magic cup)         3) Nutrition consult to order magic cup         4) Increased melatonin to 10 mg QHS    Risks, benefits, and alternatives discussed at length with patient.     Acute Medical Problems and  Treatments:  Delirium vs progression of dementia  Neurology consult placed on 6/8 for evaluation of sundowning and cognitive decline secondary to Sinemet discontinuation: Resuming Sinemet not recommended for behavioral concerns. Please see Neuro note for details.     Thrombocytopenia   Likely secondary to Depakote.  According to the, incidents of Depakote induced thrombocytopenia as 27%.  Depakote dose reduced from 1000 twice daily to 250 3 times daily on 7/28/2023  - weekly CBCs    Constipation  Likely worsened by clozapine, improved since modifying regimen.   - daily miralax   - daily Senokot 2 tablets BID      Right first toe fracture:  Seen by Ortho on 6/16:  Per note: Vinod Lee is a 62 year old  male w/ PMHx complex psychiatric history who has a fracture to the distal phalanx of the right toe after a recent trauma to the foot the patient reports.  Patient denies any other pain or discomfort.  Images reviewed and plan will be for irrigation of the popped fracture blister with sterile saline and the toes should be dressed and a 4 x 4 gauze dressing.  Patient will need a postop shoe which she can be weightbearing as tolerated in.  Would recommend a course of antibiotics for approximately 7 days.  Would recommend Augmentin or clindamycin.  Podiatry will be made aware of patient and will see patient while he is admitted or if patient is discharged in the near future a follow-up appointment will need to be established.     Remainder of care per primary team.  Primary team should make sure that patient is up-to-date on his tetanus shot.  If not patient will require a booster shot.    Hx of prolonged QTc:  Continue weekly EKGs. See above for details.     Urinary retention, resolved  Per patient care order:   If patient is refusing straight caths, please notify IM provider immediately to determine whether this constitutes a medical emergency. If IM declares medical emergency, may restrain patient for straight cath.  "Discussed this with patient's parents/substitute decision makers on 6/7 who are in support of this plan.    # Psoriasis hx  # Purplish discoloration of B/LE feet-resolved   Discussed with Dr. Rene and bedside RN regarding rash on right foot that appears and appears to be purplish in color and almost \"petechiae.\" It was noted during interview, but seemed to be resolving upon walking. Within the past 24 hrs, pt has had ECT and seemed more confused than usual. Pt seems to have had a right great toe wound with recent right great toe fracture seen by Medicine on 7/16. Seen on 8/4 with improving color on B/L legs at rest and appears to have small, scaly patches of varying coin sizes that have not grown past marked borders. See photos. Per further chart review, has had psoriasis history. WBC WNL, no fevers, HDS. This appears to more consistent with a psoriasis like picture.   - Start triamcinolone topical 0.025%   -Apply twice daily until lesions for 2 weeks or until lesions resolve/  -Monitor for skin thinning, striae, rebound lesion flares.   - Start Eucerin cream              - Apply 30 minutes PRIOR to triamcinolone topical cream above or PRN as needed if dry skin/after bathing   - Medicine will sign off.   - For worsening pain, spread, itchiness, fevers, please contact Medicine and possibly Dermatology.    Benzodiazepine dependence:  Phenobarbital taper completed    Pertinent labs/imaging:  Weekly CBC with diff     Behavioral/Psychological/Social:  - Encourage unit programming    Safety:  - Continue precautions as noted above  - Status 15 minute checks  - Continue 1:1 for staff and pt safety    Legal Status: Fully committed with Sánchez and Lorenzo Pavon. Sánchez medications: clozapine, olanzapine, risperidone, quetiapine      Disposition Plan   Reason for ongoing admission: poses an imminent risk to self, poses an imminent risk to others and is unable to care for self due to severe psychosis or darryl  Discharge " location:  assisted living facility  Discharge Medications: not ordered  Follow-up Appointments: not scheduled    Entered by: Ingrid Rhodes MD on 08/25/2023  at 8:57 AM

## 2023-08-25 NOTE — ANESTHESIA CARE TRANSFER NOTE
Patient: Vinod Lee    Procedure: * No procedures listed *       Diagnosis: * No pre-op diagnosis entered *  Diagnosis Additional Information: No value filed.    Anesthesia Type:   General     Note:    Oropharynx: oral airway in place  Level of Consciousness: drowsy      Independent Airway: airway patency satisfactory and stable  Dentition: dentition unchanged  Vital Signs Stable: post-procedure vital signs reviewed and stable  Report to RN Given: handoff report given  Patient transferred to: PACU    Handoff Report: Identifed the Patient, Identified the Reponsible Provider, Reviewed the pertinent medical history, Discussed the surgical course, Reviewed Intra-OP anesthesia mangement and issues during anesthesia, Set expectations for post-procedure period and Allowed opportunity for questions and acknowledgement of understanding      Vitals:  Vitals Value Taken Time   BP     Temp     Pulse     Resp     SpO2         Electronically Signed By: Ascencion Hart DO  August 25, 2023  8:53 AM

## 2023-08-25 NOTE — PROGRESS NOTES
Patient adequate for discharge. Report called to inpatient nurse Moriah Davis RN. VSS, A/O, IV removed. Discharged with staff and security personnel at this time.

## 2023-08-25 NOTE — PROGRESS NOTES
"[facility], [place]   Psychiatric Progress Note  Hospital Day: 91        Interim History:   The patient's care was discussed with the treatment team during the daily team meeting and/or staff's chart notes were reviewed. Continues to have mouth, hand, and foot tremors. Yesterday morning, he became agitated while reporting that he was hearing voices telling him that people are going to hurt him. Responded well to reassurance. Pt presents as disorganized and paranoid.  Pt stating that he is in an illusion, that he will be \"pummeled\" soon, that he will never leave, and that his medication is poison. Pt is not receptive when told that this things are not true. Upon assessment denied anxiety, depression, SI/HI. Upon assessment of A/VH, pt indicated that he was hearing voices, but that he could not determine what they were saying. Generally cooperative. Blunted affect. No instances of aggression. Refused scheduled Miralax, stating that he has been going to the bathroom and does not need the medication. Unkempt     Upon interview, Vinod reported that he is feeling \"ambivalent\" and \"anxious.\" Again expressed concerns that staff were planning on harming him eventually. He said that he continues to experience tremors, and has, to some extent, for several years. He expressed desire to shower. Did not have any specific questions for this writer today. Discussed proposed medication changes. He supported plan to add Cogentin. Initially he said that he did not want to take Prozac any longer because \"I am not delusional.\" I explained why Prozac is recommended, and he was then in support of plan to increase.     Suicidal ideation: no    Homicidal ideation: \"not at the moment. Not sure if the last one I experienced was 1 days ago or 1 month ago.\"     Psychotic symptoms: no AH or VH reported during interview. Delusions present and other psychotic symptoms suspected.    Medication side effects reported: " none    Acute medical concerns: none. He denies any pain or discomfort today.      Other issues reported by patient: Patient had no further questions or concerns.           Medications:      atropine  2 drop Sublingual TID    benztropine  1 mg Oral BID    clotrimazole   Topical BID    cloZAPine  650 mg Oral Daily    cyanocobalamin  1,000 mcg Oral Daily    [Held by provider] divalproex sodium delayed-release  250 mg Oral 3 times per day on Mon Wed Fri    [Held by provider] divalproex sodium delayed-release  250 mg Oral 3 times per day on Sun Tue Thu    [Held by provider] divalproex sodium delayed-release  250 mg Oral 3 times per day on Sat    FLUoxetine  10 mg Oral Daily    gabapentin  300 mg Oral 3 times per day on Mon Wed Fri    gabapentin  300 mg Oral 3 times per day on Sun Tue Thu    gabapentin  300 mg Oral 3 times per day on Sat    LORazepam  0.5 mg Oral 3 times per day on Mon Wed Fri    LORazepam  0.5 mg Oral 3 times per day on Sun Tue Thu    LORazepam  0.5 mg Oral 3 times per day on Sat    melatonin  10 mg Oral At Bedtime    mineral oil-hydrophilic petrolatum   Topical BID IS    naproxen  250 mg Oral BID w/meals    OLANZapine zydis  15 mg Oral BID    Or    OLANZapine  10 mg Intramuscular BID    polyethylene glycol  17 g Oral BID    sennosides  2 tablet Oral BID    tamsulosin  0.4 mg Oral Daily          Allergies:     Allergies   Allergen Reactions    Bee Venom Unknown    Montelukast Unknown    Phenothiazines Unknown          Labs:     Recent Results (from the past 24 hour(s))   CBC with platelets and differential    Collection Time: 08/25/23  8:02 AM   Result Value Ref Range    WBC Count 8.5 4.0 - 11.0 10e3/uL    RBC Count 4.60 4.40 - 5.90 10e6/uL    Hemoglobin 12.7 (L) 13.3 - 17.7 g/dL    Hematocrit 40.0 40.0 - 53.0 %    MCV 87 78 - 100 fL    MCH 27.6 26.5 - 33.0 pg    MCHC 31.8 31.5 - 36.5 g/dL    RDW 14.3 10.0 - 15.0 %    Platelet Count 188 150 - 450 10e3/uL    % Neutrophils 69 %    % Lymphocytes 19 %    %  Monocytes 10 %    % Eosinophils 0 %    % Basophils 1 %    % Immature Granulocytes 1 %    NRBCs per 100 WBC 0 <1 /100    Absolute Neutrophils 6.0 1.6 - 8.3 10e3/uL    Absolute Lymphocytes 1.6 0.8 - 5.3 10e3/uL    Absolute Monocytes 0.8 0.0 - 1.3 10e3/uL    Absolute Eosinophils 0.0 0.0 - 0.7 10e3/uL    Absolute Basophils 0.1 0.0 - 0.2 10e3/uL    Absolute Immature Granulocytes 0.0 <=0.4 10e3/uL    Absolute NRBCs 0.0 10e3/uL              Psychiatric Examination:     /73   Pulse 98   Temp 97.2  F (36.2  C) (Tympanic)   Resp 18   Wt 85.7 kg (188 lb 14.4 oz)   SpO2 94%   BMI 27.90 kg/m    Weight is 188 lbs 14.4 oz  Body mass index is 27.9 kg/m .    Weight over time:  Vitals:    07/03/23 1100 07/30/23 0902 08/13/23 0900   Weight: 81.6 kg (179 lb 12.8 oz) 86.5 kg (190 lb 9.6 oz) 85.7 kg (188 lb 14.4 oz)       Orthostatic Vitals         Most Recent      Sitting Orthostatic /61 07/25 0800    Sitting Orthostatic Pulse (bpm) 85 07/25 0800          Cardiometabolic risk assessment. 06/07/23    Reviewed patient profile for cardiometabolic risk factors    Date taken /Value  REFERENCE RANGE   Abdominal Obesity  (Waist Circumference)   See nursing flowsheet Women ?35 in (88 cm)   Men ?40 in (102 cm)      Triglycerides  No results found for: TRIG    ?150 mg/dL (1.7 mmol/L) or current treatment for elevated triglycerides   HDL cholesterol  No results found for: HDL]   Women <50 mg/dL (1.3 mmol/L) in women or current treatment for low HDL cholesterol  Men <40 mg/dL (1 mmol/L) in men or current treatment for low HDL cholesterol     Fasting plasma glucose (FPG) Lab Results   Component Value Date     05/26/2023      FPG ?100 mg/dL (5.6 mmol/L) or treatment for elevated blood glucose   Blood pressure  BP Readings from Last 3 Encounters:   08/25/23 118/73   05/26/23 97/55    Blood pressure ?130/85 mmHg or treatment for elevated blood pressure   Family History  See family history     Mental Status Exam:  Appearance:  awake, groggy, disheveled. No evidence of pain of acute distress.   Attitude:  somewhat cooperative, more suspicious of writer today  Eye Contact:  fair, less intense  Mood:  calm  Affect:  mood congruent  Speech: mostly coherent  Psychomotor Behavior:  No evidence of psychomotor agitation or restlessness today. No evidence of dystonia, or tics.  Throught Process: linear, illogical  Associations:  no loosening of associations present  Thought Content:  Delusions. Likely auditory, tacticle and olfactory hallucinations. Cannot rule out visual hallucinations. Evidence of obsessive thinking and likely compulsions to harm others.   Insight:  limited  Judgement:  poor  Oriented to:  self, date, place  Attention Span and Concentration:  fair  Recent and Remote Memory:  poor         Precautions:     Behavioral Orders   Procedures    Assault precautions    Code 1 - Restrict to Unit    Code 2    Code 2 - 1:1 Staff Supervision     For ECT only    Electroconvulsive therapy     Series of up to 12 treatments. Begin Date: 8/2/23     Treating Psychiatrist providing ECT:  unknown     Notified on:  7/28/23 via this order  NOTE: We received verbal confirmation from [facility] that Lorenzo Pavon has been approved but awaiting official court order and risk management's review  Initial consult was completed by Dr. Hicks on 7/18/23    Electroconvulsive therapy     Series of up to 12 treatments. Begin Date: 8/2/23     Treating Psychiatrist providing ECT:  Dominga     Notified on:  8/2/23    Elopement precautions    Fall precautions    Routine Programming     As clinically indicated    Self Injury Precaution    Status 15     Every 15 minutes.    Status Individual Observation     Patient SIO status reviewed with team/RN.  Please also refer to RN/team documentation for add'l detail.    -SIO staff to monitor following which have contributed to patient being on SIO:  Agitation  Pt is impulsive   Pt has ran out of his room naked  Pt has Parkinson  symptoms place him in a high fall risk  Pt has verbal outburst of sexual and threaten statements  Pt requires immediate redirection when masturbating    -Possible interventions SIO staff could use to support patient's treatment progress:  Engage pt in activities    -When following observed, team will review discontinuation of SIO:  Absence of aggression toward others for > 24 hours    Comments: SIO 2:1     Order Specific Question:   CONTINUOUS 24 hours / day     Answer:   5 feet     Order Specific Question:   Indications for SIO     Answer:   Severe intrusiveness     Order Specific Question:   Indications for SIO     Answer:   Assault risk    Suicide precautions     Patients on Suicide Precautions should have a Combination Diet ordered that includes a Diet selection(s) AND a Behavioral Tray selection for Safe Tray - with utensils, or Safe Tray - NO utensils            Diagnoses:     #Unspecified psychosis likely schizophrenia per history, rule out Bipolar affective disorder, manic  #Unspecified encephalopathy   -R/O catatonia   -Episodes of unresponsiveness, concern for PNES   #Parkinsonism 2/2 neuroleptic medications, rule out Parkinson's Disease  #Urinary retention and BPH  -Possible UTI -- UC resulted on 5/25 w/out growth  #Hx of prolonged QTc with clozapine  #Mild thrombocytopenia  #R/O OCD         Assessment and hospital summary:  Patient was admitted to psychiatric unit for safety, stabilization and medication management. He has had schizophrenia since the 1980. He was on Clozaril x 25 years, and it was tapered and discontinued on May 7, 2023  due to prolonged qtc of 527, and his psychotic  symptoms have worsened since then. Sinemet was also discontinued recently due to concerns that it was contributing to paranoia and AH. He is agitated, aggressive, dangerous to self and others. He remains on SIO 2 to 1, and is under psychiatric emergency and court hold. There are concerns for organic etiology given pattern  of sundowning, history of parkinsonism, and ongoing disorientation/confusion. : EKG repeated, cardiology consult regarding safety of resuming clozapine in the context of prolonged QTc and refractory schizophrenia pending response. Per cardiology, correct QTc is no more than 460. They do not have concerns about AP retrial. Neurology IP consult placed for evaluation of sundowning and cognitive decline secondary to Sinemet discontinuation. MSSA initiated. Per Neurology, discontinuation of Sinemet would not account for these symptoms. They do not recommend retrial at this time. : Clozapine titration continued.   Its possible that clozapine dose is contributing to worsened compulsive behaviors noted throughout hospitalization which could improve with lowering the dose.     Chart reviewed which revealed the followin2022: He was on clozapine, Seroquel, Cymbalta, and Carbidopa-levodopa and hospitalized for pneumonia. Psych consulted and Seroquel was stopped. Treated with Abx and discharged to TCU.     2022: Hospitalized at [place]. Per chart review:    Vinod Lee is a 63 yo male with longstanding history of schizophrenia. He was admitted from [facility] ED, where he presented due to increased delusional thoughts while on a visit to his parents for Thanksgiving. He began experiencing increasing paranoid thoughts that someone might be poisoning him and began fearing that he might accidentally harm someone. He reported to his parents that he was having thoughts about hitting someone or beating someone up and told them he could not guarantee they would be safe with him. Parents encouraged patient to go to the ED, which he did voluntarily.     Per patient report and chart review, he was hospitalized several times in the  and reports he was civilly committed at one point in the , however he has been quite stable for the past several decades. He reports he will experience some paranoia and  "delusional thinking at times, but generally has good insight into his symptoms. He has been maintained on clozapine for about 25 years and prior to several months ago was also concurrently prescribed quetiapine.      In the last several months, patient has had a number of medication changes. Approximately 6 months ago, patient was diagnosed with parkinsonism related to antipsychotic use and was started on carbidopa-levidopa. He experienced no improvement in tremors, and thus carbidopa- levidopa dose was increased 1.5 weeks ago.      In addition, patient was medically hospitalized in August 2022 for confusion, weakness, repeated falls, ongoing weight loss, and SOB. He was found to have a cavitary lesion of the lung and suspected aspiration pneumonia. He was treated with antibiotics. At that time, he was taking current dose of clozapine and duloxetine, as well as propranolol ER, quetiapine, gabapentin, and clonazepam. Psychiatry was consulted due to concern for oversedation, and propranolol ER, gabapentin, and quetiapine were discontinued. Conazepam was reduced from 0.5 mg TID to BID dosing and recommended to be discontinued, however, it does not appear this occurred. His sedation improved significantly. QTc prolongation was also noted, but improved throughout his stay. He was ultimately discharged to a TCU for several months before returning home. He reports his weakness has greatly improved and states he \"graduated\" physical therapy, though he continues to be diligent in completed recommended exercises.      He reports that over the past several months, his delusions and anxiety have been worse than usual, with symptoms becoming much more acute since the carbidopa-levidopa dose was increased. He has felt increasingly paranoid about being poisoned, noting this is a delusion that has been stable over time, but was previously less intrusive. He has insight during the interview that his paranoid thinking is not reality " "based, but states it has been harder to separate delusions from reality and he becomes very worried that he might hurt someone, despite no history of violent behavior. He reports that the paranoia about his food being poisoned in combination with the tremors in his hands have made it difficult for him to eat. He has also had quite low appetite for the past 6 months to a year . He reports that due to the combination of these factors, he has lost about 50 pounds in the last year.He denies any problems with sleep initiation or maintenance. He reports energy is fairly good and \"better than it used to be.\" He reports some short term memory issues and on interview, does have some difficulty remembering details of recent events. He is unsure if he has received cognitive testing in the past.    He denies any suicidal ideation and reports he has not experienced SI for decades. He denies homicidal thoughts and is very clear that he had and has no desire to harm anyone else, but was afraid he might somehow do so. He denies any hallucinations and states \"that's never been a problem for me.\" He is not observed responding to internal stimuli. He is alert and oriented in all spheres.        ========    BRIEF HOSPITAL COURSE: This 62 y.o. male admitted 11/25/2022 to  [place] for the assessment and treatment of the above presentation. This was a voluntary admission.     In summary, he was tapered off the Sinemet, which he reports to be both ineffective and potentially worsening the anxiety and paranoia ideations. Instead Benadryl 25 mg TID was started to help with extrapyramidal side effects. He struggled with sleep during his stay here. Remeron 7.5mg QHS was tried without success. Seroquel 100mg qhs was restarted with addition of suvorexant 10mg qhs. Given his Seroquel PRN use prior history of prolonged QTC, he will benefit from another EKG repeat on the medical unit.     Unfortunately, he tested positive for influenza A and " developed hypoxemia. Now on 2L oxygen with desats when prone requiring 3L oxygen. Subsequently, the plan will to be tapering him off clonazepam due to hypoxia (and also prior plan to taper). The patient tolerated these changes without side effects.     The patient minimally benefited from the structured therapeutic milieu as he attended programming seldom as he tested positive for influenza, he needed to isolate with droplet precautions. Pt was engaged and worked on issues that were triggering/brought pt to the hospital and has excellent insight into his anxiety and paranoid delusions. Pt denied suicidal ideations throughout all/majority of their hospitalization. Pt denied HI throughout all of hospitalization. There were no concerns with behavioral disruptions/outbursts. The patient has shown improvement in general.     At the time of discharge, hospitalization course was reviewed with Vinod Lee with plan to transfer to medicine. Please consult psychiatry and I will continue to follow while patient is on the medical unit. He is now off sinemet since 11/29 21:00 and I would expect anxiety and paranoia to improve more moving forward. Psychiatrically, once anxiety and paranoia is baseline, can D/C to home once medically stable. He DOES NOT NEED A 1:1 on the medical unit from a mental health perspective, we initiated 1:1 11/30/2022 due to significant fatigue, gait instability (he was unable to stand without assist) and feeding assist.    04/2023: Clozapine was gradually tapered and discontinued, unclear reasons why it was discontinued per outside records, though Dr. Portillo's H&P indicates that it was due to prolonged QTc.       Today's Changes:  - HOLDING Depakote due to short seizures. Will have low threshold to restart if aggression worsens. May consider reinitiation at total daily dose of 750 mg. At higher doses, patient developed thrombocytopenia.   - Start Cogentin 1 mg BID to target EPSE  - Increase Prozac to  20 mg daily.   - ECT # 11 completed today. STOP ECT due to lack of substantial improvements.   - Discussed EKG findings with Cardiology today. Corrected QTc is 501. Per cardiology, repeat EKG today. If corrected QTc is > 500, will need to reduce clozapine. If < 500, OK to keep clozapine at current dose.   UPDATE/ADDENDUM: Repeat EKG with corrected QTc of 440. No need to adjust clozapine dose per cards.   - Consider anticholinesterase - some literature suggests it may reduce hallucinations (though not prominent for him)   - Will discuss case with Dr. Melo, geriatric psychiatrist. May also consider formal neurology consult given lack of overall improvement despite gold standard interventions for treatment refractory schizophrenia.    Target psychiatric symptoms and interventions:  -Psych emergency declared on 5/27, now fully committed  -ECT Mon/Wed/Fri per Janelle   -Continue clozapine as above  -Continue scheduled Zyprexa 15mg BID  -Continue 1:1 for safety of staff and patients, reduced from 2:1 7/23/23  - weekly CBC to monitor platelets      -Continue Gabapentin 300 mg TID as staff believe it has been effective for treatment of his anxiety  -Discussed complex case at length with Dr. Hatch (primary provider on geriatic unit) who provided recs (see second opinion note dated 6/14). Appreciate assistance. I have since made the following changes:         1) Add propranolol 10 mg TID         2) Added Depakote 1000 mg qhs (to be administered in magic cup)         3) Nutrition consult to order magic cup         4) Increased melatonin to 10 mg QHS    Risks, benefits, and alternatives discussed at length with patient.     Acute Medical Problems and Treatments:  Delirium vs progression of dementia  Neurology consult placed on 6/8 for evaluation of sundowning and cognitive decline secondary to Sinemet discontinuation: Resuming Sinemet not recommended for behavioral concerns. Please see Neuro note for details.      Thrombocytopenia   Likely secondary to Depakote.  According to the, incidents of Depakote induced thrombocytopenia as 27%.  Depakote dose reduced from 1000 twice daily to 250 3 times daily on 7/28/2023  - weekly CBCs    Constipation  Likely worsened by clozapine, improved since modifying regimen.   - daily miralax   - daily Senokot 2 tablets BID      Right first toe fracture:  Seen by Ortho on 6/16:  Per note: Vinod Lee is a 62 year old  male w/ PMHx complex psychiatric history who has a fracture to the distal phalanx of the right toe after a recent trauma to the foot the patient reports.  Patient denies any other pain or discomfort.  Images reviewed and plan will be for irrigation of the popped fracture blister with sterile saline and the toes should be dressed and a 4 x 4 gauze dressing.  Patient will need a postop shoe which she can be weightbearing as tolerated in.  Would recommend a course of antibiotics for approximately 7 days.  Would recommend Augmentin or clindamycin.  Podiatry will be made aware of patient and will see patient while he is admitted or if patient is discharged in the near future a follow-up appointment will need to be established.     Remainder of care per primary team.  Primary team should make sure that patient is up-to-date on his tetanus shot.  If not patient will require a booster shot.    Hx of prolonged QTc:  Continue weekly EKGs. See above for details.     Urinary retention, resolved  Per patient care order:   If patient is refusing straight caths, please notify IM provider immediately to determine whether this constitutes a medical emergency. If IM declares medical emergency, may restrain patient for straight cath. Discussed this with patient's parents/substitute decision makers on 6/7 who are in support of this plan.    # Psoriasis hx  # Purplish discoloration of B/LE feet-resolved   Discussed with Dr. Rene and bedside RN regarding rash on right foot that appears and  "appears to be purplish in color and almost \"petechiae.\" It was noted during interview, but seemed to be resolving upon walking. Within the past 24 hrs, pt has had ECT and seemed more confused than usual. Pt seems to have had a right great toe wound with recent right great toe fracture seen by Medicine on 7/16. Seen on 8/4 with improving color on B/L legs at rest and appears to have small, scaly patches of varying coin sizes that have not grown past marked borders. See photos. Per further chart review, has had psoriasis history. WBC WNL, no fevers, HDS. This appears to more consistent with a psoriasis like picture.   - Start triamcinolone topical 0.025%   -Apply twice daily until lesions for 2 weeks or until lesions resolve/  -Monitor for skin thinning, striae, rebound lesion flares.   - Start Eucerin cream              - Apply 30 minutes PRIOR to triamcinolone topical cream above or PRN as needed if dry skin/after bathing   - Medicine will sign off.   - For worsening pain, spread, itchiness, fevers, please contact Medicine and possibly Dermatology.    Benzodiazepine dependence:  Phenobarbital taper completed    Pertinent labs/imaging:  Weekly CBC with diff     Behavioral/Psychological/Social:  - Encourage unit programming    Safety:  - Continue precautions as noted above  - Status 15 minute checks  - Continue 1:1 for staff and pt safety    Legal Status: Fully committed with Sánchez and Lorenzo Pavon. [place] medications: clozapine, olanzapine, risperidone, quetiapine      Disposition Plan   Reason for ongoing admission: poses an imminent risk to self, poses an imminent risk to others and is unable to care for self due to severe psychosis or darryl  Discharge location:  assisted living facility  Discharge Medications: not ordered  Follow-up Appointments: not scheduled    Entered by: Ingrid Rhodes MD on 08/25/2023  at 12:30 PM           "

## 2023-08-25 NOTE — ANESTHESIA PREPROCEDURE EVALUATION
Anesthesia Pre-Procedure Evaluation    Patient: Vinod Lee   MRN: [id number] : 1960        Procedure :           Past Medical History:   Diagnosis Date    LBBB (left bundle branch block)     Parkinson's disease (H)     Schizophrenia (H)       No past surgical history on file.   Allergies   Allergen Reactions    Bee Venom Unknown    Montelukast Unknown    Phenothiazines Unknown      Social History     Tobacco Use    Smoking status: Not on file    Smokeless tobacco: Never   Substance Use Topics    Alcohol use: Not on file     Comment: GRACE      Wt Readings from Last 1 Encounters:   23 85.7 kg (188 lb 14.4 oz)        Anesthesia Evaluation            ROS/MED HX  ENT/Pulmonary:       Neurologic:     (+)                 Parkinson's disease,               Cardiovascular:       METS/Exercise Tolerance:     Hematologic:       Musculoskeletal:       GI/Hepatic:       Renal/Genitourinary:       Endo:       Psychiatric/Substance Use:     (+) psychiatric history schizophrenia       Infectious Disease:       Malignancy:       Other:            Physical Exam    Airway        Mallampati: III   TM distance: > 3 FB   Neck ROM: full   Mouth opening: > 3 cm    Respiratory Devices and Support         Dental       (+) Modest Abnormalities - crowns, retainers, 1 or 2 missing teeth      Cardiovascular   cardiovascular exam normal          Pulmonary   pulmonary exam normal                OUTSIDE LABS:  CBC:   Lab Results   Component Value Date    WBC 10.8 2023    WBC 11.3 (H) 2023    HGB 11.8 (L) 2023    HGB 11.9 (L) 2023    HCT 36.8 (L) 2023    HCT 37.6 (L) 2023     2023     2023     BMP:   Lab Results   Component Value Date     2023     2023    POTASSIUM 4.9 2023    POTASSIUM 4.9 2023    CHLORIDE 106 2023    CHLORIDE 107 2023    CO2 24 2023    CO2 24 2023    BUN 37.8 (H) 2023    BUN 24.8 (H)  08/02/2023    CR 1.05 08/04/2023    CR 0.99 08/02/2023     (H) 08/04/2023    GLC 93 08/02/2023     COAGS: No results found for: PTT, INR, FIBR  POC: No results found for: BGM, HCG, HCGS  HEPATIC:   Lab Results   Component Value Date    ALBUMIN 3.6 07/13/2023    PROTTOTAL 5.6 (L) 07/13/2023    ALT 14 07/28/2023    AST 18 07/28/2023    ALKPHOS 73 07/13/2023    BILITOTAL 0.2 07/13/2023     OTHER:   Lab Results   Component Value Date    LACT 1.5 06/07/2023    BRENDA 8.3 (L) 08/04/2023    PHOS 3.8 05/25/2023    MAG 2.1 05/25/2023    TSH 1.80 05/22/2023       Anesthesia Plan    ASA Status:  3       Anesthesia Type: General.   Induction: Intravenous.           Consents            Postoperative Care            Comments:                Ascencion Hart DO

## 2023-08-26 PROCEDURE — 124N000002 HC R&B MH UMMC

## 2023-08-26 PROCEDURE — 250N000013 HC RX MED GY IP 250 OP 250 PS 637: Performed by: STUDENT IN AN ORGANIZED HEALTH CARE EDUCATION/TRAINING PROGRAM

## 2023-08-26 PROCEDURE — 250N000013 HC RX MED GY IP 250 OP 250 PS 637: Performed by: PSYCHIATRY & NEUROLOGY

## 2023-08-26 PROCEDURE — 99233 SBSQ HOSP IP/OBS HIGH 50: CPT | Performed by: PSYCHIATRY & NEUROLOGY

## 2023-08-26 PROCEDURE — 250N000013 HC RX MED GY IP 250 OP 250 PS 637: Performed by: PHYSICIAN ASSISTANT

## 2023-08-26 RX ADMIN — GABAPENTIN 300 MG: 300 CAPSULE ORAL at 08:33

## 2023-08-26 RX ADMIN — SENNOSIDES 2 TABLET: 8.6 TABLET ORAL at 08:33

## 2023-08-26 RX ADMIN — GABAPENTIN 300 MG: 300 CAPSULE ORAL at 19:56

## 2023-08-26 RX ADMIN — LORAZEPAM 0.5 MG: 0.5 TABLET ORAL at 08:34

## 2023-08-26 RX ADMIN — NAPROXEN 250 MG: 250 TABLET ORAL at 16:38

## 2023-08-26 RX ADMIN — WHITE PETROLATUM: 1.75 OINTMENT TOPICAL at 08:34

## 2023-08-26 RX ADMIN — NAPROXEN 250 MG: 250 TABLET ORAL at 08:34

## 2023-08-26 RX ADMIN — ATROPINE SULFATE 2 DROP: 10 SOLUTION/ DROPS OPHTHALMIC at 08:35

## 2023-08-26 RX ADMIN — GABAPENTIN 300 MG: 300 CAPSULE ORAL at 13:59

## 2023-08-26 RX ADMIN — TAMSULOSIN HYDROCHLORIDE 0.4 MG: 0.4 CAPSULE ORAL at 08:32

## 2023-08-26 RX ADMIN — LORAZEPAM 0.5 MG: 0.5 TABLET ORAL at 19:56

## 2023-08-26 RX ADMIN — WHITE PETROLATUM: 1.75 OINTMENT TOPICAL at 16:41

## 2023-08-26 RX ADMIN — DIVALPROEX SODIUM 250 MG: 250 TABLET, DELAYED RELEASE ORAL at 13:59

## 2023-08-26 RX ADMIN — BENZTROPINE MESYLATE 1 MG: 1 TABLET ORAL at 16:36

## 2023-08-26 RX ADMIN — OLANZAPINE 15 MG: 10 TABLET, ORALLY DISINTEGRATING ORAL at 19:56

## 2023-08-26 RX ADMIN — DIVALPROEX SODIUM 250 MG: 250 TABLET, DELAYED RELEASE ORAL at 06:06

## 2023-08-26 RX ADMIN — CLOTRIMAZOLE: 0.01 CREAM TOPICAL at 19:56

## 2023-08-26 RX ADMIN — CLOTRIMAZOLE: 0.01 CREAM TOPICAL at 08:35

## 2023-08-26 RX ADMIN — BENZTROPINE MESYLATE 1 MG: 1 TABLET ORAL at 08:33

## 2023-08-26 RX ADMIN — LORAZEPAM 0.5 MG: 0.5 TABLET ORAL at 13:59

## 2023-08-26 RX ADMIN — CYANOCOBALAMIN TAB 1000 MCG 1000 MCG: 1000 TAB at 08:33

## 2023-08-26 RX ADMIN — DIVALPROEX SODIUM 250 MG: 250 TABLET, DELAYED RELEASE ORAL at 19:56

## 2023-08-26 RX ADMIN — ATROPINE SULFATE 2 DROP: 10 SOLUTION/ DROPS OPHTHALMIC at 19:56

## 2023-08-26 RX ADMIN — FLUOXETINE 20 MG: 20 CAPSULE ORAL at 08:34

## 2023-08-26 RX ADMIN — OLANZAPINE 15 MG: 10 TABLET, ORALLY DISINTEGRATING ORAL at 08:33

## 2023-08-26 RX ADMIN — Medication 10 MG: at 19:56

## 2023-08-26 RX ADMIN — ATROPINE SULFATE 2 DROP: 10 SOLUTION/ DROPS OPHTHALMIC at 13:59

## 2023-08-26 RX ADMIN — POLYETHYLENE GLYCOL 3350 17 G: 17 POWDER, FOR SOLUTION ORAL at 19:58

## 2023-08-26 RX ADMIN — SENNOSIDES 2 TABLET: 8.6 TABLET ORAL at 19:57

## 2023-08-26 RX ADMIN — POLYETHYLENE GLYCOL 3350 17 G: 17 POWDER, FOR SOLUTION ORAL at 08:33

## 2023-08-26 RX ADMIN — CLOZAPINE 650 MG: 100 TABLET, ORALLY DISINTEGRATING ORAL at 12:01

## 2023-08-26 ASSESSMENT — ACTIVITIES OF DAILY LIVING (ADL)
HYGIENE/GROOMING: INDEPENDENT
LAUNDRY: UNABLE TO COMPLETE
ADLS_ACUITY_SCORE: 64
DRESS: INDEPENDENT
ORAL_HYGIENE: PROMPTS
HYGIENE/GROOMING: INDEPENDENT
ADLS_ACUITY_SCORE: 64
ADLS_ACUITY_SCORE: 65
LAUNDRY: UNABLE TO COMPLETE
ADLS_ACUITY_SCORE: 64
ADLS_ACUITY_SCORE: 64
ADLS_ACUITY_SCORE: 65
DRESS: INDEPENDENT
ADLS_ACUITY_SCORE: 65
ADLS_ACUITY_SCORE: 64
ORAL_HYGIENE: PROMPTS

## 2023-08-26 NOTE — CARE PLAN
Occupational Therapy Group Note:     08/26/23 1622   Group Therapy Session   Group Attendance attended group session   Time Session Began 1115   Time Session Ended 1200   Total Time (minutes) 25 (no charge)   Total # Attendees 3   Group Type task skill   Group Topic Covered coping skills/lifestyle management;emotions/expression;leisure exploration/use of leisure time;structured socialization;relaxation techniques   Group Session Detail OT Activity Group   Patient Response/Contribution cooperative with task;disorganized   Patient Participation Detail Patient engaged in a therapeutic activity (coloring) in order to: promote relaxation/relieve stress, improve focus, and foster creativity. Patient entered group room with SIO. Patient brought with him a coloring sheet and markers; needed encouragement to attend to task. Patient attended to task briefly before appearing to lose interest. Patient appeared to perseverate on a marker stain on his finger and began to wash hands multiple times in group room. Writer engaged patient in a simple 2 step visual-spatial cognitive activity; patient seemed to have difficulty following simplified instructions of activity. Required 1:1 simplified cueing to promote engagement and increased understanding. Patient engaged in negative self-talk throughout group; not receptive to encouraging words by writer/staff.

## 2023-08-26 NOTE — PLAN OF CARE
"  Problem: Adult Behavioral Health Plan of Care  Goal: Optimized Coping Skills in Response to Life Stressors  Outcome: Not Progressing     Problem: Psychotic Symptoms  Goal: Psychotic Symptoms  Description: Signs and symptoms of listed problems will be absent or manageable.  Outcome: Not Progressing     Patient is calm and flat became more restless towards the end of the shift. Did not want to eat breakfast but ate all of his lunch. Patient comes out of his room to sit in the lounge and watch TV. Sat in the OT room with another patient and colored. Socializes with nursing staff.  Took a long shower today. Patient has disorganized and illogical thinking. Continues to have negative thoughts of him self and things he has done in the past. Paranoid and suspicious about water and pills provided to him from staff. Thinks we are poisoning him. Patient is redirectable and compliant with cares. He was compliant with all of his medication in chocolate pudding. Endorses anxiety is a 1/10 and depression 1/10. Denied SI/SIB/HI. Denied having any visual or auditory hallucinations. When asked what are the voices saying he said \"they are telling me......\" He did not complete his sentence and said \"I do not know what the voices are telling me.\" Contracted for safety. No aggressive behaviors this shift.     /71 (BP Location: Right arm, Patient Position: Sitting, Cuff Size: Adult Regular)   Pulse 74   Temp 97.4  F (36.3  C) (Oral)   Resp 18   Wt 82.8 kg (182 lb 7 oz)   SpO2 97%   BMI 26.94 kg/m      "

## 2023-08-26 NOTE — PROGRESS NOTES
"[facility], [place], [place]   Psychiatric Progress Note  Hospital Day: 92        Interim History:   The patient's care was discussed with the treatment team during the daily team meeting and/or staff's chart notes were reviewed. Vinod was concerned about \"contaminating\" everything he touched. Also continues to voice the belief that his medication is poison. Accepted them on the third approach. Would not acknowledge or deny hallucinations and did not overtly respond to inner stimuli. No episodes of aggression.     Upon interview, Vinod said that he has \"been worse.\" When asked to describe his mood, he said \"neutral.\" Still voicing concerns that staff are poisoning him because he is evil. He was more receptive to possibility that these beliefs are delusional in nature and not reality based. He denied acute medical concerns. Denied pain or discomfort. He said that he is feeling safe on the unit \"at the moment.\"      Suicidal ideation: no    Homicidal ideation: Denies    Psychotic symptoms: no AH or VH reported during interview. Delusions present and other psychotic symptoms suspected.    Medication side effects reported: none    Acute medical concerns: none. He denies any pain or discomfort today.      Other issues reported by patient: Patient had no further questions or concerns.           Medications:      atropine  2 drop Sublingual TID    benztropine  1 mg Oral BID    clotrimazole   Topical BID    cloZAPine  650 mg Oral Daily    cyanocobalamin  1,000 mcg Oral Daily    divalproex sodium delayed-release  250 mg Oral Q8H KIKI    FLUoxetine  20 mg Oral Daily    gabapentin  300 mg Oral TID    LORazepam  0.5 mg Oral TID    melatonin  10 mg Oral At Bedtime    mineral oil-hydrophilic petrolatum   Topical BID IS    naproxen  250 mg Oral BID w/meals    OLANZapine zydis  15 mg Oral BID    Or    OLANZapine  10 mg Intramuscular BID    polyethylene glycol  17 g Oral BID    sennosides  2 tablet Oral BID    " tamsulosin  0.4 mg Oral Daily          Allergies:     Allergies   Allergen Reactions    Bee Venom Unknown    Montelukast Unknown    Phenothiazines Unknown          Labs:     Recent Results (from the past 24 hour(s))   EKG 12-lead, complete    Collection Time: 08/25/23  1:01 PM   Result Value Ref Range    Systolic Blood Pressure  mmHg    Diastolic Blood Pressure  mmHg    Ventricular Rate 98 BPM    Atrial Rate 98 BPM    MI Interval 152 ms    QRS Duration 154 ms     ms    QTc 520 ms    P Axis 50 degrees    R AXIS -13 degrees    T Axis 96 degrees    Interpretation ECG       Sinus rhythm  Left bundle branch block  Abnormal ECG  When compared with ECG of 23-AUG-2023 11:48,  No significant change was found                Psychiatric Examination:     /71 (BP Location: Right arm, Patient Position: Sitting, Cuff Size: Adult Regular)   Pulse 74   Temp 97.4  F (36.3  C) (Oral)   Resp 18   Wt 85.7 kg (188 lb 14.4 oz)   SpO2 97%   BMI 27.90 kg/m    Weight is 188 lbs 14.4 oz  Body mass index is 27.9 kg/m .    Weight over time:  Vitals:    07/03/23 1100 07/30/23 0902 08/13/23 0900   Weight: 81.6 kg (179 lb 12.8 oz) 86.5 kg (190 lb 9.6 oz) 85.7 kg (188 lb 14.4 oz)       Orthostatic Vitals         Most Recent      Sitting Orthostatic /61 07/25 0800    Sitting Orthostatic Pulse (bpm) 85 07/25 0800          Cardiometabolic risk assessment. 06/07/23    Reviewed patient profile for cardiometabolic risk factors    Date taken /Value  REFERENCE RANGE   Abdominal Obesity  (Waist Circumference)   See nursing flowsheet Women ?35 in (88 cm)   Men ?40 in (102 cm)      Triglycerides  No results found for: TRIG    ?150 mg/dL (1.7 mmol/L) or current treatment for elevated triglycerides   HDL cholesterol  No results found for: HDL]   Women <50 mg/dL (1.3 mmol/L) in women or current treatment for low HDL cholesterol  Men <40 mg/dL (1 mmol/L) in men or current treatment for low HDL cholesterol     Fasting plasma glucose (FPG)  Lab Results   Component Value Date     05/26/2023      FPG ?100 mg/dL (5.6 mmol/L) or treatment for elevated blood glucose   Blood pressure  BP Readings from Last 3 Encounters:   08/26/23 112/71   05/26/23 97/55    Blood pressure ?130/85 mmHg or treatment for elevated blood pressure   Family History  See family history     Mental Status Exam:  Appearance: awake, groggy, disheveled. No evidence of pain of acute distress.   Attitude:  somewhat cooperative, more suspicious of writer today  Eye Contact:  fair, less intense  Mood:  calm  Affect:  mood congruent  Speech: mostly coherent  Psychomotor Behavior:  No evidence of psychomotor agitation or restlessness today. No evidence of dystonia, or tics.  Throught Process: linear, illogical  Associations:  no loosening of associations present  Thought Content:  Delusions. Likely auditory, tacticle and olfactory hallucinations. Cannot rule out visual hallucinations. Evidence of obsessive thinking and likely compulsions to harm others.   Insight:  limited  Judgement:  poor  Oriented to:  self, date, place  Attention Span and Concentration:  fair  Recent and Remote Memory:  poor         Precautions:     Behavioral Orders   Procedures    Assault precautions    Code 1 - Restrict to Unit    Code 2    Code 2 - 1:1 Staff Supervision     For ECT only    Electroconvulsive therapy     Series of up to 12 treatments. Begin Date: 8/2/23     Treating Psychiatrist providing ECT:  unknown     Notified on:  7/28/23 via this order  NOTE: We received verbal confirmation from [facility] that Lorenzo Pavon has been approved but awaiting official court order and risk management's review  Initial consult was completed by Dr. Hicks on 7/18/23    Electroconvulsive therapy     Series of up to 12 treatments. Begin Date: 8/2/23     Treating Psychiatrist providing ECT:  Dominga     Notified on:  8/2/23    Elopement precautions    Fall precautions    Routine Programming     As clinically indicated     Self Injury Precaution    Status 15     Every 15 minutes.    Status Individual Observation     Patient SIO status reviewed with team/RN.  Please also refer to RN/team documentation for add'l detail.    -SIO staff to monitor following which have contributed to patient being on SIO:  Agitation  Pt is impulsive   Pt has ran out of his room naked  Pt has Parkinson symptoms place him in a high fall risk  Pt has verbal outburst of sexual and threaten statements  Pt requires immediate redirection when masturbating    -Possible interventions SIO staff could use to support patient's treatment progress:  Engage pt in activities    -When following observed, team will review discontinuation of SIO:  Absence of aggression toward others for > 24 hours    Comments: SIO 2:1     Order Specific Question:   CONTINUOUS 24 hours / day     Answer:   5 feet     Order Specific Question:   Indications for SIO     Answer:   Severe intrusiveness     Order Specific Question:   Indications for SIO     Answer:   Assault risk    Suicide precautions     Patients on Suicide Precautions should have a Combination Diet ordered that includes a Diet selection(s) AND a Behavioral Tray selection for Safe Tray - with utensils, or Safe Tray - NO utensils            Diagnoses:     #Unspecified psychosis likely schizophrenia per history, rule out Bipolar affective disorder, manic  #Unspecified encephalopathy   -R/O catatonia   -Episodes of unresponsiveness, concern for PNES   #Parkinsonism 2/2 neuroleptic medications, rule out Parkinson's Disease  #Urinary retention and BPH  -Possible UTI -- UC resulted on 5/25 w/out growth  #Hx of prolonged QTc with clozapine  #Mild thrombocytopenia  #R/O OCD         Assessment and hospital summary:  Patient was admitted to psychiatric unit for safety, stabilization and medication management. He has had schizophrenia since the 1980. He was on Clozaril x 25 years, and it was tapered and discontinued on May 7, 2023  due to  prolonged qtc of 527, and his psychotic  symptoms have worsened since then. Sinemet was also discontinued recently due to concerns that it was contributing to paranoia and AH. He is agitated, aggressive, dangerous to self and others. He remains on SIO 2 to 1, and is under psychiatric emergency and court hold. There are concerns for organic etiology given pattern of sundowning, history of parkinsonism, and ongoing disorientation/confusion. : EKG repeated, cardiology consult regarding safety of resuming clozapine in the context of prolonged QTc and refractory schizophrenia pending response. Per cardiology, correct QTc is no more than 460. They do not have concerns about AP retrial. Neurology IP consult placed for evaluation of sundowning and cognitive decline secondary to Sinemet discontinuation. MSSA initiated. Per Neurology, discontinuation of Sinemet would not account for these symptoms. They do not recommend retrial at this time. : Clozapine titration continued.   Its possible that clozapine dose is contributing to worsened compulsive behaviors noted throughout hospitalization which could improve with lowering the dose.     Chart reviewed which revealed the followin2022: He was on clozapine, Seroquel, Cymbalta, and Carbidopa-levodopa and hospitalized for pneumonia. Psych consulted and Seroquel was stopped. Treated with Abx and discharged to TCU.     2022: Hospitalized at [place]. Per chart review:    Vinod Lee is a 63 yo male with longstanding history of schizophrenia. He was admitted from [facility] ED, where he presented due to increased delusional thoughts while on a visit to his parents for Thanksgiving. He began experiencing increasing paranoid thoughts that someone might be poisoning him and began fearing that he might accidentally harm someone. He reported to his parents that he was having thoughts about hitting someone or beating someone up and told them he could not guarantee they  "would be safe with him. Parents encouraged patient to go to the ED, which he did voluntarily.     Per patient report and chart review, he was hospitalized several times in the 1980s and reports he was civilly committed at one point in the 1980s, however he has been quite stable for the past several decades. He reports he will experience some paranoia and delusional thinking at times, but generally has good insight into his symptoms. He has been maintained on clozapine for about 25 years and prior to several months ago was also concurrently prescribed quetiapine.      In the last several months, patient has had a number of medication changes. Approximately 6 months ago, patient was diagnosed with parkinsonism related to antipsychotic use and was started on carbidopa-levidopa. He experienced no improvement in tremors, and thus carbidopa- levidopa dose was increased 1.5 weeks ago.      In addition, patient was medically hospitalized in August 2022 for confusion, weakness, repeated falls, ongoing weight loss, and SOB. He was found to have a cavitary lesion of the lung and suspected aspiration pneumonia. He was treated with antibiotics. At that time, he was taking current dose of clozapine and duloxetine, as well as propranolol ER, quetiapine, gabapentin, and clonazepam. Psychiatry was consulted due to concern for oversedation, and propranolol ER, gabapentin, and quetiapine were discontinued. Conazepam was reduced from 0.5 mg TID to BID dosing and recommended to be discontinued, however, it does not appear this occurred. His sedation improved significantly. QTc prolongation was also noted, but improved throughout his stay. He was ultimately discharged to a TCU for several months before returning home. He reports his weakness has greatly improved and states he \"graduated\" physical therapy, though he continues to be diligent in completed recommended exercises.      He reports that over the past several months, his " "delusions and anxiety have been worse than usual, with symptoms becoming much more acute since the carbidopa-levidopa dose was increased. He has felt increasingly paranoid about being poisoned, noting this is a delusion that has been stable over time, but was previously less intrusive. He has insight during the interview that his paranoid thinking is not reality based, but states it has been harder to separate delusions from reality and he becomes very worried that he might hurt someone, despite no history of violent behavior. He reports that the paranoia about his food being poisoned in combination with the tremors in his hands have made it difficult for him to eat. He has also had quite low appetite for the past 6 months to a year . He reports that due to the combination of these factors, he has lost about 50 pounds in the last year.He denies any problems with sleep initiation or maintenance. He reports energy is fairly good and \"better than it used to be.\" He reports some short term memory issues and on interview, does have some difficulty remembering details of recent events. He is unsure if he has received cognitive testing in the past.    He denies any suicidal ideation and reports he has not experienced SI for decades. He denies homicidal thoughts and is very clear that he had and has no desire to harm anyone else, but was afraid he might somehow do so. He denies any hallucinations and states \"that's never been a problem for me.\" He is not observed responding to internal stimuli. He is alert and oriented in all spheres.        ========    BRIEF HOSPITAL COURSE: This 62 y.o. male admitted 11/25/2022 to  [facility] for the assessment and treatment of the above presentation. This was a voluntary admission.     In summary, he was tapered off the Sinemet, which he reports to be both ineffective and potentially worsening the anxiety and paranoia ideations. Instead Benadryl 25 mg TID was started to help with " extrapyramidal side effects. He struggled with sleep during his stay here. Remeron 7.5mg QHS was tried without success. Seroquel 100mg qhs was restarted with addition of suvorexant 10mg qhs. Given his Seroquel PRN use prior history of prolonged QTC, he will benefit from another EKG repeat on the medical unit.     Unfortunately, he tested positive for influenza A and developed hypoxemia. Now on 2L oxygen with desats when prone requiring 3L oxygen. Subsequently, the plan will to be tapering him off clonazepam due to hypoxia (and also prior plan to taper). The patient tolerated these changes without side effects.     The patient minimally benefited from the structured therapeutic milieu as he attended programming seldom as he tested positive for influenza, he needed to isolate with droplet precautions. Pt was engaged and worked on issues that were triggering/brought pt to the hospital and has excellent insight into his anxiety and paranoid delusions. Pt denied suicidal ideations throughout all/majority of their hospitalization. Pt denied HI throughout all of hospitalization. There were no concerns with behavioral disruptions/outbursts. The patient has shown improvement in general.     At the time of discharge, hospitalization course was reviewed with Vinod Lee with plan to transfer to medicine. Please consult psychiatry and I will continue to follow while patient is on the medical unit. He is now off sinemet since 11/29 21:00 and I would expect anxiety and paranoia to improve more moving forward. Psychiatrically, once anxiety and paranoia is baseline, can D/C to home once medically stable. He DOES NOT NEED A 1:1 on the medical unit from a mental health perspective, we initiated 1:1 11/30/2022 due to significant fatigue, gait instability (he was unable to stand without assist) and feeding assist.    04/2023: Clozapine was gradually tapered and discontinued, unclear reasons why it was discontinued per outside  records, though Dr. Portillo's H&P indicates that it was due to prolonged QTc.       Today's Changes:  - Restart Depakote 250 mg TID while monitoring closely for re-emerging thrombocytopenia  - Increase Prozac to 20 mg daily.   - ECT # 11 completed on 8/25. STOP ECT due to lack of substantial improvements.   - Consider anticholinesterase - some literature suggests it may reduce hallucinations (though not prominent for him)   - Will discuss case with Dr. Melo, geriatric psychiatrist. May also consider formal neurology consult given lack of overall improvement despite gold standard interventions for treatment refractory schizophrenia.    Target psychiatric symptoms and interventions:  -Psych emergency declared on 5/27, now fully committed  -ECT Mon/Wed/Fri per Janelle   -Continue clozapine as above  -Continue scheduled Zyprexa 15mg BID  -Continue 1:1 for safety of staff and patients, reduced from 2:1 7/23/23  - weekly CBC to monitor platelets      -Continue Gabapentin 300 mg TID as staff believe it has been effective for treatment of his anxiety  -Discussed complex case at length with Dr. Hatch (primary provider on geriatic unit) who provided recs (see second opinion note dated 6/14). Appreciate assistance. I have since made the following changes:         1) Add propranolol 10 mg TID         2) Added Depakote 1000 mg qhs (to be administered in magic cup)         3) Nutrition consult to order magic cup         4) Increased melatonin to 10 mg QHS    Risks, benefits, and alternatives discussed at length with patient.     Acute Medical Problems and Treatments:  Delirium vs progression of dementia  Neurology consult placed on 6/8 for evaluation of sundowning and cognitive decline secondary to Sinemet discontinuation: Resuming Sinemet not recommended for behavioral concerns. Please see Neuro note for details.     Tremors  longstanding though appeared to worsen following initiation of clozapine  - Ativan 0.5 mg TID  -  Cogentin 1 mg BID added on 8/25    Thrombocytopenia, resolved  Likely secondary to Depakote.  According to the, incidents of Depakote induced thrombocytopenia as 27%.  Depakote dose reduced from 1000 twice daily to 250 3 times daily on 7/28/2023 with subsequent resolution of thrombocytopenia  - weekly CBCs    Constipation  Likely worsened by clozapine, improved since modifying regimen.   - daily miralax   - daily Senokot 2 tablets BID      Right first toe fracture:  Seen by Ortho on 6/16:  Per note: Vinod Lee is a 62 year old  male w/ PMHx complex psychiatric history who has a fracture to the distal phalanx of the right toe after a recent trauma to the foot the patient reports.  Patient denies any other pain or discomfort.  Images reviewed and plan will be for irrigation of the popped fracture blister with sterile saline and the toes should be dressed and a 4 x 4 gauze dressing.  Patient will need a postop shoe which she can be weightbearing as tolerated in.  Would recommend a course of antibiotics for approximately 7 days.  Would recommend Augmentin or clindamycin.  Podiatry will be made aware of patient and will see patient while he is admitted or if patient is discharged in the near future a follow-up appointment will need to be established.     Remainder of care per primary team.  Primary team should make sure that patient is up-to-date on his tetanus shot.  If not patient will require a booster shot.    Hx of prolonged QTc:  Continue weekly EKGs. See above for details.   Discussed most recent EKG findings with Cardiology on 8/25. Corrected QTc is 501 from EKG on 8/23. Per cardiology, repeat EKG today. If corrected QTc is > 500, will need to reduce clozapine. If < 500, OK to keep clozapine at current dose. UPDATE/ADDENDUM: Repeat EKG with corrected QTc of 440. No need to adjust clozapine dose per cards.     Urinary retention, resolved  Per patient care order:   If patient is refusing straight caths, please  "notify IM provider immediately to determine whether this constitutes a medical emergency. If IM declares medical emergency, may restrain patient for straight cath. Discussed this with patient's parents/substitute decision makers on 6/7 who are in support of this plan.    # Psoriasis hx  # Purplish discoloration of B/LE feet-resolved   Discussed with Dr. Rene and bedside RN regarding rash on right foot that appears and appears to be purplish in color and almost \"petechiae.\" It was noted during interview, but seemed to be resolving upon walking. Within the past 24 hrs, pt has had ECT and seemed more confused than usual. Pt seems to have had a right great toe wound with recent right great toe fracture seen by Medicine on 7/16. Seen on 8/4 with improving color on B/L legs at rest and appears to have small, scaly patches of varying coin sizes that have not grown past marked borders. See photos. Per further chart review, has had psoriasis history. WBC WNL, no fevers, HDS. This appears to more consistent with a psoriasis like picture.   - Start triamcinolone topical 0.025%   -Apply twice daily until lesions for 2 weeks or until lesions resolve/  -Monitor for skin thinning, striae, rebound lesion flares.   - Start Eucerin cream              - Apply 30 minutes PRIOR to triamcinolone topical cream above or PRN as needed if dry skin/after bathing   - Medicine will sign off.   - For worsening pain, spread, itchiness, fevers, please contact Medicine and possibly Dermatology.    Benzodiazepine dependence:  Phenobarbital taper completed shortly after patient's admission    Pertinent labs/imaging:  Weekly CBC with diff     Behavioral/Psychological/Social:  - Encourage unit programming    Safety:  - Continue precautions as noted above  - Status 15 minute checks  - Continue 2:1 for staff and pt safety    Legal Status: Fully committed with Sánchez and Lorenzo Pavon. [place] medications: clozapine, olanzapine, risperidone, " quetiapine      Disposition Plan   Reason for ongoing admission: poses an imminent risk to self, poses an imminent risk to others and is unable to care for self due to severe psychosis or darryl  Discharge location:  assisted living facility  Discharge Medications: not ordered  Follow-up Appointments: not scheduled    Entered by: Ingrid Rhodes MD on 08/26/2023  at 11:42 AM

## 2023-08-26 NOTE — PLAN OF CARE
"Repeatedly stated that he was \"contaminating\" everything he touched: a note pad, a person's hand. Also continues to voice the belief that his medication is poison. Accepted them on the third approach. Would not acknowledge or deny hallucinations and did not overtly respond to inner stimuli.     Blunted affect. No aggression.    Showered, remains disheveled. Not interacting with peers or even his staff very much. Spending some of his time reading the bible which seems to disquiet him more.     Today  was the last ECT , # 11: 78 second seizure.    (QTc corrected for LBBB: 440, HR 50 this evening (refused recheck as unnecessary.)    Continues with 2:1 assistance and acuity staff for safety and falls prevention.     Referrals to [facility] assisted living facilities in progress: [facility] in [place] and [facility] in [place].       Plan: Monitor and document mood and behaviour, thought process and content. Establish and maintain therapeutic relationship. Educate about diagnoses, medications, treatment, legal status, plan of care. Address preexisting and concurrent medical concerns.      P:Disturbed thought process  G:Rational thought process  0: Not progressing  "

## 2023-08-27 PROCEDURE — 250N000013 HC RX MED GY IP 250 OP 250 PS 637: Performed by: STUDENT IN AN ORGANIZED HEALTH CARE EDUCATION/TRAINING PROGRAM

## 2023-08-27 PROCEDURE — 250N000013 HC RX MED GY IP 250 OP 250 PS 637: Performed by: PSYCHIATRY & NEUROLOGY

## 2023-08-27 PROCEDURE — 124N000002 HC R&B MH UMMC

## 2023-08-27 PROCEDURE — 250N000013 HC RX MED GY IP 250 OP 250 PS 637: Performed by: PHYSICIAN ASSISTANT

## 2023-08-27 RX ADMIN — WHITE PETROLATUM: 1.75 OINTMENT TOPICAL at 17:49

## 2023-08-27 RX ADMIN — WHITE PETROLATUM: 1.75 OINTMENT TOPICAL at 09:17

## 2023-08-27 RX ADMIN — FLUOXETINE 20 MG: 20 CAPSULE ORAL at 09:16

## 2023-08-27 RX ADMIN — OLANZAPINE 15 MG: 10 TABLET, ORALLY DISINTEGRATING ORAL at 19:44

## 2023-08-27 RX ADMIN — DIVALPROEX SODIUM 250 MG: 250 TABLET, DELAYED RELEASE ORAL at 15:13

## 2023-08-27 RX ADMIN — BENZTROPINE MESYLATE 1 MG: 1 TABLET ORAL at 09:16

## 2023-08-27 RX ADMIN — OLANZAPINE 15 MG: 10 TABLET, ORALLY DISINTEGRATING ORAL at 09:16

## 2023-08-27 RX ADMIN — GABAPENTIN 300 MG: 300 CAPSULE ORAL at 09:16

## 2023-08-27 RX ADMIN — DIVALPROEX SODIUM 250 MG: 250 TABLET, DELAYED RELEASE ORAL at 19:44

## 2023-08-27 RX ADMIN — Medication 10 MG: at 19:44

## 2023-08-27 RX ADMIN — NAPROXEN 250 MG: 250 TABLET ORAL at 17:48

## 2023-08-27 RX ADMIN — LORAZEPAM 0.5 MG: 0.5 TABLET ORAL at 15:13

## 2023-08-27 RX ADMIN — SENNOSIDES 2 TABLET: 8.6 TABLET ORAL at 09:16

## 2023-08-27 RX ADMIN — POLYETHYLENE GLYCOL 3350 17 G: 17 POWDER, FOR SOLUTION ORAL at 09:15

## 2023-08-27 RX ADMIN — SENNOSIDES 2 TABLET: 8.6 TABLET ORAL at 19:43

## 2023-08-27 RX ADMIN — POLYETHYLENE GLYCOL 3350 17 G: 17 POWDER, FOR SOLUTION ORAL at 19:45

## 2023-08-27 RX ADMIN — GABAPENTIN 300 MG: 300 CAPSULE ORAL at 15:13

## 2023-08-27 RX ADMIN — LORAZEPAM 0.5 MG: 0.5 TABLET ORAL at 19:43

## 2023-08-27 RX ADMIN — CLOTRIMAZOLE: 0.01 CREAM TOPICAL at 09:18

## 2023-08-27 RX ADMIN — GABAPENTIN 300 MG: 300 CAPSULE ORAL at 19:44

## 2023-08-27 RX ADMIN — ATROPINE SULFATE 2 DROP: 10 SOLUTION/ DROPS OPHTHALMIC at 19:49

## 2023-08-27 RX ADMIN — CLOZAPINE 650 MG: 100 TABLET, ORALLY DISINTEGRATING ORAL at 15:13

## 2023-08-27 RX ADMIN — DIVALPROEX SODIUM 250 MG: 250 TABLET, DELAYED RELEASE ORAL at 06:15

## 2023-08-27 RX ADMIN — ATROPINE SULFATE 2 DROP: 10 SOLUTION/ DROPS OPHTHALMIC at 15:16

## 2023-08-27 RX ADMIN — TAMSULOSIN HYDROCHLORIDE 0.4 MG: 0.4 CAPSULE ORAL at 09:16

## 2023-08-27 RX ADMIN — ATROPINE SULFATE 2 DROP: 10 SOLUTION/ DROPS OPHTHALMIC at 09:17

## 2023-08-27 RX ADMIN — CYANOCOBALAMIN TAB 1000 MCG 1000 MCG: 1000 TAB at 09:16

## 2023-08-27 RX ADMIN — BENZTROPINE MESYLATE 1 MG: 1 TABLET ORAL at 19:44

## 2023-08-27 RX ADMIN — LORAZEPAM 0.5 MG: 0.5 TABLET ORAL at 09:16

## 2023-08-27 RX ADMIN — NAPROXEN 250 MG: 250 TABLET ORAL at 09:16

## 2023-08-27 ASSESSMENT — ACTIVITIES OF DAILY LIVING (ADL)
ADLS_ACUITY_SCORE: 64
DRESS: INDEPENDENT
HYGIENE/GROOMING: INDEPENDENT
ADLS_ACUITY_SCORE: 64
LAUNDRY: UNABLE TO COMPLETE
ADLS_ACUITY_SCORE: 64
ADLS_ACUITY_SCORE: 64
ORAL_HYGIENE: PROMPTS
ADLS_ACUITY_SCORE: 64

## 2023-08-27 NOTE — PLAN OF CARE
"Nursing assessment completed. Patient continues on 2:1 SIO. Vinod had a good shift today. He states he is still hearing voices telling him to \"pummel staff, and gouge eyes out\", but he is receptive to reality orientation that the voices are not true. He does not act on the voices. He worries that the bridge and river outside are \"in illusion\", although this appears to be improved from previous shifts with him. He showers and completes ADLs. Took about an hour and half shower, which is common for pt. Denies constipation. Denies SI/SIB. Medication compliant. Continue to monitor and assess.                         "

## 2023-08-27 NOTE — PLAN OF CARE
"Vaguely acknowledges auditory hallucinations \"but they are not telling me what to do now.\" Voices the unchanged conviction that his medication is poison but accepted it on the first offer. Calls himself \"evil\".     Initiated more interaction with psych. Associates.  No aggression, no sexually inappropriate behaviour, no responding to inner stimuli observed.    Politely requested and received naprosyn for generalised body pain. Also c/o  increased tremors of hands and mouth and chose to accept cogentin early. Complained that he does not find it very effective and asked about Benadryl. Per chart notes he has had tremors since childhood but they do seem worse today as compared to just yesterday.       Continues with 2:1 assistance and acuity staff for safety and falls prevention.     Referrals to [facility] assisted living facilities in progress: [facility] in [place] and [facility] in [place].       Plan: Monitor and document mood and behaviour, thought process and content. Establish and maintain therapeutic relationship. Educate about diagnoses, medications, treatment, legal status, plan of care. Address preexisting and concurrent medical concerns.      P:Disturbed thought process  G:Rational thought process  0: Not progressing  "

## 2023-08-28 PROCEDURE — 250N000013 HC RX MED GY IP 250 OP 250 PS 637: Performed by: PSYCHIATRY & NEUROLOGY

## 2023-08-28 PROCEDURE — 124N000002 HC R&B MH UMMC

## 2023-08-28 PROCEDURE — H2032 ACTIVITY THERAPY, PER 15 MIN: HCPCS

## 2023-08-28 PROCEDURE — 250N000013 HC RX MED GY IP 250 OP 250 PS 637

## 2023-08-28 PROCEDURE — 99233 SBSQ HOSP IP/OBS HIGH 50: CPT | Performed by: PSYCHIATRY & NEUROLOGY

## 2023-08-28 PROCEDURE — 250N000013 HC RX MED GY IP 250 OP 250 PS 637: Performed by: STUDENT IN AN ORGANIZED HEALTH CARE EDUCATION/TRAINING PROGRAM

## 2023-08-28 PROCEDURE — 250N000013 HC RX MED GY IP 250 OP 250 PS 637: Performed by: PHYSICIAN ASSISTANT

## 2023-08-28 RX ADMIN — OLANZAPINE 15 MG: 10 TABLET, ORALLY DISINTEGRATING ORAL at 08:47

## 2023-08-28 RX ADMIN — DIVALPROEX SODIUM 250 MG: 250 TABLET, DELAYED RELEASE ORAL at 14:14

## 2023-08-28 RX ADMIN — DIVALPROEX SODIUM 250 MG: 250 TABLET, DELAYED RELEASE ORAL at 05:27

## 2023-08-28 RX ADMIN — TAMSULOSIN HYDROCHLORIDE 0.4 MG: 0.4 CAPSULE ORAL at 08:47

## 2023-08-28 RX ADMIN — CLOZAPINE 650 MG: 100 TABLET, ORALLY DISINTEGRATING ORAL at 14:13

## 2023-08-28 RX ADMIN — GABAPENTIN 300 MG: 300 CAPSULE ORAL at 19:43

## 2023-08-28 RX ADMIN — ATROPINE SULFATE 2 DROP: 10 SOLUTION/ DROPS OPHTHALMIC at 08:48

## 2023-08-28 RX ADMIN — LORAZEPAM 0.5 MG: 0.5 TABLET ORAL at 19:43

## 2023-08-28 RX ADMIN — SENNOSIDES 2 TABLET: 8.6 TABLET ORAL at 19:44

## 2023-08-28 RX ADMIN — CYANOCOBALAMIN TAB 1000 MCG 1000 MCG: 1000 TAB at 08:47

## 2023-08-28 RX ADMIN — GABAPENTIN 300 MG: 300 CAPSULE ORAL at 08:47

## 2023-08-28 RX ADMIN — LORAZEPAM 0.5 MG: 0.5 TABLET ORAL at 08:47

## 2023-08-28 RX ADMIN — ATROPINE SULFATE 2 DROP: 10 SOLUTION/ DROPS OPHTHALMIC at 14:17

## 2023-08-28 RX ADMIN — SENNOSIDES 2 TABLET: 8.6 TABLET ORAL at 08:47

## 2023-08-28 RX ADMIN — LORAZEPAM 0.5 MG: 0.5 TABLET ORAL at 14:14

## 2023-08-28 RX ADMIN — Medication 10 MG: at 19:43

## 2023-08-28 RX ADMIN — NAPROXEN 250 MG: 250 TABLET ORAL at 08:46

## 2023-08-28 RX ADMIN — GABAPENTIN 300 MG: 300 CAPSULE ORAL at 14:15

## 2023-08-28 RX ADMIN — ATROPINE SULFATE 2 DROP: 10 SOLUTION/ DROPS OPHTHALMIC at 19:48

## 2023-08-28 RX ADMIN — BENZTROPINE MESYLATE 1 MG: 1 TABLET ORAL at 08:47

## 2023-08-28 RX ADMIN — CLOTRIMAZOLE: 0.01 CREAM TOPICAL at 19:48

## 2023-08-28 RX ADMIN — DIVALPROEX SODIUM 250 MG: 250 TABLET, DELAYED RELEASE ORAL at 19:43

## 2023-08-28 RX ADMIN — POLYETHYLENE GLYCOL 3350 17 G: 17 POWDER, FOR SOLUTION ORAL at 19:43

## 2023-08-28 RX ADMIN — POLYETHYLENE GLYCOL 3350 17 G: 17 POWDER, FOR SOLUTION ORAL at 08:48

## 2023-08-28 RX ADMIN — OLANZAPINE 15 MG: 10 TABLET, ORALLY DISINTEGRATING ORAL at 19:43

## 2023-08-28 RX ADMIN — CLOTRIMAZOLE: 0.01 CREAM TOPICAL at 08:48

## 2023-08-28 RX ADMIN — NAPROXEN 250 MG: 250 TABLET ORAL at 18:23

## 2023-08-28 RX ADMIN — BENZTROPINE MESYLATE 1 MG: 1 TABLET ORAL at 19:44

## 2023-08-28 RX ADMIN — FLUOXETINE 20 MG: 20 CAPSULE ORAL at 08:47

## 2023-08-28 ASSESSMENT — ACTIVITIES OF DAILY LIVING (ADL)
ADLS_ACUITY_SCORE: 64
ADLS_ACUITY_SCORE: 64
ORAL_HYGIENE: PROMPTS
ADLS_ACUITY_SCORE: 74
ADLS_ACUITY_SCORE: 64
ADLS_ACUITY_SCORE: 74
ORAL_HYGIENE: PROMPTS;INDEPENDENT
ADLS_ACUITY_SCORE: 64
DRESS: INDEPENDENT
ADLS_ACUITY_SCORE: 64
HYGIENE/GROOMING: INDEPENDENT;PROMPTS
ADLS_ACUITY_SCORE: 74
ADLS_ACUITY_SCORE: 64
LAUNDRY: UNABLE TO COMPLETE
ADLS_ACUITY_SCORE: 64
DRESS: SCRUBS (BEHAVIORAL HEALTH)
HYGIENE/GROOMING: INDEPENDENT

## 2023-08-28 NOTE — PROGRESS NOTES
"   08/28/23 1539   Group Therapy Session   Group Attendance attended group session   Time Session Began 1115   Time Session Ended 1200   Total Time (minutes) 30   Total # Attendees 3   Group Type task skill   Group Topic Covered coping skills/lifestyle management   Group Session Detail OT clinic   Patient Response/Contribution cooperative with task     Pt casually wandered into the group room for the last 10 minutes of group, though with lunch arriving 20 minutes late, pt ended up staying in group for 30 minutes.  Pt questions \"so what do you want me to do?\", and does better with simple directed tasks ie, \"I need you to color this bird\", then being told he can do what he'd like.  Pt makes many negative statements about his work, though was easily distracted as writer joked \"that it wasn't as hers\" pointing at a picture his [employer] staff worked at alongside, and became less negative.  Pt appeared calm as he remained seated and engaged the entire 30 minutes.  "

## 2023-08-28 NOTE — PLAN OF CARE
"Acknowledged ongoing auditory hallucinations they told me to choke people, to swing at people. Denies regrets over having followed the imperatives in the past. Also said that they at times reinforced what he already knew: \"that I'm evil and about the poison.\" Politely agreed to abstain from acting on the commands this evening. Did not wish to speak about the future.    Readily engaging in conversation with staff but not with peers.      No aggression, no sexually inappropriate behaviour, no responding to inner stimuli observed. Accepted his medication at the first offer.    Tremors of mouth and hands are again quite pronounced. Sialorrhea: unchanged but not requiring constant mouth wiping    Weight loss of 8 lbs since 7/30 means BMI still over 26.     Continues with 2:1 assistance and acuity staff for safety and falls prevention.     Referrals to [facility] assisted living facilities in progress: [facility] in [place] and [facility] in [place].       Plan: Monitor and document mood and behaviour, thought process and content. Establish and maintain therapeutic relationship. Educate about diagnoses, medications, treatment, legal status, plan of care. Address preexisting and concurrent medical concerns.      P:Disturbed thought process  G:Rational thought process  0: Not progressing      "

## 2023-08-28 NOTE — PROGRESS NOTES
08/28/23 1500   Group Therapy Session   Time Session Began 1315   Time Session Ended 1500   Total Time (minutes) 20   Total # Attendees 3   Group Type expressive therapy   Group Topic Covered balanced lifestyle;relaxation techniques   Group Session Detail Afternoon Relaxation   Patient Response/Contribution cooperative with task   Patient Participation Detail Cooperatively engaged in Extended   Afternoon Music Relaxation group to decrease anxiety and promote health.  Presented as unsure of self, but was able to select a Yessy Waltz and able to focus on the music with some cueing.   Exited group when peer exited, appearing cued by this.  Needs frequent reassurance, but did appear to calm with the Classical music.

## 2023-08-28 NOTE — PROGRESS NOTES
"[facility], [place]   Psychiatric Progress Note  Hospital Day: 94        Interim History:   The patient's care was discussed with the treatment team during the daily team meeting and/or staff's chart notes were reviewed. Per RN note:  He states he is still hearing voices telling him to \"pummel staff, and gouge eyes out\", but he is receptive to reality orientation that the voices are not true. He does not act on the voices. He worries that the bridge and river outside are \"in illusion\", although this appears to be improved from previous shifts with him. He showers and completes ADLs. Took about an hour and half shower, which is common for pt. Denies constipation. Denies SI/SIB. Medication compliant.     Acknowledged ongoing auditory hallucinations they told me to choke people, to swing at people. Denies regrets over having followed the imperatives in the past. Also said that they at times reinforced what he already knew: \"that I'm evil and about the poison.\" Politely agreed to abstain from acting on the commands this evening. Did not wish to speak about the future.     Readily engaging in conversation with staff but not with peers.        No aggression, no sexually inappropriate behaviour, no responding to inner stimuli observed. Accepted his medication at the first offer.     Tremors of mouth and hands are again quite pronounced. Sialorrhea: unchanged but not requiring constant mouth wiping     Weight loss of 8 lbs since 7/30 means BMI still over 26.        Upon interview, Vinod said that he is not doing very well, which he attributes to dry mouth. He said that he has not noticed any improvement in tremors since Cogentin was started. He continues to have urges to \"gouge people's eye out, but not at the moment.\" He continues to experience AH. He questions whether his family still loves him, and thought that his father said \"goodbye\" in the background when patient was speaking to his " "mother, \"which means that was probably the last time I will talk to them.\"  Reassurance provided.      Suicidal ideation: no    Homicidal ideation: Denies    Psychotic symptoms: no AH or VH reported during interview. Delusions present and other psychotic symptoms suspected.    Medication side effects reported: none    Acute medical concerns: none. He denies any pain or discomfort today.      Other issues reported by patient: Patient had no further questions or concerns.           Medications:      atropine  2 drop Sublingual TID    benztropine  1 mg Oral BID    clotrimazole   Topical BID    cloZAPine  650 mg Oral Daily    cyanocobalamin  1,000 mcg Oral Daily    divalproex sodium delayed-release  250 mg Oral Q8H KIKI    FLUoxetine  20 mg Oral Daily    gabapentin  300 mg Oral TID    LORazepam  0.5 mg Oral TID    melatonin  10 mg Oral At Bedtime    mineral oil-hydrophilic petrolatum   Topical BID IS    naproxen  250 mg Oral BID w/meals    OLANZapine zydis  15 mg Oral BID    Or    OLANZapine  10 mg Intramuscular BID    polyethylene glycol  17 g Oral BID    sennosides  2 tablet Oral BID    tamsulosin  0.4 mg Oral Daily          Allergies:     Allergies   Allergen Reactions    Bee Venom Unknown    Montelukast Unknown    Phenothiazines Unknown          Labs:     No results found for this or any previous visit (from the past 24 hour(s)).             Psychiatric Examination:     BP 96/64 (BP Location: Right arm)   Pulse 93   Temp 96.8  F (36  C) (Temporal)   Resp 18   Wt 82.8 kg (182 lb 7 oz)   SpO2 100%   BMI 26.94 kg/m    Weight is 182 lbs 7 oz  Body mass index is 26.94 kg/m .    Weight over time:  Vitals:    07/03/23 1100 07/30/23 0902 08/13/23 0900 08/26/23 0835   Weight: 81.6 kg (179 lb 12.8 oz) 86.5 kg (190 lb 9.6 oz) 85.7 kg (188 lb 14.4 oz) 82.8 kg (182 lb 7 oz)       Orthostatic Vitals         Most Recent      Sitting Orthostatic /61 07/25 0800    Sitting Orthostatic Pulse (bpm) 85 07/25 0800      "     Cardiometabolic risk assessment. 06/07/23    Reviewed patient profile for cardiometabolic risk factors    Date taken /Value  REFERENCE RANGE   Abdominal Obesity  (Waist Circumference)   See nursing flowsheet Women ?35 in (88 cm)   Men ?40 in (102 cm)      Triglycerides  No results found for: TRIG    ?150 mg/dL (1.7 mmol/L) or current treatment for elevated triglycerides   HDL cholesterol  No results found for: HDL]   Women <50 mg/dL (1.3 mmol/L) in women or current treatment for low HDL cholesterol  Men <40 mg/dL (1 mmol/L) in men or current treatment for low HDL cholesterol     Fasting plasma glucose (FPG) Lab Results   Component Value Date     05/26/2023      FPG ?100 mg/dL (5.6 mmol/L) or treatment for elevated blood glucose   Blood pressure  BP Readings from Last 3 Encounters:   08/27/23 96/64   05/26/23 97/55    Blood pressure ?130/85 mmHg or treatment for elevated blood pressure   Family History  See family history     Mental Status Exam:  Appearance: awake, groggy, disheveled. No evidence of pain of acute distress.   Attitude:  somewhat cooperative  Eye Contact:  fair, less intense  Mood:  calm  Affect:  mood congruent  Speech: mostly coherent  Psychomotor Behavior:  No evidence of psychomotor agitation or restlessness today. No evidence of dystonia, or tics.  Throught Process: linear, illogical  Associations:  no loosening of associations present  Thought Content:  Delusions. Likely auditory, tacticle and olfactory hallucinations. Cannot rule out visual hallucinations. Evidence of obsessive thinking and likely compulsions to harm others.   Insight:  limited  Judgement:  poor  Oriented to:  self, date, place  Attention Span and Concentration:  fair  Recent and Remote Memory:  poor         Precautions:     Behavioral Orders   Procedures    Assault precautions    Code 1 - Restrict to Unit    Code 2    Code 2 - 1:1 Staff Supervision     For ECT only    Electroconvulsive therapy     Series of up to 12  treatments. Begin Date: 8/2/23     Treating Psychiatrist providing ECT:  unknown     Notified on:  7/28/23 via this order  NOTE: We received verbal confirmation from [facility] that Lorenzo Pavon has been approved but awaiting official court order and risk management's review  Initial consult was completed by Dr. Hicks on 7/18/23    Electroconvulsive therapy     Series of up to 12 treatments. Begin Date: 8/2/23     Treating Psychiatrist providing ECT:  Dominga     Notified on:  8/2/23    Elopement precautions    Fall precautions    Routine Programming     As clinically indicated    Self Injury Precaution    Status 15     Every 15 minutes.    Status Individual Observation     Patient SIO status reviewed with team/RN.  Please also refer to RN/team documentation for add'l detail.    -SIO staff to monitor following which have contributed to patient being on SIO:  Agitation  Pt is impulsive   Pt has ran out of his room naked  Pt has Parkinson symptoms place him in a high fall risk  Pt has verbal outburst of sexual and threaten statements  Pt requires immediate redirection when masturbating    -Possible interventions SIO staff could use to support patient's treatment progress:  Engage pt in activities    -When following observed, team will review discontinuation of SIO:  Absence of aggression toward others for > 24 hours    Comments: SIO 1:1     Order Specific Question:   CONTINUOUS 24 hours / day     Answer:   5 feet     Order Specific Question:   Indications for SIO     Answer:   Severe intrusiveness     Order Specific Question:   Indications for SIO     Answer:   Assault risk    Suicide precautions     Patients on Suicide Precautions should have a Combination Diet ordered that includes a Diet selection(s) AND a Behavioral Tray selection for Safe Tray - with utensils, or Safe Tray - NO utensils            Diagnoses:     #Unspecified psychosis likely schizophrenia per history, rule out Bipolar affective disorder,  manic  #Unspecified encephalopathy   -R/O catatonia   -Episodes of unresponsiveness, concern for PNES   #Parkinsonism 2/2 neuroleptic medications, rule out Parkinson's Disease  #Urinary retention and BPH  -Possible UTI -- UC resulted on  w/out growth  #Hx of prolonged QTc with clozapine  #Mild thrombocytopenia  #R/O OCD         Assessment and hospital summary:  Patient was admitted to psychiatric unit for safety, stabilization and medication management. He has had schizophrenia since the . He was on Clozaril x 25 years, and it was tapered and discontinued on May 7, 2023  due to prolonged qtc of 527, and his psychotic  symptoms have worsened since then. Sinemet was also discontinued recently due to concerns that it was contributing to paranoia and AH. He is agitated, aggressive, dangerous to self and others. He remains on SIO 2 to 1, and is under psychiatric emergency and court hold. There are concerns for organic etiology given pattern of sundowning, history of parkinsonism, and ongoing disorientation/confusion. : EKG repeated, cardiology consult regarding safety of resuming clozapine in the context of prolonged QTc and refractory schizophrenia pending response. Per cardiology, correct QTc is no more than 460. They do not have concerns about AP retrial. Neurology IP consult placed for evaluation of sundowning and cognitive decline secondary to Sinemet discontinuation. MSSA initiated. Per Neurology, discontinuation of Sinemet would not account for these symptoms. They do not recommend retrial at this time. : Clozapine titration continued.   Its possible that clozapine dose is contributing to worsened compulsive behaviors noted throughout hospitalization which could improve with lowering the dose.     Chart reviewed which revealed the followin2022: He was on clozapine, Seroquel, Cymbalta, and Carbidopa-levodopa and hospitalized for pneumonia. Psych consulted and Seroquel was stopped. Treated  with Abx and discharged to TCU.     11/2022: Hospitalized at [place]. Per chart review:    Vinod Lee is a 61 yo male with longstanding history of schizophrenia. He was admitted from [facility] ED, where he presented due to increased delusional thoughts while on a visit to his parents for Thanksgiving. He began experiencing increasing paranoid thoughts that someone might be poisoning him and began fearing that he might accidentally harm someone. He reported to his parents that he was having thoughts about hitting someone or beating someone up and told them he could not guarantee they would be safe with him. Parents encouraged patient to go to the ED, which he did voluntarily.     Per patient report and chart review, he was hospitalized several times in the 1980s and reports he was civilly committed at one point in the 1980s, however he has been quite stable for the past several decades. He reports he will experience some paranoia and delusional thinking at times, but generally has good insight into his symptoms. He has been maintained on clozapine for about 25 years and prior to several months ago was also concurrently prescribed quetiapine.      In the last several months, patient has had a number of medication changes. Approximately 6 months ago, patient was diagnosed with parkinsonism related to antipsychotic use and was started on carbidopa-levidopa. He experienced no improvement in tremors, and thus carbidopa- levidopa dose was increased 1.5 weeks ago.      In addition, patient was medically hospitalized in August 2022 for confusion, weakness, repeated falls, ongoing weight loss, and SOB. He was found to have a cavitary lesion of the lung and suspected aspiration pneumonia. He was treated with antibiotics. At that time, he was taking current dose of clozapine and duloxetine, as well as propranolol ER, quetiapine, gabapentin, and clonazepam. Psychiatry was consulted due to concern for oversedation, and  "propranolol ER, gabapentin, and quetiapine were discontinued. Conazepam was reduced from 0.5 mg TID to BID dosing and recommended to be discontinued, however, it does not appear this occurred. His sedation improved significantly. QTc prolongation was also noted, but improved throughout his stay. He was ultimately discharged to a TCU for several months before returning home. He reports his weakness has greatly improved and states he \"graduated\" physical therapy, though he continues to be diligent in completed recommended exercises.      He reports that over the past several months, his delusions and anxiety have been worse than usual, with symptoms becoming much more acute since the carbidopa-levidopa dose was increased. He has felt increasingly paranoid about being poisoned, noting this is a delusion that has been stable over time, but was previously less intrusive. He has insight during the interview that his paranoid thinking is not reality based, but states it has been harder to separate delusions from reality and he becomes very worried that he might hurt someone, despite no history of violent behavior. He reports that the paranoia about his food being poisoned in combination with the tremors in his hands have made it difficult for him to eat. He has also had quite low appetite for the past 6 months to a year . He reports that due to the combination of these factors, he has lost about 50 pounds in the last year.He denies any problems with sleep initiation or maintenance. He reports energy is fairly good and \"better than it used to be.\" He reports some short term memory issues and on interview, does have some difficulty remembering details of recent events. He is unsure if he has received cognitive testing in the past.    He denies any suicidal ideation and reports he has not experienced SI for decades. He denies homicidal thoughts and is very clear that he had and has no desire to harm anyone else, but was " "afraid he might somehow do so. He denies any hallucinations and states \"that's never been a problem for me.\" He is not observed responding to internal stimuli. He is alert and oriented in all spheres.        ========    BRIEF HOSPITAL COURSE: This 62 y.o. male admitted 11/25/2022 to  [facility] for the assessment and treatment of the above presentation. This was a voluntary admission.     In summary, he was tapered off the Sinemet, which he reports to be both ineffective and potentially worsening the anxiety and paranoia ideations. Instead Benadryl 25 mg TID was started to help with extrapyramidal side effects. He struggled with sleep during his stay here. Remeron 7.5mg QHS was tried without success. Seroquel 100mg qhs was restarted with addition of suvorexant 10mg qhs. Given his Seroquel PRN use prior history of prolonged QTC, he will benefit from another EKG repeat on the medical unit.     Unfortunately, he tested positive for influenza A and developed hypoxemia. Now on 2L oxygen with desats when prone requiring 3L oxygen. Subsequently, the plan will to be tapering him off clonazepam due to hypoxia (and also prior plan to taper). The patient tolerated these changes without side effects.     The patient minimally benefited from the structured therapeutic milieu as he attended programming seldom as he tested positive for influenza, he needed to isolate with droplet precautions. Pt was engaged and worked on issues that were triggering/brought pt to the hospital and has excellent insight into his anxiety and paranoid delusions. Pt denied suicidal ideations throughout all/majority of their hospitalization. Pt denied HI throughout all of hospitalization. There were no concerns with behavioral disruptions/outbursts. The patient has shown improvement in general.     At the time of discharge, hospitalization course was reviewed with Vinod Lee with plan to transfer to medicine. Please consult psychiatry and I will " continue to follow while patient is on the medical unit. He is now off sinemet since 11/29 21:00 and I would expect anxiety and paranoia to improve more moving forward. Psychiatrically, once anxiety and paranoia is baseline, can D/C to home once medically stable. He DOES NOT NEED A 1:1 on the medical unit from a mental health perspective, we initiated 1:1 11/30/2022 due to significant fatigue, gait instability (he was unable to stand without assist) and feeding assist.    04/2023: Clozapine was gradually tapered and discontinued, unclear reasons why it was discontinued per outside records, though Dr. Portillo's H&P indicates that it was due to prolonged QTc.       Today's Changes:  - ECT # 11 completed on 8/25. STOP ECT due to lack of substantial improvements.   - Consider anticholinesterase - some literature suggests it may reduce hallucinations (though not prominent for him)   - Will discuss case with Dr. Melo, geriatric psychiatrist. May also consider formal neurology consult given lack of overall significant improvements despite gold standard interventions for treatment refractory schizophrenia.  - Reduce SIO to 1:1 given overall improvement in aggression  - Nutrition consult for recent weight loss    Target psychiatric symptoms and interventions:  -Psych emergency declared on 5/27, now fully committed  -ECT Mon/Wed/Fri per Janelle   -Continue clozapine as above  -Continue scheduled Zyprexa 15mg BID  -Continue 1:1 for safety of staff and patients, reduced from 2:1 7/23/23  - weekly CBC to monitor platelets      -Continue Gabapentin 300 mg TID as staff believe it has been effective for treatment of his anxiety  -Discussed complex case at length with Dr. Hatch (primary provider on geriatic unit) who provided recs (see second opinion note dated 6/14). Appreciate assistance. I have since made the following changes:         1) Add propranolol 10 mg TID         2) Added Depakote 1000 mg qhs (to be administered  in magic cup)         3) Nutrition consult to order magic cup         4) Increased melatonin to 10 mg QHS    Risks, benefits, and alternatives discussed at length with patient.     Acute Medical Problems and Treatments:  Delirium vs progression of dementia  Neurology consult placed on 6/8 for evaluation of sundowning and cognitive decline secondary to Sinemet discontinuation: Resuming Sinemet not recommended for behavioral concerns. Please see Neuro note for details.     Tremors  longstanding though appeared to worsen following initiation of clozapine  - Ativan 0.5 mg TID  - Cogentin 1 mg BID added on 8/25    Thrombocytopenia, resolved  Likely secondary to Depakote.  According to the, incidents of Depakote induced thrombocytopenia as 27%.  Depakote dose reduced from 1000 twice daily to 250 3 times daily on 7/28/2023 with subsequent resolution of thrombocytopenia  - weekly CBCs    Constipation  Likely worsened by clozapine, improved since modifying regimen.   - daily miralax   - daily Senokot 2 tablets BID      Right first toe fracture:  Seen by Ortho on 6/16:  Per note: Vinod Lee is a 62 year old  male w/ PMHx complex psychiatric history who has a fracture to the distal phalanx of the right toe after a recent trauma to the foot the patient reports.  Patient denies any other pain or discomfort.  Images reviewed and plan will be for irrigation of the popped fracture blister with sterile saline and the toes should be dressed and a 4 x 4 gauze dressing.  Patient will need a postop shoe which she can be weightbearing as tolerated in.  Would recommend a course of antibiotics for approximately 7 days.  Would recommend Augmentin or clindamycin.  Podiatry will be made aware of patient and will see patient while he is admitted or if patient is discharged in the near future a follow-up appointment will need to be established.     Remainder of care per primary team.  Primary team should make sure that patient is up-to-date  "on his tetanus shot.  If not patient will require a booster shot.    Hx of prolonged QTc:  Continue weekly EKGs. See above for details.   Discussed most recent EKG findings with Cardiology on 8/25. Corrected QTc is 501 from EKG on 8/23. Per cardiology, repeat EKG today. If corrected QTc is > 500, will need to reduce clozapine. If < 500, OK to keep clozapine at current dose. UPDATE/ADDENDUM: Repeat EKG with corrected QTc of 440. No need to adjust clozapine dose per cards.     Urinary retention, resolved  Per patient care order:   If patient is refusing straight caths, please notify IM provider immediately to determine whether this constitutes a medical emergency. If IM declares medical emergency, may restrain patient for straight cath. Discussed this with patient's parents/substitute decision makers on 6/7 who are in support of this plan.    # Psoriasis hx  # Purplish discoloration of B/LE feet-resolved   Discussed with Dr. Rene and bedside RN regarding rash on right foot that appears and appears to be purplish in color and almost \"petechiae.\" It was noted during interview, but seemed to be resolving upon walking. Within the past 24 hrs, pt has had ECT and seemed more confused than usual. Pt seems to have had a right great toe wound with recent right great toe fracture seen by Medicine on 7/16. Seen on 8/4 with improving color on B/L legs at rest and appears to have small, scaly patches of varying coin sizes that have not grown past marked borders. See photos. Per further chart review, has had psoriasis history. WBC WNL, no fevers, HDS. This appears to more consistent with a psoriasis like picture.   - Start triamcinolone topical 0.025%   -Apply twice daily until lesions for 2 weeks or until lesions resolve/  -Monitor for skin thinning, striae, rebound lesion flares.   - Start Eucerin cream              - Apply 30 minutes PRIOR to triamcinolone topical cream above or PRN as needed if dry skin/after bathing   - " Medicine will sign off.   - For worsening pain, spread, itchiness, fevers, please contact Medicine and possibly Dermatology.    Benzodiazepine dependence:  Phenobarbital taper completed shortly after patient's admission    Pertinent labs/imaging:  Weekly CBC with diff     Behavioral/Psychological/Social:  - Encourage unit programming    Safety:  - Continue precautions as noted above  - Status 15 minute checks  - Continue 2:1 for staff and pt safety    Legal Status: Fully committed with Sánchez and Lorenzo Pavon. [place] medications: clozapine, olanzapine, risperidone, quetiapine      Disposition Plan   Reason for ongoing admission: poses an imminent risk to self, poses an imminent risk to others and is unable to care for self due to severe psychosis or darryl  Discharge location:  assisted living facility  Discharge Medications: not ordered  Follow-up Appointments: not scheduled    Entered by: Ingrid Rhodes MD on 08/28/2023  at 1:54 PM

## 2023-08-28 NOTE — PROGRESS NOTES
"CLINICAL NUTRITION SERVICES - ASSESSMENT NOTE     Nutrition Prescription    RECOMMENDATIONS FOR MDs/PROVIDERS TO ORDER:  Consider OT providing modified eating utensils for hand tremors    Malnutrition Status:    Patient does not meet two of the established criteria necessary for diagnosing malnutrition but is at risk for malnutrition    Recommendations already ordered by Registered Dietitian (RD):  Ensure Enlive with meals  Magic Cup with meals    Future/Additional Recommendations:  Monitor weight trends, intakes and supplement tolerance       REASON FOR ASSESSMENT  Vinod Lee is a 63 year old male assessed by the dietitian for Provider Order - 8 lb weight loss in one month  Admitted since 5/26/23    NUTRITION HISTORY  Pt reports good intakes and tolerating supplements. Per notes, pt needs assistance with cutting foods r/t hand tremors. Recommend OT evaluate and/or provide modified eating utensils. Pt reports appetite is \"Too good\" but also states no appetite currently for just delivered breakfast.     CURRENT NUTRITION ORDERS  Diet: Regular  Intake/Tolerance: % Per I/Os    LABS  Labs reviewed    MEDICATIONS  Medications reviewed  Current Facility-Administered Medications   Medication    acetaminophen (TYLENOL) tablet 650 mg    alum & mag hydroxide-simethicone (MAALOX) suspension 30 mL    atropine 1 % ophthalmic solution 1 drop    atropine 1 % sublingual (ophthalmic drops for sublingual use) 2 drop    benzonatate (TESSALON) capsule 100 mg    benztropine (COGENTIN) tablet 1 mg    bisacodyl (DULCOLAX) suppository 10 mg    clotrimazole (LOTRIMIN) 1 % cream    cloZAPine (FAZACLO) ODT tab 650 mg    cyanocobalamin (VITAMIN B-12) tablet 1,000 mcg    haloperidol (HALDOL) tablet 5 mg    And    diphenhydrAMINE (BENADRYL) capsule 50 mg    haloperidol lactate (HALDOL) injection 5 mg    And    diphenhydrAMINE (BENADRYL) injection 50 mg    divalproex sodium delayed-release (DEPAKOTE) DR tablet 250 mg    FLUoxetine " "(PROzac) capsule 20 mg    gabapentin (NEURONTIN) capsule 100 mg    gabapentin (NEURONTIN) capsule 300 mg    lidocaine (XYLOCAINE) 2 % external gel    LORazepam (ATIVAN) tablet 0.5 mg    magnesium hydroxide (MILK OF MAGNESIA) suspension 30 mL    melatonin tablet 10 mg    mineral oil-hydrophilic petrolatum (AQUAPHOR)    mineral oil-white petrolatum (EUCERIN/MINERIN) cream    naproxen (NAPROSYN) tablet 250 mg    OLANZapine zydis (zyPREXA) ODT tab 15 mg    Or    OLANZapine (zyPREXA) injection 10 mg    OLANZapine (zyPREXA) tablet 5-10 mg    Or    OLANZapine (zyPREXA) injection 5-10 mg    polyethylene glycol (MIRALAX) Packet 17 g    senna-docusate (SENOKOT-S/PERICOLACE) 8.6-50 MG per tablet 1 tablet    sennosides (SENOKOT) tablet 2 tablet    tamsulosin (FLOMAX) capsule 0.4 mg    traZODone (DESYREL) tablet 50 mg       ANTHROPOMETRICS  Height: 175.3 cm (5' 9\"[brought forward to generate BMI[)  Most Recent Weight: 82.8 kg (182 lb 7 oz)    IBW: 72.7 kg  Body mass index is 26.94 kg/m .  BMI: Normal BMI  Weight History:  Pt had noted pta his appetite had decreased and had had a 50 lb weight loss.  Weight has increased since admission. Continue to monitor weight trends. Pt is not concerned about his weight.  Wt Readings from Last 10 Encounters:   08/26/23 82.8 kg (182 lb 7 oz)   05/23/23 78.6 kg (173 lb 4.5 oz)     Date/Time Weight Weight Method   08/26/23 0835 82.8 kg (182 lb 7 oz) Standing scale   08/13/23 0900 85.7 kg (188 lb 14.4 oz) --   07/30/23 0902 86.5 kg (190 lb 9.6 oz) Standing scale   07/03/23 1100 81.6 kg (179 lb 12.8 oz) Standing scale     Per Care Everywhere:  124.7 kg (275 lb) 04/05/2023 (suspect incorrect)     92.1 kg (203 lb) 05/13/2022      90.3 kg (199 lb) 06/23/2022     88 kg (194 lb 1.6 oz) 08/17/2022     Dosing Weight: 82.8 kg    ASSESSED NUTRITION NEEDS  Estimated Energy Needs: 2070 - 2485 kcals/day (25 - 30 kcals/kg)  Justification: Maintenance  Estimated Protein Needs: 66 - 83 grams protein/day (0.8 - 1 " grams of pro/kg)  Justification: Maintenance  Estimated Fluid Needs: 2070 - 2485 mL/day (25 - 30 mL/kg)   Justification: Maintenance    PHYSICAL FINDINGS  See malnutrition section below.  Dry mouth reported  Hand tremors limiting ability to feed self     MALNUTRITION  % Intake: No decreased intake noted  % Weight Loss: 3.4% x 2 weeks, trending towards clinical significance  Subcutaneous Fat Loss: None observed  Muscle Loss: None observed  Fluid Accumulation/Edema: None noted  Malnutrition Diagnosis: Patient does not meet two of the established criteria necessary for diagnosing malnutrition    NUTRITION DIAGNOSIS  Predicted Inadequate oral intake related to decreased appetite as evidenced by 3.4% wt loss x two weeks      INTERVENTIONS  Implementation  Recommend OT assess/provide modified eating utensils for hand tremors   Medical food supplement therapy     Goals  Patient to consume % of nutritionally adequate meal trays TID, or the equivalent with supplements/snacks.     Monitoring/Evaluation  Progress toward goals will be monitored and evaluated per protocol.  Sandra HOUGH  Mental Health Pager (M-F): [phone]  On Call Pager (weekends only): [phone]

## 2023-08-28 NOTE — PLAN OF CARE
Writer called  ECT provider to verify if the pt is getting ECT today and was told that he is completely done with ECT due to increase confusion.  Pt has been cooperative without physical or verbal aggression this shift.  SIO decrease to 1:1 and pt continue to stay calm. Made multiple phone calls to his family without issue  Continue to have tremor on his hands and mouth needing help to cut his food into little pieces.  Nutrition consult ordered due in recent weight loss of 8 lb.  Denies pain and hallucination this shift.  Pt is medication compliant  without multiple reproaches.       Problem: Confusion Chronic  Goal: Optimal Cognitive Function  Outcome: Progressing  Intervention: Minimize and Manage Confusion  Recent Flowsheet Documentation  Taken 8/28/2023 1232 by Basia Woodson, RN  Family/Support System Care: self-care encouraged   Goal Outcome Evaluation:    Plan of Care Reviewed With: patient

## 2023-08-29 LAB
ATRIAL RATE - MUSE: 98 BPM
DIASTOLIC BLOOD PRESSURE - MUSE: NORMAL MMHG
INTERPRETATION ECG - MUSE: NORMAL
P AXIS - MUSE: 50 DEGREES
PR INTERVAL - MUSE: 152 MS
QRS DURATION - MUSE: 154 MS
QT - MUSE: 408 MS
QTC - MUSE: 520 MS
R AXIS - MUSE: -13 DEGREES
SYSTOLIC BLOOD PRESSURE - MUSE: NORMAL MMHG
T AXIS - MUSE: 96 DEGREES
VENTRICULAR RATE- MUSE: 98 BPM

## 2023-08-29 PROCEDURE — 250N000013 HC RX MED GY IP 250 OP 250 PS 637

## 2023-08-29 PROCEDURE — 93010 ELECTROCARDIOGRAM REPORT: CPT | Performed by: INTERNAL MEDICINE

## 2023-08-29 PROCEDURE — 250N000013 HC RX MED GY IP 250 OP 250 PS 637: Performed by: PSYCHIATRY & NEUROLOGY

## 2023-08-29 PROCEDURE — 93005 ELECTROCARDIOGRAM TRACING: CPT

## 2023-08-29 PROCEDURE — 250N000013 HC RX MED GY IP 250 OP 250 PS 637: Performed by: PHYSICIAN ASSISTANT

## 2023-08-29 PROCEDURE — 250N000009 HC RX 250: Performed by: PSYCHIATRY & NEUROLOGY

## 2023-08-29 PROCEDURE — 124N000002 HC R&B MH UMMC

## 2023-08-29 PROCEDURE — 250N000013 HC RX MED GY IP 250 OP 250 PS 637: Performed by: STUDENT IN AN ORGANIZED HEALTH CARE EDUCATION/TRAINING PROGRAM

## 2023-08-29 RX ADMIN — NAPROXEN 250 MG: 250 TABLET ORAL at 18:04

## 2023-08-29 RX ADMIN — BENZTROPINE MESYLATE 1 MG: 1 TABLET ORAL at 08:54

## 2023-08-29 RX ADMIN — Medication 10 MG: at 19:34

## 2023-08-29 RX ADMIN — DIVALPROEX SODIUM 250 MG: 250 TABLET, DELAYED RELEASE ORAL at 19:35

## 2023-08-29 RX ADMIN — CYANOCOBALAMIN TAB 1000 MCG 1000 MCG: 1000 TAB at 08:56

## 2023-08-29 RX ADMIN — CLOZAPINE 650 MG: 100 TABLET, ORALLY DISINTEGRATING ORAL at 12:59

## 2023-08-29 RX ADMIN — GABAPENTIN 300 MG: 300 CAPSULE ORAL at 19:34

## 2023-08-29 RX ADMIN — GABAPENTIN 300 MG: 300 CAPSULE ORAL at 13:00

## 2023-08-29 RX ADMIN — LORAZEPAM 0.5 MG: 0.5 TABLET ORAL at 19:34

## 2023-08-29 RX ADMIN — SENNOSIDES 2 TABLET: 8.6 TABLET ORAL at 19:37

## 2023-08-29 RX ADMIN — TAMSULOSIN HYDROCHLORIDE 0.4 MG: 0.4 CAPSULE ORAL at 08:55

## 2023-08-29 RX ADMIN — CLOTRIMAZOLE: 0.01 CREAM TOPICAL at 19:35

## 2023-08-29 RX ADMIN — OLANZAPINE 15 MG: 10 TABLET, ORALLY DISINTEGRATING ORAL at 08:56

## 2023-08-29 RX ADMIN — WHITE PETROLATUM: 1.75 OINTMENT TOPICAL at 09:13

## 2023-08-29 RX ADMIN — GABAPENTIN 300 MG: 300 CAPSULE ORAL at 08:54

## 2023-08-29 RX ADMIN — POLYETHYLENE GLYCOL 3350 17 G: 17 POWDER, FOR SOLUTION ORAL at 08:59

## 2023-08-29 RX ADMIN — LORAZEPAM 0.5 MG: 0.5 TABLET ORAL at 08:55

## 2023-08-29 RX ADMIN — POLYETHYLENE GLYCOL 3350 17 G: 17 POWDER, FOR SOLUTION ORAL at 19:34

## 2023-08-29 RX ADMIN — DIVALPROEX SODIUM 250 MG: 250 TABLET, DELAYED RELEASE ORAL at 13:01

## 2023-08-29 RX ADMIN — LORAZEPAM 0.5 MG: 0.5 TABLET ORAL at 13:01

## 2023-08-29 RX ADMIN — CLOTRIMAZOLE: 0.01 CREAM TOPICAL at 08:57

## 2023-08-29 RX ADMIN — SENNOSIDES 2 TABLET: 8.6 TABLET ORAL at 08:53

## 2023-08-29 RX ADMIN — ATROPINE SULFATE 2 DROP: 10 SOLUTION/ DROPS OPHTHALMIC at 19:35

## 2023-08-29 RX ADMIN — BENZTROPINE MESYLATE 1 MG: 1 TABLET ORAL at 19:34

## 2023-08-29 RX ADMIN — FLUOXETINE 20 MG: 20 CAPSULE ORAL at 08:55

## 2023-08-29 RX ADMIN — ATROPINE SULFATE 2 DROP: 10 SOLUTION/ DROPS OPHTHALMIC at 08:57

## 2023-08-29 RX ADMIN — NAPROXEN 250 MG: 250 TABLET ORAL at 08:55

## 2023-08-29 RX ADMIN — ATROPINE SULFATE 2 DROP: 10 SOLUTION/ DROPS OPHTHALMIC at 13:01

## 2023-08-29 RX ADMIN — DIVALPROEX SODIUM 250 MG: 250 TABLET, DELAYED RELEASE ORAL at 06:27

## 2023-08-29 RX ADMIN — OLANZAPINE 15 MG: 10 TABLET, ORALLY DISINTEGRATING ORAL at 19:34

## 2023-08-29 ASSESSMENT — ACTIVITIES OF DAILY LIVING (ADL)
ORAL_HYGIENE: INDEPENDENT
DRESS: SCRUBS (BEHAVIORAL HEALTH)
ADLS_ACUITY_SCORE: 74
HYGIENE/GROOMING: INDEPENDENT
DRESS: SCRUBS (BEHAVIORAL HEALTH)
ADLS_ACUITY_SCORE: 74
ADLS_ACUITY_SCORE: 64
ORAL_HYGIENE: INDEPENDENT;PROMPTS
ADLS_ACUITY_SCORE: 74
ADLS_ACUITY_SCORE: 64
LAUNDRY: UNABLE TO COMPLETE
HYGIENE/GROOMING: INDEPENDENT;PROMPTS
ADLS_ACUITY_SCORE: 64
LAUNDRY: UNABLE TO COMPLETE
ADLS_ACUITY_SCORE: 74

## 2023-08-29 NOTE — PLAN OF CARE
"Assessment/Intervention/Current Symtoms and Care Coordination:  Reviewed chart. Met with team to review patient's current functioning, needs, progress, and impediments to discharge.    Met with Vinod to discuss ROIs for placement. He presented with paranoid and persecutory delusional thought content. When writer brought up placement post-hospitalization Vinod stated, \"I'm surprised you think I'll be alive after being in here.\" Writer attempted to instill a sense of hope for post-hospitalization. Vinod signed the ROIs, then stated, \"Something bad is going to happen to me because I signed those, that's what the voices are saying.\" Writer reassured Vinod about the purpose of the ROIs and let him know that he can revoke the KING if he is feeling uncomfortable about it, but would discourage that because the KING is needed for the referral. He was agreeable to keeping the ROIs in place. He stated [place] [facility]. He had concerns about his tremors, writer notified RN who offered PRN but Vinod declined.     Discharge Plan or Goal:  Patient continues to present with persecutory delusions, auditory hallucinations, and intermittent agitation.      Barriers to Discharge:  Patient requires further psychiatric stabilization due to current symptomology.     Referral Status:  [facility] in [place] 8/22,   [facility] in [place] 8/15  [facility] Preadmission calling 8/31  [facility] 8/29  [facility] 8/29     Legal Status:  Commitment with [facility]: [place]  File Number: [id number]  Issued 07/06/23 and is not to exceed six months    Contacts:  : Vicky Valdez with [facility], [phone]  Psychiatrist: Dr. Santana at [facility], [phone] PCP: Attila Urena DO  Mom: Alberta, [phone] (H) [phone] (M)   Dad: Ino, [phone] (H)  Sister, Sandra Treadwell, [phone] (KING)   [facility] " 's Office Fax: [phone]  Pia May: [phone] (KING)  [place]: Jasmin phone [phone], fax [phone]  CADI  with [facility]: Regina Wong, [phone], [email or web address].    Upcoming Meetings and Dates/Important Information and next steps:  Central preadmission calling back 8/31 for an update on ECT status.

## 2023-08-29 NOTE — CARE PLAN
08/28/23 2100   Group Therapy Session   Group Attendance attended group session   Time Session Began 1615   Time Session Ended 1700   Total Time (minutes) 30   Total # Attendees 4   Group Type expressive therapy   Group Topic Covered emotions/expression   Patient Response/Contribution cooperative with task     Art Therapy directive was to create group collaborative art by moving around the room and contributing to each group art piece with a variety of art media.  Goals of directive: to assess how individual functions within a group process, social interest, media exploration, emotional regulation.  Pt was distractible, low frustration tolerance and lacked confidence in his abilities. Pt contributed minimally to group drawings. Pt did attend group for a longer duration of time than in previous author's groups.  Pts mood was irritable, calmer towards end of group.

## 2023-08-29 NOTE — PLAN OF CARE
Goal Outcome Evaluation:    Plan of Care Reviewed With: patient    Overall Patient Progress: Not met Overall Patient Progress: Not met    Outcome Evaluation: providing Ensure Enlive TID, Magic Cups TID  Recommend OT provide modified eating utensils r/t limited ability to feed self r/t hand tremors    Sandra HOUGH  Mental Health Pager (M-F): [phone]  On Call Pager (weekends only): [phone]

## 2023-08-29 NOTE — PLAN OF CARE
"Pt was seen pacing the halls with SIO around 1130. He was cooperative with taking all scheduled medications today in pudding. Pts weekly EKG was completed and strip placed in chart. After patient took his afternoon medications he stated, \"Im scared that if I go to shock therapy, my meds will come up\". Pt reminded that he does not have shock therapy, however, he continued to focus on it. He refused his morning vitals.     Problem: Suicidal Behavior  Goal: Suicidal Behavior is Absent or Managed  Outcome: Progressing     "

## 2023-08-29 NOTE — PLAN OF CARE
Pt was seen in his room at the start of the shift. He remains on SIO. Pt was cooperative with taking all scheduled evening medications in pudding. Anti fungal Cream was applied for his jock itch to the perineum and groin area. It was pink/red and blanchable. He was given an egg crate mattress as he stated that his bed was very uncomfortable for him. Continues to have tremors of the hands and mouth.       Problem: Confusion Chronic  Goal: Optimal Cognitive Function  Outcome: Progressing  Intervention: Minimize Injury Risk and Provide Safety  Recent Flowsheet Documentation  Taken 8/28/2023 1844 by Jaqui Monsivais, RN  Enhanced Safety Measures:  at bedside

## 2023-08-30 LAB
ATRIAL RATE - MUSE: 84 BPM
DIASTOLIC BLOOD PRESSURE - MUSE: NORMAL MMHG
INTERPRETATION ECG - MUSE: NORMAL
P AXIS - MUSE: 50 DEGREES
PR INTERVAL - MUSE: 146 MS
QRS DURATION - MUSE: 156 MS
QT - MUSE: 424 MS
QTC - MUSE: 501 MS
R AXIS - MUSE: -20 DEGREES
SYSTOLIC BLOOD PRESSURE - MUSE: NORMAL MMHG
T AXIS - MUSE: 99 DEGREES
VENTRICULAR RATE- MUSE: 84 BPM

## 2023-08-30 PROCEDURE — 99232 SBSQ HOSP IP/OBS MODERATE 35: CPT | Performed by: PHYSICIAN ASSISTANT

## 2023-08-30 PROCEDURE — 124N000002 HC R&B MH UMMC

## 2023-08-30 PROCEDURE — 250N000013 HC RX MED GY IP 250 OP 250 PS 637: Performed by: PSYCHIATRY & NEUROLOGY

## 2023-08-30 PROCEDURE — 99233 SBSQ HOSP IP/OBS HIGH 50: CPT | Performed by: PSYCHIATRY & NEUROLOGY

## 2023-08-30 PROCEDURE — 250N000013 HC RX MED GY IP 250 OP 250 PS 637: Performed by: STUDENT IN AN ORGANIZED HEALTH CARE EDUCATION/TRAINING PROGRAM

## 2023-08-30 PROCEDURE — 250N000013 HC RX MED GY IP 250 OP 250 PS 637: Performed by: PHYSICIAN ASSISTANT

## 2023-08-30 RX ADMIN — FLUOXETINE 20 MG: 20 CAPSULE ORAL at 08:51

## 2023-08-30 RX ADMIN — ATROPINE SULFATE 2 DROP: 10 SOLUTION/ DROPS OPHTHALMIC at 19:25

## 2023-08-30 RX ADMIN — OLANZAPINE 15 MG: 10 TABLET, ORALLY DISINTEGRATING ORAL at 19:25

## 2023-08-30 RX ADMIN — CLOTRIMAZOLE: 0.01 CREAM TOPICAL at 08:54

## 2023-08-30 RX ADMIN — BENZTROPINE MESYLATE 1 MG: 1 TABLET ORAL at 19:25

## 2023-08-30 RX ADMIN — ATROPINE SULFATE 2 DROP: 10 SOLUTION/ DROPS OPHTHALMIC at 14:46

## 2023-08-30 RX ADMIN — Medication 10 MG: at 19:24

## 2023-08-30 RX ADMIN — WHITE PETROLATUM: 1.75 OINTMENT TOPICAL at 08:54

## 2023-08-30 RX ADMIN — NAPROXEN 250 MG: 250 TABLET ORAL at 18:25

## 2023-08-30 RX ADMIN — OLANZAPINE 15 MG: 10 TABLET, ORALLY DISINTEGRATING ORAL at 08:52

## 2023-08-30 RX ADMIN — DIVALPROEX SODIUM 250 MG: 250 TABLET, DELAYED RELEASE ORAL at 19:25

## 2023-08-30 RX ADMIN — ATROPINE SULFATE 2 DROP: 10 SOLUTION/ DROPS OPHTHALMIC at 08:53

## 2023-08-30 RX ADMIN — GABAPENTIN 300 MG: 300 CAPSULE ORAL at 08:50

## 2023-08-30 RX ADMIN — DIVALPROEX SODIUM 250 MG: 250 TABLET, DELAYED RELEASE ORAL at 06:31

## 2023-08-30 RX ADMIN — NAPROXEN 250 MG: 250 TABLET ORAL at 08:51

## 2023-08-30 RX ADMIN — TAMSULOSIN HYDROCHLORIDE 0.4 MG: 0.4 CAPSULE ORAL at 08:52

## 2023-08-30 RX ADMIN — POLYETHYLENE GLYCOL 3350 17 G: 17 POWDER, FOR SOLUTION ORAL at 19:23

## 2023-08-30 RX ADMIN — SENNOSIDES 2 TABLET: 8.6 TABLET ORAL at 08:52

## 2023-08-30 RX ADMIN — DIVALPROEX SODIUM 250 MG: 250 TABLET, DELAYED RELEASE ORAL at 14:10

## 2023-08-30 RX ADMIN — GABAPENTIN 300 MG: 300 CAPSULE ORAL at 14:09

## 2023-08-30 RX ADMIN — CLOZAPINE 650 MG: 100 TABLET, ORALLY DISINTEGRATING ORAL at 14:09

## 2023-08-30 RX ADMIN — LORAZEPAM 0.5 MG: 0.5 TABLET ORAL at 08:51

## 2023-08-30 RX ADMIN — SENNOSIDES 2 TABLET: 8.6 TABLET ORAL at 19:24

## 2023-08-30 RX ADMIN — BENZTROPINE MESYLATE 1 MG: 1 TABLET ORAL at 08:53

## 2023-08-30 RX ADMIN — MICONAZOLE NITRATE: 20 POWDER TOPICAL at 14:10

## 2023-08-30 RX ADMIN — CYANOCOBALAMIN TAB 1000 MCG 1000 MCG: 1000 TAB at 08:51

## 2023-08-30 RX ADMIN — POLYETHYLENE GLYCOL 3350 17 G: 17 POWDER, FOR SOLUTION ORAL at 08:50

## 2023-08-30 RX ADMIN — GABAPENTIN 300 MG: 300 CAPSULE ORAL at 19:25

## 2023-08-30 RX ADMIN — LORAZEPAM 0.5 MG: 0.5 TABLET ORAL at 14:10

## 2023-08-30 RX ADMIN — LORAZEPAM 0.5 MG: 0.5 TABLET ORAL at 19:25

## 2023-08-30 RX ADMIN — MICONAZOLE NITRATE: 20 POWDER TOPICAL at 19:29

## 2023-08-30 RX ADMIN — WHITE PETROLATUM: 1.75 OINTMENT TOPICAL at 18:25

## 2023-08-30 ASSESSMENT — ACTIVITIES OF DAILY LIVING (ADL)
ADLS_ACUITY_SCORE: 74
ADLS_ACUITY_SCORE: 64
ADLS_ACUITY_SCORE: 64
DRESS: SCRUBS (BEHAVIORAL HEALTH)
ADLS_ACUITY_SCORE: 64
ADLS_ACUITY_SCORE: 64
ORAL_HYGIENE: PROMPTS
ADLS_ACUITY_SCORE: 74
ADLS_ACUITY_SCORE: 64
ADLS_ACUITY_SCORE: 74
ORAL_HYGIENE: PROMPTS
ADLS_ACUITY_SCORE: 74
HYGIENE/GROOMING: PROMPTS
HYGIENE/GROOMING: PROMPTS
ADLS_ACUITY_SCORE: 74

## 2023-08-30 NOTE — PLAN OF CARE
Problem: Adult Behavioral Health Plan of Care  Goal: Adheres to Safety Considerations for Self and Others  Outcome: Progressing     Problem: Suicidal Behavior  Goal: Suicidal Behavior is Absent or Managed  Outcome: Progressing    Pt slept through the night without distress. Pt reported no pain or discomfort. No problems with behavior. No concerns reported by pt. Pt slept 7 hours.  Staff will continue to monitor.

## 2023-08-30 NOTE — PLAN OF CARE
Assessment/Intervention/Current Symtoms and Care Coordination:  Reviewed chart. Met with team to review patient's current functioning, needs, progress, and impediments to discharge.    Vinod signed an KING for [facility]. Referral sent.     Called Ramila at [facility], no answer, left voicemail.     Discharge Plan or Goal:  Patient continues to present with persecutory delusions, auditory hallucinations, and intermittent agitation.      Barriers to Discharge:  Patient requires further psychiatric stabilization due to current symptomology.     Referral Status:  [facility] in [place] 8/22, no memory care 8/30  [facility] in [place] 8/15  [facility] calling 8/31  [facility] 8/29  [facility] 8/29, no open beds 8/30  [facility] 8/30     Legal Status:  Commitment with [place] [facility] [place]    [facility]: [place]  File Number: [id number]  Issued 07/06/23 and is not to exceed six months    Contacts:  : Vicky Valdez with [facility], [phone]  Psychiatrist: Dr. Santana at [facility], [phone] PCP: Attila Urena DO  Mom: Alberta, [phone] (H) [phone] (M)   Dad: Ino, [phone] (H)  Sister, Sandra Treadwell, [phone] (KING)   [facility]'s Office Fax: [phone]  Pia May: [phone] (KING)  [place]: Jasmin phone [phone], fax [phone]  CADI  with [facility]: Regina Wong, [phone], [email or web address]    Upcoming Meetings and Dates/Important Information and next steps:  Central preadmission calling back 8/31 for an update on ECT status.

## 2023-08-30 NOTE — PROGRESS NOTES
"[facility], [place]   Psychiatric Progress Note  Hospital Day: 96        Interim History:   The patient's care was discussed with the treatment team during the daily team meeting and/or staff's chart notes were reviewed. Pt appears to be improving. No recent episodes of aggression or violent behaviors. Responding better to reassurance, thought still paranoid and suspicious of staff. Rash in groin area is worsening. Pt has good appetite.     Upon interview, Vinod was lying in bed, though awake and alert. He made several comments about being evil and staff targeting him for this reason. He accused writer of lying to him when I attempted to reassure him. He suspected that writer was going to \"smack me upside the head at any moment now.\" Reassurance again provided.  SIO staff present throughout our interview, and also attempted to provide reassurance.     Suicidal ideation: no    Homicidal ideation: Denies    Psychotic symptoms: no AH or VH reported during interview. Delusions present and other psychotic symptoms suspected.    Medication side effects reported: Tremor. Stated that he has had tremor \"to some extent since infancy.\" Feels it has worsened since hospitalization. Drooling has improved.     Acute medical concerns: none. He denies any pain or discomfort today.      Other issues reported by patient: Patient had no further questions or concerns.           Medications:      atropine  2 drop Sublingual TID    benztropine  1 mg Oral BID    cloZAPine  650 mg Oral Daily    cyanocobalamin  1,000 mcg Oral Daily    divalproex sodium delayed-release  250 mg Oral Q8H KIKI    FLUoxetine  20 mg Oral Daily    gabapentin  300 mg Oral TID    LORazepam  0.5 mg Oral TID    melatonin  10 mg Oral At Bedtime    miconazole   Topical BID    mineral oil-hydrophilic petrolatum   Topical BID IS    naproxen  250 mg Oral BID w/meals    OLANZapine zydis  15 mg Oral BID    Or    OLANZapine  10 mg Intramuscular BID " "   polyethylene glycol  17 g Oral BID    sennosides  2 tablet Oral BID    tamsulosin  0.4 mg Oral Daily          Allergies:     Allergies   Allergen Reactions    Bee Venom Unknown    Montelukast Unknown    Phenothiazines Unknown          Labs:     No results found for this or any previous visit (from the past 24 hour(s)).             Psychiatric Examination:     /65 (BP Location: Right arm)   Pulse 83   Temp 97.8  F (36.6  C) (Temporal)   Resp 18   Ht 1.753 m (5' 9\")   Wt 82.8 kg (182 lb 7 oz)   SpO2 97%   BMI 26.94 kg/m    Weight is 182 lbs 7 oz  Body mass index is 26.94 kg/m .    Weight over time:  Vitals:    07/03/23 1100 07/30/23 0902 08/13/23 0900 08/26/23 0835   Weight: 81.6 kg (179 lb 12.8 oz) 86.5 kg (190 lb 9.6 oz) 85.7 kg (188 lb 14.4 oz) 82.8 kg (182 lb 7 oz)       Orthostatic Vitals         Most Recent      Sitting Orthostatic /61 07/25 0800    Sitting Orthostatic Pulse (bpm) 85 07/25 0800          Cardiometabolic risk assessment. 06/07/23    Reviewed patient profile for cardiometabolic risk factors    Date taken /Value  REFERENCE RANGE   Abdominal Obesity  (Waist Circumference)   See nursing flowsheet Women ?35 in (88 cm)   Men ?40 in (102 cm)      Triglycerides  No results found for: TRIG    ?150 mg/dL (1.7 mmol/L) or current treatment for elevated triglycerides   HDL cholesterol  No results found for: HDL]   Women <50 mg/dL (1.3 mmol/L) in women or current treatment for low HDL cholesterol  Men <40 mg/dL (1 mmol/L) in men or current treatment for low HDL cholesterol     Fasting plasma glucose (FPG) Lab Results   Component Value Date     05/26/2023      FPG ?100 mg/dL (5.6 mmol/L) or treatment for elevated blood glucose   Blood pressure  BP Readings from Last 3 Encounters:   08/30/23 103/65   05/26/23 97/55    Blood pressure ?130/85 mmHg or treatment for elevated blood pressure   Family History  See family history     Mental Status Exam:  Appearance: awake, groggy, disheveled. " No evidence of pain of acute distress.   Attitude:  mostly cooperative  Eye Contact:  fair, less intense  Mood:  calm  Affect:  mood congruent  Speech: mostly coherent  Psychomotor Behavior:  No evidence of psychomotor agitation or restlessness today. No evidence of dystonia, or tics.  Throught Process: linear, illogical  Associations:  no loosening of associations present  Thought Content:  Delusions. Likely auditory, tacticle and olfactory hallucinations. Cannot rule out visual hallucinations. Evidence of obsessive thinking and likely compulsions to harm others.   Insight:  limited  Judgement:  poor  Oriented to:  self, date, place  Attention Span and Concentration:  fair  Recent and Remote Memory:  poor         Precautions:     Behavioral Orders   Procedures    Assault precautions    Code 1 - Restrict to Unit    Code 2    Code 2 - 1:1 Staff Supervision     For ECT only    Electroconvulsive therapy     Series of up to 12 treatments. Begin Date: 8/2/23     Treating Psychiatrist providing ECT:  unknown     Notified on:  7/28/23 via this order  NOTE: We received verbal confirmation from [facility] that Lorenzo Pavon has been approved but awaiting official court order and risk management's review  Initial consult was completed by Dr. Hicks on 7/18/23    Electroconvulsive therapy     Series of up to 12 treatments. Begin Date: 8/2/23     Treating Psychiatrist providing ECT:  Dominga     Notified on:  8/2/23    Elopement precautions    Fall precautions    Routine Programming     As clinically indicated    Self Injury Precaution    Status 15     Every 15 minutes.    Status Individual Observation     Patient SIO status reviewed with team/RN.  Please also refer to RN/team documentation for add'l detail.    -SIO staff to monitor following which have contributed to patient being on SIO:  Agitation  Pt is impulsive   Pt has ran out of his room naked  Pt has Parkinson symptoms place him in a high fall risk  Pt has verbal outburst  of sexual and threaten statements  Pt requires immediate redirection when masturbating    -Possible interventions SIO staff could use to support patient's treatment progress:  Engage pt in activities    -When following observed, team will review discontinuation of SIO:  Absence of aggression toward others for > 24 hours    Comments: SIO 1:1     Order Specific Question:   CONTINUOUS 24 hours / day     Answer:   5 feet     Order Specific Question:   Indications for SIO     Answer:   Severe intrusiveness     Order Specific Question:   Indications for SIO     Answer:   Assault risk    Suicide precautions     Patients on Suicide Precautions should have a Combination Diet ordered that includes a Diet selection(s) AND a Behavioral Tray selection for Safe Tray - with utensils, or Safe Tray - NO utensils            Diagnoses:     #Unspecified psychosis likely schizophrenia per history, rule out Bipolar affective disorder, manic  #Unspecified encephalopathy   -R/O catatonia   -Episodes of unresponsiveness, concern for PNES   #Parkinsonism 2/2 neuroleptic medications, rule out Parkinson's Disease  #Urinary retention and BPH  -Possible UTI -- UC resulted on 5/25 w/out growth  #Hx of prolonged QTc with clozapine  #Mild thrombocytopenia  #R/O OCD         Assessment and hospital summary:  Patient was admitted to psychiatric unit for safety, stabilization and medication management. He has had schizophrenia since the 1980. He was on Clozaril x 25 years, and it was tapered and discontinued on May 7, 2023  due to prolonged qtc of 527, and his psychotic  symptoms have worsened since then. Sinemet was also discontinued recently due to concerns that it was contributing to paranoia and AH. He is agitated, aggressive, dangerous to self and others. He remains on SIO 2 to 1, and is under psychiatric emergency and court hold. There are concerns for organic etiology given pattern of sundowning, history of parkinsonism, and ongoing  disorientation/confusion. : EKG repeated, cardiology consult regarding safety of resuming clozapine in the context of prolonged QTc and refractory schizophrenia pending response. Per cardiology, correct QTc is no more than 460. They do not have concerns about AP retrial. Neurology IP consult placed for evaluation of sundowning and cognitive decline secondary to Sinemet discontinuation. MSSA initiated. Per Neurology, discontinuation of Sinemet would not account for these symptoms. They do not recommend retrial at this time. : Clozapine titration continued.   Its possible that clozapine dose is contributing to worsened compulsive behaviors noted throughout hospitalization which could improve with lowering the dose.     Chart reviewed which revealed the followin2022: He was on clozapine, Seroquel, Cymbalta, and Carbidopa-levodopa and hospitalized for pneumonia. Psych consulted and Seroquel was stopped. Treated with Abx and discharged to TCU.     2022: Hospitalized at [place]. Per chart review:    Vinod Lee is a 61 yo male with longstanding history of schizophrenia. He was admitted from [facility] ED, where he presented due to increased delusional thoughts while on a visit to his parents for Thanksgiving. He began experiencing increasing paranoid thoughts that someone might be poisoning him and began fearing that he might accidentally harm someone. He reported to his parents that he was having thoughts about hitting someone or beating someone up and told them he could not guarantee they would be safe with him. Parents encouraged patient to go to the ED, which he did voluntarily.     Per patient report and chart review, he was hospitalized several times in the  and reports he was civilly committed at one point in the , however he has been quite stable for the past several decades. He reports he will experience some paranoia and delusional thinking at times, but generally has good  "insight into his symptoms. He has been maintained on clozapine for about 25 years and prior to several months ago was also concurrently prescribed quetiapine.      In the last several months, patient has had a number of medication changes. Approximately 6 months ago, patient was diagnosed with parkinsonism related to antipsychotic use and was started on carbidopa-levidopa. He experienced no improvement in tremors, and thus carbidopa- levidopa dose was increased 1.5 weeks ago.      In addition, patient was medically hospitalized in August 2022 for confusion, weakness, repeated falls, ongoing weight loss, and SOB. He was found to have a cavitary lesion of the lung and suspected aspiration pneumonia. He was treated with antibiotics. At that time, he was taking current dose of clozapine and duloxetine, as well as propranolol ER, quetiapine, gabapentin, and clonazepam. Psychiatry was consulted due to concern for oversedation, and propranolol ER, gabapentin, and quetiapine were discontinued. Conazepam was reduced from 0.5 mg TID to BID dosing and recommended to be discontinued, however, it does not appear this occurred. His sedation improved significantly. QTc prolongation was also noted, but improved throughout his stay. He was ultimately discharged to a TCU for several months before returning home. He reports his weakness has greatly improved and states he \"graduated\" physical therapy, though he continues to be diligent in completed recommended exercises.      He reports that over the past several months, his delusions and anxiety have been worse than usual, with symptoms becoming much more acute since the carbidopa-levidopa dose was increased. He has felt increasingly paranoid about being poisoned, noting this is a delusion that has been stable over time, but was previously less intrusive. He has insight during the interview that his paranoid thinking is not reality based, but states it has been harder to separate " "delusions from reality and he becomes very worried that he might hurt someone, despite no history of violent behavior. He reports that the paranoia about his food being poisoned in combination with the tremors in his hands have made it difficult for him to eat. He has also had quite low appetite for the past 6 months to a year . He reports that due to the combination of these factors, he has lost about 50 pounds in the last year.He denies any problems with sleep initiation or maintenance. He reports energy is fairly good and \"better than it used to be.\" He reports some short term memory issues and on interview, does have some difficulty remembering details of recent events. He is unsure if he has received cognitive testing in the past.    He denies any suicidal ideation and reports he has not experienced SI for decades. He denies homicidal thoughts and is very clear that he had and has no desire to harm anyone else, but was afraid he might somehow do so. He denies any hallucinations and states \"that's never been a problem for me.\" He is not observed responding to internal stimuli. He is alert and oriented in all spheres.        ========    BRIEF HOSPITAL COURSE: This 62 y.o. male admitted 11/25/2022 to  [place] for the assessment and treatment of the above presentation. This was a voluntary admission.     In summary, he was tapered off the Sinemet, which he reports to be both ineffective and potentially worsening the anxiety and paranoia ideations. Instead Benadryl 25 mg TID was started to help with extrapyramidal side effects. He struggled with sleep during his stay here. Remeron 7.5mg QHS was tried without success. Seroquel 100mg qhs was restarted with addition of suvorexant 10mg qhs. Given his Seroquel PRN use prior history of prolonged QTC, he will benefit from another EKG repeat on the medical unit.     Unfortunately, he tested positive for influenza A and developed hypoxemia. Now on 2L oxygen with desats when " prone requiring 3L oxygen. Subsequently, the plan will to be tapering him off clonazepam due to hypoxia (and also prior plan to taper). The patient tolerated these changes without side effects.     The patient minimally benefited from the structured therapeutic milieu as he attended programming seldom as he tested positive for influenza, he needed to isolate with droplet precautions. Pt was engaged and worked on issues that were triggering/brought pt to the hospital and has excellent insight into his anxiety and paranoid delusions. Pt denied suicidal ideations throughout all/majority of their hospitalization. Pt denied HI throughout all of hospitalization. There were no concerns with behavioral disruptions/outbursts. The patient has shown improvement in general.     At the time of discharge, hospitalization course was reviewed with Vinod Lee with plan to transfer to medicine. Please consult psychiatry and I will continue to follow while patient is on the medical unit. He is now off sinemet since 11/29 21:00 and I would expect anxiety and paranoia to improve more moving forward. Psychiatrically, once anxiety and paranoia is baseline, can D/C to home once medically stable. He DOES NOT NEED A 1:1 on the medical unit from a mental health perspective, we initiated 1:1 11/30/2022 due to significant fatigue, gait instability (he was unable to stand without assist) and feeding assist.    04/2023: Clozapine was gradually tapered and discontinued, unclear reasons why it was discontinued per outside records, though Dr. Portillo's H&P indicates that it was due to prolonged QTc.       Today's Changes:  - Consider anticholinesterase - some literature suggests it may reduce hallucinations (though not prominent for him)   - Nutrition note reviewed. Appreciate assistance. Recommending ensure and magic cup, as well as OT eval for modified utensils.     Target psychiatric symptoms and interventions:  -Psych emergency declared on  5/27, now fully committed  -ECT Mon/Wed/Fri per Janelle   -Continue clozapine as above  -Continue scheduled Zyprexa 15mg BID  -Continue 1:1 for safety of staff and patients, reduced from 2:1 7/23/23  - weekly CBC to monitor platelets      -Continue Gabapentin 300 mg TID as staff believe it has been effective for treatment of his anxiety  -Discussed complex case at length with Dr. Hatch (primary provider on geriatic unit) who provided recs (see second opinion note dated 6/14). Appreciate assistance. I have since made the following changes:         1) Add propranolol 10 mg TID         2) Added Depakote 1000 mg qhs (to be administered in magic cup)         3) Nutrition consult to order magic cup         4) Increased melatonin to 10 mg QHS    Risks, benefits, and alternatives discussed at length with patient.     Acute Medical Problems and Treatments:  Delirium vs progression of dementia  Neurology consult placed on 6/8 for evaluation of sundowning and cognitive decline secondary to Sinemet discontinuation: Resuming Sinemet not recommended for behavioral concerns. Please see Neuro note for details.     Tremors  longstanding though appeared to worsen following initiation of clozapine  - Ativan 0.5 mg TID  - Cogentin 1 mg BID added on 8/25    Thrombocytopenia, resolved  Likely secondary to Depakote.  According to the, incidents of Depakote induced thrombocytopenia as 27%.  Depakote dose reduced from 1000 twice daily to 250 3 times daily on 7/28/2023 with subsequent resolution of thrombocytopenia  - weekly CBCs    Constipation  Likely worsened by clozapine, improved since modifying regimen.   - daily miralax   - daily Senokot 2 tablets BID      Right first toe fracture:  Seen by Ortho on 6/16:  Per note: Vinod Lee is a 62 year old  male w/ PMHx complex psychiatric history who has a fracture to the distal phalanx of the right toe after a recent trauma to the foot the patient reports.  Patient denies any other  "pain or discomfort.  Images reviewed and plan will be for irrigation of the popped fracture blister with sterile saline and the toes should be dressed and a 4 x 4 gauze dressing.  Patient will need a postop shoe which she can be weightbearing as tolerated in.  Would recommend a course of antibiotics for approximately 7 days.  Would recommend Augmentin or clindamycin.  Podiatry will be made aware of patient and will see patient while he is admitted or if patient is discharged in the near future a follow-up appointment will need to be established.     Remainder of care per primary team.  Primary team should make sure that patient is up-to-date on his tetanus shot.  If not patient will require a booster shot.    Hx of prolonged QTc:  Continue weekly EKGs. See above for details.   Discussed most recent EKG findings with Cardiology on 8/25. Corrected QTc is 501 from EKG on 8/23. Per cardiology, repeat EKG today. If corrected QTc is > 500, will need to reduce clozapine. If < 500, OK to keep clozapine at current dose. UPDATE/ADDENDUM: Repeat EKG with corrected QTc of 440. No need to adjust clozapine dose per cards.     Urinary retention, resolved  Per patient care order:   If patient is refusing straight caths, please notify IM provider immediately to determine whether this constitutes a medical emergency. If IM declares medical emergency, may restrain patient for straight cath. Discussed this with patient's parents/substitute decision makers on 6/7 who are in support of this plan.    # Psoriasis hx  # Purplish discoloration of B/LE feet-resolved   Discussed with Dr. Rene and bedside RN regarding rash on right foot that appears and appears to be purplish in color and almost \"petechiae.\" It was noted during interview, but seemed to be resolving upon walking. Within the past 24 hrs, pt has had ECT and seemed more confused than usual. Pt seems to have had a right great toe wound with recent right great toe fracture seen " by Medicine on 7/16. Seen on 8/4 with improving color on B/L legs at rest and appears to have small, scaly patches of varying coin sizes that have not grown past marked borders. See photos. Per further chart review, has had psoriasis history. WBC WNL, no fevers, HDS. This appears to more consistent with a psoriasis like picture.   - Start triamcinolone topical 0.025%   -Apply twice daily until lesions for 2 weeks or until lesions resolve/  -Monitor for skin thinning, striae, rebound lesion flares.   - Start Eucerin cream              - Apply 30 minutes PRIOR to triamcinolone topical cream above or PRN as needed if dry skin/after bathing   - Medicine will sign off.   - For worsening pain, spread, itchiness, fevers, please contact Medicine and possibly Dermatology.    Benzodiazepine dependence:  Phenobarbital taper completed shortly after patient's admission    Pertinent labs/imaging:  Weekly CBC with diff     Behavioral/Psychological/Social:  - Encourage unit programming    Safety:  - Continue precautions as noted above  - Status 15 minute checks  - Continue 1:1 for staff and pt safety    Legal Status: Fully committed with Sánchez and Lorenzo Pavon. Sánchez medications: clozapine, olanzapine, risperidone, quetiapine      Disposition Plan   Reason for ongoing admission: poses an imminent risk to self, poses an imminent risk to others and is unable to care for self due to severe psychosis or darryl  Discharge location:  assisted living facility  Discharge Medications: not ordered  Follow-up Appointments: not scheduled    Entered by: Ingrid Rhodes MD on 08/30/2023  at 1:46 PM

## 2023-08-30 NOTE — PLAN OF CARE
Pt continue to show improvement in mental health with intermitting confusion. However, rash on perineum area appered worse that the previous day. Pt also complain of pain with urination. Provider updated and she requested for internal  medicine to see the pt again.   Pt was seen by internal medicine and his lotrimin cream was replaced  with Miconazole powder BID.  IM will also like staff to informed pt to reduce the amount of time he spent in the shower at a time because it might be a contrbuting factor to the rash not healing effectively.    OT consult  is also ordered due to  increase tremor  and constant assistant needed with food cutting and feeding.      No harm and assault  to self and others  this shift. Pt is calm and  coperrative with cares.    Problem: Psychotic Symptoms  Goal: Psychotic Symptoms  Description: Signs and symptoms of listed problems will be absent or manageable.  Outcome: Progressing     Problem: Confusion Chronic  Goal: Optimal Cognitive Function  Outcome: Progressing   Goal Outcome Evaluation:    Plan of Care Reviewed With: patient

## 2023-08-30 NOTE — CONSULTS
[facility]  Consult Note - Hospitalist Service  Date of Admission:  5/26/2023  Consult Requested by: Misty Portillo MD   Reason for Consult: groin rash, not improving    Assessment & Plan   Vinod Lee is a 63 year old male admitted on 5/26/2023 to inpatient psychiatry. a past medical history significant for schizophrenia and parkinson's disease who was initially admitted to the hospital 5/18/23 for altered mental status and aggression.       Groin rash, suspect tinea cruris and intertrigo  Patient was seen by medicine on 8/17/2023 for a groin rash at that time.  He stated the rash has been present for about a month and was associated with itchiness.  Exam at that time was consistent with tinea cruris and patient was started on clotrimazole 1% topically twice daily for 3 weeks.  Reconsulted due to rash not improving, it has been approximately 13 days since he was started on the treatment.  On exam, the rash has spread in surface area covering more of the right side of the groin and his right upper thigh in addition to the previous area.  There is still evidence of a tinea like rash to the left groin.  In addition, patient has been taking long showers, sometimes multiple times a day, causing him to be in the shower for 1 to 2 hours each day.  There is some burning when he is in the shower to the rash, but otherwise he denies pain, fevers, chills, dysuria.  He does state that the rash is itchy.  Vitals reviewed and overall unremarkable.  - Start miconazole 2% powder with Zeasorb, BID   - If patient is to shower soon, please apply after shower to allow maximum efficacy time  - limit showers to once daily, and attempt to be <20 minutes  -Discontinue topical clotrimazole 1% twice daily x3 weeks.  -will consider dermatology consult if no improvement  -Medicine will continue to follow to monitor rash      The patient's care was discussed with the Bedside Nurse and  "Patient.    Clinically Significant Risk Factors                         # Overweight: Estimated body mass index is 26.94 kg/m  as calculated from the following:    Height as of this encounter: 1.753 m (5' 9\").    Weight as of this encounter: 82.8 kg (182 lb 7 oz).             Ernestine Jimenez PA-C  Hospitalist Service  Securely message with Vocera (more info)  Text page via [facility] Paging/Directory   ______________________________________________________________________    Chief Complaint   N/A    History is obtained from the patient    History of Present Illness   Vinod Lee is a 63 year old male who rash.  Reports that there is some itchiness associated with the rash.  He denies any significant pain except for some burning when he is in the shower.  He otherwise denies any recent fevers, chills, dysuria.      Past Medical History    Past Medical History:   Diagnosis Date    LBBB (left bundle branch block)     Parkinson's disease (H)     Schizophrenia (H)        Past Surgical History   No past surgical history on file.    Medications   Medications Prior to Admission   Medication Sig Dispense Refill Last Dose    cyanocobalamin (VITAMIN B-12) 1000 MCG tablet Take 1,000 mcg by mouth daily   Unknown    LORazepam (ATIVAN) 1 MG tablet Take 1 mg by mouth 3 times daily   Unknown    nitroFURantoin macrocrystal-monohydrate (MACROBID) 100 MG capsule Take 1 capsule (100 mg) by mouth every 12 hours   Unknown    OLANZapine (ZYPREXA) 7.5 MG tablet Take 1 tablet (7.5 mg) by mouth 2 times daily   Unknown    OLANZapine zydis (ZYPREXA) 5 MG ODT Take 1 tablet (5 mg) by mouth 3 times daily as needed   Unknown    polyethylene glycol (MIRALAX) 17 g packet Take 17 g by mouth daily   Unknown    senna-docusate (SENOKOT-S/PERICOLACE) 8.6-50 MG tablet Take 1 tablet by mouth 2 times daily as needed for constipation   Unknown    sertraline (ZOLOFT) 50 MG tablet Take 3 tablets (150 mg) by mouth daily   Unknown    tamsulosin (FLOMAX) 0.4 MG capsule " Take 1 capsule (0.4 mg) by mouth daily   Unknown          Review of Systems     focused review of system as above in HPI     Physical Exam   Vital Signs: Temp: 97.8  F (36.6  C) Temp src: Temporal BP: 103/65 Pulse: 83     SpO2: 97 % O2 Device: None (Room air)    Weight: 182 lbs 7 oz    Focused physical exam:  Skin: Pink toned erythematous rash to intertriginous groin area and bilateral upper thighs, with well demarcated large macule with white scale around perimeter to left groin, and scattered satellite lesions. No discharge or bleeding.     Medical Decision Making       40 MINUTES SPENT BY ME on the date of service doing chart review, history, exam, documentation & further activities per the note.      Data   Recent Labs   Lab 08/25/23  0802   WBC 8.5   HGB 12.7*   MCV 87

## 2023-08-30 NOTE — PLAN OF CARE
Pt was seen laying in his bed at the start of the shift. Pt was cooperative with taking his scheduled medications in pudding. Continues to endorse , contracts for safety. He took a shower this evening and reported itching of his perineum area. When writer went to apply antifungal cream to the area, it appeared to look more irritated and red today than it was yesterday evening. When writer attempted to apply the cream, pt stated that it was burning and painful. Cream was then wiped off and no more was applied. Pts underwear was changed due to scant amounts of blood seen. Pt stated that the blood was from him itching the area in the shower.  notified and Medicine consult placed. Remains on SIO.    Problem: Psychotic Symptoms  Goal: Social and Therapeutic (Psychotic Symptoms)  Description: Signs and symptoms of listed problems will be absent or manageable.  Outcome: Progressing

## 2023-08-30 NOTE — PROVIDER NOTIFICATION
08/30/23 1528   Individualization/Patient Specific Goals   Patient Personal Strengths expressive of emotions;community support;family/social support;humor;insight into illness/situation;interests/hobbies;resourceful;resilient;socioeconomic stability   Patient Vulnerabilities traumatic event;housing insecurity   Interprofessional Rounds   Summary In the process of finding discharge placement for Vinod.   Participants CTC;psychiatrist;nursing   Behavioral Team Discussion   Participants Dr. Rhodes, Masha Mora, Taurus ONEILL, Hina Albarran   Anticipated length of stay 14 days   Continued Stay Criteria/Rationale Seeking placement, likely memory care unit in the [place] part of the [facility].   Anticipated Discharge Disposition nursing home;basic nursing care/long-term care;assisted living     PRECAUTIONS AND SAFETY    Behavioral Orders   Procedures    Assault precautions    Code 1 - Restrict to Unit    Code 2    Code 2 - 1:1 Staff Supervision     For ECT only    Electroconvulsive therapy     Series of up to 12 treatments. Begin Date: 8/2/23     Treating Psychiatrist providing ECT:  unknown     Notified on:  7/28/23 via this order  NOTE: We received verbal confirmation from [facility] that Lorenzo Pavon has been approved but awaiting official court order and risk management's review  Initial consult was completed by Dr. Hicks on 7/18/23    Electroconvulsive therapy     Series of up to 12 treatments. Begin Date: 8/2/23     Treating Psychiatrist providing ECT:  Dominga     Notified on:  8/2/23    Elopement precautions    Fall precautions    Routine Programming     As clinically indicated    Self Injury Precaution    Status 15     Every 15 minutes.    Status Individual Observation     Patient SIO status reviewed with team/RN.  Please also refer to RN/team documentation for add'l detail.    -SIO staff to monitor following which have contributed to patient being on SIO:  Agitation  Pt is impulsive   Pt has ran out of his room  naked  Pt has Parkinson symptoms place him in a high fall risk  Pt has verbal outburst of sexual and threaten statements  Pt requires immediate redirection when masturbating    -Possible interventions SIO staff could use to support patient's treatment progress:  Engage pt in activities    -When following observed, team will review discontinuation of SIO:  Absence of aggression toward others for > 24 hours    Comments: SIO 1:1     Order Specific Question:   CONTINUOUS 24 hours / day     Answer:   5 feet     Order Specific Question:   Indications for SIO     Answer:   Severe intrusiveness     Order Specific Question:   Indications for SIO     Answer:   Assault risk    Suicide precautions     Patients on Suicide Precautions should have a Combination Diet ordered that includes a Diet selection(s) AND a Behavioral Tray selection for Safe Tray - with utensils, or Safe Tray - NO utensils         Safety  Safety WDL: WDL  Patient Location: patient room, own  Observed Behavior: calm  Observed Behavior (Comment): laying in bed  Airway Safety Measures: all equipment/monitors on and audible  Safety Measures: safety rounds completed  Diversional Activity: television  Suicidality: Status 15, Minimal furniture in room  Seizure precautions: clutter free environment  Assault: status 15  Elopement Assessment: Statements about wanting to leave  Elopement Interventions: status 15  Sexual: status 15  Additional Documentation:  (SIB)

## 2023-08-31 LAB
ALBUMIN UR-MCNC: NEGATIVE MG/DL
APPEARANCE UR: CLEAR
BILIRUB UR QL STRIP: NEGATIVE
COLOR UR AUTO: ABNORMAL
GLUCOSE UR STRIP-MCNC: NEGATIVE MG/DL
HGB UR QL STRIP: NEGATIVE
KETONES UR STRIP-MCNC: NEGATIVE MG/DL
LEUKOCYTE ESTERASE UR QL STRIP: ABNORMAL
NITRATE UR QL: NEGATIVE
PH UR STRIP: 6 [PH] (ref 5–7)
RBC URINE: 1 /HPF
SP GR UR STRIP: 1.02 (ref 1–1.03)
SQUAMOUS EPITHELIAL: 6 /HPF
UROBILINOGEN UR STRIP-MCNC: NORMAL MG/DL
WBC URINE: 3 /HPF

## 2023-08-31 PROCEDURE — 124N000002 HC R&B MH UMMC

## 2023-08-31 PROCEDURE — 250N000013 HC RX MED GY IP 250 OP 250 PS 637: Performed by: PSYCHIATRY & NEUROLOGY

## 2023-08-31 PROCEDURE — 99233 SBSQ HOSP IP/OBS HIGH 50: CPT | Performed by: PSYCHIATRY & NEUROLOGY

## 2023-08-31 PROCEDURE — 250N000013 HC RX MED GY IP 250 OP 250 PS 637: Performed by: PHYSICIAN ASSISTANT

## 2023-08-31 PROCEDURE — 999N000111 HC STATISTIC OT IP EVAL DEFER

## 2023-08-31 PROCEDURE — 81001 URINALYSIS AUTO W/SCOPE: CPT | Performed by: PSYCHIATRY & NEUROLOGY

## 2023-08-31 PROCEDURE — 87086 URINE CULTURE/COLONY COUNT: CPT | Performed by: PSYCHIATRY & NEUROLOGY

## 2023-08-31 PROCEDURE — 250N000013 HC RX MED GY IP 250 OP 250 PS 637: Performed by: STUDENT IN AN ORGANIZED HEALTH CARE EDUCATION/TRAINING PROGRAM

## 2023-08-31 RX ADMIN — POLYETHYLENE GLYCOL 3350 17 G: 17 POWDER, FOR SOLUTION ORAL at 10:18

## 2023-08-31 RX ADMIN — CLOZAPINE 650 MG: 100 TABLET, ORALLY DISINTEGRATING ORAL at 13:06

## 2023-08-31 RX ADMIN — POLYETHYLENE GLYCOL 3350 17 G: 17 POWDER, FOR SOLUTION ORAL at 19:23

## 2023-08-31 RX ADMIN — LORAZEPAM 0.5 MG: 0.5 TABLET ORAL at 10:17

## 2023-08-31 RX ADMIN — DIVALPROEX SODIUM 250 MG: 250 TABLET, DELAYED RELEASE ORAL at 06:05

## 2023-08-31 RX ADMIN — TAMSULOSIN HYDROCHLORIDE 0.4 MG: 0.4 CAPSULE ORAL at 10:17

## 2023-08-31 RX ADMIN — ATROPINE SULFATE 2 DROP: 10 SOLUTION/ DROPS OPHTHALMIC at 19:23

## 2023-08-31 RX ADMIN — FLUOXETINE 20 MG: 20 CAPSULE ORAL at 10:18

## 2023-08-31 RX ADMIN — CYANOCOBALAMIN TAB 1000 MCG 1000 MCG: 1000 TAB at 10:18

## 2023-08-31 RX ADMIN — GABAPENTIN 300 MG: 300 CAPSULE ORAL at 13:06

## 2023-08-31 RX ADMIN — MICONAZOLE NITRATE: 20 POWDER TOPICAL at 10:20

## 2023-08-31 RX ADMIN — OLANZAPINE 15 MG: 10 TABLET, ORALLY DISINTEGRATING ORAL at 10:18

## 2023-08-31 RX ADMIN — BENZTROPINE MESYLATE 1 MG: 1 TABLET ORAL at 19:24

## 2023-08-31 RX ADMIN — MICONAZOLE NITRATE: 20 POWDER TOPICAL at 19:23

## 2023-08-31 RX ADMIN — ATROPINE SULFATE 2 DROP: 10 SOLUTION/ DROPS OPHTHALMIC at 10:19

## 2023-08-31 RX ADMIN — OLANZAPINE 15 MG: 10 TABLET, ORALLY DISINTEGRATING ORAL at 19:23

## 2023-08-31 RX ADMIN — SENNOSIDES 2 TABLET: 8.6 TABLET ORAL at 10:18

## 2023-08-31 RX ADMIN — SENNOSIDES 2 TABLET: 8.6 TABLET ORAL at 19:24

## 2023-08-31 RX ADMIN — LORAZEPAM 0.5 MG: 0.5 TABLET ORAL at 13:06

## 2023-08-31 RX ADMIN — LORAZEPAM 0.5 MG: 0.5 TABLET ORAL at 19:24

## 2023-08-31 RX ADMIN — GABAPENTIN 300 MG: 300 CAPSULE ORAL at 10:18

## 2023-08-31 RX ADMIN — WHITE PETROLATUM: 1.75 OINTMENT TOPICAL at 10:20

## 2023-08-31 RX ADMIN — GABAPENTIN 300 MG: 300 CAPSULE ORAL at 19:23

## 2023-08-31 RX ADMIN — DIVALPROEX SODIUM 250 MG: 250 TABLET, DELAYED RELEASE ORAL at 13:07

## 2023-08-31 RX ADMIN — BENZTROPINE MESYLATE 1 MG: 1 TABLET ORAL at 10:17

## 2023-08-31 RX ADMIN — WHITE PETROLATUM: 1.75 OINTMENT TOPICAL at 19:25

## 2023-08-31 RX ADMIN — Medication 10 MG: at 19:24

## 2023-08-31 RX ADMIN — ATROPINE SULFATE 2 DROP: 10 SOLUTION/ DROPS OPHTHALMIC at 13:08

## 2023-08-31 RX ADMIN — NAPROXEN 250 MG: 250 TABLET ORAL at 10:18

## 2023-08-31 RX ADMIN — DIVALPROEX SODIUM 250 MG: 250 TABLET, DELAYED RELEASE ORAL at 19:24

## 2023-08-31 RX ADMIN — NAPROXEN 250 MG: 250 TABLET ORAL at 17:40

## 2023-08-31 ASSESSMENT — ACTIVITIES OF DAILY LIVING (ADL)
ADLS_ACUITY_SCORE: 74
DRESS: SCRUBS (BEHAVIORAL HEALTH)
ORAL_HYGIENE: PROMPTS
ADLS_ACUITY_SCORE: 74
DRESS: SCRUBS (BEHAVIORAL HEALTH)
ADLS_ACUITY_SCORE: 74
LAUNDRY: UNABLE TO COMPLETE
ADLS_ACUITY_SCORE: 74
HYGIENE/GROOMING: PROMPTS
ADLS_ACUITY_SCORE: 74
ORAL_HYGIENE: PROMPTS
HYGIENE/GROOMING: PROMPTS
ADLS_ACUITY_SCORE: 74

## 2023-08-31 NOTE — PLAN OF CARE
Assessment/Intervention/Current Symtoms and Care Coordination:  Reviewed chart. Met with team to review patient's current functioning, needs, progress, and impediments to discharge.    [facility] preadmission called, no open beds at [facility] at this time.     Emailed his  with an update.     Called [facility] in [facility] to inquire about openings, no answer, left voicemail requesting information.     Called [facility] in [facility] to inquire about openings, no answer, left voicemail requesting information.     Called [facility] in [facility] to inquire about openings, no answer, left voicemail requesting information.     Called [facility] in [facility] to inquire about openings, no answer, left voicemail requesting information.     Called [facility] in [place] to inquire about openings, no answer, left voicemail requesting information.     Called [facility] in [place], they have openings in memory care, referral sent at 2pm via fax.     Received voicemail from [facility]. They have no current openings. They are private pay.     Discharge Plan or Goal:  Seeking placement in a memory care unit, preferably in the [facility]     Barriers to Discharge:  Referrals are being sent to memory care units who have openings. One barrier is the limited openings in memory care units at this time.     Referral Status:  [facility] in [place] 8/22, no memory care 8/30  [facility] in [place] 8/15, no memory care 8/31  [facility] 8/31, no openings 8/31  [facility] 8/29  [facility] 8/29, no open beds 8/30  [facility] 8/30  [facility] 8/31, no open beds 8/31  [facility] 8/31, no open beds 8/31  [facility] 8/31  [facility] in [facility] 8/31, no openings and private pay 8/31  [facility] 8[facility]     Legal Status:  Commitment with Sánchez and Juliana    [facility]: [place]  File Number: [id number]  Issued 07/06/23 and is not to  exceed six months    Contacts:  : Vicky Valdez with [facility], [phone]  Psychiatrist: Dr. Santana at [facility], [phone] PCP: Attila Urena DO  Mom: Alberta, [phone] (H) [phone] (M)   Dad: Ino, [phone] (H)  Sister, Sandra Treadwell, [phone] (KING)   [facility]'s Office Fax: [phone]  [facility]: [phone] (KING)  [place]: Jasmin phone [phone], fax [phone]  CADI  with [facility]: Regina Wong, [phone], [email or web address]    Upcoming Meetings and Dates/Important Information and next steps:

## 2023-08-31 NOTE — PROGRESS NOTES
"[facility], [place]   Psychiatric Progress Note  Hospital Day: 97        Interim History:   The patient's care was discussed with the treatment team during the daily team meeting and/or staff's chart notes were reviewed. Pt seen by IM for rash in groin. Memory is poor. Pt has difficulty concentrating. Repeatedly demonstrated guilt and self deprecation. Continues to express paranoia about being \"beaten up\" by staff.     Upon interview, Vinod initially would not respond to writer, though awake and alert. He placed his fingers along his lips to \"keep my mouth shut.\" He did this multiple times until finally, he answered writer. He then said that he was told by voices not to respond to me but he did it anyways. He continues to believe that he is evil and that staff will eventually try to harm him or kill him. He asked if his parents' Religious members are praying for him because his case is on the news. He continues to believe he has committed heinous crimes for which he will be harmed. Reassurance provided, though pt was not receptive. Still continues to believe he is being poisoned. Reports ongoing tremor, and it is bothersome sometimes.     Suicidal ideation: no    Homicidal ideation: Denies    Psychotic symptoms: no AH or VH reported during interview. Delusions present and other psychotic symptoms suspected.    Medication side effects reported: Tremor. Stated that he has had tremor \"to some extent since infancy.\" Feels it has worsened since hospitalization. Drooling has improved.     Acute medical concerns: none. He denies any pain or discomfort today.      Other issues reported by patient: Patient had no further questions or concerns.           Medications:      atropine  2 drop Sublingual TID    benztropine  1 mg Oral BID    cloZAPine  650 mg Oral Daily    cyanocobalamin  1,000 mcg Oral Daily    divalproex sodium delayed-release  250 mg Oral Q8H [facility]    FLUoxetine  20 mg Oral Daily    " "gabapentin  300 mg Oral TID    LORazepam  0.5 mg Oral TID    melatonin  10 mg Oral At Bedtime    miconazole   Topical BID    mineral oil-hydrophilic petrolatum   Topical BID IS    naproxen  250 mg Oral BID w/meals    OLANZapine zydis  15 mg Oral BID    Or    OLANZapine  10 mg Intramuscular BID    polyethylene glycol  17 g Oral BID    sennosides  2 tablet Oral BID    tamsulosin  0.4 mg Oral Daily          Allergies:     Allergies   Allergen Reactions    Bee Venom Unknown    Montelukast Unknown    Phenothiazines Unknown          Labs:     Recent Results (from the past 24 hour(s))   UA with Microscopic reflex to Culture    Collection Time: 08/31/23 11:36 AM    Specimen: Urine, NOS   Result Value Ref Range    Color Urine Light Yellow Colorless, Straw, Light Yellow, Yellow    Appearance Urine Clear Clear    Glucose Urine Negative Negative mg/dL    Bilirubin Urine Negative Negative    Ketones Urine Negative Negative mg/dL    Specific Gravity Urine 1.018 1.003 - 1.035    Blood Urine Negative Negative    pH Urine 6.0 5.0 - 7.0    Protein Albumin Urine Negative Negative mg/dL    Urobilinogen Urine Normal Normal, 2.0 mg/dL    Nitrite Urine Negative Negative    Leukocyte Esterase Urine Moderate (A) Negative    RBC Urine 1 <=2 /HPF    WBC Urine 3 <=5 /HPF    Squamous Epithelials Urine 6 (H) <=1 /HPF                Psychiatric Examination:     BP (!) 140/77 (BP Location: Right arm, Patient Position: Supine, Cuff Size: Adult Regular)   Pulse 81   Temp 98  F (36.7  C) (Temporal)   Resp 16   Ht 1.753 m (5' 9\")   Wt 82.8 kg (182 lb 7 oz)   SpO2 97%   BMI 26.94 kg/m    Weight is 182 lbs 7 oz  Body mass index is 26.94 kg/m .    Weight over time:  Vitals:    07/03/23 1100 07/30/23 0902 08/13/23 0900 08/26/23 0835   Weight: 81.6 kg (179 lb 12.8 oz) 86.5 kg (190 lb 9.6 oz) 85.7 kg (188 lb 14.4 oz) 82.8 kg (182 lb 7 oz)       Orthostatic Vitals         Most Recent      Sitting Orthostatic /61 07/25 0800    Sitting Orthostatic " Pulse (bpm) 85 07/25 0800          Cardiometabolic risk assessment. 06/07/23    Reviewed patient profile for cardiometabolic risk factors    Date taken /Value  REFERENCE RANGE   Abdominal Obesity  (Waist Circumference)   See nursing flowsheet Women ?35 in (88 cm)   Men ?40 in (102 cm)      Triglycerides  No results found for: TRIG    ?150 mg/dL (1.7 mmol/L) or current treatment for elevated triglycerides   HDL cholesterol  No results found for: HDL]   Women <50 mg/dL (1.3 mmol/L) in women or current treatment for low HDL cholesterol  Men <40 mg/dL (1 mmol/L) in men or current treatment for low HDL cholesterol     Fasting plasma glucose (FPG) Lab Results   Component Value Date     05/26/2023      FPG ?100 mg/dL (5.6 mmol/L) or treatment for elevated blood glucose   Blood pressure  BP Readings from Last 3 Encounters:   08/31/23 (!) 140/77   05/26/23 97/55    Blood pressure ?130/85 mmHg or treatment for elevated blood pressure   Family History  See family history     Mental Status Exam:  Appearance: awake, alert, well groomed. No evidence of pain of acute distress.   Attitude:  somewhat cooperative  Eye Contact:  fair, less intense  Mood:  irritable  Affect:  mood congruent  Speech: mostly coherent  Psychomotor Behavior:  Ongoing bilateral hand tremor and jaw tremor. No evidence of psychomotor agitation or restlessness today. No evidence of dystonia, or tics.  Throught Process: linear, illogical  Associations:  no loosening of associations present  Thought Content:  Delusions. Likely auditory, tacticle and olfactory hallucinations. Cannot rule out visual hallucinations. Evidence of obsessive thinking and likely compulsions to harm others.   Insight:  limited  Judgement:  poor  Oriented to:  self, date, place  Attention Span and Concentration:  fair  Recent and Remote Memory:  poor         Precautions:     Behavioral Orders   Procedures    Assault precautions    Code 1 - Restrict to Unit    Code 2    Code 2 - 1:1  Staff Supervision     For ECT only    Electroconvulsive therapy     Series of up to 12 treatments. Begin Date: 8/2/23     Treating Psychiatrist providing ECT:  unknown     Notified on:  7/28/23 via this order  NOTE: We received verbal confirmation from [facility] that Lorenzo Pavon has been approved but awaiting official court order and risk management's review  Initial consult was completed by Dr. Hicks on 7/18/23    Electroconvulsive therapy     Series of up to 12 treatments. Begin Date: 8/2/23     Treating Psychiatrist providing ECT:  Dominga     Notified on:  8/2/23    Elopement precautions    Fall precautions    Routine Programming     As clinically indicated    Self Injury Precaution    Status 15     Every 15 minutes.    Status Individual Observation     Patient SIO status reviewed with team/RN.  Please also refer to RN/team documentation for add'l detail.    -SIO staff to monitor following which have contributed to patient being on SIO:  Agitation  Pt is impulsive   Pt has ran out of his room naked  Pt has Parkinson symptoms place him in a high fall risk  Pt has verbal outburst of sexual and threaten statements  Pt requires immediate redirection when masturbating    -Possible interventions SIO staff could use to support patient's treatment progress:  Engage pt in activities    -When following observed, team will review discontinuation of SIO:  Absence of aggression toward others for > 24 hours    Comments: SIO 1:1     Order Specific Question:   CONTINUOUS 24 hours / day     Answer:   5 feet     Order Specific Question:   Indications for SIO     Answer:   Severe intrusiveness     Order Specific Question:   Indications for SIO     Answer:   Assault risk    Suicide precautions     Patients on Suicide Precautions should have a Combination Diet ordered that includes a Diet selection(s) AND a Behavioral Tray selection for Safe Tray - with utensils, or Safe Tray - NO utensils            Diagnoses:     #Unspecified  psychosis likely schizophrenia per history, rule out Bipolar affective disorder, manic  #Unspecified encephalopathy   -R/O catatonia   -Episodes of unresponsiveness, concern for PNES   #Parkinsonism 2/2 neuroleptic medications, rule out Parkinson's Disease  #Urinary retention and BPH  -Possible UTI -- UC resulted on  w/out growth  #Hx of prolonged QTc with clozapine  #Mild thrombocytopenia  #R/O OCD         Assessment and hospital summary:  Patient was admitted to psychiatric unit for safety, stabilization and medication management. He has had schizophrenia since the . He was on Clozaril x 25 years, and it was tapered and discontinued on May 7, 2023  due to prolonged qtc of 527, and his psychotic  symptoms have worsened since then. Sinemet was also discontinued recently due to concerns that it was contributing to paranoia and AH. He is agitated, aggressive, dangerous to self and others. He remains on SIO 2 to 1, and is under psychiatric emergency and court hold. There are concerns for organic etiology given pattern of sundowning, history of parkinsonism, and ongoing disorientation/confusion. : EKG repeated, cardiology consult regarding safety of resuming clozapine in the context of prolonged QTc and refractory schizophrenia pending response. Per cardiology, correct QTc is no more than 460. They do not have concerns about AP retrial. Neurology IP consult placed for evaluation of sundowning and cognitive decline secondary to Sinemet discontinuation. MSSA initiated. Per Neurology, discontinuation of Sinemet would not account for these symptoms. They do not recommend retrial at this time. : Clozapine titration continued.   Its possible that clozapine dose is contributing to worsened compulsive behaviors noted throughout hospitalization which could improve with lowering the dose.     Chart reviewed which revealed the followin2022: He was on clozapine, Seroquel, Cymbalta, and Carbidopa-levodopa and  hospitalized for pneumonia. Psych consulted and Seroquel was stopped. Treated with Abx and discharged to TCU.     11/2022: Hospitalized at [place]. Per chart review:    Vinod Lee is a 61 yo male with longstanding history of schizophrenia. He was admitted from [facility] ED, where he presented due to increased delusional thoughts while on a visit to his parents for Thanksgiving. He began experiencing increasing paranoid thoughts that someone might be poisoning him and began fearing that he might accidentally harm someone. He reported to his parents that he was having thoughts about hitting someone or beating someone up and told them he could not guarantee they would be safe with him. Parents encouraged patient to go to the ED, which he did voluntarily.     Per patient report and chart review, he was hospitalized several times in the 1980s and reports he was civilly committed at one point in the 1980s, however he has been quite stable for the past several decades. He reports he will experience some paranoia and delusional thinking at times, but generally has good insight into his symptoms. He has been maintained on clozapine for about 25 years and prior to several months ago was also concurrently prescribed quetiapine.      In the last several months, patient has had a number of medication changes. Approximately 6 months ago, patient was diagnosed with parkinsonism related to antipsychotic use and was started on carbidopa-levidopa. He experienced no improvement in tremors, and thus carbidopa- levidopa dose was increased 1.5 weeks ago.      In addition, patient was medically hospitalized in August 2022 for confusion, weakness, repeated falls, ongoing weight loss, and SOB. He was found to have a cavitary lesion of the lung and suspected aspiration pneumonia. He was treated with antibiotics. At that time, he was taking current dose of clozapine and duloxetine, as well as propranolol ER, quetiapine, gabapentin, and  "clonazepam. Psychiatry was consulted due to concern for oversedation, and propranolol ER, gabapentin, and quetiapine were discontinued. Conazepam was reduced from 0.5 mg TID to BID dosing and recommended to be discontinued, however, it does not appear this occurred. His sedation improved significantly. QTc prolongation was also noted, but improved throughout his stay. He was ultimately discharged to a TCU for several months before returning home. He reports his weakness has greatly improved and states he \"graduated\" physical therapy, though he continues to be diligent in completed recommended exercises.      He reports that over the past several months, his delusions and anxiety have been worse than usual, with symptoms becoming much more acute since the carbidopa-levidopa dose was increased. He has felt increasingly paranoid about being poisoned, noting this is a delusion that has been stable over time, but was previously less intrusive. He has insight during the interview that his paranoid thinking is not reality based, but states it has been harder to separate delusions from reality and he becomes very worried that he might hurt someone, despite no history of violent behavior. He reports that the paranoia about his food being poisoned in combination with the tremors in his hands have made it difficult for him to eat. He has also had quite low appetite for the past 6 months to a year . He reports that due to the combination of these factors, he has lost about 50 pounds in the last year.He denies any problems with sleep initiation or maintenance. He reports energy is fairly good and \"better than it used to be.\" He reports some short term memory issues and on interview, does have some difficulty remembering details of recent events. He is unsure if he has received cognitive testing in the past.    He denies any suicidal ideation and reports he has not experienced SI for decades. He denies homicidal thoughts and is " "very clear that he had and has no desire to harm anyone else, but was afraid he might somehow do so. He denies any hallucinations and states \"that's never been a problem for me.\" He is not observed responding to internal stimuli. He is alert and oriented in all spheres.        ========    BRIEF HOSPITAL COURSE: This 62 y.o. male admitted 11/25/2022 to  [place] for the assessment and treatment of the above presentation. This was a voluntary admission.     In summary, he was tapered off the Sinemet, which he reports to be both ineffective and potentially worsening the anxiety and paranoia ideations. Instead Benadryl 25 mg TID was started to help with extrapyramidal side effects. He struggled with sleep during his stay here. Remeron 7.5mg QHS was tried without success. Seroquel 100mg qhs was restarted with addition of suvorexant 10mg qhs. Given his Seroquel PRN use prior history of prolonged QTC, he will benefit from another EKG repeat on the medical unit.     Unfortunately, he tested positive for influenza A and developed hypoxemia. Now on 2L oxygen with desats when prone requiring 3L oxygen. Subsequently, the plan will to be tapering him off clonazepam due to hypoxia (and also prior plan to taper). The patient tolerated these changes without side effects.     The patient minimally benefited from the structured therapeutic milieu as he attended programming seldom as he tested positive for influenza, he needed to isolate with droplet precautions. Pt was engaged and worked on issues that were triggering/brought pt to the hospital and has excellent insight into his anxiety and paranoid delusions. Pt denied suicidal ideations throughout all/majority of their hospitalization. Pt denied HI throughout all of hospitalization. There were no concerns with behavioral disruptions/outbursts. The patient has shown improvement in general.     At the time of discharge, hospitalization course was reviewed with Vinod Lee with plan " to transfer to medicine. Please consult psychiatry and I will continue to follow while patient is on the medical unit. He is now off sinemet since 11/29 21:00 and I would expect anxiety and paranoia to improve more moving forward. Psychiatrically, once anxiety and paranoia is baseline, can D/C to home once medically stable. He DOES NOT NEED A 1:1 on the medical unit from a mental health perspective, we initiated 1:1 11/30/2022 due to significant fatigue, gait instability (he was unable to stand without assist) and feeding assist.    04/2023: Clozapine was gradually tapered and discontinued, unclear reasons why it was discontinued per outside records, though Dr. Portillo's H&P indicates that it was due to prolonged QTc.       Today's Changes:  - Consider anticholinesterase - some literature suggests it may reduce hallucinations (though not prominent for him)   - Nutrition note reviewed. Appreciate assistance. Recommending ensure and magic cup, as well as OT eval for modified utensils. OT consulted on 8/30 and recs reviewed.   - No medication changes today, though will consider increase in Prozac.     Target psychiatric symptoms and interventions:  -Psych emergency declared on 5/27, now fully committed  -ECT Mon/Wed/Fri per Janelle   -Continue clozapine as above  -Continue scheduled Zyprexa 15mg BID  -Continue 1:1 for safety of staff and patients, reduced from 2:1 7/23/23  - weekly CBC to monitor platelets      -Continue Gabapentin 300 mg TID as staff believe it has been effective for treatment of his anxiety  -Discussed complex case at length with Dr. Hatch (primary provider on geriatic unit) who provided recs (see second opinion note dated 6/14). Appreciate assistance. I have since made the following changes:         1) Add propranolol 10 mg TID         2) Added Depakote 1000 mg qhs (to be administered in magic cup)         3) Nutrition consult to order magic cup         4) Increased melatonin to 10 mg  QHS    Risks, benefits, and alternatives discussed at length with patient.     Acute Medical Problems and Treatments:  Delirium vs progression of dementia  Neurology consult placed on 6/8 for evaluation of sundowning and cognitive decline secondary to Sinemet discontinuation: Resuming Sinemet not recommended for behavioral concerns. Please see Neuro note for details.     Tremors  longstanding though appeared to worsen following initiation of clozapine  - Ativan 0.5 mg TID  - Cogentin 1 mg BID added on 8/25    Thrombocytopenia, resolved  Likely secondary to Depakote.  According to the, incidents of Depakote induced thrombocytopenia as 27%.  Depakote dose reduced from 1000 twice daily to 250 3 times daily on 7/28/2023 with subsequent resolution of thrombocytopenia  - weekly CBCs    Constipation  Likely worsened by clozapine, improved since modifying regimen.   - daily miralax   - daily Senokot 2 tablets BID      Right first toe fracture:  Seen by Ortho on 6/16:  Per note: Vinod Lee is a 62 year old  male w/ PMHx complex psychiatric history who has a fracture to the distal phalanx of the right toe after a recent trauma to the foot the patient reports.  Patient denies any other pain or discomfort.  Images reviewed and plan will be for irrigation of the popped fracture blister with sterile saline and the toes should be dressed and a 4 x 4 gauze dressing.  Patient will need a postop shoe which she can be weightbearing as tolerated in.  Would recommend a course of antibiotics for approximately 7 days.  Would recommend Augmentin or clindamycin.  Podiatry will be made aware of patient and will see patient while he is admitted or if patient is discharged in the near future a follow-up appointment will need to be established.     Remainder of care per primary team.  Primary team should make sure that patient is up-to-date on his tetanus shot.  If not patient will require a booster shot.    Hx of prolonged QTc:  Continue  "weekly EKGs. See above for details.   Discussed most recent EKG findings with Cardiology on 8/25. Corrected QTc is 501 from EKG on 8/23. Per cardiology, repeat EKG today. If corrected QTc is > 500, will need to reduce clozapine. If < 500, OK to keep clozapine at current dose. UPDATE/ADDENDUM: Repeat EKG with corrected QTc of 440. No need to adjust clozapine dose per cards.     Urinary retention, resolved  Per patient care order:   If patient is refusing straight caths, please notify IM provider immediately to determine whether this constitutes a medical emergency. If IM declares medical emergency, may restrain patient for straight cath. Discussed this with patient's parents/substitute decision makers on 6/7 who are in support of this plan.    # Psoriasis hx  # Purplish discoloration of B/LE feet-resolved   Discussed with Dr. Rene and bedside RN regarding rash on right foot that appears and appears to be purplish in color and almost \"petechiae.\" It was noted during interview, but seemed to be resolving upon walking. Within the past 24 hrs, pt has had ECT and seemed more confused than usual. Pt seems to have had a right great toe wound with recent right great toe fracture seen by Medicine on 7/16. Seen on 8/4 with improving color on B/L legs at rest and appears to have small, scaly patches of varying coin sizes that have not grown past marked borders. See photos. Per further chart review, has had psoriasis history. WBC WNL, no fevers, HDS. This appears to more consistent with a psoriasis like picture.   - Start triamcinolone topical 0.025%   -Apply twice daily until lesions for 2 weeks or until lesions resolve/  -Monitor for skin thinning, striae, rebound lesion flares.   - Start Eucerin cream              - Apply 30 minutes PRIOR to triamcinolone topical cream above or PRN as needed if dry skin/after bathing   - Medicine will sign off.   - For worsening pain, spread, itchiness, fevers, please contact Medicine and " possibly Dermatology.    Benzodiazepine dependence:  Phenobarbital taper completed shortly after patient's admission    Pertinent labs/imaging:  Weekly CBC with diff     Behavioral/Psychological/Social:  - Encourage unit programming    Safety:  - Continue precautions as noted above  - Status 15 minute checks  - Continue 1:1 for staff and pt safety    Legal Status: Fully committed with Sánchez and Lorenzo Pavon. Sánchez medications: clozapine, olanzapine, risperidone, quetiapine      Disposition Plan   Reason for ongoing admission: No safe alternative at this time  Discharge location:  assisted living facility  Discharge Medications: not ordered  Follow-up Appointments: not scheduled    Entered by: Ingrid Rhodes MD on 08/31/2023  at 3:27 PM

## 2023-08-31 NOTE — PLAN OF CARE
"  Problem: Suicidal Behavior  Goal: Suicidal Behavior is Absent or Managed  Outcome: Progressing  Note: Patient manifests no suicidal behavior     Problem: Behavioral Disturbance  Goal: Behavioral Disturbance  Description: Signs and symptoms of listed problems will be absent or manageable by discharge or transition of care.  Outcome: Progressing  Note: Patient manifests no behavioral disturbance   Goal Outcome Evaluation:       Patient had uneventful night, slept for the most part (7 hours) with no complaints no behavioral issues. Patient approached this writer at 0600 and asks \"can I have my Depakote now?\" Patient was given the schedule 250 mg Depakote which he swallowed whole, refused his pudding. Patient manifests no behavioral disturbance. Will continue to monitor.           García Up DNP, RN  "

## 2023-08-31 NOTE — PLAN OF CARE
Occupational Therapy: Orders received. Chart reviewed and discussed with RN. RN reporting increased difficulty with self feeding due to increased tremors. Provided recommendations for weighted handled utensils, using wrist weight to keep forearm steady. RN reporting unlikely to be able to accommodate this due to current unit precautions. Recommended encouragement of keeping elbows on table to assist with increasing steadiness. No further skilled IP OT needs at this time. Will complete orders.

## 2023-08-31 NOTE — PLAN OF CARE
"Pt spent most of the day isolated to room with [facility] staff present and has walked the hallway intermittently. Pt denies SI, SIB, HI, depression, anxiety. Pt states that he is feeling urges to \"hurt\" people and requested a \"big burly male\" nurse that can \"defend himself\". Pt has no shown any aggression this shift. Pt was med compliant with calm mood and flat affect. Pt denies AH/VH.    Groin area is still reddened but pt denies pain or itch. No other medical concerns reported or observed. No complaints voiced.    VS reviewed: BP (!) 140/77 (BP Location: Right arm, Patient Position: Supine, Cuff Size: Adult Regular)   Pulse 81   Temp 98  F (36.7  C) (Temporal)   Resp 16   Ht 1.753 m (5' 9\")   Wt 82.8 kg (182 lb 7 oz)   SpO2 97%   BMI 26.94 kg/m   .     Length of stay: 97  "

## 2023-08-31 NOTE — PLAN OF CARE
"Pt's memory is poor. Reports difficulty concentrating. Isolating to room. Encouraged pt to come out of room, with pt stating that he would watch the news in the lounge. Shortly after coming out to [facility], pt went back to room, stating that he did not have to the concentration to watch TV. Upon further assessment, pt reported that he does not have the concentration to do things that he wants to do, such as reading the bible.     Upon assessment, pt reported feeling a general unwellness. Upon prompting, pt indicated that this was a psychological feeling and not a physical one, though pt struggled to provide an answer. However, pt upon assessment denied pain, anxiety, and depression. Pt denied SI/HI. Upon assessment for A/VH, pt stated that he heard voices. Upon prompting, he again struggled to provide elaboration of what he was experiencing and stated something approximating that he did not know.     Pt repeatedly demonstrated guilt and self deprecation. Pt stated to writer that he was sorry for all that he had done; that he deserved bad things to happen to him; that he was a serial killer. Pt also continues to express paranoia, eg that he will be \"reprimanded\" and \"beat up.\"       Pt experiencing jock itch. Given scheduled miconazole. Denied any pain in the area. Denied pain urinating, denied urinary frequency, denied difficulty with urination. UA ordered, not obtained this shift. No showers noted this shift. Continues to demonstrate hand and mouth tremors.     No instances of aggression this shift. Maintains a 1:1 for assault risk.   "

## 2023-09-01 LAB
BACTERIA UR CULT: NORMAL
BASOPHILS # BLD AUTO: 0.1 10E3/UL (ref 0–0.2)
BASOPHILS NFR BLD AUTO: 1 %
EOSINOPHIL # BLD AUTO: 0 10E3/UL (ref 0–0.7)
EOSINOPHIL NFR BLD AUTO: 0 %
ERYTHROCYTE [DISTWIDTH] IN BLOOD BY AUTOMATED COUNT: 14.6 % (ref 10–15)
HCT VFR BLD AUTO: 35.8 % (ref 40–53)
HGB BLD-MCNC: 11.4 G/DL (ref 13.3–17.7)
IMM GRANULOCYTES # BLD: 0.1 10E3/UL
IMM GRANULOCYTES NFR BLD: 1 %
LYMPHOCYTES # BLD AUTO: 1.4 10E3/UL (ref 0.8–5.3)
LYMPHOCYTES NFR BLD AUTO: 15 %
MCH RBC QN AUTO: 27.2 PG (ref 26.5–33)
MCHC RBC AUTO-ENTMCNC: 31.8 G/DL (ref 31.5–36.5)
MCV RBC AUTO: 85 FL (ref 78–100)
MONOCYTES # BLD AUTO: 0.9 10E3/UL (ref 0–1.3)
MONOCYTES NFR BLD AUTO: 9 %
NEUTROPHILS # BLD AUTO: 7.3 10E3/UL (ref 1.6–8.3)
NEUTROPHILS NFR BLD AUTO: 74 %
NRBC # BLD AUTO: 0 10E3/UL
NRBC BLD AUTO-RTO: 0 /100
PLATELET # BLD AUTO: 198 10E3/UL (ref 150–450)
RBC # BLD AUTO: 4.19 10E6/UL (ref 4.4–5.9)
WBC # BLD AUTO: 9.8 10E3/UL (ref 4–11)

## 2023-09-01 PROCEDURE — 250N000013 HC RX MED GY IP 250 OP 250 PS 637: Performed by: PSYCHIATRY & NEUROLOGY

## 2023-09-01 PROCEDURE — 36415 COLL VENOUS BLD VENIPUNCTURE: CPT | Performed by: PSYCHIATRY & NEUROLOGY

## 2023-09-01 PROCEDURE — 99233 SBSQ HOSP IP/OBS HIGH 50: CPT | Performed by: PSYCHIATRY & NEUROLOGY

## 2023-09-01 PROCEDURE — 250N000013 HC RX MED GY IP 250 OP 250 PS 637: Performed by: STUDENT IN AN ORGANIZED HEALTH CARE EDUCATION/TRAINING PROGRAM

## 2023-09-01 PROCEDURE — 124N000002 HC R&B MH UMMC

## 2023-09-01 PROCEDURE — 250N000013 HC RX MED GY IP 250 OP 250 PS 637: Performed by: PHYSICIAN ASSISTANT

## 2023-09-01 PROCEDURE — 85025 COMPLETE CBC W/AUTO DIFF WBC: CPT | Performed by: PSYCHIATRY & NEUROLOGY

## 2023-09-01 RX ADMIN — LORAZEPAM 0.5 MG: 0.5 TABLET ORAL at 20:16

## 2023-09-01 RX ADMIN — BENZTROPINE MESYLATE 1 MG: 1 TABLET ORAL at 08:02

## 2023-09-01 RX ADMIN — DIVALPROEX SODIUM 250 MG: 250 TABLET, DELAYED RELEASE ORAL at 20:15

## 2023-09-01 RX ADMIN — ATROPINE SULFATE 2 DROP: 10 SOLUTION/ DROPS OPHTHALMIC at 08:03

## 2023-09-01 RX ADMIN — GABAPENTIN 300 MG: 300 CAPSULE ORAL at 08:02

## 2023-09-01 RX ADMIN — GABAPENTIN 300 MG: 300 CAPSULE ORAL at 20:16

## 2023-09-01 RX ADMIN — WHITE PETROLATUM: 1.75 OINTMENT TOPICAL at 18:35

## 2023-09-01 RX ADMIN — GABAPENTIN 300 MG: 300 CAPSULE ORAL at 14:47

## 2023-09-01 RX ADMIN — OLANZAPINE 15 MG: 10 TABLET, ORALLY DISINTEGRATING ORAL at 08:01

## 2023-09-01 RX ADMIN — ATROPINE SULFATE 2 DROP: 10 SOLUTION/ DROPS OPHTHALMIC at 20:28

## 2023-09-01 RX ADMIN — NAPROXEN 250 MG: 250 TABLET ORAL at 08:01

## 2023-09-01 RX ADMIN — OLANZAPINE 15 MG: 10 TABLET, ORALLY DISINTEGRATING ORAL at 20:16

## 2023-09-01 RX ADMIN — TAMSULOSIN HYDROCHLORIDE 0.4 MG: 0.4 CAPSULE ORAL at 08:02

## 2023-09-01 RX ADMIN — DIVALPROEX SODIUM 250 MG: 250 TABLET, DELAYED RELEASE ORAL at 14:48

## 2023-09-01 RX ADMIN — Medication 10 MG: at 20:16

## 2023-09-01 RX ADMIN — SENNOSIDES 2 TABLET: 8.6 TABLET ORAL at 20:16

## 2023-09-01 RX ADMIN — LORAZEPAM 0.5 MG: 0.5 TABLET ORAL at 14:48

## 2023-09-01 RX ADMIN — FLUOXETINE 20 MG: 20 CAPSULE ORAL at 08:02

## 2023-09-01 RX ADMIN — SENNOSIDES 2 TABLET: 8.6 TABLET ORAL at 08:02

## 2023-09-01 RX ADMIN — POLYETHYLENE GLYCOL 3350 17 G: 17 POWDER, FOR SOLUTION ORAL at 08:02

## 2023-09-01 RX ADMIN — CYANOCOBALAMIN TAB 1000 MCG 1000 MCG: 1000 TAB at 08:02

## 2023-09-01 RX ADMIN — WHITE PETROLATUM: 1.75 OINTMENT TOPICAL at 08:02

## 2023-09-01 RX ADMIN — POLYETHYLENE GLYCOL 3350 17 G: 17 POWDER, FOR SOLUTION ORAL at 20:27

## 2023-09-01 RX ADMIN — DIVALPROEX SODIUM 250 MG: 250 TABLET, DELAYED RELEASE ORAL at 06:15

## 2023-09-01 RX ADMIN — MICONAZOLE NITRATE: 20 POWDER TOPICAL at 08:02

## 2023-09-01 RX ADMIN — LORAZEPAM 0.5 MG: 0.5 TABLET ORAL at 08:02

## 2023-09-01 RX ADMIN — BENZTROPINE MESYLATE 1 MG: 1 TABLET ORAL at 20:16

## 2023-09-01 RX ADMIN — CLOZAPINE 650 MG: 100 TABLET, ORALLY DISINTEGRATING ORAL at 12:40

## 2023-09-01 RX ADMIN — NAPROXEN 250 MG: 250 TABLET ORAL at 18:35

## 2023-09-01 RX ADMIN — ATROPINE SULFATE 2 DROP: 10 SOLUTION/ DROPS OPHTHALMIC at 14:49

## 2023-09-01 RX ADMIN — MICONAZOLE NITRATE: 20 POWDER TOPICAL at 20:27

## 2023-09-01 ASSESSMENT — ACTIVITIES OF DAILY LIVING (ADL)
ADLS_ACUITY_SCORE: 84
ADLS_ACUITY_SCORE: 84
ADLS_ACUITY_SCORE: 74
ORAL_HYGIENE: PROMPTS
ORAL_HYGIENE: PROMPTS
ADLS_ACUITY_SCORE: 84
DRESS: PROMPTS
ADLS_ACUITY_SCORE: 84
ADLS_ACUITY_SCORE: 84
LAUNDRY: UNABLE TO COMPLETE
ADLS_ACUITY_SCORE: 74
DRESS: PROMPTS
ADLS_ACUITY_SCORE: 84
ADLS_ACUITY_SCORE: 74
HYGIENE/GROOMING: PROMPTS
LAUNDRY: UNABLE TO COMPLETE
ADLS_ACUITY_SCORE: 84
ADLS_ACUITY_SCORE: 84
ADLS_ACUITY_SCORE: 74
HYGIENE/GROOMING: PROMPTS

## 2023-09-01 NOTE — PROGRESS NOTES
"[facility], [place]   Psychiatric Progress Note  Hospital Day: 98        Interim History:   The patient's care was discussed with the treatment team during the daily team meeting and/or staff's chart notes were reviewed. Pt  continues to voice paranoid statements, eg that he will be punished and that his medication is contaminated. Continues to make negative statements about himself, eg that he has done bad things and deserves to be punished. Upon assessment, denied all psych symptoms. However pt made references to the \"voices in my head.\" Prompted pt to elaborate on the content and quality of these voices, but pt stated that he did not know and appeared unable to elaborate.      At one point in the shift, pt stated to writer that pt was worried that he was going to assault a staff member. Discussed this feeling with pt, with pt subsequently stating that he has not hurt anyone in a while and does not want to hurt anyone. No instances of aggression noted this shift.     Initially stated that he did not want to take his atropine drops. However, pt was receptive to encouragement to take. Took all other scheduled medications without issue.      Jock itch does not appear to be worse than previous evening. Pt denies any pain related to his jock itch. Given scheduled miconazole. No showers noted this shift. Continues to demonstrate hand and mouth tremors.     Upon interview, Vinod immediately began making self depricating statements; still believes that he is evil. Did not respond to reassurance despite multiple attempts. Discussed recommendation to increase Prozac. Vinod said that he did not want Prozac increased, \"but you'll probably do it anyways. I have no control.\" He was reassured that we would not be making this change without his approval. He denied feeling depressed when discussing indications for Prozac. He admitted to feeling anxious but did not elaborate further.     Suicidal " ideation: no    Homicidal ideation: Denies    Psychotic symptoms: no AH or VH reported during interview. Delusions present and other psychotic symptoms suspected.    Medication side effects reported: Tremors    Acute medical concerns: none. He denies any pain or discomfort today.      Other issues reported by patient: Patient had no further questions or concerns.           Medications:      atropine  2 drop Sublingual TID    benztropine  1 mg Oral BID    cloZAPine  650 mg Oral Daily    cyanocobalamin  1,000 mcg Oral Daily    divalproex sodium delayed-release  250 mg Oral Q8H KIKI    FLUoxetine  20 mg Oral Daily    gabapentin  300 mg Oral TID    LORazepam  0.5 mg Oral TID    melatonin  10 mg Oral At Bedtime    miconazole   Topical BID    mineral oil-hydrophilic petrolatum   Topical BID IS    naproxen  250 mg Oral BID w/meals    OLANZapine zydis  15 mg Oral BID    Or    OLANZapine  10 mg Intramuscular BID    polyethylene glycol  17 g Oral BID    sennosides  2 tablet Oral BID    tamsulosin  0.4 mg Oral Daily          Allergies:     Allergies   Allergen Reactions    Bee Venom Unknown    Montelukast Unknown    Phenothiazines Unknown          Labs:     Recent Results (from the past 24 hour(s))   CBC with platelets and differential    Collection Time: 09/01/23  7:06 AM   Result Value Ref Range    WBC Count 9.8 4.0 - 11.0 10e3/uL    RBC Count 4.19 (L) 4.40 - 5.90 10e6/uL    Hemoglobin 11.4 (L) 13.3 - 17.7 g/dL    Hematocrit 35.8 (L) 40.0 - 53.0 %    MCV 85 78 - 100 fL    MCH 27.2 26.5 - 33.0 pg    MCHC 31.8 31.5 - 36.5 g/dL    RDW 14.6 10.0 - 15.0 %    Platelet Count 198 150 - 450 10e3/uL    % Neutrophils 74 %    % Lymphocytes 15 %    % Monocytes 9 %    % Eosinophils 0 %    % Basophils 1 %    % Immature Granulocytes 1 %    NRBCs per 100 WBC 0 <1 /100    Absolute Neutrophils 7.3 1.6 - 8.3 10e3/uL    Absolute Lymphocytes 1.4 0.8 - 5.3 10e3/uL    Absolute Monocytes 0.9 0.0 - 1.3 10e3/uL    Absolute Eosinophils 0.0 0.0 - 0.7  "10e3/uL    Absolute Basophils 0.1 0.0 - 0.2 10e3/uL    Absolute Immature Granulocytes 0.1 <=0.4 10e3/uL    Absolute NRBCs 0.0 10e3/uL                Psychiatric Examination:     /73 (Patient Position: Sitting)   Pulse 86   Temp 97.3  F (36.3  C) (Temporal)   Resp 16   Ht 1.753 m (5' 9\")   Wt 82.8 kg (182 lb 7 oz)   SpO2 97%   BMI 26.94 kg/m    Weight is 182 lbs 7 oz  Body mass index is 26.94 kg/m .    Weight over time:  Vitals:    07/03/23 1100 07/30/23 0902 08/13/23 0900 08/26/23 0835   Weight: 81.6 kg (179 lb 12.8 oz) 86.5 kg (190 lb 9.6 oz) 85.7 kg (188 lb 14.4 oz) 82.8 kg (182 lb 7 oz)       Orthostatic Vitals         Most Recent      Sitting Orthostatic /61 07/25 0800    Sitting Orthostatic Pulse (bpm) 85 07/25 0800          Cardiometabolic risk assessment. 06/07/23    Reviewed patient profile for cardiometabolic risk factors    Date taken /Value  REFERENCE RANGE   Abdominal Obesity  (Waist Circumference)   See nursing flowsheet Women ?35 in (88 cm)   Men ?40 in (102 cm)      Triglycerides  No results found for: TRIG    ?150 mg/dL (1.7 mmol/L) or current treatment for elevated triglycerides   HDL cholesterol  No results found for: HDL]   Women <50 mg/dL (1.3 mmol/L) in women or current treatment for low HDL cholesterol  Men <40 mg/dL (1 mmol/L) in men or current treatment for low HDL cholesterol     Fasting plasma glucose (FPG) Lab Results   Component Value Date     05/26/2023      FPG ?100 mg/dL (5.6 mmol/L) or treatment for elevated blood glucose   Blood pressure  BP Readings from Last 3 Encounters:   09/01/23 115/73   05/26/23 97/55    Blood pressure ?130/85 mmHg or treatment for elevated blood pressure   Family History  See family history     Mental Status Exam:  Appearance: awake, alert, well groomed. No evidence of pain of acute distress.   Attitude:  mostly uncooperative  Eye Contact:  fair, less intense  Mood:  irritable  Affect:  mood congruent  Speech: mostly " coherent  Psychomotor Behavior:  Ongoing bilateral hand tremor and jaw tremor. No evidence of psychomotor agitation or restlessness today. No evidence of dystonia, or tics.  Throught Process: linear, illogical  Associations:  no loosening of associations present  Thought Content:  Delusions. Likely auditory, tacticle and olfactory hallucinations. Cannot rule out visual hallucinations. Evidence of obsessive thinking and likely compulsions to harm others.   Insight:  limited  Judgement:  poor  Oriented to:  self, date, place  Attention Span and Concentration:  fair  Recent and Remote Memory:  poor         Precautions:     Behavioral Orders   Procedures    Assault precautions    Code 1 - Restrict to Unit    Code 2    Code 2 - 1:1 Staff Supervision     For ECT only    Electroconvulsive therapy     Series of up to 12 treatments. Begin Date: 8/2/23     Treating Psychiatrist providing ECT:  unknown     Notified on:  7/28/23 via this order  NOTE: We received verbal confirmation from [facility] that Lorenzo Pavon has been approved but awaiting official court order and risk management's review  Initial consult was completed by Dr. Hicks on 7/18/23    Electroconvulsive therapy     Series of up to 12 treatments. Begin Date: 8/2/23     Treating Psychiatrist providing ECT:  Dominga     Notified on:  8/2/23    Elopement precautions    Fall precautions    Routine Programming     As clinically indicated    Self Injury Precaution    Status 15     Every 15 minutes.    Status Individual Observation     Patient SIO status reviewed with team/RN.  Please also refer to RN/team documentation for add'l detail.    -SIO staff to monitor following which have contributed to patient being on SIO:  Agitation  Pt is impulsive   Pt has ran out of his room naked  Pt has Parkinson symptoms place him in a high fall risk  Pt has verbal outburst of sexual and threaten statements  Pt requires immediate redirection when masturbating    -Possible interventions  SIO staff could use to support patient's treatment progress:  Engage pt in activities    -When following observed, team will review discontinuation of SIO:  Absence of aggression toward others for > 24 hours    Comments: SIO 1:1     Order Specific Question:   CONTINUOUS 24 hours / day     Answer:   5 feet     Order Specific Question:   Indications for SIO     Answer:   Severe intrusiveness     Order Specific Question:   Indications for SIO     Answer:   Assault risk    Suicide precautions     Patients on Suicide Precautions should have a Combination Diet ordered that includes a Diet selection(s) AND a Behavioral Tray selection for Safe Tray - with utensils, or Safe Tray - NO utensils            Diagnoses:     #Unspecified psychosis likely schizophrenia per history, rule out Bipolar affective disorder, manic  #Unspecified encephalopathy   -R/O catatonia   -Episodes of unresponsiveness, concern for PNES   #Parkinsonism 2/2 neuroleptic medications, rule out Parkinson's Disease  #Urinary retention and BPH  -Possible UTI -- UC resulted on 5/25 w/out growth  #Hx of prolonged QTc with clozapine  #Mild thrombocytopenia  #R/O OCD         Assessment and hospital summary:  Patient was admitted to psychiatric unit for safety, stabilization and medication management. He has had schizophrenia since the 1980. He was on Clozaril x 25 years, and it was tapered and discontinued on May 7, 2023  due to prolonged qtc of 527, and his psychotic  symptoms have worsened since then. Sinemet was also discontinued recently due to concerns that it was contributing to paranoia and AH. He is agitated, aggressive, dangerous to self and others. He remains on SIO 2 to 1, and is under psychiatric emergency and court hold. There are concerns for organic etiology given pattern of sundowning, history of parkinsonism, and ongoing disorientation/confusion. 6/8: EKG repeated, cardiology consult regarding safety of resuming clozapine in the context of  prolonged QTc and refractory schizophrenia pending response. Per cardiology, correct QTc is no more than 460. They do not have concerns about AP retrial. Neurology IP consult placed for evaluation of sundowning and cognitive decline secondary to Sinemet discontinuation. MSSA initiated. Per Neurology, discontinuation of Sinemet would not account for these symptoms. They do not recommend retrial at this time. : Clozapine titration continued.   Its possible that clozapine dose is contributing to worsened compulsive behaviors noted throughout hospitalization which could improve with lowering the dose.     Chart reviewed which revealed the followin2022: He was on clozapine, Seroquel, Cymbalta, and Carbidopa-levodopa and hospitalized for pneumonia. Psych consulted and Seroquel was stopped. Treated with Abx and discharged to TCU.     2022: Hospitalized at [facility]. Per chart review:    Vinod Lee is a 61 yo male with longstanding history of schizophrenia. He was admitted from [facility] ED, where he presented due to increased delusional thoughts while on a visit to his parents for Thanksgiving. He began experiencing increasing paranoid thoughts that someone might be poisoning him and began fearing that he might accidentally harm someone. He reported to his parents that he was having thoughts about hitting someone or beating someone up and told them he could not guarantee they would be safe with him. Parents encouraged patient to go to the ED, which he did voluntarily.     Per patient report and chart review, he was hospitalized several times in the  and reports he was civilly committed at one point in the , however he has been quite stable for the past several decades. He reports he will experience some paranoia and delusional thinking at times, but generally has good insight into his symptoms. He has been maintained on clozapine for about 25 years and prior to several months ago was also  "concurrently prescribed quetiapine.      In the last several months, patient has had a number of medication changes. Approximately 6 months ago, patient was diagnosed with parkinsonism related to antipsychotic use and was started on carbidopa-levidopa. He experienced no improvement in tremors, and thus carbidopa- levidopa dose was increased 1.5 weeks ago.      In addition, patient was medically hospitalized in August 2022 for confusion, weakness, repeated falls, ongoing weight loss, and SOB. He was found to have a cavitary lesion of the lung and suspected aspiration pneumonia. He was treated with antibiotics. At that time, he was taking current dose of clozapine and duloxetine, as well as propranolol ER, quetiapine, gabapentin, and clonazepam. Psychiatry was consulted due to concern for oversedation, and propranolol ER, gabapentin, and quetiapine were discontinued. Conazepam was reduced from 0.5 mg TID to BID dosing and recommended to be discontinued, however, it does not appear this occurred. His sedation improved significantly. QTc prolongation was also noted, but improved throughout his stay. He was ultimately discharged to a TCU for several months before returning home. He reports his weakness has greatly improved and states he \"graduated\" physical therapy, though he continues to be diligent in completed recommended exercises.      He reports that over the past several months, his delusions and anxiety have been worse than usual, with symptoms becoming much more acute since the carbidopa-levidopa dose was increased. He has felt increasingly paranoid about being poisoned, noting this is a delusion that has been stable over time, but was previously less intrusive. He has insight during the interview that his paranoid thinking is not reality based, but states it has been harder to separate delusions from reality and he becomes very worried that he might hurt someone, despite no history of violent behavior. He " "reports that the paranoia about his food being poisoned in combination with the tremors in his hands have made it difficult for him to eat. He has also had quite low appetite for the past 6 months to a year . He reports that due to the combination of these factors, he has lost about 50 pounds in the last year.He denies any problems with sleep initiation or maintenance. He reports energy is fairly good and \"better than it used to be.\" He reports some short term memory issues and on interview, does have some difficulty remembering details of recent events. He is unsure if he has received cognitive testing in the past.    He denies any suicidal ideation and reports he has not experienced SI for decades. He denies homicidal thoughts and is very clear that he had and has no desire to harm anyone else, but was afraid he might somehow do so. He denies any hallucinations and states \"that's never been a problem for me.\" He is not observed responding to internal stimuli. He is alert and oriented in all spheres.        ========    BRIEF HOSPITAL COURSE: This 62 y.o. male admitted 11/25/2022 to  [facility] for the assessment and treatment of the above presentation. This was a voluntary admission.     In summary, he was tapered off the Sinemet, which he reports to be both ineffective and potentially worsening the anxiety and paranoia ideations. Instead Benadryl 25 mg TID was started to help with extrapyramidal side effects. He struggled with sleep during his stay here. Remeron 7.5mg QHS was tried without success. Seroquel 100mg qhs was restarted with addition of suvorexant 10mg qhs. Given his Seroquel PRN use prior history of prolonged QTC, he will benefit from another EKG repeat on the medical unit.     Unfortunately, he tested positive for influenza A and developed hypoxemia. Now on 2L oxygen with desats when prone requiring 3L oxygen. Subsequently, the plan will to be tapering him off clonazepam due to hypoxia (and also prior " plan to taper). The patient tolerated these changes without side effects.     The patient minimally benefited from the structured therapeutic milieu as he attended programming seldom as he tested positive for influenza, he needed to isolate with droplet precautions. Pt was engaged and worked on issues that were triggering/brought pt to the hospital and has excellent insight into his anxiety and paranoid delusions. Pt denied suicidal ideations throughout all/majority of their hospitalization. Pt denied HI throughout all of hospitalization. There were no concerns with behavioral disruptions/outbursts. The patient has shown improvement in general.     At the time of discharge, hospitalization course was reviewed with Vinod Lee with plan to transfer to medicine. Please consult psychiatry and I will continue to follow while patient is on the medical unit. He is now off sinemet since 11/29 21:00 and I would expect anxiety and paranoia to improve more moving forward. Psychiatrically, once anxiety and paranoia is baseline, can D/C to home once medically stable. He DOES NOT NEED A 1:1 on the medical unit from a mental health perspective, we initiated 1:1 11/30/2022 due to significant fatigue, gait instability (he was unable to stand without assist) and feeding assist.    04/2023: Clozapine was gradually tapered and discontinued, unclear reasons why it was discontinued per outside records, though Dr. Portillo's H&P indicates that it was due to prolonged QTc.       Today's Changes:  - None    Target psychiatric symptoms and interventions:  -Psych emergency declared on 5/27, now fully committed  -ECT Mon/Wed/Fri per Janelle. Last ECT treatment was on 8/25. Had total of 11 treatments with subtle improvements.   - Depakote 250 mg TID, restarted on 8/26. Cannot increase beyond this dose due to thrombocytopenia at higher doses  -Continue clozapine as above  -Continue scheduled Zyprexa 15mg BID  -Continue 1:1 for safety of  staff and patients, reduced from 2:1 7/23/23  - weekly CBC to monitor platelets      -Continue Gabapentin 300 mg TID as staff believe it has been effective for treatment of his anxiety  -Discussed complex case at length with Dr. Hatch (primary provider on geriatic unit) who provided recs (see second opinion note dated 6/14). Appreciate assistance. I have since made the following changes:         1) Add propranolol 10 mg TID         2) Added Depakote 1000 mg qhs (to be administered in magic cup)         3) Nutrition consult to order magic cup         4) Increased melatonin to 10 mg QHS    Risks, benefits, and alternatives discussed at length with patient.     Acute Medical Problems and Treatments:  Delirium vs progression of dementia  Neurology consult placed on 6/8 for evaluation of sundowning and cognitive decline secondary to Sinemet discontinuation: Resuming Sinemet not recommended for behavioral concerns. Please see Neuro note for details.     Tremors  longstanding though appeared to worsen following initiation of clozapine  - Ativan 0.5 mg TID  - Cogentin 1 mg BID added on 8/25    Thrombocytopenia, resolved  Likely secondary to Depakote.  According to the, incidents of Depakote induced thrombocytopenia as 27%.  Depakote dose reduced from 1000 twice daily to 250 3 times daily on 7/28/2023 with subsequent resolution of thrombocytopenia  - weekly CBCs    Constipation  Likely worsened by clozapine, improved since modifying regimen.   - daily miralax   - daily Senokot 2 tablets BID      Right first toe fracture:  Seen by Ortho on 6/16:  Per note: Vinod Lee is a 62 year old  male w/ PMHx complex psychiatric history who has a fracture to the distal phalanx of the right toe after a recent trauma to the foot the patient reports.  Patient denies any other pain or discomfort.  Images reviewed and plan will be for irrigation of the popped fracture blister with sterile saline and the toes should be dressed and a 4 x 4  "gauze dressing.  Patient will need a postop shoe which she can be weightbearing as tolerated in.  Would recommend a course of antibiotics for approximately 7 days.  Would recommend Augmentin or clindamycin.  Podiatry will be made aware of patient and will see patient while he is admitted or if patient is discharged in the near future a follow-up appointment will need to be established.     Remainder of care per primary team.  Primary team should make sure that patient is up-to-date on his tetanus shot.  If not patient will require a booster shot.    Hx of prolonged QTc:  Continue weekly EKGs. See above for details.   Discussed most recent EKG findings with Cardiology on 8/25. Corrected QTc is 501 from EKG on 8/23. Per cardiology, repeat EKG today. If corrected QTc is > 500, will need to reduce clozapine. If < 500, OK to keep clozapine at current dose. UPDATE/ADDENDUM: Repeat EKG with corrected QTc of 440. No need to adjust clozapine dose per cards.     Urinary retention, resolved  Per patient care order:   If patient is refusing straight caths, please notify IM provider immediately to determine whether this constitutes a medical emergency. If IM declares medical emergency, may restrain patient for straight cath. Discussed this with patient's parents/substitute decision makers on 6/7 who are in support of this plan.    # Psoriasis hx  # Purplish discoloration of B/LE feet-resolved   Discussed with Dr. Rene and bedside RN regarding rash on right foot that appears and appears to be purplish in color and almost \"petechiae.\" It was noted during interview, but seemed to be resolving upon walking. Within the past 24 hrs, pt has had ECT and seemed more confused than usual. Pt seems to have had a right great toe wound with recent right great toe fracture seen by Medicine on 7/16. Seen on 8/4 with improving color on B/L legs at rest and appears to have small, scaly patches of varying coin sizes that have not grown past " marked borders. See photos. Per further chart review, has had psoriasis history. WBC WNL, no fevers, HDS. This appears to more consistent with a psoriasis like picture.   - Start triamcinolone topical 0.025%   -Apply twice daily until lesions for 2 weeks or until lesions resolve/  -Monitor for skin thinning, striae, rebound lesion flares.   - Start Eucerin cream              - Apply 30 minutes PRIOR to triamcinolone topical cream above or PRN as needed if dry skin/after bathing   - Medicine will sign off.   - For worsening pain, spread, itchiness, fevers, please contact Medicine and possibly Dermatology.    Benzodiazepine dependence:  Phenobarbital taper completed shortly after patient's admission    Pertinent labs/imaging:  Weekly CBC with diff     Behavioral/Psychological/Social:  - Encourage unit programming    Safety:  - Continue precautions as noted above  - Status 15 minute checks  - Continue 1:1 for staff and pt safety    Legal Status: Fully committed with Sánchez and Lorenzo Pavon. [place] medications: clozapine, olanzapine, risperidone, quetiapine      Disposition Plan   Reason for ongoing admission: No safe alternative at this time  Discharge location:  assisted living facility  Discharge Medications: not ordered  Follow-up Appointments: not scheduled    Entered by: Ingrid Rhodes MD on 09/01/2023  at 1:10 PM

## 2023-09-01 NOTE — PLAN OF CARE
Assessment/Intervention/Current Symtoms and Care Coordination:  Reviewed chart. Met with team to review patient's current functioning, needs, progress, and impediments to discharge.    Received a call from [facility] who has memory care openings. Price is 8k-12k/month.     Received call from Jasmin White at [facility] in [place] to check in about ECT status. Will talk to provider about this option, there is no memory care unit but they are assisted living with a focus on severe mental illness.     Discharge Plan or Goal:  Seeking placement in a memory care unit, preferably in the [facility]     Barriers to Discharge:  Referrals are being sent to memory care units who have openings. One barrier is the limited openings in memory care units at this time.     Referral Status:  [facility] in [place] 8/22, no memory care 8/30  [facility] in [place] 8/15, no memory care 8/31  [facility] 8/31, no openings 8/31  [facility] 8/29  [facility] 8/29, no open beds 8/30  [facility] 8/30  [facility] 8/31, no open beds 8/31  [facility] 8/31, no open beds 8/31  [facility] 8/31  [place] in [facility] 8/31, no openings and private pay 8/31  [facility], 8/31, declined 9/1 due to aggressive behaviors     Legal Status:  Commitment with [facility]: [place]  File Number: [id number]  Issued 07/06/23 and is not to exceed six months    Contacts:  : Vicky Valdez with [facility], [phone]  Psychiatrist: Dr. Santana at [facility], [phone] PCP: Attila Urena,   Mom: Alberta, [phone] (H) [phone] (M)   Dad: Ino, [phone] (H)  Sister, Sandra Treadwell, [phone] (KING)   [facility]'s Office Fax: [phone]  [facility]: [phone] (KING)  [facility]: Jasmin phone [phone], fax [phone]  CADI  with [facility]: Regina Wong,  [phone]    Upcoming Meetings and Dates/Important Information and next steps:

## 2023-09-01 NOTE — PLAN OF CARE
"Pt continues to voice paranoid statements, eg that he will be punished and that his medication is contaminated. Continues to make negative statements about himself, eg that he has done bad things and deserves to be punished. Upon assessment, denied all psych symptoms. However pt made references to the \"voices in my head\" during med pass. Prompted pt to elaborate on the content and quality of these voices, but pt stated that he did not know and appeared unable to elaborate.     At one point in the shift, pt stated to writer that pt was worried that he was going to assault a staff member. Discussed this feeling with pt, with pt subsequently stating that he has not hurt anyone in a while and does not want to hurt anyone. No instances of aggression noted this shift.    Initially stated that he did not want to take his atropine drops. However, pt was receptive to encouragement to take. Took all other scheduled medications without issue.     Jock itch does not appear to be worse than previous evening. Pt denies any pain related to his jock itch. Given scheduled miconazole. No showers noted this shift. Continues to demonstrate hand and mouth tremors.     Pt maintains 1:1 for assault risk.   "

## 2023-09-01 NOTE — PROGRESS NOTES
"When writer sat down in the SIO chair in Vinod's chair, he quickly sat up and sat \"I'm afraid I'm going to harm you.\" Writer asked a ap to sit with Vinod instead. Nurse notified.  "

## 2023-09-01 NOTE — PLAN OF CARE
Problem: Psychotic Signs/Symptoms  Goal: Improved Behavioral Control (Psychotic Signs/Symptoms)  Outcome: Progressing  Note: Patient manifests no behavioral dyscontrol     Problem: Sleep Disturbance  Goal: Adequate Sleep/Rest  Outcome: Progressing  Note: Patient seems to have slept optimally this night   Goal Outcome Evaluation:       Patient had uneventful night, slept for the most part (6.5 hours) with no complaints or requests for prn medications or snack. Patient reports no adverse effect from medication, neither does patient endorse SI/HI, A/VH or SIB. Patient was calm and cooperative with no outburst, physical or verbal aggression toward staff or peers. Will continue to monitor. Patient still 1:1 SIO for safety.       García Up DNP, RN

## 2023-09-01 NOTE — PLAN OF CARE
"Pt spent most of day sitting on his bed interacting with SIO staff. Pt ate breakfast in the dining area, did not socialize with peers. Pt continues to exhibit tremors but did not require assistance to consume food. Pt mood has been calm with flat affect. Pt was polite during interactions with RN writer. No aggression reported or observed this shift. Pt was med compliant though made a comment about the water asking if it was \"real water\". RN writer assured pt it was good, clean water. Pt appeared dubious however, he accepted the water with medication administration.    Pt denies SI, SIB, HI and thoughts of hurting others. Pt denies depression and anxiety. Groin redness continues to improve. No physical complaints observed. Pt prompted to change clothes d/t food spilled on front of shirt. Pt complied w/o complaint.    VS reviewed: /73 (Patient Position: Sitting)   Pulse 86   Temp 97.3  F (36.3  C) (Temporal)   Resp 16   Ht 1.753 m (5' 9\")   Wt 82.8 kg (182 lb 7 oz)   SpO2 97%   BMI 26.94 kg/m   . Patient denies  pain.    Length of stay: 98  " 3/day(s)

## 2023-09-02 PROCEDURE — 250N000013 HC RX MED GY IP 250 OP 250 PS 637: Performed by: PSYCHIATRY & NEUROLOGY

## 2023-09-02 PROCEDURE — 250N000013 HC RX MED GY IP 250 OP 250 PS 637: Performed by: STUDENT IN AN ORGANIZED HEALTH CARE EDUCATION/TRAINING PROGRAM

## 2023-09-02 PROCEDURE — 250N000013 HC RX MED GY IP 250 OP 250 PS 637: Performed by: PHYSICIAN ASSISTANT

## 2023-09-02 PROCEDURE — 124N000002 HC R&B MH UMMC

## 2023-09-02 RX ADMIN — OLANZAPINE 15 MG: 10 TABLET, ORALLY DISINTEGRATING ORAL at 19:52

## 2023-09-02 RX ADMIN — POLYETHYLENE GLYCOL 3350 17 G: 17 POWDER, FOR SOLUTION ORAL at 08:30

## 2023-09-02 RX ADMIN — DIVALPROEX SODIUM 250 MG: 250 TABLET, DELAYED RELEASE ORAL at 14:28

## 2023-09-02 RX ADMIN — LORAZEPAM 0.5 MG: 0.5 TABLET ORAL at 08:30

## 2023-09-02 RX ADMIN — MICONAZOLE NITRATE: 20 POWDER TOPICAL at 19:55

## 2023-09-02 RX ADMIN — CLOZAPINE 650 MG: 100 TABLET, ORALLY DISINTEGRATING ORAL at 12:39

## 2023-09-02 RX ADMIN — SENNOSIDES 2 TABLET: 8.6 TABLET ORAL at 08:29

## 2023-09-02 RX ADMIN — DIVALPROEX SODIUM 250 MG: 250 TABLET, DELAYED RELEASE ORAL at 06:01

## 2023-09-02 RX ADMIN — Medication 10 MG: at 19:52

## 2023-09-02 RX ADMIN — BENZTROPINE MESYLATE 1 MG: 1 TABLET ORAL at 19:52

## 2023-09-02 RX ADMIN — POLYETHYLENE GLYCOL 3350 17 G: 17 POWDER, FOR SOLUTION ORAL at 19:54

## 2023-09-02 RX ADMIN — NAPROXEN 250 MG: 250 TABLET ORAL at 08:29

## 2023-09-02 RX ADMIN — GABAPENTIN 300 MG: 300 CAPSULE ORAL at 14:28

## 2023-09-02 RX ADMIN — WHITE PETROLATUM: 1.75 OINTMENT TOPICAL at 19:55

## 2023-09-02 RX ADMIN — WHITE PETROLATUM: 1.75 OINTMENT TOPICAL at 10:15

## 2023-09-02 RX ADMIN — ATROPINE SULFATE 2 DROP: 10 SOLUTION/ DROPS OPHTHALMIC at 08:31

## 2023-09-02 RX ADMIN — SENNOSIDES 2 TABLET: 8.6 TABLET ORAL at 19:52

## 2023-09-02 RX ADMIN — BENZTROPINE MESYLATE 1 MG: 1 TABLET ORAL at 08:30

## 2023-09-02 RX ADMIN — GABAPENTIN 300 MG: 300 CAPSULE ORAL at 19:52

## 2023-09-02 RX ADMIN — DIVALPROEX SODIUM 250 MG: 250 TABLET, DELAYED RELEASE ORAL at 19:52

## 2023-09-02 RX ADMIN — FLUOXETINE 20 MG: 20 CAPSULE ORAL at 08:29

## 2023-09-02 RX ADMIN — OLANZAPINE 15 MG: 10 TABLET, ORALLY DISINTEGRATING ORAL at 08:29

## 2023-09-02 RX ADMIN — MICONAZOLE NITRATE: 20 POWDER TOPICAL at 11:10

## 2023-09-02 RX ADMIN — LORAZEPAM 0.5 MG: 0.5 TABLET ORAL at 19:52

## 2023-09-02 RX ADMIN — CYANOCOBALAMIN TAB 1000 MCG 1000 MCG: 1000 TAB at 08:29

## 2023-09-02 RX ADMIN — LORAZEPAM 0.5 MG: 0.5 TABLET ORAL at 14:28

## 2023-09-02 RX ADMIN — ATROPINE SULFATE 2 DROP: 10 SOLUTION/ DROPS OPHTHALMIC at 16:49

## 2023-09-02 RX ADMIN — GABAPENTIN 300 MG: 300 CAPSULE ORAL at 08:30

## 2023-09-02 RX ADMIN — TAMSULOSIN HYDROCHLORIDE 0.4 MG: 0.4 CAPSULE ORAL at 08:29

## 2023-09-02 RX ADMIN — ATROPINE SULFATE 2 DROP: 10 SOLUTION/ DROPS OPHTHALMIC at 19:54

## 2023-09-02 RX ADMIN — TRAZODONE HYDROCHLORIDE 50 MG: 50 TABLET ORAL at 00:53

## 2023-09-02 RX ADMIN — NAPROXEN 250 MG: 250 TABLET ORAL at 18:13

## 2023-09-02 ASSESSMENT — ACTIVITIES OF DAILY LIVING (ADL)
ADLS_ACUITY_SCORE: 84
DRESS: PROMPTS
ADLS_ACUITY_SCORE: 84
ORAL_HYGIENE: PROMPTS
ADLS_ACUITY_SCORE: 84
LAUNDRY: UNABLE TO COMPLETE
ADLS_ACUITY_SCORE: 84
ADLS_ACUITY_SCORE: 84
HYGIENE/GROOMING: PROMPTS

## 2023-09-02 NOTE — PLAN OF CARE
Problem: Suicidal Behavior  Goal: Suicidal Behavior is Absent or Managed  Outcome: Progressing  Note: Patient manifests no suicidal behavior     Problem: Behavioral Disturbance  Goal: Behavioral Disturbance  Description: Signs and symptoms of listed problems will be absent or manageable by discharge or transition of care.  Outcome: Progressing  Note: Patient manifests no behavioral disturbance   Goal Outcome Evaluation:       Patient slept poorly this night despite being given prn Trazodone 50 mg at the start of the shift, Patient stated he would rather be up not sleeping this night, was requesting for a tooth brush at about midnight, patient was redirected and informed he could have that at 0600 in the morning. Patient received periodic redirection from staff when trying to be intrusive into other's room. Will continue to monitor and follow plan of care.           García Up DNP, RN

## 2023-09-02 NOTE — PLAN OF CARE
"  Problem: Suicidal Behavior  Goal: Suicidal Behavior is Absent or Managed  Outcome: Progressing   Goal Outcome Evaluation:    Plan of Care Reviewed With: patient      Patient has been in his room most of the shift but he came out to eat dinner. He had tremors but he was able to eat dinner and didn't need any assistance with feeding. He denies having any thoughts of wanting to harm himself or others, depression, and anxiety. He took his medications with out issue but during his night time medication administration after he was given the Atropine patient stops responding to writer and another staff. He was encouraged to talk but he continues to refuse to respond. The charge nurse went to check on the patient he then told his SIO staff that his voices were telling to stop talking and he then states \" talking is what gets me in trouble so I better shut my mouth\". He continues to be confused and does not recall anything about ECT. Vitals are with in normal limits.    Blood pressure (!) 137/90, pulse 94, temperature 97  F (36.1  C), temperature source Temporal, resp. rate 18, height 1.753 m (5' 9\"), weight 82.8 kg (182 lb 7 oz), SpO2 96 %.             "

## 2023-09-02 NOTE — PLAN OF CARE
"  Problem: Seclusion/Restraint, Behavioral  Goal: Absence of Harm or Injury  Intervention: Protect Dignity, Rights, and Personal Wellbeing     Goal Outcome Evaluation:    Patient had a quiet day, calm and cooperative. Pt was mute in the morning before breakfast, did not want to speak or take medication. Pt wanted to shower but paranoid about the tiles in the bathroom. Pt refused to take a shower and went back to his room. Writer tried to give morning medication once again but pt refused. Staff opened music for him pt became cooperative and started to speak. Pt asked for his morning medication soon after. He took his medication but did not eat breakfast. Pt tolerated application of micatin and Aquaphor. Pt also took only few bites of lunch. Pt is hydrating but has limited food consumption. Pt stated that voices have told him not to eat today. Per check in he denies any mental health symptoms including, anxiety, depression, and SI/SIB/HI. Pt appears to be paranoid, and delusional, he thought writer was giving him poison when giving him his afternoon medication. He stated he is sleeping fine. He agreed to take a shower in the evening.     Pt has tremors from the medication, no acute medical concerns noted this shift. Pt coughs after a sip of water occasionally.   /70 (BP Location: Right arm, Patient Position: Sitting, Cuff Size: Adult Regular)   Pulse 94   Temp 98.1  F (36.7  C) (Temporal)   Resp 18   Ht 1.753 m (5' 9\")   Wt 82.8 kg (182 lb 7 oz)   SpO2 96%   BMI 26.94 kg/m              "

## 2023-09-03 PROCEDURE — 250N000013 HC RX MED GY IP 250 OP 250 PS 637: Performed by: STUDENT IN AN ORGANIZED HEALTH CARE EDUCATION/TRAINING PROGRAM

## 2023-09-03 PROCEDURE — 124N000002 HC R&B MH UMMC

## 2023-09-03 PROCEDURE — 250N000013 HC RX MED GY IP 250 OP 250 PS 637: Performed by: PSYCHIATRY & NEUROLOGY

## 2023-09-03 PROCEDURE — 250N000013 HC RX MED GY IP 250 OP 250 PS 637: Performed by: PHYSICIAN ASSISTANT

## 2023-09-03 RX ADMIN — LORAZEPAM 0.5 MG: 0.5 TABLET ORAL at 08:38

## 2023-09-03 RX ADMIN — NAPROXEN 250 MG: 250 TABLET ORAL at 08:39

## 2023-09-03 RX ADMIN — OLANZAPINE 15 MG: 10 TABLET, ORALLY DISINTEGRATING ORAL at 08:38

## 2023-09-03 RX ADMIN — CLOZAPINE 650 MG: 100 TABLET, ORALLY DISINTEGRATING ORAL at 13:02

## 2023-09-03 RX ADMIN — LORAZEPAM 0.5 MG: 0.5 TABLET ORAL at 13:03

## 2023-09-03 RX ADMIN — GABAPENTIN 300 MG: 300 CAPSULE ORAL at 08:39

## 2023-09-03 RX ADMIN — BENZTROPINE MESYLATE 1 MG: 1 TABLET ORAL at 19:43

## 2023-09-03 RX ADMIN — TAMSULOSIN HYDROCHLORIDE 0.4 MG: 0.4 CAPSULE ORAL at 08:39

## 2023-09-03 RX ADMIN — SENNOSIDES 2 TABLET: 8.6 TABLET ORAL at 19:42

## 2023-09-03 RX ADMIN — CYANOCOBALAMIN TAB 1000 MCG 1000 MCG: 1000 TAB at 08:38

## 2023-09-03 RX ADMIN — GABAPENTIN 300 MG: 300 CAPSULE ORAL at 13:03

## 2023-09-03 RX ADMIN — FLUOXETINE 20 MG: 20 CAPSULE ORAL at 08:39

## 2023-09-03 RX ADMIN — DIVALPROEX SODIUM 250 MG: 250 TABLET, DELAYED RELEASE ORAL at 06:34

## 2023-09-03 RX ADMIN — WHITE PETROLATUM: 1.75 OINTMENT TOPICAL at 19:46

## 2023-09-03 RX ADMIN — NAPROXEN 250 MG: 250 TABLET ORAL at 17:47

## 2023-09-03 RX ADMIN — POLYETHYLENE GLYCOL 3350 17 G: 17 POWDER, FOR SOLUTION ORAL at 19:42

## 2023-09-03 RX ADMIN — POLYETHYLENE GLYCOL 3350 17 G: 17 POWDER, FOR SOLUTION ORAL at 08:38

## 2023-09-03 RX ADMIN — BENZTROPINE MESYLATE 1 MG: 1 TABLET ORAL at 08:39

## 2023-09-03 RX ADMIN — Medication 10 MG: at 19:42

## 2023-09-03 RX ADMIN — MICONAZOLE NITRATE: 20 POWDER TOPICAL at 09:21

## 2023-09-03 RX ADMIN — DIVALPROEX SODIUM 250 MG: 250 TABLET, DELAYED RELEASE ORAL at 19:42

## 2023-09-03 RX ADMIN — DIVALPROEX SODIUM 250 MG: 250 TABLET, DELAYED RELEASE ORAL at 13:03

## 2023-09-03 RX ADMIN — ATROPINE SULFATE 2 DROP: 10 SOLUTION/ DROPS OPHTHALMIC at 19:46

## 2023-09-03 RX ADMIN — WHITE PETROLATUM: 1.75 OINTMENT TOPICAL at 09:21

## 2023-09-03 RX ADMIN — SENNOSIDES 2 TABLET: 8.6 TABLET ORAL at 08:39

## 2023-09-03 RX ADMIN — MICONAZOLE NITRATE: 20 POWDER TOPICAL at 19:46

## 2023-09-03 RX ADMIN — ATROPINE SULFATE 2 DROP: 10 SOLUTION/ DROPS OPHTHALMIC at 13:04

## 2023-09-03 RX ADMIN — ATROPINE SULFATE 2 DROP: 10 SOLUTION/ DROPS OPHTHALMIC at 09:21

## 2023-09-03 RX ADMIN — LORAZEPAM 0.5 MG: 0.5 TABLET ORAL at 19:43

## 2023-09-03 RX ADMIN — GABAPENTIN 300 MG: 300 CAPSULE ORAL at 19:43

## 2023-09-03 RX ADMIN — OLANZAPINE 15 MG: 10 TABLET, ORALLY DISINTEGRATING ORAL at 19:42

## 2023-09-03 ASSESSMENT — ACTIVITIES OF DAILY LIVING (ADL)
ADLS_ACUITY_SCORE: 84
HYGIENE/GROOMING: PROMPTS
ADLS_ACUITY_SCORE: 84
ADLS_ACUITY_SCORE: 84
LAUNDRY: UNABLE TO COMPLETE
DRESS: PROMPTS
ORAL_HYGIENE: PROMPTS
ADLS_ACUITY_SCORE: 84

## 2023-09-03 NOTE — PLAN OF CARE
Problem: Sleep Disturbance  Goal: Adequate Sleep/Rest  Outcome: Progressing  Note: Patient observed sleeping during safety checks     Problem: Behavioral Disturbance  Goal: Behavioral Disturbance  Description: Signs and symptoms of listed problems will be absent or manageable by discharge or transition of care.  Outcome: Progressing  Note: Patient manifests no behavioral disturbance   Goal Outcome Evaluation:       Patient passed the night unremarkably, slept well with no new complaints or development. Patient was cooperative with safety rounds, reports no issues that necessitate immediate intervention. Patient manifests no behavioral dyscontrol, all precautions in place. Patient reports no side effect from medications and endorses no SI/HI, A/VH, or SIB. Overall,  patient achieved 6.5 hours of uninterrupted sleep. Will continue to monitor and follow plan of care.           García Up DNP, RN

## 2023-09-03 NOTE — PROGRESS NOTES
"This writer was on the SIO from [id number]. Pt said to this writer, \"Ya know that I'm on this watch because of my urge to gauge out peoples' eyeballs?\" Pt sat up in bed and said, \"In fact I'm having the urge to gauge your eyes out right now so you better get out of here\". This writer stood up and started backing up into the hallway while moving the chair that was in front of her. Pt stood up and quickly took a couple steps towards staff. He said, \"I got the chair!\" and started pushing it out of his room. Pt said, \"There\" and went back into his room.  "

## 2023-09-03 NOTE — PLAN OF CARE
"  Problem: Adult Behavioral Health Plan of Care  Goal: Adheres to Safety Considerations for Self and Others  Outcome: Progressing   Goal Outcome Evaluation:    Patient is isolative and withdrawn towards room. Pt woke up start of shift and took his medication with no problems. Pt ate his breakfast soon after. Pt is calm and cooperative and stated the voices in his head are much better. Pt denies SI/SIB/HI. Pt denies anxiety and depression. Pt appears to be paranoid but denies when asked. Pt is safe to himself and others. Pt looks kempt and has stated he will take a shower this evening. Pt was mostly in room today. Writer encouraged pt to come out of room and to eat lunch in the lounge. Pt agreed and he was there for about an hour watching television and eating.  He is medication compliant. He made unsafe comment to a PA staff that was sitting on the SIO, from [phone]; see their notes. Movement and hydration was encouraged this shift. No other mental health concerns present this shift.     No medical concerns noted this shift, pt has an ongoing tremors from the medication side effects.     Vitals in a normal norm  /77   Pulse 88   Temp 97.3  F (36.3  C)   Resp 18   Ht 1.753 m (5' 9\")   Wt 82.8 kg (182 lb 7 oz)   SpO2 96%   BMI 26.94 kg/m       "

## 2023-09-03 NOTE — PLAN OF CARE
"  Problem: Suicide Risk  Goal: Absence of Self-Harm  Outcome: Progressing   Goal Outcome Evaluation:    Patient had uneventful shift today. He has been in his room most of the shift and staff was encouraging him to stay awake during the evening time so he could get a good night sleep of at night. Patient denies having any thoughts of wanting to harm himself or others, anxiety, depression, visual hallucinations/auditory hallucinations. He took a shower at the start of the shift, and ate 100% of his meal.He was medication compliant.    He reported to his SIO Staff that his Penis was turning purple, and patient was concerned.Writer and another male nurse went to complete assessment. He denies having any trouble with urination, frequency of urine, pain and any burning sensation during urination. No abnormal discolorations of his Penis was observed except the rash that he has around his groin area which appears to be healing. He continues to have tremors on his hands. His vitals are with in normal limits. No behavioral concerns noted during this shift, he has been calm and compliant.    Blood pressure 116/77, pulse 88, temperature 97.3  F (36.3  C), resp. rate 18, height 1.753 m (5' 9\"), weight 82.8 kg (182 lb 7 oz), SpO2 96 %.    "

## 2023-09-04 PROCEDURE — 250N000013 HC RX MED GY IP 250 OP 250 PS 637: Performed by: PSYCHIATRY & NEUROLOGY

## 2023-09-04 PROCEDURE — 124N000002 HC R&B MH UMMC

## 2023-09-04 PROCEDURE — 250N000013 HC RX MED GY IP 250 OP 250 PS 637: Performed by: PHYSICIAN ASSISTANT

## 2023-09-04 PROCEDURE — 250N000013 HC RX MED GY IP 250 OP 250 PS 637: Performed by: STUDENT IN AN ORGANIZED HEALTH CARE EDUCATION/TRAINING PROGRAM

## 2023-09-04 PROCEDURE — 250N000009 HC RX 250: Performed by: PSYCHIATRY & NEUROLOGY

## 2023-09-04 RX ADMIN — BENZTROPINE MESYLATE 1 MG: 1 TABLET ORAL at 09:53

## 2023-09-04 RX ADMIN — WHITE PETROLATUM: 1.75 OINTMENT TOPICAL at 19:40

## 2023-09-04 RX ADMIN — NAPROXEN 250 MG: 250 TABLET ORAL at 17:55

## 2023-09-04 RX ADMIN — NAPROXEN 250 MG: 250 TABLET ORAL at 09:53

## 2023-09-04 RX ADMIN — GABAPENTIN 300 MG: 300 CAPSULE ORAL at 09:53

## 2023-09-04 RX ADMIN — OLANZAPINE 15 MG: 10 TABLET, ORALLY DISINTEGRATING ORAL at 19:36

## 2023-09-04 RX ADMIN — DIVALPROEX SODIUM 250 MG: 250 TABLET, DELAYED RELEASE ORAL at 19:35

## 2023-09-04 RX ADMIN — LORAZEPAM 0.5 MG: 0.5 TABLET ORAL at 19:36

## 2023-09-04 RX ADMIN — CLOZAPINE 650 MG: 100 TABLET, ORALLY DISINTEGRATING ORAL at 12:42

## 2023-09-04 RX ADMIN — DIVALPROEX SODIUM 250 MG: 250 TABLET, DELAYED RELEASE ORAL at 06:19

## 2023-09-04 RX ADMIN — LORAZEPAM 0.5 MG: 0.5 TABLET ORAL at 09:53

## 2023-09-04 RX ADMIN — ATROPINE SULFATE 2 DROP: 10 SOLUTION/ DROPS OPHTHALMIC at 19:39

## 2023-09-04 RX ADMIN — BENZTROPINE MESYLATE 1 MG: 1 TABLET ORAL at 19:36

## 2023-09-04 RX ADMIN — SENNOSIDES 2 TABLET: 8.6 TABLET ORAL at 09:54

## 2023-09-04 RX ADMIN — CYANOCOBALAMIN TAB 1000 MCG 1000 MCG: 1000 TAB at 09:53

## 2023-09-04 RX ADMIN — GABAPENTIN 300 MG: 300 CAPSULE ORAL at 14:25

## 2023-09-04 RX ADMIN — Medication 10 MG: at 19:35

## 2023-09-04 RX ADMIN — TAMSULOSIN HYDROCHLORIDE 0.4 MG: 0.4 CAPSULE ORAL at 09:54

## 2023-09-04 RX ADMIN — LORAZEPAM 0.5 MG: 0.5 TABLET ORAL at 14:25

## 2023-09-04 RX ADMIN — SENNOSIDES 2 TABLET: 8.6 TABLET ORAL at 19:36

## 2023-09-04 RX ADMIN — FLUOXETINE 20 MG: 20 CAPSULE ORAL at 09:54

## 2023-09-04 RX ADMIN — POLYETHYLENE GLYCOL 3350 17 G: 17 POWDER, FOR SOLUTION ORAL at 19:36

## 2023-09-04 RX ADMIN — ATROPINE SULFATE 2 DROP: 10 SOLUTION/ DROPS OPHTHALMIC at 10:46

## 2023-09-04 RX ADMIN — GABAPENTIN 300 MG: 300 CAPSULE ORAL at 19:35

## 2023-09-04 RX ADMIN — DIVALPROEX SODIUM 250 MG: 250 TABLET, DELAYED RELEASE ORAL at 14:24

## 2023-09-04 RX ADMIN — OLANZAPINE 15 MG: 10 TABLET, ORALLY DISINTEGRATING ORAL at 09:53

## 2023-09-04 RX ADMIN — ATROPINE SULFATE 2 DROP: 10 SOLUTION/ DROPS OPHTHALMIC at 15:21

## 2023-09-04 RX ADMIN — POLYETHYLENE GLYCOL 3350 17 G: 17 POWDER, FOR SOLUTION ORAL at 09:52

## 2023-09-04 RX ADMIN — MICONAZOLE NITRATE: 20 POWDER TOPICAL at 19:39

## 2023-09-04 ASSESSMENT — ACTIVITIES OF DAILY LIVING (ADL)
HYGIENE/GROOMING: PROMPTS
ADLS_ACUITY_SCORE: 84
ORAL_HYGIENE: PROMPTS
ADLS_ACUITY_SCORE: 84
LAUNDRY: UNABLE TO COMPLETE
ADLS_ACUITY_SCORE: 84
DRESS: PROMPTS;SCRUBS (BEHAVIORAL HEALTH)

## 2023-09-04 NOTE — PLAN OF CARE
"Goal Outcome Evaluation:    Plan of Care Reviewed With: patient        Vinod continues with SIO staff monitoring. Pt slept in this morning, refused breakfast, ate >50% of his lunch. Vinod was med adherent, took his meds with water this shift. Pt refused pudding that was offered if he wanted.     Pt showered this afternoon, refused to allow staff to put a shower chair in the shower. Vinod was steady on his feet and made safe, slow movements.     Vinod denied having suicidal ideation or self harm thoughts. Pt did voice feeling suspicious about his name band being possibly not the correct one. Vinod also expressed feeling hopeless, when asked if writer could help him in any way he responded \"there is no hope for me\"    Pt voided multiple times this shift. Vinod was not observed having a BM. Writer asked Vinod if he had a BM and if he was having any abdominal discomfort or bloating, he said \"I had BMs yesterday, not yet today though\" Pt denied being symptomatic for costipation.      Vinod spent most of the shift in his rooom resting. Pt did come out at change of shift to walk the hallway.       "

## 2023-09-04 NOTE — PLAN OF CARE
Problem: Suicidal Behavior  Goal: Suicidal Behavior is Absent or Managed  Outcome: Progressing  Note: Patient manifests no suicidal behavior     Problem: Behavioral Disturbance  Goal: Behavioral Disturbance  Description: Signs and symptoms of listed problems will be absent or manageable by discharge or transition of care.  Outcome: Progressing  Note: Patient manifests no behavioral disturbance   Goal Outcome Evaluation:       Patient had uneventful night, slept all through the night with no behavioral concerns, patient reports no anxiety or pain, cooperative with care. Patient endorses no SI/HI or SIB, not observed responding to IS, tolerated his 0600 medication. Overall, patient achieved 7 hours of good quality sleep.           García Up DNP, RN

## 2023-09-04 NOTE — PLAN OF CARE
"  Problem: Psychotic Signs/Symptoms  Goal: Improved Behavioral Control (Psychotic Signs/Symptoms)  Outcome: Progressing   Goal Outcome Evaluation:    Patient has been calm and pleasant during this shift. He denies having any thoughts of wanting to harm himself or others, depression, anxiety, and visual hallucinations/auditory hallucinations. He was medication compliant, and he did not eat much of his dinner this evening. He still continues to have tremors, and he states \" the tremors are getting worst\". Vitals are within normal limits.     Blood pressure 105/68, pulse 91, temperature 97.5  F (36.4  C), temperature source Temporal, resp. rate 18, height 1.753 m (5' 9\"), weight 82.8 kg (182 lb 7 oz), SpO2 97 %.    "

## 2023-09-05 PROCEDURE — 250N000013 HC RX MED GY IP 250 OP 250 PS 637: Performed by: PSYCHIATRY & NEUROLOGY

## 2023-09-05 PROCEDURE — 93010 ELECTROCARDIOGRAM REPORT: CPT | Performed by: INTERNAL MEDICINE

## 2023-09-05 PROCEDURE — 124N000002 HC R&B MH UMMC

## 2023-09-05 PROCEDURE — 93005 ELECTROCARDIOGRAM TRACING: CPT

## 2023-09-05 PROCEDURE — 250N000013 HC RX MED GY IP 250 OP 250 PS 637: Performed by: STUDENT IN AN ORGANIZED HEALTH CARE EDUCATION/TRAINING PROGRAM

## 2023-09-05 PROCEDURE — 250N000013 HC RX MED GY IP 250 OP 250 PS 637: Performed by: PHYSICIAN ASSISTANT

## 2023-09-05 RX ADMIN — MICONAZOLE NITRATE: 20 POWDER TOPICAL at 09:02

## 2023-09-05 RX ADMIN — POLYETHYLENE GLYCOL 3350 17 G: 17 POWDER, FOR SOLUTION ORAL at 19:51

## 2023-09-05 RX ADMIN — OLANZAPINE 15 MG: 10 TABLET, ORALLY DISINTEGRATING ORAL at 08:54

## 2023-09-05 RX ADMIN — LORAZEPAM 0.5 MG: 0.5 TABLET ORAL at 19:52

## 2023-09-05 RX ADMIN — LORAZEPAM 0.5 MG: 0.5 TABLET ORAL at 08:54

## 2023-09-05 RX ADMIN — Medication 10 MG: at 19:51

## 2023-09-05 RX ADMIN — WHITE PETROLATUM: 1.75 OINTMENT TOPICAL at 19:53

## 2023-09-05 RX ADMIN — GABAPENTIN 300 MG: 300 CAPSULE ORAL at 13:03

## 2023-09-05 RX ADMIN — POLYETHYLENE GLYCOL 3350 17 G: 17 POWDER, FOR SOLUTION ORAL at 08:53

## 2023-09-05 RX ADMIN — CLOZAPINE 650 MG: 100 TABLET, ORALLY DISINTEGRATING ORAL at 12:59

## 2023-09-05 RX ADMIN — MICONAZOLE NITRATE: 20 POWDER TOPICAL at 19:53

## 2023-09-05 RX ADMIN — SENNOSIDES 2 TABLET: 8.6 TABLET ORAL at 08:53

## 2023-09-05 RX ADMIN — BENZTROPINE MESYLATE 1 MG: 1 TABLET ORAL at 08:54

## 2023-09-05 RX ADMIN — DIVALPROEX SODIUM 250 MG: 250 TABLET, DELAYED RELEASE ORAL at 08:53

## 2023-09-05 RX ADMIN — GABAPENTIN 300 MG: 300 CAPSULE ORAL at 08:54

## 2023-09-05 RX ADMIN — DIVALPROEX SODIUM 250 MG: 250 TABLET, DELAYED RELEASE ORAL at 13:03

## 2023-09-05 RX ADMIN — GABAPENTIN 300 MG: 300 CAPSULE ORAL at 19:51

## 2023-09-05 RX ADMIN — BENZTROPINE MESYLATE 1 MG: 1 TABLET ORAL at 19:52

## 2023-09-05 RX ADMIN — ATROPINE SULFATE 2 DROP: 10 SOLUTION/ DROPS OPHTHALMIC at 13:03

## 2023-09-05 RX ADMIN — ATROPINE SULFATE 2 DROP: 10 SOLUTION/ DROPS OPHTHALMIC at 08:54

## 2023-09-05 RX ADMIN — DIVALPROEX SODIUM 250 MG: 250 TABLET, DELAYED RELEASE ORAL at 19:51

## 2023-09-05 RX ADMIN — NAPROXEN 250 MG: 250 TABLET ORAL at 08:54

## 2023-09-05 RX ADMIN — ATROPINE SULFATE 2 DROP: 10 SOLUTION/ DROPS OPHTHALMIC at 19:53

## 2023-09-05 RX ADMIN — OLANZAPINE 15 MG: 10 TABLET, ORALLY DISINTEGRATING ORAL at 19:52

## 2023-09-05 RX ADMIN — NAPROXEN 250 MG: 250 TABLET ORAL at 18:35

## 2023-09-05 RX ADMIN — SENNOSIDES 2 TABLET: 8.6 TABLET ORAL at 19:52

## 2023-09-05 RX ADMIN — LORAZEPAM 0.5 MG: 0.5 TABLET ORAL at 13:03

## 2023-09-05 RX ADMIN — TAMSULOSIN HYDROCHLORIDE 0.4 MG: 0.4 CAPSULE ORAL at 08:54

## 2023-09-05 RX ADMIN — FLUOXETINE 20 MG: 20 CAPSULE ORAL at 08:53

## 2023-09-05 RX ADMIN — CYANOCOBALAMIN TAB 1000 MCG 1000 MCG: 1000 TAB at 08:53

## 2023-09-05 ASSESSMENT — ACTIVITIES OF DAILY LIVING (ADL)
ADLS_ACUITY_SCORE: 84
ADLS_ACUITY_SCORE: 74
DRESS: SCRUBS (BEHAVIORAL HEALTH)
ADLS_ACUITY_SCORE: 84
ADLS_ACUITY_SCORE: 74
ADLS_ACUITY_SCORE: 84
ADLS_ACUITY_SCORE: 74
ORAL_HYGIENE: PROMPTS
ORAL_HYGIENE: PROMPTS
HYGIENE/GROOMING: PROMPTS
ADLS_ACUITY_SCORE: 74
DRESS: SCRUBS (BEHAVIORAL HEALTH)
ADLS_ACUITY_SCORE: 74
LAUNDRY: UNABLE TO COMPLETE
ADLS_ACUITY_SCORE: 74
ADLS_ACUITY_SCORE: 84
ADLS_ACUITY_SCORE: 74
HYGIENE/GROOMING: PROMPTS
ADLS_ACUITY_SCORE: 84

## 2023-09-05 NOTE — PLAN OF CARE
"Pt appeared to be sleeping at the start of the shift. Pt refused to eat his breakfast this morning but did take his scheduled morning medications in pudding. Powder was applied to perineum area. Area is red and blanchable. He does not c/o pain. Pt was cooperative with weekly EKG--placed in chart. He refused to eat lunch stating \"Im not hungry\". Pt educated on the importance of eating for nutrition. He then stated, \"you do know if I try to eat I wont be able to, it will just shake right out my hand\". Writer than offered to help feed pt multiple times, however, he did not want that stating, \"it doesn't look good, its poison\". When brought his 1400 scheduled medications, he was cooperative with taking them along with his lunch. Writer fed him 75% of his lunch. During time spent in room he kept stating, \"I don't believe you but okay\". He was focused on when he would get pummeled today and what time his eyes would get gouged out. Pt reassured that those would not be happening and that he is safe in this environment.     /67   Pulse 82   Temp 97.4  F (36.3  C) (Temporal)   Resp 16   Ht 1.753 m (5' 9\")   Wt 82.8 kg (182 lb 7 oz)   SpO2 95%   BMI 26.94 kg/m      Problem: Psychotic Symptoms  Goal: Social and Therapeutic (Psychotic Symptoms)  Description: Signs and symptoms of listed problems will be absent or manageable.  Outcome: Progressing     "

## 2023-09-05 NOTE — PLAN OF CARE
Pt in bed at beginning of shift breathing quiet and unlabored.  Pt slept 7 hours. No prn given, no patient complaint or concerns.

## 2023-09-05 NOTE — PLAN OF CARE
Assessment/Intervention/Current Symtoms and Care Coordination:  Reviewed chart. Met with team to review patient's current functioning, needs, progress, and impediments to discharge.    Discussed barriers to discharge during social work supervision. Collaborated on resources and supports that would make discharge planning more successful.     Emailed Jackie Wong asking to do a re-assessment for CADI.     Discharge Plan or Goal:  Seeking placement in a memory care unit, preferably in the [facility]     Barriers to Discharge:  Referrals are being sent to memory care units who have openings. One barrier is the limited openings in memory care units at this time.     Referral Status:  [facility] in [place] 8/22, no memory care 8/30  [facility] in [place] 8/15, no memory care 8/31  [facility] 8/31, no openings 8/31  [facility] 8/29  [facility] 8/29, no open beds 8/30  [facility] 8/30  [facility] 8/31, no open beds 8/31  [facility] 8/31, no open beds 8/31  [facility] 8/31  [facility] 8/31, no openings and private pay 8/31  [facility], 8/31, declined 9/1 due to aggressive behaviors     Legal Status:  Commitment with [facility]: [place]  File Number: [id number]  Issued 07/06/23 and is not to exceed six months    Contacts:  : Vicky Valdez with [facility], [phone]  Psychiatrist: Dr. Santana at [facility], [phone] PCP: Attila Urena DO  Mom: Alberta, [phone] (H) [phone] (M)   Dad: Ino, [phone] (H)  Sister, Sandra Treadwell, [phone] (KING)   [facility]'s Office Fax: [phone]  [facility]: [phone] (KING)  [facility]: Jasmin phone [phone], fax [phone]  CADI  with [facility]: Jackie Wong, [phone], [email or web address]    Upcoming Meetings and Dates/Important Information and  next steps:

## 2023-09-05 NOTE — PLAN OF CARE
"  Problem: Psychotic Symptoms  Goal: Psychotic Symptoms  Description: Signs and symptoms of listed problems will be absent or manageable.  Outcome: Progressing   Goal Outcome Evaluation:    Patient has been calm and cooperative during this shift. He was fixated on taking a shower, but he was redirectable. Patient denies feeling anxiety, depression,denies having physical pain and any thoughts of wanting to harm himself but does endorse that his voices are telling him to hurt others but he does contract to be safe. He still continues to appear suspicious/ paranoid about the water having poison. He states \" the staff have been great here\". He took his medication with out an issue. He did not report about having bowel movement today but he states \" I did go yesterday like three or four times\".  Vitals are with in normal limits, and no behavioral outbursts.     Blood pressure 105/68, pulse 91, temperature 97.5  F (36.4  C), temperature source Temporal, resp. rate 18, height 1.753 m (5' 9\"), weight 82.8 kg (182 lb 7 oz), SpO2 97 %.    "

## 2023-09-06 PROCEDURE — 250N000013 HC RX MED GY IP 250 OP 250 PS 637: Performed by: PHYSICIAN ASSISTANT

## 2023-09-06 PROCEDURE — 124N000002 HC R&B MH UMMC

## 2023-09-06 PROCEDURE — 250N000013 HC RX MED GY IP 250 OP 250 PS 637: Performed by: PSYCHIATRY & NEUROLOGY

## 2023-09-06 PROCEDURE — 99232 SBSQ HOSP IP/OBS MODERATE 35: CPT | Performed by: PSYCHIATRY & NEUROLOGY

## 2023-09-06 PROCEDURE — 250N000013 HC RX MED GY IP 250 OP 250 PS 637: Performed by: STUDENT IN AN ORGANIZED HEALTH CARE EDUCATION/TRAINING PROGRAM

## 2023-09-06 RX ADMIN — GABAPENTIN 300 MG: 300 CAPSULE ORAL at 19:36

## 2023-09-06 RX ADMIN — SENNOSIDES 2 TABLET: 8.6 TABLET ORAL at 19:35

## 2023-09-06 RX ADMIN — MICONAZOLE NITRATE: 20 POWDER TOPICAL at 08:59

## 2023-09-06 RX ADMIN — ATROPINE SULFATE 2 DROP: 10 SOLUTION/ DROPS OPHTHALMIC at 19:37

## 2023-09-06 RX ADMIN — BENZTROPINE MESYLATE 1 MG: 1 TABLET ORAL at 19:36

## 2023-09-06 RX ADMIN — GABAPENTIN 300 MG: 300 CAPSULE ORAL at 13:00

## 2023-09-06 RX ADMIN — OLANZAPINE 15 MG: 10 TABLET, ORALLY DISINTEGRATING ORAL at 08:58

## 2023-09-06 RX ADMIN — LORAZEPAM 0.5 MG: 0.5 TABLET ORAL at 08:58

## 2023-09-06 RX ADMIN — TAMSULOSIN HYDROCHLORIDE 0.4 MG: 0.4 CAPSULE ORAL at 08:58

## 2023-09-06 RX ADMIN — Medication 10 MG: at 19:36

## 2023-09-06 RX ADMIN — POLYETHYLENE GLYCOL 3350 17 G: 17 POWDER, FOR SOLUTION ORAL at 19:35

## 2023-09-06 RX ADMIN — GABAPENTIN 300 MG: 300 CAPSULE ORAL at 08:58

## 2023-09-06 RX ADMIN — LORAZEPAM 0.5 MG: 0.5 TABLET ORAL at 19:36

## 2023-09-06 RX ADMIN — NAPROXEN 250 MG: 250 TABLET ORAL at 18:21

## 2023-09-06 RX ADMIN — DIVALPROEX SODIUM 250 MG: 250 TABLET, DELAYED RELEASE ORAL at 13:00

## 2023-09-06 RX ADMIN — ATROPINE SULFATE 2 DROP: 10 SOLUTION/ DROPS OPHTHALMIC at 13:01

## 2023-09-06 RX ADMIN — FLUOXETINE 20 MG: 20 CAPSULE ORAL at 08:57

## 2023-09-06 RX ADMIN — CYANOCOBALAMIN TAB 1000 MCG 1000 MCG: 1000 TAB at 08:58

## 2023-09-06 RX ADMIN — POLYETHYLENE GLYCOL 3350 17 G: 17 POWDER, FOR SOLUTION ORAL at 08:57

## 2023-09-06 RX ADMIN — LORAZEPAM 0.5 MG: 0.5 TABLET ORAL at 13:02

## 2023-09-06 RX ADMIN — OLANZAPINE 15 MG: 10 TABLET, ORALLY DISINTEGRATING ORAL at 19:36

## 2023-09-06 RX ADMIN — BENZTROPINE MESYLATE 1 MG: 1 TABLET ORAL at 08:58

## 2023-09-06 RX ADMIN — DIVALPROEX SODIUM 250 MG: 250 TABLET, DELAYED RELEASE ORAL at 06:50

## 2023-09-06 RX ADMIN — CLOZAPINE 650 MG: 100 TABLET, ORALLY DISINTEGRATING ORAL at 12:39

## 2023-09-06 RX ADMIN — ATROPINE SULFATE 2 DROP: 10 SOLUTION/ DROPS OPHTHALMIC at 08:58

## 2023-09-06 RX ADMIN — MICONAZOLE NITRATE: 20 POWDER TOPICAL at 19:38

## 2023-09-06 RX ADMIN — DIVALPROEX SODIUM 250 MG: 250 TABLET, DELAYED RELEASE ORAL at 19:35

## 2023-09-06 RX ADMIN — NAPROXEN 250 MG: 250 TABLET ORAL at 08:58

## 2023-09-06 RX ADMIN — SENNOSIDES 2 TABLET: 8.6 TABLET ORAL at 08:58

## 2023-09-06 ASSESSMENT — ACTIVITIES OF DAILY LIVING (ADL)
ADLS_ACUITY_SCORE: 74
DRESS: SCRUBS (BEHAVIORAL HEALTH)
HYGIENE/GROOMING: PROMPTS
ADLS_ACUITY_SCORE: 74
HYGIENE/GROOMING: PROMPTS
DRESS: SCRUBS (BEHAVIORAL HEALTH)
ADLS_ACUITY_SCORE: 74
LAUNDRY: UNABLE TO COMPLETE
ADLS_ACUITY_SCORE: 74
ORAL_HYGIENE: PROMPTS
ADLS_ACUITY_SCORE: 74
ORAL_HYGIENE: PROMPTS

## 2023-09-06 NOTE — PLAN OF CARE
Pt asleep at start of shift.     Breathing quiet and unlabored when sleeping.     Pt had no c/o pain or discomfort during the HS.     Appears to have slept 7 hours.     Pt continues on 1:1 SIO/5 ft space distance.     Goal Outcome Evaluation:    Problem: Adult Behavioral Health Plan of Care  Goal: Adheres to Safety Considerations for Self and Others  Intervention: Develop and Maintain Individualized Safety Plan  Recent Flowsheet Documentation  Taken 9/6/2023 0000 by Tameka Hatch, RN  Safety Measures: safety rounds completed  Goal: Absence of New-Onset Illness or Injury  Intervention: Identify and Manage Fall Risk  Recent Flowsheet Documentation  Taken 9/6/2023 0000 by Tameka Hatch, RN  Safety Measures: safety rounds completed     Problem: Sleep Disturbance  Goal: Adequate Sleep/Rest  Outcome: Progressing

## 2023-09-06 NOTE — PROGRESS NOTES
CLINICAL NUTRITION SERVICES - REASSESSMENT NOTE     Nutrition Prescription    RECOMMENDATIONS FOR MDs/PROVIDERS TO ORDER:  Order wrist weights and weighted utensils to assist at meals per OT recommendations    Malnutrition Status:    Patient does not meet two of the established criteria necessary for diagnosing malnutrition but is at risk for malnutrition    Recommendations already ordered by Registered Dietitian (RD):  Ensure Enlive with meals  Magic Cup with meals    Future/Additional Recommendations:  RD to sign off and will remain available by consult if new nutrition concerns arise       EVALUATION OF THE PROGRESS TOWARD GOALS   Diet: Regular  Nutrition Support: Ensure Enlive with meals  Magic Cup with meals  Intake: Mostly good Per I/Os       NEW FINDINGS   Per notes: OT recommended wrist weights and weighted utensils to assist him when eating. Dr put in an order allowing him to have them on the unit . He reported overall improvement in his hand, tremors, and noted that they have been less bothersome, and have not significantly impacted his ability to eat in recent days.  Date/Time Weight Weight Method   09/07/23 0900 83.7 kg (184 lb 8 oz) Standing scale   08/26/23 0835 82.8 kg (182 lb 7 oz) Standing scale   08/13/23 0900 85.7 kg (188 lb 14.4 oz) --   07/30/23 0902 86.5 kg (190 lb 9.6 oz) Standing scale   07/03/23 1100 81.6 kg (179 lb 12.8 oz) Fred     Per Care Everywhere:  124.7 kg (275 lb) 04/05/2023 (suspect incorrect)      92.1 kg (203 lb) 05/13/2022       90.3 kg (199 lb) 06/23/2022      88 kg (194 lb 1.6 oz) 08/17/2022     MALNUTRITION  % Intake: Decreased intake does not meet criteria  % Weight Loss: Weight loss does not meet criteria  Subcutaneous Fat Loss: None observed  Muscle Loss: None observed  Fluid Accumulation/Edema: None noted  Malnutrition Diagnosis: Patient does not meet two of the established criteria necessary for diagnosing malnutrition but is at risk for malnutrition    Previous Goals    Patient to consume % of nutritionally adequate meal trays TID, or the equivalent with supplements/snacks.  Evaluation: Met    Previous Nutrition Diagnosis  Predicted Inadequate oral intake related to decreased appetite as evidenced by 3.4% wt loss x two weeks     Evaluation: Improving    CURRENT NUTRITION DIAGNOSIS  No nutrition diagnosis at this time    INTERVENTIONS  Implementation  Medical food supplement therapy    Goals  Patient to consume % of nutritionally adequate meal trays TID, or the equivalent with supplements/snacks.    Monitoring/Evaluation  RD to sign off and will remain available by consult if new nutrition concerns arise    Sandra Max RDN LD  Mental Health Pager (M-F): [phone]  On Call Pager (weekends only): [phone]

## 2023-09-06 NOTE — PLAN OF CARE
Assessment/Intervention/Current Symtoms and Care Coordination:  Reviewed chart. Met with team to review patient's current functioning, needs, progress, and impediments to discharge.    Called [facility], they have current openings. Attempted to get an KING signed from Vinod, he was paranoid about this and shredded the KING. Will attempt to have an KING signed again tomorrow.     Completed the 60/90-day report and sent it via email to his  for her signature.     Received signed 60/90-day report from .     Faxed 60/90-day report to [facility] at [phone] at 4:05pm.     Discharge Plan or Goal:  Seeking placement in a memory care unit, preferably in the [facility]     Barriers to Discharge:  Referrals are being sent to memory care units who have openings. One barrier is the limited openings in memory care units at this time.     Referral Status:  [facility] in [place] 8/22, no memory care 8/30  [facility] in [place] 8/15, no memory care 8/31  [facility] 8/31, no openings 8/31  [facility] 8/29  [facility] 8/29, no open beds 8/30  [facility] 8/30  [facility] 8/31, no open beds 8/31  [facility] 8/31, no open beds 8/31  [facility] 8/31  [place] in [facility] 8/31, no openings and private pay 8/31  [facility], 8/31, declined 9/1 due to aggressive behaviors     Legal Status:  Commitment with [facility]: [place]  File Number: [id number]  Issued 07/06/23 and is not to exceed six months    Contacts:  : Vicky Valdez with [facility], [phone]  Psychiatrist: Dr. Santana at [facility], [phone] PCP: Attila Urena DO  Mom: Alberta, [phone] (H) [phone] (M)   Dad: Ino, [phone] (H)  Sister, Sandra Treadwell, [phone] (KING)   [facility]'s Office Fax: [phone]  [facility]: [phone] ([facility])  [facility]: Jasmin phone [phone], fax [phone]  CADI  with [facility]: Jackie Wong, [phone], [email or web address]    Upcoming Meetings and Dates/Important Information and next steps:
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
## 2023-09-06 NOTE — PLAN OF CARE
Pt appeared to be asleep at the start of the shift. Pt currently denying all psych symptoms. Refused to eat breakfast but ate majority of his lunch. He was cooperative with taking his scheduled medications and was polite. OT recommended wrist weights and weighted utensils to assist him when eating. Dr put in an order allowing him to have them on the unit. Recommend contacting physical disabilities rehab to get the weighted assistive devices. Pts mom called to get an update and then pt spoke with mom after on the phone in the aragon. He stated that the phone call went well. No acts of physical aggression.     Problem: Confusion Chronic  Goal: Optimal Cognitive Function  Outcome: Progressing  Intervention: Minimize Injury Risk and Provide Safety  Recent Flowsheet Documentation  Taken 9/6/2023 1100 by Jaqui Monsivais, RN  Enhanced Safety Measures:  at bedside
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
## 2023-09-06 NOTE — CARE PLAN
"   09/06/23 145   Group Therapy Session   Group Attendance attended group session   Time Session Began 1315   Time Session Ended 1400   Total Time (minutes) 12 (No Charge)   Total # Attendees 2   Group Type Occupational Therapy   Group Topic Covered balanced lifestyle;coping skills/lifestyle management;leisure exploration/use of leisure time;relaxation techniques;self-care activities   Group Session Detail OT Leisure Group   Patient Participation Detail Intervention: Leisure Group with 1 peer.    Patient Response: Pt partcipated in group focused on leisure participation and exploration, socialization and physical movement for general wellness. Discussed how participation in leisure activities can be used as a healthy coping skill in symptom management and a strategy to reduce stress. Patient participated in [employer] leisure activity with SIO staff for about 12 minutes. During this time was moderately social with the SIO staff member and writer. Discussed his liking for the Beatles, that his favorite song is \"While my guitar gently weeps\". Was somewhat receptive to the positive encouragement that writer and SIO staff member offered as he was intermittently negative regarding his skills.     Mood/Affect: Flat, Pleasant      Plan: Patient encouraged to maintain attendance for continued ongoing support in working towards occupational therapy goals to support overall treatment/care.          "

## 2023-09-06 NOTE — PROGRESS NOTES
[facility], [place]   Psychiatric Progress Note  Hospital Day: 103        Interim History:   The patient's care was discussed with the treatment team during the daily team meeting and/or staff's chart notes were reviewed. Pt reported tremor. He continued to voice concerns about being poisoned. He was focused on when he would get pummeled today and what time his eyes would get gouged out. Pt reassured that those would not be happening and that he is safe in this environment.      Upon interview, Vinod was lying calmly in his bed, though awake and alert. He is oriented to place and person. He recalled that I am his doctor, though could not recall my name, and stated that he would likely never be able to recall my name. He acknowledged experiencing memory deficits For  several years.  He said that his family began noticing changes in his memory around that time as well. He reported overall improvement in his hand, tremors, and noted that they have been less bothersome, and have not significantly impacted his ability to eat in recent days. He was quite distracted during the interview, and stated that he repeatedly heard his own voice, say  illusions and delusions.  we discussed the high likelihood of OCD, and my ongoing recommendation to optimize Prozac for this reason. Vinod again expressed concerns about increasing the dose any further, though could not elaborate more. He then abruptly sat up in bed, stared at this writer intensely, and asked writer to leave the room, because he was concerned that if I did not leave the room in that moment,  a crowd of people would come in here after me.  He again, with firm tone, instructed writer to leave his room. Patient was physically, tense, and writer, then exited his room per his request.     Suicidal ideation: no    Homicidal ideation: Denies    Psychotic symptoms: no AH or VH reported during interview. Delusions present and other psychotic symptoms  "suspected.    Medication side effects reported: Tremors    Acute medical concerns: none. He denies any pain or discomfort today.      Other issues reported by patient: Patient had no further questions or concerns.           Medications:      atropine  2 drop Sublingual TID    benztropine  1 mg Oral BID    cloZAPine  650 mg Oral Daily    cyanocobalamin  1,000 mcg Oral Daily    divalproex sodium delayed-release  250 mg Oral Q8H KIKI    FLUoxetine  20 mg Oral Daily    gabapentin  300 mg Oral TID    LORazepam  0.5 mg Oral TID    melatonin  10 mg Oral At Bedtime    miconazole   Topical BID    mineral oil-hydrophilic petrolatum   Topical BID IS    naproxen  250 mg Oral BID w/meals    OLANZapine zydis  15 mg Oral BID    Or    OLANZapine  10 mg Intramuscular BID    polyethylene glycol  17 g Oral BID    sennosides  2 tablet Oral BID    tamsulosin  0.4 mg Oral Daily          Allergies:     Allergies   Allergen Reactions    Bee Venom Unknown    Montelukast Unknown    Phenothiazines Unknown          Labs:     No results found for this or any previous visit (from the past 24 hour(s)).               Psychiatric Examination:     /79 (BP Location: Right arm)   Pulse 91   Temp 99.1  F (37.3  C) (Temporal)   Resp 17   Ht 1.753 m (5' 9\")   Wt 82.8 kg (182 lb 7 oz)   SpO2 96%   BMI 26.94 kg/m    Weight is 182 lbs 7 oz  Body mass index is 26.94 kg/m .    Weight over time:  Vitals:    07/03/23 1100 07/30/23 0902 08/13/23 0900 08/26/23 0835   Weight: 81.6 kg (179 lb 12.8 oz) 86.5 kg (190 lb 9.6 oz) 85.7 kg (188 lb 14.4 oz) 82.8 kg (182 lb 7 oz)       Orthostatic Vitals         Most Recent      Sitting Orthostatic /61 07/25 0800    Sitting Orthostatic Pulse (bpm) 85 07/25 0800          Cardiometabolic risk assessment. 06/07/23    Reviewed patient profile for cardiometabolic risk factors    Date taken /Value  REFERENCE RANGE   Abdominal Obesity  (Waist Circumference)   See nursing flowsheet Women ?35 in (88 cm)   Men ?40 " in (102 cm)      Triglycerides  No results found for: TRIG    ?150 mg/dL (1.7 mmol/L) or current treatment for elevated triglycerides   HDL cholesterol  No results found for: HDL]   Women <50 mg/dL (1.3 mmol/L) in women or current treatment for low HDL cholesterol  Men <40 mg/dL (1 mmol/L) in men or current treatment for low HDL cholesterol     Fasting plasma glucose (FPG) Lab Results   Component Value Date     05/26/2023      FPG ?100 mg/dL (5.6 mmol/L) or treatment for elevated blood glucose   Blood pressure  BP Readings from Last 3 Encounters:   09/05/23 135/79   05/26/23 97/55    Blood pressure ?130/85 mmHg or treatment for elevated blood pressure   Family History  See family history     Mental Status Exam:  Appearance: awake, alert, well groomed. No evidence of pain of acute distress.   Attitude:  mostly uncooperative  Eye Contact:  fair, less intense  Mood:  irritable  Affect:  mood congruent  Speech: mostly coherent  Psychomotor Behavior:  Ongoing bilateral hand tremor and jaw tremor. No evidence of psychomotor agitation or restlessness today. No evidence of dystonia, or tics.  Throught Process: linear, illogical  Associations:  no loosening of associations present  Thought Content:  Delusions. Likely auditory, tacticle and olfactory hallucinations. Cannot rule out visual hallucinations. Evidence of obsessive thinking and likely compulsions to harm others.   Insight:  limited  Judgement:  poor  Oriented to:  self, date, place  Attention Span and Concentration:  fair  Recent and Remote Memory:  poor         Precautions:     Behavioral Orders   Procedures    Assault precautions    Code 1 - Restrict to Unit    Code 2    Code 2 - 1:1 Staff Supervision     For ECT only    Electroconvulsive therapy     Series of up to 12 treatments. Begin Date: 8/2/23     Treating Psychiatrist providing ECT:  unknown     Notified on:  7/28/23 via this order  NOTE: We received verbal confirmation from [facility] that Price  Librado has been approved but awaiting official court order and risk management's review  Initial consult was completed by Dr. Hicks on 7/18/23    Electroconvulsive therapy     Series of up to 12 treatments. Begin Date: 8/2/23     Treating Psychiatrist providing ECT:  Dominga     Notified on:  8/2/23    Elopement precautions    Fall precautions    Routine Programming     As clinically indicated    Self Injury Precaution    Status 15     Every 15 minutes.    Status Individual Observation     Patient SIO status reviewed with team/RN.  Please also refer to RN/team documentation for add'l detail.    -SIO staff to monitor following which have contributed to patient being on SIO:  Agitation  Pt is impulsive   Pt has ran out of his room naked  Pt has Parkinson symptoms place him in a high fall risk  Pt has verbal outburst of sexual and threaten statements  Pt requires immediate redirection when masturbating    -Possible interventions SIO staff could use to support patient's treatment progress:  Engage pt in activities    -When following observed, team will review discontinuation of SIO:  Absence of aggression toward others for > 24 hours    Comments: SIO 1:1     Order Specific Question:   CONTINUOUS 24 hours / day     Answer:   5 feet     Order Specific Question:   Indications for SIO     Answer:   Severe intrusiveness     Order Specific Question:   Indications for SIO     Answer:   Assault risk    Suicide precautions     Patients on Suicide Precautions should have a Combination Diet ordered that includes a Diet selection(s) AND a Behavioral Tray selection for Safe Tray - with utensils, or Safe Tray - NO utensils            Diagnoses:     #Unspecified psychosis likely schizophrenia per history, rule out Bipolar affective disorder, manic  #Unspecified encephalopathy   -R/O catatonia   -Episodes of unresponsiveness, concern for PNES   #Parkinsonism 2/2 neuroleptic medications, rule out Parkinson's Disease  #Urinary  retention and BPH  -Possible UTI -- UC resulted on  w/out growth  #Hx of prolonged QTc with clozapine  #Mild thrombocytopenia  #R/O OCD         Assessment and hospital summary:  Patient was admitted to psychiatric unit for safety, stabilization and medication management. He has had schizophrenia since the . He was on Clozaril x 25 years, and it was tapered and discontinued on May 7, 2023  due to prolonged qtc of 527, and his psychotic  symptoms have worsened since then. Sinemet was also discontinued recently due to concerns that it was contributing to paranoia and AH. He is agitated, aggressive, dangerous to self and others. He remains on SIO 2 to 1, and is under psychiatric emergency and court hold. There are concerns for organic etiology given pattern of sundowning, history of parkinsonism, and ongoing disorientation/confusion. : EKG repeated, cardiology consult regarding safety of resuming clozapine in the context of prolonged QTc and refractory schizophrenia pending response. Per cardiology, correct QTc is no more than 460. They do not have concerns about AP retrial. Neurology IP consult placed for evaluation of sundowning and cognitive decline secondary to Sinemet discontinuation. MSSA initiated. Per Neurology, discontinuation of Sinemet would not account for these symptoms. They do not recommend retrial at this time. : Clozapine titration continued.   Its possible that clozapine dose is contributing to worsened compulsive behaviors noted throughout hospitalization which could improve with lowering the dose.     Chart reviewed which revealed the followin2022: He was on clozapine, Seroquel, Cymbalta, and Carbidopa-levodopa and hospitalized for pneumonia. Psych consulted and Seroquel was stopped. Treated with Abx and discharged to TCU.     2022: Hospitalized at [facility]. Per chart review:    Vinod Lee is a 63 yo male with longstanding history of schizophrenia. He was admitted from  [facility] ED, where he presented due to increased delusional thoughts while on a visit to his parents for Thanksgiving. He began experiencing increasing paranoid thoughts that someone might be poisoning him and began fearing that he might accidentally harm someone. He reported to his parents that he was having thoughts about hitting someone or beating someone up and told them he could not guarantee they would be safe with him. Parents encouraged patient to go to the ED, which he did voluntarily.     Per patient report and chart review, he was hospitalized several times in the 1980s and reports he was civilly committed at one point in the 1980s, however he has been quite stable for the past several decades. He reports he will experience some paranoia and delusional thinking at times, but generally has good insight into his symptoms. He has been maintained on clozapine for about 25 years and prior to several months ago was also concurrently prescribed quetiapine.      In the last several months, patient has had a number of medication changes. Approximately 6 months ago, patient was diagnosed with parkinsonism related to antipsychotic use and was started on carbidopa-levidopa. He experienced no improvement in tremors, and thus carbidopa- levidopa dose was increased 1.5 weeks ago.      In addition, patient was medically hospitalized in August 2022 for confusion, weakness, repeated falls, ongoing weight loss, and SOB. He was found to have a cavitary lesion of the lung and suspected aspiration pneumonia. He was treated with antibiotics. At that time, he was taking current dose of clozapine and duloxetine, as well as propranolol ER, quetiapine, gabapentin, and clonazepam. Psychiatry was consulted due to concern for oversedation, and propranolol ER, gabapentin, and quetiapine were discontinued. Conazepam was reduced from 0.5 mg TID to BID dosing and recommended to be discontinued, however, it does not appear this  "occurred. His sedation improved significantly. QTc prolongation was also noted, but improved throughout his stay. He was ultimately discharged to a TCU for several months before returning home. He reports his weakness has greatly improved and states he \"graduated\" physical therapy, though he continues to be diligent in completed recommended exercises.      He reports that over the past several months, his delusions and anxiety have been worse than usual, with symptoms becoming much more acute since the carbidopa-levidopa dose was increased. He has felt increasingly paranoid about being poisoned, noting this is a delusion that has been stable over time, but was previously less intrusive. He has insight during the interview that his paranoid thinking is not reality based, but states it has been harder to separate delusions from reality and he becomes very worried that he might hurt someone, despite no history of violent behavior. He reports that the paranoia about his food being poisoned in combination with the tremors in his hands have made it difficult for him to eat. He has also had quite low appetite for the past 6 months to a year . He reports that due to the combination of these factors, he has lost about 50 pounds in the last year.He denies any problems with sleep initiation or maintenance. He reports energy is fairly good and \"better than it used to be.\" He reports some short term memory issues and on interview, does have some difficulty remembering details of recent events. He is unsure if he has received cognitive testing in the past.    He denies any suicidal ideation and reports he has not experienced SI for decades. He denies homicidal thoughts and is very clear that he had and has no desire to harm anyone else, but was afraid he might somehow do so. He denies any hallucinations and states \"that's never been a problem for me.\" He is not observed responding to internal stimuli. He is alert and oriented in " all spheres.        ========    BRIEF HOSPITAL COURSE: This 62 y.o. male admitted 11/25/2022 to  [facility] for the assessment and treatment of the above presentation. This was a voluntary admission.     In summary, he was tapered off the Sinemet, which he reports to be both ineffective and potentially worsening the anxiety and paranoia ideations. Instead Benadryl 25 mg TID was started to help with extrapyramidal side effects. He struggled with sleep during his stay here. Remeron 7.5mg QHS was tried without success. Seroquel 100mg qhs was restarted with addition of suvorexant 10mg qhs. Given his Seroquel PRN use prior history of prolonged QTC, he will benefit from another EKG repeat on the medical unit.     Unfortunately, he tested positive for influenza A and developed hypoxemia. Now on 2L oxygen with desats when prone requiring 3L oxygen. Subsequently, the plan will to be tapering him off clonazepam due to hypoxia (and also prior plan to taper). The patient tolerated these changes without side effects.     The patient minimally benefited from the structured therapeutic milieu as he attended programming seldom as he tested positive for influenza, he needed to isolate with droplet precautions. Pt was engaged and worked on issues that were triggering/brought pt to the hospital and has excellent insight into his anxiety and paranoid delusions. Pt denied suicidal ideations throughout all/majority of their hospitalization. Pt denied HI throughout all of hospitalization. There were no concerns with behavioral disruptions/outbursts. The patient has shown improvement in general.     At the time of discharge, hospitalization course was reviewed with Vinod Lee with plan to transfer to medicine. Please consult psychiatry and I will continue to follow while patient is on the medical unit. He is now off sinemet since 11/29 21:00 and I would expect anxiety and paranoia to improve more moving forward. Psychiatrically, once  anxiety and paranoia is baseline, can D/C to home once medically stable. He DOES NOT NEED A 1:1 on the medical unit from a mental health perspective, we initiated 1:1 11/30/2022 due to significant fatigue, gait instability (he was unable to stand without assist) and feeding assist.    04/2023: Clozapine was gradually tapered and discontinued, unclear reasons why it was discontinued per outside records, though Dr. Portillo's H&P indicates that it was due to prolonged QTc.       Today's Changes:  EKG reviewed. Discussed with cardiology fellow who said that it was unchanged from last corrected QTc.   Renewed SIO  CPT will be completed  Attempting to obtain weighted utensils for tremor    Target psychiatric symptoms and interventions:  -Psych emergency declared on 5/27, now fully committed  -ECT Mon/Wed/Fri per Janelle. Last ECT treatment was on 8/25. Had total of 11 treatments with subtle improvements.   - Depakote 250 mg TID, restarted on 8/26. Cannot increase beyond this dose due to thrombocytopenia at higher doses  -Continue clozapine as above  -Continue scheduled Zyprexa 15mg BID  -Continue 1:1 for safety of staff and patients, reduced from 2:1 7/23/23  - weekly CBC to monitor platelets      -Continue Gabapentin 300 mg TID as staff believe it has been effective for treatment of his anxiety  -Discussed complex case at length with Dr. Hatch (primary provider on geriatic unit) who provided recs (see second opinion note dated 6/14). Appreciate assistance. I have since made the following changes:         1) Add propranolol 10 mg TID         2) Added Depakote 1000 mg qhs (to be administered in magic cup)         3) Nutrition consult to order magic cup         4) Increased melatonin to 10 mg QHS    Risks, benefits, and alternatives discussed at length with patient.     Acute Medical Problems and Treatments:  Delirium vs progression of dementia  Neurology consult placed on 6/8 for evaluation of sundowning and  cognitive decline secondary to Sinemet discontinuation: Resuming Sinemet not recommended for behavioral concerns. Please see Neuro note for details.     Tremors  longstanding though appeared to worsen following initiation of clozapine  - Ativan 0.5 mg TID  - Cogentin 1 mg BID added on 8/25    Thrombocytopenia, resolved  Likely secondary to Depakote.  According to the, incidents of Depakote induced thrombocytopenia as 27%.  Depakote dose reduced from 1000 twice daily to 250 3 times daily on 7/28/2023 with subsequent resolution of thrombocytopenia  - weekly CBCs    Constipation  Likely worsened by clozapine, improved since modifying regimen.   - daily miralax   - daily Senokot 2 tablets BID      Right first toe fracture:  Seen by Ortho on 6/16:  Per note: Vinod Lee is a 62 year old  male w/ PMHx complex psychiatric history who has a fracture to the distal phalanx of the right toe after a recent trauma to the foot the patient reports.  Patient denies any other pain or discomfort.  Images reviewed and plan will be for irrigation of the popped fracture blister with sterile saline and the toes should be dressed and a 4 x 4 gauze dressing.  Patient will need a postop shoe which she can be weightbearing as tolerated in.  Would recommend a course of antibiotics for approximately 7 days.  Would recommend Augmentin or clindamycin.  Podiatry will be made aware of patient and will see patient while he is admitted or if patient is discharged in the near future a follow-up appointment will need to be established.     Remainder of care per primary team.  Primary team should make sure that patient is up-to-date on his tetanus shot.  If not patient will require a booster shot.    Hx of prolonged QTc:  Continue weekly EKGs. See above for details.   Discussed most recent EKG findings with Cardiology on 8/25. Corrected QTc is 501 from EKG on 8/23. Per cardiology, repeat EKG today. If corrected QTc is > 500, will need to reduce  "clozapine. If < 500, OK to keep clozapine at current dose. UPDATE/ADDENDUM: Repeat EKG with corrected QTc of 440. No need to adjust clozapine dose per cards.     Urinary retention, resolved  Per patient care order:   If patient is refusing straight caths, please notify IM provider immediately to determine whether this constitutes a medical emergency. If IM declares medical emergency, may restrain patient for straight cath. Discussed this with patient's parents/substitute decision makers on 6/7 who are in support of this plan.    # Psoriasis hx  # Purplish discoloration of B/LE feet-resolved   Discussed with Dr. Rene and bedside RN regarding rash on right foot that appears and appears to be purplish in color and almost \"petechiae.\" It was noted during interview, but seemed to be resolving upon walking. Within the past 24 hrs, pt has had ECT and seemed more confused than usual. Pt seems to have had a right great toe wound with recent right great toe fracture seen by Medicine on 7/16. Seen on 8/4 with improving color on B/L legs at rest and appears to have small, scaly patches of varying coin sizes that have not grown past marked borders. See photos. Per further chart review, has had psoriasis history. WBC WNL, no fevers, HDS. This appears to more consistent with a psoriasis like picture.   - Start triamcinolone topical 0.025%   -Apply twice daily until lesions for 2 weeks or until lesions resolve/  -Monitor for skin thinning, striae, rebound lesion flares.   - Start Eucerin cream              - Apply 30 minutes PRIOR to triamcinolone topical cream above or PRN as needed if dry skin/after bathing   - Medicine will sign off.   - For worsening pain, spread, itchiness, fevers, please contact Medicine and possibly Dermatology.    Benzodiazepine dependence:  Phenobarbital taper completed shortly after patient's admission    Pertinent labs/imaging:  Weekly CBC with diff     Behavioral/Psychological/Social:  - Encourage " unit programming    Safety:  - Continue precautions as noted above  - Status 15 minute checks  - Continue 1:1 for staff and pt safety    Legal Status: Fully committed with Sánchez and Lorenzo Pavon. [place] medications: clozapine, olanzapine, risperidone, quetiapine      Disposition Plan   Reason for ongoing admission: No safe alternative at this time  Discharge location: TBD. Will likely need memory care unit  Discharge Medications: not ordered  Follow-up Appointments: not scheduled    Entered by: Ingrid Rhodes MD on 09/06/2023  at 6:41 PM

## 2023-09-07 LAB
ATRIAL RATE - MUSE: 76 BPM
DIASTOLIC BLOOD PRESSURE - MUSE: NORMAL MMHG
INTERPRETATION ECG - MUSE: NORMAL
P AXIS - MUSE: 62 DEGREES
PR INTERVAL - MUSE: 148 MS
QRS DURATION - MUSE: 160 MS
QT - MUSE: 460 MS
QTC - MUSE: 517 MS
R AXIS - MUSE: -25 DEGREES
SYSTOLIC BLOOD PRESSURE - MUSE: NORMAL MMHG
T AXIS - MUSE: 103 DEGREES
VENTRICULAR RATE- MUSE: 76 BPM

## 2023-09-07 PROCEDURE — 250N000013 HC RX MED GY IP 250 OP 250 PS 637: Performed by: PSYCHIATRY & NEUROLOGY

## 2023-09-07 PROCEDURE — 250N000013 HC RX MED GY IP 250 OP 250 PS 637: Performed by: STUDENT IN AN ORGANIZED HEALTH CARE EDUCATION/TRAINING PROGRAM

## 2023-09-07 PROCEDURE — 124N000002 HC R&B MH UMMC

## 2023-09-07 PROCEDURE — 250N000013 HC RX MED GY IP 250 OP 250 PS 637: Performed by: PHYSICIAN ASSISTANT

## 2023-09-07 PROCEDURE — 99232 SBSQ HOSP IP/OBS MODERATE 35: CPT | Performed by: PSYCHIATRY & NEUROLOGY

## 2023-09-07 RX ADMIN — POLYETHYLENE GLYCOL 3350 17 G: 17 POWDER, FOR SOLUTION ORAL at 20:30

## 2023-09-07 RX ADMIN — FLUOXETINE 20 MG: 20 CAPSULE ORAL at 07:44

## 2023-09-07 RX ADMIN — POLYETHYLENE GLYCOL 3350 17 G: 17 POWDER, FOR SOLUTION ORAL at 07:46

## 2023-09-07 RX ADMIN — ATROPINE SULFATE 2 DROP: 10 SOLUTION/ DROPS OPHTHALMIC at 07:52

## 2023-09-07 RX ADMIN — WHITE PETROLATUM: 1.75 OINTMENT TOPICAL at 07:53

## 2023-09-07 RX ADMIN — GABAPENTIN 300 MG: 300 CAPSULE ORAL at 20:27

## 2023-09-07 RX ADMIN — NAPROXEN 250 MG: 250 TABLET ORAL at 18:49

## 2023-09-07 RX ADMIN — NAPROXEN 250 MG: 250 TABLET ORAL at 07:43

## 2023-09-07 RX ADMIN — CYANOCOBALAMIN TAB 1000 MCG 1000 MCG: 1000 TAB at 07:43

## 2023-09-07 RX ADMIN — OLANZAPINE 15 MG: 10 TABLET, ORALLY DISINTEGRATING ORAL at 20:27

## 2023-09-07 RX ADMIN — GABAPENTIN 300 MG: 300 CAPSULE ORAL at 13:42

## 2023-09-07 RX ADMIN — LORAZEPAM 0.5 MG: 0.5 TABLET ORAL at 07:44

## 2023-09-07 RX ADMIN — DIVALPROEX SODIUM 250 MG: 250 TABLET, DELAYED RELEASE ORAL at 06:45

## 2023-09-07 RX ADMIN — SENNOSIDES 2 TABLET: 8.6 TABLET ORAL at 07:46

## 2023-09-07 RX ADMIN — BENZTROPINE MESYLATE 1 MG: 1 TABLET ORAL at 20:28

## 2023-09-07 RX ADMIN — DIVALPROEX SODIUM 250 MG: 250 TABLET, DELAYED RELEASE ORAL at 20:42

## 2023-09-07 RX ADMIN — CLOZAPINE 650 MG: 100 TABLET, ORALLY DISINTEGRATING ORAL at 11:45

## 2023-09-07 RX ADMIN — LORAZEPAM 0.5 MG: 0.5 TABLET ORAL at 13:42

## 2023-09-07 RX ADMIN — BENZTROPINE MESYLATE 1 MG: 1 TABLET ORAL at 07:43

## 2023-09-07 RX ADMIN — TAMSULOSIN HYDROCHLORIDE 0.4 MG: 0.4 CAPSULE ORAL at 07:44

## 2023-09-07 RX ADMIN — SENNOSIDES 2 TABLET: 8.6 TABLET ORAL at 20:28

## 2023-09-07 RX ADMIN — DIVALPROEX SODIUM 250 MG: 250 TABLET, DELAYED RELEASE ORAL at 13:42

## 2023-09-07 RX ADMIN — MICONAZOLE NITRATE: 20 POWDER TOPICAL at 20:41

## 2023-09-07 RX ADMIN — Medication 10 MG: at 20:27

## 2023-09-07 RX ADMIN — WHITE PETROLATUM: 1.75 OINTMENT TOPICAL at 18:49

## 2023-09-07 RX ADMIN — ATROPINE SULFATE 2 DROP: 10 SOLUTION/ DROPS OPHTHALMIC at 13:43

## 2023-09-07 RX ADMIN — OLANZAPINE 15 MG: 10 TABLET, ORALLY DISINTEGRATING ORAL at 07:42

## 2023-09-07 RX ADMIN — MICONAZOLE NITRATE: 20 POWDER TOPICAL at 07:52

## 2023-09-07 RX ADMIN — ATROPINE SULFATE 2 DROP: 10 SOLUTION/ DROPS OPHTHALMIC at 20:29

## 2023-09-07 RX ADMIN — LORAZEPAM 0.5 MG: 0.5 TABLET ORAL at 20:28

## 2023-09-07 RX ADMIN — GABAPENTIN 300 MG: 300 CAPSULE ORAL at 07:43

## 2023-09-07 ASSESSMENT — ACTIVITIES OF DAILY LIVING (ADL)
ADLS_ACUITY_SCORE: 74
ORAL_HYGIENE: PROMPTS
HYGIENE/GROOMING: PROMPTS
ADLS_ACUITY_SCORE: 74

## 2023-09-07 NOTE — PLAN OF CARE
Goal Outcome Evaluation:     Pt a wake x1 briefly otherwise appeared to be a sleep at all other safety checks.  Pt slept 6.5 hours.  He remains on an SIO for safety.

## 2023-09-07 NOTE — PLAN OF CARE
"    Patient denies having any thoughts of wanting to harm himself or others, anxiety, depression and pain. He reports that he slept horrible but it was noted from the night shift report that he slept for 6.5 hours. He has been isolative/withdrawn to himself. Patient was encouraged to take a shower but he refused.He was also encouraged to walk out on the unit. He agreed to walk out on the unit with the writer twice. Patient continues to have tremor's.  His vitals are stable    Blood pressure 123/72, pulse 85, temperature 97.4  F (36.3  C), resp. rate 17, height 1.753 m (5' 9\"), weight 83.7 kg (184 lb 8 oz), SpO2 96 %.    "

## 2023-09-07 NOTE — PLAN OF CARE
"Assessment/Intervention/Current Symtoms and Care Coordination:  Reviewed chart. Met with team to review patient's current functioning, needs, progress, and impediments to discharge.    Again attempted to have Vinod sign [facility] for placement, he was unwilling to sign due to paranoid thought content, stating that we are trying to \"bamboozle\" him.    Discharge Plan or Goal:  Seeking placement in a memory care unit, preferably in the [facility]     Barriers to Discharge:  Referrals are being sent to memory care units who have openings. One barrier is the limited openings in memory care units at this time.     Referral Status:  [facility] in [place] 8/22, no memory care 8/30  [facility] in [place] 8/15, no memory care 8/31  [facility] 8/31, no openings 8/31  [facility] 8/29  [facility] 8/29, no open beds 8/30  [facility] 8/30  [facility] 8/31, no open beds 8/31  [facility] 8/31, no open beds 8/31  [facility] 8/31  [place] [facility] 8/31, no openings and private pay 8/31  [facility], 8/31, declined 9/1 due to aggressive behaviors     Legal Status:  Commitment with [facility]: [place]  File Number: [id number]  Issued 07/06/23 and is not to exceed six months    Contacts:  : Vicky Valdez with [facility], [phone]  Psychiatrist: Dr. Santana at [facility], [phone] PCP: Attila Urena DO  Mom: Alberta, [phone] (H) [phone] (M)   Dad: Ino, [phone] (H)  Sister, Sandra Treadwell, [phone] (KING)   [facility]'s Office Fax: [phone]  Pia May: [phone] ([place])  [facility]: Jasmin phone [phone], fax [phone]  CADI  with [facility]: Jackie Wong, [phone], [email or web address]    Upcoming Meetings and Dates/Important Information and next steps:            "

## 2023-09-07 NOTE — PLAN OF CARE
Problem: Adult Behavioral Health Plan of Care  Goal: Adheres to Safety Considerations for Self and Others  Outcome: Progressing  Intervention: Develop and Maintain Individualized Safety Plan  Recent Flowsheet Documentation  Taken 9/6/2023 1950 by Amanda Prado RN  Safety Measures:   clinical history reviewed   environmental rounds completed   safety rounds completed   suicide check-in completed   Goal Outcome Evaluation:    Plan of Care Reviewed With: patient          Patient was mostly isolative to the room this shift, presents with a flat affect but brightens upon approach. Patient denied all mental health symptoms, appropriate with care. Patient had reported to another staff that he was having an abdominal pain and was hesitant to eat dinner, upon assessment, patient denied abdominal pain and all physical discomfort, notable tremors in the upper extremities. Patient later ate most of his dinner. Patient was medication compliant. Patient continues on SIO for severe intrusiveness and assault risk. No behavioral outbursts, agitation, or acute safety concerns. Staff will continue to follow the plan of care.

## 2023-09-07 NOTE — PROGRESS NOTES
"[facility], [place]   Psychiatric Progress Note  Hospital Day: 104        Interim History:   The patient's care was discussed with the treatment team during the daily team meeting and/or staff's chart notes were reviewed. Pt was mostly isolative to the room, presents with a flat affect but brightens upon approach. Patient denied all mental health symptoms, appropriate with care. Patient had reported to another staff that he was having an abdominal pain and was hesitant to eat dinner, upon assessment, patient denied abdominal pain and all physical discomfort, notable tremors in the upper extremities. Patient later ate most of his dinner. Patient was medication compliant. Patient continues on SIO for severe intrusiveness and assault risk. No behavioral outbursts, agitation, or acute safety concerns. No seclusion/restraint episodes.     Upon interview, Vinod was lying calmly in his bed, and appeared somnolent. Tremor much less pronounced today and Vinod agreed. Made less paranoid and delusional statements, but reported passive SI. Clarified that he is not feeling depressed. Inquired about whether he still has an apartment and so more future oriented than previous interviews. Not interested in discussing additional medication changes (I have been recommending a higher dose of Prozac). Has good appetite. Sleeping well.     Suicidal ideation: reported passive SI today    Homicidal ideation: \"yes.\" When asked how he refrains from acting on these thoughts, he said \"I just ignore them and they go away.\"     Psychotic symptoms: no AH or VH reported during interview. Delusions present and other psychotic symptoms suspected.    Medication side effects reported: Tremors    Acute medical concerns: none. He denies any pain or discomfort today.  Reports that rash has improved.     Other issues reported by patient: Patient had no further questions or concerns.           Medications:      atropine  2 drop " "Sublingual TID    benztropine  1 mg Oral BID    cloZAPine  650 mg Oral Daily    cyanocobalamin  1,000 mcg Oral Daily    divalproex sodium delayed-release  250 mg Oral Q8H KIKI    FLUoxetine  20 mg Oral Daily    gabapentin  300 mg Oral TID    LORazepam  0.5 mg Oral TID    melatonin  10 mg Oral At Bedtime    miconazole   Topical BID    mineral oil-hydrophilic petrolatum   Topical BID IS    naproxen  250 mg Oral BID w/meals    OLANZapine zydis  15 mg Oral BID    Or    OLANZapine  10 mg Intramuscular BID    polyethylene glycol  17 g Oral BID    sennosides  2 tablet Oral BID    tamsulosin  0.4 mg Oral Daily          Allergies:     Allergies   Allergen Reactions    Bee Venom Unknown    Montelukast Unknown    Phenothiazines Unknown          Labs:     No results found for this or any previous visit (from the past 24 hour(s)).               Psychiatric Examination:     /72   Pulse 85   Temp 97.4  F (36.3  C)   Resp 17   Ht 1.753 m (5' 9\")   Wt 83.7 kg (184 lb 8 oz)   SpO2 96%   BMI 27.25 kg/m    Weight is 184 lbs 8 oz  Body mass index is 27.25 kg/m .    Weight over time:  Vitals:    07/03/23 1100 07/30/23 0902 08/13/23 0900 08/26/23 0835   Weight: 81.6 kg (179 lb 12.8 oz) 86.5 kg (190 lb 9.6 oz) 85.7 kg (188 lb 14.4 oz) 82.8 kg (182 lb 7 oz)    09/07/23 0900   Weight: 83.7 kg (184 lb 8 oz)       Orthostatic Vitals         Most Recent      Sitting Orthostatic /61 07/25 0800    Sitting Orthostatic Pulse (bpm) 85 07/25 0800          Cardiometabolic risk assessment. 06/07/23    Reviewed patient profile for cardiometabolic risk factors    Date taken /Value  REFERENCE RANGE   Abdominal Obesity  (Waist Circumference)   See nursing flowsheet Women ?35 in (88 cm)   Men ?40 in (102 cm)      Triglycerides  No results found for: TRIG    ?150 mg/dL (1.7 mmol/L) or current treatment for elevated triglycerides   HDL cholesterol  No results found for: HDL]   Women <50 mg/dL (1.3 mmol/L) in women or current treatment for " low HDL cholesterol  Men <40 mg/dL (1 mmol/L) in men or current treatment for low HDL cholesterol     Fasting plasma glucose (FPG) Lab Results   Component Value Date     05/26/2023      FPG ?100 mg/dL (5.6 mmol/L) or treatment for elevated blood glucose   Blood pressure  BP Readings from Last 3 Encounters:   09/06/23 123/72   05/26/23 97/55    Blood pressure ?130/85 mmHg or treatment for elevated blood pressure   Family History  See family history     Mental Status Exam:  Appearance: awake, alert, well groomed. No evidence of pain of acute distress.   Attitude:  somewhat cooperative  Eye Contact:  fair, less intense  Mood:  calm  Affect:  mood congruent  Speech: mostly coherent  Psychomotor Behavior:  Ongoing bilateral hand tremor and jaw tremor. Appeared slightly better today. No evidence of psychomotor agitation or restlessness today. No evidence of dystonia, or tics.  Throught Process: linear, illogical  Associations:  no loosening of associations present  Thought Content:  Delusions. Likely auditory, tacticle and olfactory hallucinations. Cannot rule out visual hallucinations. Evidence of obsessive thinking and likely compulsions to harm others.   Insight:  limited  Judgement:  poor  Oriented to:  self, date, place  Attention Span and Concentration:  fair  Recent and Remote Memory:  poor         Precautions:     Behavioral Orders   Procedures    Assault precautions    Code 1 - Restrict to Unit    Code 2    Code 2 - 1:1 Staff Supervision     For ECT only    Electroconvulsive therapy     Series of up to 12 treatments. Begin Date: 8/2/23     Treating Psychiatrist providing ECT:  unknown     Notified on:  7/28/23 via this order  NOTE: We received verbal confirmation from [facility] that Lorenzo Pavon has been approved but awaiting official court order and risk management's review  Initial consult was completed by Dr. Hicks on 7/18/23    Electroconvulsive therapy     Series of up to 12 treatments. Begin Date:  8/2/23     Treating Psychiatrist providing ECT:  Dominga     Notified on:  8/2/23    Elopement precautions    Fall precautions    Routine Programming     As clinically indicated    Self Injury Precaution    Status 15     Every 15 minutes.    Status Individual Observation     Patient SIO status reviewed with team/RN.  Please also refer to RN/team documentation for add'l detail.    -SIO staff to monitor following which have contributed to patient being on SIO:  Agitation  Pt is impulsive   Pt has ran out of his room naked  Pt has Parkinson symptoms place him in a high fall risk  Pt has verbal outburst of sexual and threaten statements  Pt requires immediate redirection when masturbating    -Possible interventions SIO staff could use to support patient's treatment progress:  Engage pt in activities    -When following observed, team will review discontinuation of SIO:  Absence of aggression toward others for > 24 hours    Comments: SIO 1:1     Order Specific Question:   CONTINUOUS 24 hours / day     Answer:   5 feet     Order Specific Question:   Indications for SIO     Answer:   Severe intrusiveness     Order Specific Question:   Indications for SIO     Answer:   Assault risk    Suicide precautions     Patients on Suicide Precautions should have a Combination Diet ordered that includes a Diet selection(s) AND a Behavioral Tray selection for Safe Tray - with utensils, or Safe Tray - NO utensils            Diagnoses:     #Unspecified psychosis likely schizophrenia per history, rule out Bipolar affective disorder, manic  #Unspecified encephalopathy   -R/O catatonia   -Episodes of unresponsiveness, concern for PNES   #Parkinsonism 2/2 neuroleptic medications, rule out Parkinson's Disease  #Urinary retention and BPH  -Possible UTI -- UC resulted on 5/25 w/out growth  #Hx of prolonged QTc with clozapine  #Mild thrombocytopenia  #R/O OCD         Assessment and hospital summary:  Patient was admitted to psychiatric unit for  safety, stabilization and medication management. He has had schizophrenia since the . He was on Clozaril x 25 years, and it was tapered and discontinued on May 7, 2023  due to prolonged qtc of 527, and his psychotic  symptoms have worsened since then. Sinemet was also discontinued recently due to concerns that it was contributing to paranoia and AH. He is agitated, aggressive, dangerous to self and others. He remains on SIO 2 to 1, and is under psychiatric emergency and court hold. There are concerns for organic etiology given pattern of sundowning, history of parkinsonism, and ongoing disorientation/confusion. : EKG repeated, cardiology consult regarding safety of resuming clozapine in the context of prolonged QTc and refractory schizophrenia pending response. Per cardiology, correct QTc is no more than 460. They do not have concerns about AP retrial. Neurology IP consult placed for evaluation of sundowning and cognitive decline secondary to Sinemet discontinuation. MSSA initiated. Per Neurology, discontinuation of Sinemet would not account for these symptoms. They do not recommend retrial at this time. : Clozapine titration continued.   Its possible that clozapine dose is contributing to worsened compulsive behaviors noted throughout hospitalization which could improve with lowering the dose.     Chart reviewed which revealed the followin2022: He was on clozapine, Seroquel, Cymbalta, and Carbidopa-levodopa and hospitalized for pneumonia. Psych consulted and Seroquel was stopped. Treated with Abx and discharged to TCU.     2022: Hospitalized at [facility]. Per chart review:    Vinod Lee is a 63 yo male with longstanding history of schizophrenia. He was admitted from [facility] ED, where he presented due to increased delusional thoughts while on a visit to his parents for Thanksgiving. He began experiencing increasing paranoid thoughts that someone might be poisoning him and began fearing  that he might accidentally harm someone. He reported to his parents that he was having thoughts about hitting someone or beating someone up and told them he could not guarantee they would be safe with him. Parents encouraged patient to go to the ED, which he did voluntarily.     Per patient report and chart review, he was hospitalized several times in the 1980s and reports he was civilly committed at one point in the 1980s, however he has been quite stable for the past several decades. He reports he will experience some paranoia and delusional thinking at times, but generally has good insight into his symptoms. He has been maintained on clozapine for about 25 years and prior to several months ago was also concurrently prescribed quetiapine.      In the last several months, patient has had a number of medication changes. Approximately 6 months ago, patient was diagnosed with parkinsonism related to antipsychotic use and was started on carbidopa-levidopa. He experienced no improvement in tremors, and thus carbidopa- levidopa dose was increased 1.5 weeks ago.      In addition, patient was medically hospitalized in August 2022 for confusion, weakness, repeated falls, ongoing weight loss, and SOB. He was found to have a cavitary lesion of the lung and suspected aspiration pneumonia. He was treated with antibiotics. At that time, he was taking current dose of clozapine and duloxetine, as well as propranolol ER, quetiapine, gabapentin, and clonazepam. Psychiatry was consulted due to concern for oversedation, and propranolol ER, gabapentin, and quetiapine were discontinued. Conazepam was reduced from 0.5 mg TID to BID dosing and recommended to be discontinued, however, it does not appear this occurred. His sedation improved significantly. QTc prolongation was also noted, but improved throughout his stay. He was ultimately discharged to a TCU for several months before returning home. He reports his weakness has greatly  "improved and states he \"graduated\" physical therapy, though he continues to be diligent in completed recommended exercises.      He reports that over the past several months, his delusions and anxiety have been worse than usual, with symptoms becoming much more acute since the carbidopa-levidopa dose was increased. He has felt increasingly paranoid about being poisoned, noting this is a delusion that has been stable over time, but was previously less intrusive. He has insight during the interview that his paranoid thinking is not reality based, but states it has been harder to separate delusions from reality and he becomes very worried that he might hurt someone, despite no history of violent behavior. He reports that the paranoia about his food being poisoned in combination with the tremors in his hands have made it difficult for him to eat. He has also had quite low appetite for the past 6 months to a year . He reports that due to the combination of these factors, he has lost about 50 pounds in the last year.He denies any problems with sleep initiation or maintenance. He reports energy is fairly good and \"better than it used to be.\" He reports some short term memory issues and on interview, does have some difficulty remembering details of recent events. He is unsure if he has received cognitive testing in the past.    He denies any suicidal ideation and reports he has not experienced SI for decades. He denies homicidal thoughts and is very clear that he had and has no desire to harm anyone else, but was afraid he might somehow do so. He denies any hallucinations and states \"that's never been a problem for me.\" He is not observed responding to internal stimuli. He is alert and oriented in all spheres.        ========    BRIEF HOSPITAL COURSE: This 62 y.o. male admitted 11/25/2022 to  [facility] for the assessment and treatment of the above presentation. This was a voluntary admission.     In summary, he was tapered off " the Sinemet, which he reports to be both ineffective and potentially worsening the anxiety and paranoia ideations. Instead Benadryl 25 mg TID was started to help with extrapyramidal side effects. He struggled with sleep during his stay here. Remeron 7.5mg QHS was tried without success. Seroquel 100mg qhs was restarted with addition of suvorexant 10mg qhs. Given his Seroquel PRN use prior history of prolonged QTC, he will benefit from another EKG repeat on the medical unit.     Unfortunately, he tested positive for influenza A and developed hypoxemia. Now on 2L oxygen with desats when prone requiring 3L oxygen. Subsequently, the plan will to be tapering him off clonazepam due to hypoxia (and also prior plan to taper). The patient tolerated these changes without side effects.     The patient minimally benefited from the structured therapeutic milieu as he attended programming seldom as he tested positive for influenza, he needed to isolate with droplet precautions. Pt was engaged and worked on issues that were triggering/brought pt to the hospital and has excellent insight into his anxiety and paranoid delusions. Pt denied suicidal ideations throughout all/majority of their hospitalization. Pt denied HI throughout all of hospitalization. There were no concerns with behavioral disruptions/outbursts. The patient has shown improvement in general.     At the time of discharge, hospitalization course was reviewed with Vinod Lee with plan to transfer to medicine. Please consult psychiatry and I will continue to follow while patient is on the medical unit. He is now off sinemet since 11/29 21:00 and I would expect anxiety and paranoia to improve more moving forward. Psychiatrically, once anxiety and paranoia is baseline, can D/C to home once medically stable. He DOES NOT NEED A 1:1 on the medical unit from a mental health perspective, we initiated 1:1 11/30/2022 due to significant fatigue, gait instability (he was  unable to stand without assist) and feeding assist.    04/2023: Clozapine was gradually tapered and discontinued, unclear reasons why it was discontinued per outside records, though Dr. Portillo's H&P indicates that it was due to prolonged QTc.       Today's Changes:  Renewed SIO  CPT order placed  Reviewed nutrition recs  Attempting to obtain weighted utensils for tremor  Discussed dispo barriers during outlier meeting    Target psychiatric symptoms and interventions:  -Psych emergency declared on 5/27, now fully committed  -ECT Mon/Wed/Fri per Janelle. Last ECT treatment was on 8/25. Had total of 11 treatments with subtle improvements.   - Depakote 250 mg TID, restarted on 8/26. Cannot increase beyond this dose due to thrombocytopenia at higher doses  -Continue clozapine as above  -Continue scheduled Zyprexa 15mg BID  -Continue 1:1 for safety of staff and patients, reduced from 2:1 7/23/23  - weekly CBC to monitor platelets      -Continue Gabapentin 300 mg TID as staff believe it has been effective for treatment of his anxiety  -Discussed complex case at length with Dr. Hatch (primary provider on geriatic unit) who provided recs (see second opinion note dated 6/14). Appreciate assistance. I have since made the following changes:         1) Add propranolol 10 mg TID         2) Added Depakote 1000 mg qhs (to be administered in magic cup)         3) Nutrition consult to order magic cup         4) Increased melatonin to 10 mg QHS    Risks, benefits, and alternatives discussed at length with patient.     Acute Medical Problems and Treatments:  Delirium vs progression of dementia  Neurology consult placed on 6/8 for evaluation of sundowning and cognitive decline secondary to Sinemet discontinuation: Resuming Sinemet not recommended for behavioral concerns. Please see Neuro note for details.     Tremors  longstanding though appeared to worsen following initiation of clozapine  - Ativan 0.5 mg TID  - Cogentin 1 mg  BID added on 8/25    Thrombocytopenia, resolved  Likely secondary to Depakote.  According to the, incidents of Depakote induced thrombocytopenia as 27%.  Depakote dose reduced from 1000 twice daily to 250 3 times daily on 7/28/2023 with subsequent resolution of thrombocytopenia  - weekly CBCs    Constipation  Likely worsened by clozapine, improved since modifying regimen.   - daily miralax   - daily Senokot 2 tablets BID      Right first toe fracture:  Seen by Ortho on 6/16:  Per note: Vinod Lee is a 62 year old  male w/ PMHx complex psychiatric history who has a fracture to the distal phalanx of the right toe after a recent trauma to the foot the patient reports.  Patient denies any other pain or discomfort.  Images reviewed and plan will be for irrigation of the popped fracture blister with sterile saline and the toes should be dressed and a 4 x 4 gauze dressing.  Patient will need a postop shoe which she can be weightbearing as tolerated in.  Would recommend a course of antibiotics for approximately 7 days.  Would recommend Augmentin or clindamycin.  Podiatry will be made aware of patient and will see patient while he is admitted or if patient is discharged in the near future a follow-up appointment will need to be established.     Remainder of care per primary team.  Primary team should make sure that patient is up-to-date on his tetanus shot.  If not patient will require a booster shot.    Hx of prolonged QTc:  Continue weekly EKGs. See above for details.   Discussed most recent EKG findings with Cardiology on 8/25. Corrected QTc is 501 from EKG on 8/23. Per cardiology, repeat EKG today. If corrected QTc is > 500, will need to reduce clozapine. If < 500, OK to keep clozapine at current dose. UPDATE/ADDENDUM 9/6: Repeat EKG with corrected QTc of 440. No need to adjust clozapine dose per cards.     Urinary retention, resolved  Per patient care order:   If patient is refusing straight caths, please notify IM  "provider immediately to determine whether this constitutes a medical emergency. If IM declares medical emergency, may restrain patient for straight cath. Discussed this with patient's parents/substitute decision makers on 6/7 who are in support of this plan.    # Psoriasis hx  # Purplish discoloration of B/LE feet-resolved   Discussed with Dr. Rene and bedside RN regarding rash on right foot that appears and appears to be purplish in color and almost \"petechiae.\" It was noted during interview, but seemed to be resolving upon walking. Within the past 24 hrs, pt has had ECT and seemed more confused than usual. Pt seems to have had a right great toe wound with recent right great toe fracture seen by Medicine on 7/16. Seen on 8/4 with improving color on B/L legs at rest and appears to have small, scaly patches of varying coin sizes that have not grown past marked borders. See photos. Per further chart review, has had psoriasis history. WBC WNL, no fevers, HDS. This appears to more consistent with a psoriasis like picture.   - Start triamcinolone topical 0.025%   -Apply twice daily until lesions for 2 weeks or until lesions resolve/  -Monitor for skin thinning, striae, rebound lesion flares.   - Start Eucerin cream              - Apply 30 minutes PRIOR to triamcinolone topical cream above or PRN as needed if dry skin/after bathing   - Medicine will sign off.   - For worsening pain, spread, itchiness, fevers, please contact Medicine and possibly Dermatology.    Benzodiazepine dependence:  Phenobarbital taper completed shortly after patient's admission    Pertinent labs/imaging:  Weekly CBC with diff     Behavioral/Psychological/Social:  - Encourage unit programming    Safety:  - Continue precautions as noted above  - Status 15 minute checks  - Continue 1:1 for staff and pt safety    Legal Status: Fully committed with Sánchez and Lorenzo Pavon. [place] medications: clozapine, olanzapine, risperidone, " quetiapine      Disposition Plan   Reason for ongoing admission: No safe alternative at this time  Discharge location: TBD. Will likely need memory care unit  Discharge Medications: not ordered  Follow-up Appointments: not scheduled    Entered by: Ingrid Rhodes MD on 09/07/2023  at 2:06 PM

## 2023-09-08 LAB
ALBUMIN SERPL BCG-MCNC: 3.4 G/DL (ref 3.5–5.2)
ALP SERPL-CCNC: 72 U/L (ref 40–129)
ALT SERPL W P-5'-P-CCNC: 7 U/L (ref 0–70)
AMMONIA PLAS-SCNC: 46 UMOL/L (ref 16–60)
ANION GAP SERPL CALCULATED.3IONS-SCNC: 10 MMOL/L (ref 7–15)
AST SERPL W P-5'-P-CCNC: 11 U/L (ref 0–45)
BASOPHILS # BLD AUTO: 0.1 10E3/UL (ref 0–0.2)
BASOPHILS # BLD AUTO: 0.1 10E3/UL (ref 0–0.2)
BASOPHILS NFR BLD AUTO: 1 %
BASOPHILS NFR BLD AUTO: 1 %
BILIRUB SERPL-MCNC: 0.2 MG/DL
BUN SERPL-MCNC: 23.2 MG/DL (ref 8–23)
CALCIUM SERPL-MCNC: 8.7 MG/DL (ref 8.8–10.2)
CHLORIDE SERPL-SCNC: 108 MMOL/L (ref 98–107)
CREAT SERPL-MCNC: 0.93 MG/DL (ref 0.67–1.17)
DEPRECATED HCO3 PLAS-SCNC: 25 MMOL/L (ref 22–29)
EGFRCR SERPLBLD CKD-EPI 2021: >90 ML/MIN/1.73M2
EOSINOPHIL # BLD AUTO: 0 10E3/UL (ref 0–0.7)
EOSINOPHIL # BLD AUTO: 0 10E3/UL (ref 0–0.7)
EOSINOPHIL NFR BLD AUTO: 0 %
EOSINOPHIL NFR BLD AUTO: 0 %
ERYTHROCYTE [DISTWIDTH] IN BLOOD BY AUTOMATED COUNT: 14.3 % (ref 10–15)
ERYTHROCYTE [DISTWIDTH] IN BLOOD BY AUTOMATED COUNT: 14.6 % (ref 10–15)
FLUAV RNA SPEC QL NAA+PROBE: NEGATIVE
FLUBV RNA RESP QL NAA+PROBE: NEGATIVE
GLUCOSE SERPL-MCNC: 96 MG/DL (ref 70–99)
HCT VFR BLD AUTO: 35.5 % (ref 40–53)
HCT VFR BLD AUTO: 38.4 % (ref 40–53)
HGB BLD-MCNC: 11.3 G/DL (ref 13.3–17.7)
HGB BLD-MCNC: 12.5 G/DL (ref 13.3–17.7)
IMM GRANULOCYTES # BLD: 0.1 10E3/UL
IMM GRANULOCYTES # BLD: 0.1 10E3/UL
IMM GRANULOCYTES NFR BLD: 1 %
IMM GRANULOCYTES NFR BLD: 1 %
LACTATE SERPL-SCNC: 1.1 MMOL/L (ref 0.7–2)
LYMPHOCYTES # BLD AUTO: 1.6 10E3/UL (ref 0.8–5.3)
LYMPHOCYTES # BLD AUTO: 1.9 10E3/UL (ref 0.8–5.3)
LYMPHOCYTES NFR BLD AUTO: 17 %
LYMPHOCYTES NFR BLD AUTO: 20 %
MAGNESIUM SERPL-MCNC: 1.8 MG/DL (ref 1.7–2.3)
MCH RBC QN AUTO: 27.5 PG (ref 26.5–33)
MCH RBC QN AUTO: 27.5 PG (ref 26.5–33)
MCHC RBC AUTO-ENTMCNC: 31.8 G/DL (ref 31.5–36.5)
MCHC RBC AUTO-ENTMCNC: 32.6 G/DL (ref 31.5–36.5)
MCV RBC AUTO: 85 FL (ref 78–100)
MCV RBC AUTO: 86 FL (ref 78–100)
MONOCYTES # BLD AUTO: 0.7 10E3/UL (ref 0–1.3)
MONOCYTES # BLD AUTO: 0.9 10E3/UL (ref 0–1.3)
MONOCYTES NFR BLD AUTO: 10 %
MONOCYTES NFR BLD AUTO: 8 %
NEUTROPHILS # BLD AUTO: 6.5 10E3/UL (ref 1.6–8.3)
NEUTROPHILS # BLD AUTO: 7.2 10E3/UL (ref 1.6–8.3)
NEUTROPHILS NFR BLD AUTO: 68 %
NEUTROPHILS NFR BLD AUTO: 73 %
NRBC # BLD AUTO: 0 10E3/UL
NRBC # BLD AUTO: 0 10E3/UL
NRBC BLD AUTO-RTO: 0 /100
NRBC BLD AUTO-RTO: 0 /100
PHOSPHATE SERPL-MCNC: 3.6 MG/DL (ref 2.5–4.5)
PLATELET # BLD AUTO: 167 10E3/UL (ref 150–450)
PLATELET # BLD AUTO: 190 10E3/UL (ref 150–450)
POTASSIUM SERPL-SCNC: 4.7 MMOL/L (ref 3.4–5.3)
PROCALCITONIN SERPL IA-MCNC: 0.05 NG/ML
PROT SERPL-MCNC: 5.4 G/DL (ref 6.4–8.3)
RBC # BLD AUTO: 4.11 10E6/UL (ref 4.4–5.9)
RBC # BLD AUTO: 4.54 10E6/UL (ref 4.4–5.9)
RSV RNA SPEC NAA+PROBE: NEGATIVE
SARS-COV-2 RNA RESP QL NAA+PROBE: NEGATIVE
SODIUM SERPL-SCNC: 143 MMOL/L (ref 136–145)
WBC # BLD AUTO: 9.5 10E3/UL (ref 4–11)
WBC # BLD AUTO: 9.6 10E3/UL (ref 4–11)

## 2023-09-08 PROCEDURE — 99232 SBSQ HOSP IP/OBS MODERATE 35: CPT | Performed by: PSYCHIATRY & NEUROLOGY

## 2023-09-08 PROCEDURE — 124N000002 HC R&B MH UMMC

## 2023-09-08 PROCEDURE — 83735 ASSAY OF MAGNESIUM: CPT | Performed by: PSYCHIATRY & NEUROLOGY

## 2023-09-08 PROCEDURE — 83605 ASSAY OF LACTIC ACID: CPT | Performed by: PSYCHIATRY & NEUROLOGY

## 2023-09-08 PROCEDURE — 84145 PROCALCITONIN (PCT): CPT | Performed by: PSYCHIATRY & NEUROLOGY

## 2023-09-08 PROCEDURE — 250N000013 HC RX MED GY IP 250 OP 250 PS 637: Performed by: PSYCHIATRY & NEUROLOGY

## 2023-09-08 PROCEDURE — 87637 SARSCOV2&INF A&B&RSV AMP PRB: CPT | Performed by: PSYCHIATRY & NEUROLOGY

## 2023-09-08 PROCEDURE — 250N000013 HC RX MED GY IP 250 OP 250 PS 637: Performed by: STUDENT IN AN ORGANIZED HEALTH CARE EDUCATION/TRAINING PROGRAM

## 2023-09-08 PROCEDURE — 36415 COLL VENOUS BLD VENIPUNCTURE: CPT | Performed by: PSYCHIATRY & NEUROLOGY

## 2023-09-08 PROCEDURE — 84100 ASSAY OF PHOSPHORUS: CPT | Performed by: PSYCHIATRY & NEUROLOGY

## 2023-09-08 PROCEDURE — 85025 COMPLETE CBC W/AUTO DIFF WBC: CPT | Performed by: PSYCHIATRY & NEUROLOGY

## 2023-09-08 PROCEDURE — 250N000013 HC RX MED GY IP 250 OP 250 PS 637: Performed by: PHYSICIAN ASSISTANT

## 2023-09-08 PROCEDURE — 82140 ASSAY OF AMMONIA: CPT | Performed by: PSYCHIATRY & NEUROLOGY

## 2023-09-08 PROCEDURE — 99221 1ST HOSP IP/OBS SF/LOW 40: CPT | Performed by: PHYSICIAN ASSISTANT

## 2023-09-08 PROCEDURE — 80164 ASSAY DIPROPYLACETIC ACD TOT: CPT | Performed by: PSYCHIATRY & NEUROLOGY

## 2023-09-08 PROCEDURE — 80053 COMPREHEN METABOLIC PANEL: CPT | Performed by: PSYCHIATRY & NEUROLOGY

## 2023-09-08 RX ADMIN — CLOZAPINE 650 MG: 100 TABLET, ORALLY DISINTEGRATING ORAL at 11:51

## 2023-09-08 RX ADMIN — NAPROXEN 250 MG: 250 TABLET ORAL at 08:47

## 2023-09-08 RX ADMIN — ATROPINE SULFATE 2 DROP: 10 SOLUTION/ DROPS OPHTHALMIC at 08:47

## 2023-09-08 RX ADMIN — BENZTROPINE MESYLATE 1 MG: 1 TABLET ORAL at 19:31

## 2023-09-08 RX ADMIN — LORAZEPAM 0.5 MG: 0.5 TABLET ORAL at 19:32

## 2023-09-08 RX ADMIN — LORAZEPAM 0.5 MG: 0.5 TABLET ORAL at 08:47

## 2023-09-08 RX ADMIN — WHITE PETROLATUM: 1.75 OINTMENT TOPICAL at 09:26

## 2023-09-08 RX ADMIN — SENNOSIDES 2 TABLET: 8.6 TABLET ORAL at 08:46

## 2023-09-08 RX ADMIN — NAPROXEN 250 MG: 250 TABLET ORAL at 17:51

## 2023-09-08 RX ADMIN — TAMSULOSIN HYDROCHLORIDE 0.4 MG: 0.4 CAPSULE ORAL at 08:47

## 2023-09-08 RX ADMIN — SENNOSIDES 2 TABLET: 8.6 TABLET ORAL at 19:31

## 2023-09-08 RX ADMIN — DIVALPROEX SODIUM 250 MG: 250 TABLET, DELAYED RELEASE ORAL at 19:31

## 2023-09-08 RX ADMIN — POLYETHYLENE GLYCOL 3350 17 G: 17 POWDER, FOR SOLUTION ORAL at 08:47

## 2023-09-08 RX ADMIN — GABAPENTIN 300 MG: 300 CAPSULE ORAL at 19:31

## 2023-09-08 RX ADMIN — ATROPINE SULFATE 2 DROP: 10 SOLUTION/ DROPS OPHTHALMIC at 19:46

## 2023-09-08 RX ADMIN — CYANOCOBALAMIN TAB 1000 MCG 1000 MCG: 1000 TAB at 08:46

## 2023-09-08 RX ADMIN — MICONAZOLE NITRATE: 20 POWDER TOPICAL at 19:47

## 2023-09-08 RX ADMIN — ATROPINE SULFATE 2 DROP: 10 SOLUTION/ DROPS OPHTHALMIC at 14:06

## 2023-09-08 RX ADMIN — OLANZAPINE 15 MG: 10 TABLET, ORALLY DISINTEGRATING ORAL at 08:47

## 2023-09-08 RX ADMIN — DIVALPROEX SODIUM 250 MG: 250 TABLET, DELAYED RELEASE ORAL at 14:06

## 2023-09-08 RX ADMIN — DIVALPROEX SODIUM 250 MG: 250 TABLET, DELAYED RELEASE ORAL at 06:32

## 2023-09-08 RX ADMIN — MICONAZOLE NITRATE: 20 POWDER TOPICAL at 08:48

## 2023-09-08 RX ADMIN — GABAPENTIN 300 MG: 300 CAPSULE ORAL at 14:06

## 2023-09-08 RX ADMIN — TRAZODONE HYDROCHLORIDE 50 MG: 50 TABLET ORAL at 02:16

## 2023-09-08 RX ADMIN — LORAZEPAM 0.5 MG: 0.5 TABLET ORAL at 14:06

## 2023-09-08 RX ADMIN — OLANZAPINE 15 MG: 10 TABLET, ORALLY DISINTEGRATING ORAL at 19:30

## 2023-09-08 RX ADMIN — WHITE PETROLATUM: 1.75 OINTMENT TOPICAL at 17:51

## 2023-09-08 RX ADMIN — Medication 10 MG: at 19:31

## 2023-09-08 RX ADMIN — BENZTROPINE MESYLATE 1 MG: 1 TABLET ORAL at 08:47

## 2023-09-08 RX ADMIN — POLYETHYLENE GLYCOL 3350 17 G: 17 POWDER, FOR SOLUTION ORAL at 19:30

## 2023-09-08 RX ADMIN — FLUOXETINE 20 MG: 20 CAPSULE ORAL at 08:47

## 2023-09-08 RX ADMIN — GABAPENTIN 300 MG: 300 CAPSULE ORAL at 08:47

## 2023-09-08 ASSESSMENT — ACTIVITIES OF DAILY LIVING (ADL)
HYGIENE/GROOMING: PROMPTS
ADLS_ACUITY_SCORE: 74
ADLS_ACUITY_SCORE: 74
ORAL_HYGIENE: PROMPTS
ADLS_ACUITY_SCORE: 74

## 2023-09-08 NOTE — PROGRESS NOTES
"   09/08/23 1209   Group Therapy Session   Group Attendance other (see comments)     Pt declined interest in activity with writer when asked if he'd like to play cards, \"no, I'm lousy at that\" despite writer's encouragement.  Offered the foot massager, initially declined, then changed his mind, sat in chair and used it for 30 minutes.  "

## 2023-09-08 NOTE — PLAN OF CARE
Assessment/Intervention/Current Symtoms and Care Coordination:  Reviewed chart. Met with team to review patient's current functioning, needs, progress, and impediments to discharge.    Called the [facility] through [facility]. Left voicemail and requested a call back.     Called Vinod's mom to update her about discharge planning. She believes that at the assisted living facility he didn't have any out-of-pocket costs. She had concerns about him being discharged due to his symptoms, writer assured her that we're referring to places who are able to manage behavioral health in conjunction with memory care. She requested that the provider call her to discuss his current symptomology. Provider notified.     Sent referral to [facility] in [place] at 11:00 am.     Discharge Plan or Goal:  Seeking placement in a memory care unit, preferably in the [facility]     Barriers to Discharge:  Referrals are being sent to memory care units who have openings. One barrier is the limited openings in memory care units at this time.     Referral Status:  [facility] in [place] 8/22, no memory care 8/30  [facility] in [place] 8/15, no memory care 8/31  [facility] 8/31, no openings 8/31  [facility] 8/29  [facility] 8/29, no open beds 8/30  [facility] 8/30  [facility] 8/31, no open beds 8/31  [facility] 8/31, no open beds 8/31  [facility] 8/31  [place] [facility] 8/31, no openings and private pay 8/31  [facility], 8/31, declined 9/1 due to aggressive behaviors     Legal Status:  Commitment with [facility] and [facility]: [place]  File Number: [id number]  Issued 07/06/23 and is not to exceed six months    Contacts:  : Vicky Valdez with [facility], [phone]  Psychiatrist: Dr. Santana at [facility], [phone] PCP: Attila Urena DO  Mom: Alberta,  [phone] (H) [phone] (M)   Dad: Ino, [phone] (H)  Sister, Sandra Treadwell, [phone] (KING)   [facility]'s Office Fax: [phone]  [facility]: [phone] ([place])  [place]: Jasmin phone [phone], fax [phone]  CADI  with [facility]: Regina Wong, [phone], [email or web address]    Upcoming Meetings and Dates/Important Information and next steps:

## 2023-09-08 NOTE — PROGRESS NOTES
"[facility], [place]   Psychiatric Progress Note  Hospital Day: 105        Interim History:   The patient's care was discussed with the treatment team during the daily team meeting and/or staff's chart notes were reviewed. Pt was mostly isolative to the room, presents with a flat affect but brightens upon approach. Reported AH telling him that a girl jumped off of a building. Pt is  compliant with medication and groin treatment. Utilized the weighted utensils for dinner- the plate and the little  wrist wrap was okay but  Vinod was unable to use the spoon for dinner because it was not comfortable. So he ended up using his the regular fork. Staff have noticed that Vinod appears more lethargic today with new onset cough. Also declining to eat breakfast. Vinod is reporting weakness this morning. Patient continues on SIO for severe intrusiveness and assault risk. No behavioral outbursts, agitation, or acute safety concerns. No seclusion/restraint episodes.     Upon interview, Vinod was lying in bed and awake. RN was completing her assessment. He acknowledged experiencing a cough, but when asked about weakness and signs of fever, he said \"Im fine now just leave.\" I reassured him that we are asking questions to ensure he is stable, and he again said \"Leave.\" No evidence of acute distress or increase in respiratory rate.     Suicidal ideation: GRACE    Homicidal ideation: GRACE    Psychotic symptoms: no AH or VH reported during interview. Delusions present and other psychotic symptoms suspected.    Medication side effects reported: Tremors, less pronounced on exam in recent days    Acute medical concerns: none. He denies any pain or discomfort today.  Reports that rash has improved.     Other issues reported by patient: Patient had no further questions or concerns.           Medications:      atropine  2 drop Sublingual TID    benztropine  1 mg Oral BID    cloZAPine  650 mg Oral Daily    cyanocobalamin  " "1,000 mcg Oral Daily    divalproex sodium delayed-release  250 mg Oral Q8H KIKI    FLUoxetine  20 mg Oral Daily    gabapentin  300 mg Oral TID    LORazepam  0.5 mg Oral TID    melatonin  10 mg Oral At Bedtime    miconazole   Topical BID    mineral oil-hydrophilic petrolatum   Topical BID IS    naproxen  250 mg Oral BID w/meals    OLANZapine zydis  15 mg Oral BID    Or    OLANZapine  10 mg Intramuscular BID    polyethylene glycol  17 g Oral BID    sennosides  2 tablet Oral BID    tamsulosin  0.4 mg Oral Daily          Allergies:     Allergies   Allergen Reactions    Bee Venom Unknown    Montelukast Unknown    Phenothiazines Unknown          Labs:     No results found for this or any previous visit (from the past 24 hour(s)).               Psychiatric Examination:     /72   Pulse 85   Temp 98.8  F (37.1  C)   Resp 14   Ht 1.753 m (5' 9\")   Wt 83.7 kg (184 lb 8 oz)   SpO2 97%   BMI 27.25 kg/m    Weight is 184 lbs 8 oz  Body mass index is 27.25 kg/m .    Weight over time:  Vitals:    07/03/23 1100 07/30/23 0902 08/13/23 0900 08/26/23 0835   Weight: 81.6 kg (179 lb 12.8 oz) 86.5 kg (190 lb 9.6 oz) 85.7 kg (188 lb 14.4 oz) 82.8 kg (182 lb 7 oz)    09/07/23 0900   Weight: 83.7 kg (184 lb 8 oz)       Orthostatic Vitals         Most Recent      Sitting Orthostatic /61 07/25 0800    Sitting Orthostatic Pulse (bpm) 85 07/25 0800          Cardiometabolic risk assessment. 06/07/23    Reviewed patient profile for cardiometabolic risk factors    Date taken /Value  REFERENCE RANGE   Abdominal Obesity  (Waist Circumference)   See nursing flowsheet Women ?35 in (88 cm)   Men ?40 in (102 cm)      Triglycerides  No results found for: TRIG    ?150 mg/dL (1.7 mmol/L) or current treatment for elevated triglycerides   HDL cholesterol  No results found for: HDL]   Women <50 mg/dL (1.3 mmol/L) in women or current treatment for low HDL cholesterol  Men <40 mg/dL (1 mmol/L) in men or current treatment for low HDL " cholesterol     Fasting plasma glucose (FPG) Lab Results   Component Value Date     05/26/2023      FPG ?100 mg/dL (5.6 mmol/L) or treatment for elevated blood glucose   Blood pressure  BP Readings from Last 3 Encounters:   09/06/23 123/72   05/26/23 97/55    Blood pressure ?130/85 mmHg or treatment for elevated blood pressure   Family History  See family history     Mental Status Exam:  Appearance: awake, alert, disheveled, lying in bed. No evidence of pain of acute distress.   Attitude:  uncooperative  Eye Contact:  fair, less intense  Mood:  irritable  Affect:  mood congruent  Speech: mostly coherent  Psychomotor Behavior:  Ongoing bilateral hand tremor and jaw tremor. Appeared slightly better today. No evidence of psychomotor agitation or restlessness today. No evidence of dystonia, or tics.  Throught Process: linear, illogical  Associations:  no loosening of associations present  Thought Content:  Delusions and AH. Evidence of obsessive thinking and likely compulsions to harm others.   Insight:  limited  Judgement:  poor  Oriented to:  self, place  Attention Span and Concentration:  fair  Recent and Remote Memory:  poor  Gait: GRACE         Precautions:     Behavioral Orders   Procedures    Assault precautions    Code 1 - Restrict to Unit    Code 2    Code 2 - 1:1 Staff Supervision     For ECT only    Electroconvulsive therapy     Series of up to 12 treatments. Begin Date: 8/2/23     Treating Psychiatrist providing ECT:  unknown     Notified on:  7/28/23 via this order  NOTE: We received verbal confirmation from [facility] that Lorenzo Pavon has been approved but awaiting official court order and risk management's review  Initial consult was completed by Dr. Hicks on 7/18/23    Electroconvulsive therapy     Series of up to 12 treatments. Begin Date: 8/2/23     Treating Psychiatrist providing ECT:  Dominga     Notified on:  8/2/23    Elopement precautions    Fall precautions    Occupational Therapy on the Unit      Order Specific Question:   Reason for Consult     Answer:   Eval of thought process, functional skills and behavior     Order Specific Question:   Course of Action:     Answer:   Evaluation only     Order Specific Question:   Treatment Prescription:     Answer:   CPT requested    Routine Programming     As clinically indicated    Self Injury Precaution    Status 15     Every 15 minutes.    Status Individual Observation     Patient SIO status reviewed with team/RN.  Please also refer to RN/team documentation for add'l detail.    -SIO staff to monitor following which have contributed to patient being on SIO:  Agitation  Pt is impulsive   Pt has ran out of his room naked  Pt has Parkinson symptoms place him in a high fall risk  Pt has verbal outburst of sexual and threaten statements  Pt requires immediate redirection when masturbating    -Possible interventions SIO staff could use to support patient's treatment progress:  Engage pt in activities    -When following observed, team will review discontinuation of SIO:  Absence of aggression toward others for > 24 hours    Comments: SIO 1:1     Order Specific Question:   CONTINUOUS 24 hours / day     Answer:   5 feet     Order Specific Question:   Indications for SIO     Answer:   Severe intrusiveness     Order Specific Question:   Indications for SIO     Answer:   Assault risk    Suicide precautions     Patients on Suicide Precautions should have a Combination Diet ordered that includes a Diet selection(s) AND a Behavioral Tray selection for Safe Tray - with utensils, or Safe Tray - NO utensils            Diagnoses:     #Unspecified psychosis likely schizophrenia per history, rule out Bipolar affective disorder, manic  #Unspecified encephalopathy   -R/O catatonia   -Episodes of unresponsiveness, concern for PNES   #Parkinsonism 2/2 neuroleptic medications, rule out Parkinson's Disease  #Urinary retention and BPH  -Possible UTI -- UC resulted on 5/25 w/out growth  #Hx  of prolonged QTc with clozapine  #Mild thrombocytopenia  #R/O OCD         Assessment and hospital summary:  Patient was admitted to psychiatric unit for safety, stabilization and medication management. He has had schizophrenia since the . He was on Clozaril x 25 years, and it was tapered and discontinued on May 7, 2023  due to prolonged qtc of 527, and his psychotic  symptoms have worsened since then. Sinemet was also discontinued recently due to concerns that it was contributing to paranoia and AH. He is agitated, aggressive, dangerous to self and others. He remains on SIO 2 to 1, and is under psychiatric emergency and court hold. There are concerns for organic etiology given pattern of sundowning, history of parkinsonism, and ongoing disorientation/confusion. : EKG repeated, cardiology consult regarding safety of resuming clozapine in the context of prolonged QTc and refractory schizophrenia pending response. Per cardiology, correct QTc is no more than 460. They do not have concerns about AP retrial. Neurology IP consult placed for evaluation of sundowning and cognitive decline secondary to Sinemet discontinuation. MSSA initiated. Per Neurology, discontinuation of Sinemet would not account for these symptoms. They do not recommend retrial at this time. : Clozapine titration continued.   Its possible that clozapine dose is contributing to worsened compulsive behaviors noted throughout hospitalization which could improve with lowering the dose.     Chart reviewed which revealed the followin2022: He was on clozapine, Seroquel, Cymbalta, and Carbidopa-levodopa and hospitalized for pneumonia. Psych consulted and Seroquel was stopped. Treated with Abx and discharged to TCU.     2022: Hospitalized at [place]. Per chart review:    Vinod Lee is a 63 yo male with longstanding history of schizophrenia. He was admitted from [facility] ED, where he presented due to increased delusional thoughts  while on a visit to his parents for Thanksgiving. He began experiencing increasing paranoid thoughts that someone might be poisoning him and began fearing that he might accidentally harm someone. He reported to his parents that he was having thoughts about hitting someone or beating someone up and told them he could not guarantee they would be safe with him. Parents encouraged patient to go to the ED, which he did voluntarily.     Per patient report and chart review, he was hospitalized several times in the 1980s and reports he was civilly committed at one point in the 1980s, however he has been quite stable for the past several decades. He reports he will experience some paranoia and delusional thinking at times, but generally has good insight into his symptoms. He has been maintained on clozapine for about 25 years and prior to several months ago was also concurrently prescribed quetiapine.      In the last several months, patient has had a number of medication changes. Approximately 6 months ago, patient was diagnosed with parkinsonism related to antipsychotic use and was started on carbidopa-levidopa. He experienced no improvement in tremors, and thus carbidopa- levidopa dose was increased 1.5 weeks ago.      In addition, patient was medically hospitalized in August 2022 for confusion, weakness, repeated falls, ongoing weight loss, and SOB. He was found to have a cavitary lesion of the lung and suspected aspiration pneumonia. He was treated with antibiotics. At that time, he was taking current dose of clozapine and duloxetine, as well as propranolol ER, quetiapine, gabapentin, and clonazepam. Psychiatry was consulted due to concern for oversedation, and propranolol ER, gabapentin, and quetiapine were discontinued. Conazepam was reduced from 0.5 mg TID to BID dosing and recommended to be discontinued, however, it does not appear this occurred. His sedation improved significantly. QTc prolongation was also noted,  "but improved throughout his stay. He was ultimately discharged to a TCU for several months before returning home. He reports his weakness has greatly improved and states he \"graduated\" physical therapy, though he continues to be diligent in completed recommended exercises.      He reports that over the past several months, his delusions and anxiety have been worse than usual, with symptoms becoming much more acute since the carbidopa-levidopa dose was increased. He has felt increasingly paranoid about being poisoned, noting this is a delusion that has been stable over time, but was previously less intrusive. He has insight during the interview that his paranoid thinking is not reality based, but states it has been harder to separate delusions from reality and he becomes very worried that he might hurt someone, despite no history of violent behavior. He reports that the paranoia about his food being poisoned in combination with the tremors in his hands have made it difficult for him to eat. He has also had quite low appetite for the past 6 months to a year . He reports that due to the combination of these factors, he has lost about 50 pounds in the last year.He denies any problems with sleep initiation or maintenance. He reports energy is fairly good and \"better than it used to be.\" He reports some short term memory issues and on interview, does have some difficulty remembering details of recent events. He is unsure if he has received cognitive testing in the past.    He denies any suicidal ideation and reports he has not experienced SI for decades. He denies homicidal thoughts and is very clear that he had and has no desire to harm anyone else, but was afraid he might somehow do so. He denies any hallucinations and states \"that's never been a problem for me.\" He is not observed responding to internal stimuli. He is alert and oriented in all spheres.        ========    BRIEF HOSPITAL COURSE: This 62 y.o. male " admitted 11/25/2022 to  [facility] for the assessment and treatment of the above presentation. This was a voluntary admission.     In summary, he was tapered off the Sinemet, which he reports to be both ineffective and potentially worsening the anxiety and paranoia ideations. Instead Benadryl 25 mg TID was started to help with extrapyramidal side effects. He struggled with sleep during his stay here. Remeron 7.5mg QHS was tried without success. Seroquel 100mg qhs was restarted with addition of suvorexant 10mg qhs. Given his Seroquel PRN use prior history of prolonged QTC, he will benefit from another EKG repeat on the medical unit.     Unfortunately, he tested positive for influenza A and developed hypoxemia. Now on 2L oxygen with desats when prone requiring 3L oxygen. Subsequently, the plan will to be tapering him off clonazepam due to hypoxia (and also prior plan to taper). The patient tolerated these changes without side effects.     The patient minimally benefited from the structured therapeutic milieu as he attended programming seldom as he tested positive for influenza, he needed to isolate with droplet precautions. Pt was engaged and worked on issues that were triggering/brought pt to the hospital and has excellent insight into his anxiety and paranoid delusions. Pt denied suicidal ideations throughout all/majority of their hospitalization. Pt denied HI throughout all of hospitalization. There were no concerns with behavioral disruptions/outbursts. The patient has shown improvement in general.     At the time of discharge, hospitalization course was reviewed with Vinod Lee with plan to transfer to medicine. Please consult psychiatry and I will continue to follow while patient is on the medical unit. He is now off sinemet since 11/29 21:00 and I would expect anxiety and paranoia to improve more moving forward. Psychiatrically, once anxiety and paranoia is baseline, can D/C to home once medically stable. He  DOES NOT NEED A 1:1 on the medical unit from a mental health perspective, we initiated 1:1 11/30/2022 due to significant fatigue, gait instability (he was unable to stand without assist) and feeding assist.    04/2023: Clozapine was gradually tapered and discontinued, unclear reasons why it was discontinued per outside records, though Dr. Portillo's H&P indicates that it was due to prolonged QTc.       Today's Changes:  Renewed SIO  CPT order placed  STAT labs, including CMP, CBC with diff, procalcitonin, Depakote level (note-level was obtained after AM dose given), ammonia, Mg, Phos, lactic acid, and COVID/Flu/RSV.  IM consult placed. Will defer need for imaging to IM.     Target psychiatric symptoms and interventions:  -Psych emergency declared on 5/27, now fully committed  -ECT Mon/Wed/Fri per Janelle. Last ECT treatment was on 8/25. Had total of 11 treatments with subtle improvements.   - Depakote 250 mg TID, restarted on 8/26. Cannot increase beyond this dose due to thrombocytopenia at higher doses  -Continue clozapine as above  -Continue scheduled Zyprexa 15mg BID  -Continue 1:1 for safety of staff and patients, reduced from 2:1 7/23/23  - weekly CBC to monitor platelets      -Continue Gabapentin 300 mg TID as staff believe it has been effective for treatment of his anxiety  -Discussed complex case at length with Dr. Hatch (primary provider on geriatic unit) who provided recs (see second opinion note dated 6/14). Appreciate assistance. I have since made the following changes:         1) Add propranolol 10 mg TID         2) Added Depakote 1000 mg qhs (to be administered in magic cup)         3) Nutrition consult to order magic cup         4) Increased melatonin to 10 mg QHS    Risks, benefits, and alternatives discussed at length with patient.     Acute Medical Problems and Treatments:  Delirium vs progression of dementia  Neurology consult placed on 6/8 for evaluation of sundowning and cognitive decline  secondary to Sinemet discontinuation: Resuming Sinemet not recommended for behavioral concerns. Please see Neuro note for details.     Tremors  longstanding though appeared to worsen following initiation of clozapine  - Ativan 0.5 mg TID  - Cogentin 1 mg BID added on 8/25 with overall improvement noted    Thrombocytopenia, resolved  Likely secondary to Depakote.  According to the, incidents of Depakote induced thrombocytopenia as 27%.  Depakote dose reduced from 1000 twice daily to 250 3 times daily on 7/28/2023 with subsequent resolution of thrombocytopenia  - weekly CBCs    Constipation  Likely worsened by clozapine, improved since modifying regimen.   - daily miralax   - daily Senokot 2 tablets BID      Right first toe fracture:  Seen by Ortho on 6/16:  Per note: Vinod Lee is a 62 year old  male w/ PMHx complex psychiatric history who has a fracture to the distal phalanx of the right toe after a recent trauma to the foot the patient reports.  Patient denies any other pain or discomfort.  Images reviewed and plan will be for irrigation of the popped fracture blister with sterile saline and the toes should be dressed and a 4 x 4 gauze dressing.  Patient will need a postop shoe which she can be weightbearing as tolerated in.  Would recommend a course of antibiotics for approximately 7 days.  Would recommend Augmentin or clindamycin.  Podiatry will be made aware of patient and will see patient while he is admitted or if patient is discharged in the near future a follow-up appointment will need to be established.     Remainder of care per primary team.  Primary team should make sure that patient is up-to-date on his tetanus shot.  If not patient will require a booster shot.    Hx of prolonged QTc:  Continue weekly EKGs. See above for details.   Discussed most recent EKG findings with Cardiology on 8/25. Corrected QTc is 501 from EKG on 8/23. Per cardiology, repeat EKG today. If corrected QTc is > 500, will need to  "reduce clozapine. If < 500, OK to keep clozapine at current dose. UPDATE/ADDENDUM 9/6: Repeat EKG with corrected QTc of 440. No need to adjust clozapine dose per cards.     Urinary retention, resolved  Per patient care order:   If patient is refusing straight caths, please notify IM provider immediately to determine whether this constitutes a medical emergency. If IM declares medical emergency, may restrain patient for straight cath. Discussed this with patient's parents/substitute decision makers on 6/7 who are in support of this plan.    # Psoriasis hx  # Purplish discoloration of B/LE feet-resolved   Discussed with Dr. Rene and bedside RN regarding rash on right foot that appears and appears to be purplish in color and almost \"petechiae.\" It was noted during interview, but seemed to be resolving upon walking. Within the past 24 hrs, pt has had ECT and seemed more confused than usual. Pt seems to have had a right great toe wound with recent right great toe fracture seen by Medicine on 7/16. Seen on 8/4 with improving color on B/L legs at rest and appears to have small, scaly patches of varying coin sizes that have not grown past marked borders. See photos. Per further chart review, has had psoriasis history. WBC WNL, no fevers, HDS. This appears to more consistent with a psoriasis like picture.   - Start triamcinolone topical 0.025%   -Apply twice daily until lesions for 2 weeks or until lesions resolve/  -Monitor for skin thinning, striae, rebound lesion flares.   - Start Eucerin cream              - Apply 30 minutes PRIOR to triamcinolone topical cream above or PRN as needed if dry skin/after bathing   - Medicine will sign off.   - For worsening pain, spread, itchiness, fevers, please contact Medicine and possibly Dermatology.    Benzodiazepine dependence:  Phenobarbital taper completed shortly after patient's admission    Pertinent labs/imaging:  Weekly CBC with diff     Behavioral/Psychological/Social:  - " Encourage unit programming    Safety:  - Continue precautions as noted above  - Status 15 minute checks  - Continue 1:1 for staff and pt safety    Legal Status: Fully committed with Sánchez and Lorenzo Pavon. [place] medications: clozapine, olanzapine, risperidone, quetiapine      Disposition Plan   Reason for ongoing admission: No safe alternative at this time  Discharge location: TBD. Will likely need memory care unit  Discharge Medications: not ordered  Follow-up Appointments: not scheduled    Entered by: Ingrid Rhodes MD on 09/08/2023  at 8:17 AM

## 2023-09-08 NOTE — CONSULTS
Order was received for OT Cognitive Assessments. Pt will be seen on 9- unless noted for readiness sooner.

## 2023-09-08 NOTE — PLAN OF CARE
NOC Shift Report     Pt in bed at beginning of shift, breathing quiet and unlabored. Pt woke up briefly and voiced difficultly getting back to sleep. He was provided Trazodone around 0216. Pt slept 6.75 hours. Will continue to monitor.

## 2023-09-08 NOTE — PLAN OF CARE
"SIO staff reported that the patient was showing a sign of weakness. Patient was assessed if he was experiencing any physical pain, patient denies and he was stating \" can you leave me alone please\". He was encouraged to eat his breakfast but he refused to eat and he believes there is poison in the water. Patient continues to have a dry cough, but he denies having any body aches, no sign of high temperature. Patient was tested for Covid and Flu and results were negative. He has been isolative to his room during this shift except came out of his room once and he went back to his room. It was reported that he ate 75% of his meal, and he has been encouraged to drink fluids through out the day. He did not report having any bowel movements today. He was encouraged to take a shower but he refused. He took his medications with verbal prompts. Patient had more labs ordered for him but he refused twice this shift, and lab is scheduled for 4:30 this afternoon to try again. He denies having any thoughts of wanting to harm himself or others.   "

## 2023-09-08 NOTE — CONSULTS
"Brief medicine follow up progress note:  - Asked to see pt regarding increased fatigue and cough. Went to pt's room and pt denied acute physical concerns including cough, however, when asked specifically if he's having chest pain or SOB, pt responded \"I'm not hungry enough to have that\". No observed coughing per 1:1 in room.     LUNGS: Decreased breath sounds otherwise clear  : Crural folds in groin reveal faint erythema and residual antifungal powder    ASSESSMENT:    1) Increased fatigue of unclear etiology  2) Non-observed (this writer and current 1:1) cough with lungs clear on exam. Respiratory panel neg.  3) Recently treated groin rash most likely tinea cruris, significantly improved.     PLAN:  No medical intervention indicated. Continue with topical fungal for persistent yet improved groin rash. Medicine signing off. Please feel free to call with questions.        Jack Richmond PA-C  Internal Medicine Hospitalist   [facility]  [phone]   "

## 2023-09-08 NOTE — PLAN OF CARE
Pt was isolative in his room this shift. Presented with flat affect and intermitting confusion  Endorsed Auditory hallucination of voices telling him that a girl jumped off the building. Pt appeared to be in no distress.  Pt is  compliant with medication and groin treatment. Utilized the weighted utensils for dinner- the plate and the little  wrist wrap was okay but  Vinod was unable to use the spoon for dinner because it was not comfortable. So he ended up using his the regular fork.  Continue on SIO without issues.No harm to self or assault to others this shift.     Problem: Psychotic Symptoms  Goal: Psychotic Symptoms  Description: Signs and symptoms of listed problems will be absent or manageable.  Outcome: Progressing     Problem: Confusion Chronic  Goal: Optimal Cognitive Function  Outcome: Progressing  Intervention: Minimize and Manage Confusion  Recent Flowsheet Documentation  Taken 9/7/2023 1900 by Basia Woodson, RN  Reorientation Measures: reorientation provided   Goal Outcome Evaluation:    Plan of Care Reviewed With: patient

## 2023-09-09 PROCEDURE — 250N000013 HC RX MED GY IP 250 OP 250 PS 637: Performed by: PSYCHIATRY & NEUROLOGY

## 2023-09-09 PROCEDURE — 250N000009 HC RX 250: Performed by: PSYCHIATRY & NEUROLOGY

## 2023-09-09 PROCEDURE — 250N000013 HC RX MED GY IP 250 OP 250 PS 637: Performed by: STUDENT IN AN ORGANIZED HEALTH CARE EDUCATION/TRAINING PROGRAM

## 2023-09-09 PROCEDURE — 250N000013 HC RX MED GY IP 250 OP 250 PS 637: Performed by: PHYSICIAN ASSISTANT

## 2023-09-09 PROCEDURE — 124N000002 HC R&B MH UMMC

## 2023-09-09 RX ADMIN — LORAZEPAM 0.5 MG: 0.5 TABLET ORAL at 19:48

## 2023-09-09 RX ADMIN — BENZTROPINE MESYLATE 1 MG: 1 TABLET ORAL at 19:49

## 2023-09-09 RX ADMIN — ATROPINE SULFATE 2 DROP: 10 SOLUTION/ DROPS OPHTHALMIC at 08:45

## 2023-09-09 RX ADMIN — ATROPINE SULFATE 2 DROP: 10 SOLUTION/ DROPS OPHTHALMIC at 14:10

## 2023-09-09 RX ADMIN — MICONAZOLE NITRATE: 20 POWDER TOPICAL at 19:49

## 2023-09-09 RX ADMIN — LORAZEPAM 0.5 MG: 0.5 TABLET ORAL at 14:09

## 2023-09-09 RX ADMIN — NAPROXEN 250 MG: 250 TABLET ORAL at 18:24

## 2023-09-09 RX ADMIN — NAPROXEN 250 MG: 250 TABLET ORAL at 08:43

## 2023-09-09 RX ADMIN — WHITE PETROLATUM: 1.75 OINTMENT TOPICAL at 18:24

## 2023-09-09 RX ADMIN — LORAZEPAM 0.5 MG: 0.5 TABLET ORAL at 08:44

## 2023-09-09 RX ADMIN — GABAPENTIN 300 MG: 300 CAPSULE ORAL at 14:09

## 2023-09-09 RX ADMIN — OLANZAPINE 15 MG: 10 TABLET, ORALLY DISINTEGRATING ORAL at 19:48

## 2023-09-09 RX ADMIN — BENZTROPINE MESYLATE 1 MG: 1 TABLET ORAL at 08:43

## 2023-09-09 RX ADMIN — GABAPENTIN 300 MG: 300 CAPSULE ORAL at 19:48

## 2023-09-09 RX ADMIN — SENNOSIDES 2 TABLET: 8.6 TABLET ORAL at 19:49

## 2023-09-09 RX ADMIN — DIVALPROEX SODIUM 250 MG: 250 TABLET, DELAYED RELEASE ORAL at 06:26

## 2023-09-09 RX ADMIN — POLYETHYLENE GLYCOL 3350 17 G: 17 POWDER, FOR SOLUTION ORAL at 08:44

## 2023-09-09 RX ADMIN — SENNOSIDES 2 TABLET: 8.6 TABLET ORAL at 08:43

## 2023-09-09 RX ADMIN — FLUOXETINE 20 MG: 20 CAPSULE ORAL at 08:43

## 2023-09-09 RX ADMIN — CLOZAPINE 650 MG: 100 TABLET, ORALLY DISINTEGRATING ORAL at 12:23

## 2023-09-09 RX ADMIN — DIVALPROEX SODIUM 250 MG: 250 TABLET, DELAYED RELEASE ORAL at 14:09

## 2023-09-09 RX ADMIN — Medication 10 MG: at 19:49

## 2023-09-09 RX ADMIN — OLANZAPINE 15 MG: 10 TABLET, ORALLY DISINTEGRATING ORAL at 09:25

## 2023-09-09 RX ADMIN — ATROPINE SULFATE 2 DROP: 10 SOLUTION/ DROPS OPHTHALMIC at 19:49

## 2023-09-09 RX ADMIN — GABAPENTIN 300 MG: 300 CAPSULE ORAL at 08:43

## 2023-09-09 RX ADMIN — CYANOCOBALAMIN TAB 1000 MCG 1000 MCG: 1000 TAB at 08:43

## 2023-09-09 RX ADMIN — TAMSULOSIN HYDROCHLORIDE 0.4 MG: 0.4 CAPSULE ORAL at 08:43

## 2023-09-09 RX ADMIN — DIVALPROEX SODIUM 250 MG: 250 TABLET, DELAYED RELEASE ORAL at 19:49

## 2023-09-09 RX ADMIN — POLYETHYLENE GLYCOL 3350 17 G: 17 POWDER, FOR SOLUTION ORAL at 19:48

## 2023-09-09 ASSESSMENT — ACTIVITIES OF DAILY LIVING (ADL)
ADLS_ACUITY_SCORE: 74
ORAL_HYGIENE: INDEPENDENT;PROMPTS
ADLS_ACUITY_SCORE: 74
ORAL_HYGIENE: PROMPTS
ADLS_ACUITY_SCORE: 74
HYGIENE/GROOMING: PROMPTS
ADLS_ACUITY_SCORE: 74
HYGIENE/GROOMING: INDEPENDENT;PROMPTS

## 2023-09-09 NOTE — PLAN OF CARE
Goal Outcome Evaluation:      Pt appeared to be a sleep @ all safety checks.  Pt slept 7 hours. Pt remains on an SIO for safety.

## 2023-09-09 NOTE — PLAN OF CARE
Problem: Psychotic Symptoms  Goal: Psychotic Symptoms  Description: Signs and symptoms of listed problems will be absent or manageable.  Outcome: Progressing   Goal Outcome Evaluation:         Vinod had a calm and pleasant evening without any agitation or aggression. He spent most of the shift resting comfortably in bed but was seen in the lounge area twice during dinner. During a nursing assessment, he reported no pain or discomfort but had no insight into his mental health. Pt walked without any observed weakness, and his gait was consistent with his baseline. There were no reports or signs of coughing. Vital signs were within normal range, with a temperature of 98.0, heart rate of 86, respiratory rate of 18, blood pressure of 126/76, and oxygen saturation of 98% on room air. Pt took all scheduled medication without any issues. This writer was allowed to assist with his alejandro-care, and treatment cream and powder were applied as ordered. Due to agitation and aggression, pt remains on SIO.

## 2023-09-09 NOTE — PLAN OF CARE
Problem: Psychotic Symptoms  Goal: Psychotic Symptoms  Description: Signs and symptoms of listed problems will be absent or manageable.  Outcome: Progressing  Patient is alert and oriented x2 with a flat, labile affect. He refused to take his morning medication and writer had to tell him why its important for him to take them. He continued to refused and writer had to try multiple times before finally taking them. Writer was not able to assess his groin area or perform skin care because he refused. His micatin, and Aquaphor was not applied this shift. He ate 20% of breakfast, but ate 100% of his lunch. Writer fed him lunch and noticed that it is difficult for him to hold the adaptive spoon and feed himself due to the tremors. While writer was feeding him, he was coughing and told writer that he feels like the food is stuck in his throat. He did not appear to be aspirating or having difficulty swallowing. His mood is calm, and he was observed pacing in the hallway after eating lunch. He did not take a shower, but told writer that he will take one later today. Patient did not complain of pain and no prn given. He denies hearing voices, SI, HI, A/V hallucinations, anxiety and depression. Patient did not have any acute medical concerns and he did not have behavioral issues this shift. Patient did not have a bowel movement this shift but told writer that he had one yesterday. He was overall in a good mood this shift.

## 2023-09-10 PROCEDURE — 250N000013 HC RX MED GY IP 250 OP 250 PS 637: Performed by: PSYCHIATRY & NEUROLOGY

## 2023-09-10 PROCEDURE — 250N000013 HC RX MED GY IP 250 OP 250 PS 637: Performed by: STUDENT IN AN ORGANIZED HEALTH CARE EDUCATION/TRAINING PROGRAM

## 2023-09-10 PROCEDURE — 250N000009 HC RX 250: Performed by: PSYCHIATRY & NEUROLOGY

## 2023-09-10 PROCEDURE — 250N000013 HC RX MED GY IP 250 OP 250 PS 637: Performed by: PHYSICIAN ASSISTANT

## 2023-09-10 PROCEDURE — 124N000002 HC R&B MH UMMC

## 2023-09-10 RX ADMIN — DIVALPROEX SODIUM 250 MG: 250 TABLET, DELAYED RELEASE ORAL at 19:41

## 2023-09-10 RX ADMIN — LORAZEPAM 0.5 MG: 0.5 TABLET ORAL at 07:54

## 2023-09-10 RX ADMIN — OLANZAPINE 15 MG: 10 TABLET, ORALLY DISINTEGRATING ORAL at 07:53

## 2023-09-10 RX ADMIN — TAMSULOSIN HYDROCHLORIDE 0.4 MG: 0.4 CAPSULE ORAL at 07:54

## 2023-09-10 RX ADMIN — MICONAZOLE NITRATE: 20 POWDER TOPICAL at 19:41

## 2023-09-10 RX ADMIN — MICONAZOLE NITRATE: 20 POWDER TOPICAL at 07:54

## 2023-09-10 RX ADMIN — BENZTROPINE MESYLATE 1 MG: 1 TABLET ORAL at 07:54

## 2023-09-10 RX ADMIN — ATROPINE SULFATE 2 DROP: 10 SOLUTION/ DROPS OPHTHALMIC at 19:40

## 2023-09-10 RX ADMIN — NAPROXEN 250 MG: 250 TABLET ORAL at 07:54

## 2023-09-10 RX ADMIN — GABAPENTIN 300 MG: 300 CAPSULE ORAL at 19:41

## 2023-09-10 RX ADMIN — WHITE PETROLATUM: 1.75 OINTMENT TOPICAL at 07:56

## 2023-09-10 RX ADMIN — ATROPINE SULFATE 1 DROP: 10 SOLUTION/ DROPS OPHTHALMIC at 17:11

## 2023-09-10 RX ADMIN — ATROPINE SULFATE 2 DROP: 10 SOLUTION/ DROPS OPHTHALMIC at 13:16

## 2023-09-10 RX ADMIN — LORAZEPAM 0.5 MG: 0.5 TABLET ORAL at 19:41

## 2023-09-10 RX ADMIN — NAPROXEN 250 MG: 250 TABLET ORAL at 17:54

## 2023-09-10 RX ADMIN — DIVALPROEX SODIUM 250 MG: 250 TABLET, DELAYED RELEASE ORAL at 06:19

## 2023-09-10 RX ADMIN — Medication 10 MG: at 19:41

## 2023-09-10 RX ADMIN — DIVALPROEX SODIUM 250 MG: 250 TABLET, DELAYED RELEASE ORAL at 13:15

## 2023-09-10 RX ADMIN — OLANZAPINE 15 MG: 10 TABLET, ORALLY DISINTEGRATING ORAL at 19:41

## 2023-09-10 RX ADMIN — LORAZEPAM 0.5 MG: 0.5 TABLET ORAL at 13:16

## 2023-09-10 RX ADMIN — BENZTROPINE MESYLATE 1 MG: 1 TABLET ORAL at 19:41

## 2023-09-10 RX ADMIN — SENNOSIDES 2 TABLET: 8.6 TABLET ORAL at 07:54

## 2023-09-10 RX ADMIN — ATROPINE SULFATE 2 DROP: 10 SOLUTION/ DROPS OPHTHALMIC at 07:55

## 2023-09-10 RX ADMIN — FLUOXETINE 20 MG: 20 CAPSULE ORAL at 07:54

## 2023-09-10 RX ADMIN — CLOZAPINE 650 MG: 100 TABLET, ORALLY DISINTEGRATING ORAL at 12:41

## 2023-09-10 RX ADMIN — SENNOSIDES 2 TABLET: 8.6 TABLET ORAL at 19:41

## 2023-09-10 RX ADMIN — WHITE PETROLATUM: 1.75 OINTMENT TOPICAL at 17:57

## 2023-09-10 RX ADMIN — GABAPENTIN 300 MG: 300 CAPSULE ORAL at 13:16

## 2023-09-10 RX ADMIN — GABAPENTIN 300 MG: 300 CAPSULE ORAL at 07:54

## 2023-09-10 RX ADMIN — POLYETHYLENE GLYCOL 3350 17 G: 17 POWDER, FOR SOLUTION ORAL at 07:54

## 2023-09-10 RX ADMIN — POLYETHYLENE GLYCOL 3350 17 G: 17 POWDER, FOR SOLUTION ORAL at 19:41

## 2023-09-10 RX ADMIN — CYANOCOBALAMIN TAB 1000 MCG 1000 MCG: 1000 TAB at 07:56

## 2023-09-10 ASSESSMENT — ACTIVITIES OF DAILY LIVING (ADL)
ADLS_ACUITY_SCORE: 74
ORAL_HYGIENE: PROMPTS
ADLS_ACUITY_SCORE: 74
HYGIENE/GROOMING: PROMPTS

## 2023-09-10 NOTE — PLAN OF CARE
Goal Outcome Evaluation:     Pt appeared to be a sleep @ all safety checks.  Pt slept 7 hours.  Pt remains on an SIO for safety.  Pt had no input or output except a teaspoon of pudding with his medication.

## 2023-09-10 NOTE — PLAN OF CARE
Problem: Psychotic Symptoms  Goal: Psychotic Symptoms  Description: Signs and symptoms of listed problems will be absent or manageable.  Outcome: Progressing   Goal Outcome Evaluation:         Vinod had a calm and pleasant evening without any agitation or aggression. He was more visible in the milieu and walked without any observed weakness, and his gait was consistent with his baseline. There were no reports or signs of coughing. Pt showered and changed into clean scrub this shift. He denies any pain or discomfort but lacks insight into his mental health. Pt's vitals are stable at 97.5, 16, 87, 124/62, and 96% on RA. He ate about 75% of his dinner with staff assistance. (Pt was fed by staff). Pt took scheduled medication without any issues.    This RN writer assisted Vinod with his perineal care before applying the prescribed treatment cream and powder. Redness and inflammation were noted. Internal medicine evaluated the area yesterday.        Around 10:45 pm, I was alerted to Vinod's room, where he complained of feeling weak while trying to get up from lying down. His blood pressure was 95/53, and his pulse was 88. I provided him with a large cup of water and advised him to rise slowly from his lying position. His blood pressure will be rechecked in the next 15-20 minutes.         At 11:10 pm, blood pressure was rechecked 113/75 and pulse 88

## 2023-09-10 NOTE — PLAN OF CARE
Goal Outcome Evaluation:    Plan of Care Reviewed With: patient          Problem: Adult Behavioral Health Plan of Care  Goal: Individualized Daily Interaction Plan (IDIP)  Outcome: Progressing  Vinod continues to be on SIO this shift. He was polite on approach, he had slept well last night. He was medication compliant and he had adequate intake of both food and fluids. Pt needs encouragements to eat, he was helped by staff and he ate 100% of his lunch. Reddened skin in groin area managed by antifungal powder and pt cooperative with per care and powder application.  Pt had adequate intake of both food and fluids. His BP was high in the morning, 187/70, presumed to be an error due to hand tremors, but recheck was 114/67. He denied SI, HI and he contracted for safety. Will continue to monitor and with plan of care.

## 2023-09-11 LAB
VALPROATE FREE MFR SERPL: ABNORMAL %
VALPROATE FREE SERPL-MCNC: <7 UG/ML
VALPROATE SERPL-MCNC: 33 UG/ML

## 2023-09-11 PROCEDURE — 250N000013 HC RX MED GY IP 250 OP 250 PS 637

## 2023-09-11 PROCEDURE — 99231 SBSQ HOSP IP/OBS SF/LOW 25: CPT | Performed by: PSYCHIATRY & NEUROLOGY

## 2023-09-11 PROCEDURE — 250N000013 HC RX MED GY IP 250 OP 250 PS 637: Performed by: PHYSICIAN ASSISTANT

## 2023-09-11 PROCEDURE — 124N000002 HC R&B MH UMMC

## 2023-09-11 PROCEDURE — 250N000013 HC RX MED GY IP 250 OP 250 PS 637: Performed by: STUDENT IN AN ORGANIZED HEALTH CARE EDUCATION/TRAINING PROGRAM

## 2023-09-11 PROCEDURE — 250N000013 HC RX MED GY IP 250 OP 250 PS 637: Performed by: PSYCHIATRY & NEUROLOGY

## 2023-09-11 RX ADMIN — Medication 10 MG: at 19:58

## 2023-09-11 RX ADMIN — SENNOSIDES 2 TABLET: 8.6 TABLET ORAL at 08:32

## 2023-09-11 RX ADMIN — ATROPINE SULFATE 2 DROP: 10 SOLUTION/ DROPS OPHTHALMIC at 20:03

## 2023-09-11 RX ADMIN — GABAPENTIN 300 MG: 300 CAPSULE ORAL at 19:57

## 2023-09-11 RX ADMIN — WHITE PETROLATUM: 1.75 OINTMENT TOPICAL at 08:31

## 2023-09-11 RX ADMIN — DIVALPROEX SODIUM 250 MG: 250 TABLET, DELAYED RELEASE ORAL at 06:44

## 2023-09-11 RX ADMIN — NAPROXEN 250 MG: 250 TABLET ORAL at 08:32

## 2023-09-11 RX ADMIN — LORAZEPAM 0.5 MG: 0.5 TABLET ORAL at 19:58

## 2023-09-11 RX ADMIN — NAPROXEN 250 MG: 250 TABLET ORAL at 18:12

## 2023-09-11 RX ADMIN — CLOZAPINE 650 MG: 100 TABLET, ORALLY DISINTEGRATING ORAL at 13:39

## 2023-09-11 RX ADMIN — GABAPENTIN 300 MG: 300 CAPSULE ORAL at 08:32

## 2023-09-11 RX ADMIN — GABAPENTIN 300 MG: 300 CAPSULE ORAL at 13:40

## 2023-09-11 RX ADMIN — TAMSULOSIN HYDROCHLORIDE 0.4 MG: 0.4 CAPSULE ORAL at 08:33

## 2023-09-11 RX ADMIN — MICONAZOLE NITRATE: 20 POWDER TOPICAL at 08:53

## 2023-09-11 RX ADMIN — BENZTROPINE MESYLATE 1 MG: 1 TABLET ORAL at 19:57

## 2023-09-11 RX ADMIN — CYANOCOBALAMIN TAB 1000 MCG 1000 MCG: 1000 TAB at 08:33

## 2023-09-11 RX ADMIN — SENNOSIDES 2 TABLET: 8.6 TABLET ORAL at 19:57

## 2023-09-11 RX ADMIN — DIVALPROEX SODIUM 250 MG: 250 TABLET, DELAYED RELEASE ORAL at 13:40

## 2023-09-11 RX ADMIN — POLYETHYLENE GLYCOL 3350 17 G: 17 POWDER, FOR SOLUTION ORAL at 08:33

## 2023-09-11 RX ADMIN — OLANZAPINE 15 MG: 10 TABLET, ORALLY DISINTEGRATING ORAL at 08:32

## 2023-09-11 RX ADMIN — DIVALPROEX SODIUM 250 MG: 250 TABLET, DELAYED RELEASE ORAL at 19:57

## 2023-09-11 RX ADMIN — OLANZAPINE 15 MG: 10 TABLET, ORALLY DISINTEGRATING ORAL at 19:58

## 2023-09-11 RX ADMIN — LORAZEPAM 0.5 MG: 0.5 TABLET ORAL at 08:32

## 2023-09-11 RX ADMIN — MICONAZOLE NITRATE: 20 POWDER TOPICAL at 20:03

## 2023-09-11 RX ADMIN — WHITE PETROLATUM: 1.75 OINTMENT TOPICAL at 18:12

## 2023-09-11 RX ADMIN — ATROPINE SULFATE 2 DROP: 10 SOLUTION/ DROPS OPHTHALMIC at 13:39

## 2023-09-11 RX ADMIN — ATROPINE SULFATE 2 DROP: 10 SOLUTION/ DROPS OPHTHALMIC at 08:33

## 2023-09-11 RX ADMIN — BENZTROPINE MESYLATE 1 MG: 1 TABLET ORAL at 08:33

## 2023-09-11 RX ADMIN — POLYETHYLENE GLYCOL 3350 17 G: 17 POWDER, FOR SOLUTION ORAL at 19:57

## 2023-09-11 RX ADMIN — LORAZEPAM 0.5 MG: 0.5 TABLET ORAL at 13:40

## 2023-09-11 RX ADMIN — FLUOXETINE 20 MG: 20 CAPSULE ORAL at 08:32

## 2023-09-11 ASSESSMENT — ACTIVITIES OF DAILY LIVING (ADL)
ADLS_ACUITY_SCORE: 84
ORAL_HYGIENE: PROMPTS
ADLS_ACUITY_SCORE: 84
DRESS: PROMPTS
ADLS_ACUITY_SCORE: 74
ADLS_ACUITY_SCORE: 74
ADLS_ACUITY_SCORE: 84
DRESS: PROMPTS
ADLS_ACUITY_SCORE: 84
ADLS_ACUITY_SCORE: 74
HYGIENE/GROOMING: PROMPTS
ORAL_HYGIENE: PROMPTS
ADLS_ACUITY_SCORE: 84
ADLS_ACUITY_SCORE: 74
ADLS_ACUITY_SCORE: 74
ADLS_ACUITY_SCORE: 84
HYGIENE/GROOMING: PROMPTS
ADLS_ACUITY_SCORE: 84

## 2023-09-11 NOTE — PLAN OF CARE
Pt was calm this shift. Refused breakfast and verbalized not hungry despite multiple approaches.   Ate 85% of lunch with staff assistance. Difficulty swallowing  observed when eating lunch. Dr david updated and order received for speech consult.  Pt was mostly isolative in his room and took a shower with encouragement. Perineal area cleaned and treatment completed without issue.  Urinated in the bathroom and Verbalized no bowel movement this shift  Denies pain, SI/HI, delusions, and hallucination   No aggression demonstrated this shift.    Problem: Psychotic Signs/Symptoms  Goal: Optimal Cognitive Function (Psychotic Signs/Symptoms)  Intervention: Support and Promote Cognitive Ability  Recent Flowsheet Documentation  Taken 9/11/2023 0957 by Basia Woodson RN  Trust Relationship/Rapport:   thoughts/feelings acknowledged   reassurance provided   questions encouraged   questions answered   care explained   choices provided  Goal: Improved Psychomotor Symptoms (Psychotic Signs/Symptoms)  Intervention: Manage Psychomotor Movement  Recent Flowsheet Documentation  Taken 9/11/2023 0957 by Basia Woodson RN  Activity (Behavioral Health): up ad jose  Goal: Enhanced Social, Occupational or Functional Skills (Psychotic Signs/Symptoms)  Intervention: Promote Social, Occupational and Functional Ability  Recent Flowsheet Documentation  Taken 9/11/2023 0957 by Basia Woodson RN  Trust Relationship/Rapport:   thoughts/feelings acknowledged   reassurance provided   questions encouraged   questions answered   care explained   choices provided   Goal Outcome Evaluation:    Plan of Care Reviewed With: patient

## 2023-09-11 NOTE — PROGRESS NOTES
[facility], [place]   Psychiatric Progress Note  Hospital Day: 108          Interim History:     Patient seen and chart reviewed. Case discussed in multi-disciplinary treatment team      According to Nursing report:  Patient is frustrated about loss of independence. He is cooperative in general.        According to Nursing notes from yesterday:  Vinod continues to be on SIO this shift. He was polite on approach, he had slept well last night. He was medication compliant and he had adequate intake of both food and fluids. Pt needs encouragements to eat, he was helped by staff and he ate 100% of his lunch. Reddened skin in groin area managed by antifungal powder and pt cooperative with per care and powder application.  Pt had adequate intake of both food and fluids. His BP was high in the morning, 187/70, presumed to be an error due to hand tremors, but recheck was 114/67. He denied SI, HI and he contracted for safety. Will continue to monitor and with plan of care.   Vinod had an uneventful evening without any agitation or aggression. He spent most of the shift resting comfortably in bed. There were no indications or reports of coughing, weakness, or lightheadedness. His vital signs were within normal range: 97.4, 16, 100, 128/76, and sats 96% on RA. Pt is experiencing difficulty feeding himself due to tremors and difficulty using adaptive utensils, resulting in refusal to eat. Staff assisted; he ate about 75% of his dinner and drank enough fluids. This RN assisted Vinod with his perineal care and applied treatment cream and antifungal powder. His groin area shows noticeable improvement compared to yesterday. Pt took scheduled medication without any issues. Pt remains on SIO due to agitation and aggression. Pt had no BM this shift.           According to  :  Patient is on commitment      On interview today:  Patient denies suicidal or homicidal thoughts but still reports  "hallucinations. Patient is not revealing delusions on interview. Patient denies side effects to medications.         ROS:    Vital signs:  Temp: 97.3  F (36.3  C) Temp src: Temporal BP: 122/68 Pulse: 85   Resp: 16 SpO2: 100 % O2 Device: None (Room air)   Height: 175.3 cm (5' 9\") (brought forward to generate BMI) Weight: 83.7 kg (184 lb 8 oz)  Estimated body mass index is 27.25 kg/m  as calculated from the following:    Height as of this encounter: 1.753 m (5' 9\").    Weight as of this encounter: 83.7 kg (184 lb 8 oz).             Medications:      atropine  2 drop Sublingual TID    benztropine  1 mg Oral BID    cloZAPine  650 mg Oral Daily    cyanocobalamin  1,000 mcg Oral Daily    divalproex sodium delayed-release  250 mg Oral Q8H KIKI    FLUoxetine  20 mg Oral Daily    gabapentin  300 mg Oral TID    LORazepam  0.5 mg Oral TID    melatonin  10 mg Oral At Bedtime    miconazole   Topical BID    mineral oil-hydrophilic petrolatum   Topical BID IS    naproxen  250 mg Oral BID w/meals    OLANZapine zydis  15 mg Oral BID    Or    OLANZapine  10 mg Intramuscular BID    polyethylene glycol  17 g Oral BID    sennosides  2 tablet Oral BID    tamsulosin  0.4 mg Oral Daily          Allergies:     Allergies   Allergen Reactions    Bee Venom Unknown    Montelukast Unknown    Phenothiazines Unknown          Labs:     No results found for this or any previous visit (from the past 24 hour(s)).               Psychiatric Examination:     BP (!) 155/68 (BP Location: Right arm, Patient Position: Sitting)   Pulse 100   Temp 97.4  F (36.3  C) (Temporal)   Resp 16   Ht 1.753 m (5' 9\")   Wt 83.7 kg (184 lb 8 oz)   SpO2 98%   BMI 27.25 kg/m    Weight is 184 lbs 8 oz  Body mass index is 27.25 kg/m .    Weight over time:  Vitals:    07/03/23 1100 07/30/23 0902 08/13/23 0900 08/26/23 0835   Weight: 81.6 kg (179 lb 12.8 oz) 86.5 kg (190 lb 9.6 oz) 85.7 kg (188 lb 14.4 oz) 82.8 kg (182 lb 7 oz)    09/07/23 0900   Weight: 83.7 kg (184 lb 8 " oz)       Orthostatic Vitals         Most Recent      Sitting Orthostatic /61 07/25 0800    Sitting Orthostatic Pulse (bpm) 85 07/25 0800          Cardiometabolic risk assessment. 06/07/23    Reviewed patient profile for cardiometabolic risk factors    Date taken /Value  REFERENCE RANGE   Abdominal Obesity  (Waist Circumference)   See nursing flowsheet Women ?35 in (88 cm)   Men ?40 in (102 cm)      Triglycerides  No results found for: TRIG    ?150 mg/dL (1.7 mmol/L) or current treatment for elevated triglycerides   HDL cholesterol  No results found for: HDL]   Women <50 mg/dL (1.3 mmol/L) in women or current treatment for low HDL cholesterol  Men <40 mg/dL (1 mmol/L) in men or current treatment for low HDL cholesterol     Fasting plasma glucose (FPG) Lab Results   Component Value Date     05/26/2023      FPG ?100 mg/dL (5.6 mmol/L) or treatment for elevated blood glucose   Blood pressure  BP Readings from Last 3 Encounters:   09/10/23 (!) 155/68   05/26/23 97/55    Blood pressure ?130/85 mmHg or treatment for elevated blood pressure   Family History  See family history     Mental Status Exam:  Appearance: awake, alert, disheveled, lying in bed. No evidence of pain of acute distress.   Attitude:  uncooperative  Eye Contact:  fair, less intense  Mood:  irritable  Affect:  mood congruent  Speech: mostly coherent  Psychomotor Behavior:  Ongoing bilateral hand tremor and jaw tremor. Appeared slightly better today. No evidence of psychomotor agitation or restlessness today. No evidence of dystonia, or tics.  Throught Process: linear, illogical  Associations:  no loosening of associations present  Thought Content:  Delusions and AH. Evidence of obsessive thinking and likely compulsions to harm others.   Insight:  limited  Judgement:  poor  Oriented to:  self, place  Attention Span and Concentration:  fair  Recent and Remote Memory:  poor  Gait: GRACE    Minimal change in mental status in the past 24 hours            Precautions:     Behavioral Orders   Procedures    Assault precautions    Code 1 - Restrict to Unit    Code 2    Code 2 - 1:1 Staff Supervision     For ECT only    Electroconvulsive therapy     Series of up to 12 treatments. Begin Date: 8/2/23     Treating Psychiatrist providing ECT:  unknown     Notified on:  7/28/23 via this order  NOTE: We received verbal confirmation from [facility] that Lorenzo Pavon has been approved but awaiting official court order and risk management's review  Initial consult was completed by Dr. Hicks on 7/18/23    Electroconvulsive therapy     Series of up to 12 treatments. Begin Date: 8/2/23     Treating Psychiatrist providing ECT:  Dominga     Notified on:  8/2/23    Elopement precautions    Fall precautions    Occupational Therapy on the Unit     Order Specific Question:   Reason for Consult     Answer:   Eval of thought process, functional skills and behavior     Order Specific Question:   Course of Action:     Answer:   Evaluation only     Order Specific Question:   Treatment Prescription:     Answer:   CPT requested    Routine Programming     As clinically indicated    Self Injury Precaution    Status 15     Every 15 minutes.    Status Individual Observation     Patient SIO status reviewed with team/RN.  Please also refer to RN/team documentation for add'l detail.    -SIO staff to monitor following which have contributed to patient being on SIO:  Agitation  Pt is impulsive   Pt has ran out of his room naked  Pt has Parkinson symptoms place him in a high fall risk  Pt has verbal outburst of sexual and threaten statements  Pt requires immediate redirection when masturbating    -Possible interventions SIO staff could use to support patient's treatment progress:  Engage pt in activities    -When following observed, team will review discontinuation of SIO:  Absence of aggression toward others for > 24 hours    Comments: SIO 1:1     Order Specific Question:   CONTINUOUS 24 hours / day      Answer:   5 feet     Order Specific Question:   Indications for SIO     Answer:   Severe intrusiveness     Order Specific Question:   Indications for SIO     Answer:   Assault risk    Suicide precautions     Patients on Suicide Precautions should have a Combination Diet ordered that includes a Diet selection(s) AND a Behavioral Tray selection for Safe Tray - with utensils, or Safe Tray - NO utensils            Diagnoses:   Paranoid schizophrenia, chronic condition with acute exacerbation (H)    #Unspecified psychosis likely schizophrenia per history, rule out Bipolar affective disorder, manic  #Unspecified encephalopathy   -R/O catatonia   -Episodes of unresponsiveness, concern for PNES   #Parkinsonism 2/2 neuroleptic medications, rule out Parkinson's Disease  #Urinary retention and BPH  -Possible UTI -- UC resulted on 5/25 w/out growth  #Hx of prolonged QTc with clozapine  #Mild thrombocytopenia  #R/O OCD         Assessment and hospital summary:  Patient was admitted to psychiatric unit for safety, stabilization and medication management. He has had schizophrenia since the 1980. He was on Clozaril x 25 years, and it was tapered and discontinued on May 7, 2023  due to prolonged qtc of 527, and his psychotic  symptoms have worsened since then. Sinemet was also discontinued recently due to concerns that it was contributing to paranoia and AH. He is agitated, aggressive, dangerous to self and others. He remains on SIO 2 to 1, and is under psychiatric emergency and court hold. There are concerns for organic etiology given pattern of sundowning, history of parkinsonism, and ongoing disorientation/confusion. 6/8: EKG repeated, cardiology consult regarding safety of resuming clozapine in the context of prolonged QTc and refractory schizophrenia pending response. Per cardiology, correct QTc is no more than 460. They do not have concerns about AP retrial. Neurology IP consult placed for evaluation of sundowning and  cognitive decline secondary to Sinemet discontinuation. MSSA initiated. Per Neurology, discontinuation of Sinemet would not account for these symptoms. They do not recommend retrial at this time. : Clozapine titration continued.   Its possible that clozapine dose is contributing to worsened compulsive behaviors noted throughout hospitalization which could improve with lowering the dose.     Chart reviewed which revealed the followin2022: He was on clozapine, Seroquel, Cymbalta, and Carbidopa-levodopa and hospitalized for pneumonia. Psych consulted and Seroquel was stopped. Treated with Abx and discharged to TCU.     2022: Hospitalized at [place]. Per chart review:    Vinod Lee is a 61 yo male with longstanding history of schizophrenia. He was admitted from [facility] ED, where he presented due to increased delusional thoughts while on a visit to his parents for Thanksgiving. He began experiencing increasing paranoid thoughts that someone might be poisoning him and began fearing that he might accidentally harm someone. He reported to his parents that he was having thoughts about hitting someone or beating someone up and told them he could not guarantee they would be safe with him. Parents encouraged patient to go to the ED, which he did voluntarily.     Per patient report and chart review, he was hospitalized several times in the 1980s and reports he was civilly committed at one point in the , however he has been quite stable for the past several decades. He reports he will experience some paranoia and delusional thinking at times, but generally has good insight into his symptoms. He has been maintained on clozapine for about 25 years and prior to several months ago was also concurrently prescribed quetiapine.      In the last several months, patient has had a number of medication changes. Approximately 6 months ago, patient was diagnosed with parkinsonism related to antipsychotic use and  "was started on carbidopa-levidopa. He experienced no improvement in tremors, and thus carbidopa- levidopa dose was increased 1.5 weeks ago.      In addition, patient was medically hospitalized in August 2022 for confusion, weakness, repeated falls, ongoing weight loss, and SOB. He was found to have a cavitary lesion of the lung and suspected aspiration pneumonia. He was treated with antibiotics. At that time, he was taking current dose of clozapine and duloxetine, as well as propranolol ER, quetiapine, gabapentin, and clonazepam. Psychiatry was consulted due to concern for oversedation, and propranolol ER, gabapentin, and quetiapine were discontinued. Conazepam was reduced from 0.5 mg TID to BID dosing and recommended to be discontinued, however, it does not appear this occurred. His sedation improved significantly. QTc prolongation was also noted, but improved throughout his stay. He was ultimately discharged to a TCU for several months before returning home. He reports his weakness has greatly improved and states he \"graduated\" physical therapy, though he continues to be diligent in completed recommended exercises.      He reports that over the past several months, his delusions and anxiety have been worse than usual, with symptoms becoming much more acute since the carbidopa-levidopa dose was increased. He has felt increasingly paranoid about being poisoned, noting this is a delusion that has been stable over time, but was previously less intrusive. He has insight during the interview that his paranoid thinking is not reality based, but states it has been harder to separate delusions from reality and he becomes very worried that he might hurt someone, despite no history of violent behavior. He reports that the paranoia about his food being poisoned in combination with the tremors in his hands have made it difficult for him to eat. He has also had quite low appetite for the past 6 months to a year . He reports " "that due to the combination of these factors, he has lost about 50 pounds in the last year.He denies any problems with sleep initiation or maintenance. He reports energy is fairly good and \"better than it used to be.\" He reports some short term memory issues and on interview, does have some difficulty remembering details of recent events. He is unsure if he has received cognitive testing in the past.    He denies any suicidal ideation and reports he has not experienced SI for decades. He denies homicidal thoughts and is very clear that he had and has no desire to harm anyone else, but was afraid he might somehow do so. He denies any hallucinations and states \"that's never been a problem for me.\" He is not observed responding to internal stimuli. He is alert and oriented in all spheres.        ========    BRIEF HOSPITAL COURSE: This 62 y.o. male admitted 11/25/2022 to  [place] for the assessment and treatment of the above presentation. This was a voluntary admission.     In summary, he was tapered off the Sinemet, which he reports to be both ineffective and potentially worsening the anxiety and paranoia ideations. Instead Benadryl 25 mg TID was started to help with extrapyramidal side effects. He struggled with sleep during his stay here. Remeron 7.5mg QHS was tried without success. Seroquel 100mg qhs was restarted with addition of suvorexant 10mg qhs. Given his Seroquel PRN use prior history of prolonged QTC, he will benefit from another EKG repeat on the medical unit.     Unfortunately, he tested positive for influenza A and developed hypoxemia. Now on 2L oxygen with desats when prone requiring 3L oxygen. Subsequently, the plan will to be tapering him off clonazepam due to hypoxia (and also prior plan to taper). The patient tolerated these changes without side effects.     The patient minimally benefited from the structured therapeutic milieu as he attended programming seldom as he tested positive for influenza, he " needed to isolate with droplet precautions. Pt was engaged and worked on issues that were triggering/brought pt to the hospital and has excellent insight into his anxiety and paranoid delusions. Pt denied suicidal ideations throughout all/majority of their hospitalization. Pt denied HI throughout all of hospitalization. There were no concerns with behavioral disruptions/outbursts. The patient has shown improvement in general.     At the time of discharge, hospitalization course was reviewed with Vinod Lee with plan to transfer to medicine. Please consult psychiatry and I will continue to follow while patient is on the medical unit. He is now off sinemet since 11/29 21:00 and I would expect anxiety and paranoia to improve more moving forward. Psychiatrically, once anxiety and paranoia is baseline, can D/C to home once medically stable. He DOES NOT NEED A 1:1 on the medical unit from a mental health perspective, we initiated 1:1 11/30/2022 due to significant fatigue, gait instability (he was unable to stand without assist) and feeding assist.    04/2023: Clozapine was gradually tapered and discontinued, unclear reasons why it was discontinued per outside records, though Dr. Portillo's H&P indicates that it was due to prolonged QTc.       Today's Changes:  Renewed SIO  CPT order placed  STAT labs, including CMP, CBC with diff, procalcitonin, Depakote level (note-level was obtained after AM dose given), ammonia, Mg, Phos, lactic acid, and COVID/Flu/RSV.  IM consult placed. Will defer need for imaging to IM.     Target psychiatric symptoms and interventions:  -Psych emergency declared on 5/27, now fully committed  -ECT Mon/Wed/Fri per Janelle. Last ECT treatment was on 8/25. Had total of 11 treatments with subtle improvements.   - Depakote 250 mg TID, restarted on 8/26. Cannot increase beyond this dose due to thrombocytopenia at higher doses  -Continue clozapine as above  -Continue scheduled Zyprexa 15mg  BID  -Continue 1:1 for safety of staff and patients, reduced from 2:1 7/23/23  - weekly CBC to monitor platelets      -Continue Gabapentin 300 mg TID as staff believe it has been effective for treatment of his anxiety  -Discussed complex case at length with Dr. Hatch (primary provider on geriatic unit) who provided recs (see second opinion note dated 6/14). Appreciate assistance. I have since made the following changes:         1) Add propranolol 10 mg TID         2) Added Depakote 1000 mg qhs (to be administered in magic cup)         3) Nutrition consult to order magic cup         4) Increased melatonin to 10 mg QHS    Risks, benefits, and alternatives discussed at length with patient.     Acute Medical Problems and Treatments:  Delirium vs progression of dementia  Neurology consult placed on 6/8 for evaluation of sundowning and cognitive decline secondary to Sinemet discontinuation: Resuming Sinemet not recommended for behavioral concerns. Please see Neuro note for details.     Tremors  longstanding though appeared to worsen following initiation of clozapine  - Ativan 0.5 mg TID  - Cogentin 1 mg BID added on 8/25 with overall improvement noted    Thrombocytopenia, resolved  Likely secondary to Depakote.  According to the, incidents of Depakote induced thrombocytopenia as 27%.  Depakote dose reduced from 1000 twice daily to 250 3 times daily on 7/28/2023 with subsequent resolution of thrombocytopenia  - weekly CBCs    Constipation  Likely worsened by clozapine, improved since modifying regimen.   - daily miralax   - daily Senokot 2 tablets BID      Right first toe fracture:  Seen by Ortho on 6/16:  Per note: Vinod Lee is a 62 year old  male w/ PMHx complex psychiatric history who has a fracture to the distal phalanx of the right toe after a recent trauma to the foot the patient reports.  Patient denies any other pain or discomfort.  Images reviewed and plan will be for irrigation of the popped fracture  "blister with sterile saline and the toes should be dressed and a 4 x 4 gauze dressing.  Patient will need a postop shoe which she can be weightbearing as tolerated in.  Would recommend a course of antibiotics for approximately 7 days.  Would recommend Augmentin or clindamycin.  Podiatry will be made aware of patient and will see patient while he is admitted or if patient is discharged in the near future a follow-up appointment will need to be established.     Remainder of care per primary team.  Primary team should make sure that patient is up-to-date on his tetanus shot.  If not patient will require a booster shot.    Hx of prolonged QTc:  Continue weekly EKGs. See above for details.   Discussed most recent EKG findings with Cardiology on 8/25. Corrected QTc is 501 from EKG on 8/23. Per cardiology, repeat EKG today. If corrected QTc is > 500, will need to reduce clozapine. If < 500, OK to keep clozapine at current dose. UPDATE/ADDENDUM 9/6: Repeat EKG with corrected QTc of 440. No need to adjust clozapine dose per cards.     Urinary retention, resolved  Per patient care order:   If patient is refusing straight caths, please notify IM provider immediately to determine whether this constitutes a medical emergency. If IM declares medical emergency, may restrain patient for straight cath. Discussed this with patient's parents/substitute decision makers on 6/7 who are in support of this plan.    # Psoriasis hx  # Purplish discoloration of B/LE feet-resolved   Discussed with Dr. Rene and bedside RN regarding rash on right foot that appears and appears to be purplish in color and almost \"petechiae.\" It was noted during interview, but seemed to be resolving upon walking. Within the past 24 hrs, pt has had ECT and seemed more confused than usual. Pt seems to have had a right great toe wound with recent right great toe fracture seen by Medicine on 7/16. Seen on 8/4 with improving color on B/L legs at rest and appears to " have small, scaly patches of varying coin sizes that have not grown past marked borders. See photos. Per further chart review, has had psoriasis history. WBC WNL, no fevers, HDS. This appears to more consistent with a psoriasis like picture.   - Start triamcinolone topical 0.025%   -Apply twice daily until lesions for 2 weeks or until lesions resolve/  -Monitor for skin thinning, striae, rebound lesion flares.   - Start Eucerin cream              - Apply 30 minutes PRIOR to triamcinolone topical cream above or PRN as needed if dry skin/after bathing   - Medicine will sign off.   - For worsening pain, spread, itchiness, fevers, please contact Medicine and possibly Dermatology.    Benzodiazepine dependence:  Phenobarbital taper completed shortly after patient's admission    Pertinent labs/imaging:  Weekly CBC with diff     Behavioral/Psychological/Social:  - Encourage unit programming    Safety:  - Continue precautions as noted above  - Status 15 minute checks  - Continue 1:1 for staff and pt safety    Legal Status: Fully committed with Sánchez and Lorenzo Pavon. [place] medications: clozapine, olanzapine, risperidone, quetiapine      Disposition Plan   No further change in treatment plan  Reason for ongoing admission: No safe alternative at this time  Discharge location: TBD. Will likely need memory care unit  Discharge Medications: not ordered  Follow-up Appointments: not scheduled    Jermaine Griffith MD

## 2023-09-11 NOTE — PLAN OF CARE
Problem: Psychotic Symptoms  Goal: Psychotic Symptoms  Description: Signs and symptoms of listed problems will be absent or manageable.  Outcome: Progressing   Goal Outcome Evaluation:         Vinod had an uneventful evening without any agitation or aggression. He spent most of the shift resting comfortably in bed. There were no indications or reports of coughing, weakness, or lightheadedness. His vital signs were within normal range: 97.4, 16, 100, 128/76, and sats 96% on RA. Pt is experiencing difficulty feeding himself due to tremors and difficulty using adaptive utensils, resulting in refusal to eat. Staff assisted; he ate about 75% of his dinner and drank enough fluids. This RN assisted Vinod with his perineal care and applied treatment cream and antifungal powder. His groin area shows noticeable improvement compared to yesterday. Pt took scheduled medication without any issues. Pt remains on SIO due to agitation and aggression. Pt had no BM this shift.

## 2023-09-11 NOTE — PLAN OF CARE
Goal Outcome Evaluation:      Pt appeared to be a sleep @ all safety checks.  Pt slept 7 hours.

## 2023-09-11 NOTE — PLAN OF CARE
Assessment/Intervention/Current Symtoms and Care Coordination:  Reviewed chart. Met with team to review patient's current functioning, needs, progress, and impediments to discharge.    Declined from [facility].     Emailed with [facility]  and [facility]  about placements.     Discharge Plan or Goal:  Seeking placement in a memory care unit, preferably in the [facility]     Barriers to Discharge:  Referrals are being sent to memory care units who have openings. One barrier is the limited openings in memory care units at this time.     Referral Status:  [facility] in [place] 8/22, no memory care 8/30  [facility] in [place] 8/15, no memory care 8/31  [facility] 8/31, no openings 8/31  [facility] 8/29, declined 9/8  [facility] 8/29, no open beds 8/30  [facility] 8/30  [facility] 8/31, no open beds 8/31  [facility] 8/31, no open beds 8/31  [facility] 8/31  [facility] in [facility] 8/31, no openings and private pay 8/31  [facility], 8/31, declined 9/1 due to aggressive behaviors     Legal Status:  Commitment with [facility]: [place]  File Number: [id number]  Issued 07/06/23 and is not to exceed six months    Contacts:  : Vicky Valdez with [facility], [phone]  Psychiatrist: Dr. Santana at [facility], [phone] PCP: Attila Urena DO  Mom: Alberta, [phone] (H) [phone] (M)   Dad: Ino, [phone] (H)  Sister, Sandra Treadwell, [phone] (KING)   [facility]'s Office Fax: [phone]  [facility]: [phone] (KING)  [facility]: Jasmin phone [phone], fax [phone]  CADI  with [facility]: Regina Wong, [phone], [email or web address]    Upcoming Meetings and Dates/Important Information and next steps:

## 2023-09-12 ENCOUNTER — APPOINTMENT (OUTPATIENT)
Dept: SPEECH THERAPY | Facility: CLINIC | Age: 63
DRG: 885 | End: 2023-09-12
Attending: PSYCHIATRY & NEUROLOGY
Payer: COMMERCIAL

## 2023-09-12 PROCEDURE — 250N000013 HC RX MED GY IP 250 OP 250 PS 637: Performed by: PSYCHIATRY & NEUROLOGY

## 2023-09-12 PROCEDURE — 250N000013 HC RX MED GY IP 250 OP 250 PS 637: Performed by: PHYSICIAN ASSISTANT

## 2023-09-12 PROCEDURE — 99231 SBSQ HOSP IP/OBS SF/LOW 25: CPT | Performed by: PSYCHIATRY & NEUROLOGY

## 2023-09-12 PROCEDURE — G0177 OPPS/PHP; TRAIN & EDUC SERV: HCPCS

## 2023-09-12 PROCEDURE — 92610 EVALUATE SWALLOWING FUNCTION: CPT | Mod: GN

## 2023-09-12 PROCEDURE — 250N000013 HC RX MED GY IP 250 OP 250 PS 637: Performed by: STUDENT IN AN ORGANIZED HEALTH CARE EDUCATION/TRAINING PROGRAM

## 2023-09-12 PROCEDURE — 124N000002 HC R&B MH UMMC

## 2023-09-12 PROCEDURE — 92526 ORAL FUNCTION THERAPY: CPT | Mod: GN

## 2023-09-12 PROCEDURE — 93005 ELECTROCARDIOGRAM TRACING: CPT

## 2023-09-12 PROCEDURE — 93010 ELECTROCARDIOGRAM REPORT: CPT | Performed by: INTERNAL MEDICINE

## 2023-09-12 RX ADMIN — FLUOXETINE 20 MG: 20 CAPSULE ORAL at 08:01

## 2023-09-12 RX ADMIN — Medication 10 MG: at 20:17

## 2023-09-12 RX ADMIN — MICONAZOLE NITRATE: 20 POWDER TOPICAL at 08:04

## 2023-09-12 RX ADMIN — BENZTROPINE MESYLATE 1 MG: 1 TABLET ORAL at 08:01

## 2023-09-12 RX ADMIN — DIVALPROEX SODIUM 250 MG: 250 TABLET, DELAYED RELEASE ORAL at 06:09

## 2023-09-12 RX ADMIN — GABAPENTIN 300 MG: 300 CAPSULE ORAL at 20:17

## 2023-09-12 RX ADMIN — DIVALPROEX SODIUM 250 MG: 250 TABLET, DELAYED RELEASE ORAL at 20:17

## 2023-09-12 RX ADMIN — ATROPINE SULFATE 2 DROP: 10 SOLUTION/ DROPS OPHTHALMIC at 08:04

## 2023-09-12 RX ADMIN — TAMSULOSIN HYDROCHLORIDE 0.4 MG: 0.4 CAPSULE ORAL at 08:03

## 2023-09-12 RX ADMIN — NAPROXEN 250 MG: 250 TABLET ORAL at 08:02

## 2023-09-12 RX ADMIN — OLANZAPINE 15 MG: 10 TABLET, ORALLY DISINTEGRATING ORAL at 20:17

## 2023-09-12 RX ADMIN — CYANOCOBALAMIN TAB 1000 MCG 1000 MCG: 1000 TAB at 08:03

## 2023-09-12 RX ADMIN — POLYETHYLENE GLYCOL 3350 17 G: 17 POWDER, FOR SOLUTION ORAL at 08:04

## 2023-09-12 RX ADMIN — SENNOSIDES 2 TABLET: 8.6 TABLET ORAL at 20:17

## 2023-09-12 RX ADMIN — POLYETHYLENE GLYCOL 3350 17 G: 17 POWDER, FOR SOLUTION ORAL at 20:17

## 2023-09-12 RX ADMIN — ATROPINE SULFATE 2 DROP: 10 SOLUTION/ DROPS OPHTHALMIC at 20:18

## 2023-09-12 RX ADMIN — DIVALPROEX SODIUM 250 MG: 250 TABLET, DELAYED RELEASE ORAL at 13:51

## 2023-09-12 RX ADMIN — CLOZAPINE 650 MG: 100 TABLET, ORALLY DISINTEGRATING ORAL at 13:51

## 2023-09-12 RX ADMIN — BENZTROPINE MESYLATE 1 MG: 1 TABLET ORAL at 20:17

## 2023-09-12 RX ADMIN — OLANZAPINE 15 MG: 10 TABLET, ORALLY DISINTEGRATING ORAL at 08:03

## 2023-09-12 RX ADMIN — GABAPENTIN 300 MG: 300 CAPSULE ORAL at 13:52

## 2023-09-12 RX ADMIN — LORAZEPAM 0.5 MG: 0.5 TABLET ORAL at 08:03

## 2023-09-12 RX ADMIN — MICONAZOLE NITRATE: 20 POWDER TOPICAL at 20:18

## 2023-09-12 RX ADMIN — GABAPENTIN 300 MG: 300 CAPSULE ORAL at 08:01

## 2023-09-12 RX ADMIN — SENNOSIDES 2 TABLET: 8.6 TABLET ORAL at 08:01

## 2023-09-12 RX ADMIN — NAPROXEN 250 MG: 250 TABLET ORAL at 17:53

## 2023-09-12 RX ADMIN — LORAZEPAM 0.5 MG: 0.5 TABLET ORAL at 20:17

## 2023-09-12 RX ADMIN — WHITE PETROLATUM: 1.75 OINTMENT TOPICAL at 08:04

## 2023-09-12 RX ADMIN — LORAZEPAM 0.5 MG: 0.5 TABLET ORAL at 13:52

## 2023-09-12 RX ADMIN — ATROPINE SULFATE 2 DROP: 10 SOLUTION/ DROPS OPHTHALMIC at 13:51

## 2023-09-12 ASSESSMENT — ACTIVITIES OF DAILY LIVING (ADL)
ORAL_HYGIENE: PROMPTS
ADLS_ACUITY_SCORE: 84
DRESS: PROMPTS
HYGIENE/GROOMING: PROMPTS
ADLS_ACUITY_SCORE: 84
ADLS_ACUITY_SCORE: 84
HYGIENE/GROOMING: PROMPTS
ADLS_ACUITY_SCORE: 84
DRESS: PROMPTS
ADLS_ACUITY_SCORE: 84
ORAL_HYGIENE: PROMPTS

## 2023-09-12 NOTE — CARE PLAN
"   09/12/23 1144   Group Therapy Session   Group Attendance attended group session   Time Session Began 1015   Time Session Ended 1100   Total Time (minutes) 20 (No Charge)   Total # Attendees 3   Group Type Occupational Therapy   Group Topic Covered balanced lifestyle;coping skills/lifestyle management;leisure exploration/use of leisure time;relaxation techniques;self-care activities   Group Session Detail OT Leisure Group   Patient Participation Detail   Intervention: Leisure Group with 2 peers.    Patient Response: Pt partcipated in group after personal invite at patient's room from writer. Attended with [facility] staff present, focused on leisure participation and exploration, socialization and physical movement for general wellness. Discussed how participation in leisure activities can be used as a healthy coping skill in symptom management and a strategy to reduce stress. Participated in group [employer] [address] game for more than half the time present in group room, sitting down for breaks as needed.     Mood/Affect: Pleasant, flat    Thought Content:  Hopeless -at one point making a comment, \"this might be the last song I ever hear\" when writer put on a favorite song of his.       Plan: Patient encouraged to maintain attendance for continued ongoing support in working towards occupational therapy goals to support overall treatment/care.        "

## 2023-09-12 NOTE — PROGRESS NOTES
"[facility], [place]   Psychiatric Progress Note  Hospital Day: 109          Interim History:     Patient seen and chart reviewed. Case discussed in multi-disciplinary treatment team      According to Nursing report:  Patient has had no recent aggression. He had speech therapy ordered for swallowing problems. He can be confused and delusional at times.      According to Nursing notes from yesterday:  Pt was calm this shift. Refused breakfast and verbalized not hungry despite multiple approaches.   Ate 85% of lunch with staff assistance. Difficulty swallowing  observed when eating lunch. Dr david updated and order received for speech consult.  Pt was mostly isolative in his room and took a shower with encouragement. Perineal area cleaned and treatment completed without issue.  Urinated in the bathroom and Verbalized no bowel movement this shift  Denies pain, SI/HI, delusions, and hallucination   No aggression demonstrated this shift.   Vinod had a peaceful and pleasant evening without any agitation or aggression. He spent most of the shift comfortably resting in bed and only minimally socializing with the SIO staff. When approached, he appeared cheerful. This writer assisted Vinod with his perineal care and applied the prescribed treatment cream and powder     According to  :  Legal Status:  Commitment with [facility] and [facility]         On interview today:  Patient denies suicidal or homicidal thoughts and denies hallucinations. Patient is not revealing delusions on interview. Patient denies side effects to medications.   Patient is oriented to place and person, but is somewhat vague in general      ROS: Patient denies new physical complaints.    Vital signs:  Temp: 97.8  F (36.6  C) Temp src: Temporal BP: 131/73 Pulse: 97     SpO2: 98 % O2 Device: None (Room air)   Height: 175.3 cm (5' 9\") (brought forward to generate BMI) Weight: 83.7 kg (184 lb 8 oz)  Estimated body " "mass index is 27.25 kg/m  as calculated from the following:    Height as of this encounter: 1.753 m (5' 9\").    Weight as of this encounter: 83.7 kg (184 lb 8 oz).             Medications:      atropine  2 drop Sublingual TID    benztropine  1 mg Oral BID    cloZAPine  650 mg Oral Daily    cyanocobalamin  1,000 mcg Oral Daily    divalproex sodium delayed-release  250 mg Oral Q8H KIKI    FLUoxetine  20 mg Oral Daily    gabapentin  300 mg Oral TID    LORazepam  0.5 mg Oral TID    melatonin  10 mg Oral At Bedtime    miconazole   Topical BID    mineral oil-hydrophilic petrolatum   Topical BID IS    naproxen  250 mg Oral BID w/meals    OLANZapine zydis  15 mg Oral BID    Or    OLANZapine  10 mg Intramuscular BID    polyethylene glycol  17 g Oral BID    sennosides  2 tablet Oral BID    tamsulosin  0.4 mg Oral Daily          Allergies:     Allergies   Allergen Reactions    Bee Venom Unknown    Montelukast Unknown    Phenothiazines Unknown          Labs:     No results found for this or any previous visit (from the past 24 hour(s)).               Psychiatric Examination:     /73 (BP Location: Right arm)   Pulse 97   Temp 97.8  F (36.6  C)   Resp 16   Ht 1.753 m (5' 9\")   Wt 83.7 kg (184 lb 8 oz)   SpO2 98%   BMI 27.25 kg/m    Weight is 184 lbs 8 oz  Body mass index is 27.25 kg/m .    Weight over time:  Vitals:    07/03/23 1100 07/30/23 0902 08/13/23 0900 08/26/23 0835   Weight: 81.6 kg (179 lb 12.8 oz) 86.5 kg (190 lb 9.6 oz) 85.7 kg (188 lb 14.4 oz) 82.8 kg (182 lb 7 oz)    09/07/23 0900   Weight: 83.7 kg (184 lb 8 oz)       Orthostatic Vitals         Most Recent      Sitting Orthostatic /61 07/25 0800    Sitting Orthostatic Pulse (bpm) 85 07/25 0800          Cardiometabolic risk assessment. 06/07/23    Reviewed patient profile for cardiometabolic risk factors    Date taken /Value  REFERENCE RANGE   Abdominal Obesity  (Waist Circumference)   See nursing flowsheet Women ?35 in (88 cm)   Men ?40 in (102 cm) "      Triglycerides  No results found for: TRIG    ?150 mg/dL (1.7 mmol/L) or current treatment for elevated triglycerides   HDL cholesterol  No results found for: HDL]   Women <50 mg/dL (1.3 mmol/L) in women or current treatment for low HDL cholesterol  Men <40 mg/dL (1 mmol/L) in men or current treatment for low HDL cholesterol     Fasting plasma glucose (FPG) Lab Results   Component Value Date     05/26/2023      FPG ?100 mg/dL (5.6 mmol/L) or treatment for elevated blood glucose   Blood pressure  BP Readings from Last 3 Encounters:   09/11/23 131/73   05/26/23 97/55    Blood pressure ?130/85 mmHg or treatment for elevated blood pressure   Family History  See family history     Mental Status Exam:  Appearance: awake, alert, disheveled, lying in bed. No evidence of pain of acute distress.   Attitude:  uncooperative  Eye Contact:  fair, less intense  Mood:  irritable  Affect:  mood congruent  Speech: mostly coherent  Psychomotor Behavior:  Ongoing bilateral hand tremor and jaw tremor. No evidence of psychomotor agitation or restlessness today. No evidence of dystonia, or tics.  Throught Process: linear, illogical  Associations:  no loosening of associations present  Thought Content:  Less Delusions and AH revealed today. Evidence of obsessive thinking and likely compulsions to harm others.   Insight:  limited  Judgement:  poor  Oriented to:  self, place  Attention Span and Concentration:  fair  Recent and Remote Memory:  poor  Gait: GRACE    Minimal change in mental status in the past 24 hours             Precautions:     Behavioral Orders   Procedures    Assault precautions    Code 1 - Restrict to Unit    Code 2    Code 2 - 1:1 Staff Supervision     For ECT only    Electroconvulsive therapy     Series of up to 12 treatments. Begin Date: 8/2/23     Treating Psychiatrist providing ECT:  unknown     Notified on:  7/28/23 via this order  NOTE: We received verbal confirmation from [facility] that Lorenzo Pavon has  been approved but awaiting official court order and risk management's review  Initial consult was completed by Dr. Hicks on 7/18/23    Electroconvulsive therapy     Series of up to 12 treatments. Begin Date: 8/2/23     Treating Psychiatrist providing ECT:  Dominga     Notified on:  8/2/23    Elopement precautions    Fall precautions    Occupational Therapy on the Unit     Order Specific Question:   Reason for Consult     Answer:   Eval of thought process, functional skills and behavior     Order Specific Question:   Course of Action:     Answer:   Evaluation only     Order Specific Question:   Treatment Prescription:     Answer:   CPT requested    Routine Programming     As clinically indicated    Self Injury Precaution    Status 15     Every 15 minutes.    Status Individual Observation     Patient SIO status reviewed with team/RN.  Please also refer to RN/team documentation for add'l detail.    -SIO staff to monitor following which have contributed to patient being on SIO:  Agitation  Pt is impulsive   Pt has ran out of his room naked  Pt has Parkinson symptoms place him in a high fall risk  Pt has verbal outburst of sexual and threaten statements  Pt requires immediate redirection when masturbating    -Possible interventions SIO staff could use to support patient's treatment progress:  Engage pt in activities    -When following observed, team will review discontinuation of SIO:  Absence of aggression toward others for > 24 hours    Comments: SIO 1:1     Order Specific Question:   CONTINUOUS 24 hours / day     Answer:   5 feet     Order Specific Question:   Indications for SIO     Answer:   Severe intrusiveness     Order Specific Question:   Indications for SIO     Answer:   Assault risk    Suicide precautions     Patients on Suicide Precautions should have a Combination Diet ordered that includes a Diet selection(s) AND a Behavioral Tray selection for Safe Tray - with utensils, or Safe Tray - NO utensils             Diagnoses:   Paranoid schizophrenia, chronic condition with acute exacerbation (H)    #Unspecified psychosis likely schizophrenia per history, rule out Bipolar affective disorder, manic  #Unspecified encephalopathy   -R/O catatonia   -Episodes of unresponsiveness, concern for PNES   #Parkinsonism 2/2 neuroleptic medications, rule out Parkinson's Disease  #Urinary retention and BPH  -Possible UTI -- UC resulted on 5/25 w/out growth  #Hx of prolonged QTc with clozapine  #Mild thrombocytopenia  #R/O OCD         Assessment and hospital summary:  Patient was admitted to psychiatric unit for safety, stabilization and medication management. He has had schizophrenia since the 1980. He was on Clozaril x 25 years, and it was tapered and discontinued on May 7, 2023  due to prolonged qtc of 527, and his psychotic  symptoms have worsened since then. Sinemet was also discontinued recently due to concerns that it was contributing to paranoia and AH. He is agitated, aggressive, dangerous to self and others. He remains on SIO 2 to 1, and is under psychiatric emergency and court hold. There are concerns for organic etiology given pattern of sundowning, history of parkinsonism, and ongoing disorientation/confusion. 6/8: EKG repeated, cardiology consult regarding safety of resuming clozapine in the context of prolonged QTc and refractory schizophrenia pending response. Per cardiology, correct QTc is no more than 460. They do not have concerns about AP retrial. Neurology IP consult placed for evaluation of sundowning and cognitive decline secondary to Sinemet discontinuation. MSSA initiated. Per Neurology, discontinuation of Sinemet would not account for these symptoms. They do not recommend retrial at this time. 6/22: Clozapine titration continued.   Its possible that clozapine dose is contributing to worsened compulsive behaviors noted throughout hospitalization which could improve with lowering the dose.     Chart reviewed which  revealed the followin2022: He was on clozapine, Seroquel, Cymbalta, and Carbidopa-levodopa and hospitalized for pneumonia. Psych consulted and Seroquel was stopped. Treated with Abx and discharged to TCU.     2022: Hospitalized at [facility]. Per chart review:    Vinod Lee is a 63 yo male with longstanding history of schizophrenia. He was admitted from [facility] ED, where he presented due to increased delusional thoughts while on a visit to his parents for Thanksgiving. He began experiencing increasing paranoid thoughts that someone might be poisoning him and began fearing that he might accidentally harm someone. He reported to his parents that he was having thoughts about hitting someone or beating someone up and told them he could not guarantee they would be safe with him. Parents encouraged patient to go to the ED, which he did voluntarily.     Per patient report and chart review, he was hospitalized several times in the  and reports he was civilly committed at one point in the , however he has been quite stable for the past several decades. He reports he will experience some paranoia and delusional thinking at times, but generally has good insight into his symptoms. He has been maintained on clozapine for about 25 years and prior to several months ago was also concurrently prescribed quetiapine.      In the last several months, patient has had a number of medication changes. Approximately 6 months ago, patient was diagnosed with parkinsonism related to antipsychotic use and was started on carbidopa-levidopa. He experienced no improvement in tremors, and thus carbidopa- levidopa dose was increased 1.5 weeks ago.      In addition, patient was medically hospitalized in 2022 for confusion, weakness, repeated falls, ongoing weight loss, and SOB. He was found to have a cavitary lesion of the lung and suspected aspiration pneumonia. He was treated with antibiotics. At that time, he was  "taking current dose of clozapine and duloxetine, as well as propranolol ER, quetiapine, gabapentin, and clonazepam. Psychiatry was consulted due to concern for oversedation, and propranolol ER, gabapentin, and quetiapine were discontinued. Conazepam was reduced from 0.5 mg TID to BID dosing and recommended to be discontinued, however, it does not appear this occurred. His sedation improved significantly. QTc prolongation was also noted, but improved throughout his stay. He was ultimately discharged to a TCU for several months before returning home. He reports his weakness has greatly improved and states he \"graduated\" physical therapy, though he continues to be diligent in completed recommended exercises.      He reports that over the past several months, his delusions and anxiety have been worse than usual, with symptoms becoming much more acute since the carbidopa-levidopa dose was increased. He has felt increasingly paranoid about being poisoned, noting this is a delusion that has been stable over time, but was previously less intrusive. He has insight during the interview that his paranoid thinking is not reality based, but states it has been harder to separate delusions from reality and he becomes very worried that he might hurt someone, despite no history of violent behavior. He reports that the paranoia about his food being poisoned in combination with the tremors in his hands have made it difficult for him to eat. He has also had quite low appetite for the past 6 months to a year . He reports that due to the combination of these factors, he has lost about 50 pounds in the last year.He denies any problems with sleep initiation or maintenance. He reports energy is fairly good and \"better than it used to be.\" He reports some short term memory issues and on interview, does have some difficulty remembering details of recent events. He is unsure if he has received cognitive testing in the past.    He denies any " "suicidal ideation and reports he has not experienced SI for decades. He denies homicidal thoughts and is very clear that he had and has no desire to harm anyone else, but was afraid he might somehow do so. He denies any hallucinations and states \"that's never been a problem for me.\" He is not observed responding to internal stimuli. He is alert and oriented in all spheres.        ========    BRIEF HOSPITAL COURSE: This 62 y.o. male admitted 11/25/2022 to  [place] for the assessment and treatment of the above presentation. This was a voluntary admission.     In summary, he was tapered off the Sinemet, which he reports to be both ineffective and potentially worsening the anxiety and paranoia ideations. Instead Benadryl 25 mg TID was started to help with extrapyramidal side effects. He struggled with sleep during his stay here. Remeron 7.5mg QHS was tried without success. Seroquel 100mg qhs was restarted with addition of suvorexant 10mg qhs. Given his Seroquel PRN use prior history of prolonged QTC, he will benefit from another EKG repeat on the medical unit.     Unfortunately, he tested positive for influenza A and developed hypoxemia. Now on 2L oxygen with desats when prone requiring 3L oxygen. Subsequently, the plan will to be tapering him off clonazepam due to hypoxia (and also prior plan to taper). The patient tolerated these changes without side effects.     The patient minimally benefited from the structured therapeutic milieu as he attended programming seldom as he tested positive for influenza, he needed to isolate with droplet precautions. Pt was engaged and worked on issues that were triggering/brought pt to the hospital and has excellent insight into his anxiety and paranoid delusions. Pt denied suicidal ideations throughout all/majority of their hospitalization. Pt denied HI throughout all of hospitalization. There were no concerns with behavioral disruptions/outbursts. The patient has shown improvement in " general.     At the time of discharge, hospitalization course was reviewed with Vinod Lee with plan to transfer to medicine. Please consult psychiatry and I will continue to follow while patient is on the medical unit. He is now off sinemet since 11/29 21:00 and I would expect anxiety and paranoia to improve more moving forward. Psychiatrically, once anxiety and paranoia is baseline, can D/C to home once medically stable. He DOES NOT NEED A 1:1 on the medical unit from a mental health perspective, we initiated 1:1 11/30/2022 due to significant fatigue, gait instability (he was unable to stand without assist) and feeding assist.    04/2023: Clozapine was gradually tapered and discontinued, unclear reasons why it was discontinued per outside records, though Dr. Portillo's H&P indicates that it was due to prolonged QTc.       Today's Changes:  Renewed SIO      Target psychiatric symptoms and interventions:  -Psych emergency declared on 5/27, now fully committed  -ECT Mon/Wed/Fri per Janelle. Last ECT treatment was on 8/25. Had total of 11 treatments with subtle improvements.   - Depakote 250 mg TID, restarted on 8/26. Cannot increase beyond this dose due to thrombocytopenia at higher doses  -Continue clozapine as above  -Continue scheduled Zyprexa 15mg BID  -Continue 1:1 for safety of staff and patients, reduced from 2:1 7/23/23  - weekly CBC to monitor platelets      -Continue Gabapentin 300 mg TID as staff believe it has been effective for treatment of his anxiety  -Discussed complex case at length with Dr. Hatch (primary provider on geriatic unit) who provided recs (see second opinion note dated 6/14). Appreciate assistance. I have since made the following changes:         1) Add propranolol 10 mg TID         2) Added Depakote 1000 mg qhs (to be administered in magic cup)         3) Nutrition consult to order magic cup         4) Increased melatonin to 10 mg QHS    Risks, benefits, and alternatives  discussed at length with patient.     Acute Medical Problems and Treatments:  Delirium vs progression of dementia  Neurology consult placed on 6/8 for evaluation of sundowning and cognitive decline secondary to Sinemet discontinuation: Resuming Sinemet not recommended for behavioral concerns. Please see Neuro note for details.     Tremors  longstanding though appeared to worsen following initiation of clozapine  - Ativan 0.5 mg TID  - Cogentin 1 mg BID added on 8/25 with overall improvement noted    Thrombocytopenia, resolved  Likely secondary to Depakote.  According to the, incidents of Depakote induced thrombocytopenia as 27%.  Depakote dose reduced from 1000 twice daily to 250 3 times daily on 7/28/2023 with subsequent resolution of thrombocytopenia  - weekly CBCs    Constipation  Likely worsened by clozapine, improved since modifying regimen.   - daily miralax   - daily Senokot 2 tablets BID      Right first toe fracture:  Seen by Ortho on 6/16:  Per note: Vinod Lee is a 62 year old  male w/ PMHx complex psychiatric history who has a fracture to the distal phalanx of the right toe after a recent trauma to the foot the patient reports.  Patient denies any other pain or discomfort.  Images reviewed and plan will be for irrigation of the popped fracture blister with sterile saline and the toes should be dressed and a 4 x 4 gauze dressing.  Patient will need a postop shoe which she can be weightbearing as tolerated in.  Would recommend a course of antibiotics for approximately 7 days.  Would recommend Augmentin or clindamycin.  Podiatry will be made aware of patient and will see patient while he is admitted or if patient is discharged in the near future a follow-up appointment will need to be established.     Remainder of care per primary team.  Primary team should make sure that patient is up-to-date on his tetanus shot.  If not patient will require a booster shot.    Hx of prolonged QTc:  Continue weekly EKGs.  "See above for details.   Discussed most recent EKG findings with Cardiology on 8/25. Corrected QTc is 501 from EKG on 8/23. Per cardiology, repeat EKG today. If corrected QTc is > 500, will need to reduce clozapine. If < 500, OK to keep clozapine at current dose. UPDATE/ADDENDUM 9/6: Repeat EKG with corrected QTc of 440. No need to adjust clozapine dose per cards.     Urinary retention, resolved  Per patient care order:   If patient is refusing straight caths, please notify IM provider immediately to determine whether this constitutes a medical emergency. If IM declares medical emergency, may restrain patient for straight cath. Discussed this with patient's parents/substitute decision makers on 6/7 who are in support of this plan.    # Psoriasis hx  # Purplish discoloration of B/LE feet-resolved   Discussed with Dr. Rene and bedside RN regarding rash on right foot that appears and appears to be purplish in color and almost \"petechiae.\" It was noted during interview, but seemed to be resolving upon walking. Within the past 24 hrs, pt has had ECT and seemed more confused than usual. Pt seems to have had a right great toe wound with recent right great toe fracture seen by Medicine on 7/16. Seen on 8/4 with improving color on B/L legs at rest and appears to have small, scaly patches of varying coin sizes that have not grown past marked borders. See photos. Per further chart review, has had psoriasis history. WBC WNL, no fevers, HDS. This appears to more consistent with a psoriasis like picture.   - Start triamcinolone topical 0.025%   -Apply twice daily until lesions for 2 weeks or until lesions resolve/  -Monitor for skin thinning, striae, rebound lesion flares.   - Start Eucerin cream              - Apply 30 minutes PRIOR to triamcinolone topical cream above or PRN as needed if dry skin/after bathing   - Medicine will sign off.   - For worsening pain, spread, itchiness, fevers, please contact Medicine and possibly " Dermatology.    Benzodiazepine dependence:  Phenobarbital taper completed shortly after patient's admission    Pertinent labs/imaging:  Weekly CBC with diff     Behavioral/Psychological/Social:  - Encourage unit programming    Safety:  - Continue precautions as noted above  - Status 15 minute checks  - Continue 1:1 for staff and pt safety    Legal Status: Fully committed with [facility] and Lorenzo Pavon. [facility] medications: clozapine, olanzapine, risperidone, quetiapine      Disposition Plan   No further change in treatment plan  Reason for ongoing admission: No safe alternative at this time  Discharge location: TBD. Will likely need memory care unit  Discharge Medications: not ordered  Follow-up Appointments: not scheduled      No further change in treatment plan  Patient seen, chart reviewed, case reviewed with  and with nursing.   Case reviewed in multi-disciplinary treatment team.    Jermaine Griffith MD

## 2023-09-12 NOTE — PLAN OF CARE
Assessment/Intervention/Current Symtoms and Care Coordination:  Reviewed chart. Met with team to review patient's current functioning, needs, progress, and impediments to discharge.    Called Mar[facility] Ro[facility] to [facility] [place] to follow up on referral - unclear if declined due to open beds or other reasons. No answer, requested a call back.     Discharge Plan or Goal:  Seeking placement in a memory care unit, preferably in the [facility]     Barriers to Discharge:  Referrals are being sent to memory care units who have openings. One barrier is the limited openings in memory care units at this time.     Referral Status:  [facility] in [place] 8/22, no memory care 8/30  [facility] in [place] 8/15, no memory care 8/31  [facility] 8/31, no openings 8/31  [facility] 8/29, declined 9/8  [facility] 8/29, no open beds 8/30  [facility] 8/30  [facility] 8/31, no open beds 8/31  [facility] 8/31, no open beds 8/31  [facility] 8/31  [facility] 8/31, no openings and private pay 8/31  [facility], 8/31, declined 9/1 due to aggressive behaviors     Legal Status:  Commitment with [facility]: [place]  File Number: [id number]  Issued 07/06/23 and is not to exceed six months    Contacts:  : Vicky Valdez with [facility], [phone]  Psychiatrist: Dr. Santana at [facility], [phone] PCP: Attila Urena DO  Mom: Alberta, [phone] (H) [phone] (M)   Dad: Ino, [phone] (H)  Sister, Sandra Treadwell, [phone] ([place])   [facility]'s Office Fax: [phone]  [facility]: [phone] ([place])  [facility] Valentine: Jasmin phone [phone], fax [phone]  CADI  with [facility]: Regina Wong, [phone], [email or web address]    Upcoming Meetings and Dates/Important Information and next steps:

## 2023-09-12 NOTE — PLAN OF CARE
Goal Outcome Evaluation:    Patient asleep at start of shift. Breathing quiet and unlabored when sleeping. Pt had no c/o pain or discomfort during the HS. Pt continues on 1:1 SIO/5 ft. Appears to have slept 7 hours.

## 2023-09-12 NOTE — PLAN OF CARE
Problem: Psychotic Symptoms  Goal: Psychotic Symptoms  Description: Signs and symptoms of listed problems will be absent or manageable.  Outcome: Progressing   Goal Outcome Evaluation:         Vinod had a peaceful and pleasant evening without any agitation or aggression. He spent most of the shift comfortably resting in bed and only minimally socializing with the SIO staff. When approached, he appeared cheerful. This writer assisted Vinod with his perineal care and applied the prescribed treatment cream and powder. During this shift, Vinod's vital signs were all within normal range: T-97.8, P-97, B/P-131/73, and sats 98% on RA. Vinod took all his scheduled medication without any issues. He ate 75-80% of his dinner with the staff's assistance. Due to agitation and aggression, Vinod remains on SIO.

## 2023-09-12 NOTE — PLAN OF CARE
Pt  is mostly isolative in his room this shift.  Per PA, Pt walked 3 laps this shift. Ate about 75% of breakfast and 80% of lunch.   Speech therapist  called that pt will be seen today due to  recent swallowing issues.  Pt denies  SI/HI, delusions, and hallucination   Continue to have rash on alejandro -area. Treatment completed  without issues  Pt still have tremors on bilateral hands. Needing help to cut his food and feeding.  Pt  is calm and cooperative this shift and compliant with medication administration    Problem: Psychotic Symptoms  Goal: Psychotic Symptoms  Description: Signs and symptoms of listed problems will be absent or manageable.  Outcome: Progressing   Goal Outcome Evaluation:    Plan of Care Reviewed With: patient

## 2023-09-13 ENCOUNTER — APPOINTMENT (OUTPATIENT)
Dept: SPEECH THERAPY | Facility: CLINIC | Age: 63
DRG: 885 | End: 2023-09-13
Attending: PSYCHIATRY & NEUROLOGY
Payer: COMMERCIAL

## 2023-09-13 LAB
ATRIAL RATE - MUSE: 82 BPM
DIASTOLIC BLOOD PRESSURE - MUSE: NORMAL MMHG
INTERPRETATION ECG - MUSE: NORMAL
P AXIS - MUSE: 52 DEGREES
PR INTERVAL - MUSE: 154 MS
QRS DURATION - MUSE: 158 MS
QT - MUSE: 442 MS
QTC - MUSE: 516 MS
R AXIS - MUSE: -38 DEGREES
SYSTOLIC BLOOD PRESSURE - MUSE: NORMAL MMHG
T AXIS - MUSE: 99 DEGREES
VENTRICULAR RATE- MUSE: 82 BPM

## 2023-09-13 PROCEDURE — 250N000013 HC RX MED GY IP 250 OP 250 PS 637: Performed by: PSYCHIATRY & NEUROLOGY

## 2023-09-13 PROCEDURE — 250N000013 HC RX MED GY IP 250 OP 250 PS 637: Performed by: STUDENT IN AN ORGANIZED HEALTH CARE EDUCATION/TRAINING PROGRAM

## 2023-09-13 PROCEDURE — 92526 ORAL FUNCTION THERAPY: CPT | Mod: GN

## 2023-09-13 PROCEDURE — 99231 SBSQ HOSP IP/OBS SF/LOW 25: CPT | Performed by: PSYCHIATRY & NEUROLOGY

## 2023-09-13 PROCEDURE — 124N000002 HC R&B MH UMMC

## 2023-09-13 PROCEDURE — 250N000013 HC RX MED GY IP 250 OP 250 PS 637: Performed by: PHYSICIAN ASSISTANT

## 2023-09-13 RX ADMIN — OLANZAPINE 15 MG: 10 TABLET, ORALLY DISINTEGRATING ORAL at 19:30

## 2023-09-13 RX ADMIN — POLYETHYLENE GLYCOL 3350 17 G: 17 POWDER, FOR SOLUTION ORAL at 19:30

## 2023-09-13 RX ADMIN — FLUOXETINE 20 MG: 20 CAPSULE ORAL at 08:20

## 2023-09-13 RX ADMIN — GABAPENTIN 300 MG: 300 CAPSULE ORAL at 13:45

## 2023-09-13 RX ADMIN — GABAPENTIN 300 MG: 300 CAPSULE ORAL at 08:19

## 2023-09-13 RX ADMIN — SENNOSIDES 2 TABLET: 8.6 TABLET ORAL at 08:19

## 2023-09-13 RX ADMIN — ATROPINE SULFATE 2 DROP: 10 SOLUTION/ DROPS OPHTHALMIC at 13:45

## 2023-09-13 RX ADMIN — OLANZAPINE 15 MG: 10 TABLET, ORALLY DISINTEGRATING ORAL at 08:19

## 2023-09-13 RX ADMIN — BENZTROPINE MESYLATE 1 MG: 1 TABLET ORAL at 08:19

## 2023-09-13 RX ADMIN — MICONAZOLE NITRATE: 20 POWDER TOPICAL at 19:36

## 2023-09-13 RX ADMIN — LORAZEPAM 0.5 MG: 0.5 TABLET ORAL at 19:30

## 2023-09-13 RX ADMIN — ATROPINE SULFATE 2 DROP: 10 SOLUTION/ DROPS OPHTHALMIC at 08:19

## 2023-09-13 RX ADMIN — SENNOSIDES 2 TABLET: 8.6 TABLET ORAL at 19:30

## 2023-09-13 RX ADMIN — NAPROXEN 250 MG: 250 TABLET ORAL at 18:11

## 2023-09-13 RX ADMIN — LORAZEPAM 0.5 MG: 0.5 TABLET ORAL at 08:20

## 2023-09-13 RX ADMIN — CYANOCOBALAMIN TAB 1000 MCG 1000 MCG: 1000 TAB at 08:19

## 2023-09-13 RX ADMIN — GABAPENTIN 300 MG: 300 CAPSULE ORAL at 19:29

## 2023-09-13 RX ADMIN — POLYETHYLENE GLYCOL 3350 17 G: 17 POWDER, FOR SOLUTION ORAL at 08:18

## 2023-09-13 RX ADMIN — Medication 10 MG: at 19:30

## 2023-09-13 RX ADMIN — BENZTROPINE MESYLATE 1 MG: 1 TABLET ORAL at 19:30

## 2023-09-13 RX ADMIN — DIVALPROEX SODIUM 250 MG: 250 TABLET, DELAYED RELEASE ORAL at 19:30

## 2023-09-13 RX ADMIN — TAMSULOSIN HYDROCHLORIDE 0.4 MG: 0.4 CAPSULE ORAL at 08:20

## 2023-09-13 RX ADMIN — ATROPINE SULFATE 2 DROP: 10 SOLUTION/ DROPS OPHTHALMIC at 19:37

## 2023-09-13 RX ADMIN — CLOZAPINE 650 MG: 100 TABLET, ORALLY DISINTEGRATING ORAL at 11:44

## 2023-09-13 RX ADMIN — DIVALPROEX SODIUM 250 MG: 250 TABLET, DELAYED RELEASE ORAL at 13:45

## 2023-09-13 RX ADMIN — MICONAZOLE NITRATE: 20 POWDER TOPICAL at 08:18

## 2023-09-13 RX ADMIN — WHITE PETROLATUM: 1.75 OINTMENT TOPICAL at 08:30

## 2023-09-13 RX ADMIN — NAPROXEN 250 MG: 250 TABLET ORAL at 08:19

## 2023-09-13 RX ADMIN — DIVALPROEX SODIUM 250 MG: 250 TABLET, DELAYED RELEASE ORAL at 06:25

## 2023-09-13 RX ADMIN — WHITE PETROLATUM: 1.75 OINTMENT TOPICAL at 18:13

## 2023-09-13 RX ADMIN — LORAZEPAM 0.5 MG: 0.5 TABLET ORAL at 13:45

## 2023-09-13 ASSESSMENT — ACTIVITIES OF DAILY LIVING (ADL)
ADLS_ACUITY_SCORE: 84
HYGIENE/GROOMING: PROMPTS
ORAL_HYGIENE: PROMPTS
DRESS: PROMPTS
ADLS_ACUITY_SCORE: 84
ORAL_HYGIENE: PROMPTS
ADLS_ACUITY_SCORE: 84
DRESS: PROMPTS
HYGIENE/GROOMING: PROMPTS

## 2023-09-13 NOTE — CARE PLAN
"   09/13/23 1514   Group Therapy Session   Group Attendance attended group session   Time Session Began 1330   Time Session Ended 1415   Total Time (minutes) 20 (No Charge)   Total # Attendees 1   Group Type Occupational Therapy   Group Topic Covered balanced lifestyle;coping skills/lifestyle management;leisure exploration/use of leisure time;relaxation techniques;cognitive activities   Group Session Detail OT Leisure Group   Patient Participation Detail Intervention: Leisure Group     Patient Response:  Pt participated in a group dice activity for leisure exploration and participation as a healthy coping skill, socialization, problem solving and sequencing. SIO staff present for duration of the group. Was an active participant in the activity for the time patient was in the group, needed a lot of encouragement to stay and participate. On several occasions stating \"I am just destroying the game anyway\". With reassurance that this was not true and he should continue, patient did stay for a total of about 20 mins before wanting to be done with the activity. Required a few cues and prompts to follow along with the activity rules, however overall was able to  the strategy behind the activity.     Plan: Patient encouraged to maintain attendance for continued ongoing support in working towards occupational therapy goals to support overall treatment/care.          "

## 2023-09-13 NOTE — PLAN OF CARE
Pt asleep at start of shift.     Breathing quiet and unlabored when sleeping.     Pt had no c/o pain or discomfort during the HS.     Appears to have slept 7 hours.     Pt continues on 1:1 SIO/5 ft space distance.     Medication compliant with scheduled 0600 Depakote.     Goal Outcome Evaluation:  Problem: Adult Behavioral Health Plan of Care  Goal: Adheres to Safety Considerations for Self and Others  Intervention: Develop and Maintain Individualized Safety Plan  Recent Flowsheet Documentation  Taken 9/13/2023 0000 by Tameka Hatch, RN  Safety Measures:   safety rounds completed   1:1 observation maintained  Goal: Absence of New-Onset Illness or Injury  Intervention: Identify and Manage Fall Risk  Recent Flowsheet Documentation  Taken 9/13/2023 0000 by Tameka Hatch, RN  Safety Measures:   safety rounds completed   1:1 observation maintained     Problem: Sleep Disturbance  Goal: Adequate Sleep/Rest  Outcome: Progressing

## 2023-09-13 NOTE — PROGRESS NOTES
"[facility], [place]   Psychiatric Progress Note  Hospital Day: 110          Interim History:     Patient seen and chart reviewed. Case discussed in multi-disciplinary treatment team      According to Nursing report:  Patient is calm today. Still has tremors. He will have speech therapist evaluation for swallowing. He is not voicing delusions today. He is mostly oriented but can have confusion.      According to Nursing notes from yesterday:  Pt  is mostly isolative in his room this shift.  Per PA, Pt walked 3 laps this shift. Ate about 75% of breakfast and 80% of lunch.   Speech therapist  called that pt will be seen today due to  recent swallowing issues.  Pt denies  SI/HI, delusions, and hallucination   Continue to have rash on alejandro -area. Treatment completed  without issues  Pt still have tremors on bilateral hands. Needing help to cut his food and feeding.  Pt  is calm and cooperative this shift and compliant with medication administration  Pt has a flat affect with somber mood. Pt endorses a \"little\" anxiety and denies any depression. Pt rates pain at 0/10. Pt reports no SI/HI/SIB and contracts for safety. Pt denies any hallucinations and not noted responding to any internal stimuli. Pt was medication compliant. Pt noted to have drooling and tremors. Pt is disoriented to time and situation. Pt ate 75% of supper with encouragement and assist. Pt was mostly in his room this shift. Pt ambulated twice this shift with staff. Pt took a shower and was assisted with alejandro care and anti fungal powder was applied to groin. Continue current POC.     According to  :  Legal Status:  Commitment with [facility] and [facility]         On interview today:  Patient denies suicidal or homicidal thoughts and denies hallucinations. Patient is not revealing delusions on interview. Patient denies side effects to medications.   Patient is oriented to place and person, but is somewhat vague in " "general unchanged.      ROS: Patient denies new physical complaints    Vital signs:  Temp: 97.4  F (36.3  C) Temp src: Temporal BP: 137/79 Pulse: 99     SpO2: 97 % O2 Device: None (Room air)   Height: 175.3 cm (5' 9\") (brought forward to generate BMI) Weight: 83.7 kg (184 lb 8 oz)  Estimated body mass index is 27.25 kg/m  as calculated from the following:    Height as of this encounter: 1.753 m (5' 9\").    Weight as of this encounter: 83.7 kg (184 lb 8 oz).             Medications:      atropine  2 drop Sublingual TID    benztropine  1 mg Oral BID    cloZAPine  650 mg Oral Daily    cyanocobalamin  1,000 mcg Oral Daily    divalproex sodium delayed-release  250 mg Oral Q8H KIKI    FLUoxetine  20 mg Oral Daily    gabapentin  300 mg Oral TID    LORazepam  0.5 mg Oral TID    melatonin  10 mg Oral At Bedtime    miconazole   Topical BID    mineral oil-hydrophilic petrolatum   Topical BID IS    naproxen  250 mg Oral BID w/meals    OLANZapine zydis  15 mg Oral BID    Or    OLANZapine  10 mg Intramuscular BID    polyethylene glycol  17 g Oral BID    sennosides  2 tablet Oral BID    tamsulosin  0.4 mg Oral Daily          Allergies:     Allergies   Allergen Reactions    Bee Venom Unknown    Montelukast Unknown    Phenothiazines Unknown          Labs:     Recent Results (from the past 24 hour(s))   EKG 12-lead, complete    Collection Time: 09/12/23  9:24 AM   Result Value Ref Range    Systolic Blood Pressure  mmHg    Diastolic Blood Pressure  mmHg    Ventricular Rate 82 BPM    Atrial Rate 82 BPM    MT Interval 154 ms    QRS Duration 158 ms     ms    QTc 516 ms    P Axis 52 degrees    R AXIS -38 degrees    T Axis 99 degrees    Interpretation ECG       Sinus rhythm with Premature atrial complexes  Left axis deviation  Left bundle branch block  Abnormal ECG  When compared with ECG of 05-SEP-2023 11:06,  No significant change was found                    Psychiatric Examination:     /79 (BP Location: Left arm)   Pulse 99 " "  Temp 97.4  F (36.3  C) (Temporal)   Resp 16   Ht 1.753 m (5' 9\")   Wt 83.7 kg (184 lb 8 oz)   SpO2 97%   BMI 27.25 kg/m    Weight is 184 lbs 8 oz  Body mass index is 27.25 kg/m .    Weight over time:  Vitals:    07/03/23 1100 07/30/23 0902 08/13/23 0900 08/26/23 0835   Weight: 81.6 kg (179 lb 12.8 oz) 86.5 kg (190 lb 9.6 oz) 85.7 kg (188 lb 14.4 oz) 82.8 kg (182 lb 7 oz)    09/07/23 0900   Weight: 83.7 kg (184 lb 8 oz)       Orthostatic Vitals         Most Recent      Sitting Orthostatic /61 07/25 0800    Sitting Orthostatic Pulse (bpm) 85 07/25 0800          Cardiometabolic risk assessment. 06/07/23    Reviewed patient profile for cardiometabolic risk factors    Date taken /Value  REFERENCE RANGE   Abdominal Obesity  (Waist Circumference)   See nursing flowsheet Women ?35 in (88 cm)   Men ?40 in (102 cm)      Triglycerides  No results found for: TRIG    ?150 mg/dL (1.7 mmol/L) or current treatment for elevated triglycerides   HDL cholesterol  No results found for: HDL]   Women <50 mg/dL (1.3 mmol/L) in women or current treatment for low HDL cholesterol  Men <40 mg/dL (1 mmol/L) in men or current treatment for low HDL cholesterol     Fasting plasma glucose (FPG) Lab Results   Component Value Date     05/26/2023      FPG ?100 mg/dL (5.6 mmol/L) or treatment for elevated blood glucose   Blood pressure  BP Readings from Last 3 Encounters:   09/12/23 137/79   05/26/23 97/55    Blood pressure ?130/85 mmHg or treatment for elevated blood pressure   Family History  See family history     Mental Status Exam:  Appearance: awake, alert, disheveled, lying in bed. No evidence of pain of acute distress.   Attitude:  uncooperative  Eye Contact:  fair, less intense  Mood:  irritable  Affect:  mood congruent  Speech: mostly coherent  Psychomotor Behavior:  Ongoing bilateral hand tremor and jaw tremor. No evidence of psychomotor agitation or restlessness today. No evidence of dystonia, or tics.  Throught " Process: linear, illogical  Associations:  no loosening of associations present  Thought Content:  Less Delusions and AH revealed today. Evidence of obsessive thinking and likely compulsions to harm others. Does reveal delusions at times vs confusion  Insight:  limited  Judgement:  poor  Oriented to:  self, place  Attention Span and Concentration:  fair  Recent and Remote Memory:  poor  Gait: GRACE    Minimal change in mental status in the past 24 hours             Precautions:     Behavioral Orders   Procedures    Assault precautions    Code 1 - Restrict to Unit    Code 2    Code 2 - 1:1 Staff Supervision     For ECT only    Electroconvulsive therapy     Series of up to 12 treatments. Begin Date: 8/2/23     Treating Psychiatrist providing ECT:  unknown     Notified on:  7/28/23 via this order  NOTE: We received [facility] that [facility] has been approved but awaiting official court order and risk management's review  Initial consult was completed by Dr. Hicks on 7/18/23    Electroconvulsive therapy     Series of up to 12 treatments. Begin Date: 8/2/23     Treating Psychiatrist providing ECT:  Dominga     Notified on:  8/2/23    Elopement precautions    Fall precautions    Occupational Therapy on the Unit     Order Specific Question:   Reason for Consult     Answer:   Eval of thought process, functional skills and behavior     Order Specific Question:   Course of Action:     Answer:   Evaluation only     Order Specific Question:   Treatment Prescription:     Answer:   CPT requested    Routine Programming     As clinically indicated    Self Injury Precaution    Status 15     Every 15 minutes.    Status Individual Observation     Patient SIO status reviewed with team/RN.  Please also refer to RN/team documentation for add'l detail.    -SIO staff to monitor following which have contributed to patient being on SIO:  Agitation  Pt is impulsive   Pt has ran out of his room naked  Pt has Parkinson  symptoms place him in a high fall risk  Pt has verbal outburst of sexual and threaten statements  Pt requires immediate redirection when masturbating    -Possible interventions SIO staff could use to support patient's treatment progress:  Engage pt in activities    -When following observed, team will review discontinuation of SIO:  Absence of aggression toward others for > 24 hours    Comments: SIO 1:1     Order Specific Question:   CONTINUOUS 24 hours / day     Answer:   5 feet     Order Specific Question:   Indications for SIO     Answer:   Severe intrusiveness     Order Specific Question:   Indications for SIO     Answer:   Assault risk    Suicide precautions     Patients on Suicide Precautions should have a Combination Diet ordered that includes a Diet selection(s) AND a Behavioral Tray selection for Safe Tray - with utensils, or Safe Tray - NO utensils            Diagnoses:   Paranoid schizophrenia, chronic condition with acute exacerbation (H)    #Unspecified psychosis likely schizophrenia per history, rule out Bipolar affective disorder, manic  #Unspecified encephalopathy   -R/O catatonia   -Episodes of unresponsiveness, concern for PNES   #Parkinsonism 2/2 neuroleptic medications, rule out Parkinson's Disease  #Urinary retention and BPH  -Possible UTI -- UC resulted on 5/25 w/out growth  #Hx of prolonged QTc with clozapine  #Mild thrombocytopenia  #R/O OCD         Assessment and hospital summary:  Patient was admitted to psychiatric unit for safety, stabilization and medication management. He has had schizophrenia since the 1980. He was on Clozaril x 25 years, and it was tapered and discontinued on May 7, 2023  due to prolonged qtc of 527, and his psychotic  symptoms have worsened since then. Sinemet was also discontinued recently due to concerns that it was contributing to paranoia and AH. He is agitated, aggressive, dangerous to self and others. He remains on SIO 2 to 1, and is under psychiatric emergency  and court hold. There are concerns for organic etiology given pattern of sundowning, history of parkinsonism, and ongoing disorientation/confusion. : EKG repeated, cardiology consult regarding safety of resuming clozapine in the context of prolonged QTc and refractory schizophrenia pending response. Per cardiology, correct QTc is no more than 460. They do not have concerns about AP retrial. Neurology IP consult placed for evaluation of sundowning and cognitive decline secondary to Sinemet discontinuation. MSSA initiated. Per Neurology, discontinuation of Sinemet would not account for these symptoms. They do not recommend retrial at this time. : Clozapine titration continued.   Its possible that clozapine dose is contributing to worsened compulsive behaviors noted throughout hospitalization which could improve with lowering the dose.     Chart reviewed which revealed the followin2022: He was on clozapine, Seroquel, Cymbalta, and Carbidopa-levodopa and hospitalized for pneumonia. Psych consulted and Seroquel was stopped. Treated with Abx and discharged to TCU.     2022: Hospitalized at [facility]. Per chart review:    Vinod Lee is a 63 yo male with longstanding history of schizophrenia. He was admitted from [facility] ED, where he presented due to increased delusional thoughts while on a visit to his parents for Thanksgiving. He began experiencing increasing paranoid thoughts that someone might be poisoning him and began fearing that he might accidentally harm someone. He reported to his parents that he was having thoughts about hitting someone or beating someone up and told them he could not guarantee they would be safe with him. Parents encouraged patient to go to the ED, which he did voluntarily.     Per patient report and chart review, he was hospitalized several times in the  and reports he was civilly committed at one point in the , however he has been quite stable for the past  "several decades. He reports he will experience some paranoia and delusional thinking at times, but generally has good insight into his symptoms. He has been maintained on clozapine for about 25 years and prior to several months ago was also concurrently prescribed quetiapine.      In the last several months, patient has had a number of medication changes. Approximately 6 months ago, patient was diagnosed with parkinsonism related to antipsychotic use and was started on carbidopa-levidopa. He experienced no improvement in tremors, and thus carbidopa- levidopa dose was increased 1.5 weeks ago.      In addition, patient was medically hospitalized in August 2022 for confusion, weakness, repeated falls, ongoing weight loss, and SOB. He was found to have a cavitary lesion of the lung and suspected aspiration pneumonia. He was treated with antibiotics. At that time, he was taking current dose of clozapine and duloxetine, as well as propranolol ER, quetiapine, gabapentin, and clonazepam. Psychiatry was consulted due to concern for oversedation, and propranolol ER, gabapentin, and quetiapine were discontinued. Conazepam was reduced from 0.5 mg TID to BID dosing and recommended to be discontinued, however, it does not appear this occurred. His sedation improved significantly. QTc prolongation was also noted, but improved throughout his stay. He was ultimately discharged to a TCU for several months before returning home. He reports his weakness has greatly improved and states he \"graduated\" physical therapy, though he continues to be diligent in completed recommended exercises.      He reports that over the past several months, his delusions and anxiety have been worse than usual, with symptoms becoming much more acute since the carbidopa-levidopa dose was increased. He has felt increasingly paranoid about being poisoned, noting this is a delusion that has been stable over time, but was previously less intrusive. He has " "insight during the interview that his paranoid thinking is not reality based, but states it has been harder to separate delusions from reality and he becomes very worried that he might hurt someone, despite no history of violent behavior. He reports that the paranoia about his food being poisoned in combination with the tremors in his hands have made it difficult for him to eat. He has also had quite low appetite for the past 6 months to a year . He reports that due to the combination of these factors, he has lost about 50 pounds in the last year.He denies any problems with sleep initiation or maintenance. He reports energy is fairly good and \"better than it used to be.\" He reports some short term memory issues and on interview, does have some difficulty remembering details of recent events. He is unsure if he has received cognitive testing in the past.    He denies any suicidal ideation and reports he has not experienced SI for decades. He denies homicidal thoughts and is very clear that he had and has no desire to harm anyone else, but was afraid he might somehow do so. He denies any hallucinations and states \"that's never been a problem for me.\" He is not observed responding to internal stimuli. He is alert and oriented in all spheres.        ========    BRIEF HOSPITAL COURSE: This 62 y.o. male admitted 11/25/2022 to  [facility] for the assessment and treatment of the above presentation. This was a voluntary admission.     In summary, he was tapered off the Sinemet, which he reports to be both ineffective and potentially worsening the anxiety and paranoia ideations. Instead Benadryl 25 mg TID was started to help with extrapyramidal side effects. He struggled with sleep during his stay here. Remeron 7.5mg QHS was tried without success. Seroquel 100mg qhs was restarted with addition of suvorexant 10mg qhs. Given his Seroquel PRN use prior history of prolonged QTC, he will benefit from another EKG repeat on the medical " unit.     Unfortunately, he tested positive for influenza A and developed hypoxemia. Now on 2L oxygen with desats when prone requiring 3L oxygen. Subsequently, the plan will to be tapering him off clonazepam due to hypoxia (and also prior plan to taper). The patient tolerated these changes without side effects.     The patient minimally benefited from the structured therapeutic milieu as he attended programming seldom as he tested positive for influenza, he needed to isolate with droplet precautions. Pt was engaged and worked on issues that were triggering/brought pt to the hospital and has excellent insight into his anxiety and paranoid delusions. Pt denied suicidal ideations throughout all/majority of their hospitalization. Pt denied HI throughout all of hospitalization. There were no concerns with behavioral disruptions/outbursts. The patient has shown improvement in general.     At the time of discharge, hospitalization course was reviewed with Vinod Lee with plan to transfer to medicine. Please consult psychiatry and I will continue to follow while patient is on the medical unit. He is now off sinemet since 11/29 21:00 and I would expect anxiety and paranoia to improve more moving forward. Psychiatrically, once anxiety and paranoia is baseline, can D/C to home once medically stable. He DOES NOT NEED A 1:1 on the medical unit from a mental health perspective, we initiated 1:1 11/30/2022 due to significant fatigue, gait instability (he was unable to stand without assist) and feeding assist.    04/2023: Clozapine was gradually tapered and discontinued, unclear reasons why it was discontinued per outside records, though Dr. Portillo's H&P indicates that it was due to prolonged QTc.       Today's Changes:  Renewed SIO      Target psychiatric symptoms and interventions:  -Psych emergency declared on 5/27, now fully committed  -ECT Mon/Wed/Fri per Janelle. Last ECT treatment was on 8/25. Had total of 11  treatments with subtle improvements.   - Depakote 250 mg TID, restarted on 8/26. Cannot increase beyond this dose due to thrombocytopenia at higher doses  -Continue clozapine as above  -Continue scheduled Zyprexa 15mg BID  -Continue 1:1 for safety of staff and patients, reduced from 2:1 7/23/23  - weekly CBC to monitor platelets      -Continue Gabapentin 300 mg TID as staff believe it has been effective for treatment of his anxiety  -Discussed complex case at length with Dr. Hatch (primary provider on geriatic unit) who provided recs (see second opinion note dated 6/14). Appreciate assistance. I have since made the following changes:         1) Add propranolol 10 mg TID         2) Added Depakote 1000 mg qhs (to be administered in magic cup)         3) Nutrition consult to order magic cup         4) Increased melatonin to 10 mg QHS    Risks, benefits, and alternatives discussed at length with patient.     Acute Medical Problems and Treatments:  Delirium vs progression of dementia  Neurology consult placed on 6/8 for evaluation of sundowning and cognitive decline secondary to Sinemet discontinuation: Resuming Sinemet not recommended for behavioral concerns. Please see Neuro note for details.     Tremors  longstanding though appeared to worsen following initiation of clozapine  - Ativan 0.5 mg TID  - Cogentin 1 mg BID added on 8/25 with overall improvement noted    Thrombocytopenia, resolved  Likely secondary to Depakote.  According to the, incidents of Depakote induced thrombocytopenia as 27%.  Depakote dose reduced from 1000 twice daily to 250 3 times daily on 7/28/2023 with subsequent resolution of thrombocytopenia  - weekly CBCs    Constipation  Likely worsened by clozapine, improved since modifying regimen.   - daily miralax   - daily Senokot 2 tablets BID      Right first toe fracture:  Seen by Ortho on 6/16:  Per note: Vinod Lee is a 62 year old  male w/ PMHx complex psychiatric history who has a fracture  "to the distal phalanx of the right toe after a recent trauma to the foot the patient reports.  Patient denies any other pain or discomfort.  Images reviewed and plan will be for irrigation of the popped fracture blister with sterile saline and the toes should be dressed and a 4 x 4 gauze dressing.  Patient will need a postop shoe which she can be weightbearing as tolerated in.  Would recommend a course of antibiotics for approximately 7 days.  Would recommend Augmentin or clindamycin.  Podiatry will be made aware of patient and will see patient while he is admitted or if patient is discharged in the near future a follow-up appointment will need to be established.     Remainder of care per primary team.  Primary team should make sure that patient is up-to-date on his tetanus shot.  If not patient will require a booster shot.    Hx of prolonged QTc:  Continue weekly EKGs. See above for details.   Discussed most recent EKG findings with Cardiology on 8/25. Corrected QTc is 501 from EKG on 8/23. Per cardiology, repeat EKG today. If corrected QTc is > 500, will need to reduce clozapine. If < 500, OK to keep clozapine at current dose. UPDATE/ADDENDUM 9/6: Repeat EKG with corrected QTc of 440. No need to adjust clozapine dose per cards.     Urinary retention, resolved  Per patient care order:   If patient is refusing straight caths, please notify IM provider immediately to determine whether this constitutes a medical emergency. If IM declares medical emergency, may restrain patient for straight cath. Discussed this with patient's parents/substitute decision makers on 6/7 who are in support of this plan.    # Psoriasis hx  # Purplish discoloration of B/LE feet-resolved   Discussed with Dr. Rene and bedside RN regarding rash on right foot that appears and appears to be purplish in color and almost \"petechiae.\" It was noted during interview, but seemed to be resolving upon walking. Within the past 24 hrs, pt has had ECT " and seemed more confused than usual. Pt seems to have had a right great toe wound with recent right great toe fracture seen by Medicine on 7/16. Seen on 8/4 with improving color on B/L legs at rest and appears to have small, scaly patches of varying coin sizes that have not grown past marked borders. See photos. Per further chart review, has had psoriasis history. WBC WNL, no fevers, HDS. This appears to more consistent with a psoriasis like picture.   - Start triamcinolone topical 0.025%   -Apply twice daily until lesions for 2 weeks or until lesions resolve/  -Monitor for skin thinning, striae, rebound lesion flares.   - Start Eucerin cream              - Apply 30 minutes PRIOR to triamcinolone topical cream above or PRN as needed if dry skin/after bathing   - Medicine will sign off.   - For worsening pain, spread, itchiness, fevers, please contact Medicine and possibly Dermatology.    Benzodiazepine dependence:  Phenobarbital taper completed shortly after patient's admission    Pertinent labs/imaging:  Weekly CBC with diff     Behavioral/Psychological/Social:  - Encourage unit programming    Safety:  - Continue precautions as noted above  - Status 15 minute checks  - Continue 1:1 for staff and pt safety    Legal Status: Fully committed with [facility] and [facility]on. [facility] medications: clozapine, olanzapine, risperidone, quetiapine      Disposition Plan   No further change in treatment plan  Reason for ongoing admission: No safe alternative at this time  Discharge location: TBD. Will likely need memory care unit  Discharge Medications: not ordered  Follow-up Appointments: not scheduled      No further change in treatment plan  Patient seen, chart reviewed, case reviewed with  and with nursing.   Case reviewed in multi-disciplinary treatment team.      Jermaine Griffith MD

## 2023-09-13 NOTE — PROGRESS NOTES
09/13/23 1811   Group Therapy Session   Group Attendance attended group session   Total Time (minutes) 45   Group Type task skill;recreation   Group Topic Covered coping skills/lifestyle management;leisure exploration/use of leisure time;structured socialization     Pt attended 45 minutes of programming engaging in some morning relaxation time using electronic foot massager in the hallway while occasionally interacting with staff.  Prior to this, pt was in and out of his room, seemingly more unsettled/not sure what to do as writer tried to have 1:1 time with him.  Writer put a poster of simple upper and lower body exercises in his room (on wall) to begin a daily exercise routine, though he wasn't interested as we started to discuss.   Later pt played cards with a peer for about 25 minutes, then randomly started saying things like he would rather be laying withering away in his bed, and opted to leave group.

## 2023-09-13 NOTE — PLAN OF CARE
Goal Outcome Evaluation:    Plan of Care Reviewed With: patient        Problem: Adult Behavioral Health Plan of Care  Goal: Individualized Daily Interaction Plan (IDIP)  Outcome: Progressing   Pt continues on SIO, no behavior outburst this shift, patient was calm, he walked in hallway twice this shift otherwise isolated to his room resting in bed all shift. He presented with calm mood and flat affect. He denied c/o pain, denied all mental health symptoms, he was alert but confused to situation and place. He was med compliant, ate his meals in room with good appetite and drunk adequately. Attended group later afternoon. Groin redness improving, skin intact, treatment completed. Speech therapy assessed patient today for swallowing difficulties. Patient was not noted responding to internal stimuli, mild drooling and tremors to bilateral hands noted.

## 2023-09-13 NOTE — PLAN OF CARE
Speech Language Therapy Discharge Summary    Reason for therapy discharge:    No further expectations of functional progress.    Progress towards therapy goal(s). See goals on Care Plan in [facility] electronic health record for goal details.  Goals met    Therapy recommendation(s):    No further therapy is recommended.  Continue current diet of Regular textures and thin liquids as tolerated. Staff to cue patient to eat slowly, take small bites, take a sip of liquid after every 2-3 bites, and swallow the food already in his mouth before taking another bite. Continue to give pills mixed in applesauce or pudding. Encourage patient to accept assistance with feeding to promote adequate intake.

## 2023-09-13 NOTE — PLAN OF CARE
Assessment/Intervention/Current Symtoms and Care Coordination:  Reviewed chart. Met with team to review patient's current functioning, needs, progress, and impediments to discharge.    Called [facility] in [place], no answer, left voicemail.     Called [facility], 1 current opening in Memory Care. KING signed, referral sent. Denied at 2:17 pm - they don't accept folks under age 65.     Discharge Plan or Goal:  Seeking placement in a memory care unit, preferably in the [facility]     Barriers to Discharge:  Referrals are being sent to memory care units who have openings. One barrier is the limited openings in memory care units at this time.     Referral Status:  [facility] in [place] 8/22, no memory care 8/30  [facility] in [place] 8/15, no memory care 8/31  [facility] 8/31, no openings 8/31  [facility], declined 9/8  [facility] 8/29, no open beds 8/30  [facility] 8/30  [facility] 8/31, no open beds 8/31  [facility] 8/31, no open beds 8/31  [facility] 8/31  [place] in [facility] 8/31, no openings and private pay 8/31  [facility], 8/31, declined 9/1 due to aggressive behaviors  [facility], 9/13, declined 9/13     Legal Status:  Commitment with [facility]: [place]  File Number: [id number]  Issued 07/06/23 and is not to exceed six months    Contacts:  : Vicky Valdez with [facility], [phone]  Psychiatrist: Dr. Santana at [facility], [phone] PCP: Attila Urena,   Mom: Alberta, [phone] (H) [phone] (M)   Dad: Ino, [phone] (H)  Sister, Sandra Treadwell, [phone] (KING)   [facility]'s Office Fax: [phone]  Pia May: [phone] (KING)  [place]: Jasmin phone [phone], fax [phone]  CADI  with [facility]: Regina Wong, [phone],  [email or web address]    Upcoming Meetings and Dates/Important Information and next steps:

## 2023-09-13 NOTE — PROGRESS NOTES
"Speech-Language Pathology: Clinical Swallow Evaluation      09/12/23 8348   Appointment Info   Signing Clinician's Name / Credentials (SLP) Jeanie Pack MA, CCC-SLP   General Information   Onset of Illness/Injury or Date of Surgery 05/26/23   Referring Physician Jermaine Griffith MD   Pertinent History of Current Problem   Per chart review, \"Patient was admitted to psychiatric unit for safety, stabilization and medication management. He has had schizophrenia since the 1980. He was on Clozaril x 25 years, and it was tapered and discontinued on May 7, 2023  due to prolonged qtc of 527, and his psychotic  symptoms have worsened since then. Sinemet was also discontinued recently due to concerns that it was contributing to paranoia and AH. He is agitated, aggressive, dangerous to self and others. He remains on SIO 2 to 1, and is under psychiatric emergency and court hold. There are concerns for organic etiology given pattern of sundowning, history of parkinsonism, and ongoing disorientation/confusion.\"     Per nursing progress note dated 9/11/23, \"Refused breakfast and verbalized not hungry despite multiple approaches.   Ate 85% of lunch with staff assistance. Difficulty swallowing observed when eating lunch. Dr griffith updated and order received for speech consult.\"     General Observations   Patient has difficulty feeding himself due to bilateral UE tremors. He has previously found weighted utensils helpful, however, his 1:1 attendant reports that these, as well as wrist weights, were tried last week. Unfortunately, use of the adaptive equipment did not reduce patient's UE tremors when self-feeding. He does not accept assistance with feeding. Attendant feels that patient gets frustrated with his difficulty feeding himself, then basically gives up and doesn't finish his meals.     Type of Evaluation   Type of Evaluation Swallow Evaluation   Oral Motor   Oral Musculature unable to assess due to poor " "participation/comprehension   Dentition (Oral Motor)   Comment, Dentition (Oral Motor) Unable to assess. Patient declined to open his mouth.   Facial Symmetry (Oral Motor)   Facial Symmetry (Oral Motor) unable/difficult to assess   Lip Function (Oral Motor)   Lip Range of Motion (Oral Motor) unable/difficult to assess   Lip Strength (Oral Motor) unable/difficult to assess   Tongue Function (Oral Motor)   Tongue Strength (Oral Motor) unable/difficult to assess   Tongue Coordination/Speed (Oral Motor) unable/difficult to assess   Tongue ROM (Oral Motor) unable/difficult to assess   Jaw Function (Oral Motor)   Jaw Function (Oral Motor) unable/difficult to assess   Comment, Jaw Function (Oral Motor) Patient was noted to have an intermittent jaw tremor at rest.   Cough/Swallow/Gag Reflex (Oral Motor)   Volitional Throat Clear/Cough (Oral Motor) unable/difficult to assess   Volitional Swallow (Oral Motor) unable/difficult to assess   Vocal Quality/Secretion Management (Oral Motor)   Vocal Quality (Oral Motor) WFL;hypophonic   Secretion Management (Oral Motor) WNL   Comment, Vocal Quality/Secretion Management (Oral Motor) Patient's vocal quality is clear. Mild hypophonia noted.   General Swallowing Observations   Past History of Dysphagia   Per review of EMR, patient participated in a videofluoroscopic swallow study (VFSS) at [facility] on 8/12/22. Evaluating SLP's report stated the following:     \"Pt demonstrated a mild oropharyngeal dysphagia during instrumental assessment, presenting with a mild risk for aspiration, and will benefit from initiation of a modified PO diet with strict adherence to aspiration precautions. No aspiration appreciated. Consistent shallow penetration with thin liquids which fully cleared by the end of the swallow. Single episode of deeper penetration with thin liquids which did not immediately clear, though eventually cleared during subsequent swallows.\"     Comment, General Swallowing Observations " Details of patient's swallowing difficulty yesterday are unknown.   Respiratory Support (General Swallowing Observations) none   Current Diet/Method of Nutritional Intake (General Swallowing Observations, NIS) regular diet;thin liquids (level 0)   Swallowing Evaluation Clinical swallow evaluation   Clinical Swallow Evaluation   Feeding Assistance minimal assistance required  (Patient has difficulty with self-feeding due to bilateral UE tremors. He declined assistance during this session. Per his 1:1 attendant, patient typically declines assistance during meals.)   Clinical Swallow Evaluation Textures Trialed thin liquids;pureed;solid foods   Clinical Swallow Eval: Thin Liquid Texture Trial   Mode of Presentation, Thin Liquids straw;self-fed  (Patient kept cup on the table and leaned down to take sips by straw.)   Volume of Liquid or Food Presented ~4 ounces   Oral Phase of Swallow WFL   Pharyngeal Phase of Swallow intact  (No overt s/sx of aspiration)   Diagnostic Statement No overt clinical s/sx of aspiration.   Clinical Swallow Evaluation: Puree Solid Texture Trial   Mode of Presentation, Puree spoon;self-fed   Volume of Puree Presented ~3 ounces   Oral Phase, Puree WFL   Pharyngeal Phase, Puree throat clearing  (x2)   Diagnostic Statement Patient presented with adequate bolus control/prep. A-P bolus transit appeared timely and effective. Patient demonstrated intermittent impulsivity with self-feeding, taking several bites of mashed potatoes in rapid succession. This resulted in two separate episodes of mild throat clearing. Patient was able to follow verbal cues to reduce his rate of intake and to complete a liquid wash every 2-3 swallows.   Clinical Swallow Evaluation: Solid Food Texture Trial   Mode of Presentation self-fed   Volume Presented 8 bites  (1 piece of pizza)   Oral Phase WFL   Pharyngeal Phase throat clearing  (x1)   Diagnostic Statement Patient presented with adequate mastication. He used rotary  chewing with reduced lip closure. Unable to assess oral clearance as patient declined to open his mouth. Throat clearing noted x1. This appeared related to the fact that patient had taken multiple bites in a row without pausing to take a sip of liquid. No overt clinical s/sx of aspiration observed.   Esophageal Phase of Swallow   Patient reports or presents with symptoms of esophageal dysphagia No   Swallowing Recommendations   Diet Consistency Recommendations regular diet;thin liquids (level 0)   Supervision Level for Intake 1:1 supervision needed  (To ensure that patient adheres to safe swallowing precautions)   Mode of Delivery Recommendations bolus size, small;slow rate of intake;food moistened   Swallowing Maneuver Recommendations alternate food and liquid intake   Monitoring/Assistance Required (Eating/Swallowing) stop eating activities when fatigue is present;monitor for cough or change in vocal quality with intake   Recommended Feeding/Eating Techniques (Swallow Eval) maintain upright sitting position for eating;minimize distractions during oral intake;set-up and prepare tray;provide assist with feeding  (Nursing to provide assist with feeding as patient allows)   Medication Administration Recommendations, Swallowing (SLP) Continue to administer pills in applesauce or pudding   Instrumental Assessment Recommendations instrumental evaluation not recommended at this time   General Therapy Interventions   Planned Therapy Interventions Dysphagia Treatment   Dysphagia treatment Instruction of safe swallow strategies   Clinical Impression   Criteria for Skilled Therapeutic Interventions Met (SLP Eval) Yes, treatment indicated   SLP Diagnosis Mild oral dysphagia   Risks & Benefits of therapy have been explained evaluation/treatment results reviewed;care plan/treatment goals reviewed;participants voiced agreement with care plan;participants included;patient   Clinical Impression Comments   Clinical swallow  evaluation completed per MD order. Patient presents with mild impulsivity with self-feeding, sometimes taking several bites in rapid succession. It appeared as though he had not fully cleared the previous bolus from his mouth prior to taking another bite. However, this was rather difficult to discern as patient would not open his mouth to allow SLP to evaluate for oral residue. Patient cleared his throat a few times when eating solid foods, presumably due to incomplete oropharyngeal clearance and rapid rate of intake. No overt s/sx of aspiration were observed with thin liquid taken by straw.    At this time, patient appears appropriate to continue current diet of Regular textures and thin liquids with use of the safe swallowing strategies indicated above. He will require some cueing from his 1:1 attendant to consistently follow strategies. Anticipate short course of Speech Therapy to monitor diet tolerance and reinforce strategies with patient and caregivers.     SLP Total Evaluation Time   Eval: oral/pharyngeal swallow function, clinical swallow Minutes (73002) 17   SLP Goals   Therapy Frequency (SLP Eval) 4 times/wk   SLP Predicted Duration/Target Date for Goal Attainment 09/19/23   SLP Goals Swallow   SLP: Safely tolerate diet without signs/symptoms of aspiration Regular diet;Thin liquids;With use of swallow precautions;With assistance/supervision   Interventions   Interventions Quick Adds Swallowing Dysfunction   Swallowing Dysfunction &/or Oral Function for Feeding   Treatment of Swallowing Dysfunction &/or Oral Function for Feeding Minutes (40772) 8   Treatment Detail/Skilled Intervention Patient was given verbal cues to use the strategies of reduced rate of intake and completing a liquid wash after every 2-3 bites to help clear any residuals in the oropharynx. He demonstrated understanding by carrying out these strategies when cued, but rarely used them independently.   SLP Discharge Planning   SLP Plan  Verify that current diet is still appropriate. Reinforce safe swallowing strategies. Likely 1-2 more sessions.   SLP Discharge Recommendation   (Defer to treatment team)   SLP Rationale for DC Rec Suspect that swallow function is at/near baseline. Do not anticipate need for ongoing Speech Therapy at discharge.   SLP Brief overview of current status  Continue current diet of Regular textures and thin liquids as tolerated. Staff to cue patient to eat slowly, take small bites, take a sip of liquid after every 2-3 bites, and swallow the food already in his mouth before taking another bite. Continue to give pills mixed in applesauce or pudding.   Total Session Time   Total Session Time (sum of timed and untimed services) 25

## 2023-09-13 NOTE — PROGRESS NOTES
"Speech Therapy       09/13/23 1099   Appointment Info   Signing Clinician's Name / Credentials (SLP) Jeanie Pack MA, CCC-SLP   Swallowing Dysfunction &/or Oral Function for Feeding   Treatment of Swallowing Dysfunction &/or Oral Function for Feeding Minutes (75458) 8   Treatment Detail/Skilled Intervention Patient seen for dysphagia treatment. He was eating lunch upon SLP's arrival, but suddenly stopped, stating he didn't want to eat anymore. He had consumed half of a sandwich, but no other foods from his tray. Per 1:1 attendant, no coughing or choking noted with sandwich. SLP reviewed safe swallowing strategies with patient and nursing staff; specifically, eating slowly, taking small bites, taking a sip of liquid after every 2-3 bites, and swallowing the food already in his mouth before taking another bite. Patient appeared to be listening, but then stated, \"I'm not sure what you're talking about.\" SLP explained to patient that staff want him to be safe when eating and drinking, and following safe swallowing strategies can minimize his risk of aspiration/choking. Patient indicated understanding, but then started to go off on a tangent about the fact that he is \"not going to be around much longer\". SLP encouraged staff to reinforce safe swallowing precautions during meals. Staff today and yesterday have not witnessed him having difficulty swallowing. They state that his primary issue with eating is his frustration with self-feeding due to UE tremors. Unfortunately, it seems like patient is resistive to receiving assistance during meals. No further Speech Therapy needs at this time. Will sign off, however, please re-consult if new concerns arise.   SLP Discharge Planning   SLP Plan Will discontinue Speech Therapy and complete current orders at this time.   SLP Discharge Recommendation   (Defer to treatment team)   SLP Rationale for DC Rec Swallow function appears at baseline. No further Speech Therapy needs at " this time.   SLP Brief overview of current status  Continue current diet of Regular textures and thin liquids as tolerated. Staff to cue patient to eat slowly, take small bites, take a sip of liquid after every 2-3 bites, and swallow the food already in his mouth before taking another bite. Continue to give pills mixed in applesauce or pudding. Encourage patient to accept assistance with feeding to promote adequate intake.   Total Session Time   Total Session Time (sum of timed and untimed services) 8

## 2023-09-13 NOTE — PLAN OF CARE
"Pt has a flat affect with somber mood. Pt endorses a \"little\" anxiety and denies any depression. Pt rates pain at 0/10. Pt reports no SI/HI/SIB and contracts for safety. Pt denies any hallucinations and not noted responding to any internal stimuli. Pt was medication compliant. Pt noted to have drooling and tremors. Pt is disoriented to time and situation. Pt ate 75% of supper with encouragement and assist. Pt was mostly in his room this shift. Pt ambulated twice this shift with staff. Pt took a shower and was assisted with alejandro care and anti fungal powder was applied to groin. Continue current POC.      Plan of Care Reviewed With: patient                   "

## 2023-09-13 NOTE — PROVIDER NOTIFICATION
09/13/23 1629   Individualization/Patient Specific Goals   Patient Personal Strengths community support;expressive of emotions;family/social support;humor;insight into illness/situation;interests/hobbies;resilient;resourceful   Patient Vulnerabilities housing insecurity;poor physical health;traumatic event   Interprofessional Rounds   Summary Referring to memory care units.   Participants psychiatrist;nursing;CTC   Behavioral Team Discussion   Participants Hina Aragon [facility], Jess Rogers RN, Cecille Burris RN, Sharath RN, Christopher RN   Anticipated length of stay 14 days   Continued Stay Criteria/Rationale Seeking placement into memory care unit   Anticipated Discharge Disposition nursing/skilled nursing care     PRECAUTIONS AND SAFETY    Behavioral Orders   Procedures    Assault precautions    Code 1 - Restrict to Unit    Code 2    Code 2 - 1:1 Staff Supervision     For ECT only    Electroconvulsive therapy     Series of up to 12 treatments. Begin Date: 8/2/23     Treating Psychiatrist providing ECT:  unknown     Notified on:  7/28/23 via this order  NOTE: We received verbal confirmation from [facility] that Lorenzo Pavon has been approved but awaiting official court order and risk management's review  Initial consult was completed by Dr. Hicks on 7/18/23    Electroconvulsive therapy     Series of up to 12 treatments. Begin Date: 8/2/23     Treating Psychiatrist providing ECT:  Dominga     Notified on:  8/2/23    Elopement precautions    Fall precautions    Occupational Therapy on the Unit     Order Specific Question:   Reason for Consult     Answer:   Eval of thought process, functional skills and behavior     Order Specific Question:   Course of Action:     Answer:   Evaluation only     Order Specific Question:   Treatment Prescription:     Answer:   CPT requested    Routine Programming     As clinically indicated    Self Injury Precaution    Status 15     Every 15 minutes.    Status Individual  Observation     Patient SIO status reviewed with team/RN.  Please also refer to RN/team documentation for add'l detail.    -SIO staff to monitor following which have contributed to patient being on SIO:  Agitation  Pt is impulsive   Pt has ran out of his room naked  Pt has Parkinson symptoms place him in a high fall risk  Pt has verbal outburst of sexual and threaten statements  Pt requires immediate redirection when masturbating    -Possible interventions SIO staff could use to support patient's treatment progress:  Engage pt in activities    -When following observed, team will review discontinuation of SIO:  Absence of aggression toward others for > 24 hours    Comments: SIO 1:1     Order Specific Question:   CONTINUOUS 24 hours / day     Answer:   5 feet     Order Specific Question:   Indications for SIO     Answer:   Severe intrusiveness     Order Specific Question:   Indications for SIO     Answer:   Assault risk    Suicide precautions     Patients on Suicide Precautions should have a Combination Diet ordered that includes a Diet selection(s) AND a Behavioral Tray selection for Safe Tray - with utensils, or Safe Tray - NO utensils         Safety  Safety WDL: WDL  Patient Location: patient room, own  Observed Behavior: sleeping  Observed Behavior (Comment): sleeping  Airway Safety Measures: all equipment/monitors on and audible  Safety Measures: safety rounds completed  Diversional Activity: other (see comments) (walking)  Suicidality: SIO (Status Individual Observation)  (NOTE - order will specify distance, Behavioral scrubs (pajamas), Minimal furniture in room, Minimal personal belongings in room, Optimize communication / relationship to minimize opportunity for self-harm (FALL & SIB precautions)  Seizure precautions: clutter free environment  Assault: private room, behavioral scrubs (pajamas), minimal furniture in room, status continuous sight, minimal personal belongings in room  Elopement Assessment:  Statements about wanting to leave  Elopement Interventions: status continuous sight, behavioral scrubs (pajamas), signs posted on unit entrance / exit doors  Sexual: status 15, status continuous sight, private room  Additional Documentation:  (SIB preacaution)

## 2023-09-14 PROCEDURE — 250N000013 HC RX MED GY IP 250 OP 250 PS 637: Performed by: PSYCHIATRY & NEUROLOGY

## 2023-09-14 PROCEDURE — 124N000002 HC R&B MH UMMC

## 2023-09-14 PROCEDURE — 99233 SBSQ HOSP IP/OBS HIGH 50: CPT | Performed by: PSYCHIATRY & NEUROLOGY

## 2023-09-14 PROCEDURE — 250N000013 HC RX MED GY IP 250 OP 250 PS 637: Performed by: PHYSICIAN ASSISTANT

## 2023-09-14 PROCEDURE — 250N000013 HC RX MED GY IP 250 OP 250 PS 637: Performed by: STUDENT IN AN ORGANIZED HEALTH CARE EDUCATION/TRAINING PROGRAM

## 2023-09-14 RX ORDER — BENZTROPINE MESYLATE 1 MG/1
1 TABLET ORAL 3 TIMES DAILY
Status: DISCONTINUED | OUTPATIENT
Start: 2023-09-14 | End: 2023-09-16

## 2023-09-14 RX ADMIN — ATROPINE SULFATE 2 DROP: 10 SOLUTION/ DROPS OPHTHALMIC at 08:24

## 2023-09-14 RX ADMIN — LORAZEPAM 0.5 MG: 0.5 TABLET ORAL at 19:07

## 2023-09-14 RX ADMIN — GABAPENTIN 300 MG: 300 CAPSULE ORAL at 08:23

## 2023-09-14 RX ADMIN — NAPROXEN 250 MG: 250 TABLET ORAL at 19:07

## 2023-09-14 RX ADMIN — ATROPINE SULFATE 2 DROP: 10 SOLUTION/ DROPS OPHTHALMIC at 14:12

## 2023-09-14 RX ADMIN — MICONAZOLE NITRATE: 20 POWDER TOPICAL at 08:24

## 2023-09-14 RX ADMIN — GABAPENTIN 300 MG: 300 CAPSULE ORAL at 19:07

## 2023-09-14 RX ADMIN — SENNOSIDES 2 TABLET: 8.6 TABLET ORAL at 19:06

## 2023-09-14 RX ADMIN — SENNOSIDES 2 TABLET: 8.6 TABLET ORAL at 08:23

## 2023-09-14 RX ADMIN — GABAPENTIN 300 MG: 300 CAPSULE ORAL at 14:12

## 2023-09-14 RX ADMIN — BENZTROPINE MESYLATE 1 MG: 1 TABLET ORAL at 14:12

## 2023-09-14 RX ADMIN — TAMSULOSIN HYDROCHLORIDE 0.4 MG: 0.4 CAPSULE ORAL at 08:22

## 2023-09-14 RX ADMIN — BENZTROPINE MESYLATE 1 MG: 1 TABLET ORAL at 08:23

## 2023-09-14 RX ADMIN — LORAZEPAM 0.5 MG: 0.5 TABLET ORAL at 14:12

## 2023-09-14 RX ADMIN — OLANZAPINE 15 MG: 10 TABLET, ORALLY DISINTEGRATING ORAL at 19:07

## 2023-09-14 RX ADMIN — POLYETHYLENE GLYCOL 3350 17 G: 17 POWDER, FOR SOLUTION ORAL at 08:24

## 2023-09-14 RX ADMIN — CLOZAPINE 650 MG: 100 TABLET, ORALLY DISINTEGRATING ORAL at 11:52

## 2023-09-14 RX ADMIN — DIVALPROEX SODIUM 250 MG: 250 TABLET, DELAYED RELEASE ORAL at 19:08

## 2023-09-14 RX ADMIN — TRAZODONE HYDROCHLORIDE 50 MG: 50 TABLET ORAL at 00:45

## 2023-09-14 RX ADMIN — BENZTROPINE MESYLATE 1 MG: 1 TABLET ORAL at 19:08

## 2023-09-14 RX ADMIN — FLUOXETINE 20 MG: 20 CAPSULE ORAL at 08:23

## 2023-09-14 RX ADMIN — WHITE PETROLATUM: 1.75 OINTMENT TOPICAL at 08:24

## 2023-09-14 RX ADMIN — DIVALPROEX SODIUM 250 MG: 250 TABLET, DELAYED RELEASE ORAL at 14:12

## 2023-09-14 RX ADMIN — MICONAZOLE NITRATE: 20 POWDER TOPICAL at 19:12

## 2023-09-14 RX ADMIN — NAPROXEN 250 MG: 250 TABLET ORAL at 08:23

## 2023-09-14 RX ADMIN — OLANZAPINE 15 MG: 10 TABLET, ORALLY DISINTEGRATING ORAL at 08:22

## 2023-09-14 RX ADMIN — POLYETHYLENE GLYCOL 3350 17 G: 17 POWDER, FOR SOLUTION ORAL at 19:10

## 2023-09-14 RX ADMIN — LORAZEPAM 0.5 MG: 0.5 TABLET ORAL at 08:23

## 2023-09-14 RX ADMIN — ATROPINE SULFATE 2 DROP: 10 SOLUTION/ DROPS OPHTHALMIC at 19:11

## 2023-09-14 RX ADMIN — CYANOCOBALAMIN TAB 1000 MCG 1000 MCG: 1000 TAB at 08:23

## 2023-09-14 RX ADMIN — DIVALPROEX SODIUM 250 MG: 250 TABLET, DELAYED RELEASE ORAL at 07:12

## 2023-09-14 ASSESSMENT — ACTIVITIES OF DAILY LIVING (ADL)
ADLS_ACUITY_SCORE: 84
HYGIENE/GROOMING: PROMPTS
ADLS_ACUITY_SCORE: 84
ADLS_ACUITY_SCORE: 84
HYGIENE/GROOMING: PROMPTS
DRESS: PROMPTS
ORAL_HYGIENE: PROMPTS
DRESS: SCRUBS (BEHAVIORAL HEALTH);PROMPTS
ADLS_ACUITY_SCORE: 84
ORAL_HYGIENE: PROMPTS
ADLS_ACUITY_SCORE: 84

## 2023-09-14 NOTE — CONSULTS
OCCUPATIONAL THERAPY:  Independent Living Skills Evaluation / CPT / MoCA   [facility],         Che Pinto, OTR/L    Patient Name      Vinod Lee  Date of Assessment:      9-   Time of day:     early afternoon    THE RESULTS OF THIS ASSESSMENT PROVIDE RECOMMENDATIONS BASED UPON FUNCTION AT THE TIME OF ADMINISTRATION ONLY, MAY NOT REFLECT BASELINE FUNCTION.    COGNITIVE PERFORMANCE TEST: The CPT is a standardized, performance-based assessment used to assess cognitive-functional information processing and executive processing.  This test is based on performance of some common activities. The level of performance may help to describe how information is being processed, potential safety risks and recommendations for supervision needs in the individual's living environment.  Updated with 2018 guidelines    JOSE ALBERTO COGNITIVE ASSESSMENT (MoCA)    VERSION  __8.1 attempted   The [place] Cognitive Assessment (MoCA) was designed as a rapid screening instrument for mild cognitive dysfunction. It assesses different cognitive domains: attention and concentration, executive functions, memory, language, visuoconstructional skills, conceptual thinking, calculations, and orientation. The total possible score is 30 points; a score of 26 or above is considered normal.    JOSE ALBERTO COGNITIVE ASSESSMENT SCORE:  INCOMPLETE : WITH AGREEING TO ANSWER ONLY 6 QUESTIONS / 30 which WOULD BE EXPECTED TO SCORE below the normal range     Draw a Clock Subtest: Score:    1  / 3 Made attempts to draw a [place] though with hand tremors, was quite a shaky appearance (1 point was provided for the [place]. Also attempted to write a couple numbers inside the [place] though unable to recognize any resemblance to an appearance of a number. When offered for this author to write the numbers in, he stated it being  too hard  and did not know how to do this.    Details of scores:    SUBTASK SCORE SUBTASK SCORE    Visuospatial/Executive    1  /5 Abstraction  refused   /2   Naming    3  /3 Delayed Recall  refused   /5   Attention   refused   /6 Orientation  refused   /6   Language   refused   /3         CPT RESULTS:    SUBTASK SCORE COMMENTS   Phone  4 /6 Needed phone number provided. Asked questions related to information requested.   Shop  3.5  /6 Paid the incorrect $ amount.   Med Box  4 /6 Was unsuccessful in following any written directions with specific cues provided.   Wash  5  /5 Completed concrete task successfully.   Toast  3.5 - 4  /5 Incomplete though expected result included       TOTAL CPT TASK 5 AVERAGE SCORE:   4.0 - 4.2  / 5.6  which is well below the normal range.    GENERAL RECOMMENDATIONS BASED UPON THE COGNITIVE PERFORMANCE TEST (CPT) SCORE.  Note these are ranges and there may be fluctuations in abilities for an individual at different times of days                                                                                                             LEVEL 4.0  Moderate functional decline noted by concrete thinking and deficits with abstract concepts like planning and problem solving.  Familiar routines along with visual cues are relied upon to know what to do. Behavior is goal-directed, unable to follow multi-step directions, is easily distracted, and may not recognize mistakes.  Inability to anticipate hazards or understand precautions.  Slowed thinking process. Day programs, routines and structured schedules may be beneficial. At this point Independent living is not safe.   Safety:  Recommend 24-hour supervision for safety.  Medication management should be done by others and monitor for hazardous activities (use of sharp objects, heat or household chemicals).  May get lost in unfamiliar surroundings.  Inability to follow restricted diet.  Not safe to drive.  IADL:  Will need assistance to be done by and with others. Some decline in quality or frequency of ADL. [place] enhanced by use of a  routine, simple concrete directions, and caregiver set-up of needed items.  More complex tasks such as money or home management need to be done by others.  May rely heavily on vision to guide behavior and may ignore objects/hazards that are not in plain sight.    Often have poor insight into their level of disability.  May carry out social conversation and may be verbally able to  cover  for deficits leading caregivers to believe they are capable of functioning independently.   Supervision is essential with hazardous activities and may need restriction.          ASSESSMENT/RECOMMENDATIONS (based upon assessment/group observation)    This OT author met with Vinod on 2 occasions on 9-. In the late am on this date, he declined participating in this testing though seemed willing to sit and chat and listen to 2 songs of the Beatles Music group together, in the attempts for this author to build some rapport with him. We discussed meeting after lunch with hopes of completing this testing to gather additional information for placement after discharge, which he agreed to.      After lunch he was approached and agreed to work on the tasks of the testing. On multiple occasions during this session, he stated the need to lie down and  rest . At that time author encouraged to rest as long as needed to then to progress with the testing when he was ready.   He was social during some of the  rests , explaining  I am a bad person. I had a bad childhood and that's why I am the way I am . He was given support and encouragement and thanked several times for treating this author with respect. This seemed to note a change in his tone of voice and the times when telling author, he was  done  and to then continue with the session. On several occasions, he seemed to suddenly feel the need to move, to walk, to lie down, with explaining he is hearing voices and appearing restless at sudden moments.    Hand tremors interfered with his  fine motor coordination of using a pencil when attempts were made in him drawing a [place]. He was noted to be drooling on a couple occasions which he used a washcloth to clean his mouth with.     Vinod successfully completed 4 of the 5 typical subtasks of the CPT. Assistance was provided with the Med task with him explaining where author should place the pills in the pill box due to his extreme hand shakiness at that time. This author will speculate the expected score because of not administering the last task due to the concerns of utilizing the supplies needed (the Toast Task). It is expected that Vinod would have scored a total score on the CPT 4.0 - 4.2 which is well below the normal range. Vinod agreed to complete only 6 questions on the MoCA.  At that time, he stated needing this author to be finished with the testing. Note the MoCA score on the Visualspatial/Executive question of 1/5. Author provided some assistance with drawing what he wanted and explained to complete the Trail and Clock questions due to his hand shakiness. Naming was correct with scoring 3/3 points correctly. At that time, he stated being disinterested in continuing. The scores on these assessments may indicate mild to moderate functional decline. It is also noted at times his ability he has demonstrated for brief periods of time, 5-15 minutes, in following directions in the Occupational Therapy groups with familiar structured activity tasks of interest and long past learning.    If Vinod continues to perform at the level demonstrated during this assessment, recommendations include 24 hour highly supervised living such as a Memory Care Unit to provide the supervision, support and assistance he would benefit from. Recommend all meals provided, medications being administered, finances being managed, and frequent checks to assist in increasing comfort in his surroundings, and increased guidance of structured time.    It may be worth trying some  unintrusive sensory calming techniques 3-4 X/day in a form of a Sensory Diet at scheduled times, to experiment if the input may have an affect to calm the Nervous System, in addition to all other forms of treatment currently being provided. The other additional goal with these techniques is to work towards increasing comfort at the moment, concentration and attention span. Ideas that may be considered include trying weighted blanket or shoulder wrap, the foot massager he is currently using, fidgets, music (calming or the Beatles), walking, lavender oil or patches, viewing and if interested - talking about the viewed nature out the window, or lotion on his hands (He was willing to use the waterless soap before the testing without any complaints), to name a few options.    The scores on this assessment, if indicative of his current abilities, this may indicate mild functional decline. One might note difficulties in the areas of more complex problem solving, judgment, reasoning, abstract thinking, memory, decision making, planning and organizing. Difficulties with complex daily tasks may be noted. Seeing the  big picture  may be more challenging.    One would expect at this level of performance, thinking ability would be impaired having difficulty with perception and thought processing. He may have increased difficulties with new learning, memory, problem solving, judgment, organization, and planning. Memory impairment may cause fragmented retention of information and recall - of general knowledge. Abilities with familiar tasks may be inconsistent and may require assistance with performance. At this time, he may need help in initiating tasks as well as help in completing them and would benefit from provided cues. Anxiety and discomfort may increase when not knowing what to do or what is expected.  Reasoning and problem solving are quite limited and with confusion experienced about time, space, people, and events that  may lead to paranoia. He may exhibit delusional ideas, suspiciousness, and difficulty expressing thoughts or concerns. Confusion and discomfort increase when unfamiliar with the environment and will benefit in receiving support and reassurance in performance of tasks. There is more reliance on visual cues in the environment to provide ideas of what is expected in knowing what to do. Conversation may be brief, concrete, and repetitive. Environmental and interactions may be easily misinterpreted. Behavioral problems may present at times from the increased confusion and frustration experienced. He may or may not have the insight to that realization at the time. Reassurance, support and brief worded clear interactions and directions may be helpful at those times.
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
## 2023-09-14 NOTE — PLAN OF CARE
NOC Shift Report     Pt in bed at beginning of shift, breathing quiet and unlabored. Patient woke up and was unable to go back to sleep. He remained restless in bed and slept 1.25 hours. He was provided Trazodone however did not obtain relief from this.

## 2023-09-14 NOTE — PLAN OF CARE
Pt appears intermittently confused. During conversation with writer, pt stopped talking and reported that he forgotten what he was going to say and that he had forgotten what writer had been talking about. States that his memory is getting worse. When asked about voices, pt first stated that he did not have voices today and then stated that he only hears a few voices and that he has conversations with himself in his head.  During a later encounter, pt said that the voices told him that an astroid was coming. Pt continues to make paranoid statements, eg that everything in his room is contaminated.  Upon assessment, pt denied all psych symptoms.      Pt with encouragement came out to [facility] to eat dinner. Pt initially reluctant to eat anything, stating that he wasn't hunger. After numerous promptings, pt stated that he would eat. Pt repeatedly asked if he had to eat all of his food while he was eating. Ate approximately 75% of dinner.  OT has given pt assistive utensils, plate, and a wrist weight to aid with eating due to UE tremors. Pt did not use this shift. Assistive utensils in a bin next to pt medication bin.     Writer encouraged pt to come out to [facility] to watch the [employer] game, and pt was receptive to this. Pt was in [facility] for a significant duration of time watching the game.     Pt appears weak at times, having difficulty going from lying to sitting. Required assistance numerous times to transition to sitting.     A psych associate reported that pt felt dizzy. Vitals taken, with BP of 99/69. Pt upon assessment denied experiencing dizziness at that time and indicated to writer that it happen when he stood up. Pt during assessment stood up and reported that he was not experiencing dizziness now when he stood up. Given water, with vitals taken approximately 30 minutes later with BP then at 109/72.     Pt had cognitive testing performed today. See OT note for details.    Continued to apply anti fungal powder to  "groin. Continues to display hand tremors. Showered this shift, 20 minute limit enforced.     Maintains 1:1 for assault risk. No aggressive behaviors noted or reported this shift.     /72   Pulse 85   Temp 97.8  F (36.6  C) (Temporal)   Resp 16   Ht 1.753 m (5' 9\")   Wt 83.7 kg (184 lb 8 oz)   SpO2 96%   BMI 27.25 kg/m      Problem: Confusion Chronic  Goal: Optimal Cognitive Function  Outcome: Not Progressing  "
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
## 2023-09-15 LAB
ANION GAP SERPL CALCULATED.3IONS-SCNC: 8 MMOL/L (ref 7–15)
BASOPHILS # BLD AUTO: 0.1 10E3/UL (ref 0–0.2)
BASOPHILS # BLD AUTO: 0.1 10E3/UL (ref 0–0.2)
BASOPHILS NFR BLD AUTO: 1 %
BASOPHILS NFR BLD AUTO: 1 %
BUN SERPL-MCNC: 28.6 MG/DL (ref 8–23)
CALCIUM SERPL-MCNC: 8.9 MG/DL (ref 8.8–10.2)
CHLORIDE SERPL-SCNC: 105 MMOL/L (ref 98–107)
CREAT SERPL-MCNC: 1.04 MG/DL (ref 0.67–1.17)
DEPRECATED HCO3 PLAS-SCNC: 28 MMOL/L (ref 22–29)
EGFRCR SERPLBLD CKD-EPI 2021: 80 ML/MIN/1.73M2
EOSINOPHIL # BLD AUTO: 0 10E3/UL (ref 0–0.7)
EOSINOPHIL # BLD AUTO: 0 10E3/UL (ref 0–0.7)
EOSINOPHIL NFR BLD AUTO: 0 %
EOSINOPHIL NFR BLD AUTO: 0 %
ERYTHROCYTE [DISTWIDTH] IN BLOOD BY AUTOMATED COUNT: 14.3 % (ref 10–15)
ERYTHROCYTE [DISTWIDTH] IN BLOOD BY AUTOMATED COUNT: 14.3 % (ref 10–15)
GLUCOSE SERPL-MCNC: 121 MG/DL (ref 70–99)
HCT VFR BLD AUTO: 35.3 % (ref 40–53)
HCT VFR BLD AUTO: 40.8 % (ref 40–53)
HGB BLD-MCNC: 11.6 G/DL (ref 13.3–17.7)
HGB BLD-MCNC: 12.9 G/DL (ref 13.3–17.7)
IMM GRANULOCYTES # BLD: 0.1 10E3/UL
IMM GRANULOCYTES # BLD: 0.1 10E3/UL
IMM GRANULOCYTES NFR BLD: 1 %
IMM GRANULOCYTES NFR BLD: 1 %
LYMPHOCYTES # BLD AUTO: 1 10E3/UL (ref 0.8–5.3)
LYMPHOCYTES # BLD AUTO: 1.3 10E3/UL (ref 0.8–5.3)
LYMPHOCYTES NFR BLD AUTO: 11 %
LYMPHOCYTES NFR BLD AUTO: 9 %
MCH RBC QN AUTO: 27.1 PG (ref 26.5–33)
MCH RBC QN AUTO: 27.4 PG (ref 26.5–33)
MCHC RBC AUTO-ENTMCNC: 31.6 G/DL (ref 31.5–36.5)
MCHC RBC AUTO-ENTMCNC: 32.9 G/DL (ref 31.5–36.5)
MCV RBC AUTO: 83 FL (ref 78–100)
MCV RBC AUTO: 86 FL (ref 78–100)
MONOCYTES # BLD AUTO: 0.9 10E3/UL (ref 0–1.3)
MONOCYTES # BLD AUTO: 1.1 10E3/UL (ref 0–1.3)
MONOCYTES NFR BLD AUTO: 10 %
MONOCYTES NFR BLD AUTO: 8 %
NEUTROPHILS # BLD AUTO: 9.1 10E3/UL (ref 1.6–8.3)
NEUTROPHILS # BLD AUTO: 9.4 10E3/UL (ref 1.6–8.3)
NEUTROPHILS NFR BLD AUTO: 77 %
NEUTROPHILS NFR BLD AUTO: 81 %
NRBC # BLD AUTO: 0 10E3/UL
NRBC BLD AUTO-RTO: 0 /100
PLATELET # BLD AUTO: 182 10E3/UL (ref 150–450)
PLATELET # BLD AUTO: 184 10E3/UL (ref 150–450)
POTASSIUM SERPL-SCNC: 4.4 MMOL/L (ref 3.4–5.3)
RBC # BLD AUTO: 4.24 10E6/UL (ref 4.4–5.9)
RBC # BLD AUTO: 4.76 10E6/UL (ref 4.4–5.9)
SODIUM SERPL-SCNC: 141 MMOL/L (ref 136–145)
WBC # BLD AUTO: 11.4 10E3/UL (ref 4–11)
WBC # BLD AUTO: 11.6 10E3/UL (ref 4–11)

## 2023-09-15 PROCEDURE — 124N000002 HC R&B MH UMMC

## 2023-09-15 PROCEDURE — 99222 1ST HOSP IP/OBS MODERATE 55: CPT | Performed by: PHYSICIAN ASSISTANT

## 2023-09-15 PROCEDURE — 250N000013 HC RX MED GY IP 250 OP 250 PS 637: Performed by: PSYCHIATRY & NEUROLOGY

## 2023-09-15 PROCEDURE — 250N000009 HC RX 250: Performed by: PSYCHIATRY & NEUROLOGY

## 2023-09-15 PROCEDURE — 250N000013 HC RX MED GY IP 250 OP 250 PS 637: Performed by: STUDENT IN AN ORGANIZED HEALTH CARE EDUCATION/TRAINING PROGRAM

## 2023-09-15 PROCEDURE — 36415 COLL VENOUS BLD VENIPUNCTURE: CPT | Performed by: PHYSICIAN ASSISTANT

## 2023-09-15 PROCEDURE — 36415 COLL VENOUS BLD VENIPUNCTURE: CPT | Performed by: PSYCHIATRY & NEUROLOGY

## 2023-09-15 PROCEDURE — 99233 SBSQ HOSP IP/OBS HIGH 50: CPT | Performed by: PSYCHIATRY & NEUROLOGY

## 2023-09-15 PROCEDURE — 85014 HEMATOCRIT: CPT | Performed by: PHYSICIAN ASSISTANT

## 2023-09-15 PROCEDURE — 85025 COMPLETE CBC W/AUTO DIFF WBC: CPT | Performed by: PSYCHIATRY & NEUROLOGY

## 2023-09-15 PROCEDURE — 80048 BASIC METABOLIC PNL TOTAL CA: CPT | Performed by: PHYSICIAN ASSISTANT

## 2023-09-15 PROCEDURE — 250N000013 HC RX MED GY IP 250 OP 250 PS 637: Performed by: PHYSICIAN ASSISTANT

## 2023-09-15 RX ADMIN — GABAPENTIN 300 MG: 300 CAPSULE ORAL at 20:06

## 2023-09-15 RX ADMIN — CLOZAPINE 650 MG: 100 TABLET, ORALLY DISINTEGRATING ORAL at 14:00

## 2023-09-15 RX ADMIN — DIVALPROEX SODIUM 250 MG: 250 TABLET, DELAYED RELEASE ORAL at 14:01

## 2023-09-15 RX ADMIN — POLYETHYLENE GLYCOL 3350 17 G: 17 POWDER, FOR SOLUTION ORAL at 08:33

## 2023-09-15 RX ADMIN — HALOPERIDOL 5 MG: 5 TABLET ORAL at 11:27

## 2023-09-15 RX ADMIN — BENZTROPINE MESYLATE 1 MG: 1 TABLET ORAL at 14:01

## 2023-09-15 RX ADMIN — DIVALPROEX SODIUM 250 MG: 250 TABLET, DELAYED RELEASE ORAL at 06:48

## 2023-09-15 RX ADMIN — WHITE PETROLATUM: 1.75 OINTMENT TOPICAL at 21:05

## 2023-09-15 RX ADMIN — TAMSULOSIN HYDROCHLORIDE 0.4 MG: 0.4 CAPSULE ORAL at 08:32

## 2023-09-15 RX ADMIN — FLUOXETINE 20 MG: 20 CAPSULE ORAL at 08:31

## 2023-09-15 RX ADMIN — OLANZAPINE 15 MG: 10 TABLET, ORALLY DISINTEGRATING ORAL at 20:06

## 2023-09-15 RX ADMIN — ATROPINE SULFATE 2 DROP: 10 SOLUTION/ DROPS OPHTHALMIC at 14:13

## 2023-09-15 RX ADMIN — BENZTROPINE MESYLATE 1 MG: 1 TABLET ORAL at 08:33

## 2023-09-15 RX ADMIN — SENNOSIDES 2 TABLET: 8.6 TABLET ORAL at 08:32

## 2023-09-15 RX ADMIN — WHITE PETROLATUM: 1.75 OINTMENT TOPICAL at 09:57

## 2023-09-15 RX ADMIN — ATROPINE SULFATE 2 DROP: 10 SOLUTION/ DROPS OPHTHALMIC at 21:08

## 2023-09-15 RX ADMIN — ATROPINE SULFATE 2 DROP: 10 SOLUTION/ DROPS OPHTHALMIC at 09:58

## 2023-09-15 RX ADMIN — NAPROXEN 250 MG: 250 TABLET ORAL at 08:32

## 2023-09-15 RX ADMIN — DIVALPROEX SODIUM 250 MG: 250 TABLET, DELAYED RELEASE ORAL at 20:06

## 2023-09-15 RX ADMIN — LORAZEPAM 0.5 MG: 0.5 TABLET ORAL at 14:01

## 2023-09-15 RX ADMIN — LORAZEPAM 0.5 MG: 0.5 TABLET ORAL at 20:06

## 2023-09-15 RX ADMIN — MICONAZOLE NITRATE: 20 POWDER TOPICAL at 21:05

## 2023-09-15 RX ADMIN — MICONAZOLE NITRATE: 20 POWDER TOPICAL at 09:56

## 2023-09-15 RX ADMIN — GABAPENTIN 300 MG: 300 CAPSULE ORAL at 14:01

## 2023-09-15 RX ADMIN — NAPROXEN 250 MG: 250 TABLET ORAL at 20:06

## 2023-09-15 RX ADMIN — BENZTROPINE MESYLATE 1 MG: 1 TABLET ORAL at 20:06

## 2023-09-15 RX ADMIN — Medication 10 MG: at 21:31

## 2023-09-15 RX ADMIN — LORAZEPAM 0.5 MG: 0.5 TABLET ORAL at 08:33

## 2023-09-15 RX ADMIN — OLANZAPINE 15 MG: 10 TABLET, ORALLY DISINTEGRATING ORAL at 08:31

## 2023-09-15 RX ADMIN — CYANOCOBALAMIN TAB 1000 MCG 1000 MCG: 1000 TAB at 08:32

## 2023-09-15 ASSESSMENT — ACTIVITIES OF DAILY LIVING (ADL)
ADLS_ACUITY_SCORE: 84

## 2023-09-15 NOTE — PLAN OF CARE
NOC Shift Report     Pt in bed at beginning of shift, breathing quiet and unlabored. Pt slept 5.75 hours.      No pt complaints or concerns at this time.      No PRNs given. Will continue to monitor.

## 2023-09-15 NOTE — PLAN OF CARE
Assessment/Intervention/Current Symtoms and Care Coordination:  Reviewed chart. Met with team to review patient's current functioning, needs, progress, and impediments to discharge.    Called the [facility] at [place] to follow up from yesterday, they are currently only accepting private pay on their memory care units.     Followed up with [facility]. They have one male opening in their memory care unit in their nursing home facility, unclear about memory care unit on their assisted living facility. KING signed. Referral sent via Epic.     Discharge Plan or Goal:  Seeking placement in a memory care unit, preferably in the [facility]     Barriers to Discharge:  Referrals are being sent to memory care units who have openings. One barrier is the limited openings in memory care units at this time.     Referral Status:  [facility] in [place] 8/22, no memory care 8/30  [facility] in [place] 8/15, no memory care 8/31  [facility] 8/31, no openings 8/31  [facility] 8/29, declined 9/8  [facility] 8/29, no open beds 8/30  [facility] 8/30  [facility] 8/31, no open beds 8/31  [facility] 8/31, no open beds 8/31  [facility] 8/31  [place] in [facility] 8/31, no openings and private pay 8/31  [facility], 8/31, declined 9/1 due to aggressive behaviors  [place], 9/13, declined 9/13  [facility] 9/15     Legal Status:  Commitment with [facility]: [place]  File Number: [id number]  Issued 07/06/23 and is not to exceed six months    Contacts:  : Vicky Valdez with Psychiatric, [phone]  Psychiatrist: Dr. Santana at [facility], [phone] PCP: Attila Urena DO  Mom: Alberta, [phone] (H) [phone] (M)   Dad: Ino, [phone] (H)  Sister, Sandra Treadwell, [phone] (KING)   [facility]'s Office Fax: [phone]  [facility]: [phone] ([facility])  [employer]:  Jasmin phone [phone], fax [phone]  CADI  with [facility]: Regina Wong, [phone], [email or web address]    Upcoming Meetings and Dates/Important Information and next steps:

## 2023-09-15 NOTE — PROGRESS NOTES
"[facility], [place]   Psychiatric Progress Note  Hospital Day: 112        Interim History:   The patient's care was discussed with the treatment team during the daily team meeting and/or staff's chart notes were reviewed. Pt was intermittently confused. He had forgotten what he was talking about during conversation with RN. Pt reported that his memory is getting worse. He reported AH telling him that an astroid is coming. Required prompts to eat dinner. Pt was in milieu for extended period of time watching [employer] game. Reported feeling dizzy last evening. Orthostatics obtained this AM were notable for orthostatic hypotension.     Upon interview, Vinod reported that he has \"been better.\" When asked why, he said \"its only a matter of time before I get pummeled or poisoned.\" I attempted to reassure him, and he then stared at writer intensely and sat up from his bed, stood up, and attempted to come closer to writer. When redirected by psych associate, he said \"I was having thoughts of harming her.\" He was able to sit back down on his bed, but then again said that he continues having thoughts of harming this writer, and needed to be redirected by psych associate again. Due to his level of distress, writer terminated the interview.     Suicidal ideation: GRACE    Homicidal ideation: GRACE    Psychotic symptoms: no AH or VH reported during interview. Delusions present and other psychotic symptoms suspected.    Medication side effects reported: Tremors, less pronounced on exam in recent days    Acute medical concerns: none. He denies any pain or discomfort today.  Reports that rash has improved.     Other issues reported by patient: Patient had no further questions or concerns.           Medications:      atropine  2 drop Sublingual TID    benztropine  1 mg Oral TID    cloZAPine  650 mg Oral Daily    cyanocobalamin  1,000 mcg Oral Daily    divalproex sodium delayed-release  250 mg Oral Q8H KIKI    " FLUoxetine  20 mg Oral Daily    gabapentin  300 mg Oral TID    LORazepam  0.5 mg Oral TID    melatonin  10 mg Oral At Bedtime    miconazole   Topical BID    mineral oil-hydrophilic petrolatum   Topical BID IS    naproxen  250 mg Oral BID w/meals    OLANZapine zydis  15 mg Oral BID    Or    OLANZapine  10 mg Intramuscular BID    polyethylene glycol  17 g Oral BID    sennosides  2 tablet Oral BID    tamsulosin  0.4 mg Oral Daily          Allergies:     Allergies   Allergen Reactions    Bee Venom Unknown    Montelukast Unknown    Phenothiazines Unknown          Labs:     Recent Results (from the past 24 hour(s))   Basic metabolic panel    Collection Time: 09/15/23 10:43 AM   Result Value Ref Range    Sodium 141 136 - 145 mmol/L    Potassium 4.4 3.4 - 5.3 mmol/L    Chloride 105 98 - 107 mmol/L    Carbon Dioxide (CO2) 28 22 - 29 mmol/L    Anion Gap 8 7 - 15 mmol/L    Urea Nitrogen 28.6 (H) 8.0 - 23.0 mg/dL    Creatinine 1.04 0.67 - 1.17 mg/dL    Calcium 8.9 8.8 - 10.2 mg/dL    Glucose 121 (H) 70 - 99 mg/dL    GFR Estimate 80 >60 mL/min/1.73m2   CBC with platelets    Collection Time: 09/15/23 10:43 AM   Result Value Ref Range    WBC Count 11.4 (H) 4.0 - 11.0 10e3/uL    RBC Count 4.76 4.40 - 5.90 10e6/uL    Hemoglobin 12.9 (L) 13.3 - 17.7 g/dL    Hematocrit 40.8 40.0 - 53.0 %    MCV 86 78 - 100 fL    MCH 27.1 26.5 - 33.0 pg    MCHC 31.6 31.5 - 36.5 g/dL    RDW 14.3 10.0 - 15.0 %    Platelet Count 184 150 - 450 10e3/uL   WBC and Differential    Collection Time: 09/15/23 10:43 AM   Result Value Ref Range    % Neutrophils 81 %    % Lymphocytes 9 %    % Monocytes 8 %    % Eosinophils 0 %    % Basophils 1 %    % Immature Granulocytes 1 %    Absolute Neutrophils 9.4 (H) 1.6 - 8.3 10e3/uL    Absolute Lymphocytes 1.0 0.8 - 5.3 10e3/uL    Absolute Monocytes 0.9 0.0 - 1.3 10e3/uL    Absolute Eosinophils 0.0 0.0 - 0.7 10e3/uL    Absolute Basophils 0.1 0.0 - 0.2 10e3/uL    Absolute Immature Granulocytes 0.1 <=0.4 10e3/uL                "   Psychiatric Examination:     /70 (Patient Position: Sitting)   Pulse 88   Temp 96.9  F (36.1  C) (Temporal)   Resp 16   Ht 1.753 m (5' 9\")   Wt 83.7 kg (184 lb 8 oz)   SpO2 100%   BMI 27.25 kg/m    Weight is 184 lbs 8 oz  Body mass index is 27.25 kg/m .    Weight over time:  Vitals:    07/03/23 1100 07/30/23 0902 08/13/23 0900 08/26/23 0835   Weight: 81.6 kg (179 lb 12.8 oz) 86.5 kg (190 lb 9.6 oz) 85.7 kg (188 lb 14.4 oz) 82.8 kg (182 lb 7 oz)    09/07/23 0900   Weight: 83.7 kg (184 lb 8 oz)       Orthostatic Vitals         Most Recent      Sitting Orthostatic /61 07/25 0800    Sitting Orthostatic Pulse (bpm) 85 07/25 0800          Cardiometabolic risk assessment. 06/07/23    Reviewed patient profile for cardiometabolic risk factors    Date taken /Value  REFERENCE RANGE   Abdominal Obesity  (Waist Circumference)   See nursing flowsheet Women ?35 in (88 cm)   Men ?40 in (102 cm)      Triglycerides  No results found for: TRIG    ?150 mg/dL (1.7 mmol/L) or current treatment for elevated triglycerides   HDL cholesterol  No results found for: HDL]   Women <50 mg/dL (1.3 mmol/L) in women or current treatment for low HDL cholesterol  Men <40 mg/dL (1 mmol/L) in men or current treatment for low HDL cholesterol     Fasting plasma glucose (FPG) Lab Results   Component Value Date     05/26/2023      FPG ?100 mg/dL (5.6 mmol/L) or treatment for elevated blood glucose   Blood pressure  BP Readings from Last 3 Encounters:   09/15/23 108/70   05/26/23 97/55    Blood pressure ?130/85 mmHg or treatment for elevated blood pressure   Family History  See family history     Mental Status Exam:  Appearance: awake, alert, disheveled, lying in bed. No evidence of pain of acute distress.   Attitude:  uncooperative  Eye Contact:  fair, less intense  Mood:  irritable  Affect:  mood congruent, anxious  Speech: mostly coherent  Psychomotor Behavior:  Ongoing bilateral hand tremor and jaw tremor. Appeared slightly " better today. No evidence of dystonia, or tics.  Throught Process: linear, illogical  Associations:  no loosening of associations present  Thought Content:  Delusions and AH. Evidence of obsessive thinking and likely compulsions to harm others.   Insight:  limited  Judgement:  poor  Oriented to:  self, place  Attention Span and Concentration:  fair  Recent and Remote Memory:  poor  Gait: GRACE         Precautions:     Behavioral Orders   Procedures    Assault precautions    Code 1 - Restrict to Unit    Code 2    Code 2 - 1:1 Staff Supervision     For ECT only    Electroconvulsive therapy     Series of up to 12 treatments. Begin Date: 8/2/23     Treating Psychiatrist providing ECT:  unknown     Notified on:  7/28/23 via this order  NOTE: We received verbal confirmation from [facility] that Lorenzo Pavon has been approved but awaiting official court order and risk management's review  Initial consult was completed by Dr. Hicks on 7/18/23    Electroconvulsive therapy     Series of up to 12 treatments. Begin Date: 8/2/23     Treating Psychiatrist providing ECT:  Dominga     Notified on:  8/2/23    Elopement precautions    Fall precautions    Occupational Therapy on the Unit     Order Specific Question:   Reason for Consult     Answer:   Eval of thought process, functional skills and behavior     Order Specific Question:   Course of Action:     Answer:   Evaluation only     Order Specific Question:   Treatment Prescription:     Answer:   CPT requested    Routine Programming     As clinically indicated    Self Injury Precaution    Status 15     Every 15 minutes.    Status Individual Observation     Patient SIO status reviewed with team/RN.  Please also refer to RN/team documentation for add'l detail.    -SIO staff to monitor following which have contributed to patient being on SIO:  Agitation  Pt is impulsive   Pt has ran out of his room naked  Pt has Parkinson symptoms place him in a high fall risk  Pt has verbal outburst of  sexual and threaten statements  Pt requires immediate redirection when masturbating    -Possible interventions SIO staff could use to support patient's treatment progress:  Engage pt in activities    -When following observed, team will review discontinuation of SIO:  Absence of aggression toward others for > 24 hours    Comments: SIO 1:1     Order Specific Question:   CONTINUOUS 24 hours / day     Answer:   5 feet     Order Specific Question:   Indications for SIO     Answer:   Severe intrusiveness     Order Specific Question:   Indications for SIO     Answer:   Assault risk    Suicide precautions     Patients on Suicide Precautions should have a Combination Diet ordered that includes a Diet selection(s) AND a Behavioral Tray selection for Safe Tray - with utensils, or Safe Tray - NO utensils            Diagnoses:     #Unspecified psychosis likely schizophrenia per history, rule out Bipolar affective disorder, manic  #Unspecified encephalopathy   -R/O catatonia   -Episodes of unresponsiveness, concern for PNES   #Parkinsonism 2/2 neuroleptic medications, rule out Parkinson's Disease  #Urinary retention and BPH  -Possible UTI -- UC resulted on 5/25 w/out growth  #Hx of prolonged QTc with clozapine  #Mild thrombocytopenia  #R/O OCD         Assessment and hospital summary:  Patient was admitted to psychiatric unit for safety, stabilization and medication management. He has had schizophrenia since the 1980. He was on Clozaril x 25 years, and it was tapered and discontinued on May 7, 2023  due to prolonged qtc of 527, and his psychotic  symptoms have worsened since then. Sinemet was also discontinued recently due to concerns that it was contributing to paranoia and AH. He is agitated, aggressive, dangerous to self and others. He remains on SIO 2 to 1, and is under psychiatric emergency and court hold. There are concerns for organic etiology given pattern of sundowning, history of parkinsonism, and ongoing  disorientation/confusion. : EKG repeated, cardiology consult regarding safety of resuming clozapine in the context of prolonged QTc and refractory schizophrenia pending response. Per cardiology, correct QTc is no more than 460. They do not have concerns about AP retrial. Neurology IP consult placed for evaluation of sundowning and cognitive decline secondary to Sinemet discontinuation. MSSA initiated. Per Neurology, discontinuation of Sinemet would not account for these symptoms. They do not recommend retrial at this time. On , discussed complex case at length with Dr. Hatch (primary provider on geriatic unit) who provided recs (see second opinion note dated ). Depakote initiated, though needed to be reduced due to side effect of thrombocytopenia. Melatonin was increased to 10 mg at bedtime. : Clozapine titration continued. Its possible that clozapine dose is contributing to worsened compulsive behaviors noted throughout hospitalization which could improve with lowering the dose. ECT initiated due to treatment refractory psychosis and ongoing symptoms despite therapeutic dose of clozapine (650 mg daily). ECT initiated on 23 and pt completed 11 total treatments, but ECT stopped on  due to lack of improvement and distress it was causing patient.     Chart reviewed which revealed the followin2022: He was on clozapine, Seroquel, Cymbalta, and Carbidopa-levodopa and hospitalized for pneumonia. Psych consulted and Seroquel was stopped. Treated with Abx and discharged to TCU.     2022: Hospitalized at [facility]. Per chart review:    Vinod Lee is a 63 yo male with longstanding history of schizophrenia. He was admitted from [facility] ED, where he presented due to increased delusional thoughts while on a visit to his parents for Thanksgiving. He began experiencing increasing paranoid thoughts that someone might be poisoning him and began fearing that he might accidentally harm  "someone. He reported to his parents that he was having thoughts about hitting someone or beating someone up and told them he could not guarantee they would be safe with him. Parents encouraged patient to go to the ED, which he did voluntarily.     Per patient report and chart review, he was hospitalized several times in the 1980s and reports he was civilly committed at one point in the 1980s, however he has been quite stable for the past several decades. He reports he will experience some paranoia and delusional thinking at times, but generally has good insight into his symptoms. He has been maintained on clozapine for about 25 years and prior to several months ago was also concurrently prescribed quetiapine.      In the last several months, patient has had a number of medication changes. Approximately 6 months ago, patient was diagnosed with parkinsonism related to antipsychotic use and was started on carbidopa-levidopa. He experienced no improvement in tremors, and thus carbidopa- levidopa dose was increased 1.5 weeks ago.      In addition, patient was medically hospitalized in August 2022 for confusion, weakness, repeated falls, ongoing weight loss, and SOB. He was found to have a cavitary lesion of the lung and suspected aspiration pneumonia. He was treated with antibiotics. At that time, he was taking current dose of clozapine and duloxetine, as well as propranolol ER, quetiapine, gabapentin, and clonazepam. Psychiatry was consulted due to concern for oversedation, and propranolol ER, gabapentin, and quetiapine were discontinued. Conazepam was reduced from 0.5 mg TID to BID dosing and recommended to be discontinued, however, it does not appear this occurred. His sedation improved significantly. QTc prolongation was also noted, but improved throughout his stay. He was ultimately discharged to a TCU for several months before returning home. He reports his weakness has greatly improved and states he \"graduated\" " "physical therapy, though he continues to be diligent in completed recommended exercises.      He reports that over the past several months, his delusions and anxiety have been worse than usual, with symptoms becoming much more acute since the carbidopa-levidopa dose was increased. He has felt increasingly paranoid about being poisoned, noting this is a delusion that has been stable over time, but was previously less intrusive. He has insight during the interview that his paranoid thinking is not reality based, but states it has been harder to separate delusions from reality and he becomes very worried that he might hurt someone, despite no history of violent behavior. He reports that the paranoia about his food being poisoned in combination with the tremors in his hands have made it difficult for him to eat. He has also had quite low appetite for the past 6 months to a year . He reports that due to the combination of these factors, he has lost about 50 pounds in the last year.He denies any problems with sleep initiation or maintenance. He reports energy is fairly good and \"better than it used to be.\" He reports some short term memory issues and on interview, does have some difficulty remembering details of recent events. He is unsure if he has received cognitive testing in the past.    He denies any suicidal ideation and reports he has not experienced SI for decades. He denies homicidal thoughts and is very clear that he had and has no desire to harm anyone else, but was afraid he might somehow do so. He denies any hallucinations and states \"that's never been a problem for me.\" He is not observed responding to internal stimuli. He is alert and oriented in all spheres.        ========    BRIEF HOSPITAL COURSE: This 62 y.o. male admitted 11/25/2022 to  [facility] for the assessment and treatment of the above presentation. This was a voluntary admission.     In summary, he was tapered off the Sinemet, which he reports to " be both ineffective and potentially worsening the anxiety and paranoia ideations. Instead Benadryl 25 mg TID was started to help with extrapyramidal side effects. He struggled with sleep during his stay here. Remeron 7.5mg QHS was tried without success. Seroquel 100mg qhs was restarted with addition of suvorexant 10mg qhs. Given his Seroquel PRN use prior history of prolonged QTC, he will benefit from another EKG repeat on the medical unit.     Unfortunately, he tested positive for influenza A and developed hypoxemia. Now on 2L oxygen with desats when prone requiring 3L oxygen. Subsequently, the plan will to be tapering him off clonazepam due to hypoxia (and also prior plan to taper). The patient tolerated these changes without side effects.     The patient minimally benefited from the structured therapeutic milieu as he attended programming seldom as he tested positive for influenza, he needed to isolate with droplet precautions. Pt was engaged and worked on issues that were triggering/brought pt to the hospital and has excellent insight into his anxiety and paranoid delusions. Pt denied suicidal ideations throughout all/majority of their hospitalization. Pt denied HI throughout all of hospitalization. There were no concerns with behavioral disruptions/outbursts. The patient has shown improvement in general.     At the time of discharge, hospitalization course was reviewed with Vinod Lee with plan to transfer to medicine. Please consult psychiatry and I will continue to follow while patient is on the medical unit. He is now off sinemet since 11/29 21:00 and I would expect anxiety and paranoia to improve more moving forward. Psychiatrically, once anxiety and paranoia is baseline, can D/C to home once medically stable. He DOES NOT NEED A 1:1 on the medical unit from a mental health perspective, we initiated 1:1 11/30/2022 due to significant fatigue, gait instability (he was unable to stand without assist) and  feeding assist.    04/2023: Clozapine was gradually tapered and discontinued, unclear reasons why it was discontinued per outside records, though Dr. Portillo's H&P indicates that it was due to prolonged QTc.       Today's Changes:  Renewed SIO  CPT by OT reviewed. Appreciate assistance.  IM consult placed for orthostatic hypotension  Monitor oral intake closely  Encourage fluids       Target psychiatric symptoms and interventions:  -Psych emergency declared on 5/27, now fully committed  - Depakote 250 mg TID, restarted on 8/26. Cannot increase beyond this dose due to thrombocytopenia at higher doses  -Continue clozapine as above  -Continue scheduled Zyprexa 15mg BID  -Continue 1:1 for safety of staff and patients, reduced from 2:1 7/23/23  - weekly CBC to monitor platelets    -Continue Gabapentin 300 mg TID as staff believe it has been effective for treatment of his anxiety    Risks, benefits, and alternatives discussed at length with patient.     Acute Medical Problems and Treatments:  Delirium vs progression of dementia  Neurology consult placed on 6/8 for evaluation of sundowning and cognitive decline secondary to Sinemet discontinuation: Resuming Sinemet not recommended for behavioral concerns. Please see Neuro note for details.     Tremors  longstanding though appeared to worsen following initiation of clozapine  - Ativan 0.5 mg TID  - Cogentin 1 mg TID added on 8/25 with overall improvement noted    Thrombocytopenia, resolved  Likely secondary to Depakote.  According to the, incidents of Depakote induced thrombocytopenia as 27%.  Depakote dose reduced from 1000 twice daily to 250 3 times daily on 7/28/2023 with subsequent resolution of thrombocytopenia  - weekly CBCs    Constipation  Likely worsened by clozapine, improved since modifying regimen.   - daily miralax   - daily Senokot 2 tablets BID      Right first toe fracture:  Seen by Ortho on 6/16:  Per note: Vinod Lee is a 62 year old  male w/ PMHx complex  "psychiatric history who has a fracture to the distal phalanx of the right toe after a recent trauma to the foot the patient reports.  Patient denies any other pain or discomfort.  Images reviewed and plan will be for irrigation of the popped fracture blister with sterile saline and the toes should be dressed and a 4 x 4 gauze dressing.  Patient will need a postop shoe which she can be weightbearing as tolerated in.  Would recommend a course of antibiotics for approximately 7 days.  Would recommend Augmentin or clindamycin.  Podiatry will be made aware of patient and will see patient while he is admitted or if patient is discharged in the near future a follow-up appointment will need to be established.     Remainder of care per primary team.  Primary team should make sure that patient is up-to-date on his tetanus shot.  If not patient will require a booster shot.    Hx of prolonged QTc:  Continue weekly EKGs. See above for details.   Discussed most recent EKG findings with Cardiology on 8/25. Corrected QTc is 501 from EKG on 8/23. Per cardiology, repeat EKG today. If corrected QTc is > 500, will need to reduce clozapine. If < 500, OK to keep clozapine at current dose. UPDATE/ADDENDUM 9/6: Repeat EKG with corrected QTc of 440. No need to adjust clozapine dose per cards.     Urinary retention, resolved  Per patient care order:   If patient is refusing straight caths, please notify IM provider immediately to determine whether this constitutes a medical emergency. If IM declares medical emergency, may restrain patient for straight cath. Discussed this with patient's parents/substitute decision makers on 6/7 who are in support of this plan.    # Psoriasis hx  # Purplish discoloration of B/LE feet-resolved   Discussed with Dr. Rene and bedside RN regarding rash on right foot that appears and appears to be purplish in color and almost \"petechiae.\" It was noted during interview, but seemed to be resolving upon walking. " Within the past 24 hrs, pt has had ECT and seemed more confused than usual. Pt seems to have had a right great toe wound with recent right great toe fracture seen by Medicine on 7/16. Seen on 8/4 with improving color on B/L legs at rest and appears to have small, scaly patches of varying coin sizes that have not grown past marked borders. See photos. Per further chart review, has had psoriasis history. WBC WNL, no fevers, HDS. This appears to more consistent with a psoriasis like picture.   - Start triamcinolone topical 0.025%   -Apply twice daily until lesions for 2 weeks or until lesions resolve/  -Monitor for skin thinning, striae, rebound lesion flares.   - Start Eucerin cream              - Apply 30 minutes PRIOR to triamcinolone topical cream above or PRN as needed if dry skin/after bathing   - Medicine will sign off.   - For worsening pain, spread, itchiness, fevers, please contact Medicine and possibly Dermatology.    Benzodiazepine dependence:  Phenobarbital taper completed shortly after patient's admission    Pertinent labs/imaging:  Weekly CBC with diff     Behavioral/Psychological/Social:  - Encourage unit programming    Safety:  - Continue precautions as noted above  - Status 15 minute checks  - Continue 1:1 for staff and pt safety    Legal Status: Fully committed with Sánchez and Lorenzo Pavon. [place] medications: clozapine, olanzapine, risperidone, quetiapine      Disposition Plan   Reason for ongoing admission: No safe alternative at this time  Discharge location: TBD. Will likely need memory care unit  Discharge Medications: not ordered  Follow-up Appointments: not scheduled    Entered by: Ingrid Rhodes MD on 09/15/2023  at 1:44 PM

## 2023-09-15 NOTE — CONSULTS
[facility]  Consult Note - Hospitalist Service  Date of Admission:  5/26/2023  Consult Requested by: Ingrid Rhodes MD  Reason for Consult: symptomatic orthostatic hypotension     Assessment & Plan   Vinod Lee is a 63 year old male with PMHx of schizophrenia and Parkinson's disease, non-epileptic seizures, and hx of urinary retention initially admitted to medicine 5/18-5/26/23 for acute encephalopathy likely 2/2 acute psychosis with medical work up negative for organic causes, then admitted on 5/26/2023 to inpatient psychiatry for acute psychosis. Medicine has been consulted multiple times, with request to re-evaluate for symptomatic orthostatic hypotension.     # Orthostatic Hypotension - Likely related to underlying parkinson's disease exacerbated by anti-cholinergic and anti-psychiatric medications including atropine, cogentin (dose recently increased), clozapine, zyprexa, and flomax. Could have a component of dehydration since patient has not been drinking much for fluids.   - Repeat labs to ensure no other causes for orthostatic hypotension (signs of dehydration)  - Compression stockings  - Recommend adjusting and minimizing offending medications as able   - Encourage PO hydration for now   - Monitor stool output and notify medicine if patients diarrhea persists  - Repeat orthostatic VS daily     # Leukocytosis - Noted on labs today WBC. Afebrile. Patient not reliable historian at this time, so unclear if having any infectious symptoms.   - Repeat CBC in AM   - Monitor for infectious symptoms  - If patient is retaining, reasonable to check UA as it increases risk of developing UTI    # Hx of urinary retention Likely 2/2 parkinson's disease, delirium and anti-psychotic medications. Seen by Urology who placed lowery which patient self removed on 5/24. Per nursing patient has not required any straight cathing recently and has been urinating without difficulty.   -  "With increased agitation and increased cogentin, please obtain PVR  - If retaining, please continue to monitor Bladder scan Qshift and straight cath for residual >300 ml   - Continue  tamsulosin 0.4 mg daily   - Outpatient follow up with urology once discharged if retention remains an issue     # Parkinson's disease  Neurology was consulted on 5/19, they did not recommend initiation of meds while inpatient.   - Recommended outpatient follow-up with neurology at discharge    # Acute psychosis  # Schizophrenia  # Altered mental status upon initial presentation  Violent thoughts and behaviors were observed PTA by patient's parents and at assisted living facility.  Broad work-up work-up for organic causes of encephalopathy obtained during medical stay including UA, chest x-ray, HIV, syphilis, CRP, CBC, CMP, overall no significant acute medical concerns from this work-up.    - continued management per psychiatry    # Psychogenic non-epileptic seizures  - noted during inpatient psychiatry admission. Neurology consulted and recommended no urgent need from a neurologic standpoint as long as spells appear similar in nature.      The patient's care was discussed with the Bedside Nurse, Patient, and Primary team. Medicine will continue to follow orthostatic VS and repeat CBC on 9/16.     Clinically Significant Risk Factors              # Hypoalbuminemia: Lowest albumin = 3.4 g/dL at 9/8/2023  6:01 PM, will monitor as appropriate            # Overweight: Estimated body mass index is 27.25 kg/m  as calculated from the following:    Height as of this encounter: 1.753 m (5' 9\").    Weight as of this encounter: 83.7 kg (184 lb 8 oz).             Lili Don PA-C  Hospitalist Service  Securely message with [employer] (more info)  Text page via [facility] Paging/Directory   ______________________________________________________________________    Chief Complaint   Symptomatic orthostatic hypotension    History is obtained from the " patient, psych associate, nurse, and chart review     History of Present Illness   Vinod Lee is a 63 year old male with PMHx of schizophrenia and Parkinson's disease, non-epileptic seizures, and hx of urinary retention initially admitted to medicine 5/18-5/26/23 for acute encephalopathy likely 2/2 acute psychosis with medical work up negative for organic causes, then admitted on 5/26/2023 to inpatient psychiatry for acute psychosis. Medicine has been consulted multiple times, with request to re-evaluate for symptomatic orthostatic hypotension.     Patient noted to have new orthostatic hypotension in the last two days.  Yesterday blood pressure dropped from 133/76 to 104/65 with standing.  This morning his blood pressure dropped from 108/70 with sitting to 78/53 with standing.  Patient is an unreliable historian.  Currently denies any dizziness.  Was walking up and down the hallway without difficulty.  Her psych associate patient has not been taking in much water in the last few days.  He had an episode of diarrhea this morning.  Non-bloody stool per staff.  When asked if patient was having any abdominal pain, nausea, vomiting, or dizziness with standing, he would stare at me and then lunged towards me.  Psych associate had to hold him back and re-direct him.  Per chart review appears patient had a Cogentin increased in the last 2 days.  Per nursing patient has not required any straight cathing for several days to weeks.       Past Medical History    Past Medical History:   Diagnosis Date    LBBB (left bundle branch block)     Parkinson's disease (H)     Schizophrenia (H)        Past Surgical History   History reviewed. No pertinent surgical history.    Medications   I have reviewed this patient's current medications  Current Facility-Administered Medications   Medication    acetaminophen (TYLENOL) tablet 650 mg    alum & mag hydroxide-simethicone (MAALOX) suspension 30 mL    atropine 1 % ophthalmic solution 1  drop    atropine 1 % sublingual (ophthalmic drops for sublingual use) 2 drop    benzonatate (TESSALON) capsule 100 mg    benztropine (COGENTIN) tablet 1 mg    bisacodyl (DULCOLAX) suppository 10 mg    cloZAPine (FAZACLO) ODT tab 650 mg    cyanocobalamin (VITAMIN B-12) tablet 1,000 mcg    haloperidol (HALDOL) tablet 5 mg    And    diphenhydrAMINE (BENADRYL) capsule 50 mg    haloperidol lactate (HALDOL) injection 5 mg    And    diphenhydrAMINE (BENADRYL) injection 50 mg    divalproex sodium delayed-release (DEPAKOTE) DR tablet 250 mg    FLUoxetine (PROzac) capsule 20 mg    gabapentin (NEURONTIN) capsule 100 mg    gabapentin (NEURONTIN) capsule 300 mg    lidocaine (XYLOCAINE) 2 % external gel    LORazepam (ATIVAN) tablet 0.5 mg    magnesium hydroxide (MILK OF MAGNESIA) suspension 30 mL    melatonin tablet 10 mg    miconazole (MICATIN) 2 % powder    mineral oil-hydrophilic petrolatum (AQUAPHOR)    mineral oil-white petrolatum (EUCERIN/MINERIN) cream    naproxen (NAPROSYN) tablet 250 mg    OLANZapine zydis (zyPREXA) ODT tab 15 mg    Or    OLANZapine (zyPREXA) injection 10 mg    OLANZapine (zyPREXA) tablet 5-10 mg    Or    OLANZapine (zyPREXA) injection 5-10 mg    polyethylene glycol (MIRALAX) Packet 17 g    senna-docusate (SENOKOT-S/PERICOLACE) 8.6-50 MG per tablet 1 tablet    sennosides (SENOKOT) tablet 2 tablet    tamsulosin (FLOMAX) capsule 0.4 mg    traZODone (DESYREL) tablet 50 mg        Physical Exam   Vital Signs: Temp: 96.9  F (36.1  C) Temp src: Temporal BP: 108/70 Pulse: 88     SpO2: 100 % O2 Device: None (Room air)    Weight: 184 lbs 8 oz  GENERAL: Alert and awake. Appears anxious. NAD. Elderly appearing gentleman.   HEENT: AT/NC. Anicteric sclera.  RESPIRATORY: Effort normal on RA.  CV: could not exam due to agitation and concern of safety.    GI: could not exam due to agitation and concern of safety.    NEUROLOGICAL: CN II-XII grossly intact. No facial droop. Tremor of jaw. Moving all extremities  symmetrically. Steady gait.    SKIN: Intact. Warm and dry. No jaundice.   PSYCH: Affect appropriate. Reported hearing voices.        Medical Decision Making       65 MINUTES SPENT BY ME on the date of service doing chart review, history, exam, documentation & further activities per the note.      Data     I have personally reviewed the following data over the past 24 hrs:    11.4 (H)  \   12.9 (L)   / 184     141 105 28.6 (H) /  121 (H)   4.4 28 1.04 \       Imaging results reviewed over the past 24 hrs:   No results found for this or any previous visit (from the past 24 hour(s)).

## 2023-09-15 NOTE — PLAN OF CARE
"  Problem: Psychotic Symptoms  Goal: Social and Therapeutic (Psychotic Symptoms)  Description: Signs and symptoms of listed problems will be absent or manageable.  Outcome: Progressing   Goal Outcome Evaluation     Patient is alert and oriented,  blood pressure running low on this shift, 108/70 sitting 75/47 standing. Rechecked BP,  102/64 sitting and 71/43 standing,  BP may not be as accurate because patient was shaking when he stood up to take standing  BP.  Patient denied feeling dizzy.  Provider notified, internal medicine consult put in  for low BP by provider. Patient was seen by internal medicine. Patient a little agitated around 11:30 AM making an attempt to grab onto staff when walking up and down the aragon with [facility] staff, patient was given PRN Haldol. Compression stocking ordered for patient. Patient appetite was good ate 80% of breakfast and 100% of lunch with assistance. Patient spit out his AM gabapentin, but took the rest of AM medications  with encouragement. Patient stated \"I think  you are giving me  poison, what is in the medications\". Patient was not also willing to take his afternoon medications easily. Patient stated, \"The doctor forcing me to take medications I can refuse\".  Staff nurse encouraged and educated patient to take his medications. Patient medication compliant after education. Patient denied all psychosis symptoms, and stated \"I don't have that\".     Patient was visible in the milieu most of the shift. Patient denied pain, no concern with behavior.                     "

## 2023-09-15 NOTE — PROGRESS NOTES
[facility], [place]   Psychiatric Progress Note  Hospital Day: 111        Interim History:   The patient's care was discussed with the treatment team during the daily team meeting and/or staff's chart notes were reviewed. Pt was mostly isolative to the room, presents with a flat affect but brightens upon approach. Continues to voice desire to harm staff at times, though has not acted on these thoughts in several days. No recent seclusion/restraint episodes. Only slept 1.25 hours overnight. Staff noted difficulty swallowing and speech pathology assessed patient. Possibly some drooling but overall improved.     Upon interview, Vinod reported that both tremor and drooling have improved.  Reports good sleep and good appetite. Was more cooperative and pleasant today.     Suicidal ideation: denies    Homicidal ideation: denies. Reported not having these thoughts for a couple of days.     Psychotic symptoms: no AH or VH reported during interview. Delusions present and other psychotic symptoms suspected.    Medication side effects reported: Tremors, less pronounced on exam in recent days    Acute medical concerns: none. He denies any pain or discomfort today.  Reports that rash has improved.     Other issues reported by patient: Patient had no further questions or concerns.           Medications:      atropine  2 drop Sublingual TID    benztropine  1 mg Oral TID    cloZAPine  650 mg Oral Daily    cyanocobalamin  1,000 mcg Oral Daily    divalproex sodium delayed-release  250 mg Oral Q8H KIKI    FLUoxetine  20 mg Oral Daily    gabapentin  300 mg Oral TID    LORazepam  0.5 mg Oral TID    melatonin  10 mg Oral At Bedtime    miconazole   Topical BID    mineral oil-hydrophilic petrolatum   Topical BID IS    naproxen  250 mg Oral BID w/meals    OLANZapine zydis  15 mg Oral BID    Or    OLANZapine  10 mg Intramuscular BID    polyethylene glycol  17 g Oral BID    sennosides  2 tablet Oral BID    tamsulosin  " 0.4 mg Oral Daily          Allergies:     Allergies   Allergen Reactions    Bee Venom Unknown    Montelukast Unknown    Phenothiazines Unknown          Labs:     No results found for this or any previous visit (from the past 24 hour(s)).               Psychiatric Examination:     /72   Pulse 85   Temp 97.8  F (36.6  C) (Temporal)   Resp 16   Ht 1.753 m (5' 9\")   Wt 83.7 kg (184 lb 8 oz)   SpO2 96%   BMI 27.25 kg/m    Weight is 184 lbs 8 oz  Body mass index is 27.25 kg/m .    Weight over time:  Vitals:    07/03/23 1100 07/30/23 0902 08/13/23 0900 08/26/23 0835   Weight: 81.6 kg (179 lb 12.8 oz) 86.5 kg (190 lb 9.6 oz) 85.7 kg (188 lb 14.4 oz) 82.8 kg (182 lb 7 oz)    09/07/23 0900   Weight: 83.7 kg (184 lb 8 oz)       Orthostatic Vitals         Most Recent      Sitting Orthostatic /61 07/25 0800    Sitting Orthostatic Pulse (bpm) 85 07/25 0800          Cardiometabolic risk assessment. 06/07/23    Reviewed patient profile for cardiometabolic risk factors    Date taken /Value  REFERENCE RANGE   Abdominal Obesity  (Waist Circumference)   See nursing flowsheet Women ?35 in (88 cm)   Men ?40 in (102 cm)      Triglycerides  No results found for: TRIG    ?150 mg/dL (1.7 mmol/L) or current treatment for elevated triglycerides   HDL cholesterol  No results found for: HDL]   Women <50 mg/dL (1.3 mmol/L) in women or current treatment for low HDL cholesterol  Men <40 mg/dL (1 mmol/L) in men or current treatment for low HDL cholesterol     Fasting plasma glucose (FPG) Lab Results   Component Value Date     05/26/2023      FPG ?100 mg/dL (5.6 mmol/L) or treatment for elevated blood glucose   Blood pressure  BP Readings from Last 3 Encounters:   09/14/23 109/72   05/26/23 97/55    Blood pressure ?130/85 mmHg or treatment for elevated blood pressure   Family History  See family history     Mental Status Exam:  Appearance: awake, alert, disheveled, lying in bed. No evidence of pain of acute distress. "   Attitude:  uncooperative  Eye Contact:  fair, less intense  Mood:  irritable  Affect:  mood congruent  Speech: mostly coherent  Psychomotor Behavior:  Ongoing bilateral hand tremor and jaw tremor. Appeared slightly better today. No evidence of psychomotor agitation or restlessness today. No evidence of dystonia, or tics.  Throught Process: linear, illogical  Associations:  no loosening of associations present  Thought Content:  Delusions and AH. Evidence of obsessive thinking and likely compulsions to harm others.   Insight:  limited  Judgement:  poor  Oriented to:  self, place  Attention Span and Concentration:  fair  Recent and Remote Memory:  poor  Gait: GRACE         Precautions:     Behavioral Orders   Procedures    Assault precautions    Code 1 - Restrict to Unit    Code 2    Code 2 - 1:1 Staff Supervision     For ECT only    Electroconvulsive therapy     Series of up to 12 treatments. Begin Date: 8/2/23     Treating Psychiatrist providing ECT:  unknown     Notified on:  7/28/23 via this order  NOTE: We [facility] that [facility] Pavon has been approved but awaiting official court order and risk management's review  Initial consult was completed by Dr. Hicks on 7/18/23    Electroconvulsive therapy     Series of up to 12 treatments. Begin Date: 8/2/23     Treating Psychiatrist providing ECT:  Dominga     Notified on:  8/2/23    Elopement precautions    Fall precautions    Occupational Therapy on the Unit     Order Specific Question:   Reason for Consult     Answer:   Eval of thought process, functional skills and behavior     Order Specific Question:   Course of Action:     Answer:   Evaluation only     Order Specific Question:   Treatment Prescription:     Answer:   CPT requested    Routine Programming     As clinically indicated    Self Injury Precaution    Status 15     Every 15 minutes.    Status Individual Observation     Patient SIO status reviewed with team/RN.  Please also  refer to RN/team documentation for add'l detail.    -SIO staff to monitor following which have contributed to patient being on SIO:  Agitation  Pt is impulsive   Pt has ran out of his room naked  Pt has Parkinson symptoms place him in a high fall risk  Pt has verbal outburst of sexual and threaten statements  Pt requires immediate redirection when masturbating    -Possible interventions SIO staff could use to support patient's treatment progress:  Engage pt in activities    -When following observed, team will review discontinuation of SIO:  Absence of aggression toward others for > 24 hours    Comments: SIO 1:1     Order Specific Question:   CONTINUOUS 24 hours / day     Answer:   5 feet     Order Specific Question:   Indications for SIO     Answer:   Severe intrusiveness     Order Specific Question:   Indications for SIO     Answer:   Assault risk    Suicide precautions     Patients on Suicide Precautions should have a Combination Diet ordered that includes a Diet selection(s) AND a Behavioral Tray selection for Safe Tray - with utensils, or Safe Tray - NO utensils            Diagnoses:     #Unspecified psychosis likely schizophrenia per history, rule out Bipolar affective disorder, manic  #Unspecified encephalopathy   -R/O catatonia   -Episodes of unresponsiveness, concern for PNES   #Parkinsonism 2/2 neuroleptic medications, rule out Parkinson's Disease  #Urinary retention and BPH  -Possible UTI -- UC resulted on 5/25 w/out growth  #Hx of prolonged QTc with clozapine  #Mild thrombocytopenia  #R/O OCD         Assessment and hospital summary:  Patient was admitted to psychiatric unit for safety, stabilization and medication management. He has had schizophrenia since the 1980. He was on Clozaril x 25 years, and it was tapered and discontinued on May 7, 2023  due to prolonged qtc of 527, and his psychotic  symptoms have worsened since then. Sinemet was also discontinued recently due to concerns that it was  contributing to paranoia and AH. He is agitated, aggressive, dangerous to self and others. He remains on SIO 2 to 1, and is under psychiatric emergency and court hold. There are concerns for organic etiology given pattern of sundowning, history of parkinsonism, and ongoing disorientation/confusion. : EKG repeated, cardiology consult regarding safety of resuming clozapine in the context of prolonged QTc and refractory schizophrenia pending response. Per cardiology, correct QTc is no more than 460. They do not have concerns about AP retrial. Neurology IP consult placed for evaluation of sundowning and cognitive decline secondary to Sinemet discontinuation. MSSA initiated. Per Neurology, discontinuation of Sinemet would not account for these symptoms. They do not recommend retrial at this time. : Clozapine titration continued.   Its possible that clozapine dose is contributing to worsened compulsive behaviors noted throughout hospitalization which could improve with lowering the dose.     Chart reviewed which revealed the followin2022: He was on clozapine, Seroquel, Cymbalta, and Carbidopa-levodopa and hospitalized for pneumonia. Psych consulted and Seroquel was stopped. Treated with Abx and discharged to TCU.     2022: Hospitalized at [facility]. Per chart review:    Vinod Lee is a 63 yo male with longstanding history of schizophrenia. He was admitted from [facility] ED, where he presented due to increased delusional thoughts while on a visit to his parents for Thanksgiving. He began experiencing increasing paranoid thoughts that someone might be poisoning him and began fearing that he might accidentally harm someone. He reported to his parents that he was having thoughts about hitting someone or beating someone up and told them he could not guarantee they would be safe with him. Parents encouraged patient to go to the ED, which he did voluntarily.     Per patient report and chart review, he was  "hospitalized several times in the 1980s and reports he was civilly committed at one point in the 1980s, however he has been quite stable for the past several decades. He reports he will experience some paranoia and delusional thinking at times, but generally has good insight into his symptoms. He has been maintained on clozapine for about 25 years and prior to several months ago was also concurrently prescribed quetiapine.      In the last several months, patient has had a number of medication changes. Approximately 6 months ago, patient was diagnosed with parkinsonism related to antipsychotic use and was started on carbidopa-levidopa. He experienced no improvement in tremors, and thus carbidopa- levidopa dose was increased 1.5 weeks ago.      In addition, patient was medically hospitalized in August 2022 for confusion, weakness, repeated falls, ongoing weight loss, and SOB. He was found to have a cavitary lesion of the lung and suspected aspiration pneumonia. He was treated with antibiotics. At that time, he was taking current dose of clozapine and duloxetine, as well as propranolol ER, quetiapine, gabapentin, and clonazepam. Psychiatry was consulted due to concern for oversedation, and propranolol ER, gabapentin, and quetiapine were discontinued. Conazepam was reduced from 0.5 mg TID to BID dosing and recommended to be discontinued, however, it does not appear this occurred. His sedation improved significantly. QTc prolongation was also noted, but improved throughout his stay. He was ultimately discharged to a TCU for several months before returning home. He reports his weakness has greatly improved and states he \"graduated\" physical therapy, though he continues to be diligent in completed recommended exercises.      He reports that over the past several months, his delusions and anxiety have been worse than usual, with symptoms becoming much more acute since the carbidopa-levidopa dose was increased. He has felt " "increasingly paranoid about being poisoned, noting this is a delusion that has been stable over time, but was previously less intrusive. He has insight during the interview that his paranoid thinking is not reality based, but states it has been harder to separate delusions from reality and he becomes very worried that he might hurt someone, despite no history of violent behavior. He reports that the paranoia about his food being poisoned in combination with the tremors in his hands have made it difficult for him to eat. He has also had quite low appetite for the past 6 months to a year . He reports that due to the combination of these factors, he has lost about 50 pounds in the last year.He denies any problems with sleep initiation or maintenance. He reports energy is fairly good and \"better than it used to be.\" He reports some short term memory issues and on interview, does have some difficulty remembering details of recent events. He is unsure if he has received cognitive testing in the past.    He denies any suicidal ideation and reports he has not experienced SI for decades. He denies homicidal thoughts and is very clear that he had and has no desire to harm anyone else, but was afraid he might somehow do so. He denies any hallucinations and states \"that's never been a problem for me.\" He is not observed responding to internal stimuli. He is alert and oriented in all spheres.        ========    BRIEF HOSPITAL COURSE: This 62 y.o. male admitted 11/25/2022 to  [facility] for the assessment and treatment of the above presentation. This was a voluntary admission.     In summary, he was tapered off the Sinemet, which he reports to be both ineffective and potentially worsening the anxiety and paranoia ideations. Instead Benadryl 25 mg TID was started to help with extrapyramidal side effects. He struggled with sleep during his stay here. Remeron 7.5mg QHS was tried without success. Seroquel 100mg qhs was restarted with " addition of suvorexant 10mg qhs. Given his Seroquel PRN use prior history of prolonged QTC, he will benefit from another EKG repeat on the medical unit.     Unfortunately, he tested positive for influenza A and developed hypoxemia. Now on 2L oxygen with desats when prone requiring 3L oxygen. Subsequently, the plan will to be tapering him off clonazepam due to hypoxia (and also prior plan to taper). The patient tolerated these changes without side effects.     The patient minimally benefited from the structured therapeutic milieu as he attended programming seldom as he tested positive for influenza, he needed to isolate with droplet precautions. Pt was engaged and worked on issues that were triggering/brought pt to the hospital and has excellent insight into his anxiety and paranoid delusions. Pt denied suicidal ideations throughout all/majority of their hospitalization. Pt denied HI throughout all of hospitalization. There were no concerns with behavioral disruptions/outbursts. The patient has shown improvement in general.     At the time of discharge, hospitalization course was reviewed with Vinod Lee with plan to transfer to medicine. Please consult psychiatry and I will continue to follow while patient is on the medical unit. He is now off sinemet since 11/29 21:00 and I would expect anxiety and paranoia to improve more moving forward. Psychiatrically, once anxiety and paranoia is baseline, can D/C to home once medically stable. He DOES NOT NEED A 1:1 on the medical unit from a mental health perspective, we initiated 1:1 11/30/2022 due to significant fatigue, gait instability (he was unable to stand without assist) and feeding assist.    04/2023: Clozapine was gradually tapered and discontinued, unclear reasons why it was discontinued per outside records, though Dr. Portillo's H&P indicates that it was due to prolonged QTc.       Today's Changes:  Renewed SIO  Reviewed speech pathology recs. Appreciate  assistance.   CPT by OT reviewed. Appreciate assistance.   Increase Cogentin to 1 mg TID to address sialorrhea and tremor      Target psychiatric symptoms and interventions:  -Psych emergency declared on 5/27, now fully committed  -ECT Mon/Wed/Fri per Janelle. Last ECT treatment was on 8/25. Had total of 11 treatments with subtle improvements.   - Depakote 250 mg TID, restarted on 8/26. Cannot increase beyond this dose due to thrombocytopenia at higher doses  -Continue clozapine as above  -Continue scheduled Zyprexa 15mg BID  -Continue 1:1 for safety of staff and patients, reduced from 2:1 7/23/23  - weekly CBC to monitor platelets      -Continue Gabapentin 300 mg TID as staff believe it has been effective for treatment of his anxiety  -Discussed complex case at length with Dr. Hatch (primary provider on geriatic unit) who provided recs (see second opinion note dated 6/14). Appreciate assistance. I have since made the following changes:         1) Add propranolol 10 mg TID         2) Added Depakote 1000 mg qhs (to be administered in magic cup)         3) Nutrition consult to order magic cup         4) Increased melatonin to 10 mg QHS    Risks, benefits, and alternatives discussed at length with patient.     Acute Medical Problems and Treatments:  Delirium vs progression of dementia  Neurology consult placed on 6/8 for evaluation of sundowning and cognitive decline secondary to Sinemet discontinuation: Resuming Sinemet not recommended for behavioral concerns. Please see Neuro note for details.     Tremors  longstanding though appeared to worsen following initiation of clozapine  - Ativan 0.5 mg TID  - Cogentin 1 mg TID added on 8/25 with overall improvement noted    Thrombocytopenia, resolved  Likely secondary to Depakote.  According to the, incidents of Depakote induced thrombocytopenia as 27%.  Depakote dose reduced from 1000 twice daily to 250 3 times daily on 7/28/2023 with subsequent resolution of  thrombocytopenia  - weekly CBCs    Constipation  Likely worsened by clozapine, improved since modifying regimen.   - daily miralax   - daily Senokot 2 tablets BID      Right first toe fracture:  Seen by Ortho on 6/16:  Per note: Vinod Lee is a 62 year old  male w/ PMHx complex psychiatric history who has a fracture to the distal phalanx of the right toe after a recent trauma to the foot the patient reports.  Patient denies any other pain or discomfort.  Images reviewed and plan will be for irrigation of the popped fracture blister with sterile saline and the toes should be dressed and a 4 x 4 gauze dressing.  Patient will need a postop shoe which she can be weightbearing as tolerated in.  Would recommend a course of antibiotics for approximately 7 days.  Would recommend Augmentin or clindamycin.  Podiatry will be made aware of patient and will see patient while he is admitted or if patient is discharged in the near future a follow-up appointment will need to be established.     Remainder of care per primary team.  Primary team should make sure that patient is up-to-date on his tetanus shot.  If not patient will require a booster shot.    Hx of prolonged QTc:  Continue weekly EKGs. See above for details.   Discussed most recent EKG findings with Cardiology on 8/25. Corrected QTc is 501 from EKG on 8/23. Per cardiology, repeat EKG today. If corrected QTc is > 500, will need to reduce clozapine. If < 500, OK to keep clozapine at current dose. UPDATE/ADDENDUM 9/6: Repeat EKG with corrected QTc of 440. No need to adjust clozapine dose per cards.     Urinary retention, resolved  Per patient care order:   If patient is refusing straight caths, please notify IM provider immediately to determine whether this constitutes a medical emergency. If IM declares medical emergency, may restrain patient for straight cath. Discussed this with patient's parents/substitute decision makers on 6/7 who are in support of this plan.    #  "Psoriasis hx  # Purplish discoloration of B/LE feet-resolved   Discussed with Dr. Rene and bedside RN regarding rash on right foot that appears and appears to be purplish in color and almost \"petechiae.\" It was noted during interview, but seemed to be resolving upon walking. Within the past 24 hrs, pt has had ECT and seemed more confused than usual. Pt seems to have had a right great toe wound with recent right great toe fracture seen by Medicine on 7/16. Seen on 8/4 with improving color on B/L legs at rest and appears to have small, scaly patches of varying coin sizes that have not grown past marked borders. See photos. Per further chart review, has had psoriasis history. WBC WNL, no fevers, HDS. This appears to more consistent with a psoriasis like picture.   - Start triamcinolone topical 0.025%   -Apply twice daily until lesions for 2 weeks or until lesions resolve/  -Monitor for skin thinning, striae, rebound lesion flares.   - Start Eucerin cream              - Apply 30 minutes PRIOR to triamcinolone topical cream above or PRN as needed if dry skin/after bathing   - Medicine will sign off.   - For worsening pain, spread, itchiness, fevers, please contact Medicine and possibly Dermatology.    Benzodiazepine dependence:  Phenobarbital taper completed shortly after patient's admission    Pertinent labs/imaging:  Weekly CBC with diff     Behavioral/Psychological/Social:  - Encourage unit programming    Safety:  - Continue precautions as noted above  - Status 15 minute checks  - Continue 1:1 for staff and pt safety    Legal Status: Fully committed with S[facility] and [facility] Pavon. [place] medications: clozapine, olanzapine, risperidone, quetiapine      Disposition Plan   Reason for ongoing admission: No safe alternative at this time  Discharge location: TBD. Will likely need memory care unit  Discharge Medications: not ordered  Follow-up Appointments: not scheduled    Entered by: Ingrid Rhodes MD on " 09/14/2023  at 8:24 PM

## 2023-09-15 NOTE — PLAN OF CARE
"  Problem: Psychotic Symptoms  Goal: Social and Therapeutic (Psychotic Symptoms)  Description: Signs and symptoms of listed problems will be absent or manageable.  Outcome: Progressing   Goal Outcome Evaluation:      At the start of the shift SIO staff reported that patient was having a diarrhea. Patient reported that  he had a BM X 3. HS miralax and senna held.  patient has an order  to bladder scan for post void residual, if residual is over 300 nursing staff should straight cath patient and notify provider. Instructed SIO staff to let writer knows if patient urinate.  Writer asked patient so many time to use the bathroom  patient stated \"I don't need to go\". Around 1730 writer asked patient again to use the bathroom, patient agreed at this time, sat on the toilet for less than 2 minutes, he didn't urinate. SIO staff and writer encouraged patient to sit on the toilet for awhile,  patient refused. Got bladder scan from [facility]. Bladder scan won't turned on. A staff nurse from [facility] told writer that the Bladder scan has not been working well,  sometimes it won't turned on. Called medical flyer to bladder scan patient. Medical flyer came on unit, bladder scanned  patient and got greater 475 ML. Medical flyer  straight cathed  patient around 2130 and got 600 ML. On call provider notified with result of bladder scan.      Compression stocking  ordered for patient on day shift was given to patient. Patient put  on compression stocking without assistance from staff. Patient was medication compliant. Ate most of his supper. Staff offered patient liquid, but patient not drinking as much. Patient denied all mental health symptoms and pain. Patient vitals were stable.  No behavior issues or any safety concern.                   "

## 2023-09-16 LAB
ALBUMIN UR-MCNC: NEGATIVE MG/DL
APPEARANCE UR: CLEAR
BILIRUB UR QL STRIP: NEGATIVE
COLOR UR AUTO: ABNORMAL
ERYTHROCYTE [DISTWIDTH] IN BLOOD BY AUTOMATED COUNT: 14.5 % (ref 10–15)
GLUCOSE UR STRIP-MCNC: NEGATIVE MG/DL
HCT VFR BLD AUTO: 38.6 % (ref 40–53)
HGB BLD-MCNC: 12.4 G/DL (ref 13.3–17.7)
HGB UR QL STRIP: NEGATIVE
KETONES UR STRIP-MCNC: NEGATIVE MG/DL
LEUKOCYTE ESTERASE UR QL STRIP: ABNORMAL
MCH RBC QN AUTO: 27.3 PG (ref 26.5–33)
MCHC RBC AUTO-ENTMCNC: 32.1 G/DL (ref 31.5–36.5)
MCV RBC AUTO: 85 FL (ref 78–100)
MUCOUS THREADS #/AREA URNS LPF: PRESENT /LPF
NITRATE UR QL: NEGATIVE
PH UR STRIP: 6.5 [PH] (ref 5–7)
PLATELET # BLD AUTO: 157 10E3/UL (ref 150–450)
RBC # BLD AUTO: 4.55 10E6/UL (ref 4.4–5.9)
RBC URINE: 1 /HPF
SP GR UR STRIP: 1.02 (ref 1–1.03)
SQUAMOUS EPITHELIAL: 3 /HPF
UROBILINOGEN UR STRIP-MCNC: NORMAL MG/DL
WBC # BLD AUTO: 10.1 10E3/UL (ref 4–11)
WBC URINE: 1 /HPF

## 2023-09-16 PROCEDURE — 99232 SBSQ HOSP IP/OBS MODERATE 35: CPT

## 2023-09-16 PROCEDURE — 85027 COMPLETE CBC AUTOMATED: CPT | Performed by: PHYSICIAN ASSISTANT

## 2023-09-16 PROCEDURE — 250N000013 HC RX MED GY IP 250 OP 250 PS 637: Performed by: PHYSICIAN ASSISTANT

## 2023-09-16 PROCEDURE — 250N000013 HC RX MED GY IP 250 OP 250 PS 637: Performed by: PSYCHIATRY & NEUROLOGY

## 2023-09-16 PROCEDURE — 36415 COLL VENOUS BLD VENIPUNCTURE: CPT | Performed by: PHYSICIAN ASSISTANT

## 2023-09-16 PROCEDURE — 250N000013 HC RX MED GY IP 250 OP 250 PS 637: Performed by: STUDENT IN AN ORGANIZED HEALTH CARE EDUCATION/TRAINING PROGRAM

## 2023-09-16 PROCEDURE — 124N000002 HC R&B MH UMMC

## 2023-09-16 PROCEDURE — 81001 URINALYSIS AUTO W/SCOPE: CPT | Performed by: PSYCHIATRY & NEUROLOGY

## 2023-09-16 RX ORDER — BENZTROPINE MESYLATE 0.5 MG/1
1 TABLET ORAL 2 TIMES DAILY
Status: DISCONTINUED | OUTPATIENT
Start: 2023-09-16 | End: 2023-10-20

## 2023-09-16 RX ADMIN — TAMSULOSIN HYDROCHLORIDE 0.4 MG: 0.4 CAPSULE ORAL at 08:36

## 2023-09-16 RX ADMIN — LORAZEPAM 0.5 MG: 0.5 TABLET ORAL at 15:09

## 2023-09-16 RX ADMIN — ATROPINE SULFATE 2 DROP: 10 SOLUTION/ DROPS OPHTHALMIC at 08:50

## 2023-09-16 RX ADMIN — WHITE PETROLATUM: 1.75 OINTMENT TOPICAL at 19:33

## 2023-09-16 RX ADMIN — CYANOCOBALAMIN TAB 1000 MCG 1000 MCG: 1000 TAB at 08:36

## 2023-09-16 RX ADMIN — BENZTROPINE MESYLATE 1 MG: 1 TABLET ORAL at 19:32

## 2023-09-16 RX ADMIN — MICONAZOLE NITRATE: 20 POWDER TOPICAL at 19:34

## 2023-09-16 RX ADMIN — CLOZAPINE 650 MG: 100 TABLET, ORALLY DISINTEGRATING ORAL at 11:57

## 2023-09-16 RX ADMIN — GABAPENTIN 300 MG: 300 CAPSULE ORAL at 19:32

## 2023-09-16 RX ADMIN — OLANZAPINE 15 MG: 10 TABLET, ORALLY DISINTEGRATING ORAL at 08:36

## 2023-09-16 RX ADMIN — LORAZEPAM 0.5 MG: 0.5 TABLET ORAL at 08:36

## 2023-09-16 RX ADMIN — LORAZEPAM 0.5 MG: 0.5 TABLET ORAL at 19:32

## 2023-09-16 RX ADMIN — NAPROXEN 250 MG: 250 TABLET ORAL at 08:36

## 2023-09-16 RX ADMIN — SENNOSIDES 2 TABLET: 8.6 TABLET ORAL at 08:35

## 2023-09-16 RX ADMIN — Medication 10 MG: at 19:33

## 2023-09-16 RX ADMIN — NAPROXEN 250 MG: 250 TABLET ORAL at 19:32

## 2023-09-16 RX ADMIN — ATROPINE SULFATE 2 DROP: 10 SOLUTION/ DROPS OPHTHALMIC at 15:09

## 2023-09-16 RX ADMIN — GABAPENTIN 300 MG: 300 CAPSULE ORAL at 15:09

## 2023-09-16 RX ADMIN — FLUOXETINE 20 MG: 20 CAPSULE ORAL at 08:36

## 2023-09-16 RX ADMIN — DIVALPROEX SODIUM 250 MG: 250 TABLET, DELAYED RELEASE ORAL at 15:09

## 2023-09-16 RX ADMIN — ATROPINE SULFATE 2 DROP: 10 SOLUTION/ DROPS OPHTHALMIC at 19:33

## 2023-09-16 RX ADMIN — DIVALPROEX SODIUM 250 MG: 250 TABLET, DELAYED RELEASE ORAL at 06:40

## 2023-09-16 RX ADMIN — DIVALPROEX SODIUM 250 MG: 250 TABLET, DELAYED RELEASE ORAL at 19:32

## 2023-09-16 RX ADMIN — BENZTROPINE MESYLATE 1 MG: 1 TABLET ORAL at 08:36

## 2023-09-16 RX ADMIN — SENNOSIDES 2 TABLET: 8.6 TABLET ORAL at 19:33

## 2023-09-16 RX ADMIN — POLYETHYLENE GLYCOL 3350 17 G: 17 POWDER, FOR SOLUTION ORAL at 08:36

## 2023-09-16 RX ADMIN — GABAPENTIN 300 MG: 300 CAPSULE ORAL at 08:35

## 2023-09-16 RX ADMIN — MICONAZOLE NITRATE: 20 POWDER TOPICAL at 08:48

## 2023-09-16 RX ADMIN — OLANZAPINE 15 MG: 10 TABLET, ORALLY DISINTEGRATING ORAL at 19:32

## 2023-09-16 ASSESSMENT — ACTIVITIES OF DAILY LIVING (ADL)
ADLS_ACUITY_SCORE: 84
DRESS: PROMPTS;INDEPENDENT
HYGIENE/GROOMING: PROMPTS
ADLS_ACUITY_SCORE: 84
ORAL_HYGIENE: PROMPTS
ORAL_HYGIENE: PROMPTS
ADLS_ACUITY_SCORE: 84
DRESS: PROMPTS
HYGIENE/GROOMING: PROMPTS
ADLS_ACUITY_SCORE: 84

## 2023-09-16 NOTE — PROGRESS NOTES
Brief Internal Medicine Progress Note:     Hx of urinary retention   Likely 2/2 parkinson's disease, delirium and anti-psychotic medications. Seen by Urology who placed lowery which patient self removed on 5/24. Per nursing patient, prior to last night (9/16), he has not required any straight cathing recently and has been urinating without difficulty. However, patient required straight cath with output of 600 ml after bladder scan revealed > 450 ml overnight.   - cogentin decreased back to 1 mg BID   - obtain PVR. If retaining, please continue to monitor bladder scan every shift and straight cath for residual > 300 ml   - continue tamsulosin 0.4 mg daily   - follow UA with reflex to culture, yet to be obtained   - outpatient follow up with urology once discharged if retention remains an issue     Orthostatic Hypotension  Likely related to underlying parkinson's disease exacerbated by anti-cholinergic and anti-psychiatric medications including atropine, cogentin (dose recently increased), clozapine, zyprexa, and flomax. Could have a component of dehydration since patient has not been drinking much for fluids. BMP on 9/15 unremarkable. CBC on 9/16 WNL, Hgb stable at 12.4, WBC 10.1.   - Compression stockings  - Recommend adjusting and minimizing offending medications as able   - Encourage PO hydration for now   - Monitor stool output and notify medicine if patients diarrhea persists  - Repeat orthostatic VS daily     Leukocytosis - resolved   Noted on labs yesterday (9/15), WBC 11.6. Afebrile. Patient not reliable historian at this time, so unclear if having any infectious symptoms. Repeat CBC shows normalization of WBC, 10.1.     Medicine will continue to follow for orthostatic hypotension and urinary retention.     Pinky Hernandez DNP, ACNPC-AG  Hospitalist Service

## 2023-09-16 NOTE — PLAN OF CARE
"Mostly isolating to room. Calm, cooperative. Upon assessment, pt denied anxiety, depression, SI/HI. Upon assessment for auditory hallucinations, pt only stated that he has conversations with himself in his head. Does not appear to be responding to internal stimuli. Continues to report poor memory, telling writer that he had forgotten what writer had told him immediately after talking with him.    Continues to have hand tremors. Difficulty eating. Attempted to use assistive utensils but they did not seem to help and pt stated that he do not want to use them anymore.     Pt encouraged to drink fluids, with pt mostly receptive to encouragement. Voided approximately 750mL at approximately 1745. Pt denies having had a BM today. [facility] staff also reporting that he was not observed to have had a BM this shift. Pt denying dizziness and lightheadedness. However when attempting to gather a standing blood pressure, pt sat down during measurement shortly after standing and reported that his head felt strange. Denied that it was lightheadedness, and stated that he felt a \"tickling\" in his head.      Eating adequately. Maintains 1:1 for assault risk. No instances of aggression noted.     Problem: Adult Behavioral Health Plan of Care  Goal: Adheres to Safety Considerations for Self and Others  Outcome: Progressing    /69 (BP Location: Right arm, Patient Position: Sitting)   Pulse 88   Temp 97  F (36.1  C) (Temporal)   Resp 16   Ht 1.753 m (5' 9\")   Wt 83.7 kg (184 lb 8 oz)   SpO2 97%   BMI 27.25 kg/m      "

## 2023-09-16 NOTE — PLAN OF CARE
Problem: Suicidal Behavior  Goal: Suicidal Behavior is Absent or Managed  Outcome: Progressing     Problem: Sleep Disturbance  Goal: Adequate Sleep/Rest  Outcome: Progressing    Pt slept through the night without distress. Pt reported no pain or discomfort. No problems with behavior. No concerns reported by pt. Pt slept 7 hours.  Staff will continue to monitor.

## 2023-09-16 NOTE — PROGRESS NOTES
"Vinod had uneventful morning and afternoon. He ate his breakfast and his lunch. He took his medication with no problems. Pt resting in room for most of the shift. Pt urinated around 12:45pm. Urine sample collected from the hat and was sent to lab. Pt is unkempt refused to take showers. He appears guarded, paranoid and appears to be responding to internal stimuli. Per assessment he denies all mental health symptoms including anxiety, depression, paranoia and delusions. Pt also denies SI/SIB/HI. No acute behavioral concerns this shift. Continues to have tremors as a medication side effect. No acute medical concerns observed or stated this shift.      /71 (BP Location: Right arm)   Pulse 82   Temp 97.9  F (36.6  C) (Temporal)   Resp 16   Ht 1.753 m (5' 9\")   Wt 83.7 kg (184 lb 8 oz)   SpO2 97%   BMI 27.25 kg/m     "

## 2023-09-17 PROCEDURE — 250N000013 HC RX MED GY IP 250 OP 250 PS 637: Performed by: STUDENT IN AN ORGANIZED HEALTH CARE EDUCATION/TRAINING PROGRAM

## 2023-09-17 PROCEDURE — 99232 SBSQ HOSP IP/OBS MODERATE 35: CPT

## 2023-09-17 PROCEDURE — 250N000013 HC RX MED GY IP 250 OP 250 PS 637: Performed by: PSYCHIATRY & NEUROLOGY

## 2023-09-17 PROCEDURE — 250N000013 HC RX MED GY IP 250 OP 250 PS 637: Performed by: PHYSICIAN ASSISTANT

## 2023-09-17 PROCEDURE — 124N000002 HC R&B MH UMMC

## 2023-09-17 RX ADMIN — WHITE PETROLATUM: 1.75 OINTMENT TOPICAL at 19:28

## 2023-09-17 RX ADMIN — Medication 10 MG: at 19:28

## 2023-09-17 RX ADMIN — DIVALPROEX SODIUM 250 MG: 250 TABLET, DELAYED RELEASE ORAL at 19:27

## 2023-09-17 RX ADMIN — SENNOSIDES 2 TABLET: 8.6 TABLET ORAL at 08:46

## 2023-09-17 RX ADMIN — SENNOSIDES 2 TABLET: 8.6 TABLET ORAL at 19:27

## 2023-09-17 RX ADMIN — POLYETHYLENE GLYCOL 3350 17 G: 17 POWDER, FOR SOLUTION ORAL at 09:03

## 2023-09-17 RX ADMIN — MICONAZOLE NITRATE: 20 POWDER TOPICAL at 08:51

## 2023-09-17 RX ADMIN — OLANZAPINE 15 MG: 10 TABLET, ORALLY DISINTEGRATING ORAL at 08:46

## 2023-09-17 RX ADMIN — GABAPENTIN 300 MG: 300 CAPSULE ORAL at 08:47

## 2023-09-17 RX ADMIN — BENZTROPINE MESYLATE 1 MG: 1 TABLET ORAL at 19:27

## 2023-09-17 RX ADMIN — ATROPINE SULFATE 2 DROP: 10 SOLUTION/ DROPS OPHTHALMIC at 08:50

## 2023-09-17 RX ADMIN — GABAPENTIN 300 MG: 300 CAPSULE ORAL at 19:27

## 2023-09-17 RX ADMIN — DIVALPROEX SODIUM 250 MG: 250 TABLET, DELAYED RELEASE ORAL at 13:46

## 2023-09-17 RX ADMIN — BENZTROPINE MESYLATE 1 MG: 1 TABLET ORAL at 08:46

## 2023-09-17 RX ADMIN — CLOZAPINE 650 MG: 100 TABLET, ORALLY DISINTEGRATING ORAL at 13:46

## 2023-09-17 RX ADMIN — ATROPINE SULFATE 2 DROP: 10 SOLUTION/ DROPS OPHTHALMIC at 13:50

## 2023-09-17 RX ADMIN — OLANZAPINE 15 MG: 10 TABLET, ORALLY DISINTEGRATING ORAL at 19:27

## 2023-09-17 RX ADMIN — DIVALPROEX SODIUM 250 MG: 250 TABLET, DELAYED RELEASE ORAL at 08:47

## 2023-09-17 RX ADMIN — LORAZEPAM 0.5 MG: 0.5 TABLET ORAL at 13:47

## 2023-09-17 RX ADMIN — FLUOXETINE 20 MG: 20 CAPSULE ORAL at 08:47

## 2023-09-17 RX ADMIN — NAPROXEN 250 MG: 250 TABLET ORAL at 19:28

## 2023-09-17 RX ADMIN — LORAZEPAM 0.5 MG: 0.5 TABLET ORAL at 19:27

## 2023-09-17 RX ADMIN — POLYETHYLENE GLYCOL 3350 17 G: 17 POWDER, FOR SOLUTION ORAL at 19:28

## 2023-09-17 RX ADMIN — GABAPENTIN 300 MG: 300 CAPSULE ORAL at 13:46

## 2023-09-17 RX ADMIN — TAMSULOSIN HYDROCHLORIDE 0.4 MG: 0.4 CAPSULE ORAL at 08:47

## 2023-09-17 RX ADMIN — MICONAZOLE NITRATE: 20 POWDER TOPICAL at 19:29

## 2023-09-17 RX ADMIN — CYANOCOBALAMIN TAB 1000 MCG 1000 MCG: 1000 TAB at 08:47

## 2023-09-17 RX ADMIN — ATROPINE SULFATE 2 DROP: 10 SOLUTION/ DROPS OPHTHALMIC at 19:28

## 2023-09-17 RX ADMIN — NAPROXEN 250 MG: 250 TABLET ORAL at 08:47

## 2023-09-17 RX ADMIN — LORAZEPAM 0.5 MG: 0.5 TABLET ORAL at 08:46

## 2023-09-17 ASSESSMENT — ACTIVITIES OF DAILY LIVING (ADL)
HYGIENE/GROOMING: PROMPTS
HYGIENE/GROOMING: PROMPTS
ADLS_ACUITY_SCORE: 84
DRESS: PROMPTS
ADLS_ACUITY_SCORE: 84
ORAL_HYGIENE: PROMPTS
ADLS_ACUITY_SCORE: 84
DRESS: INDEPENDENT;PROMPTS
ADLS_ACUITY_SCORE: 84
ORAL_HYGIENE: PROMPTS
ADLS_ACUITY_SCORE: 84

## 2023-09-17 NOTE — PROGRESS NOTES
Brief Medicine Progress Note:    Medicine consulted for symptomatic orthostatic hypotension.     Hx of urinary retention   Likely 2/2 parkinson's disease, delirium and anti-psychotic medications. Seen by Urology who placed lowery which patient self removed on 5/24. Per nursing patient, prior to last night (9/16), he has not required any straight cathing recently and has been urinating without difficulty. However, patient required straight cath with output of 600 ml after bladder scan revealed > 450 ml overnight on 9/16. Cogentin decreased back to 1 mg BID. Patient able to spontaneously void throughout the day yesterday and overnight. UA on 9/16 unremarkable, full details below, no reflex to culture.   - obtain PVR. If retaining, please continue to monitor bladder scan every shift and straight cath for residual > 300 ml   - continue tamsulosin 0.4 mg daily   - outpatient follow up with urology once discharged if retention remains an issue      Orthostatic Hypotension  Likely related to underlying parkinson's disease exacerbated by anti-cholinergic and anti-psychiatric medications including atropine, cogentin (dose recently increased), clozapine, zyprexa, and flomax. Could have a component of dehydration since patient has not been drinking much for fluids. BMP on 9/15 unremarkable. CBC on 9/16 WNL, Hgb stable at 12.4, WBC 10.1.   - Most recent vitals: B/P: 105/69, T: 97.8, P: 88, R: 16  - Compression stockings  - Recommend adjusting and minimizing offending medications as able   - Encourage PO hydration for now   - Monitor stool output and notify medicine if patients diarrhea persists  - Repeat orthostatic VS daily     Medicine will continue to follow for orthostatic hypotension.     Pinky Hernandez DNP, ACNPC-  Hospitalist Service    Color Urine (no units)   Date Value   09/16/2023 Light Yellow     Appearance Urine (no units)   Date Value   09/16/2023 Clear     Glucose Urine (mg/dL)   Date Value   09/16/2023  Negative     Bilirubin Urine (no units)   Date Value   09/16/2023 Negative     Ketones Urine (mg/dL)   Date Value   09/16/2023 Negative     Specific Gravity Urine (no units)   Date Value   09/16/2023 1.018     pH Urine (no units)   Date Value   09/16/2023 6.5     Protein Albumin Urine (mg/dL)   Date Value   09/16/2023 Negative     Nitrite Urine (no units)   Date Value   09/16/2023 Negative     Leukocyte Esterase Urine (no units)   Date Value   09/16/2023 Trace (A)

## 2023-09-17 NOTE — PROGRESS NOTES
"Vinod had uneventful morning and afternoon. He ate both his breakfast and lunch. He took his medication with no problems. Pt resting in room for most of the shift. He denies all mental health symptoms including anxiety, depression, paranoia and delusions. He also denies SI/SIB/HI. Pt is paranoid and when given morning medication he stated \"You are giving me poison aren't you, just give it to me I have no choice but to take it even if it kills me\". Pt appears to be talking to himself, isolative in room. Pt is unkempt and states he does not want to shower. Writer encouraged patient to take a shower, pt declined multiple times. Pt appears to have tremors as a result of the medication side effects. No medical concerns noted this shift. Also no acute mental health concerns this shift.     /69 (BP Location: Right arm, Patient Position: Sitting)   Pulse 88   Temp 97.8  F (36.6  C)   Resp 16   Ht 1.753 m (5' 9\")   Wt 83.7 kg (184 lb 8 oz)   SpO2 98%   BMI 27.25 kg/m     "

## 2023-09-18 PROCEDURE — 99232 SBSQ HOSP IP/OBS MODERATE 35: CPT

## 2023-09-18 PROCEDURE — 250N000013 HC RX MED GY IP 250 OP 250 PS 637: Performed by: PSYCHIATRY & NEUROLOGY

## 2023-09-18 PROCEDURE — 250N000013 HC RX MED GY IP 250 OP 250 PS 637: Performed by: STUDENT IN AN ORGANIZED HEALTH CARE EDUCATION/TRAINING PROGRAM

## 2023-09-18 PROCEDURE — 124N000002 HC R&B MH UMMC

## 2023-09-18 PROCEDURE — 99233 SBSQ HOSP IP/OBS HIGH 50: CPT | Performed by: PSYCHIATRY & NEUROLOGY

## 2023-09-18 PROCEDURE — 250N000013 HC RX MED GY IP 250 OP 250 PS 637: Performed by: PHYSICIAN ASSISTANT

## 2023-09-18 RX ADMIN — LORAZEPAM 0.5 MG: 0.5 TABLET ORAL at 13:12

## 2023-09-18 RX ADMIN — GABAPENTIN 300 MG: 300 CAPSULE ORAL at 21:07

## 2023-09-18 RX ADMIN — NAPROXEN 250 MG: 250 TABLET ORAL at 09:00

## 2023-09-18 RX ADMIN — ATROPINE SULFATE 2 DROP: 10 SOLUTION/ DROPS OPHTHALMIC at 11:03

## 2023-09-18 RX ADMIN — SENNOSIDES 2 TABLET: 8.6 TABLET ORAL at 21:06

## 2023-09-18 RX ADMIN — ATROPINE SULFATE 2 DROP: 10 SOLUTION/ DROPS OPHTHALMIC at 13:12

## 2023-09-18 RX ADMIN — MICONAZOLE NITRATE: 20 POWDER TOPICAL at 12:14

## 2023-09-18 RX ADMIN — POLYETHYLENE GLYCOL 3350 17 G: 17 POWDER, FOR SOLUTION ORAL at 21:06

## 2023-09-18 RX ADMIN — WHITE PETROLATUM: 1.75 OINTMENT TOPICAL at 21:24

## 2023-09-18 RX ADMIN — Medication 10 MG: at 21:06

## 2023-09-18 RX ADMIN — GABAPENTIN 300 MG: 300 CAPSULE ORAL at 08:59

## 2023-09-18 RX ADMIN — LORAZEPAM 0.5 MG: 0.5 TABLET ORAL at 21:06

## 2023-09-18 RX ADMIN — OLANZAPINE 15 MG: 10 TABLET, ORALLY DISINTEGRATING ORAL at 21:06

## 2023-09-18 RX ADMIN — ATROPINE SULFATE 2 DROP: 10 SOLUTION/ DROPS OPHTHALMIC at 21:23

## 2023-09-18 RX ADMIN — BENZTROPINE MESYLATE 1 MG: 1 TABLET ORAL at 08:59

## 2023-09-18 RX ADMIN — TAMSULOSIN HYDROCHLORIDE 0.4 MG: 0.4 CAPSULE ORAL at 08:59

## 2023-09-18 RX ADMIN — SENNOSIDES 2 TABLET: 8.6 TABLET ORAL at 08:59

## 2023-09-18 RX ADMIN — DIVALPROEX SODIUM 250 MG: 250 TABLET, DELAYED RELEASE ORAL at 06:02

## 2023-09-18 RX ADMIN — POLYETHYLENE GLYCOL 3350 17 G: 17 POWDER, FOR SOLUTION ORAL at 08:59

## 2023-09-18 RX ADMIN — DIVALPROEX SODIUM 250 MG: 250 TABLET, DELAYED RELEASE ORAL at 13:12

## 2023-09-18 RX ADMIN — NAPROXEN 250 MG: 250 TABLET ORAL at 21:07

## 2023-09-18 RX ADMIN — BENZTROPINE MESYLATE 1 MG: 1 TABLET ORAL at 21:06

## 2023-09-18 RX ADMIN — FLUOXETINE 20 MG: 20 CAPSULE ORAL at 09:00

## 2023-09-18 RX ADMIN — DIVALPROEX SODIUM 250 MG: 250 TABLET, DELAYED RELEASE ORAL at 21:07

## 2023-09-18 RX ADMIN — CYANOCOBALAMIN TAB 1000 MCG 1000 MCG: 1000 TAB at 08:59

## 2023-09-18 RX ADMIN — WHITE PETROLATUM: 1.75 OINTMENT TOPICAL at 11:03

## 2023-09-18 RX ADMIN — CLOZAPINE 650 MG: 100 TABLET, ORALLY DISINTEGRATING ORAL at 13:12

## 2023-09-18 RX ADMIN — OLANZAPINE 15 MG: 10 TABLET, ORALLY DISINTEGRATING ORAL at 09:00

## 2023-09-18 RX ADMIN — MICONAZOLE NITRATE: 20 POWDER TOPICAL at 21:24

## 2023-09-18 RX ADMIN — LORAZEPAM 0.5 MG: 0.5 TABLET ORAL at 09:00

## 2023-09-18 RX ADMIN — GABAPENTIN 300 MG: 300 CAPSULE ORAL at 13:12

## 2023-09-18 ASSESSMENT — ACTIVITIES OF DAILY LIVING (ADL)
ADLS_ACUITY_SCORE: 84

## 2023-09-18 NOTE — PROGRESS NOTES
"[facility], [place]   Psychiatric Progress Note  Hospital Day: 115        Interim History:   The patient's care was discussed with the treatment team during the daily team meeting and/or staff's chart notes were reviewed. Pt continues to be intermittently confused. He required straight cath on Friday evening in context of increased dose of Cogentin. Dose was subsequently reduced. IM following for urinary retention and orthostatic hypotension, which has improved. No episodes of aggression or violent behaviors toward others.     Upon interview, Vinod reports that he is having periods lasting up to 3 minutes during which he is unable to speak even though he wants to speak. He said that he believes this has been going on \"for awhile,\" but that it has never lasted this long in the past. He denies weakness, current difficulty speaking, slurred speech, changes in vision, facial drooping. He does report difficulty swallowing, which is consistent with his reports last week. Speech therapy was involved and testing was reassuring. He said that his appetite is good and that he is eating good amounts. He continues to be forgetful. He often says that he is not listening when writer is attempting to gather information or provide updates about his care.     Suicidal ideation: no    Homicidal ideation: no    Psychotic symptoms: no AH or VH reported during interview. Delusions present and other psychotic symptoms suspected.    Medication side effects reported: As noted above. Tremors, less pronounced on exam in recent days.   Acute medical concerns: none. He denies any pain or discomfort today.  Reports that rash has improved.  Denying abdominal discomfort/pain and urinary changes.     Other issues reported by patient: Patient had no further questions or concerns.           Medications:      atropine  2 drop Sublingual TID    benztropine  1 mg Oral BID    cloZAPine  650 mg Oral Daily    cyanocobalamin  " "1,000 mcg Oral Daily    divalproex sodium delayed-release  250 mg Oral Q8H KIKI    FLUoxetine  20 mg Oral Daily    gabapentin  300 mg Oral TID    LORazepam  0.5 mg Oral TID    melatonin  10 mg Oral At Bedtime    miconazole   Topical BID    mineral oil-hydrophilic petrolatum   Topical BID IS    naproxen  250 mg Oral BID w/meals    OLANZapine zydis  15 mg Oral BID    Or    OLANZapine  10 mg Intramuscular BID    polyethylene glycol  17 g Oral BID    sennosides  2 tablet Oral BID    tamsulosin  0.4 mg Oral Daily          Allergies:     Allergies   Allergen Reactions    Bee Venom Unknown    Montelukast Unknown    Phenothiazines Unknown          Labs:     No results found for this or any previous visit (from the past 24 hour(s)).                 Psychiatric Examination:     /73 (BP Location: Left arm)   Pulse 83   Temp 97.8  F (36.6  C) (Temporal)   Resp 16   Ht 1.753 m (5' 9\")   Wt 83.7 kg (184 lb 8 oz)   SpO2 97%   BMI 27.25 kg/m    Weight is 184 lbs 8 oz  Body mass index is 27.25 kg/m .    Weight over time:  Vitals:    07/03/23 1100 07/30/23 0902 08/13/23 0900 08/26/23 0835   Weight: 81.6 kg (179 lb 12.8 oz) 86.5 kg (190 lb 9.6 oz) 85.7 kg (188 lb 14.4 oz) 82.8 kg (182 lb 7 oz)    09/07/23 0900   Weight: 83.7 kg (184 lb 8 oz)       Orthostatic Vitals         Most Recent      Sitting Orthostatic /61 07/25 0800    Sitting Orthostatic Pulse (bpm) 85 07/25 0800          Cardiometabolic risk assessment. 06/07/23    Reviewed patient profile for cardiometabolic risk factors    Date taken /Value  REFERENCE RANGE   Abdominal Obesity  (Waist Circumference)   See nursing flowsheet Women ?35 in (88 cm)   Men ?40 in (102 cm)      Triglycerides  No results found for: TRIG    ?150 mg/dL (1.7 mmol/L) or current treatment for elevated triglycerides   HDL cholesterol  No results found for: HDL]   Women <50 mg/dL (1.3 mmol/L) in women or current treatment for low HDL cholesterol  Men <40 mg/dL (1 mmol/L) in men or " current treatment for low HDL cholesterol     Fasting plasma glucose (FPG) Lab Results   Component Value Date     05/26/2023      FPG ?100 mg/dL (5.6 mmol/L) or treatment for elevated blood glucose   Blood pressure  BP Readings from Last 3 Encounters:   09/18/23 119/73   05/26/23 97/55    Blood pressure ?130/85 mmHg or treatment for elevated blood pressure   Family History  See family history     Mental Status Exam:  Appearance: awake, alert, disheveled, lying in bed. No evidence of pain of acute distress.   Attitude:  attempts at being cooperative  Eye Contact:  fair, less intense  Mood:  irritable  Affect:  mood congruent, anxious  Speech: mostly coherent  Psychomotor Behavior:  Ongoing bilateral hand tremor and jaw tremor. Appeared slightly better today. No evidence of dystonia, or tics.  Throught Process: linear, illogical  Associations:  no loosening of associations present  Thought Content:  Delusions and AH. Evidence of obsessive thinking and likely compulsions to harm others.   Insight:  limited  Judgement:  poor  Oriented to:  self, place  Attention Span and Concentration:  fair  Recent and Remote Memory:  poor  Gait: GRACE         Precautions:     Behavioral Orders   Procedures    Assault precautions    Code 1 - Restrict to Unit    Code 2    Code 2 - 1:1 Staff Supervision     For ECT only    Electroconvulsive therapy     Series of up to 12 treatments. Begin Date: 8/2/23     Treating Psychiatrist providing ECT:  unknown     Notified on:  7/28/23 via this order  NOTE: We received verbal confirmation from [facility] that Lorenzo Pavon has been approved but awaiting official court order and risk management's review  Initial consult was completed by Dr. Hicks on 7/18/23    Electroconvulsive therapy     Series of up to 12 treatments. Begin Date: 8/2/23     Treating Psychiatrist providing ECT:  Dominga     Notified on:  8/2/23    Elopement precautions    Fall precautions    Occupational Therapy on the Unit      Order Specific Question:   Reason for Consult     Answer:   Eval of thought process, functional skills and behavior     Order Specific Question:   Course of Action:     Answer:   Evaluation only     Order Specific Question:   Treatment Prescription:     Answer:   CPT requested    Routine Programming     As clinically indicated    Self Injury Precaution    Status 15     Every 15 minutes.    Status Individual Observation     Patient SIO status reviewed with team/RN.  Please also refer to RN/team documentation for add'l detail.    -SIO staff to monitor following which have contributed to patient being on SIO:  Agitation  Pt is impulsive   Pt has ran out of his room naked  Pt has Parkinson symptoms place him in a high fall risk  Pt has verbal outburst of sexual and threaten statements  Pt requires immediate redirection when masturbating    -Possible interventions SIO staff could use to support patient's treatment progress:  Engage pt in activities    -When following observed, team will review discontinuation of SIO:  Absence of aggression toward others for > 24 hours    Comments: SIO 1:1     Order Specific Question:   CONTINUOUS 24 hours / day     Answer:   5 feet     Order Specific Question:   Indications for SIO     Answer:   Severe intrusiveness     Order Specific Question:   Indications for SIO     Answer:   Assault risk    Suicide precautions     Patients on Suicide Precautions should have a Combination Diet ordered that includes a Diet selection(s) AND a Behavioral Tray selection for Safe Tray - with utensils, or Safe Tray - NO utensils            Diagnoses:     #Unspecified psychosis likely schizophrenia per history, rule out Bipolar affective disorder, manic  #Unspecified encephalopathy   -R/O catatonia   -Episodes of unresponsiveness, concern for PNES   #Parkinsonism 2/2 neuroleptic medications, rule out Parkinson's Disease  #Urinary retention and BPH  -Possible UTI -- UC resulted on 5/25 w/out growth  #Hx of  prolonged QTc with clozapine  #Mild thrombocytopenia  #R/O OCD         Assessment and hospital summary:  Patient was admitted to psychiatric unit for safety, stabilization and medication management. He has had schizophrenia since the 1980. He was on Clozaril x 25 years, and it was tapered and discontinued on May 7, 2023  due to prolonged qtc of 527, and his psychotic  symptoms have worsened since then. Sinemet was also discontinued recently due to concerns that it was contributing to paranoia and AH. He is agitated, aggressive, dangerous to self and others. He remains on SIO 2 to 1, and is under psychiatric emergency and court hold. There are concerns for organic etiology given pattern of sundowning, history of parkinsonism, and ongoing disorientation/confusion. 6/8: EKG repeated, cardiology consult regarding safety of resuming clozapine in the context of prolonged QTc and refractory schizophrenia pending response. Per cardiology, correct QTc is no more than 460. They do not have concerns about AP retrial. Neurology IP consult placed for evaluation of sundowning and cognitive decline secondary to Sinemet discontinuation. MSSA initiated. Per Neurology, discontinuation of Sinemet would not account for these symptoms. They do not recommend retrial at this time. On 6/14, discussed complex case at length with Dr. Hatch (primary provider on geriatic unit) who provided recs (see second opinion note dated 6/14). Depakote initiated, though needed to be reduced due to side effect of thrombocytopenia. Melatonin was increased to 10 mg at bedtime. 6/22: Clozapine titration continued. Its possible that clozapine dose is contributing to worsened compulsive behaviors noted throughout hospitalization which could improve with lowering the dose. ECT initiated due to treatment refractory psychosis and ongoing symptoms despite therapeutic dose of clozapine (650 mg daily). ECT initiated on 8/2/23 and pt completed 11 total treatments,  but ECT stopped on  due to lack of improvement and distress it was causing patient.     Chart reviewed which revealed the followin2022: He was on clozapine, Seroquel, Cymbalta, and Carbidopa-levodopa and hospitalized for pneumonia. Psych consulted and Seroquel was stopped. Treated with Abx and discharged to TCU.     2022: Hospitalized at [facility]. Per chart review:    Vinod Lee is a 63 yo male with longstanding history of schizophrenia. [facility] where he presented due to increased delusional thoughts while on a visit to his parents for Thanksgiving. He began experiencing increasing paranoid thoughts that someone might be poisoning him and began fearing that he might accidentally harm someone. He reported to his parents that he was having thoughts about hitting someone or beating someone up and told them he could not guarantee they would be safe with him. Parents encouraged patient to go to the ED, which he did voluntarily.     Per patient report and chart review, he was hospitalized several times in the  and reports he was civilly committed at one point in the , however he has been quite stable for the past several decades. He reports he will experience some paranoia and delusional thinking at times, but generally has good insight into his symptoms. He has been maintained on clozapine for about 25 years and prior to several months ago was also concurrently prescribed quetiapine.      In the last several months, patient has had a number of medication changes. Approximately 6 months ago, patient was diagnosed with parkinsonism related to antipsychotic use and was started on carbidopa-levidopa. He experienced no improvement in tremors, and thus carbidopa- levidopa dose was increased 1.5 weeks ago.      In addition, patient was medically hospitalized in 2022 for confusion, weakness, repeated falls, ongoing weight loss, and SOB. He was found to have a  "cavitary lesion of the lung and suspected aspiration pneumonia. He was treated with antibiotics. At that time, he was taking current dose of clozapine and duloxetine, as well as propranolol ER, quetiapine, gabapentin, and clonazepam. Psychiatry was consulted due to concern for oversedation, and propranolol ER, gabapentin, and quetiapine were discontinued. Conazepam was reduced from 0.5 mg TID to BID dosing and recommended to be discontinued, however, it does not appear this occurred. His sedation improved significantly. QTc prolongation was also noted, but improved throughout his stay. He was ultimately discharged to a TCU for several months before returning home. He reports his weakness has greatly improved and states he \"graduated\" physical therapy, though he continues to be diligent in completed recommended exercises.      He reports that over the past several months, his delusions and anxiety have been worse than usual, with symptoms becoming much more acute since the carbidopa-levidopa dose was increased. He has felt increasingly paranoid about being poisoned, noting this is a delusion that has been stable over time, but was previously less intrusive. He has insight during the interview that his paranoid thinking is not reality based, but states it has been harder to separate delusions from reality and he becomes very worried that he might hurt someone, despite no history of violent behavior. He reports that the paranoia about his food being poisoned in combination with the tremors in his hands have made it difficult for him to eat. He has also had quite low appetite for the past 6 months to a year . He reports that due to the combination of these factors, he has lost about 50 pounds in the last year.He denies any problems with sleep initiation or maintenance. He reports energy is fairly good and \"better than it used to be.\" He reports some short term memory issues and on interview, does have some difficulty " "remembering details of recent events. He is unsure if he has received cognitive testing in the past.    He denies any suicidal ideation and reports he has not experienced SI for decades. He denies homicidal thoughts and is very clear that he had and has no desire to harm anyone else, but was afraid he might somehow do so. He denies any hallucinations and states \"that's never been a problem for me.\" He is not observed responding to internal stimuli. He is alert and oriented in all spheres.        ========    BRIEF HOSPITAL COURSE: This 62 y.o. male admitted 11/25/2022 to  [facility] for the assessment and treatment of the above presentation. This was a voluntary admission.     In summary, he was tapered off the Sinemet, which he reports to be both ineffective and potentially worsening the anxiety and paranoia ideations. Instead Benadryl 25 mg TID was started to help with extrapyramidal side effects. He struggled with sleep during his stay here. Remeron 7.5mg QHS was tried without success. Seroquel 100mg qhs was restarted with addition of suvorexant 10mg qhs. Given his Seroquel PRN use prior history of prolonged QTC, he will benefit from another EKG repeat on the medical unit.     Unfortunately, he tested positive for influenza A and developed hypoxemia. Now on 2L oxygen with desats when prone requiring 3L oxygen. Subsequently, the plan will to be tapering him off clonazepam due to hypoxia (and also prior plan to taper). The patient tolerated these changes without side effects.     The patient minimally benefited from the structured therapeutic milieu as he attended programming seldom as he tested positive for influenza, he needed to isolate with droplet precautions. Pt was engaged and worked on issues that were triggering/brought pt to the hospital and has excellent insight into his anxiety and paranoid delusions. Pt denied suicidal ideations throughout all/majority of their hospitalization. Pt denied HI throughout all " of hospitalization. There were no concerns with behavioral disruptions/outbursts. The patient has shown improvement in general.     At the time of discharge, hospitalization course was reviewed with Vinod Lee with plan to transfer to medicine. Please consult psychiatry and I will continue to follow while patient is on the medical unit. He is now off sinemet since 11/29 21:00 and I would expect anxiety and paranoia to improve more moving forward. Psychiatrically, once anxiety and paranoia is baseline, can D/C to home once medically stable. He DOES NOT NEED A 1:1 on the medical unit from a mental health perspective, we initiated 1:1 11/30/2022 due to significant fatigue, gait instability (he was unable to stand without assist) and feeding assist.    04/2023: Clozapine was gradually tapered and discontinued, unclear reasons why it was discontinued per outside records, though Dr. Portillo's H&P indicates that it was due to prolonged QTc.       Today's Changes:  Renewed SIO  IM consult note reviewed. Appreciate assistance.  Neuro consult placed for the following reasons:  - Ongoing tremor of unclear etiology that is negatively impacting quality of life  - Worsening memory/attention deficits   - Pharmacologic interventions for possible/suspected dementia  - Need for imaging at this time? Pt reporting episodes of aphasia lasting 2-3 minutes      Target psychiatric symptoms and interventions:  -Psych emergency declared on 5/27, now fully committed  - Depakote 250 mg TID, restarted on 8/26. Cannot increase beyond this dose due to thrombocytopenia at higher doses  -Continue clozapine as above  -Continue scheduled Zyprexa 15mg BID  -Continue 1:1 for safety of staff and patients, reduced from 2:1 7/23/23  - weekly CBC to monitor platelets    -Continue Gabapentin 300 mg TID as staff believe it has been effective for treatment of his anxiety    Risks, benefits, and alternatives discussed at length with patient.     Acute  Medical Problems and Treatments:  Delirium vs progression of dementia  Neurology consult placed on 6/8 for evaluation of sundowning and cognitive decline secondary to Sinemet discontinuation: Resuming Sinemet not recommended for behavioral concerns. Please see Neuro note for details.     Tremors  longstanding though appeared to worsen following initiation of clozapine  - Ativan 0.5 mg TID  - Cogentin 1 mg TID added on 8/25 with overall improvement noted    Thrombocytopenia, resolved  Likely secondary to Depakote.  According to the, incidents of Depakote induced thrombocytopenia as 27%.  Depakote dose reduced from 1000 twice daily to 250 3 times daily on 7/28/2023 with subsequent resolution of thrombocytopenia  - weekly CBCs    Constipation  Likely worsened by clozapine, improved since modifying regimen.   - daily miralax   - daily Senokot 2 tablets BID      Right first toe fracture:  Seen by Ortho on 6/16:  Per note: Vinod Lee is a 62 year old  male w/ PMHx complex psychiatric history who has a fracture to the distal phalanx of the right toe after a recent trauma to the foot the patient reports.  Patient denies any other pain or discomfort.  Images reviewed and plan will be for irrigation of the popped fracture blister with sterile saline and the toes should be dressed and a 4 x 4 gauze dressing.  Patient will need a postop shoe which she can be weightbearing as tolerated in.  Would recommend a course of antibiotics for approximately 7 days.  Would recommend Augmentin or clindamycin.  Podiatry will be made aware of patient and will see patient while he is admitted or if patient is discharged in the near future a follow-up appointment will need to be established.     Remainder of care per primary team.  Primary team should make sure that patient is up-to-date on his tetanus shot.  If not patient will require a booster shot.    Hx of prolonged QTc:  Continue weekly EKGs. See above for details.   Discussed most  "recent EKG findings with Cardiology on 8/25. Corrected QTc is 501 from EKG on 8/23. Per cardiology, repeat EKG today. If corrected QTc is > 500, will need to reduce clozapine. If < 500, OK to keep clozapine at current dose. UPDATE/ADDENDUM 9/6: Repeat EKG with corrected QTc of 440. No need to adjust clozapine dose per cards.     Urine retention  Patient with history of Parkinson disease with history of urinary retention.  Last postvoid residual 197.  Has not needed straight cath since Friday.  -Check PVR if patient showing with signs of retention such as bloating, noted decreased urine output, increased confusion  -Notify medicine if PVR greater than 300 mL  -Continue tamsulosin 0.4 mg daily  -Urology consult at discharge for further testing in clinic    Orthostatic hypotension-resolved  Ambulating in unit.  Independent in shower.  Not complaining of dizziness, lightheaded, presyncopal symptoms.  -Compression stockings  -Encourage p.o. hydration  -Tamsulosin as above     Currently, medically stable and internal medicine will sign off. Please contact if future questions or concerns arise. Thank you for the opportunity to be a part of this patient's care.       # Psoriasis hx  # Purplish discoloration of B/LE feet-resolved   Discussed with Dr. Rhodes and bedside RN regarding rash on right foot that appears and appears to be purplish in color and almost \"petechiae.\" It was noted during interview, but seemed to be resolving upon walking. Within the past 24 hrs, pt has had ECT and seemed more confused than usual. Pt seems to have had a right great toe wound with recent right great toe fracture seen by Medicine on 7/16. Seen on 8/4 with improving color on B/L legs at rest and appears to have small, scaly patches of varying coin sizes that have not grown past marked borders. See photos. Per further chart review, has had psoriasis history. WBC WNL, no fevers, HDS. This appears to more consistent with a psoriasis like " picture.   - Start triamcinolone topical 0.025%   -Apply twice daily until lesions for 2 weeks or until lesions resolve/  -Monitor for skin thinning, striae, rebound lesion flares.   - Start Eucerin cream              - Apply 30 minutes PRIOR to triamcinolone topical cream above or PRN as needed if dry skin/after bathing   - Medicine will sign off.   - For worsening pain, spread, itchiness, fevers, please contact Medicine and possibly Dermatology.    Benzodiazepine dependence:  Phenobarbital taper completed shortly after patient's admission    Pertinent labs/imaging:  Weekly CBC with diff     Behavioral/Psychological/Social:  - Encourage unit programming    Safety:  - Continue precautions as noted above  - Status 15 minute checks  - Continue 1:1 for staff and pt safety    Legal Status: Fully committed with Sánchez and Lorenzo Pavon. [place] medications: clozapine, olanzapine, risperidone, quetiapine      Disposition Plan   Reason for ongoing admission: No safe alternative at this time  Discharge location: TBD. Will likely need memory care unit  Discharge Medications: not ordered  Follow-up Appointments: not scheduled    Entered by: Ingrid Rhodes MD on 09/18/2023  at 2:04 PM

## 2023-09-18 NOTE — PLAN OF CARE
"Days  Nursing assessment completed. Continues on 1:1 SIO. Patient up early in his room conversing with his SIO staff. Ate 80% of his breakfast. Cooperative with taking am medications. Fluids Pushed. Patient has order for post void residual bladder scan, and straight cath >300ml. Per SIO staff, patient has been up to the bathroom 2-3 times this morning, but has not voided or had a BM. Patient is a poor historian and states he did have a BM this morning. With significant encouragement, patient agreed to shower. After shower, patient cooperative with alejandro care and ointment. Redness on groin area appears to have reduced from previous time writer observed area redness, however it is still red. Patient denies discomfort in the area. Writer spoke with IM, and plan to discontinue post void residual bladder scanning since pt has not required straight cathing since Friday. Will continue to monitor PVR as needed.   Evening  Patient up to the bathroom independently. He allowed staff to put on his go stockings and was agreeable to wearing them. He ate the majority of his dinner tray, attempting to use his adaptive silverware device, but stated he did not think it was very helpful. He allowed writer to change some of his linens. Showered on the day shift. He continues to report that he has voided recently and denies pain, pressure, or discomfort. Since writer did not observe him urinating, he was asked if he would consider allowing the bladder scan to check if he has any PVR, but he declined, stating he thought it would \"blow up his room\". Patient rested in his room for some of the evening, and socialized appropriately with staff. He was cooperative with HS medications. Teds removed for the night.                       "

## 2023-09-18 NOTE — PLAN OF CARE
Goal Outcome Evaluation:      Patient was observed sleeping during safety rounds check. Respirations observed and breathing was normal unlabored. Pt had no c/o pain or discomfort during the night. No PRNs given and no behavioral concerns. Post void residual was 38. Pt slept 6 hours.

## 2023-09-18 NOTE — PLAN OF CARE
"Pt continues to make paranoid statements, eg that his \"time is coming,\" that his water is poison, that his medical team is going to beat him up, that if he goes into the shower he will disappear. Appears intermittently confused, eg forgetting what he is saying while he is talking and telling writer immediately after writer spoke that he does not remember what I just said. Generally cooperative. Upon assessment, denies anxiety, depression, SI/HI. Endorses auditory hallucinations. Isolating to room.     Pt only ate approximately 1/3 of his dinner with encouragement. Encouraged to drink fluids, mostly receptive to encouragement. Urinated 500 ml at 1745, with post void residual of 197. Denies pain or hesitation with urination. Denies feeling lightheaded or dizzy.     Displaying mouth and hand tremors. Took scheduled medications without issue. Maintains 1:1 for assault risk. No noted aggression.     /82 (BP Location: Right arm)   Pulse 89   Temp 97.5  F (36.4  C) (Temporal)   Resp 16   Ht 1.753 m (5' 9\")   Wt 83.7 kg (184 lb 8 oz)   SpO2 98%   BMI 27.25 kg/m          Problem: Psychotic Symptoms  Goal: Psychotic Symptoms  Description: Signs and symptoms of listed problems will be absent or manageable.  Outcome: Not Progressing     Problem: Confusion Chronic  Goal: Optimal Cognitive Function  Outcome: Not Progressing      "

## 2023-09-18 NOTE — PLAN OF CARE
Assessment/Intervention/Current Symtoms and Care Coordination:  Reviewed chart. Met with team to review patient's current functioning, needs, progress, and impediments to discharge.    Called [facility] admissions coordinator, they did not receive the in basket request, referral faxed over around 9:35am.     PAMELA MALONE sending referral to the [facility].     Reviewed [facility] assessment done by Che Pinto, Occupational Therapist on 3B last week.     Discharge Plan or Goal:  Seeking placement in a memory care unit, preferably in the [facility]     Barriers to Discharge:  Referrals are being sent to memory care units who have openings. One barrier is the limited openings in memory care units at this time.     Referral Status:  [facility] in [place] 8/22, no memory care 8/30  [facility] in [place] 8/15, no memory care 8/31  [facility] 8/31, no openings 8/31  [facility] 8/29, declined 9/8  [facility] 8/29, no open beds 8/30  [facility] 8/30  [facility] 8/31, no open beds 8/31  [facility] 9/15  [facility] 8/31  [place] [facility] 8/31, no openings and private pay 8/31  [facility], 8/31, declined 9/1 due to aggressive behaviors  [place], 9/13, declined 9/13  [facility] 9/15     Legal Status:  Commitment with [facility]: [place]  File Number: [id number]  Issued 07/06/23 and is not to exceed six months    Contacts:  : Vicky Valdez with [facility], [phone]  Psychiatrist: Dr. Santana at [facility], [phone] PCP: Attila Urena,   Mom: Alberta, [phone] (H) [phone] (M)   Dad: Ino, [phone] (H)  Sister, Sandra Treadwell, [phone] (KING)   [facility]'s Office Fax: [phone]  [facility]: [phone] (KING)  [facility]: Jasmin phone [phone], fax [phone]  CADI  with [facility]: Regina  Walt, [phone], [email or web address]    Upcoming Meetings and Dates/Important Information and next steps:

## 2023-09-18 NOTE — PROGRESS NOTES
"Brief Medicine Note    Contacted by nursing regarding bladder scan orders.     Today's vital signs, medications, and nursing notes were reviewed.      /73 (BP Location: Left arm)   Pulse 83   Temp 97.8  F (36.6  C) (Temporal)   Resp 16   Ht 1.753 m (5' 9\")   Wt 83.7 kg (184 lb 8 oz)   SpO2 97%   BMI 27.25 kg/m    General: A&O. NAD.       A/P:    Urine retention  Patient with history of Parkinson disease with history of urinary retention.  Last postvoid residual 197.  Has not needed straight cath since Friday.  -Check PVR if patient showing with signs of retention such as bloating, noted decreased urine output, increased confusion  -Notify medicine if PVR greater than 300 mL  -Continue tamsulosin 0.4 mg daily  -Urology consult at discharge for further testing in clinic    Orthostatic hypotension-resolved  Ambulating in unit.  Independent in shower.  Not complaining of dizziness, lightheaded, presyncopal symptoms.  -Compression stockings  -Encourage p.o. hydration  -Tamsulosin as above    Currently, medically stable and internal medicine will sign off. Please contact if future questions or concerns arise. Thank you for the opportunity to be a part of this patient's care.    PRESTON Bass CNP  Internal Medicine [facility] Hospitalist  Page job code [id number] (3B), [phone] (3A), or [phone] ([facility] and 4A)  Text paging via [employer] is appreciated  September 18, 2023    "

## 2023-09-19 PROCEDURE — 93010 ELECTROCARDIOGRAM REPORT: CPT | Performed by: INTERNAL MEDICINE

## 2023-09-19 PROCEDURE — 124N000002 HC R&B MH UMMC

## 2023-09-19 PROCEDURE — 250N000013 HC RX MED GY IP 250 OP 250 PS 637: Performed by: PSYCHIATRY & NEUROLOGY

## 2023-09-19 PROCEDURE — 93005 ELECTROCARDIOGRAM TRACING: CPT

## 2023-09-19 PROCEDURE — 250N000013 HC RX MED GY IP 250 OP 250 PS 637: Performed by: PHYSICIAN ASSISTANT

## 2023-09-19 PROCEDURE — 250N000013 HC RX MED GY IP 250 OP 250 PS 637: Performed by: STUDENT IN AN ORGANIZED HEALTH CARE EDUCATION/TRAINING PROGRAM

## 2023-09-19 RX ADMIN — ATROPINE SULFATE 2 DROP: 10 SOLUTION/ DROPS OPHTHALMIC at 19:56

## 2023-09-19 RX ADMIN — NAPROXEN 250 MG: 250 TABLET ORAL at 08:36

## 2023-09-19 RX ADMIN — GABAPENTIN 300 MG: 300 CAPSULE ORAL at 19:55

## 2023-09-19 RX ADMIN — SENNOSIDES 2 TABLET: 8.6 TABLET ORAL at 19:56

## 2023-09-19 RX ADMIN — DIVALPROEX SODIUM 250 MG: 250 TABLET, DELAYED RELEASE ORAL at 13:12

## 2023-09-19 RX ADMIN — BENZTROPINE MESYLATE 1 MG: 1 TABLET ORAL at 08:35

## 2023-09-19 RX ADMIN — DIVALPROEX SODIUM 250 MG: 250 TABLET, DELAYED RELEASE ORAL at 19:55

## 2023-09-19 RX ADMIN — CLOZAPINE 650 MG: 100 TABLET, ORALLY DISINTEGRATING ORAL at 13:13

## 2023-09-19 RX ADMIN — GABAPENTIN 300 MG: 300 CAPSULE ORAL at 13:12

## 2023-09-19 RX ADMIN — NAPROXEN 250 MG: 250 TABLET ORAL at 19:55

## 2023-09-19 RX ADMIN — LORAZEPAM 0.5 MG: 0.5 TABLET ORAL at 08:33

## 2023-09-19 RX ADMIN — DIVALPROEX SODIUM 250 MG: 250 TABLET, DELAYED RELEASE ORAL at 08:33

## 2023-09-19 RX ADMIN — CYANOCOBALAMIN TAB 1000 MCG 1000 MCG: 1000 TAB at 08:33

## 2023-09-19 RX ADMIN — OLANZAPINE 15 MG: 10 TABLET, ORALLY DISINTEGRATING ORAL at 08:35

## 2023-09-19 RX ADMIN — TAMSULOSIN HYDROCHLORIDE 0.4 MG: 0.4 CAPSULE ORAL at 08:34

## 2023-09-19 RX ADMIN — Medication 10 MG: at 19:56

## 2023-09-19 RX ADMIN — WHITE PETROLATUM: 1.75 OINTMENT TOPICAL at 19:56

## 2023-09-19 RX ADMIN — ATROPINE SULFATE 2 DROP: 10 SOLUTION/ DROPS OPHTHALMIC at 08:38

## 2023-09-19 RX ADMIN — BENZTROPINE MESYLATE 1 MG: 1 TABLET ORAL at 19:56

## 2023-09-19 RX ADMIN — POLYETHYLENE GLYCOL 3350 17 G: 17 POWDER, FOR SOLUTION ORAL at 19:56

## 2023-09-19 RX ADMIN — FLUOXETINE 20 MG: 20 CAPSULE ORAL at 08:36

## 2023-09-19 RX ADMIN — POLYETHYLENE GLYCOL 3350 17 G: 17 POWDER, FOR SOLUTION ORAL at 08:33

## 2023-09-19 RX ADMIN — OLANZAPINE 15 MG: 10 TABLET, ORALLY DISINTEGRATING ORAL at 19:55

## 2023-09-19 RX ADMIN — LORAZEPAM 0.5 MG: 0.5 TABLET ORAL at 13:12

## 2023-09-19 RX ADMIN — GABAPENTIN 300 MG: 300 CAPSULE ORAL at 08:33

## 2023-09-19 RX ADMIN — LORAZEPAM 0.5 MG: 0.5 TABLET ORAL at 19:56

## 2023-09-19 RX ADMIN — SENNOSIDES 2 TABLET: 8.6 TABLET ORAL at 08:36

## 2023-09-19 RX ADMIN — MICONAZOLE NITRATE: 20 POWDER TOPICAL at 19:56

## 2023-09-19 ASSESSMENT — ACTIVITIES OF DAILY LIVING (ADL)
ADLS_ACUITY_SCORE: 84
ADLS_ACUITY_SCORE: 84
ORAL_HYGIENE: INDEPENDENT;PROMPTS
ADLS_ACUITY_SCORE: 84
HYGIENE/GROOMING: INDEPENDENT;PROMPTS
DRESS: SCRUBS (BEHAVIORAL HEALTH);INDEPENDENT;PROMPTS
HYGIENE/GROOMING: INDEPENDENT;PROMPTS
ORAL_HYGIENE: PROMPTS;INDEPENDENT
ADLS_ACUITY_SCORE: 84
DRESS: INDEPENDENT;PROMPTS

## 2023-09-19 NOTE — PLAN OF CARE
"  Problem: Adult Behavioral Health Plan of Care  Goal: Develops/Participates in Therapeutic [place] to Support Successful Transition  Intervention: Foster Therapeutic [place]  Recent Flowsheet Documentation  Taken 9/19/2023 5037 by Cecille Burris RN  Trust Relationship/Rapport:   care explained   choices provided   emotional support provided   empathic listening provided   questions answered   questions encouraged   reassurance provided   thoughts/feelings acknowledged   Goal Outcome Evaluation:    Plan of Care Reviewed With: patient      Pt had an uneventful shift. He remained isolative and withdrawn to his room for majorty of the shift. He denied any mental health symptoms including AVH.  He did appear to be internally preoccupied as he was engaging in quiet self talk and looking out the window for sometime. Writer stated to the pt that his view of the river was beautiful, and he stated that the \"view out the window was an illusion\".    Pt ate most of his meals this shift with the help of the SIO staff, as he was having hand tremors.   He urinated x2 and had a small formed BM in the morning.   He had an EKG that was baseline, and did not show premature atrial complexes that were present on 9/12/23.  Pt initially agreed to taking a shower after breakfast, but then wanted to take a shower after lunch. Writer stated that she wanted him to take a shower, so he could have the cream and lotion applied to his groin, and he stated \"no that's not necessary\".  Writer attempted another time to apply the creams, and he declined.   Pt had denied pain, and had no other acute physical or behavioral concerns this shift.   /84   Pulse 80   Temp 98  F (36.7  C) (Temporal)   Resp 16   Ht 1.753 m (5' 9\")   Wt 83.7 kg (184 lb 8 oz)   SpO2 95%   BMI 27.25 kg/m          "

## 2023-09-19 NOTE — PLAN OF CARE
Assessment/Intervention/Current Symtoms and Care Coordination:  Reviewed chart. Met with team to review patient's current functioning, needs, progress, and impediments to discharge.    Discharge Plan or Goal:  Seeking placement in a memory care unit, preferably in the [facility]     Barriers to Discharge:  Referrals are being sent to memory care units who have openings. One barrier is the limited openings in memory care units at this time.     Referral Status:  [facility] in [place] 8/22, no memory care 8/30  [facility] in [place] 8/15, no memory care 8/31  [facility] 8/31, no openings 8/31  [facility] 8/29, declined 9/8  [facility] 8/29, no open beds 8/30  [facility] 8/30  [facility] 8/31, no open beds 8/31  [facility] 9/15  [facility] 8/31  [place] [facility] 8/31, no openings and private pay 8/31  [facility], 8/31, declined 9/1 due to aggressive behaviors  [facility] 9/13  [facility] 9/15     Legal Status:  Commitment with [facility]: [place]  File Number: [id number]  Issued 07/06/23 and is not to exceed six months    Contacts:  : Vicky Valdez with [facility], [phone]  Psychiatrist: Dr. Santana at [facility], [phone] PCP: Attila Urena DO  Mom: Alberta, [phone] (H) [phone] (M)   Dad: Ino, [phone] (H)  Sister, Sandra Treadwell, [phone] (KING)   [facility]'s Office Fax: [phone]  Pia May: [phone] (KING)  [facility] Valentine: Jasmin phone [phone], fax [phone]  CADI  with [facility]: Regina Wong, [phone], [email or web address]    Upcoming Meetings and Dates/Important Information and next steps:

## 2023-09-19 NOTE — PLAN OF CARE
"Presents as intermittently confused. Difficulty maintaining attention. Reports poor memory. Stopped talking mid-sentence, appeared to lose train of thought. Continues to isolate to room. Occasionally stated nonsensical things, eg that he water cup is endless and automatically refills itself. Continues to display paranoia, eg that his water is poison, that he cannot take a shower or bad things will happen, that he will soon be \"punished.\" Upon remarking about the view from pt's window, pt stated that it's an \"illusion.\" Upon assessment, anxiety, depression, SI/HI, A/VH.    Continues to maintain SIO for assault risk. Stated to SIO staff at one point that he thought that he may hurt her; staff switched out. No aggression physical behaviors noted or reported.      Observed by SIO staff to be responding to internal stimuli.     \"Get away, I want to hurt you.\" To which SIO staff stated, \"Are you ok, is there something I can get for you?\" With pt responding with \"No just get away, hide, I'm going to hit you.\" Pt then stated something about hitting SIO with a pipe, though SIO staff not fully sure what he said at this point.     Pt voided 500 ml during shift. Ate approximately 1/3 of dinner. Took scheduled medications without issue.     /73 (BP Location: Left arm)   Pulse 92   Temp 97.5  F (36.4  C) (Temporal)   Resp 16   Ht 1.753 m (5' 9\")   Wt 83.7 kg (184 lb 8 oz)   SpO2 97%   BMI 27.25 kg/m      "

## 2023-09-20 LAB
ATRIAL RATE - MUSE: 86 BPM
DIASTOLIC BLOOD PRESSURE - MUSE: NORMAL MMHG
INTERPRETATION ECG - MUSE: NORMAL
P AXIS - MUSE: 47 DEGREES
PR INTERVAL - MUSE: 150 MS
QRS DURATION - MUSE: 156 MS
QT - MUSE: 422 MS
QTC - MUSE: 504 MS
R AXIS - MUSE: -20 DEGREES
SYSTOLIC BLOOD PRESSURE - MUSE: NORMAL MMHG
T AXIS - MUSE: 130 DEGREES
VENTRICULAR RATE- MUSE: 86 BPM

## 2023-09-20 PROCEDURE — 250N000013 HC RX MED GY IP 250 OP 250 PS 637: Performed by: STUDENT IN AN ORGANIZED HEALTH CARE EDUCATION/TRAINING PROGRAM

## 2023-09-20 PROCEDURE — 250N000013 HC RX MED GY IP 250 OP 250 PS 637: Performed by: PSYCHIATRY & NEUROLOGY

## 2023-09-20 PROCEDURE — 99233 SBSQ HOSP IP/OBS HIGH 50: CPT | Performed by: STUDENT IN AN ORGANIZED HEALTH CARE EDUCATION/TRAINING PROGRAM

## 2023-09-20 PROCEDURE — 124N000002 HC R&B MH UMMC

## 2023-09-20 PROCEDURE — 99232 SBSQ HOSP IP/OBS MODERATE 35: CPT | Mod: GC | Performed by: PSYCHIATRY & NEUROLOGY

## 2023-09-20 PROCEDURE — 250N000013 HC RX MED GY IP 250 OP 250 PS 637: Performed by: PHYSICIAN ASSISTANT

## 2023-09-20 RX ADMIN — GABAPENTIN 300 MG: 300 CAPSULE ORAL at 19:42

## 2023-09-20 RX ADMIN — BENZTROPINE MESYLATE 1 MG: 1 TABLET ORAL at 10:47

## 2023-09-20 RX ADMIN — DIVALPROEX SODIUM 250 MG: 250 TABLET, DELAYED RELEASE ORAL at 14:23

## 2023-09-20 RX ADMIN — ATROPINE SULFATE 2 DROP: 10 SOLUTION/ DROPS OPHTHALMIC at 19:42

## 2023-09-20 RX ADMIN — DIVALPROEX SODIUM 250 MG: 250 TABLET, DELAYED RELEASE ORAL at 19:42

## 2023-09-20 RX ADMIN — NAPROXEN 250 MG: 250 TABLET ORAL at 10:47

## 2023-09-20 RX ADMIN — CLOZAPINE 650 MG: 100 TABLET, ORALLY DISINTEGRATING ORAL at 14:23

## 2023-09-20 RX ADMIN — SENNOSIDES 2 TABLET: 8.6 TABLET ORAL at 19:42

## 2023-09-20 RX ADMIN — LORAZEPAM 0.5 MG: 0.5 TABLET ORAL at 14:23

## 2023-09-20 RX ADMIN — Medication 10 MG: at 19:42

## 2023-09-20 RX ADMIN — ATROPINE SULFATE 2 DROP: 10 SOLUTION/ DROPS OPHTHALMIC at 14:24

## 2023-09-20 RX ADMIN — WHITE PETROLATUM: 1.75 OINTMENT TOPICAL at 10:52

## 2023-09-20 RX ADMIN — MICONAZOLE NITRATE: 20 POWDER TOPICAL at 10:52

## 2023-09-20 RX ADMIN — POLYETHYLENE GLYCOL 3350 17 G: 17 POWDER, FOR SOLUTION ORAL at 19:43

## 2023-09-20 RX ADMIN — OLANZAPINE 15 MG: 10 TABLET, ORALLY DISINTEGRATING ORAL at 19:42

## 2023-09-20 RX ADMIN — GABAPENTIN 300 MG: 300 CAPSULE ORAL at 10:47

## 2023-09-20 RX ADMIN — ATROPINE SULFATE 2 DROP: 10 SOLUTION/ DROPS OPHTHALMIC at 10:52

## 2023-09-20 RX ADMIN — CYANOCOBALAMIN TAB 1000 MCG 1000 MCG: 1000 TAB at 10:47

## 2023-09-20 RX ADMIN — LORAZEPAM 0.5 MG: 0.5 TABLET ORAL at 19:42

## 2023-09-20 RX ADMIN — MICONAZOLE NITRATE: 20 POWDER TOPICAL at 19:43

## 2023-09-20 RX ADMIN — TAMSULOSIN HYDROCHLORIDE 0.4 MG: 0.4 CAPSULE ORAL at 10:47

## 2023-09-20 RX ADMIN — POLYETHYLENE GLYCOL 3350 17 G: 17 POWDER, FOR SOLUTION ORAL at 10:47

## 2023-09-20 RX ADMIN — NAPROXEN 250 MG: 250 TABLET ORAL at 19:42

## 2023-09-20 RX ADMIN — BENZTROPINE MESYLATE 1 MG: 1 TABLET ORAL at 19:42

## 2023-09-20 RX ADMIN — GABAPENTIN 300 MG: 300 CAPSULE ORAL at 14:23

## 2023-09-20 RX ADMIN — LORAZEPAM 0.5 MG: 0.5 TABLET ORAL at 10:47

## 2023-09-20 RX ADMIN — FLUOXETINE 20 MG: 20 CAPSULE ORAL at 10:47

## 2023-09-20 RX ADMIN — SENNOSIDES 2 TABLET: 8.6 TABLET ORAL at 10:47

## 2023-09-20 RX ADMIN — OLANZAPINE 15 MG: 10 TABLET, ORALLY DISINTEGRATING ORAL at 10:47

## 2023-09-20 RX ADMIN — DIVALPROEX SODIUM 250 MG: 250 TABLET, DELAYED RELEASE ORAL at 06:14

## 2023-09-20 ASSESSMENT — ACTIVITIES OF DAILY LIVING (ADL)
ADLS_ACUITY_SCORE: 84

## 2023-09-20 NOTE — PROGRESS NOTES
"[facility], [place]   Psychiatric Progress Note  Hospital Day: 117        Interim History:   The patient's care was discussed with the treatment team during the daily team meeting and/or staff's chart notes were reviewed. Pt was intermittently confused. He appeared to be internally preoccupied as he was engaging in quiet self talk and looking out the window for some time, then stating that it was an \"illusion\". At times Vinod made nonsensical statements. Made comments about striking staff, \"Get away, I want to hurt you.\" No aggressive physical behaviors noted or reported. Was intermittently visible in the milieu. Took medications as prescribed. No restraints or seclusion overnight. He remains on SIO for assault risk. Neurology consult placed Monday due to aphasia and other symptoms, recommendations pending.    Vinod was found in his room laying in bed with SIO present and engages in conversation. He reported that today he was doing \"okay\". Reports he does not have any immediate concerns. Endorses recent voids and bowel movement without trouble. Also reports no times of aphasia since last assessment. States he is having no AH or thoughts of harming himself or others. Reports that the last time he had thoughts of harming others was 3 days ago. Vinod endorsed feeling sad.     Suicidal ideation: denied    Homicidal ideation: denied    Psychotic symptoms: no AH or VH reported during interview. Delusions present and other psychotic symptoms suspected.    Medication side effects reported:  R>L hand tremor and jaw tremor, resting    Acute medical concerns: none. He denies any pain or discomfort today.      Other issues reported by patient: Patient had no further questions or concerns.           Medications:      atropine  2 drop Sublingual TID    benztropine  1 mg Oral BID    cloZAPine  650 mg Oral Daily    cyanocobalamin  1,000 mcg Oral Daily    divalproex sodium delayed-release  250 mg Oral Q8H " "KIKI    FLUoxetine  20 mg Oral Daily    gabapentin  300 mg Oral TID    LORazepam  0.5 mg Oral TID    melatonin  10 mg Oral At Bedtime    miconazole   Topical BID    mineral oil-hydrophilic petrolatum   Topical BID IS    naproxen  250 mg Oral BID w/meals    OLANZapine zydis  15 mg Oral BID    Or    OLANZapine  10 mg Intramuscular BID    polyethylene glycol  17 g Oral BID    sennosides  2 tablet Oral BID    tamsulosin  0.4 mg Oral Daily          Allergies:     Allergies   Allergen Reactions    Bee Venom Unknown    Montelukast Unknown    Phenothiazines Unknown          Labs:     No results found for this or any previous visit (from the past 24 hour(s)).                 Psychiatric Examination:     /73 (BP Location: Left arm)   Pulse 92   Temp 97.5  F (36.4  C) (Temporal)   Resp 16   Ht 1.753 m (5' 9\")   Wt 83.7 kg (184 lb 8 oz)   SpO2 97%   BMI 27.25 kg/m    Weight is 184 lbs 8 oz  Body mass index is 27.25 kg/m .    Weight over time:  Vitals:    07/03/23 1100 07/30/23 0902 08/13/23 0900 08/26/23 0835   Weight: 81.6 kg (179 lb 12.8 oz) 86.5 kg (190 lb 9.6 oz) 85.7 kg (188 lb 14.4 oz) 82.8 kg (182 lb 7 oz)    09/07/23 0900   Weight: 83.7 kg (184 lb 8 oz)       Orthostatic Vitals         Most Recent      Sitting Orthostatic /61 07/25 0800    Sitting Orthostatic Pulse (bpm) 85 07/25 0800          Cardiometabolic risk assessment. 06/07/23    Reviewed patient profile for cardiometabolic risk factors    Date taken /Value  REFERENCE RANGE   Abdominal Obesity  (Waist Circumference)   See nursing flowsheet Women ?35 in (88 cm)   Men ?40 in (102 cm)      Triglycerides  No results found for: TRIG    ?150 mg/dL (1.7 mmol/L) or current treatment for elevated triglycerides   HDL cholesterol  No results found for: HDL]   Women <50 mg/dL (1.3 mmol/L) in women or current treatment for low HDL cholesterol  Men <40 mg/dL (1 mmol/L) in men or current treatment for low HDL cholesterol     Fasting plasma glucose (FPG) Lab " "Results   Component Value Date     05/26/2023      FPG ?100 mg/dL (5.6 mmol/L) or treatment for elevated blood glucose   Blood pressure  BP Readings from Last 3 Encounters:   09/19/23 113/73   05/26/23 97/55    Blood pressure ?130/85 mmHg or treatment for elevated blood pressure   Family History  See family history     Mental Status Exam:  Appearance: awake, alert, disheveled, lying in bed. No evidence of pain of acute distress.   Attitude:  somewhat cooperative  Eye Contact:  fair, less intense, intermittent  Mood:  \"okay\"  Affect:  mood congruent, annoyed  Speech: mostly coherent  Psychomotor Behavior:  Ongoing bilateral hand tremor and jaw tremor. No evidence of dystonia, or tics.  Throught Process: linear, illogical, concrete  Associations:  no loosening of associations present  Thought Content:  Delusions and AH suspected. Evidence of obsessive thinking and likely compulsions to harm others.   Insight:  limited  Judgement:  poor  Oriented to:  self, place  Attention Span and Concentration:  fair  Recent and Remote Memory:  poor  Gait: appropriate step length, little arm swing, balance appears intact (during brief observation)         Precautions:     Behavioral Orders   Procedures    Assault precautions    Code 1 - Restrict to Unit    Code 2    Code 2 - 1:1 Staff Supervision     For ECT only    Electroconvulsive therapy     Series of up to 12 treatments. Begin Date: 8/2/23     Treating Psychiatrist providing ECT:  unknown     Notified on:  7/28/23 via this order  NOTE: We received verbal confirmation from [facility] that Lorenzo Pavon has been approved but awaiting official court order and risk management's review  Initial consult was completed by Dr. Hicks on 7/18/23    Electroconvulsive therapy     Series of up to 12 treatments. Begin Date: 8/2/23     Treating Psychiatrist providing ECT:  Dominga     Notified on:  8/2/23    Elopement precautions    Fall precautions    Occupational Therapy on the Unit     " Order Specific Question:   Reason for Consult     Answer:   Eval of thought process, functional skills and behavior     Order Specific Question:   Course of Action:     Answer:   Evaluation only     Order Specific Question:   Treatment Prescription:     Answer:   CPT requested    Routine Programming     As clinically indicated    Self Injury Precaution    Status 15     Every 15 minutes.    Status Individual Observation     Patient SIO status reviewed with team/RN.  Please also refer to RN/team documentation for add'l detail.    -SIO staff to monitor following which have contributed to patient being on SIO:  Agitation  Pt is impulsive   Pt has ran out of his room naked  Pt has Parkinson symptoms place him in a high fall risk  Pt has verbal outburst of sexual and threaten statements  Pt requires immediate redirection when masturbating    -Possible interventions SIO staff could use to support patient's treatment progress:  Engage pt in activities    -When following observed, team will review discontinuation of SIO:  Absence of aggression toward others for > 24 hours    Comments: SIO 1:1     Order Specific Question:   CONTINUOUS 24 hours / day     Answer:   5 feet     Order Specific Question:   Indications for SIO     Answer:   Severe intrusiveness     Order Specific Question:   Indications for SIO     Answer:   Assault risk    Suicide precautions     Patients on Suicide Precautions should have a Combination Diet ordered that includes a Diet selection(s) AND a Behavioral Tray selection for Safe Tray - with utensils, or Safe Tray - NO utensils            Diagnoses:     #Unspecified psychosis likely schizophrenia per history,  #Unspecified encephalopathy   -R/O catatonia   -Episodes of unresponsiveness, concern for PNES   #Parkinsonism 2/2 neuroleptic medications, rule out Parkinson's Disease  -rule out major neurocognitive disorder (refused to answer much of assessment)  #Urinary retention and BPH  -Possible UTI -- UC  resulted on 5/25 w/out growth  #Hx of prolonged QTc with clozapine  #Mild thrombocytopenia  #R/O OCD         Assessment and hospital summary:  Patient was admitted to psychiatric unit for safety, stabilization and medication management. He has had schizophrenia since the 1980. He was on Clozaril x 25 years, and it was tapered and discontinued on May 7, 2023  due to prolonged qtc of 527, and his psychotic  symptoms have worsened since then. Sinemet was also discontinued recently due to concerns that it was contributing to paranoia and AH. He is agitated, aggressive, dangerous to self and others. He remains on SIO 2 to 1, and is under psychiatric emergency and court hold. There are concerns for organic etiology given pattern of sundowning, history of parkinsonism, and ongoing disorientation/confusion. 6/8: EKG repeated, cardiology consult regarding safety of resuming clozapine in the context of prolonged QTc and refractory schizophrenia pending response. Per cardiology, correct QTc is no more than 460. They do not have concerns about AP retrial. Neurology IP consult placed for evaluation of sundowning and cognitive decline secondary to Sinemet discontinuation. MSSA initiated. Per Neurology, discontinuation of Sinemet would not account for these symptoms. They do not recommend retrial at this time. On 6/14, discussed complex case at length with Dr. Hatch (primary provider on geriatic unit) who provided recs (see second opinion note dated 6/14). Depakote initiated, though needed to be reduced due to side effect of thrombocytopenia. Melatonin was increased to 10 mg at bedtime. 6/22: Clozapine titration continued. Its possible that clozapine dose is contributing to worsened compulsive behaviors noted throughout hospitalization which could improve with lowering the dose. ECT initiated due to treatment refractory psychosis and ongoing symptoms despite therapeutic dose of clozapine (650 mg daily). ECT initiated on 8/2/23  and pt completed 11 total treatments, but ECT stopped on  due to lack of improvement and distress it was causing patient.     Chart reviewed which revealed the followin2022: He was on clozapine, Seroquel, Cymbalta, and Carbidopa-levodopa and hospitalized for pneumonia. Psych consulted and Seroquel was stopped. Treated with Abx and discharged to TCU.     2022: Hospitalized at [facility]. Per chart review:    Vinod Lee is a 61 yo male with longstanding history of schizophrenia. He was admitted from [facility] ED, where he presented due to increased delusional thoughts while on a visit to his parents for Thanksgiving. He began experiencing increasing paranoid thoughts that someone might be poisoning him and began fearing that he might accidentally harm someone. He reported to his parents that he was having thoughts about hitting someone or beating someone up and told them he could not guarantee they would be safe with him. Parents encouraged patient to go to the ED, which he did voluntarily.     Per patient report and chart review, he was hospitalized several times in the  and reports he was civilly committed at one point in the , however he has been quite stable for the past several decades. He reports he will experience some paranoia and delusional thinking at times, but generally has good insight into his symptoms. He has been maintained on clozapine for about 25 years and prior to several months ago was also concurrently prescribed quetiapine.      In the last several months, patient has had a number of medication changes. Approximately 6 months ago, patient was diagnosed with parkinsonism related to antipsychotic use and was started on carbidopa-levidopa. He experienced no improvement in tremors, and thus carbidopa- levidopa dose was increased 1.5 weeks ago.      In addition, patient was medically hospitalized in 2022 for confusion, weakness, repeated falls, ongoing weight loss,  "and SOB. He was found to have a cavitary lesion of the lung and suspected aspiration pneumonia. He was treated with antibiotics. At that time, he was taking current dose of clozapine and duloxetine, as well as propranolol ER, quetiapine, gabapentin, and clonazepam. Psychiatry was consulted due to concern for oversedation, and propranolol ER, gabapentin, and quetiapine were discontinued. Conazepam was reduced from 0.5 mg TID to BID dosing and recommended to be discontinued, however, it does not appear this occurred. His sedation improved significantly. QTc prolongation was also noted, but improved throughout his stay. He was ultimately discharged to a TCU for several months before returning home. He reports his weakness has greatly improved and states he \"graduated\" physical therapy, though he continues to be diligent in completed recommended exercises.      He reports that over the past several months, his delusions and anxiety have been worse than usual, with symptoms becoming much more acute since the carbidopa-levidopa dose was increased. He has felt increasingly paranoid about being poisoned, noting this is a delusion that has been stable over time, but was previously less intrusive. He has insight during the interview that his paranoid thinking is not reality based, but states it has been harder to separate delusions from reality and he becomes very worried that he might hurt someone, despite no history of violent behavior. He reports that the paranoia about his food being poisoned in combination with the tremors in his hands have made it difficult for him to eat. He has also had quite low appetite for the past 6 months to a year . He reports that due to the combination of these factors, he has lost about 50 pounds in the last year.He denies any problems with sleep initiation or maintenance. He reports energy is fairly good and \"better than it used to be.\" He reports some short term memory issues and on " "interview, does have some difficulty remembering details of recent events. He is unsure if he has received cognitive testing in the past.    He denies any suicidal ideation and reports he has not experienced SI for decades. He denies homicidal thoughts and is very clear that he had and has no desire to harm anyone else, but was afraid he might somehow do so. He denies any hallucinations and states \"that's never been a problem for me.\" He is not observed responding to internal stimuli. He is alert and oriented in all spheres.        ========    BRIEF HOSPITAL COURSE: This 62 y.o. male admitted 11/25/2022 to  [place] for the assessment and treatment of the above presentation. This was a voluntary admission.     In summary, he was tapered off the Sinemet, which he reports to be both ineffective and potentially worsening the anxiety and paranoia ideations. Instead Benadryl 25 mg TID was started to help with extrapyramidal side effects. He struggled with sleep during his stay here. Remeron 7.5mg QHS was tried without success. Seroquel 100mg qhs was restarted with addition of suvorexant 10mg qhs. Given his Seroquel PRN use prior history of prolonged QTC, he will benefit from another EKG repeat on the medical unit.     Unfortunately, he tested positive for influenza A and developed hypoxemia. Now on 2L oxygen with desats when prone requiring 3L oxygen. Subsequently, the plan will to be tapering him off clonazepam due to hypoxia (and also prior plan to taper). The patient tolerated these changes without side effects.     The patient minimally benefited from the structured therapeutic milieu as he attended programming seldom as he tested positive for influenza, he needed to isolate with droplet precautions. Pt was engaged and worked on issues that were triggering/brought pt to the hospital and has excellent insight into his anxiety and paranoid delusions. Pt denied suicidal ideations throughout all/majority of their " hospitalization. Pt denied HI throughout all of hospitalization. There were no concerns with behavioral disruptions/outbursts. The patient has shown improvement in general.     At the time of discharge, hospitalization course was reviewed with Vinod Lee with plan to transfer to medicine. Please consult psychiatry and I will continue to follow while patient is on the medical unit. He is now off sinemet since 11/29 21:00 and I would expect anxiety and paranoia to improve more moving forward. Psychiatrically, once anxiety and paranoia is baseline, can D/C to home once medically stable. He DOES NOT NEED A 1:1 on the medical unit from a mental health perspective, we initiated 1:1 11/30/2022 due to significant fatigue, gait instability (he was unable to stand without assist) and feeding assist.    04/2023: Clozapine was gradually tapered and discontinued, unclear reasons why it was discontinued per outside records, though Dr. Portillo's H&P indicates that it was due to prolonged QTc.       Today's Changes:  Renewed SIO  Monitor oral intake closely  Encourage fluids   No medication changes      Target psychiatric symptoms and interventions:  -Psych emergency declared on 5/27, now fully committed  - Depakote 250 mg TID, restarted on 8/26. Cannot increase beyond this dose due to thrombocytopenia at higher doses  -Continue clozapine as above  -Continue scheduled Zyprexa 15mg BID  -Continue 1:1 for safety of staff and patients, reduced from 2:1 7/23/23  - weekly CBC to monitor platelets    -Continue Gabapentin 300 mg TID as staff believe it has been effective for treatment of his anxiety    Risks, benefits, and alternatives discussed at length with patient.     Acute Medical Problems and Treatments:  Delirium vs progression of dementia  Neurology consult placed on 6/8 for evaluation of sundowning and cognitive decline secondary to Sinemet discontinuation: Resuming Sinemet not recommended for behavioral concerns. Please see  Neuro note for details.     Tremors  longstanding though appeared to worsen following initiation of clozapine  - Ativan 0.5 mg TID  - Cogentin 1 mg TID added on 8/25 with overall improvement noted    Thrombocytopenia, resolved  Likely secondary to Depakote.  According to the, incidents of Depakote induced thrombocytopenia as 27%.  Depakote dose reduced from 1000 twice daily to 250 3 times daily on 7/28/2023 with subsequent resolution of thrombocytopenia  - weekly CBCs    Constipation  Likely worsened by clozapine, improved since modifying regimen.   - daily miralax   - daily Senokot 2 tablets BID      Right first toe fracture:  Seen by Ortho on 6/16:  Per note: Vinod Lee is a 62 year old  male w/ PMHx complex psychiatric history who has a fracture to the distal phalanx of the right toe after a recent trauma to the foot the patient reports.  Patient denies any other pain or discomfort.  Images reviewed and plan will be for irrigation of the popped fracture blister with sterile saline and the toes should be dressed and a 4 x 4 gauze dressing.  Patient will need a postop shoe which she can be weightbearing as tolerated in.  Would recommend a course of antibiotics for approximately 7 days.  Would recommend Augmentin or clindamycin.  Podiatry will be made aware of patient and will see patient while he is admitted or if patient is discharged in the near future a follow-up appointment will need to be established.     Remainder of care per primary team.  Primary team should make sure that patient is up-to-date on his tetanus shot.  If not patient will require a booster shot.    Hx of prolonged QTc:  Continue weekly EKGs. See above for details.   Discussed most recent EKG findings with Cardiology on 8/25. Corrected QTc is 501 from EKG on 8/23. Per cardiology, repeat EKG today. If corrected QTc is > 500, will need to reduce clozapine. If < 500, OK to keep clozapine at current dose. UPDATE/ADDENDUM 9/6: Repeat EKG with  "corrected QTc of 440. No need to adjust clozapine dose per cards.     Urinary retention, resolved  Per patient care order:   If patient is refusing straight caths, please notify IM provider immediately to determine whether this constitutes a medical emergency. If IM declares medical emergency, may restrain patient for straight cath. Discussed this with patient's parents/substitute decision makers on 6/7 who are in support of this plan.    # Psoriasis hx  # Purplish discoloration of B/LE feet-resolved   Discussed with Dr. Rene and bedside RN regarding rash on right foot that appears and appears to be purplish in color and almost \"petechiae.\" It was noted during interview, but seemed to be resolving upon walking. Within the past 24 hrs, pt has had ECT and seemed more confused than usual. Pt seems to have had a right great toe wound with recent right great toe fracture seen by Medicine on 7/16. Seen on 8/4 with improving color on B/L legs at rest and appears to have small, scaly patches of varying coin sizes that have not grown past marked borders. See photos. Per further chart review, has had psoriasis history. WBC WNL, no fevers, HDS. This appears to more consistent with a psoriasis like picture.   - Start triamcinolone topical 0.025%   -Apply twice daily until lesions for 2 weeks or until lesions resolve/  -Monitor for skin thinning, striae, rebound lesion flares.   - Start Eucerin cream              - Apply 30 minutes PRIOR to triamcinolone topical cream above or PRN as needed if dry skin/after bathing   - Medicine will sign off.   - For worsening pain, spread, itchiness, fevers, please contact Medicine and possibly Dermatology.    Benzodiazepine dependence:  Phenobarbital taper completed shortly after patient's admission    Pertinent labs/imaging:  Weekly CBC with diff     Behavioral/Psychological/Social:  - Encourage unit programming    Safety:  - Continue precautions as noted above  - Status 15 minute " checks  - Continue 1:1 for staff and pt safety    Legal Status: Fully committed with Sánchez and Lorenzo Pavon. [place] medications: clozapine, olanzapine, risperidone, quetiapine      Disposition Plan   Reason for ongoing admission: No safe alternative at this time  Discharge location: TBD. Will likely need memory care unit  Discharge Medications: not ordered  Follow-up Appointments: not scheduled    Entered by: Garth Abreu MD on 09/20/2023  at 12:49 PM
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
## 2023-09-20 NOTE — PLAN OF CARE
"Cooperative with am medications  With significant encouragement and prompts, cooperative with showering. Writer changed maria dolores, with patient helping with his pillowcases. He spends time in his room and walking the halls with his staff. At times he is conversational with staff, at other times he appears agitated and states he \"has done bad things\". He is receptive to reassurance and support, and comforted by staff presence and reality orientation. Gait appears good, stable. Groin irritation appears reddened, but improving. Patient denies pain. He denies pain with BM or urination. Patient cooperative with meeting with Urology during the morning. Awaiting any new orders. No s/s of aphasia this shift. He denies SI/SIB or thoughts to harm others. Medication compliant. Continue to monitor and assess.                         "

## 2023-09-20 NOTE — PLAN OF CARE
Pt awake  at start of shift.     Breathing quiet and unlabored when sleeping.     Pt had no c/o pain or discomfort during the HS.     Appears to have slept .75  hours.     Med compliant with 0600 scheduled medication.     Pt continues on 1:1 SIO/5 ft space distance.     Goal Outcome Evaluation:  Problem: Sleep Disturbance  Goal: Adequate Sleep/Rest  Outcome: Progressing

## 2023-09-21 PROCEDURE — 99232 SBSQ HOSP IP/OBS MODERATE 35: CPT | Mod: GC | Performed by: PSYCHIATRY & NEUROLOGY

## 2023-09-21 PROCEDURE — 250N000013 HC RX MED GY IP 250 OP 250 PS 637: Performed by: PSYCHIATRY & NEUROLOGY

## 2023-09-21 PROCEDURE — 124N000002 HC R&B MH UMMC

## 2023-09-21 PROCEDURE — 250N000013 HC RX MED GY IP 250 OP 250 PS 637: Performed by: STUDENT IN AN ORGANIZED HEALTH CARE EDUCATION/TRAINING PROGRAM

## 2023-09-21 PROCEDURE — 250N000013 HC RX MED GY IP 250 OP 250 PS 637: Performed by: PHYSICIAN ASSISTANT

## 2023-09-21 RX ADMIN — DIVALPROEX SODIUM 250 MG: 250 TABLET, DELAYED RELEASE ORAL at 19:18

## 2023-09-21 RX ADMIN — OLANZAPINE 15 MG: 10 TABLET, ORALLY DISINTEGRATING ORAL at 19:17

## 2023-09-21 RX ADMIN — BENZTROPINE MESYLATE 1 MG: 1 TABLET ORAL at 19:18

## 2023-09-21 RX ADMIN — WHITE PETROLATUM: 1.75 OINTMENT TOPICAL at 19:18

## 2023-09-21 RX ADMIN — GABAPENTIN 300 MG: 300 CAPSULE ORAL at 08:51

## 2023-09-21 RX ADMIN — POLYETHYLENE GLYCOL 3350 17 G: 17 POWDER, FOR SOLUTION ORAL at 08:51

## 2023-09-21 RX ADMIN — GABAPENTIN 300 MG: 300 CAPSULE ORAL at 19:18

## 2023-09-21 RX ADMIN — MICONAZOLE NITRATE: 20 POWDER TOPICAL at 19:18

## 2023-09-21 RX ADMIN — TAMSULOSIN HYDROCHLORIDE 0.4 MG: 0.4 CAPSULE ORAL at 08:51

## 2023-09-21 RX ADMIN — NAPROXEN 250 MG: 250 TABLET ORAL at 19:17

## 2023-09-21 RX ADMIN — NAPROXEN 250 MG: 250 TABLET ORAL at 08:51

## 2023-09-21 RX ADMIN — BENZTROPINE MESYLATE 1 MG: 1 TABLET ORAL at 08:51

## 2023-09-21 RX ADMIN — OLANZAPINE 15 MG: 10 TABLET, ORALLY DISINTEGRATING ORAL at 08:51

## 2023-09-21 RX ADMIN — GABAPENTIN 300 MG: 300 CAPSULE ORAL at 13:32

## 2023-09-21 RX ADMIN — DIVALPROEX SODIUM 250 MG: 250 TABLET, DELAYED RELEASE ORAL at 06:39

## 2023-09-21 RX ADMIN — MICONAZOLE NITRATE: 20 POWDER TOPICAL at 13:54

## 2023-09-21 RX ADMIN — Medication 10 MG: at 19:18

## 2023-09-21 RX ADMIN — SENNOSIDES 2 TABLET: 8.6 TABLET ORAL at 19:17

## 2023-09-21 RX ADMIN — SENNOSIDES 2 TABLET: 8.6 TABLET ORAL at 08:51

## 2023-09-21 RX ADMIN — ATROPINE SULFATE 2 DROP: 10 SOLUTION/ DROPS OPHTHALMIC at 13:53

## 2023-09-21 RX ADMIN — DIVALPROEX SODIUM 250 MG: 250 TABLET, DELAYED RELEASE ORAL at 13:32

## 2023-09-21 RX ADMIN — LORAZEPAM 0.5 MG: 0.5 TABLET ORAL at 13:32

## 2023-09-21 RX ADMIN — FLUOXETINE 20 MG: 20 CAPSULE ORAL at 08:51

## 2023-09-21 RX ADMIN — ATROPINE SULFATE 2 DROP: 10 SOLUTION/ DROPS OPHTHALMIC at 08:51

## 2023-09-21 RX ADMIN — LORAZEPAM 0.5 MG: 0.5 TABLET ORAL at 08:51

## 2023-09-21 RX ADMIN — LORAZEPAM 0.5 MG: 0.5 TABLET ORAL at 19:17

## 2023-09-21 RX ADMIN — POLYETHYLENE GLYCOL 3350 17 G: 17 POWDER, FOR SOLUTION ORAL at 19:18

## 2023-09-21 RX ADMIN — CLOZAPINE 650 MG: 100 TABLET, ORALLY DISINTEGRATING ORAL at 13:32

## 2023-09-21 RX ADMIN — ATROPINE SULFATE 2 DROP: 10 SOLUTION/ DROPS OPHTHALMIC at 19:18

## 2023-09-21 RX ADMIN — CYANOCOBALAMIN TAB 1000 MCG 1000 MCG: 1000 TAB at 08:51

## 2023-09-21 ASSESSMENT — ACTIVITIES OF DAILY LIVING (ADL)
ADLS_ACUITY_SCORE: 84

## 2023-09-21 NOTE — PLAN OF CARE
Goal Outcome Evaluation:      Pt remains on an SIO for safety.  Pt appeared to be a sleep @ all safety checks.  Pt slept 7 hours.  Pt had no intake or output this shift.

## 2023-09-21 NOTE — PLAN OF CARE
Assessment/Intervention/Current Symtoms and Care Coordination:  Reviewed chart. Met with team to review patient's current functioning, needs, progress, and impediments to discharge.    Called [facility] to inquire into openings in memory care units and if they accept CADI. No answer, left voicemail requesting a call back.     Called [facility] to inquire into openings in memory care units and if they accept CADI. No answer, left voicemail requesting a call back.     Called [facility], spoke to admissions coordinator, no current male openings.    Emailed [facility] requesting information, per website they have immediate openings for CADI.     Emailed [facility] , Vicky, with update.      Discharge Plan or Goal:  Seeking [facility]     Barriers to Discharge:  Referrals are being sent to memory care units who have openings. One barrier is the limited openings in memory care units at this time.     Referral Status:  [facility] in [place] 8/22, no memory care 8/30  [facility] in [place] 8/15, no memory care 8/31  [facility] 8/31, no openings 8/31  [facility] 8/29, declined 9/8  [facility] 8/29, no open beds 8/30  [facility] 8/30  [facility] 8/31, no open beds 8/31  [facility] 9/15  [facility] 8/31  [place] [facility] 8/31, no openings and private pay 8/31  [facility], 8/31, declined 9/1 due to aggressive behaviors  [facility] 9/13  [facility] 9/15     Legal Status:  Commitment with [facility]: [place]  File Number: [id number]  Issued 07/06/23 and is not to exceed six months    Contacts:  : Vicky Gallegoed with [facility], [phone]  Psychiatrist: Dr. Santana at [facility], [phone] PCP: Attila Urena,   Mom: Alberta, [phone] (H) [phone] (M)   Dad:  Ino, [phone] (H)  Sister, Sandra Treadwell, [phone] (KING)   [facility]'s Office Fax: [phone]  [facility]: [phone] ([place])  [place]: Jasmin phone [phone], fax [phone]  CADI  with [facility]: Regina Wong, [phone], [email or web address]    Upcoming Meetings and Dates/Important Information and next steps:

## 2023-09-21 NOTE — PLAN OF CARE
"Nursing assessment completed. Patient continues on 1:1 SIO. He is cooperative with his scheduled am medications. Does not eat much of his breakfast, stating he does not have an appetite. He did eat almost 100% of his lunch. His assistive utensil offered, but he states it is not helpful. He is up ad jose to the bathroom and voiding independently. He states he wants to shower, but verbalizes significant fear that \"acid\" will fall from the shower. Despite multiple attempts at reassurance and support, he declines to shower. He did allow writer to apply his powder during afternoon med pass, but he would not allow cleaning prior. Area still appears reddened, but improved. Patient denies pain or discomfort. He tells writer about his son and his son's mother who \"he drove to the point of insanity with my behaviors\". He spoke fondly of his son. Vinod denies any SI/SIB or thoughts to harm others. Medication compliant. Continue to monitor and assess.                         "

## 2023-09-21 NOTE — PROGRESS NOTES
"[facility], [place]   Psychiatric Progress Note  Hospital Day: 118        Interim History:   The patient's care was discussed with the treatment team during the daily team meeting and/or staff's chart notes were reviewed. Neurology assessed patient yesterday and documented that they did not think a medication change was indicated; they favor that symptoms are psychiatric in nature rather than organic neurological issue. Staff reported that Vinod had difficulty maintaining attention yesterday, continued to make odd statements and self-deprecating comments. No aggressive physical behaviors noted or reported. Was intermittently visible in the milieu. Took medications as prescribed. No restraints or seclusion overnight. He remains on SIO for assault risk.     Vinod was found in his room laying in bed with SIO present and engages in conversation. Today he reports that he is doing well with no pain, side effects, or other concerns. Vinod reports that he is not having thoughts of harming himself or others. When asked about prior report of \"telepathy\", he reports that this is still happening. Unable or unwilling to describe content of \"telepathy\" or its most recent occurrence. At conclusion of interview, Vinod states, \"I dont know what I told you\" but seemed satisfied that treatment team was satisfied.    Suicidal ideation: denied    Homicidal ideation: denied    Psychotic symptoms: AH suspected with report of telepathy. no VH reported during interview. Delusions present and other psychotic symptoms suspected.    Medication side effects reported:  jaw and chin tremors, no constipation    Acute medical concerns: none. He denies any pain or discomfort today.      Other issues reported by patient: Patient had no further questions or concerns.           Medications:      atropine  2 drop Sublingual TID    benztropine  1 mg Oral BID    cloZAPine  650 mg Oral Daily    cyanocobalamin  1,000 mcg Oral Daily " "   divalproex sodium delayed-release  250 mg Oral Q8H KIKI    FLUoxetine  20 mg Oral Daily    gabapentin  300 mg Oral TID    LORazepam  0.5 mg Oral TID    melatonin  10 mg Oral At Bedtime    miconazole   Topical BID    mineral oil-hydrophilic petrolatum   Topical BID IS    naproxen  250 mg Oral BID w/meals    OLANZapine zydis  15 mg Oral BID    Or    OLANZapine  10 mg Intramuscular BID    polyethylene glycol  17 g Oral BID    sennosides  2 tablet Oral BID    tamsulosin  0.4 mg Oral Daily          Allergies:     Allergies   Allergen Reactions    Bee Venom Unknown    Montelukast Unknown    Phenothiazines Unknown          Labs:     No results found for this or any previous visit (from the past 24 hour(s)).                 Psychiatric Examination:     /67 (BP Location: Right arm, Patient Position: Sitting, Cuff Size: Adult Regular)   Pulse 80   Temp 98.2  F (36.8  C) (Temporal)   Resp 16   Ht 1.753 m (5' 9\")   Wt 83.7 kg (184 lb 8 oz)   SpO2 98%   BMI 27.25 kg/m    Weight is 184 lbs 8 oz  Body mass index is 27.25 kg/m .    Weight over time:  Vitals:    07/03/23 1100 07/30/23 0902 08/13/23 0900 08/26/23 0835   Weight: 81.6 kg (179 lb 12.8 oz) 86.5 kg (190 lb 9.6 oz) 85.7 kg (188 lb 14.4 oz) 82.8 kg (182 lb 7 oz)    09/07/23 0900   Weight: 83.7 kg (184 lb 8 oz)       Orthostatic Vitals         Most Recent      Sitting Orthostatic /61 07/25 0800    Sitting Orthostatic Pulse (bpm) 85 07/25 0800          Cardiometabolic risk assessment. 06/07/23    Reviewed patient profile for cardiometabolic risk factors    Date taken /Value  REFERENCE RANGE   Abdominal Obesity  (Waist Circumference)   See nursing flowsheet Women ?35 in (88 cm)   Men ?40 in (102 cm)      Triglycerides  No results found for: TRIG    ?150 mg/dL (1.7 mmol/L) or current treatment for elevated triglycerides   HDL cholesterol  No results found for: HDL]   Women <50 mg/dL (1.3 mmol/L) in women or current treatment for low HDL cholesterol  Men <40 " "mg/dL (1 mmol/L) in men or current treatment for low HDL cholesterol     Fasting plasma glucose (FPG) Lab Results   Component Value Date     05/26/2023      FPG ?100 mg/dL (5.6 mmol/L) or treatment for elevated blood glucose   Blood pressure  BP Readings from Last 3 Encounters:   09/21/23 102/67   05/26/23 97/55    Blood pressure ?130/85 mmHg or treatment for elevated blood pressure   Family History  See family history     Mental Status Exam:  Appearance: awake, alert, unkempt, lying in bed. No evidence of pain of acute distress.   Attitude:  more cooperative  Eye Contact:  fair, less intense, better duration  Mood:  \"neutral\"  Affect:  mood congruent, content  Speech: mostly coherent  Psychomotor Behavior:  Ongoing bilateral hand tremor and jaw tremor. No evidence of dystonia, or tics.  Throught Process: linear, illogical, concrete  Associations:  no loosening of associations present  Thought Content:  Delusions and AH suspected. Evidence of obsessive thinking and likely compulsions to harm others.   Insight:  limited  Judgement:  poor  Oriented to:  self, place  Attention Span and Concentration:  fair  Recent and Remote Memory:  poor  Gait: patient lying in bed         Precautions:     Behavioral Orders   Procedures    Assault precautions    Code 1 - Restrict to Unit    Code 2    Code 2 - 1:1 Staff Supervision     For ECT only    Electroconvulsive therapy     Series of up to 12 treatments. Begin Date: 8/2/23     Treating Psychiatrist providing ECT:  unknown     Notified on:  7/28/23 via this order  NOTE: We received verbal confirmation from [facility] that Lorenzo Pavon has been approved but awaiting official court order and risk management's review  Initial consult was completed by Dr. Hicks on 7/18/23    Electroconvulsive therapy     Series of up to 12 treatments. Begin Date: 8/2/23     Treating Psychiatrist providing ECT:  Dominga     Notified on:  8/2/23    Elopement precautions    Fall precautions    " Occupational Therapy on the Unit     Order Specific Question:   Reason for Consult     Answer:   Eval of thought process, functional skills and behavior     Order Specific Question:   Course of Action:     Answer:   Evaluation only     Order Specific Question:   Treatment Prescription:     Answer:   CPT requested    Routine Programming     As clinically indicated    Self Injury Precaution    Status 15     Every 15 minutes.    Status Individual Observation     Patient SIO status reviewed with team/RN.  Please also refer to RN/team documentation for add'l detail.    -SIO staff to monitor following which have contributed to patient being on SIO:  Agitation  Pt is impulsive   Pt has ran out of his room naked  Pt has Parkinson symptoms place him in a high fall risk  Pt has verbal outburst of sexual and threaten statements  Pt requires immediate redirection when masturbating    -Possible interventions SIO staff could use to support patient's treatment progress:  Engage pt in activities    -When following observed, team will review discontinuation of SIO:  Absence of aggression toward others for > 24 hours    Comments: SIO 1:1     Order Specific Question:   CONTINUOUS 24 hours / day     Answer:   5 feet     Order Specific Question:   Indications for SIO     Answer:   Severe intrusiveness     Order Specific Question:   Indications for SIO     Answer:   Assault risk    Suicide precautions     Patients on Suicide Precautions should have a Combination Diet ordered that includes a Diet selection(s) AND a Behavioral Tray selection for Safe Tray - with utensils, or Safe Tray - NO utensils            Diagnoses:     #Unspecified psychosis likely schizophrenia per history,  #Unspecified encephalopathy   -R/O catatonia   -Episodes of unresponsiveness, concern for PNES   #Parkinsonism 2/2 neuroleptic medications, rule out Parkinson's Disease  -rule out major neurocognitive disorder (refused to answer much of assessment)  #Urinary  retention and BPH  -Possible UTI -- UC resulted on 5/25 w/out growth  #Hx of prolonged QTc with clozapine  #Mild thrombocytopenia  #R/O OCD         Assessment and hospital summary:  Patient was admitted to psychiatric unit for safety, stabilization and medication management. He has had schizophrenia since the 1980. He was on Clozaril x 25 years, and it was tapered and discontinued on May 7, 2023  due to prolonged qtc of 527, and his psychotic  symptoms have worsened since then. Sinemet was also discontinued recently due to concerns that it was contributing to paranoia and AH. He is agitated, aggressive, dangerous to self and others. He remains on SIO 2 to 1, and is under psychiatric emergency and court hold. There are concerns for organic etiology given pattern of sundowning, history of parkinsonism, and ongoing disorientation/confusion. 6/8: EKG repeated, cardiology consult regarding safety of resuming clozapine in the context of prolonged QTc and refractory schizophrenia pending response. Per cardiology, correct QTc is no more than 460. They do not have concerns about AP retrial. Neurology IP consult placed for evaluation of sundowning and cognitive decline secondary to Sinemet discontinuation. MSSA initiated. Per Neurology, discontinuation of Sinemet would not account for these symptoms. They do not recommend retrial at this time. On 6/14, discussed complex case at length with Dr. Hatch (primary provider on geriatic unit) who provided recs (see second opinion note dated 6/14). Depakote initiated, though needed to be reduced due to side effect of thrombocytopenia. Melatonin was increased to 10 mg at bedtime. 6/22: Clozapine titration continued. Its possible that clozapine dose is contributing to worsened compulsive behaviors noted throughout hospitalization which could improve with lowering the dose. ECT initiated due to treatment refractory psychosis and ongoing symptoms despite therapeutic dose of clozapine  (650 mg daily). ECT initiated on 23 and pt completed 11 total treatments, but ECT stopped on  due to lack of improvement and distress it was causing patient.     Chart reviewed which revealed the followin2022: He was on clozapine, Seroquel, Cymbalta, and Carbidopa-levodopa and hospitalized for pneumonia. Psych consulted and Seroquel was stopped. Treated with Abx and discharged to TCU.     2022: Hospitalized at [facility]. Per chart review:    Vinod Lee is a 63 yo male with longstanding history of schizophrenia. He was admitted from [facility] ED, where he presented due to increased delusional thoughts while on a visit to his parents for Thanksgiving. He began experiencing increasing paranoid thoughts that someone might be poisoning him and began fearing that he might accidentally harm someone. He reported to his parents that he was having thoughts about hitting someone or beating someone up and told them he could not guarantee they would be safe with him. Parents encouraged patient to go to the ED, which he did voluntarily.     Per patient report and chart review, he was hospitalized several times in the  and reports he was civilly committed at one point in the , however he has been quite stable for the past several decades. He reports he will experience some paranoia and delusional thinking at times, but generally has good insight into his symptoms. He has been maintained on clozapine for about 25 years and prior to several months ago was also concurrently prescribed quetiapine.      In the last several months, patient has had a number of medication changes. Approximately 6 months ago, patient was diagnosed with parkinsonism related to antipsychotic use and was started on carbidopa-levidopa. He experienced no improvement in tremors, and thus carbidopa- levidopa dose was increased 1.5 weeks ago.      In addition, patient was medically hospitalized in 2022 for confusion,  "weakness, repeated falls, ongoing weight loss, and SOB. He was found to have a cavitary lesion of the lung and suspected aspiration pneumonia. He was treated with antibiotics. At that time, he was taking current dose of clozapine and duloxetine, as well as propranolol ER, quetiapine, gabapentin, and clonazepam. Psychiatry was consulted due to concern for oversedation, and propranolol ER, gabapentin, and quetiapine were discontinued. Conazepam was reduced from 0.5 mg TID to BID dosing and recommended to be discontinued, however, it does not appear this occurred. His sedation improved significantly. QTc prolongation was also noted, but improved throughout his stay. He was ultimately discharged to a TCU for several months before returning home. He reports his weakness has greatly improved and states he \"graduated\" physical therapy, though he continues to be diligent in completed recommended exercises.      He reports that over the past several months, his delusions and anxiety have been worse than usual, with symptoms becoming much more acute since the carbidopa-levidopa dose was increased. He has felt increasingly paranoid about being poisoned, noting this is a delusion that has been stable over time, but was previously less intrusive. He has insight during the interview that his paranoid thinking is not reality based, but states it has been harder to separate delusions from reality and he becomes very worried that he might hurt someone, despite no history of violent behavior. He reports that the paranoia about his food being poisoned in combination with the tremors in his hands have made it difficult for him to eat. He has also had quite low appetite for the past 6 months to a year . He reports that due to the combination of these factors, he has lost about 50 pounds in the last year.He denies any problems with sleep initiation or maintenance. He reports energy is fairly good and \"better than it used to be.\" He " "reports some short term memory issues and on interview, does have some difficulty remembering details of recent events. He is unsure if he has received cognitive testing in the past.    He denies any suicidal ideation and reports he has not experienced SI for decades. He denies homicidal thoughts and is very clear that he had and has no desire to harm anyone else, but was afraid he might somehow do so. He denies any hallucinations and states \"that's never been a problem for me.\" He is not observed responding to internal stimuli. He is alert and oriented in all spheres.        ========    BRIEF HOSPITAL COURSE: This 62 y.o. male admitted 11/25/2022 to  [place] for the assessment and treatment of the above presentation. This was a voluntary admission.     In summary, he was tapered off the Sinemet, which he reports to be both ineffective and potentially worsening the anxiety and paranoia ideations. Instead Benadryl 25 mg TID was started to help with extrapyramidal side effects. He struggled with sleep during his stay here. Remeron 7.5mg QHS was tried without success. Seroquel 100mg qhs was restarted with addition of suvorexant 10mg qhs. Given his Seroquel PRN use prior history of prolonged QTC, he will benefit from another EKG repeat on the medical unit.     Unfortunately, he tested positive for influenza A and developed hypoxemia. Now on 2L oxygen with desats when prone requiring 3L oxygen. Subsequently, the plan will to be tapering him off clonazepam due to hypoxia (and also prior plan to taper). The patient tolerated these changes without side effects.     The patient minimally benefited from the structured therapeutic milieu as he attended programming seldom as he tested positive for influenza, he needed to isolate with droplet precautions. Pt was engaged and worked on issues that were triggering/brought pt to the hospital and has excellent insight into his anxiety and paranoid delusions. Pt denied suicidal " ideations throughout all/majority of their hospitalization. Pt denied HI throughout all of hospitalization. There were no concerns with behavioral disruptions/outbursts. The patient has shown improvement in general.     At the time of discharge, hospitalization course was reviewed with Vinod NINO Rosa with plan to transfer to medicine. Please consult psychiatry and I will continue to follow while patient is on the medical unit. He is now off sinemet since 11/29 21:00 and I would expect anxiety and paranoia to improve more moving forward. Psychiatrically, once anxiety and paranoia is baseline, can D/C to home once medically stable. He DOES NOT NEED A 1:1 on the medical unit from a mental health perspective, we initiated 1:1 11/30/2022 due to significant fatigue, gait instability (he was unable to stand without assist) and feeding assist.    04/2023: Clozapine was gradually tapered and discontinued, unclear reasons why it was discontinued per outside records, though Dr. Portillo's H&P indicates that it was due to prolonged QTc.       Today's Changes:  Renewed SIO  Monitor oral intake closely  Encourage fluids   No medication changes  Neurology note reviewed    Per Neurology Recs:    Recommendations  -No current indications to change medications from a neurologic perspective  - Optimizing his psychiatric medication and treatment is currently more important than optimization of parkinsonism  - Please contact neurology if any new questions arise, or to discuss the assessment described above     Thank you for involving Neurology in the care of Vinod Lee.  Please do not hesitate to call with questions.      Kranthi Perdue MD    Target psychiatric symptoms and interventions:  -Psych emergency declared on 5/27, now fully committed  - Depakote 250 mg TID, restarted on 8/26. Cannot increase beyond this dose due to thrombocytopenia at higher doses  -Continue clozapine as above  -Continue scheduled Zyprexa 15mg  BID  -Continue 1:1 for safety of staff and patients, reduced from 2:1 7/23/23  - weekly CBC to monitor platelets    -Continue Gabapentin 300 mg TID as staff believe it has been effective for treatment of his anxiety    Risks, benefits, and alternatives discussed at length with patient.     Acute Medical Problems and Treatments:  Delirium vs progression of dementia  Neurology consult placed on 6/8 for evaluation of sundowning and cognitive decline secondary to Sinemet discontinuation: Resuming Sinemet not recommended for behavioral concerns. Please see Neuro note for details.     Tremors  longstanding though appeared to worsen following initiation of clozapine  - Ativan 0.5 mg TID  - Cogentin 1 mg BID added on 8/25 with overall improvement noted    Neuro re-consulted on 9/20 and per their documentation:    Impression  Mr. Lee is a 63 y.o. man with a long history of psychosis including requiring commitment and psychiatric hospitalizations in the 1980s.  Given his long history, this is less consistent with an organic cause of psychosis (as something that would be primarily neurologic in origin would be expected to have a more rapid progression of neurologic deterioration, rather than this long period of psychiatric symptoms).  I am not able to appreciate any evidence for a neurologic basis of his psychosis given the time course of his psychiatric illness.      Regarding his difficulty with word finding, I am not able to elicit this on exam.  However since he described being symptomatic earlier today, it is helpful that I am able to go through a full assessment of his language in terms of comprehension, repetition, naming objects with increasing difficulty without being able to appreciate any deficits.     He does continue to have a tremor that is most prominent posteriorly as well as with action, in addition to a chin tremor.  He also does have rigidity in the bilateral upper extremities consistent with the  diagnosis of parkinsonism.  He is currently not on levodopa or dopamine agonists which I continue to agree with.  Since his psychiatric symptoms are much more bothersome right now than his parkinsonism, I will continue to recommend avoiding dopaminergic medications.     Recommendations  -No current indications to change medications from a neurologic perspective  - Optimizing his psychiatric medication and treatment is currently more important than optimization of parkinsonism  - Please contact neurology if any new questions arise, or to discuss the assessment described above     Thank you for involving Neurology in the care of Vinod Lee.  Please do not hesitate to call with questions.      Kranthi Perdue MD      Thrombocytopenia, resolved  Likely secondary to Depakote.  According to the, incidents of Depakote induced thrombocytopenia as 27%.  Depakote dose reduced from 1000 twice daily to 250 3 times daily on 7/28/2023 with subsequent resolution of thrombocytopenia  - weekly CBCs    Constipation  Likely worsened by clozapine, improved since modifying regimen.   - daily miralax   - daily Senokot 2 tablets BID      Right first toe fracture:  Seen by Ortho on 6/16:  Per note: Vinod Lee is a 62 year old  male w/ PMHx complex psychiatric history who has a fracture to the distal phalanx of the right toe after a recent trauma to the foot the patient reports.  Patient denies any other pain or discomfort.  Images reviewed and plan will be for irrigation of the popped fracture blister with sterile saline and the toes should be dressed and a 4 x 4 gauze dressing.  Patient will need a postop shoe which she can be weightbearing as tolerated in.  Would recommend a course of antibiotics for approximately 7 days.  Would recommend Augmentin or clindamycin.  Podiatry will be made aware of patient and will see patient while he is admitted or if patient is discharged in the near future a follow-up appointment will need to  "be established.     Remainder of care per primary team.  Primary team should make sure that patient is up-to-date on his tetanus shot.  If not patient will require a booster shot.    Hx of prolonged QTc:  Continue weekly EKGs. See above for details.   Discussed most recent EKG findings with Cardiology on 8/25. Corrected QTc is 501 from EKG on 8/23. Per cardiology, repeat EKG today. If corrected QTc is > 500, will need to reduce clozapine. If < 500, OK to keep clozapine at current dose. UPDATE/ADDENDUM 9/6: Repeat EKG with corrected QTc of 440. No need to adjust clozapine dose per cards.     Urinary retention, resolved  Per patient care order:   If patient is refusing straight caths, please notify IM provider immediately to determine whether this constitutes a medical emergency. If IM declares medical emergency, may restrain patient for straight cath. Discussed this with patient's parents/substitute decision makers on 6/7 who are in support of this plan.    # Psoriasis hx  # Purplish discoloration of B/LE feet-resolved   Discussed with Dr. Rene and bedside RN regarding rash on right foot that appears and appears to be purplish in color and almost \"petechiae.\" It was noted during interview, but seemed to be resolving upon walking. Within the past 24 hrs, pt has had ECT and seemed more confused than usual. Pt seems to have had a right great toe wound with recent right great toe fracture seen by Medicine on 7/16. Seen on 8/4 with improving color on B/L legs at rest and appears to have small, scaly patches of varying coin sizes that have not grown past marked borders. See photos. Per further chart review, has had psoriasis history. WBC WNL, no fevers, HDS. This appears to more consistent with a psoriasis like picture.   - Start triamcinolone topical 0.025%   -Apply twice daily until lesions for 2 weeks or until lesions resolve/  -Monitor for skin thinning, striae, rebound lesion flares.   - Start Eucerin cream        "       - Apply 30 minutes PRIOR to triamcinolone topical cream above or PRN as needed if dry skin/after bathing   - Medicine will sign off.   - For worsening pain, spread, itchiness, fevers, please contact Medicine and possibly Dermatology.    Benzodiazepine dependence:  Phenobarbital taper completed shortly after patient's admission    Pertinent labs/imaging:  Weekly CBC with diff     Behavioral/Psychological/Social:  - Encourage unit programming    Safety:  - Continue precautions as noted above  - Status 15 minute checks  - Continue 1:1 for staff and pt safety    Legal Status: Fully committed with S[facility]chez and Lorenzo Pavon. [place] medications: clozapine, olanzapine, risperidone, quetiapine      Disposition Plan   Reason for ongoing admission: No safe alternative at this time  Discharge location: TBD. Will likely need memory care unit  Discharge Medications: not ordered  Follow-up Appointments: not scheduled    Entered by: Garth Abreu MD on 09/21/2023  at 11:03 AM      This patient has been seen and evaluated by me, Ingrid Rhodes MD on the date of resident's note. I have discussed this patient with the the resident and I agree with the findings and plan in this note. I have reviewed today's vital signs, medications, labs and imaging.

## 2023-09-21 NOTE — CONSULTS
"[facility]  Neurology Consultation    Patient Name:  Vinod Lee  MRN:  [id number]    :  1960  Date of Service:  2023  Primary care provider:  No Ref-Primary, Physician      Neurology consultation service was asked to see Vinod Lee by Dr. Rhodes to evaluate for word finding difficulty and if there is an organic cause to this patient's intractable psychosis.    Chief Complaint:  psychosis     History of Present Illness:   Vinod Lee is a 63 year old male with history of schizophrenia, Parkinson disease, auditory and visual hallucinations who has been in inpatient psychiatry since 2023.  Neurology has been consulted several times due to this patient's history of Parkinson disease though this current consult question is to explore the patient's symptom of word finding difficulty and to answer the question of whether Mr. Lee may have an organic cause of his psychosis as it seems so intractable to therapy including ECT.    On chart review I have reviewed his psychiatry notes including the first one written by Dr. Puente describing his initial presentation to the emergency department; initial consult note by Dr. Haney regarding the patient's Parkinson medications; and recent psychiatry progress notes describing his treatment interventions and current symptoms.    When I interviewed the patient this morning he is able to tell me that he has had difficulty with word finding.  He says he associates this with \"eating chocolate pudding\" and that the symptoms last for about 2 to 3 minutes after eating pudding.  (It appears he ate some chocolate pudding recently which may be affecting this response).  He also says that he had this symptom as early as this morning and it self resolves.  He otherwise does not have any new symptoms including numbness, weakness of any extremity, or any facial weakness, swallowing difficulty or any new symptoms that " "would make us think he has had a recent stroke.    Regarding his past psychiatric history note was reviewed from 11/30/2022 discharge summary in the [facility] system describing multiple psychiatric hospitalizations including in the 80s and a civil commitment from the 1980s.  This was followed by several decades of apparent stability, without any known hospital admissions at least here within the [facility].    ROS  A comprehensive ROS was performed and pertinent findings were included in HPI.     PMH  Past Medical History:   Diagnosis Date    LBBB (left bundle branch block)     Parkinson's disease (H)     Schizophrenia (H)      History reviewed. No pertinent surgical history.    Medications   I have personally reviewed the patient's medication list.     Allergies  I have personally reviewed the patient's allergy list.     Social History  Social History     Socioeconomic History    Marital status: Single     Spouse name: Not on file    Number of children: Not on file    Years of education: Not on file    Highest education level: Not on file   Occupational History    Not on file   Tobacco Use    Smoking status: Not on file    Smokeless tobacco: Never   Substance and Sexual Activity    Alcohol use: Not on file     Comment: GRACE    Drug use: Not on file     Comment: GRACE    Sexual activity: Not Currently   Other Topics Concern    Not on file   Social History Narrative    Not on file     Social Determinants of Health     Financial Resource Strain: Not on file   Food Insecurity: Not on file   Transportation Needs: Not on file   Physical Activity: Not on file   Stress: Not on file   Social Connections: Not on file   Interpersonal Safety: Not on file   Housing Stability: Not on file        Family History    No family history on file.       Physical Examination   Vitals: /77 (BP Location: Right arm)   Pulse 90   Temp 98.2  F (36.8  C) (Oral)   Resp 18   Ht 1.753 m (5' 9\")   Wt 83.7 kg (184 lb 8 oz)   SpO2 95%   BMI " 27.25 kg/m    General: Lying in bed, NAD  Head: NC/AT  Eyes: no icterus, op pink and moist  Cardiac: RRR. Extremities warm, no edema.   Respiratory: non-labored on RA  GI: S/NT/ND  Skin: No rash or lesion on exposed skin  Psych: Mood pleasant, affect congruent  Neuro:  Mental status: Awake, alert, attentive, oriented to self, time, place, and circumstance. Language is fluent and coherent with intact comprehension of complex commands, naming and repetition. No language deficits could be appreciated or elicited.   Cranial nerves: VFF, PERRL, conjugate gaze, EOMI, facial sensation intact, face symmetric, shoulder shrug strong, tongue/uvula midline, no dysarthria.   Motor: Bilateral rigidity at the wrists and elbows. No abnormal movements. 5/5 strength bilaterally in deltoids, biceps, triceps, hand , hip flexors, hip extensors, knee flexion, knee extension, plantarflexion, dorsiflexion.   Reflexes: Normo-reflexic and symmetric biceps, patellae.  Not elicited at Achilles.  Negative Quintero, no clonus, toes down-going.  Sensory: Intact to light touch in proximal and distal aspects of all 4 extremities   Coordination: Finger-nose-finger with some tremulousness and tremor increasing the amplitude as he gets closer to the target.  No resting tremor of the hands observed, though he does have a resting tremor of the lower lip, possibly the chin.  Gait: He is able to ambulate normally though slowly.  He does not require any assistive devices    Investigations   I have personally reviewed pertinent labs, tests, and radiological imaging. Discussion of notable findings is included under Impression.     Was patient transferred from outside hospital?   No    Impression  Mr. Lee is a 63 y.o. man with a long history of psychosis including requiring commitment and psychiatric hospitalizations in the 1980s.  Given his long history, this is less consistent with an organic cause of psychosis (as something that would be primarily  neurologic in origin would be expected to have a more rapid progression of neurologic deterioration, rather than this long period of psychiatric symptoms).  I am not able to appreciate any evidence for a neurologic basis of his psychosis given the time course of his psychiatric illness.     Regarding his difficulty with word finding, I am not able to elicit this on exam.  However since he described being symptomatic earlier today, it is helpful that I am able to go through a full assessment of his language in terms of comprehension, repetition, naming objects with increasing difficulty without being able to appreciate any deficits.    He does continue to have a tremor that is most prominent posteriorly as well as with action, in addition to a chin tremor.  He also does have rigidity in the bilateral upper extremities consistent with the diagnosis of parkinsonism.  He is currently not on levodopa or dopamine agonists which I continue to agree with.  Since his psychiatric symptoms are much more bothersome right now than his parkinsonism, I will continue to recommend avoiding dopaminergic medications.    Recommendations  -No current indications to change medications from a neurologic perspective  - Optimizing his psychiatric medication and treatment is currently more important than optimization of parkinsonism  - Please contact neurology if any new questions arise, or to discuss the assessment described above    Thank you for involving Neurology in the care of Vinod Lee.  Please do not hesitate to call with questions.     Kranthi Perdue MD    I spent 70 minutes on this consultation including the chart review described above, patient interview, review of labs and medications.

## 2023-09-21 NOTE — PLAN OF CARE
"Pt reports that memory is \"terrible\" and that his concentration is \"bad.\" Will occasionally not respond to questioning, potentially indicating aphasia. Lost train of thought numerous times during conversations.     Upon assessment, pt denies anxiety, depression, SI/HI. Upon assessment for auditory hallucinations, pt stated \"not so much anymore.\" However, pt making references to \"the voices\" during discussion. Continues to make self-deprecating comments and continues to express paranoia, eg that his water is \"poison.\"      Pt reported by SIO staff to have said that he at one point was having thoughts of hurting SIO staff. Pt was apparently receptive to redirection and no physical aggression occurred. Upon assessment of this incident, pt stated that he warned the SIO staff and does not want to hurt anyone.     Pt reported by SIO staff to have voided a significant amount this shift. Denies pain. No physical concerns voiced. Maintains 1:1 for assault risk.     Problem: Psychotic Symptoms  Goal: Psychotic Symptoms  Description: Signs and symptoms of listed problems will be absent or manageable.  Outcome: Not Progressing    /69 (Patient Position: Sitting, Cuff Size: Adult Regular)   Pulse 92   Temp 98.2  F (36.8  C) (Temporal)   Resp 16   Ht 1.753 m (5' 9\")   Wt 83.7 kg (184 lb 8 oz)   SpO2 98%   BMI 27.25 kg/m      "

## 2023-09-22 LAB
BASOPHILS # BLD AUTO: 0.1 10E3/UL (ref 0–0.2)
BASOPHILS NFR BLD AUTO: 1 %
EOSINOPHIL # BLD AUTO: 0 10E3/UL (ref 0–0.7)
EOSINOPHIL NFR BLD AUTO: 0 %
ERYTHROCYTE [DISTWIDTH] IN BLOOD BY AUTOMATED COUNT: 14.6 % (ref 10–15)
HCT VFR BLD AUTO: 37.4 % (ref 40–53)
HGB BLD-MCNC: 12 G/DL (ref 13.3–17.7)
IMM GRANULOCYTES # BLD: 0.1 10E3/UL
IMM GRANULOCYTES NFR BLD: 1 %
LYMPHOCYTES # BLD AUTO: 0.7 10E3/UL (ref 0.8–5.3)
LYMPHOCYTES NFR BLD AUTO: 7 %
MCH RBC QN AUTO: 27.5 PG (ref 26.5–33)
MCHC RBC AUTO-ENTMCNC: 32.1 G/DL (ref 31.5–36.5)
MCV RBC AUTO: 86 FL (ref 78–100)
MONOCYTES # BLD AUTO: 1 10E3/UL (ref 0–1.3)
MONOCYTES NFR BLD AUTO: 10 %
NEUTROPHILS # BLD AUTO: 8.7 10E3/UL (ref 1.6–8.3)
NEUTROPHILS NFR BLD AUTO: 81 %
NRBC # BLD AUTO: 0 10E3/UL
NRBC BLD AUTO-RTO: 0 /100
PLATELET # BLD AUTO: 154 10E3/UL (ref 150–450)
RBC # BLD AUTO: 4.36 10E6/UL (ref 4.4–5.9)
WBC # BLD AUTO: 10.6 10E3/UL (ref 4–11)

## 2023-09-22 PROCEDURE — 124N000002 HC R&B MH UMMC

## 2023-09-22 PROCEDURE — 250N000013 HC RX MED GY IP 250 OP 250 PS 637: Performed by: PHYSICIAN ASSISTANT

## 2023-09-22 PROCEDURE — 250N000013 HC RX MED GY IP 250 OP 250 PS 637: Performed by: STUDENT IN AN ORGANIZED HEALTH CARE EDUCATION/TRAINING PROGRAM

## 2023-09-22 PROCEDURE — 250N000013 HC RX MED GY IP 250 OP 250 PS 637: Performed by: PSYCHIATRY & NEUROLOGY

## 2023-09-22 PROCEDURE — 85025 COMPLETE CBC W/AUTO DIFF WBC: CPT | Performed by: PSYCHIATRY & NEUROLOGY

## 2023-09-22 PROCEDURE — 36415 COLL VENOUS BLD VENIPUNCTURE: CPT | Performed by: PSYCHIATRY & NEUROLOGY

## 2023-09-22 RX ADMIN — FLUOXETINE 20 MG: 20 CAPSULE ORAL at 10:35

## 2023-09-22 RX ADMIN — TAMSULOSIN HYDROCHLORIDE 0.4 MG: 0.4 CAPSULE ORAL at 10:35

## 2023-09-22 RX ADMIN — OLANZAPINE 15 MG: 10 TABLET, ORALLY DISINTEGRATING ORAL at 10:35

## 2023-09-22 RX ADMIN — LORAZEPAM 0.5 MG: 0.5 TABLET ORAL at 19:24

## 2023-09-22 RX ADMIN — DIVALPROEX SODIUM 250 MG: 250 TABLET, DELAYED RELEASE ORAL at 06:11

## 2023-09-22 RX ADMIN — WHITE PETROLATUM: 1.75 OINTMENT TOPICAL at 14:24

## 2023-09-22 RX ADMIN — BENZTROPINE MESYLATE 1 MG: 1 TABLET ORAL at 19:24

## 2023-09-22 RX ADMIN — SENNOSIDES 2 TABLET: 8.6 TABLET ORAL at 19:24

## 2023-09-22 RX ADMIN — ATROPINE SULFATE 2 DROP: 10 SOLUTION/ DROPS OPHTHALMIC at 14:23

## 2023-09-22 RX ADMIN — DIVALPROEX SODIUM 250 MG: 250 TABLET, DELAYED RELEASE ORAL at 14:19

## 2023-09-22 RX ADMIN — ATROPINE SULFATE 2 DROP: 10 SOLUTION/ DROPS OPHTHALMIC at 19:23

## 2023-09-22 RX ADMIN — NAPROXEN 250 MG: 250 TABLET ORAL at 10:35

## 2023-09-22 RX ADMIN — POLYETHYLENE GLYCOL 3350 17 G: 17 POWDER, FOR SOLUTION ORAL at 19:23

## 2023-09-22 RX ADMIN — GABAPENTIN 300 MG: 300 CAPSULE ORAL at 10:35

## 2023-09-22 RX ADMIN — GABAPENTIN 300 MG: 300 CAPSULE ORAL at 14:19

## 2023-09-22 RX ADMIN — MICONAZOLE NITRATE: 20 POWDER TOPICAL at 19:23

## 2023-09-22 RX ADMIN — OLANZAPINE 15 MG: 10 TABLET, ORALLY DISINTEGRATING ORAL at 19:24

## 2023-09-22 RX ADMIN — GABAPENTIN 300 MG: 300 CAPSULE ORAL at 19:24

## 2023-09-22 RX ADMIN — ATROPINE SULFATE 2 DROP: 10 SOLUTION/ DROPS OPHTHALMIC at 10:36

## 2023-09-22 RX ADMIN — NAPROXEN 250 MG: 250 TABLET ORAL at 18:25

## 2023-09-22 RX ADMIN — BENZTROPINE MESYLATE 1 MG: 1 TABLET ORAL at 10:35

## 2023-09-22 RX ADMIN — MICONAZOLE NITRATE: 20 POWDER TOPICAL at 14:24

## 2023-09-22 RX ADMIN — DIVALPROEX SODIUM 250 MG: 250 TABLET, DELAYED RELEASE ORAL at 19:24

## 2023-09-22 RX ADMIN — LORAZEPAM 0.5 MG: 0.5 TABLET ORAL at 10:35

## 2023-09-22 RX ADMIN — CLOZAPINE 650 MG: 100 TABLET, ORALLY DISINTEGRATING ORAL at 14:19

## 2023-09-22 RX ADMIN — LORAZEPAM 0.5 MG: 0.5 TABLET ORAL at 14:19

## 2023-09-22 RX ADMIN — Medication 10 MG: at 19:24

## 2023-09-22 RX ADMIN — WHITE PETROLATUM: 1.75 OINTMENT TOPICAL at 18:25

## 2023-09-22 RX ADMIN — POLYETHYLENE GLYCOL 3350 17 G: 17 POWDER, FOR SOLUTION ORAL at 10:35

## 2023-09-22 RX ADMIN — CYANOCOBALAMIN TAB 1000 MCG 1000 MCG: 1000 TAB at 10:35

## 2023-09-22 RX ADMIN — SENNOSIDES 2 TABLET: 8.6 TABLET ORAL at 10:35

## 2023-09-22 ASSESSMENT — ACTIVITIES OF DAILY LIVING (ADL)
ADLS_ACUITY_SCORE: 84

## 2023-09-22 NOTE — PLAN OF CARE
Assessment/Intervention/Current Symtoms and Care Coordination:  Reviewed chart. Met with team to review patient's current functioning, needs, progress, and impediments to discharge.    Discharge Plan or Goal:  Seeking placement in a memory care unit, preferably in the [facility]     Barriers to Discharge:  Referrals are being sent to memory care units who have openings. One barrier is the limited openings in memory care units at this time.     Referral Status:  [facility] in [place] 8/22, no memory care 8/30  [facility] in [place] 8/15, no memory care 8/31  [facility] 8/31, no openings 8/31  [facility] 8/29, declined 9/8  [facility] 8/29, no open beds 8/30  [facility] 8/30  [facility] 8/31, no open beds 8/31  [facility] 9/15  [facility] 8/31  [place] [facility] 8/31, no openings and private pay 8/31  [facility], 8/31, declined 9/1 due to aggressive behaviors  [facility], 9/13, declined 9/13  [facility] 9/15     Legal Status:  Commitment with [facility]: [place]  File Number: [id number]  Issued 07/06/23 and is not to exceed six months    Contacts:  : Vicky Valdez with [facility], [phone]  Psychiatrist: Dr. Santana at [facility], [phone] PCP: Attila Urena DO  Mom: Alberta, [phone] (H) [phone] (M)   Dad: Ino, [phone] (H)  Sister, Sandra Treadwell, [phone] (KING)   [facility]'s Office Fax: [phone]  [facility] May: [phone] (KING)  [place]: Jsamin phone [phone], fax [phone]  CADI  with [facility]: Regina Wong, [phone], [email or web address]    Upcoming Meetings and Dates/Important Information and next steps:

## 2023-09-22 NOTE — PLAN OF CARE
Individual Therapy note:    Therapist introduced self to patient and discussed psychotherapy service available to patient.     Pt response:   Pt not interested currently in meeting 1:1; therapist will continue remaining available for pt   Plan: Pt was encouraged to attend groups and therapist will remain available for 1:1 sessions    Duration: Met with patient a for a total of 5 minutes.    Time started: 1055   Time ended: 1100

## 2023-09-22 NOTE — PLAN OF CARE
"Nursing assessment completed. Patient continues on 1:1 SIO staffing. Patient had an uneventful shift, spending much of the time in his room socializing and listening to music with his SIO staff. He does not eat much of his breakfast and states he has a low appetite, but he did eat a yogurt with his am medications. He is agreeable to performing ADLs in his room, with staff assistance and prompting, but declines to shower. He states \"it will be ice cold or scolding hot\". He has moments of struggling to speak, appearing to be aphasia. He endorses frustration with this. No episodes of aggression. Up to the bathroom ad jose and voiding independently.                         "

## 2023-09-22 NOTE — PLAN OF CARE
NOC Shift Report     Pt in bed at beginning of shift, breathing quiet and unlabored. Pt woke up throughout the shift. Pt slept 4.75 hours. Will continue to monitor.

## 2023-09-23 ENCOUNTER — APPOINTMENT (OUTPATIENT)
Dept: GENERAL RADIOLOGY | Facility: CLINIC | Age: 63
DRG: 885 | End: 2023-09-23
Attending: PHYSICIAN ASSISTANT
Payer: COMMERCIAL

## 2023-09-23 LAB
ANION GAP SERPL CALCULATED.3IONS-SCNC: 8 MMOL/L (ref 7–15)
BUN SERPL-MCNC: 28.8 MG/DL (ref 8–23)
CALCIUM SERPL-MCNC: 8.6 MG/DL (ref 8.8–10.2)
CHLORIDE SERPL-SCNC: 108 MMOL/L (ref 98–107)
CREAT SERPL-MCNC: 1.03 MG/DL (ref 0.67–1.17)
CRP SERPL-MCNC: 45.26 MG/L
DEPRECATED HCO3 PLAS-SCNC: 25 MMOL/L (ref 22–29)
EGFRCR SERPLBLD CKD-EPI 2021: 81 ML/MIN/1.73M2
ERYTHROCYTE [DISTWIDTH] IN BLOOD BY AUTOMATED COUNT: 15 % (ref 10–15)
GLUCOSE SERPL-MCNC: 105 MG/DL (ref 70–99)
HCT VFR BLD AUTO: 34.4 % (ref 40–53)
HGB BLD-MCNC: 11.3 G/DL (ref 13.3–17.7)
MCH RBC QN AUTO: 28.1 PG (ref 26.5–33)
MCHC RBC AUTO-ENTMCNC: 32.8 G/DL (ref 31.5–36.5)
MCV RBC AUTO: 86 FL (ref 78–100)
PLATELET # BLD AUTO: 136 10E3/UL (ref 150–450)
POTASSIUM SERPL-SCNC: 4.5 MMOL/L (ref 3.4–5.3)
PROCALCITONIN SERPL IA-MCNC: 0.07 NG/ML
RBC # BLD AUTO: 4.02 10E6/UL (ref 4.4–5.9)
SARS-COV-2 RNA RESP QL NAA+PROBE: NEGATIVE
SODIUM SERPL-SCNC: 141 MMOL/L (ref 136–145)
WBC # BLD AUTO: 9.6 10E3/UL (ref 4–11)

## 2023-09-23 PROCEDURE — 250N000013 HC RX MED GY IP 250 OP 250 PS 637: Performed by: PSYCHIATRY & NEUROLOGY

## 2023-09-23 PROCEDURE — 71046 X-RAY EXAM CHEST 2 VIEWS: CPT

## 2023-09-23 PROCEDURE — 87635 SARS-COV-2 COVID-19 AMP PRB: CPT | Performed by: NURSE PRACTITIONER

## 2023-09-23 PROCEDURE — 250N000013 HC RX MED GY IP 250 OP 250 PS 637: Performed by: PHYSICIAN ASSISTANT

## 2023-09-23 PROCEDURE — 71046 X-RAY EXAM CHEST 2 VIEWS: CPT | Mod: 26 | Performed by: STUDENT IN AN ORGANIZED HEALTH CARE EDUCATION/TRAINING PROGRAM

## 2023-09-23 PROCEDURE — 124N000002 HC R&B MH UMMC

## 2023-09-23 PROCEDURE — 99231 SBSQ HOSP IP/OBS SF/LOW 25: CPT | Performed by: PHYSICIAN ASSISTANT

## 2023-09-23 PROCEDURE — 86140 C-REACTIVE PROTEIN: CPT | Performed by: PHYSICIAN ASSISTANT

## 2023-09-23 PROCEDURE — 36415 COLL VENOUS BLD VENIPUNCTURE: CPT | Performed by: PHYSICIAN ASSISTANT

## 2023-09-23 PROCEDURE — 80048 BASIC METABOLIC PNL TOTAL CA: CPT | Performed by: PHYSICIAN ASSISTANT

## 2023-09-23 PROCEDURE — 250N000013 HC RX MED GY IP 250 OP 250 PS 637: Performed by: STUDENT IN AN ORGANIZED HEALTH CARE EDUCATION/TRAINING PROGRAM

## 2023-09-23 PROCEDURE — 85027 COMPLETE CBC AUTOMATED: CPT | Performed by: PHYSICIAN ASSISTANT

## 2023-09-23 PROCEDURE — 84145 PROCALCITONIN (PCT): CPT | Performed by: PHYSICIAN ASSISTANT

## 2023-09-23 RX ADMIN — POLYETHYLENE GLYCOL 3350 17 G: 17 POWDER, FOR SOLUTION ORAL at 19:47

## 2023-09-23 RX ADMIN — OLANZAPINE 15 MG: 10 TABLET, ORALLY DISINTEGRATING ORAL at 09:58

## 2023-09-23 RX ADMIN — BENZTROPINE MESYLATE 1 MG: 1 TABLET ORAL at 09:58

## 2023-09-23 RX ADMIN — SENNOSIDES 2 TABLET: 8.6 TABLET ORAL at 19:46

## 2023-09-23 RX ADMIN — TAMSULOSIN HYDROCHLORIDE 0.4 MG: 0.4 CAPSULE ORAL at 09:57

## 2023-09-23 RX ADMIN — ATROPINE SULFATE 2 DROP: 10 SOLUTION/ DROPS OPHTHALMIC at 10:00

## 2023-09-23 RX ADMIN — ATROPINE SULFATE 2 DROP: 10 SOLUTION/ DROPS OPHTHALMIC at 15:34

## 2023-09-23 RX ADMIN — DIVALPROEX SODIUM 250 MG: 250 TABLET, DELAYED RELEASE ORAL at 15:35

## 2023-09-23 RX ADMIN — CLOZAPINE 650 MG: 100 TABLET, ORALLY DISINTEGRATING ORAL at 15:35

## 2023-09-23 RX ADMIN — FLUOXETINE 20 MG: 20 CAPSULE ORAL at 09:58

## 2023-09-23 RX ADMIN — GABAPENTIN 300 MG: 300 CAPSULE ORAL at 19:47

## 2023-09-23 RX ADMIN — POLYETHYLENE GLYCOL 3350 17 G: 17 POWDER, FOR SOLUTION ORAL at 09:57

## 2023-09-23 RX ADMIN — LORAZEPAM 0.5 MG: 0.5 TABLET ORAL at 09:58

## 2023-09-23 RX ADMIN — GABAPENTIN 300 MG: 300 CAPSULE ORAL at 15:35

## 2023-09-23 RX ADMIN — GABAPENTIN 300 MG: 300 CAPSULE ORAL at 09:58

## 2023-09-23 RX ADMIN — NAPROXEN 250 MG: 250 TABLET ORAL at 19:06

## 2023-09-23 RX ADMIN — BENZTROPINE MESYLATE 1 MG: 1 TABLET ORAL at 19:46

## 2023-09-23 RX ADMIN — CYANOCOBALAMIN TAB 1000 MCG 1000 MCG: 1000 TAB at 09:58

## 2023-09-23 RX ADMIN — LORAZEPAM 0.5 MG: 0.5 TABLET ORAL at 19:46

## 2023-09-23 RX ADMIN — LORAZEPAM 0.5 MG: 0.5 TABLET ORAL at 15:35

## 2023-09-23 RX ADMIN — NAPROXEN 250 MG: 250 TABLET ORAL at 09:58

## 2023-09-23 RX ADMIN — Medication 10 MG: at 19:46

## 2023-09-23 RX ADMIN — OLANZAPINE 15 MG: 10 TABLET, ORALLY DISINTEGRATING ORAL at 19:46

## 2023-09-23 RX ADMIN — SENNOSIDES 2 TABLET: 8.6 TABLET ORAL at 09:57

## 2023-09-23 RX ADMIN — DIVALPROEX SODIUM 250 MG: 250 TABLET, DELAYED RELEASE ORAL at 06:36

## 2023-09-23 RX ADMIN — ATROPINE SULFATE 2 DROP: 10 SOLUTION/ DROPS OPHTHALMIC at 19:49

## 2023-09-23 RX ADMIN — DIVALPROEX SODIUM 250 MG: 250 TABLET, DELAYED RELEASE ORAL at 19:46

## 2023-09-23 ASSESSMENT — ACTIVITIES OF DAILY LIVING (ADL)
ADLS_ACUITY_SCORE: 84
HYGIENE/GROOMING: PROMPTS;INDEPENDENT
ADLS_ACUITY_SCORE: 74
ADLS_ACUITY_SCORE: 84
ADLS_ACUITY_SCORE: 74
ADLS_ACUITY_SCORE: 84
ADLS_ACUITY_SCORE: 74
ORAL_HYGIENE: PROMPTS;INDEPENDENT
ADLS_ACUITY_SCORE: 84
DRESS: INDEPENDENT
LAUNDRY: UNABLE TO COMPLETE
ADLS_ACUITY_SCORE: 84

## 2023-09-23 NOTE — PROGRESS NOTES
09/22/23 1900   Group Therapy Session   Group Attendance attended group session   Time Session Began 1615   Time Session Ended 1700   Total Time (minutes) 15   Total # Attendees 2   Group Type recreation   Group Topic Covered leisure exploration/use of leisure time   Group Session Detail TR leisure group   Patient Response/Contribution disorganized;unable to sequence the task   Patient Participation Detail Pt attended some of the Therapeutic Recreation group this evening. Pt briefly tried to participate, but stated he could not really focus and was not able to sequence the task. Pt often would read cards out loud, even when given multiple prompts to read silently to self.

## 2023-09-23 NOTE — PLAN OF CARE
Problem: Psychotic Signs/Symptoms  Goal: Improved Psychomotor Symptoms (Psychotic Signs/Symptoms)  Intervention: Manage Psychomotor Movement  Recent Flowsheet Documentation  Taken 9/22/2023 1700 by Zakiya Miller RN  Activity (Behavioral Health):   activity encouraged   up ad jose   Goal Outcome Evaluation:     Vinod had a calm and pleasant evening and displayed no agitation or aggression. He spent most of this shift resting comfortably in bed. During a nursing assessment, he reported no pain or discomfort. However, he lacked insight into his current mental health. P's vital signs were normal, with T-98.7, P-90, R-18, B/P-121/76, and sats at 95% on RA. Pt took all scheduled medication without any issues. Vinod allowed this writer to assist with his alejandro-care and treatment, including applying cream/powder as ordered. Due to agitation/aggression, he remains on SIO.

## 2023-09-23 NOTE — PLAN OF CARE
Patient am temp 99.1. 98.2 upon recheck  Coughing. Encouraged to lay on side or propped up.   Writer notified on call provider and obtained and collected order for covid testing (negative)  Patient declining to eat breakfast, stating he does not have an appetite, which is consistent from the last several mornings. Did eat lunch.  Agreed to shower  Medication compliant  No SI/SIB or thoughts to harm others

## 2023-09-23 NOTE — PLAN OF CARE
Goal Outcome Evaluation:    Plan of Care Reviewed With: patient      Problem: Psychotic Symptoms  Goal: Social and Therapeutic (Psychotic Symptoms)  Description: Signs and symptoms of listed problems will be absent or manageable.  9/23/2023 1745 by Becca Negrete RN  Outcome: Progressing  9/23/2023 1744 by Becca Negrete RN  Outcome: Progressing     Problem: Adult Behavioral Health Plan of Care  Goal: Optimized Coping Skills in Response to Life Stressors  9/23/2023 1745 by Becca Negrete RN  Outcome: Progressing  9/23/2023 1744 by Becca Negrete RN  Outcome: Progressing     Patient  noted to be isolative in his room. Was met lying quietly in bed. Affect was flat and blunted. Patient appeared withdrawn. Patient endorsed high anxiety of 9 but denied depression. He endorsed passive SI stating that he wouldn't be able to complete suicide here. He wishes to be dead per patient. He declined PRN anxiety medication stating that it does not work and that he wouldn't like to take any antidepressants either. He denied HI and hallucinations. Patient is currently on 1:1 SIO for safety.   At dinner, patient's tray was served in his room. Patient was unable to eat much, just few bites. Complained of weakness. Generalized tremor noted. Patient observed to be confused and tangential. Vital signs at the time, 1840 showed T 98.3, HR 95, RR 40, /71, and O2 Sat 89%. Patient appeared to have phlegm but was unable to bring it out due to weak coughing. Resident on call notified of patient's vital signs. Resident will see patient.  Patient was seen by the resident. Orders received.     Patient left the unit at 2122 to radiology for CXR. Patient went down on a wheelchair accompanied by RN, PA, and Security personnel. Patient came back from [facility] at 2137. Patient took all his night medications but the topical applications. Will continue to monitor.

## 2023-09-24 LAB

## 2023-09-24 PROCEDURE — 250N000013 HC RX MED GY IP 250 OP 250 PS 637: Performed by: PSYCHIATRY & NEUROLOGY

## 2023-09-24 PROCEDURE — 250N000013 HC RX MED GY IP 250 OP 250 PS 637

## 2023-09-24 PROCEDURE — 250N000009 HC RX 250: Performed by: PSYCHIATRY & NEUROLOGY

## 2023-09-24 PROCEDURE — 250N000013 HC RX MED GY IP 250 OP 250 PS 637: Performed by: PHYSICIAN ASSISTANT

## 2023-09-24 PROCEDURE — 250N000013 HC RX MED GY IP 250 OP 250 PS 637: Performed by: STUDENT IN AN ORGANIZED HEALTH CARE EDUCATION/TRAINING PROGRAM

## 2023-09-24 PROCEDURE — 124N000002 HC R&B MH UMMC

## 2023-09-24 PROCEDURE — 87633 RESP VIRUS 12-25 TARGETS: CPT | Performed by: PHYSICIAN ASSISTANT

## 2023-09-24 PROCEDURE — 99232 SBSQ HOSP IP/OBS MODERATE 35: CPT

## 2023-09-24 RX ORDER — ALBUTEROL SULFATE 90 UG/1
2 AEROSOL, METERED RESPIRATORY (INHALATION) EVERY 6 HOURS PRN
Status: DISCONTINUED | OUTPATIENT
Start: 2023-09-24 | End: 2023-09-25

## 2023-09-24 RX ORDER — DOXYCYCLINE 100 MG/1
100 CAPSULE ORAL EVERY 12 HOURS SCHEDULED
Status: COMPLETED | OUTPATIENT
Start: 2023-09-24 | End: 2023-09-28

## 2023-09-24 RX ADMIN — SENNOSIDES 2 TABLET: 8.6 TABLET ORAL at 20:07

## 2023-09-24 RX ADMIN — DIVALPROEX SODIUM 250 MG: 250 TABLET, DELAYED RELEASE ORAL at 06:59

## 2023-09-24 RX ADMIN — Medication 10 MG: at 20:06

## 2023-09-24 RX ADMIN — CYANOCOBALAMIN TAB 1000 MCG 1000 MCG: 1000 TAB at 10:09

## 2023-09-24 RX ADMIN — DOXYCYCLINE HYCLATE 100 MG: 50 CAPSULE ORAL at 20:06

## 2023-09-24 RX ADMIN — OLANZAPINE 15 MG: 10 TABLET, ORALLY DISINTEGRATING ORAL at 10:09

## 2023-09-24 RX ADMIN — BENZTROPINE MESYLATE 1 MG: 1 TABLET ORAL at 20:06

## 2023-09-24 RX ADMIN — WHITE PETROLATUM: 1.75 OINTMENT TOPICAL at 10:12

## 2023-09-24 RX ADMIN — GABAPENTIN 300 MG: 300 CAPSULE ORAL at 10:09

## 2023-09-24 RX ADMIN — NAPROXEN 250 MG: 250 TABLET ORAL at 10:09

## 2023-09-24 RX ADMIN — LORAZEPAM 0.5 MG: 0.5 TABLET ORAL at 20:06

## 2023-09-24 RX ADMIN — MICONAZOLE NITRATE: 20 POWDER TOPICAL at 22:57

## 2023-09-24 RX ADMIN — DOXYCYCLINE HYCLATE 100 MG: 50 CAPSULE ORAL at 10:08

## 2023-09-24 RX ADMIN — SENNOSIDES 2 TABLET: 8.6 TABLET ORAL at 10:09

## 2023-09-24 RX ADMIN — ATROPINE SULFATE 2 DROP: 10 SOLUTION/ DROPS OPHTHALMIC at 10:12

## 2023-09-24 RX ADMIN — FLUOXETINE 20 MG: 20 CAPSULE ORAL at 10:09

## 2023-09-24 RX ADMIN — ATROPINE SULFATE 2 DROP: 10 SOLUTION/ DROPS OPHTHALMIC at 22:57

## 2023-09-24 RX ADMIN — POLYETHYLENE GLYCOL 3350 17 G: 17 POWDER, FOR SOLUTION ORAL at 10:08

## 2023-09-24 RX ADMIN — GABAPENTIN 300 MG: 300 CAPSULE ORAL at 20:07

## 2023-09-24 RX ADMIN — TAMSULOSIN HYDROCHLORIDE 0.4 MG: 0.4 CAPSULE ORAL at 10:09

## 2023-09-24 RX ADMIN — DIVALPROEX SODIUM 250 MG: 250 TABLET, DELAYED RELEASE ORAL at 13:26

## 2023-09-24 RX ADMIN — BENZTROPINE MESYLATE 1 MG: 1 TABLET ORAL at 10:09

## 2023-09-24 RX ADMIN — NAPROXEN 250 MG: 250 TABLET ORAL at 18:51

## 2023-09-24 RX ADMIN — ATROPINE SULFATE 1 DROP: 10 SOLUTION/ DROPS OPHTHALMIC at 22:57

## 2023-09-24 RX ADMIN — MICONAZOLE NITRATE: 20 POWDER TOPICAL at 10:11

## 2023-09-24 RX ADMIN — CLOZAPINE 650 MG: 100 TABLET, ORALLY DISINTEGRATING ORAL at 13:24

## 2023-09-24 RX ADMIN — LORAZEPAM 0.5 MG: 0.5 TABLET ORAL at 13:26

## 2023-09-24 RX ADMIN — WHITE PETROLATUM: 1.75 OINTMENT TOPICAL at 22:58

## 2023-09-24 RX ADMIN — LORAZEPAM 0.5 MG: 0.5 TABLET ORAL at 10:09

## 2023-09-24 RX ADMIN — ATROPINE SULFATE 2 DROP: 10 SOLUTION/ DROPS OPHTHALMIC at 13:47

## 2023-09-24 RX ADMIN — GABAPENTIN 300 MG: 300 CAPSULE ORAL at 13:26

## 2023-09-24 RX ADMIN — ACETAMINOPHEN 650 MG: 325 TABLET, FILM COATED ORAL at 12:08

## 2023-09-24 RX ADMIN — OLANZAPINE 15 MG: 10 TABLET, ORALLY DISINTEGRATING ORAL at 20:06

## 2023-09-24 RX ADMIN — DIVALPROEX SODIUM 250 MG: 250 TABLET, DELAYED RELEASE ORAL at 20:06

## 2023-09-24 RX ADMIN — POLYETHYLENE GLYCOL 3350 17 G: 17 POWDER, FOR SOLUTION ORAL at 20:07

## 2023-09-24 ASSESSMENT — ACTIVITIES OF DAILY LIVING (ADL)
ADLS_ACUITY_SCORE: 74
LAUNDRY: UNABLE TO COMPLETE
ADLS_ACUITY_SCORE: 74
ORAL_HYGIENE: INDEPENDENT;PROMPTS
ADLS_ACUITY_SCORE: 74
ADLS_ACUITY_SCORE: 74
DRESS: INDEPENDENT
ADLS_ACUITY_SCORE: 74
HYGIENE/GROOMING: PROMPTS;INDEPENDENT

## 2023-09-24 NOTE — PLAN OF CARE
NOC Shift Report     Pt in bed at beginning of shift, breathing quiet and unlabored. Pt slept 7 hours.      No pt complaints or concerns at this time. No PRNs given.    Plan is to continue to monitor vitals every 4 hours x4, patient has two times left.       09/24/23 0045 09/24/23 0450   Vital Signs   Temp 99.1  F (37.3  C) 98.3  F (36.8  C)   Temp src Tympanic Tympanic   Resp 22 24   Pulse 85 86   Pulse Rate Source Monitor Monitor   BP 94/59 103/65   BP Location Left arm Left arm   Patient Position Supine Supine   Cuff Size Adult Regular Adult Regular   Oxygen Therapy   SpO2 95 % 91 %   O2 Device None (Room air) None (Room air)

## 2023-09-24 NOTE — PROGRESS NOTES
"Internal Medicine Progress Note      Assessment and plan:  Vinod Lee is a 63 year old male with a history of schizophrenia, Parkinson disease, auditory and visual hallucinations who has been in inpatient psychiatry since 5/26/2023.  Cross cover provider notified last night of oxygen saturations of 89% and respirations of 40.    #Community acquired pneumonia  Last evening, patient was reported to have respirations of 40 and oxygen saturation of 89%.  Provider assessed patient at bedside and respirations noted to be 28.  He has had mild elevations in temperature.  Spoke with pharmacy-patient was on Augmentin in June.  Patient has history of QT prolongation so avoiding medications that would prolong QT further. Lungs CTA. Ongoing cough and sore throat.  -Doxycycline 100 mg twice daily for 5 days  -Albuterol inhaler as needed cough, wheezing, shortness of breath  -APAP every 4 hours as needed fevers, pain  -Sore throat lozenges PRN      Objective:  /70 (Patient Position: Sitting)   Pulse 88   Temp 97.9  F (36.6  C) (Temporal)   Resp 24   Ht 1.753 m (5' 9\")   Wt 83.7 kg (184 lb 8 oz)   SpO2 97%   BMI 27.25 kg/m      Vitals signs reviewed and noted    GENERAL: Alert and oriented x3  HEENT: Anicteric sclera. MMM  CV: RRR, S1, S2. No murmur noted  RESPIRATORY: Effort normal on RA. Lungs CTAB with no wheezing or rales. Non productive cough, sore throat  GI: Abdomen soft, non-tender with active bowel sounds. No rebound or guarding  NEUROLOGICAL: No focal deficits. Moves all extremities  EXTREMITIES: No peripheral edema. Intact bilateral pedal pulses  SKIN: No jaundice, no rash    Pertinent labs and procedures were reviewed     Subjective:     Discussed plan as above with patient. Bedside sitter confirmed he is still coughing after patient denied he was. Ongoing sore throat. Pt agrees with treatment plan.    PRESTON Bass CNP  Internal Medicine [facility] Hospitalist  Page job code [id number] (3B), [phone] (3A), or [phone] " ([place] and 4A)  Text paging via [facility] is appreciated  2023    Medical Decision Makin MINUTES SPENT BY ME on the date of service doing chart review, history, exam, documentation & further activities per the note.

## 2023-09-24 NOTE — PROGRESS NOTES
Medicine Cross-Cover Note:    Assessment  In assessment, I was called to see this patient by RN secondary to concerns regarding O2 sat 89% and RR 40. COVID negative. Patient seen at bedside and reporting he feels fine, no different from usual. Vitals rechecked at bedside with RR 28 and O2 92% on room air. No labored breathing, patient resting comfortably in bed. Has a chronic wet sounding cough. He has difficulty clearing his own secretions sometimes secondary to parkinson's. No chest pain or pressure, afebrile, no body aches. Reviewed last 3 CBCs and BMP from 9/15/23.     Plan  - CBC, BMP, procalc, crp  - CXR  - Vitals Q4H x 4 occurrences  - RVP  - PT consulted for generalized weakness associated with parkinson's disease  - Please place formal medicine consult so we may re-evaluate him in the am  - Disposition: The patient will remain on the current unit. We will continue to monitor this patient closely.    25 MINUTES SPENT BY ME on the date of service doing chart review, history, exam, documentation & further activities per the note.  Bryant Barrios PA-C  [facility] Paging/Directory       Hospital Course    Brief Summary of events leading to call:   Called to see patient due to O2 sat 89% and RR 40. Patient has no complaints for me. Is resting comfortably in bed on my arrival, able to sit up with assistance of staff and vitals re-assessed after sitting up.     Physical Exam  B/P: 110/67, T: 98.1, P: 89, R: 28     GENERAL: Alert and oriented x 3. Well nourished, well developed.  No acute distress.    HEENT: Normocephalic, atraumatic. Anicteric sclera. Mucous membranes moist.   CV: RRR. S1, S2. No murmurs appreciated.   RESPIRATORY: Effort normal on room air. Lungs CTAB with no wheezing, rales, or rhonchi.   GI: Abdomen soft and non distended, bowel sounds present x all 4 quadrants. No tenderness, rebound, or guarding.   NEUROLOGICAL: At his baseline, has parkinsonian tremor and generalized weakness with no focal  deficits. Follows commands.    EXTREMITIES: No gross deformities. No peripheral edema.   SKIN: Grossly warm, dry, and intact. No jaundice. No rashes.     Imaging results reviewed over the past 24 hrs:   No results found for this or any previous visit (from the past 24 hour(s)).  Recent Labs   Lab 09/22/23  1614   WBC 10.6   HGB 12.0*   MCV 86

## 2023-09-24 NOTE — PROGRESS NOTES
CNP was notified of this evenings abnormal laboratory results at ca 23:15 via amcon. Result of chest x/r not yet available

## 2023-09-24 NOTE — PLAN OF CARE
"-VS 0815  /70  P88  RR20    T97.9   O2 97%  -Wedge placed on pt bed   -Writer spoke to PT regarding PT consult. They stated they did not have staff to assess him today, and will come tomorrow. Writer encouraged them to reach out to the ordering provider   -Writer contacted IM for review and follow up regarding CXR. Doxycycline ordered for pneumonia  -Upon assessment, patient is awake, alert and oriented. VSS. He states he feels \"overall unwell\", and does not offer specific c/o. No s/s of current SOB or respiratory distress.  Will continue to monitor closely.    -1200 vital reassessment  /70  P70  RR16  T99.1  O2 99%  -PRN apap administered at 1208  -Writer received phone call from lab of positive Resp panel results. Contacted IM to relay results. Suggestion universal precautions to mask while sitting in close proximity to patient, good hand hygiene, and avoid patient interacting with other patients as much as possible X24 hours post starting antibiotic.   -Patient showered  -Writer cleaned and sanitized pt's room and unit surfaces while pt in the shower.  - Patient coughing intermittently during the shift, non productive. No c/o or s/s of SOB or respiratory distress throughout shift. Patient remained in behavioral control, calm and cooperative. Medication compliant. Continue to monitor and assess.                           "

## 2023-09-25 ENCOUNTER — APPOINTMENT (OUTPATIENT)
Dept: PHYSICAL THERAPY | Facility: CLINIC | Age: 63
DRG: 885 | End: 2023-09-25
Attending: PHYSICIAN ASSISTANT
Payer: COMMERCIAL

## 2023-09-25 PROCEDURE — 99233 SBSQ HOSP IP/OBS HIGH 50: CPT | Performed by: PHYSICIAN ASSISTANT

## 2023-09-25 PROCEDURE — 97530 THERAPEUTIC ACTIVITIES: CPT | Mod: GP

## 2023-09-25 PROCEDURE — 250N000013 HC RX MED GY IP 250 OP 250 PS 637

## 2023-09-25 PROCEDURE — 250N000013 HC RX MED GY IP 250 OP 250 PS 637: Performed by: PHYSICIAN ASSISTANT

## 2023-09-25 PROCEDURE — 250N000013 HC RX MED GY IP 250 OP 250 PS 637: Performed by: STUDENT IN AN ORGANIZED HEALTH CARE EDUCATION/TRAINING PROGRAM

## 2023-09-25 PROCEDURE — 97161 PT EVAL LOW COMPLEX 20 MIN: CPT | Mod: GP

## 2023-09-25 PROCEDURE — 124N000002 HC R&B MH UMMC

## 2023-09-25 PROCEDURE — H2032 ACTIVITY THERAPY, PER 15 MIN: HCPCS

## 2023-09-25 PROCEDURE — 250N000013 HC RX MED GY IP 250 OP 250 PS 637: Performed by: PSYCHIATRY & NEUROLOGY

## 2023-09-25 PROCEDURE — 99233 SBSQ HOSP IP/OBS HIGH 50: CPT | Performed by: PSYCHIATRY & NEUROLOGY

## 2023-09-25 RX ORDER — ALBUTEROL SULFATE 90 UG/1
2 AEROSOL, METERED RESPIRATORY (INHALATION) EVERY 8 HOURS
Status: DISCONTINUED | OUTPATIENT
Start: 2023-09-25 | End: 2023-09-30

## 2023-09-25 RX ORDER — ALBUTEROL SULFATE 90 UG/1
2 AEROSOL, METERED RESPIRATORY (INHALATION) EVERY 8 HOURS PRN
Status: DISCONTINUED | OUTPATIENT
Start: 2023-09-25 | End: 2023-09-30

## 2023-09-25 RX ORDER — SIMETHICONE 80 MG
80 TABLET,CHEWABLE ORAL EVERY 6 HOURS PRN
Status: DISCONTINUED | OUTPATIENT
Start: 2023-09-25 | End: 2023-11-13 | Stop reason: HOSPADM

## 2023-09-25 RX ADMIN — SENNOSIDES 2 TABLET: 8.6 TABLET ORAL at 19:12

## 2023-09-25 RX ADMIN — NAPROXEN 250 MG: 250 TABLET ORAL at 08:13

## 2023-09-25 RX ADMIN — DOXYCYCLINE HYCLATE 100 MG: 50 CAPSULE ORAL at 19:11

## 2023-09-25 RX ADMIN — WHITE PETROLATUM: 1.75 OINTMENT TOPICAL at 08:14

## 2023-09-25 RX ADMIN — MICONAZOLE NITRATE: 20 POWDER TOPICAL at 08:14

## 2023-09-25 RX ADMIN — POLYETHYLENE GLYCOL 3350 17 G: 17 POWDER, FOR SOLUTION ORAL at 08:13

## 2023-09-25 RX ADMIN — POLYETHYLENE GLYCOL 3350 17 G: 17 POWDER, FOR SOLUTION ORAL at 19:11

## 2023-09-25 RX ADMIN — DIVALPROEX SODIUM 250 MG: 250 TABLET, DELAYED RELEASE ORAL at 14:17

## 2023-09-25 RX ADMIN — ATROPINE SULFATE 2 DROP: 10 SOLUTION/ DROPS OPHTHALMIC at 14:15

## 2023-09-25 RX ADMIN — DIVALPROEX SODIUM 250 MG: 250 TABLET, DELAYED RELEASE ORAL at 06:34

## 2023-09-25 RX ADMIN — GABAPENTIN 300 MG: 300 CAPSULE ORAL at 19:11

## 2023-09-25 RX ADMIN — AMOXICILLIN AND CLAVULANATE POTASSIUM 1 TABLET: 875; 125 TABLET, FILM COATED ORAL at 19:31

## 2023-09-25 RX ADMIN — ALBUTEROL SULFATE 2 PUFF: 90 AEROSOL, METERED RESPIRATORY (INHALATION) at 14:15

## 2023-09-25 RX ADMIN — NAPROXEN 250 MG: 250 TABLET ORAL at 17:52

## 2023-09-25 RX ADMIN — GABAPENTIN 300 MG: 300 CAPSULE ORAL at 08:12

## 2023-09-25 RX ADMIN — MICONAZOLE NITRATE: 20 POWDER TOPICAL at 19:12

## 2023-09-25 RX ADMIN — TAMSULOSIN HYDROCHLORIDE 0.4 MG: 0.4 CAPSULE ORAL at 08:13

## 2023-09-25 RX ADMIN — ATROPINE SULFATE 2 DROP: 10 SOLUTION/ DROPS OPHTHALMIC at 08:13

## 2023-09-25 RX ADMIN — LORAZEPAM 0.5 MG: 0.5 TABLET ORAL at 08:13

## 2023-09-25 RX ADMIN — FLUOXETINE 20 MG: 20 CAPSULE ORAL at 08:13

## 2023-09-25 RX ADMIN — DIVALPROEX SODIUM 250 MG: 250 TABLET, DELAYED RELEASE ORAL at 19:12

## 2023-09-25 RX ADMIN — CLOZAPINE 650 MG: 100 TABLET, ORALLY DISINTEGRATING ORAL at 14:17

## 2023-09-25 RX ADMIN — CYANOCOBALAMIN TAB 1000 MCG 1000 MCG: 1000 TAB at 08:13

## 2023-09-25 RX ADMIN — SENNOSIDES 2 TABLET: 8.6 TABLET ORAL at 08:13

## 2023-09-25 RX ADMIN — Medication 10 MG: at 19:11

## 2023-09-25 RX ADMIN — BENZTROPINE MESYLATE 1 MG: 1 TABLET ORAL at 19:11

## 2023-09-25 RX ADMIN — LORAZEPAM 0.5 MG: 0.5 TABLET ORAL at 14:17

## 2023-09-25 RX ADMIN — ALBUTEROL SULFATE 2 PUFF: 90 AEROSOL, METERED RESPIRATORY (INHALATION) at 19:29

## 2023-09-25 RX ADMIN — BENZTROPINE MESYLATE 1 MG: 1 TABLET ORAL at 08:12

## 2023-09-25 RX ADMIN — GABAPENTIN 300 MG: 300 CAPSULE ORAL at 14:17

## 2023-09-25 RX ADMIN — ATROPINE SULFATE 2 DROP: 10 SOLUTION/ DROPS OPHTHALMIC at 19:13

## 2023-09-25 RX ADMIN — ALBUTEROL SULFATE 2 PUFF: 90 AEROSOL, METERED RESPIRATORY (INHALATION) at 11:42

## 2023-09-25 RX ADMIN — OLANZAPINE 15 MG: 10 TABLET, ORALLY DISINTEGRATING ORAL at 19:11

## 2023-09-25 RX ADMIN — WHITE PETROLATUM: 1.75 OINTMENT TOPICAL at 17:52

## 2023-09-25 RX ADMIN — LORAZEPAM 0.5 MG: 0.5 TABLET ORAL at 19:11

## 2023-09-25 RX ADMIN — OLANZAPINE 15 MG: 10 TABLET, ORALLY DISINTEGRATING ORAL at 08:12

## 2023-09-25 RX ADMIN — DOXYCYCLINE HYCLATE 100 MG: 50 CAPSULE ORAL at 08:12

## 2023-09-25 ASSESSMENT — ACTIVITIES OF DAILY LIVING (ADL)
ADLS_ACUITY_SCORE: 74
LAUNDRY: UNABLE TO COMPLETE
DRESS: INDEPENDENT
ADLS_ACUITY_SCORE: 74
HYGIENE/GROOMING: INDEPENDENT
ADLS_ACUITY_SCORE: 74
ADLS_ACUITY_SCORE: 64
ADLS_ACUITY_SCORE: 74
DRESS: INDEPENDENT
ADLS_ACUITY_SCORE: 64
HYGIENE/GROOMING: INDEPENDENT
ORAL_HYGIENE: INDEPENDENT
ORAL_HYGIENE: INDEPENDENT
ADLS_ACUITY_SCORE: 64
ADLS_ACUITY_SCORE: 64
ADLS_ACUITY_SCORE: 74
ADLS_ACUITY_SCORE: 74

## 2023-09-25 NOTE — PLAN OF CARE
"Pt continue to have ongoing productive cough but couldn't spit it out. PA reported that he is not doing well and was having difficulty sitting.   Upon assessment by the  unit manager and the writer,  pt  sat at the side of the bed,  noted wheezing and  Vital was BP 94/52 (BP Location: Left arm)   Pulse 83   Temp 98.3  F (36.8  C) (Temporal)   Resp 18   Ht 1.753 m (5' 9\")   Wt 83.7 kg (184 lb 8 oz)   SpO2 94%   BMI 27.25 kg/m   .  Tremors and drooling pronounced but drooling got better towards the end of the shift.     Pt was started on Doxycycline 100 mg twice daily for pneumonia which seem not very  effective at this time.  Primary MD notified and he notified internal medicine to re-access the pt .  Pt was re-accessed by IM and started on Augmenting. Albuterol inhaler was changed to schedule and as needed  for cough, wheezing, and shortness of breath. Pt was also started on simethicone for gas.    Pt  refused breakfast and ate 75% for lunch.   Voided x2 and no bowel movement  reported this shift.  Pt felt better after lunch, paced in the hallway and attended afternoon music  group.   He denies any discomfort at the time and  cooperative with cares. Will continue to monitor and assist as needed.  -  Problem: Confusion Chronic  Goal: Optimal Cognitive Function  9/25/2023 1248 by Basia Woodson RN  Outcome: Progressing  9/25/2023 1045 by Basia Woodson RN  Outcome: Progressing  Intervention: Minimize and Manage Confusion  Recent Flowsheet Documentation  Taken 9/25/2023 1235 by Basia Woodson RN  Family/Support System Care: support provided     Problem: Seclusion/Restraint, Behavioral  Goal: Absence of Harm or Injury  Intervention: Protect Skin and Joint Integrity  Recent Flowsheet Documentation  Taken 9/25/2023 1235 by Basia Woodson RN  Body Position:   position changed independently   position maintained   Goal Outcome Evaluation:    Plan of Care Reviewed With: patient             "

## 2023-09-25 NOTE — PROGRESS NOTES
[facility]    Medicine Progress Note - Internal Medicine    Date of Admission:  5/26/2023    Assessment & Plan   Mr. Vinod Lee is a 62 y.o. male with a history of schizophrenia, depression, Parkinsonism, essential tremor, hypertension, SOPHIA, GERD, and BPH. He was admitted to inpatient psychiatry on 5/26/2023 for psychosis. Concern for pneumonia as of 9/24/23, medicine following up for this.      #Community acquired pneumonia  9/23, patient was reported to have respirations of 40 and oxygen saturation of 89%.  Provider assessed patient at bedside and respirations noted to be 28.  He has had mild elevations in temperature.  Covid negative. Patient has history of QT prolongation so avoiding medications that would prolong QT further. Lungs CTA. CXR with possible infection vs atelectasis in RLL. Ongoing cough and sore throat. Patient was started on doxycyline.   Update today: per provider and nurse, feel that patient is not improving.  Patient seen at bedside.  No new complaints, continues to have a cough that seems to be unchanged, as well as some discomfort to his throat. Vitals remain stable.   - sputum culture  - cont Doxycycline 100 mg twice daily for 5 days  - add augmentin 875-125 mg BID x 5 days total due to c/f aspiration - patient has been drooling excessively due to being on clozapine, lying in bed for much of the day.   - Albuterol inhaler as needed cough, wheezing, shortness of breath  -APAP every 4 hours as needed fevers, pain  - I-S q shift    Hx cavitary lesion to SOPHY  Pulmonary nodule to SOPHY  Seen August 2022 in ED. CT chest with contrast negative for PE but showed cavitary masslike process in the left upper lung, over an area measuring 9.5 x 6.2 x 5.2 cm, indeterminant pulmonary nodule measuring 9 mm in the anterior segment of the left upper lobe, nonspecific left hilar and mediastinal lymphadenopathy, small-moderate left pleural effusion.  Cavitary  lesion also seen on chest x-ray.  Patient was given IVF, Rocephin, Zithromax, and admitted for further evaluation. Thoracentesis was done and negative cultures. ID was consulted and managed Abx. The patient was initially on zosyn and doxycycline but narrowed. Strep pneumo, legionella, and HIV testing done (negative). Blood cultures were negative as well. The patient did well.  Follow-up chest CT showed near complete interval resolution of the previously described left upper lobe abscess.  Residual 12 x 10 mm spiculated nodular opacity with adjacent scarring within this region, probably represents sequela of previous infection, although underlying malignancy not excluded.  - Recommend follow-up outpatient with PCP for continued monitoring.    Sleep apnea      Medicine service will continue to follow for monitoring of vitals and clinical findings as pertaining to pneumonia.    The patient's care was discussed with the Attending Physician, Dr. Rhodes, Bedside Nurse, and Patient.    Ernestine Jimenez PA-C  Hospitalist Service  [facility]  Securely message with [employer] (more info)  Text page via [facility] Paging/Directory   ______________________________________________________________________    Interval History   Patient was seen at the bedside.  He reports feeling no new symptoms.  He does endorse a cough that is overall unchanged in the last few days.  He states the cough has been present for 2 weeks.  He also endorses some throat discomfort, denies any runny nose, shortness of breath, wheezing, fevers, chills recently.  Per nurse and psychiatric staff in room, he has been coughing today.    Physical Exam   Vital Signs: Temp: 98.3  F (36.8  C) Temp src: Temporal BP: 94/52 Pulse: 83   Resp: 18 SpO2: 94 % O2 Device: None (Room air)    Weight: 184 lbs 8 oz    GENERAL: older adult male seen resting reclined and then sitting in bed. NAD.   NEURO / PSYCH: Alert, able to answer  questions, but has delusions / inappropriate fixations. No focal deficits. Moves all extremities.   HEENT: Anicteric sclera. PERRL. Mucous membranes moist.   CV: RRR. S1, S2. No murmurs appreciated.    RESPIRATORY: Effort normal. Rhonchi present to lower lobes, scattered wheezes also present.   SKIN: No jaundice. No rashes or lesions to exposed areas.     Medical Decision Making       60 MINUTES SPENT BY ME on the date of service doing chart review, history, exam, documentation & further activities per the note.      Data

## 2023-09-25 NOTE — PROGRESS NOTES
"[facility], [place]   Psychiatric Progress Note  Hospital Day: 122        Interim History:   The patient's care was discussed with the treatment team during the daily team meeting and/or staff's chart notes were reviewed. Pt developed cough over the weekend and IM assessed. Poor oral intake this morning. Vital signs stable.     Vinod was found in his room laying in bed with SIO present and engages in conversation. He was more irritable than usual. Reported sore throat and accused writer of poisoning him. He demanded that I leave the room and attempted to come toward writer in an aggressive manner while saying \"fuck you!\"    Suicidal ideation: GRACE    Homicidal ideation: GRACE    Psychotic symptoms: AH suspected with report of telepathy. no VH reported during interview. Delusions present and other psychotic symptoms suspected.    Medication side effects reported:  jaw and chin tremors, no constipation    Acute medical concerns: Vinod denied pain other than sore throat. Denied cough but was coughing during interview. Denied CP and SOB.     Other issues reported by patient: Patient had no further questions or concerns.           Medications:      albuterol  2 puff Inhalation Q8H    amoxicillin-clavulanate  1 tablet Oral Q12H [facility] (08/20)    atropine  2 drop Sublingual TID    benztropine  1 mg Oral BID    cloZAPine  650 mg Oral Daily    cyanocobalamin  1,000 mcg Oral Daily    divalproex sodium delayed-release  250 mg Oral Q8H KIKI    doxycycline hyclate  100 mg Oral Q12H [facility] (08/20)    FLUoxetine  20 mg Oral Daily    gabapentin  300 mg Oral TID    LORazepam  0.5 mg Oral TID    melatonin  10 mg Oral At Bedtime    miconazole   Topical BID    mineral oil-hydrophilic petrolatum   Topical BID IS    naproxen  250 mg Oral BID w/meals    OLANZapine zydis  15 mg Oral BID    Or    OLANZapine  10 mg Intramuscular BID    polyethylene glycol  17 g Oral BID    sennosides  2 tablet Oral BID    tamsulosin  0.4 mg " "Oral Daily          Allergies:     Allergies   Allergen Reactions    Bee Venom Unknown    Montelukast Unknown    Phenothiazines Unknown          Labs:     No results found for this or any previous visit (from the past 24 hour(s)).                 Psychiatric Examination:     BP 94/52 (BP Location: Left arm)   Pulse 83   Temp 98.3  F (36.8  C) (Temporal)   Resp 18   Ht 1.753 m (5' 9\")   Wt 83.7 kg (184 lb 8 oz)   SpO2 94%   BMI 27.25 kg/m    Weight is 184 lbs 8 oz  Body mass index is 27.25 kg/m .    Weight over time:  Vitals:    07/03/23 1100 07/30/23 0902 08/13/23 0900 08/26/23 0835   Weight: 81.6 kg (179 lb 12.8 oz) 86.5 kg (190 lb 9.6 oz) 85.7 kg (188 lb 14.4 oz) 82.8 kg (182 lb 7 oz)    09/07/23 0900   Weight: 83.7 kg (184 lb 8 oz)       Orthostatic Vitals         Most Recent      Sitting Orthostatic /61 07/25 0800    Sitting Orthostatic Pulse (bpm) 85 07/25 0800          Cardiometabolic risk assessment. 06/07/23    Reviewed patient profile for cardiometabolic risk factors    Date taken /Value  REFERENCE RANGE   Abdominal Obesity  (Waist Circumference)   See nursing flowsheet Women ?35 in (88 cm)   Men ?40 in (102 cm)      Triglycerides  No results found for: TRIG    ?150 mg/dL (1.7 mmol/L) or current treatment for elevated triglycerides   HDL cholesterol  No results found for: HDL]   Women <50 mg/dL (1.3 mmol/L) in women or current treatment for low HDL cholesterol  Men <40 mg/dL (1 mmol/L) in men or current treatment for low HDL cholesterol     Fasting plasma glucose (FPG) Lab Results   Component Value Date     05/26/2023      FPG ?100 mg/dL (5.6 mmol/L) or treatment for elevated blood glucose   Blood pressure  BP Readings from Last 3 Encounters:   09/25/23 94/52   05/26/23 97/55    Blood pressure ?130/85 mmHg or treatment for elevated blood pressure   Family History  See family history     Mental Status Exam:  Appearance: awake, alert, unkempt, lying in bed. No evidence of pain of acute " "distress.   Attitude:  more cooperative  Eye Contact:  fair, less intense, better duration  Mood:  \"neutral\"  Affect:  mood congruent, content  Speech: mostly coherent  Psychomotor Behavior:  Ongoing bilateral hand tremor and jaw tremor. No evidence of dystonia, or tics.  Throught Process: linear, illogical, concrete  Associations:  no loosening of associations present  Thought Content:  Delusions and AH suspected. Evidence of obsessive thinking and likely compulsions to harm others.   Insight:  limited  Judgement:  poor  Oriented to:  self, place  Attention Span and Concentration:  fair  Recent and Remote Memory:  poor  Gait: patient lying in bed         Precautions:     Behavioral Orders   Procedures    Assault precautions    Code 1 - Restrict to Unit    Code 2    Code 2 - 1:1 Staff Supervision     For ECT only    Electroconvulsive therapy     Series of up to 12 treatments. Begin Date: 8/2/23     Treating Psychiatrist providing ECT:  unknown     Notified on:  7/28/23 via this order  NOTE: We received verbal confirmation from [facility] that [facility]on has been approved but awaiting official court order and risk management's review  Initial consult was completed by Dr. Hicks on 7/18/23    Electroconvulsive therapy     Series of up to 12 treatments. Begin Date: 8/2/23     Treating Psychiatrist providing ECT:  Dominga     Notified on:  8/2/23    Elopement precautions    Fall precautions    Occupational Therapy on the Unit     Order Specific Question:   Reason for Consult     Answer:   Eval of thought process, functional skills and behavior     Order Specific Question:   Course of Action:     Answer:   Evaluation only     Order Specific Question:   Treatment Prescription:     Answer:   CPT requested    Routine Programming     As clinically indicated    Self Injury Precaution    Status 15     Every 15 minutes.    Status Individual Observation     Patient SIO status reviewed with team/RN.  Please also refer to RN/team " documentation for add'l detail.    -SIO staff to monitor following which have contributed to patient being on SIO:  Agitation  Pt is impulsive   Pt has ran out of his room naked  Pt has Parkinson symptoms place him in a high fall risk  Pt has verbal outburst of sexual and threaten statements  Pt requires immediate redirection when masturbating    -Possible interventions SIO staff could use to support patient's treatment progress:  Engage pt in activities    -When following observed, team will review discontinuation of SIO:  Absence of aggression toward others for > 24 hours    Comments: SIO 1:1     Order Specific Question:   CONTINUOUS 24 hours / day     Answer:   5 feet     Order Specific Question:   Indications for SIO     Answer:   Severe intrusiveness     Order Specific Question:   Indications for SIO     Answer:   Assault risk    Suicide precautions     Patients on Suicide Precautions should have a Combination Diet ordered that includes a Diet selection(s) AND a Behavioral Tray selection for Safe Tray - with utensils, or Safe Tray - NO utensils            Diagnoses:     #Unspecified psychosis likely schizophrenia per history,  #Unspecified encephalopathy   -R/O catatonia   -Episodes of unresponsiveness, concern for PNES   #Parkinsonism 2/2 neuroleptic medications, rule out Parkinson's Disease  -rule out major neurocognitive disorder (refused to answer much of assessment)  #Urinary retention and BPH  -Possible UTI -- UC resulted on 5/25 w/out growth  #Hx of prolonged QTc with clozapine  #Mild thrombocytopenia  #R/O OCD         Assessment and hospital summary:  Patient was admitted to psychiatric unit for safety, stabilization and medication management. He has had schizophrenia since the 1980. He was on Clozaril x 25 years, and it was tapered and discontinued on May 7, 2023  due to prolonged qtc of 527, and his psychotic  symptoms have worsened since then. Sinemet was also discontinued recently due to concerns  that it was contributing to paranoia and AH. He is agitated, aggressive, dangerous to self and others. He remains on SIO 2 to 1, and is under psychiatric emergency and court hold. There are concerns for organic etiology given pattern of sundowning, history of parkinsonism, and ongoing disorientation/confusion. : EKG repeated, cardiology consult regarding safety of resuming clozapine in the context of prolonged QTc and refractory schizophrenia pending response. Per cardiology, correct QTc is no more than 460. They do not have concerns about AP retrial. Neurology IP consult placed for evaluation of sundowning and cognitive decline secondary to Sinemet discontinuation. MSSA initiated. Per Neurology, discontinuation of Sinemet would not account for these symptoms. They do not recommend retrial at this time. On , discussed complex case at length with Dr. Hatch (primary provider on geriatic unit) who provided recs (see second opinion note dated ). Depakote initiated, though needed to be reduced due to side effect of thrombocytopenia. Melatonin was increased to 10 mg at bedtime. : Clozapine titration continued. Its possible that clozapine dose is contributing to worsened compulsive behaviors noted throughout hospitalization which could improve with lowering the dose. ECT initiated due to treatment refractory psychosis and ongoing symptoms despite therapeutic dose of clozapine (650 mg daily). ECT initiated on 23 and pt completed 11 total treatments, but ECT stopped on  due to lack of improvement and distress it was causing patient.     Chart reviewed which revealed the followin2022: He was on clozapine, Seroquel, Cymbalta, and Carbidopa-levodopa and hospitalized for pneumonia. Psych consulted and Seroquel was stopped. Treated with Abx and discharged to TCU.     2022: Hospitalized at [facility]. Per chart review:    Vinod Lee is a 63 yo male with longstanding history of schizophrenia. He  was admitted from [facility] ED, where he presented due to increased delusional thoughts while on a visit to his parents for Thanksgiving. He began experiencing increasing paranoid thoughts that someone might be poisoning him and began fearing that he might accidentally harm someone. He reported to his parents that he was having thoughts about hitting someone or beating someone up and told them he could not guarantee they would be safe with him. Parents encouraged patient to go to the ED, which he did voluntarily.     Per patient report and chart review, he was hospitalized several times in the 1980s and reports he was civilly committed at one point in the 1980s, however he has been quite stable for the past several decades. He reports he will experience some paranoia and delusional thinking at times, but generally has good insight into his symptoms. He has been maintained on clozapine for about 25 years and prior to several months ago was also concurrently prescribed quetiapine.      In the last several months, patient has had a number of medication changes. Approximately 6 months ago, patient was diagnosed with parkinsonism related to antipsychotic use and was started on carbidopa-levidopa. He experienced no improvement in tremors, and thus carbidopa- levidopa dose was increased 1.5 weeks ago.      In addition, patient was medically hospitalized in August 2022 for confusion, weakness, repeated falls, ongoing weight loss, and SOB. He was found to have a cavitary lesion of the lung and suspected aspiration pneumonia. He was treated with antibiotics. At that time, he was taking current dose of clozapine and duloxetine, as well as propranolol ER, quetiapine, gabapentin, and clonazepam. Psychiatry was consulted due to concern for oversedation, and propranolol ER, gabapentin, and quetiapine were discontinued. Conazepam was reduced from 0.5 mg TID to BID dosing and recommended to be discontinued, however, it does not  "appear this occurred. His sedation improved significantly. QTc prolongation was also noted, but improved throughout his stay. He was ultimately discharged to a TCU for several months before returning home. He reports his weakness has greatly improved and states he \"graduated\" physical therapy, though he continues to be diligent in completed recommended exercises.      He reports that over the past several months, his delusions and anxiety have been worse than usual, with symptoms becoming much more acute since the carbidopa-levidopa dose was increased. He has felt increasingly paranoid about being poisoned, noting this is a delusion that has been stable over time, but was previously less intrusive. He has insight during the interview that his paranoid thinking is not reality based, but states it has been harder to separate delusions from reality and he becomes very worried that he might hurt someone, despite no history of violent behavior. He reports that the paranoia about his food being poisoned in combination with the tremors in his hands have made it difficult for him to eat. He has also had quite low appetite for the past 6 months to a year . He reports that due to the combination of these factors, he has lost about 50 pounds in the last year.He denies any problems with sleep initiation or maintenance. He reports energy is fairly good and \"better than it used to be.\" He reports some short term memory issues and on interview, does have some difficulty remembering details of recent events. He is unsure if he has received cognitive testing in the past.    He denies any suicidal ideation and reports he has not experienced SI for decades. He denies homicidal thoughts and is very clear that he had and has no desire to harm anyone else, but was afraid he might somehow do so. He denies any hallucinations and states \"that's never been a problem for me.\" He is not observed responding to internal stimuli. He is alert and " oriented in all spheres.        ========    BRIEF HOSPITAL COURSE: This 62 y.o. male admitted 11/25/2022 to  [facility] for the assessment and treatment of the above presentation. This was a voluntary admission.     In summary, he was tapered off the Sinemet, which he reports to be both ineffective and potentially worsening the anxiety and paranoia ideations. Instead Benadryl 25 mg TID was started to help with extrapyramidal side effects. He struggled with sleep during his stay here. Remeron 7.5mg QHS was tried without success. Seroquel 100mg qhs was restarted with addition of suvorexant 10mg qhs. Given his Seroquel PRN use prior history of prolonged QTC, he will benefit from another EKG repeat on the medical unit.     Unfortunately, he tested positive for influenza A and developed hypoxemia. Now on 2L oxygen with desats when prone requiring 3L oxygen. Subsequently, the plan will to be tapering him off clonazepam due to hypoxia (and also prior plan to taper). The patient tolerated these changes without side effects.     The patient minimally benefited from the structured therapeutic milieu as he attended programming seldom as he tested positive for influenza, he needed to isolate with droplet precautions. Pt was engaged and worked on issues that were triggering/brought pt to the hospital and has excellent insight into his anxiety and paranoid delusions. Pt denied suicidal ideations throughout all/majority of their hospitalization. Pt denied HI throughout all of hospitalization. There were no concerns with behavioral disruptions/outbursts. The patient has shown improvement in general.     At the time of discharge, hospitalization course was reviewed with Vinod Lee with plan to transfer to medicine. Please consult psychiatry and I will continue to follow while patient is on the medical unit. He is now off sinemet since 11/29 21:00 and I would expect anxiety and paranoia to improve more moving forward.  Psychiatrically, once anxiety and paranoia is baseline, can D/C to home once medically stable. He DOES NOT NEED A 1:1 on the medical unit from a mental health perspective, we initiated 1:1 11/30/2022 due to significant fatigue, gait instability (he was unable to stand without assist) and feeding assist.    04/2023: Clozapine was gradually tapered and discontinued, unclear reasons why it was discontinued per outside records, though Dr. Portillo's H&P indicates that it was due to prolonged QTc.       Today's Changes:  Renewed SIO  Monitor oral intake closely  Encourage fluids   I contacted IM to relay concerns about lack of improvement in cough and poor oral intake. IM to assess. Appreciate assistance.     Per Neurology Recs:    Recommendations  -No current indications to change medications from a neurologic perspective  - Optimizing his psychiatric medication and treatment is currently more important than optimization of parkinsonism  - Please contact neurology if any new questions arise, or to discuss the assessment described above     Thank you for involving Neurology in the care of Vinod Lee.  Please do not hesitate to call with questions.      Kranthi Perdue MD    Target psychiatric symptoms and interventions:  -Psych emergency declared on 5/27, now fully committed  - Depakote 250 mg TID, restarted on 8/26. Cannot increase beyond this dose due to thrombocytopenia at higher doses  -Continue clozapine as above  -Continue scheduled Zyprexa 15mg BID  -Continue 1:1 for safety of staff and patients, reduced from 2:1 7/23/23  - weekly CBC to monitor platelets    -Continue Gabapentin 300 mg TID as staff believe it has been effective for treatment of his anxiety    Risks, benefits, and alternatives discussed at length with patient.     Acute Medical Problems and Treatments:  Delirium vs progression of dementia  Neurology consult placed on 6/8 for evaluation of sundowning and cognitive decline secondary to Sinemet  discontinuation: Resuming Sinemet not recommended for behavioral concerns. Please see Neuro note for details.     Tremors  longstanding though appeared to worsen following initiation of clozapine  - Ativan 0.5 mg TID  - Cogentin 1 mg BID added on 8/25 with overall improvement noted    Neuro re-consulted on 9/20 and per their documentation:    Impression  Mr. Lee is a 63 y.o. man with a long history of psychosis including requiring commitment and psychiatric hospitalizations in the 1980s.  Given his long history, this is less consistent with an organic cause of psychosis (as something that would be primarily neurologic in origin would be expected to have a more rapid progression of neurologic deterioration, rather than this long period of psychiatric symptoms).  I am not able to appreciate any evidence for a neurologic basis of his psychosis given the time course of his psychiatric illness.      Regarding his difficulty with word finding, I am not able to elicit this on exam.  However since he described being symptomatic earlier today, it is helpful that I am able to go through a full assessment of his language in terms of comprehension, repetition, naming objects with increasing difficulty without being able to appreciate any deficits.     He does continue to have a tremor that is most prominent posteriorly as well as with action, in addition to a chin tremor.  He also does have rigidity in the bilateral upper extremities consistent with the diagnosis of parkinsonism.  He is currently not on levodopa or dopamine agonists which I continue to agree with.  Since his psychiatric symptoms are much more bothersome right now than his parkinsonism, I will continue to recommend avoiding dopaminergic medications.     Recommendations  -No current indications to change medications from a neurologic perspective  - Optimizing his psychiatric medication and treatment is currently more important than optimization of  parkinsonism  - Please contact neurology if any new questions arise, or to discuss the assessment described above     Thank you for involving Neurology in the care of Vinod Lee.  Please do not hesitate to call with questions.      Kranthi Perdue MD      Thrombocytopenia, resolved  Likely secondary to Depakote.  According to the, incidents of Depakote induced thrombocytopenia as 27%.  Depakote dose reduced from 1000 twice daily to 250 3 times daily on 7/28/2023 with subsequent resolution of thrombocytopenia  - weekly CBCs    Constipation  Likely worsened by clozapine, improved since modifying regimen.   - daily miralax   - daily Senokot 2 tablets BID      Right first toe fracture:  Seen by Ortho on 6/16:  Per note: Vinod Lee is a 62 year old  male w/ PMHx complex psychiatric history who has a fracture to the distal phalanx of the right toe after a recent trauma to the foot the patient reports.  Patient denies any other pain or discomfort.  Images reviewed and plan will be for irrigation of the popped fracture blister with sterile saline and the toes should be dressed and a 4 x 4 gauze dressing.  Patient will need a postop shoe which she can be weightbearing as tolerated in.  Would recommend a course of antibiotics for approximately 7 days.  Would recommend Augmentin or clindamycin.  Podiatry will be made aware of patient and will see patient while he is admitted or if patient is discharged in the near future a follow-up appointment will need to be established.     Remainder of care per primary team.  Primary team should make sure that patient is up-to-date on his tetanus shot.  If not patient will require a booster shot.    Hx of prolonged QTc:  Continue weekly EKGs. See above for details.   Discussed most recent EKG findings with Cardiology on 8/25. Corrected QTc is 501 from EKG on 8/23. Per cardiology, repeat EKG today. If corrected QTc is > 500, will need to reduce clozapine. If < 500, OK to keep  "clozapine at current dose. UPDATE/ADDENDUM 9/6: Repeat EKG with corrected QTc of 440. No need to adjust clozapine dose per cards.     Urinary retention, resolved  Per patient care order:   If patient is refusing straight caths, please notify IM provider immediately to determine whether this constitutes a medical emergency. If IM declares medical emergency, may restrain patient for straight cath. Discussed this with patient's parents/substitute decision makers on 6/7 who are in support of this plan.    # Psoriasis hx  # Purplish discoloration of B/LE feet-resolved   Discussed with Dr. Rene and bedside RN regarding rash on right foot that appears and appears to be purplish in color and almost \"petechiae.\" It was noted during interview, but seemed to be resolving upon walking. Within the past 24 hrs, pt has had ECT and seemed more confused than usual. Pt seems to have had a right great toe wound with recent right great toe fracture seen by Medicine on 7/16. Seen on 8/4 with improving color on B/L legs at rest and appears to have small, scaly patches of varying coin sizes that have not grown past marked borders. See photos. Per further chart review, has had psoriasis history. WBC WNL, no fevers, HDS. This appears to more consistent with a psoriasis like picture.   - Start triamcinolone topical 0.025%   -Apply twice daily until lesions for 2 weeks or until lesions resolve/  -Monitor for skin thinning, striae, rebound lesion flares.   - Start Eucerin cream              - Apply 30 minutes PRIOR to triamcinolone topical cream above or PRN as needed if dry skin/after bathing   - Medicine will sign off.   - For worsening pain, spread, itchiness, fevers, please contact Medicine and possibly Dermatology.    Benzodiazepine dependence:  Phenobarbital taper completed shortly after patient's admission    Pertinent labs/imaging:  Weekly CBC with diff     Behavioral/Psychological/Social:  - Encourage unit " programming    Safety:  - Continue precautions as noted above  - Status 15 minute checks  - Continue 1:1 for staff and pt safety    Legal Status: Fully committed with [facility] and [facility]on. [place] medications: clozapine, olanzapine, risperidone, quetiapine    Disposition Plan   Reason for ongoing admission: No safe alternative at this time  Discharge location: TBD. Will likely need memory care unit  Discharge Medications: not ordered  Follow-up Appointments: not scheduled    Entered by: Ingrid Rhodes MD on 09/25/2023  at 2:43 PM

## 2023-09-25 NOTE — PROGRESS NOTES
09/25/23 1500   Appointment Info   Signing Clinician's Name / Credentials (PT) Elver Grady DPT   Living Environment   Living Environment Comments Unable to gather subjective information due to cog deficits, per SW note, planning d/c to memory care unit   Self-Care   Activity/Exercise/Self-Care Comment Pt noting not previously using FWW, but unable to gather further subjective due to cog deficits. Plan for d/c to memory care per SW note   General Information   Onset of Illness/Injury or Date of Surgery 09/22/23   Referring Physician Bryant Barrios, PATaniaC   Patient/Family Therapy Goals Statement (PT) unable to articulate goals due to cog deficits   Pertinent History of Current Problem (include personal factors and/or comorbidities that impact the POC) Per EMR: In assessment, I was called to see this patient by RN secondary to concerns regarding O2 sat 89% and RR 40. COVID negative. Patient seen at bedside and reporting he feels fine, no different from usual. Vitals rechecked at bedside with RR 28 and O2 92% on room air. No labored breathing, patient resting comfortably in bed. Has a chronic wet sounding cough. He has difficulty clearing his own secretions sometimes secondary to parkinson's. No chest pain or pressure, afebrile, no body aches. Reviewed last 3 CBCs and BMP from 9/15/23.   Existing Precautions/Restrictions fall   Weight-Bearing Status - LUE full weight-bearing   Weight-Bearing Status - RUE full weight-bearing   Weight-Bearing Status - LLE full weight-bearing   Weight-Bearing Status - RLE full weight-bearing   Cognition   Affect/Mental Status (Cognition) confused;flat/blunted affect   Orientation Status (Cognition) unable/difficult to assess   Follows Commands (Cognition) 25-49% accuracy;follows one-step commands;repetition of directions required   Integumentary/Edema   Integumentary/Edema no deficits were identifed   Posture    Posture Forward head position;Kyphosis   Range of Motion (ROM)   Range  of Motion ROM is WFL   Strength (Manual Muscle Testing)   Strength (Manual Muscle Testing) strength is WFL   Bed Mobility   Comment, (Bed Mobility) IND supine <> sit   Transfers   Comment, (Transfers) SBA for STS transfers   Gait/Stairs (Locomotion)   Comment, (Gait/Stairs) SBA for ambulation without AD, demos quick gait speed with appropriate step length. Downward gaze and forward head throughout   Balance   Balance Comments mild instability with gait likely as pt continues to recover from respiratory infection, no need for AD at this time   Sensory Examination   Sensory Perception Comments unable to assess   Clinical Impression   Criteria for Skilled Therapeutic Intervention Yes, treatment indicated   PT Diagnosis (PT) impaired mobility force production deficits   Influenced by the following impairments generalized weakness, deconditioning, respoiratory infection, cognitive impairments   Functional limitations due to impairments transfers, gait, safety and falls risk   Clinical Presentation (PT Evaluation Complexity) Stable/Uncomplicated   Clinical Presentation Rationale improved status with improvement in respiratory status, clinical reasoning   Clinical Decision Making (Complexity) low complexity   Planned Therapy Interventions (PT) balance training;bed mobility training;gait training;home exercise program;neuromuscular re-education;patient/family education;stair training;strengthening;stretching;transfer training   Risk & Benefits of therapy have been explained evaluation/treatment results reviewed;care plan/treatment goals reviewed;risks/benefits reviewed;current/potential barriers reviewed;participants voiced agreement with care plan;participants included;patient   Clinical Impression Comments Pt presents to PT with generalized weakness likely related to recent onset of respiratory infection. Per RN and manuel, mobility has improved today back near baseline. Skilled PT services necessary to evaluate pt's  mobility then continue to progress safety with mobility   PT Total Evaluation Time   PT Eval, Low Complexity Minutes (12274) 7   Physical Therapy Goals   PT Frequency One time eval and treatment only   PT Predicted Duration/Target Date for Goal Attainment 09/25/23   PT Goals Transfers;Gait   PT: Transfers Supervision/stand-by assist;Sit to/from stand;Bed to/from chair;Goal Met   PT: Gait Supervision/stand-by assist;150 feet;Goal Met   Interventions   Interventions Quick Adds Therapeutic Activity   Therapeutic Activity   Therapeutic Activities: dynamic activities to improve functional performance Minutes (36099) 8   Treatment Detail/Skilled Intervention post eval, continued functional mobility training to improve level of IND back towards baseline. Performs supine > sit with IND, toilet transfer to bathroom with SBA and no AD. Pt ambulates ~400' with no AD and SBA, demos quick gait speed with appropriate step length. Downward gaze and forward head throughout. No overt LOB despite mild instability. Edu provided on improving mobility by continuing to amb with nursing staff as able. Pt ends with sitter present and all needs met within reach.   PT Discharge Planning   PT Plan d/c PT   PT Discharge Recommendation (DC Rec) Long term care facility  (memory care)   PT Rationale for DC Rec Pt's mobility back near baseline, but overall safety limited by cog deficits that requires 24/7 assist. Plan for pt to d/c to memory care unit per  note.   PT Brief overview of current status SBA for gait with no AD   Total Session Time   Timed Code Treatment Minutes 8   Total Session Time (sum of timed and untimed services) 15

## 2023-09-25 NOTE — PLAN OF CARE
Assessment/Intervention/Current Symtoms and Care Coordination:  Reviewed chart. Met with team to review patient's current functioning, needs, progress, and impediments to discharge.    [facility]erral [facility] to [facility].     Discharge Plan or Goal:  Seeking placement in a memory care unit, preferably in the [facility]     Barriers to Discharge:  Referrals are being sent to memory care units who have openings. One barrier is the limited openings in memory care units at this time.     Referral Status:  [facility] in [place] 8/22, no memory care 8/30  [facility] in [place] 8/15, no memory care 8/31  [facility] 8/31, no openings 8/31  [facility] 8/29, declined 9/8  [facility] 8/29, no open beds 8/30  [facility] 8/30  [facility] 8/31, no open beds 8/31  [facility] 9/15, waiting for decision   [facility] 8/31  [place] in [facility] 8/31, no openings and private pay 8/31  [facility], 8/31, declined 9/1 due to aggressive behaviors  [place], 9/13, declined 9/13  [facility] 9/15, waiting for decision   [facility] 9/25, waiting for decision      Legal Status:  Commitment with [facility]: [place]  File Number: [id number]  Issued 07/06/23 and is not to exceed six months    Contacts:  : Vicky Valdez with [facility], [phone]  Psychiatrist: Dr. Santana at [facility], [phone] PCP: Attila Urena  Mom: Alberta, [phone] (H) [phone] (M)   Dad: Ino, [phone] (H)  Sister, Sandra Treadwell, [phone] (KING)   [facility]'s Office Fax: [phone]  CADI  with [facility]: Regina Wong, [phone], [email or web address].    Upcoming Meetings and Dates/Important Information and next steps:

## 2023-09-25 NOTE — PROGRESS NOTES
Physical Therapy Discharge Summary    Reason for therapy discharge:    All goals and outcomes met, no further needs identified.    Progress towards therapy goal(s). See goals on Care Plan in Epic electronic health record for goal details.  Goals met for SBA mobility without FWW    Therapy recommendation(s):    Continue home exercise program. Pt back near baseline level of requiring SBA for mobility without assistive device. Pt likely unable to use assistive device due to cognitive deficits. Recommend pt continue to ambulate hallways with nursing staff to continue to improve functional mobility.

## 2023-09-25 NOTE — PLAN OF CARE
Duration: Met with patient a for a total of 15 minutes.    Time start: 10:30  Time end 1045    Modality Used:Person Centered, Rapport Building, and Brief Therapy    Goals: Introduction to IT, Rapport building, goal setting    Pt progress: Met with pt in his room. He was laying in bed and open to talk. I approached his bed and asked him how he was feeling. Pt speech was mumbling so parts of it were not comprehensible. Pt mentioned he was feeling apprehensive and explained that he is worried about the lower realms. Pt got a phone call so the session ended abruptly.     Treatment Objective(s) Addressed:   The focus of this session was on rapport building and identifying treatment goals     Progress Towards Goals:   Patient reports stable symptoms. Unsure of pt's progress towards treatment goals, first meeting.     Therapeutic Intervention(s):   Provided active listening, unconditional positive regard, and validation. Engaged in guided discovery, explored patient's perspectives and helped expand them through socratic dialogue.    Plan/next step: Continue to meet with therapist and engage in group activity.

## 2023-09-25 NOTE — PROVIDER NOTIFICATION
"   09/24/23 1630   Vital Signs   Temp 98.6  F (37  C)   Temp src Temporal   Resp 18   Pulse 65   Pulse Rate Source Monitor   /77   BP Location Left arm   Patient Position Sitting   Cuff Size Adult Regular   Oxygen Therapy   SpO2 99 %   O2 Device None (Room air)   Pain/Comfort   Patient Currently in Pain denies     Vitals shown above was taken at 16:30 PM.     Patient has been Isolative/withdrawn to his room, and denies having any thoughts of wanting to harm himself or others, anxiety, depression and any pain. He ate 100% of his dinner with verbal encouragements, and writer assisted patient with eating. He continues to have tremors. He was medication complaint.      Vitals were taken again 19:30 PM     Blood pressure 107/63, pulse 92, temperature 98.4  F (36.9  C), temperature source Temporal, resp. rate 18, height 1.753 m (5' 9\"), weight 83.7 kg (184 lb 8 oz), SpO2 95 %.      "

## 2023-09-25 NOTE — PROGRESS NOTES
NOC Shift Report     Pt continues on SIO 1:1 order due to risk of assault and severe intrusiveness.     Pt was awake in bed at beginning of shift, breathing quiet and unlabored.    Pt appeared to sleep  hours overnight with no concerns observed during safety checks.     During VS assessments, the pt was calm and cooperative. Denied labored breathing, pain, and anxiety. No s/s of current SOB or respiratory distress.  Will continue to monitor closely.       Latest Reference Range & Units 09/25/23 00:15 09/25/23 04:15   BP mmHg 118/71 126/78   Temp  F ( C) 98.5 (36.9) 98.1 (36.7)   Temp src  Temporal Temporal   Pulse  92 79   Resp  18 18   SpO2 % 95 95   (DVPRS) Pain Rating Score   0-No pain 0-No pain       Pt consumed 300 ml of water overnight. He voided: urine x2. Loose stool x2.    Pt is experiencing frequent intestinal gas and flatulence. Pt expressed feeling embarrassed. He would like a prn medication to address this issue, such as simethicone.     According to [facility] staff, familiar with the pt, the frequency of the pt's coughing has decreased    No other pt concerns or complaints expressed overnight.     No PRNs administered.     Will continue to monitor and assess pt safety, mental status, and any medical concerns.     Pt has PT appointment scheduled for today at 0845.

## 2023-09-26 PROCEDURE — 250N000013 HC RX MED GY IP 250 OP 250 PS 637: Performed by: STUDENT IN AN ORGANIZED HEALTH CARE EDUCATION/TRAINING PROGRAM

## 2023-09-26 PROCEDURE — 124N000002 HC R&B MH UMMC

## 2023-09-26 PROCEDURE — 250N000013 HC RX MED GY IP 250 OP 250 PS 637: Performed by: PSYCHIATRY & NEUROLOGY

## 2023-09-26 PROCEDURE — 93010 ELECTROCARDIOGRAM REPORT: CPT | Performed by: INTERNAL MEDICINE

## 2023-09-26 PROCEDURE — 99232 SBSQ HOSP IP/OBS MODERATE 35: CPT | Performed by: PHYSICIAN ASSISTANT

## 2023-09-26 PROCEDURE — 250N000013 HC RX MED GY IP 250 OP 250 PS 637: Performed by: PHYSICIAN ASSISTANT

## 2023-09-26 PROCEDURE — 93005 ELECTROCARDIOGRAM TRACING: CPT

## 2023-09-26 PROCEDURE — 250N000013 HC RX MED GY IP 250 OP 250 PS 637

## 2023-09-26 RX ADMIN — SENNOSIDES 2 TABLET: 8.6 TABLET ORAL at 20:20

## 2023-09-26 RX ADMIN — BENZTROPINE MESYLATE 1 MG: 1 TABLET ORAL at 20:20

## 2023-09-26 RX ADMIN — AMOXICILLIN AND CLAVULANATE POTASSIUM 1 TABLET: 875; 125 TABLET, FILM COATED ORAL at 09:13

## 2023-09-26 RX ADMIN — OLANZAPINE 15 MG: 10 TABLET, ORALLY DISINTEGRATING ORAL at 09:14

## 2023-09-26 RX ADMIN — FLUOXETINE 20 MG: 20 CAPSULE ORAL at 09:14

## 2023-09-26 RX ADMIN — Medication 10 MG: at 20:20

## 2023-09-26 RX ADMIN — DIVALPROEX SODIUM 250 MG: 250 TABLET, DELAYED RELEASE ORAL at 05:55

## 2023-09-26 RX ADMIN — POLYETHYLENE GLYCOL 3350 17 G: 17 POWDER, FOR SOLUTION ORAL at 09:13

## 2023-09-26 RX ADMIN — WHITE PETROLATUM: 1.75 OINTMENT TOPICAL at 09:15

## 2023-09-26 RX ADMIN — GABAPENTIN 300 MG: 300 CAPSULE ORAL at 13:15

## 2023-09-26 RX ADMIN — MICONAZOLE NITRATE: 20 POWDER TOPICAL at 09:15

## 2023-09-26 RX ADMIN — ALBUTEROL SULFATE 2 PUFF: 90 AEROSOL, METERED RESPIRATORY (INHALATION) at 13:26

## 2023-09-26 RX ADMIN — ALBUTEROL SULFATE 2 PUFF: 90 AEROSOL, METERED RESPIRATORY (INHALATION) at 05:52

## 2023-09-26 RX ADMIN — LORAZEPAM 0.5 MG: 0.5 TABLET ORAL at 20:20

## 2023-09-26 RX ADMIN — GABAPENTIN 300 MG: 300 CAPSULE ORAL at 20:20

## 2023-09-26 RX ADMIN — DOXYCYCLINE HYCLATE 100 MG: 50 CAPSULE ORAL at 20:20

## 2023-09-26 RX ADMIN — ATROPINE SULFATE 2 DROP: 10 SOLUTION/ DROPS OPHTHALMIC at 09:15

## 2023-09-26 RX ADMIN — DOXYCYCLINE HYCLATE 100 MG: 50 CAPSULE ORAL at 09:13

## 2023-09-26 RX ADMIN — LORAZEPAM 0.5 MG: 0.5 TABLET ORAL at 13:15

## 2023-09-26 RX ADMIN — ATROPINE SULFATE 2 DROP: 10 SOLUTION/ DROPS OPHTHALMIC at 20:22

## 2023-09-26 RX ADMIN — SENNOSIDES 2 TABLET: 8.6 TABLET ORAL at 09:13

## 2023-09-26 RX ADMIN — ATROPINE SULFATE 2 DROP: 10 SOLUTION/ DROPS OPHTHALMIC at 13:15

## 2023-09-26 RX ADMIN — BENZTROPINE MESYLATE 1 MG: 1 TABLET ORAL at 09:14

## 2023-09-26 RX ADMIN — TAMSULOSIN HYDROCHLORIDE 0.4 MG: 0.4 CAPSULE ORAL at 09:14

## 2023-09-26 RX ADMIN — GABAPENTIN 300 MG: 300 CAPSULE ORAL at 09:14

## 2023-09-26 RX ADMIN — CLOZAPINE 650 MG: 100 TABLET, ORALLY DISINTEGRATING ORAL at 13:15

## 2023-09-26 RX ADMIN — DIVALPROEX SODIUM 250 MG: 250 TABLET, DELAYED RELEASE ORAL at 20:20

## 2023-09-26 RX ADMIN — NAPROXEN 250 MG: 250 TABLET ORAL at 09:14

## 2023-09-26 RX ADMIN — WHITE PETROLATUM: 1.75 OINTMENT TOPICAL at 18:11

## 2023-09-26 RX ADMIN — ALBUTEROL SULFATE 2 PUFF: 90 AEROSOL, METERED RESPIRATORY (INHALATION) at 20:21

## 2023-09-26 RX ADMIN — MICONAZOLE NITRATE: 20 POWDER TOPICAL at 20:21

## 2023-09-26 RX ADMIN — LORAZEPAM 0.5 MG: 0.5 TABLET ORAL at 09:13

## 2023-09-26 RX ADMIN — DIVALPROEX SODIUM 250 MG: 250 TABLET, DELAYED RELEASE ORAL at 13:15

## 2023-09-26 RX ADMIN — POLYETHYLENE GLYCOL 3350 17 G: 17 POWDER, FOR SOLUTION ORAL at 20:20

## 2023-09-26 RX ADMIN — NAPROXEN 250 MG: 250 TABLET ORAL at 17:56

## 2023-09-26 RX ADMIN — CYANOCOBALAMIN TAB 1000 MCG 1000 MCG: 1000 TAB at 09:14

## 2023-09-26 RX ADMIN — AMOXICILLIN AND CLAVULANATE POTASSIUM 1 TABLET: 875; 125 TABLET, FILM COATED ORAL at 20:20

## 2023-09-26 RX ADMIN — OLANZAPINE 15 MG: 10 TABLET, ORALLY DISINTEGRATING ORAL at 20:20

## 2023-09-26 ASSESSMENT — ACTIVITIES OF DAILY LIVING (ADL)
LAUNDRY: UNABLE TO COMPLETE
ADLS_ACUITY_SCORE: 64
ADLS_ACUITY_SCORE: 87
LAUNDRY: UNABLE TO COMPLETE
ADLS_ACUITY_SCORE: 64
HYGIENE/GROOMING: INDEPENDENT;PROMPTS
ADLS_ACUITY_SCORE: 64
ORAL_HYGIENE: PROMPTS;INDEPENDENT
ADLS_ACUITY_SCORE: 87
ADLS_ACUITY_SCORE: 64
HYGIENE/GROOMING: PROMPTS;INDEPENDENT
ADLS_ACUITY_SCORE: 87
ORAL_HYGIENE: PROMPTS
ADLS_ACUITY_SCORE: 87
ADLS_ACUITY_SCORE: 64
ADLS_ACUITY_SCORE: 87
DRESS: PROMPTS;INDEPENDENT
DRESS: SCRUBS (BEHAVIORAL HEALTH);INDEPENDENT
ADLS_ACUITY_SCORE: 77
ADLS_ACUITY_SCORE: 64

## 2023-09-26 NOTE — PROGRESS NOTES
[facility]    Medicine Progress Note - Internal Medicine    Date of Admission:  5/26/2023    Assessment & Plan   Mr. Vinod Lee is a 62 y.o. male with a history of schizophrenia, depression, Parkinsonism, essential tremor, hypertension, SOPHIA, GERD, and BPH. He was admitted to inpatient psychiatry on 5/26/2023 for psychosis. Concern for pneumonia as of 9/24/23, medicine following up for this.     Updates today:   Assessed patient at bedside, patient appears clinically stable.  He appears to be breathing comfortably, with continued wet cough. Lungs sound improved and overall clear on auscultation.   Respiratory panel positive for rhinovirus / enterovirus  No changes to the plan as below.    Rhinovirus / enterovirus URI  Concern for pneumonia  On 9/23, patient was reported to be hypoxic with an SpO2 of 89% with RR of 28, as well as had some low-grade fevers.  Associated symptoms of wet sounding cough.  COVID was negative.  Chest x-ray with possible infection versus atelectasis in RLL.  Patient was initially started on doxycycline on 9/24.  Due to concern for clinical worsening, patient was reassessed by provider on 9/25.  At that time due to patient increase drooling while on Clozaril and him laying supine for much of the day, concerns were raised for aspiration pneumonia, thus he was started on Augmentin as well.   - sputum culture if able, pending  - cont Doxycycline 100 mg twice daily for 5 days (9/24-9/28)  - cont augmentin 875-125 mg BID x 5 days (9/25-9/29)  - Albuterol inhaler as needed cough, wheezing, shortness of breath  - APAP every 4 hours as needed fevers, pain  - I-S q shift, as tolerated    Hx cavitary lesion to SOPHY  Pulmonary nodule to SOPHY  Seen August 2022 in ED. CT chest with contrast negative for PE but showed cavitary masslike process in the left upper lung, over an area measuring 9.5 x 6.2 x 5.2 cm, indeterminant pulmonary nodule measuring 9 mm in  the anterior segment of the left upper lobe, nonspecific left hilar and mediastinal lymphadenopathy, small-moderate left pleural effusion.  Cavitary lesion also seen on chest x-ray.  Patient was given IVF, Rocephin, Zithromax, and admitted for further evaluation. Thoracentesis was done and negative cultures. ID was consulted and managed Abx. The patient was initially on zosyn and doxycycline but narrowed. Strep pneumo, legionella, and HIV testing done (negative). Blood cultures were negative as well. The patient did well.  Follow-up chest CT showed near complete interval resolution of the previously described left upper lobe abscess.  Residual 12 x 10 mm spiculated nodular opacity with adjacent scarring within this region, probably represents sequela of previous infection, although underlying malignancy not excluded.  - Recommend follow-up outpatient with PCP for continued monitoring.    Sleep apnea      Medicine service will continue to follow for monitoring of vitals and clinical findings as pertaining to pneumonia.    The patient's care was discussed with the Attending Physician, Dr. Rhodes, Bedside Nurse, and Patient.    Ernestine Jimenez PA-C  Hospitalist Service  [facility]  Securely message with [employer] (more info)  Text page via [facility] Paging/Directory   ______________________________________________________________________    Interval History   Patient was seen at the bedside.  He reports continued cough, and that he has had some difficulty breathing, however noted that his report may be unreliable.  He denies any new symptoms or complaints.    Physical Exam   Vital Signs: Temp: 97.8  F (36.6  C) Temp src: Temporal BP: 124/76 Pulse: 90   Resp: 18 SpO2: 96 % O2 Device: None (Room air)    Weight: 184 lbs 8 oz    GENERAL: older adult male seen resting reclined and then sitting in bed. NAD.   NEURO / PSYCH: Alert, able to answer questions, but has delusions /  inappropriate fixations. No focal deficits. Moves all extremities.   HEENT: Anicteric sclera. PERRL. Mucous membranes moist.   CV: RRR. S1, S2. No murmurs appreciated.    RESPIRATORY: Effort normal. Lungs CTAB with some faint crackles to lower lobes.   SKIN: No jaundice. No rashes or lesions to exposed areas.     Medical Decision Making       45 MINUTES SPENT BY ME on the date of service doing chart review, history, exam, documentation & further activities per the note.      Data

## 2023-09-26 NOTE — PLAN OF CARE
"Improvement noted on pt health since Augmentin is added to his medication . Vital this shift /76 (BP Location: Right arm, Patient Position: Sitting, Cuff Size: Adult Regular)   Pulse 90   Temp 97.8  F (36.6  C) (Temporal)   Resp 18   Ht 1.753 m (5' 9\")   Wt 83.7 kg (184 lb 8 oz)   SpO2 96%   BMI 27.25 kg/m   .    Pt continue to have a productive cough and  utilized albuterol inhaler to help with short  of breath and wheezing.  Vinod was fed by staff and ate about 75% of breakfast and 100 % of lunch.   Voided X2. No bowel movent this shift. Pt denied abdominal pain  Calm and cooperative with perineum treatment with improvement noted on the sight  Pt is compliant with medication and no harm or aggressive behavior this shift..        Problem: Infection  Goal: Absence of Infection Signs and Symptoms  Outcome: Progressing   Goal Outcome Evaluation:    Plan of Care Reviewed With: patient                   "

## 2023-09-26 NOTE — PLAN OF CARE
Problem: Psychotic Symptoms  Goal: Psychotic Symptoms  Description: Signs and symptoms of listed problems will be absent or manageable.  Outcome: Progressing   Goal Outcome Evaluation:     Vinod had a peaceful and relaxing evening. He spent most of this evening resting in bed with no agitation or aggression. Upon nursing assessments, his vital signs were found to be normal - temp 97.8, pulse 91, resp 18, blood pressure 126/60, and oxygen saturation 94% on room air. He was observed coughing but denied having a sore throat. He was on antibiotics for pneumonia (Augmentin and Vibramycin). Vinod was advised to use a spirometer to help with his breathing, but he found it difficult to use correctly. He became frustrated and asked the writer to leave him alone. Vinod struggled with tremors while trying to feed himself and became agitated. He had an MD order for staff to assist him with feeding and ate about 95% of his dinner with staff assistance. Vinod took his scheduled medication without any issues. He was assisted with his pericare, and treatments were applied as ordered.

## 2023-09-26 NOTE — PROGRESS NOTES
NOC Shift Report     Pt in bed at beginning of shift, breathing fine and snoring. Pt appeared to be coughing several times throughout the night. Early morning medication was given around 5:50am and pt appears to be coughing uncontrollably encouraged pt sleep on the side and coughing decreased. Pt slept through shift. Pt slept 6.75 hours.      No pt complaints. But pt is positive for enterovirus 9/24 (in labs)  please wear proper PPE when caring for Vinod.      No PRNs given. Will continue to monitor.

## 2023-09-26 NOTE — PLAN OF CARE
Assessment/Intervention/Current Symtoms and Care Coordination:  Reviewed chart. Met with team to review patient's current functioning, needs, progress, and impediments to discharge.    [facility] to get fax number to send referral to. Per website current [facility] openings.     Received email from [facility] saying they have immediate openings.     Sent referral to [facility] via fax at 2:50 PM.     Discharge Plan or Goal:  Seeking placement in a memory care unit, preferably in the [facility]     Barriers to Discharge:  Referrals are being sent to memory care units who have openings. One barrier is the limited openings in memory care units at this time.     Referral Status:  [facility] in [place] 8/22, no memory care 8/30  [facility] in [place] 8/15, no memory care 8/31  [facility] 8/31, no openings 8/31  [facility] 8/29, declined 9/8  [facility] 8/29, no open beds 8/30  [facility] 8/30  [facility] 8/31, no open beds 8/31  [facility] 9/15, waiting for decision   [facility] 8/31  [place] in [facility] 8/31, no openings and private pay 8/31  [facility], 8/31, declined 9/1 due to aggressive behaviors  [facility] [place] [facility], 9/13, declined 9/13  [facility] 9/15, waiting for decision   [place] 9/25, waiting for decision      Legal Status:  Commitment with [facility]: [place]  File Number: [id number]  Issued 07/06/23 and is not to exceed six months    Contacts:  : Vicky Valdez with [facility], [phone]  Psychiatrist: Dr. Santana at [facility], [phone] PCP: Attila Urena  Mom: Alberta, [phone] (H) [phone] (M)   Dad: Ino, [phone] (H)  Sister, Sandra Treadwell, [phone] (KING)   [facility]'s Office Fax: [phone]  CADI  with [facility]: Regina Barnhartrehr, [phone],  [email or web address]    Upcoming Meetings and Dates/Important Information and next steps:

## 2023-09-27 LAB
ATRIAL RATE - MUSE: 84 BPM
DIASTOLIC BLOOD PRESSURE - MUSE: NORMAL MMHG
INTERPRETATION ECG - MUSE: NORMAL
P AXIS - MUSE: 49 DEGREES
PR INTERVAL - MUSE: 150 MS
QRS DURATION - MUSE: 164 MS
QT - MUSE: 432 MS
QTC - MUSE: 510 MS
R AXIS - MUSE: -13 DEGREES
SYSTOLIC BLOOD PRESSURE - MUSE: NORMAL MMHG
T AXIS - MUSE: 114 DEGREES
VENTRICULAR RATE- MUSE: 84 BPM

## 2023-09-27 PROCEDURE — 124N000002 HC R&B MH UMMC

## 2023-09-27 PROCEDURE — 99233 SBSQ HOSP IP/OBS HIGH 50: CPT | Performed by: PSYCHIATRY & NEUROLOGY

## 2023-09-27 PROCEDURE — 250N000013 HC RX MED GY IP 250 OP 250 PS 637

## 2023-09-27 PROCEDURE — 250N000013 HC RX MED GY IP 250 OP 250 PS 637: Performed by: PSYCHIATRY & NEUROLOGY

## 2023-09-27 PROCEDURE — 250N000013 HC RX MED GY IP 250 OP 250 PS 637: Performed by: PHYSICIAN ASSISTANT

## 2023-09-27 PROCEDURE — 250N000013 HC RX MED GY IP 250 OP 250 PS 637: Performed by: STUDENT IN AN ORGANIZED HEALTH CARE EDUCATION/TRAINING PROGRAM

## 2023-09-27 RX ADMIN — ATROPINE SULFATE 2 DROP: 10 SOLUTION/ DROPS OPHTHALMIC at 09:02

## 2023-09-27 RX ADMIN — NAPROXEN 250 MG: 250 TABLET ORAL at 17:41

## 2023-09-27 RX ADMIN — LORAZEPAM 0.5 MG: 0.5 TABLET ORAL at 13:10

## 2023-09-27 RX ADMIN — POLYETHYLENE GLYCOL 3350 17 G: 17 POWDER, FOR SOLUTION ORAL at 09:00

## 2023-09-27 RX ADMIN — AMOXICILLIN AND CLAVULANATE POTASSIUM 1 TABLET: 875; 125 TABLET, FILM COATED ORAL at 20:57

## 2023-09-27 RX ADMIN — TAMSULOSIN HYDROCHLORIDE 0.4 MG: 0.4 CAPSULE ORAL at 09:02

## 2023-09-27 RX ADMIN — AMOXICILLIN AND CLAVULANATE POTASSIUM 1 TABLET: 875; 125 TABLET, FILM COATED ORAL at 09:02

## 2023-09-27 RX ADMIN — POLYETHYLENE GLYCOL 3350 17 G: 17 POWDER, FOR SOLUTION ORAL at 20:57

## 2023-09-27 RX ADMIN — GABAPENTIN 300 MG: 300 CAPSULE ORAL at 13:11

## 2023-09-27 RX ADMIN — OLANZAPINE 15 MG: 10 TABLET, ORALLY DISINTEGRATING ORAL at 20:58

## 2023-09-27 RX ADMIN — DOXYCYCLINE HYCLATE 100 MG: 50 CAPSULE ORAL at 20:57

## 2023-09-27 RX ADMIN — BENZTROPINE MESYLATE 1 MG: 1 TABLET ORAL at 20:57

## 2023-09-27 RX ADMIN — LORAZEPAM 0.5 MG: 0.5 TABLET ORAL at 20:58

## 2023-09-27 RX ADMIN — DIVALPROEX SODIUM 250 MG: 250 TABLET, DELAYED RELEASE ORAL at 13:11

## 2023-09-27 RX ADMIN — GABAPENTIN 300 MG: 300 CAPSULE ORAL at 09:01

## 2023-09-27 RX ADMIN — GABAPENTIN 300 MG: 300 CAPSULE ORAL at 20:57

## 2023-09-27 RX ADMIN — MICONAZOLE NITRATE: 20 POWDER TOPICAL at 20:58

## 2023-09-27 RX ADMIN — BISACODYL 10 MG: 10 SUPPOSITORY RECTAL at 22:28

## 2023-09-27 RX ADMIN — NAPROXEN 250 MG: 250 TABLET ORAL at 09:01

## 2023-09-27 RX ADMIN — ATROPINE SULFATE 2 DROP: 10 SOLUTION/ DROPS OPHTHALMIC at 20:59

## 2023-09-27 RX ADMIN — BENZTROPINE MESYLATE 1 MG: 1 TABLET ORAL at 09:02

## 2023-09-27 RX ADMIN — OLANZAPINE 15 MG: 10 TABLET, ORALLY DISINTEGRATING ORAL at 09:02

## 2023-09-27 RX ADMIN — MICONAZOLE NITRATE: 20 POWDER TOPICAL at 09:02

## 2023-09-27 RX ADMIN — CYANOCOBALAMIN TAB 1000 MCG 1000 MCG: 1000 TAB at 09:01

## 2023-09-27 RX ADMIN — WHITE PETROLATUM: 1.75 OINTMENT TOPICAL at 09:00

## 2023-09-27 RX ADMIN — SENNOSIDES 2 TABLET: 8.6 TABLET ORAL at 09:01

## 2023-09-27 RX ADMIN — DIVALPROEX SODIUM 250 MG: 250 TABLET, DELAYED RELEASE ORAL at 20:58

## 2023-09-27 RX ADMIN — DIVALPROEX SODIUM 250 MG: 250 TABLET, DELAYED RELEASE ORAL at 05:43

## 2023-09-27 RX ADMIN — SENNOSIDES 2 TABLET: 8.6 TABLET ORAL at 20:57

## 2023-09-27 RX ADMIN — DOXYCYCLINE HYCLATE 100 MG: 50 CAPSULE ORAL at 09:01

## 2023-09-27 RX ADMIN — ATROPINE SULFATE 2 DROP: 10 SOLUTION/ DROPS OPHTHALMIC at 13:11

## 2023-09-27 RX ADMIN — ALBUTEROL SULFATE 2 PUFF: 90 AEROSOL, METERED RESPIRATORY (INHALATION) at 05:43

## 2023-09-27 RX ADMIN — WHITE PETROLATUM: 1.75 OINTMENT TOPICAL at 17:41

## 2023-09-27 RX ADMIN — CLOZAPINE 650 MG: 100 TABLET, ORALLY DISINTEGRATING ORAL at 13:11

## 2023-09-27 RX ADMIN — Medication 10 MG: at 20:57

## 2023-09-27 RX ADMIN — ALBUTEROL SULFATE 2 PUFF: 90 AEROSOL, METERED RESPIRATORY (INHALATION) at 13:11

## 2023-09-27 RX ADMIN — FLUOXETINE 20 MG: 20 CAPSULE ORAL at 09:01

## 2023-09-27 RX ADMIN — LORAZEPAM 0.5 MG: 0.5 TABLET ORAL at 09:01

## 2023-09-27 RX ADMIN — ALBUTEROL SULFATE 2 PUFF: 90 AEROSOL, METERED RESPIRATORY (INHALATION) at 21:15

## 2023-09-27 ASSESSMENT — ACTIVITIES OF DAILY LIVING (ADL)
ADLS_ACUITY_SCORE: 77
ORAL_HYGIENE: PROMPTS;INDEPENDENT
ADLS_ACUITY_SCORE: 77
LAUNDRY: UNABLE TO COMPLETE
ADLS_ACUITY_SCORE: 77
ADLS_ACUITY_SCORE: 77
LAUNDRY: UNABLE TO COMPLETE
ADLS_ACUITY_SCORE: 77
ORAL_HYGIENE: PROMPTS;INDEPENDENT
HYGIENE/GROOMING: PROMPTS;INDEPENDENT
DRESS: SCRUBS (BEHAVIORAL HEALTH)
ADLS_ACUITY_SCORE: 77
HYGIENE/GROOMING: PROMPTS;INDEPENDENT
ADLS_ACUITY_SCORE: 77
ADLS_ACUITY_SCORE: 77
DRESS: SCRUBS (BEHAVIORAL HEALTH)
ADLS_ACUITY_SCORE: 77
ADLS_ACUITY_SCORE: 77

## 2023-09-27 NOTE — PLAN OF CARE
Assessment/Intervention/Current Symtoms and Care Coordination:  Reviewed chart. Met with team to review patient's current functioning, needs, progress, and impediments to discharge.    [facility] and [facility] declined today due to patient's needs that cannot be met at their memory care facilities.     CADI  inquired further into patient needs for placement. She is sending information on a group home that can offer 1:1 care for us to review.     Discharge Plan or Goal:  Seeking placement in a memory care unit, preferably in the [facility]     Barriers to Discharge:  Referrals are being sent to memory care units who have openings. One barrier is the limited openings in memory care units at this time.     Referral Status:  [facility] in [place] 8/22, no memory care 8/30  [facility] in [place] 8/15, no memory care 8/31  [facility] 8/31, no openings 8/31  [facility] 8/29, declined 9/8  [facility] 8/29, no open beds 8/30  [facility] 8/30  [facility] 8/31, no open beds 8/31  [facility] 9/15, waiting for decision   [facility] 8/31  [place] [facility] 8/31, no openings and private pay 8/31  [facility], 8/31, declined 9/1   [place], 9/13, declined 9/13  [facility] 9/15, waiting for decision   [place] 9/25, declined 9/27  [place] 9/26, declined 9/27     Legal Status:  Commitment with [facility]: [place]  File Number: [id number]  Issued 07/06/23 and is not to exceed six months    Contacts:  : Vicky Valdez with [facility], [phone]  Psychiatrist: Dr. Santana at [facility], [phone] PCP: Attila Urena  Mom: [place], [phone] (H) [phone] (M)   Dad: Ino, [phone] (H)  Sister, Sandra Treadwell, [phone] (KING)   [facility]'s Office Fax: [phone]  CADI  with [facility]: Regina Wong,  [phone]    Upcoming Meetings and Dates/Important Information and next steps:

## 2023-09-27 NOTE — PLAN OF CARE
"  Problem: Infection  Goal: Absence of Infection Signs and Symptoms  Outcome: Progressing   Goal Outcome Evaluation:    Plan of Care Reviewed With: patient      Patient remains on 1:1 SIO to monitor for agitation and violence. Patient demonstrating safe behaviors this evening.      Patient remains isolative and withdrawn to room majority of the evening, only in the milieu for a short time walking with staff prompting. Patient upon approach flat in affect, calm with verbal assessment. Patient reporting he feels as though he is going to die here today. Patient demonstrating paranoia making reference to the food and water being poison. Patient prompted for increased physical activity and presence in the milieu though states \"all the people in the [place] are after me\". Patient denied SI/SIB, AH/VH, depression, or thoughts of harming others.     Patient cooperative with vital sign assessments which were stable. Patient educated and prompted for sprirometer use which he was cooperative with with 1250 ml's observed this evening. Patient diet good, eating 75% of dinner and snacks. Patient with prompting accepting of HS medications in pudding. Patient still reporting tremor in hands, RN observed akathisia in bilateral hands early in the evening that appeared to improved later in the night. Patient prompted for completion of ADL's prior to wound care treatment but continuously refused due to fears of the shower water being poison. Patient was receptive to pereniam assessment and medication administration. Still some redness where the leg contacts testicles but appears to be improving with patient denying any discomfort or pain. Patient prompted to toilet though continuously refusing to go with no BM's charted in past 24 hours since 9/25. Patient allowed for abdominal assessment, some distension observed with audible bowel sounds in all quadrants.           "

## 2023-09-27 NOTE — PLAN OF CARE
Pt asleep at start of shift.     Breathing quiet and unlabored when sleeping.     Pt had no c/o pain or discomfort during the HS.     Appears to have slept 6.5 hours.     Pt continues on 1:1 SIO/5 ft space distance.     Medication compliant with scheduled 0600 medication administration.     Goal Outcome Evaluation:  Problem: Adult Behavioral Health Plan of Care  Goal: Adheres to Safety Considerations for Self and Others  Intervention: Develop and Maintain Individualized Safety Plan  Recent Flowsheet Documentation  Taken 9/27/2023 0000 by Tameka Hatch RN  Safety Measures: (SIB & FALL precautions)   1:1 observation maintained   safety plan reviewed   safety rounds completed  Goal: Absence of New-Onset Illness or Injury  Intervention: Identify and Manage Fall Risk  Recent Flowsheet Documentation  Taken 9/27/2023 0000 by Tameka Hatch RN  Safety Measures: (SIB & FALL precautions)   1:1 observation maintained   safety plan reviewed   safety rounds completed     Problem: Sleep Disturbance  Goal: Adequate Sleep/Rest  Outcome: Progressing

## 2023-09-27 NOTE — PROGRESS NOTES
"[facility], [place]   Psychiatric Progress Note  Hospital Day: 124        Interim History:   The patient's care was discussed with the treatment team during the daily team meeting and/or staff's chart notes were reviewed. Pt continues to have a productive cough and has been utilizing inhaler prn to help with SOB. Eating and drinking good amounts. Sleeping well. IM following. Patient found to have mass in his lung (most likely sequelae from previous infection but underlying malignancy not excluded). Still voicing concerns about being poisoned. Patient denied SI/SIB, AH/VH, depression, or thoughts of harming others. Has not had BM reported or observed since 9/25. Abdomen appears distended.     Vinod was found in his room laying in bed with SIO present and engages in conversation. He said that he is \"not too bad actually.\" Shared that he enjoys listening to the Beatles, and that his favorite song is \"let it be.\" He said that he sometimes suspects that he is being poisoned. He again mentioned that he has committed crimes in the past (contrary to records).     Suicidal ideation: None    Homicidal ideation: None    Psychotic symptoms: AH suspected with report of telepathy. no VH reported during interview. Delusions present and other psychotic symptoms suspected.    Medication side effects reported:  jaw and chin tremors. Not sure when he had a BM. Denied abdominal pain or discomfort.     Acute medical concerns: None. Stated that he is no longer experiencing cough or SOB     Other issues reported by patient: Patient had no further questions or concerns.           Medications:      albuterol  2 puff Inhalation Q8H    amoxicillin-clavulanate  1 tablet Oral Q12H KIKI (08/20)    atropine  2 drop Sublingual TID    benztropine  1 mg Oral BID    cloZAPine  650 mg Oral Daily    cyanocobalamin  1,000 mcg Oral Daily    divalproex sodium delayed-release  250 mg Oral Q8H KIKI    doxycycline hyclate  100 mg " "Oral Q12H [facility] (08/20)    FLUoxetine  20 mg Oral Daily    gabapentin  300 mg Oral TID    LORazepam  0.5 mg Oral TID    melatonin  10 mg Oral At Bedtime    miconazole   Topical BID    mineral oil-hydrophilic petrolatum   Topical BID IS    naproxen  250 mg Oral BID w/meals    OLANZapine zydis  15 mg Oral BID    Or    OLANZapine  10 mg Intramuscular BID    polyethylene glycol  17 g Oral BID    sennosides  2 tablet Oral BID    tamsulosin  0.4 mg Oral Daily          Allergies:     Allergies   Allergen Reactions    Bee Venom Unknown    Montelukast Unknown    Phenothiazines Unknown          Labs:     No results found for this or any previous visit (from the past 24 hour(s)).                 Psychiatric Examination:     /79 (BP Location: Right arm, Patient Position: Sitting, Cuff Size: Adult Regular)   Pulse 95   Temp 97.6  F (36.4  C) (Temporal)   Resp 18   Ht 1.753 m (5' 9\")   Wt 83.4 kg (183 lb 14.4 oz)   SpO2 98%   BMI 27.16 kg/m    Weight is 183 lbs 14.4 oz  Body mass index is 27.16 kg/m .    Weight over time:  Vitals:    07/03/23 1100 07/30/23 0902 08/13/23 0900 08/26/23 0835   Weight: 81.6 kg (179 lb 12.8 oz) 86.5 kg (190 lb 9.6 oz) 85.7 kg (188 lb 14.4 oz) 82.8 kg (182 lb 7 oz)    09/07/23 0900 09/26/23 1729   Weight: 83.7 kg (184 lb 8 oz) 83.4 kg (183 lb 14.4 oz)       Orthostatic Vitals         Most Recent      Sitting Orthostatic /61 07/25 0800    Sitting Orthostatic Pulse (bpm) 85 07/25 0800          Cardiometabolic risk assessment. 06/07/23    Reviewed patient profile for cardiometabolic risk factors    Date taken /Value  REFERENCE RANGE   Abdominal Obesity  (Waist Circumference)   See nursing flowsheet Women ?35 in (88 cm)   Men ?40 in (102 cm)      Triglycerides  No results found for: TRIG    ?150 mg/dL (1.7 mmol/L) or current treatment for elevated triglycerides   HDL cholesterol  No results found for: HDL]   Women <50 mg/dL (1.3 mmol/L) in women or current treatment for low HDL " "cholesterol  Men <40 mg/dL (1 mmol/L) in men or current treatment for low HDL cholesterol     Fasting plasma glucose (FPG) Lab Results   Component Value Date     05/26/2023      FPG ?100 mg/dL (5.6 mmol/L) or treatment for elevated blood glucose   Blood pressure  BP Readings from Last 3 Encounters:   09/26/23 127/79   05/26/23 97/55    Blood pressure ?130/85 mmHg or treatment for elevated blood pressure   Family History  See family history     Mental Status Exam:  Appearance: awake, alert, unkempt, lying in bed. No evidence of pain of acute distress.   Attitude:  more cooperative  Eye Contact:  fair, less intense, better duration  Mood:  \"not too bad actually\"  Affect:  mood congruent, content  Speech: mostly coherent  Psychomotor Behavior:  Ongoing bilateral hand tremor and jaw tremor. No evidence of dystonia, or tics.  Throught Process: linear, illogical, concrete  Associations:  no loosening of associations present  Thought Content:  Delusions and AH suspected. Evidence of obsessive thinking and likely compulsions to harm others.   Insight:  limited  Judgement:  poor  Oriented to:  self, place  Attention Span and Concentration:  fair  Recent and Remote Memory:  poor  Gait: patient lying in bed         Precautions:     Behavioral Orders   Procedures    Assault precautions    Code 1 - Restrict to Unit    Code 2    Code 2 - 1:1 Staff Supervision     For ECT only    Electroconvulsive therapy     Series of up to 12 treatments. Begin Date: 8/2/23     Treating Psychiatrist providing ECT:  unknown     Notified on:  7/28/23 via this order  NOTE: We received verbal confirmation from [facility] that Lorenzo Pavon has been approved but awaiting official court order and risk management's review  Initial consult was completed by Dr. Hicks on 7/18/23    Electroconvulsive therapy     Series of up to 12 treatments. Begin Date: 8/2/23     Treating Psychiatrist providing ECT:  Dominga     Notified on:  8/2/23    Elopement " precautions    Fall precautions    Occupational Therapy on the Unit     Order Specific Question:   Reason for Consult     Answer:   Eval of thought process, functional skills and behavior     Order Specific Question:   Course of Action:     Answer:   Evaluation only     Order Specific Question:   Treatment Prescription:     Answer:   CPT requested    Routine Programming     As clinically indicated    Self Injury Precaution    Status 15     Every 15 minutes.    Status Individual Observation     Patient SIO status reviewed with team/RN.  Please also refer to RN/team documentation for add'l detail.    -SIO staff to monitor following which have contributed to patient being on SIO:  Agitation  Pt is impulsive   Pt has ran out of his room naked  Pt has Parkinson symptoms place him in a high fall risk  Pt has verbal outburst of sexual and threaten statements  Pt requires immediate redirection when masturbating    -Possible interventions SIO staff could use to support patient's treatment progress:  Engage pt in activities    -When following observed, team will review discontinuation of SIO:  Absence of aggression toward others for > 24 hours    Comments: SIO 1:1     Order Specific Question:   CONTINUOUS 24 hours / day     Answer:   5 feet     Order Specific Question:   Indications for SIO     Answer:   Severe intrusiveness     Order Specific Question:   Indications for SIO     Answer:   Assault risk    Suicide precautions     Patients on Suicide Precautions should have a Combination Diet ordered that includes a Diet selection(s) AND a Behavioral Tray selection for Safe Tray - with utensils, or Safe Tray - NO utensils            Diagnoses:     #Unspecified psychosis likely schizophrenia per history,  #Unspecified encephalopathy   -R/O catatonia   -Episodes of unresponsiveness, concern for PNES   #Parkinsonism 2/2 neuroleptic medications, rule out Parkinson's Disease  -rule out major neurocognitive disorder (refused to answer  much of assessment)  #Urinary retention and BPH  -Possible UTI -- UC resulted on 5/25 w/out growth  #Hx of prolonged QTc with clozapine  #Mild thrombocytopenia  #R/O OCD         Assessment and hospital summary:  Patient was admitted to psychiatric unit for safety, stabilization and medication management. He has had schizophrenia since the 1980. He was on Clozaril x 25 years, and it was tapered and discontinued on May 7, 2023  due to prolonged qtc of 527, and his psychotic  symptoms have worsened since then. Sinemet was also discontinued recently due to concerns that it was contributing to paranoia and AH. He is agitated, aggressive, dangerous to self and others. He remains on SIO 2 to 1, and is under psychiatric emergency and court hold. There are concerns for organic etiology given pattern of sundowning, history of parkinsonism, and ongoing disorientation/confusion. 6/8: EKG repeated, cardiology consult regarding safety of resuming clozapine in the context of prolonged QTc and refractory schizophrenia pending response. Per cardiology, correct QTc is no more than 460. They do not have concerns about AP retrial. Neurology IP consult placed for evaluation of sundowning and cognitive decline secondary to Sinemet discontinuation. MSSA initiated. Per Neurology, discontinuation of Sinemet would not account for these symptoms. They do not recommend retrial at this time. On 6/14, discussed complex case at length with Dr. Hatch (primary provider on geriatic unit) who provided recs (see second opinion note dated 6/14). Depakote initiated, though needed to be reduced due to side effect of thrombocytopenia. Melatonin was increased to 10 mg at bedtime. 6/22: Clozapine titration continued. Its possible that clozapine dose is contributing to worsened compulsive behaviors noted throughout hospitalization which could improve with lowering the dose. ECT initiated due to treatment refractory psychosis and ongoing symptoms despite  therapeutic dose of clozapine (650 mg daily). ECT initiated on 23 and pt completed 11 total treatments, but ECT stopped on  due to lack of improvement and distress it was causing patient.     Chart reviewed which revealed the followin2022: He was on clozapine, Seroquel, Cymbalta, and Carbidopa-levodopa and hospitalized for pneumonia. Psych consulted and Seroquel was stopped. Treated with Abx and discharged to TCU.     2022: Hospitalized at [facility]. Per chart review:    Vinod Lee is a 63 yo male with longstanding history of schizophrenia. He was admitted from [facility] ED, where he presented due to increased delusional thoughts while on a visit to his parents for Thanksgiving. He began experiencing increasing paranoid thoughts that someone might be poisoning him and began fearing that he might accidentally harm someone. He reported to his parents that he was having thoughts about hitting someone or beating someone up and told them he could not guarantee they would be safe with him. Parents encouraged patient to go to the ED, which he did voluntarily.     Per patient report and chart review, he was hospitalized several times in the  and reports he was civilly committed at one point in the , however he has been quite stable for the past several decades. He reports he will experience some paranoia and delusional thinking at times, but generally has good insight into his symptoms. He has been maintained on clozapine for about 25 years and prior to several months ago was also concurrently prescribed quetiapine.      In the last several months, patient has had a number of medication changes. Approximately 6 months ago, patient was diagnosed with parkinsonism related to antipsychotic use and was started on carbidopa-levidopa. He experienced no improvement in tremors, and thus carbidopa- levidopa dose was increased 1.5 weeks ago.      In addition, patient was medically hospitalized in  "August 2022 for confusion, weakness, repeated falls, ongoing weight loss, and SOB. He was found to have a cavitary lesion of the lung and suspected aspiration pneumonia. He was treated with antibiotics. At that time, he was taking current dose of clozapine and duloxetine, as well as propranolol ER, quetiapine, gabapentin, and clonazepam. Psychiatry was consulted due to concern for oversedation, and propranolol ER, gabapentin, and quetiapine were discontinued. Conazepam was reduced from 0.5 mg TID to BID dosing and recommended to be discontinued, however, it does not appear this occurred. His sedation improved significantly. QTc prolongation was also noted, but improved throughout his stay. He was ultimately discharged to a TCU for several months before returning home. He reports his weakness has greatly improved and states he \"graduated\" physical therapy, though he continues to be diligent in completed recommended exercises.      He reports that over the past several months, his delusions and anxiety have been worse than usual, with symptoms becoming much more acute since the carbidopa-levidopa dose was increased. He has felt increasingly paranoid about being poisoned, noting this is a delusion that has been stable over time, but was previously less intrusive. He has insight during the interview that his paranoid thinking is not reality based, but states it has been harder to separate delusions from reality and he becomes very worried that he might hurt someone, despite no history of violent behavior. He reports that the paranoia about his food being poisoned in combination with the tremors in his hands have made it difficult for him to eat. He has also had quite low appetite for the past 6 months to a year . He reports that due to the combination of these factors, he has lost about 50 pounds in the last year.He denies any problems with sleep initiation or maintenance. He reports energy is fairly good and \"better " "than it used to be.\" He reports some short term memory issues and on interview, does have some difficulty remembering details of recent events. He is unsure if he has received cognitive testing in the past.    He denies any suicidal ideation and reports he has not experienced SI for decades. He denies homicidal thoughts and is very clear that he had and has no desire to harm anyone else, but was afraid he might somehow do so. He denies any hallucinations and states \"that's never been a problem for me.\" He is not observed responding to internal stimuli. He is alert and oriented in all spheres.        ========    BRIEF HOSPITAL COURSE: This 62 y.o. male admitted 11/25/2022 to  [place] for the assessment and treatment of the above presentation. This was a voluntary admission.     In summary, he was tapered off the Sinemet, which he reports to be both ineffective and potentially worsening the anxiety and paranoia ideations. Instead Benadryl 25 mg TID was started to help with extrapyramidal side effects. He struggled with sleep during his stay here. Remeron 7.5mg QHS was tried without success. Seroquel 100mg qhs was restarted with addition of suvorexant 10mg qhs. Given his Seroquel PRN use prior history of prolonged QTC, he will benefit from another EKG repeat on the medical unit.     Unfortunately, he tested positive for influenza A and developed hypoxemia. Now on 2L oxygen with desats when prone requiring 3L oxygen. Subsequently, the plan will to be tapering him off clonazepam due to hypoxia (and also prior plan to taper). The patient tolerated these changes without side effects.     The patient minimally benefited from the structured therapeutic milieu as he attended programming seldom as he tested positive for influenza, he needed to isolate with droplet precautions. Pt was engaged and worked on issues that were triggering/brought pt to the hospital and has excellent insight into his anxiety and paranoid delusions. Pt " denied suicidal ideations throughout all/majority of their hospitalization. Pt denied HI throughout all of hospitalization. There were no concerns with behavioral disruptions/outbursts. The patient has shown improvement in general.     At the time of discharge, hospitalization course was reviewed with Vinod NINO Rosa with plan to transfer to medicine. Please consult psychiatry and I will continue to follow while patient is on the medical unit. He is now off sinemet since 11/29 21:00 and I would expect anxiety and paranoia to improve more moving forward. Psychiatrically, once anxiety and paranoia is baseline, can D/C to home once medically stable. He DOES NOT NEED A 1:1 on the medical unit from a mental health perspective, we initiated 1:1 11/30/2022 due to significant fatigue, gait instability (he was unable to stand without assist) and feeding assist.    04/2023: Clozapine was gradually tapered and discontinued, unclear reasons why it was discontinued per outside records, though Dr. Portillo's H&P indicates that it was due to prolonged QTc.       Today's Changes:  Renewed SIO  Monitor oral intake closely  Monitor BMs closely  Reviewed IM recs    Per Neurology Recs:    Recommendations  -No current indications to change medications from a neurologic perspective  - Optimizing his psychiatric medication and treatment is currently more important than optimization of parkinsonism  - Please contact neurology if any new questions arise, or to discuss the assessment described above     Thank you for involving Neurology in the care of Vinod Lee.  Please do not hesitate to call with questions.      Kranthi Perdue MD    Target psychiatric symptoms and interventions:  -Psych emergency declared on 5/27, now fully committed  - Depakote 250 mg TID, restarted on 8/26. Cannot increase beyond this dose due to thrombocytopenia at higher doses  -Continue clozapine as above  -Continue scheduled Zyprexa 15mg BID  -Continue 1:1 for  safety of staff and patients, reduced from 2:1 7/23/23  - weekly CBC to monitor platelets    -Continue Gabapentin 300 mg TID as staff believe it has been effective for treatment of his anxiety    Risks, benefits, and alternatives discussed at length with patient.     Acute Medical Problems and Treatments:  Rhinovirus / enterovirus URI  Concern for pneumonia  On 9/23, patient was reported to be hypoxic with an SpO2 of 89% with RR of 28, as well as had some low-grade fevers.  Associated symptoms of wet sounding cough.  COVID was negative.  Chest x-ray with possible infection versus atelectasis in RLL.  Patient was initially started on doxycycline on 9/24.  Due to concern for clinical worsening, patient was reassessed by provider on 9/25.  At that time due to patient increase drooling while on Clozaril and him laying supine for much of the day, concerns were raised for aspiration pneumonia, thus he was started on Augmentin as well.   - sputum culture if able, pending  - cont Doxycycline 100 mg twice daily for 5 days (9/24-9/28)  - cont augmentin 875-125 mg BID x 5 days (9/25-9/29)  - Albuterol inhaler as needed cough, wheezing, shortness of breath  - APAP every 4 hours as needed fevers, pain  - I-S q shift, as tolerated     Hx cavitary lesion to SOPHY  Pulmonary nodule to SOPHY  Seen August 2022 in ED. CT chest with contrast negative for PE but showed cavitary masslike process in the left upper lung, over an area measuring 9.5 x 6.2 x 5.2 cm, indeterminant pulmonary nodule measuring 9 mm in the anterior segment of the left upper lobe, nonspecific left hilar and mediastinal lymphadenopathy, small-moderate left pleural effusion.  Cavitary lesion also seen on chest x-ray.  Patient was given IVF, Rocephin, Zithromax, and admitted for further evaluation. Thoracentesis was done and negative cultures. ID was consulted and managed Abx. The patient was initially on zosyn and doxycycline but narrowed. Strep pneumo, legionella, and  HIV testing done (negative). Blood cultures were negative as well. The patient did well.  Follow-up chest CT showed near complete interval resolution of the previously described left upper lobe abscess.  Residual 12 x 10 mm spiculated nodular opacity with adjacent scarring within this region, probably represents sequela of previous infection, although underlying malignancy not excluded.  - Recommend follow-up outpatient with PCP for continued monitoring.    Possible Dementia  Neurology consult placed on 6/8 for evaluation of sundowning and cognitive decline secondary to Sinemet discontinuation: Resuming Sinemet not recommended for behavioral concerns. Please see Neuro note for details.     Tremors  longstanding though appeared to worsen following initiation of clozapine  - Ativan 0.5 mg TID  - Cogentin 1 mg BID added on 8/25 with overall improvement noted    Neuro re-consulted on 9/20 and per their documentation:    Impression  Mr. Lee is a 63 y.o. man with a long history of psychosis including requiring commitment and psychiatric hospitalizations in the 1980s.  Given his long history, this is less consistent with an organic cause of psychosis (as something that would be primarily neurologic in origin would be expected to have a more rapid progression of neurologic deterioration, rather than this long period of psychiatric symptoms).  I am not able to appreciate any evidence for a neurologic basis of his psychosis given the time course of his psychiatric illness.      Regarding his difficulty with word finding, I am not able to elicit this on exam.  However since he described being symptomatic earlier today, it is helpful that I am able to go through a full assessment of his language in terms of comprehension, repetition, naming objects with increasing difficulty without being able to appreciate any deficits.     He does continue to have a tremor that is most prominent posteriorly as well as with action, in  addition to a chin tremor.  He also does have rigidity in the bilateral upper extremities consistent with the diagnosis of parkinsonism.  He is currently not on levodopa or dopamine agonists which I continue to agree with.  Since his psychiatric symptoms are much more bothersome right now than his parkinsonism, I will continue to recommend avoiding dopaminergic medications.     Recommendations  -No current indications to change medications from a neurologic perspective  - Optimizing his psychiatric medication and treatment is currently more important than optimization of parkinsonism  - Please contact neurology if any new questions arise, or to discuss the assessment described above     Thank you for involving Neurology in the care of Vinod Lee.  Please do not hesitate to call with questions.      Kranthi Perdue MD      Thrombocytopenia, resolved  Likely secondary to Depakote.  According to the, incidents of Depakote induced thrombocytopenia as 27%.  Depakote dose reduced from 1000 twice daily to 250 3 times daily on 7/28/2023 with subsequent resolution of thrombocytopenia  - weekly CBCs    Constipation  Likely worsened by clozapine, improved since modifying regimen.   - daily miralax   - daily Senokot 2 tablets BID      Right first toe fracture:  Seen by Ortho on 6/16:  Per note: Vinod Lee is a 62 year old  male w/ PMHx complex psychiatric history who has a fracture to the distal phalanx of the right toe after a recent trauma to the foot the patient reports.  Patient denies any other pain or discomfort.  Images reviewed and plan will be for irrigation of the popped fracture blister with sterile saline and the toes should be dressed and a 4 x 4 gauze dressing.  Patient will need a postop shoe which she can be weightbearing as tolerated in.  Would recommend a course of antibiotics for approximately 7 days.  Would recommend Augmentin or clindamycin.  Podiatry will be made aware of patient and will see  "patient while he is admitted or if patient is discharged in the near future a follow-up appointment will need to be established.     Remainder of care per primary team.  Primary team should make sure that patient is up-to-date on his tetanus shot.  If not patient will require a booster shot.    Hx of prolonged QTc:  Continue weekly EKGs. See above for details.   Discussed most recent EKG findings with Cardiology on 8/25. Corrected QTc is 501 from EKG on 8/23. Per cardiology, repeat EKG today. If corrected QTc is > 500, will need to reduce clozapine. If < 500, OK to keep clozapine at current dose. UPDATE/ADDENDUM 9/6: Repeat EKG with corrected QTc of 440. No need to adjust clozapine dose per cards.     Urinary retention, resolved  Per patient care order:   If patient is refusing straight caths, please notify IM provider immediately to determine whether this constitutes a medical emergency. If IM declares medical emergency, may restrain patient for straight cath. Discussed this with patient's parents/substitute decision makers on 6/7 who are in support of this plan.    # Psoriasis hx  # Purplish discoloration of B/LE feet-resolved   Discussed with Dr. Rene and bedside RN regarding rash on right foot that appears and appears to be purplish in color and almost \"petechiae.\" It was noted during interview, but seemed to be resolving upon walking. Within the past 24 hrs, pt has had ECT and seemed more confused than usual. Pt seems to have had a right great toe wound with recent right great toe fracture seen by Medicine on 7/16. Seen on 8/4 with improving color on B/L legs at rest and appears to have small, scaly patches of varying coin sizes that have not grown past marked borders. See photos. Per further chart review, has had psoriasis history. WBC WNL, no fevers, HDS. This appears to more consistent with a psoriasis like picture.   - Start triamcinolone topical 0.025%   -Apply twice daily until lesions for 2 weeks or " until lesions resolve/  -Monitor for skin thinning, striae, rebound lesion flares.   - Start Eucerin cream              - Apply 30 minutes PRIOR to triamcinolone topical cream above or PRN as needed if dry skin/after bathing   - Medicine will sign off.   - For worsening pain, spread, itchiness, fevers, please contact Medicine and possibly Dermatology.    Benzodiazepine dependence:  Phenobarbital taper completed shortly after patient's admission    Pertinent labs/imaging:  Weekly CBC with diff     Behavioral/Psychological/Social:  - Encourage unit programming    Safety:  - Continue precautions as noted above  - Status 15 minute checks  - Continue 1:1 for staff and pt safety    Legal Status: Fully committed with [facility]. [place] medications: clozapine, olanzapine, risperidone, quetiapine    Disposition Plan   Reason for ongoing admission: No safe alternative at this time  Discharge location: TBD. Will likely need memory care unit  Discharge Medications: not ordered  Follow-up Appointments: not scheduled    Entered by: Ingrid Rhodes MD on 09/27/2023  at 3:38 PM

## 2023-09-27 NOTE — PLAN OF CARE
Individual Therapy note:    Therapist introduced self to patient and discussed psychotherapy service available to patient.     Pt response: Pt not interested currently in meeting 1:1; therapist will continue remaining available for pt. After offering to talk pt told me he felt like he had to hurt me. Writer advised pt that if that's how he's feeling they would leave. Pt agreed and said thank you. As writer left room overheard pt saying he had not intent to harm writer.    Plan: Pt was encouraged to attend groups and therapist will remain available for 1:1 sessions    Duration: Met with patient a for a total of 5 minutes.    Time started: 1035   Time ended: 1040

## 2023-09-27 NOTE — PLAN OF CARE
Pt is still on ABX and  continue to stabilized clinically. Used Incentive spirometer with reading of 1500.  denies discomfort  and pain.  Pt had one incident where he verbally told a  therapy  the he felt like he had to hurt her.   left the room and Vinod later said to his SIO staff that  he had no intent to harming the . Pt was calm after the incident  without any other aggression.    Ate 75% of breakfast and lunch with  and voided x3.  Pt tolerated Perineum treatment without issue  denies pain, SI, delusions, and hallucination  and  compliant with medication.  No safety concern verbalized or noted this shift.    Problem: Adult Behavioral Health Plan of Care  Goal: Plan of Care Review  Recent Flowsheet Documentation  Taken 9/27/2023 1200 by Basia Woodson RN  Patient Agreement with Plan of Care: agrees  Goal: Develops/Participates in Therapeutic [place] to Support Successful Transition  Intervention: Foster Therapeutic [place]  Recent Flowsheet Documentation  Taken 9/27/2023 1200 by Basia Woodson RN  Trust Relationship/Rapport:   care explained   choices provided   thoughts/feelings acknowledged   questions answered     Problem: Psychotic Symptoms  Goal: Psychotic Symptoms  Description: Signs and symptoms of listed problems will be absent or manageable.  Outcome: Progressing   Goal Outcome Evaluation:    Plan of Care Reviewed With: patient

## 2023-09-28 ENCOUNTER — APPOINTMENT (OUTPATIENT)
Dept: CT IMAGING | Facility: CLINIC | Age: 63
DRG: 885 | End: 2023-09-28
Attending: PSYCHIATRY & NEUROLOGY
Payer: COMMERCIAL

## 2023-09-28 PROCEDURE — 250N000013 HC RX MED GY IP 250 OP 250 PS 637: Performed by: PSYCHIATRY & NEUROLOGY

## 2023-09-28 PROCEDURE — 70450 CT HEAD/BRAIN W/O DYE: CPT

## 2023-09-28 PROCEDURE — 70450 CT HEAD/BRAIN W/O DYE: CPT | Mod: 26 | Performed by: RADIOLOGY

## 2023-09-28 PROCEDURE — 250N000013 HC RX MED GY IP 250 OP 250 PS 637: Performed by: STUDENT IN AN ORGANIZED HEALTH CARE EDUCATION/TRAINING PROGRAM

## 2023-09-28 PROCEDURE — 99233 SBSQ HOSP IP/OBS HIGH 50: CPT | Performed by: PSYCHIATRY & NEUROLOGY

## 2023-09-28 PROCEDURE — 250N000013 HC RX MED GY IP 250 OP 250 PS 637: Performed by: PHYSICIAN ASSISTANT

## 2023-09-28 PROCEDURE — 99233 SBSQ HOSP IP/OBS HIGH 50: CPT | Performed by: PHYSICIAN ASSISTANT

## 2023-09-28 PROCEDURE — 250N000013 HC RX MED GY IP 250 OP 250 PS 637

## 2023-09-28 PROCEDURE — 124N000002 HC R&B MH UMMC

## 2023-09-28 RX ORDER — LORAZEPAM 0.5 MG/1
1 TABLET ORAL ONCE
Status: COMPLETED | OUTPATIENT
Start: 2023-09-28 | End: 2023-09-28

## 2023-09-28 RX ADMIN — LORAZEPAM 1 MG: 0.5 TABLET ORAL at 10:35

## 2023-09-28 RX ADMIN — GABAPENTIN 300 MG: 300 CAPSULE ORAL at 08:34

## 2023-09-28 RX ADMIN — WHITE PETROLATUM: 1.75 OINTMENT TOPICAL at 08:39

## 2023-09-28 RX ADMIN — DIVALPROEX SODIUM 250 MG: 250 TABLET, DELAYED RELEASE ORAL at 06:10

## 2023-09-28 RX ADMIN — DOXYCYCLINE HYCLATE 100 MG: 50 CAPSULE ORAL at 20:32

## 2023-09-28 RX ADMIN — BENZTROPINE MESYLATE 1 MG: 1 TABLET ORAL at 08:35

## 2023-09-28 RX ADMIN — SENNOSIDES 2 TABLET: 8.6 TABLET ORAL at 08:35

## 2023-09-28 RX ADMIN — CLOZAPINE 650 MG: 100 TABLET, ORALLY DISINTEGRATING ORAL at 13:03

## 2023-09-28 RX ADMIN — LORAZEPAM 0.5 MG: 0.5 TABLET ORAL at 08:35

## 2023-09-28 RX ADMIN — WHITE PETROLATUM: 1.75 OINTMENT TOPICAL at 20:33

## 2023-09-28 RX ADMIN — BENZTROPINE MESYLATE 1 MG: 1 TABLET ORAL at 20:31

## 2023-09-28 RX ADMIN — POLYETHYLENE GLYCOL 3350 17 G: 17 POWDER, FOR SOLUTION ORAL at 20:34

## 2023-09-28 RX ADMIN — GABAPENTIN 300 MG: 300 CAPSULE ORAL at 20:32

## 2023-09-28 RX ADMIN — POLYETHYLENE GLYCOL 3350 17 G: 17 POWDER, FOR SOLUTION ORAL at 08:35

## 2023-09-28 RX ADMIN — OLANZAPINE 15 MG: 10 TABLET, ORALLY DISINTEGRATING ORAL at 20:31

## 2023-09-28 RX ADMIN — SENNOSIDES 2 TABLET: 8.6 TABLET ORAL at 20:31

## 2023-09-28 RX ADMIN — FLUOXETINE 20 MG: 20 CAPSULE ORAL at 08:35

## 2023-09-28 RX ADMIN — MICONAZOLE NITRATE: 20 POWDER TOPICAL at 20:34

## 2023-09-28 RX ADMIN — OLANZAPINE 15 MG: 10 TABLET, ORALLY DISINTEGRATING ORAL at 08:35

## 2023-09-28 RX ADMIN — ALBUTEROL SULFATE 2 PUFF: 90 AEROSOL, METERED RESPIRATORY (INHALATION) at 20:36

## 2023-09-28 RX ADMIN — TAMSULOSIN HYDROCHLORIDE 0.4 MG: 0.4 CAPSULE ORAL at 08:35

## 2023-09-28 RX ADMIN — Medication 10 MG: at 20:31

## 2023-09-28 RX ADMIN — MICONAZOLE NITRATE: 20 POWDER TOPICAL at 08:40

## 2023-09-28 RX ADMIN — CYANOCOBALAMIN TAB 1000 MCG 1000 MCG: 1000 TAB at 08:35

## 2023-09-28 RX ADMIN — NAPROXEN 250 MG: 250 TABLET ORAL at 20:31

## 2023-09-28 RX ADMIN — ALBUTEROL SULFATE 2 PUFF: 90 AEROSOL, METERED RESPIRATORY (INHALATION) at 06:18

## 2023-09-28 RX ADMIN — DOXYCYCLINE HYCLATE 100 MG: 50 CAPSULE ORAL at 08:35

## 2023-09-28 RX ADMIN — ATROPINE SULFATE 2 DROP: 10 SOLUTION/ DROPS OPHTHALMIC at 20:32

## 2023-09-28 RX ADMIN — LORAZEPAM 0.5 MG: 0.5 TABLET ORAL at 20:32

## 2023-09-28 RX ADMIN — AMOXICILLIN AND CLAVULANATE POTASSIUM 1 TABLET: 875; 125 TABLET, FILM COATED ORAL at 08:34

## 2023-09-28 RX ADMIN — GABAPENTIN 300 MG: 300 CAPSULE ORAL at 13:02

## 2023-09-28 RX ADMIN — NAPROXEN 250 MG: 250 TABLET ORAL at 08:35

## 2023-09-28 RX ADMIN — AMOXICILLIN AND CLAVULANATE POTASSIUM 1 TABLET: 875; 125 TABLET, FILM COATED ORAL at 20:31

## 2023-09-28 RX ADMIN — DIVALPROEX SODIUM 250 MG: 250 TABLET, DELAYED RELEASE ORAL at 13:03

## 2023-09-28 RX ADMIN — ALBUTEROL SULFATE 2 PUFF: 90 AEROSOL, METERED RESPIRATORY (INHALATION) at 13:02

## 2023-09-28 RX ADMIN — DIVALPROEX SODIUM 250 MG: 250 TABLET, DELAYED RELEASE ORAL at 20:32

## 2023-09-28 ASSESSMENT — ACTIVITIES OF DAILY LIVING (ADL)
ADLS_ACUITY_SCORE: 77
HYGIENE/GROOMING: PROMPTS;INDEPENDENT
ADLS_ACUITY_SCORE: 77
DRESS: SCRUBS (BEHAVIORAL HEALTH)
ADLS_ACUITY_SCORE: 77
ADLS_ACUITY_SCORE: 77
HYGIENE/GROOMING: PROMPTS;INDEPENDENT
DRESS: SCRUBS (BEHAVIORAL HEALTH)
ADLS_ACUITY_SCORE: 77
ORAL_HYGIENE: PROMPTS;INDEPENDENT
ADLS_ACUITY_SCORE: 77
ADLS_ACUITY_SCORE: 77
ORAL_HYGIENE: PROMPTS
ADLS_ACUITY_SCORE: 77

## 2023-09-28 NOTE — PLAN OF CARE
Assessment/Intervention/Current Symtoms and Care Coordination:  Reviewed chart. Met with team to review patient's current functioning, needs, progress, and impediments to discharge.    Emailed [facility] asking about 24/7 1:1 staffing for residents.     Emailed [facility] inquiring into openings and CADI.     Vinod signed KING for [facility], they have requested to have their RN come and assess him. Waiting to hear back from them to schedule.     Nursing director from [facility] coming to interview Vinod tomorrow at 10am. Vinod was informed of this and given a paper reminder.     Discharge Plan or Goal:  Seeking placement in a memory care unit, preferably in the [facility]     Barriers to Discharge:  Referrals are being sent to memory care units who have openings. One barrier is the limited openings in memory care units at this time.     Referral Status:  [facility] in [place] 8/22, no memory care 8/30  [facility] in [place] 8/15, no memory care 8/31  [facility] calling 8/31, no openings 8/31  [facility] 8/29, declined 9/8  [facility] 8/29, no open beds 8/30  [facility] 8/30  [facility] 8/31, no open beds 8/31  [facility] 9/15, waiting for decision   [facility] 8/31  [place] [facility] 8/31, no openings and private pay 8/31  [facility], 8/31, declined 9/1   [facility], 9/13, declined 9/13  [facility] 9/15, waiting for decision   [facility] 9/25, declined 9/27  [facility] 9/26, declined 9/27     Legal Status:  Commitment with [facility]: [place]  File Number: [id number]  Issued 07/06/23 and is not to exceed six months    Contacts:  : Vicky Valdez with [facility], [phone]  Psychiatrist: Dr. Santana at [facility], [phone] PCP: Attila Urena  Mom: Alberta, [phone] (H) [phone] (M)   Dad: Ino, [phone] (H)  Sister, Sandra Treadwell,  [phone] ([facility])   [facility]'s Office Fax: [phone]  CADI  with [facility]: Regina Wong, [phone], [email or web address]    Upcoming Meetings and Dates/Important Information and next steps:

## 2023-09-28 NOTE — PLAN OF CARE
Problem: Psychotic Symptoms  Goal: Psychotic Symptoms  Description: Signs and symptoms of listed problems will be absent or manageable.  Outcome: Progressing   Goal Outcome Evaluation:    Patient had a calm and pleasant evening with no signs of agitation or aggression. He was briefly visible in the milieu, he spent most of the shift resting and socializing with SIO staff while in bed.     During nursing assessment, he reported no pain or discomfort but lacked insight into his mental health. Vinod is on ABX for pneumonia and remains compliant with his medication. He reports no side effects, and none were observed. His vital signs were all within a normal range, with a temperature of 98.3, a pulse of 98, respiratory 18, blood pressure 111/64, and oxygen saturation of 98% on room air. His lungs were clear, and his bowel sounds were present in all four quadrants. Although patient did not have a bowel movement during this shift, he denied being constipated, even though his stomach appeared extended. Patient voided x 3. Pt ate about 65% of his dinner with staff assistance. Pt had no BM for 3 days, PRN Ducolax suppository 10 mg administered at around 10:30 pm.     Patient allowed this writer to assist with his alejandro-care and treatment, with cream and powder applied as ordered. Pt used the spirometer twice this shift with a reading of 1500. Due to his previous agitation and aggression, patient remains on SIO.      Pt mother and sister visited this shift and it went well.

## 2023-09-28 NOTE — PROGRESS NOTES
[facility]    Medicine Progress Note - Internal Medicine    Date of Admission:  5/26/2023    Assessment & Plan   Mr. Vinod Lee is a 62 y.o. male with a history of schizophrenia, depression, Parkinsonism, essential tremor, hypertension, SOPHIA, GERD, and BPH. He was admitted to inpatient psychiatry on 5/26/2023 for psychosis. Concern for pneumonia as of 9/24/23, medicine following up for this.     Updates today:   Assessed patient at bedside, patient appears clinically stable.  He appears to be breathing comfortably, with continued wet cough. Lungs sound improved and overall clear on auscultation.   Reassessed groin rash, previously assessed to have tinea cruris with possible intertrigo. Rash appears significantly improved. Recommend continuing with miconazole powder BID. Residual mild erythema may be still improving rash, vs intertrigo from moisture  Start Interdry topical therapy, apply one sheet to each skin fold in groin area. Change every 3-5 days.     Rhinovirus / enterovirus URI  Concern for pneumonia  On 9/23, patient was reported to be hypoxic with an SpO2 of 89% with RR of 28, as well as had some low-grade fevers.  Associated symptoms of wet sounding cough.  COVID was negative.  Chest x-ray with possible infection versus atelectasis in RLL.  Patient was initially started on doxycycline on 9/24.  Due to concern for clinical worsening, patient was reassessed by provider on 9/25.  At that time due to patient increase drooling while on Clozaril and him laying supine for much of the day, concerns were raised for aspiration pneumonia, thus he was started on Augmentin as well.   - sputum culture if able, pending  - cont Doxycycline 100 mg twice daily for 5 days (9/24-9/28)  - cont augmentin 875-125 mg BID x 5 days (9/25-9/29)  - Albuterol inhaler as needed cough, wheezing, shortness of breath  - APAP every 4 hours as needed fevers, pain  - I-S q shift, as  tolerated    Hx cavitary lesion to SOPHY  Pulmonary nodule to SOPHY  Seen August 2022 in ED. CT chest with contrast negative for PE but showed cavitary masslike process in the left upper lung, over an area measuring 9.5 x 6.2 x 5.2 cm, indeterminant pulmonary nodule measuring 9 mm in the anterior segment of the left upper lobe, nonspecific left hilar and mediastinal lymphadenopathy, small-moderate left pleural effusion.  Cavitary lesion also seen on chest x-ray.  Patient was given IVF, Rocephin, Zithromax, and admitted for further evaluation. Thoracentesis was done and negative cultures. ID was consulted and managed Abx. The patient was initially on zosyn and doxycycline but narrowed. Strep pneumo, legionella, and HIV testing done (negative). Blood cultures were negative as well. The patient did well.  Follow-up chest CT showed near complete interval resolution of the previously described left upper lobe abscess.  Residual 12 x 10 mm spiculated nodular opacity with adjacent scarring within this region, probably represents sequela of previous infection, although underlying malignancy not excluded.  - Recommend follow-up outpatient with PCP for continued monitoring.    Groin rash  Reassessed groin rash, previously assessed to have tinea cruris with possible intertrigo. Rash appears significantly improved. Recommend continuing with miconazole powder BID. Residual mild erythema may be still improving rash, vs intertrigo from moisture  - Start Interdry topical therapy, apply one sheet to each skin fold in groin area. Change every 3-5 days.   - Medicine service will follow-up next week and then PRN, for monitoring for rash    Medicine service will continue to follow for monitoring of vitals and clinical findings as pertaining to pneumonia, as well as groin rash.     The patient's care was discussed with the Attending Physician, Dr. Rhodes, Bedside Nurse, and Patient.    Ernestine Jimenez PA-C  Hospitalist Service  [facility]  [facility]  Securely message with [employer] (more info)  Text page via [facility] Paging/Directory   ______________________________________________________________________    Interval History   Patient was seen at the bedside.  He reports continued cough, and that he has had some difficulty breathing, however noted that his report may be unreliable.  He denies any new symptoms or complaints.    Physical Exam   Vital Signs: Temp: 97.8  F (36.6  C) Temp src: Oral BP: 127/77 Pulse: 83   Resp: 16 SpO2: 99 % O2 Device: None (Room air)    Weight: 183 lbs 14.4 oz    GENERAL: older adult male seen resting reclined and then sitting in bed. NAD.   NEURO / PSYCH: Alert, able to answer questions, but has delusions / inappropriate fixations. No focal deficits. Moves all extremities.   HEENT: Anicteric sclera. PERRL. Mucous membranes moist.   CV: RRR. S1, S2. No murmurs appreciated.    RESPIRATORY: Effort normal. Lungs CTAB with some faint crackles to lower lobes.   SKIN: No jaundice. Mild erythema to skin folds of groin area.     Medical Decision Making       60 MINUTES SPENT BY ME on the date of service doing chart review, history, exam, documentation & further activities per the note.      Data

## 2023-09-28 NOTE — PROGRESS NOTES
"[facility]   Psychiatric Progress Note  Hospital Day: 125        Interim History:   The patient's care was discussed with the treatment team during the daily team meeting and/or staff's chart notes were reviewed. Staff reported a large BM overnight after receiving dulcolax suppository. Pt had a calm and pleasant evening with no episodes of agitation or aggression. He was briefly visible in the milieu, he spent most of the shift resting and socializing with SIO staff while in bed. No seclusion/restraint episodes. Continues to express concerns about staff.     Vinod was found in his room laying in bed with SIO present and engages in conversation. He said that he is \"pretty good\" today. He then immediately began expressing concerns that he was going to be brought to the bottom level of the basement and then harmed by staff. He did not respond well to reassurance. He continued to perseverate on others' harming him. He agreed to a head CT.     Suicidal ideation: None    Homicidal ideation: None    Psychotic symptoms: AH suspected with report of telepathy. no VH reported during interview. Delusions present and other psychotic symptoms suspected.    Medication side effects reported:  jaw and chin tremors. Reported two BMs early this morning. Denied abdominal pain or discomfort.     Acute medical concerns: None. Stated that he is no longer experiencing cough or SOB     Other issues reported by patient: Patient had no further questions or concerns.           Medications:      albuterol  2 puff Inhalation Q8H    amoxicillin-clavulanate  1 tablet Oral Q12H [facility] (08/20)    atropine  2 drop Sublingual TID    benztropine  1 mg Oral BID    cloZAPine  650 mg Oral Daily    cyanocobalamin  1,000 mcg Oral Daily    divalproex sodium delayed-release  250 mg Oral Q8H [facility]    doxycycline hyclate  100 mg Oral Q12H [facility] (08/20)    FLUoxetine  20 mg Oral Daily    gabapentin  300 mg Oral TID    LORazepam  0.5 " "mg Oral TID    melatonin  10 mg Oral At Bedtime    miconazole   Topical BID    mineral oil-hydrophilic petrolatum   Topical BID IS    naproxen  250 mg Oral BID w/meals    OLANZapine zydis  15 mg Oral BID    Or    OLANZapine  10 mg Intramuscular BID    polyethylene glycol  17 g Oral BID    sennosides  2 tablet Oral BID    tamsulosin  0.4 mg Oral Daily          Allergies:     Allergies   Allergen Reactions    Bee Venom Unknown    Montelukast Unknown    Phenothiazines Unknown          Labs:     No results found for this or any previous visit (from the past 24 hour(s)).                 Psychiatric Examination:     /78 (BP Location: Left arm, Patient Position: Sitting, Cuff Size: Adult Regular)   Pulse 97   Temp 97.3  F (36.3  C) (Temporal)   Resp 16   Ht 1.753 m (5' 9\")   Wt 83.4 kg (183 lb 14.4 oz)   SpO2 97%   BMI 27.16 kg/m    Weight is 183 lbs 14.4 oz  Body mass index is 27.16 kg/m .    Weight over time:  Vitals:    07/03/23 1100 07/30/23 0902 08/13/23 0900 08/26/23 0835   Weight: 81.6 kg (179 lb 12.8 oz) 86.5 kg (190 lb 9.6 oz) 85.7 kg (188 lb 14.4 oz) 82.8 kg (182 lb 7 oz)    09/07/23 0900 09/26/23 1729   Weight: 83.7 kg (184 lb 8 oz) 83.4 kg (183 lb 14.4 oz)       Orthostatic Vitals         Most Recent      Sitting Orthostatic /61 07/25 0800    Sitting Orthostatic Pulse (bpm) 85 07/25 0800          Cardiometabolic risk assessment. 06/07/23    Reviewed patient profile for cardiometabolic risk factors    Date taken /Value  REFERENCE RANGE   Abdominal Obesity  (Waist Circumference)   See nursing flowsheet Women ?35 in (88 cm)   Men ?40 in (102 cm)      Triglycerides  No results found for: TRIG    ?150 mg/dL (1.7 mmol/L) or current treatment for elevated triglycerides   HDL cholesterol  No results found for: HDL]   Women <50 mg/dL (1.3 mmol/L) in women or current treatment for low HDL cholesterol  Men <40 mg/dL (1 mmol/L) in men or current treatment for low HDL cholesterol     Fasting plasma " "glucose (FPG) Lab Results   Component Value Date     05/26/2023      FPG ?100 mg/dL (5.6 mmol/L) or treatment for elevated blood glucose   Blood pressure  BP Readings from Last 3 Encounters:   09/28/23 134/78   05/26/23 97/55    Blood pressure ?130/85 mmHg or treatment for elevated blood pressure   Family History  See family history     Mental Status Exam:  Appearance: awake, alert, unkempt, lying in bed. No evidence of pain of acute distress.   Attitude:  more cooperative  Eye Contact:  fair, less intense, better duration  Mood:  \"pretty good\"  Affect:  mood incongruent, content  Speech: mostly coherent  Psychomotor Behavior:  Ongoing bilateral hand tremor and jaw tremor. No evidence of dystonia, or tics.  Throught Process: linear, illogical, concrete  Associations:  no loosening of associations present  Thought Content:  Delusions and AH suspected. Evidence of obsessive thinking and likely compulsions to harm others.   Insight:  limited  Judgement:  poor  Oriented to:  self, place  Attention Span and Concentration:  fair  Recent and Remote Memory:  poor  Gait: patient lying in bed         Precautions:     Behavioral Orders   Procedures    Assault precautions    Code 1 - Restrict to Unit    Code 2    Code 2 - 1:1 Staff Supervision     For ECT only    Electroconvulsive therapy     Series of up to 12 treatments. Begin Date: 8/2/23     Treating Psychiatrist providing ECT:  unknown     Notified on:  7/28/23 via this order  NOTE: We received [facility] that [facility] has been approved but awaiting official court order and risk management's review  Initial consult was completed by Dr. Hicks on 7/18/23    Electroconvulsive therapy     Series of up to 12 treatments. Begin Date: 8/2/23     Treating Psychiatrist providing ECT:  Dominga     Notified on:  8/2/23    Elopement precautions    Fall precautions    Occupational Therapy on the Unit     Order Specific Question:   Reason for Consult     " Answer:   Eval of thought process, functional skills and behavior     Order Specific Question:   Course of Action:     Answer:   Evaluation only     Order Specific Question:   Treatment Prescription:     Answer:   CPT requested    Routine Programming     As clinically indicated    Self Injury Precaution    Status 15     Every 15 minutes.    Status Individual Observation     Patient SIO status reviewed with team/RN.  Please also refer to RN/team documentation for add'l detail.    -SIO staff to monitor following which have contributed to patient being on SIO:  Agitation  Pt is impulsive   Pt has ran out of his room naked  Pt has Parkinson symptoms place him in a high fall risk  Pt has verbal outburst of sexual and threaten statements  Pt requires immediate redirection when masturbating    -Possible interventions SIO staff could use to support patient's treatment progress:  Engage pt in activities    -When following observed, team will review discontinuation of SIO:  Absence of aggression toward others for > 24 hours    Comments: SIO 1:1     Order Specific Question:   CONTINUOUS 24 hours / day     Answer:   5 feet     Order Specific Question:   Indications for SIO     Answer:   Severe intrusiveness     Order Specific Question:   Indications for SIO     Answer:   Assault risk    Suicide precautions     Patients on Suicide Precautions should have a Combination Diet ordered that includes a Diet selection(s) AND a Behavioral Tray selection for Safe Tray - with utensils, or Safe Tray - NO utensils            Diagnoses:     #Unspecified psychosis likely schizophrenia per history,  #Unspecified encephalopathy   -R/O catatonia   -Episodes of unresponsiveness, concern for PNES   #Parkinsonism 2/2 neuroleptic medications, rule out Parkinson's Disease  -rule out major neurocognitive disorder (refused to answer much of assessment)  #Urinary retention and BPH  -Possible UTI -- UC resulted on 5/25 w/out growth  #Hx of prolonged QTc  with clozapine  #Mild thrombocytopenia  #R/O OCD         Assessment and hospital summary:  Patient was admitted to psychiatric unit for safety, stabilization and medication management. He has had schizophrenia since the 1980. He was on Clozaril x 25 years, and it was tapered and discontinued on May 7, 2023  due to prolonged qtc of 527, and his psychotic  symptoms have worsened since then. Sinemet was also discontinued recently due to concerns that it was contributing to paranoia and AH. He is agitated, aggressive, dangerous to self and others. He remains on SIO 2 to 1, and is under psychiatric emergency and court hold. There are concerns for organic etiology given pattern of sundowning, history of parkinsonism, and ongoing disorientation/confusion. 6/8: EKG repeated, cardiology consult regarding safety of resuming clozapine in the context of prolonged QTc and refractory schizophrenia pending response. Per cardiology, correct QTc is no more than 460. They do not have concerns about AP retrial. Neurology IP consult placed for evaluation of sundowning and cognitive decline secondary to Sinemet discontinuation. MSSA initiated. Per Neurology, discontinuation of Sinemet would not account for these symptoms. They do not recommend retrial at this time. On 6/14, discussed complex case at length with Dr. Hatch (primary provider on geriatic unit) who provided recs (see second opinion note dated 6/14). Depakote initiated, though needed to be reduced due to side effect of thrombocytopenia. Melatonin was increased to 10 mg at bedtime. 6/22: Clozapine titration continued. Its possible that clozapine dose is contributing to worsened compulsive behaviors noted throughout hospitalization which could improve with lowering the dose. ECT initiated due to treatment refractory psychosis and ongoing symptoms despite therapeutic dose of clozapine (650 mg daily). ECT initiated on 8/2/23 and pt completed 11 total treatments, but ECT stopped  on  due to lack of improvement and distress it was causing patient.     Chart reviewed which revealed the followin2022: He was on clozapine, Seroquel, Cymbalta, and Carbidopa-levodopa and hospitalized for pneumonia. Psych consulted and Seroquel was stopped. Treated with Abx and discharged to TCU.     2022: Hospitalized at [facility]. Per chart review:    Vinod Lee is a 63 yo male with longstanding history of schizophrenia. He was admitted from [facility] ED, where he presented due to increased delusional thoughts while on a visit to his parents for Thanksgiving. He began experiencing increasing paranoid thoughts that someone might be poisoning him and began fearing that he might accidentally harm someone. He reported to his parents that he was having thoughts about hitting someone or beating someone up and told them he could not guarantee they would be safe with him. Parents encouraged patient to go to the ED, which he did voluntarily.     Per patient report and chart review, he was hospitalized several times in the  and reports he was civilly committed at one point in the , however he has been quite stable for the past several decades. He reports he will experience some paranoia and delusional thinking at times, but generally has good insight into his symptoms. He has been maintained on clozapine for about 25 years and prior to several months ago was also concurrently prescribed quetiapine.      In the last several months, patient has had a number of medication changes. Approximately 6 months ago, patient was diagnosed with parkinsonism related to antipsychotic use and was started on carbidopa-levidopa. He experienced no improvement in tremors, and thus carbidopa- levidopa dose was increased 1.5 weeks ago.      In addition, patient was medically hospitalized in 2022 for confusion, weakness, repeated falls, ongoing weight loss, and SOB. He was found to have a cavitary lesion of  "the lung and suspected aspiration pneumonia. He was treated with antibiotics. At that time, he was taking current dose of clozapine and duloxetine, as well as propranolol ER, quetiapine, gabapentin, and clonazepam. Psychiatry was consulted due to concern for oversedation, and propranolol ER, gabapentin, and quetiapine were discontinued. Conazepam was reduced from 0.5 mg TID to BID dosing and recommended to be discontinued, however, it does not appear this occurred. His sedation improved significantly. QTc prolongation was also noted, but improved throughout his stay. He was ultimately discharged to a TCU for several months before returning home. He reports his weakness has greatly improved and states he \"graduated\" physical therapy, though he continues to be diligent in completed recommended exercises.      He reports that over the past several months, his delusions and anxiety have been worse than usual, with symptoms becoming much more acute since the carbidopa-levidopa dose was increased. He has felt increasingly paranoid about being poisoned, noting this is a delusion that has been stable over time, but was previously less intrusive. He has insight during the interview that his paranoid thinking is not reality based, but states it has been harder to separate delusions from reality and he becomes very worried that he might hurt someone, despite no history of violent behavior. He reports that the paranoia about his food being poisoned in combination with the tremors in his hands have made it difficult for him to eat. He has also had quite low appetite for the past 6 months to a year . He reports that due to the combination of these factors, he has lost about 50 pounds in the last year.He denies any problems with sleep initiation or maintenance. He reports energy is fairly good and \"better than it used to be.\" He reports some short term memory issues and on interview, does have some difficulty remembering details " "of recent events. He is unsure if he has received cognitive testing in the past.    He denies any suicidal ideation and reports he has not experienced SI for decades. He denies homicidal thoughts and is very clear that he had and has no desire to harm anyone else, but was afraid he might somehow do so. He denies any hallucinations and states \"that's never been a problem for me.\" He is not observed responding to internal stimuli. He is alert and oriented in all spheres.        ========    BRIEF HOSPITAL COURSE: This 62 y.o. male admitted 11/25/2022 to  [place] for the assessment and treatment of the above presentation. This was a voluntary admission.     In summary, he was tapered off the Sinemet, which he reports to be both ineffective and potentially worsening the anxiety and paranoia ideations. Instead Benadryl 25 mg TID was started to help with extrapyramidal side effects. He struggled with sleep during his stay here. Remeron 7.5mg QHS was tried without success. Seroquel 100mg qhs was restarted with addition of suvorexant 10mg qhs. Given his Seroquel PRN use prior history of prolonged QTC, he will benefit from another EKG repeat on the medical unit.     Unfortunately, he tested positive for influenza A and developed hypoxemia. Now on 2L oxygen with desats when prone requiring 3L oxygen. Subsequently, the plan will to be tapering him off clonazepam due to hypoxia (and also prior plan to taper). The patient tolerated these changes without side effects.     The patient minimally benefited from the structured therapeutic milieu as he attended programming seldom as he tested positive for influenza, he needed to isolate with droplet precautions. Pt was engaged and worked on issues that were triggering/brought pt to the hospital and has excellent insight into his anxiety and paranoid delusions. Pt denied suicidal ideations throughout all/majority of their hospitalization. Pt denied HI throughout all of hospitalization. " There were no concerns with behavioral disruptions/outbursts. The patient has shown improvement in general.     At the time of discharge, hospitalization course was reviewed with Vinod Lee with plan to transfer to medicine. Please consult psychiatry and I will continue to follow while patient is on the medical unit. He is now off sinemet since 11/29 21:00 and I would expect anxiety and paranoia to improve more moving forward. Psychiatrically, once anxiety and paranoia is baseline, can D/C to home once medically stable. He DOES NOT NEED A 1:1 on the medical unit from a mental health perspective, we initiated 1:1 11/30/2022 due to significant fatigue, gait instability (he was unable to stand without assist) and feeding assist.    04/2023: Clozapine was gradually tapered and discontinued, unclear reasons why it was discontinued per outside records, though Dr. Portillo's H&P indicates that it was due to prolonged QTc.       Today's Changes:  Renewed SIO  Monitor oral intake closely  Monitor BMs closely  Reviewed IM recs  Ordered head CT and reviewed. Unchanged from head CT in 5/2023. No acute intracranial pathology.   Asked IM to evaluate groin rash. Appeared much improved per IM.     Per Neurology Recs:    Recommendations  -No current indications to change medications from a neurologic perspective  - Optimizing his psychiatric medication and treatment is currently more important than optimization of parkinsonism  - Please contact neurology if any new questions arise, or to discuss the assessment described above     Thank you for involving Neurology in the care of Vinod Lee.  Please do not hesitate to call with questions.      Kranthi Perdue MD    Target psychiatric symptoms and interventions:  -Psych emergency declared on 5/27, now fully committed  - Depakote 250 mg TID, restarted on 8/26. Cannot increase beyond this dose due to thrombocytopenia at higher doses  -Continue clozapine as above  -Continue  scheduled Zyprexa 15mg BID  -Continue 1:1 for safety of staff and patients, reduced from 2:1 7/23/23  - weekly CBC to monitor platelets    -Continue Gabapentin 300 mg TID as staff believe it has been effective for treatment of his anxiety    Risks, benefits, and alternatives discussed at length with patient.     Acute Medical Problems and Treatments:  Rhinovirus / enterovirus URI  Concern for pneumonia  On 9/23, patient was reported to be hypoxic with an SpO2 of 89% with RR of 28, as well as had some low-grade fevers.  Associated symptoms of wet sounding cough.  COVID was negative.  Chest x-ray with possible infection versus atelectasis in RLL.  Patient was initially started on doxycycline on 9/24.  Due to concern for clinical worsening, patient was reassessed by provider on 9/25.  At that time due to patient increase drooling while on Clozaril and him laying supine for much of the day, concerns were raised for aspiration pneumonia, thus he was started on Augmentin as well.   - sputum culture if able, pending  - cont Doxycycline 100 mg twice daily for 5 days (9/24-9/28)  - cont augmentin 875-125 mg BID x 5 days (9/25-9/29)  - Albuterol inhaler as needed cough, wheezing, shortness of breath  - APAP every 4 hours as needed fevers, pain  - I-S q shift, as tolerated     Hx cavitary lesion to SOPHY  Pulmonary nodule to SOPHY  Seen August 2022 in ED. CT chest with contrast negative for PE but showed cavitary masslike process in the left upper lung, over an area measuring 9.5 x 6.2 x 5.2 cm, indeterminant pulmonary nodule measuring 9 mm in the anterior segment of the left upper lobe, nonspecific left hilar and mediastinal lymphadenopathy, small-moderate left pleural effusion.  Cavitary lesion also seen on chest x-ray.  Patient was given IVF, Rocephin, Zithromax, and admitted for further evaluation. Thoracentesis was done and negative cultures. ID was consulted and managed Abx. The patient was initially on zosyn and doxycycline  but narrowed. Strep pneumo, legionella, and HIV testing done (negative). Blood cultures were negative as well. The patient did well.  Follow-up chest CT showed near complete interval resolution of the previously described left upper lobe abscess.  Residual 12 x 10 mm spiculated nodular opacity with adjacent scarring within this region, probably represents sequela of previous infection, although underlying malignancy not excluded.  - Recommend follow-up outpatient with PCP for continued monitoring.    Groin rash  Reassessed groin rash, previously assessed to have tinea cruris with possible intertrigo. Rash appears significantly improved. Recommend continuing with miconazole powder BID. Residual mild erythema may be still improving rash, vs intertrigo from moisture  - Interdry topical therapy, apply one sheet to each skin fold in groin area. Change every 3-5 days.   - Medicine service will follow-up next week and then PRN, for monitoring for rash       Possible Dementia  Neurology consult placed on 6/8 for evaluation of sundowning and cognitive decline secondary to Sinemet discontinuation: Resuming Sinemet not recommended for behavioral concerns. Please see Neuro note for details.     Tremors  longstanding though appeared to worsen following initiation of clozapine  - Ativan 0.5 mg TID  - Cogentin 1 mg BID added on 8/25 with overall improvement noted    Neuro re-consulted on 9/20 and per their documentation:    Impression  Mr. Lee is a 63 y.o. man with a long history of psychosis including requiring commitment and psychiatric hospitalizations in the 1980s.  Given his long history, this is less consistent with an organic cause of psychosis (as something that would be primarily neurologic in origin would be expected to have a more rapid progression of neurologic deterioration, rather than this long period of psychiatric symptoms).  I am not able to appreciate any evidence for a neurologic basis of his psychosis  given the time course of his psychiatric illness.      Regarding his difficulty with word finding, I am not able to elicit this on exam.  However since he described being symptomatic earlier today, it is helpful that I am able to go through a full assessment of his language in terms of comprehension, repetition, naming objects with increasing difficulty without being able to appreciate any deficits.     He does continue to have a tremor that is most prominent posteriorly as well as with action, in addition to a chin tremor.  He also does have rigidity in the bilateral upper extremities consistent with the diagnosis of parkinsonism.  He is currently not on levodopa or dopamine agonists which I continue to agree with.  Since his psychiatric symptoms are much more bothersome right now than his parkinsonism, I will continue to recommend avoiding dopaminergic medications.     Recommendations  -No current indications to change medications from a neurologic perspective  - Optimizing his psychiatric medication and treatment is currently more important than optimization of parkinsonism  - Please contact neurology if any new questions arise, or to discuss the assessment described above     Thank you for involving Neurology in the care of Vinod Lee.  Please do not hesitate to call with questions.      Kranthi Perdue MD      Thrombocytopenia, resolved  Likely secondary to Depakote.  According to the, incidents of Depakote induced thrombocytopenia as 27%.  Depakote dose reduced from 1000 twice daily to 250 3 times daily on 7/28/2023 with subsequent resolution of thrombocytopenia  - weekly CBCs    Constipation  Likely worsened by clozapine, improved since modifying regimen.   - daily miralax   - daily Senokot 2 tablets BID      Right first toe fracture:  Seen by Ortho on 6/16:  Per note: iVnod Lee is a 62 year old  male w/ PMHx complex psychiatric history who has a fracture to the distal phalanx of the right toe  "after a recent trauma to the foot the patient reports.  Patient denies any other pain or discomfort.  Images reviewed and plan will be for irrigation of the popped fracture blister with sterile saline and the toes should be dressed and a 4 x 4 gauze dressing.  Patient will need a postop shoe which she can be weightbearing as tolerated in.  Would recommend a course of antibiotics for approximately 7 days.  Would recommend Augmentin or clindamycin.  Podiatry will be made aware of patient and will see patient while he is admitted or if patient is discharged in the near future a follow-up appointment will need to be established.     Remainder of care per primary team.  Primary team should make sure that patient is up-to-date on his tetanus shot.  If not patient will require a booster shot.    Hx of prolonged QTc:  Continue weekly EKGs. See above for details.   Discussed most recent EKG findings with Cardiology on 8/25. Corrected QTc is 501 from EKG on 8/23. Per cardiology, repeat EKG today. If corrected QTc is > 500, will need to reduce clozapine. If < 500, OK to keep clozapine at current dose. UPDATE/ADDENDUM 9/6: Repeat EKG with corrected QTc of 440. No need to adjust clozapine dose per cards.     Urinary retention, resolved  Per patient care order:   If patient is refusing straight caths, please notify IM provider immediately to determine whether this constitutes a medical emergency. If IM declares medical emergency, may restrain patient for straight cath. Discussed this with patient's parents/substitute decision makers on 6/7 who are in support of this plan.    # Psoriasis hx  # Purplish discoloration of B/LE feet-resolved   Discussed with Dr. Rene and bedside RN regarding rash on right foot that appears and appears to be purplish in color and almost \"petechiae.\" It was noted during interview, but seemed to be resolving upon walking. Within the past 24 hrs, pt has had ECT and seemed more confused than usual. Pt " seems to have had a right great toe wound with recent right great toe fracture seen by Medicine on 7/16. Seen on 8/4 with improving color on B/L legs at rest and appears to have small, scaly patches of varying coin sizes that have not grown past marked borders. See photos. Per further chart review, has had psoriasis history. WBC WNL, no fevers, HDS. This appears to more consistent with a psoriasis like picture.   - Start triamcinolone topical 0.025%   -Apply twice daily until lesions for 2 weeks or until lesions resolve/  -Monitor for skin thinning, striae, rebound lesion flares.   - Start Eucerin cream              - Apply 30 minutes PRIOR to triamcinolone topical cream above or PRN as needed if dry skin/after bathing   - Medicine will sign off.   - For worsening pain, spread, itchiness, fevers, please contact Medicine and possibly Dermatology.    Benzodiazepine dependence:  Phenobarbital taper completed shortly after patient's admission    Pertinent labs/imaging:  Weekly CBC with diff     Behavioral/Psychological/Social:  - Encourage unit programming    Safety:  - Continue precautions as noted above  - Status 15 minute checks  - Continue 1:1 for staff and pt safety    Legal Status: Fully committed with [facility]. [place] medications: clozapine, olanzapine, risperidone, quetiapine    Disposition Plan   Reason for ongoing admission: No safe alternative at this time  Discharge location: TBD. Will likely need memory care unit  Discharge Medications: not ordered  Follow-up Appointments: not scheduled    Entered by: Ingrid Rhodes MD on 09/28/2023  at 6:58 PM

## 2023-09-28 NOTE — PLAN OF CARE
"He is isolative and resting or watching television in his room often in the shift.  He is delayed in speech, cognition when doing assessment questions.  He has a productive cough and extensive education given to spit the sputum out into a tissue instead of swallowing it.  He complains of pain/discomfort, yet unable to articulate and said, \" well, I'll be dead soon anyway's.\"  He continues to be suspicious of cares and complained of a dry mouth and chapped lips.  Writer encouraged him to drink liquids and handed him water, yet stated, \" no, that's no real water,\" and was given juice with SIO sitter to drink.  He has abdominal firmness on palpation in his bilateral upper abdomen, softer palpation in his lower abdomen, no rebound tenderness noticed, VSS.  Writer updated internal medicine, who stated will assess him later today.  He requires meal set up, assistance in opening up packages and encouraged to increase oral intake from SIO staff.  Informed by staff that he had a large bowel movement in the night shift and XR was canceled.  It was noticed that his scrotum was pinkish in color as well in the shift, reported to psychiatry and internal medicine team.  1 mg of ativan was given before going to CT with SIO sitter and  before 1100.  Psychiatrist and internal medicine updated on patient findings.  1400 ativan dose was held due to getting 1 mg of ativan before CT scan, MD approved holding 1400 ativan dose.  Atropine was held due to complaints of dry mouth and chapped lips.  Continues to display bilateral hand tremors in the shift.  Medications were given by spoon in pudding.  At lunch, he asked writer and SIO if they heard about him winning the [employer] in [place].  Also, states that he medications and food, \" are all contaminated.\"  Remains on SIO: 1 due to needing assistance with cares, disorganized thought process, and suspiciousness of cares.      "

## 2023-09-28 NOTE — PROVIDER NOTIFICATION
09/28/23 0625   Sleep/Rest   Night Time # Hours 6.75 hours     Pt had a quiet night with no behavioral or safety concerns. Pt was observed sleeping the entire night, no acute events noted or reported. Takes his medications as prescribed.    Uses incentive spirometer with prompting. Pt voided twice in a short period of time. Post-void bladder scan done, straight cath not indicated at this time. Pt reported feeling comfortable.

## 2023-09-29 PROCEDURE — 250N000013 HC RX MED GY IP 250 OP 250 PS 637: Performed by: PHYSICIAN ASSISTANT

## 2023-09-29 PROCEDURE — 250N000013 HC RX MED GY IP 250 OP 250 PS 637: Performed by: PSYCHIATRY & NEUROLOGY

## 2023-09-29 PROCEDURE — 99233 SBSQ HOSP IP/OBS HIGH 50: CPT | Performed by: PSYCHIATRY & NEUROLOGY

## 2023-09-29 PROCEDURE — 250N000013 HC RX MED GY IP 250 OP 250 PS 637: Performed by: STUDENT IN AN ORGANIZED HEALTH CARE EDUCATION/TRAINING PROGRAM

## 2023-09-29 PROCEDURE — 124N000002 HC R&B MH UMMC

## 2023-09-29 PROCEDURE — 250N000013 HC RX MED GY IP 250 OP 250 PS 637

## 2023-09-29 RX ADMIN — ATROPINE SULFATE 2 DROP: 10 SOLUTION/ DROPS OPHTHALMIC at 14:08

## 2023-09-29 RX ADMIN — OLANZAPINE 15 MG: 10 TABLET, ORALLY DISINTEGRATING ORAL at 08:17

## 2023-09-29 RX ADMIN — BENZTROPINE MESYLATE 1 MG: 1 TABLET ORAL at 08:17

## 2023-09-29 RX ADMIN — MICONAZOLE NITRATE: 20 POWDER TOPICAL at 08:17

## 2023-09-29 RX ADMIN — WHITE PETROLATUM: 1.75 OINTMENT TOPICAL at 08:18

## 2023-09-29 RX ADMIN — SENNOSIDES 2 TABLET: 8.6 TABLET ORAL at 08:16

## 2023-09-29 RX ADMIN — BENZONATATE 100 MG: 100 CAPSULE ORAL at 18:54

## 2023-09-29 RX ADMIN — SENNOSIDES 2 TABLET: 8.6 TABLET ORAL at 19:52

## 2023-09-29 RX ADMIN — ATROPINE SULFATE 2 DROP: 10 SOLUTION/ DROPS OPHTHALMIC at 08:18

## 2023-09-29 RX ADMIN — ALBUTEROL SULFATE 2 PUFF: 90 AEROSOL, METERED RESPIRATORY (INHALATION) at 06:19

## 2023-09-29 RX ADMIN — Medication 10 MG: at 19:53

## 2023-09-29 RX ADMIN — DIVALPROEX SODIUM 250 MG: 250 TABLET, DELAYED RELEASE ORAL at 19:53

## 2023-09-29 RX ADMIN — DIVALPROEX SODIUM 250 MG: 250 TABLET, DELAYED RELEASE ORAL at 06:19

## 2023-09-29 RX ADMIN — FLUOXETINE 20 MG: 20 CAPSULE ORAL at 08:16

## 2023-09-29 RX ADMIN — OLANZAPINE 15 MG: 10 TABLET, ORALLY DISINTEGRATING ORAL at 19:53

## 2023-09-29 RX ADMIN — BENZTROPINE MESYLATE 1 MG: 1 TABLET ORAL at 19:53

## 2023-09-29 RX ADMIN — GABAPENTIN 300 MG: 300 CAPSULE ORAL at 14:08

## 2023-09-29 RX ADMIN — CYANOCOBALAMIN TAB 1000 MCG 1000 MCG: 1000 TAB at 08:17

## 2023-09-29 RX ADMIN — ALBUTEROL SULFATE 2 PUFF: 90 AEROSOL, METERED RESPIRATORY (INHALATION) at 14:08

## 2023-09-29 RX ADMIN — NAPROXEN 250 MG: 250 TABLET ORAL at 18:10

## 2023-09-29 RX ADMIN — GABAPENTIN 300 MG: 300 CAPSULE ORAL at 19:53

## 2023-09-29 RX ADMIN — GABAPENTIN 300 MG: 300 CAPSULE ORAL at 08:16

## 2023-09-29 RX ADMIN — DIVALPROEX SODIUM 250 MG: 250 TABLET, DELAYED RELEASE ORAL at 14:08

## 2023-09-29 RX ADMIN — CLOZAPINE 650 MG: 100 TABLET, ORALLY DISINTEGRATING ORAL at 12:07

## 2023-09-29 RX ADMIN — MICONAZOLE NITRATE: 20 POWDER TOPICAL at 19:54

## 2023-09-29 RX ADMIN — AMOXICILLIN AND CLAVULANATE POTASSIUM 1 TABLET: 875; 125 TABLET, FILM COATED ORAL at 08:16

## 2023-09-29 RX ADMIN — ATROPINE SULFATE 2 DROP: 10 SOLUTION/ DROPS OPHTHALMIC at 19:55

## 2023-09-29 RX ADMIN — LORAZEPAM 0.5 MG: 0.5 TABLET ORAL at 08:17

## 2023-09-29 RX ADMIN — WHITE PETROLATUM: 1.75 OINTMENT TOPICAL at 18:10

## 2023-09-29 RX ADMIN — LORAZEPAM 0.5 MG: 0.5 TABLET ORAL at 14:08

## 2023-09-29 RX ADMIN — LORAZEPAM 0.5 MG: 0.5 TABLET ORAL at 19:53

## 2023-09-29 RX ADMIN — POLYETHYLENE GLYCOL 3350 17 G: 17 POWDER, FOR SOLUTION ORAL at 19:52

## 2023-09-29 RX ADMIN — TAMSULOSIN HYDROCHLORIDE 0.4 MG: 0.4 CAPSULE ORAL at 08:16

## 2023-09-29 RX ADMIN — AMOXICILLIN AND CLAVULANATE POTASSIUM 1 TABLET: 875; 125 TABLET, FILM COATED ORAL at 19:52

## 2023-09-29 RX ADMIN — ALBUTEROL SULFATE 2 PUFF: 90 AEROSOL, METERED RESPIRATORY (INHALATION) at 21:51

## 2023-09-29 RX ADMIN — NAPROXEN 250 MG: 250 TABLET ORAL at 08:17

## 2023-09-29 RX ADMIN — POLYETHYLENE GLYCOL 3350 17 G: 17 POWDER, FOR SOLUTION ORAL at 08:16

## 2023-09-29 ASSESSMENT — ACTIVITIES OF DAILY LIVING (ADL)
ADLS_ACUITY_SCORE: 77
DRESS: SCRUBS (BEHAVIORAL HEALTH)
ORAL_HYGIENE: PROMPTS;INDEPENDENT
ADLS_ACUITY_SCORE: 77
HYGIENE/GROOMING: INDEPENDENT
ADLS_ACUITY_SCORE: 77
ORAL_HYGIENE: INDEPENDENT
ADLS_ACUITY_SCORE: 67
HYGIENE/GROOMING: PROMPTS;INDEPENDENT
ADLS_ACUITY_SCORE: 67
ADLS_ACUITY_SCORE: 67
DRESS: INDEPENDENT
ADLS_ACUITY_SCORE: 77
ADLS_ACUITY_SCORE: 77

## 2023-09-29 NOTE — PLAN OF CARE
Assessment/Intervention/Current Symtoms and Care Coordination:  Reviewed chart. Met with team to review patient's current functioning, needs, progress, and impediments to discharge.    Jovana from [facility] came to assess Vinod for placement.     Met with Jovana after the assessment, she is reaching out to his CADI  to complete needed reports then will get back to us with admission paperwork and a discharge date.     Discharge Plan or Goal:  Seeking placement in a memory care unit, preferably in the [facility]     Barriers to Discharge:  Referrals are being sent to memory care units who have openings. One barrier is the limited openings in memory care units at this time.     Referral Status:  [facility] in [place] 8/22, no memory care 8/30  [facility] in [place] 8/15, no memory care 8/31  [facility] 8/31, no openings 8/31  [facility] 8/29, declined 9/8  [facility] 8/29, no open beds 8/30  [facility] 8/30  [facility] 8/31, no open beds 8/31  [facility] 9/15, waiting for decision   [facility] 8/31  [facility] 8/31, no openings and private pay 8/31  [facility], 8/31, declined 9/1   [facility], 9/13, declined 9/13  [facility] 9/15, waiting for decision   [facility] 9/25, declined 9/27  [facility] 9/26, declined 9/27     Legal Status:  Commitment with [facility]: [place]  File Number: [id number]  Issued 07/06/23 and is not to exceed six months    Contacts:  : Vicky Valdez with [facility], [phone]  Psychiatrist: Dr. Santana at [facility], [phone] PCP: Attila Urena  Mom: Alberta, [phone] (H) [phone] (M)   Dad: Ino, [phone] (H)  Sister, Sandra Treadwell, [phone] (KING)   [facility]'s Office Fax: [phone]  CADI  with [facility]: Roxie Marvin, [phone],  [email or web address]    Upcoming Meetings and Dates/Important Information and next steps:

## 2023-09-29 NOTE — PROGRESS NOTES
Pt was coughing loudly and frequently. He asked for something to help, and tessalon was given. Pt took without incident and stated he would let staff know if it wasn't helping.

## 2023-09-29 NOTE — PLAN OF CARE
Problem: Sleep Disturbance  Goal: Adequate Sleep/Rest  Outcome: Progressing  Goal Outcome Evaluation:  Patient in assigned room throughout the night and observed to be sleeping with minimal interruptions. No behavioral concerns. No PRN medications requested or administered.   Sleep hours:  6.75 hours.

## 2023-09-29 NOTE — PLAN OF CARE
"  Problem: Psychotic Symptoms  Goal: Psychotic Symptoms  Description: Signs and symptoms of listed problems will be absent or manageable.  Outcome: Not Progressing    Pt continues to display paranoia, e.g., that his medication is poison. Continues to state that he has done terrible things and that he thinks that the staff are going to punish him. Generally not receptive to reassurance that this is not the case. Makes reference to the voices in his head, e.g., \" the voice told me not to tell you that\" in reference to an assessment question. Denies all psych symptoms upon assessment.     Per SIO staff, ate approximately 50% of his meal. Pt SIO staff, pt voided this shift. No BM reported. Pt is passing gas. Denies pain.     Pt is on ABX for treatment of pneumonia, cooperative with medication. Displaying a dry cough.  O2 97%. Using incentive spirometer.     Maintains SIO staff for assault risk. No instances of aggression noted this shift.     /78 (BP Location: Left arm, Patient Position: Sitting, Cuff Size: Adult Regular)   Pulse 97   Temp 97.3  F (36.3  C) (Temporal)   Resp 16   Ht 1.753 m (5' 9\")   Wt 83.4 kg (183 lb 14.4 oz)   SpO2 97%   BMI 27.16 kg/m        "

## 2023-09-29 NOTE — PLAN OF CARE
"Goal Outcome Evaluation:    Plan of Care Reviewed With: patient        Problem: Adult Behavioral Health Plan of Care  Goal: Individualized Daily Interaction Plan (IDIP)  Outcome: Progressing   Pt isolative to own room all shift, ate 50% of his breakfast, adequate oral fluids taken. He is alert and oriented but disoriented to situation and place. He presents with flat affect and calm mood. He denies all mental health symptoms, was med compliant and contracted for safety. Pt continues to have chronic tremors, limited assist  with meals and holding fluid cups. No drilling/hypersalivation noted. No BM this shift, abdomen soft, pt voiding okay with no difficulties. Last BM 09/28. Pt paranoid this morning stating, \"the guys outside are coming in to beat the hell out of me.\" Pt pointed to one of the SIO staff that was standing at the door and writer reassured pt that staff are here to provide pt safety and not hurting/harming them.  Pt still having infrequent dry cough. Last dose of abx 9/30. Medicine team Ernestine HORN called for updates and reported will see patient tomorrow. Continue to monitor patient symptoms and also BM d/t recent hx of constipation.   "

## 2023-09-29 NOTE — PROGRESS NOTES
"[facility], [place]   Psychiatric Progress Note  Hospital Day: 126        Interim History:   The patient's care was discussed with the treatment team during the daily team meeting and/or staff's chart notes were reviewed. Medication compliant. He voiced concerns about staff harming him. Slept 6.75 hours. Eating good amounts. No episodes of aggression overnight.     Upon interview, Vinod reported feeling \"okay.\" He then shared that he heard a voice telling him that he won 150 million dollars in the lottery. He said that it is possible that the voice is inaccurate, but not likely. He became increasingly irritable with further questioning and yelled at writer to get out of his room. He then abruptly stood up until writer was completely out of his room.     Suicidal ideation: None    Homicidal ideation: None    Psychotic symptoms: AH suspected with report of telepathy. no VH reported during interview. Delusions present and other psychotic symptoms suspected.    Medication side effects reported:  jaw and chin tremors. Reported two BMs early this morning. Denied abdominal pain or discomfort.     Acute medical concerns: None. Stated that he is no longer experiencing cough or SOB     Other issues reported by patient: Patient had no further questions or concerns.           Medications:      albuterol  2 puff Inhalation Q8H    amoxicillin-clavulanate  1 tablet Oral Q12H KIKI (08/20)    atropine  2 drop Sublingual TID    benztropine  1 mg Oral BID    cloZAPine  650 mg Oral Daily    cyanocobalamin  1,000 mcg Oral Daily    divalproex sodium delayed-release  250 mg Oral Q8H KIKI    FLUoxetine  20 mg Oral Daily    gabapentin  300 mg Oral TID    LORazepam  0.5 mg Oral TID    melatonin  10 mg Oral At Bedtime    miconazole   Topical BID    mineral oil-hydrophilic petrolatum   Topical BID IS    naproxen  250 mg Oral BID w/meals    OLANZapine zydis  15 mg Oral BID    Or    OLANZapine  10 mg Intramuscular BID " "   polyethylene glycol  17 g Oral BID    sennosides  2 tablet Oral BID    tamsulosin  0.4 mg Oral Daily          Allergies:     Allergies   Allergen Reactions    Bee Venom Unknown    Montelukast Unknown    Phenothiazines Unknown          Labs:     No results found for this or any previous visit (from the past 24 hour(s)).                 Psychiatric Examination:     /67 (BP Location: Right arm, Patient Position: Sitting)   Pulse 91   Temp 98  F (36.7  C) (Oral)   Resp 18   Ht 1.753 m (5' 9\")   Wt 83.4 kg (183 lb 14.4 oz)   SpO2 98%   BMI 27.16 kg/m    Weight is 183 lbs 14.4 oz  Body mass index is 27.16 kg/m .    Weight over time:  Vitals:    07/03/23 1100 07/30/23 0902 08/13/23 0900 08/26/23 0835   Weight: 81.6 kg (179 lb 12.8 oz) 86.5 kg (190 lb 9.6 oz) 85.7 kg (188 lb 14.4 oz) 82.8 kg (182 lb 7 oz)    09/07/23 0900 09/26/23 1729   Weight: 83.7 kg (184 lb 8 oz) 83.4 kg (183 lb 14.4 oz)       Orthostatic Vitals         Most Recent      Sitting Orthostatic /61 07/25 0800    Sitting Orthostatic Pulse (bpm) 85 07/25 0800          Cardiometabolic risk assessment. 06/07/23    Reviewed patient profile for cardiometabolic risk factors    Date taken /Value  REFERENCE RANGE   Abdominal Obesity  (Waist Circumference)   See nursing flowsheet Women ?35 in (88 cm)   Men ?40 in (102 cm)      Triglycerides  No results found for: TRIG    ?150 mg/dL (1.7 mmol/L) or current treatment for elevated triglycerides   HDL cholesterol  No results found for: HDL]   Women <50 mg/dL (1.3 mmol/L) in women or current treatment for low HDL cholesterol  Men <40 mg/dL (1 mmol/L) in men or current treatment for low HDL cholesterol     Fasting plasma glucose (FPG) Lab Results   Component Value Date     05/26/2023      FPG ?100 mg/dL (5.6 mmol/L) or treatment for elevated blood glucose   Blood pressure  BP Readings from Last 3 Encounters:   09/29/23 104/67   05/26/23 97/55    Blood pressure ?130/85 mmHg or treatment for " "elevated blood pressure   Family History  See family history     Mental Status Exam:  Appearance: awake, alert, unkempt, lying in bed. No evidence of pain of acute distress.   Attitude:  uncooperative  Eye Contact:  fair, less intense, better duration  Mood:  \"okay\"  Affect:  mood incongruent, content  Speech: mostly coherent  Psychomotor Behavior:  Ongoing bilateral hand tremor and jaw tremor. No evidence of dystonia, or tics.  Throught Process: linear, illogical, concrete  Associations:  no loosening of associations present  Thought Content:  Delusions and AH suspected. Evidence of obsessive thinking and likely compulsions to harm others.   Insight:  limited  Judgement:  poor  Oriented to:  self, place  Attention Span and Concentration:  fair  Recent and Remote Memory:  poor  Gait: patient lying in bed         Precautions:     Behavioral Orders   Procedures    Assault precautions    Code 1 - Restrict to Unit    Code 2    Code 2 - 1:1 Staff Supervision     For ECT only    Electroconvulsive therapy     Series of up to 12 treatments. Begin Date: 8/2/23     Treating Psychiatrist providing ECT:  unknown     Notified on:  7/28/23 via this order  NOTE: We received verbal confirmation from [facility] that Lorenzo Pavon has been approved but awaiting official court order and risk management's review  Initial consult was completed by Dr. Hicks on 7/18/23    Electroconvulsive therapy     Series of up to 12 treatments. Begin Date: 8/2/23     Treating Psychiatrist providing ECT:  Dominga     Notified on:  8/2/23    Elopement precautions    Fall precautions    Occupational Therapy on the Unit     Order Specific Question:   Reason for Consult     Answer:   Eval of thought process, functional skills and behavior     Order Specific Question:   Course of Action:     Answer:   Evaluation only     Order Specific Question:   Treatment Prescription:     Answer:   CPT requested    Routine Programming     As clinically indicated    Self " Injury Precaution    Status 15     Every 15 minutes.    Status Individual Observation     Patient SIO status reviewed with team/RN.  Please also refer to RN/team documentation for add'l detail.    -SIO staff to monitor following which have contributed to patient being on SIO:  Agitation  Pt is impulsive   Pt has ran out of his room naked  Pt has Parkinson symptoms place him in a high fall risk  Pt has verbal outburst of sexual and threaten statements  Pt requires immediate redirection when masturbating    -Possible interventions SIO staff could use to support patient's treatment progress:  Engage pt in activities    -When following observed, team will review discontinuation of SIO:  Absence of aggression toward others for > 24 hours    Comments: SIO 1:1     Order Specific Question:   CONTINUOUS 24 hours / day     Answer:   5 feet     Order Specific Question:   Indications for SIO     Answer:   Severe intrusiveness     Order Specific Question:   Indications for SIO     Answer:   Assault risk    Suicide precautions     Patients on Suicide Precautions should have a Combination Diet ordered that includes a Diet selection(s) AND a Behavioral Tray selection for Safe Tray - with utensils, or Safe Tray - NO utensils            Diagnoses:     #Unspecified psychosis likely schizophrenia per history,  #Unspecified encephalopathy   -R/O catatonia   -Episodes of unresponsiveness, concern for PNES   #Parkinsonism 2/2 neuroleptic medications, rule out Parkinson's Disease  -rule out major neurocognitive disorder (refused to answer much of assessment)  #Urinary retention and BPH  -Possible UTI -- UC resulted on 5/25 w/out growth  #Hx of prolonged QTc with clozapine  #Mild thrombocytopenia  #R/O OCD         Assessment and hospital summary:  Patient was admitted to psychiatric unit for safety, stabilization and medication management. He has had schizophrenia since the 1980. He was on Clozaril x 25 years, and it was tapered and  discontinued on May 7, 2023  due to prolonged qtc of 527, and his psychotic  symptoms have worsened since then. Sinemet was also discontinued recently due to concerns that it was contributing to paranoia and AH. He is agitated, aggressive, dangerous to self and others. He remains on SIO 2 to 1, and is under psychiatric emergency and court hold. There are concerns for organic etiology given pattern of sundowning, history of parkinsonism, and ongoing disorientation/confusion. : EKG repeated, cardiology consult regarding safety of resuming clozapine in the context of prolonged QTc and refractory schizophrenia pending response. Per cardiology, correct QTc is no more than 460. They do not have concerns about AP retrial. Neurology IP consult placed for evaluation of sundowning and cognitive decline secondary to Sinemet discontinuation. MSSA initiated. Per Neurology, discontinuation of Sinemet would not account for these symptoms. They do not recommend retrial at this time. On , discussed complex case at length with Dr. Hatch (primary provider on geriatic unit) who provided recs (see second opinion note dated ). Depakote initiated, though needed to be reduced due to side effect of thrombocytopenia. Melatonin was increased to 10 mg at bedtime. : Clozapine titration continued. Its possible that clozapine dose is contributing to worsened compulsive behaviors noted throughout hospitalization which could improve with lowering the dose. ECT initiated due to treatment refractory psychosis and ongoing symptoms despite therapeutic dose of clozapine (650 mg daily). ECT initiated on 23 and pt completed 11 total treatments, but ECT stopped on  due to lack of improvement and distress it was causing patient.     Chart reviewed which revealed the followin2022: He was on clozapine, Seroquel, Cymbalta, and Carbidopa-levodopa and hospitalized for pneumonia. Psych consulted and Seroquel was stopped. Treated  with Abx and discharged to TCU.     11/2022: Hospitalized at [place]. Per chart review:    Vinod Lee is a 61 yo male with longstanding history of schizophrenia. He was admitted from [facility] ED, where he presented due to increased delusional thoughts while on a visit to his parents for Thanksgiving. He began experiencing increasing paranoid thoughts that someone might be poisoning him and began fearing that he might accidentally harm someone. He reported to his parents that he was having thoughts about hitting someone or beating someone up and told them he could not guarantee they would be safe with him. Parents encouraged patient to go to the ED, which he did voluntarily.     Per patient report and chart review, he was hospitalized several times in the 1980s and reports he was civilly committed at one point in the 1980s, however he has been quite stable for the past several decades. He reports he will experience some paranoia and delusional thinking at times, but generally has good insight into his symptoms. He has been maintained on clozapine for about 25 years and prior to several months ago was also concurrently prescribed quetiapine.      In the last several months, patient has had a number of medication changes. Approximately 6 months ago, patient was diagnosed with parkinsonism related to antipsychotic use and was started on carbidopa-levidopa. He experienced no improvement in tremors, and thus carbidopa- levidopa dose was increased 1.5 weeks ago.      In addition, patient was medically hospitalized in August 2022 for confusion, weakness, repeated falls, ongoing weight loss, and SOB. He was found to have a cavitary lesion of the lung and suspected aspiration pneumonia. He was treated with antibiotics. At that time, he was taking current dose of clozapine and duloxetine, as well as propranolol ER, quetiapine, gabapentin, and clonazepam. Psychiatry was consulted due to concern for oversedation, and  "propranolol ER, gabapentin, and quetiapine were discontinued. Conazepam was reduced from 0.5 mg TID to BID dosing and recommended to be discontinued, however, it does not appear this occurred. His sedation improved significantly. QTc prolongation was also noted, but improved throughout his stay. He was ultimately discharged to a TCU for several months before returning home. He reports his weakness has greatly improved and states he \"graduated\" physical therapy, though he continues to be diligent in completed recommended exercises.      He reports that over the past several months, his delusions and anxiety have been worse than usual, with symptoms becoming much more acute since the carbidopa-levidopa dose was increased. He has felt increasingly paranoid about being poisoned, noting this is a delusion that has been stable over time, but was previously less intrusive. He has insight during the interview that his paranoid thinking is not reality based, but states it has been harder to separate delusions from reality and he becomes very worried that he might hurt someone, despite no history of violent behavior. He reports that the paranoia about his food being poisoned in combination with the tremors in his hands have made it difficult for him to eat. He has also had quite low appetite for the past 6 months to a year . He reports that due to the combination of these factors, he has lost about 50 pounds in the last year.He denies any problems with sleep initiation or maintenance. He reports energy is fairly good and \"better than it used to be.\" He reports some short term memory issues and on interview, does have some difficulty remembering details of recent events. He is unsure if he has received cognitive testing in the past.    He denies any suicidal ideation and reports he has not experienced SI for decades. He denies homicidal thoughts and is very clear that he had and has no desire to harm anyone else, but was " "afraid he might somehow do so. He denies any hallucinations and states \"that's never been a problem for me.\" He is not observed responding to internal stimuli. He is alert and oriented in all spheres.        ========    BRIEF HOSPITAL COURSE: This 62 y.o. male admitted 11/25/2022 to  [facility] for the assessment and treatment of the above presentation. This was a voluntary admission.     In summary, he was tapered off the Sinemet, which he reports to be both ineffective and potentially worsening the anxiety and paranoia ideations. Instead Benadryl 25 mg TID was started to help with extrapyramidal side effects. He struggled with sleep during his stay here. Remeron 7.5mg QHS was tried without success. Seroquel 100mg qhs was restarted with addition of suvorexant 10mg qhs. Given his Seroquel PRN use prior history of prolonged QTC, he will benefit from another EKG repeat on the medical unit.     Unfortunately, he tested positive for influenza A and developed hypoxemia. Now on 2L oxygen with desats when prone requiring 3L oxygen. Subsequently, the plan will to be tapering him off clonazepam due to hypoxia (and also prior plan to taper). The patient tolerated these changes without side effects.     The patient minimally benefited from the structured therapeutic milieu as he attended programming seldom as he tested positive for influenza, he needed to isolate with droplet precautions. Pt was engaged and worked on issues that were triggering/brought pt to the hospital and has excellent insight into his anxiety and paranoid delusions. Pt denied suicidal ideations throughout all/majority of their hospitalization. Pt denied HI throughout all of hospitalization. There were no concerns with behavioral disruptions/outbursts. The patient has shown improvement in general.     At the time of discharge, hospitalization course was reviewed with Vinod Lee with plan to transfer to medicine. Please consult psychiatry and I will " continue to follow while patient is on the medical unit. He is now off sinemet since 11/29 21:00 and I would expect anxiety and paranoia to improve more moving forward. Psychiatrically, once anxiety and paranoia is baseline, can D/C to home once medically stable. He DOES NOT NEED A 1:1 on the medical unit from a mental health perspective, we initiated 1:1 11/30/2022 due to significant fatigue, gait instability (he was unable to stand without assist) and feeding assist.    04/2023: Clozapine was gradually tapered and discontinued, unclear reasons why it was discontinued per outside records, though Dr. Portillo's H&P indicates that it was due to prolonged QTc.       Today's Changes:  Renewed SIO  Monitor oral intake closely  Monitor BMs closely    Per Neurology Recs:    Recommendations  -No current indications to change medications from a neurologic perspective  - Optimizing his psychiatric medication and treatment is currently more important than optimization of parkinsonism  - Please contact neurology if any new questions arise, or to discuss the assessment described above     Thank you for involving Neurology in the care of Vinod Lee.  Please do not hesitate to call with questions.      Kranthi Perdue MD    Target psychiatric symptoms and interventions:  -Psych emergency declared on 5/27, now fully committed  - Depakote 250 mg TID, restarted on 8/26. Cannot increase beyond this dose due to thrombocytopenia at higher doses  -Continue clozapine as above  -Continue scheduled Zyprexa 15mg BID  -Continue 1:1 for safety of staff and patients, reduced from 2:1 7/23/23  - weekly CBC to monitor platelets    -Continue Gabapentin 300 mg TID as staff believe it has been effective for treatment of his anxiety    Risks, benefits, and alternatives discussed at length with patient.     Acute Medical Problems and Treatments:  Rhinovirus / enterovirus URI  Concern for pneumonia  On 9/23, patient was reported to be hypoxic with  an SpO2 of 89% with RR of 28, as well as had some low-grade fevers.  Associated symptoms of wet sounding cough.  COVID was negative.  Chest x-ray with possible infection versus atelectasis in RLL.  Patient was initially started on doxycycline on 9/24.  Due to concern for clinical worsening, patient was reassessed by provider on 9/25.  At that time due to patient increase drooling while on Clozaril and him laying supine for much of the day, concerns were raised for aspiration pneumonia, thus he was started on Augmentin as well.   - sputum culture if able, pending  - cont Doxycycline 100 mg twice daily for 5 days (9/24-9/28)  - cont augmentin 875-125 mg BID x 5 days (9/25-9/29)  - Albuterol inhaler as needed cough, wheezing, shortness of breath  - APAP every 4 hours as needed fevers, pain  - I-S q shift, as tolerated     Hx cavitary lesion to SOPHY  Pulmonary nodule to SOPHY  Seen August 2022 in ED. CT chest with contrast negative for PE but showed cavitary masslike process in the left upper lung, over an area measuring 9.5 x 6.2 x 5.2 cm, indeterminant pulmonary nodule measuring 9 mm in the anterior segment of the left upper lobe, nonspecific left hilar and mediastinal lymphadenopathy, small-moderate left pleural effusion.  Cavitary lesion also seen on chest x-ray.  Patient was given IVF, Rocephin, Zithromax, and admitted for further evaluation. Thoracentesis was done and negative cultures. ID was consulted and managed Abx. The patient was initially on zosyn and doxycycline but narrowed. Strep pneumo, legionella, and HIV testing done (negative). Blood cultures were negative as well. The patient did well.  Follow-up chest CT showed near complete interval resolution of the previously described left upper lobe abscess.  Residual 12 x 10 mm spiculated nodular opacity with adjacent scarring within this region, probably represents sequela of previous infection, although underlying malignancy not excluded.  - Recommend  follow-up outpatient with PCP for continued monitoring.    Groin rash  Reassessed groin rash, previously assessed to have tinea cruris with possible intertrigo. Rash appears significantly improved. Recommend continuing with miconazole powder BID. Residual mild erythema may be still improving rash, vs intertrigo from moisture  - Interdry topical therapy, apply one sheet to each skin fold in groin area. Change every 3-5 days.   - Medicine service will follow-up next week and then PRN, for monitoring for rash       Possible Dementia  Neurology consult placed on 6/8 for evaluation of sundowning and cognitive decline secondary to Sinemet discontinuation: Resuming Sinemet not recommended for behavioral concerns. Please see Neuro note for details.     Tremors  longstanding though appeared to worsen following initiation of clozapine  - Ativan 0.5 mg TID  - Cogentin 1 mg BID added on 8/25 with overall improvement noted    Neuro re-consulted on 9/20 and per their documentation:    Impression  Mr. Lee is a 63 y.o. man with a long history of psychosis including requiring commitment and psychiatric hospitalizations in the 1980s.  Given his long history, this is less consistent with an organic cause of psychosis (as something that would be primarily neurologic in origin would be expected to have a more rapid progression of neurologic deterioration, rather than this long period of psychiatric symptoms).  I am not able to appreciate any evidence for a neurologic basis of his psychosis given the time course of his psychiatric illness.      Regarding his difficulty with word finding, I am not able to elicit this on exam.  However since he described being symptomatic earlier today, it is helpful that I am able to go through a full assessment of his language in terms of comprehension, repetition, naming objects with increasing difficulty without being able to appreciate any deficits.     He does continue to have a tremor that is  most prominent posteriorly as well as with action, in addition to a chin tremor.  He also does have rigidity in the bilateral upper extremities consistent with the diagnosis of parkinsonism.  He is currently not on levodopa or dopamine agonists which I continue to agree with.  Since his psychiatric symptoms are much more bothersome right now than his parkinsonism, I will continue to recommend avoiding dopaminergic medications.     Recommendations  -No current indications to change medications from a neurologic perspective  - Optimizing his psychiatric medication and treatment is currently more important than optimization of parkinsonism  - Please contact neurology if any new questions arise, or to discuss the assessment described above     Thank you for involving Neurology in the care of Vinod Lee.  Please do not hesitate to call with questions.      Kranthi Perdue MD      Thrombocytopenia, resolved  Likely secondary to Depakote.  According to the, incidents of Depakote induced thrombocytopenia as 27%.  Depakote dose reduced from 1000 twice daily to 250 3 times daily on 7/28/2023 with subsequent resolution of thrombocytopenia  - weekly CBCs    Constipation  Likely worsened by clozapine, improved since modifying regimen.   - daily miralax   - daily Senokot 2 tablets BID      Right first toe fracture:  Seen by Ortho on 6/16:  Per note: Vinod Lee is a 62 year old  male w/ PMHx complex psychiatric history who has a fracture to the distal phalanx of the right toe after a recent trauma to the foot the patient reports.  Patient denies any other pain or discomfort.  Images reviewed and plan will be for irrigation of the popped fracture blister with sterile saline and the toes should be dressed and a 4 x 4 gauze dressing.  Patient will need a postop shoe which she can be weightbearing as tolerated in.  Would recommend a course of antibiotics for approximately 7 days.  Would recommend Augmentin or clindamycin.   "Podiatry will be made aware of patient and will see patient while he is admitted or if patient is discharged in the near future a follow-up appointment will need to be established.     Remainder of care per primary team.  Primary team should make sure that patient is up-to-date on his tetanus shot.  If not patient will require a booster shot.    Hx of prolonged QTc:  Continue weekly EKGs. See above for details.   Discussed most recent EKG findings with Cardiology on 8/25. Corrected QTc is 501 from EKG on 8/23. Per cardiology, repeat EKG today. If corrected QTc is > 500, will need to reduce clozapine. If < 500, OK to keep clozapine at current dose. UPDATE/ADDENDUM 9/6: Repeat EKG with corrected QTc of 440. No need to adjust clozapine dose per cards.     Urinary retention, resolved  Per patient care order:   If patient is refusing straight caths, please notify IM provider immediately to determine whether this constitutes a medical emergency. If IM declares medical emergency, may restrain patient for straight cath. Discussed this with patient's parents/substitute decision makers on 6/7 who are in support of this plan.    # Psoriasis hx  # Purplish discoloration of B/LE feet-resolved   Discussed with Dr. Rene and bedside RN regarding rash on right foot that appears and appears to be purplish in color and almost \"petechiae.\" It was noted during interview, but seemed to be resolving upon walking. Within the past 24 hrs, pt has had ECT and seemed more confused than usual. Pt seems to have had a right great toe wound with recent right great toe fracture seen by Medicine on 7/16. Seen on 8/4 with improving color on B/L legs at rest and appears to have small, scaly patches of varying coin sizes that have not grown past marked borders. See photos. Per further chart review, has had psoriasis history. WBC WNL, no fevers, HDS. This appears to more consistent with a psoriasis like picture.   - Start triamcinolone topical " 0.025%   -Apply twice daily until lesions for 2 weeks or until lesions resolve/  -Monitor for skin thinning, striae, rebound lesion flares.   - Start Eucerin cream              - Apply 30 minutes PRIOR to triamcinolone topical cream above or PRN as needed if dry skin/after bathing   - Medicine will sign off.   - For worsening pain, spread, itchiness, fevers, please contact Medicine and possibly Dermatology.    Benzodiazepine dependence:  Phenobarbital taper completed shortly after patient's admission    Pertinent labs/imaging:  Weekly CBC with diff     Behavioral/Psychological/Social:  - Encourage unit programming    Safety:  - Continue precautions as noted above  - Status 15 minute checks  - Continue 1:1 for staff and pt safety    Legal Status: Fully committed with [facility] and [facility]. [place] medications: clozapine, olanzapine, risperidone, quetiapine    Disposition Plan   Reason for ongoing admission: No safe alternative at this time  Discharge location: TBD. Will likely need memory care unit  Discharge Medications: not ordered  Follow-up Appointments: not scheduled    Entered by: Ingrid Rhodes MD on 09/29/2023  at 9:09 AM

## 2023-09-30 ENCOUNTER — APPOINTMENT (OUTPATIENT)
Dept: GENERAL RADIOLOGY | Facility: CLINIC | Age: 63
DRG: 885 | End: 2023-09-30
Attending: PHYSICIAN ASSISTANT
Payer: COMMERCIAL

## 2023-09-30 LAB
BASOPHILS # BLD AUTO: 0.1 10E3/UL (ref 0–0.2)
BASOPHILS NFR BLD AUTO: 1 %
EOSINOPHIL # BLD AUTO: 0 10E3/UL (ref 0–0.7)
EOSINOPHIL NFR BLD AUTO: 0 %
ERYTHROCYTE [DISTWIDTH] IN BLOOD BY AUTOMATED COUNT: 14.8 % (ref 10–15)
HCT VFR BLD AUTO: 44.4 % (ref 40–53)
HGB BLD-MCNC: 12.6 G/DL (ref 13.3–17.7)
IMM GRANULOCYTES # BLD: 0.2 10E3/UL
IMM GRANULOCYTES NFR BLD: 2 %
LYMPHOCYTES # BLD AUTO: 1.6 10E3/UL (ref 0.8–5.3)
LYMPHOCYTES NFR BLD AUTO: 18 %
MCH RBC QN AUTO: 27.4 PG (ref 26.5–33)
MCHC RBC AUTO-ENTMCNC: 28.4 G/DL (ref 31.5–36.5)
MCV RBC AUTO: 97 FL (ref 78–100)
MONOCYTES # BLD AUTO: 0.9 10E3/UL (ref 0–1.3)
MONOCYTES NFR BLD AUTO: 10 %
NEUTROPHILS # BLD AUTO: 6.3 10E3/UL (ref 1.6–8.3)
NEUTROPHILS NFR BLD AUTO: 69 %
NRBC # BLD AUTO: 0 10E3/UL
NRBC BLD AUTO-RTO: 0 /100
PLATELET # BLD AUTO: 193 10E3/UL (ref 150–450)
RBC # BLD AUTO: 4.6 10E6/UL (ref 4.4–5.9)
WBC # BLD AUTO: 9.1 10E3/UL (ref 4–11)

## 2023-09-30 PROCEDURE — 250N000009 HC RX 250: Performed by: PSYCHIATRY & NEUROLOGY

## 2023-09-30 PROCEDURE — 85025 COMPLETE CBC W/AUTO DIFF WBC: CPT | Performed by: PSYCHIATRY & NEUROLOGY

## 2023-09-30 PROCEDURE — 250N000013 HC RX MED GY IP 250 OP 250 PS 637: Performed by: PHYSICIAN ASSISTANT

## 2023-09-30 PROCEDURE — 99232 SBSQ HOSP IP/OBS MODERATE 35: CPT | Performed by: PHYSICIAN ASSISTANT

## 2023-09-30 PROCEDURE — 124N000002 HC R&B MH UMMC

## 2023-09-30 PROCEDURE — 250N000013 HC RX MED GY IP 250 OP 250 PS 637: Performed by: PSYCHIATRY & NEUROLOGY

## 2023-09-30 PROCEDURE — 71045 X-RAY EXAM CHEST 1 VIEW: CPT | Mod: 26 | Performed by: RADIOLOGY

## 2023-09-30 PROCEDURE — 250N000013 HC RX MED GY IP 250 OP 250 PS 637: Performed by: STUDENT IN AN ORGANIZED HEALTH CARE EDUCATION/TRAINING PROGRAM

## 2023-09-30 PROCEDURE — 71045 X-RAY EXAM CHEST 1 VIEW: CPT

## 2023-09-30 PROCEDURE — 36415 COLL VENOUS BLD VENIPUNCTURE: CPT | Performed by: PSYCHIATRY & NEUROLOGY

## 2023-09-30 RX ORDER — ALBUTEROL SULFATE 90 UG/1
2 AEROSOL, METERED RESPIRATORY (INHALATION) EVERY 6 HOURS PRN
Status: DISCONTINUED | OUTPATIENT
Start: 2023-09-30 | End: 2023-11-13 | Stop reason: HOSPADM

## 2023-09-30 RX ADMIN — Medication 10 MG: at 20:29

## 2023-09-30 RX ADMIN — WHITE PETROLATUM: 1.75 OINTMENT TOPICAL at 08:57

## 2023-09-30 RX ADMIN — ATROPINE SULFATE 2 DROP: 10 SOLUTION/ DROPS OPHTHALMIC at 14:57

## 2023-09-30 RX ADMIN — WHITE PETROLATUM: 1.75 OINTMENT TOPICAL at 20:29

## 2023-09-30 RX ADMIN — ATROPINE SULFATE 2 DROP: 10 SOLUTION/ DROPS OPHTHALMIC at 20:30

## 2023-09-30 RX ADMIN — POLYETHYLENE GLYCOL 3350 17 G: 17 POWDER, FOR SOLUTION ORAL at 08:39

## 2023-09-30 RX ADMIN — OLANZAPINE 15 MG: 10 TABLET, ORALLY DISINTEGRATING ORAL at 08:39

## 2023-09-30 RX ADMIN — CLOZAPINE 650 MG: 100 TABLET, ORALLY DISINTEGRATING ORAL at 13:26

## 2023-09-30 RX ADMIN — MICONAZOLE NITRATE: 20 POWDER TOPICAL at 08:57

## 2023-09-30 RX ADMIN — DIVALPROEX SODIUM 250 MG: 250 TABLET, DELAYED RELEASE ORAL at 05:49

## 2023-09-30 RX ADMIN — ATROPINE SULFATE 2 DROP: 10 SOLUTION/ DROPS OPHTHALMIC at 08:40

## 2023-09-30 RX ADMIN — AMOXICILLIN AND CLAVULANATE POTASSIUM 1 TABLET: 875; 125 TABLET, FILM COATED ORAL at 08:39

## 2023-09-30 RX ADMIN — NAPROXEN 250 MG: 250 TABLET ORAL at 20:28

## 2023-09-30 RX ADMIN — BENZTROPINE MESYLATE 1 MG: 1 TABLET ORAL at 08:39

## 2023-09-30 RX ADMIN — LORAZEPAM 0.5 MG: 0.5 TABLET ORAL at 08:39

## 2023-09-30 RX ADMIN — ACETAMINOPHEN 650 MG: 325 TABLET, FILM COATED ORAL at 22:43

## 2023-09-30 RX ADMIN — TAMSULOSIN HYDROCHLORIDE 0.4 MG: 0.4 CAPSULE ORAL at 08:39

## 2023-09-30 RX ADMIN — NAPROXEN 250 MG: 250 TABLET ORAL at 08:38

## 2023-09-30 RX ADMIN — CYANOCOBALAMIN TAB 1000 MCG 1000 MCG: 1000 TAB at 08:39

## 2023-09-30 RX ADMIN — GABAPENTIN 300 MG: 300 CAPSULE ORAL at 13:26

## 2023-09-30 RX ADMIN — SENNOSIDES 2 TABLET: 8.6 TABLET ORAL at 08:39

## 2023-09-30 RX ADMIN — DIVALPROEX SODIUM 250 MG: 250 TABLET, DELAYED RELEASE ORAL at 20:28

## 2023-09-30 RX ADMIN — ALBUTEROL SULFATE 2 PUFF: 90 AEROSOL, METERED RESPIRATORY (INHALATION) at 05:49

## 2023-09-30 RX ADMIN — LORAZEPAM 0.5 MG: 0.5 TABLET ORAL at 20:28

## 2023-09-30 RX ADMIN — DIVALPROEX SODIUM 250 MG: 250 TABLET, DELAYED RELEASE ORAL at 13:26

## 2023-09-30 RX ADMIN — GABAPENTIN 300 MG: 300 CAPSULE ORAL at 20:28

## 2023-09-30 RX ADMIN — ALBUTEROL SULFATE 2 PUFF: 90 AEROSOL, METERED RESPIRATORY (INHALATION) at 14:57

## 2023-09-30 RX ADMIN — GABAPENTIN 300 MG: 300 CAPSULE ORAL at 08:39

## 2023-09-30 RX ADMIN — OLANZAPINE 15 MG: 10 TABLET, ORALLY DISINTEGRATING ORAL at 20:28

## 2023-09-30 RX ADMIN — BENZTROPINE MESYLATE 1 MG: 1 TABLET ORAL at 20:28

## 2023-09-30 RX ADMIN — FLUOXETINE 20 MG: 20 CAPSULE ORAL at 08:39

## 2023-09-30 RX ADMIN — LORAZEPAM 0.5 MG: 0.5 TABLET ORAL at 13:26

## 2023-09-30 ASSESSMENT — ACTIVITIES OF DAILY LIVING (ADL)
ADLS_ACUITY_SCORE: 67

## 2023-09-30 NOTE — PLAN OF CARE
Problem: Psychotic Symptoms  Goal: Psychotic Symptoms  Description: Signs and symptoms of listed problems will be absent or manageable.  Outcome: Progressing   Goal Outcome Evaluation:       Patient alert and oriented blood pressure low at 1600  91/63. Blood pressure rechecked at bedtime was 95/61. Refused blood draw this evening   re approached patient  X 2, patient still refused. Was coughing a lot around 1900, patient was given PRN TESSALON by another staff nurse. Patient spent most of the shift his room. Affect flat, mood was calmed and cooperative, Patent denied all mental health symptoms. Appetite was good ate 100% of supper. No difficulty with voiding, SIO staff reported that patient was using the rest room. Patient denied pain or any discomfort.  PRN cough medication was effective. Behavior was good, no safety concern.

## 2023-09-30 NOTE — PLAN OF CARE
"Problem: Adult Behavioral Health Plan of Care  Goal: Absence of New-Onset Illness or Injury  Outcome: Progressing    Patient is alert, calm and cooperative. Patient remains on 1:1 for needing assistance in ADLs and meals. He continues to isolate in his room; avoids social contact; engages with SIO staff with prompting. Patient has flat affect and \"good\" mood. He denies all MH symptoms. No evidence of agitation. He is cooperative with cares.     Patient is eating poorly (15 % breakfast and 75% for lunch), hydrating, and sleeping adequately.     Patient is medication compliant with reminders. Medication side effects were not observed or reported this shift.  Continues to have intermittent cough. Use of spirometry is encouraged and was able to do this shift. Chest xray is order completed; showing slightly reduced lung volumes. Improved medial patchy right basilar opacity.Sputum collection started this shift; placed sterile cup in room for patient to spit. Patient denies pain/physical discomfort. No acute medical concern. /70 (BP Location: Left arm, Patient Position: Sitting, Cuff Size: Adult Regular)   Pulse 87   Temp 97.8  F (36.6  C) (Temporal)   Resp 16   Ht 1.753 m (5' 9\")   Wt 83.4 kg (183 lb 14.4 oz)   SpO2 99%   BMI 27.16 kg/m      Addendum:  Patient had BM this shift per SIO staff. Patient denies that he has a loose stool or diarrhea. Patient didn't void this shift. He was prompted to void several times.   "

## 2023-09-30 NOTE — PLAN OF CARE
"Patient was observed resting in room at the start of the shift. Patient slept for 6.75  hours. No concerns or issues noted during this shift. Patient took his scheduled medications. His vitals were assessed during medication administration and was with normal limits. He continues to cough.    Blood pressure 118/77, pulse 86, temperature 97.1  F (36.2  C), temperature source Temporal, resp. rate 16, height 1.753 m (5' 9\"), weight 83.4 kg (183 lb 14.4 oz), SpO2 96 %.    "

## 2023-09-30 NOTE — PROGRESS NOTES
[facility]    Medicine Progress Note - Internal Medicine    Date of Admission:  5/26/2023    Assessment & Plan   Mr. Vinod Lee is a 62 y.o. male with a history of schizophrenia, depression, Parkinsonism, essential tremor, hypertension, SOPHIA, GERD, and BPH. He was admitted to inpatient psychiatry on 5/26/2023 for psychosis. Concern for pneumonia as of 9/24/23, medicine following up for this.     Updates today:   Assessed patient at bedside. Patient continues to have dry cough per staff and patient. Patient appears clinically stable.  He appears to be breathing comfortably, with continued wet cough. Lungs sound improved and overall clear on auscultation.   Repeat CXR: improved area of opacity to right basilar lung compared to previous  CTM, no indication for further treatment at this time. Please inform medicine if respiratory status worsens.   Reassessed groin rash, previously assessed to have tinea cruris with possible intertrigo. Rash appears improved in terms of tinea but there is still erythema present. Residual mild erythema may be still improving rash, vs intertrigo from moisture  Continue with miconazole powder BID.   Interdry topical therapy, apply one sheet to each skin fold in groin area. Change every 3-5 days.     Rhinovirus / enterovirus URI  Concern for pneumonia  On 9/23, patient was reported to be hypoxic with an SpO2 of 89% with RR of 28, as well as had some low-grade fevers.  Associated symptoms of wet sounding cough.  COVID was negative.  Chest x-ray with possible infection versus atelectasis in RLL.  Patient was initially started on doxycycline on 9/24.  Due to concern for clinical worsening, patient was reassessed by provider on 9/25.  At that time due to patient increase drooling while on Clozaril and him laying supine for much of the day, concerns were raised for aspiration pneumonia, thus he was started on Augmentin as well.  Patient completed  5-day course of doxycycline Augmentin, on 9/28 and 9/29 respectively. Continued cough, repeat CXR on 9/30 with improved area of opacity to right basilar lung compared to previous  - Albuterol inhaler to as needed cough, wheezing, shortness of breath  - I-S q shift, as tolerated  - CTM, no indication for further treatment at this time. Please inform medicine if respiratory status worsens.     Groin rash  Reassessed groin rash, previously assessed to have tinea cruris with possible intertrigo. Rash appears significantly improved. Recommend continuing with miconazole powder BID. Residual mild erythema may be still improving rash, vs intertrigo from moisture  - Interdry topical therapy, apply one sheet to each skin fold in groin area. Change every 3-5 days.   - continue miconazole powder BID  - Medicine service will follow-up next week and then PRN, for monitoring for rash    Medicine service will continue to follow for improvement of rash.    The patient's care was discussed with patient and bedside nurse.    Ernestine Jimenez PA-C  Hospitalist Service  [facility]  Securely message with [employer] (more info)  Text page via [facility] Paging/Directory   ______________________________________________________________________    Interval History   Patient was seen at the bedside.  He reports continued cough, which is confirmed by his one-to-one  attending.  He otherwise denies any new symptoms or complaints, does not have any shortness of breath.  He states that his groin rash is still quite itchy.    Physical Exam   Vital Signs: Temp: 97.8  F (36.6  C) Temp src: Temporal BP: 115/70 Pulse: 87   Resp: 16 SpO2: 99 % O2 Device: None (Room air)    Weight: 183 lbs 14.4 oz    GENERAL: older adult male seen resting reclined and then sitting in bed. NAD.   NEURO / PSYCH: Alert, able to answer questions, but has delusions / inappropriate fixations. No focal deficits. Moves all extremities.   HEENT:  Anicteric sclera. PERRL. Mucous membranes moist.   CV: RRR. S1, S2. No murmurs appreciated.    RESPIRATORY: Effort normal. Lungs CTAB with some faint crackles to lower lobes.   SKIN: No jaundice. Mild erythema to skin folds of groin area.     Medical Decision Making       45 MINUTES SPENT BY ME on the date of service doing chart review, history, exam, documentation & further activities per the note.      Data     I have personally reviewed the following data over the past 24 hrs:    9.1  \   12.6 (L)   / 193     N/A N/A N/A /  N/A   N/A N/A N/A \

## 2023-10-01 PROCEDURE — 250N000013 HC RX MED GY IP 250 OP 250 PS 637: Performed by: PHYSICIAN ASSISTANT

## 2023-10-01 PROCEDURE — 250N000013 HC RX MED GY IP 250 OP 250 PS 637: Performed by: STUDENT IN AN ORGANIZED HEALTH CARE EDUCATION/TRAINING PROGRAM

## 2023-10-01 PROCEDURE — 250N000013 HC RX MED GY IP 250 OP 250 PS 637: Performed by: PSYCHIATRY & NEUROLOGY

## 2023-10-01 PROCEDURE — 250N000013 HC RX MED GY IP 250 OP 250 PS 637

## 2023-10-01 PROCEDURE — 124N000002 HC R&B MH UMMC

## 2023-10-01 RX ADMIN — POLYETHYLENE GLYCOL 3350 17 G: 17 POWDER, FOR SOLUTION ORAL at 08:24

## 2023-10-01 RX ADMIN — SENNOSIDES 2 TABLET: 8.6 TABLET ORAL at 20:17

## 2023-10-01 RX ADMIN — WHITE PETROLATUM: 1.75 OINTMENT TOPICAL at 08:25

## 2023-10-01 RX ADMIN — ATROPINE SULFATE 2 DROP: 10 SOLUTION/ DROPS OPHTHALMIC at 13:08

## 2023-10-01 RX ADMIN — SENNOSIDES 2 TABLET: 8.6 TABLET ORAL at 08:24

## 2023-10-01 RX ADMIN — TAMSULOSIN HYDROCHLORIDE 0.4 MG: 0.4 CAPSULE ORAL at 08:24

## 2023-10-01 RX ADMIN — CYANOCOBALAMIN TAB 1000 MCG 1000 MCG: 1000 TAB at 08:25

## 2023-10-01 RX ADMIN — ATROPINE SULFATE 2 DROP: 10 SOLUTION/ DROPS OPHTHALMIC at 08:26

## 2023-10-01 RX ADMIN — NAPROXEN 250 MG: 250 TABLET ORAL at 18:05

## 2023-10-01 RX ADMIN — OLANZAPINE 15 MG: 10 TABLET, ORALLY DISINTEGRATING ORAL at 20:17

## 2023-10-01 RX ADMIN — BENZTROPINE MESYLATE 1 MG: 1 TABLET ORAL at 08:24

## 2023-10-01 RX ADMIN — FLUOXETINE 20 MG: 20 CAPSULE ORAL at 08:24

## 2023-10-01 RX ADMIN — GABAPENTIN 300 MG: 300 CAPSULE ORAL at 20:16

## 2023-10-01 RX ADMIN — LORAZEPAM 0.5 MG: 0.5 TABLET ORAL at 20:17

## 2023-10-01 RX ADMIN — LORAZEPAM 0.5 MG: 0.5 TABLET ORAL at 13:07

## 2023-10-01 RX ADMIN — DIVALPROEX SODIUM 250 MG: 250 TABLET, DELAYED RELEASE ORAL at 20:17

## 2023-10-01 RX ADMIN — NAPROXEN 250 MG: 250 TABLET ORAL at 08:24

## 2023-10-01 RX ADMIN — BENZTROPINE MESYLATE 1 MG: 1 TABLET ORAL at 20:17

## 2023-10-01 RX ADMIN — GABAPENTIN 300 MG: 300 CAPSULE ORAL at 13:07

## 2023-10-01 RX ADMIN — DIVALPROEX SODIUM 250 MG: 250 TABLET, DELAYED RELEASE ORAL at 13:07

## 2023-10-01 RX ADMIN — LORAZEPAM 0.5 MG: 0.5 TABLET ORAL at 08:24

## 2023-10-01 RX ADMIN — OLANZAPINE 15 MG: 10 TABLET, ORALLY DISINTEGRATING ORAL at 08:25

## 2023-10-01 RX ADMIN — MICONAZOLE NITRATE: 20 POWDER TOPICAL at 08:25

## 2023-10-01 RX ADMIN — POLYETHYLENE GLYCOL 3350 17 G: 17 POWDER, FOR SOLUTION ORAL at 20:17

## 2023-10-01 RX ADMIN — GABAPENTIN 300 MG: 300 CAPSULE ORAL at 08:24

## 2023-10-01 RX ADMIN — DIVALPROEX SODIUM 250 MG: 250 TABLET, DELAYED RELEASE ORAL at 06:11

## 2023-10-01 RX ADMIN — Medication 10 MG: at 20:17

## 2023-10-01 RX ADMIN — BENZONATATE 100 MG: 100 CAPSULE ORAL at 20:50

## 2023-10-01 RX ADMIN — CLOZAPINE 650 MG: 100 TABLET, ORALLY DISINTEGRATING ORAL at 13:07

## 2023-10-01 ASSESSMENT — ACTIVITIES OF DAILY LIVING (ADL)
ADLS_ACUITY_SCORE: 68
ADLS_ACUITY_SCORE: 68
ADLS_ACUITY_SCORE: 67
LAUNDRY: UNABLE TO COMPLETE
ADLS_ACUITY_SCORE: 68
HYGIENE/GROOMING: INDEPENDENT
ORAL_HYGIENE: INDEPENDENT
ADLS_ACUITY_SCORE: 67
ADLS_ACUITY_SCORE: 68
ADLS_ACUITY_SCORE: 68
ORAL_HYGIENE: INDEPENDENT
ADLS_ACUITY_SCORE: 67
ADLS_ACUITY_SCORE: 67
ADLS_ACUITY_SCORE: 68
DRESS: INDEPENDENT
HYGIENE/GROOMING: INDEPENDENT
DRESS: INDEPENDENT
ADLS_ACUITY_SCORE: 67
ADLS_ACUITY_SCORE: 68

## 2023-10-01 NOTE — PLAN OF CARE
Problem: Psychotic Signs/Symptoms  Goal: Improved Behavioral Control (Psychotic Signs/Symptoms)  Outcome: Progressing   Goal Outcome Evaluation:    Vinod has an uneventful evening. He spent much of the time in his room resting or watching TV. He was pleasant and polite with staff. Ate 100% of dinner. Took all scheduled HS medications with pudding and without incident. Needed assistance with feeding, staff are required to hold his cup as as drinks from a straw.   He had a BM this shift and a large void in the toilet. He states he is feeling well and reports he is doing good. During the interaction he did not make any non-sensical statements.  He did start to cough one time tonight and it was contributed to eating pudding followed by water. He denies any other concerns tonight. Continues of SIO staffing for safety. Coloplast inter dry applied to inner groin folds tonight.

## 2023-10-01 NOTE — PLAN OF CARE
Pt asleep at start of shift.     Breathing quiet and unlabored when sleeping.     Pt had no c/o pain or discomfort during the HS.     Appears to have slept 7 hours.     Med compliant with all scheduled medications this shift.     Pt continues on 1:1 SIO/5 ft space distance.     Goal Outcome Evaluation:  Problem: Adult Behavioral Health Plan of Care  Goal: Adheres to Safety Considerations for Self and Others  Intervention: Develop and Maintain Individualized Safety Plan  Recent Flowsheet Documentation  Taken 10/1/2023 0000 by Tameka Hatch, RN  Safety Measures:   1:1 observation maintained   safety rounds completed  Goal: Absence of New-Onset Illness or Injury  Intervention: Identify and Manage Fall Risk  Recent Flowsheet Documentation  Taken 10/1/2023 0000 by Tameka Hatch, RN  Safety Measures:   1:1 observation maintained   safety rounds completed     Problem: Sleep Disturbance  Goal: Adequate Sleep/Rest  Outcome: Progressing

## 2023-10-01 NOTE — PLAN OF CARE
"  Problem: Adult Behavioral Health Plan of Care  Goal: Individualized Daily Interaction Plan (IDIP)  Outcome: Progressing   Goal Outcome Evaluation:    Plan of Care Reviewed With: patient      Pt intermittently spent time in room and out in hallway pacing/walking with SIO staff, while in room he was in bed awake resting, he is alert and oriented but disoriented to situation, soft/quiet and clear speech, VSS stable, voiding with no issues, no BM this shift. Pt presents with flat affect and calm mood, he reported \"I am doing good.\" He denied c/o pain, no anxiety and depression, no SI/HI/AVH, he contracted for safety and was med compliant. He ate less than 50% of breakfast and about 75% of lunch, hydrating adequately. He has less frequent wet cough, pt was agreeable to use of incentive spirometer with with staff assist, no breathing distress noted, oxygen sats at 100% RA. Pt didn't make nonsensical statements, no paranoia   "

## 2023-10-02 PROCEDURE — 250N000013 HC RX MED GY IP 250 OP 250 PS 637: Performed by: STUDENT IN AN ORGANIZED HEALTH CARE EDUCATION/TRAINING PROGRAM

## 2023-10-02 PROCEDURE — 124N000002 HC R&B MH UMMC

## 2023-10-02 PROCEDURE — 250N000013 HC RX MED GY IP 250 OP 250 PS 637: Performed by: PSYCHIATRY & NEUROLOGY

## 2023-10-02 PROCEDURE — 99232 SBSQ HOSP IP/OBS MODERATE 35: CPT | Performed by: PSYCHIATRY & NEUROLOGY

## 2023-10-02 PROCEDURE — 250N000013 HC RX MED GY IP 250 OP 250 PS 637: Performed by: PHYSICIAN ASSISTANT

## 2023-10-02 RX ADMIN — OLANZAPINE 15 MG: 10 TABLET, ORALLY DISINTEGRATING ORAL at 08:44

## 2023-10-02 RX ADMIN — LORAZEPAM 0.5 MG: 0.5 TABLET ORAL at 15:43

## 2023-10-02 RX ADMIN — MICONAZOLE NITRATE: 20 POWDER TOPICAL at 08:46

## 2023-10-02 RX ADMIN — BENZTROPINE MESYLATE 1 MG: 1 TABLET ORAL at 19:50

## 2023-10-02 RX ADMIN — DIVALPROEX SODIUM 250 MG: 250 TABLET, DELAYED RELEASE ORAL at 06:29

## 2023-10-02 RX ADMIN — CLOZAPINE 650 MG: 100 TABLET, ORALLY DISINTEGRATING ORAL at 12:48

## 2023-10-02 RX ADMIN — LORAZEPAM 0.5 MG: 0.5 TABLET ORAL at 08:45

## 2023-10-02 RX ADMIN — ATROPINE SULFATE 2 DROP: 10 SOLUTION/ DROPS OPHTHALMIC at 19:59

## 2023-10-02 RX ADMIN — DIVALPROEX SODIUM 250 MG: 250 TABLET, DELAYED RELEASE ORAL at 19:49

## 2023-10-02 RX ADMIN — GABAPENTIN 300 MG: 300 CAPSULE ORAL at 15:43

## 2023-10-02 RX ADMIN — FLUOXETINE 20 MG: 20 CAPSULE ORAL at 08:44

## 2023-10-02 RX ADMIN — NAPROXEN 250 MG: 250 TABLET ORAL at 17:57

## 2023-10-02 RX ADMIN — TAMSULOSIN HYDROCHLORIDE 0.4 MG: 0.4 CAPSULE ORAL at 08:44

## 2023-10-02 RX ADMIN — POLYETHYLENE GLYCOL 3350 17 G: 17 POWDER, FOR SOLUTION ORAL at 19:49

## 2023-10-02 RX ADMIN — MICONAZOLE NITRATE: 20 POWDER TOPICAL at 20:00

## 2023-10-02 RX ADMIN — BENZTROPINE MESYLATE 1 MG: 1 TABLET ORAL at 08:44

## 2023-10-02 RX ADMIN — DIVALPROEX SODIUM 250 MG: 250 TABLET, DELAYED RELEASE ORAL at 15:43

## 2023-10-02 RX ADMIN — LORAZEPAM 0.5 MG: 0.5 TABLET ORAL at 19:50

## 2023-10-02 RX ADMIN — CYANOCOBALAMIN TAB 1000 MCG 1000 MCG: 1000 TAB at 08:45

## 2023-10-02 RX ADMIN — NAPROXEN 250 MG: 250 TABLET ORAL at 08:44

## 2023-10-02 RX ADMIN — Medication 10 MG: at 19:49

## 2023-10-02 RX ADMIN — OLANZAPINE 15 MG: 10 TABLET, ORALLY DISINTEGRATING ORAL at 19:49

## 2023-10-02 RX ADMIN — SENNOSIDES 2 TABLET: 8.6 TABLET ORAL at 08:44

## 2023-10-02 RX ADMIN — ATROPINE SULFATE 2 DROP: 10 SOLUTION/ DROPS OPHTHALMIC at 15:44

## 2023-10-02 RX ADMIN — SENNOSIDES 2 TABLET: 8.6 TABLET ORAL at 19:49

## 2023-10-02 RX ADMIN — GABAPENTIN 300 MG: 300 CAPSULE ORAL at 19:49

## 2023-10-02 RX ADMIN — GABAPENTIN 300 MG: 300 CAPSULE ORAL at 08:44

## 2023-10-02 RX ADMIN — WHITE PETROLATUM: 1.75 OINTMENT TOPICAL at 17:56

## 2023-10-02 RX ADMIN — POLYETHYLENE GLYCOL 3350 17 G: 17 POWDER, FOR SOLUTION ORAL at 08:46

## 2023-10-02 RX ADMIN — WHITE PETROLATUM: 1.75 OINTMENT TOPICAL at 08:46

## 2023-10-02 ASSESSMENT — ACTIVITIES OF DAILY LIVING (ADL)
ADLS_ACUITY_SCORE: 88
ADLS_ACUITY_SCORE: 68
DRESS: WITH ASSISTANCE
HYGIENE/GROOMING: WITH ASSISTANCE
ADLS_ACUITY_SCORE: 68
ORAL_HYGIENE: WITH ASSISTANCE
ADLS_ACUITY_SCORE: 68

## 2023-10-02 NOTE — PLAN OF CARE
"Patient has been calm and cooperative.Patient was Isolative to his room during this shift, and appears with a flat affect.Patient appears unkempt/unclean and was encouraged to take a a shower but he refused.Patient denied having  SI/SIB/HI, racing thoughts,hallucinations, and thoughts of paranoia. Patient ate 100% of his meal for dinner and no BM reported during this shift. Patient is medication compliant. He continues to have a cough, and was given PRN to help with that. He was encouraged to walk out on the unit and use Incentive spirometer.      Vital signs are stable  Blood pressure 126/74, pulse 98, temperature 98.1  F (36.7  C), temperature source Temporal, resp. rate 16, height 1.753 m (5' 9\"), weight 83.4 kg (183 lb 14.4 oz), SpO2 98 %.      PRN's given this shift: Tessalon   Medications refused:None    Plan is to continue to monitor patient status 1:1  assess response to medications, and maintain the patients safety.   "

## 2023-10-02 NOTE — PLAN OF CARE
Assessment/Intervention/Current Symtoms and Care Coordination:  Reviewed chart. Met with team to review patient's current functioning, needs, progress, and impediments to discharge.    CADI  emailed, she will start the MNChoice assessment to prepare for discharge to [facility].     Discharge Plan or Goal:  Seeking placement in a memory care unit, preferably in the [facility]     Barriers to Discharge:  Referrals are being sent to memory care units who have openings. One barrier is the limited openings in memory care units at this time.     Referral Status:  [facility] in [place] 8/22, no memory care 8/30  [facility] in [place] 8/15, no memory care 8/31  [facility] 8/31, no openings 8/31  [place] 8/29, declined 9/8  [facility] 8/29, no open beds 8/30  [facility] 8/30  [facility] 8/31, no open beds 8/31  [facility] 9/15, waiting for decision   [place] 8/31  [facility] 8/31, no openings and private pay 8/31  [facility], 8/31, declined 9/1   [facility] 9[facility], declined 9/13  [facility] 9/15, waiting for decision   [facility] 9/25, declined 9/27  [place] 9/26, declined 9/27     Legal Status:  Commitment with [facility]: [place]  File Number: [id number]  Issued 07/06/23 and is not to exceed six months    Contacts:  : Vicky Valdez with [facility], [phone]  Psychiatrist: Dr. Santana at [facility], [phone] PCP: Attila Urena  Mom: Alberta, [phone] (H) [phone] (M)   Dad: Ino, [phone] (H)  Sister, Sandra Treadwell, [phone] (KING)   [facility]'s Office Fax: [phone]  PAMELA  with [facility]: Regina Wong, [phone], [email or web address]    Upcoming Meetings and Dates/Important Information and next steps:

## 2023-10-02 NOTE — PROGRESS NOTES
[facility], [place]   Psychiatric Progress Note  Hospital Day: 129        Interim History:   The patient's care was discussed with the treatment team during the daily team meeting and/or staff's chart notes were reviewed. Staff report that Vinod had a good weekend and was medication adherent. He was calm, cooperative, and isolative to room yesterday. Did not shower. Continues to cough, using incentive spirometer. Slept 7 hours overnight. Eating good amounts. No episodes of aggression overnight.     Upon interview with the patient, he was irritable and dismissive. He repeatedly accused this writer of attempting to poison him. He continues to question staffs intentions, and remains quite suspicious of others. He does not respond well to reassurance. When attempting to provide reassurance, the patient became quite agitated and said  oh well, you just shut the hell up and leave?  he was informed of the good news that he has been accepted to a group home. She said  what that would just be a dream if that ever really happened.  He was again reassured that he had been accepted to a group home, and that he will likely be discharged in the month of October. He had no further questions or concerns for this writer.     Suicidal ideation: None    Homicidal ideation: None    Psychotic symptoms: AH suspected with report of telepathy. no VH reported during interview. Delusions present and other psychotic symptoms suspected.    Medication side effects reported:  jaw and chin tremors. Reported two BMs early this morning. Denied abdominal pain or discomfort.     Acute medical concerns: None. Stated that he is no longer experiencing cough or SOB     Other issues reported by patient: Patient had no further questions or concerns.           Medications:      atropine  2 drop Sublingual TID    benztropine  1 mg Oral BID    cloZAPine  650 mg Oral Daily    cyanocobalamin  1,000 mcg Oral Daily    divalproex sodium  "delayed-release  250 mg Oral Q8H KIKI    FLUoxetine  20 mg Oral Daily    gabapentin  300 mg Oral TID    LORazepam  0.5 mg Oral TID    melatonin  10 mg Oral At Bedtime    miconazole   Topical BID    mineral oil-hydrophilic petrolatum   Topical BID IS    naproxen  250 mg Oral BID w/meals    OLANZapine zydis  15 mg Oral BID    Or    OLANZapine  10 mg Intramuscular BID    polyethylene glycol  17 g Oral BID    sennosides  2 tablet Oral BID    tamsulosin  0.4 mg Oral Daily          Allergies:     Allergies   Allergen Reactions    Bee Venom Unknown    Montelukast Unknown    Phenothiazines Unknown          Labs:     No results found for this or any previous visit (from the past 24 hour(s)).                 Psychiatric Examination:     /74 (BP Location: Left arm, Patient Position: Sitting, Cuff Size: Adult Regular)   Pulse 98   Temp 98.1  F (36.7  C) (Temporal)   Resp 16   Ht 1.753 m (5' 9\")   Wt 83.4 kg (183 lb 14.4 oz)   SpO2 98%   BMI 27.16 kg/m    Weight is 183 lbs 14.4 oz  Body mass index is 27.16 kg/m .    Weight over time:  Vitals:    07/03/23 1100 07/30/23 0902 08/13/23 0900 08/26/23 0835   Weight: 81.6 kg (179 lb 12.8 oz) 86.5 kg (190 lb 9.6 oz) 85.7 kg (188 lb 14.4 oz) 82.8 kg (182 lb 7 oz)    09/07/23 0900 09/26/23 1729   Weight: 83.7 kg (184 lb 8 oz) 83.4 kg (183 lb 14.4 oz)       Orthostatic Vitals         Most Recent      Sitting Orthostatic /61 07/25 0800    Sitting Orthostatic Pulse (bpm) 85 07/25 0800          Cardiometabolic risk assessment. 06/07/23    Reviewed patient profile for cardiometabolic risk factors    Date taken /Value  REFERENCE RANGE   Abdominal Obesity  (Waist Circumference)   See nursing flowsheet Women ?35 in (88 cm)   Men ?40 in (102 cm)      Triglycerides  No results found for: TRIG    ?150 mg/dL (1.7 mmol/L) or current treatment for elevated triglycerides   HDL cholesterol  No results found for: HDL]   Women <50 mg/dL (1.3 mmol/L) in women or current treatment for low " HDL cholesterol  Men <40 mg/dL (1 mmol/L) in men or current treatment for low HDL cholesterol     Fasting plasma glucose (FPG) Lab Results   Component Value Date     05/26/2023      FPG ?100 mg/dL (5.6 mmol/L) or treatment for elevated blood glucose   Blood pressure  BP Readings from Last 3 Encounters:   10/01/23 126/74   05/26/23 97/55    Blood pressure ?130/85 mmHg or treatment for elevated blood pressure   Family History  See family history     Mental Status Exam:  Appearance: awake, alert, unkempt, lying in bed. No evidence of pain of acute distress.   Attitude:  uncooperative  Eye Contact:  fair, less intense, better duration  Mood: irritable  Affect:  mood incongruent, content  Speech: mostly coheren  Psychomotor Behavior:  Ongoing bilateral hand tremor and jaw tremor. No evidence of dystonia, or tics.  Throught Process: linear, illogical, concrete  Associations:  no loosening of associations present  Thought Content:  Delusions and AH suspected. Evidence of obsessive thinking and likely compulsions to harm others.   Insight:  limited  Judgement:  poor  Oriented to:  self, place  Attention Span and Concentration:  fair  Recent and Remote Memory:  poor  Gait: patient lying in bed         Precautions:     Behavioral Orders   Procedures    Assault precautions    Code 1 - Restrict to Unit    Code 2    Code 2 - 1:1 Staff Supervision     For ECT only    Electroconvulsive therapy     Series of up to 12 treatments. Begin Date: 8/2/23     Treating Psychiatrist providing ECT:  unknown     Notified on:  7/28/23 via this order  NOTE: We received verbal confirmation from [facility] that Lorenzo Pavon has been approved but awaiting official court order and risk management's review  Initial consult was completed by Dr. Hicks on 7/18/23    Electroconvulsive therapy     Series of up to 12 treatments. Begin Date: 8/2/23     Treating Psychiatrist providing ECT:  Dominga     Notified on:  8/2/23    Elopement precautions     Fall precautions    Occupational Therapy on the Unit     Order Specific Question:   Reason for Consult     Answer:   Eval of thought process, functional skills and behavior     Order Specific Question:   Course of Action:     Answer:   Evaluation only     Order Specific Question:   Treatment Prescription:     Answer:   CPT requested    Routine Programming     As clinically indicated    Self Injury Precaution    Status 15     Every 15 minutes.    Status Individual Observation     Patient SIO status reviewed with team/RN.  Please also refer to RN/team documentation for add'l detail.    -SIO staff to monitor following which have contributed to patient being on SIO:  Agitation  Pt is impulsive   Pt has ran out of his room naked  Pt has Parkinson symptoms place him in a high fall risk  Pt has verbal outburst of sexual and threaten statements  Pt requires immediate redirection when masturbating    -Possible interventions SIO staff could use to support patient's treatment progress:  Engage pt in activities    -When following observed, team will review discontinuation of SIO:  Absence of aggression toward others for > 24 hours    Comments: SIO 1:1     Order Specific Question:   CONTINUOUS 24 hours / day     Answer:   5 feet     Order Specific Question:   Indications for SIO     Answer:   Severe intrusiveness     Order Specific Question:   Indications for SIO     Answer:   Assault risk    Suicide precautions     Patients on Suicide Precautions should have a Combination Diet ordered that includes a Diet selection(s) AND a Behavioral Tray selection for Safe Tray - with utensils, or Safe Tray - NO utensils            Diagnoses:     #Unspecified psychosis likely schizophrenia per history,  #Unspecified encephalopathy   -R/O catatonia   -Episodes of unresponsiveness, concern for PNES   #Parkinsonism 2/2 neuroleptic medications, rule out Parkinson's Disease  -rule out major neurocognitive disorder (refused to answer much of  assessment)  #Urinary retention and BPH  -Possible UTI -- UC resulted on 5/25 w/out growth  #Hx of prolonged QTc with clozapine  #Mild thrombocytopenia  #R/O OCD         Assessment and hospital summary:  Patient was admitted to psychiatric unit for safety, stabilization and medication management. He has had schizophrenia since the 1980. He was on Clozaril x 25 years, and it was tapered and discontinued on May 7, 2023  due to prolonged qtc of 527, and his psychotic  symptoms have worsened since then. Sinemet was also discontinued recently due to concerns that it was contributing to paranoia and AH. He is agitated, aggressive, dangerous to self and others. He remains on SIO 2 to 1, and is under psychiatric emergency and court hold. There are concerns for organic etiology given pattern of sundowning, history of parkinsonism, and ongoing disorientation/confusion. 6/8: EKG repeated, cardiology consult regarding safety of resuming clozapine in the context of prolonged QTc and refractory schizophrenia pending response. Per cardiology, correct QTc is no more than 460. They do not have concerns about AP retrial. Neurology IP consult placed for evaluation of sundowning and cognitive decline secondary to Sinemet discontinuation. MSSA initiated. Per Neurology, discontinuation of Sinemet would not account for these symptoms. They do not recommend retrial at this time. On 6/14, discussed complex case at length with Dr. Hatch (primary provider on geriatic unit) who provided recs (see second opinion note dated 6/14). Depakote initiated, though needed to be reduced due to side effect of thrombocytopenia. Melatonin was increased to 10 mg at bedtime. 6/22: Clozapine titration continued. Its possible that clozapine dose is contributing to worsened compulsive behaviors noted throughout hospitalization which could improve with lowering the dose. ECT initiated due to treatment refractory psychosis and ongoing symptoms despite  therapeutic dose of clozapine (650 mg daily). ECT initiated on 23 and pt completed 11 total treatments, but ECT stopped on  due to lack of improvement and distress it was causing patient.     Chart reviewed which revealed the followin2022: He was on clozapine, Seroquel, Cymbalta, and Carbidopa-levodopa and hospitalized for pneumonia. Psych consulted and Seroquel was stopped. Treated with Abx and discharged to TCU.     2022: Hospitalized at [facility]. Per chart review:    Vinod Lee is a 63 yo male with longstanding history of schizophrenia. He was admitted from [facility] ED, where he presented due to increased delusional thoughts while on a visit to his parents for Thanksgiving. He began experiencing increasing paranoid thoughts that someone might be poisoning him and began fearing that he might accidentally harm someone. He reported to his parents that he was having thoughts about hitting someone or beating someone up and told them he could not guarantee they would be safe with him. Parents encouraged patient to go to the ED, which he did voluntarily.     Per patient report and chart review, he was hospitalized several times in the  and reports he was civilly committed at one point in the , however he has been quite stable for the past several decades. He reports he will experience some paranoia and delusional thinking at times, but generally has good insight into his symptoms. He has been maintained on clozapine for about 25 years and prior to several months ago was also concurrently prescribed quetiapine.      In the last several months, patient has had a number of medication changes. Approximately 6 months ago, patient was diagnosed with parkinsonism related to antipsychotic use and was started on carbidopa-levidopa. He experienced no improvement in tremors, and thus carbidopa- levidopa dose was increased 1.5 weeks ago.      In addition, patient was medically hospitalized in  "August 2022 for confusion, weakness, repeated falls, ongoing weight loss, and SOB. He was found to have a cavitary lesion of the lung and suspected aspiration pneumonia. He was treated with antibiotics. At that time, he was taking current dose of clozapine and duloxetine, as well as propranolol ER, quetiapine, gabapentin, and clonazepam. Psychiatry was consulted due to concern for oversedation, and propranolol ER, gabapentin, and quetiapine were discontinued. Conazepam was reduced from 0.5 mg TID to BID dosing and recommended to be discontinued, however, it does not appear this occurred. His sedation improved significantly. QTc prolongation was also noted, but improved throughout his stay. He was ultimately discharged to a TCU for several months before returning home. He reports his weakness has greatly improved and states he \"graduated\" physical therapy, though he continues to be diligent in completed recommended exercises.      He reports that over the past several months, his delusions and anxiety have been worse than usual, with symptoms becoming much more acute since the carbidopa-levidopa dose was increased. He has felt increasingly paranoid about being poisoned, noting this is a delusion that has been stable over time, but was previously less intrusive. He has insight during the interview that his paranoid thinking is not reality based, but states it has been harder to separate delusions from reality and he becomes very worried that he might hurt someone, despite no history of violent behavior. He reports that the paranoia about his food being poisoned in combination with the tremors in his hands have made it difficult for him to eat. He has also had quite low appetite for the past 6 months to a year . He reports that due to the combination of these factors, he has lost about 50 pounds in the last year.He denies any problems with sleep initiation or maintenance. He reports energy is fairly good and \"better " "than it used to be.\" He reports some short term memory issues and on interview, does have some difficulty remembering details of recent events. He is unsure if he has received cognitive testing in the past.    He denies any suicidal ideation and reports he has not experienced SI for decades. He denies homicidal thoughts and is very clear that he had and has no desire to harm anyone else, but was afraid he might somehow do so. He denies any hallucinations and states \"that's never been a problem for me.\" He is not observed responding to internal stimuli. He is alert and oriented in all spheres.        ========    BRIEF HOSPITAL COURSE: This 62 y.o. male admitted 11/25/2022 to  [place] for the assessment and treatment of the above presentation. This was a voluntary admission.     In summary, he was tapered off the Sinemet, which he reports to be both ineffective and potentially worsening the anxiety and paranoia ideations. Instead Benadryl 25 mg TID was started to help with extrapyramidal side effects. He struggled with sleep during his stay here. Remeron 7.5mg QHS was tried without success. Seroquel 100mg qhs was restarted with addition of suvorexant 10mg qhs. Given his Seroquel PRN use prior history of prolonged QTC, he will benefit from another EKG repeat on the medical unit.     Unfortunately, he tested positive for influenza A and developed hypoxemia. Now on 2L oxygen with desats when prone requiring 3L oxygen. Subsequently, the plan will to be tapering him off clonazepam due to hypoxia (and also prior plan to taper). The patient tolerated these changes without side effects.     The patient minimally benefited from the structured therapeutic milieu as he attended programming seldom as he tested positive for influenza, he needed to isolate with droplet precautions. Pt was engaged and worked on issues that were triggering/brought pt to the hospital and has excellent insight into his anxiety and paranoid delusions. Pt " denied suicidal ideations throughout all/majority of their hospitalization. Pt denied HI throughout all of hospitalization. There were no concerns with behavioral disruptions/outbursts. The patient has shown improvement in general.     At the time of discharge, hospitalization course was reviewed with Vinod ONEILL Zaragabino with plan to transfer to medicine. Please consult psychiatry and I will continue to follow while patient is on the medical unit. He is now off sinemet since 11/29 21:00 and I would expect anxiety and paranoia to improve more moving forward. Psychiatrically, once anxiety and paranoia is baseline, can D/C to home once medically stable. He DOES NOT NEED A 1:1 on the medical unit from a mental health perspective, we initiated 1:1 11/30/2022 due to significant fatigue, gait instability (he was unable to stand without assist) and feeding assist.    04/2023: Clozapine was gradually tapered and discontinued, unclear reasons why it was discontinued per outside records, though Dr. Portillo's H&P indicates that it was due to prolonged QTc.       Today's Changes:  Renewed SIO   Monitor oral intake closely  Monitor BMs closely    Per Neurology Recs:    Recommendations  -No current indications to change medications from a neurologic perspective  - Optimizing his psychiatric medication and treatment is currently more important than optimization of parkinsonism  - Please contact neurology if any new questions arise, or to discuss the assessment described above     Thank you for involving Neurology in the care of Vinod Lee.  Please do not hesitate to call with questions.      Karnthi Perdue MD    Target psychiatric symptoms and interventions:  -Psych emergency declared on 5/27, now fully committed  - Depakote 250 mg TID, restarted on 8/26. Cannot increase beyond this dose due to thrombocytopenia at higher doses  -Continue clozapine as above  -Continue scheduled Zyprexa 15mg BID  -Continue 1:1 for safety of staff  and patients, reduced from 2:1 7/23/23  - weekly CBC to monitor platelets    -Continue Gabapentin 300 mg TID as staff believe it has been effective for treatment of his anxiety    Risks, benefits, and alternatives discussed at length with patient.     Acute Medical Problems and Treatments:  Rhinovirus / enterovirus URI  Concern for pneumonia  On 9/23, patient was reported to be hypoxic with an SpO2 of 89% with RR of 28, as well as had some low-grade fevers.  Associated symptoms of wet sounding cough.  COVID was negative.  Chest x-ray with possible infection versus atelectasis in RLL.  Patient was initially started on doxycycline on 9/24.  Due to concern for clinical worsening, patient was reassessed by provider on 9/25.  At that time due to patient increase drooling while on Clozaril and him laying supine for much of the day, concerns were raised for aspiration pneumonia, thus he was started on Augmentin as well.   - sputum culture if able, pending  - cont Doxycycline 100 mg twice daily for 5 days (9/24-9/28)  - cont augmentin 875-125 mg BID x 5 days (9/25-9/29)  - Albuterol inhaler as needed cough, wheezing, shortness of breath  - APAP every 4 hours as needed fevers, pain  - I-S q shift, as tolerated     Hx cavitary lesion to SOPHY  Pulmonary nodule to SOPHY  Seen August 2022 in ED. CT chest with contrast negative for PE but showed cavitary masslike process in the left upper lung, over an area measuring 9.5 x 6.2 x 5.2 cm, indeterminant pulmonary nodule measuring 9 mm in the anterior segment of the left upper lobe, nonspecific left hilar and mediastinal lymphadenopathy, small-moderate left pleural effusion.  Cavitary lesion also seen on chest x-ray.  Patient was given IVF, Rocephin, Zithromax, and admitted for further evaluation. Thoracentesis was done and negative cultures. ID was consulted and managed Abx. The patient was initially on zosyn and doxycycline but narrowed. Strep pneumo, legionella, and HIV testing done  (negative). Blood cultures were negative as well. The patient did well.  Follow-up chest CT showed near complete interval resolution of the previously described left upper lobe abscess.  Residual 12 x 10 mm spiculated nodular opacity with adjacent scarring within this region, probably represents sequela of previous infection, although underlying malignancy not excluded.  - Recommend follow-up outpatient with PCP for continued monitoring.    Groin rash  Reassessed groin rash, previously assessed to have tinea cruris with possible intertrigo. Rash appears significantly improved. Recommend continuing with miconazole powder BID. Residual mild erythema may be still improving rash, vs intertrigo from moisture  - Interdry topical therapy, apply one sheet to each skin fold in groin area. Change every 3-5 days.   - Medicine service will follow-up next week and then PRN, for monitoring for rash       Possible Dementia  Neurology consult placed on 6/8 for evaluation of sundowning and cognitive decline secondary to Sinemet discontinuation: Resuming Sinemet not recommended for behavioral concerns. Please see Neuro note for details.     Tremors  longstanding though appeared to worsen following initiation of clozapine  - Ativan 0.5 mg TID  - Cogentin 1 mg BID added on 8/25 with overall improvement noted    Neuro re-consulted on 9/20 and per their documentation:    Impression  Mr. Lee is a 63 y.o. man with a long history of psychosis including requiring commitment and psychiatric hospitalizations in the 1980s.  Given his long history, this is less consistent with an organic cause of psychosis (as something that would be primarily neurologic in origin would be expected to have a more rapid progression of neurologic deterioration, rather than this long period of psychiatric symptoms).  I am not able to appreciate any evidence for a neurologic basis of his psychosis given the time course of his psychiatric illness.      Regarding  his difficulty with word finding, I am not able to elicit this on exam.  However since he described being symptomatic earlier today, it is helpful that I am able to go through a full assessment of his language in terms of comprehension, repetition, naming objects with increasing difficulty without being able to appreciate any deficits.     He does continue to have a tremor that is most prominent posteriorly as well as with action, in addition to a chin tremor.  He also does have rigidity in the bilateral upper extremities consistent with the diagnosis of parkinsonism.  He is currently not on levodopa or dopamine agonists which I continue to agree with.  Since his psychiatric symptoms are much more bothersome right now than his parkinsonism, I will continue to recommend avoiding dopaminergic medications.     Recommendations  -No current indications to change medications from a neurologic perspective  - Optimizing his psychiatric medication and treatment is currently more important than optimization of parkinsonism  - Please contact neurology if any new questions arise, or to discuss the assessment described above     Thank you for involving Neurology in the care of Vinod Lee.  Please do not hesitate to call with questions.      Kranthi Perdue MD      Thrombocytopenia, resolved  Likely secondary to Depakote.  According to the, incidents of Depakote induced thrombocytopenia as 27%.  Depakote dose reduced from 1000 twice daily to 250 3 times daily on 7/28/2023 with subsequent resolution of thrombocytopenia  - weekly CBCs    Constipation  Likely worsened by clozapine, improved since modifying regimen.   - daily miralax   - daily Senokot 2 tablets BID      Right first toe fracture:  Seen by Ortho on 6/16:  Per note: Vinod Lee is a 62 year old  male w/ PMHx complex psychiatric history who has a fracture to the distal phalanx of the right toe after a recent trauma to the foot the patient reports.  Patient  "denies any other pain or discomfort.  Images reviewed and plan will be for irrigation of the popped fracture blister with sterile saline and the toes should be dressed and a 4 x 4 gauze dressing.  Patient will need a postop shoe which she can be weightbearing as tolerated in.  Would recommend a course of antibiotics for approximately 7 days.  Would recommend Augmentin or clindamycin.  Podiatry will be made aware of patient and will see patient while he is admitted or if patient is discharged in the near future a follow-up appointment will need to be established.     Remainder of care per primary team.  Primary team should make sure that patient is up-to-date on his tetanus shot.  If not patient will require a booster shot.    Hx of prolonged QTc:  Continue weekly EKGs. See above for details.   Discussed most recent EKG findings with Cardiology on 8/25. Corrected QTc is 501 from EKG on 8/23. Per cardiology, repeat EKG today. If corrected QTc is > 500, will need to reduce clozapine. If < 500, OK to keep clozapine at current dose. UPDATE/ADDENDUM 9/6: Repeat EKG with corrected QTc of 440. No need to adjust clozapine dose per cards.     Urinary retention, resolved  Per patient care order:   If patient is refusing straight caths, please notify IM provider immediately to determine whether this constitutes a medical emergency. If IM declares medical emergency, may restrain patient for straight cath. Discussed this with patient's parents/substitute decision makers on 6/7 who are in support of this plan.    # Psoriasis hx  # Purplish discoloration of B/LE feet-resolved   Discussed with Dr. Rene and bedside RN regarding rash on right foot that appears and appears to be purplish in color and almost \"petechiae.\" It was noted during interview, but seemed to be resolving upon walking. Within the past 24 hrs, pt has had ECT and seemed more confused than usual. Pt seems to have had a right great toe wound with recent right " great toe fracture seen by Medicine on 7/16. Seen on 8/4 with improving color on B/L legs at rest and appears to have small, scaly patches of varying coin sizes that have not grown past marked borders. See photos. Per further chart review, has had psoriasis history. WBC WNL, no fevers, HDS. This appears to more consistent with a psoriasis like picture.   - Start triamcinolone topical 0.025%   -Apply twice daily until lesions for 2 weeks or until lesions resolve/  -Monitor for skin thinning, striae, rebound lesion flares.   - Start Eucerin cream              - Apply 30 minutes PRIOR to triamcinolone topical cream above or PRN as needed if dry skin/after bathing   - Medicine will sign off.   - For worsening pain, spread, itchiness, fevers, please contact Medicine and possibly Dermatology.    Benzodiazepine dependence:  Phenobarbital taper completed shortly after patient's admission    Pertinent labs/imaging:  Weekly CBC with diff     Behavioral/Psychological/Social:  - Encourage unit programming    Safety:  - Continue precautions as noted above  - Status 15 minute checks  - Continue 1:1 for staff and pt safety    Legal Status: Fully committed with Sánchez and Lorenzo Pavon. Sánchez medications: clozapine, olanzapine, risperidone, quetiapine    Disposition Plan   Reason for ongoing admission: No safe alternative at this time  Discharge location: TBD. Will likely need memory care unit  Discharge Medications: not ordered  Follow-up Appointments: not scheduled    Entered by: Garth Abreu MD on 10/02/2023  at 8:22 AM

## 2023-10-02 NOTE — PLAN OF CARE
Problem: Psychotic Signs/Symptoms  Goal: Improved Mood Symptoms  Outcome: Progressing   Goal Outcome Evaluation:         Patient had an uneventful day today. Voided independently today. Ate 100% of lunch. Took all scheduled medications without incident. Continues to have [facility] staff for safety. Patient was able to make sensical statements today. He is alert, orient x3, disoriented on the time of day . He was encouraged to get up and take walks today.

## 2023-10-02 NOTE — PLAN OF CARE
Pt asleep at start of shift.     Breathing quiet and unlabored when sleeping.     Pt had no c/o pain or discomfort during the HS.     Appears to have slept 7 hours.     Med compliant with all scheduled medications this shift.      Pt continues on 1:1 SIO/5 ft space distance.     Goal Outcome Evaluation:  Problem: Adult Behavioral Health Plan of Care  Goal: Adheres to Safety Considerations for Self and Others  Intervention: Develop and Maintain Individualized Safety Plan  Recent Flowsheet Documentation  Taken 10/2/2023 0000 by Tameka Hatch, RN  Safety Measures:   1:1 observation maintained   safety rounds completed  Goal: Absence of New-Onset Illness or Injury  Intervention: Identify and Manage Fall Risk  Recent Flowsheet Documentation  Taken 10/2/2023 0000 by Tameka Hatch, RN  Safety Measures:   1:1 observation maintained   safety rounds completed     Problem: Sleep Disturbance  Goal: Adequate Sleep/Rest  Outcome: Progressing

## 2023-10-03 LAB
ATRIAL RATE - MUSE: 84 BPM
DIASTOLIC BLOOD PRESSURE - MUSE: NORMAL MMHG
INTERPRETATION ECG - MUSE: NORMAL
P AXIS - MUSE: 55 DEGREES
PR INTERVAL - MUSE: 160 MS
QRS DURATION - MUSE: 164 MS
QT - MUSE: 446 MS
QTC - MUSE: 527 MS
R AXIS - MUSE: -15 DEGREES
SYSTOLIC BLOOD PRESSURE - MUSE: NORMAL MMHG
T AXIS - MUSE: 131 DEGREES
VENTRICULAR RATE- MUSE: 84 BPM

## 2023-10-03 PROCEDURE — 250N000013 HC RX MED GY IP 250 OP 250 PS 637: Performed by: PSYCHIATRY & NEUROLOGY

## 2023-10-03 PROCEDURE — 250N000013 HC RX MED GY IP 250 OP 250 PS 637: Performed by: STUDENT IN AN ORGANIZED HEALTH CARE EDUCATION/TRAINING PROGRAM

## 2023-10-03 PROCEDURE — 250N000013 HC RX MED GY IP 250 OP 250 PS 637: Performed by: PHYSICIAN ASSISTANT

## 2023-10-03 PROCEDURE — 93005 ELECTROCARDIOGRAM TRACING: CPT

## 2023-10-03 PROCEDURE — 124N000002 HC R&B MH UMMC

## 2023-10-03 PROCEDURE — 250N000013 HC RX MED GY IP 250 OP 250 PS 637

## 2023-10-03 PROCEDURE — 93010 ELECTROCARDIOGRAM REPORT: CPT | Performed by: INTERNAL MEDICINE

## 2023-10-03 RX ADMIN — GABAPENTIN 300 MG: 300 CAPSULE ORAL at 19:11

## 2023-10-03 RX ADMIN — DIVALPROEX SODIUM 250 MG: 250 TABLET, DELAYED RELEASE ORAL at 06:16

## 2023-10-03 RX ADMIN — CYANOCOBALAMIN TAB 1000 MCG 1000 MCG: 1000 TAB at 08:09

## 2023-10-03 RX ADMIN — BENZTROPINE MESYLATE 1 MG: 1 TABLET ORAL at 19:11

## 2023-10-03 RX ADMIN — MICONAZOLE NITRATE: 20 POWDER TOPICAL at 19:11

## 2023-10-03 RX ADMIN — GABAPENTIN 300 MG: 300 CAPSULE ORAL at 08:09

## 2023-10-03 RX ADMIN — WHITE PETROLATUM: 1.75 OINTMENT TOPICAL at 08:10

## 2023-10-03 RX ADMIN — POLYETHYLENE GLYCOL 3350 17 G: 17 POWDER, FOR SOLUTION ORAL at 19:10

## 2023-10-03 RX ADMIN — NAPROXEN 250 MG: 250 TABLET ORAL at 17:12

## 2023-10-03 RX ADMIN — MICONAZOLE NITRATE: 20 POWDER TOPICAL at 08:10

## 2023-10-03 RX ADMIN — DIVALPROEX SODIUM 250 MG: 250 TABLET, DELAYED RELEASE ORAL at 13:31

## 2023-10-03 RX ADMIN — FLUOXETINE 20 MG: 20 CAPSULE ORAL at 08:09

## 2023-10-03 RX ADMIN — ATROPINE SULFATE 2 DROP: 10 SOLUTION/ DROPS OPHTHALMIC at 08:10

## 2023-10-03 RX ADMIN — WHITE PETROLATUM: 1.75 OINTMENT TOPICAL at 17:12

## 2023-10-03 RX ADMIN — GABAPENTIN 300 MG: 300 CAPSULE ORAL at 13:30

## 2023-10-03 RX ADMIN — BENZONATATE 100 MG: 100 CAPSULE ORAL at 21:40

## 2023-10-03 RX ADMIN — LORAZEPAM 0.5 MG: 0.5 TABLET ORAL at 19:11

## 2023-10-03 RX ADMIN — BENZTROPINE MESYLATE 1 MG: 1 TABLET ORAL at 08:09

## 2023-10-03 RX ADMIN — SENNOSIDES 2 TABLET: 8.6 TABLET ORAL at 19:11

## 2023-10-03 RX ADMIN — ATROPINE SULFATE 2 DROP: 10 SOLUTION/ DROPS OPHTHALMIC at 19:11

## 2023-10-03 RX ADMIN — LORAZEPAM 0.5 MG: 0.5 TABLET ORAL at 13:30

## 2023-10-03 RX ADMIN — SENNOSIDES 2 TABLET: 8.6 TABLET ORAL at 08:09

## 2023-10-03 RX ADMIN — Medication 10 MG: at 19:11

## 2023-10-03 RX ADMIN — TAMSULOSIN HYDROCHLORIDE 0.4 MG: 0.4 CAPSULE ORAL at 08:09

## 2023-10-03 RX ADMIN — NAPROXEN 250 MG: 250 TABLET ORAL at 08:09

## 2023-10-03 RX ADMIN — DIVALPROEX SODIUM 250 MG: 250 TABLET, DELAYED RELEASE ORAL at 19:11

## 2023-10-03 RX ADMIN — CLOZAPINE 650 MG: 100 TABLET, ORALLY DISINTEGRATING ORAL at 13:30

## 2023-10-03 RX ADMIN — POLYETHYLENE GLYCOL 3350 17 G: 17 POWDER, FOR SOLUTION ORAL at 08:10

## 2023-10-03 RX ADMIN — OLANZAPINE 15 MG: 10 TABLET, ORALLY DISINTEGRATING ORAL at 08:09

## 2023-10-03 RX ADMIN — OLANZAPINE 15 MG: 10 TABLET, ORALLY DISINTEGRATING ORAL at 19:11

## 2023-10-03 RX ADMIN — LORAZEPAM 0.5 MG: 0.5 TABLET ORAL at 08:09

## 2023-10-03 ASSESSMENT — ACTIVITIES OF DAILY LIVING (ADL)
ADLS_ACUITY_SCORE: 88
ORAL_HYGIENE: WITH ASSISTANCE
ADLS_ACUITY_SCORE: 88
HYGIENE/GROOMING: WITH ASSISTANCE
DRESS: WITH ASSISTANCE
ADLS_ACUITY_SCORE: 88

## 2023-10-03 NOTE — PROGRESS NOTES
"[facility], [place]   Psychiatric Progress Note  Hospital Day: 130        Interim History:   The patient's care was discussed with the treatment team during the daily team meeting and/or staff's chart notes were reviewed. Staff report that Vinod interacted appropriately with staff and peers, had sufficient food and fluid intake, and took his medications as prescribed. During the evening shift, Vinod had to attempt to swallow the med a few times before success.     Vinod was found in his room with sitter present. He was resting in bed with his eyes open. He adjusted himself several times on the bed and remained calm and seated for the duration of interaction. Vinod reported that he was doing \"the same as yesterday\" and then the sitter interjected that he reported feeling anxious earlier, which Vinod endorsed. He reports that he is anxious about \"taking a shower\" since he fears the water will be \"too hot or too cold\". He is also worried about what will happen if he has a bowel movement in his pants. Reassurance was provided. Vinod expresses the feeling that \"you are here to harm me\". Interview was concluded so Vinod could use the restroom.    Suicidal ideation: None    Homicidal ideation: None    Psychotic symptoms: AH suspected with report of telepathy. no VH reported during interview. Delusions present (paranoia) and other psychotic symptoms suspected.    Medication side effects reported:  tremors that make it difficult to eat. jaw and chin tremors.    Acute medical concerns: None.      Other issues reported by patient: Patient had no further questions or concerns.           Medications:      atropine  2 drop Sublingual TID    benztropine  1 mg Oral BID    cloZAPine  650 mg Oral Daily    cyanocobalamin  1,000 mcg Oral Daily    divalproex sodium delayed-release  250 mg Oral Q8H [facility]    FLUoxetine  20 mg Oral Daily    gabapentin  300 mg Oral TID    LORazepam  0.5 mg Oral TID    melatonin  10 mg " "Oral At Bedtime    miconazole   Topical BID    mineral oil-hydrophilic petrolatum   Topical BID IS    naproxen  250 mg Oral BID w/meals    OLANZapine zydis  15 mg Oral BID    Or    OLANZapine  10 mg Intramuscular BID    polyethylene glycol  17 g Oral BID    sennosides  2 tablet Oral BID    tamsulosin  0.4 mg Oral Daily          Allergies:     Allergies   Allergen Reactions    Bee Venom Unknown    Montelukast Unknown    Phenothiazines Unknown          Labs:     Recent Results (from the past 24 hour(s))   EKG 12-lead, complete    Collection Time: 10/03/23  9:29 AM   Result Value Ref Range    Systolic Blood Pressure  mmHg    Diastolic Blood Pressure  mmHg    Ventricular Rate 84 BPM    Atrial Rate 84 BPM    CO Interval 160 ms    QRS Duration 164 ms     ms    QTc 527 ms    P Axis 55 degrees    R AXIS -15 degrees    T Axis 131 degrees    Interpretation ECG       Sinus rhythm  Left bundle branch block  Abnormal ECG  When compared with ECG of 26-SEP-2023 09:38,  No significant change was found  Confirmed by fellow Scott Valdivia ([id number]) on 10/3/2023 10:10:16 AM                      Psychiatric Examination:     /68 (BP Location: Right arm)   Pulse 88   Temp 97.1  F (36.2  C) (Temporal)   Resp 16   Ht 1.753 m (5' 9\")   Wt 84.4 kg (186 lb)   SpO2 95%   BMI 27.47 kg/m    Weight is 186 lbs 0 oz  Body mass index is 27.47 kg/m .    Weight over time:  Vitals:    07/03/23 1100 07/30/23 0902 08/13/23 0900 08/26/23 0835   Weight: 81.6 kg (179 lb 12.8 oz) 86.5 kg (190 lb 9.6 oz) 85.7 kg (188 lb 14.4 oz) 82.8 kg (182 lb 7 oz)    09/07/23 0900 09/26/23 1729 10/03/23 0843   Weight: 83.7 kg (184 lb 8 oz) 83.4 kg (183 lb 14.4 oz) 84.4 kg (186 lb)       Orthostatic Vitals         Most Recent      Sitting Orthostatic /61 07/25 0800    Sitting Orthostatic Pulse (bpm) 85 07/25 0800          Cardiometabolic risk assessment. 06/07/23    Reviewed patient profile for cardiometabolic risk factors    Date taken /Value  " "REFERENCE RANGE   Abdominal Obesity  (Waist Circumference)   See nursing flowsheet Women ?35 in (88 cm)   Men ?40 in (102 cm)      Triglycerides  No results found for: TRIG    ?150 mg/dL (1.7 mmol/L) or current treatment for elevated triglycerides   HDL cholesterol  No results found for: HDL]   Women <50 mg/dL (1.3 mmol/L) in women or current treatment for low HDL cholesterol  Men <40 mg/dL (1 mmol/L) in men or current treatment for low HDL cholesterol     Fasting plasma glucose (FPG) Lab Results   Component Value Date     05/26/2023      FPG ?100 mg/dL (5.6 mmol/L) or treatment for elevated blood glucose   Blood pressure  BP Readings from Last 3 Encounters:   10/03/23 113/68   05/26/23 97/55    Blood pressure ?130/85 mmHg or treatment for elevated blood pressure   Family History  See family history     Mental Status Exam:  Appearance: awake, alert, unkempt, lying in bed. No evidence of pain of acute distress.   Attitude:  cooperative, calm during interview today  Eye Contact:  fair, less intense, better duration  Mood: \"anxious\"  Affect:  mood congruent, content  Speech: mostly coherent  Psychomotor Behavior:  Ongoing bilateral hand tremor and jaw tremor. No evidence of dystonia, or tics.  Throught Process: linear, illogical, concrete  Associations:  no loosening of associations present  Thought Content:  Delusions and AH suspected. Evidence of obsessive thinking and likely compulsions to harm others.   Insight:  limited  Judgement:  poor  Oriented to:  self, place  Attention Span and Concentration:  fair  Recent and Remote Memory:  poor  Gait: patient lying in bed         Precautions:     Behavioral Orders   Procedures    Assault precautions    Code 1 - Restrict to Unit    Code 2    Code 2 - 1:1 Staff Supervision     For ECT only    Electroconvulsive therapy     Series of up to 12 treatments. Begin Date: 8/2/23     Treating Psychiatrist providing ECT:  unknown     Notified on:  7/28/23 via this order  NOTE: " We received verbal confirmation from [facility] that Lorenzo Pavon has been approved but awaiting official court order and risk management's review  Initial consult was completed by Dr. Hicks on 7/18/23    Electroconvulsive therapy     Series of up to 12 treatments. Begin Date: 8/2/23     Treating Psychiatrist providing ECT:  Dominga     Notified on:  8/2/23    Elopement precautions    Fall precautions    Occupational Therapy on the Unit     Order Specific Question:   Reason for Consult     Answer:   Eval of thought process, functional skills and behavior     Order Specific Question:   Course of Action:     Answer:   Evaluation only     Order Specific Question:   Treatment Prescription:     Answer:   CPT requested    Routine Programming     As clinically indicated    Self Injury Precaution    Status 15     Every 15 minutes.    Status Individual Observation     Patient SIO status reviewed with team/RN.  Please also refer to RN/team documentation for add'l detail.    -SIO staff to monitor following which have contributed to patient being on SIO:  Agitation  Pt is impulsive   Pt has ran out of his room naked  Pt has Parkinson symptoms place him in a high fall risk  Pt has verbal outburst of sexual and threaten statements  Pt requires immediate redirection when masturbating    -Possible interventions SIO staff could use to support patient's treatment progress:  Engage pt in activities    -When following observed, team will review discontinuation of SIO:  Absence of aggression toward others for > 24 hours    Comments: SIO 1:1     Order Specific Question:   CONTINUOUS 24 hours / day     Answer:   5 feet     Order Specific Question:   Indications for SIO     Answer:   Severe intrusiveness     Order Specific Question:   Indications for SIO     Answer:   Assault risk    Suicide precautions     Patients on Suicide Precautions should have a Combination Diet ordered that includes a Diet selection(s) AND a Behavioral Tray selection  for Safe Tray - with utensils, or Safe Tray - NO utensils            Diagnoses:     #Unspecified psychosis likely schizophrenia per history,  #Unspecified encephalopathy   -R/O catatonia   -Episodes of unresponsiveness, concern for PNES   #Parkinsonism 2/2 neuroleptic medications, rule out Parkinson's Disease  -rule out major neurocognitive disorder (refused to answer much of assessment)  #Urinary retention and BPH  -Possible UTI -- UC resulted on 5/25 w/out growth  #Hx of prolonged QTc with clozapine  #Mild thrombocytopenia  #R/O OCD         Assessment and hospital summary:  Patient was admitted to psychiatric unit for safety, stabilization and medication management. He has had schizophrenia since the 1980. He was on Clozaril x 25 years, and it was tapered and discontinued on May 7, 2023  due to prolonged qtc of 527, and his psychotic  symptoms have worsened since then. Sinemet was also discontinued recently due to concerns that it was contributing to paranoia and AH. He is agitated, aggressive, dangerous to self and others. He remains on SIO 2 to 1, and is under psychiatric emergency and court hold. There are concerns for organic etiology given pattern of sundowning, history of parkinsonism, and ongoing disorientation/confusion. 6/8: EKG repeated, cardiology consult regarding safety of resuming clozapine in the context of prolonged QTc and refractory schizophrenia pending response. Per cardiology, correct QTc is no more than 460. They do not have concerns about AP retrial. Neurology IP consult placed for evaluation of sundowning and cognitive decline secondary to Sinemet discontinuation. MSSA initiated. Per Neurology, discontinuation of Sinemet would not account for these symptoms. They do not recommend retrial at this time. On 6/14, discussed complex case at length with Dr. Hatch (primary provider on geriatic unit) who provided recs (see second opinion note dated 6/14). Depakote initiated, though needed to be  reduced due to side effect of thrombocytopenia. Melatonin was increased to 10 mg at bedtime. : Clozapine titration continued. Its possible that clozapine dose is contributing to worsened compulsive behaviors noted throughout hospitalization which could improve with lowering the dose. ECT initiated due to treatment refractory psychosis and ongoing symptoms despite therapeutic dose of clozapine (650 mg daily). ECT initiated on 23 and pt completed 11 total treatments, but ECT stopped on  due to lack of improvement and distress it was causing patient.     Chart reviewed which revealed the followin2022: He was on clozapine, Seroquel, Cymbalta, and Carbidopa-levodopa and hospitalized for pneumonia. Psych consulted and Seroquel was stopped. Treated with Abx and discharged to TCU.     2022: Hospitalized at [facility]. Per chart review:    Vinod Lee is a 61 yo male with longstanding history of schizophrenia. He was admitted from [facility] ED, where he presented due to increased delusional thoughts while on a visit to his parents for Thanksgiving. He began experiencing increasing paranoid thoughts that someone might be poisoning him and began fearing that he might accidentally harm someone. He reported to his parents that he was having thoughts about hitting someone or beating someone up and told them he could not guarantee they would be safe with him. Parents encouraged patient to go to the ED, which he did voluntarily.     Per patient report and chart review, he was hospitalized several times in the 1980s and reports he was civilly committed at one point in the , however he has been quite stable for the past several decades. He reports he will experience some paranoia and delusional thinking at times, but generally has good insight into his symptoms. He has been maintained on clozapine for about 25 years and prior to several months ago was also concurrently prescribed quetiapine.      In the  "last several months, patient has had a number of medication changes. Approximately 6 months ago, patient was diagnosed with parkinsonism related to antipsychotic use and was started on carbidopa-levidopa. He experienced no improvement in tremors, and thus carbidopa- levidopa dose was increased 1.5 weeks ago.      In addition, patient was medically hospitalized in August 2022 for confusion, weakness, repeated falls, ongoing weight loss, and SOB. He was found to have a cavitary lesion of the lung and suspected aspiration pneumonia. He was treated with antibiotics. At that time, he was taking current dose of clozapine and duloxetine, as well as propranolol ER, quetiapine, gabapentin, and clonazepam. Psychiatry was consulted due to concern for oversedation, and propranolol ER, gabapentin, and quetiapine were discontinued. Conazepam was reduced from 0.5 mg TID to BID dosing and recommended to be discontinued, however, it does not appear this occurred. His sedation improved significantly. QTc prolongation was also noted, but improved throughout his stay. He was ultimately discharged to a TCU for several months before returning home. He reports his weakness has greatly improved and states he \"graduated\" physical therapy, though he continues to be diligent in completed recommended exercises.      He reports that over the past several months, his delusions and anxiety have been worse than usual, with symptoms becoming much more acute since the carbidopa-levidopa dose was increased. He has felt increasingly paranoid about being poisoned, noting this is a delusion that has been stable over time, but was previously less intrusive. He has insight during the interview that his paranoid thinking is not reality based, but states it has been harder to separate delusions from reality and he becomes very worried that he might hurt someone, despite no history of violent behavior. He reports that the paranoia about his food being " "poisoned in combination with the tremors in his hands have made it difficult for him to eat. He has also had quite low appetite for the past 6 months to a year . He reports that due to the combination of these factors, he has lost about 50 pounds in the last year.He denies any problems with sleep initiation or maintenance. He reports energy is fairly good and \"better than it used to be.\" He reports some short term memory issues and on interview, does have some difficulty remembering details of recent events. He is unsure if he has received cognitive testing in the past.    He denies any suicidal ideation and reports he has not experienced SI for decades. He denies homicidal thoughts and is very clear that he had and has no desire to harm anyone else, but was afraid he might somehow do so. He denies any hallucinations and states \"that's never been a problem for me.\" He is not observed responding to internal stimuli. He is alert and oriented in all spheres.        ========    BRIEF HOSPITAL COURSE: This 62 y.o. male admitted 11/25/2022 to  [place] for the assessment and treatment of the above presentation. This was a voluntary admission.     In summary, he was tapered off the Sinemet, which he reports to be both ineffective and potentially worsening the anxiety and paranoia ideations. Instead Benadryl 25 mg TID was started to help with extrapyramidal side effects. He struggled with sleep during his stay here. Remeron 7.5mg QHS was tried without success. Seroquel 100mg qhs was restarted with addition of suvorexant 10mg qhs. Given his Seroquel PRN use prior history of prolonged QTC, he will benefit from another EKG repeat on the medical unit.     Unfortunately, he tested positive for influenza A and developed hypoxemia. Now on 2L oxygen with desats when prone requiring 3L oxygen. Subsequently, the plan will to be tapering him off clonazepam due to hypoxia (and also prior plan to taper). The patient tolerated these " changes without side effects.     The patient minimally benefited from the structured therapeutic milieu as he attended programming seldom as he tested positive for influenza, he needed to isolate with droplet precautions. Pt was engaged and worked on issues that were triggering/brought pt to the hospital and has excellent insight into his anxiety and paranoid delusions. Pt denied suicidal ideations throughout all/majority of their hospitalization. Pt denied HI throughout all of hospitalization. There were no concerns with behavioral disruptions/outbursts. The patient has shown improvement in general.     At the time of discharge, hospitalization course was reviewed with Vinod Lee with plan to transfer to medicine. Please consult psychiatry and I will continue to follow while patient is on the medical unit. He is now off sinemet since 11/29 21:00 and I would expect anxiety and paranoia to improve more moving forward. Psychiatrically, once anxiety and paranoia is baseline, can D/C to home once medically stable. He DOES NOT NEED A 1:1 on the medical unit from a mental health perspective, we initiated 1:1 11/30/2022 due to significant fatigue, gait instability (he was unable to stand without assist) and feeding assist.    04/2023: Clozapine was gradually tapered and discontinued, unclear reasons why it was discontinued per outside records, though Dr. Portillo's H&P indicates that it was due to prolonged QTc.       Today's Changes:  -Renewed SIO   -Monitor oral intake closely  -Monitor BMs closely    Per Neurology Recs:    Recommendations  -No current indications to change medications from a neurologic perspective  - Optimizing his psychiatric medication and treatment is currently more important than optimization of parkinsonism  - Please contact neurology if any new questions arise, or to discuss the assessment described above     Thank you for involving Neurology in the care of Vinod Lee.  Please do not  hesitate to call with questions.      Kranthi Perdue MD    Target psychiatric symptoms and interventions:  -Psych emergency declared on 5/27, now fully committed  - Depakote 250 mg TID, restarted on 8/26. Cannot increase beyond this dose due to thrombocytopenia at higher doses  -Continue clozapine as above  -Continue scheduled Zyprexa 15mg BID  -Continue 1:1 for safety of staff and patients, reduced from 2:1 7/23/23  - weekly CBC to monitor platelets    -Continue Gabapentin 300 mg TID as staff believe it has been effective for treatment of his anxiety    Risks, benefits, and alternatives discussed at length with patient.     Acute Medical Problems and Treatments:  Rhinovirus / enterovirus URI  Concern for pneumonia  On 9/23, patient was reported to be hypoxic with an SpO2 of 89% with RR of 28, as well as had some low-grade fevers.  Associated symptoms of wet sounding cough.  COVID was negative.  Chest x-ray with possible infection versus atelectasis in RLL.  Patient was initially started on doxycycline on 9/24.  Due to concern for clinical worsening, patient was reassessed by provider on 9/25.  At that time due to patient increase drooling while on Clozaril and him laying supine for much of the day, concerns were raised for aspiration pneumonia, thus he was started on Augmentin as well.   - sputum culture if able, pending  - cont Doxycycline 100 mg twice daily for 5 days (9/24-9/28)  - cont augmentin 875-125 mg BID x 5 days (9/25-9/29)  - Albuterol inhaler as needed cough, wheezing, shortness of breath  - APAP every 4 hours as needed fevers, pain  - I-S q shift, as tolerated     Hx cavitary lesion to SOPHY  Pulmonary nodule to SOPHY  Seen August 2022 in ED. CT chest with contrast negative for PE but showed cavitary masslike process in the left upper lung, over an area measuring 9.5 x 6.2 x 5.2 cm, indeterminant pulmonary nodule measuring 9 mm in the anterior segment of the left upper lobe, nonspecific left hilar  and mediastinal lymphadenopathy, small-moderate left pleural effusion.  Cavitary lesion also seen on chest x-ray.  Patient was given IVF, Rocephin, Zithromax, and admitted for further evaluation. Thoracentesis was done and negative cultures. ID was consulted and managed Abx. The patient was initially on zosyn and doxycycline but narrowed. Strep pneumo, legionella, and HIV testing done (negative). Blood cultures were negative as well. The patient did well.  Follow-up chest CT showed near complete interval resolution of the previously described left upper lobe abscess.  Residual 12 x 10 mm spiculated nodular opacity with adjacent scarring within this region, probably represents sequela of previous infection, although underlying malignancy not excluded.  - Recommend follow-up outpatient with PCP for continued monitoring.    Groin rash  Reassessed groin rash, previously assessed to have tinea cruris with possible intertrigo. Rash appears significantly improved. Recommend continuing with miconazole powder BID. Residual mild erythema may be still improving rash, vs intertrigo from moisture  - Interdry topical therapy, apply one sheet to each skin fold in groin area. Change every 3-5 days.   - Medicine service will follow-up next week and then PRN, for monitoring for rash       Possible Dementia  Neurology consult placed on 6/8 for evaluation of sundowning and cognitive decline secondary to Sinemet discontinuation: Resuming Sinemet not recommended for behavioral concerns. Please see Neuro note for details.     Tremors  longstanding though appeared to worsen following initiation of clozapine  - Ativan 0.5 mg TID  - Cogentin 1 mg BID added on 8/25 with overall improvement noted    Neuro re-consulted on 9/20 and per their documentation:    Impression  Mr. Lee is a 63 y.o. man with a long history of psychosis including requiring commitment and psychiatric hospitalizations in the 1980s.  Given his long history, this is  less consistent with an organic cause of psychosis (as something that would be primarily neurologic in origin would be expected to have a more rapid progression of neurologic deterioration, rather than this long period of psychiatric symptoms).  I am not able to appreciate any evidence for a neurologic basis of his psychosis given the time course of his psychiatric illness.      Regarding his difficulty with word finding, I am not able to elicit this on exam.  However since he described being symptomatic earlier today, it is helpful that I am able to go through a full assessment of his language in terms of comprehension, repetition, naming objects with increasing difficulty without being able to appreciate any deficits.     He does continue to have a tremor that is most prominent posteriorly as well as with action, in addition to a chin tremor.  He also does have rigidity in the bilateral upper extremities consistent with the diagnosis of parkinsonism.  He is currently not on levodopa or dopamine agonists which I continue to agree with.  Since his psychiatric symptoms are much more bothersome right now than his parkinsonism, I will continue to recommend avoiding dopaminergic medications.     Recommendations  -No current indications to change medications from a neurologic perspective  - Optimizing his psychiatric medication and treatment is currently more important than optimization of parkinsonism  - Please contact neurology if any new questions arise, or to discuss the assessment described above     Thank you for involving Neurology in the care of Vinod Lee.  Please do not hesitate to call with questions.      Kranthi Perdue MD      Thrombocytopenia, resolved  Likely secondary to Depakote.  According to the, incidents of Depakote induced thrombocytopenia as 27%.  Depakote dose reduced from 1000 twice daily to 250 3 times daily on 7/28/2023 with subsequent resolution of thrombocytopenia  - weekly  CBCs    Constipation  Likely worsened by clozapine, improved since modifying regimen.   - daily miralax   - daily Senokot 2 tablets BID      Right first toe fracture:  Seen by Ortho on 6/16:  Per note: Vinod Lee is a 62 year old  male w/ PMHx complex psychiatric history who has a fracture to the distal phalanx of the right toe after a recent trauma to the foot the patient reports.  Patient denies any other pain or discomfort.  Images reviewed and plan will be for irrigation of the popped fracture blister with sterile saline and the toes should be dressed and a 4 x 4 gauze dressing.  Patient will need a postop shoe which she can be weightbearing as tolerated in.  Would recommend a course of antibiotics for approximately 7 days.  Would recommend Augmentin or clindamycin.  Podiatry will be made aware of patient and will see patient while he is admitted or if patient is discharged in the near future a follow-up appointment will need to be established.     Remainder of care per primary team.  Primary team should make sure that patient is up-to-date on his tetanus shot.  If not patient will require a booster shot.    Hx of prolonged QTc:  Continue weekly EKGs. See above for details.   Discussed most recent EKG findings with Cardiology on 8/25. Corrected QTc is 501 from EKG on 8/23. Per cardiology, repeat EKG today. If corrected QTc is > 500, will need to reduce clozapine. If < 500, OK to keep clozapine at current dose. UPDATE/ADDENDUM 9/6: Repeat EKG with corrected QTc of 440. No need to adjust clozapine dose per cards.     Urinary retention, resolved  Per patient care order:   If patient is refusing straight caths, please notify IM provider immediately to determine whether this constitutes a medical emergency. If IM declares medical emergency, may restrain patient for straight cath. Discussed this with patient's parents/substitute decision makers on 6/7 who are in support of this plan.    # Psoriasis hx  # Purplish  "discoloration of B/LE feet-resolved   Discussed with Dr. Rene and bedside RN regarding rash on right foot that appears and appears to be purplish in color and almost \"petechiae.\" It was noted during interview, but seemed to be resolving upon walking. Within the past 24 hrs, pt has had ECT and seemed more confused than usual. Pt seems to have had a right great toe wound with recent right great toe fracture seen by Medicine on 7/16. Seen on 8/4 with improving color on B/L legs at rest and appears to have small, scaly patches of varying coin sizes that have not grown past marked borders. See photos. Per further chart review, has had psoriasis history. WBC WNL, no fevers, HDS. This appears to more consistent with a psoriasis like picture.   - Start triamcinolone topical 0.025%   -Apply twice daily until lesions for 2 weeks or until lesions resolve/  -Monitor for skin thinning, striae, rebound lesion flares.   - Start Eucerin cream              - Apply 30 minutes PRIOR to triamcinolone topical cream above or PRN as needed if dry skin/after bathing   - Medicine will sign off.   - For worsening pain, spread, itchiness, fevers, please contact Medicine and possibly Dermatology.    Benzodiazepine dependence:  Phenobarbital taper completed shortly after patient's admission    Pertinent labs/imaging:  Weekly CBC with diff     Behavioral/Psychological/Social:  - Encourage unit programming    Safety:  - Continue precautions as noted above  - Status 15 minute checks  - Continue 1:1 for staff and pt safety    Legal Status: Fully committed with [facility]. [place] medications: clozapine, olanzapine, risperidone, quetiapine    Disposition Plan   Reason for ongoing admission: No safe alternative at this time  Discharge location: TBD. Will likely need memory care unit  Discharge Medications: not ordered  Follow-up Appointments: not scheduled    Entered by: Garth Abreu MD on 10/03/2023  at 10:17 AM           "

## 2023-10-03 NOTE — PLAN OF CARE
Assessment/Intervention/Current Symtoms and Care Coordination:  Reviewed chart. Met with team to review patient's current functioning, needs, progress, and impediments to discharge.    CADI  completing MNChoice Assessment prior to discharge.     Called his parents and spoke to his mom, Alberta. Communicated that he was accepted to [facility] and a tentative discharge date of a few weeks. She has concerns about how she will get him his clothing and [place] with [place] , [place], to see if she is able to help transport.     Emailed , Vicky. She is able to help transport clothing upon discharge.     Discharge Plan or Goal:  Will discharge to [facility] [place] [address]     Barriers to Discharge:  Discharge pending completion of MNChoice Assessment by CADI     Referral Status:  [facility] in [place] , no memory care   [facility] in [place] 8/15, no memory care   [facility] , no openings   [facility] , declined   [facility] , no open beds   [facility]   [facility] , no open beds   [facility] 9/15, waiting for decision   [facility]   [place] [facility] , no openings and private pay   [facility], , declined    [facility], , declined   [facility] 9/15, waiting for decision   [place] , declined   [place] , declined      Legal Status:  Commitment with [facility]: [place]  File Number: [id number]  Issued 23 and is not to exceed six months    Contacts:  : Vicky Gallegoed with [facility], [phone]  Psychiatrist: Dr. Santana at [facility], [phone] PCP: Attila Urena  Mom: Alberta, [phone] (H) [phone] (M)   Dad:  Ino, [phone] (H)  Sister, Sandra Treadwell, [phone] (KING)   [facility]'s Office Fax: [phone]  CADI  with [facility]: Regina Wong, [phone], [email or web address]    Upcoming Meetings and Dates/Important Information and next steps:

## 2023-10-03 NOTE — PLAN OF CARE
Problem: Psychotic Symptoms  Goal: Psychotic Symptoms  Description: Signs and symptoms of listed problems will be absent or manageable.  Outcome: Progressing   Goal Outcome Evaluation:      Medication compliant. No aggression or agitation. Has not required PRN medications. Remains on the SIO for safety. He did not eat breakfast, ate 100% of lunch. Voided independently in the morning, has not reported having a BM today. Had large amount of flatulence in the afternoon.  Needs encouragement to drink fluids. Needed assistance with ADL's today. Encouraged to come out of room and walk the halls to facilitate elimination.   No delusional statements today. No s/s of psychosis. Awake much of the day.

## 2023-10-03 NOTE — PLAN OF CARE
Problem: Psychotic Symptoms  Goal: Psychotic Symptoms  Description: Signs and symptoms of listed problems will be absent or manageable.  Outcome: Progressing  Flowsheets (Taken 10/2/2023 2211)  Psychotic Symptoms Assessed:   suicidality   affect   anxiety   thought process   orientation   mood   insight   speech  Psychotic Symptoms Present:   thought process   insight   Goal Outcome Evaluation:    The patient continued on SIO 1:1 to manage severe intrusiveness and assault risks. Throughout the evening, he spent time in his room and the common areas. His mood was stable, and he reported no suicidal or self-injurious thoughts, anxiety, depression, or hallucinations. The patient interacted appropriately with staff and peers, had sufficient food and fluid intake, and took his medications as prescribed. His evening was pleasant, and he did not exhibit any concerning behaviors or pose any safety risks.

## 2023-10-03 NOTE — PLAN OF CARE
Problem: Psychotic Signs/Symptoms  Goal: Improved Sleep (Psychotic Signs/Symptoms)  Intervention: Promote Healthy Sleep Hygiene  Recent Flowsheet Documentation  Taken 10/3/2023 0708 by Marie Damon RN  Sleep Hygiene Promotion:   noise level reduced   regular sleep pattern promoted   room lighting adjusted   Goal Outcome Evaluation:    Patient appeared to sleep 6.75 hours this night shift.  No prns or snacks given or requested.  No concerns were reported or noted.  Swallowed his Depakote med with chocolate pudding and orange juice this morning. Had to attempt to swallow the med a few times before success.  Denies any pain and wants to fall back to sleep again now.  Remains on 1:1 staffing.

## 2023-10-04 ENCOUNTER — APPOINTMENT (OUTPATIENT)
Dept: SPEECH THERAPY | Facility: CLINIC | Age: 63
DRG: 885 | End: 2023-10-04
Attending: PSYCHIATRY & NEUROLOGY
Payer: COMMERCIAL

## 2023-10-04 PROCEDURE — 93005 ELECTROCARDIOGRAM TRACING: CPT

## 2023-10-04 PROCEDURE — 250N000013 HC RX MED GY IP 250 OP 250 PS 637: Performed by: PSYCHIATRY & NEUROLOGY

## 2023-10-04 PROCEDURE — 250N000013 HC RX MED GY IP 250 OP 250 PS 637: Performed by: PHYSICIAN ASSISTANT

## 2023-10-04 PROCEDURE — 250N000013 HC RX MED GY IP 250 OP 250 PS 637

## 2023-10-04 PROCEDURE — 250N000013 HC RX MED GY IP 250 OP 250 PS 637: Performed by: STUDENT IN AN ORGANIZED HEALTH CARE EDUCATION/TRAINING PROGRAM

## 2023-10-04 PROCEDURE — 92610 EVALUATE SWALLOWING FUNCTION: CPT | Mod: GN

## 2023-10-04 PROCEDURE — 124N000002 HC R&B MH UMMC

## 2023-10-04 PROCEDURE — 99232 SBSQ HOSP IP/OBS MODERATE 35: CPT | Mod: GC | Performed by: PSYCHIATRY & NEUROLOGY

## 2023-10-04 RX ORDER — DIVALPROEX SODIUM 125 MG/1
250 CAPSULE, COATED PELLETS ORAL EVERY 8 HOURS SCHEDULED
Status: DISCONTINUED | OUTPATIENT
Start: 2023-10-04 | End: 2023-10-18

## 2023-10-04 RX ADMIN — WHITE PETROLATUM: 1.75 OINTMENT TOPICAL at 19:24

## 2023-10-04 RX ADMIN — GABAPENTIN 300 MG: 300 CAPSULE ORAL at 19:27

## 2023-10-04 RX ADMIN — POLYETHYLENE GLYCOL 3350 17 G: 17 POWDER, FOR SOLUTION ORAL at 19:24

## 2023-10-04 RX ADMIN — DIVALPROEX SODIUM 250 MG: 125 CAPSULE, COATED PELLETS ORAL at 13:54

## 2023-10-04 RX ADMIN — POLYETHYLENE GLYCOL 3350 17 G: 17 POWDER, FOR SOLUTION ORAL at 08:45

## 2023-10-04 RX ADMIN — ATROPINE SULFATE 2 DROP: 10 SOLUTION/ DROPS OPHTHALMIC at 19:24

## 2023-10-04 RX ADMIN — SENNOSIDES 2 TABLET: 8.6 TABLET ORAL at 19:23

## 2023-10-04 RX ADMIN — BISACODYL 10 MG: 10 SUPPOSITORY RECTAL at 14:33

## 2023-10-04 RX ADMIN — SENNOSIDES 2 TABLET: 8.6 TABLET ORAL at 08:44

## 2023-10-04 RX ADMIN — GABAPENTIN 300 MG: 300 CAPSULE ORAL at 13:54

## 2023-10-04 RX ADMIN — LORAZEPAM 0.5 MG: 0.5 TABLET ORAL at 08:44

## 2023-10-04 RX ADMIN — BENZTROPINE MESYLATE 1 MG: 1 TABLET ORAL at 08:44

## 2023-10-04 RX ADMIN — ATROPINE SULFATE 2 DROP: 10 SOLUTION/ DROPS OPHTHALMIC at 08:44

## 2023-10-04 RX ADMIN — CYANOCOBALAMIN TAB 1000 MCG 1000 MCG: 1000 TAB at 08:43

## 2023-10-04 RX ADMIN — WHITE PETROLATUM: 1.75 OINTMENT TOPICAL at 08:44

## 2023-10-04 RX ADMIN — NAPROXEN 250 MG: 250 TABLET ORAL at 19:24

## 2023-10-04 RX ADMIN — ATROPINE SULFATE 2 DROP: 10 SOLUTION/ DROPS OPHTHALMIC at 14:07

## 2023-10-04 RX ADMIN — DIVALPROEX SODIUM 250 MG: 250 TABLET, DELAYED RELEASE ORAL at 06:33

## 2023-10-04 RX ADMIN — NAPROXEN 250 MG: 250 TABLET ORAL at 08:44

## 2023-10-04 RX ADMIN — MICONAZOLE NITRATE: 20 POWDER TOPICAL at 19:24

## 2023-10-04 RX ADMIN — BENZTROPINE MESYLATE 1 MG: 1 TABLET ORAL at 19:24

## 2023-10-04 RX ADMIN — DIVALPROEX SODIUM 250 MG: 125 CAPSULE, COATED PELLETS ORAL at 19:23

## 2023-10-04 RX ADMIN — LORAZEPAM 0.5 MG: 0.5 TABLET ORAL at 19:24

## 2023-10-04 RX ADMIN — BENZONATATE 100 MG: 100 CAPSULE ORAL at 05:14

## 2023-10-04 RX ADMIN — OLANZAPINE 15 MG: 10 TABLET, ORALLY DISINTEGRATING ORAL at 08:44

## 2023-10-04 RX ADMIN — FLUOXETINE 20 MG: 20 CAPSULE ORAL at 08:43

## 2023-10-04 RX ADMIN — OLANZAPINE 15 MG: 10 TABLET, ORALLY DISINTEGRATING ORAL at 19:24

## 2023-10-04 RX ADMIN — LORAZEPAM 0.5 MG: 0.5 TABLET ORAL at 13:54

## 2023-10-04 RX ADMIN — GABAPENTIN 300 MG: 300 CAPSULE ORAL at 08:43

## 2023-10-04 RX ADMIN — CLOZAPINE 650 MG: 100 TABLET, ORALLY DISINTEGRATING ORAL at 13:52

## 2023-10-04 RX ADMIN — TAMSULOSIN HYDROCHLORIDE 0.4 MG: 0.4 CAPSULE ORAL at 08:44

## 2023-10-04 RX ADMIN — MICONAZOLE NITRATE: 20 POWDER TOPICAL at 08:44

## 2023-10-04 RX ADMIN — Medication 10 MG: at 19:23

## 2023-10-04 ASSESSMENT — ACTIVITIES OF DAILY LIVING (ADL)
ADLS_ACUITY_SCORE: 88
ORAL_HYGIENE: WITH ASSISTANCE
ADLS_ACUITY_SCORE: 88
ORAL_HYGIENE: WITH ASSISTANCE
ADLS_ACUITY_SCORE: 88
ADLS_ACUITY_SCORE: 88
DRESS: WITH ASSISTANCE
ADLS_ACUITY_SCORE: 88
ADLS_ACUITY_SCORE: 88
DRESS: WITH ASSISTANCE
HYGIENE/GROOMING: WITH ASSISTANCE
HYGIENE/GROOMING: WITH ASSISTANCE
ADLS_ACUITY_SCORE: 88

## 2023-10-04 NOTE — PLAN OF CARE
Assessment/Intervention/Current Symtoms and Care Coordination:  Reviewed chart. Met with team to review patient's current functioning, needs, progress, and impediments to discharge.    Discharge Plan or Goal:  [place] discharge [place] [address]     Barriers to Discharge:  Discharge pending completion of MNChoice Assessment by CADI     Referral Status:  [facility] in [place] , no memory care   [facility] in [place] 8/15, no memory care   [facility] , no openings   [facility] , declined   [facility] [place] , no open beds   [facility]   [facility] , no open beds   [facility] [place] 9/15, waiting for decision   [facility]   [place] in [facility] , no openings and private pay   [facility], , declined    [place], , declined   [facility] 9/15, waiting for decision   [place] , declined   [place] , declined      Legal Status:  Commitment with [facility]: [place]  File Number: [id number]  Issued 23 and is not to exceed six months    Contacts:  : Vicky Valdez with [facility], [phone]  Psychiatrist: Dr. [facility]ana at [facility], [phone] PCP: Attila Urena  Mom: Alberta, [phone] (H) [phone] (M)   Dad: Ino, [phone] (H)  Sister, Sandra Treadwell, [phone] (KING)   [facility]'s Office Fax: [phone]  CADI  with [facility]: Regina Wong, [phone], [email or web address]    Upcoming Meetings and Dates/Important Information and next steps:

## 2023-10-04 NOTE — PLAN OF CARE
Problem: Psychotic Signs/Symptoms  Goal: Optimal Cognitive Function (Psychotic Signs/Symptoms)  Intervention: Support and Promote Cognitive Ability  Recent Flowsheet Documentation  Taken 10/3/2023 2100 by Ramon Cotton RN  Trust Relationship/Rapport:   care explained   choices provided   emotional support provided   empathic listening provided   questions answered   questions encouraged   reassurance provided   thoughts/feelings acknowledged   Goal Outcome Evaluation:    The patient rested in his room for most of the evening but briefly walked the aragon with his SIO staff. He had enough food and fluids and took his scheduled medications and Tessalon PRN for cough. He was pleasant and cooperative and denied any mental health symptoms. There were no medical or safety concerns observed during this time. He declined prompts to perform personal hygiene but said he would do so tomorrow.

## 2023-10-04 NOTE — PLAN OF CARE
Pt was withdrawn and isolative in the room with the SIO sitter.  Ate 25% of  breakfast with lots of encouragement and 50% of lunch plus 2 Ensures. Pt   verbalized and was observed having difficulty swallowing  food and medication  even with his medication mixed in chocolate pudding.  Provider updated and she ordered a speech consult and changed Depakote from tablets to sprinkles.   Pt verbalized no bowel for days and it was noticed that he had no bowel movement for more than 2 days. Provider updated and PRN suppository administered at 1433 without result yet.  Pt noted to be having a dry mouth and no drooling this shift. Perineum treatment completed and pt tolerated it well without issue. denies  pain SI/HI, delusions, and hallucination. No acute medical concerns observed or verbalized this shift  Will continue to monitor and assist as needed.     Problem: Confusion Chronic  Goal: Optimal Cognitive Function  Outcome: Progressing     Problem: Psychotic Signs/Symptoms  Goal: Optimal Cognitive Function (Psychotic Signs/Symptoms)  Intervention: Support and Promote Cognitive Ability  Recent Flowsheet Documentation  Taken 10/4/2023 1200 by Basia Woodson RN  Trust Relationship/Rapport:   care explained   choices provided  Goal: Improved Psychomotor Symptoms (Psychotic Signs/Symptoms)  Intervention: Manage Psychomotor Movement  Recent Flowsheet Documentation  Taken 10/4/2023 1200 by Basia Woodson RN  Activity (Behavioral Health):   up in chair   activity encouraged  Goal: Enhanced Social, Occupational or Functional Skills (Psychotic Signs/Symptoms)  Intervention: Promote Social, Occupational and Functional Ability  Recent Flowsheet Documentation  Taken 10/4/2023 1200 by Basia Woodson RN  Trust Relationship/Rapport:   care explained   choices provided   Goal Outcome Evaluation:    Plan of Care Reviewed With: patient

## 2023-10-04 NOTE — PROGRESS NOTES
"Speech-Language Pathology: Clinical Swallow Evaluation     10/04/23 1500   Appointment Info   Signing Clinician's Name / Credentials (SLP) Jess Weiss MS, CCC-SLP   General Information   Onset of Illness/Injury or Date of Surgery 05/26/23   Referring Physician Garth Abreu MD   Pertinent History of Current Problem Per chart review, \"Patient was admitted to psychiatric unit for safety, stabilization and medication management. He has had schizophrenia since the 1980. He was on Clozaril x 25 years, and it was tapered and discontinued on May 7, 2023  due to prolonged qtc of 527, and his psychotic  symptoms have worsened since then. Sinemet was also discontinued recently due to concerns that it was contributing to paranoia and AH. He is agitated, aggressive, dangerous to self and others. He remains on SIO 2 to 1, and is under psychiatric emergency and court hold. There are concerns for organic etiology given pattern of sundowning, history of parkinsonism, and ongoing disorientation/confusion.\" Per MD note 10/4/23, \"Vinod reports that he is doing \"good\" today, but relates that he \"couldn't get that food down\". He reports trouble swallowing this morning, his first such episode since earlier in the admission. We alert Vinod to a Speech consult for evaluation. He grew irritable when asked if the trouble was with liquids of solids.\"   General Observations Pt with difficulty feeding self d/t bilateral UE tremors, but agreeable to have SLP to assist with feeding.   Type of Evaluation   Type of Evaluation Swallow Evaluation   Oral Motor   Oral Musculature unable to assess due to poor participation/comprehension   Dentition (Oral Motor)   Comment, Dentition (Oral Motor) Unable to assess. Patient declined to open his mouth.   Facial Symmetry (Oral Motor)   Facial Symmetry (Oral Motor) unable/difficult to assess   Lip Function (Oral Motor)   Lip Range of Motion (Oral Motor) unable/difficult to assess   Tongue Function (Oral " "Motor)   Tongue ROM (Oral Motor) unable/difficult to assess   Jaw Function (Oral Motor)   Jaw Function (Oral Motor) unable/difficult to assess   Cough/Swallow/Gag Reflex (Oral Motor)   Volitional Throat Clear/Cough (Oral Motor) unable/difficult to assess   Volitional Swallow (Oral Motor) unable/difficult to assess   Vocal Quality/Secretion Management (Oral Motor)   Vocal Quality (Oral Motor) WFL;hoarse;hypophonic   Secretion Management (Oral Motor) WNL   General Swallowing Observations   Past History of Dysphagia Per review of EMR, patient participated in a videofluoroscopic swallow study (VFSS) at [facility] on 8/12/22. Evaluating SLP's report stated the following: \"Pt demonstrated a mild oropharyngeal dysphagia during instrumental assessment, presenting with a mild risk for aspiration, and will benefit from initiation of a modified PO diet with strict adherence to aspiration precautions. No aspiration appreciated. Consistent shallow penetration with thin liquids which fully cleared by the end of the swallow. Single episode of deeper penetration with thin liquids which did not immediately clear, though eventually cleared during subsequent swallows.\" CSE during this admit on 9/12/2023 recommending regular solids and thin liquids when demonstrated impulsivity with self-feeding with fast feeding rate prior to clearing previous bolus. Speech therapy completed 1 session and then d/c d/t goals met.   Comment, General Swallowing Observations RN endorsed verbally and MD note mentioned that pt reported that it was hard to swallow and \"couldn't get the food down\". When SLP asked pt, he denied any difficulties. RN endorsed difficulties with medications in puree and Divehi toast at breakfast. No coughing or choking, but patient having difficult time initiating swallowing.   Respiratory Support (General Swallowing Observations) none   Current Diet/Method of Nutritional Intake (General Swallowing Observations, NIS) regular diet;thin " liquids (level 0)   Swallowing Evaluation Clinical swallow evaluation   Clinical Swallow Evaluation   Feeding Assistance dependent  (d/t bilateral UE tremors)   Additional evaluation(s) completed today No   Clinical Swallow Evaluation Textures Trialed thin liquids;pureed;solid foods   Clinical Swallow Eval: Thin Liquid Texture Trial   Mode of Presentation, Thin Liquids straw;fed by clinician   Volume of Liquid or Food Presented 2 oz   Oral Phase of Swallow WFL   Pharyngeal Phase of Swallow intact   Diagnostic Statement No overt s/s of aspiration. Patient declined previous trials.   Clinical Swallow Evaluation: Puree Solid Texture Trial   Mode of Presentation, Puree spoon;fed by clinician   Volume of Puree Presented 2 oz   Oral Phase, Puree WFL   Pharyngeal Phase, Puree intact   Diagnostic Statement No overt s/s of aspiration or dysphagia. Patient denied pharyngeal stasis.   Clinical Swallow Evaluation: Solid Food Texture Trial   Mode of Presentation self-fed   Volume Presented 1 lizy cracker   Oral Phase WFL   Pharyngeal Phase intact   Diagnostic Statement No overt s/s of aspiration or dsyphagia. Patient denied pharyngeal stasis.   Esophageal Phase of Swallow   Patient reports or presents with symptoms of esophageal dysphagia No   Swallowing Recommendations   Diet Consistency Recommendations regular diet;thin liquids (level 0)   Supervision Level for Intake 1:1 supervision needed  (To ensure that patient adheres to safe swallowing precautions)   Mode of Delivery Recommendations bolus size, small;slow rate of intake   Monitoring/Assistance Required (Eating/Swallowing) stop eating activities when fatigue is present;monitor for cough or change in vocal quality with intake   Recommended Feeding/Eating Techniques (Swallow Eval) maintain upright sitting position for eating;minimize distractions during oral intake;set-up and prepare tray;provide assist with feeding   Medication Administration Recommendations, Swallowing  (SLP) In [facility]   Instrumental Assessment Recommendations instrumental evaluation not recommended at this time   Clinical Impression   Criteria for Skilled Therapeutic Interventions Met (SLP Eval) Evaluation only   Risks & Benefits of therapy have been explained evaluation/treatment results reviewed;participants voiced agreement with care plan;participants included;patient   Clinical Impression Comments Clinical Swallow Evaluation completed. Patient had no s/s aspiration and no signs of dysphagia. Oral motor function was unable to be assessed with patient declining to participate in oral mechanism exam. Mastication was timely and complete when patient took large bites. Hyolaryngeal elevation appears intact. Recommend diet of regular solids and thin liquids with staff feeding, as allowed, sitting fully upright, small bites/sips, and slow rate. No further ST is warranted at this time. Contact SLP if any questions or concerns.   SLP Total Evaluation Time   Eval: oral/pharyngeal swallow function, clinical swallow Minutes (25000) 25   SLP Discharge Planning   SLP Plan Complete orders   SLP Discharge Recommendation   (defer to treatment team)   SLP Rationale for DC Rec Swallow function appears at baseline. No further Speech Therapy needs at this time.   SLP Brief overview of current status  Continue regular solids and thin liquids when sitting fully upright, with staff feeding, as accepted, small bites/sips, slow rate, and swallowing food already in his mouth prior to another bite. [facility] Speech will complete orders.   Total Session Time   Total Session Time (sum of timed and untimed services) 25

## 2023-10-04 NOTE — CARE PLAN
10/04/23 1451   Group Therapy Session   Group Attendance attended group session   Time Session Began 1315   Time Session Ended 1400   Total Time (minutes) 8 (No Charge)   Total # Attendees 3   Group Type Occupational Therapy   Group Topic Covered balanced lifestyle;coping skills/lifestyle management;leisure exploration/use of leisure time;relaxation techniques;self-care activities   Group Session Detail OT Clinic Group and check in   Patient Participation Detail Intervention: OT Clinic with 2 peers. Pt participated in a OT Clinic group to facilitate coping skills exploration and creative expression through personally meaningful activities, and to encourage utilization of these healthy coping skills to promote overall health and wellness. Group included clinical observation of social, cognitive and kinesthetic performance skills to inform treatment and safe discharge planning.    Patient Response: Approached patient at their room doorway to invite to group. Patient resistant to try watercolor painting project d/t tremors. Writer and SIO staff able to convince patient to give it a try. Participated for a minute or two d/t tremors in hands making this activity difficult. Declined other suggestions for activities from writer, however agreeing to walk with writer in the aragon for a little while. Discussed playing baseball and softball in the past and enjoying this activity. Writer printed of some word searches where writer could find the words and have staff assist with highlighting the word.     Mood/Affect: Flat, Pleasant       Plan: Patient encouraged to maintain attendance for continued ongoing support in working towards occupational therapy goals to support overall treatment/care.

## 2023-10-04 NOTE — PLAN OF CARE
"Continues to make paranoid statements, eg that his medications are \"poison\" and that he will be \"pummeled\" by staff. Pt not receptive to reassurance that this is not the case. Upon assessment, pt denied SI/HI, anxiety. Upon assessment for depression, pt did not immediately deny and stated \"I'm not having anxiety.\" Upon further assessment for depression, pt stated that he was \"ok\" and did not elaborate further despite prompting. Upon assessment for A/VH, pt stated that he has a \"voice.\" Asked for elaboration, with pt stating that he has 2-way conversations in his head. When asked what the voice says, he said that they talk about \"fishing, hunting\" and other casual things.     Pt given PRN suppository in AM shift for constipation. Pt reported by [facility] staff to be passing gas. [facility] staff reported that pt when into bathroom for \"not that long\" and was passing gas, but was unsure if pt had a BM. Pt reports that he did have a BM. Denies any abdominal discomfort or bloating. Palpated pt abdomen, which did not feel firm and pt denied any discomfort with palpitation. Given scheduled senna and Miralax. Receptive to encouragement to drink fluids and walk the hallway. Reported by [facility] to be voiding.     Pt reports that his jock itch is getting worse and reports mild burning on his scrotum. Appeared to be more red than when writer worked with pt previously approximately a week ago. Pt stated to writer that he had jock itch before and that it lasted 6 months. Applied anti-fungal powder and InterDry.     Continues to demonstrate cough, which was a dry cough this shift. Pt denies any difficulty breathing. Used incentive spirometer, which pt only being minimally cooperative, and used for only 3 inhales.      Ate approximately 50% of dinner. Denies pain    Maintains 2:1 for assault risk. No instances of aggression noted this shift.     Problem: Confusion Chronic  Goal: Optimal Cognitive Function  Outcome: Progressing    Problem: Adult " Behavioral Health Plan of Care  Goal: Develops/Participates in Therapeutic [place] to Support Successful Transition  Outcome: Progressing

## 2023-10-04 NOTE — PROGRESS NOTES
"[facility], [place]   Psychiatric Progress Note  Hospital Day: 131        Interim History:   The patient's care was discussed with the treatment team during the daily team meeting and/or staff's chart notes were reviewed. Staff report that Vinod was medication compliant. No aggression or agitation. Did not required PRN medications. He had enough food and fluids and took his scheduled medications and Tessalon PRN for cough. He was pleasant and cooperative and denied any mental health symptoms.     Vinod was encountered in the aragon as he peered through the Pod door. He was amenable to interview and beckoned providers to his room where he sat on bed. Vinod reports that he is doing \"good\" today, but relates that he \"couldn't get that food down\". He reports trouble swallowing this morning, his first such episode since earlier in the admission. We alert Vinod to a Speech consult for evaluation. He grew irritable when asked if the trouble was with liquids of solids. Regarding his bowel movements, Vinod reports that he has not had BM in 2-3 days. Recommended increasing his bowel medications, and Vinod is opposed to that, reporting that he is \"one extreme or the other without medication\". He reports no abdominal pain, is passing gas. Vinod reports an absence of SI or HI; reports thoughts of harming others for a few days. Vinod abruptly says, \"Please leave\" and interview is concluded.    Suicidal ideation: None    Homicidal ideation: None    Psychotic symptoms: Delusions present (paranoia) and other psychotic symptoms suspected.    Medication side effects reported:  tremors that make it difficult to eat. jaw and chin tremors.    Acute medical concerns: None.      Other issues reported by patient: Patient had no further questions or concerns.           Medications:      atropine  2 drop Sublingual TID    benztropine  1 mg Oral BID    cloZAPine  650 mg Oral Daily    cyanocobalamin  1,000 mcg Oral Daily    " "divalproex sodium delayed-release  250 mg Oral Q8H KIKI    FLUoxetine  20 mg Oral Daily    gabapentin  300 mg Oral TID    LORazepam  0.5 mg Oral TID    melatonin  10 mg Oral At Bedtime    miconazole   Topical BID    mineral oil-hydrophilic petrolatum   Topical BID IS    naproxen  250 mg Oral BID w/meals    OLANZapine zydis  15 mg Oral BID    Or    OLANZapine  10 mg Intramuscular BID    polyethylene glycol  17 g Oral BID    sennosides  2 tablet Oral BID    tamsulosin  0.4 mg Oral Daily          Allergies:     Allergies   Allergen Reactions    Bee Venom Unknown    Montelukast Unknown    Phenothiazines Unknown          Labs:     No results found for this or any previous visit (from the past 24 hour(s)).                   Psychiatric Examination:     /77 (BP Location: Right arm)   Pulse 81   Temp 97.6  F (36.4  C) (Temporal)   Resp 19   Ht 1.753 m (5' 9\")   Wt 84.4 kg (186 lb)   SpO2 98%   BMI 27.47 kg/m    Weight is 186 lbs 0 oz  Body mass index is 27.47 kg/m .    Weight over time:  Vitals:    07/03/23 1100 07/30/23 0902 08/13/23 0900 08/26/23 0835   Weight: 81.6 kg (179 lb 12.8 oz) 86.5 kg (190 lb 9.6 oz) 85.7 kg (188 lb 14.4 oz) 82.8 kg (182 lb 7 oz)    09/07/23 0900 09/26/23 1729 10/03/23 0843   Weight: 83.7 kg (184 lb 8 oz) 83.4 kg (183 lb 14.4 oz) 84.4 kg (186 lb)       Orthostatic Vitals         Most Recent      Sitting Orthostatic /61 07/25 0800    Sitting Orthostatic Pulse (bpm) 85 07/25 0800          Cardiometabolic risk assessment. 06/07/23    Reviewed patient profile for cardiometabolic risk factors    Date taken /Value  REFERENCE RANGE   Abdominal Obesity  (Waist Circumference)   See nursing flowsheet Women ?35 in (88 cm)   Men ?40 in (102 cm)      Triglycerides  No results found for: TRIG    ?150 mg/dL (1.7 mmol/L) or current treatment for elevated triglycerides   HDL cholesterol  No results found for: HDL]   Women <50 mg/dL (1.3 mmol/L) in women or current treatment for low HDL " "cholesterol  Men <40 mg/dL (1 mmol/L) in men or current treatment for low HDL cholesterol     Fasting plasma glucose (FPG) Lab Results   Component Value Date     05/26/2023      FPG ?100 mg/dL (5.6 mmol/L) or treatment for elevated blood glucose   Blood pressure  BP Readings from Last 3 Encounters:   10/03/23 115/77   05/26/23 97/55    Blood pressure ?130/85 mmHg or treatment for elevated blood pressure   Family History  See family history     Mental Status Exam:  Appearance: awake, alert, unkempt, sitting on bed. No evidence of pain of acute distress.   Attitude:  cooperative, calm during interview today  Eye Contact:  fair, less intense, better duration  Mood: \"good\"  Affect:  mood congruent, content  Speech: mostly coherent  Psychomotor Behavior:  Ongoing bilateral arm tremor. No evidence of dystonia, or tics.  Throught Process: linear, illogical, concrete  Associations:  no loosening of associations present  Thought Content:  Delusions and AH suspected. Evidence of obsessive thinking and likely compulsions to harm others.   Insight:  limited  Judgement:  poor  Oriented to:  self, place  Attention Span and Concentration:  fair  Recent and Remote Memory:  poor  Gait: patient lying in bed         Precautions:     Behavioral Orders   Procedures    Assault precautions    Code 1 - Restrict to Unit    Code 2    Code 2 - 1:1 Staff Supervision     For ECT only    Electroconvulsive therapy     Series of up to 12 treatments. Begin Date: 8/2/23     Treating Psychiatrist providing ECT:  unknown     Notified on:  7/28/23 via this order  NOTE: We received verbal confirmation from [facility] that Lorenzo Pavon has been approved but awaiting official court order and risk management's review  Initial consult was completed by Dr. Hicks on 7/18/23    Electroconvulsive therapy     Series of up to 12 treatments. Begin Date: 8/2/23     Treating Psychiatrist providing ECT:  Dominga     Notified on:  8/2/23    Elopement precautions "    Fall precautions    Occupational Therapy on the Unit     Order Specific Question:   Reason for Consult     Answer:   Eval of thought process, functional skills and behavior     Order Specific Question:   Course of Action:     Answer:   Evaluation only     Order Specific Question:   Treatment Prescription:     Answer:   CPT requested    Routine Programming     As clinically indicated    Self Injury Precaution    Status 15     Every 15 minutes.    Status Individual Observation     Patient SIO status reviewed with team/RN.  Please also refer to RN/team documentation for add'l detail.    -SIO staff to monitor following which have contributed to patient being on SIO:  Agitation  Pt is impulsive   Pt has ran out of his room naked  Pt has Parkinson symptoms place him in a high fall risk  Pt has verbal outburst of sexual and threaten statements  Pt requires immediate redirection when masturbating    -Possible interventions SIO staff could use to support patient's treatment progress:  Engage pt in activities    -When following observed, team will review discontinuation of SIO:  Absence of aggression toward others for > 24 hours    Comments: SIO 1:1     Order Specific Question:   CONTINUOUS 24 hours / day     Answer:   5 feet     Order Specific Question:   Indications for SIO     Answer:   Severe intrusiveness     Order Specific Question:   Indications for SIO     Answer:   Assault risk    Suicide precautions     Patients on Suicide Precautions should have a Combination Diet ordered that includes a Diet selection(s) AND a Behavioral Tray selection for Safe Tray - with utensils, or Safe Tray - NO utensils            Diagnoses:     #Unspecified psychosis likely schizophrenia per history,  #Unspecified encephalopathy   -R/O catatonia   -Episodes of unresponsiveness, concern for PNES   #Parkinsonism 2/2 neuroleptic medications, rule out Parkinson's Disease  -rule out major neurocognitive disorder (refused to answer much of  assessment)  #Urinary retention and BPH  -Possible UTI -- UC resulted on 5/25 w/out growth  #Hx of prolonged QTc with clozapine  #Mild thrombocytopenia  #R/O OCD         Assessment and hospital summary:  Patient was admitted to psychiatric unit for safety, stabilization and medication management. He has had schizophrenia since the 1980. He was on Clozaril x 25 years, and it was tapered and discontinued on May 7, 2023  due to prolonged qtc of 527, and his psychotic  symptoms have worsened since then. Sinemet was also discontinued recently due to concerns that it was contributing to paranoia and AH. He is agitated, aggressive, dangerous to self and others. He remains on SIO 2 to 1, and is under psychiatric emergency and court hold. There are concerns for organic etiology given pattern of sundowning, history of parkinsonism, and ongoing disorientation/confusion. 6/8: EKG repeated, cardiology consult regarding safety of resuming clozapine in the context of prolonged QTc and refractory schizophrenia pending response. Per cardiology, correct QTc is no more than 460. They do not have concerns about AP retrial. Neurology IP consult placed for evaluation of sundowning and cognitive decline secondary to Sinemet discontinuation. MSSA initiated. Per Neurology, discontinuation of Sinemet would not account for these symptoms. They do not recommend retrial at this time. On 6/14, discussed complex case at length with Dr. Hatch (primary provider on geriatic unit) who provided recs (see second opinion note dated 6/14). Depakote initiated, though needed to be reduced due to side effect of thrombocytopenia. Melatonin was increased to 10 mg at bedtime. 6/22: Clozapine titration continued. Its possible that clozapine dose is contributing to worsened compulsive behaviors noted throughout hospitalization which could improve with lowering the dose. ECT initiated due to treatment refractory psychosis and ongoing symptoms despite  therapeutic dose of clozapine (650 mg daily). ECT initiated on 23 and pt completed 11 total treatments, but ECT stopped on  due to lack of improvement and distress it was causing patient.     Chart reviewed which revealed the followin2022: He was on clozapine, Seroquel, Cymbalta, and Carbidopa-levodopa and hospitalized for pneumonia. Psych consulted and Seroquel was stopped. Treated with Abx and discharged to TCU.     2022: Hospitalized at [facility]. Per chart review:    Vinod Lee is a 63 yo male with longstanding history of schizophrenia. He was admitted from [facility] ED, where he presented due to increased delusional thoughts while on a visit to his parents for Thanksgiving. He began experiencing increasing paranoid thoughts that someone might be poisoning him and began fearing that he might accidentally harm someone. He reported to his parents that he was having thoughts about hitting someone or beating someone up and told them he could not guarantee they would be safe with him. Parents encouraged patient to go to the ED, which he did voluntarily.     Per patient report and chart review, he was hospitalized several times in the  and reports he was civilly committed at one point in the , however he has been quite stable for the past several decades. He reports he will experience some paranoia and delusional thinking at times, but generally has good insight into his symptoms. He has been maintained on clozapine for about 25 years and prior to several months ago was also concurrently prescribed quetiapine.      In the last several months, patient has had a number of medication changes. Approximately 6 months ago, patient was diagnosed with parkinsonism related to antipsychotic use and was started on carbidopa-levidopa. He experienced no improvement in tremors, and thus carbidopa- levidopa dose was increased 1.5 weeks ago.      In addition, patient was medically hospitalized in  "August 2022 for confusion, weakness, repeated falls, ongoing weight loss, and SOB. He was found to have a cavitary lesion of the lung and suspected aspiration pneumonia. He was treated with antibiotics. At that time, he was taking current dose of clozapine and duloxetine, as well as propranolol ER, quetiapine, gabapentin, and clonazepam. Psychiatry was consulted due to concern for oversedation, and propranolol ER, gabapentin, and quetiapine were discontinued. Conazepam was reduced from 0.5 mg TID to BID dosing and recommended to be discontinued, however, it does not appear this occurred. His sedation improved significantly. QTc prolongation was also noted, but improved throughout his stay. He was ultimately discharged to a TCU for several months before returning home. He reports his weakness has greatly improved and states he \"graduated\" physical therapy, though he continues to be diligent in completed recommended exercises.      He reports that over the past several months, his delusions and anxiety have been worse than usual, with symptoms becoming much more acute since the carbidopa-levidopa dose was increased. He has felt increasingly paranoid about being poisoned, noting this is a delusion that has been stable over time, but was previously less intrusive. He has insight during the interview that his paranoid thinking is not reality based, but states it has been harder to separate delusions from reality and he becomes very worried that he might hurt someone, despite no history of violent behavior. He reports that the paranoia about his food being poisoned in combination with the tremors in his hands have made it difficult for him to eat. He has also had quite low appetite for the past 6 months to a year . He reports that due to the combination of these factors, he has lost about 50 pounds in the last year.He denies any problems with sleep initiation or maintenance. He reports energy is fairly good and \"better " "than it used to be.\" He reports some short term memory issues and on interview, does have some difficulty remembering details of recent events. He is unsure if he has received cognitive testing in the past.    He denies any suicidal ideation and reports he has not experienced SI for decades. He denies homicidal thoughts and is very clear that he had and has no desire to harm anyone else, but was afraid he might somehow do so. He denies any hallucinations and states \"that's never been a problem for me.\" He is not observed responding to internal stimuli. He is alert and oriented in all spheres.        ========    BRIEF HOSPITAL COURSE: This 62 y.o. male admitted 11/25/2022 to  [facility] for the assessment and treatment of the above presentation. This was a voluntary admission.     In summary, he was tapered off the Sinemet, which he reports to be both ineffective and potentially worsening the anxiety and paranoia ideations. Instead Benadryl 25 mg TID was started to help with extrapyramidal side effects. He struggled with sleep during his stay here. Remeron 7.5mg QHS was tried without success. Seroquel 100mg qhs was restarted with addition of suvorexant 10mg qhs. Given his Seroquel PRN use prior history of prolonged QTC, he will benefit from another EKG repeat on the medical unit.     Unfortunately, he tested positive for influenza A and developed hypoxemia. Now on 2L oxygen with desats when prone requiring 3L oxygen. Subsequently, the plan will to be tapering him off clonazepam due to hypoxia (and also prior plan to taper). The patient tolerated these changes without side effects.     The patient minimally benefited from the structured therapeutic milieu as he attended programming seldom as he tested positive for influenza, he needed to isolate with droplet precautions. Pt was engaged and worked on issues that were triggering/brought pt to the hospital and has excellent insight into his anxiety and paranoid delusions. Pt " denied suicidal ideations throughout all/majority of their hospitalization. Pt denied HI throughout all of hospitalization. There were no concerns with behavioral disruptions/outbursts. The patient has shown improvement in general.     At the time of discharge, hospitalization course was reviewed with Vinod Lee with plan to transfer to medicine. Please consult psychiatry and I will continue to follow while patient is on the medical unit. He is now off sinemet since 11/29 21:00 and I would expect anxiety and paranoia to improve more moving forward. Psychiatrically, once anxiety and paranoia is baseline, can D/C to home once medically stable. He DOES NOT NEED A 1:1 on the medical unit from a mental health perspective, we initiated 1:1 11/30/2022 due to significant fatigue, gait instability (he was unable to stand without assist) and feeding assist.    04/2023: Clozapine was gradually tapered and discontinued, unclear reasons why it was discontinued per outside records, though Dr. Portillo's H&P indicates that it was due to prolonged QTc.       Today's Changes:  -Change Depakote to Sprinkles formulation  -Speech/SLP consult for eval and recs  -Renewed SIO   -Monitor oral intake closely  -Monitor BMs closely    Per Neurology Recs:    Recommendations  -No current indications to change medications from a neurologic perspective  - Optimizing his psychiatric medication and treatment is currently more important than optimization of parkinsonism  - Please contact neurology if any new questions arise, or to discuss the assessment described above     Thank you for involving Neurology in the care of Vinod Lee.  Please do not hesitate to call with questions.      Kranthi Perdue MD    Target psychiatric symptoms and interventions:  -Psych emergency declared on 5/27, now fully committed  - Depakote Sprinkles 250 mg TID, restarted on 8/26. Cannot increase beyond this dose due to thrombocytopenia at higher  doses  -Continue clozapine as above, 650mg  -Continue scheduled Zyprexa 15mg BID  -Continue 1:1 for safety of staff and patients, reduced from 2:1 7/23/23  - weekly CBC to monitor platelets    -Continue Gabapentin 300 mg TID as staff believe it has been effective for treatment of his anxiety    Risks, benefits, and alternatives discussed at length with patient.     Acute Medical Problems and Treatments:  Rhinovirus / enterovirus URI  Concern for pneumonia  On 9/23, patient was reported to be hypoxic with an SpO2 of 89% with RR of 28, as well as had some low-grade fevers.  Associated symptoms of wet sounding cough.  COVID was negative.  Chest x-ray with possible infection versus atelectasis in RLL.  Patient was initially started on doxycycline on 9/24.  Due to concern for clinical worsening, patient was reassessed by provider on 9/25.  At that time due to patient increase drooling while on Clozaril and him laying supine for much of the day, concerns were raised for aspiration pneumonia, thus he was started on Augmentin as well.   - sputum culture if able, pending  - cont Doxycycline 100 mg twice daily for 5 days (9/24-9/28)  - cont augmentin 875-125 mg BID x 5 days (9/25-9/29)  - Albuterol inhaler as needed cough, wheezing, shortness of breath  - APAP every 4 hours as needed fevers, pain  - I-S q shift, as tolerated     Hx cavitary lesion to SOPHY  Pulmonary nodule to SOPHY  Seen August 2022 in ED. CT chest with contrast negative for PE but showed cavitary masslike process in the left upper lung, over an area measuring 9.5 x 6.2 x 5.2 cm, indeterminant pulmonary nodule measuring 9 mm in the anterior segment of the left upper lobe, nonspecific left hilar and mediastinal lymphadenopathy, small-moderate left pleural effusion.  Cavitary lesion also seen on chest x-ray.  Patient was given IVF, Rocephin, Zithromax, and admitted for further evaluation. Thoracentesis was done and negative cultures. ID was consulted and managed  Abx. The patient was initially on zosyn and doxycycline but narrowed. Strep pneumo, legionella, and HIV testing done (negative). Blood cultures were negative as well. The patient did well.  Follow-up chest CT showed near complete interval resolution of the previously described left upper lobe abscess.  Residual 12 x 10 mm spiculated nodular opacity with adjacent scarring within this region, probably represents sequela of previous infection, although underlying malignancy not excluded.  - Recommend follow-up outpatient with PCP for continued monitoring.    Groin rash  Reassessed groin rash, previously assessed to have tinea cruris with possible intertrigo. Rash appears significantly improved. Recommend continuing with miconazole powder BID [please wipe off old powder before applying new powder]. Residual mild erythema may be still improving rash, vs intertrigo from moisture  - Interdry topical therapy, apply one sheet to each skin fold in groin area. Change every 3-5 days.   - Medicine service will follow-up next week and then PRN, for monitoring for rash       Possible Dementia  Neurology consult placed on 6/8 for evaluation of sundowning and cognitive decline secondary to Sinemet discontinuation: Resuming Sinemet not recommended for behavioral concerns. Please see Neuro note for details.     Tremors  longstanding though appeared to worsen following initiation of clozapine  - Ativan 0.5 mg TID  - Cogentin 1 mg BID added on 8/25 with overall improvement noted    Neuro re-consulted on 9/20 and per their documentation:    Impression  Mr. Lee is a 63 y.o. man with a long history of psychosis including requiring commitment and psychiatric hospitalizations in the 1980s.  Given his long history, this is less consistent with an organic cause of psychosis (as something that would be primarily neurologic in origin would be expected to have a more rapid progression of neurologic deterioration, rather than this long period  of psychiatric symptoms).  I am not able to appreciate any evidence for a neurologic basis of his psychosis given the time course of his psychiatric illness.      Regarding his difficulty with word finding, I am not able to elicit this on exam.  However since he described being symptomatic earlier today, it is helpful that I am able to go through a full assessment of his language in terms of comprehension, repetition, naming objects with increasing difficulty without being able to appreciate any deficits.     He does continue to have a tremor that is most prominent posteriorly as well as with action, in addition to a chin tremor.  He also does have rigidity in the bilateral upper extremities consistent with the diagnosis of parkinsonism.  He is currently not on levodopa or dopamine agonists which I continue to agree with.  Since his psychiatric symptoms are much more bothersome right now than his parkinsonism, I will continue to recommend avoiding dopaminergic medications.     Recommendations  -No current indications to change medications from a neurologic perspective  - Optimizing his psychiatric medication and treatment is currently more important than optimization of parkinsonism  - Please contact neurology if any new questions arise, or to discuss the assessment described above     Thank you for involving Neurology in the care of Vinod Lee.  Please do not hesitate to call with questions.      Kranthi Perdue MD      Thrombocytopenia, resolved  Likely secondary to Depakote.  According to the, incidents of Depakote induced thrombocytopenia as 27%.  Depakote dose reduced from 1000 twice daily to 250 3 times daily on 7/28/2023 with subsequent resolution of thrombocytopenia  - weekly CBCs    Constipation  Likely worsened by clozapine, improved since modifying regimen.   - daily miralax   - daily Senokot 2 tablets BID      Right first toe fracture:  Seen by Ortho on 6/16:  Per note: Vinod Lee is a 62 year  "old  male w/ PMHx complex psychiatric history who has a fracture to the distal phalanx of the right toe after a recent trauma to the foot the patient reports.  Patient denies any other pain or discomfort.  Images reviewed and plan will be for irrigation of the popped fracture blister with sterile saline and the toes should be dressed and a 4 x 4 gauze dressing.  Patient will need a postop shoe which she can be weightbearing as tolerated in.  Would recommend a course of antibiotics for approximately 7 days.  Would recommend Augmentin or clindamycin.  Podiatry will be made aware of patient and will see patient while he is admitted or if patient is discharged in the near future a follow-up appointment will need to be established.     Remainder of care per primary team.  Primary team should make sure that patient is up-to-date on his tetanus shot.  If not patient will require a booster shot.    Hx of prolonged QTc:  Continue weekly EKGs. See above for details.   Discussed most recent EKG findings with Cardiology on 8/25. Corrected QTc is 501 from EKG on 8/23. Per cardiology, repeat EKG today. If corrected QTc is > 500, will need to reduce clozapine. If < 500, OK to keep clozapine at current dose. UPDATE/ADDENDUM 9/6: Repeat EKG with corrected QTc of 440. No need to adjust clozapine dose per cards.     Urinary retention, resolved  Per patient care order:   If patient is refusing straight caths, please notify IM provider immediately to determine whether this constitutes a medical emergency. If IM declares medical emergency, may restrain patient for straight cath. Discussed this with patient's parents/substitute decision makers on 6/7 who are in support of this plan.    # Psoriasis hx  # Purplish discoloration of B/LE feet-resolved   Discussed with Dr. Rene and bedside RN regarding rash on right foot that appears and appears to be purplish in color and almost \"petechiae.\" It was noted during interview, but seemed to be " resolving upon walking. Within the past 24 hrs, pt has had ECT and seemed more confused than usual. Pt seems to have had a right great toe wound with recent right great toe fracture seen by Medicine on 7/16. Seen on 8/4 with improving color on B/L legs at rest and appears to have small, scaly patches of varying coin sizes that have not grown past marked borders. See photos. Per further chart review, has had psoriasis history. WBC WNL, no fevers, HDS. This appears to more consistent with a psoriasis like picture.   - Start triamcinolone topical 0.025%   -Apply twice daily until lesions for 2 weeks or until lesions resolve/  -Monitor for skin thinning, striae, rebound lesion flares.   - Start Eucerin cream              - Apply 30 minutes PRIOR to triamcinolone topical cream above or PRN as needed if dry skin/after bathing   - Medicine will sign off.   - For worsening pain, spread, itchiness, fevers, please contact Medicine and possibly Dermatology.    Benzodiazepine dependence:  Phenobarbital taper completed shortly after patient's admission    Pertinent labs/imaging:  Weekly CBC with diff     Behavioral/Psychological/Social:  - Encourage unit programming    Safety:  - Continue precautions as noted above  - Status 15 minute checks  - Continue 1:1 for staff and pt safety    Legal Status: Fully committed with [facility] and [facility]. [place] medications: clozapine, olanzapine, risperidone, quetiapine    Disposition Plan   Reason for ongoing admission: No safe alternative at this time  Discharge location: TBD. Will likely need memory care unit  Discharge Medications: not ordered  Follow-up Appointments: not scheduled    Entered by: Garth Abreu MD on 10/04/2023  at 11:28 AM

## 2023-10-04 NOTE — PROGRESS NOTES
Vinod remains on 1:1 staffing for safety at this time.  He appeared to be sleeping well, but then work up coughing with a consistent loose sounding cough.  He at first declined prn Tessalon cough meds, but then accepted it.  Took it with vanilla pudding and swallowed the pill easily.  He has a harder time swallowing the Depakote scheduled for 0600 every morning.  Wondering if Depakote sprinkles would be easier for him to swallow.    Vinod seems to be much more organized and much less confused than previously.  Denies any pain.  Appeared 5.25 hours this night shift. Coughing has partially resolved after prn Tessalon prn given.

## 2023-10-04 NOTE — PROVIDER NOTIFICATION
10/04/23 1522   Individualization/Patient Specific Goals   Patient Personal Strengths community support;coping skills;expressive of emotions;family/social support;humor;insight into illness/situation;interests/hobbies;relationship stability;resilient;resourceful   Patient Vulnerabilities poor physical health;traumatic event   Interprofessional Rounds   Summary Vinod has been accepted to a group home. He is in the process of being assessed for [facility] to get the resources necessary for discharge to placement.   Participants CTC;psychiatrist;nursing   Behavioral Team Discussion   Participants Dr. Rhodes, Dr. Abreu, Irma RN, Jess RN, Clarisse RN, Hina AHN, Binta AHN   Anticipated length of stay 3 weeks   Continued Stay Criteria/Rationale [facility] assessment   Anticipated Discharge Disposition group home     PRECAUTIONS AND SAFETY    Behavioral Orders   Procedures    Assault precautions    Code 1 - Restrict to Unit    Code 2    Code 2 - 1:1 Staff Supervision     For ECT only    Electroconvulsive therapy     Series of up to 12 treatments. Begin Date: 8/2/23     Treating Psychiatrist providing ECT:  unknown     Notified on:  7/28/23 via this order  NOTE: We received verbal confirmation from [facility] that [facility] has been approved but awaiting official court order and risk management's review  Initial consult was completed by Dr. Hicks on 7/18/23    Electroconvulsive therapy     Series of up to 12 treatments. Begin Date: 8/2/23     Treating Psychiatrist providing ECT:  Dominga     Notified on:  8/2/23    Elopement precautions    Fall precautions    Occupational Therapy on the Unit     Order Specific Question:   Reason for Consult     Answer:   Eval of thought process, functional skills and behavior     Order Specific Question:   Course of Action:     Answer:   Evaluation only     Order Specific Question:   Treatment Prescription:     Answer:   CPT requested    Routine Programming     As clinically  indicated    Self Injury Precaution    Status 15     Every 15 minutes.    Status Individual Observation     Patient SIO status reviewed with team/RN.  Please also refer to RN/team documentation for add'l detail.    -SIO staff to monitor following which have contributed to patient being on SIO:  Agitation  Pt is impulsive   Pt has ran out of his room naked  Pt has Parkinson symptoms place him in a high fall risk  Pt has verbal outburst of sexual and threaten statements  Pt requires immediate redirection when masturbating    -Possible interventions SIO staff could use to support patient's treatment progress:  Engage pt in activities    -When following observed, team will review discontinuation of SIO:  Absence of aggression toward others for > 24 hours    Comments: SIO 1:1     Order Specific Question:   CONTINUOUS 24 hours / day     Answer:   5 feet     Order Specific Question:   Indications for SIO     Answer:   Severe intrusiveness     Order Specific Question:   Indications for SIO     Answer:   Assault risk    Suicide precautions     Patients on Suicide Precautions should have a Combination Diet ordered that includes a Diet selection(s) AND a Behavioral Tray selection for Safe Tray - with utensils, or Safe Tray - NO utensils         Safety  Safety WDL: WD  Patient Location: patient room, own  Observed Behavior: sleeping  Observed Behavior (Comment): lying in bed awake  Airway Safety Measures: all equipment/monitors on and audible  Safety Measures: safety rounds completed  Diversional Activity: other (see comments) (sleeping)  Suicidality: Status 15, Minimal furniture in room  Seizure precautions: clutter free environment  Assault: status 15, private room  Elopement Assessment: Statements about wanting to leave  Elopement Interventions: status 15, status continuous sight  Sexual: status continuous sight  Additional Documentation:  (SIB, Fall Precaution)

## 2023-10-05 PROCEDURE — 250N000013 HC RX MED GY IP 250 OP 250 PS 637: Performed by: PSYCHIATRY & NEUROLOGY

## 2023-10-05 PROCEDURE — 124N000002 HC R&B MH UMMC

## 2023-10-05 PROCEDURE — 99233 SBSQ HOSP IP/OBS HIGH 50: CPT | Mod: GC | Performed by: PSYCHIATRY & NEUROLOGY

## 2023-10-05 PROCEDURE — 250N000013 HC RX MED GY IP 250 OP 250 PS 637: Performed by: STUDENT IN AN ORGANIZED HEALTH CARE EDUCATION/TRAINING PROGRAM

## 2023-10-05 PROCEDURE — 250N000013 HC RX MED GY IP 250 OP 250 PS 637: Performed by: PHYSICIAN ASSISTANT

## 2023-10-05 RX ADMIN — ATROPINE SULFATE 1 DROP: 10 SOLUTION/ DROPS OPHTHALMIC at 18:10

## 2023-10-05 RX ADMIN — LORAZEPAM 0.5 MG: 0.5 TABLET ORAL at 08:47

## 2023-10-05 RX ADMIN — POLYETHYLENE GLYCOL 3350 17 G: 17 POWDER, FOR SOLUTION ORAL at 19:46

## 2023-10-05 RX ADMIN — CYANOCOBALAMIN TAB 1000 MCG 1000 MCG: 1000 TAB at 08:47

## 2023-10-05 RX ADMIN — DIVALPROEX SODIUM 250 MG: 125 CAPSULE, COATED PELLETS ORAL at 13:22

## 2023-10-05 RX ADMIN — BENZTROPINE MESYLATE 1 MG: 1 TABLET ORAL at 19:46

## 2023-10-05 RX ADMIN — DIVALPROEX SODIUM 250 MG: 125 CAPSULE, COATED PELLETS ORAL at 06:28

## 2023-10-05 RX ADMIN — ATROPINE SULFATE 2 DROP: 10 SOLUTION/ DROPS OPHTHALMIC at 08:33

## 2023-10-05 RX ADMIN — GABAPENTIN 300 MG: 300 CAPSULE ORAL at 08:47

## 2023-10-05 RX ADMIN — ATROPINE SULFATE 2 DROP: 10 SOLUTION/ DROPS OPHTHALMIC at 19:47

## 2023-10-05 RX ADMIN — LORAZEPAM 0.5 MG: 0.5 TABLET ORAL at 19:46

## 2023-10-05 RX ADMIN — LORAZEPAM 0.5 MG: 0.5 TABLET ORAL at 13:22

## 2023-10-05 RX ADMIN — Medication 10 MG: at 19:46

## 2023-10-05 RX ADMIN — GABAPENTIN 300 MG: 300 CAPSULE ORAL at 13:22

## 2023-10-05 RX ADMIN — CLOZAPINE 650 MG: 100 TABLET, ORALLY DISINTEGRATING ORAL at 13:20

## 2023-10-05 RX ADMIN — SENNOSIDES 2 TABLET: 8.6 TABLET ORAL at 08:32

## 2023-10-05 RX ADMIN — POLYETHYLENE GLYCOL 3350 17 G: 17 POWDER, FOR SOLUTION ORAL at 08:31

## 2023-10-05 RX ADMIN — BENZTROPINE MESYLATE 1 MG: 1 TABLET ORAL at 08:46

## 2023-10-05 RX ADMIN — OLANZAPINE 15 MG: 10 TABLET, ORALLY DISINTEGRATING ORAL at 19:46

## 2023-10-05 RX ADMIN — NAPROXEN 250 MG: 250 TABLET ORAL at 17:38

## 2023-10-05 RX ADMIN — TAMSULOSIN HYDROCHLORIDE 0.4 MG: 0.4 CAPSULE ORAL at 08:47

## 2023-10-05 RX ADMIN — ALBUTEROL SULFATE 2 PUFF: 90 AEROSOL, METERED RESPIRATORY (INHALATION) at 21:20

## 2023-10-05 RX ADMIN — NAPROXEN 250 MG: 250 TABLET ORAL at 08:47

## 2023-10-05 RX ADMIN — FLUOXETINE 20 MG: 20 CAPSULE ORAL at 08:46

## 2023-10-05 RX ADMIN — OLANZAPINE 15 MG: 10 TABLET, ORALLY DISINTEGRATING ORAL at 09:05

## 2023-10-05 RX ADMIN — GABAPENTIN 300 MG: 300 CAPSULE ORAL at 19:46

## 2023-10-05 RX ADMIN — SENNOSIDES 2 TABLET: 8.6 TABLET ORAL at 19:46

## 2023-10-05 RX ADMIN — DIVALPROEX SODIUM 250 MG: 125 CAPSULE, COATED PELLETS ORAL at 19:46

## 2023-10-05 ASSESSMENT — ACTIVITIES OF DAILY LIVING (ADL)
ADLS_ACUITY_SCORE: 88
ADLS_ACUITY_SCORE: 94
ADLS_ACUITY_SCORE: 94
ADLS_ACUITY_SCORE: 88
ADLS_ACUITY_SCORE: 94
HYGIENE/GROOMING: WITH ASSISTANCE
DRESS: WITH ASSISTANCE
ADLS_ACUITY_SCORE: 88
ADLS_ACUITY_SCORE: 88
ADLS_ACUITY_SCORE: 94
ADLS_ACUITY_SCORE: 94
ADLS_ACUITY_SCORE: 88

## 2023-10-05 NOTE — PLAN OF CARE
Problem: Sleep Disturbance  Goal: Adequate Sleep/Rest  Outcome: Progressing   Goal Outcome Evaluation:    Patient appeared to sleep 7 hours this night shift.  No prns or snacks given or requested.  No concerns were reported or noted.  No coughing heard tonight by this RN or the SIO staff.      0600 Depakote sprinkles given with pudding.  No issues taking the Depakote this shift.  Remains on 1:1 staffing.

## 2023-10-05 NOTE — PROGRESS NOTES
Brief medicine follow up note:  - Met with pt in his room within presence of pt's 1:1. Pt showed this writer his groin folds and no significant erythema or scaling noted. Pt denies groin itch. Will change miconazole powder to prn and discontinue prior wound care orders. Please continue to encourage frequent showering and washing/drying of area. Medicine signing off. Please feel free to call with questions.        Jack Richmond PA-C  Internal Medicine Hospitalist   [facility]  [phone]

## 2023-10-05 NOTE — PLAN OF CARE
Assessment/Intervention/Current Symtoms and Care Coordination:  Reviewed chart. Met with team to review patient's current functioning, needs, progress, and impediments to discharge.    Called [facility] to get the status of the [place] assessment.     Discharge Plan or Goal:  Will discharge [place] [address]     Barriers to Discharge:  Discharge pending completion of [place] assessment by CADI     Referral Status:  [facility] in [place] , no memory care   [facility] in [place] 8/15, no memory care   [facility] , no openings   [facility] , declined   [facility] , no open beds   [facility]   [facility] , no open beds   [facility] 9/15, waiting for decision   [place]   [place] in [facility] , no openings and private pay   [facility], , declined    [place], , declined   [facility] 9/15, waiting for decision   [place] , declined   [place] , declined      Legal Status:  Commitment with [facility]: [place]  File Number: [id number]  Issued 23 and is not to exceed six months    Contacts:  : Vicky Valdez with [facility], [phone]  Psychiatrist: Dr. Santana at [facility], [phone] PCP: Attila Urena  Mom: Alberta, [phone] (H) [phone] (M)   Dad: Ino, [phone] (H)  Sister, Sandra Treadwell, [phone] (KING)   [facility]'s Office Fax: [phone]  CADI  with [facility]: Regina Wong, [phone], [email or web address].    Upcoming Meetings and Dates/Important Information and next steps:

## 2023-10-05 NOTE — PROGRESS NOTES
"[facility], [place]   Psychiatric Progress Note  Hospital Day: 132        Interim History:   The patient's care was discussed with the treatment team during the daily team meeting and/or staff's chart notes were reviewed. Vinod verbalized and was observed having difficulty swallowing  food and medication  even with his medication mixed in chocolate pudding. Seen by SLP who noted that swallowing was baseline and made suggestions, see below. Staff also report that Vinod made several odd comments yesterday -  that his medications are \"poison\" and that he will be \"pummeled\" by staff.  Given PRN suppository in AM shift for constipation. Pt reported by SIO staff to be passing gas. SIO staff reported that pt when into bathroom for \"not that long\" and was passing gas, but was unsure if pt had a BM. Pt reports that he did have a BM.  Vinod was medication compliant. No aggression or agitation. No acute behavioral concern for physical complaints.    Vinod is found in his room where he is laying in bed. He engages in conversation first from bed, then sits up in bed. He reports that he is feeling \"the same as yesterday\". Vinod expresses worry that we will give him a catheter. Tried to assure him that this would only be done if medically necessary. He disagrees, says it should not be done at all. Vinod reports having no physical pain. He reports having no difficulty swallowing today.    Suicidal ideation: None    Homicidal ideation: None    Psychotic symptoms: Delusions present (paranoia) and other psychotic symptoms suspected.    Medication side effects reported:  tremors that make it difficult to eat. jaw and chin tremors.    Acute medical concerns: None.      Other issues reported by patient: Patient had no further questions or concerns.           Medications:      atropine  2 drop Sublingual TID    benztropine  1 mg Oral BID    cloZAPine  650 mg Oral Daily    cyanocobalamin  1,000 mcg Oral Daily    " "divalproex sodium delayed-release  250 mg Oral Q8H KIKI    FLUoxetine  20 mg Oral Daily    gabapentin  300 mg Oral TID    LORazepam  0.5 mg Oral TID    melatonin  10 mg Oral At Bedtime    miconazole   Topical BID    mineral oil-hydrophilic petrolatum   Topical BID IS    naproxen  250 mg Oral BID w/meals    OLANZapine zydis  15 mg Oral BID    Or    OLANZapine  10 mg Intramuscular BID    polyethylene glycol  17 g Oral BID    sennosides  2 tablet Oral BID    tamsulosin  0.4 mg Oral Daily          Allergies:     Allergies   Allergen Reactions    Bee Venom Unknown    Montelukast Unknown    Phenothiazines Unknown          Labs:     No results found for this or any previous visit (from the past 24 hour(s)).                   Psychiatric Examination:     /74   Pulse 85   Temp 97.2  F (36.2  C) (Temporal)   Resp 19   Ht 1.753 m (5' 9\")   Wt 84.4 kg (186 lb)   SpO2 97%   BMI 27.47 kg/m    Weight is 186 lbs 0 oz  Body mass index is 27.47 kg/m .    Weight over time:  Vitals:    07/03/23 1100 07/30/23 0902 08/13/23 0900 08/26/23 0835   Weight: 81.6 kg (179 lb 12.8 oz) 86.5 kg (190 lb 9.6 oz) 85.7 kg (188 lb 14.4 oz) 82.8 kg (182 lb 7 oz)    09/07/23 0900 09/26/23 1729 10/03/23 0843   Weight: 83.7 kg (184 lb 8 oz) 83.4 kg (183 lb 14.4 oz) 84.4 kg (186 lb)       Orthostatic Vitals         Most Recent      Sitting Orthostatic /61 07/25 0800    Sitting Orthostatic Pulse (bpm) 85 07/25 0800          Cardiometabolic risk assessment. 06/07/23    Reviewed patient profile for cardiometabolic risk factors    Date taken /Value  REFERENCE RANGE   Abdominal Obesity  (Waist Circumference)   See nursing flowsheet Women ?35 in (88 cm)   Men ?40 in (102 cm)      Triglycerides  No results found for: TRIG    ?150 mg/dL (1.7 mmol/L) or current treatment for elevated triglycerides   HDL cholesterol  No results found for: HDL]   Women <50 mg/dL (1.3 mmol/L) in women or current treatment for low HDL cholesterol  Men <40 mg/dL (1 " "mmol/L) in men or current treatment for low HDL cholesterol     Fasting plasma glucose (FPG) Lab Results   Component Value Date     05/26/2023      FPG ?100 mg/dL (5.6 mmol/L) or treatment for elevated blood glucose   Blood pressure  BP Readings from Last 3 Encounters:   10/04/23 126/74   05/26/23 97/55    Blood pressure ?130/85 mmHg or treatment for elevated blood pressure   Family History  See family history     Mental Status Exam:  Appearance: awake, alert, unkempt, on bed. No evidence of pain of acute distress.   Attitude: Somewhat cooperative, calm during interview today  Eye Contact:  fair, intense at times, better duration  Mood: \"Same as yesterday\"  Affect:  mood congruent, irritable  Speech: mostly coherent  Psychomotor Behavior:  Ongoing bilateral arm tremor. No evidence of dystonia, or tics.  Throught Process: linear, illogical, concrete  Associations:  no loosening of associations present  Thought Content:  Delusions and AH suspected. Evidence of obsessive thinking and likely compulsions to harm others.   Insight:  limited  Judgement:  poor  Oriented to:  self, place  Attention Span and Concentration:  fair  Recent and Remote Memory:  poor  Gait: patient lying in bed         Precautions:     Behavioral Orders   Procedures    Assault precautions    Code 1 - Restrict to Unit    Code 2    Code 2 - 1:1 Staff Supervision     For ECT only    Electroconvulsive therapy     Series of up to 12 treatments. Begin Date: 8/2/23     Treating Psychiatrist providing ECT:  unknown     Notified on:  7/28/23 via this order  NOTE: We received verbal confirmation from [facility] that Lorenzo Pavon has been approved but awaiting official court order and risk management's review  Initial consult was completed by Dr. Hicks on 7/18/23    Electroconvulsive therapy     Series of up to 12 treatments. Begin Date: 8/2/23     Treating Psychiatrist providing ECT:  Dominga     Notified on:  8/2/23    Elopement precautions    Fall " precautions    Occupational Therapy on the Unit     Order Specific Question:   Reason for Consult     Answer:   Eval of thought process, functional skills and behavior     Order Specific Question:   Course of Action:     Answer:   Evaluation only     Order Specific Question:   Treatment Prescription:     Answer:   CPT requested    Routine Programming     As clinically indicated    Self Injury Precaution    Status 15     Every 15 minutes.    Status Individual Observation     Patient SIO status reviewed with team/RN.  Please also refer to RN/team documentation for add'l detail.    -SIO staff to monitor following which have contributed to patient being on SIO:  Agitation  Pt is impulsive   Pt has ran out of his room naked  Pt has Parkinson symptoms place him in a high fall risk  Pt has verbal outburst of sexual and threaten statements  Pt requires immediate redirection when masturbating    -Possible interventions SIO staff could use to support patient's treatment progress:  Engage pt in activities    -When following observed, team will review discontinuation of SIO:  Absence of aggression toward others for > 24 hours    Comments: SIO 1:1     Order Specific Question:   CONTINUOUS 24 hours / day     Answer:   5 feet     Order Specific Question:   Indications for SIO     Answer:   Severe intrusiveness     Order Specific Question:   Indications for SIO     Answer:   Assault risk    Suicide precautions     Patients on Suicide Precautions should have a Combination Diet ordered that includes a Diet selection(s) AND a Behavioral Tray selection for Safe Tray - with utensils, or Safe Tray - NO utensils            Diagnoses:     #Unspecified psychosis likely schizophrenia per history,  #Unspecified encephalopathy   -R/O catatonia   -Episodes of unresponsiveness, concern for PNES   #Parkinsonism 2/2 neuroleptic medications, rule out Parkinson's Disease  -rule out major neurocognitive disorder (refused to answer much of  assessment)  #Urinary retention and BPH  -Possible UTI -- UC resulted on 5/25 w/out growth  #Hx of prolonged QTc with clozapine  #Mild thrombocytopenia  #R/O OCD         Assessment and hospital summary:  Patient was admitted to psychiatric unit for safety, stabilization and medication management. He has had schizophrenia since the 1980. He was on Clozaril x 25 years, and it was tapered and discontinued on May 7, 2023  due to prolonged qtc of 527, and his psychotic  symptoms have worsened since then. Sinemet was also discontinued recently due to concerns that it was contributing to paranoia and AH. He is agitated, aggressive, dangerous to self and others. He remains on SIO 2 to 1, and is under psychiatric emergency and court hold. There are concerns for organic etiology given pattern of sundowning, history of parkinsonism, and ongoing disorientation/confusion. 6/8: EKG repeated, cardiology consult regarding safety of resuming clozapine in the context of prolonged QTc and refractory schizophrenia pending response. Per cardiology, correct QTc is no more than 460. They do not have concerns about AP retrial. Neurology IP consult placed for evaluation of sundowning and cognitive decline secondary to Sinemet discontinuation. MSSA initiated. Per Neurology, discontinuation of Sinemet would not account for these symptoms. They do not recommend retrial at this time. On 6/14, discussed complex case at length with Dr. Hatch (primary provider on geriatic unit) who provided recs (see second opinion note dated 6/14). Depakote initiated, though needed to be reduced due to side effect of thrombocytopenia. Melatonin was increased to 10 mg at bedtime. 6/22: Clozapine titration continued. Its possible that clozapine dose is contributing to worsened compulsive behaviors noted throughout hospitalization which could improve with lowering the dose. ECT initiated due to treatment refractory psychosis and ongoing symptoms despite  therapeutic dose of clozapine (650 mg daily). ECT initiated on 23 and pt completed 11 total treatments, but ECT stopped on  due to lack of improvement and distress it was causing patient.     Chart reviewed which revealed the followin2022: He was on clozapine, Seroquel, Cymbalta, and Carbidopa-levodopa and hospitalized for pneumonia. Psych consulted and Seroquel was stopped. Treated with Abx and discharged to TCU.     2022: Hospitalized at [place]. Per chart review:    Vinod Lee is a 63 yo male with longstanding history of schizophrenia. He was admitted from [facility] ED, where he presented due to increased delusional thoughts while on a visit to his parents for Thanksgiving. He began experiencing increasing paranoid thoughts that someone might be poisoning him and began fearing that he might accidentally harm someone. He reported to his parents that he was having thoughts about hitting someone or beating someone up and told them he could not guarantee they would be safe with him. Parents encouraged patient to go to the ED, which he did voluntarily.     Per patient report and chart review, he was hospitalized several times in the  and reports he was civilly committed at one point in the , however he has been quite stable for the past several decades. He reports he will experience some paranoia and delusional thinking at times, but generally has good insight into his symptoms. He has been maintained on clozapine for about 25 years and prior to several months ago was also concurrently prescribed quetiapine.      In the last several months, patient has had a number of medication changes. Approximately 6 months ago, patient was diagnosed with parkinsonism related to antipsychotic use and was started on carbidopa-levidopa. He experienced no improvement in tremors, and thus carbidopa- levidopa dose was increased 1.5 weeks ago.      In addition, patient was medically hospitalized in  "August 2022 for confusion, weakness, repeated falls, ongoing weight loss, and SOB. He was found to have a cavitary lesion of the lung and suspected aspiration pneumonia. He was treated with antibiotics. At that time, he was taking current dose of clozapine and duloxetine, as well as propranolol ER, quetiapine, gabapentin, and clonazepam. Psychiatry was consulted due to concern for oversedation, and propranolol ER, gabapentin, and quetiapine were discontinued. Conazepam was reduced from 0.5 mg TID to BID dosing and recommended to be discontinued, however, it does not appear this occurred. His sedation improved significantly. QTc prolongation was also noted, but improved throughout his stay. He was ultimately discharged to a TCU for several months before returning home. He reports his weakness has greatly improved and states he \"graduated\" physical therapy, though he continues to be diligent in completed recommended exercises.      He reports that over the past several months, his delusions and anxiety have been worse than usual, with symptoms becoming much more acute since the carbidopa-levidopa dose was increased. He has felt increasingly paranoid about being poisoned, noting this is a delusion that has been stable over time, but was previously less intrusive. He has insight during the interview that his paranoid thinking is not reality based, but states it has been harder to separate delusions from reality and he becomes very worried that he might hurt someone, despite no history of violent behavior. He reports that the paranoia about his food being poisoned in combination with the tremors in his hands have made it difficult for him to eat. He has also had quite low appetite for the past 6 months to a year . He reports that due to the combination of these factors, he has lost about 50 pounds in the last year.He denies any problems with sleep initiation or maintenance. He reports energy is fairly good and \"better " "than it used to be.\" He reports some short term memory issues and on interview, does have some difficulty remembering details of recent events. He is unsure if he has received cognitive testing in the past.    He denies any suicidal ideation and reports he has not experienced SI for decades. He denies homicidal thoughts and is very clear that he had and has no desire to harm anyone else, but was afraid he might somehow do so. He denies any hallucinations and states \"that's never been a problem for me.\" He is not observed responding to internal stimuli. He is alert and oriented in all spheres.        ========    BRIEF HOSPITAL COURSE: This 62 y.o. male admitted 11/25/2022 to  [facility] for the assessment and treatment of the above presentation. This was a voluntary admission.     In summary, he was tapered off the Sinemet, which he reports to be both ineffective and potentially worsening the anxiety and paranoia ideations. Instead Benadryl 25 mg TID was started to help with extrapyramidal side effects. He struggled with sleep during his stay here. Remeron 7.5mg QHS was tried without success. Seroquel 100mg qhs was restarted with addition of suvorexant 10mg qhs. Given his Seroquel PRN use prior history of prolonged QTC, he will benefit from another EKG repeat on the medical unit.     Unfortunately, he tested positive for influenza A and developed hypoxemia. Now on 2L oxygen with desats when prone requiring 3L oxygen. Subsequently, the plan will to be tapering him off clonazepam due to hypoxia (and also prior plan to taper). The patient tolerated these changes without side effects.     The patient minimally benefited from the structured therapeutic milieu as he attended programming seldom as he tested positive for influenza, he needed to isolate with droplet precautions. Pt was engaged and worked on issues that were triggering/brought pt to the hospital and has excellent insight into his anxiety and paranoid delusions. Pt " denied suicidal ideations throughout all/majority of their hospitalization. Pt denied HI throughout all of hospitalization. There were no concerns with behavioral disruptions/outbursts. The patient has shown improvement in general.     At the time of discharge, hospitalization course was reviewed with Vinod Lee with plan to transfer to medicine. Please consult psychiatry and I will continue to follow while patient is on the medical unit. He is now off sinemet since 11/29 21:00 and I would expect anxiety and paranoia to improve more moving forward. Psychiatrically, once anxiety and paranoia is baseline, can D/C to home once medically stable. He DOES NOT NEED A 1:1 on the medical unit from a mental health perspective, we initiated 1:1 11/30/2022 due to significant fatigue, gait instability (he was unable to stand without assist) and feeding assist.    04/2023: Clozapine was gradually tapered and discontinued, unclear reasons why it was discontinued per outside records, though Dr. Portillo's H&P indicates that it was due to prolonged QTc.       Today's Changes:  -No med changes  -Renewed SIO   -Monitor oral intake closely  -Monitor BMs closely    Per Neurology Recs:    Recommendations  -No current indications to change medications from a neurologic perspective  - Optimizing his psychiatric medication and treatment is currently more important than optimization of parkinsonism  - Please contact neurology if any new questions arise, or to discuss the assessment described above     Thank you for involving Neurology in the care of Vinod Lee.  Please do not hesitate to call with questions.      Kranthi Perdue MD      Seen by SLP 10/4/23:     Clinical Impression Comments Clinical Swallow Evaluation completed. Patient had no s/s aspiration and no signs of dysphagia. Oral motor function was unable to be assessed with patient declining to participate in oral mechanism exam. Mastication was timely and complete when  patient took large bites. Hyolaryngeal elevation appears intact. Recommend diet of regular solids and thin liquids with staff feeding, as allowed, sitting fully upright, small bites/sips, and slow rate. No further ST is warranted at this time. Contact SLP if any questions or concerns.     SLP Rationale for DC Rec Swallow function appears at baseline. No further Speech Therapy needs at this time.   SLP Brief overview of current status  Continue regular solids and thin liquids when sitting fully upright, with staff feeding, as accepted, small bites/sips, slow rate, and swallowing food already in his mouth prior to another bite. Continue meds in puree. Speech will complete orders.     Target psychiatric symptoms and interventions:  -Psych emergency declared on 5/27, now fully committed  - Depakote Sprinkles 250 mg TID, restarted on 8/26. Cannot increase beyond this dose due to thrombocytopenia at higher doses  -Continue clozapine as above, 650mg  -Continue scheduled Zyprexa 15mg BID  -Continue 1:1 for safety of staff and patients, reduced from 2:1 7/23/23  - weekly CBC to monitor platelets    -Continue Gabapentin 300 mg TID as staff believe it has been effective for treatment of his anxiety    Risks, benefits, and alternatives discussed at length with patient.     Acute Medical Problems and Treatments:  Rhinovirus / enterovirus URI  Concern for pneumonia  On 9/23, patient was reported to be hypoxic with an SpO2 of 89% with RR of 28, as well as had some low-grade fevers.  Associated symptoms of wet sounding cough.  COVID was negative.  Chest x-ray with possible infection versus atelectasis in RLL.  Patient was initially started on doxycycline on 9/24.  Due to concern for clinical worsening, patient was reassessed by provider on 9/25.  At that time due to patient increase drooling while on Clozaril and him laying supine for much of the day, concerns were raised for aspiration pneumonia, thus he was started on Augmentin  as well.   - sputum culture if able, pending  - cont Doxycycline 100 mg twice daily for 5 days (9/24-9/28)  - cont augmentin 875-125 mg BID x 5 days (9/25-9/29)  - Albuterol inhaler as needed cough, wheezing, shortness of breath  - APAP every 4 hours as needed fevers, pain  - I-S q shift, as tolerated     Hx cavitary lesion to SOPHY  Pulmonary nodule to SOPHY  Seen August 2022 in ED. CT chest with contrast negative for PE but showed cavitary masslike process in the left upper lung, over an area measuring 9.5 x 6.2 x 5.2 cm, indeterminant pulmonary nodule measuring 9 mm in the anterior segment of the left upper lobe, nonspecific left hilar and mediastinal lymphadenopathy, small-moderate left pleural effusion.  Cavitary lesion also seen on chest x-ray.  Patient was given IVF, Rocephin, Zithromax, and admitted for further evaluation. Thoracentesis was done and negative cultures. ID was consulted and managed Abx. The patient was initially on zosyn and doxycycline but narrowed. Strep pneumo, legionella, and HIV testing done (negative). Blood cultures were negative as well. The patient did well.  Follow-up chest CT showed near complete interval resolution of the previously described left upper lobe abscess.  Residual 12 x 10 mm spiculated nodular opacity with adjacent scarring within this region, probably represents sequela of previous infection, although underlying malignancy not excluded.  - Recommend follow-up outpatient with PCP for continued monitoring.    Groin rash  Reassessed groin rash, previously assessed to have tinea cruris with possible intertrigo. Rash appears significantly improved. Recommend continuing with miconazole powder BID [please wipe off old powder before applying new powder]. Residual mild erythema may be still improving rash, vs intertrigo from moisture  - Interdry topical therapy, apply one sheet to each skin fold in groin area. Change every 3-5 days.   - Medicine service will follow-up next week  and then PRN, for monitoring for rash  -Update 10/5/2023: Seen by medicine, groin rash examined-miconazole powder changed to PRN       Possible Dementia  Neurology consult placed on 6/8 for evaluation of sundowning and cognitive decline secondary to Sinemet discontinuation: Resuming Sinemet not recommended for behavioral concerns. Please see Neuro note for details.     Tremors  longstanding though appeared to worsen following initiation of clozapine  - Ativan 0.5 mg TID  - Cogentin 1 mg BID added on 8/25 with overall improvement noted    Neuro re-consulted on 9/20 and per their documentation:    Impression  Mr. Lee is a 63 y.o. man with a long history of psychosis including requiring commitment and psychiatric hospitalizations in the 1980s.  Given his long history, this is less consistent with an organic cause of psychosis (as something that would be primarily neurologic in origin would be expected to have a more rapid progression of neurologic deterioration, rather than this long period of psychiatric symptoms).  I am not able to appreciate any evidence for a neurologic basis of his psychosis given the time course of his psychiatric illness.      Regarding his difficulty with word finding, I am not able to elicit this on exam.  However since he described being symptomatic earlier today, it is helpful that I am able to go through a full assessment of his language in terms of comprehension, repetition, naming objects with increasing difficulty without being able to appreciate any deficits.     He does continue to have a tremor that is most prominent posteriorly as well as with action, in addition to a chin tremor.  He also does have rigidity in the bilateral upper extremities consistent with the diagnosis of parkinsonism.  He is currently not on levodopa or dopamine agonists which I continue to agree with.  Since his psychiatric symptoms are much more bothersome right now than his parkinsonism, I will continue  to recommend avoiding dopaminergic medications.     Recommendations  -No current indications to change medications from a neurologic perspective  - Optimizing his psychiatric medication and treatment is currently more important than optimization of parkinsonism  - Please contact neurology if any new questions arise, or to discuss the assessment described above     Thank you for involving Neurology in the care of Vinod Lee.  Please do not hesitate to call with questions.      Kranthi Perdue MD      Thrombocytopenia, resolved  Likely secondary to Depakote.  According to the, incidents of Depakote induced thrombocytopenia as 27%.  Depakote dose reduced from 1000 twice daily to 250 3 times daily on 7/28/2023 with subsequent resolution of thrombocytopenia  - weekly CBCs    Constipation  Likely worsened by clozapine, improved since modifying regimen.   - daily miralax   - daily Senokot 2 tablets BID      Right first toe fracture:  Seen by Ortho on 6/16:  Per note: Vinod Lee is a 62 year old  male w/ PMHx complex psychiatric history who has a fracture to the distal phalanx of the right toe after a recent trauma to the foot the patient reports.  Patient denies any other pain or discomfort.  Images reviewed and plan will be for irrigation of the popped fracture blister with sterile saline and the toes should be dressed and a 4 x 4 gauze dressing.  Patient will need a postop shoe which she can be weightbearing as tolerated in.  Would recommend a course of antibiotics for approximately 7 days.  Would recommend Augmentin or clindamycin.  Podiatry will be made aware of patient and will see patient while he is admitted or if patient is discharged in the near future a follow-up appointment will need to be established.     Remainder of care per primary team.  Primary team should make sure that patient is up-to-date on his tetanus shot.  If not patient will require a booster shot.    Hx of prolonged QTc:  Continue  "weekly EKGs. See above for details.   Discussed most recent EKG findings with Cardiology on 8/25. Corrected QTc is 501 from EKG on 8/23. Per cardiology, repeat EKG today. If corrected QTc is > 500, will need to reduce clozapine. If < 500, OK to keep clozapine at current dose. UPDATE/ADDENDUM 9/6: Repeat EKG with corrected QTc of 440. No need to adjust clozapine dose per cards.     Urinary retention, resolved  Per patient care order:   If patient is refusing straight caths, please notify IM provider immediately to determine whether this constitutes a medical emergency. If IM declares medical emergency, may restrain patient for straight cath. Discussed this with patient's parents/substitute decision makers on 6/7 who are in support of this plan.    # Psoriasis hx  # Purplish discoloration of B/LE feet-resolved   Discussed with Dr. Rene and bedside RN regarding rash on right foot that appears and appears to be purplish in color and almost \"petechiae.\" It was noted during interview, but seemed to be resolving upon walking. Within the past 24 hrs, pt has had ECT and seemed more confused than usual. Pt seems to have had a right great toe wound with recent right great toe fracture seen by Medicine on 7/16. Seen on 8/4 with improving color on B/L legs at rest and appears to have small, scaly patches of varying coin sizes that have not grown past marked borders. See photos. Per further chart review, has had psoriasis history. WBC WNL, no fevers, HDS. This appears to more consistent with a psoriasis like picture.   - Start triamcinolone topical 0.025%   -Apply twice daily until lesions for 2 weeks or until lesions resolve/  -Monitor for skin thinning, striae, rebound lesion flares.   - Start Eucerin cream              - Apply 30 minutes PRIOR to triamcinolone topical cream above or PRN as needed if dry skin/after bathing   - Medicine will sign off.   - For worsening pain, spread, itchiness, fevers, please contact Medicine " and possibly Dermatology.    Benzodiazepine dependence:  Phenobarbital taper completed shortly after patient's admission    Pertinent labs/imaging:  Weekly CBC with diff     Behavioral/Psychological/Social:  - Encourage unit programming    Safety:  - Continue precautions as noted above  - Status 15 minute checks  - Continue 1:1 for staff and pt safety    Legal Status: Fully committed with Sánchez and Lorenzo Pavon. Sánchez medications: clozapine, olanzapine, risperidone, quetiapine    Disposition Plan   Reason for ongoing admission: No safe alternative at this time  Discharge location: TBD. Will likely need memory care unit  Discharge Medications: not ordered  Follow-up Appointments: not scheduled    Entered by: Garth Abreu MD on 10/05/2023  at 8:26 AM

## 2023-10-05 NOTE — PLAN OF CARE
"  Problem: Adult Behavioral Health Plan of Care  Goal: Adheres to Safety Considerations for Self and Others  Outcome: Progressing     Problem: Adult Behavioral Health Plan of Care  Goal: Optimized Coping Skills in Response to Life Stressors  Outcome: Progressing     Problem: Psychotic Symptoms  Goal: Psychotic Symptoms  Description: Signs and symptoms of listed problems will be absent or manageable.  Outcome: Progressing   Goal Outcome Evaluation:    Plan of Care Reviewed With: patient         Medicine provider stated that due to patient being an unreliable historian, assessment should be upon exam only, not subjective report of the patient. Medical provider JUSTINA Tapia, confirmed that he examined patient today 10/05/2023 and did not see any findings needing daily treatment to patient's groin.   Writer informed patient that this treatment had been discontinued and changed to only as needed. Patient stated, \"I need someone else to examine me\" writer assured patient that a medical provider examined him and so the report should be trustworthy. Patient stated \"I don't trust anyone\".   Per usual, patient made statements of paranoia that his medications contained poison or \"acid\", but with staff reassurance patient is able to let it go and take the medications. Patient reported having a bowel movement this AM. When asked about the consistency, patient stated it was \"little chunks\", and when writer asked for clarification of it being solid or liquid, patient stated \"somewhere inbetween\".     Patient denied SI, HI, AVH. Patient denied feelings of depression, endorsed anxiety that is \"not too bad\". Patient endorsed being worried about being \"scalded to death\" or \"Frozen to death\" in the shower. Patient also reported that he feels afraid people are out out to get him and are going to hurt him. He denied thoughts of harming others.     When patient was administered his 2pm medications, he had a fit of coughing, patient was able " to get it under control himself, but then had some residual pills in his mouth but was given water to wash it down with and patient got it successfully down. Writer took patient's pulse and O2 sats after the coughing fit and HR was 78 and O2 sats were 100. Patient reported feeling fine and denied any dizziness or lightheadedness while he was coughing.

## 2023-10-06 ENCOUNTER — TELEPHONE (OUTPATIENT)
Dept: BEHAVIORAL HEALTH | Facility: CLINIC | Age: 63
End: 2023-10-06
Payer: COMMERCIAL

## 2023-10-06 LAB
BASO+EOS+MONOS # BLD AUTO: ABNORMAL 10*3/UL
BASO+EOS+MONOS NFR BLD AUTO: ABNORMAL %
BASOPHILS # BLD AUTO: 0.1 10E3/UL (ref 0–0.2)
BASOPHILS NFR BLD AUTO: 1 %
EOSINOPHIL # BLD AUTO: 0 10E3/UL (ref 0–0.7)
EOSINOPHIL NFR BLD AUTO: 0 %
ERYTHROCYTE [DISTWIDTH] IN BLOOD BY AUTOMATED COUNT: 15.2 % (ref 10–15)
HCT VFR BLD AUTO: 35.6 % (ref 40–53)
HGB BLD-MCNC: 11.6 G/DL (ref 13.3–17.7)
IMM GRANULOCYTES # BLD: 0.1 10E3/UL
IMM GRANULOCYTES NFR BLD: 1 %
LYMPHOCYTES # BLD AUTO: 1.3 10E3/UL (ref 0.8–5.3)
LYMPHOCYTES NFR BLD AUTO: 11 %
MCH RBC QN AUTO: 27.6 PG (ref 26.5–33)
MCHC RBC AUTO-ENTMCNC: 32.6 G/DL (ref 31.5–36.5)
MCV RBC AUTO: 85 FL (ref 78–100)
MONOCYTES # BLD AUTO: 1.1 10E3/UL (ref 0–1.3)
MONOCYTES NFR BLD AUTO: 9 %
NEUTROPHILS # BLD AUTO: 9 10E3/UL (ref 1.6–8.3)
NEUTROPHILS NFR BLD AUTO: 78 %
NRBC # BLD AUTO: 0 10E3/UL
NRBC BLD AUTO-RTO: 0 /100
PLATELET # BLD AUTO: 169 10E3/UL (ref 150–450)
RBC # BLD AUTO: 4.2 10E6/UL (ref 4.4–5.9)
WBC # BLD AUTO: 11.5 10E3/UL (ref 4–11)

## 2023-10-06 PROCEDURE — 250N000013 HC RX MED GY IP 250 OP 250 PS 637: Performed by: STUDENT IN AN ORGANIZED HEALTH CARE EDUCATION/TRAINING PROGRAM

## 2023-10-06 PROCEDURE — 99232 SBSQ HOSP IP/OBS MODERATE 35: CPT | Performed by: PSYCHIATRY & NEUROLOGY

## 2023-10-06 PROCEDURE — 250N000013 HC RX MED GY IP 250 OP 250 PS 637: Performed by: PSYCHIATRY & NEUROLOGY

## 2023-10-06 PROCEDURE — 85025 COMPLETE CBC W/AUTO DIFF WBC: CPT | Performed by: PSYCHIATRY & NEUROLOGY

## 2023-10-06 PROCEDURE — 124N000002 HC R&B MH UMMC

## 2023-10-06 PROCEDURE — 250N000013 HC RX MED GY IP 250 OP 250 PS 637: Performed by: PHYSICIAN ASSISTANT

## 2023-10-06 PROCEDURE — 36415 COLL VENOUS BLD VENIPUNCTURE: CPT | Performed by: PSYCHIATRY & NEUROLOGY

## 2023-10-06 RX ADMIN — OLANZAPINE 15 MG: 10 TABLET, ORALLY DISINTEGRATING ORAL at 08:48

## 2023-10-06 RX ADMIN — ATROPINE SULFATE 2 DROP: 10 SOLUTION/ DROPS OPHTHALMIC at 14:26

## 2023-10-06 RX ADMIN — LORAZEPAM 0.5 MG: 0.5 TABLET ORAL at 14:26

## 2023-10-06 RX ADMIN — NAPROXEN 250 MG: 250 TABLET ORAL at 18:04

## 2023-10-06 RX ADMIN — Medication 10 MG: at 20:30

## 2023-10-06 RX ADMIN — POLYETHYLENE GLYCOL 3350 17 G: 17 POWDER, FOR SOLUTION ORAL at 20:30

## 2023-10-06 RX ADMIN — GABAPENTIN 300 MG: 300 CAPSULE ORAL at 08:48

## 2023-10-06 RX ADMIN — NAPROXEN 250 MG: 250 TABLET ORAL at 08:48

## 2023-10-06 RX ADMIN — DIVALPROEX SODIUM 250 MG: 125 CAPSULE, COATED PELLETS ORAL at 06:39

## 2023-10-06 RX ADMIN — DIVALPROEX SODIUM 250 MG: 125 CAPSULE, COATED PELLETS ORAL at 14:26

## 2023-10-06 RX ADMIN — LORAZEPAM 0.5 MG: 0.5 TABLET ORAL at 08:48

## 2023-10-06 RX ADMIN — OLANZAPINE 15 MG: 10 TABLET, ORALLY DISINTEGRATING ORAL at 20:30

## 2023-10-06 RX ADMIN — FLUOXETINE 20 MG: 20 CAPSULE ORAL at 08:48

## 2023-10-06 RX ADMIN — TAMSULOSIN HYDROCHLORIDE 0.4 MG: 0.4 CAPSULE ORAL at 08:48

## 2023-10-06 RX ADMIN — GABAPENTIN 300 MG: 300 CAPSULE ORAL at 14:26

## 2023-10-06 RX ADMIN — LORAZEPAM 0.5 MG: 0.5 TABLET ORAL at 20:30

## 2023-10-06 RX ADMIN — BENZTROPINE MESYLATE 1 MG: 1 TABLET ORAL at 20:30

## 2023-10-06 RX ADMIN — ATROPINE SULFATE 2 DROP: 10 SOLUTION/ DROPS OPHTHALMIC at 10:33

## 2023-10-06 RX ADMIN — DIVALPROEX SODIUM 250 MG: 125 CAPSULE, COATED PELLETS ORAL at 20:44

## 2023-10-06 RX ADMIN — CYANOCOBALAMIN TAB 1000 MCG 1000 MCG: 1000 TAB at 08:47

## 2023-10-06 RX ADMIN — BENZTROPINE MESYLATE 1 MG: 1 TABLET ORAL at 08:48

## 2023-10-06 RX ADMIN — POLYETHYLENE GLYCOL 3350 17 G: 17 POWDER, FOR SOLUTION ORAL at 08:47

## 2023-10-06 RX ADMIN — MICONAZOLE NITRATE: 20 POWDER TOPICAL at 20:31

## 2023-10-06 RX ADMIN — GABAPENTIN 300 MG: 300 CAPSULE ORAL at 20:30

## 2023-10-06 RX ADMIN — SENNOSIDES 2 TABLET: 8.6 TABLET ORAL at 08:48

## 2023-10-06 RX ADMIN — ATROPINE SULFATE 2 DROP: 10 SOLUTION/ DROPS OPHTHALMIC at 20:30

## 2023-10-06 RX ADMIN — CLOZAPINE 650 MG: 100 TABLET, ORALLY DISINTEGRATING ORAL at 12:36

## 2023-10-06 ASSESSMENT — ACTIVITIES OF DAILY LIVING (ADL)
LAUNDRY: UNABLE TO COMPLETE
DRESS: WITH ASSISTANCE;SCRUBS (BEHAVIORAL HEALTH)
ADLS_ACUITY_SCORE: 94
HYGIENE/GROOMING: WITH ASSISTANCE
ADLS_ACUITY_SCORE: 94
ORAL_HYGIENE: WITH ASSISTANCE
ADLS_ACUITY_SCORE: 94
ADLS_ACUITY_SCORE: 94
DRESS: WITH ASSISTANCE;SCRUBS (BEHAVIORAL HEALTH)
HYGIENE/GROOMING: WITH ASSISTANCE
LAUNDRY: UNABLE TO COMPLETE
ADLS_ACUITY_SCORE: 94
ORAL_HYGIENE: WITH ASSISTANCE
ADLS_ACUITY_SCORE: 94

## 2023-10-06 NOTE — PLAN OF CARE
"Pt was  observed  pacing with SIO staff in the hallway without any issue. He presented with  a flat affect and lack insight to his mental health.   Pt denies pain, ate 100% of dinner  and compliant with medication.  Received PRN Atropine for drooling at 1810 and  later verbalized that he is short of breath.  Vitals was WNL,  /82   Pulse 84   Temp 97.5  F (36.4  C) (Oral)   Resp 18   Ht 1.753 m (5' 9\")   Wt 84.4 kg (186 lb)   SpO2 98%   BMI 27.47 kg/m . Paged pharmacy for pt's inhaler which was administered  at 2120 and was effective.  Pt was calm, cooperative with cares and no aggressive behavior verbalized or demonstrated this shift.     Problem: Psychotic Signs/Symptoms  Goal: Optimal Cognitive Function (Psychotic Signs/Symptoms)  Intervention: Support and Promote Cognitive Ability  Recent Flowsheet Documentation  Taken 10/5/2023 1838 by Basia Woodson RN  Trust Relationship/Rapport:   choices provided   care explained   questions answered   thoughts/feelings acknowledged  Goal: Enhanced Social, Occupational or Functional Skills (Psychotic Signs/Symptoms)  Intervention: Promote Social, Occupational and Functional Ability  Recent Flowsheet Documentation  Taken 10/5/2023 1838 by Basia Woodson RN  Trust Relationship/Rapport:   choices provided   care explained   questions answered   thoughts/feelings acknowledged        Problem: Adult Behavioral Health Plan of Care  Goal: Plan of Care Review  Recent Flowsheet Documentation  Taken 10/5/2023 1838 by Basia Woodson RN  Patient Agreement with Plan of Care: agrees  Goal: Individualized Daily Interaction Plan (IDIP)  Outcome: Progressing  Goal: Develops/Participates in Therapeutic [place] to Support Successful Transition  Intervention: Foster Therapeutic [place]  Recent Flowsheet Documentation  Taken 10/5/2023 1838 by Basia Woodson RN  Trust Relationship/Rapport:   choices provided   care explained   questions answered   " thoughts/feelings acknowledged   Goal Outcome Evaluation:    Plan of Care Reviewed With: patient

## 2023-10-06 NOTE — PLAN OF CARE
"Goal Outcome Evaluation:    Plan of Care Reviewed With: patient        Vinod continues with SIO staff assistance with ADLS. Pt spent most of the shift in his room resting and  listening to music, occasionally coming out of his room to walk a lap or two in the pod  1 hallway.  After lunch, writer met with Vinod and asked him how he was feeling and if he had a good walk  in the hallway, to which he replied \"I don't even remember taking a  walk, did I take  a walk\"     Vinod denied having suicidal thoughts. Pt did express hopelessness during medication administration \"I don't even think these medications will help. Do I have to keep taking them\"    Pt's SIO staff needed to repeatedly prompt him to eat his meals \"what's the point in eating, I'm not hungry\" Pt did eat 75% of meals.     Vinod denied having either overly dry mouth or of drooling this shift.   Pt was med adherent. Pt was observed coughing briefly at times, primarily after taking medications or drinking fluids.Pt's cough was not productive.     Vinod denied feeling short of breath, was afebrile, and did not request albuterol this shift.            "

## 2023-10-06 NOTE — PLAN OF CARE
NOC Shift Report     Pt in bed at beginning of shift, breathing quiet and unlabored. Pt slept through shift. Pt slept 6 hours.      No pt complaints or concerns at this time.      No PRNs given. Will continue to monitor.

## 2023-10-06 NOTE — TELEPHONE ENCOUNTER
3:34 PM unit 12 MD Rhodes called to updated Intake that she is transferring Pt to unit 3B as soon as a private bed under MD Portillo.    Pt was added to [facility] worklist.    3:44 PM Confirmed with Pilar, unit 3B Charge RN that Lindsey informed the unit and that this Pt takes precedence. Pt will have a private room and a SIO and the unit has staffing to take them tonight.  Pt was previously on 3B and returning.    Pt was put into 3B que.     no

## 2023-10-06 NOTE — PLAN OF CARE
Problem: Psychotic Symptoms  Goal: Psychotic Symptoms  Description: Signs and symptoms of listed problems will be absent or manageable.  Outcome: Progressing   Goal Outcome Evaluation:     Transfer Note    An MD order written to transfer Vinod to 3B.    Viond Lee, a 63-year-old man with a long history of psychosis (Bipolar), was assessed by this RN in his room, where he was found resting comfortably in bed. He was informed of his upcoming transfer to 3B and accepted the move without any objections. A comprehensive nursing assessment was conducted, revealing that Vinod is alert and oriented regarding his name and place, but his insight into his mental health remains limited. He occasionally makes delusional comments. During this assessment, Vinod denied experiencing suicidal thoughts, anxiety, or depression, and there were no observed or reported episodes of agitation or aggression.     Upon reviewing his medical records, his vital signs have remained stable. Notably, he experiences tremors in his hands, jaw, and chin, making it challenging to self-feed. As a result, he relies on the assistance of his SIO staff for feeding and assistance with ADLs. Vinod is currently under a commitment and [place] status. He is on SI/SIB, fall, assault, and elopement precautions. Vinod maintains compliance with his prescribed medication regimen, with a preference for taking his pills with vanilla pudding.     A nurse to nurse verbal report given to accepting RN on station 3B.

## 2023-10-06 NOTE — PROGRESS NOTES
[facility], [place]   Psychiatric Progress Note  Hospital Day: 133        Interim History:   The patient's care was discussed with the treatment team during the daily team meeting and/or staff's chart notes were reviewed. Vinod was medication compliant. No aggression or agitation. No acute behavioral concern for physical complaints. He continues to report difficulty swallowing at times, though has had two speech pathology evaluations with no recommended changes or concerns. Staff note that patient has good appetite and will eat if staff assist. His tremor interferes with his ability to eat. Last BM was yesterday. No agitation or aggressive behaviors. Pt enjoys listening to the KoalaDeal songs.     Upon interview today, Vinod stated that he is  not that bad.  He denied feeling depressed or sad. He denied having concerns about being poisoned, though did make a paranoid statement about this writer being  in on it all.  He reported good appetite, and noted that he ate most of his lunch today. Staff indicated that he requires significant assistance in consuming meals, but does maintain good appetite. He denied acute medical concerns or any discomfort or pain. Vinod then laid down in his bed and asked this writer to leave. He did not wish to address any further questions or concerns.     Suicidal ideation: None    Homicidal ideation: None    Psychotic symptoms: Delusions present (paranoia) and other psychotic symptoms suspected.    Medication side effects reported:  tremors that make it difficult to eat. jaw and chin tremors.    Acute medical concerns: None.      Other issues reported by patient: Patient had no further questions or concerns.           Medications:      atropine  2 drop Sublingual TID    benztropine  1 mg Oral BID    cloZAPine  650 mg Oral Daily    cyanocobalamin  1,000 mcg Oral Daily    divalproex sodium delayed-release  250 mg Oral Q8H KIKI    FLUoxetine  20 mg Oral Daily     "gabapentin  300 mg Oral TID    LORazepam  0.5 mg Oral TID    melatonin  10 mg Oral At Bedtime    naproxen  250 mg Oral BID w/meals    OLANZapine zydis  15 mg Oral BID    Or    OLANZapine  10 mg Intramuscular BID    polyethylene glycol  17 g Oral BID    sennosides  2 tablet Oral BID    tamsulosin  0.4 mg Oral Daily          Allergies:     Allergies   Allergen Reactions    Bee Venom Unknown    Montelukast Unknown    Phenothiazines Unknown          Labs:     Recent Results (from the past 24 hour(s))   CBC with platelets and differential    Collection Time: 10/06/23  8:30 AM   Result Value Ref Range    WBC Count 11.5 (H) 4.0 - 11.0 10e3/uL    RBC Count 4.20 (L) 4.40 - 5.90 10e6/uL    Hemoglobin 11.6 (L) 13.3 - 17.7 g/dL    Hematocrit 35.6 (L) 40.0 - 53.0 %    MCV 85 78 - 100 fL    MCH 27.6 26.5 - 33.0 pg    MCHC 32.6 31.5 - 36.5 g/dL    RDW 15.2 (H) 10.0 - 15.0 %    Platelet Count 169 150 - 450 10e3/uL    % Neutrophils 78 %    % Lymphocytes 11 %    % Monocytes 9 %    Mids % (Monos, Eos, Basos)      % Eosinophils 0 %    % Basophils 1 %    % Immature Granulocytes 1 %    NRBCs per 100 WBC 0 <1 /100    Absolute Neutrophils 9.0 (H) 1.6 - 8.3 10e3/uL    Absolute Lymphocytes 1.3 0.8 - 5.3 10e3/uL    Absolute Monocytes 1.1 0.0 - 1.3 10e3/uL    Mids Abs (Monos, Eos, Basos)      Absolute Eosinophils 0.0 0.0 - 0.7 10e3/uL    Absolute Basophils 0.1 0.0 - 0.2 10e3/uL    Absolute Immature Granulocytes 0.1 <=0.4 10e3/uL    Absolute NRBCs 0.0 10e3/uL                      Psychiatric Examination:     /82   Pulse 84   Temp 97.5  F (36.4  C) (Oral)   Resp 18   Ht 1.753 m (5' 9\")   Wt 84.4 kg (186 lb)   SpO2 98%   BMI 27.47 kg/m    Weight is 186 lbs 0 oz  Body mass index is 27.47 kg/m .    Weight over time:  Vitals:    07/03/23 1100 07/30/23 0902 08/13/23 0900 08/26/23 0835   Weight: 81.6 kg (179 lb 12.8 oz) 86.5 kg (190 lb 9.6 oz) 85.7 kg (188 lb 14.4 oz) 82.8 kg (182 lb 7 oz)    09/07/23 0900 09/26/23 1729 10/03/23 0843 " "  Weight: 83.7 kg (184 lb 8 oz) 83.4 kg (183 lb 14.4 oz) 84.4 kg (186 lb)       Orthostatic Vitals         Most Recent      Sitting Orthostatic /61 07/25 0800    Sitting Orthostatic Pulse (bpm) 85 07/25 0800          Cardiometabolic risk assessment. 06/07/23    Reviewed patient profile for cardiometabolic risk factors    Date taken /Value  REFERENCE RANGE   Abdominal Obesity  (Waist Circumference)   See nursing flowsheet Women ?35 in (88 cm)   Men ?40 in (102 cm)      Triglycerides  No results found for: TRIG    ?150 mg/dL (1.7 mmol/L) or current treatment for elevated triglycerides   HDL cholesterol  No results found for: HDL]   Women <50 mg/dL (1.3 mmol/L) in women or current treatment for low HDL cholesterol  Men <40 mg/dL (1 mmol/L) in men or current treatment for low HDL cholesterol     Fasting plasma glucose (FPG) Lab Results   Component Value Date     05/26/2023      FPG ?100 mg/dL (5.6 mmol/L) or treatment for elevated blood glucose   Blood pressure  BP Readings from Last 3 Encounters:   10/05/23 121/82   05/26/23 97/55    Blood pressure ?130/85 mmHg or treatment for elevated blood pressure   Family History  See family history     Mental Status Exam:  Appearance: awake, alert, unkempt, on bed. No evidence of pain of acute distress.   Attitude: Somewhat cooperative, calm during interview today  Eye Contact:  fair, intense at times, better duration  Mood: \"not too bad\"  Affect:  mood congruent, irritable  Speech: mostly coherent  Psychomotor Behavior:  Ongoing bilateral arm tremor. No evidence of dystonia, or tics.  Throught Process: linear, illogical, concrete  Associations:  no loosening of associations present  Thought Content:  Delusions and AH suspected. Evidence of obsessive thinking and compulsions to harm others intermittently but not within the past few days.   Insight:  limited  Judgement:  poor  Oriented to:  self, place  Attention Span and Concentration:  fair  Recent and Remote " Memory:  poor  Gait: patient lying in bed         Precautions:     Behavioral Orders   Procedures    Assault precautions    Code 1 - Restrict to Unit    Code 2    Code 2 - 1:1 Staff Supervision     For ECT only    Electroconvulsive therapy     Series of up to 12 treatments. Begin Date: 8/2/23     Treating Psychiatrist providing ECT:  unknown     Notified on:  7/28/23 via this order  NOTE: We received verbal confirmation from [facility] that Lorenzo Pavon has been approved but awaiting official court order and risk management's review  Initial consult was completed by Dr. Hicks on 7/18/23    Electroconvulsive therapy     Series of up to 12 treatments. Begin Date: 8/2/23     Treating Psychiatrist providing ECT:  Dominga     Notified on:  8/2/23    Elopement precautions    Fall precautions    Occupational Therapy on the Unit     Order Specific Question:   Reason for Consult     Answer:   Eval of thought process, functional skills and behavior     Order Specific Question:   Course of Action:     Answer:   Evaluation only     Order Specific Question:   Treatment Prescription:     Answer:   CPT requested    Routine Programming     As clinically indicated    Self Injury Precaution    Status 15     Every 15 minutes.    Status Individual Observation     Patient SIO status reviewed with team/RN.  Please also refer to RN/team documentation for add'l detail.    -SIO staff to monitor following which have contributed to patient being on SIO:  Agitation  Pt is impulsive   Pt has ran out of his room naked  Pt has Parkinson symptoms place him in a high fall risk  Pt has verbal outburst of sexual and threaten statements  Pt requires immediate redirection when masturbating    -Possible interventions SIO staff could use to support patient's treatment progress:  Engage pt in activities    -When following observed, team will review discontinuation of SIO:  Absence of aggression toward others for > 24 hours    Comments: SIO 1:1     Order  Specific Question:   CONTINUOUS 24 hours / day     Answer:   5 feet     Order Specific Question:   Indications for SIO     Answer:   Severe intrusiveness     Order Specific Question:   Indications for SIO     Answer:   Assault risk    Suicide precautions     Patients on Suicide Precautions should have a Combination Diet ordered that includes a Diet selection(s) AND a Behavioral Tray selection for Safe Tray - with utensils, or Safe Tray - NO utensils            Diagnoses:     #Unspecified psychosis likely schizophrenia per history,  #Unspecified encephalopathy   -R/O catatonia   -Episodes of unresponsiveness, concern for PNES   #Parkinsonism 2/2 neuroleptic medications, rule out Parkinson's Disease  -rule out major neurocognitive disorder (refused to answer much of assessment)  #Urinary retention and BPH  -Possible UTI -- UC resulted on 5/25 w/out growth  #Hx of prolonged QTc with clozapine  #Mild thrombocytopenia  #R/O OCD         Assessment and hospital summary:  Patient was admitted to psychiatric unit for safety, stabilization and medication management. He has had schizophrenia since the 1980. He was on Clozaril x 25 years, and it was tapered and discontinued on May 7, 2023  due to prolonged qtc of 527, and his psychotic  symptoms have worsened since then. Sinemet was also discontinued recently due to concerns that it was contributing to paranoia and AH. He is agitated, aggressive, dangerous to self and others. He remains on SIO 2 to 1, and is under psychiatric emergency and court hold. There are concerns for organic etiology given pattern of sundowning, history of parkinsonism, and ongoing disorientation/confusion. 6/8: EKG repeated, cardiology consult regarding safety of resuming clozapine in the context of prolonged QTc and refractory schizophrenia pending response. Per cardiology, correct QTc is no more than 460. They do not have concerns about AP retrial. Neurology IP consult placed for evaluation of  sundowning and cognitive decline secondary to Sinemet discontinuation. MSSA initiated. Per Neurology, discontinuation of Sinemet would not account for these symptoms. They do not recommend retrial at this time. On , discussed complex case at length with Dr. Hatch (primary provider on geriatic unit) who provided recs (see second opinion note dated ). Depakote initiated, though needed to be reduced due to side effect of thrombocytopenia. Melatonin was increased to 10 mg at bedtime. : Clozapine titration continued. Its possible that clozapine dose is contributing to worsened compulsive behaviors noted throughout hospitalization which could improve with lowering the dose. ECT initiated due to treatment refractory psychosis and ongoing symptoms despite therapeutic dose of clozapine (650 mg daily). ECT initiated on 23 and pt completed 11 total treatments, but ECT stopped on  due to lack of improvement and distress it was causing patient.     Chart reviewed which revealed the followin2022: He was on clozapine, Seroquel, Cymbalta, and Carbidopa-levodopa and hospitalized for pneumonia. Psych consulted and Seroquel was stopped. Treated with Abx and discharged to TCU.     2022: Hospitalized at [facility]. Per chart review:    Vinod Lee is a 63 yo male with longstanding history of schizophrenia. He was admitted from [facility] ED, where he presented due to increased delusional thoughts while on a visit to his parents for Thanksgiving. He began experiencing increasing paranoid thoughts that someone might be poisoning him and began fearing that he might accidentally harm someone. He reported to his parents that he was having thoughts about hitting someone or beating someone up and told them he could not guarantee they would be safe with him. Parents encouraged patient to go to the ED, which he did voluntarily.     Per patient report and chart review, he was hospitalized several times in the  "1980s and reports he was civilly committed at one point in the 1980s, however he has been quite stable for the past several decades. He reports he will experience some paranoia and delusional thinking at times, but generally has good insight into his symptoms. He has been maintained on clozapine for about 25 years and prior to several months ago was also concurrently prescribed quetiapine.      In the last several months, patient has had a number of medication changes. Approximately 6 months ago, patient was diagnosed with parkinsonism related to antipsychotic use and was started on carbidopa-levidopa. He experienced no improvement in tremors, and thus carbidopa- levidopa dose was increased 1.5 weeks ago.      In addition, patient was medically hospitalized in August 2022 for confusion, weakness, repeated falls, ongoing weight loss, and SOB. He was found to have a cavitary lesion of the lung and suspected aspiration pneumonia. He was treated with antibiotics. At that time, he was taking current dose of clozapine and duloxetine, as well as propranolol ER, quetiapine, gabapentin, and clonazepam. Psychiatry was consulted due to concern for oversedation, and propranolol ER, gabapentin, and quetiapine were discontinued. Conazepam was reduced from 0.5 mg TID to BID dosing and recommended to be discontinued, however, it does not appear this occurred. His sedation improved significantly. QTc prolongation was also noted, but improved throughout his stay. He was ultimately discharged to a TCU for several months before returning home. He reports his weakness has greatly improved and states he \"graduated\" physical therapy, though he continues to be diligent in completed recommended exercises.      He reports that over the past several months, his delusions and anxiety have been worse than usual, with symptoms becoming much more acute since the carbidopa-levidopa dose was increased. He has felt increasingly paranoid about being " "poisoned, noting this is a delusion that has been stable over time, but was previously less intrusive. He has insight during the interview that his paranoid thinking is not reality based, but states it has been harder to separate delusions from reality and he becomes very worried that he might hurt someone, despite no history of violent behavior. He reports that the paranoia about his food being poisoned in combination with the tremors in his hands have made it difficult for him to eat. He has also had quite low appetite for the past 6 months to a year . He reports that due to the combination of these factors, he has lost about 50 pounds in the last year.He denies any problems with sleep initiation or maintenance. He reports energy is fairly good and \"better than it used to be.\" He reports some short term memory issues and on interview, does have some difficulty remembering details of recent events. He is unsure if he has received cognitive testing in the past.    He denies any suicidal ideation and reports he has not experienced SI for decades. He denies homicidal thoughts and is very clear that he had and has no desire to harm anyone else, but was afraid he might somehow do so. He denies any hallucinations and states \"that's never been a problem for me.\" He is not observed responding to internal stimuli. He is alert and oriented in all spheres.        ========    BRIEF HOSPITAL COURSE: This 62 y.o. male admitted 11/25/2022 to  [facility] for the assessment and treatment of the above presentation. This was a voluntary admission.     In summary, he was tapered off the Sinemet, which he reports to be both ineffective and potentially worsening the anxiety and paranoia ideations. Instead Benadryl 25 mg TID was started to help with extrapyramidal side effects. He struggled with sleep during his stay here. Remeron 7.5mg QHS was tried without success. Seroquel 100mg qhs was restarted with addition of suvorexant 10mg qhs. " Given his Seroquel PRN use prior history of prolonged QTC, he will benefit from another EKG repeat on the medical unit.     Unfortunately, he tested positive for influenza A and developed hypoxemia. Now on 2L oxygen with desats when prone requiring 3L oxygen. Subsequently, the plan will to be tapering him off clonazepam due to hypoxia (and also prior plan to taper). The patient tolerated these changes without side effects.     The patient minimally benefited from the structured therapeutic milieu as he attended programming seldom as he tested positive for influenza, he needed to isolate with droplet precautions. Pt was engaged and worked on issues that were triggering/brought pt to the hospital and has excellent insight into his anxiety and paranoid delusions. Pt denied suicidal ideations throughout all/majority of their hospitalization. Pt denied HI throughout all of hospitalization. There were no concerns with behavioral disruptions/outbursts. The patient has shown improvement in general.     At the time of discharge, hospitalization course was reviewed with Vinod Lee with plan to transfer to medicine. Please consult psychiatry and I will continue to follow while patient is on the medical unit. He is now off sinemet since 11/29 21:00 and I would expect anxiety and paranoia to improve more moving forward. Psychiatrically, once anxiety and paranoia is baseline, can D/C to home once medically stable. He DOES NOT NEED A 1:1 on the medical unit from a mental health perspective, we initiated 1:1 11/30/2022 due to significant fatigue, gait instability (he was unable to stand without assist) and feeding assist.    04/2023: Clozapine was gradually tapered and discontinued, unclear reasons why it was discontinued per outside records, though Dr. Portillo's H&P indicates that it was due to prolonged QTc.       Today's Changes:  -No med changes  -Renewed SIO   -Monitor oral intake closely  -Monitor BMs closely  -Transfer to  station 3B    Per Neurology Recs:    Recommendations  -No current indications to change medications from a neurologic perspective  - Optimizing his psychiatric medication and treatment is currently more important than optimization of parkinsonism  - Please contact neurology if any new questions arise, or to discuss the assessment described above     Thank you for involving Neurology in the care of Vinod Lee.  Please do not hesitate to call with questions.      Kranthi Perdue MD      Seen by SLP 10/4/23:     Clinical Impression Comments Clinical Swallow Evaluation completed. Patient had no s/s aspiration and no signs of dysphagia. Oral motor function was unable to be assessed with patient declining to participate in oral mechanism exam. Mastication was timely and complete when patient took large bites. Hyolaryngeal elevation appears intact. Recommend diet of regular solids and thin liquids with staff feeding, as allowed, sitting fully upright, small bites/sips, and slow rate. No further ST is warranted at this time. Contact SLP if any questions or concerns.     SLP Rationale for DC Rec Swallow function appears at baseline. No further Speech Therapy needs at this time.   SLP Brief overview of current status  Continue regular solids and thin liquids when sitting fully upright, with staff feeding, as accepted, small bites/sips, slow rate, and swallowing food already in his mouth prior to another bite. Continue meds in puree. Speech will complete orders.     Target psychiatric symptoms and interventions:  -Psych emergency declared on 5/27, now fully committed  - Depakote Sprinkles 250 mg TID, restarted on 8/26. Cannot increase beyond this dose due to thrombocytopenia at higher doses  -Continue clozapine as above, 650mg  -Continue scheduled Zyprexa 15mg BID  -Continue 1:1 for safety of staff and patients, reduced from 2:1 7/23/23  - weekly CBC to monitor platelets    -Continue Gabapentin 300 mg TID as staff  believe it has been effective for treatment of his anxiety    Risks, benefits, and alternatives discussed at length with patient.     Acute Medical Problems and Treatments:  Rhinovirus / enterovirus URI  Concern for pneumonia  On 9/23, patient was reported to be hypoxic with an SpO2 of 89% with RR of 28, as well as had some low-grade fevers.  Associated symptoms of wet sounding cough.  COVID was negative.  Chest x-ray with possible infection versus atelectasis in RLL.  Patient was initially started on doxycycline on 9/24.  Due to concern for clinical worsening, patient was reassessed by provider on 9/25.  At that time due to patient increase drooling while on Clozaril and him laying supine for much of the day, concerns were raised for aspiration pneumonia, thus he was started on Augmentin as well.   - sputum culture if able, pending  - cont Doxycycline 100 mg twice daily for 5 days (9/24-9/28)  - cont augmentin 875-125 mg BID x 5 days (9/25-9/29)  - Albuterol inhaler as needed cough, wheezing, shortness of breath  - APAP every 4 hours as needed fevers, pain  - I-S q shift, as tolerated     Hx cavitary lesion to SOPHY  Pulmonary nodule to SOPHY  Seen August 2022 in ED. CT chest with contrast negative for PE but showed cavitary masslike process in the left upper lung, over an area measuring 9.5 x 6.2 x 5.2 cm, indeterminant pulmonary nodule measuring 9 mm in the anterior segment of the left upper lobe, nonspecific left hilar and mediastinal lymphadenopathy, small-moderate left pleural effusion.  Cavitary lesion also seen on chest x-ray.  Patient was given IVF, Rocephin, Zithromax, and admitted for further evaluation. Thoracentesis was done and negative cultures. ID was consulted and managed Abx. The patient was initially on zosyn and doxycycline but narrowed. Strep pneumo, legionella, and HIV testing done (negative). Blood cultures were negative as well. The patient did well.  Follow-up chest CT showed near complete  interval resolution of the previously described left upper lobe abscess.  Residual 12 x 10 mm spiculated nodular opacity with adjacent scarring within this region, probably represents sequela of previous infection, although underlying malignancy not excluded.  - Recommend follow-up outpatient with PCP for continued monitoring.    Groin rash  Per IM note 10/5:  Met with pt in his room within presence of pt's 1:1. Pt showed this writer his groin folds and no significant erythema or scaling noted. Pt denies groin itch. Will change miconazole powder to prn and discontinue prior wound care orders. Please continue to encourage frequent showering and washing/drying of area. Medicine signing off. Please feel free to call with questions.       Possible Dementia  Neurology consult placed on 6/8 for evaluation of sundowning and cognitive decline secondary to Sinemet discontinuation: Resuming Sinemet not recommended for behavioral concerns. Please see Neuro note for details.     Tremors  longstanding though appeared to worsen following initiation of clozapine  - Ativan 0.5 mg TID  - Cogentin 1 mg BID added on 8/25 with overall improvement noted    Neuro re-consulted on 9/20 and per their documentation:    Impression  Mr. Lee is a 63 y.o. man with a long history of psychosis including requiring commitment and psychiatric hospitalizations in the 1980s.  Given his long history, this is less consistent with an organic cause of psychosis (as something that would be primarily neurologic in origin would be expected to have a more rapid progression of neurologic deterioration, rather than this long period of psychiatric symptoms).  I am not able to appreciate any evidence for a neurologic basis of his psychosis given the time course of his psychiatric illness.      Regarding his difficulty with word finding, I am not able to elicit this on exam.  However since he described being symptomatic earlier today, it is helpful that I am  able to go through a full assessment of his language in terms of comprehension, repetition, naming objects with increasing difficulty without being able to appreciate any deficits.     He does continue to have a tremor that is most prominent posteriorly as well as with action, in addition to a chin tremor.  He also does have rigidity in the bilateral upper extremities consistent with the diagnosis of parkinsonism.  He is currently not on levodopa or dopamine agonists which I continue to agree with.  Since his psychiatric symptoms are much more bothersome right now than his parkinsonism, I will continue to recommend avoiding dopaminergic medications.     Recommendations  -No current indications to change medications from a neurologic perspective  - Optimizing his psychiatric medication and treatment is currently more important than optimization of parkinsonism  - Please contact neurology if any new questions arise, or to discuss the assessment described above     Thank you for involving Neurology in the care of Vinod Lee.  Please do not hesitate to call with questions.      Kranthi Perdue MD      Thrombocytopenia, resolved  Likely secondary to Depakote.  According to the, incidents of Depakote induced thrombocytopenia as 27%.  Depakote dose reduced from 1000 twice daily to 250 3 times daily on 7/28/2023 with subsequent resolution of thrombocytopenia  - weekly CBCs    Constipation  Likely worsened by clozapine, improved since modifying regimen.   - daily miralax   - daily Senokot 2 tablets BID      Right first toe fracture:  Seen by Ortho on 6/16:  Per note: Vinod Lee is a 62 year old  male w/ PMHx complex psychiatric history who has a fracture to the distal phalanx of the right toe after a recent trauma to the foot the patient reports.  Patient denies any other pain or discomfort.  Images reviewed and plan will be for irrigation of the popped fracture blister with sterile saline and the toes should be  "dressed and a 4 x 4 gauze dressing.  Patient will need a postop shoe which she can be weightbearing as tolerated in.  Would recommend a course of antibiotics for approximately 7 days.  Would recommend Augmentin or clindamycin.  Podiatry will be made aware of patient and will see patient while he is admitted or if patient is discharged in the near future a follow-up appointment will need to be established.     Remainder of care per primary team.  Primary team should make sure that patient is up-to-date on his tetanus shot.  If not patient will require a booster shot.    Hx of prolonged QTc:  Continue weekly EKGs. See above for details.   Discussed most recent EKG findings with Cardiology on 8/25. Corrected QTc is 501 from EKG on 8/23. Per cardiology, repeat EKG today. If corrected QTc is > 500, will need to reduce clozapine. If < 500, OK to keep clozapine at current dose. UPDATE/ADDENDUM 9/6: Repeat EKG with corrected QTc of 440. No need to adjust clozapine dose per cards.     Urinary retention, resolved  Per patient care order:   If patient is refusing straight caths, please notify IM provider immediately to determine whether this constitutes a medical emergency. If IM declares medical emergency, may restrain patient for straight cath. Discussed this with patient's parents/substitute decision makers on 6/7 who are in support of this plan.    # Psoriasis hx  # Purplish discoloration of B/LE feet-resolved   Discussed with Dr. Rene and bedside RN regarding rash on right foot that appears and appears to be purplish in color and almost \"petechiae.\" It was noted during interview, but seemed to be resolving upon walking. Within the past 24 hrs, pt has had ECT and seemed more confused than usual. Pt seems to have had a right great toe wound with recent right great toe fracture seen by Medicine on 7/16. Seen on 8/4 with improving color on B/L legs at rest and appears to have small, scaly patches of varying coin sizes " that have not grown past marked borders. See photos. Per further chart review, has had psoriasis history. WBC WNL, no fevers, HDS. This appears to more consistent with a psoriasis like picture.   - Start triamcinolone topical 0.025%   -Apply twice daily until lesions for 2 weeks or until lesions resolve/  -Monitor for skin thinning, striae, rebound lesion flares.   - Start Eucerin cream              - Apply 30 minutes PRIOR to triamcinolone topical cream above or PRN as needed if dry skin/after bathing   - Medicine will sign off.   - For worsening pain, spread, itchiness, fevers, please contact Medicine and possibly Dermatology.    Benzodiazepine dependence:  Phenobarbital taper completed shortly after patient's admission    Pertinent labs/imaging:  Weekly CBC with diff     Behavioral/Psychological/Social:  - Encourage unit programming    Safety:  - Continue precautions as noted above  - Status 15 minute checks  - Continue 1:1 for staff and pt safety    Legal Status: Fully committed with [facility] and [facility]. Pozo medications: clozapine, olanzapine, risperidone, quetiapine    Disposition Plan   Reason for ongoing admission: No safe alternative at this time  Discharge location: Accepted to  pending [facility] assessment. Will have 24/7 supervision (1:1).   Discharge Medications: not ordered  Follow-up Appointments: not scheduled    Entered by: Ingrid Rhodes MD on 10/06/2023  at 9:24 AM

## 2023-10-06 NOTE — PLAN OF CARE
Assessment/Intervention/Current Symtoms and Care Coordination:  Reviewed chart. Met with team to review patient's current functioning, needs, progress, and impediments to discharge.    Discharge Plan or Goal:  Will discharge to [facility] [place] [address]     Barriers to Discharge:  Discharge pending completion of [facility] assessment by CADI     Referral Status:  [facility] in [place] , no memory care   [facility] in [place] 8/15, no memory care   [facility] , no openings   [facility] , declined   [facility] , no open beds   [facility]   [facility] , no open beds   [facility] 9/15, waiting for decision   [facility]   [facility] in [facility] , no openings and private pay   [facility], , declined    [place], , declined   [facility] 9/15, waiting for decision   [place] , declined   [place] , declined      Legal Status:  Commitment with [facility]: [place]  File Number: [id number]  Issued 23 and is not to exceed six months    Contacts:  : Vicky Valdez with [facility], [phone]  Psychiatrist: Dr. Santana at [facility], [phone] PCP: Attila Urena  Mom: Alberta, [phone] (H) [phone] (M)   Dad: Ino, [phone] (H)  Sister, Sandra Treadwell, [phone] (KING)   [facility]'s Office Fax: [phone]  CADI  with [facility]: Regina Wong, [phone], [email or web address]    Upcoming Meetings and Dates/Important Information and next steps:    When we have a discharge date referrals for psychiatry follow up will be sent

## 2023-10-07 PROCEDURE — 250N000013 HC RX MED GY IP 250 OP 250 PS 637: Performed by: PSYCHIATRY & NEUROLOGY

## 2023-10-07 PROCEDURE — 250N000013 HC RX MED GY IP 250 OP 250 PS 637: Performed by: PHYSICIAN ASSISTANT

## 2023-10-07 PROCEDURE — 124N000002 HC R&B MH UMMC

## 2023-10-07 PROCEDURE — 250N000013 HC RX MED GY IP 250 OP 250 PS 637: Performed by: STUDENT IN AN ORGANIZED HEALTH CARE EDUCATION/TRAINING PROGRAM

## 2023-10-07 RX ADMIN — NAPROXEN 250 MG: 250 TABLET ORAL at 17:17

## 2023-10-07 RX ADMIN — OLANZAPINE 15 MG: 10 TABLET, ORALLY DISINTEGRATING ORAL at 08:51

## 2023-10-07 RX ADMIN — SENNOSIDES 2 TABLET: 8.6 TABLET ORAL at 08:51

## 2023-10-07 RX ADMIN — BENZTROPINE MESYLATE 1 MG: 1 TABLET ORAL at 08:51

## 2023-10-07 RX ADMIN — DIVALPROEX SODIUM 250 MG: 125 CAPSULE, COATED PELLETS ORAL at 06:27

## 2023-10-07 RX ADMIN — DIVALPROEX SODIUM 250 MG: 125 CAPSULE, COATED PELLETS ORAL at 20:09

## 2023-10-07 RX ADMIN — GABAPENTIN 300 MG: 300 CAPSULE ORAL at 13:18

## 2023-10-07 RX ADMIN — GABAPENTIN 300 MG: 300 CAPSULE ORAL at 08:50

## 2023-10-07 RX ADMIN — LORAZEPAM 0.5 MG: 0.5 TABLET ORAL at 08:51

## 2023-10-07 RX ADMIN — BENZTROPINE MESYLATE 1 MG: 1 TABLET ORAL at 20:08

## 2023-10-07 RX ADMIN — LORAZEPAM 0.5 MG: 0.5 TABLET ORAL at 13:18

## 2023-10-07 RX ADMIN — POLYETHYLENE GLYCOL 3350 17 G: 17 POWDER, FOR SOLUTION ORAL at 08:52

## 2023-10-07 RX ADMIN — SENNOSIDES 2 TABLET: 8.6 TABLET ORAL at 20:08

## 2023-10-07 RX ADMIN — TRAZODONE HYDROCHLORIDE 50 MG: 50 TABLET ORAL at 20:08

## 2023-10-07 RX ADMIN — POLYETHYLENE GLYCOL 3350 17 G: 17 POWDER, FOR SOLUTION ORAL at 20:09

## 2023-10-07 RX ADMIN — TAMSULOSIN HYDROCHLORIDE 0.4 MG: 0.4 CAPSULE ORAL at 08:51

## 2023-10-07 RX ADMIN — DIVALPROEX SODIUM 250 MG: 125 CAPSULE, COATED PELLETS ORAL at 13:18

## 2023-10-07 RX ADMIN — CYANOCOBALAMIN TAB 1000 MCG 1000 MCG: 1000 TAB at 08:52

## 2023-10-07 RX ADMIN — FLUOXETINE 20 MG: 20 CAPSULE ORAL at 08:52

## 2023-10-07 RX ADMIN — NAPROXEN 250 MG: 250 TABLET ORAL at 08:51

## 2023-10-07 RX ADMIN — OLANZAPINE 15 MG: 10 TABLET, ORALLY DISINTEGRATING ORAL at 20:08

## 2023-10-07 RX ADMIN — Medication 10 MG: at 20:09

## 2023-10-07 RX ADMIN — ATROPINE SULFATE 2 DROP: 10 SOLUTION/ DROPS OPHTHALMIC at 20:07

## 2023-10-07 RX ADMIN — LORAZEPAM 0.5 MG: 0.5 TABLET ORAL at 20:08

## 2023-10-07 RX ADMIN — CLOZAPINE 650 MG: 100 TABLET, ORALLY DISINTEGRATING ORAL at 11:50

## 2023-10-07 RX ADMIN — GABAPENTIN 300 MG: 300 CAPSULE ORAL at 20:08

## 2023-10-07 ASSESSMENT — ACTIVITIES OF DAILY LIVING (ADL)
ADLS_ACUITY_SCORE: 94
DRESS: WITH ASSISTANCE
ORAL_HYGIENE: WITH ASSISTANCE
ADLS_ACUITY_SCORE: 94
HYGIENE/GROOMING: WITH ASSISTANCE
ADLS_ACUITY_SCORE: 94
LAUNDRY: UNABLE TO COMPLETE
ADLS_ACUITY_SCORE: 94

## 2023-10-07 NOTE — PROGRESS NOTES
No aggression or agitation noted on the night shift, patient slept the entire shift.  Pt may not need status one to one at night.
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
## 2023-10-07 NOTE — PLAN OF CARE
"  Problem: Confusion Chronic  Goal: Optimal Cognitive Function  Outcome: Not Progressing     Problem: Psychotic Signs/Symptoms  Goal: Improved Mood Symptoms  Outcome: Progressing     Problem: Oral Intake Inadequate  Goal: Improved Oral Intake  Outcome: Progressing   Goal Outcome Evaluation:    Plan of Care Reviewed With: patient      Patient remained calm this shift, no aggressive or disruptive behavior. Rated anxiety 3/10, denied depression/SI/SIB/AVH. Patient is disoriented to situation was slightly paranoid about medications, but ended up taking morning medications crushed in pudding. Patient's daily dose of clozapine is scheduled at noon, writer brought medication in crushed in pudding. Patient took first spoonful well but spit out the second spoonful stating that the medications was not mixed in pudding. Patient only received roughly 1/2 of their dose. Later patient took 1400hrs medication well in apple sauce.     Patient was isolative and withdrawn all shift, spent most of shift in bed, only visible for lunch. Ate 75% of lunch. Patient tremors make it hard for patient to eat independently but was able to eat lunch independently with encouragement from staff.     Patients mobility was limited this shift, staff attempted to walk patient to lunch but needed to grab wheelchair because of tremors and BLE weakness.     Weighted fork/spoon in bin in medication room.    GRACE groin rash this shift    /78 (BP Location: Right arm)   Pulse 88   Temp 97.8  F (36.6  C) (Temporal)   Resp 18   Ht 1.753 m (5' 9\")   Wt 84.4 kg (186 lb)   SpO2 97%   BMI 27.47 kg/m        "

## 2023-10-08 PROCEDURE — 124N000002 HC R&B MH UMMC

## 2023-10-08 PROCEDURE — 250N000013 HC RX MED GY IP 250 OP 250 PS 637: Performed by: PHYSICIAN ASSISTANT

## 2023-10-08 PROCEDURE — 250N000013 HC RX MED GY IP 250 OP 250 PS 637: Performed by: PSYCHIATRY & NEUROLOGY

## 2023-10-08 PROCEDURE — 250N000013 HC RX MED GY IP 250 OP 250 PS 637: Performed by: STUDENT IN AN ORGANIZED HEALTH CARE EDUCATION/TRAINING PROGRAM

## 2023-10-08 RX ADMIN — POLYETHYLENE GLYCOL 3350 17 G: 17 POWDER, FOR SOLUTION ORAL at 08:08

## 2023-10-08 RX ADMIN — POLYETHYLENE GLYCOL 3350 17 G: 17 POWDER, FOR SOLUTION ORAL at 19:17

## 2023-10-08 RX ADMIN — LORAZEPAM 0.5 MG: 0.5 TABLET ORAL at 14:29

## 2023-10-08 RX ADMIN — Medication 10 MG: at 19:17

## 2023-10-08 RX ADMIN — DIVALPROEX SODIUM 250 MG: 125 CAPSULE, COATED PELLETS ORAL at 05:31

## 2023-10-08 RX ADMIN — ATROPINE SULFATE 2 DROP: 10 SOLUTION/ DROPS OPHTHALMIC at 14:28

## 2023-10-08 RX ADMIN — BENZTROPINE MESYLATE 1 MG: 1 TABLET ORAL at 08:07

## 2023-10-08 RX ADMIN — OLANZAPINE 15 MG: 10 TABLET, ORALLY DISINTEGRATING ORAL at 08:08

## 2023-10-08 RX ADMIN — TAMSULOSIN HYDROCHLORIDE 0.4 MG: 0.4 CAPSULE ORAL at 08:07

## 2023-10-08 RX ADMIN — SENNOSIDES 2 TABLET: 8.6 TABLET ORAL at 19:17

## 2023-10-08 RX ADMIN — GABAPENTIN 300 MG: 300 CAPSULE ORAL at 14:29

## 2023-10-08 RX ADMIN — LORAZEPAM 0.5 MG: 0.5 TABLET ORAL at 19:17

## 2023-10-08 RX ADMIN — LORAZEPAM 0.5 MG: 0.5 TABLET ORAL at 08:07

## 2023-10-08 RX ADMIN — BENZTROPINE MESYLATE 1 MG: 1 TABLET ORAL at 19:17

## 2023-10-08 RX ADMIN — ACETAMINOPHEN 650 MG: 325 TABLET, FILM COATED ORAL at 04:37

## 2023-10-08 RX ADMIN — GABAPENTIN 300 MG: 300 CAPSULE ORAL at 08:07

## 2023-10-08 RX ADMIN — CLOZAPINE 650 MG: 100 TABLET, ORALLY DISINTEGRATING ORAL at 12:08

## 2023-10-08 RX ADMIN — GABAPENTIN 300 MG: 300 CAPSULE ORAL at 19:17

## 2023-10-08 RX ADMIN — NAPROXEN 250 MG: 250 TABLET ORAL at 17:55

## 2023-10-08 RX ADMIN — FLUOXETINE 20 MG: 20 CAPSULE ORAL at 08:08

## 2023-10-08 RX ADMIN — DIVALPROEX SODIUM 250 MG: 125 CAPSULE, COATED PELLETS ORAL at 19:17

## 2023-10-08 RX ADMIN — ATROPINE SULFATE 2 DROP: 10 SOLUTION/ DROPS OPHTHALMIC at 08:08

## 2023-10-08 RX ADMIN — NAPROXEN 250 MG: 250 TABLET ORAL at 08:08

## 2023-10-08 RX ADMIN — DIVALPROEX SODIUM 250 MG: 125 CAPSULE, COATED PELLETS ORAL at 14:29

## 2023-10-08 RX ADMIN — CYANOCOBALAMIN TAB 1000 MCG 1000 MCG: 1000 TAB at 08:08

## 2023-10-08 RX ADMIN — SENNOSIDES 2 TABLET: 8.6 TABLET ORAL at 08:08

## 2023-10-08 RX ADMIN — OLANZAPINE 15 MG: 10 TABLET, ORALLY DISINTEGRATING ORAL at 19:17

## 2023-10-08 ASSESSMENT — ACTIVITIES OF DAILY LIVING (ADL)
ADLS_ACUITY_SCORE: 94
ORAL_HYGIENE: PROMPTS
ADLS_ACUITY_SCORE: 93
HYGIENE/GROOMING: PROMPTS;WITH ASSISTANCE
ADLS_ACUITY_SCORE: 94
ADLS_ACUITY_SCORE: 93
ADLS_ACUITY_SCORE: 94
ADLS_ACUITY_SCORE: 93
ADLS_ACUITY_SCORE: 93
DRESS: WITH ASSISTANCE
ADLS_ACUITY_SCORE: 93
ADLS_ACUITY_SCORE: 94
ADLS_ACUITY_SCORE: 94
ADLS_ACUITY_SCORE: 93
ADLS_ACUITY_SCORE: 93
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
## 2023-10-08 NOTE — CARE PLAN
Occupational Therapy Group Note:     10/08/23 1505   Group Therapy Session   Group Attendance attended group session   Time Session Began 1015   Time Session Ended 1100   Total Time (minutes) 30 (no charge)   Total # Attendees 2   Group Type recreation   Group Topic Covered leisure exploration/use of leisure time;structured socialization   Group Session Detail OT Leisure Group: Hemant Card Game   Patient Response/Contribution confronted peers appropriately;cooperative with task;listened actively   Patient Participation Detail Patient engaged in a cognitive based leisure activity with peers. Therapeutic benefits and skills addressed during today's leisure game include: problem solving, maintaining attention/focus, improving social skills/interaction, exercise cognitive skills, and explore healthy leisure engagements. Patient was willing and engaged in group activity for one round of game play. Patient needed intermittent cueing for accurate card placement and turn taking; however, patient was generally able to engage independently. Writer noted some instances of negative self-talk (bad at game and foul body odor); however, patient did not seem to ruminate on these thoughts. Patient did seem somewhat receptive of redirection and reassurance following these negative verbalizations about himself. Patient was overall cooperative and pleasant in interactions today. Writer did note mild bilateral hand tremors during group.

## 2023-10-08 NOTE — PLAN OF CARE
"  Problem: Psychotic Symptoms  Goal: Psychotic Symptoms  Description: Signs and symptoms of listed problems will be absent or manageable.  Outcome: Progressing     Problem: Behavioral Disturbance  Goal: Behavioral Disturbance  Description: Signs and symptoms of listed problems will be absent or manageable by discharge or transition of care.  Outcome: Progressing   Goal Outcome Evaluation:    Plan of Care Reviewed With: patient      Patient is calm and cooperative, medication compliant crushed in applesauce. Patient denies anxiety/depression/SI/SIB/AVH. Patient endorses paranoia about being poisoned and states they know its a delusion. Patient made bizarre statements when asked how their evening went, patient stated \"I was unruly and disruptive, you know calling black guys the N word\", patient was easily redirected from those thoughts. Per reports from yesterday patient was calm with no psychotic, delusional or disruptive behavior.    Patient wanted to go to bathroom to have a BM, later found to be masturbating in bathroom. Patient performed hand hygiene and went back to group. Patient is minimally social with peers, although more visible this morning than yesterday.    Patient denies pain, tremors are present in hands. Able to eat independently with setup while using weighted utensils and weight wrist cuff. Ate 75% of breakfast and 75% of lunch    Rash to groin is red, miconazole powder applied. Patient reports itchy groin, encouraged to avoid scratching area excessively.    Patient did not take full dose of clozapine this shift, 2nd day in a row, Pharmacist notified. Yesterday patient did not take full dose crushed in vanilla pudding, today writer tried crushed in applesauce, patient did not take any. Writer wasted medication and grabbed new pills and tried whole in chocolate pudding because patient did not want it crushed. Patient was able to take 3/5 pills and declined to try the rest. Will recommend trying to " "crush pills in chocolate pudding tomorrow.    LBM 10/8, voiding freely    /79   Pulse 81   Temp 97  F (36.1  C)   Resp 16   Ht 1.753 m (5' 9\")   Wt 84.4 kg (186 lb)   SpO2 95%   BMI 27.47 kg/m      "
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
## 2023-10-09 PROCEDURE — 250N000013 HC RX MED GY IP 250 OP 250 PS 637: Performed by: STUDENT IN AN ORGANIZED HEALTH CARE EDUCATION/TRAINING PROGRAM

## 2023-10-09 PROCEDURE — 250N000013 HC RX MED GY IP 250 OP 250 PS 637: Performed by: PSYCHIATRY & NEUROLOGY

## 2023-10-09 PROCEDURE — 250N000009 HC RX 250: Performed by: PSYCHIATRY & NEUROLOGY

## 2023-10-09 PROCEDURE — 124N000002 HC R&B MH UMMC

## 2023-10-09 PROCEDURE — 99231 SBSQ HOSP IP/OBS SF/LOW 25: CPT | Performed by: PSYCHIATRY & NEUROLOGY

## 2023-10-09 PROCEDURE — 250N000013 HC RX MED GY IP 250 OP 250 PS 637: Performed by: PHYSICIAN ASSISTANT

## 2023-10-09 RX ADMIN — LORAZEPAM 0.5 MG: 0.5 TABLET ORAL at 08:05

## 2023-10-09 RX ADMIN — NAPROXEN 250 MG: 250 TABLET ORAL at 17:37

## 2023-10-09 RX ADMIN — DIVALPROEX SODIUM 250 MG: 125 CAPSULE, COATED PELLETS ORAL at 13:51

## 2023-10-09 RX ADMIN — SENNOSIDES 2 TABLET: 8.6 TABLET ORAL at 19:19

## 2023-10-09 RX ADMIN — LORAZEPAM 0.5 MG: 0.5 TABLET ORAL at 19:19

## 2023-10-09 RX ADMIN — OLANZAPINE 15 MG: 10 TABLET, ORALLY DISINTEGRATING ORAL at 19:19

## 2023-10-09 RX ADMIN — LORAZEPAM 0.5 MG: 0.5 TABLET ORAL at 13:51

## 2023-10-09 RX ADMIN — TAMSULOSIN HYDROCHLORIDE 0.4 MG: 0.4 CAPSULE ORAL at 08:05

## 2023-10-09 RX ADMIN — OLANZAPINE 15 MG: 10 TABLET, ORALLY DISINTEGRATING ORAL at 08:04

## 2023-10-09 RX ADMIN — DIVALPROEX SODIUM 250 MG: 125 CAPSULE, COATED PELLETS ORAL at 19:19

## 2023-10-09 RX ADMIN — BENZTROPINE MESYLATE 1 MG: 1 TABLET ORAL at 19:19

## 2023-10-09 RX ADMIN — CYANOCOBALAMIN TAB 1000 MCG 1000 MCG: 1000 TAB at 08:05

## 2023-10-09 RX ADMIN — POLYETHYLENE GLYCOL 3350 17 G: 17 POWDER, FOR SOLUTION ORAL at 19:18

## 2023-10-09 RX ADMIN — BENZTROPINE MESYLATE 1 MG: 1 TABLET ORAL at 08:04

## 2023-10-09 RX ADMIN — DIVALPROEX SODIUM 250 MG: 125 CAPSULE, COATED PELLETS ORAL at 06:19

## 2023-10-09 RX ADMIN — NAPROXEN 250 MG: 250 TABLET ORAL at 08:05

## 2023-10-09 RX ADMIN — POLYETHYLENE GLYCOL 3350 17 G: 17 POWDER, FOR SOLUTION ORAL at 08:04

## 2023-10-09 RX ADMIN — CLOZAPINE 650 MG: 100 TABLET, ORALLY DISINTEGRATING ORAL at 11:48

## 2023-10-09 RX ADMIN — GABAPENTIN 300 MG: 300 CAPSULE ORAL at 08:04

## 2023-10-09 RX ADMIN — ATROPINE SULFATE 2 DROP: 10 SOLUTION/ DROPS OPHTHALMIC at 08:05

## 2023-10-09 RX ADMIN — SENNOSIDES 2 TABLET: 8.6 TABLET ORAL at 08:04

## 2023-10-09 RX ADMIN — FLUOXETINE 20 MG: 20 CAPSULE ORAL at 08:04

## 2023-10-09 RX ADMIN — Medication 10 MG: at 19:19

## 2023-10-09 RX ADMIN — GABAPENTIN 300 MG: 300 CAPSULE ORAL at 19:19

## 2023-10-09 ASSESSMENT — ACTIVITIES OF DAILY LIVING (ADL)
LAUNDRY: UNABLE TO COMPLETE
ADLS_ACUITY_SCORE: 93
ORAL_HYGIENE: PROMPTS
HYGIENE/GROOMING: PROMPTS;WITH ASSISTANCE
ADLS_ACUITY_SCORE: 93
ADLS_ACUITY_SCORE: 93
DRESS: PROMPTS;WITH ASSISTANCE
ADLS_ACUITY_SCORE: 93
ADLS_ACUITY_SCORE: 93
HYGIENE/GROOMING: PROMPTS;WITH ASSISTANCE
ADLS_ACUITY_SCORE: 93
ADLS_ACUITY_SCORE: 93
ORAL_HYGIENE: PROMPTS
ADLS_ACUITY_SCORE: 93
DRESS: PROMPTS;WITH ASSISTANCE
ADLS_ACUITY_SCORE: 93
LAUNDRY: UNABLE TO COMPLETE

## 2023-10-09 NOTE — PLAN OF CARE
"  Problem: Psychotic Symptoms  Goal: Psychotic Symptoms  Description: Signs and symptoms of listed problems will be absent or manageable.  10/8/2023 2140 by Juan Esteban, RN  Outcome: Not Progressing  10/8/2023 1007 by Juan Esteban, RN  Outcome: Progressing     Problem: Confusion Chronic  Goal: Optimal Cognitive Function  Outcome: Not Progressing   Goal Outcome Evaluation:    Plan of Care Reviewed With: patient      Patient is calm, affect is flat. Patient is medication compliant, medications crushed in yogurt/pudding this shift. Patient has intermittent confusion, when asked what time they wanted medications this evening patient stated \"If I am still here, they want me In the basement\".  Patient was visible for dinner and attended group, otherwise spent shift resting in room. Denies pain, anxiety/depression/SI/SIB.  /74 (BP Location: Left arm)   Pulse 81   Temp 97.8  F (36.6  C) (Oral)   Resp 16   Ht 1.753 m (5' 9\")   Wt 84.4 kg (186 lb)   SpO2 98%   BMI 27.47 kg/m      Appetite: 100% dinner ,utilized weighted spoon/fork and wrist weight.  Paranoid disruptive behavior: None  Medications: atropine not given, requested refill from pharmacy    "

## 2023-10-09 NOTE — CARE PLAN
"   10/09/23 1501   Group Therapy Session   Group Attendance attended group session   Time Session Began 1015   Time Session Ended 1115   Total Time (minutes) 25  (no charge)   Total # Attendees 6   Group Type expressive therapy;task skill;psychotherapeutic   Group Topic Covered coping skills/lifestyle management;problem-solving;cognitive activities;balanced lifestyle;structured socialization   Group Session Detail Occup[facility] Therapy Clinic group to facilitate coping skill exploration, use of cognitive skills and problem solving, creative expression, clinical observation and facilitation of social, cognitive, and kinesthetic performance skills.    Patient Response/Contribution listened actively;other (see comments)  (made attempts to be involved.)   Patient Participation Detail Attended without delay to OT group. He stated disinterest in filling out the OT form because his hands are too shaky with tremors, which was demonstrated to OT authors. He was offered multiple options and he attempted to work on a structured task though stated too much difficulty again with his hand tremors. No drooling noted while present. He attmempted to look at some picture books offered, stated interest and followed through for approx 5 minutes. He then stated the need to leave and move around and walk and left. During the time present, stated concerns about being \"stinky, and stupid\". He was reassured this was not the case. Author will explore further options he will be able to manage with his tremors.        "

## 2023-10-09 NOTE — PLAN OF CARE
Assessment/Intervention/Current Symtoms and Care Coordination:  Reviewed chart. Met with team to review patient's current functioning, needs, progress, and impediments to discharge. Pt transferred from station 12. Pt on SIO for safety. Pt's affect is flat. Per RN, pt believes his medications are poison but is redirectable with reassurance. Pt has placement secured, awaiting [facility] assessment for [facility] funding.    Writer put out call to Jackie SANTIAGO CM ([phone]) to introduce self and request update. Writer LVM and requested CB. Writer also put out email ([email or web address].) requesting update.    Writer put out call to Vicky MALONE ([phone]) to introduce self and request update. Writer LVM and requested CB.    Writer spoke with Alberta ([phone]) who requests CB in an hour.    Writer met with pt bedside and introduced self. Writer provided update. Pt agreeable with plan. Writer relayed plan to check in with updates.    Writer spoke with Alberta and provided update. Writer provided Alberta with contact information and relayed plan to connect with Alberta when there are updates.    Discharge Plan or Goal:  Will discharge to [facility]: [address] [place] [id number]     Barriers to Discharge:  Discharge [facility]     Referral Status:  Pt accepted at [facility]. For more comprehensive list of referrals see CTC note from 10/6  Pt will need psychiatry, PCP, Clozaril lab appointments to be scheduled prior to discharge  Psychiatrist - Dr. Santana at [facility]  P: [phone]   PCP - Attila Urena    Legal Status:  Committed [place] with ITP  [facility]: [place]  File Number: [id number]  Expires: 2024    Contacts:  Vikcy Marin CM with [facility] ([place] per commitment)  P: [phone]  Jackie SANTIAGO CM with [facility] ([place] per commitment)  P: [phone]  E:  [email or web address]  [place] - Mom (KING signed)  P: [phone] (H) [phone] (M)   [place] - Dad (KING signed)  P: [phone] (H)  Sandra Treadwell - Sister (KING signed)  P: [phone]  [facility]'s Office   F: [phone]    Upcoming Meetings and Dates/Important Information and next steps:  CTC to coordinate with CADI CM regarding M[facility]hoices assessment
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
## 2023-10-09 NOTE — PROGRESS NOTES
"[facility], [place]   Psychiatric Progress Note  Hospital Day: 136        Interim History:     Patient seen and chart reviewed. Case discussed in multi-disciplinary treatment team      According to Nursing report:  Patient has had no paranoid or intrusive behaviors since admit. He has had no aggressive behavior. He still has paranoia and delusions. He denies pain. He is flat. He is alert and oriented. He is tracking better than he was in past. He still is having baseline confusion/psychosis. He is accepting medications. He needs some help with ADL's.        According to Nursing notes from yesterday:  Patient is calm and cooperative, medication compliant crushed in applesauce. Patient denies anxiety/depression/SI/SIB/AVH. Patient endorses paranoia about being poisoned and states they know its a delusion. Patient made bizarre statements when asked how their evening went, patient stated \"I was unruly and disruptive, you know calling black guys the N word\", patient was easily redirected from those thoughts. Per reports from yesterday patient was calm with no psychotic, delusional or disruptive behavior.     Patient wanted to go to bathroom to have a BM, later found to be masturbating in bathroom. Patient performed hand hygiene and went back to group. Patient is minimally social with peers, although more visible this morning than yesterday.     Patient denies pain, tremors are present in hands. Able to eat independently with setup while using weighted utensils and weight wrist cuff. Ate 75% of breakfast and 75% of lunch     Rash to groin is red, miconazole powder applied. Patient reports itchy groin, encouraged to avoid scratching area excessively.     Patient did not take full dose of clozapine this shift, 2nd day in a row, Pharmacist notified. Yesterday patient did not take full dose crushed in vanilla pudding, today writer tried crushed in applesauce, patient did not take any. Writer " "wasted medication and grabbed new pills and tried whole in chocolate pudding because patient did not want it crushed. Patient was able to take 3/5 pills and declined to try the rest. Will recommend trying to crush pills in chocolate pudding tomorrow.      Patient is calm, affect is flat. Patient is medication compliant, medications crushed in yogurt/pudding this shift. Patient has intermittent confusion, when asked what time they wanted medications this evening patient stated \"If I am still here, they want me In the basement\".     According to  :  Awaiting [facility] assessment and [facility] waiver. He will be moving to assisted living once these are in place      On interview today:  Patient denies suicidal or homicidal thoughts and denies hallucinations. Patient is not revealing delusions on interview but has been delusional and unrealistic and somewhat confused. Patient denies side effects to medications.         ROS:Patient has no other physical complaints today.      Vital signs:  Temp: 97  F (36.1  C) Temp src: Temporal BP: (!) 159/74 Pulse: 86   Resp: 18 SpO2: 96 % O2 Device: None (Room air)   Height: 175.3 cm (5' 9\") (brought forward to generate BMI) Weight: 84.4 kg (186 lb)  Estimated body mass index is 27.47 kg/m  as calculated from the following:    Height as of this encounter: 1.753 m (5' 9\").    Weight as of this encounter: 84.4 kg (186 lb).         MSE:  Mental Status Exam:  Appearance: awake, alert, unkempt, on bed. No evidence of pain of acute distress.   Attitude: Somewhat cooperative, calm during interview today  Eye Contact:  fair, intense at times, better duration  Mood: \"not too bad\"  Affect:  mood congruent, irritable  Speech: mostly coherent  Psychomotor Behavior:  Ongoing bilateral arm tremor. No evidence of dystonia, or tics.  Throught Process: linear, illogical, concrete  Associations:  no loosening of associations present  Thought Content:  Delusions and AH suspected. Evidence of " obsessive thinking and compulsions to harm others intermittently but not within the past few days.   Insight:  limited  Judgement:  poor  Oriented to:  self, place  Attention Span and Concentration:  fair  Recent and Remote Memory:  poor  Gait: patient lying in bed    Minimal change in mental status in the past 24 hours                  Medications:      atropine  2 drop Sublingual TID    benztropine  1 mg Oral BID    cloZAPine  650 mg Oral Daily    cyanocobalamin  1,000 mcg Oral Daily    divalproex sodium delayed-release  250 mg Oral Q8H KIKI    FLUoxetine  20 mg Oral Daily    gabapentin  300 mg Oral TID    LORazepam  0.5 mg Oral TID    melatonin  10 mg Oral At Bedtime    naproxen  250 mg Oral BID w/meals    OLANZapine zydis  15 mg Oral BID    Or    OLANZapine  10 mg Intramuscular BID    polyethylene glycol  17 g Oral BID    sennosides  2 tablet Oral BID    tamsulosin  0.4 mg Oral Daily          Allergies:     Allergies   Allergen Reactions    Bee Venom Unknown    Montelukast Unknown    Phenothiazines Unknown          Labs:       No results found for this or any previous visit (from the past 72 hour(s)).             Precautions:     Behavioral Orders   Procedures    Assault precautions    Code 1 - Restrict to Unit    Code 2    Code 2 - 1:1 Staff Supervision     For ECT only    Electroconvulsive therapy     Series of up to 12 treatments. Begin Date: 8/2/23     Treating Psychiatrist providing ECT:  unknown     Notified on:  7/28/23 via this order  NOTE: We received verbal confirmation from [facility] that Lorenzo Pavon has been approved but awaiting official court order and risk management's review  Initial consult was completed by Dr. Hicks on 7/18/23    Electroconvulsive therapy     Series of up to 12 treatments. Begin Date: 8/2/23     Treating Psychiatrist providing ECT:  Dominga     Notified on:  8/2/23    Elopement precautions    Fall precautions    Occupational Therapy on the Unit     Order Specific Question:    Reason for Consult     Answer:   Eval of thought process, functional skills and behavior     Order Specific Question:   Course of Action:     Answer:   Evaluation only     Order Specific Question:   Treatment Prescription:     Answer:   CPT requested    Routine Programming     As clinically indicated    Self Injury Precaution    Status 15     Every 15 minutes.    Status Individual Observation     Patient SIO status reviewed with team/RN.  Please also refer to RN/team documentation for add'l detail.    -SIO staff to monitor following which have contributed to patient being on SIO:  Agitation  Pt is impulsive   Pt has ran out of his room naked  Pt has Parkinson symptoms place him in a high fall risk  Pt has verbal outburst of sexual and threaten statements  Pt requires immediate redirection when masturbating    -Possible interventions SIO staff could use to support patient's treatment progress:  Engage pt in activities    -When following observed, team will review discontinuation of SIO:  Absence of aggression toward others for > 24 hours    Comments: SIO 1:1     Order Specific Question:   CONTINUOUS 24 hours / day     Answer:   5 feet     Order Specific Question:   Indications for SIO     Answer:   Severe intrusiveness     Order Specific Question:   Indications for SIO     Answer:   Assault risk    Suicide precautions     Patients on Suicide Precautions should have a Combination Diet ordered that includes a Diet selection(s) AND a Behavioral Tray selection for Safe Tray - with utensils, or Safe Tray - NO utensils            Diagnoses:   Paranoid schizophrenia, chronic condition with acute exacerbation (H)    Patient Active Problem List    Diagnosis Date Noted    Mood changes 05/26/2023     Priority: Medium    Paranoid schizophrenia, chronic condition with acute exacerbation (H) 05/26/2023     Priority: Medium    Neuroleptic-induced parkinsonism  05/26/2023     Priority: Medium    Agitation 05/26/2023     Priority:  Medium    Urinary retention 05/23/2023     Priority: Medium    Schizophrenia, unspecified type (H) 05/23/2023     Priority: Medium    Altered mental status, unspecified altered mental status type 05/23/2023     Priority: Medium       #Unspecified psychosis likely schizophrenia per history,  #Unspecified encephalopathy   -R/O catatonia   -Episodes of unresponsiveness, concern for PNES   #Parkinsonism 2/2 neuroleptic medications, rule out Parkinson's Disease  -rule out major neurocognitive disorder (refused to answer much of assessment)  #Urinary retention and BPH  -Possible UTI -- UC resulted on 5/25 w/out growth  #Hx of prolonged QTc with clozapine  #Mild thrombocytopenia  #R/O OCD         Assessment and hospital summary:  Patient was admitted to psychiatric unit for safety, stabilization and medication management. He has had schizophrenia since the 1980. He was on Clozaril x 25 years, and it was tapered and discontinued on May 7, 2023  due to prolonged qtc of 527, and his psychotic  symptoms have worsened since then. Sinemet was also discontinued recently due to concerns that it was contributing to paranoia and AH. He is agitated, aggressive, dangerous to self and others. He remains on SIO 2 to 1, and is under psychiatric emergency and court hold. There are concerns for organic etiology given pattern of sundowning, history of parkinsonism, and ongoing disorientation/confusion. 6/8: EKG repeated, cardiology consult regarding safety of resuming clozapine in the context of prolonged QTc and refractory schizophrenia pending response. Per cardiology, correct QTc is no more than 460. They do not have concerns about AP retrial. Neurology IP consult placed for evaluation of sundowning and cognitive decline secondary to Sinemet discontinuation. MSSA initiated. Per Neurology, discontinuation of Sinemet would not account for these symptoms. They do not recommend retrial at this time. On 6/14, discussed complex case at length  with Dr. Hatch (primary provider on geriatic unit) who provided recs (see second opinion note dated ). Depakote initiated, though needed to be reduced due to side effect of thrombocytopenia. Melatonin was increased to 10 mg at bedtime. : Clozapine titration continued. Its possible that clozapine dose is contributing to worsened compulsive behaviors noted throughout hospitalization which could improve with lowering the dose. ECT initiated due to treatment refractory psychosis and ongoing symptoms despite therapeutic dose of clozapine (650 mg daily). ECT initiated on 23 and pt completed 11 total treatments, but ECT stopped on  due to lack of improvement and distress it was causing patient.     Chart reviewed which revealed the followin2022: He was on clozapine, Seroquel, Cymbalta, and Carbidopa-levodopa and hospitalized for pneumonia. Psych consulted and Seroquel was stopped. Treated with Abx and discharged to TCU.     2022: Hospitalized at [place]. Per chart review:    Vinod Lee is a 61 yo male with longstanding history of schizophrenia. He was admitted from [facility] ED, where he presented due to increased delusional thoughts while on a visit to his parents for Thanksgiving. He began experiencing increasing paranoid thoughts that someone might be poisoning him and began fearing that he might accidentally harm someone. He reported to his parents that he was having thoughts about hitting someone or beating someone up and told them he could not guarantee they would be safe with him. Parents encouraged patient to go to the ED, which he did voluntarily.     Per patient report and chart review, he was hospitalized several times in the  and reports he was civilly committed at one point in the , however he has been quite stable for the past several decades. He reports he will experience some paranoia and delusional thinking at times, but generally has good insight into his  "symptoms. He has been maintained on clozapine for about 25 years and prior to several months ago was also concurrently prescribed quetiapine.      In the last several months, patient has had a number of medication changes. Approximately 6 months ago, patient was diagnosed with parkinsonism related to antipsychotic use and was started on carbidopa-levidopa. He experienced no improvement in tremors, and thus carbidopa- levidopa dose was increased 1.5 weeks ago.      In addition, patient was medically hospitalized in August 2022 for confusion, weakness, repeated falls, ongoing weight loss, and SOB. He was found to have a cavitary lesion of the lung and suspected aspiration pneumonia. He was treated with antibiotics. At that time, he was taking current dose of clozapine and duloxetine, as well as propranolol ER, quetiapine, gabapentin, and clonazepam. Psychiatry was consulted due to concern for oversedation, and propranolol ER, gabapentin, and quetiapine were discontinued. Conazepam was reduced from 0.5 mg TID to BID dosing and recommended to be discontinued, however, it does not appear this occurred. His sedation improved significantly. QTc prolongation was also noted, but improved throughout his stay. He was ultimately discharged to a TCU for several months before returning home. He reports his weakness has greatly improved and states he \"graduated\" physical therapy, though he continues to be diligent in completed recommended exercises.      He reports that over the past several months, his delusions and anxiety have been worse than usual, with symptoms becoming much more acute since the carbidopa-levidopa dose was increased. He has felt increasingly paranoid about being poisoned, noting this is a delusion that has been stable over time, but was previously less intrusive. He has insight during the interview that his paranoid thinking is not reality based, but states it has been harder to separate delusions from " "reality and he becomes very worried that he might hurt someone, despite no history of violent behavior. He reports that the paranoia about his food being poisoned in combination with the tremors in his hands have made it difficult for him to eat. He has also had quite low appetite for the past 6 months to a year . He reports that due to the combination of these factors, he has lost about 50 pounds in the last year.He denies any problems with sleep initiation or maintenance. He reports energy is fairly good and \"better than it used to be.\" He reports some short term memory issues and on interview, does have some difficulty remembering details of recent events. He is unsure if he has received cognitive testing in the past.    He denies any suicidal ideation and reports he has not experienced SI for decades. He denies homicidal thoughts and is very clear that he had and has no desire to harm anyone else, but was afraid he might somehow do so. He denies any hallucinations and states \"that's never been a problem for me.\" He is not observed responding to internal stimuli. He is alert and oriented in all spheres.        ========    BRIEF HOSPITAL COURSE: This 62 y.o. male admitted 11/25/2022 to  [facility] for the assessment and treatment of the above presentation. This was a voluntary admission.     In summary, he was tapered off the Sinemet, which he reports to be both ineffective and potentially worsening the anxiety and paranoia ideations. Instead Benadryl 25 mg TID was started to help with extrapyramidal side effects. He struggled with sleep during his stay here. Remeron 7.5mg QHS was tried without success. Seroquel 100mg qhs was restarted with addition of suvorexant 10mg qhs. Given his Seroquel PRN use prior history of prolonged QTC, he will benefit from another EKG repeat on the medical unit.     Unfortunately, he tested positive for influenza A and developed hypoxemia. Now on 2L oxygen with desats when prone requiring " 3L oxygen. Subsequently, the plan will to be tapering him off clonazepam due to hypoxia (and also prior plan to taper). The patient tolerated these changes without side effects.     The patient minimally benefited from the structured therapeutic milieu as he attended programming seldom as he tested positive for influenza, he needed to isolate with droplet precautions. Pt was engaged and worked on issues that were triggering/brought pt to the hospital and has excellent insight into his anxiety and paranoid delusions. Pt denied suicidal ideations throughout all/majority of their hospitalization. Pt denied HI throughout all of hospitalization. There were no concerns with behavioral disruptions/outbursts. The patient has shown improvement in general.     At the time of discharge, hospitalization course was reviewed with Vinod Lee with plan to transfer to medicine. Please consult psychiatry and I will continue to follow while patient is on the medical unit. He is now off sinemet since 11/29 21:00 and I would expect anxiety and paranoia to improve more moving forward. Psychiatrically, once anxiety and paranoia is baseline, can D/C to home once medically stable. He DOES NOT NEED A 1:1 on the medical unit from a mental health perspective, we initiated 1:1 11/30/2022 due to significant fatigue, gait instability (he was unable to stand without assist) and feeding assist.    04/2023: Clozapine was gradually tapered and discontinued, unclear reasons why it was discontinued per outside records, though Dr. Portillo's H&P indicates that it was due to prolonged QTc.         Per Neurology Recs:    Recommendations  -No current indications to change medications from a neurologic perspective  - Optimizing his psychiatric medication and treatment is currently more important than optimization of parkinsonism  - Please contact neurology if any new questions arise, or to discuss the assessment described above     Thank you for involving  Neurology in the care of Vinod Lee.  Please do not hesitate to call with questions.      Kranthi Perdue MD      Seen by SLP 10/4/23:     Clinical Impression Comments Clinical Swallow Evaluation completed. Patient had no s/s aspiration and no signs of dysphagia. Oral motor function was unable to be assessed with patient declining to participate in oral mechanism exam. Mastication was timely and complete when patient took large bites. Hyolaryngeal elevation appears intact. Recommend diet of regular solids and thin liquids with staff feeding, as allowed, sitting fully upright, small bites/sips, and slow rate. No further ST is warranted at this time. Contact SLP if any questions or concerns.     SLP Rationale for DC Rec Swallow function appears at baseline. No further Speech Therapy needs at this time.   SLP Brief overview of current status  Continue regular solids and thin liquids when sitting fully upright, with staff feeding, as accepted, small bites/sips, slow rate, and swallowing food already in his mouth prior to another bite. Continue meds in puree. Speech will complete orders.     Target psychiatric symptoms and interventions:  -Psych emergency declared on 5/27, now fully committed  - Depakote Sprinkles 250 mg TID, restarted on 8/26. Cannot increase beyond this dose due to thrombocytopenia at higher doses  -Continue clozapine as above, 650mg  -Continue scheduled Zyprexa 15mg BID  -Continue 1:1 for safety of staff and patients, reduced from 2:1 7/23/23  - weekly CBC to monitor platelets    -Continue Gabapentin 300 mg TID as staff believe it has been effective for treatment of his anxiety    Risks, benefits, and alternatives discussed at length with patient.     Acute Medical Problems and Treatments:  Rhinovirus / enterovirus URI  Concern for pneumonia  On 9/23, patient was reported to be hypoxic with an SpO2 of 89% with RR of 28, as well as had some low-grade fevers.  Associated symptoms of wet sounding  cough.  COVID was negative.  Chest x-ray with possible infection versus atelectasis in RLL.  Patient was initially started on doxycycline on 9/24.  Due to concern for clinical worsening, patient was reassessed by provider on 9/25.  At that time due to patient increase drooling while on Clozaril and him laying supine for much of the day, concerns were raised for aspiration pneumonia, thus he was started on Augmentin as well.   - sputum culture if able, pending  - cont Doxycycline 100 mg twice daily for 5 days (9/24-9/28)  - cont augmentin 875-125 mg BID x 5 days (9/25-9/29)  - Albuterol inhaler as needed cough, wheezing, shortness of breath  - APAP every 4 hours as needed fevers, pain  - I-S q shift, as tolerated     Hx cavitary lesion to SOPHY  Pulmonary nodule to SOPHY  Seen August 2022 in ED. CT chest with contrast negative for PE but showed cavitary masslike process in the left upper lung, over an area measuring 9.5 x 6.2 x 5.2 cm, indeterminant pulmonary nodule measuring 9 mm in the anterior segment of the left upper lobe, nonspecific left hilar and mediastinal lymphadenopathy, small-moderate left pleural effusion.  Cavitary lesion also seen on chest x-ray.  Patient was given IVF, Rocephin, Zithromax, and admitted for further evaluation. Thoracentesis was done and negative cultures. ID was consulted and managed Abx. The patient was initially on zosyn and doxycycline but narrowed. Strep pneumo, legionella, and HIV testing done (negative). Blood cultures were negative as well. The patient did well.  Follow-up chest CT showed near complete interval resolution of the previously described left upper lobe abscess.  Residual 12 x 10 mm spiculated nodular opacity with adjacent scarring within this region, probably represents sequela of previous infection, although underlying malignancy not excluded.  - Recommend follow-up outpatient with PCP for continued monitoring.    Groin rash  Per IM note 10/5:  Met with pt in his room  within presence of pt's 1:1. Pt showed this writer his groin folds and no significant erythema or scaling noted. Pt denies groin itch. Will change miconazole powder to prn and discontinue prior wound care orders. Please continue to encourage frequent showering and washing/drying of area. Medicine signing off. Please feel free to call with questions.       Possible Dementia  Neurology consult placed on 6/8 for evaluation of sundowning and cognitive decline secondary to Sinemet discontinuation: Resuming Sinemet not recommended for behavioral concerns. Please see Neuro note for details.     Tremors  longstanding though appeared to worsen following initiation of clozapine  - Ativan 0.5 mg TID  - Cogentin 1 mg BID added on 8/25 with overall improvement noted    Neuro re-consulted on 9/20 and per their documentation:    Impression  Mr. Lee is a 63 y.o. man with a long history of psychosis including requiring commitment and psychiatric hospitalizations in the 1980s.  Given his long history, this is less consistent with an organic cause of psychosis (as something that would be primarily neurologic in origin would be expected to have a more rapid progression of neurologic deterioration, rather than this long period of psychiatric symptoms).  I am not able to appreciate any evidence for a neurologic basis of his psychosis given the time course of his psychiatric illness.      Regarding his difficulty with word finding, I am not able to elicit this on exam.  However since he described being symptomatic earlier today, it is helpful that I am able to go through a full assessment of his language in terms of comprehension, repetition, naming objects with increasing difficulty without being able to appreciate any deficits.     He does continue to have a tremor that is most prominent posteriorly as well as with action, in addition to a chin tremor.  He also does have rigidity in the bilateral upper extremities consistent with  the diagnosis of parkinsonism.  He is currently not on levodopa or dopamine agonists which I continue to agree with.  Since his psychiatric symptoms are much more bothersome right now than his parkinsonism, I will continue to recommend avoiding dopaminergic medications.     Recommendations  -No current indications to change medications from a neurologic perspective  - Optimizing his psychiatric medication and treatment is currently more important than optimization of parkinsonism  - Please contact neurology if any new questions arise, or to discuss the assessment described above     Thank you for involving Neurology in the care of Vinod Lee.  Please do not hesitate to call with questions.      Kranthi Perdue MD      Thrombocytopenia, resolved  Likely secondary to Depakote.  According to the, incidents of Depakote induced thrombocytopenia as 27%.  Depakote dose reduced from 1000 twice daily to 250 3 times daily on 7/28/2023 with subsequent resolution of thrombocytopenia  - weekly CBCs    Constipation  Likely worsened by clozapine, improved since modifying regimen.   - daily miralax   - daily Senokot 2 tablets BID      Right first toe fracture:  Seen by Ortho on 6/16:  Per note: Vinod Lee is a 62 year old  male w/ PMHx complex psychiatric history who has a fracture to the distal phalanx of the right toe after a recent trauma to the foot the patient reports.  Patient denies any other pain or discomfort.  Images reviewed and plan will be for irrigation of the popped fracture blister with sterile saline and the toes should be dressed and a 4 x 4 gauze dressing.  Patient will need a postop shoe which she can be weightbearing as tolerated in.  Would recommend a course of antibiotics for approximately 7 days.  Would recommend Augmentin or clindamycin.  Podiatry will be made aware of patient and will see patient while he is admitted or if patient is discharged in the near future a follow-up appointment will need  "to be established.     Remainder of care per primary team.  Primary team should make sure that patient is up-to-date on his tetanus shot.  If not patient will require a booster shot.    Hx of prolonged QTc:  Continue weekly EKGs. See above for details.   Discussed most recent EKG findings with Cardiology on 8/25. Corrected QTc is 501 from EKG on 8/23. Per cardiology, repeat EKG today. If corrected QTc is > 500, will need to reduce clozapine. If < 500, OK to keep clozapine at current dose. UPDATE/ADDENDUM 9/6: Repeat EKG with corrected QTc of 440. No need to adjust clozapine dose per cards.     Urinary retention, resolved  Per patient care order:   If patient is refusing straight caths, please notify IM provider immediately to determine whether this constitutes a medical emergency. If IM declares medical emergency, may restrain patient for straight cath. Discussed this with patient's parents/substitute decision makers on 6/7 who are in support of this plan.    # Psoriasis hx  # Purplish discoloration of B/LE feet-resolved   Discussed with Dr. Rene and bedside RN regarding rash on right foot that appears and appears to be purplish in color and almost \"petechiae.\" It was noted during interview, but seemed to be resolving upon walking. Within the past 24 hrs, pt has had ECT and seemed more confused than usual. Pt seems to have had a right great toe wound with recent right great toe fracture seen by Medicine on 7/16. Seen on 8/4 with improving color on B/L legs at rest and appears to have small, scaly patches of varying coin sizes that have not grown past marked borders. See photos. Per further chart review, has had psoriasis history. WBC WNL, no fevers, HDS. This appears to more consistent with a psoriasis like picture.   - Start triamcinolone topical 0.025%   -Apply twice daily until lesions for 2 weeks or until lesions resolve/  -Monitor for skin thinning, striae, rebound lesion flares.   - Start Eucerin cream     "          - Apply 30 minutes PRIOR to triamcinolone topical cream above or PRN as needed if dry skin/after bathing   - Medicine will sign off.   - For worsening pain, spread, itchiness, fevers, please contact Medicine and possibly Dermatology.    Benzodiazepine dependence:  Phenobarbital taper completed shortly after patient's admission    Pertinent labs/imaging:  Weekly CBC with diff     Behavioral/Psychological/Social:  - Encourage unit programming    Safety:  - Continue precautions as noted above  - Status 15 minute checks  - Continue 1:1 for staff and pt safety    Legal Status: Fully committed with [facility] and Lorenzo Pavon. Pozo medications: clozapine, olanzapine, risperidone, quetiapine    Disposition Plan   Reason for ongoing admission: No safe alternative at this time  Discharge location: Accepted to  pending [facility] assessment. Will have 24/7 supervision (1:1).   Discharge Medications: not ordered  Follow-up Appointments: not scheduled      No further change in treatment plan  Patient seen, chart reviewed, case reviewed with  and with nursing.   Case reviewed in multi-disciplinary treatment team.      Entered by: Jermaine Griffith MD on 10/09/2023  at 8:23 AM

## 2023-10-09 NOTE — PLAN OF CARE
RN Assessment:    Pt presented with flat affect. Pt appeared calm, and pt was cooperative while interacting with the writer. Pt was alert and oriented x 4. Pt does continue to exhibit intermittent confusion. Pt denied having SI, HI, thoughts of SIB, and hallucinations. Pt denied having physical pain. Pt had no new medical concerns. Pt endorsed sleeping well last night. Pt feels no therapeutic effects from the medications that are currently ordered. Pt endorsed tremor as only medication side effect. Pt was isolative and withdrawn this shift. Continue to monitor for safety and changes in medical condition.     Pt refused the ordered PO atropine drops and gabapentin at 1400 medication pass. Pt stated he didn't need the atropine drops. Pt had the gabapentin in his mouth, then spit it out. He stated he did not care for the taste of the medication.     SIO continued by psychiatric provider.

## 2023-10-10 PROCEDURE — 250N000013 HC RX MED GY IP 250 OP 250 PS 637: Performed by: PSYCHIATRY & NEUROLOGY

## 2023-10-10 PROCEDURE — 250N000013 HC RX MED GY IP 250 OP 250 PS 637: Performed by: STUDENT IN AN ORGANIZED HEALTH CARE EDUCATION/TRAINING PROGRAM

## 2023-10-10 PROCEDURE — 99231 SBSQ HOSP IP/OBS SF/LOW 25: CPT | Performed by: PSYCHIATRY & NEUROLOGY

## 2023-10-10 PROCEDURE — 250N000013 HC RX MED GY IP 250 OP 250 PS 637: Performed by: PHYSICIAN ASSISTANT

## 2023-10-10 PROCEDURE — 93010 ELECTROCARDIOGRAM REPORT: CPT | Performed by: INTERNAL MEDICINE

## 2023-10-10 PROCEDURE — 93005 ELECTROCARDIOGRAM TRACING: CPT

## 2023-10-10 PROCEDURE — 124N000002 HC R&B MH UMMC

## 2023-10-10 RX ADMIN — BENZTROPINE MESYLATE 1 MG: 1 TABLET ORAL at 20:10

## 2023-10-10 RX ADMIN — DIVALPROEX SODIUM 250 MG: 125 CAPSULE, COATED PELLETS ORAL at 06:05

## 2023-10-10 RX ADMIN — BENZTROPINE MESYLATE 1 MG: 1 TABLET ORAL at 08:51

## 2023-10-10 RX ADMIN — NAPROXEN 250 MG: 250 TABLET ORAL at 17:19

## 2023-10-10 RX ADMIN — Medication 10 MG: at 20:10

## 2023-10-10 RX ADMIN — OLANZAPINE 15 MG: 10 TABLET, ORALLY DISINTEGRATING ORAL at 08:52

## 2023-10-10 RX ADMIN — TAMSULOSIN HYDROCHLORIDE 0.4 MG: 0.4 CAPSULE ORAL at 08:52

## 2023-10-10 RX ADMIN — DIVALPROEX SODIUM 250 MG: 125 CAPSULE, COATED PELLETS ORAL at 20:09

## 2023-10-10 RX ADMIN — POLYETHYLENE GLYCOL 3350 17 G: 17 POWDER, FOR SOLUTION ORAL at 20:09

## 2023-10-10 RX ADMIN — GABAPENTIN 300 MG: 300 CAPSULE ORAL at 13:23

## 2023-10-10 RX ADMIN — LORAZEPAM 0.5 MG: 0.5 TABLET ORAL at 13:23

## 2023-10-10 RX ADMIN — LORAZEPAM 0.5 MG: 0.5 TABLET ORAL at 20:10

## 2023-10-10 RX ADMIN — GABAPENTIN 300 MG: 300 CAPSULE ORAL at 08:51

## 2023-10-10 RX ADMIN — FLUOXETINE 20 MG: 20 CAPSULE ORAL at 08:51

## 2023-10-10 RX ADMIN — SENNOSIDES 2 TABLET: 8.6 TABLET ORAL at 20:09

## 2023-10-10 RX ADMIN — CYANOCOBALAMIN TAB 1000 MCG 1000 MCG: 1000 TAB at 08:51

## 2023-10-10 RX ADMIN — DIVALPROEX SODIUM 250 MG: 125 CAPSULE, COATED PELLETS ORAL at 13:23

## 2023-10-10 RX ADMIN — OLANZAPINE 15 MG: 10 TABLET, ORALLY DISINTEGRATING ORAL at 20:10

## 2023-10-10 RX ADMIN — POLYETHYLENE GLYCOL 3350 17 G: 17 POWDER, FOR SOLUTION ORAL at 08:51

## 2023-10-10 RX ADMIN — SENNOSIDES 2 TABLET: 8.6 TABLET ORAL at 08:52

## 2023-10-10 RX ADMIN — GABAPENTIN 300 MG: 300 CAPSULE ORAL at 20:10

## 2023-10-10 RX ADMIN — ATROPINE SULFATE 2 DROP: 10 SOLUTION/ DROPS OPHTHALMIC at 20:10

## 2023-10-10 RX ADMIN — NAPROXEN 250 MG: 250 TABLET ORAL at 08:52

## 2023-10-10 RX ADMIN — LORAZEPAM 0.5 MG: 0.5 TABLET ORAL at 08:52

## 2023-10-10 RX ADMIN — CLOZAPINE 650 MG: 100 TABLET, ORALLY DISINTEGRATING ORAL at 13:23

## 2023-10-10 RX ADMIN — ATROPINE SULFATE 1 DROP: 10 SOLUTION/ DROPS OPHTHALMIC at 16:59

## 2023-10-10 ASSESSMENT — ACTIVITIES OF DAILY LIVING (ADL)
ADLS_ACUITY_SCORE: 93

## 2023-10-10 NOTE — PLAN OF CARE
"Goal Outcome Evaluation:    Plan of Care Reviewed With: patient      Problem: Adult Behavioral Health Plan of Care  Goal: Individualized Daily Interaction Plan (IDIP)  Outcome: Progressing   Pt at baseline for his intermittent confusion, he is disoriented to situation and time, he is alert and up and about in hallway, he was observed in milieu periodically minimally social with SIO staff, he is withdrawn and guarded. He presented with flat affect and calm mood. Pt denied any c/o pain, no depression and anxiety, denied SI/HI/SIB/AVH. Pt still tremulous, struggling with feeding self. SIO staff to help pt feed per orders. Pt is demonstrating mistrust by making statements like, \"I don't trust that.\"Pt was med compliant, took meds whole in chocolate pudding with no swallowing issues except clozapine that was crushed. Pt showered with help of SIO staff, refused treatment to groin after shower. No behaviors, no bizarre statements.   "

## 2023-10-10 NOTE — PLAN OF CARE
"  Problem: Confusion Chronic  Goal: Optimal Cognitive Function  Outcome: Progressing  Intervention: Minimize Injury Risk and Provide Safety    Patient alert and oriented to self. Disoriented to situation and time. Mood is calm, presented with flat and blunted affect. Patient denies physical pain and discomfort. Refused dinner, patient verbalized. \" I am not hungry\" drinking fluids adequately. Had two apple sauce with medications. Medication compliant. Voiding well, Atropine given for drooling. No behavioral or safety concerns noted. Patient continues on SIO 5 feet.  Patient is able to make needs known. No other known concerns at this time. This writer will continue to monitor patient as needed per plan of care.    /69   Pulse 89   Temp 98.6  F (37  C)   Resp 16   Ht 1.753 m (5' 9\")   Wt 86.6 kg (190 lb 14.7 oz)   SpO2 98%   BMI 28.19 kg/m      "

## 2023-10-10 NOTE — PLAN OF CARE
"Assessment/Intervention/Current Symtoms and Care Coordination:  Reviewed chart. Met with team to review patient's current functioning, needs, progress, and impediments to discharge. Pt transferred from station 12. Pt on SIO for safety. Pt's affect is flat. Per RN, pt believes his medications are poison but is redirectable with reassurance. Pt has placement secured, awaiting [facility] assessment for [facility] funding.     [facility] ([email or web address] reporting that she requested the [facility] assessment to be expedited this morning. Jackie relayed contact information to [facility]  to schedule assessment through writer.  put out return email requesting expedition with the rest of the funding process.  also requests contact information for [facility].    Writer spoke with Zabrina from [facility] who inquired about further information for the need for pt's [facility] assessment to assign it to the correct department. Zabrina reports that once pt is assigned a [facility] , they will reach out to writeluis to schedule assessment.    Kristinar received email from Jackie reporting that she requested the assessment last week but \"got pushback from [facility].\" Jackie also provided contact information for [facility].    Discharge Plan or Goal:  Will discharge to [place] [address] [place] [id number]     Barriers to Discharge:  Discharge pending completion of [facility] assessment by [facility]     Referral Status:  Pt accepted at [facility]. For more comprehensive list of referrals see CTC note from 10/6  Pt will need psychiatry, PCP, Clozaril lab appointments to be scheduled prior to discharge  Psychiatrist - Dr. Santana at [facility]  P: [phone]   PCP - Attila Urena    Legal Status:  Committed [place] with ITP  [facility]: [place]  File Number: [id number]  Expires: " 01/06/2024    Contacts:  Vicky Valdez - CM with [facility] (KING per commitment)  P: [phone]  Regina Wong - PAMELA CM with [facility] (KING per commitment)  P: [phone]  E: [email or web address]  [facility] ([facility] signed)  P: [phone] (H) [phone] (M)   [facility] - Dad (KING signed)  P: [phone] (H)  Sandra Treadwell - Sister (KING signed)  P: [phone]  [facility]'s Office   F: [phone]  Jovana Pozo - [facility]  P: [phone]  E: [email or web address]    Upcoming Meetings and Dates/Important Information and next steps:  CTC to coordinate with [facility] CM regarding [facility] assessment

## 2023-10-10 NOTE — PROGRESS NOTES
[facility], [place]   Psychiatric Progress Note  Hospital Day: 137        Interim History:     Patient seen and chart reviewed. Case discussed in multi-disciplinary treatment team      According to Nursing report:  Patient has had no paranoid or intrusive behaviors since admit. He has had no aggressive behavior. He still has paranoia and delusions. He denies pain. He is flat. He is alert and oriented. He is tracking better than he was in past. He still is having baseline confusion/psychosis. He is accepting medications. He needs some help with ADL's. He has had minimal change since yesterday        According to Nursing notes from yesterday:  Pt presented with flat affect. Pt appeared calm, and pt was cooperative while interacting with the writer. Pt was alert and oriented x 4. Pt does continue to exhibit intermittent confusion. Pt denied having SI, HI, thoughts of SIB, and hallucinations. Pt denied having physical pain. Pt had no new medical concerns. Pt endorsed sleeping well last night. Pt feels no therapeutic effects from the medications that are currently ordered. Pt endorsed tremor as only medication side effect. Pt was isolative and withdrawn this shift. Continue to monitor for safety and changes in medical condition.      Pt refused the ordered PO atropine drops and gabapentin at 1400 medication pass. Pt stated he didn't need the atropine drops. Pt had the gabapentin in his mouth, then spit it out. He stated he did not care for the taste of the medication.     Patient up and visible on the unit at the beginning of the shift, watching TV. Patient presented with flat and blunted affect. Compliant with assessments questions and plan of care. Patient denies all mental health questions., Denies SI/SIB/hallucinations. Denies pain or discomfort. Continues on SIO (1:1) for assault and intrusiveness. Patient verbally contracts for safety, safety checks and precautions in place. No  "behavioral or safety concerns noted this shift. Medication compliant. Patient has some intermittent confusion this shift, but able to follow directions. Patient is eating and drinking adequately. Patient is able to make needs known. This writer will continue to monitor and offer therapeutic support as needed for the rest of the shift.         According to  :  [place]wait[place] and [place] waiver. He will be moving to assisted living once these are in place      On interview today:  Patient has ongoing confusion unchanged. He has been intermittently delusional and is unrealistic. He denies side effects to medications.        ROS: Patient has no other physical complaints today.        Vital signs:  Temp: 97.8  F (36.6  C) Temp src: Temporal BP: 127/65 Pulse: 82     SpO2: 97 % O2 Device: None (Room air)   Height: 175.3 cm (5' 9\") (brought forward to generate BMI) Weight: 84.4 kg (186 lb)  Estimated body mass index is 27.47 kg/m  as calculated from the following:    Height as of this encounter: 1.753 m (5' 9\").    Weight as of this encounter: 84.4 kg (186 lb).         MSE:  Mental Status Exam:  Appearance: awake, alert, unkempt, on bed. No evidence of pain of acute distress.   Attitude: Somewhat cooperative, calm during interview today  Eye Contact:  fair, intense at times, better duration  Mood: \"not too bad\"  Affect:  mood congruent, irritable  Speech: mostly coherent  Psychomotor Behavior:  Ongoing bilateral arm tremor. No evidence of dystonia, or tics.  Throught Process: linear, illogical, concrete  Associations:  no loosening of associations present  Thought Content:  Delusions and AH suspected. Evidence of obsessive thinking and compulsions to harm others intermittently but not within the past few days.   Insight:  limited  Judgement:  poor  Oriented to:  self, place  Attention Span and Concentration:  fair  Recent and Remote Memory:  poor  Gait: patient lying in bed    Minimal change in " mental status in the past 24 hours                    Medications:      atropine  2 drop Sublingual TID    benztropine  1 mg Oral BID    cloZAPine  650 mg Oral Daily    cyanocobalamin  1,000 mcg Oral Daily    divalproex sodium delayed-release  250 mg Oral Q8H KIKI    FLUoxetine  20 mg Oral Daily    gabapentin  300 mg Oral TID    LORazepam  0.5 mg Oral TID    melatonin  10 mg Oral At Bedtime    naproxen  250 mg Oral BID w/meals    OLANZapine zydis  15 mg Oral BID    Or    OLANZapine  10 mg Intramuscular BID    polyethylene glycol  17 g Oral BID    sennosides  2 tablet Oral BID    tamsulosin  0.4 mg Oral Daily          Allergies:     Allergies   Allergen Reactions    Bee Venom Unknown    Montelukast Unknown    Phenothiazines Unknown          Labs:       Recent Results (from the past 72 hour(s))   EKG 12-lead, complete    Collection Time: 10/10/23  9:35 AM   Result Value Ref Range    Systolic Blood Pressure  mmHg    Diastolic Blood Pressure  mmHg    Ventricular Rate 90 BPM    Atrial Rate 90 BPM    WA Interval 146 ms    QRS Duration 162 ms     ms    QTc 528 ms    P Axis 52 degrees    R AXIS -7 degrees    T Axis 123 degrees    Interpretation ECG       Sinus rhythm  Left bundle branch block  Abnormal ECG  When compared with ECG of 03-OCT-2023 09:29,  No significant change was found                  Precautions:     Behavioral Orders   Procedures    Assault precautions    Code 1 - Restrict to Unit    Code 2    Code 2 - 1:1 Staff Supervision     For ECT only    Electroconvulsive therapy     Series of up to 12 treatments. Begin Date: 8/2/23     Treating Psychiatrist providing ECT:  unknown     Notified on:  7/28/23 via this order  NOTE: We received verbal confirmation from [facility] that Lorenzo Pavon has been approved but awaiting official court order and risk management's review  Initial consult was completed by Dr. Hicks on 7/18/23    Electroconvulsive therapy     Series of up to 12 treatments. Begin Date: 8/2/23      Treating Psychiatrist providing ECT:  Dominga     Notified on:  8/2/23    Elopement precautions    Fall precautions    Occupational Therapy on the Unit     Order Specific Question:   Reason for Consult     Answer:   Eval of thought process, functional skills and behavior     Order Specific Question:   Course of Action:     Answer:   Evaluation only     Order Specific Question:   Treatment Prescription:     Answer:   CPT requested    Routine Programming     As clinically indicated    Self Injury Precaution    Status 15     Every 15 minutes.    Status Individual Observation     Patient SIO status reviewed with team/RN.  Please also refer to RN/team documentation for add'l detail.    -SIO staff to monitor following which have contributed to patient being on SIO:  Agitation  Pt is impulsive   Pt has ran out of his room naked  Pt has Parkinson symptoms place him in a high fall risk  Pt has verbal outburst of sexual and threaten statements  Pt requires immediate redirection when masturbating    -Possible interventions SIO staff could use to support patient's treatment progress:  Engage pt in activities    -When following observed, team will review discontinuation of SIO:  Absence of aggression toward others for > 24 hours    Comments: SIO 1:1     Order Specific Question:   CONTINUOUS 24 hours / day     Answer:   5 feet     Order Specific Question:   Indications for SIO     Answer:   Severe intrusiveness     Order Specific Question:   Indications for SIO     Answer:   Assault risk    Suicide precautions     Patients on Suicide Precautions should have a Combination Diet ordered that includes a Diet selection(s) AND a Behavioral Tray selection for Safe Tray - with utensils, or Safe Tray - NO utensils            Diagnoses:   Paranoid schizophrenia, chronic condition with acute exacerbation (H)    Patient Active Problem List    Diagnosis Date Noted    Mood changes 05/26/2023     Priority: Medium    Paranoid schizophrenia,  chronic condition with acute exacerbation (H) 05/26/2023     Priority: Medium    Neuroleptic-induced parkinsonism  05/26/2023     Priority: Medium    Agitation 05/26/2023     Priority: Medium    Urinary retention 05/23/2023     Priority: Medium    Schizophrenia, unspecified type (H) 05/23/2023     Priority: Medium    Altered mental status, unspecified altered mental status type 05/23/2023     Priority: Medium       #Unspecified psychosis likely schizophrenia per history,  #Unspecified encephalopathy   -R/O catatonia   -Episodes of unresponsiveness, concern for PNES   #Parkinsonism 2/2 neuroleptic medications, rule out Parkinson's Disease  -rule out major neurocognitive disorder (refused to answer much of assessment)  #Urinary retention and BPH  -Possible UTI -- UC resulted on 5/25 w/out growth  #Hx of prolonged QTc with clozapine  #Mild thrombocytopenia  #R/O OCD         Assessment and hospital summary:  Patient was admitted to psychiatric unit for safety, stabilization and medication management. He has had schizophrenia since the 1980. He was on Clozaril x 25 years, and it was tapered and discontinued on May 7, 2023  due to prolonged qtc of 527, and his psychotic  symptoms have worsened since then. Sinemet was also discontinued recently due to concerns that it was contributing to paranoia and AH. He is agitated, aggressive, dangerous to self and others. He remains on SIO 2 to 1, and is under psychiatric emergency and court hold. There are concerns for organic etiology given pattern of sundowning, history of parkinsonism, and ongoing disorientation/confusion. 6/8: EKG repeated, cardiology consult regarding safety of resuming clozapine in the context of prolonged QTc and refractory schizophrenia pending response. Per cardiology, correct QTc is no more than 460. They do not have concerns about AP retrial. Neurology IP consult placed for evaluation of sundowning and cognitive decline secondary to Sinemet  discontinuation. MSSA initiated. Per Neurology, discontinuation of Sinemet would not account for these symptoms. They do not recommend retrial at this time. On , discussed complex case at length with Dr. Hatch (primary provider on geriatic unit) who provided recs (see second opinion note dated ). Depakote initiated, though needed to be reduced due to side effect of thrombocytopenia. Melatonin was increased to 10 mg at bedtime. : Clozapine titration continued. Its possible that clozapine dose is contributing to worsened compulsive behaviors noted throughout hospitalization which could improve with lowering the dose. ECT initiated due to treatment refractory psychosis and ongoing symptoms despite therapeutic dose of clozapine (650 mg daily). ECT initiated on 23 and pt completed 11 total treatments, but ECT stopped on  due to lack of improvement and distress it was causing patient.     Chart reviewed which revealed the followin2022: He was on clozapine, Seroquel, Cymbalta, and Carbidopa-levodopa and hospitalized for pneumonia. Psych consulted and Seroquel was stopped. Treated with Abx and discharged to TCU.     2022: Hospitalized at [place]. Per chart review:    Vinod Lee is a 61 yo male with longstanding history of schizophrenia. He was admitted from [facility] ED, where he presented due to increased delusional thoughts while on a visit to his parents for Thanksgiving. He began experiencing increasing paranoid thoughts that someone might be poisoning him and began fearing that he might accidentally harm someone. He reported to his parents that he was having thoughts about hitting someone or beating someone up and told them he could not guarantee they would be safe with him. Parents encouraged patient to go to the ED, which he did voluntarily.     Per patient report and chart review, he was hospitalized several times in the  and reports he was civilly committed at one point  "in the 1980s, however he has been quite stable for the past several decades. He reports he will experience some paranoia and delusional thinking at times, but generally has good insight into his symptoms. He has been maintained on clozapine for about 25 years and prior to several months ago was also concurrently prescribed quetiapine.      In the last several months, patient has had a number of medication changes. Approximately 6 months ago, patient was diagnosed with parkinsonism related to antipsychotic use and was started on carbidopa-levidopa. He experienced no improvement in tremors, and thus carbidopa- levidopa dose was increased 1.5 weeks ago.      In addition, patient was medically hospitalized in August 2022 for confusion, weakness, repeated falls, ongoing weight loss, and SOB. He was found to have a cavitary lesion of the lung and suspected aspiration pneumonia. He was treated with antibiotics. At that time, he was taking current dose of clozapine and duloxetine, as well as propranolol ER, quetiapine, gabapentin, and clonazepam. Psychiatry was consulted due to concern for oversedation, and propranolol ER, gabapentin, and quetiapine were discontinued. Conazepam was reduced from 0.5 mg TID to BID dosing and recommended to be discontinued, however, it does not appear this occurred. His sedation improved significantly. QTc prolongation was also noted, but improved throughout his stay. He was ultimately discharged to a TCU for several months before returning home. He reports his weakness has greatly improved and states he \"graduated\" physical therapy, though he continues to be diligent in completed recommended exercises.      He reports that over the past several months, his delusions and anxiety have been worse than usual, with symptoms becoming much more acute since the carbidopa-levidopa dose was increased. He has felt increasingly paranoid about being poisoned, noting this is a delusion that has been stable " "over time, but was previously less intrusive. He has insight during the interview that his paranoid thinking is not reality based, but states it has been harder to separate delusions from reality and he becomes very worried that he might hurt someone, despite no history of violent behavior. He reports that the paranoia about his food being poisoned in combination with the tremors in his hands have made it difficult for him to eat. He has also had quite low appetite for the past 6 months to a year . He reports that due to the combination of these factors, he has lost about 50 pounds in the last year.He denies any problems with sleep initiation or maintenance. He reports energy is fairly good and \"better than it used to be.\" He reports some short term memory issues and on interview, does have some difficulty remembering details of recent events. He is unsure if he has received cognitive testing in the past.    He denies any suicidal ideation and reports he has not experienced SI for decades. He denies homicidal thoughts and is very clear that he had and has no desire to harm anyone else, but was afraid he might somehow do so. He denies any hallucinations and states \"that's never been a problem for me.\" He is not observed responding to internal stimuli. He is alert and oriented in all spheres.        ========    BRIEF HOSPITAL COURSE: This 62 y.o. male admitted 11/25/2022 to  [place] for the assessment and treatment of the above presentation. This was a voluntary admission.     In summary, he was tapered off the Sinemet, which he reports to be both ineffective and potentially worsening the anxiety and paranoia ideations. Instead Benadryl 25 mg TID was started to help with extrapyramidal side effects. He struggled with sleep during his stay here. Remeron 7.5mg QHS was tried without success. Seroquel 100mg qhs was restarted with addition of suvorexant 10mg qhs. Given his Seroquel PRN use prior history of prolonged QTC, he " will benefit from another EKG repeat on the medical unit.     Unfortunately, he tested positive for influenza A and developed hypoxemia. Now on 2L oxygen with desats when prone requiring 3L oxygen. Subsequently, the plan will to be tapering him off clonazepam due to hypoxia (and also prior plan to taper). The patient tolerated these changes without side effects.     The patient minimally benefited from the structured therapeutic milieu as he attended programming seldom as he tested positive for influenza, he needed to isolate with droplet precautions. Pt was engaged and worked on issues that were triggering/brought pt to the hospital and has excellent insight into his anxiety and paranoid delusions. Pt denied suicidal ideations throughout all/majority of their hospitalization. Pt denied HI throughout all of hospitalization. There were no concerns with behavioral disruptions/outbursts. The patient has shown improvement in general.     At the time of discharge, hospitalization course was reviewed with Vinod Lee with plan to transfer to medicine. Please consult psychiatry and I will continue to follow while patient is on the medical unit. He is now off sinemet since 11/29 21:00 and I would expect anxiety and paranoia to improve more moving forward. Psychiatrically, once anxiety and paranoia is baseline, can D/C to home once medically stable. He DOES NOT NEED A 1:1 on the medical unit from a mental health perspective, we initiated 1:1 11/30/2022 due to significant fatigue, gait instability (he was unable to stand without assist) and feeding assist.    04/2023: Clozapine was gradually tapered and discontinued, unclear reasons why it was discontinued per outside records, though Dr. Portillo's H&P indicates that it was due to prolonged QTc.         Per Neurology Recs:    Recommendations  -No current indications to change medications from a neurologic perspective  - Optimizing his psychiatric medication and treatment is  currently more important than optimization of parkinsonism  - Please contact neurology if any new questions arise, or to discuss the assessment described above     Thank you for involving Neurology in the care of Vinod Lee.  Please do not hesitate to call with questions.      Kranthi Perdue MD      Seen by SLP 10/4/23:     Clinical Impression Comments Clinical Swallow Evaluation completed. Patient had no s/s aspiration and no signs of dysphagia. Oral motor function was unable to be assessed with patient declining to participate in oral mechanism exam. Mastication was timely and complete when patient took large bites. Hyolaryngeal elevation appears intact. Recommend diet of regular solids and thin liquids with staff feeding, as allowed, sitting fully upright, small bites/sips, and slow rate. No further ST is warranted at this time. Contact SLP if any questions or concerns.     SLP Rationale for DC Rec Swallow function appears at baseline. No further Speech Therapy needs at this time.   SLP Brief overview of current status  Continue regular solids and thin liquids when sitting fully upright, with staff feeding, as accepted, small bites/sips, slow rate, and swallowing food already in his mouth prior to another bite. Continue meds in puree. Speech will complete orders.     Target psychiatric symptoms and interventions:  -Psych emergency declared on 5/27, now fully committed  - Depakote Sprinkles 250 mg TID, restarted on 8/26. Cannot increase beyond this dose due to thrombocytopenia at higher doses  -Continue clozapine as above, 650mg  -Continue scheduled Zyprexa 15mg BID  -Continue 1:1 for safety of staff and patients, reduced from 2:1 7/23/23  - weekly CBC to monitor platelets    -Continue Gabapentin 300 mg TID as staff believe it has been effective for treatment of his anxiety    Risks, benefits, and alternatives discussed at length with patient.     Acute Medical Problems and Treatments:  Rhinovirus /  enterovirus URI  Concern for pneumonia  On 9/23, patient was reported to be hypoxic with an SpO2 of 89% with RR of 28, as well as had some low-grade fevers.  Associated symptoms of wet sounding cough.  COVID was negative.  Chest x-ray with possible infection versus atelectasis in RLL.  Patient was initially started on doxycycline on 9/24.  Due to concern for clinical worsening, patient was reassessed by provider on 9/25.  At that time due to patient increase drooling while on Clozaril and him laying supine for much of the day, concerns were raised for aspiration pneumonia, thus he was started on Augmentin as well.   - sputum culture if able, pending  - cont Doxycycline 100 mg twice daily for 5 days (9/24-9/28)  - cont augmentin 875-125 mg BID x 5 days (9/25-9/29)  - Albuterol inhaler as needed cough, wheezing, shortness of breath  - APAP every 4 hours as needed fevers, pain  - I-S q shift, as tolerated     Hx cavitary lesion to SOPHY  Pulmonary nodule to SOPHY  Seen August 2022 in ED. CT chest with contrast negative for PE but showed cavitary masslike process in the left upper lung, over an area measuring 9.5 x 6.2 x 5.2 cm, indeterminant pulmonary nodule measuring 9 mm in the anterior segment of the left upper lobe, nonspecific left hilar and mediastinal lymphadenopathy, small-moderate left pleural effusion.  Cavitary lesion also seen on chest x-ray.  Patient was given IVF, Rocephin, Zithromax, and admitted for further evaluation. Thoracentesis was done and negative cultures. ID was consulted and managed Abx. The patient was initially on zosyn and doxycycline but narrowed. Strep pneumo, legionella, and HIV testing done (negative). Blood cultures were negative as well. The patient did well.  Follow-up chest CT showed near complete interval resolution of the previously described left upper lobe abscess.  Residual 12 x 10 mm spiculated nodular opacity with adjacent scarring within this region, probably represents sequela  of previous infection, although underlying malignancy not excluded.  - Recommend follow-up outpatient with PCP for continued monitoring.    Groin rash  Per IM note 10/5:  Met with pt in his room within presence of pt's 1:1. Pt showed this writer his groin folds and no significant erythema or scaling noted. Pt denies groin itch. Will change miconazole powder to prn and discontinue prior wound care orders. Please continue to encourage frequent showering and washing/drying of area. Medicine signing off. Please feel free to call with questions.       Possible Dementia  Neurology consult placed on 6/8 for evaluation of sundowning and cognitive decline secondary to Sinemet discontinuation: Resuming Sinemet not recommended for behavioral concerns. Please see Neuro note for details.     Tremors  longstanding though appeared to worsen following initiation of clozapine  - Ativan 0.5 mg TID  - Cogentin 1 mg BID added on 8/25 with overall improvement noted    Neuro re-consulted on 9/20 and per their documentation:    Impression  Mr. Lee is a 63 y.o. man with a long history of psychosis including requiring commitment and psychiatric hospitalizations in the 1980s.  Given his long history, this is less consistent with an organic cause of psychosis (as something that would be primarily neurologic in origin would be expected to have a more rapid progression of neurologic deterioration, rather than this long period of psychiatric symptoms).  I am not able to appreciate any evidence for a neurologic basis of his psychosis given the time course of his psychiatric illness.      Regarding his difficulty with word finding, I am not able to elicit this on exam.  However since he described being symptomatic earlier today, it is helpful that I am able to go through a full assessment of his language in terms of comprehension, repetition, naming objects with increasing difficulty without being able to appreciate any deficits.     He does  continue to have a tremor that is most prominent posteriorly as well as with action, in addition to a chin tremor.  He also does have rigidity in the bilateral upper extremities consistent with the diagnosis of parkinsonism.  He is currently not on levodopa or dopamine agonists which I continue to agree with.  Since his psychiatric symptoms are much more bothersome right now than his parkinsonism, I will continue to recommend avoiding dopaminergic medications.     Recommendations  -No current indications to change medications from a neurologic perspective  - Optimizing his psychiatric medication and treatment is currently more important than optimization of parkinsonism  - Please contact neurology if any new questions arise, or to discuss the assessment described above     Thank you for involving Neurology in the care of Vinod Lee.  Please do not hesitate to call with questions.      Kranthi Perdue MD      Thrombocytopenia, resolved  Likely secondary to Depakote.  According to the, incidents of Depakote induced thrombocytopenia as 27%.  Depakote dose reduced from 1000 twice daily to 250 3 times daily on 7/28/2023 with subsequent resolution of thrombocytopenia  - weekly CBCs    Constipation  Likely worsened by clozapine, improved since modifying regimen.   - daily miralax   - daily Senokot 2 tablets BID      Right first toe fracture:  Seen by Ortho on 6/16:  Per note: Vinod Lee is a 62 year old  male w/ PMHx complex psychiatric history who has a fracture to the distal phalanx of the right toe after a recent trauma to the foot the patient reports.  Patient denies any other pain or discomfort.  Images reviewed and plan will be for irrigation of the popped fracture blister with sterile saline and the toes should be dressed and a 4 x 4 gauze dressing.  Patient will need a postop shoe which she can be weightbearing as tolerated in.  Would recommend a course of antibiotics for approximately 7 days.  Would  "recommend Augmentin or clindamycin.  Podiatry will be made aware of patient and will see patient while he is admitted or if patient is discharged in the near future a follow-up appointment will need to be established.     Remainder of care per primary team.  Primary team should make sure that patient is up-to-date on his tetanus shot.  If not patient will require a booster shot.    Hx of prolonged QTc:  Continue weekly EKGs. See above for details.   Discussed most recent EKG findings with Cardiology on 8/25. Corrected QTc is 501 from EKG on 8/23. Per cardiology, repeat EKG today. If corrected QTc is > 500, will need to reduce clozapine. If < 500, OK to keep clozapine at current dose. UPDATE/ADDENDUM 9/6: Repeat EKG with corrected QTc of 440. No need to adjust clozapine dose per cards.     Urinary retention, resolved  Per patient care order:   If patient is refusing straight caths, please notify IM provider immediately to determine whether this constitutes a medical emergency. If IM declares medical emergency, may restrain patient for straight cath. Discussed this with patient's parents/substitute decision makers on 6/7 who are in support of this plan.    # Psoriasis hx  # Purplish discoloration of B/LE feet-resolved   Discussed with Dr. Rene and bedside RN regarding rash on right foot that appears and appears to be purplish in color and almost \"petechiae.\" It was noted during interview, but seemed to be resolving upon walking. Within the past 24 hrs, pt has had ECT and seemed more confused than usual. Pt seems to have had a right great toe wound with recent right great toe fracture seen by Medicine on 7/16. Seen on 8/4 with improving color on B/L legs at rest and appears to have small, scaly patches of varying coin sizes that have not grown past marked borders. See photos. Per further chart review, has had psoriasis history. WBC WNL, no fevers, HDS. This appears to more consistent with a psoriasis like picture. "   - Start triamcinolone topical 0.025%   -Apply twice daily until lesions for 2 weeks or until lesions resolve/  -Monitor for skin thinning, striae, rebound lesion flares.   - Start Eucerin cream              - Apply 30 minutes PRIOR to triamcinolone topical cream above or PRN as needed if dry skin/after bathing   - Medicine will sign off.   - For worsening pain, spread, itchiness, fevers, please contact Medicine and possibly Dermatology.    Benzodiazepine dependence:  Phenobarbital taper completed shortly after patient's admission    Pertinent labs/imaging:  Weekly CBC with diff     Behavioral/Psychological/Social:  - Encourage unit programming    Safety:  - Continue precautions as noted above  - Status 15 minute checks  - Continue 1:1 for staff and pt safety    Legal Status: Fully committed with S[facility]chez and Lorenzo Pavon. [facility] medications: clozapine, olanzapine, risperidone, quetiapine    Disposition Plan   Reason for ongoing admission: No safe alternative at this time  Discharge location: Accepted to  pending [facility] assessment. Will have 24/7 supervision (1:1).   Discharge Medications: not ordered  Follow-up Appointments: not scheduled      No further change in treatment plan  Patient seen, chart reviewed, case reviewed with  and with nursing.   Case reviewed in multi-disciplinary treatment team.    Jermaine Griffith MD

## 2023-10-10 NOTE — PLAN OF CARE
Problem: Sleep Disturbance  Goal: Adequate Sleep/Rest  Outcome: Progressing   Goal Outcome Evaluation:    Patient slept for a total of 7.5 hours. No behavioral issues noted the whole shift.

## 2023-10-11 PROCEDURE — 250N000013 HC RX MED GY IP 250 OP 250 PS 637: Performed by: STUDENT IN AN ORGANIZED HEALTH CARE EDUCATION/TRAINING PROGRAM

## 2023-10-11 PROCEDURE — 124N000002 HC R&B MH UMMC

## 2023-10-11 PROCEDURE — 99231 SBSQ HOSP IP/OBS SF/LOW 25: CPT | Performed by: PSYCHIATRY & NEUROLOGY

## 2023-10-11 PROCEDURE — 250N000013 HC RX MED GY IP 250 OP 250 PS 637

## 2023-10-11 PROCEDURE — 250N000013 HC RX MED GY IP 250 OP 250 PS 637: Performed by: PSYCHIATRY & NEUROLOGY

## 2023-10-11 PROCEDURE — 250N000013 HC RX MED GY IP 250 OP 250 PS 637: Performed by: PHYSICIAN ASSISTANT

## 2023-10-11 RX ADMIN — GABAPENTIN 300 MG: 300 CAPSULE ORAL at 20:28

## 2023-10-11 RX ADMIN — SENNOSIDES 2 TABLET: 8.6 TABLET ORAL at 20:28

## 2023-10-11 RX ADMIN — BENZTROPINE MESYLATE 1 MG: 1 TABLET ORAL at 20:28

## 2023-10-11 RX ADMIN — OLANZAPINE 15 MG: 10 TABLET, ORALLY DISINTEGRATING ORAL at 20:28

## 2023-10-11 RX ADMIN — BENZONATATE 100 MG: 100 CAPSULE ORAL at 06:45

## 2023-10-11 RX ADMIN — DIVALPROEX SODIUM 250 MG: 125 CAPSULE, COATED PELLETS ORAL at 07:27

## 2023-10-11 RX ADMIN — NAPROXEN 250 MG: 250 TABLET ORAL at 17:30

## 2023-10-11 RX ADMIN — ATROPINE SULFATE 2 DROP: 10 SOLUTION/ DROPS OPHTHALMIC at 15:04

## 2023-10-11 RX ADMIN — DIVALPROEX SODIUM 250 MG: 125 CAPSULE, COATED PELLETS ORAL at 15:03

## 2023-10-11 RX ADMIN — BENZTROPINE MESYLATE 1 MG: 1 TABLET ORAL at 08:50

## 2023-10-11 RX ADMIN — LORAZEPAM 0.5 MG: 0.5 TABLET ORAL at 15:04

## 2023-10-11 RX ADMIN — LORAZEPAM 0.5 MG: 0.5 TABLET ORAL at 20:28

## 2023-10-11 RX ADMIN — ALBUTEROL SULFATE 2 PUFF: 90 AEROSOL, METERED RESPIRATORY (INHALATION) at 07:10

## 2023-10-11 RX ADMIN — TAMSULOSIN HYDROCHLORIDE 0.4 MG: 0.4 CAPSULE ORAL at 08:50

## 2023-10-11 RX ADMIN — GABAPENTIN 300 MG: 300 CAPSULE ORAL at 15:04

## 2023-10-11 RX ADMIN — POLYETHYLENE GLYCOL 3350 17 G: 17 POWDER, FOR SOLUTION ORAL at 20:29

## 2023-10-11 RX ADMIN — NAPROXEN 250 MG: 250 TABLET ORAL at 08:50

## 2023-10-11 RX ADMIN — LORAZEPAM 0.5 MG: 0.5 TABLET ORAL at 08:51

## 2023-10-11 RX ADMIN — CLOZAPINE 650 MG: 100 TABLET, ORALLY DISINTEGRATING ORAL at 12:54

## 2023-10-11 RX ADMIN — Medication 10 MG: at 20:27

## 2023-10-11 RX ADMIN — OLANZAPINE 15 MG: 10 TABLET, ORALLY DISINTEGRATING ORAL at 08:50

## 2023-10-11 RX ADMIN — CYANOCOBALAMIN TAB 1000 MCG 1000 MCG: 1000 TAB at 08:50

## 2023-10-11 RX ADMIN — FLUOXETINE 20 MG: 20 CAPSULE ORAL at 08:50

## 2023-10-11 RX ADMIN — ATROPINE SULFATE 2 DROP: 10 SOLUTION/ DROPS OPHTHALMIC at 20:35

## 2023-10-11 RX ADMIN — DIVALPROEX SODIUM 250 MG: 125 CAPSULE, COATED PELLETS ORAL at 20:28

## 2023-10-11 RX ADMIN — GABAPENTIN 300 MG: 300 CAPSULE ORAL at 08:51

## 2023-10-11 RX ADMIN — POLYETHYLENE GLYCOL 3350 17 G: 17 POWDER, FOR SOLUTION ORAL at 08:51

## 2023-10-11 RX ADMIN — SENNOSIDES 2 TABLET: 8.6 TABLET ORAL at 08:50

## 2023-10-11 ASSESSMENT — ACTIVITIES OF DAILY LIVING (ADL)
HYGIENE/GROOMING: WITH ASSISTANCE;PROMPTS
ORAL_HYGIENE: PROMPTS;WITH ASSISTANCE
LAUNDRY: UNABLE TO COMPLETE
ADLS_ACUITY_SCORE: 93
ORAL_HYGIENE: PROMPTS;WITH ASSISTANCE
ADLS_ACUITY_SCORE: 93
ADLS_ACUITY_SCORE: 93
HYGIENE/GROOMING: PROMPTS;WITH ASSISTANCE
ADLS_ACUITY_SCORE: 93
ADLS_ACUITY_SCORE: 93
DRESS: PROMPTS;WITH ASSISTANCE
LAUNDRY: UNABLE TO COMPLETE
DRESS: WITH ASSISTANCE;PROMPTS
ADLS_ACUITY_SCORE: 93

## 2023-10-11 NOTE — PLAN OF CARE
Problem: Sleep Disturbance  Goal: Adequate Sleep/Rest  Outcome: Progressing   Goal Outcome Evaluation:    Patient remains on SIO due to risk for self-injury related to impulsiveness and agitation.  He was awake the first hour of the shift. Went to sleep @ 12mn and slept till the end of the shift.     Patient started to cough non productively non stop. Tessalon capsule 100 mg given with apple sauce @ 0645. Albuterol inhaler 2 puffs were also give a few minutes after as patient continued to cough. Some relief from cough noted before the end of the shift.     Slept for a total of 10 hours. No behavioral issues  noted the whole shift.

## 2023-10-11 NOTE — PLAN OF CARE
"  Problem: Psychotic Symptoms  Goal: Psychotic Symptoms  Description: Signs and symptoms of listed problems will be absent or manageable.  Outcome: Progressing   Goal Outcome Evaluation:    Plan of Care Reviewed With: patient      Patient presents in a calm mood and interacting with staff. He displays confusion and disorientation to situation. He ate 75% of his meals with staff assistance. He denies depression, rates anxiety at 3/10. Patient made some delusional/paranoid statements stating \"I know you will make me pay for that, just give me whatever so I don't get in trouble\". Patient is medication complaint, denies any pain or discomfort.   "

## 2023-10-11 NOTE — PROGRESS NOTES
"[facility], [place]   Psychiatric Progress Note  Hospital Day: 138        Interim History:     Patient seen and chart reviewed. Case discussed in multi-disciplinary treatment team      According to Nursing report:  Patient has been calm and can be paranoid at times and has occasional delusional comments. He needs substantial help with cares and feeding. No recent agitation or aggression.        According to Nursing notes from yesterday:  Pt at baseline for his intermittent confusion, he is disoriented to situation and time, he is alert and up and about in hallway, he was observed in milieu periodically minimally social with SIO staff, he is withdrawn and guarded. He presented with flat affect and calm mood. Pt denied any c/o pain, no depression and anxiety, denied SI/HI/SIB/AVH. Pt still tremulous, struggling with feeding self. SIO staff to help pt feed per orders. Pt is demonstrating mistrust by making statements like, \"I don't trust that.\"Pt was med compliant, took meds whole in chocolate pudding with no swallowing issues except clozapine that was crushed. Pt showered with help of SIO staff, refused treatment to groin after shower. No behaviors, no bizarre statements.     Patient alert and oriented to self. Disoriented to situation and time. Mood is calm, presented with flat and blunted affect. Patient denies physical pain and discomfort. Refused dinner, patient verbalized. \" I am not hungry\" drinking fluids adequately. Had two apple sauce with medications. Medication compliant. Voiding well, Atropine given for drooling. No behavioral or safety concerns noted. Patient continues on SIO 5 feet.  Patient is able to make needs known. No other known concerns at this time. This writer will continue to monitor patient as needed per plan of care.           According to  :  Awaiting [place] assessment and [facility] waiver. He will be moving to assisted living once these are in " "place      On interview today:  Patient denies suicidal or homicidal thoughts and denies hallucinations. Patient is not revealing delusions on interview. Patient denies side effects to medications. He has mild confusion unchanged          ROS: Patient has no other physical complaints today.        Vital signs:  Temp: 97.8  F (36.6  C) Temp src: Oral BP: 109/71 Pulse: 88   Resp: 16 SpO2: 97 % O2 Device: None (Room air)   Height: 175.3 cm (5' 9\") (brought forward to generate BMI) Weight: 86.6 kg (190 lb 14.7 oz)  Estimated body mass index is 28.19 kg/m  as calculated from the following:    Height as of this encounter: 1.753 m (5' 9\").    Weight as of this encounter: 86.6 kg (190 lb 14.7 oz).         MSE:  Mental Status Exam:  Appearance: awake, alert, unkempt, on bed. No evidence of pain of acute distress.   Attitude: Somewhat cooperative, calm during interview today  Eye Contact:  fair, intense at times, better duration  Mood: \"not too bad\"  Affect:  mood congruent, irritable  Speech: mostly coherent  Psychomotor Behavior:  Ongoing bilateral arm tremor. No evidence of dystonia, or tics.  Throught Process: linear, illogical, concrete  Associations:  no loosening of associations present  Thought Content:  Delusions and AH suspected. Evidence of obsessive thinking and compulsions to harm others intermittently but not within the past few days.   Insight:  limited  Judgement:  poor  Oriented to:  self, place  Attention Span and Concentration:  fair  Recent and Remote Memory:  poor  Gait: patient lying in bed    Minimal change in mental status in the past 24 hours                    Medications:      atropine  2 drop Sublingual TID    benztropine  1 mg Oral BID    cloZAPine  650 mg Oral Daily    cyanocobalamin  1,000 mcg Oral Daily    divalproex sodium delayed-release  250 mg Oral Q8H KIKI    FLUoxetine  20 mg Oral Daily    gabapentin  300 mg Oral TID    LORazepam  0.5 mg Oral TID    melatonin  10 mg Oral At Bedtime    " naproxen  250 mg Oral BID w/meals    OLANZapine zydis  15 mg Oral BID    Or    OLANZapine  10 mg Intramuscular BID    polyethylene glycol  17 g Oral BID    sennosides  2 tablet Oral BID    tamsulosin  0.4 mg Oral Daily          Allergies:     Allergies   Allergen Reactions    Bee Venom Unknown    Montelukast Unknown    Phenothiazines Unknown          Labs:       Recent Results (from the past 72 hour(s))   EKG 12-lead, complete    Collection Time: 10/10/23  9:35 AM   Result Value Ref Range    Systolic Blood Pressure  mmHg    Diastolic Blood Pressure  mmHg    Ventricular Rate 90 BPM    Atrial Rate 90 BPM    OH Interval 146 ms    QRS Duration 162 ms     ms    QTc 528 ms    P Axis 52 degrees    R AXIS -7 degrees    T Axis 123 degrees    Interpretation ECG       Sinus rhythm  Left bundle branch block  Abnormal ECG  When compared with ECG of 03-OCT-2023 09:29,  No significant change was found                  Precautions:     Behavioral Orders   Procedures    Assault precautions    Code 1 - Restrict to Unit    Code 2    Code 2 - 1:1 Staff Supervision     For ECT only    Electroconvulsive therapy     Series of up to 12 treatments. Begin Date: 8/2/23     Treating Psychiatrist providing ECT:  unknown     Notified on:  7/28/23 via this order  NOTE: We received verbal confirmation from [facility] that Lorenzo Pavon has been approved but awaiting official court order and risk management's review  Initial consult was completed by Dr. Hicks on 7/18/23    Electroconvulsive therapy     Series of up to 12 treatments. Begin Date: 8/2/23     Treating Psychiatrist providing ECT:  Dominga     Notified on:  8/2/23    Elopement precautions    Fall precautions    Occupational Therapy on the Unit     Order Specific Question:   Reason for Consult     Answer:   Eval of thought process, functional skills and behavior     Order Specific Question:   Course of Action:     Answer:   Evaluation only     Order Specific Question:   Treatment  Prescription:     Answer:   CPT requested    Routine Programming     As clinically indicated    Self Injury Precaution    Status 15     Every 15 minutes.    Status Individual Observation     Patient SIO status reviewed with team/RN.  Please also refer to RN/team documentation for add'l detail.    -SIO staff to monitor following which have contributed to patient being on SIO:  Agitation  Pt is impulsive   Pt has ran out of his room naked  Pt has Parkinson symptoms place him in a high fall risk  Pt has verbal outburst of sexual and threaten statements  Pt requires immediate redirection when masturbating    -Possible interventions SIO staff could use to support patient's treatment progress:  Engage pt in activities  - 1:1 staff to assist in eating meals and other ADL's    -When following observed, team will review discontinuation of SIO:  Absence of aggression toward others for > 24 hours    Comments: SIO 1:1     Order Specific Question:   CONTINUOUS 24 hours / day     Answer:   5 feet     Order Specific Question:   Indications for SIO     Answer:   Severe intrusiveness     Order Specific Question:   Indications for SIO     Answer:   Assault risk    Suicide precautions     Patients on Suicide Precautions should have a Combination Diet ordered that includes a Diet selection(s) AND a Behavioral Tray selection for Safe Tray - with utensils, or Safe Tray - NO utensils            Diagnoses:   Paranoid schizophrenia, chronic condition with acute exacerbation (H)    Patient Active Problem List    Diagnosis Date Noted    Mood changes 05/26/2023     Priority: Medium    Paranoid schizophrenia, chronic condition with acute exacerbation (H) 05/26/2023     Priority: Medium    Neuroleptic-induced parkinsonism  05/26/2023     Priority: Medium    Agitation 05/26/2023     Priority: Medium    Urinary retention 05/23/2023     Priority: Medium    Schizophrenia, unspecified type (H) 05/23/2023     Priority: Medium    Altered mental status,  unspecified altered mental status type 05/23/2023     Priority: Medium       #Unspecified psychosis likely schizophrenia per history,  #Unspecified encephalopathy   -R/O catatonia   -Episodes of unresponsiveness, concern for PNES   #Parkinsonism 2/2 neuroleptic medications, rule out Parkinson's Disease  -rule out major neurocognitive disorder (refused to answer much of assessment)  #Urinary retention and BPH  -Possible UTI -- UC resulted on 5/25 w/out growth  #Hx of prolonged QTc with clozapine  #Mild thrombocytopenia  #R/O OCD         Assessment and hospital summary:  Patient was admitted to psychiatric unit for safety, stabilization and medication management. He has had schizophrenia since the 1980. He was on Clozaril x 25 years, and it was tapered and discontinued on May 7, 2023  due to prolonged qtc of 527, and his psychotic  symptoms have worsened since then. Sinemet was also discontinued recently due to concerns that it was contributing to paranoia and AH. He is agitated, aggressive, dangerous to self and others. He remains on SIO 2 to 1, and is under psychiatric emergency and court hold. There are concerns for organic etiology given pattern of sundowning, history of parkinsonism, and ongoing disorientation/confusion. 6/8: EKG repeated, cardiology consult regarding safety of resuming clozapine in the context of prolonged QTc and refractory schizophrenia pending response. Per cardiology, correct QTc is no more than 460. They do not have concerns about AP retrial. Neurology IP consult placed for evaluation of sundowning and cognitive decline secondary to Sinemet discontinuation. MSSA initiated. Per Neurology, discontinuation of Sinemet would not account for these symptoms. They do not recommend retrial at this time. On 6/14, discussed complex case at length with Dr. aHtch (primary provider on geriatic unit) who provided recs (see second opinion note dated 6/14). Depakote initiated, though needed to be reduced  due to side effect of thrombocytopenia. Melatonin was increased to 10 mg at bedtime. : Clozapine titration continued. Its possible that clozapine dose is contributing to worsened compulsive behaviors noted throughout hospitalization which could improve with lowering the dose. ECT initiated due to treatment refractory psychosis and ongoing symptoms despite therapeutic dose of clozapine (650 mg daily). ECT initiated on 23 and pt completed 11 total treatments, but ECT stopped on  due to lack of improvement and distress it was causing patient.     Chart reviewed which revealed the followin2022: He was on clozapine, Seroquel, Cymbalta, and Carbidopa-levodopa and hospitalized for pneumonia. Psych consulted and Seroquel was stopped. Treated with Abx and discharged to TCU.     2022: Hospitalized at [place]. Per chart review:    Vinod Lee is a 63 yo male with longstanding history of schizophrenia. He was admitted from [facility] ED, where he presented due to increased delusional thoughts while on a visit to his parents for Thanksgiving. He began experiencing increasing paranoid thoughts that someone might be poisoning him and began fearing that he might accidentally harm someone. He reported to his parents that he was having thoughts about hitting someone or beating someone up and told them he could not guarantee they would be safe with him. Parents encouraged patient to go to the ED, which he did voluntarily.     Per patient report and chart review, he was hospitalized several times in the  and reports he was civilly committed at one point in the , however he has been quite stable for the past several decades. He reports he will experience some paranoia and delusional thinking at times, but generally has good insight into his symptoms. He has been maintained on clozapine for about 25 years and prior to several months ago was also concurrently prescribed quetiapine.      In the last  "several months, patient has had a number of medication changes. Approximately 6 months ago, patient was diagnosed with parkinsonism related to antipsychotic use and was started on carbidopa-levidopa. He experienced no improvement in tremors, and thus carbidopa- levidopa dose was increased 1.5 weeks ago.      In addition, patient was medically hospitalized in August 2022 for confusion, weakness, repeated falls, ongoing weight loss, and SOB. He was found to have a cavitary lesion of the lung and suspected aspiration pneumonia. He was treated with antibiotics. At that time, he was taking current dose of clozapine and duloxetine, as well as propranolol ER, quetiapine, gabapentin, and clonazepam. Psychiatry was consulted due to concern for oversedation, and propranolol ER, gabapentin, and quetiapine were discontinued. Conazepam was reduced from 0.5 mg TID to BID dosing and recommended to be discontinued, however, it does not appear this occurred. His sedation improved significantly. QTc prolongation was also noted, but improved throughout his stay. He was ultimately discharged to a TCU for several months before returning home. He reports his weakness has greatly improved and states he \"graduated\" physical therapy, though he continues to be diligent in completed recommended exercises.      He reports that over the past several months, his delusions and anxiety have been worse than usual, with symptoms becoming much more acute since the carbidopa-levidopa dose was increased. He has felt increasingly paranoid about being poisoned, noting this is a delusion that has been stable over time, but was previously less intrusive. He has insight during the interview that his paranoid thinking is not reality based, but states it has been harder to separate delusions from reality and he becomes very worried that he might hurt someone, despite no history of violent behavior. He reports that the paranoia about his food being poisoned in " "combination with the tremors in his hands have made it difficult for him to eat. He has also had quite low appetite for the past 6 months to a year . He reports that due to the combination of these factors, he has lost about 50 pounds in the last year.He denies any problems with sleep initiation or maintenance. He reports energy is fairly good and \"better than it used to be.\" He reports some short term memory issues and on interview, does have some difficulty remembering details of recent events. He is unsure if he has received cognitive testing in the past.    He denies any suicidal ideation and reports he has not experienced SI for decades. He denies homicidal thoughts and is very clear that he had and has no desire to harm anyone else, but was afraid he might somehow do so. He denies any hallucinations and states \"that's never been a problem for me.\" He is not observed responding to internal stimuli. He is alert and oriented in all spheres.        ========    BRIEF HOSPITAL COURSE: This 62 y.o. male admitted 11/25/2022 to  [facility] for the assessment and treatment of the above presentation. This was a voluntary admission.     In summary, he was tapered off the Sinemet, which he reports to be both ineffective and potentially worsening the anxiety and paranoia ideations. Instead Benadryl 25 mg TID was started to help with extrapyramidal side effects. He struggled with sleep during his stay here. Remeron 7.5mg QHS was tried without success. Seroquel 100mg qhs was restarted with addition of suvorexant 10mg qhs. Given his Seroquel PRN use prior history of prolonged QTC, he will benefit from another EKG repeat on the medical unit.     Unfortunately, he tested positive for influenza A and developed hypoxemia. Now on 2L oxygen with desats when prone requiring 3L oxygen. Subsequently, the plan will to be tapering him off clonazepam due to hypoxia (and also prior plan to taper). The patient tolerated these changes without " side effects.     The patient minimally benefited from the structured therapeutic milieu as he attended programming seldom as he tested positive for influenza, he needed to isolate with droplet precautions. Pt was engaged and worked on issues that were triggering/brought pt to the hospital and has excellent insight into his anxiety and paranoid delusions. Pt denied suicidal ideations throughout all/majority of their hospitalization. Pt denied HI throughout all of hospitalization. There were no concerns with behavioral disruptions/outbursts. The patient has shown improvement in general.     At the time of discharge, hospitalization course was reviewed with Vinod Lee with plan to transfer to medicine. Please consult psychiatry and I will continue to follow while patient is on the medical unit. He is now off sinemet since 11/29 21:00 and I would expect anxiety and paranoia to improve more moving forward. Psychiatrically, once anxiety and paranoia is baseline, can D/C to home once medically stable. He DOES NOT NEED A 1:1 on the medical unit from a mental health perspective, we initiated 1:1 11/30/2022 due to significant fatigue, gait instability (he was unable to stand without assist) and feeding assist.    04/2023: Clozapine was gradually tapered and discontinued, unclear reasons why it was discontinued per outside records, though Dr. Portillo's H&P indicates that it was due to prolonged QTc.         Per Neurology Recs:    Recommendations  -No current indications to change medications from a neurologic perspective  - Optimizing his psychiatric medication and treatment is currently more important than optimization of parkinsonism  - Please contact neurology if any new questions arise, or to discuss the assessment described above     Thank you for involving Neurology in the care of Vinod Lee.  Please do not hesitate to call with questions.      Kranthi Perdue MD      Seen by SLP 10/4/23:     Clinical  Impression Comments Clinical Swallow Evaluation completed. Patient had no s/s aspiration and no signs of dysphagia. Oral motor function was unable to be assessed with patient declining to participate in oral mechanism exam. Mastication was timely and complete when patient took large bites. Hyolaryngeal elevation appears intact. Recommend diet of regular solids and thin liquids with staff feeding, as allowed, sitting fully upright, small bites/sips, and slow rate. No further ST is warranted at this time. Contact SLP if any questions or concerns.     SLP Rationale for DC Rec Swallow function appears at baseline. No further Speech Therapy needs at this time.   SLP Brief overview of current status  Continue regular solids and thin liquids when sitting fully upright, with staff feeding, as accepted, small bites/sips, slow rate, and swallowing food already in his mouth prior to another bite. Continue meds in puree. Speech will complete orders.     Target psychiatric symptoms and interventions:  -Psych emergency declared on 5/27, now fully committed  - Depakote Sprinkles 250 mg TID, restarted on 8/26. Cannot increase beyond this dose due to thrombocytopenia at higher doses  -Continue clozapine as above, 650mg  -Continue scheduled Zyprexa 15mg BID  -Continue 1:1 for safety of staff and patients, reduced from 2:1 7/23/23  - weekly CBC to monitor platelets    -Continue Gabapentin 300 mg TID as staff believe it has been effective for treatment of his anxiety    Risks, benefits, and alternatives discussed at length with patient.     Acute Medical Problems and Treatments:  Rhinovirus / enterovirus URI  Concern for pneumonia  On 9/23, patient was reported to be hypoxic with an SpO2 of 89% with RR of 28, as well as had some low-grade fevers.  Associated symptoms of wet sounding cough.  COVID was negative.  Chest x-ray with possible infection versus atelectasis in RLL.  Patient was initially started on doxycycline on 9/24.  Due to  concern for clinical worsening, patient was reassessed by provider on 9/25.  At that time due to patient increase drooling while on Clozaril and him laying supine for much of the day, concerns were raised for aspiration pneumonia, thus he was started on Augmentin as well.   - sputum culture if able, pending  - cont Doxycycline 100 mg twice daily for 5 days (9/24-9/28)  - cont augmentin 875-125 mg BID x 5 days (9/25-9/29)  - Albuterol inhaler as needed cough, wheezing, shortness of breath  - APAP every 4 hours as needed fevers, pain  - I-S q shift, as tolerated     Hx cavitary lesion to SOPHY  Pulmonary nodule to SOPHY  Seen August 2022 in ED. CT chest with contrast negative for PE but showed cavitary masslike process in the left upper lung, over an area measuring 9.5 x 6.2 x 5.2 cm, indeterminant pulmonary nodule measuring 9 mm in the anterior segment of the left upper lobe, nonspecific left hilar and mediastinal lymphadenopathy, small-moderate left pleural effusion.  Cavitary lesion also seen on chest x-ray.  Patient was given IVF, Rocephin, Zithromax, and admitted for further evaluation. Thoracentesis was done and negative cultures. ID was consulted and managed Abx. The patient was initially on zosyn and doxycycline but narrowed. Strep pneumo, legionella, and HIV testing done (negative). Blood cultures were negative as well. The patient did well.  Follow-up chest CT showed near complete interval resolution of the previously described left upper lobe abscess.  Residual 12 x 10 mm spiculated nodular opacity with adjacent scarring within this region, probably represents sequela of previous infection, although underlying malignancy not excluded.  - Recommend follow-up outpatient with PCP for continued monitoring.    Groin rash  Per IM note 10/5:  Met with pt in his room within presence of pt's 1:1. Pt showed this writer his groin folds and no significant erythema or scaling noted. Pt denies groin itch. Will change  miconazole powder to prn and discontinue prior wound care orders. Please continue to encourage frequent showering and washing/drying of area. Medicine signing off. Please feel free to call with questions.       Possible Dementia  Neurology consult placed on 6/8 for evaluation of sundowning and cognitive decline secondary to Sinemet discontinuation: Resuming Sinemet not recommended for behavioral concerns. Please see Neuro note for details.     Tremors  longstanding though appeared to worsen following initiation of clozapine  - Ativan 0.5 mg TID  - Cogentin 1 mg BID added on 8/25 with overall improvement noted    Neuro re-consulted on 9/20 and per their documentation:    Impression  Mr. Lee is a 63 y.o. man with a long history of psychosis including requiring commitment and psychiatric hospitalizations in the 1980s.  Given his long history, this is less consistent with an organic cause of psychosis (as something that would be primarily neurologic in origin would be expected to have a more rapid progression of neurologic deterioration, rather than this long period of psychiatric symptoms).  I am not able to appreciate any evidence for a neurologic basis of his psychosis given the time course of his psychiatric illness.      Regarding his difficulty with word finding, I am not able to elicit this on exam.  However since he described being symptomatic earlier today, it is helpful that I am able to go through a full assessment of his language in terms of comprehension, repetition, naming objects with increasing difficulty without being able to appreciate any deficits.     He does continue to have a tremor that is most prominent posteriorly as well as with action, in addition to a chin tremor.  He also does have rigidity in the bilateral upper extremities consistent with the diagnosis of parkinsonism.  He is currently not on levodopa or dopamine agonists which I continue to agree with.  Since his psychiatric symptoms  are much more bothersome right now than his parkinsonism, I will continue to recommend avoiding dopaminergic medications.     Recommendations  -No current indications to change medications from a neurologic perspective  - Optimizing his psychiatric medication and treatment is currently more important than optimization of parkinsonism  - Please contact neurology if any new questions arise, or to discuss the assessment described above     Thank you for involving Neurology in the care of Vinod Lee.  Please do not hesitate to call with questions.      Kranthi Perdue MD      Thrombocytopenia, resolved  Likely secondary to Depakote.  According to the, incidents of Depakote induced thrombocytopenia as 27%.  Depakote dose reduced from 1000 twice daily to 250 3 times daily on 7/28/2023 with subsequent resolution of thrombocytopenia  - weekly CBCs    Constipation  Likely worsened by clozapine, improved since modifying regimen.   - daily miralax   - daily Senokot 2 tablets BID      Right first toe fracture:  Seen by Ortho on 6/16:  Per note: Vinod Lee is a 62 year old  male w/ PMHx complex psychiatric history who has a fracture to the distal phalanx of the right toe after a recent trauma to the foot the patient reports.  Patient denies any other pain or discomfort.  Images reviewed and plan will be for irrigation of the popped fracture blister with sterile saline and the toes should be dressed and a 4 x 4 gauze dressing.  Patient will need a postop shoe which she can be weightbearing as tolerated in.  Would recommend a course of antibiotics for approximately 7 days.  Would recommend Augmentin or clindamycin.  Podiatry will be made aware of patient and will see patient while he is admitted or if patient is discharged in the near future a follow-up appointment will need to be established.     Remainder of care per primary team.  Primary team should make sure that patient is up-to-date on his tetanus shot.  If not  "patient will require a booster shot.    Hx of prolonged QTc:  Continue weekly EKGs. See above for details.   Discussed most recent EKG findings with Cardiology on 8/25. Corrected QTc is 501 from EKG on 8/23. Per cardiology, repeat EKG today. If corrected QTc is > 500, will need to reduce clozapine. If < 500, OK to keep clozapine at current dose. UPDATE/ADDENDUM 9/6: Repeat EKG with corrected QTc of 440. No need to adjust clozapine dose per cards.     Urinary retention, resolved  Per patient care order:   If patient is refusing straight caths, please notify IM provider immediately to determine whether this constitutes a medical emergency. If IM declares medical emergency, may restrain patient for straight cath. Discussed this with patient's parents/substitute decision makers on 6/7 who are in support of this plan.    # Psoriasis hx  # Purplish discoloration of B/LE feet-resolved   Discussed with Dr. Rene and bedside RN regarding rash on right foot that appears and appears to be purplish in color and almost \"petechiae.\" It was noted during interview, but seemed to be resolving upon walking. Within the past 24 hrs, pt has had ECT and seemed more confused than usual. Pt seems to have had a right great toe wound with recent right great toe fracture seen by Medicine on 7/16. Seen on 8/4 with improving color on B/L legs at rest and appears to have small, scaly patches of varying coin sizes that have not grown past marked borders. See photos. Per further chart review, has had psoriasis history. WBC WNL, no fevers, HDS. This appears to more consistent with a psoriasis like picture.   - Start triamcinolone topical 0.025%   -Apply twice daily until lesions for 2 weeks or until lesions resolve/  -Monitor for skin thinning, striae, rebound lesion flares.   - Start Eucerin cream              - Apply 30 minutes PRIOR to triamcinolone topical cream above or PRN as needed if dry skin/after bathing   - Medicine will sign off.   - " For worsening pain, spread, itchiness, fevers, please contact Medicine and possibly Dermatology.    Benzodiazepine dependence:  Phenobarbital taper completed shortly after patient's admission    Pertinent labs/imaging:  Weekly CBC with diff     Behavioral/Psychological/Social:  - Encourage unit programming    Safety:  - Continue precautions as noted above  - Status 15 minute checks  - Continue 1:1 for staff and pt safety    Legal Status: Fully committed with Sánchez and Lorenzo Pavon. [place]ozo medications: clozapine, olanzapine, risperidone, quetiapine    Disposition Plan   Reason for ongoing admission: No safe alternative at this time  Discharge location: Accepted to  pending [facility] assessment. Will have 24/7 supervision (1:1).   Discharge Medications: not ordered  Follow-up Appointments: not scheduled      No further change in treatment plan  Patient seen, chart reviewed, case reviewed with  and with nursing.   Case reviewed in multi-disciplinary treatment team.      Jermaine Griffith MD

## 2023-10-11 NOTE — PROVIDER NOTIFICATION
10/11/23 1327   Individualization/Patient Specific Goals   Patient Personal Strengths community support;coping skills;expressive of emotions;family/social support;humor;insight into illness/situation;interests/hobbies;relationship stability;resilient;resourceful   Patient Vulnerabilities poor physical health;traumatic event   Anxieties, Fears or Concerns Pt awaiting discharge   Individualized Care Needs SIO   Interprofessional Rounds   Summary Pt has been accepted to [facility]. Awaiting completion of [facility] assessment for [facility] waiver prior to moving in   Participants CTC;psychiatrist;nursing   Behavioral Team Discussion   Participants Dr. Bandar Griffith MD; Sarah Kim MSW, SW; Tiffany Harris RN; Mila Durham OT   Progress Pt has been accepted to [facility]. Awaiting completion of [facility] assessment for [facility] waiver prior to moving in   Anticipated length of stay 3 weeks   Continued Stay Criteria/Rationale [facility] assessment   Medical/Physical See H&P   Precautions See below   Plan Pt has been accepted to residential. Awaiting completion of [facility] assessment for [facility]I waiver prior to moving in   Rationale for change in precautions or plan No change   Safety Plan 1:1   Anticipated Discharge Disposition assisted living     PRECAUTIONS AND SAFETY    Behavioral Orders   Procedures    Assault precautions    Code 1 - Restrict to Unit    Code 2    Code 2 - 1:1 Staff Supervision     For ECT only    Electroconvulsive therapy     Series of up to 12 treatments. Begin Date: 8/2/23     Treating Psychiatrist providing ECT:  unknown     Notified on:  7/28/23 via this order  NOTE: We received verbal confirmation from [facility] that [facility] Pavon has been approved but awaiting official court order and risk management's review  Initial consult was completed by Dr. Hicks on 7/18/23    Electroconvulsive therapy     Series of up to 12 treatments. Begin Date: 8/2/23     Treating Psychiatrist providing ECT:  Dominga     Notified on:  8/2/23     Elopement precautions    Fall precautions    Occupational Therapy on the Unit     Order Specific Question:   Reason for Consult     Answer:   Eval of thought process, functional skills and behavior     Order Specific Question:   Course of Action:     Answer:   Evaluation only     Order Specific Question:   Treatment Prescription:     Answer:   CPT requested    Routine Programming     As clinically indicated    Self Injury Precaution    Status 15     Every 15 minutes.    Status Individual Observation     Patient SIO status reviewed with team/RN.  Please also refer to RN/team documentation for add'l detail.    -SIO staff to monitor following which have contributed to patient being on SIO:  Agitation  Pt is impulsive   Pt has ran out of his room naked  Pt has Parkinson symptoms place him in a high fall risk  Pt has verbal outburst of sexual and threaten statements  Pt requires immediate redirection when masturbating    -Possible interventions SIO staff could use to support patient's treatment progress:  Engage pt in activities  - 1:1 staff to assist in eating meals and other ADL's    -When following observed, team will review discontinuation of SIO:  Absence of aggression toward others for > 24 hours    Comments: SIO 1:1     Order Specific Question:   CONTINUOUS 24 hours / day     Answer:   5 feet     Order Specific Question:   Indications for SIO     Answer:   Severe intrusiveness     Order Specific Question:   Indications for SIO     Answer:   Assault risk    Suicide precautions     Patients on Suicide Precautions should have a Combination Diet ordered that includes a Diet selection(s) AND a Behavioral Tray selection for Safe Tray - with utensils, or Safe Tray - NO utensils         Safety  Safety WDL: WDL  Patient [place] [facility] [facility] [place]  Observed Behavior: calm  Observed Behavior (Comment): visible on the  Airway Safety Measures: all equipment/monitors on and audible  Safety Measures: safety rounds  completed  Diversional Activity: other (see comments)  Suicidality: SIO (Status Individual Observation)  (NOTE - order will specify distance  Seizure precautions: clutter free environment  Assault: private room  Elopement Assessment: Statements about wanting to leave  Elopement Interventions: status 15, status continuous sight  Sexual: status continuous sight  Additional Documentation:  (SIB, Fall Precaution)

## 2023-10-11 NOTE — PLAN OF CARE
Assessment/Intervention/Current Symtoms and Care Coordination:  Reviewed chart. Met with team to review patient's current functioning, needs, progress, and impediments to discharge. Pt transferred from station 12. Pt on SIO for safety. Pt's affect is flat. Per RN, pt believes his medications are poison but is redirectable with reassurance. Pt has placement secured, awaiting [facility] assessment for CAD[facility] funding.    Writer put out call to [employer] ([phone]) to provide contact information. Writer was directed to send email.    Writer put out email to [employer] ([email or web address]) with contact information and provided update.    Discharge Plan or Goal:  Will discharge to [facility] [address] [place] [id number]     Barriers to Discharge:  Discharge pending completion of [facility] assessment by [facility]     Referral Status:  Pt accepted at [facility]. For more comprehensive list of referrals see CTC note from 10/6  Pt will need psychiatry, PCP, Clozaril lab appointments to be scheduled prior to discharge  Psychiatrist - Dr. Santana at [facility]  P: [phone]   PCP - Attila Urena    Legal Status:  Committed [place] with ITP  [facility]: [place]  File Number: [id number]  Expires: 2024    Contacts:  Vicky Valdez - CM with Psychiatric (KING per commitment)  P: [phone]  Regina Wong - PAMELA CM with Psychiatric (KING per commitment)  P: [phone]  E: [email or web address]  Alberta - Mom (KING signed)  P: [phone] (H) [phone] (M)   Gilbert - Dad (KING signed)  P: [phone] (H)  Sandra Treadwell - Sister (KING signed)  P: [phone]  Psychiatric's Office   F: [phone]  Jovana Sánchez - [employer]  P: [phone]  E: [email or web address]    Upcoming Meetings and Dates/Important Information and next steps:  CTC to coordinate with CADI CM regarding [facility] assessment

## 2023-10-12 LAB
ATRIAL RATE - MUSE: 90 BPM
DIASTOLIC BLOOD PRESSURE - MUSE: NORMAL MMHG
INTERPRETATION ECG - MUSE: NORMAL
P AXIS - MUSE: 52 DEGREES
PR INTERVAL - MUSE: 146 MS
QRS DURATION - MUSE: 162 MS
QT - MUSE: 432 MS
QTC - MUSE: 528 MS
R AXIS - MUSE: -7 DEGREES
SYSTOLIC BLOOD PRESSURE - MUSE: NORMAL MMHG
T AXIS - MUSE: 123 DEGREES
VENTRICULAR RATE- MUSE: 90 BPM

## 2023-10-12 PROCEDURE — 124N000002 HC R&B MH UMMC

## 2023-10-12 PROCEDURE — 250N000013 HC RX MED GY IP 250 OP 250 PS 637: Performed by: PHYSICIAN ASSISTANT

## 2023-10-12 PROCEDURE — 250N000013 HC RX MED GY IP 250 OP 250 PS 637: Performed by: PSYCHIATRY & NEUROLOGY

## 2023-10-12 PROCEDURE — 99231 SBSQ HOSP IP/OBS SF/LOW 25: CPT | Performed by: PSYCHIATRY & NEUROLOGY

## 2023-10-12 PROCEDURE — 250N000013 HC RX MED GY IP 250 OP 250 PS 637: Performed by: STUDENT IN AN ORGANIZED HEALTH CARE EDUCATION/TRAINING PROGRAM

## 2023-10-12 RX ADMIN — GABAPENTIN 300 MG: 300 CAPSULE ORAL at 21:06

## 2023-10-12 RX ADMIN — DIVALPROEX SODIUM 250 MG: 125 CAPSULE, COATED PELLETS ORAL at 21:06

## 2023-10-12 RX ADMIN — NAPROXEN 250 MG: 250 TABLET ORAL at 17:48

## 2023-10-12 RX ADMIN — LORAZEPAM 0.5 MG: 0.5 TABLET ORAL at 09:10

## 2023-10-12 RX ADMIN — ATROPINE SULFATE 2 DROP: 10 SOLUTION/ DROPS OPHTHALMIC at 21:01

## 2023-10-12 RX ADMIN — NAPROXEN 250 MG: 250 TABLET ORAL at 09:10

## 2023-10-12 RX ADMIN — POLYETHYLENE GLYCOL 3350 17 G: 17 POWDER, FOR SOLUTION ORAL at 21:00

## 2023-10-12 RX ADMIN — ATROPINE SULFATE 2 DROP: 10 SOLUTION/ DROPS OPHTHALMIC at 10:25

## 2023-10-12 RX ADMIN — CYANOCOBALAMIN TAB 1000 MCG 1000 MCG: 1000 TAB at 09:10

## 2023-10-12 RX ADMIN — GABAPENTIN 300 MG: 300 CAPSULE ORAL at 13:25

## 2023-10-12 RX ADMIN — SENNOSIDES 2 TABLET: 8.6 TABLET ORAL at 09:10

## 2023-10-12 RX ADMIN — DIVALPROEX SODIUM 250 MG: 125 CAPSULE, COATED PELLETS ORAL at 06:54

## 2023-10-12 RX ADMIN — POLYETHYLENE GLYCOL 3350 17 G: 17 POWDER, FOR SOLUTION ORAL at 10:25

## 2023-10-12 RX ADMIN — SENNOSIDES 2 TABLET: 8.6 TABLET ORAL at 21:07

## 2023-10-12 RX ADMIN — BENZTROPINE MESYLATE 1 MG: 1 TABLET ORAL at 09:09

## 2023-10-12 RX ADMIN — OLANZAPINE 15 MG: 10 TABLET, ORALLY DISINTEGRATING ORAL at 21:02

## 2023-10-12 RX ADMIN — LORAZEPAM 0.5 MG: 0.5 TABLET ORAL at 21:02

## 2023-10-12 RX ADMIN — ATROPINE SULFATE 2 DROP: 10 SOLUTION/ DROPS OPHTHALMIC at 13:33

## 2023-10-12 RX ADMIN — FLUOXETINE 20 MG: 20 CAPSULE ORAL at 09:10

## 2023-10-12 RX ADMIN — Medication 10 MG: at 21:01

## 2023-10-12 RX ADMIN — OLANZAPINE 15 MG: 10 TABLET, ORALLY DISINTEGRATING ORAL at 09:09

## 2023-10-12 RX ADMIN — TAMSULOSIN HYDROCHLORIDE 0.4 MG: 0.4 CAPSULE ORAL at 09:09

## 2023-10-12 RX ADMIN — DIVALPROEX SODIUM 250 MG: 125 CAPSULE, COATED PELLETS ORAL at 13:26

## 2023-10-12 RX ADMIN — GABAPENTIN 300 MG: 300 CAPSULE ORAL at 09:09

## 2023-10-12 RX ADMIN — BENZTROPINE MESYLATE 1 MG: 1 TABLET ORAL at 21:02

## 2023-10-12 RX ADMIN — CLOZAPINE 650 MG: 100 TABLET, ORALLY DISINTEGRATING ORAL at 12:05

## 2023-10-12 RX ADMIN — LORAZEPAM 0.5 MG: 0.5 TABLET ORAL at 13:26

## 2023-10-12 ASSESSMENT — ACTIVITIES OF DAILY LIVING (ADL)
ADLS_ACUITY_SCORE: 93

## 2023-10-12 NOTE — CARE PLAN
10/12/23 1230   Group Therapy Session   Group Attendance attended group session   Time Session Began 1015   Time Session Ended 1115   Total Time (minutes) 25  (no charge)   Total # Attendees 6   Group Type expressive therapy;task skill;psychotherapeutic   Group Topic Covered coping skills/lifestyle management;problem-solving;cognitive activities;balanced lifestyle;structured socialization   Group Session Detail Occupational Therapy Clinic group to facilitate coping skill exploration, use of cognitive skills and problem solving, creative expression, clinical observation and facilitation of social, cognitive, and kinesthetic performance skills.    Patient Response/Contribution listened actively   Patient Participation Detail Declined

## 2023-10-12 NOTE — PLAN OF CARE
Problem: Adult Behavioral Health Plan of Care  Goal: Plan of Care Review  Recent Flowsheet Documentation  Taken 10/12/2023 1056 by Roxanna Self RN  Patient Agreement with Plan of Care: agrees   Goal Outcome Evaluation:    Plan of Care Reviewed With: patient    Pt presenting with flat and blunted affect bright on approach . Pt was Isolative especially during the morning hours but ate 100% of his breakfast after much encouragement, out for lunch  and also ate 75% with assistance in feeding . Pt is medication complaint . Pt is forgetful and at time disoriented to his present situation. When asked how is he doing he stated fine with a smile across his face . He admits he was anxious but denied all other MH SX . Pt continue on a 1:1 for agitation and severe intrusiveness . Will continue POC

## 2023-10-12 NOTE — PLAN OF CARE
Problem: Psychotic Symptoms  Goal: Psychotic Symptoms  Description: Signs and symptoms of listed problems will be absent or manageable.  10/11/2023 2214 by Tiffany Harris RN  Outcome: Progressing  10/11/2023 1414 by Tiffany Harris RN  Outcome: Progressing   Goal Outcome Evaluation:    Plan of Care Reviewed With: patient      Patient's mood has been calm and cooperative. He continues to display confusion and disorientation to situation. He needs assistance and reminders to complete ADLs. He was able to eat 75% of his meals today with staff assistance. He denies anxiety, depression and SI/SIB. Patient is medication complaint, denies pain or discomfort

## 2023-10-12 NOTE — CARE PLAN
10/12/23 1230   Group Therapy Session   Group Attendance attended group session   Time Session Began 1015   Time Session Ended 1115   Total Time (minutes) 25  (no charge)   Total # Attendees 6   Group Type expressive therapy;task skill;psychotherapeutic   Group Topic Covered coping skills/lifestyle management;problem-solving;cognitive activities;balanced lifestyle;structured socialization   Group Session Detail Occupational Therapy Clinic group to facilitate coping skill exploration, use of cognitive skills and problem solving, creative expression, clinical observation and facilitation of social, cognitive, and kinesthetic performance skills.    Patient Response/Contribution listened actively   Patient Participation Detail Initially declined attendance even when offered he could just be present and not participate. He stated concerns that the last time he attended, everyone left because he came in. We discussed that may be a concern of his but that did not happen and he was invited to attend. He appeared to group a couple minutes later. He was pleasant on approach, talked briefly about the [place] [employer] group on approach and sang with a peer the song of one of his favorites. He chose to sit with the group at the large table, seemed to watch peers work on tasks though declined working himself on any options offered. He offered direct eye contact on approach of conversation to him. He left early stating the need to leave.

## 2023-10-12 NOTE — PLAN OF CARE
Assessment/Intervention/Current Symtoms and Care Coordination:  Reviewed chart. Met with team to review patient's current functioning, needs, progress, and impediments to discharge. Pt transferred from station 12. Pt on SIO for safety. Pt's affect is flat. Per RN, pt believes his medications are poison but is redirectable with reassurance. Pt has placement secured, awaiting [facility] assessment for [facility] funding. Pt refused to eat breakfast this morning but did end up eating with encouragement.    Discharge Plan or Goal:  Will discharge to [facility] [place] [address]     Barriers to Discharge:  Discharge pending completion of [facility] assessment by [facility]     Referral Status:  Pt accepted at [facility]. For more comprehensive list of referrals see CTC note from 10/6  Pt will need psychiatry, PCP, Clozaril lab appointments to be scheduled prior to discharge  Psychiatrist - Dr. Santana at [facility]  P: [phone]   PCP - Attila Urena    Legal Status:  Committed [place] with ITP  [facility]: [place]  File Number: [id number]  Expires: 2024    Contacts:  Vicky Valdez - CM with [facility] (KING per commitment)  P: [phone]  Regina SANTIAGO CM with [facility] (KING per commitment)  P: [phone]  E: [email or web address]  [place]ber[place] - Mom (KING signed)  P: [phone] (H) [phone] (M)   Gilbert - Dad (KING signed)  P: [phone] (H)  Sandra Treadwell - Sister (KING signed)  P: [phone]  [facility]'s Office   F: [phone]  Jovana Sánchez - [facility]  P: [phone]  E: [email or web address]    Upcoming Meetings and Dates/Important Information and next steps:  CTC to coordinate with CADI CM regarding [facility] assessment

## 2023-10-12 NOTE — PROGRESS NOTES
"[facility], [place]   Psychiatric Progress Note  Hospital Day: 139        Interim History:     Patient seen and chart reviewed. Case discussed in multi-disciplinary treatment team      According to Nursing report:  Patient has been calm and can be paranoid at times and has occasional delusional comments and reports hallucinations at times. He needs substantial help with cares and feeding. No recent agitation or aggression. Minimal change since yesterday        According to Nursing notes from yesterday:    Patient presents in a calm mood and interacting with staff. He displays confusion and disorientation to situation. He ate 75% of his meals with staff assistance. He denies depression, rates anxiety at 3/10. Patient made some delusional/paranoid statements stating \"I know you will make me pay for that, just give me whatever so I don't get in trouble\". Patient is medication complaint, denies any pain or discomfort.    Patient's mood has been calm and cooperative. He continues to display confusion and disorientation to situation. He needs assistance and reminders to complete ADLs. He was able to eat 75% of his meals today with staff assistance. He denies anxiety, depression and SI/SIB. Patient is medication complaint, denies pain or discomfort         According to  :  Awaiting [facility] assessment and [facility] waiver. He will be moving to assisted living once these are in place      On interview today:  Patient denies suicidal or homicidal thoughts and denies hallucinations. Patient is not revealing delusions on interview. Patient denies side effects to medications.    He has mild confusion unchanged          ROS:Patient has no other physical complaints today.          Vital signs:  Temp: 97.4  F (36.3  C) Temp src: Temporal BP: 134/75 Pulse: 91   Resp: 16 SpO2: 97 % O2 Device: None (Room air)   Height: 175.3 cm (5' 9\") (brought forward to generate BMI) Weight: 86.6 kg (190 lb " "14.7 oz)  Estimated body mass index is 28.19 kg/m  as calculated from the following:    Height as of this encounter: 1.753 m (5' 9\").    Weight as of this encounter: 86.6 kg (190 lb 14.7 oz).         MSE:  Mental Status Exam:  Appearance: awake, alert, unkempt, on bed. No evidence of pain of acute distress.   Attitude: Somewhat cooperative, calm during interview today  Eye Contact:  fair, intense at times, better duration  Mood: \"not too bad\"  Affect:  mood congruent, irritable  Speech: mostly coherent  Psychomotor Behavior:  Ongoing bilateral arm tremor. No evidence of dystonia, or tics.  Throught Process: linear, illogical, concrete  Associations:  no loosening of associations present  Thought Content:  Delusions and AH suspected. Evidence of obsessive thinking and compulsions to harm others intermittently but not within the past few days.   Insight:  limited  Judgement:  poor  Oriented to:  self, place  Attention Span and Concentration:  fair  Recent and Remote Memory:  poor  Gait: patient lying in bed    Minimal change in mental status in the past 24 hours                    Medications:      atropine  2 drop Sublingual TID    benztropine  1 mg Oral BID    cloZAPine  650 mg Oral Daily    cyanocobalamin  1,000 mcg Oral Daily    divalproex sodium delayed-release  250 mg Oral Q8H KIKI    FLUoxetine  20 mg Oral Daily    gabapentin  300 mg Oral TID    LORazepam  0.5 mg Oral TID    melatonin  10 mg Oral At Bedtime    naproxen  250 mg Oral BID w/meals    OLANZapine zydis  15 mg Oral BID    Or    OLANZapine  10 mg Intramuscular BID    polyethylene glycol  17 g Oral BID    sennosides  2 tablet Oral BID    tamsulosin  0.4 mg Oral Daily          Allergies:     Allergies   Allergen Reactions    Bee Venom Unknown    Montelukast Unknown    Phenothiazines Unknown          Labs:       Recent Results (from the past 72 hour(s))   EKG 12-lead, complete    Collection Time: 10/10/23  9:35 AM   Result Value Ref Range    Systolic Blood " Pressure  mmHg    Diastolic Blood Pressure  mmHg    Ventricular Rate 90 BPM    Atrial Rate 90 BPM    DE Interval 146 ms    QRS Duration 162 ms     ms    QTc 528 ms    P Axis 52 degrees    R AXIS -7 degrees    T Axis 123 degrees    Interpretation ECG       Sinus rhythm  Left bundle branch block  Abnormal ECG  When compared with ECG of 03-OCT-2023 09:29,  No significant change was found                  Precautions:     Behavioral Orders   Procedures    Assault precautions    Code 1 - Restrict to Unit    Code 2    Code 2 - 1:1 Staff Supervision     For ECT only    Electroconvulsive therapy     Series of up to 12 treatments. Begin Date: 8/2/23     Treating Psychiatrist providing ECT:  unknown     Notified on:  7/28/23 via this order  NOTE: We received verbal confirmation from [facility] that Lornezo Pavon has been approved but awaiting official court order and risk management's review  Initial consult was completed by Dr. Hicks on 7/18/23    Electroconvulsive therapy     Series of up to 12 treatments. Begin Date: 8/2/23     Treating Psychiatrist providing ECT:  Dominga     Notified on:  8/2/23    Elopement precautions    Fall precautions    Occupational Therapy on the Unit     Order Specific Question:   Reason for Consult     Answer:   Eval of thought process, functional skills and behavior     Order Specific Question:   Course of Action:     Answer:   Evaluation only     Order Specific Question:   Treatment Prescription:     Answer:   CPT requested    Routine Programming     As clinically indicated    Self Injury Precaution    Status 15     Every 15 minutes.    Status Individual Observation     Patient SIO status reviewed with team/RN.  Please also refer to RN/team documentation for add'l detail.    -SIO staff to monitor following which have contributed to patient being on SIO:  Agitation  Pt is impulsive   Pt has ran out of his room naked  Pt has Parkinson symptoms place him in a high fall risk  Pt has verbal  outburst of sexual and threaten statements  Pt requires immediate redirection when masturbating    -Possible interventions SIO staff could use to support patient's treatment progress:  Engage pt in activities  - 1:1 staff to assist in eating meals and other ADL's    -When following observed, team will review discontinuation of SIO:  Absence of aggression toward others for > 24 hours    Comments: SIO 1:1     Order Specific Question:   CONTINUOUS 24 hours / day     Answer:   5 feet     Order Specific Question:   Indications for SIO     Answer:   Severe intrusiveness     Order Specific Question:   Indications for SIO     Answer:   Assault risk    Suicide precautions     Patients on Suicide Precautions should have a Combination Diet ordered that includes a Diet selection(s) AND a Behavioral Tray selection for Safe Tray - with utensils, or Safe Tray - NO utensils            Diagnoses:   Paranoid schizophrenia, chronic condition with acute exacerbation (H)    Patient Active Problem List    Diagnosis Date Noted    Mood changes 05/26/2023     Priority: Medium    Paranoid schizophrenia, chronic condition with acute exacerbation (H) 05/26/2023     Priority: Medium    Neuroleptic-induced parkinsonism  05/26/2023     Priority: Medium    Agitation 05/26/2023     Priority: Medium    Urinary retention 05/23/2023     Priority: Medium    Schizophrenia, unspecified type (H) 05/23/2023     Priority: Medium    Altered mental status, unspecified altered mental status type 05/23/2023     Priority: Medium       #Unspecified psychosis likely schizophrenia per history,  #Unspecified encephalopathy   -R/O catatonia   -Episodes of unresponsiveness, concern for PNES   #Parkinsonism 2/2 neuroleptic medications, rule out Parkinson's Disease  -rule out major neurocognitive disorder (refused to answer much of assessment)  #Urinary retention and BPH  -Possible UTI -- UC resulted on 5/25 w/out growth  #Hx of prolonged QTc with clozapine  #Mild  thrombocytopenia  #R/O OCD         Assessment and hospital summary:  Patient was admitted to psychiatric unit for safety, stabilization and medication management. He has had schizophrenia since the 1980. He was on Clozaril x 25 years, and it was tapered and discontinued on May 7, 2023  due to prolonged qtc of 527, and his psychotic  symptoms have worsened since then. Sinemet was also discontinued recently due to concerns that it was contributing to paranoia and AH. He is agitated, aggressive, dangerous to self and others. He remains on SIO 2 to 1, and is under psychiatric emergency and court hold. There are concerns for organic etiology given pattern of sundowning, history of parkinsonism, and ongoing disorientation/confusion. 6/8: EKG repeated, cardiology consult regarding safety of resuming clozapine in the context of prolonged QTc and refractory schizophrenia pending response. Per cardiology, correct QTc is no more than 460. They do not have concerns about AP retrial. Neurology IP consult placed for evaluation of sundowning and cognitive decline secondary to Sinemet discontinuation. MSSA initiated. Per Neurology, discontinuation of Sinemet would not account for these symptoms. They do not recommend retrial at this time. On 6/14, discussed complex case at length with Dr. Hatch (primary provider on geriatic unit) who provided recs (see second opinion note dated 6/14). Depakote initiated, though needed to be reduced due to side effect of thrombocytopenia. Melatonin was increased to 10 mg at bedtime. 6/22: Clozapine titration continued. Its possible that clozapine dose is contributing to worsened compulsive behaviors noted throughout hospitalization which could improve with lowering the dose. ECT initiated due to treatment refractory psychosis and ongoing symptoms despite therapeutic dose of clozapine (650 mg daily). ECT initiated on 8/2/23 and pt completed 11 total treatments, but ECT stopped on 8/25 due to lack  of improvement and distress it was causing patient.     Chart reviewed which revealed the followin2022: He was on clozapine, Seroquel, Cymbalta, and Carbidopa-levodopa and hospitalized for pneumonia. Psych consulted and Seroquel was stopped. Treated with Abx and discharged to TCU.     2022: Hospitalized at [facility]. Per chart review:    Vinod Lee is a 63 yo male with longstanding history of schizophrenia. He was admitted from [facility] ED, where he presented due to increased delusional thoughts while on a visit to his parents for Thanksgiving. He began experiencing increasing paranoid thoughts that someone might be poisoning him and began fearing that he might accidentally harm someone. He reported to his parents that he was having thoughts about hitting someone or beating someone up and told them he could not guarantee they would be safe with him. Parents encouraged patient to go to the ED, which he did voluntarily.     Per patient report and chart review, he was hospitalized several times in the  and reports he was civilly committed at one point in the , however he has been quite stable for the past several decades. He reports he will experience some paranoia and delusional thinking at times, but generally has good insight into his symptoms. He has been maintained on clozapine for about 25 years and prior to several months ago was also concurrently prescribed quetiapine.      In the last several months, patient has had a number of medication changes. Approximately 6 months ago, patient was diagnosed with parkinsonism related to antipsychotic use and was started on carbidopa-levidopa. He experienced no improvement in tremors, and thus carbidopa- levidopa dose was increased 1.5 weeks ago.      In addition, patient was medically hospitalized in 2022 for confusion, weakness, repeated falls, ongoing weight loss, and SOB. He was found to have a cavitary lesion of the lung and suspected  "aspiration pneumonia. He was treated with antibiotics. At that time, he was taking current dose of clozapine and duloxetine, as well as propranolol ER, quetiapine, gabapentin, and clonazepam. Psychiatry was consulted due to concern for oversedation, and propranolol ER, gabapentin, and quetiapine were discontinued. Conazepam was reduced from 0.5 mg TID to BID dosing and recommended to be discontinued, however, it does not appear this occurred. His sedation improved significantly. QTc prolongation was also noted, but improved throughout his stay. He was ultimately discharged to a TCU for several months before returning home. He reports his weakness has greatly improved and states he \"graduated\" physical therapy, though he continues to be diligent in completed recommended exercises.      He reports that over the past several months, his delusions and anxiety have been worse than usual, with symptoms becoming much more acute since the carbidopa-levidopa dose was increased. He has felt increasingly paranoid about being poisoned, noting this is a delusion that has been stable over time, but was previously less intrusive. He has insight during the interview that his paranoid thinking is not reality based, but states it has been harder to separate delusions from reality and he becomes very worried that he might hurt someone, despite no history of violent behavior. He reports that the paranoia about his food being poisoned in combination with the tremors in his hands have made it difficult for him to eat. He has also had quite low appetite for the past 6 months to a year . He reports that due to the combination of these factors, he has lost about 50 pounds in the last year.He denies any problems with sleep initiation or maintenance. He reports energy is fairly good and \"better than it used to be.\" He reports some short term memory issues and on interview, does have some difficulty remembering details of recent events. He is " "unsure if he has received cognitive testing in the past.    He denies any suicidal ideation and reports he has not experienced SI for decades. He denies homicidal thoughts and is very clear that he had and has no desire to harm anyone else, but was afraid he might somehow do so. He denies any hallucinations and states \"that's never been a problem for me.\" He is not observed responding to internal stimuli. He is alert and oriented in all spheres.        ========    BRIEF HOSPITAL COURSE: This 62 y.o. male admitted 11/25/2022 to  [facility] for the assessment and treatment of the above presentation. This was a voluntary admission.     In summary, he was tapered off the Sinemet, which he reports to be both ineffective and potentially worsening the anxiety and paranoia ideations. Instead Benadryl 25 mg TID was started to help with extrapyramidal side effects. He struggled with sleep during his stay here. Remeron 7.5mg QHS was tried without success. Seroquel 100mg qhs was restarted with addition of suvorexant 10mg qhs. Given his Seroquel PRN use prior history of prolonged QTC, he will benefit from another EKG repeat on the medical unit.     Unfortunately, he tested positive for influenza A and developed hypoxemia. Now on 2L oxygen with desats when prone requiring 3L oxygen. Subsequently, the plan will to be tapering him off clonazepam due to hypoxia (and also prior plan to taper). The patient tolerated these changes without side effects.     The patient minimally benefited from the structured therapeutic milieu as he attended programming seldom as he tested positive for influenza, he needed to isolate with droplet precautions. Pt was engaged and worked on issues that were triggering/brought pt to the hospital and has excellent insight into his anxiety and paranoid delusions. Pt denied suicidal ideations throughout all/majority of their hospitalization. Pt denied HI throughout all of hospitalization. There were no concerns " with behavioral disruptions/outbursts. The patient has shown improvement in general.     At the time of discharge, hospitalization course was reviewed with Vinod Lee with plan to transfer to medicine. Please consult psychiatry and I will continue to follow while patient is on the medical unit. He is now off sinemet since 11/29 21:00 and I would expect anxiety and paranoia to improve more moving forward. Psychiatrically, once anxiety and paranoia is baseline, can D/C to home once medically stable. He DOES NOT NEED A 1:1 on the medical unit from a mental health perspective, we initiated 1:1 11/30/2022 due to significant fatigue, gait instability (he was unable to stand without assist) and feeding assist.    04/2023: Clozapine was gradually tapered and discontinued, unclear reasons why it was discontinued per outside records, though Dr. Portillo's H&P indicates that it was due to prolonged QTc.         Per Neurology Recs:    Recommendations  -No current indications to change medications from a neurologic perspective  - Optimizing his psychiatric medication and treatment is currently more important than optimization of parkinsonism  - Please contact neurology if any new questions arise, or to discuss the assessment described above     Thank you for involving Neurology in the care of Vinod Lee.  Please do not hesitate to call with questions.      Kranthi Perdue MD      Seen by SLP 10/4/23:     Clinical Impression Comments Clinical Swallow Evaluation completed. Patient had no s/s aspiration and no signs of dysphagia. Oral motor function was unable to be assessed with patient declining to participate in oral mechanism exam. Mastication was timely and complete when patient took large bites. Hyolaryngeal elevation appears intact. Recommend diet of regular solids and thin liquids with staff feeding, as allowed, sitting fully upright, small bites/sips, and slow rate. No further ST is warranted at this time. Contact  SLP if any questions or concerns.     SLP Rationale for DC Rec Swallow function appears at baseline. No further Speech Therapy needs at this time.   SLP Brief overview of current status  Continue regular solids and thin liquids when sitting fully upright, with staff feeding, as accepted, small bites/sips, slow rate, and swallowing food already in his mouth prior to another bite. Continue meds in puree. Speech will complete orders.     Target psychiatric symptoms and interventions:  -Psych emergency declared on 5/27, now fully committed  - Depakote Sprinkles 250 mg TID, restarted on 8/26. Cannot increase beyond this dose due to thrombocytopenia at higher doses  -Continue clozapine as above, 650mg  -Continue scheduled Zyprexa 15mg BID  -Continue 1:1 for safety of staff and patients, reduced from 2:1 7/23/23  - weekly CBC to monitor platelets    -Continue Gabapentin 300 mg TID as staff believe it has been effective for treatment of his anxiety    Risks, benefits, and alternatives discussed at length with patient.     Acute Medical Problems and Treatments:  Rhinovirus / enterovirus URI  Concern for pneumonia  On 9/23, patient was reported to be hypoxic with an SpO2 of 89% with RR of 28, as well as had some low-grade fevers.  Associated symptoms of wet sounding cough.  COVID was negative.  Chest x-ray with possible infection versus atelectasis in RLL.  Patient was initially started on doxycycline on 9/24.  Due to concern for clinical worsening, patient was reassessed by provider on 9/25.  At that time due to patient increase drooling while on Clozaril and him laying supine for much of the day, concerns were raised for aspiration pneumonia, thus he was started on Augmentin as well.   - sputum culture if able, pending  - cont Doxycycline 100 mg twice daily for 5 days (9/24-9/28)  - cont augmentin 875-125 mg BID x 5 days (9/25-9/29)  - Albuterol inhaler as needed cough, wheezing, shortness of breath  - APAP every 4 hours as  needed fevers, pain  - I-S q shift, as tolerated     Hx cavitary lesion to SOPHY  Pulmonary nodule to SOPHY  Seen August 2022 in ED. CT chest with contrast negative for PE but showed cavitary masslike process in the left upper lung, over an area measuring 9.5 x 6.2 x 5.2 cm, indeterminant pulmonary nodule measuring 9 mm in the anterior segment of the left upper lobe, nonspecific left hilar and mediastinal lymphadenopathy, small-moderate left pleural effusion.  Cavitary lesion also seen on chest x-ray.  Patient was given IVF, Rocephin, Zithromax, and admitted for further evaluation. Thoracentesis was done and negative cultures. ID was consulted and managed Abx. The patient was initially on zosyn and doxycycline but narrowed. Strep pneumo, legionella, and HIV testing done (negative). Blood cultures were negative as well. The patient did well.  Follow-up chest CT showed near complete interval resolution of the previously described left upper lobe abscess.  Residual 12 x 10 mm spiculated nodular opacity with adjacent scarring within this region, probably represents sequela of previous infection, although underlying malignancy not excluded.  - Recommend follow-up outpatient with PCP for continued monitoring.    Groin rash  Per IM note 10/5:  Met with pt in his room within presence of pt's 1:1. Pt showed this writer his groin folds and no significant erythema or scaling noted. Pt denies groin itch. Will change miconazole powder to prn and discontinue prior wound care orders. Please continue to encourage frequent showering and washing/drying of area. Medicine signing off. Please feel free to call with questions.       Possible Dementia  Neurology consult placed on 6/8 for evaluation of sundowning and cognitive decline secondary to Sinemet discontinuation: Resuming Sinemet not recommended for behavioral concerns. Please see Neuro note for details.     Tremors  longstanding though appeared to worsen following initiation of  clozapine  - Ativan 0.5 mg TID  - Cogentin 1 mg BID added on 8/25 with overall improvement noted    Neuro re-consulted on 9/20 and per their documentation:    Impression  Mr. Lee is a 63 y.o. man with a long history of psychosis including requiring commitment and psychiatric hospitalizations in the 1980s.  Given his long history, this is less consistent with an organic cause of psychosis (as something that would be primarily neurologic in origin would be expected to have a more rapid progression of neurologic deterioration, rather than this long period of psychiatric symptoms).  I am not able to appreciate any evidence for a neurologic basis of his psychosis given the time course of his psychiatric illness.      Regarding his difficulty with word finding, I am not able to elicit this on exam.  However since he described being symptomatic earlier today, it is helpful that I am able to go through a full assessment of his language in terms of comprehension, repetition, naming objects with increasing difficulty without being able to appreciate any deficits.     He does continue to have a tremor that is most prominent posteriorly as well as with action, in addition to a chin tremor.  He also does have rigidity in the bilateral upper extremities consistent with the diagnosis of parkinsonism.  He is currently not on levodopa or dopamine agonists which I continue to agree with.  Since his psychiatric symptoms are much more bothersome right now than his parkinsonism, I will continue to recommend avoiding dopaminergic medications.     Recommendations  -No current indications to change medications from a neurologic perspective  - Optimizing his psychiatric medication and treatment is currently more important than optimization of parkinsonism  - Please contact neurology if any new questions arise, or to discuss the assessment described above     Thank you for involving Neurology in the care of Vinod Lee.  Please do not  hesitate to call with questions.      Kranthi Perdue MD      Thrombocytopenia, resolved  Likely secondary to Depakote.  According to the, incidents of Depakote induced thrombocytopenia as 27%.  Depakote dose reduced from 1000 twice daily to 250 3 times daily on 7/28/2023 with subsequent resolution of thrombocytopenia  - weekly CBCs    Constipation  Likely worsened by clozapine, improved since modifying regimen.   - daily miralax   - daily Senokot 2 tablets BID      Right first toe fracture:  Seen by Ortho on 6/16:  Per note: Vinod Lee is a 62 year old  male w/ PMHx complex psychiatric history who has a fracture to the distal phalanx of the right toe after a recent trauma to the foot the patient reports.  Patient denies any other pain or discomfort.  Images reviewed and plan will be for irrigation of the popped fracture blister with sterile saline and the toes should be dressed and a 4 x 4 gauze dressing.  Patient will need a postop shoe which she can be weightbearing as tolerated in.  Would recommend a course of antibiotics for approximately 7 days.  Would recommend Augmentin or clindamycin.  Podiatry will be made aware of patient and will see patient while he is admitted or if patient is discharged in the near future a follow-up appointment will need to be established.     Remainder of care per primary team.  Primary team should make sure that patient is up-to-date on his tetanus shot.  If not patient will require a booster shot.    Hx of prolonged QTc:  Continue weekly EKGs. See above for details.   Discussed most recent EKG findings with Cardiology on 8/25. Corrected QTc is 501 from EKG on 8/23. Per cardiology, repeat EKG today. If corrected QTc is > 500, will need to reduce clozapine. If < 500, OK to keep clozapine at current dose. UPDATE/ADDENDUM 9/6: Repeat EKG with corrected QTc of 440. No need to adjust clozapine dose per cards.     Urinary retention, resolved  Per patient care order:   If patient  "is refusing straight caths, please notify IM provider immediately to determine whether this constitutes a medical emergency. If IM declares medical emergency, may restrain patient for straight cath. Discussed this with patient's parents/substitute decision makers on 6/7 who are in support of this plan.    # Psoriasis hx  # Purplish discoloration of B/LE feet-resolved   Discussed with Dr. Rene and bedside RN regarding rash on right foot that appears and appears to be purplish in color and almost \"petechiae.\" It was noted during interview, but seemed to be resolving upon walking. Within the past 24 hrs, pt has had ECT and seemed more confused than usual. Pt seems to have had a right great toe wound with recent right great toe fracture seen by Medicine on 7/16. Seen on 8/4 with improving color on B/L legs at rest and appears to have small, scaly patches of varying coin sizes that have not grown past marked borders. See photos. Per further chart review, has had psoriasis history. WBC WNL, no fevers, HDS. This appears to more consistent with a psoriasis like picture.   - Start triamcinolone topical 0.025%   -Apply twice daily until lesions for 2 weeks or until lesions resolve/  -Monitor for skin thinning, striae, rebound lesion flares.   - Start Eucerin cream              - Apply 30 minutes PRIOR to triamcinolone topical cream above or PRN as needed if dry skin/after bathing   - Medicine will sign off.   - For worsening pain, spread, itchiness, fevers, please contact Medicine and possibly Dermatology.    Benzodiazepine dependence:  Phenobarbital taper completed shortly after patient's admission    Pertinent labs/imaging:  Weekly CBC with diff     Behavioral/Psychological/Social:  - Encourage unit programming    Safety:  - Continue precautions as noted above  - Status 15 minute checks  - Continue 1:1 for staff and pt safety    Legal Status: Fully committed with Sánchez and Lorenzo Pavon. [facility] medications: clozapine, " olanzapine, risperidone, quetiapine    Disposition Plan   Reason for ongoing admission: No safe alternative at this time  Discharge location: Accepted to  pending [facility] assessment. Will have 24/7 supervision (1:1).   Discharge Medications: not ordered  Follow-up Appointments: not scheduled      No further change in treatment plan  Patient seen, chart reviewed, case reviewed with  and with nursing.   Case reviewed in multi-disciplinary treatment team.        Jermaine Griffith MD

## 2023-10-12 NOTE — PLAN OF CARE
Problem: Sleep Disturbance  Goal: Adequate Sleep/Rest  Outcome: Progressing   Goal Outcome Evaluation:      Patient remains on SIO due to risk for self-injury related to impulsiveness and agitation. He was sleeping most of the night. He was awake at times, sitting up in bed and stares at his surroundings and goes back to sleep.     He slept for a total of 8.5 hours. No agitation and other behavioral issues noted the whole shift.

## 2023-10-13 LAB
BASO+EOS+MONOS # BLD AUTO: ABNORMAL 10*3/UL
BASO+EOS+MONOS NFR BLD AUTO: ABNORMAL %
BASOPHILS # BLD AUTO: 0.1 10E3/UL (ref 0–0.2)
BASOPHILS NFR BLD AUTO: 1 %
EOSINOPHIL # BLD AUTO: 0 10E3/UL (ref 0–0.7)
EOSINOPHIL NFR BLD AUTO: 0 %
ERYTHROCYTE [DISTWIDTH] IN BLOOD BY AUTOMATED COUNT: 15.2 % (ref 10–15)
HCT VFR BLD AUTO: 38.2 % (ref 40–53)
HGB BLD-MCNC: 12.2 G/DL (ref 13.3–17.7)
IMM GRANULOCYTES # BLD: 0.1 10E3/UL
IMM GRANULOCYTES NFR BLD: 1 %
LYMPHOCYTES # BLD AUTO: 1.4 10E3/UL (ref 0.8–5.3)
LYMPHOCYTES NFR BLD AUTO: 22 %
MCH RBC QN AUTO: 27.9 PG (ref 26.5–33)
MCHC RBC AUTO-ENTMCNC: 31.9 G/DL (ref 31.5–36.5)
MCV RBC AUTO: 87 FL (ref 78–100)
MONOCYTES # BLD AUTO: 0.9 10E3/UL (ref 0–1.3)
MONOCYTES NFR BLD AUTO: 13 %
NEUTROPHILS # BLD AUTO: 4.1 10E3/UL (ref 1.6–8.3)
NEUTROPHILS NFR BLD AUTO: 63 %
NRBC # BLD AUTO: 0 10E3/UL
NRBC BLD AUTO-RTO: 0 /100
PLATELET # BLD AUTO: 166 10E3/UL (ref 150–450)
RBC # BLD AUTO: 4.38 10E6/UL (ref 4.4–5.9)
WBC # BLD AUTO: 6.6 10E3/UL (ref 4–11)

## 2023-10-13 PROCEDURE — 250N000013 HC RX MED GY IP 250 OP 250 PS 637: Performed by: PSYCHIATRY & NEUROLOGY

## 2023-10-13 PROCEDURE — 36415 COLL VENOUS BLD VENIPUNCTURE: CPT | Performed by: PSYCHIATRY & NEUROLOGY

## 2023-10-13 PROCEDURE — 99231 SBSQ HOSP IP/OBS SF/LOW 25: CPT | Performed by: PSYCHIATRY & NEUROLOGY

## 2023-10-13 PROCEDURE — 250N000013 HC RX MED GY IP 250 OP 250 PS 637: Performed by: STUDENT IN AN ORGANIZED HEALTH CARE EDUCATION/TRAINING PROGRAM

## 2023-10-13 PROCEDURE — 124N000002 HC R&B MH UMMC

## 2023-10-13 PROCEDURE — 85025 COMPLETE CBC W/AUTO DIFF WBC: CPT | Performed by: PSYCHIATRY & NEUROLOGY

## 2023-10-13 PROCEDURE — 250N000013 HC RX MED GY IP 250 OP 250 PS 637: Performed by: PHYSICIAN ASSISTANT

## 2023-10-13 PROCEDURE — 250N000013 HC RX MED GY IP 250 OP 250 PS 637

## 2023-10-13 RX ADMIN — CLOZAPINE 650 MG: 100 TABLET, ORALLY DISINTEGRATING ORAL at 13:20

## 2023-10-13 RX ADMIN — GABAPENTIN 300 MG: 300 CAPSULE ORAL at 14:31

## 2023-10-13 RX ADMIN — LORAZEPAM 0.5 MG: 0.5 TABLET ORAL at 09:13

## 2023-10-13 RX ADMIN — CYANOCOBALAMIN TAB 1000 MCG 1000 MCG: 1000 TAB at 09:12

## 2023-10-13 RX ADMIN — SENNOSIDES 2 TABLET: 8.6 TABLET ORAL at 19:50

## 2023-10-13 RX ADMIN — POLYETHYLENE GLYCOL 3350 17 G: 17 POWDER, FOR SOLUTION ORAL at 19:51

## 2023-10-13 RX ADMIN — DIVALPROEX SODIUM 250 MG: 125 CAPSULE, COATED PELLETS ORAL at 19:50

## 2023-10-13 RX ADMIN — NAPROXEN 250 MG: 250 TABLET ORAL at 17:20

## 2023-10-13 RX ADMIN — ATROPINE SULFATE 2 DROP: 10 SOLUTION/ DROPS OPHTHALMIC at 14:32

## 2023-10-13 RX ADMIN — LORAZEPAM 0.5 MG: 0.5 TABLET ORAL at 19:50

## 2023-10-13 RX ADMIN — TAMSULOSIN HYDROCHLORIDE 0.4 MG: 0.4 CAPSULE ORAL at 10:28

## 2023-10-13 RX ADMIN — DIVALPROEX SODIUM 250 MG: 125 CAPSULE, COATED PELLETS ORAL at 06:08

## 2023-10-13 RX ADMIN — OLANZAPINE 15 MG: 10 TABLET, ORALLY DISINTEGRATING ORAL at 09:14

## 2023-10-13 RX ADMIN — GABAPENTIN 300 MG: 300 CAPSULE ORAL at 09:12

## 2023-10-13 RX ADMIN — NAPROXEN 250 MG: 250 TABLET ORAL at 09:13

## 2023-10-13 RX ADMIN — OLANZAPINE 15 MG: 10 TABLET, ORALLY DISINTEGRATING ORAL at 19:50

## 2023-10-13 RX ADMIN — ATROPINE SULFATE 2 DROP: 10 SOLUTION/ DROPS OPHTHALMIC at 19:53

## 2023-10-13 RX ADMIN — Medication 10 MG: at 19:50

## 2023-10-13 RX ADMIN — BENZTROPINE MESYLATE 1 MG: 1 TABLET ORAL at 09:13

## 2023-10-13 RX ADMIN — LORAZEPAM 0.5 MG: 0.5 TABLET ORAL at 14:31

## 2023-10-13 RX ADMIN — SENNOSIDES 2 TABLET: 8.6 TABLET ORAL at 09:13

## 2023-10-13 RX ADMIN — GABAPENTIN 300 MG: 300 CAPSULE ORAL at 19:50

## 2023-10-13 RX ADMIN — POLYETHYLENE GLYCOL 3350 17 G: 17 POWDER, FOR SOLUTION ORAL at 09:11

## 2023-10-13 RX ADMIN — FLUOXETINE 20 MG: 20 CAPSULE ORAL at 09:12

## 2023-10-13 RX ADMIN — DIVALPROEX SODIUM 250 MG: 125 CAPSULE, COATED PELLETS ORAL at 14:31

## 2023-10-13 RX ADMIN — WHITE PETROLATUM: 1.75 OINTMENT TOPICAL at 11:37

## 2023-10-13 RX ADMIN — BENZTROPINE MESYLATE 1 MG: 1 TABLET ORAL at 19:50

## 2023-10-13 RX ADMIN — ATROPINE SULFATE 2 DROP: 10 SOLUTION/ DROPS OPHTHALMIC at 09:12

## 2023-10-13 ASSESSMENT — ACTIVITIES OF DAILY LIVING (ADL)
ADLS_ACUITY_SCORE: 93

## 2023-10-13 NOTE — PROGRESS NOTES
"[facility], [place]   Psychiatric Progress Note  Hospital Day: 140        Interim History:     Patient seen and chart reviewed. Case discussed in multi-disciplinary treatment team      According to Nursing report:  Patient has been mostly cooperative. He can still has some confusion. He is discouraged about his tremor and difficulty eating. He still has some auditory hallucinations. He did attend a group yesterday and more engaged.      According to Nursing notes from yesterday:  Pt presenting with flat and blunted affect bright on approach . Pt was Isolative especially during the morning hours but ate 100% of his breakfast after much encouragement, out for lunch  and also ate 75% with assistance in feeding . Pt is medication complaint . Pt is forgetful and at time disoriented to his present situation. When asked how is he doing he stated fine with a smile across his face . He admits he was anxious but denied all other MH SX . Pt continue on a 1:1 for agitation and severe intrusiveness . Will continue POC    Pt present with a full range affect, calm moods. Endorsed visual hallucinations \" seeing fussy white balls\". Denied pain, all other mental health symptoms, and contracted for safety. Pt had some stuff packed in the brown bags because \" I am going to [place]\". Pt was reoriented about staying in the hospital and discuss issue with his treatment team in the morning, was redirectable. Isolative in his room most of the night, came out for meals, with assist of SIO staff, ate all food and had all his drinks. Continues on SIO 5 feet for severe intrusiveness and assault risk.         According to  :  Awaiting [place] assessment and [facility] waiver. He will be moving to assisted living once these are in place      On interview today:  Patient denies suicidal or homicidal thoughts but has some hallucinations. Patient is not revealing delusions on interview. He does have mild " "confusion. Patient denies side effects to medications.             ROS:Complains of tremor          Vital signs:  Temp: 97.8  F (36.6  C) Temp src: Temporal BP: 114/75 Pulse: 94   Resp: 16 SpO2: 100 % O2 Device: None (Room air)   Height: 175.3 cm (5' 9\") (brought forward to generate BMI) Weight: 86.6 kg (190 lb 14.7 oz)  Estimated body mass index is 28.19 kg/m  as calculated from the following:    Height as of this encounter: 1.753 m (5' 9\").    Weight as of this encounter: 86.6 kg (190 lb 14.7 oz).         MSE:  Mental Status Exam:  Appearance: awake, alert, unkempt, on bed. No evidence of pain of acute distress.   Attitude: Somewhat cooperative, calm during interview today  Eye Contact:  fair, intense at times, better duration  Mood: \"not too bad\"  Affect:  mood congruent, irritable  Speech: mostly coherent  Psychomotor Behavior:  Ongoing bilateral arm tremor. No evidence of dystonia, or tics.  Throught Process: linear, illogical, concrete  Associations:  no loosening of associations present  Thought Content:  Delusions and AH suspected. Evidence of obsessive thinking and compulsions to harm others intermittently but not within the past few days.   Insight:  limited  Judgement:  poor  Oriented to:  self, place  Attention Span and Concentration:  fair  Recent and Remote Memory:  poor  Gait: patient lying in bed    Minimal change in mental status in the past 24 hours                  Medications:      atropine  2 drop Sublingual TID    benztropine  1 mg Oral BID    cloZAPine  650 mg Oral Daily    cyanocobalamin  1,000 mcg Oral Daily    divalproex sodium delayed-release  250 mg Oral Q8H KIKI    FLUoxetine  20 mg Oral Daily    gabapentin  300 mg Oral TID    LORazepam  0.5 mg Oral TID    melatonin  10 mg Oral At Bedtime    naproxen  250 mg Oral BID w/meals    OLANZapine zydis  15 mg Oral BID    Or    OLANZapine  10 mg Intramuscular BID    polyethylene glycol  17 g Oral BID    sennosides  2 tablet Oral BID    tamsulosin  " 0.4 mg Oral Daily          Allergies:     Allergies   Allergen Reactions    Bee Venom Unknown    Montelukast Unknown    Phenothiazines Unknown          Labs:       Recent Results (from the past 72 hour(s))   EKG 12-lead, complete    Collection Time: 10/10/23  9:35 AM   Result Value Ref Range    Systolic Blood Pressure  mmHg    Diastolic Blood Pressure  mmHg    Ventricular Rate 90 BPM    Atrial Rate 90 BPM    CT Interval 146 ms    QRS Duration 162 ms     ms    QTc 528 ms    P Axis 52 degrees    R AXIS -7 degrees    T Axis 123 degrees    Interpretation ECG       Sinus rhythm  Left bundle branch block  Abnormal ECG  When compared with ECG of 03-OCT-2023 09:29,  No significant change was found  Confirmed by MD VIVIANE, LESLEE ([id number]) on 10/12/2023 10:28:18 PM     CBC with platelets and differential    Collection Time: 10/13/23  8:05 AM   Result Value Ref Range    WBC Count 6.6 4.0 - 11.0 10e3/uL    RBC Count 4.38 (L) 4.40 - 5.90 10e6/uL    Hemoglobin 12.2 (L) 13.3 - 17.7 g/dL    Hematocrit 38.2 (L) 40.0 - 53.0 %    MCV 87 78 - 100 fL    MCH 27.9 26.5 - 33.0 pg    MCHC 31.9 31.5 - 36.5 g/dL    RDW 15.2 (H) 10.0 - 15.0 %    Platelet Count 166 150 - 450 10e3/uL    % Neutrophils 63 %    % Lymphocytes 22 %    % Monocytes 13 %    Mids % (Monos, Eos, Basos)      % Eosinophils 0 %    % Basophils 1 %    % Immature Granulocytes 1 %    NRBCs per 100 WBC 0 <1 /100    Absolute Neutrophils 4.1 1.6 - 8.3 10e3/uL    Absolute Lymphocytes 1.4 0.8 - 5.3 10e3/uL    Absolute Monocytes 0.9 0.0 - 1.3 10e3/uL    Mids Abs (Monos, Eos, Basos)      Absolute Eosinophils 0.0 0.0 - 0.7 10e3/uL    Absolute Basophils 0.1 0.0 - 0.2 10e3/uL    Absolute Immature Granulocytes 0.1 <=0.4 10e3/uL    Absolute NRBCs 0.0 10e3/uL                Precautions:     Behavioral Orders   Procedures    Assault precautions    Code 1 - Restrict to Unit    Code 2    Code 2 - 1:1 Staff Supervision     For ECT only    Electroconvulsive therapy     Series of up to 12  treatments. Begin Date: 8/2/23     Treating Psychiatrist providing ECT:  unknown     Notified on:  7/28/23 via this order  NOTE: We received verbal confirmation from [facility] that Lorenzo Pavon has been approved but awaiting official court order and risk management's review  Initial consult was completed by Dr. Hicks on 7/18/23    Electroconvulsive therapy     Series of up to 12 treatments. Begin Date: 8/2/23     Treating Psychiatrist providing ECT:  Dominga     Notified on:  8/2/23    Elopement precautions    Fall precautions    Occupational Therapy on the Unit     Order Specific Question:   Reason for Consult     Answer:   Eval of thought process, functional skills and behavior     Order Specific Question:   Course of Action:     Answer:   Evaluation only     Order Specific Question:   Treatment Prescription:     Answer:   CPT requested    Routine Programming     As clinically indicated    Self Injury Precaution    Status 15     Every 15 minutes.    Status Individual Observation     Patient SIO status reviewed with team/RN.  Please also refer to RN/team documentation for add'l detail.    -SIO staff to monitor following which have contributed to patient being on SIO:  Agitation  Pt is impulsive   Pt has ran out of his room naked  Pt has Parkinson symptoms place him in a high fall risk  Pt has verbal outburst of sexual and threaten statements  Pt requires immediate redirection when masturbating    -Possible interventions SIO staff could use to support patient's treatment progress:  Engage pt in activities  - 1:1 staff to assist in eating meals and other ADL's    -When following observed, team will review discontinuation of SIO:  Absence of aggression toward others for > 24 hours    Comments: SIO 1:1     Order Specific Question:   CONTINUOUS 24 hours / day     Answer:   5 feet     Order Specific Question:   Indications for SIO     Answer:   Severe intrusiveness     Order Specific Question:   Indications for SIO      Answer:   Assault risk    Suicide precautions     Patients on Suicide Precautions should have a Combination Diet ordered that includes a Diet selection(s) AND a Behavioral Tray selection for Safe Tray - with utensils, or Safe Tray - NO utensils            Diagnoses:   Paranoid schizophrenia, chronic condition with acute exacerbation (H)    Patient Active Problem List    Diagnosis Date Noted    Mood changes 05/26/2023     Priority: Medium    Paranoid schizophrenia, chronic condition with acute exacerbation (H) 05/26/2023     Priority: Medium    Neuroleptic-induced parkinsonism  05/26/2023     Priority: Medium    Agitation 05/26/2023     Priority: Medium    Urinary retention 05/23/2023     Priority: Medium    Schizophrenia, unspecified type (H) 05/23/2023     Priority: Medium    Altered mental status, unspecified altered mental status type 05/23/2023     Priority: Medium       #Unspecified psychosis likely schizophrenia per history,  #Unspecified encephalopathy   -R/O catatonia   -Episodes of unresponsiveness, concern for PNES   #Parkinsonism 2/2 neuroleptic medications, rule out Parkinson's Disease  -rule out major neurocognitive disorder (refused to answer much of assessment)  #Urinary retention and BPH  -Possible UTI -- UC resulted on 5/25 w/out growth  #Hx of prolonged QTc with clozapine  #Mild thrombocytopenia  #R/O OCD         Assessment and hospital summary:  Patient was admitted to psychiatric unit for safety, stabilization and medication management. He has had schizophrenia since the 1980. He was on Clozaril x 25 years, and it was tapered and discontinued on May 7, 2023  due to prolonged qtc of 527, and his psychotic  symptoms have worsened since then. Sinemet was also discontinued recently due to concerns that it was contributing to paranoia and AH. He is agitated, aggressive, dangerous to self and others. He remains on SIO 2 to 1, and is under psychiatric emergency and court hold. There are concerns for  organic etiology given pattern of sundowning, history of parkinsonism, and ongoing disorientation/confusion. : EKG repeated, cardiology consult regarding safety of resuming clozapine in the context of prolonged QTc and refractory schizophrenia pending response. Per cardiology, correct QTc is no more than 460. They do not have concerns about AP retrial. Neurology IP consult placed for evaluation of sundowning and cognitive decline secondary to Sinemet discontinuation. MSSA initiated. Per Neurology, discontinuation of Sinemet would not account for these symptoms. They do not recommend retrial at this time. On , discussed complex case at length with Dr. Hatch (primary provider on geriatic unit) who provided recs (see second opinion note dated ). Depakote initiated, though needed to be reduced due to side effect of thrombocytopenia. Melatonin was increased to 10 mg at bedtime. : Clozapine titration continued. Its possible that clozapine dose is contributing to worsened compulsive behaviors noted throughout hospitalization which could improve with lowering the dose. ECT initiated due to treatment refractory psychosis and ongoing symptoms despite therapeutic dose of clozapine (650 mg daily). ECT initiated on 23 and pt completed 11 total treatments, but ECT stopped on  due to lack of improvement and distress it was causing patient.     Chart reviewed which revealed the followin2022: He was on clozapine, Seroquel, Cymbalta, and Carbidopa-levodopa and hospitalized for pneumonia. Psych consulted and Seroquel was stopped. Treated with Abx and discharged to TCU.     2022: Hospitalized at [facility]. Per chart review:    Vinod Lee is a 63 yo male with longstanding history of schizophrenia. He was admitted from [facility] ED, where he presented due to increased delusional thoughts while on a visit to his parents for Thanksgiving. He began experiencing increasing paranoid thoughts that  someone might be poisoning him and began fearing that he might accidentally harm someone. He reported to his parents that he was having thoughts about hitting someone or beating someone up and told them he could not guarantee they would be safe with him. Parents encouraged patient to go to the ED, which he did voluntarily.     Per patient report and chart review, he was hospitalized several times in the 1980s and reports he was civilly committed at one point in the 1980s, however he has been quite stable for the past several decades. He reports he will experience some paranoia and delusional thinking at times, but generally has good insight into his symptoms. He has been maintained on clozapine for about 25 years and prior to several months ago was also concurrently prescribed quetiapine.      In the last several months, patient has had a number of medication changes. Approximately 6 months ago, patient was diagnosed with parkinsonism related to antipsychotic use and was started on carbidopa-levidopa. He experienced no improvement in tremors, and thus carbidopa- levidopa dose was increased 1.5 weeks ago.      In addition, patient was medically hospitalized in August 2022 for confusion, weakness, repeated falls, ongoing weight loss, and SOB. He was found to have a cavitary lesion of the lung and suspected aspiration pneumonia. He was treated with antibiotics. At that time, he was taking current dose of clozapine and duloxetine, as well as propranolol ER, quetiapine, gabapentin, and clonazepam. Psychiatry was consulted due to concern for oversedation, and propranolol ER, gabapentin, and quetiapine were discontinued. Conazepam was reduced from 0.5 mg TID to BID dosing and recommended to be discontinued, however, it does not appear this occurred. His sedation improved significantly. QTc prolongation was also noted, but improved throughout his stay. He was ultimately discharged to a TCU for several months before  "returning home. He reports his weakness has greatly improved and states he \"graduated\" physical therapy, though he continues to be diligent in completed recommended exercises.      He reports that over the past several months, his delusions and anxiety have been worse than usual, with symptoms becoming much more acute since the carbidopa-levidopa dose was increased. He has felt increasingly paranoid about being poisoned, noting this is a delusion that has been stable over time, but was previously less intrusive. He has insight during the interview that his paranoid thinking is not reality based, but states it has been harder to separate delusions from reality and he becomes very worried that he might hurt someone, despite no history of violent behavior. He reports that the paranoia about his food being poisoned in combination with the tremors in his hands have made it difficult for him to eat. He has also had quite low appetite for the past 6 months to a year . He reports that due to the combination of these factors, he has lost about 50 pounds in the last year.He denies any problems with sleep initiation or maintenance. He reports energy is fairly good and \"better than it used to be.\" He reports some short term memory issues and on interview, does have some difficulty remembering details of recent events. He is unsure if he has received cognitive testing in the past.    He denies any suicidal ideation and reports he has not experienced SI for decades. He denies homicidal thoughts and is very clear that he had and has no desire to harm anyone else, but was afraid he might somehow do so. He denies any hallucinations and states \"that's never been a problem for me.\" He is not observed responding to internal stimuli. He is alert and oriented in all spheres.        ========    BRIEF HOSPITAL COURSE: This 62 y.o. male admitted 11/25/2022 to  [facility] for the assessment and treatment of the above presentation. This was a " voluntary admission.     In summary, he was tapered off the Sinemet, which he reports to be both ineffective and potentially worsening the anxiety and paranoia ideations. Instead Benadryl 25 mg TID was started to help with extrapyramidal side effects. He struggled with sleep during his stay here. Remeron 7.5mg QHS was tried without success. Seroquel 100mg qhs was restarted with addition of suvorexant 10mg qhs. Given his Seroquel PRN use prior history of prolonged QTC, he will benefit from another EKG repeat on the medical unit.     Unfortunately, he tested positive for influenza A and developed hypoxemia. Now on 2L oxygen with desats when prone requiring 3L oxygen. Subsequently, the plan will to be tapering him off clonazepam due to hypoxia (and also prior plan to taper). The patient tolerated these changes without side effects.     The patient minimally benefited from the structured therapeutic milieu as he attended programming seldom as he tested positive for influenza, he needed to isolate with droplet precautions. Pt was engaged and worked on issues that were triggering/brought pt to the hospital and has excellent insight into his anxiety and paranoid delusions. Pt denied suicidal ideations throughout all/majority of their hospitalization. Pt denied HI throughout all of hospitalization. There were no concerns with behavioral disruptions/outbursts. The patient has shown improvement in general.     At the time of discharge, hospitalization course was reviewed with Vinod Lee with plan to transfer to medicine. Please consult psychiatry and I will continue to follow while patient is on the medical unit. He is now off sinemet since 11/29 21:00 and I would expect anxiety and paranoia to improve more moving forward. Psychiatrically, once anxiety and paranoia is baseline, can D/C to home once medically stable. He DOES NOT NEED A 1:1 on the medical unit from a mental health perspective, we initiated 1:1 11/30/2022  due to significant fatigue, gait instability (he was unable to stand without assist) and feeding assist.    04/2023: Clozapine was gradually tapered and discontinued, unclear reasons why it was discontinued per outside records, though Dr. Portillo's H&P indicates that it was due to prolonged QTc.         Per Neurology Recs:    Recommendations  -No current indications to change medications from a neurologic perspective  - Optimizing his psychiatric medication and treatment is currently more important than optimization of parkinsonism  - Please contact neurology if any new questions arise, or to discuss the assessment described above     Thank you for involving Neurology in the care of Vinod Lee.  Please do not hesitate to call with questions.      Kranthi Perdue MD      Seen by SLP 10/4/23:     Clinical Impression Comments Clinical Swallow Evaluation completed. Patient had no s/s aspiration and no signs of dysphagia. Oral motor function was unable to be assessed with patient declining to participate in oral mechanism exam. Mastication was timely and complete when patient took large bites. Hyolaryngeal elevation appears intact. Recommend diet of regular solids and thin liquids with staff feeding, as allowed, sitting fully upright, small bites/sips, and slow rate. No further ST is warranted at this time. Contact SLP if any questions or concerns.     SLP Rationale for DC Rec Swallow function appears at baseline. No further Speech Therapy needs at this time.   SLP Brief overview of current status  Continue regular solids and thin liquids when sitting fully upright, with staff feeding, as accepted, small bites/sips, slow rate, and swallowing food already in his mouth prior to another bite. Continue meds in puree. Speech will complete orders.     Target psychiatric symptoms and interventions:  -Psych emergency declared on 5/27, now fully committed  - Depakote Sprinkles 250 mg TID, restarted on 8/26. Cannot increase  beyond this dose due to thrombocytopenia at higher doses  -Continue clozapine as above, 650mg  -Continue scheduled Zyprexa 15mg BID  -Continue 1:1 for safety of staff and patients, reduced from 2:1 7/23/23  - weekly CBC to monitor platelets    -Continue Gabapentin 300 mg TID as staff believe it has been effective for treatment of his anxiety    Risks, benefits, and alternatives discussed at length with patient.     Acute Medical Problems and Treatments:  Rhinovirus / enterovirus URI  Concern for pneumonia  On 9/23, patient was reported to be hypoxic with an SpO2 of 89% with RR of 28, as well as had some low-grade fevers.  Associated symptoms of wet sounding cough.  COVID was negative.  Chest x-ray with possible infection versus atelectasis in RLL.  Patient was initially started on doxycycline on 9/24.  Due to concern for clinical worsening, patient was reassessed by provider on 9/25.  At that time due to patient increase drooling while on Clozaril and him laying supine for much of the day, concerns were raised for aspiration pneumonia, thus he was started on Augmentin as well.   - sputum culture if able, pending  - cont Doxycycline 100 mg twice daily for 5 days (9/24-9/28)  - cont augmentin 875-125 mg BID x 5 days (9/25-9/29)  - Albuterol inhaler as needed cough, wheezing, shortness of breath  - APAP every 4 hours as needed fevers, pain  - I-S q shift, as tolerated     Hx cavitary lesion to SOPHY  Pulmonary nodule to SOPHY  Seen August 2022 in ED. CT chest with contrast negative for PE but showed cavitary masslike process in the left upper lung, over an area measuring 9.5 x 6.2 x 5.2 cm, indeterminant pulmonary nodule measuring 9 mm in the anterior segment of the left upper lobe, nonspecific left hilar and mediastinal lymphadenopathy, small-moderate left pleural effusion.  Cavitary lesion also seen on chest x-ray.  Patient was given IVF, Rocephin, Zithromax, and admitted for further evaluation. Thoracentesis was done  and negative cultures. ID was consulted and managed Abx. The patient was initially on zosyn and doxycycline but narrowed. Strep pneumo, legionella, and HIV testing done (negative). Blood cultures were negative as well. The patient did well.  Follow-up chest CT showed near complete interval resolution of the previously described left upper lobe abscess.  Residual 12 x 10 mm spiculated nodular opacity with adjacent scarring within this region, probably represents sequela of previous infection, although underlying malignancy not excluded.  - Recommend follow-up outpatient with PCP for continued monitoring.    Groin rash  Per IM note 10/5:  Met with pt in his room within presence of pt's 1:1. Pt showed this writer his groin folds and no significant erythema or scaling noted. Pt denies groin itch. Will change miconazole powder to prn and discontinue prior wound care orders. Please continue to encourage frequent showering and washing/drying of area. Medicine signing off. Please feel free to call with questions.       Possible Dementia  Neurology consult placed on 6/8 for evaluation of sundowning and cognitive decline secondary to Sinemet discontinuation: Resuming Sinemet not recommended for behavioral concerns. Please see Neuro note for details.     Tremors  longstanding though appeared to worsen following initiation of clozapine  - Ativan 0.5 mg TID  - Cogentin 1 mg BID added on 8/25 with overall improvement noted    Neuro re-consulted on 9/20 and per their documentation:    Impression  Mr. Lee is a 63 y.o. man with a long history of psychosis including requiring commitment and psychiatric hospitalizations in the 1980s.  Given his long history, this is less consistent with an organic cause of psychosis (as something that would be primarily neurologic in origin would be expected to have a more rapid progression of neurologic deterioration, rather than this long period of psychiatric symptoms).  I am not able to  appreciate any evidence for a neurologic basis of his psychosis given the time course of his psychiatric illness.      Regarding his difficulty with word finding, I am not able to elicit this on exam.  However since he described being symptomatic earlier today, it is helpful that I am able to go through a full assessment of his language in terms of comprehension, repetition, naming objects with increasing difficulty without being able to appreciate any deficits.     He does continue to have a tremor that is most prominent posteriorly as well as with action, in addition to a chin tremor.  He also does have rigidity in the bilateral upper extremities consistent with the diagnosis of parkinsonism.  He is currently not on levodopa or dopamine agonists which I continue to agree with.  Since his psychiatric symptoms are much more bothersome right now than his parkinsonism, I will continue to recommend avoiding dopaminergic medications.     Recommendations  -No current indications to change medications from a neurologic perspective  - Optimizing his psychiatric medication and treatment is currently more important than optimization of parkinsonism  - Please contact neurology if any new questions arise, or to discuss the assessment described above     Thank you for involving Neurology in the care of Vinod Lee.  Please do not hesitate to call with questions.      Kranthi Perdue MD      Thrombocytopenia, resolved  Likely secondary to Depakote.  According to the, incidents of Depakote induced thrombocytopenia as 27%.  Depakote dose reduced from 1000 twice daily to 250 3 times daily on 7/28/2023 with subsequent resolution of thrombocytopenia  - weekly CBCs    Constipation  Likely worsened by clozapine, improved since modifying regimen.   - daily miralax   - daily Senokot 2 tablets BID      Right first toe fracture:  Seen by Ortho on 6/16:  Per note: Vinod Lee is a 62 year old  male w/ PMHx complex psychiatric  "history who has a fracture to the distal phalanx of the right toe after a recent trauma to the foot the patient reports.  Patient denies any other pain or discomfort.  Images reviewed and plan will be for irrigation of the popped fracture blister with sterile saline and the toes should be dressed and a 4 x 4 gauze dressing.  Patient will need a postop shoe which she can be weightbearing as tolerated in.  Would recommend a course of antibiotics for approximately 7 days.  Would recommend Augmentin or clindamycin.  Podiatry will be made aware of patient and will see patient while he is admitted or if patient is discharged in the near future a follow-up appointment will need to be established.     Remainder of care per primary team.  Primary team should make sure that patient is up-to-date on his tetanus shot.  If not patient will require a booster shot.    Hx of prolonged QTc:  Continue weekly EKGs. See above for details.   Discussed most recent EKG findings with Cardiology on 8/25. Corrected QTc is 501 from EKG on 8/23. Per cardiology, repeat EKG today. If corrected QTc is > 500, will need to reduce clozapine. If < 500, OK to keep clozapine at current dose. UPDATE/ADDENDUM 9/6: Repeat EKG with corrected QTc of 440. No need to adjust clozapine dose per cards.     Urinary retention, resolved  Per patient care order:   If patient is refusing straight caths, please notify IM provider immediately to determine whether this constitutes a medical emergency. If IM declares medical emergency, may restrain patient for straight cath. Discussed this with patient's parents/substitute decision makers on 6/7 who are in support of this plan.    # Psoriasis hx  # Purplish discoloration of B/LE feet-resolved   Discussed with Dr. Rene and bedside RN regarding rash on right foot that appears and appears to be purplish in color and almost \"petechiae.\" It was noted during interview, but seemed to be resolving upon walking. Within the " past 24 hrs, pt has had ECT and seemed more confused than usual. Pt seems to have had a right great toe wound with recent right great toe fracture seen by Medicine on 7/16. Seen on 8/4 with improving color on B/L legs at rest and appears to have small, scaly patches of varying coin sizes that have not grown past marked borders. See photos. Per further chart review, has had psoriasis history. WBC WNL, no fevers, HDS. This appears to more consistent with a psoriasis like picture.   - Start triamcinolone topical 0.025%   -Apply twice daily until lesions for 2 weeks or until lesions resolve/  -Monitor for skin thinning, striae, rebound lesion flares.   - Start Eucerin cream              - Apply 30 minutes PRIOR to triamcinolone topical cream above or PRN as needed if dry skin/after bathing   - Medicine will sign off.   - For worsening pain, spread, itchiness, fevers, please contact Medicine and possibly Dermatology.    Benzodiazepine dependence:  Phenobarbital taper completed shortly after patient's admission    Pertinent labs/imaging:  Weekly CBC with diff     Behavioral/Psychological/Social:  - Encourage unit programming    Safety:  - Continue precautions as noted above  - Status 15 minute checks  - Continue 1:1 for staff and pt safety    Legal Status: Fully committed with S[facility]chez and [facility]. Pozo medications: clozapine, olanzapine, risperidone, quetiapine    Disposition Plan   Reason for ongoing admission: No safe alternative at this time  Discharge location: Accepted to  pending [facility] assessment. Will have 24/7 supervision (1:1).   Discharge Medications: not ordered  Follow-up Appointments: not scheduled      No further change in treatment plan  Patient seen, chart reviewed, case reviewed with  and with nursing.   Case reviewed in multi-disciplinary treatment team.        Jermaine Griffith MD

## 2023-10-13 NOTE — PLAN OF CARE
"Goal Outcome Evaluation:    Plan of Care Reviewed With: patient      Problem: Plan of Care - These are the overarching goals to be used throughout the patient stay.    Goal: Optimal Comfort and Wellbeing  Outcome: Progressing  Intervention: Provide Person-Centered Care  Recent Flowsheet Documentation  Taken 10/12/2023 1944 by Farida Prabhakar RN  Trust Relationship/Rapport:   care explained   choices provided   questions encouraged   questions answered   thoughts/feelings acknowledged     Pt present with a full range affect, calm moods. Endorsed visual hallucinations \" seeing fussy white balls\". Denied pain, all other mental health symptoms, and contracted for safety. Pt had some stuff packed in the brown bags because \" I am going to [place]\". Pt was reoriented about staying in the hospital and discuss issue with his treatment team in the morning, was redirectable. Isolative in his room most of the night, came out for meals, with assist of SIO staff, ate all food and had all his drinks. Continues on SIO 5 feet for severe intrusiveness and assault risk.                "

## 2023-10-13 NOTE — PLAN OF CARE
Assessment/Intervention/Current Symtoms and Care Coordination:  Reviewed chart. Met with team to review patient's current functioning, needs, progress, and impediments to discharge. Pt transferred from station 12. Pt on SIO for safety. Pt's affect is flat. Pt has placement secured, awaiting [facility] assessment for [facility] funding. Pt continues to be confused but is appropriate and redirectable.    Writer received email from Jovana from [facility] ([email or web address]) inquiring if assessment has been scheduled yet. Writer put out return email with update.    Writer spoke with [facility] 's Office to inquire about obtaining a copy of pt's commitment and meek orders. Writer was informed that it will be sent to writer via email.    Writer put out call to [facility] ([phone]) to inquire about the [facility] assessment. Writer confirmed the assessment has not yet been scheduled but a note would be put in to request it be expedited. Writer was encouraged to call back on Monday.    Writer received email from Mattie with [facility] ([email or web address].) reporting contact information for  to obtain commitment and meek paperwork.    Discharge Plan or Goal:  Will discharge to [facility] [place] [address]     Barriers to Discharge:  Discharge pending completion of [facility] assessment by [facility]     Referral Status:  Pt accepted at [facility]. For more comprehensive list of referrals see CTC note from 10/6  Pt will need psychiatry, PCP, Clozaril lab appointments to be scheduled prior to discharge  Psychiatrist - Dr. Santana at [facility]  P: [phone]   PCP - Attila Urena    Legal Status:  Committed MI with [facility] [place]  [facility]: [place]  File Number: [id number]  Expires: 2024    Contacts:  Vicky Marin CM with [facility] (KING per commitment)  P:  [phone]  Regina SANTIAGO CM with [facility] (KING per commitment)  P: [phone]  E: [email or web address]  Alberta - Mom (KING signed)  P: [phone] (H) [phone] (M)   Ino - Elliott (KING signed)  P: [phone] (H)  Sandra Treadwell - Sister (KING signed)  P: [phone]  [facility]'s Office   F: [phone]  Jovana Pozo  [facility]  P: [phone]  E: [email or web address]    Upcoming Meetings and Dates/Important Information and next steps:  CTC to coordinate with PAMELA CM regarding [facility]ho[facility] assessment   CTC to call [facility] on Monday to follow up with [facility]ho[facility] assessment  CTC to call  to acquire commitment and emek paperwork

## 2023-10-13 NOTE — PLAN OF CARE
Problem: Sleep Disturbance  Goal: Adequate Sleep/Rest  Outcome: Progressing   Goal Outcome Evaluation:     Patient slept through un,interrupted  through most of the night. He is still on SIO for risk of self-injury  and impulsiveness/  agitation. slept  a total of 7 hours and checked every 15 minutes for safety. Will continue to monitor.

## 2023-10-13 NOTE — PLAN OF CARE
"  Problem: Psychotic Symptoms  Goal: Psychotic Symptoms  Description: Signs and symptoms of listed problems will be absent or manageable.  Outcome: Not Progressing  Flowsheets (Taken 10/13/2023 1012)  Psychotic Symptoms Assessed: all  Psychotic Symptoms Present:   affect   mood   insight     Problem: Psychotic Symptoms  Goal: Social and Therapeutic (Psychotic Symptoms)  Description: Signs and symptoms of listed problems will be absent or manageable.  Recent Flowsheet Documentation  Taken 10/13/2023 1012 by Geri Boyce RN  Psychotic Symptoms Assessed: all  Psychotic Symptoms Present:   affect   mood   insight     Problem: Psychotic Signs/Symptoms  Goal: Improved Behavioral Control (Psychotic Signs/Symptoms)  Outcome: Progressing   Goal Outcome Evaluation:    Judy/Irineo NP consulted if Flomax should have a blood pressure parameter. Pt noted to be hypotensive this AM (124/67 sitting and 95/60 standing). Pt denied dizziness. No new orders. Call if patient becomes symptomatic. Flomax administered after talking with medical team.     Pt oriented to October, 2023, [place] and [place]. Pt thought the day of the week to be \"Thursday\" and was unable to state the date. P'ts affect is flat with inconsistent eye contact. Pt denies hallucinations. Pt appears calm.    Pt assisted with a shower as he reported that he had no had one for 3 days and that he had been incontinent. Pt reports last BM was yesterday and denies issues with urination. Pt denies pain.  Pt did report that he needs help eating due to the tremors. Pt declined his breakfast tray but did take 3 containers of applesauce with his AM medications as well as 240 ml of water.                           "

## 2023-10-14 PROCEDURE — 250N000013 HC RX MED GY IP 250 OP 250 PS 637: Performed by: PHYSICIAN ASSISTANT

## 2023-10-14 PROCEDURE — 250N000013 HC RX MED GY IP 250 OP 250 PS 637: Performed by: STUDENT IN AN ORGANIZED HEALTH CARE EDUCATION/TRAINING PROGRAM

## 2023-10-14 PROCEDURE — 250N000013 HC RX MED GY IP 250 OP 250 PS 637: Performed by: PSYCHIATRY & NEUROLOGY

## 2023-10-14 PROCEDURE — 124N000002 HC R&B MH UMMC

## 2023-10-14 RX ADMIN — FLUOXETINE 20 MG: 20 CAPSULE ORAL at 08:56

## 2023-10-14 RX ADMIN — SENNOSIDES 2 TABLET: 8.6 TABLET ORAL at 08:56

## 2023-10-14 RX ADMIN — DIVALPROEX SODIUM 250 MG: 125 CAPSULE, COATED PELLETS ORAL at 14:16

## 2023-10-14 RX ADMIN — OLANZAPINE 15 MG: 10 TABLET, ORALLY DISINTEGRATING ORAL at 08:56

## 2023-10-14 RX ADMIN — DIVALPROEX SODIUM 250 MG: 125 CAPSULE, COATED PELLETS ORAL at 19:51

## 2023-10-14 RX ADMIN — DIVALPROEX SODIUM 250 MG: 125 CAPSULE, COATED PELLETS ORAL at 05:58

## 2023-10-14 RX ADMIN — LORAZEPAM 0.5 MG: 0.5 TABLET ORAL at 19:51

## 2023-10-14 RX ADMIN — ATROPINE SULFATE 2 DROP: 10 SOLUTION/ DROPS OPHTHALMIC at 08:57

## 2023-10-14 RX ADMIN — POLYETHYLENE GLYCOL 3350 17 G: 17 POWDER, FOR SOLUTION ORAL at 19:52

## 2023-10-14 RX ADMIN — NAPROXEN 250 MG: 250 TABLET ORAL at 17:38

## 2023-10-14 RX ADMIN — GABAPENTIN 300 MG: 300 CAPSULE ORAL at 14:16

## 2023-10-14 RX ADMIN — CLOZAPINE 650 MG: 100 TABLET, ORALLY DISINTEGRATING ORAL at 12:47

## 2023-10-14 RX ADMIN — Medication 10 MG: at 19:51

## 2023-10-14 RX ADMIN — MICONAZOLE NITRATE: 20 POWDER TOPICAL at 19:52

## 2023-10-14 RX ADMIN — LORAZEPAM 0.5 MG: 0.5 TABLET ORAL at 14:16

## 2023-10-14 RX ADMIN — GABAPENTIN 300 MG: 300 CAPSULE ORAL at 19:51

## 2023-10-14 RX ADMIN — GABAPENTIN 300 MG: 300 CAPSULE ORAL at 08:56

## 2023-10-14 RX ADMIN — MICONAZOLE NITRATE: 20 POWDER TOPICAL at 09:08

## 2023-10-14 RX ADMIN — TAMSULOSIN HYDROCHLORIDE 0.4 MG: 0.4 CAPSULE ORAL at 08:55

## 2023-10-14 RX ADMIN — BENZTROPINE MESYLATE 1 MG: 1 TABLET ORAL at 19:51

## 2023-10-14 RX ADMIN — SENNOSIDES 2 TABLET: 8.6 TABLET ORAL at 19:51

## 2023-10-14 RX ADMIN — NAPROXEN 250 MG: 250 TABLET ORAL at 08:56

## 2023-10-14 RX ADMIN — CYANOCOBALAMIN TAB 1000 MCG 1000 MCG: 1000 TAB at 08:55

## 2023-10-14 RX ADMIN — OLANZAPINE 15 MG: 10 TABLET, ORALLY DISINTEGRATING ORAL at 19:51

## 2023-10-14 RX ADMIN — BENZTROPINE MESYLATE 1 MG: 1 TABLET ORAL at 08:57

## 2023-10-14 RX ADMIN — LORAZEPAM 0.5 MG: 0.5 TABLET ORAL at 08:57

## 2023-10-14 ASSESSMENT — ACTIVITIES OF DAILY LIVING (ADL)
ADLS_ACUITY_SCORE: 93
HYGIENE/GROOMING: PROMPTS;WITH ASSISTANCE
ORAL_HYGIENE: INDEPENDENT
ADLS_ACUITY_SCORE: 93
ADLS_ACUITY_SCORE: 92
HYGIENE/GROOMING: PROMPTS;WITH ASSISTANCE
ADLS_ACUITY_SCORE: 93
ADLS_ACUITY_SCORE: 93
ADLS_ACUITY_SCORE: 92
ADLS_ACUITY_SCORE: 93
ADLS_ACUITY_SCORE: 92
ADLS_ACUITY_SCORE: 93

## 2023-10-14 NOTE — PLAN OF CARE
"  Problem: Adult Behavioral Health Plan of Care  Goal: Adheres to Safety Considerations for Self and Others  Intervention: Develop and Maintain Individualized Safety Plan  Recent Flowsheet Documentation  Taken 10/14/2023 1140 by Geri Boyce RN  Safety Measures:   environmental rounds completed   suicide check-in completed   safety rounds completed     Problem: Adult Behavioral Health Plan of Care  Goal: Develops/Participates in Therapeutic [place] to Support Successful Transition  Intervention: Foster Therapeutic [place]  Recent Flowsheet Documentation  Taken 10/14/2023 1140 by Geri Boyce RN  Trust Relationship/Rapport:   care explained   choices provided   emotional support provided   empathic listening provided   questions answered   questions encouraged   reassurance provided   thoughts/feelings acknowledged     Problem: Psychotic Symptoms  Goal: Psychotic Symptoms  Description: Signs and symptoms of listed problems will be absent or manageable.  Outcome: Progressing  Flowsheets (Taken 10/14/2023 1215)  Psychotic Symptoms Assessed: all  Psychotic Symptoms Present:   mood   affect   insight     Problem: Psychotic Signs/Symptoms  Goal: Improved Psychomotor Symptoms (Psychotic Signs/Symptoms)  Intervention: Manage Psychomotor Movement  Recent Flowsheet Documentation  Taken 10/14/2023 1140 by Geri Boyce RN  Patient Performed Hygiene: dressed  Diversional Activity: television  Activity (Behavioral Health): up ad jose   Goal Outcome Evaluation:    Plan of Care Reviewed With: patient      Pt pleasant during interactions. Pt is withdrawn and tends to isolate to his room. Pt declined breakfast and when it was brought to his room he declined to eat. Pt did take a container of applesauce with AM medications. Pt declined 75% of his miralax this AM and did not want any other fluids.   Pt oriented to \"[place]\", \"October\",\"2023\", \"[place]\" and \"[place]\". Pt thought the day of the week was \"Thursday " "or Friday\" and unable to state the date. Pt does admit to feeling confused at times. Pt reports that he doesn't think he will ever leave here. Pt's affect is flat and he is engaged during conversations.   Pt came out late morning and sat in the rocking chair and watched TV.  Pt continues to have a bilateral hand tremor and has a very hard time eating. Pt needs SBA with eating and drinking.   Pt continues to use sublingual atropine drops. Pt denies mouth/throat dryness. Pt has not been noted to be drooling.     Pt complained of a \"itchy scrotum\". Pt was assisted with alejandro care and miconozole powder was applied.                  "

## 2023-10-14 NOTE — PLAN OF CARE
Problem: Sleep Disturbance  Goal: Adequate Sleep/Rest  Outcome: Progressing    Patient slept approximately 8.5 hours. Safety checks and precautions in place. Continues on 1:1. No prn given or requested. Scheduled medication given this morning. Will continue to monitor patient as needed for the rest of the shift.

## 2023-10-14 NOTE — PLAN OF CARE
"Patient spent most of the evening in his room.  He briefly came out to the lounge but did not stay for long, he went back into his room. He remains on SIO 1:1 to assist with eating meals and other ADLs. Patient's mood was calm with a flat affect. Patient did not eat much of his dinner. Drank half of his ensure. He consumed total of 3 pudding this shift. He was medication compliant. No episodes of incontinence noted. Patient did not complain of pain or any discomfort.Calm and cooperative with staff. /69 (BP Location: Right arm, Patient Position: Sitting)   Pulse 99   Temp 97.7  F (36.5  C) (Temporal)   Resp 16   Ht 1.753 m (5' 9\")   Wt 86.6 kg (190 lb 14.7 oz)   SpO2 97%   BMI 28.19 kg/m             "

## 2023-10-15 PROCEDURE — 250N000013 HC RX MED GY IP 250 OP 250 PS 637: Performed by: PSYCHIATRY & NEUROLOGY

## 2023-10-15 PROCEDURE — 250N000013 HC RX MED GY IP 250 OP 250 PS 637: Performed by: STUDENT IN AN ORGANIZED HEALTH CARE EDUCATION/TRAINING PROGRAM

## 2023-10-15 PROCEDURE — 250N000009 HC RX 250: Performed by: PSYCHIATRY & NEUROLOGY

## 2023-10-15 PROCEDURE — 124N000002 HC R&B MH UMMC

## 2023-10-15 PROCEDURE — 250N000013 HC RX MED GY IP 250 OP 250 PS 637: Performed by: PHYSICIAN ASSISTANT

## 2023-10-15 RX ADMIN — GABAPENTIN 300 MG: 300 CAPSULE ORAL at 20:33

## 2023-10-15 RX ADMIN — GABAPENTIN 300 MG: 300 CAPSULE ORAL at 14:07

## 2023-10-15 RX ADMIN — LORAZEPAM 0.5 MG: 0.5 TABLET ORAL at 14:07

## 2023-10-15 RX ADMIN — POLYETHYLENE GLYCOL 3350 17 G: 17 POWDER, FOR SOLUTION ORAL at 20:34

## 2023-10-15 RX ADMIN — TAMSULOSIN HYDROCHLORIDE 0.4 MG: 0.4 CAPSULE ORAL at 09:27

## 2023-10-15 RX ADMIN — DIVALPROEX SODIUM 250 MG: 125 CAPSULE, COATED PELLETS ORAL at 14:06

## 2023-10-15 RX ADMIN — SENNOSIDES 2 TABLET: 8.6 TABLET ORAL at 20:33

## 2023-10-15 RX ADMIN — CYANOCOBALAMIN TAB 1000 MCG 1000 MCG: 1000 TAB at 09:28

## 2023-10-15 RX ADMIN — DIVALPROEX SODIUM 250 MG: 125 CAPSULE, COATED PELLETS ORAL at 06:43

## 2023-10-15 RX ADMIN — BENZTROPINE MESYLATE 1 MG: 1 TABLET ORAL at 20:33

## 2023-10-15 RX ADMIN — OLANZAPINE 15 MG: 10 TABLET, ORALLY DISINTEGRATING ORAL at 20:33

## 2023-10-15 RX ADMIN — OLANZAPINE 15 MG: 10 TABLET, ORALLY DISINTEGRATING ORAL at 09:27

## 2023-10-15 RX ADMIN — LORAZEPAM 0.5 MG: 0.5 TABLET ORAL at 09:28

## 2023-10-15 RX ADMIN — NAPROXEN 250 MG: 250 TABLET ORAL at 17:31

## 2023-10-15 RX ADMIN — GABAPENTIN 300 MG: 300 CAPSULE ORAL at 09:28

## 2023-10-15 RX ADMIN — SENNOSIDES 2 TABLET: 8.6 TABLET ORAL at 09:26

## 2023-10-15 RX ADMIN — ATROPINE SULFATE 2 DROP: 10 SOLUTION/ DROPS OPHTHALMIC at 13:04

## 2023-10-15 RX ADMIN — CLOZAPINE 650 MG: 100 TABLET, ORALLY DISINTEGRATING ORAL at 12:27

## 2023-10-15 RX ADMIN — NAPROXEN 250 MG: 250 TABLET ORAL at 09:26

## 2023-10-15 RX ADMIN — DIVALPROEX SODIUM 250 MG: 125 CAPSULE, COATED PELLETS ORAL at 20:33

## 2023-10-15 RX ADMIN — BENZTROPINE MESYLATE 1 MG: 1 TABLET ORAL at 09:26

## 2023-10-15 RX ADMIN — FLUOXETINE 20 MG: 20 CAPSULE ORAL at 09:28

## 2023-10-15 RX ADMIN — MICONAZOLE NITRATE: 20 POWDER TOPICAL at 09:33

## 2023-10-15 RX ADMIN — Medication 10 MG: at 20:33

## 2023-10-15 RX ADMIN — ATROPINE SULFATE 2 DROP: 10 SOLUTION/ DROPS OPHTHALMIC at 20:35

## 2023-10-15 RX ADMIN — POLYETHYLENE GLYCOL 3350 17 G: 17 POWDER, FOR SOLUTION ORAL at 09:24

## 2023-10-15 RX ADMIN — LORAZEPAM 0.5 MG: 0.5 TABLET ORAL at 20:33

## 2023-10-15 ASSESSMENT — ACTIVITIES OF DAILY LIVING (ADL)
DRESS: SCRUBS (BEHAVIORAL HEALTH)
ADLS_ACUITY_SCORE: 81
ADLS_ACUITY_SCORE: 92
ADLS_ACUITY_SCORE: 92
HYGIENE/GROOMING: PROMPTS;WITH ASSISTANCE
ADLS_ACUITY_SCORE: 81
ADLS_ACUITY_SCORE: 92
ADLS_ACUITY_SCORE: 81
ADLS_ACUITY_SCORE: 92
LAUNDRY: UNABLE TO COMPLETE
ADLS_ACUITY_SCORE: 81
ADLS_ACUITY_SCORE: 81
ADLS_ACUITY_SCORE: 92

## 2023-10-15 NOTE — PLAN OF CARE
Mostly withdrawn and isolative into his room, avoids social contact. He came out for supper and ate >90% of his food with staff assistance due to hand tremors. He was medication compliant, did not receive atropine drops (medication not available from pharmacy at this time.). He denied SI/SIB/AVH. Scrotum/groin area red/moist, alejandro care completed and miconazole powder applied and was given assisted to change his underwear. Patient denied pain. His VSS.

## 2023-10-15 NOTE — PLAN OF CARE
Problem: Sleep Disturbance  Goal: Adequate Sleep/Rest  Outcome: Progressing   Goal Outcome Evaluation:    Patient remains on SIO due to risk for self-injury related to impulsiveness and agitation.Did not get up to void even once during the shift.      Slept for a total of  7.0 hours. No agitation and other behavioral issues noted the whole shift.

## 2023-10-15 NOTE — PLAN OF CARE
"  Problem: Seclusion/Restraint, Behavioral  Goal: Absence of Harm or Injury  Intervention: Protect Skin and Joint Integrity  Recent Flowsheet Documentation  Taken 10/15/2023 1133 by Geri Boyce RN  Body Position: position changed independently     Problem: Psychotic Signs/Symptoms  Goal: Improved Psychomotor Symptoms (Psychotic Signs/Symptoms)  Intervention: Manage Psychomotor Movement  Recent Flowsheet Documentation  Taken 10/15/2023 1133 by Geri Boyce RN  Patient Performed Hygiene: (alejandro care completed) --  Activity (Behavioral Health):   up ad jose   activity encouraged     Problem: Behavioral Disturbance  Goal: Behavioral Disturbance  Description: Signs and symptoms of listed problems will be absent or manageable by discharge or transition of care.  Outcome: Progressing  Flowsheets (Taken 10/15/2023 5560)  Behavioral Disturbance Assessed: all  Behavioral Disturbance Present:   mood   affect   insight   memory deficits   medication sensitivity   orientation   thought process     Problem: Oral Intake Inadequate  Goal: Improved Oral Intake  Outcome: Progressing   Goal Outcome Evaluation:    Plan of Care Reviewed With: patient      Pt has been pleasant during interactions with writer. Pt cooperative with writers requests. Pt's affect is flat with appropriate eye contact. Pt is isolative and declined to go to group this AM. Pt is not social with peers when he is in the lounge. Pt oriented to [place] and October this AM. Pt has made reference to being in trouble due to talking about \"the cats last night\". Pt was reassured that he is not in trouble. Pt denies SI/SIB/wishes to be dead. Pt did make a comment to female PA who was sitting with him that she was in danger. When she inquired why he thought she was in danger he told her that he was having thoughts of strangling her. PA informed coworker and another staff sat with patient.  Pt continues to have hand/arm tremors that prevent him from eating " "independently. Pt has a hard time even when he is using his weighted silverware and wrist weight. Pt ate 100% of his breakfast with assistance and 30% of his lunch. Pt also needs assistance with drinking fluids due to tremor.   Pt denies pain.  Pt is medication compliant. Pt has made comments about believing that he has been given \"poison at times\". Pt take his medications whole in applesauce, pudding or yogurt.    Pt assisted with alejandro care this AM. Pt continues to have a reddened groin area. Prn miconazole powder applied.     Pt noted to be having some coughing this afternoon. Pt was given his 1400 dose of atropine sublingual drops at 1300 due to increased saliva production. This did seem to help with decreasing patients cough.                "

## 2023-10-16 PROCEDURE — 250N000013 HC RX MED GY IP 250 OP 250 PS 637: Performed by: STUDENT IN AN ORGANIZED HEALTH CARE EDUCATION/TRAINING PROGRAM

## 2023-10-16 PROCEDURE — 250N000013 HC RX MED GY IP 250 OP 250 PS 637: Performed by: PHYSICIAN ASSISTANT

## 2023-10-16 PROCEDURE — 250N000013 HC RX MED GY IP 250 OP 250 PS 637: Performed by: PSYCHIATRY & NEUROLOGY

## 2023-10-16 PROCEDURE — 124N000002 HC R&B MH UMMC

## 2023-10-16 RX ORDER — VITAMIN B COMPLEX
25 TABLET ORAL DAILY
Status: DISCONTINUED | OUTPATIENT
Start: 2023-10-16 | End: 2023-11-13 | Stop reason: HOSPADM

## 2023-10-16 RX ORDER — PRAZOSIN HYDROCHLORIDE 1 MG/1
1 CAPSULE ORAL AT BEDTIME
Status: DISCONTINUED | OUTPATIENT
Start: 2023-10-16 | End: 2023-11-13 | Stop reason: HOSPADM

## 2023-10-16 RX ORDER — AMANTADINE HYDROCHLORIDE 100 MG/1
100 CAPSULE, GELATIN COATED ORAL DAILY
Status: DISCONTINUED | OUTPATIENT
Start: 2023-10-16 | End: 2023-10-18

## 2023-10-16 RX ADMIN — LORAZEPAM 0.5 MG: 0.5 TABLET ORAL at 14:40

## 2023-10-16 RX ADMIN — CYANOCOBALAMIN TAB 1000 MCG 1000 MCG: 1000 TAB at 08:42

## 2023-10-16 RX ADMIN — ATROPINE SULFATE 2 DROP: 10 SOLUTION/ DROPS OPHTHALMIC at 14:41

## 2023-10-16 RX ADMIN — LORAZEPAM 0.5 MG: 0.5 TABLET ORAL at 08:43

## 2023-10-16 RX ADMIN — DIVALPROEX SODIUM 250 MG: 125 CAPSULE, COATED PELLETS ORAL at 14:39

## 2023-10-16 RX ADMIN — POLYETHYLENE GLYCOL 3350 17 G: 17 POWDER, FOR SOLUTION ORAL at 08:44

## 2023-10-16 RX ADMIN — PRAZOSIN HYDROCHLORIDE 1 MG: 1 CAPSULE ORAL at 21:29

## 2023-10-16 RX ADMIN — BENZTROPINE MESYLATE 1 MG: 1 TABLET ORAL at 20:11

## 2023-10-16 RX ADMIN — AMANTADINE HYDROCHLORIDE 100 MG: 100 CAPSULE ORAL at 12:31

## 2023-10-16 RX ADMIN — GABAPENTIN 300 MG: 300 CAPSULE ORAL at 20:11

## 2023-10-16 RX ADMIN — DIVALPROEX SODIUM 250 MG: 125 CAPSULE, COATED PELLETS ORAL at 20:11

## 2023-10-16 RX ADMIN — BENZTROPINE MESYLATE 1 MG: 1 TABLET ORAL at 08:41

## 2023-10-16 RX ADMIN — OLANZAPINE 15 MG: 10 TABLET, ORALLY DISINTEGRATING ORAL at 08:43

## 2023-10-16 RX ADMIN — Medication 25 MCG: at 12:31

## 2023-10-16 RX ADMIN — GABAPENTIN 300 MG: 300 CAPSULE ORAL at 08:41

## 2023-10-16 RX ADMIN — NAPROXEN 250 MG: 250 TABLET ORAL at 17:20

## 2023-10-16 RX ADMIN — MICONAZOLE NITRATE: 20 POWDER TOPICAL at 08:49

## 2023-10-16 RX ADMIN — Medication 10 MG: at 20:11

## 2023-10-16 RX ADMIN — DIVALPROEX SODIUM 250 MG: 125 CAPSULE, COATED PELLETS ORAL at 06:25

## 2023-10-16 RX ADMIN — NAPROXEN 250 MG: 250 TABLET ORAL at 08:41

## 2023-10-16 RX ADMIN — SENNOSIDES 2 TABLET: 8.6 TABLET ORAL at 20:15

## 2023-10-16 RX ADMIN — FLUOXETINE 20 MG: 20 CAPSULE ORAL at 08:41

## 2023-10-16 RX ADMIN — LORAZEPAM 0.5 MG: 0.5 TABLET ORAL at 20:10

## 2023-10-16 RX ADMIN — OLANZAPINE 15 MG: 10 TABLET, ORALLY DISINTEGRATING ORAL at 20:11

## 2023-10-16 RX ADMIN — POLYETHYLENE GLYCOL 3350 17 G: 17 POWDER, FOR SOLUTION ORAL at 20:10

## 2023-10-16 RX ADMIN — ATROPINE SULFATE 2 DROP: 10 SOLUTION/ DROPS OPHTHALMIC at 08:48

## 2023-10-16 RX ADMIN — TAMSULOSIN HYDROCHLORIDE 0.4 MG: 0.4 CAPSULE ORAL at 08:41

## 2023-10-16 RX ADMIN — CLOZAPINE 650 MG: 200 TABLET ORAL at 12:31

## 2023-10-16 RX ADMIN — GABAPENTIN 300 MG: 300 CAPSULE ORAL at 14:40

## 2023-10-16 RX ADMIN — SENNOSIDES 2 TABLET: 8.6 TABLET ORAL at 08:42

## 2023-10-16 ASSESSMENT — ACTIVITIES OF DAILY LIVING (ADL)
ADLS_ACUITY_SCORE: 91
ADLS_ACUITY_SCORE: 81
ADLS_ACUITY_SCORE: 91
HYGIENE/GROOMING: PROMPTS;INDEPENDENT
ADLS_ACUITY_SCORE: 91
ADLS_ACUITY_SCORE: 91
ORAL_HYGIENE: PROMPTS
ADLS_ACUITY_SCORE: 91
ADLS_ACUITY_SCORE: 81
LAUNDRY: UNABLE TO COMPLETE
ADLS_ACUITY_SCORE: 91
ADLS_ACUITY_SCORE: 81
HYGIENE/GROOMING: PROMPTS
ADLS_ACUITY_SCORE: 91
ADLS_ACUITY_SCORE: 81
DRESS: SCRUBS (BEHAVIORAL HEALTH);PROMPTS
ADLS_ACUITY_SCORE: 81

## 2023-10-16 NOTE — PLAN OF CARE
Assessment/Intervention/Current Symtoms and Care Coordination:  Reviewed chart. Met with team to review patient's current functioning, needs, progress, and impediments to discharge. Pt transferred from station 12. Pt on SIO for safety. Pt's affect is flat. Pt has placement secured, awaiting [facility] assessment for [facility] funding. Pt continues to be confused but is appropriate and redirectable. Pt continues to experience tremor, OT ordered weighted cups and bowl to assist with this.      put out call to [facility] ([phone]) to inquire about the [facility]hoices assessment. Kristinar was informed that a supervisor will reach out to  to provide update. Writer confirmed no assessment has been scheduled as of yet.     received email from [facility] with [employer] [facility] ([email or web address]) requesting update with assessment. Kristinar put out return email relaying update with updated progress notes attached.     received email from Sandra with [facility] ([email or web address] requesting to schedule a M[facility]hoices assessment next week. Sandra requests writer, pt, and  CM be present. Kristinar to contact Vicky to inquire which dates she is available.     spoke with Vicky ([phone]) to inquire which dates she would be available for in-person [facility]hoices assessment.  and Vicky confirmed 10/27 at 10:00am works for all involved.     Kristinar put out email to Sandra scheduling [facility]hoices assessment for 10/27 at 10:00am.     put out update email to [facility].    Discharge Plan or Goal:  Will discharge to [facility] [place] [address]     Barriers to Discharge:  Discharge pending completion of MNChoices assessment and negotiation process with [facility]. [facility]hoices assessment scheduled for 10/27 at 10:00am    Referral Status:  Pt accepted at [facility]. For more comprehensive list of referrals see CTC note from 10/6  Pt will need psychiatry, PCP, Clozaril lab  appointments to be scheduled prior to discharge  Psychiatrist - Dr. Santana at [facility]  P: [phone]   PCP - Attila Urena    Legal Status:  Committed MI with ITP  [facility]: [place]  File Number: [id number]  Expires: 01/06/2024    Contacts:  Vicky Valdez - CM with [facility] (KING per commitment)  P: [phone]  Regina Wong - PAMELA CM with [facility] (KING per commitment)  P: [phone]  E: [email or web address]  Alberta - Mom (KING signed)  P: [phone] (H) [phone] (M)   Ino - Dad (KING signed)  P: [phone] (H)  Sandra Treadwell - Sister (KING signed)  P: [phone]  [facility]'s Office   F: [phone]  Jovana Sánchez - Rebeca Morris  P: [phone]  E: [email or web address]    Upcoming Meetings and Dates/Important Information and next steps:  CTC to coordinate with LATONIAI CM and Jovana regarding placement needs  CTC to call  to acquire commitment and meek paperwork  Pt has MNChoices assessment on 10/27 at 10:00am

## 2023-10-16 NOTE — PLAN OF CARE
"  Problem: Adult Behavioral Health Plan of Care  Goal: Adheres to Safety Considerations for Self and Others  Intervention: Develop and Maintain Individualized Safety Plan  Recent Flowsheet Documentation  Taken 10/16/2023 0900 by Geri Boyce RN  Safety Measures:   environmental rounds completed   suicide assessment completed   safety rounds completed     Problem: Psychotic Signs/Symptoms  Goal: Improved Psychomotor Symptoms (Psychotic Signs/Symptoms)  Intervention: Manage Psychomotor Movement  Recent Flowsheet Documentation  Taken 10/16/2023 0900 by Geri Boyce RN  Patient Performed Hygiene: (alejandro care completed) --  Activity (Behavioral Health):   up ad jose   activity encouraged     Problem: Plan of Care - These are the overarching goals to be used throughout the patient stay.    Goal: Absence of Hospital-Acquired Illness or Injury  Intervention: Prevent Skin Injury  Recent Flowsheet Documentation  Taken 10/16/2023 0900 by Geri Boyce RN  Body Position: position changed independently     Problem: Suicide Risk  Goal: Absence of Self-Harm  Outcome: Progressing     Problem: Oral Intake Inadequate  Goal: Improved Oral Intake  Outcome: Progressing   Goal Outcome Evaluation:    Plan of Care Reviewed With: patient      Pt has been calm and cooperative during interactions. Pt is oriented to person, October, 2023 and [facility]. Pt thought the day of the week may be Thursday or Friday. Pt denies depression/anxiety/SI/SIB. Pt does admit to having thoughts that he wishes he was dead so he would be \"out of this misery\". Pt denies plan or intent to harm himself. Pt's affect is flat with inconsistent eye contact.   Pt denies pain.  Pt continues to have bilateral arm tremors. Pt needs assistance with eating and cares due to temor. Pt ate 2 chocolate puddings and an apple juice for breakfast. Pt took a vanilla yogurt with AM medications and 300 ml of water with AM miralax. Pt reported that he believes he " had a BM yesterday.  Pt reports that he is urinating and denies abdominal pain or discomfort.   Pt isolative to his room. Pt did come out for lunch and ate 100% with assistance. Fluids encouraged.     Pt continues to have a reddened groin. Pt was assisted with alejandro care and miconazole powder was applied.

## 2023-10-16 NOTE — PROGRESS NOTES
"Patient seen, chart reviewed, care discussed with staff.    Blood pressure (!) 143/84, pulse 88, temperature 97.5  F (36.4  C), temperature source Temporal, resp. rate 18, height 1.753 m (5' 9\"), weight 84.9 kg (187 lb 2.7 oz), SpO2 97%.    Eating and drinking ok, had thoughts of strangling staff and disclosed this instead of acting on it.  Scored low on visual spatial on cognitive testing    General appearance: fair  Alert.   Affect: fair  Mood: fair    Speech:  normal.   Eye contact:  good.    Psychomotor behavior: moderate parkinsonian tremor and cogwheeling  Gait: steady   Abnormal movements:  none  Delusions: present, believes he is being poisoned  Hallucinations:  Hears one voice, debates with him.  Thoughts: linear  Associations: intact  Judgement: poor  Insight: poor  Cognitions: intact in conversation  Memory:  intact in conversation  Orientation: uncertain  Not suicidal.    Summary from Dr. Melvin's note:  He was on Clozaril x 25 years, and it was tapered and discontinued on May 7, 2023  due to prolonged qtc of 527, and his psychotic  symptoms have worsened since then. Sinemet was also discontinued recently due to concerns that it was contributing to paranoia and AH. He is agitated, aggressive, dangerous to self and others. He remains on SIO 2 to 1, and is under psychiatric emergency and court hold. There are concerns for organic etiology given pattern of sundowning, history of parkinsonism, and ongoing disorientation/confusion. 6/8: EKG repeated, cardiology consult regarding safety of resuming clozapine in the context of prolonged QTc and refractory schizophrenia pending response. Per cardiology, correct QTc is no more than 460. They do not have concerns about AP retrial. Neurology IP consult placed for evaluation of sundowning and cognitive decline secondary to Sinemet discontinuation. MSSA initiated. Per Neurology, discontinuation of Sinemet would not account for these symptoms. They do not " recommend retrial at this time. On 6/14, discussed complex case at length with Dr. Hatch (primary provider on geriatic unit) who provided recs (see second opinion note dated 6/14). Depakote initiated, though needed to be reduced due to side effect of thrombocytopenia. Melatonin was increased to 10 mg at bedtime. 6/22: Clozapine titration continued. Its possible that clozapine dose is contributing to worsened compulsive behaviors noted throughout hospitalization which could improve with lowering the dose. ECT initiated due to treatment refractory psychosis and ongoing symptoms despite therapeutic dose of clozapine (650 mg daily). ECT initiated on 8/2/23 and pt completed 11 total treatments, but ECT stopped on 8/25 due to lack of improvement and distress it was causing patient.   Imp:  Paranoid schizophrenia chronic with acute exacerbation   Neuroleptic induced Parkinson's disease   And:   Patient Active Problem List   Diagnosis    Urinary retention    Schizophrenia, unspecified type (H)    Altered mental status, unspecified altered mental status type    Mood changes    Paranoid schizophrenia, chronic condition with acute exacerbation (H)    Neuroleptic-induced parkinsonism     Agitation   Prolonged QTC    Plan:  Cautious use of Amantadine in the hope it would allow less cogentin in view of cognitive disorder. He was woese with Sinemet.  2.  Olanzapine level  3.  Update Depakote labs (note low platelets previously on higher dose)  4.  In view of agitation, aggression history, and dementia likely, will add Prazosin to decrease potential for violence.    Current Facility-Administered Medications   Medication    acetaminophen (TYLENOL) tablet 650 mg    albuterol (PROVENTIL HFA/VENTOLIN HFA) inhaler    amantadine (SYMMETREL) capsule 100 mg    atropine 1 % ophthalmic solution 1 drop    atropine 1 % sublingual (ophthalmic drops for sublingual use) 2 drop    benzonatate (TESSALON) capsule 100 mg    benztropine (COGENTIN)  tablet 1 mg    bisacodyl (DULCOLAX) suppository 10 mg    cloZAPine (FAZACLO) ODT tab 650 mg    cyanocobalamin (VITAMIN B-12) tablet 1,000 mcg    haloperidol (HALDOL) tablet 5 mg    And    diphenhydrAMINE (BENADRYL) capsule 50 mg    haloperidol lactate (HALDOL) injection 5 mg    And    diphenhydrAMINE (BENADRYL) injection 50 mg    divalproex sodium delayed-release (DEPAKOTE SPRINKLE) DR capsule 250 mg    FLUoxetine (PROzac) capsule 20 mg    gabapentin (NEURONTIN) capsule 100 mg    gabapentin (NEURONTIN) capsule 300 mg    lidocaine (XYLOCAINE) 2 % external gel    LORazepam (ATIVAN) tablet 0.5 mg    melatonin tablet 10 mg    miconazole (MICATIN) 2 % powder    mineral oil-white petrolatum (EUCERIN/MINERIN) cream    naproxen (NAPROSYN) tablet 250 mg    OLANZapine zydis (zyPREXA) ODT tab 15 mg    Or    OLANZapine (zyPREXA) injection 10 mg    OLANZapine (zyPREXA) tablet 5-10 mg    Or    OLANZapine (zyPREXA) injection 5-10 mg    polyethylene glycol (MIRALAX) Packet 17 g    senna-docusate (SENOKOT-S/PERICOLACE) 8.6-50 MG per tablet 1 tablet    sennosides (SENOKOT) tablet 2 tablet    simethicone (MYLICON) chewable tablet 80 mg    tamsulosin (FLOMAX) capsule 0.4 mg    traZODone (DESYREL) tablet 50 mg    Vitamin D3 (CHOLECALCIFEROL) tablet 25 mcg     Recent Results (from the past 168 hour(s))   EKG 12-lead, complete    Collection Time: 10/10/23  9:35 AM   Result Value Ref Range    Systolic Blood Pressure  mmHg    Diastolic Blood Pressure  mmHg    Ventricular Rate 90 BPM    Atrial Rate 90 BPM    VA Interval 146 ms    QRS Duration 162 ms     ms    QTc 528 ms    P Axis 52 degrees    R AXIS -7 degrees    T Axis 123 degrees    Interpretation ECG       Sinus rhythm  Left bundle branch block  Abnormal ECG  When compared with ECG of 03-OCT-2023 09:29,  No significant change was found  Confirmed by MD VIVIANE, LESLEE ([id number]) on 10/12/2023 10:28:18 PM     CBC with platelets and differential    Collection Time: 10/13/23  8:05 AM    Result Value Ref Range    WBC Count 6.6 4.0 - 11.0 10e3/uL    RBC Count 4.38 (L) 4.40 - 5.90 10e6/uL    Hemoglobin 12.2 (L) 13.3 - 17.7 g/dL    Hematocrit 38.2 (L) 40.0 - 53.0 %    MCV 87 78 - 100 fL    MCH 27.9 26.5 - 33.0 pg    MCHC 31.9 31.5 - 36.5 g/dL    RDW 15.2 (H) 10.0 - 15.0 %    Platelet Count 166 150 - 450 10e3/uL    % Neutrophils 63 %    % Lymphocytes 22 %    % Monocytes 13 %    Mids % (Monos, Eos, Basos)      % Eosinophils 0 %    % Basophils 1 %    % Immature Granulocytes 1 %    NRBCs per 100 WBC 0 <1 /100    Absolute Neutrophils 4.1 1.6 - 8.3 10e3/uL    Absolute Lymphocytes 1.4 0.8 - 5.3 10e3/uL    Absolute Monocytes 0.9 0.0 - 1.3 10e3/uL    Mids Abs (Monos, Eos, Basos)      Absolute Eosinophils 0.0 0.0 - 0.7 10e3/uL    Absolute Basophils 0.1 0.0 - 0.2 10e3/uL    Absolute Immature Granulocytes 0.1 <=0.4 10e3/uL    Absolute NRBCs 0.0 10e3/uL

## 2023-10-16 NOTE — PLAN OF CARE
"  Problem: Psychotic Signs/Symptoms  Goal: Increased Participation and Engagement (Psychotic Signs/Symptoms)  Note: Patient VS. /67 (BP Location: Right leg, Patient Position: Sitting, Cuff Size: Adult Regular)   Pulse 87   Temp 97.9  F (36.6  C) (Oral)   Resp 16   Ht 1.753 m (5' 9\")   Wt 84.9 kg (187 lb 2.7 oz)   SpO2 97%   BMI 27.64 kg/m  . Patient has been isolative to his room since earlier today. Patient encouraged to come out for supper. His affect is calm and guarded. He does not share much. VS are WDL. Patient has no pain complaints. He denied any suicidal ideation or thought of self injurious behavior. He rated his depression as 3/10 and anxiety as 2/10. Will encourage the patient to come out of his room for supper and group. Gabapentin offered for anxiety but refused. No new behavioral or safety concerns to report thus far. Will continue to monitor for safety and behaviors. Patient is an SIO patient within 5 feet. He has been calm and cooperative with cares.                   "

## 2023-10-16 NOTE — CONSULTS
Order was written by Dr. Melo for pt to be able to use the below weighted products to feeding due to hand tremors.   Pt was seen by this OT author, at noon meal to try the weighted feeding products ordered to assist with eating with more ease due to his hand tremors. Products tried:  Weighted bowl, weighted spoon and spork, weighted covered drinking glass.  All products appeared helpful. Pt fed himself independently when the [facility] helping him, cut up bite size food pieces and placed them into the weighted bowl. Pt was using the weighted wrist cuff though he was not clear if this helped or not.   Weighted bowl:  Helpful when small pieces of food was placed in bowl for him to use spork to .  Weighted spork and spoon: helpful noted in pt feeling himself independently using these items. Tremor still noted and he placed his head several inches close to bowl which seemed to help.  Weighted covered drinking glass: holding onto both handles, drank from this glass independently and with appeared more ease.    Staff will be washing these products with dish soap or  after each meal and are kept in the Med Room in pt's own labeled supply container.

## 2023-10-16 NOTE — PLAN OF CARE
Problem: Sleep Disturbance  Goal: Adequate Sleep/Rest  Outcome: Progressing   Goal Outcome Evaluation:    Patient remains on SIO due to risk for self-injury related to impulsiveness and agitation.     Slept for a total of 8 hours. No behavioral issues noted during the shift.

## 2023-10-16 NOTE — PLAN OF CARE
Problem: Psychotic Signs/Symptoms  Goal: Improved Behavioral Control (Psychotic Signs/Symptoms)  Outcome: Progressing     Problem: Oral Intake Inadequate  Goal: Improved Oral Intake  Outcome: Progressing  Goal Outcome Evaluation:    Plan of Care Reviewed With: patient        Patient affect is flat and blunted, continue to be isolative and withdrawn in his room most of the time, came out for dinner, was assisted with eating and ate 100% of his food with adequate fluid intake and headed back to his room right away. Patient was heard making some paranoid statement about food being poisoned and the water contaminated while he was eating, he eats so fast and needs reminders to slow down and take small bites. Denied, SI/HI/SIB, no thoughts of harming himself and others this shift, no complains of pain and no PRN's given, continue having hand tremors and they are even worse when he is eating, was assisted with alejandro care, vital signs were stable, took the scheduled medications and no other concerns at this times.

## 2023-10-17 LAB
AMMONIA PLAS-SCNC: 33 UMOL/L (ref 16–60)
HOLD SPECIMEN: NORMAL
VALPROATE SERPL-MCNC: 32.2 UG/ML

## 2023-10-17 PROCEDURE — 250N000013 HC RX MED GY IP 250 OP 250 PS 637: Performed by: PSYCHIATRY & NEUROLOGY

## 2023-10-17 PROCEDURE — 250N000013 HC RX MED GY IP 250 OP 250 PS 637: Performed by: STUDENT IN AN ORGANIZED HEALTH CARE EDUCATION/TRAINING PROGRAM

## 2023-10-17 PROCEDURE — 93010 ELECTROCARDIOGRAM REPORT: CPT | Performed by: INTERNAL MEDICINE

## 2023-10-17 PROCEDURE — 80164 ASSAY DIPROPYLACETIC ACD TOT: CPT | Performed by: PSYCHIATRY & NEUROLOGY

## 2023-10-17 PROCEDURE — 82140 ASSAY OF AMMONIA: CPT | Performed by: PSYCHIATRY & NEUROLOGY

## 2023-10-17 PROCEDURE — H2032 ACTIVITY THERAPY, PER 15 MIN: HCPCS

## 2023-10-17 PROCEDURE — 36415 COLL VENOUS BLD VENIPUNCTURE: CPT | Performed by: PSYCHIATRY & NEUROLOGY

## 2023-10-17 PROCEDURE — 124N000002 HC R&B MH UMMC

## 2023-10-17 PROCEDURE — 80342 ANTIPSYCHOTICS NOS 1-3: CPT | Performed by: PSYCHIATRY & NEUROLOGY

## 2023-10-17 PROCEDURE — 250N000013 HC RX MED GY IP 250 OP 250 PS 637: Performed by: PHYSICIAN ASSISTANT

## 2023-10-17 RX ADMIN — OLANZAPINE 15 MG: 10 TABLET, ORALLY DISINTEGRATING ORAL at 08:26

## 2023-10-17 RX ADMIN — ATROPINE SULFATE 2 DROP: 10 SOLUTION/ DROPS OPHTHALMIC at 08:33

## 2023-10-17 RX ADMIN — FLUOXETINE 20 MG: 20 CAPSULE ORAL at 08:28

## 2023-10-17 RX ADMIN — GABAPENTIN 300 MG: 300 CAPSULE ORAL at 14:03

## 2023-10-17 RX ADMIN — NAPROXEN 250 MG: 250 TABLET ORAL at 08:28

## 2023-10-17 RX ADMIN — SENNOSIDES 2 TABLET: 8.6 TABLET ORAL at 19:17

## 2023-10-17 RX ADMIN — ATROPINE SULFATE 2 DROP: 10 SOLUTION/ DROPS OPHTHALMIC at 14:04

## 2023-10-17 RX ADMIN — NAPROXEN 250 MG: 250 TABLET ORAL at 19:18

## 2023-10-17 RX ADMIN — BENZTROPINE MESYLATE 1 MG: 1 TABLET ORAL at 19:18

## 2023-10-17 RX ADMIN — LORAZEPAM 0.5 MG: 0.5 TABLET ORAL at 08:27

## 2023-10-17 RX ADMIN — LORAZEPAM 0.5 MG: 0.5 TABLET ORAL at 14:03

## 2023-10-17 RX ADMIN — GABAPENTIN 300 MG: 300 CAPSULE ORAL at 19:17

## 2023-10-17 RX ADMIN — ATROPINE SULFATE 2 DROP: 10 SOLUTION/ DROPS OPHTHALMIC at 19:23

## 2023-10-17 RX ADMIN — DIVALPROEX SODIUM 250 MG: 125 CAPSULE, COATED PELLETS ORAL at 06:10

## 2023-10-17 RX ADMIN — POLYETHYLENE GLYCOL 3350 17 G: 17 POWDER, FOR SOLUTION ORAL at 08:24

## 2023-10-17 RX ADMIN — GABAPENTIN 300 MG: 300 CAPSULE ORAL at 08:27

## 2023-10-17 RX ADMIN — SENNOSIDES 2 TABLET: 8.6 TABLET ORAL at 08:27

## 2023-10-17 RX ADMIN — POLYETHYLENE GLYCOL 3350 17 G: 17 POWDER, FOR SOLUTION ORAL at 19:18

## 2023-10-17 RX ADMIN — CYANOCOBALAMIN TAB 1000 MCG 1000 MCG: 1000 TAB at 08:25

## 2023-10-17 RX ADMIN — DIVALPROEX SODIUM 250 MG: 125 CAPSULE, COATED PELLETS ORAL at 14:02

## 2023-10-17 RX ADMIN — AMANTADINE HYDROCHLORIDE 100 MG: 100 CAPSULE ORAL at 08:26

## 2023-10-17 RX ADMIN — MICONAZOLE NITRATE: 20 POWDER TOPICAL at 08:34

## 2023-10-17 RX ADMIN — TAMSULOSIN HYDROCHLORIDE 0.4 MG: 0.4 CAPSULE ORAL at 08:26

## 2023-10-17 RX ADMIN — OLANZAPINE 15 MG: 10 TABLET, ORALLY DISINTEGRATING ORAL at 19:17

## 2023-10-17 RX ADMIN — Medication 25 MCG: at 08:26

## 2023-10-17 RX ADMIN — Medication 10 MG: at 19:17

## 2023-10-17 RX ADMIN — BENZTROPINE MESYLATE 1 MG: 1 TABLET ORAL at 08:26

## 2023-10-17 RX ADMIN — PRAZOSIN HYDROCHLORIDE 1 MG: 1 CAPSULE ORAL at 20:58

## 2023-10-17 RX ADMIN — CLOZAPINE 650 MG: 200 TABLET ORAL at 12:12

## 2023-10-17 RX ADMIN — LORAZEPAM 0.5 MG: 0.5 TABLET ORAL at 19:17

## 2023-10-17 RX ADMIN — DIVALPROEX SODIUM 250 MG: 125 CAPSULE, COATED PELLETS ORAL at 19:17

## 2023-10-17 ASSESSMENT — ACTIVITIES OF DAILY LIVING (ADL)
ADLS_ACUITY_SCORE: 91
HYGIENE/GROOMING: INDEPENDENT
ADLS_ACUITY_SCORE: 81
DRESS: SCRUBS (BEHAVIORAL HEALTH);WITH ASSISTANCE
ADLS_ACUITY_SCORE: 81
LAUNDRY: UNABLE TO COMPLETE
ADLS_ACUITY_SCORE: 91
ADLS_ACUITY_SCORE: 81
ADLS_ACUITY_SCORE: 91

## 2023-10-17 NOTE — PLAN OF CARE
Assessment/Intervention/Current Symtoms and Care Coordination:  Reviewed chart. Met with team to review patient's current functioning, needs, progress, and impediments to discharge. Pt transferred from station 12. Pt on SIO for safety. Pt's affect is flat. Pt has placement secured, awaiting [employer] assessment for [facility] funding. Pt continues to be confused but is appropriate and redirectable. Pt continues to experience tremor, OT ordered weighted cups and bowl to assist with this. PT has [employer] assessment on 10/27 at 10:00am.    Writer put out call to  ([phone]) to inquire about obtaining commitment and meek paperwork.    Writer spoke with Evelyn from [facility]'s Office ([phone]) to inquire about obtaining commitment and meek paperwork. Evelyn relayed plan to send it to writer via email.    Writer received commitment paperwork from Evelyn via email. Writer placed copy with [facility] for filing.    Writer received email from [employer] , Alexa ([email or web address] with rights paperwork to provide to pt along with ICD 10 form to complete. Alexa also requests H&P and MAR. Writer sent MAR and H&P to Alexa with plan to send ICD 10 once completed by MD.    Discharge Plan or Goal:  Will discharge to [facility] [place] [address]     Barriers to Discharge:  Discharge pending completion of [employer] assessment and negotiation process with [facility]. [employer] assessment scheduled for 10/27 at 10:00am    Referral Status:  Pt accepted at [facility]. For more comprehensive list of referrals see CTC note from 10/6  Pt will need psychiatry, PCP, Clozaril lab appointments to be scheduled prior to discharge  Psychiatrist - Dr. Santana at [facility]  P: [phone]   PCP - Attila Urena    Legal Status:  Committed [place] with ITP  [facility]: [facility] Day  File Number: [id number]  ITP includes: Clozaril, Zyprexa, Seroquel,  and Risperdal  Expires: 01/06/2024    Contacts:  Vicky Valdez - KIRA with [facility] (KING per commitment)  P: [phone]  Regina Wong - PAMELA MALONE with [facility] (KING per commitment)  P: [phone]  E: [email or web address]  Alberta - Mom (KING signed)  P: [phone] (H) [phone] (M)   Ino - Dad (KING signed)  P: [phone] (H)  Sandra Treadwell - Sister (KING signed)  P: [phone]  [facility]'s Office   F: [phone]  Jovana Sánchez - [facility]  P: [phone]  E: [email or web address]  Alexa Arteaga - [employer]    E: [email or web address].    Upcoming Meetings and Dates/Important Information and next steps:  CTC to coordinate with PAMELA MALONE and Jovana regarding placement needs  Pt has [employer] assessment on 10/27 at 10:00am

## 2023-10-17 NOTE — PLAN OF CARE
"  Problem: Psychotic Symptoms  Goal: Psychotic Symptoms  Description: Signs and symptoms of listed problems will be absent or manageable.  Outcome: Not Progressing  Flowsheets (Taken 10/17/2023 1412)  Psychotic Symptoms Assessed: all  Psychotic Symptoms Present:   insight   anxiety   mood   affect   orientation   memory deficits   thought process     Problem: Psychotic Symptoms  Goal: Social and Therapeutic (Psychotic Symptoms)  Description: Signs and symptoms of listed problems will be absent or manageable.  Recent Flowsheet Documentation  Taken 10/17/2023 1412 by Geri Boyce RN  Psychotic Symptoms Assessed: all  Psychotic Symptoms Present:   insight   anxiety   mood   affect   orientation   memory deficits   thought process     Problem: Psychotic Signs/Symptoms  Goal: Improved Behavioral Control (Psychotic Signs/Symptoms)  Outcome: Not Progressing   Goal Outcome Evaluation:      Pt came out for breakfast and returned to his room before eating. Pt's tray was brought to his room and he ate some pudding and cottage cheese. Pt ate a container of applesauce with his AM medications and drank 300 ml of water with his miralax. Pt reports that he is worried about \"the cats\". Pt reports that he has been thinking about things that he did 40 years ago. Pt reports that he is sick and then holds up his hands which are tremulous. Pt denies abdominal pain. Pt  reports that he had a BM yesterday. Pt is oriented to person, October, 2023 and the middle of the month.   Pt denies pain.   Pt was assisted with alejandro care and miconazole powder was applied. Groin appears to be less red than yesterday. Pt continues to report feeling \"itchy\".   Pt's affect is flat with appropriate eye contact.   Pt took noon medication with a container of vanilla yogurt.  Pt ate approximately 30% of his lunch with assistance and this included approximately 200 ml of Ensure.   Pt took 1400 medication with applesauce and water. Pt did report feeling sick " "at that time. Pt encouraged to use the bathroom and he declined as he reports he does not feel like he needs to go as he had went earlier. Pt declined to be bladder scanned as he reports \"no there will be nuts and bolts put in there\".                          "

## 2023-10-17 NOTE — PLAN OF CARE
Problem: Psychotic Symptoms  Goal: Psychotic Symptoms  Description: Signs and symptoms of listed problems will be absent or manageable.  Outcome: Progressing   Goal Outcome Evaluation:                  Received asleep,pt is on SIO for self injury risk, assault  risk and hx of aggression, non of which were observed tonight.  No complaints of feeling bloated or difficulty voiding. Denied pain/ discomforts.  Slept for 10 hours  Pt had difficulty swallowing his depakote despite taking it with pudding. Eventually swallowed it when he took a sip of water

## 2023-10-17 NOTE — PROGRESS NOTES
"Patient seen, chart reviewed, care discussed with staff.  Blood pressure 105/67, pulse 87, temperature 97.9  F (36.6  C), temperature source Oral, resp. rate 16, height 1.753 m (5' 9\"), weight 84.9 kg (187 lb 2.7 oz), SpO2 98%.  On one-to-one due to psychosis and violence history in response to psychosis.  Eating the drinking and sleeping ok    General appearance: good  Alert.   Affect: fair  Mood: fair    Speech:  spontaneity low, some off topic answers  Eye contact:  good.    Psychomotor behavior: moderate tremors  Gait: steady   Abnormal movements:  none  Delusions:  present, same  Hallucinations:  he rates voices as about the same  Thoughts: tangential at times  Associations: some looseness  Judgement: poor  Insight: poor  Cognitions: Impaired  Not suicidal.    Imp:  Paranoid schizophrenia chronic with acute exacerbation   Neuroleptic induced Parkinson's disease   And:       Patient Active Problem List   Diagnosis    Urinary retention    Schizophrenia, unspecified type (H)    Altered mental status, unspecified altered mental status type    Mood changes    Paranoid schizophrenia, chronic condition with acute exacerbation (H)    Neuroleptic-induced parkinsonism     Agitation   Prolonged QTC    Plan:  Same meds    Current Facility-Administered Medications   Medication    acetaminophen (TYLENOL) tablet 650 mg    albuterol (PROVENTIL HFA/VENTOLIN HFA) inhaler    amantadine (SYMMETREL) capsule 100 mg    atropine 1 % ophthalmic solution 1 drop    atropine 1 % sublingual (ophthalmic drops for sublingual use) 2 drop    benzonatate (TESSALON) capsule 100 mg    benztropine (COGENTIN) tablet 1 mg    bisacodyl (DULCOLAX) suppository 10 mg    cloZAPine (CLOZARIL) tablet 650 mg    cyanocobalamin (VITAMIN B-12) tablet 1,000 mcg    haloperidol (HALDOL) tablet 5 mg    And    diphenhydrAMINE (BENADRYL) capsule 50 mg    haloperidol lactate (HALDOL) injection 5 mg    And    diphenhydrAMINE (BENADRYL) injection 50 mg    divalproex " sodium delayed-release (DEPAKOTE SPRINKLE) DR capsule 250 mg    FLUoxetine (PROzac) capsule 20 mg    gabapentin (NEURONTIN) capsule 100 mg    gabapentin (NEURONTIN) capsule 300 mg    lidocaine (XYLOCAINE) 2 % external gel    LORazepam (ATIVAN) tablet 0.5 mg    melatonin tablet 10 mg    miconazole (MICATIN) 2 % powder    mineral oil-white petrolatum (EUCERIN/MINERIN) cream    naproxen (NAPROSYN) tablet 250 mg    OLANZapine zydis (zyPREXA) ODT tab 15 mg    Or    OLANZapine (zyPREXA) injection 10 mg    OLANZapine (zyPREXA) tablet 5-10 mg    Or    OLANZapine (zyPREXA) injection 5-10 mg    polyethylene glycol (MIRALAX) Packet 17 g    prazosin (MINIPRESS) capsule 1 mg    senna-docusate (SENOKOT-S/PERICOLACE) 8.6-50 MG per tablet 1 tablet    sennosides (SENOKOT) tablet 2 tablet    simethicone (MYLICON) chewable tablet 80 mg    tamsulosin (FLOMAX) capsule 0.4 mg    traZODone (DESYREL) tablet 50 mg    Vitamin D3 (CHOLECALCIFEROL) tablet 25 mcg

## 2023-10-18 LAB
ATRIAL RATE - MUSE: 85 BPM
DIASTOLIC BLOOD PRESSURE - MUSE: NORMAL MMHG
INTERPRETATION ECG - MUSE: NORMAL
P AXIS - MUSE: 48 DEGREES
PR INTERVAL - MUSE: 160 MS
QRS DURATION - MUSE: 166 MS
QT - MUSE: 440 MS
QTC - MUSE: 523 MS
R AXIS - MUSE: -22 DEGREES
SYSTOLIC BLOOD PRESSURE - MUSE: NORMAL MMHG
T AXIS - MUSE: 111 DEGREES
VENTRICULAR RATE- MUSE: 85 BPM

## 2023-10-18 PROCEDURE — 250N000013 HC RX MED GY IP 250 OP 250 PS 637: Performed by: PHYSICIAN ASSISTANT

## 2023-10-18 PROCEDURE — 250N000013 HC RX MED GY IP 250 OP 250 PS 637: Performed by: STUDENT IN AN ORGANIZED HEALTH CARE EDUCATION/TRAINING PROGRAM

## 2023-10-18 PROCEDURE — 250N000013 HC RX MED GY IP 250 OP 250 PS 637: Performed by: PSYCHIATRY & NEUROLOGY

## 2023-10-18 PROCEDURE — 124N000002 HC R&B MH UMMC

## 2023-10-18 RX ORDER — FLUOXETINE 10 MG/1
10 CAPSULE ORAL DAILY
Status: DISCONTINUED | OUTPATIENT
Start: 2023-10-19 | End: 2023-11-03

## 2023-10-18 RX ORDER — DIVALPROEX SODIUM 125 MG/1
375 CAPSULE, COATED PELLETS ORAL EVERY 8 HOURS SCHEDULED
Status: DISCONTINUED | OUTPATIENT
Start: 2023-10-18 | End: 2023-10-20

## 2023-10-18 RX ADMIN — GABAPENTIN 300 MG: 300 CAPSULE ORAL at 13:59

## 2023-10-18 RX ADMIN — DIVALPROEX SODIUM 250 MG: 125 CAPSULE, COATED PELLETS ORAL at 06:21

## 2023-10-18 RX ADMIN — NAPROXEN 250 MG: 250 TABLET ORAL at 17:19

## 2023-10-18 RX ADMIN — DIVALPROEX SODIUM 375 MG: 125 CAPSULE, COATED PELLETS ORAL at 14:37

## 2023-10-18 RX ADMIN — ATROPINE SULFATE 2 DROP: 10 SOLUTION/ DROPS OPHTHALMIC at 09:07

## 2023-10-18 RX ADMIN — POLYETHYLENE GLYCOL 3350 17 G: 17 POWDER, FOR SOLUTION ORAL at 20:03

## 2023-10-18 RX ADMIN — TAMSULOSIN HYDROCHLORIDE 0.4 MG: 0.4 CAPSULE ORAL at 09:01

## 2023-10-18 RX ADMIN — ATROPINE SULFATE 2 DROP: 10 SOLUTION/ DROPS OPHTHALMIC at 20:10

## 2023-10-18 RX ADMIN — AMANTADINE HYDROCHLORIDE 100 MG: 100 CAPSULE ORAL at 08:59

## 2023-10-18 RX ADMIN — MICONAZOLE NITRATE: 20 POWDER TOPICAL at 20:11

## 2023-10-18 RX ADMIN — NAPROXEN 250 MG: 250 TABLET ORAL at 09:02

## 2023-10-18 RX ADMIN — OLANZAPINE 15 MG: 10 TABLET, ORALLY DISINTEGRATING ORAL at 20:04

## 2023-10-18 RX ADMIN — PRAZOSIN HYDROCHLORIDE 1 MG: 1 CAPSULE ORAL at 21:15

## 2023-10-18 RX ADMIN — GABAPENTIN 300 MG: 300 CAPSULE ORAL at 08:59

## 2023-10-18 RX ADMIN — Medication 25 MCG: at 09:02

## 2023-10-18 RX ADMIN — CYANOCOBALAMIN TAB 1000 MCG 1000 MCG: 1000 TAB at 09:03

## 2023-10-18 RX ADMIN — SENNOSIDES 2 TABLET: 8.6 TABLET ORAL at 08:58

## 2023-10-18 RX ADMIN — POLYETHYLENE GLYCOL 3350 17 G: 17 POWDER, FOR SOLUTION ORAL at 09:01

## 2023-10-18 RX ADMIN — CLOZAPINE 650 MG: 200 TABLET ORAL at 12:07

## 2023-10-18 RX ADMIN — GABAPENTIN 300 MG: 300 CAPSULE ORAL at 20:03

## 2023-10-18 RX ADMIN — OLANZAPINE 15 MG: 10 TABLET, ORALLY DISINTEGRATING ORAL at 09:00

## 2023-10-18 RX ADMIN — BENZTROPINE MESYLATE 1 MG: 1 TABLET ORAL at 20:04

## 2023-10-18 RX ADMIN — SENNOSIDES 2 TABLET: 8.6 TABLET ORAL at 20:03

## 2023-10-18 RX ADMIN — LORAZEPAM 0.5 MG: 0.5 TABLET ORAL at 20:04

## 2023-10-18 RX ADMIN — BENZTROPINE MESYLATE 1 MG: 1 TABLET ORAL at 09:00

## 2023-10-18 RX ADMIN — ATROPINE SULFATE 2 DROP: 10 SOLUTION/ DROPS OPHTHALMIC at 14:04

## 2023-10-18 RX ADMIN — Medication 10 MG: at 20:03

## 2023-10-18 RX ADMIN — FLUOXETINE 20 MG: 20 CAPSULE ORAL at 08:58

## 2023-10-18 RX ADMIN — LORAZEPAM 0.5 MG: 0.5 TABLET ORAL at 09:01

## 2023-10-18 RX ADMIN — DIVALPROEX SODIUM 375 MG: 125 CAPSULE, COATED PELLETS ORAL at 20:03

## 2023-10-18 RX ADMIN — LORAZEPAM 0.5 MG: 0.5 TABLET ORAL at 13:59

## 2023-10-18 RX ADMIN — MICONAZOLE NITRATE: 20 POWDER TOPICAL at 09:07

## 2023-10-18 ASSESSMENT — ACTIVITIES OF DAILY LIVING (ADL)
HYGIENE/GROOMING: WITH ASSISTANCE
ADLS_ACUITY_SCORE: 81
ADLS_ACUITY_SCORE: 92
ADLS_ACUITY_SCORE: 92
DRESS: SCRUBS (BEHAVIORAL HEALTH);WITH ASSISTANCE
ADLS_ACUITY_SCORE: 81
ADLS_ACUITY_SCORE: 81
LAUNDRY: UNABLE TO COMPLETE
ADLS_ACUITY_SCORE: 81
ADLS_ACUITY_SCORE: 92
ADLS_ACUITY_SCORE: 92
DRESS: WITH ASSISTANCE;SCRUBS (BEHAVIORAL HEALTH)
HYGIENE/GROOMING: WITH ASSISTANCE
LAUNDRY: UNABLE TO COMPLETE
ADLS_ACUITY_SCORE: 92
ADLS_ACUITY_SCORE: 81
ADLS_ACUITY_SCORE: 81
ADLS_ACUITY_SCORE: 92

## 2023-10-18 NOTE — PROGRESS NOTES
"Patient seen, chart reviewed, care discussed with staff.  Blood pressure 118/59, pulse 68, temperature 98.5  F (36.9  C), temperature source Temporal, resp. rate 16, height 1.753 m (5' 9\"), weight 84.9 kg (187 lb 2.7 oz), SpO2 98%.  Overall he seems more psychotic.  He told staff: I am the devil\". Remorseful for psychotic related violent acts in the past.    General appearance: fair  Alert.   Affect: fair, innapropriate  Mood: fair    Speech:  normal.   Eye contact:  good.    Psychomotor behavior: parkinsonian tremor  Gait: steady   Abnormal movements:  none  Delusions: present  Hallucinations:  auditory  Thoughts: some disjointed replys  Associations: somewhat loose  Judgement: poor  Insight: poor  Cognitions: impaired  Not suicidal    Plan:  Stop Amantadine  2.  Increase Clozaril.  Olanzapine level pending.  3.  Increase Depakote  4.  Decrease Prozac due to psychosis  5.  He is distressed by tremor but asks me not to prescribe more to help it.    Current Facility-Administered Medications   Medication    acetaminophen (TYLENOL) tablet 650 mg    albuterol (PROVENTIL HFA/VENTOLIN HFA) inhaler    atropine 1 % ophthalmic solution 1 drop    atropine 1 % sublingual (ophthalmic drops for sublingual use) 2 drop    benzonatate (TESSALON) capsule 100 mg    benztropine (COGENTIN) tablet 1 mg    bisacodyl (DULCOLAX) suppository 10 mg    cloZAPine (CLOZARIL) tablet 650 mg    cyanocobalamin (VITAMIN B-12) tablet 1,000 mcg    haloperidol (HALDOL) tablet 5 mg    And    diphenhydrAMINE (BENADRYL) capsule 50 mg    haloperidol lactate (HALDOL) injection 5 mg    And    diphenhydrAMINE (BENADRYL) injection 50 mg    divalproex sodium delayed-release (DEPAKOTE SPRINKLE) DR capsule 250 mg    [START ON 10/19/2023] FLUoxetine (PROzac) capsule 10 mg    gabapentin (NEURONTIN) capsule 100 mg    gabapentin (NEURONTIN) capsule 300 mg    lidocaine (XYLOCAINE) 2 % external gel    LORazepam (ATIVAN) tablet 0.5 mg    melatonin tablet 10 mg    " miconazole (MICATIN) 2 % powder    mineral oil-white petrolatum (EUCERIN/MINERIN) cream    naproxen (NAPROSYN) tablet 250 mg    OLANZapine zydis (zyPREXA) ODT tab 15 mg    Or    OLANZapine (zyPREXA) injection 10 mg    OLANZapine (zyPREXA) tablet 5-10 mg    Or    OLANZapine (zyPREXA) injection 5-10 mg    polyethylene glycol (MIRALAX) Packet 17 g    prazosin (MINIPRESS) capsule 1 mg    senna-docusate (SENOKOT-S/PERICOLACE) 8.6-50 MG per tablet 1 tablet    sennosides (SENOKOT) tablet 2 tablet    simethicone (MYLICON) chewable tablet 80 mg    tamsulosin (FLOMAX) capsule 0.4 mg    traZODone (DESYREL) tablet 50 mg    Vitamin D3 (CHOLECALCIFEROL) tablet 25 mcg     Recent Results (from the past 168 hour(s))   CBC with platelets and differential    Collection Time: 10/13/23  8:05 AM   Result Value Ref Range    WBC Count 6.6 4.0 - 11.0 10e3/uL    RBC Count 4.38 (L) 4.40 - 5.90 10e6/uL    Hemoglobin 12.2 (L) 13.3 - 17.7 g/dL    Hematocrit 38.2 (L) 40.0 - 53.0 %    MCV 87 78 - 100 fL    MCH 27.9 26.5 - 33.0 pg    MCHC 31.9 31.5 - 36.5 g/dL    RDW 15.2 (H) 10.0 - 15.0 %    Platelet Count 166 150 - 450 10e3/uL    % Neutrophils 63 %    % Lymphocytes 22 %    % Monocytes 13 %    Mids % (Monos, Eos, Basos)      % Eosinophils 0 %    % Basophils 1 %    % Immature Granulocytes 1 %    NRBCs per 100 WBC 0 <1 /100    Absolute Neutrophils 4.1 1.6 - 8.3 10e3/uL    Absolute Lymphocytes 1.4 0.8 - 5.3 10e3/uL    Absolute Monocytes 0.9 0.0 - 1.3 10e3/uL    Mids Abs (Monos, Eos, Basos)      Absolute Eosinophils 0.0 0.0 - 0.7 10e3/uL    Absolute Basophils 0.1 0.0 - 0.2 10e3/uL    Absolute Immature Granulocytes 0.1 <=0.4 10e3/uL    Absolute NRBCs 0.0 10e3/uL   Valproic acid    Collection Time: 10/17/23  9:05 AM   Result Value Ref Range    Valproic acid 32.2 (L)   ug/mL   Extra Purple Top Tube    Collection Time: 10/17/23  9:05 AM   Result Value Ref Range    Hold Specimen [facility]    EKG 12-lead, complete    Collection Time: 10/17/23  9:20 AM   Result  Value Ref Range    Systolic Blood Pressure  mmHg    Diastolic Blood Pressure  mmHg    Ventricular Rate 85 BPM    Atrial Rate 85 BPM    OR Interval 160 ms    QRS Duration 166 ms     ms    QTc 523 ms    P Axis 48 degrees    R AXIS -22 degrees    T Axis 111 degrees    Interpretation ECG       Sinus rhythm  Left bundle branch block  Abnormal ECG  When compared with ECG of 10-OCT-2023 09:35,  No significant change was found  Confirmed by MD PAXTON, RIO ([id number]) on 10/18/2023 12:52:54 PM     Ammonia    Collection Time: 10/17/23  3:56 PM   Result Value Ref Range    Ammonia 33 16 - 60 umol/L
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
## 2023-10-18 NOTE — PLAN OF CARE
Problem: Psychotic Signs/Symptoms  Goal: Improved Behavioral Control (Psychotic Signs/Symptoms)  Outcome: Not Progressing     Problem: Behavioral Disturbance  Goal: Behavioral Disturbance  Description: Signs and symptoms of listed problems will be absent or manageable by discharge or transition of care.  Outcome: Not Progressing  Flowsheets (Taken 10/18/2023 6034)  Behavioral Disturbance Assessed: all  Behavioral Disturbance Present:   affect   mood   insight   anxiety   thought process   orientation   memory deficits     Problem: Oral Intake Inadequate  Goal: Improved Oral Intake  Outcome: Progressing   Goal Outcome Evaluation:

## 2023-10-18 NOTE — PLAN OF CARE
"  Problem: Psychotic Signs/Symptoms  Goal: Improved Mood Symptoms  Note: Patient VS . BP (!) 140/73   Pulse 95   Temp 98.1  F (36.7  C) (Oral)   Resp 16   Ht 1.753 m (5' 9\")   Wt 84.9 kg (187 lb 2.7 oz)   SpO2 97%   BMI 27.64 kg/m  . Patient had an ok shift. He was mostly isolative to his room. Out for supper only and he did not attend evening group. He denied any suicidal ideation or thoughts of self injurious behavior. He was medication compliant but he needs to be reminded to swallow before asking for another spoon full of pudding and medications. No aggressive behaviors noted. He ate well at supper and staff have been offering fluids on a regular basis. No signs of aspiration when drinking thin liquids. No new behavioral or safety concerns to report this shift.                     "

## 2023-10-18 NOTE — PLAN OF CARE
"  Problem: Sleep Disturbance  Goal: Adequate Sleep/Rest  Outcome: Progressing   Goal Outcome Evaluation:         Appeared to be sleeping comfortably in bed the whole shift. Pt remains on SIo for hx of Agitation,impulsive behavior,  Self injury risk due to Parkinson symptoms and hx of  verbal outburst of sexual and threaten statements.Pt calm and cooperative  Slept for 8 hours    Medication compliant and  had no episodes of coughing with medication administration.  0650 Pt told SIO staff \" something is happening at 0700\", when asked what ,pt replied \"  The Heavenly host is coming down to torment me\".         "

## 2023-10-18 NOTE — PLAN OF CARE
"  Problem: Psychotic Symptoms  Goal: Psychotic Symptoms  Description: Signs and symptoms of listed problems will be absent or manageable.  Outcome: Progressing     Problem: Suicide Risk  Goal: Absence of Self-Harm  Outcome: Progressing    Patient in room sleeping when received this shift. Presented with flat and blunted affect. Patient denies all mental health symptoms. Denies pain and discomfort. Continues on SIO. Safety checks in place . Patient is medication compliant except  for Atropine patient declined. Patient's alejandro area washed and powder applied. No behavioral or safety concerns noted. No other known concerns at this time. Will continue to monitor patient as needed for the rest of the shift per plan of care.     /76 (BP Location: Right arm, Patient Position: Supine, Cuff Size: Adult Regular)   Pulse 109   Temp 98.2  F (36.8  C) (Temporal)   Resp 16   Ht 1.753 m (5' 9\")   Wt 84.9 kg (187 lb 2.7 oz)   SpO2 98%   BMI 27.64 kg/m          "

## 2023-10-18 NOTE — PLAN OF CARE
"  Problem: Psychotic Symptoms  Goal: Psychotic Symptoms  Description: Signs and symptoms of listed problems will be absent or manageable.  Outcome: Not Progressing  Flowsheets (Taken 10/18/2023 1426)  Psychotic Symptoms Assessed: all  Psychotic Symptoms Present:   mood   affect   insight   anxiety   thought process   orientation   memory deficits     Problem: Psychotic Signs/Symptoms  Goal: Improved Psychomotor Symptoms (Psychotic Signs/Symptoms)  Intervention: Manage Psychomotor Movement  Recent Flowsheet Documentation  Taken 10/18/2023 1126 by Geri Boyce RN  Patient Performed Hygiene: (alejandro care done) dressed  Activity (Behavioral Health): up ad jose   Goal Outcome Evaluation:    Plan of Care Reviewed With: patient      Pt has been withdrawn to his room most of the shift. Pt did come out briefly and ate approximately 25% of his breakfast with assistance. Pt declined any further assistance with eating more. Pt's affect is flat with appropriate eye contact.  Pt has been calm, however he continues to have psychotic thoughts. Pt told writer this AM that he was \"Satan\" and that he wouldn't be here much longer as he was \"going to hell\". Pt told female psych associate this afternoon that she was indanger because he was having thought to \"gouge\" her eyes out.   Pt denies active SI/SIB but reports that he does have passive wishes that he were dead.  Pt oriented to \"fall\" and \"2023\".                 "

## 2023-10-19 PROCEDURE — 250N000013 HC RX MED GY IP 250 OP 250 PS 637: Performed by: PSYCHIATRY & NEUROLOGY

## 2023-10-19 PROCEDURE — 124N000002 HC R&B MH UMMC

## 2023-10-19 PROCEDURE — 250N000013 HC RX MED GY IP 250 OP 250 PS 637: Performed by: PHYSICIAN ASSISTANT

## 2023-10-19 PROCEDURE — 250N000013 HC RX MED GY IP 250 OP 250 PS 637: Performed by: STUDENT IN AN ORGANIZED HEALTH CARE EDUCATION/TRAINING PROGRAM

## 2023-10-19 RX ADMIN — NAPROXEN 250 MG: 250 TABLET ORAL at 08:27

## 2023-10-19 RX ADMIN — BENZTROPINE MESYLATE 1 MG: 1 TABLET ORAL at 08:27

## 2023-10-19 RX ADMIN — GABAPENTIN 300 MG: 300 CAPSULE ORAL at 13:11

## 2023-10-19 RX ADMIN — OLANZAPINE 15 MG: 10 TABLET, ORALLY DISINTEGRATING ORAL at 20:06

## 2023-10-19 RX ADMIN — LORAZEPAM 0.5 MG: 0.5 TABLET ORAL at 13:11

## 2023-10-19 RX ADMIN — LORAZEPAM 0.5 MG: 0.5 TABLET ORAL at 20:05

## 2023-10-19 RX ADMIN — POLYETHYLENE GLYCOL 3350 17 G: 17 POWDER, FOR SOLUTION ORAL at 20:05

## 2023-10-19 RX ADMIN — GABAPENTIN 300 MG: 300 CAPSULE ORAL at 08:26

## 2023-10-19 RX ADMIN — CLOZAPINE 675 MG: 200 TABLET ORAL at 12:55

## 2023-10-19 RX ADMIN — DIVALPROEX SODIUM 375 MG: 125 CAPSULE, COATED PELLETS ORAL at 13:11

## 2023-10-19 RX ADMIN — FLUOXETINE 10 MG: 10 CAPSULE ORAL at 08:27

## 2023-10-19 RX ADMIN — ATROPINE SULFATE 2 DROP: 10 SOLUTION/ DROPS OPHTHALMIC at 13:12

## 2023-10-19 RX ADMIN — TAMSULOSIN HYDROCHLORIDE 0.4 MG: 0.4 CAPSULE ORAL at 08:26

## 2023-10-19 RX ADMIN — BENZTROPINE MESYLATE 1 MG: 1 TABLET ORAL at 20:05

## 2023-10-19 RX ADMIN — LORAZEPAM 0.5 MG: 0.5 TABLET ORAL at 08:27

## 2023-10-19 RX ADMIN — ATROPINE SULFATE 2 DROP: 10 SOLUTION/ DROPS OPHTHALMIC at 08:32

## 2023-10-19 RX ADMIN — DIVALPROEX SODIUM 375 MG: 125 CAPSULE, COATED PELLETS ORAL at 20:05

## 2023-10-19 RX ADMIN — CYANOCOBALAMIN TAB 1000 MCG 1000 MCG: 1000 TAB at 08:27

## 2023-10-19 RX ADMIN — NAPROXEN 250 MG: 250 TABLET ORAL at 17:21

## 2023-10-19 RX ADMIN — PRAZOSIN HYDROCHLORIDE 1 MG: 1 CAPSULE ORAL at 21:12

## 2023-10-19 RX ADMIN — Medication 25 MCG: at 08:27

## 2023-10-19 RX ADMIN — SENNOSIDES 2 TABLET: 8.6 TABLET ORAL at 20:05

## 2023-10-19 RX ADMIN — GABAPENTIN 300 MG: 300 CAPSULE ORAL at 20:05

## 2023-10-19 RX ADMIN — OLANZAPINE 15 MG: 10 TABLET, ORALLY DISINTEGRATING ORAL at 08:26

## 2023-10-19 RX ADMIN — MICONAZOLE NITRATE: 20 POWDER TOPICAL at 08:33

## 2023-10-19 RX ADMIN — DIVALPROEX SODIUM 375 MG: 125 CAPSULE, COATED PELLETS ORAL at 06:03

## 2023-10-19 RX ADMIN — Medication 10 MG: at 20:05

## 2023-10-19 ASSESSMENT — ACTIVITIES OF DAILY LIVING (ADL)
ADLS_ACUITY_SCORE: 92
LAUNDRY: UNABLE TO COMPLETE
ORAL_HYGIENE: PROMPTS
ORAL_HYGIENE: PROMPTS
ADLS_ACUITY_SCORE: 92
DRESS: WITH ASSISTANCE;SCRUBS (BEHAVIORAL HEALTH)
ADLS_ACUITY_SCORE: 92
HYGIENE/GROOMING: PROMPTS;WITH ASSISTANCE
ADLS_ACUITY_SCORE: 92
DRESS: WITH ASSISTANCE
ADLS_ACUITY_SCORE: 92
HYGIENE/GROOMING: PROMPTS;WITH ASSISTANCE
LAUNDRY: UNABLE TO COMPLETE
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
## 2023-10-19 NOTE — PLAN OF CARE
Assessment/Intervention/Current Symtoms and Care Coordination:  Reviewed chart. Met with team to review patient's current functioning, needs, progress, and impediments to discharge. Pt on SIO for safety. Pt's affect is flat. Pt has placement secured, awaiting [employer] assessment for CADI funding. Pt continues to be confused but is appropriate and redirectable. Pt continues to experience tremor, OT ordered weighted cups and bowl to assist with this. Pt also endorses embarrassment with tremor. Pt has [facility] assessment on 10/27 at 10:00am.    Discharge Plan or Goal:  [place] [address]     Barriers to Discharge:  Discharge pending completion of [facility] assessment and negotiation process with shelter. [facility] assessment scheduled for 10/27 at 10:00am    Referral Status:  Pt accepted at [facility]. For more comprehensive list of referrals see CTC note from 10/6  Pt will need psychiatry, PCP, Clozaril lab appointments to be scheduled prior to discharge  Psychiatrist - Dr. Santana at [facility]  P: [phone]   PCP - Attila Urena    Legal Status:  Committed MI with ITP  [facility]: [place]  File Number: [id number]  ITP includes: Clozaril, Zyprexa, Seroquel, and Risperdal  Expires: 2024    Contacts:  Vicky Marin CM with [facility] ([place] per commitment)  P: [phone]  Regina SANTIAGO CM with [facility] (KING per commitment)  P: [phone]  E: [email or web address]  [place] - Mom (KIGN signed)  P: [phone] (H) [phone] (M)   [place] - Dad (KING signed)  P: [phone] (H)  Sandra Treadwell - Sister (KING signed)  P: [phone]  [facility]'s Office   F: [phone]  Jovana Pozo  [employer]  P: [phone]  E: [email or web address]  Alexa Arteaga - [employer]    F: [phone]  E: [email or web address]    Upcoming Meetings and Dates/Important  Information and next steps:  CTC to coordinate with CADI CM and Jovana regarding placement needs  Pt has [facility] assessment on 10/27 at 10:00am

## 2023-10-19 NOTE — PLAN OF CARE
Problem: Psychotic Signs/Symptoms  Goal: Improved Behavioral Control (Psychotic Signs/Symptoms)  Outcome: Progressing   Goal Outcome Evaluation:      Received  sleeping comfortably in bed. Pt remains on SIo for hx of Agitation,impulsive behavior,  Self injury risk due to Parkinson symptoms and hx ofverbal outburst of sexual and threaten statements, non of which were observed this shift.  Slept for 8.5 hours  Denied pain/discomforts

## 2023-10-19 NOTE — PLAN OF CARE
Problem: Psychotic Signs/Symptoms  Goal: Improved Behavioral Control (Psychotic Signs/Symptoms)  Outcome: Progressing     Problem: Oral Intake Inadequate  Goal: Improved Oral Intake  Outcome: Progressing  Goal Outcome Evaluation:    Plan of Care Reviewed With: patient      Patient has been calm and pleasant, spent most of the shift withdrawn in his room, affect is flat and blunted, declined to eat breakfast saying that he wasn't hungry but he ate 10% of his lunch, had a couple of loose stools this shift but previously patient has not had BM for days so I wasn't worried about it and now it's better. Patient has been psychotic and been making some paranoid statements, he told another nurse that he does not trust her because the medications she gave him last night was poisoned and rotten and that is why he is having diarrhea. Denied all mental health symptoms and no complains of pain, vital signs have been stable and took all his scheduled medications.

## 2023-10-20 LAB
BASO+EOS+MONOS # BLD AUTO: ABNORMAL 10*3/UL
BASO+EOS+MONOS NFR BLD AUTO: ABNORMAL %
BASOPHILS # BLD AUTO: 0.1 10E3/UL (ref 0–0.2)
BASOPHILS NFR BLD AUTO: 1 %
EOSINOPHIL # BLD AUTO: 0 10E3/UL (ref 0–0.7)
EOSINOPHIL NFR BLD AUTO: 0 %
ERYTHROCYTE [DISTWIDTH] IN BLOOD BY AUTOMATED COUNT: 15.1 % (ref 10–15)
HCT VFR BLD AUTO: 39.1 % (ref 40–53)
HGB BLD-MCNC: 12.4 G/DL (ref 13.3–17.7)
IMM GRANULOCYTES # BLD: 0 10E3/UL
IMM GRANULOCYTES NFR BLD: 0 %
LYMPHOCYTES # BLD AUTO: 1.4 10E3/UL (ref 0.8–5.3)
LYMPHOCYTES NFR BLD AUTO: 12 %
MCH RBC QN AUTO: 27.6 PG (ref 26.5–33)
MCHC RBC AUTO-ENTMCNC: 31.7 G/DL (ref 31.5–36.5)
MCV RBC AUTO: 87 FL (ref 78–100)
MONOCYTES # BLD AUTO: 1.1 10E3/UL (ref 0–1.3)
MONOCYTES NFR BLD AUTO: 9 %
NEUTROPHILS # BLD AUTO: 9.1 10E3/UL (ref 1.6–8.3)
NEUTROPHILS NFR BLD AUTO: 78 %
NRBC # BLD AUTO: 0 10E3/UL
NRBC BLD AUTO-RTO: 0 /100
PLATELET # BLD AUTO: 148 10E3/UL (ref 150–450)
RBC # BLD AUTO: 4.49 10E6/UL (ref 4.4–5.9)
WBC # BLD AUTO: 11.6 10E3/UL (ref 4–11)

## 2023-10-20 PROCEDURE — 250N000013 HC RX MED GY IP 250 OP 250 PS 637: Performed by: PSYCHIATRY & NEUROLOGY

## 2023-10-20 PROCEDURE — 36415 COLL VENOUS BLD VENIPUNCTURE: CPT | Performed by: PSYCHIATRY & NEUROLOGY

## 2023-10-20 PROCEDURE — 250N000013 HC RX MED GY IP 250 OP 250 PS 637: Performed by: STUDENT IN AN ORGANIZED HEALTH CARE EDUCATION/TRAINING PROGRAM

## 2023-10-20 PROCEDURE — 85025 COMPLETE CBC W/AUTO DIFF WBC: CPT | Performed by: PSYCHIATRY & NEUROLOGY

## 2023-10-20 PROCEDURE — 124N000002 HC R&B MH UMMC

## 2023-10-20 PROCEDURE — 250N000013 HC RX MED GY IP 250 OP 250 PS 637: Performed by: PHYSICIAN ASSISTANT

## 2023-10-20 RX ORDER — BENZTROPINE MESYLATE 0.5 MG/1
2 TABLET ORAL 2 TIMES DAILY
Status: DISCONTINUED | OUTPATIENT
Start: 2023-10-20 | End: 2023-10-20

## 2023-10-20 RX ORDER — PROPRANOLOL HYDROCHLORIDE 10 MG/1
10 TABLET ORAL
Status: DISCONTINUED | OUTPATIENT
Start: 2023-10-20 | End: 2023-10-24

## 2023-10-20 RX ORDER — BENZTROPINE MESYLATE 0.5 MG/1
1 TABLET ORAL
Status: DISCONTINUED | OUTPATIENT
Start: 2023-10-20 | End: 2023-10-24

## 2023-10-20 RX ORDER — DIVALPROEX SODIUM 125 MG/1
250 CAPSULE, COATED PELLETS ORAL EVERY 8 HOURS SCHEDULED
Status: DISCONTINUED | OUTPATIENT
Start: 2023-10-20 | End: 2023-11-03

## 2023-10-20 RX ADMIN — BENZTROPINE MESYLATE 1 MG: 1 TABLET ORAL at 08:50

## 2023-10-20 RX ADMIN — PRAZOSIN HYDROCHLORIDE 1 MG: 1 CAPSULE ORAL at 21:59

## 2023-10-20 RX ADMIN — Medication 10 MG: at 20:58

## 2023-10-20 RX ADMIN — POLYETHYLENE GLYCOL 3350 17 G: 17 POWDER, FOR SOLUTION ORAL at 20:58

## 2023-10-20 RX ADMIN — SENNOSIDES 2 TABLET: 8.6 TABLET ORAL at 20:58

## 2023-10-20 RX ADMIN — FLUOXETINE 10 MG: 10 CAPSULE ORAL at 08:50

## 2023-10-20 RX ADMIN — BENZTROPINE MESYLATE 1 MG: 0.5 TABLET ORAL at 12:44

## 2023-10-20 RX ADMIN — ATROPINE SULFATE 2 DROP: 10 SOLUTION/ DROPS OPHTHALMIC at 14:41

## 2023-10-20 RX ADMIN — POLYETHYLENE GLYCOL 3350 17 G: 17 POWDER, FOR SOLUTION ORAL at 09:02

## 2023-10-20 RX ADMIN — GABAPENTIN 300 MG: 300 CAPSULE ORAL at 14:41

## 2023-10-20 RX ADMIN — GABAPENTIN 300 MG: 300 CAPSULE ORAL at 20:57

## 2023-10-20 RX ADMIN — MICONAZOLE NITRATE: 20 POWDER TOPICAL at 08:59

## 2023-10-20 RX ADMIN — DIVALPROEX SODIUM 250 MG: 125 CAPSULE, COATED PELLETS ORAL at 14:40

## 2023-10-20 RX ADMIN — NAPROXEN 250 MG: 250 TABLET ORAL at 08:51

## 2023-10-20 RX ADMIN — OLANZAPINE 15 MG: 10 TABLET, ORALLY DISINTEGRATING ORAL at 08:50

## 2023-10-20 RX ADMIN — GABAPENTIN 300 MG: 300 CAPSULE ORAL at 08:50

## 2023-10-20 RX ADMIN — DIVALPROEX SODIUM 250 MG: 125 CAPSULE, COATED PELLETS ORAL at 20:57

## 2023-10-20 RX ADMIN — SENNOSIDES 2 TABLET: 8.6 TABLET ORAL at 08:50

## 2023-10-20 RX ADMIN — CYANOCOBALAMIN TAB 1000 MCG 1000 MCG: 1000 TAB at 08:51

## 2023-10-20 RX ADMIN — LORAZEPAM 0.5 MG: 0.5 TABLET ORAL at 20:58

## 2023-10-20 RX ADMIN — LORAZEPAM 0.5 MG: 0.5 TABLET ORAL at 08:51

## 2023-10-20 RX ADMIN — OLANZAPINE 15 MG: 10 TABLET, ORALLY DISINTEGRATING ORAL at 20:57

## 2023-10-20 RX ADMIN — ATROPINE SULFATE 2 DROP: 10 SOLUTION/ DROPS OPHTHALMIC at 08:53

## 2023-10-20 RX ADMIN — TAMSULOSIN HYDROCHLORIDE 0.4 MG: 0.4 CAPSULE ORAL at 08:50

## 2023-10-20 RX ADMIN — DIVALPROEX SODIUM 375 MG: 125 CAPSULE, COATED PELLETS ORAL at 06:34

## 2023-10-20 RX ADMIN — NAPROXEN 250 MG: 250 TABLET ORAL at 17:43

## 2023-10-20 RX ADMIN — LORAZEPAM 0.5 MG: 0.5 TABLET ORAL at 14:41

## 2023-10-20 RX ADMIN — BENZTROPINE MESYLATE 1 MG: 0.5 TABLET ORAL at 17:43

## 2023-10-20 RX ADMIN — PROPRANOLOL HYDROCHLORIDE 10 MG: 10 TABLET ORAL at 17:43

## 2023-10-20 RX ADMIN — CLOZAPINE 675 MG: 200 TABLET ORAL at 12:44

## 2023-10-20 RX ADMIN — Medication 25 MCG: at 08:51

## 2023-10-20 ASSESSMENT — ACTIVITIES OF DAILY LIVING (ADL)
ADLS_ACUITY_SCORE: 83
ADLS_ACUITY_SCORE: 83
ADLS_ACUITY_SCORE: 92
ADLS_ACUITY_SCORE: 92
ADLS_ACUITY_SCORE: 83
HYGIENE/GROOMING: PROMPTS;WITH ASSISTANCE
ADLS_ACUITY_SCORE: 83
DRESS: INDEPENDENT
ADLS_ACUITY_SCORE: 92
ADLS_ACUITY_SCORE: 83
ORAL_HYGIENE: PROMPTS
ADLS_ACUITY_SCORE: 83
ADLS_ACUITY_SCORE: 92
ADLS_ACUITY_SCORE: 92
ADLS_ACUITY_SCORE: 83

## 2023-10-20 NOTE — PLAN OF CARE
Problem: Psychotic Symptoms  Goal: Psychotic Symptoms  Description: Signs and symptoms of listed problems will be absent or manageable.  Outcome: Progressing   Goal Outcome Evaluation:               Received  sleeping comfortably in bed, breathing unlabored with HOB elevated at 30 degree angle. Pt remains on SIO for hx of Agitation,impulsive behavior,  Self injury risk due to Parkinson symptoms and hx ofverbal outburst of sexual and threaten statements, non of which were observed this shift.  Slept for 8.5 hours  Denied pain/discomforts

## 2023-10-20 NOTE — PROGRESS NOTES
Weighted silverware was discontinued today with the hand tremors interfering with the silverware shaking on the teeth and concerns of teeth chipping. This OT author Will check for other options.

## 2023-10-20 NOTE — PLAN OF CARE
Assessment/Intervention/Current Symtoms and Care Coordination:  Reviewed chart. Met with team to review patient's current functioning, needs, progress, and impediments to discharge. Pt on SIO for safety. Pt's affect is flat. Pt has placement secured, awaiting [employer] assessment for CADI funding. Pt continues to be confused but is appropriate and redirectable. Pt continues to experience tremor, OT ordered weighted cups and bowl to assist with this. Although, pt has been resistive to adaptive equipment. Pt also endorses embarrassment with tremor. Pt has [facility] assessment on 10/27 at 10:00am.     Discharge Plan or Goal:  [place] discharge [place] [address]     Barriers to Discharge:  Discharge pending completion of [facility]hoices assessment and negotiation process with longterm. [facility]ices assessment scheduled for 10/27 at 10:00am    Referral Status:  Pt accepted at [facility]. For more comprehensive list of referrals see CTC note from 10/6  Pt will need psychiatry, PCP, Clozaril lab appointments to be scheduled prior to discharge  Psychiatrist - Dr. Santana at [facility]  P: [phone]   PCP - Attila Urena    Legal Status:  Committed MI with ITP  [facility]: [place]  File Number: [id number]  ITP includes: Clozaril, Zyprexa, Seroquel, and Risperdal  Expires: 2024    Contacts:  Vicky Marin CM with [facility] (KING per commitment)  P: [phone]  Regina SANTIAGO CM with [facility] (KING per commitment)  P: [phone]  E: [email or web address]  [place] - Mom (KING signed)  P: [phone] (H) [phone] (M)   Gilbert - Dad (KING signed)  P: [phone] (H)  Sandra Treadwell - Sister (KING signed)  P: [phone]  [facility]'s Office   F: [phone]  Jovana Pozo  [place]  P: [phone]  E: [email or web address]  Alexa Arteaga - [employer]ices    F: [phone]  E:  [email or web address]    Upcoming Meetings and Dates/Important Information and next steps:  CTC to coordinate with CADI CM and Jovana regarding placement needs  Pt has M[facility]hoices assessment on 10/27 at 10:00am

## 2023-10-20 NOTE — PLAN OF CARE
BEH IP Unit Acuity Rating Score (UARS)  Patient is given one point for every criteria they meet.    CRITERIA SCORING   On a 72 hour hold, court hold, committed, stay of commitment, or revocation 1    Patient LOS on BEH unit exceeds 20 days 1  LOS: 147   Patient under guardianship, 55+, otherwise medically complex, or under age 11 1   Suicide ideation without relief of precipitating factors 0   Current plan for suicide 0   Current plan for homicide 0   Imminent risk or actual attempt to seriously harm another without relief of factors precipitating the attempt 0   Severe dysfunction in daily living (ex: complete neglect for self care, extreme disruption in vegetative function, extreme deterioration in social interactions) 1   Recent (last 7 days) or current physical aggression in the ED or on unit 0   Restraints or seclusion episode in past 72 hours 0   Recent (last 7 days) or current verbal aggression, agitation, yelling, etc., while in the ED or unit 1   Active psychosis 0   Need for constant or near constant redirection (from leaving, from others, etc).  1   Intrusive or disruptive behaviors 1   TOTAL 7

## 2023-10-20 NOTE — PROGRESS NOTES
"Patient seen, chart reviewed, care discussed with staff.  Blood pressure 99/63, pulse 84, temperature 97.9  F (36.6  C), temperature source Oral, resp. rate 18, height 1.753 m (5' 9\"), weight 84.9 kg (187 lb 2.7 oz), SpO2 99%.  Psychosis continues complaints about tremor continue.      General appearance: fair  Alert.   Affect: fair, not appropriate at times  Mood: fair    Speech:  decreased production  Eye contact:  good.    Psychomotor behavior: mild-moderate tremor  Gait: steady   Abnormal movements:  none  Delusions: continue  Hallucinations:  auditory  Thoughts: some disorganization  Associations: a bit loose  Judgement: poor  Insight: poor  Cognitions: impaired  Not suicidal.    Imp:  Paranoid Schizophrenia  Neurocognitive  And:   Patient Active Problem List   Diagnosis    Urinary retention    Schizophrenia, unspecified type (H)    Altered mental status, unspecified altered mental status type    Mood changes    Paranoid schizophrenia, chronic condition with acute exacerbation (H)    Neuroleptic-induced parkinsonism     Agitation    Plan:  Increase Cogentin to one mg TID before meals  2.  Propranolol 10mg TID before meals    Current Facility-Administered Medications   Medication    acetaminophen (TYLENOL) tablet 650 mg    albuterol (PROVENTIL HFA/VENTOLIN HFA) inhaler    atropine 1 % ophthalmic solution 1 drop    atropine 1 % sublingual (ophthalmic drops for sublingual use) 2 drop    benzonatate (TESSALON) capsule 100 mg    benztropine (COGENTIN) tablet 1 mg    bisacodyl (DULCOLAX) suppository 10 mg    cloZAPine (CLOZARIL) tablet 675 mg    cyanocobalamin (VITAMIN B-12) tablet 1,000 mcg    haloperidol (HALDOL) tablet 5 mg    And    diphenhydrAMINE (BENADRYL) capsule 50 mg    haloperidol lactate (HALDOL) injection 5 mg    And    diphenhydrAMINE (BENADRYL) injection 50 mg    divalproex sodium delayed-release (DEPAKOTE SPRINKLE) DR capsule 250 mg    FLUoxetine (PROzac) capsule 10 mg    gabapentin (NEURONTIN) " capsule 100 mg    gabapentin (NEURONTIN) capsule 300 mg    lidocaine (XYLOCAINE) 2 % external gel    LORazepam (ATIVAN) tablet 0.5 mg    melatonin tablet 10 mg    miconazole (MICATIN) 2 % powder    mineral oil-white petrolatum (EUCERIN/MINERIN) cream    naproxen (NAPROSYN) tablet 250 mg    OLANZapine zydis (zyPREXA) ODT tab 15 mg    Or    OLANZapine (zyPREXA) injection 10 mg    OLANZapine (zyPREXA) tablet 5-10 mg    Or    OLANZapine (zyPREXA) injection 5-10 mg    polyethylene glycol (MIRALAX) Packet 17 g    prazosin (MINIPRESS) capsule 1 mg    propranolol (INDERAL) tablet 10 mg    senna-docusate (SENOKOT-S/PERICOLACE) 8.6-50 MG per tablet 1 tablet    sennosides (SENOKOT) tablet 2 tablet    simethicone (MYLICON) chewable tablet 80 mg    tamsulosin (FLOMAX) capsule 0.4 mg    traZODone (DESYREL) tablet 50 mg    Vitamin D3 (CHOLECALCIFEROL) tablet 25 mcg     Recent Results (from the past 168 hour(s))   Valproic acid    Collection Time: 10/17/23  9:05 AM   Result Value Ref Range    Valproic acid 32.2 (L)   ug/mL   Extra Purple Top Tube    Collection Time: 10/17/23  9:05 AM   Result Value Ref Range    Hold Specimen [facility]    EKG 12-lead, complete    Collection Time: 10/17/23  9:20 AM   Result Value Ref Range    Systolic Blood Pressure  mmHg    Diastolic Blood Pressure  mmHg    Ventricular Rate 85 BPM    Atrial Rate 85 BPM    DE Interval 160 ms    QRS Duration 166 ms     ms    QTc 523 ms    P Axis 48 degrees    R AXIS -22 degrees    T Axis 111 degrees    Interpretation ECG       Sinus rhythm  Left bundle branch block  Abnormal ECG  When compared with ECG of 10-OCT-2023 09:35,  No significant change was found  Confirmed by MD PAXTON, RIO ([id number]) on 10/18/2023 12:52:54 PM     Ammonia    Collection Time: 10/17/23  3:56 PM   Result Value Ref Range    Ammonia 33 16 - 60 umol/L   CBC with platelets and differential    Collection Time: 10/20/23  8:07 AM   Result Value Ref Range    WBC Count 11.6 (H) 4.0 - 11.0 10e3/uL     RBC Count 4.49 4.40 - 5.90 10e6/uL    Hemoglobin 12.4 (L) 13.3 - 17.7 g/dL    Hematocrit 39.1 (L) 40.0 - 53.0 %    MCV 87 78 - 100 fL    MCH 27.6 26.5 - 33.0 pg    MCHC 31.7 31.5 - 36.5 g/dL    RDW 15.1 (H) 10.0 - 15.0 %    Platelet Count 148 (L) 150 - 450 10e3/uL    % Neutrophils 78 %    % Lymphocytes 12 %    % Monocytes 9 %    Mids % (Monos, Eos, Basos)      % Eosinophils 0 %    % Basophils 1 %    % Immature Granulocytes 0 %    NRBCs per 100 WBC 0 <1 /100    Absolute Neutrophils 9.1 (H) 1.6 - 8.3 10e3/uL    Absolute Lymphocytes 1.4 0.8 - 5.3 10e3/uL    Absolute Monocytes 1.1 0.0 - 1.3 10e3/uL    Mids Abs (Monos, Eos, Basos)      Absolute Eosinophils 0.0 0.0 - 0.7 10e3/uL    Absolute Basophils 0.1 0.0 - 0.2 10e3/uL    Absolute Immature Granulocytes 0.0 <=0.4 10e3/uL    Absolute NRBCs 0.0 10e3/uL

## 2023-10-21 PROCEDURE — 250N000013 HC RX MED GY IP 250 OP 250 PS 637: Performed by: PSYCHIATRY & NEUROLOGY

## 2023-10-21 PROCEDURE — 250N000013 HC RX MED GY IP 250 OP 250 PS 637: Performed by: STUDENT IN AN ORGANIZED HEALTH CARE EDUCATION/TRAINING PROGRAM

## 2023-10-21 PROCEDURE — 250N000013 HC RX MED GY IP 250 OP 250 PS 637: Performed by: PHYSICIAN ASSISTANT

## 2023-10-21 PROCEDURE — 124N000002 HC R&B MH UMMC

## 2023-10-21 RX ADMIN — NAPROXEN 250 MG: 250 TABLET ORAL at 08:21

## 2023-10-21 RX ADMIN — NAPROXEN 250 MG: 250 TABLET ORAL at 17:08

## 2023-10-21 RX ADMIN — ATROPINE SULFATE 2 DROP: 10 SOLUTION/ DROPS OPHTHALMIC at 13:58

## 2023-10-21 RX ADMIN — POLYETHYLENE GLYCOL 3350 17 G: 17 POWDER, FOR SOLUTION ORAL at 08:19

## 2023-10-21 RX ADMIN — Medication 10 MG: at 20:00

## 2023-10-21 RX ADMIN — ATROPINE SULFATE 2 DROP: 10 SOLUTION/ DROPS OPHTHALMIC at 08:22

## 2023-10-21 RX ADMIN — Medication 25 MCG: at 08:21

## 2023-10-21 RX ADMIN — SENNOSIDES 2 TABLET: 8.6 TABLET ORAL at 08:21

## 2023-10-21 RX ADMIN — OLANZAPINE 15 MG: 10 TABLET, ORALLY DISINTEGRATING ORAL at 20:00

## 2023-10-21 RX ADMIN — PROPRANOLOL HYDROCHLORIDE 10 MG: 10 TABLET ORAL at 06:35

## 2023-10-21 RX ADMIN — BENZTROPINE MESYLATE 1 MG: 0.5 TABLET ORAL at 12:14

## 2023-10-21 RX ADMIN — OLANZAPINE 15 MG: 10 TABLET, ORALLY DISINTEGRATING ORAL at 08:21

## 2023-10-21 RX ADMIN — CLOZAPINE 675 MG: 200 TABLET ORAL at 12:13

## 2023-10-21 RX ADMIN — CYANOCOBALAMIN TAB 1000 MCG 1000 MCG: 1000 TAB at 08:21

## 2023-10-21 RX ADMIN — DIVALPROEX SODIUM 250 MG: 125 CAPSULE, COATED PELLETS ORAL at 13:54

## 2023-10-21 RX ADMIN — BENZTROPINE MESYLATE 1 MG: 0.5 TABLET ORAL at 06:36

## 2023-10-21 RX ADMIN — GABAPENTIN 300 MG: 300 CAPSULE ORAL at 20:00

## 2023-10-21 RX ADMIN — PROPRANOLOL HYDROCHLORIDE 10 MG: 10 TABLET ORAL at 12:14

## 2023-10-21 RX ADMIN — LORAZEPAM 0.5 MG: 0.5 TABLET ORAL at 20:00

## 2023-10-21 RX ADMIN — LORAZEPAM 0.5 MG: 0.5 TABLET ORAL at 13:54

## 2023-10-21 RX ADMIN — FLUOXETINE 10 MG: 10 CAPSULE ORAL at 08:21

## 2023-10-21 RX ADMIN — LORAZEPAM 0.5 MG: 0.5 TABLET ORAL at 08:21

## 2023-10-21 RX ADMIN — DIVALPROEX SODIUM 250 MG: 125 CAPSULE, COATED PELLETS ORAL at 06:35

## 2023-10-21 RX ADMIN — BENZTROPINE MESYLATE 1 MG: 0.5 TABLET ORAL at 17:08

## 2023-10-21 RX ADMIN — MICONAZOLE NITRATE: 20 POWDER TOPICAL at 10:26

## 2023-10-21 RX ADMIN — GABAPENTIN 300 MG: 300 CAPSULE ORAL at 08:21

## 2023-10-21 RX ADMIN — TAMSULOSIN HYDROCHLORIDE 0.4 MG: 0.4 CAPSULE ORAL at 08:21

## 2023-10-21 RX ADMIN — SENNOSIDES 2 TABLET: 8.6 TABLET ORAL at 20:00

## 2023-10-21 RX ADMIN — POLYETHYLENE GLYCOL 3350 17 G: 17 POWDER, FOR SOLUTION ORAL at 20:01

## 2023-10-21 RX ADMIN — MICONAZOLE NITRATE: 20 POWDER TOPICAL at 21:51

## 2023-10-21 RX ADMIN — PROPRANOLOL HYDROCHLORIDE 10 MG: 10 TABLET ORAL at 17:08

## 2023-10-21 RX ADMIN — GABAPENTIN 300 MG: 300 CAPSULE ORAL at 13:54

## 2023-10-21 RX ADMIN — PRAZOSIN HYDROCHLORIDE 1 MG: 1 CAPSULE ORAL at 21:50

## 2023-10-21 RX ADMIN — DIVALPROEX SODIUM 250 MG: 125 CAPSULE, COATED PELLETS ORAL at 20:00

## 2023-10-21 ASSESSMENT — ACTIVITIES OF DAILY LIVING (ADL)
HYGIENE/GROOMING: PROMPTS;WITH ASSISTANCE
ADLS_ACUITY_SCORE: 93
ADLS_ACUITY_SCORE: 93
DRESS: PROMPTS;WITH ASSISTANCE
ADLS_ACUITY_SCORE: 93
ADLS_ACUITY_SCORE: 83
ADLS_ACUITY_SCORE: 93
HYGIENE/GROOMING: WITH ASSISTANCE;PROMPTS
ADLS_ACUITY_SCORE: 83
ADLS_ACUITY_SCORE: 93
ADLS_ACUITY_SCORE: 83
ADLS_ACUITY_SCORE: 83
ORAL_HYGIENE: PROMPTS
ADLS_ACUITY_SCORE: 83
LAUNDRY: UNABLE TO COMPLETE
ADLS_ACUITY_SCORE: 93
ADLS_ACUITY_SCORE: 83
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
## 2023-10-21 NOTE — PLAN OF CARE
"  Problem: Psychotic Signs/Symptoms  Goal: Improved Mood Symptoms  Note: Patient VS. /86   Pulse 96   Temp 97.9  F (36.6  C) (Oral)   Resp 18   Ht 1.753 m (5' 9\")   Wt 84.9 kg (187 lb 2.7 oz)   SpO2 97%   BMI 27.64 kg/m  . Patient had an Ok shift. He is isolative to himself when in the milieu but does interact with staff or peers when engaged. His affect is flat. He denied any suicidal ideation or thoughts of self injurious behavior. No reports of feeling full and no reports of not being able to empty his bladder. Patient refused bladder scan. He took his medications with thins with no signs of aspiration. No cough after medications or thin liquids. Patient said he did not feel.\"... down.\" But he did endorse feeling like.\"... no one liked him.\" No new behavioral concerns to report this shift.                     "

## 2023-10-21 NOTE — PLAN OF CARE
"  Problem: Adult Behavioral Health Plan of Care  Goal: Optimized Coping Skills in Response to Life Stressors  Outcome: Progressing  Flowsheets (Taken 10/21/2023 1116)  Optimized Coping Skills in Response to Life Stressors: making progress toward outcome   Goal Outcome Evaluation:    Plan of Care Reviewed With: patient          07:00-15:30     Patient isolative to his room, came to the dinning area for both meals breakfast and lunch. Ate well % with staff assistance.     Appearance: clean but untidy   Attitude: calm and cooperative   Behavior: patient requested a \"bed bath\", since he is on assault precautions he was offered a shower but he denied. He was given supplies to perform alejandro care as per order under supervision. While performing alejandro care patient began to masturbate in front of the two staff members present in the room. He was redirected. He complained about the redirection but was compliant with it. Miconazole powder applied to the groin as per order.   Eye Contact: good  Speech: clear, coherent, moderate in production and volume  Orientation: oreinted to person , place, time and situation  Mood:  admits anxiety \"some\" denied depression   Affect: mood congruent   Thought Process: linear, poor in content   Suicidal Ideation: denied   Hallucination: reported intermittent, not intrusive familiar voice telling him that he is is \"huge trouble\" and that he needs to \"start preparing to defend\" himself. Patient was reassured that he is in a safe place. He denied thoughts or intend of harming self or others at the time of the assessment.     Remains on SIO per order for:   \" Agitation   Pt is impulsive   Pt has ran out of his room naked   Pt has Parkinson symptoms place him in a high fall risk   Pt has verbal outburst of sexual and threaten statements   Pt requires immediate redirection when masturbating\"     /63   Pulse 75   Temp 97.1  F (36.2  C) (Temporal)   Resp 17   Ht 1.753 m (5' 9\")   Wt 84.9 " kg (187 lb 2.7 oz)   SpO2 97%   BMI 27.64 kg/m

## 2023-10-22 PROCEDURE — 250N000013 HC RX MED GY IP 250 OP 250 PS 637: Performed by: PSYCHIATRY & NEUROLOGY

## 2023-10-22 PROCEDURE — 124N000002 HC R&B MH UMMC

## 2023-10-22 PROCEDURE — 250N000013 HC RX MED GY IP 250 OP 250 PS 637: Performed by: STUDENT IN AN ORGANIZED HEALTH CARE EDUCATION/TRAINING PROGRAM

## 2023-10-22 PROCEDURE — 250N000013 HC RX MED GY IP 250 OP 250 PS 637: Performed by: PHYSICIAN ASSISTANT

## 2023-10-22 RX ADMIN — FLUOXETINE 10 MG: 10 CAPSULE ORAL at 08:33

## 2023-10-22 RX ADMIN — LORAZEPAM 0.5 MG: 0.5 TABLET ORAL at 13:58

## 2023-10-22 RX ADMIN — GABAPENTIN 300 MG: 300 CAPSULE ORAL at 08:33

## 2023-10-22 RX ADMIN — ATROPINE SULFATE 2 DROP: 10 SOLUTION/ DROPS OPHTHALMIC at 20:27

## 2023-10-22 RX ADMIN — DIVALPROEX SODIUM 250 MG: 125 CAPSULE, COATED PELLETS ORAL at 07:03

## 2023-10-22 RX ADMIN — DIVALPROEX SODIUM 250 MG: 125 CAPSULE, COATED PELLETS ORAL at 20:26

## 2023-10-22 RX ADMIN — DIVALPROEX SODIUM 250 MG: 125 CAPSULE, COATED PELLETS ORAL at 13:59

## 2023-10-22 RX ADMIN — TAMSULOSIN HYDROCHLORIDE 0.4 MG: 0.4 CAPSULE ORAL at 08:33

## 2023-10-22 RX ADMIN — POLYETHYLENE GLYCOL 3350 17 G: 17 POWDER, FOR SOLUTION ORAL at 08:32

## 2023-10-22 RX ADMIN — NAPROXEN 250 MG: 250 TABLET ORAL at 17:21

## 2023-10-22 RX ADMIN — Medication 25 MCG: at 08:34

## 2023-10-22 RX ADMIN — BENZTROPINE MESYLATE 1 MG: 0.5 TABLET ORAL at 07:03

## 2023-10-22 RX ADMIN — MICONAZOLE NITRATE: 20 POWDER TOPICAL at 08:34

## 2023-10-22 RX ADMIN — MICONAZOLE NITRATE: 20 POWDER TOPICAL at 20:26

## 2023-10-22 RX ADMIN — BENZTROPINE MESYLATE 1 MG: 0.5 TABLET ORAL at 17:21

## 2023-10-22 RX ADMIN — BENZTROPINE MESYLATE 1 MG: 0.5 TABLET ORAL at 12:42

## 2023-10-22 RX ADMIN — CYANOCOBALAMIN TAB 1000 MCG 1000 MCG: 1000 TAB at 08:33

## 2023-10-22 RX ADMIN — ATROPINE SULFATE 2 DROP: 10 SOLUTION/ DROPS OPHTHALMIC at 08:34

## 2023-10-22 RX ADMIN — GABAPENTIN 300 MG: 300 CAPSULE ORAL at 20:26

## 2023-10-22 RX ADMIN — OLANZAPINE 15 MG: 10 TABLET, ORALLY DISINTEGRATING ORAL at 20:25

## 2023-10-22 RX ADMIN — PROPRANOLOL HYDROCHLORIDE 10 MG: 10 TABLET ORAL at 07:03

## 2023-10-22 RX ADMIN — CLOZAPINE 675 MG: 200 TABLET ORAL at 12:41

## 2023-10-22 RX ADMIN — SENNOSIDES 2 TABLET: 8.6 TABLET ORAL at 20:26

## 2023-10-22 RX ADMIN — GABAPENTIN 300 MG: 300 CAPSULE ORAL at 13:59

## 2023-10-22 RX ADMIN — POLYETHYLENE GLYCOL 3350 17 G: 17 POWDER, FOR SOLUTION ORAL at 20:27

## 2023-10-22 RX ADMIN — NAPROXEN 250 MG: 250 TABLET ORAL at 08:34

## 2023-10-22 RX ADMIN — OLANZAPINE 15 MG: 10 TABLET, ORALLY DISINTEGRATING ORAL at 08:33

## 2023-10-22 RX ADMIN — Medication 10 MG: at 20:26

## 2023-10-22 RX ADMIN — PROPRANOLOL HYDROCHLORIDE 10 MG: 10 TABLET ORAL at 12:41

## 2023-10-22 RX ADMIN — PROPRANOLOL HYDROCHLORIDE 10 MG: 10 TABLET ORAL at 17:21

## 2023-10-22 RX ADMIN — PRAZOSIN HYDROCHLORIDE 1 MG: 1 CAPSULE ORAL at 20:25

## 2023-10-22 RX ADMIN — LORAZEPAM 0.5 MG: 0.5 TABLET ORAL at 08:33

## 2023-10-22 RX ADMIN — SENNOSIDES 2 TABLET: 8.6 TABLET ORAL at 08:33

## 2023-10-22 RX ADMIN — LORAZEPAM 0.5 MG: 0.5 TABLET ORAL at 20:26

## 2023-10-22 ASSESSMENT — ACTIVITIES OF DAILY LIVING (ADL)
ADLS_ACUITY_SCORE: 91
HYGIENE/GROOMING: PROMPTS;INDEPENDENT
DRESS: PROMPTS;INDEPENDENT
ADLS_ACUITY_SCORE: 93
ADLS_ACUITY_SCORE: 91
ADLS_ACUITY_SCORE: 93
ADLS_ACUITY_SCORE: 91
ADLS_ACUITY_SCORE: 93
LAUNDRY: UNABLE TO COMPLETE
ADLS_ACUITY_SCORE: 93
ADLS_ACUITY_SCORE: 93
ORAL_HYGIENE: PROMPTS
ADLS_ACUITY_SCORE: 93
ADLS_ACUITY_SCORE: 91

## 2023-10-22 NOTE — PLAN OF CARE
Problem: Psychotic Signs/Symptoms  Goal: Improved Behavioral Control (Psychotic Signs/Symptoms)  Outcome: Progressing     Problem: Sleep Disturbance  Goal: Adequate Sleep/Rest  Outcome: Progressing   Goal Outcome Evaluation:       Pt received sleeping at report. Slept for 9.25 hours tonight. Remain on SIO r/t severe intrusiveness, Assault risk. Medications well tolerated, no coughing observed.No behavioral activity noted. HOB remains at 30 degrees. Will monitor per POC.

## 2023-10-22 NOTE — PLAN OF CARE
"  Problem: Psychotic Signs/Symptoms  Goal: Decreased Sensory Symptoms (Psychotic Signs/Symptoms)  Outcome: Progressing  Flowsheets (Taken 10/22/2023 1310)  Mutually Determined Action Steps (Decreased Sensory Symptoms): adheres to medication regimen   Goal Outcome Evaluation:    Plan of Care Reviewed With: patient          07:00-15:30     This morning patient needed encouragement to come out of his room for breakfast but after being approached by two staff members about it he eventually did. He ate 100% of his meal with staff assist.He isolated in his room most of the morning, did not attend group activities but was talkative with the SIO staff. He came to the dinning room for lunch with less encouragements and ate 100%.   Voided once, he reported no urge to void but he did.     Appearance: untidy, unclean, shower offered but patient refused.   Attitude: calm, cooperative   Behavior:   Patient made some paranoid statements, when given the incentive spirometer he refused saying \"There must be something in it if you are trying to make me take it\".   He also came to the unge with his water mug and said that the water in it is \"contaminated\" he did not say what substance he believes is in the water but was acceptable to have it replaced   Eye Contact: Good   Speech: poor in content but otherwise clear, coherent and logical   Orientation: oreinted to person , place, time and situation  Mood:  admits some anxiety   Affect: flat/blunted   Thought Process: paranoid delusions, suspiciousness   Suicidal Ideation:denied   Hallucination: intermittent AH - familiar voice     Remains on SIO, see order for details. Continues to be impulsive with impaired safety judgement.     /67   Pulse 81   Temp 97.5  F (36.4  C) (Oral)   Resp 18   Ht 1.753 m (5' 9\")   Wt 84.9 kg (187 lb 2.7 oz)   SpO2 98%   BMI 27.64 kg/m                 "

## 2023-10-23 LAB — OLANZAPINE SERPL-MCNC: 42 NG/ML

## 2023-10-23 PROCEDURE — 250N000013 HC RX MED GY IP 250 OP 250 PS 637: Performed by: PSYCHIATRY & NEUROLOGY

## 2023-10-23 PROCEDURE — 250N000013 HC RX MED GY IP 250 OP 250 PS 637: Performed by: STUDENT IN AN ORGANIZED HEALTH CARE EDUCATION/TRAINING PROGRAM

## 2023-10-23 PROCEDURE — 124N000002 HC R&B MH UMMC

## 2023-10-23 PROCEDURE — 250N000013 HC RX MED GY IP 250 OP 250 PS 637: Performed by: PHYSICIAN ASSISTANT

## 2023-10-23 RX ADMIN — NAPROXEN 250 MG: 250 TABLET ORAL at 20:17

## 2023-10-23 RX ADMIN — POLYETHYLENE GLYCOL 3350 17 G: 17 POWDER, FOR SOLUTION ORAL at 08:21

## 2023-10-23 RX ADMIN — BENZTROPINE MESYLATE 1 MG: 0.5 TABLET ORAL at 16:20

## 2023-10-23 RX ADMIN — DIVALPROEX SODIUM 250 MG: 125 CAPSULE, COATED PELLETS ORAL at 06:47

## 2023-10-23 RX ADMIN — BENZTROPINE MESYLATE 1 MG: 0.5 TABLET ORAL at 06:47

## 2023-10-23 RX ADMIN — LORAZEPAM 0.5 MG: 0.5 TABLET ORAL at 08:22

## 2023-10-23 RX ADMIN — PROPRANOLOL HYDROCHLORIDE 10 MG: 10 TABLET ORAL at 06:47

## 2023-10-23 RX ADMIN — NAPROXEN 250 MG: 250 TABLET ORAL at 08:21

## 2023-10-23 RX ADMIN — GABAPENTIN 300 MG: 300 CAPSULE ORAL at 14:30

## 2023-10-23 RX ADMIN — LORAZEPAM 0.5 MG: 0.5 TABLET ORAL at 20:17

## 2023-10-23 RX ADMIN — BENZTROPINE MESYLATE 1 MG: 0.5 TABLET ORAL at 12:57

## 2023-10-23 RX ADMIN — POLYETHYLENE GLYCOL 3350 17 G: 17 POWDER, FOR SOLUTION ORAL at 20:17

## 2023-10-23 RX ADMIN — ATROPINE SULFATE 2 DROP: 10 SOLUTION/ DROPS OPHTHALMIC at 20:46

## 2023-10-23 RX ADMIN — DIVALPROEX SODIUM 250 MG: 125 CAPSULE, COATED PELLETS ORAL at 20:16

## 2023-10-23 RX ADMIN — TAMSULOSIN HYDROCHLORIDE 0.4 MG: 0.4 CAPSULE ORAL at 08:21

## 2023-10-23 RX ADMIN — ATROPINE SULFATE 2 DROP: 10 SOLUTION/ DROPS OPHTHALMIC at 14:33

## 2023-10-23 RX ADMIN — Medication 10 MG: at 20:17

## 2023-10-23 RX ADMIN — OLANZAPINE 15 MG: 10 TABLET, ORALLY DISINTEGRATING ORAL at 08:21

## 2023-10-23 RX ADMIN — SENNOSIDES 2 TABLET: 8.6 TABLET ORAL at 20:16

## 2023-10-23 RX ADMIN — GABAPENTIN 300 MG: 300 CAPSULE ORAL at 20:16

## 2023-10-23 RX ADMIN — MICONAZOLE NITRATE: 20 POWDER TOPICAL at 20:47

## 2023-10-23 RX ADMIN — Medication 25 MCG: at 08:22

## 2023-10-23 RX ADMIN — DIVALPROEX SODIUM 250 MG: 125 CAPSULE, COATED PELLETS ORAL at 14:30

## 2023-10-23 RX ADMIN — LORAZEPAM 0.5 MG: 0.5 TABLET ORAL at 14:30

## 2023-10-23 RX ADMIN — CYANOCOBALAMIN TAB 1000 MCG 1000 MCG: 1000 TAB at 08:21

## 2023-10-23 RX ADMIN — CLOZAPINE 675 MG: 200 TABLET ORAL at 12:57

## 2023-10-23 RX ADMIN — FLUOXETINE 10 MG: 10 CAPSULE ORAL at 08:22

## 2023-10-23 RX ADMIN — MICONAZOLE NITRATE: 20 POWDER TOPICAL at 08:23

## 2023-10-23 RX ADMIN — PRAZOSIN HYDROCHLORIDE 1 MG: 1 CAPSULE ORAL at 20:16

## 2023-10-23 RX ADMIN — PROPRANOLOL HYDROCHLORIDE 10 MG: 10 TABLET ORAL at 16:20

## 2023-10-23 RX ADMIN — GABAPENTIN 300 MG: 300 CAPSULE ORAL at 08:21

## 2023-10-23 RX ADMIN — SENNOSIDES 2 TABLET: 8.6 TABLET ORAL at 08:21

## 2023-10-23 RX ADMIN — OLANZAPINE 15 MG: 10 TABLET, ORALLY DISINTEGRATING ORAL at 20:17

## 2023-10-23 RX ADMIN — ATROPINE SULFATE 2 DROP: 10 SOLUTION/ DROPS OPHTHALMIC at 08:22

## 2023-10-23 ASSESSMENT — ACTIVITIES OF DAILY LIVING (ADL)
ADLS_ACUITY_SCORE: 91
DRESS: SCRUBS (BEHAVIORAL HEALTH);INDEPENDENT;PROMPTS
ADLS_ACUITY_SCORE: 91
LAUNDRY: UNABLE TO COMPLETE
ADLS_ACUITY_SCORE: 91
ORAL_HYGIENE: PROMPTS;INDEPENDENT;WITH ASSISTANCE
ADLS_ACUITY_SCORE: 91
ADLS_ACUITY_SCORE: 91
HYGIENE/GROOMING: PROMPTS;INDEPENDENT;HANDWASHING
ADLS_ACUITY_SCORE: 91

## 2023-10-23 NOTE — PLAN OF CARE
Assessment/Intervention/Current Symtoms and Care Coordination:  Reviewed chart. Met with team to review patient's current functioning, needs, progress, and impediments to discharge. Pt on SIO for safety. Pt's affect is flat. Pt has placement secured, awaiting [facility] assessment for [facility] funding. Pt continues to be confused but is appropriate and redirectable. Pt continues to experience tremor, OT ordered weighted cups and bowl to assist with this. Although, pt has been resistive to adaptive equipment. Pt also endorses embarrassment with tremor. Per staff, pt has also needed extra encouragement with getting up and in the lounge in the morning. Pt has MNChoices assessment on 10/27 at 10:00am.     Discharge Plan or Goal:  Will discharge to [facility] [place] [address]     Barriers to Discharge:  Discharge pending completion of MNChoices assessment and negotiation process with correction. M[facility]hoices assessment scheduled for 10/27 at 10:00am    Referral Status:  Pt accepted at [facility]. For more comprehensive list of referrals see CTC note from 10/6  Pt will need psychiatry, PCP, Clozaril lab appointments to be scheduled prior to discharge  Psychiatrist - Dr. Santana at [facility]  P: [phone]   PCP - Attila Urena    Legal Status:  Committed MI with ITP  [facility]: [place]  File Number: [id number]  ITP includes: Clozaril, Zyprexa, Seroquel, and Risperdal  Expires: 2024    Contacts:  Vicky Marin CM with [facility] ([place] per commitment)  P: [phone]  Regina SANTIAGO CM with [facility] ([place] per commitment)  P: [phone]  E: [email or web address]  [place] - Mom (KING signed)  P: [phone] (H) [phone] (M)   [place] - Dad (KING signed)  P: [phone] (H)  Sandra Treadwell - Sister (KING signed)  P: [phone]  [facility]'s Office   F: [phone]  Jovana Sánchez - [place] [facility]  P: [phone]   E: [email or web address]  Alexa Arteaga - [place]    F: [phone]  E: [email or web address]    Upcoming Meetings and Dates/Important Information and next steps:  CTC to coordinate with CADI CM and Jovana regarding placement needs  Pt has MNChoices assessment on 10/27 at 10:00am

## 2023-10-23 NOTE — PLAN OF CARE
BEH IP Unit Acuity Rating Score (UARS)  Patient is given one point for every criteria they meet.    CRITERIA SCORING   On a 72 hour hold, court hold, committed, stay of commitment, or revocation 1    Patient LOS on BEH unit exceeds 20 days 1  LOS: 150   Patient under guardianship, 55+, otherwise medically complex, or under age 11 1   Suicide ideation without relief of precipitating factors 0   Current plan for suicide 0   Current plan for homicide 0   Imminent risk or actual attempt to seriously harm another without relief of factors precipitating the attempt 0   Severe dysfunction in daily living (ex: complete neglect for self care, extreme disruption in vegetative function, extreme deterioration in social interactions) 1   Recent (last 7 days) or current physical aggression in the ED or on unit 0   Restraints or seclusion episode in past 72 hours 0   Recent (last 7 days) or current verbal aggression, agitation, yelling, etc., while in the ED or unit 1   Active psychosis 0   Need for constant or near constant redirection (from leaving, from others, etc).  1   Intrusive or disruptive behaviors 1   TOTAL 7

## 2023-10-23 NOTE — PLAN OF CARE
"  Problem: Psychotic Symptoms  Goal: Psychotic Symptoms  Description: Signs and symptoms of listed problems will be absent or manageable.  10/22/2023 2127 by Kyleigh Chamorro RN  Outcome: Progressing   Goal Outcome Evaluation:    Plan of Care Reviewed With: patient          15:00-23:30       Patient isolative and withdrawn to his room most of the shift. Came to the dining room for dinner, ate 100% with staff assistance. IS 1550. Voided once. No BS was necessary.     Appearance: untidy, unclean, shower offered but patient refused.   Attitude: calm, cooperative   Behavior: Paranoid and suspicious.   Eye Contact: Good   Speech: poor in content but otherwise clear, coherent and logical   Orientation: oreinted to person , place, time and situation  Mood:  admits some anxiety   Affect: flat/blunted   Thought Process: paranoid delusions  Suicidal Ideation:denied   Hallucination: intermittent AH - familiar voice      Remains on SIO, see order for details. Continues to be impulsive with impaired safety judgement.     /61 (Patient Position: Left side)   Pulse 79   Temp 98.4  F (36.9  C) (Temporal)   Resp 16   Ht 1.753 m (5' 9\")   Wt 84.9 kg (187 lb 2.7 oz)   SpO2 99%   BMI 27.64 kg/m       "

## 2023-10-23 NOTE — PLAN OF CARE
"  Problem: Psychotic Symptoms  Goal: Psychotic Symptoms  Description: Signs and symptoms of listed problems will be absent or manageable.  10/23/2023 1127 by Kyleigh Chamorro RN  Outcome: Progressing     Problem: Psychotic Signs/Symptoms  Goal: Improved Behavioral Control (Psychotic Signs/Symptoms)  Outcome: Progressing   Goal Outcome Evaluation:    Plan of Care Reviewed With: patient             07:00-15:30   Patient isolative and withdrawn required staff encouragements to come out of his room this morning but eventually he did. Calm, mostly cooperative, compliment with medications.   Hygiene is neglected, patient was offered shower, he went to the shower room but changed his mind \"it will burn me alive or freeze to death\". Continues to make suspicious/ paranoid statements about people talking about him, punishing him and etc. Appetite is good 100% of his meals. Went to one group but did not stay until the end. Dulling noted, scheduled atropine drops given.                "

## 2023-10-23 NOTE — PROGRESS NOTES
"Patient seen, chart reviewed, care discussed with staff.  Blood pressure 121/79, pulse 101, temperature 97.6  F (36.4  C), resp. rate 16, height 1.753 m (5' 9\"), weight 84.9 kg (187 lb 2.7 oz), SpO2 97%.  General appearance: good  Alert.   Affect: fair  Mood: fair    Speech:  some latency.   Eye contact:  good.    Psychomotor behavior: tremor  Gait: steady   Abnormal movements: none  Delusions: present  Hallucinations:  present  Thoughts: linear, concrete  Associations: fair  Judgement: poor  Insight: poor  Cognitions: impaired  Not suicidal.    He has refused showers and bed bath.  I encouraged either.    Imp:  Paranoid Schizophrenia  Neurocognitive  And:       Patient Active Problem List   Diagnosis    Urinary retention    Schizophrenia, unspecified type (H)    Altered mental status, unspecified altered mental status type    Mood changes    Paranoid schizophrenia, chronic condition with acute exacerbation (H)    Neuroleptic-induced parkinsonism     Agitation   Plan:  same meds, more time    Current Facility-Administered Medications   Medication    acetaminophen (TYLENOL) tablet 650 mg    albuterol (PROVENTIL HFA/VENTOLIN HFA) inhaler    atropine 1 % ophthalmic solution 1 drop    atropine 1 % sublingual (ophthalmic drops for sublingual use) 2 drop    benzonatate (TESSALON) capsule 100 mg    benztropine (COGENTIN) tablet 1 mg    bisacodyl (DULCOLAX) suppository 10 mg    cloZAPine (CLOZARIL) tablet 675 mg    cyanocobalamin (VITAMIN B-12) tablet 1,000 mcg    haloperidol (HALDOL) tablet 5 mg    And    diphenhydrAMINE (BENADRYL) capsule 50 mg    haloperidol lactate (HALDOL) injection 5 mg    And    diphenhydrAMINE (BENADRYL) injection 50 mg    divalproex sodium delayed-release (DEPAKOTE SPRINKLE) DR capsule 250 mg    FLUoxetine (PROzac) capsule 10 mg    gabapentin (NEURONTIN) capsule 100 mg    gabapentin (NEURONTIN) capsule 300 mg    lidocaine (XYLOCAINE) 2 % external gel    LORazepam (ATIVAN) tablet 0.5 mg    melatonin " tablet 10 mg    miconazole (MICATIN) 2 % powder    mineral oil-white petrolatum (EUCERIN/MINERIN) cream    naproxen (NAPROSYN) tablet 250 mg    OLANZapine zydis (zyPREXA) ODT tab 15 mg    Or    OLANZapine (zyPREXA) injection 10 mg    OLANZapine (zyPREXA) tablet 5-10 mg    Or    OLANZapine (zyPREXA) injection 5-10 mg    polyethylene glycol (MIRALAX) Packet 17 g    prazosin (MINIPRESS) capsule 1 mg    propranolol (INDERAL) tablet 10 mg    senna-docusate (SENOKOT-S/PERICOLACE) 8.6-50 MG per tablet 1 tablet    sennosides (SENOKOT) tablet 2 tablet    simethicone (MYLICON) chewable tablet 80 mg    tamsulosin (FLOMAX) capsule 0.4 mg    traZODone (DESYREL) tablet 50 mg    Vitamin D3 (CHOLECALCIFEROL) tablet 25 mcg

## 2023-10-23 NOTE — PLAN OF CARE
Problem: Psychotic Symptoms  Goal: Psychotic Symptoms  Description: Signs and symptoms of listed problems will be absent or manageable.  Outcome: Progressing     Problem: Sleep Disturbance  Goal: Adequate Sleep/Rest  Outcome: Progressing   Goal Outcome Evaluation:      Received sleeping at report. Slept for a total of 8.75 hours. Remains on SIO r/t severe intrusiveness, Assault risk. Voiced no concern and no behavioral activities noted. Compliant with medications and well tolerated. No coughing noted. Monitor per POC.

## 2023-10-24 PROCEDURE — 250N000013 HC RX MED GY IP 250 OP 250 PS 637: Performed by: PHYSICIAN ASSISTANT

## 2023-10-24 PROCEDURE — 250N000013 HC RX MED GY IP 250 OP 250 PS 637: Performed by: PSYCHIATRY & NEUROLOGY

## 2023-10-24 PROCEDURE — 250N000013 HC RX MED GY IP 250 OP 250 PS 637: Performed by: STUDENT IN AN ORGANIZED HEALTH CARE EDUCATION/TRAINING PROGRAM

## 2023-10-24 PROCEDURE — 93005 ELECTROCARDIOGRAM TRACING: CPT

## 2023-10-24 PROCEDURE — 124N000002 HC R&B MH UMMC

## 2023-10-24 PROCEDURE — 93010 ELECTROCARDIOGRAM REPORT: CPT | Performed by: INTERNAL MEDICINE

## 2023-10-24 RX ORDER — PROPRANOLOL HYDROCHLORIDE 10 MG/1
20 TABLET ORAL
Status: DISCONTINUED | OUTPATIENT
Start: 2023-10-24 | End: 2023-10-30

## 2023-10-24 RX ADMIN — FLUOXETINE 10 MG: 10 CAPSULE ORAL at 08:07

## 2023-10-24 RX ADMIN — PRAZOSIN HYDROCHLORIDE 1 MG: 1 CAPSULE ORAL at 20:41

## 2023-10-24 RX ADMIN — OLANZAPINE 15 MG: 10 TABLET, ORALLY DISINTEGRATING ORAL at 08:07

## 2023-10-24 RX ADMIN — MICONAZOLE NITRATE: 20 POWDER TOPICAL at 10:21

## 2023-10-24 RX ADMIN — Medication 10 MG: at 20:41

## 2023-10-24 RX ADMIN — GABAPENTIN 300 MG: 300 CAPSULE ORAL at 08:07

## 2023-10-24 RX ADMIN — SENNOSIDES 2 TABLET: 8.6 TABLET ORAL at 08:06

## 2023-10-24 RX ADMIN — LORAZEPAM 0.5 MG: 0.5 TABLET ORAL at 20:41

## 2023-10-24 RX ADMIN — GABAPENTIN 300 MG: 300 CAPSULE ORAL at 14:30

## 2023-10-24 RX ADMIN — SENNOSIDES 2 TABLET: 8.6 TABLET ORAL at 20:41

## 2023-10-24 RX ADMIN — BENZTROPINE MESYLATE 1 MG: 0.5 TABLET ORAL at 06:16

## 2023-10-24 RX ADMIN — POLYETHYLENE GLYCOL 3350 17 G: 17 POWDER, FOR SOLUTION ORAL at 20:41

## 2023-10-24 RX ADMIN — TAMSULOSIN HYDROCHLORIDE 0.4 MG: 0.4 CAPSULE ORAL at 08:07

## 2023-10-24 RX ADMIN — DIVALPROEX SODIUM 250 MG: 125 CAPSULE, COATED PELLETS ORAL at 20:41

## 2023-10-24 RX ADMIN — OLANZAPINE 15 MG: 10 TABLET, ORALLY DISINTEGRATING ORAL at 20:41

## 2023-10-24 RX ADMIN — NAPROXEN 250 MG: 250 TABLET ORAL at 08:07

## 2023-10-24 RX ADMIN — LORAZEPAM 0.5 MG: 0.5 TABLET ORAL at 08:07

## 2023-10-24 RX ADMIN — DIVALPROEX SODIUM 250 MG: 125 CAPSULE, COATED PELLETS ORAL at 14:30

## 2023-10-24 RX ADMIN — CYANOCOBALAMIN TAB 1000 MCG 1000 MCG: 1000 TAB at 08:06

## 2023-10-24 RX ADMIN — NAPROXEN 250 MG: 250 TABLET ORAL at 17:42

## 2023-10-24 RX ADMIN — ATROPINE SULFATE 2 DROP: 10 SOLUTION/ DROPS OPHTHALMIC at 20:41

## 2023-10-24 RX ADMIN — PROPRANOLOL HYDROCHLORIDE 20 MG: 10 TABLET ORAL at 12:24

## 2023-10-24 RX ADMIN — GABAPENTIN 300 MG: 300 CAPSULE ORAL at 20:42

## 2023-10-24 RX ADMIN — MICONAZOLE NITRATE: 20 POWDER TOPICAL at 20:40

## 2023-10-24 RX ADMIN — DIVALPROEX SODIUM 250 MG: 125 CAPSULE, COATED PELLETS ORAL at 06:15

## 2023-10-24 RX ADMIN — CLOZAPINE 675 MG: 200 TABLET ORAL at 12:24

## 2023-10-24 RX ADMIN — ATROPINE SULFATE 2 DROP: 10 SOLUTION/ DROPS OPHTHALMIC at 14:52

## 2023-10-24 RX ADMIN — ATROPINE SULFATE 2 DROP: 10 SOLUTION/ DROPS OPHTHALMIC at 08:05

## 2023-10-24 RX ADMIN — POLYETHYLENE GLYCOL 3350 17 G: 17 POWDER, FOR SOLUTION ORAL at 08:05

## 2023-10-24 RX ADMIN — LORAZEPAM 0.5 MG: 0.5 TABLET ORAL at 14:52

## 2023-10-24 RX ADMIN — Medication 25 MCG: at 08:07

## 2023-10-24 RX ADMIN — PROPRANOLOL HYDROCHLORIDE 20 MG: 10 TABLET ORAL at 17:42

## 2023-10-24 RX ADMIN — PROPRANOLOL HYDROCHLORIDE 10 MG: 10 TABLET ORAL at 08:06

## 2023-10-24 ASSESSMENT — ACTIVITIES OF DAILY LIVING (ADL)
ADLS_ACUITY_SCORE: 83
ADLS_ACUITY_SCORE: 93
ADLS_ACUITY_SCORE: 92
ADLS_ACUITY_SCORE: 93
ADLS_ACUITY_SCORE: 92
HYGIENE/GROOMING: PROMPTS;INDEPENDENT
DRESS: WITH ASSISTANCE
ORAL_HYGIENE: WITH SUPERVISION
ADLS_ACUITY_SCORE: 93
ADLS_ACUITY_SCORE: 93
ORAL_HYGIENE: PROMPTS;INDEPENDENT
ADLS_ACUITY_SCORE: 92
ADLS_ACUITY_SCORE: 93
ADLS_ACUITY_SCORE: 83
HYGIENE/GROOMING: WITH SUPERVISION;WITH ASSISTANCE
DRESS: SCRUBS (BEHAVIORAL HEALTH)
ADLS_ACUITY_SCORE: 83
LAUNDRY: UNABLE TO COMPLETE
ADLS_ACUITY_SCORE: 93

## 2023-10-24 NOTE — PLAN OF CARE
Problem: Sleep Disturbance  Goal: Adequate Sleep/Rest  Outcome: Progressing   Goal Outcome Evaluation:             Received  sleeping comfortably in bed, breathing unlabored with HOB elevated at 30 degree angle. Pt remains on SIO for hx of Agitation,impulsive behavior,self injury risk due to Parkinson symptoms and hx ofverbal outburst of sexual and threaten statements, non of which were observed this shift.  Slept for 8.5 hours  Medication compliant

## 2023-10-24 NOTE — PLAN OF CARE
Care Coordinator Note(s):    Care Request(s):   PCP  - New  Preferences: 4-5 weeks, in person  Notes: [facility]    Care Outcome(s):    Appointment type: Primary Care  Date/time: Friday November 24th, 2023 @ 8:40 AM In person  Provider:  Franklyn Estrella MD   Address:  [address] [address]  Phone: [phone]     Further Actions:  None.    -Margie Crystal  Adult Behavioral Health Care Coordinator

## 2023-10-24 NOTE — PLAN OF CARE
"  Problem: Adult Behavioral Health Plan of Care  Goal: Plan of Care Review  Recent Flowsheet Documentation  Taken 10/24/2023 0806 by Kyleigh Chamorro RN  Patient Agreement with Plan of Care: agrees     Problem: Psychotic Symptoms  Goal: Psychotic Symptoms  Description: Signs and symptoms of listed problems will be absent or manageable.  Outcome: Progressing   Goal Outcome Evaluation:    Plan of Care Reviewed With: patient          07:00-15:30   Patient visible in the milieu only during meal times. Talkative with the SIO staff but not social with peers and did not attend group activities.   Scrubs changed, nikolas care done, powder applied to the groin. Shower was offered but patient refused, convinced that the water will be too hot and will burn him. He did not respond to reassurance and kept refusing. IS 1750. He is mostly cooperative with the plan of care and compliant with medications. No behavioral or safety concerns at this time.   Eye contact intermittent. Speech clear, spontaneous, poor in content.   Blunted affect. Mild paranoid comments made by the patient.   Denied anxiety. Denied depression. Denied suicidal thoughts. Denied homicidal thoughts \"Not for a while\".   Remains on SIO 1:1 for aggression, sexually inappropriate behaviors and impulsivity.            "

## 2023-10-24 NOTE — PLAN OF CARE
Assessment/Intervention/Current Symtoms and Care Coordination:  Reviewed chart. Met with team to review patient's current functioning, needs, progress, and impediments to discharge. Pt on SIO for safety. Pt's affect is flat. Pt has placement secured, awaiting [facility]hoices assessment for [facility] funding. Pt continues to be confused but is appropriate and redirectable. Pt continues to experience tremor, OT ordered weighted cups and bowl to assist with this. Although, pt has been resistive to adaptive equipment. Pt also endorses embarrassment with tremor. Per staff, pt has also needed extra encouragement with getting up and in the lounge in the morning. Pt has MNChoices assessment on 10/27 at 10:00am.     CTC tasked CC with scheduling PCP and psychiatry appointments.    Discharge Plan or Goal:  Will discharge to [facility]: [address] [place] [address]     Barriers to Discharge:  Discharge pending completion of MNChoices assessment and negotiation process with [facility]. MNChoices assessment scheduled for 10/27 at 10:00am    Referral Status:  Pt accepted at [facility]. For more comprehensive list of referrals see CTC note from 10/6  Pt will need Clozaril lab appointments to be scheduled prior to discharge  Psychiatry appointment requested  PCP appointment requested  Psychiatrist - Dr. Santana at [facility]  P: [phone]   PCP - Attila Urena    Legal Status:  Committed MI with ITP  [facility]: [place]  File Number: [id number]  ITP includes: Clozaril, Zyprexa, Seroquel, and Risperdal  Expires: 2024    Contacts:  Vicky Marin CM with [facility] ([place] per commitment)  P: [phone]  Regina SANTIAGO CM with [facility] ([place] per commitment)  P: [phone]  E: [email or web address]  [place] - Mom (KING signed)  P: [phone] (H) [phone] (M)   [place] - Dad (KING signed)  P: [phone] (H)  Sandra Treadwell - Sister (KING  signed)  P: [phone]  [facility]'s Office   F: [phone]  Jovana Sánchez - [facility]  P: [phone]  E: [email or web address]  Alexa Arteaga - [place]    F: [phone]  E: [email or web address]    Upcoming Meetings and Dates/Important Information and next steps:  CTC to coordinate with CADI CM and Jovana regarding placement needs  Pt has MNChoices assessment on 10/27 at 10:00am

## 2023-10-24 NOTE — PROGRESS NOTES
"Patient seen, chart reviewed, care discussed with staff.  Blood pressure (!) 145/80, pulse 83, temperature 97  F (36.1  C), temperature source Tympanic, resp. rate 17, height 1.753 m (5' 9\"), weight 87.7 kg (193 lb 5.5 oz), SpO2 98%.  He notes he is \"not too good\", \"people must be doing something to me at night because I am tiered during the day\".  \"Cogentin causes dry mouth and has never helped me\"    General appearance: good  Alert.   Affect: fair  Mood: fair    Speech:  spontaneity a bit low  Eye contact:  good.    Psychomotor behavior: mild tremors  Gait: steady   Abnormal movements:  none  Delusions: present  Hallucinations:  continue  Thoughts: a bit disjointed  Associations: a bit loose  Judgement: poor  Insight: poor  Cognitions: impaired  Not suicidal.    Imp:  Paranoid Schizophrenia  Neurocognitive  And:         Patient Active Problem List   Diagnosis    Urinary retention    Schizophrenia, unspecified type (H)    Altered mental status, unspecified altered mental status type    Mood changes    Paranoid schizophrenia, chronic condition with acute exacerbation (H)    Neuroleptic-induced parkinsonism     Agitation     Plan:  Stop Cogentin  2.  Increase Propranolol    Current Facility-Administered Medications   Medication    acetaminophen (TYLENOL) tablet 650 mg    albuterol (PROVENTIL HFA/VENTOLIN HFA) inhaler    atropine 1 % ophthalmic solution 1 drop    atropine 1 % sublingual (ophthalmic drops for sublingual use) 2 drop    benzonatate (TESSALON) capsule 100 mg    bisacodyl (DULCOLAX) suppository 10 mg    cloZAPine (CLOZARIL) tablet 675 mg    cyanocobalamin (VITAMIN B-12) tablet 1,000 mcg    haloperidol (HALDOL) tablet 5 mg    And    diphenhydrAMINE (BENADRYL) capsule 50 mg    haloperidol lactate (HALDOL) injection 5 mg    And    diphenhydrAMINE (BENADRYL) injection 50 mg    divalproex sodium delayed-release (DEPAKOTE SPRINKLE) DR capsule 250 mg    FLUoxetine (PROzac) capsule 10 mg    gabapentin (NEURONTIN) " capsule 100 mg    gabapentin (NEURONTIN) capsule 300 mg    lidocaine (XYLOCAINE) 2 % external gel    LORazepam (ATIVAN) tablet 0.5 mg    melatonin tablet 10 mg    miconazole (MICATIN) 2 % powder    mineral oil-white petrolatum (EUCERIN/MINERIN) cream    naproxen (NAPROSYN) tablet 250 mg    OLANZapine zydis (zyPREXA) ODT tab 15 mg    Or    OLANZapine (zyPREXA) injection 10 mg    OLANZapine (zyPREXA) tablet 5-10 mg    Or    OLANZapine (zyPREXA) injection 5-10 mg    polyethylene glycol (MIRALAX) Packet 17 g    prazosin (MINIPRESS) capsule 1 mg    propranolol (INDERAL) tablet 20 mg    senna-docusate (SENOKOT-S/PERICOLACE) 8.6-50 MG per tablet 1 tablet    sennosides (SENOKOT) tablet 2 tablet    simethicone (MYLICON) chewable tablet 80 mg    tamsulosin (FLOMAX) capsule 0.4 mg    traZODone (DESYREL) tablet 50 mg    Vitamin D3 (CHOLECALCIFEROL) tablet 25 mcg     Recent Results (from the past 168 hour(s))   Ammonia    Collection Time: 10/17/23  3:56 PM   Result Value Ref Range    Ammonia 33 16 - 60 umol/L   CBC with platelets and differential    Collection Time: 10/20/23  8:07 AM   Result Value Ref Range    WBC Count 11.6 (H) 4.0 - 11.0 10e3/uL    RBC Count 4.49 4.40 - 5.90 10e6/uL    Hemoglobin 12.4 (L) 13.3 - 17.7 g/dL    Hematocrit 39.1 (L) 40.0 - 53.0 %    MCV 87 78 - 100 fL    MCH 27.6 26.5 - 33.0 pg    MCHC 31.7 31.5 - 36.5 g/dL    RDW 15.1 (H) 10.0 - 15.0 %    Platelet Count 148 (L) 150 - 450 10e3/uL    % Neutrophils 78 %    % Lymphocytes 12 %    % Monocytes 9 %    Mids % (Monos, Eos, Basos)      % Eosinophils 0 %    % Basophils 1 %    % Immature Granulocytes 0 %    NRBCs per 100 WBC 0 <1 /100    Absolute Neutrophils 9.1 (H) 1.6 - 8.3 10e3/uL    Absolute Lymphocytes 1.4 0.8 - 5.3 10e3/uL    Absolute Monocytes 1.1 0.0 - 1.3 10e3/uL    Mids Abs (Monos, Eos, Basos)      Absolute Eosinophils 0.0 0.0 - 0.7 10e3/uL    Absolute Basophils 0.1 0.0 - 0.2 10e3/uL    Absolute Immature Granulocytes 0.0 <=0.4 10e3/uL    Absolute  NRBCs 0.0 10e3/uL   EKG 12-lead, complete    Collection Time: 10/24/23  9:59 AM   Result Value Ref Range    Systolic Blood Pressure  mmHg    Diastolic Blood Pressure  mmHg    Ventricular Rate 72 BPM    Atrial Rate 72 BPM    ND Interval 164 ms    QRS Duration 166 ms     ms    QTc 527 ms    P Axis 48 degrees    R AXIS 2 degrees    T Axis 123 degrees    Interpretation ECG       Sinus rhythm with sinus arrhythmia  Left bundle branch block  Abnormal ECG  When compared with ECG of 17-OCT-2023 09:20,  No significant change was found

## 2023-10-24 NOTE — PLAN OF CARE
BEH IP Unit Acuity Rating Score (UARS)  Patient is given one point for every criteria they meet.    CRITERIA SCORING   On a 72 hour hold, court hold, committed, stay of commitment, or revocation 1    Patient LOS on BEH unit exceeds 20 days 1  LOS: 151   Patient under guardianship, 55+, otherwise medically complex, or under age 11 1   Suicide ideation without relief of precipitating factors 0   Current plan for suicide 0   Current plan for homicide 0   Imminent risk or actual attempt to seriously harm another without relief of factors precipitating the attempt 0   Severe dysfunction in daily living (ex: complete neglect for self care, extreme disruption in vegetative function, extreme deterioration in social interactions) 1   Recent (last 7 days) or current physical aggression in the ED or on unit 0   Restraints or seclusion episode in past 72 hours 0   Recent (last 7 days) or current verbal aggression, agitation, yelling, etc., while in the ED or unit 1   Active psychosis 0   Need for constant or near constant redirection (from leaving, from others, etc).  1   Intrusive or disruptive behaviors 1   TOTAL 7

## 2023-10-24 NOTE — PLAN OF CARE
"Goal Outcome Evaluation:    Plan of Care Reviewed With: patient          Problem: Adult Behavioral Health Plan of Care  Goal: Individualized Daily Interaction Plan (IDIP)  Outcome: Progressing     Problem: Adult Behavioral Health Plan of Care  Goal: Adheres to Safety Considerations for Self and Others  Outcome: Progressing  Intervention: Develop and Maintain Individualized Safety Plan  Recent Flowsheet Documentation  Taken 10/23/2023 1656 by Sheila De Leon RN  Safety Measures: 1:1 observation maintained        Pt is on SIO 1:1. Please see the order. Pt presented with moderate tremors in the beginning of the shift. Pt had scheduled medications and was somewhat effective. Pt is isolative and withdrawn but brighten upon approach. Pt is able to follow directions, medications complaint. Hygiene is appropriate. Pt was cleaned in bed, applied treatment powder and changed the scrubs to clean ones. Pt was making some delusional statement regarding why he does not want to take a shower. Pt ate 100% of dinner. Continue to encourage fluids. Incentive Spirometry every shift. Pt voided several times this shift but pt kept removing the hat from the toilet. Urinal was offered but pt refused to use it. Blood pressure 104/53, pulse 101, temperature 97.2  F (36.2  C), temperature source Oral, resp. rate 16, height 1.753 m (5' 9\"), weight 84.9 kg (187 lb 2.7 oz), SpO2 97%.      "

## 2023-10-24 NOTE — PLAN OF CARE
Care Coordinator scheduled the following appointment:     Psychiatry appointment: Monday, November 20, 2023 @ 11am via Phone  **For this appointment, you will receive a phone call from your provider @ [phone].   Provider: Dolly Santana MD  Location: [facility] ([facility])  [address] [place] [address], [address], [place] [address]  Phone: [phone]   Fax: [phone]  HUC TO FAX AVS to above appointment, please    CC updated CTC and AVS

## 2023-10-25 PROCEDURE — 250N000013 HC RX MED GY IP 250 OP 250 PS 637: Performed by: PSYCHIATRY & NEUROLOGY

## 2023-10-25 PROCEDURE — 124N000002 HC R&B MH UMMC

## 2023-10-25 PROCEDURE — 250N000013 HC RX MED GY IP 250 OP 250 PS 637: Performed by: PHYSICIAN ASSISTANT

## 2023-10-25 PROCEDURE — 250N000013 HC RX MED GY IP 250 OP 250 PS 637: Performed by: STUDENT IN AN ORGANIZED HEALTH CARE EDUCATION/TRAINING PROGRAM

## 2023-10-25 RX ORDER — LORAZEPAM 0.5 MG/1
0.25 TABLET ORAL 3 TIMES DAILY
Status: DISCONTINUED | OUTPATIENT
Start: 2023-10-25 | End: 2023-11-13 | Stop reason: HOSPADM

## 2023-10-25 RX ADMIN — Medication 25 MCG: at 08:55

## 2023-10-25 RX ADMIN — DIVALPROEX SODIUM 250 MG: 125 CAPSULE, COATED PELLETS ORAL at 14:33

## 2023-10-25 RX ADMIN — SENNOSIDES 2 TABLET: 8.6 TABLET ORAL at 20:29

## 2023-10-25 RX ADMIN — GABAPENTIN 300 MG: 300 CAPSULE ORAL at 20:29

## 2023-10-25 RX ADMIN — NAPROXEN 250 MG: 250 TABLET ORAL at 08:55

## 2023-10-25 RX ADMIN — GABAPENTIN 300 MG: 300 CAPSULE ORAL at 08:55

## 2023-10-25 RX ADMIN — PRAZOSIN HYDROCHLORIDE 1 MG: 1 CAPSULE ORAL at 20:29

## 2023-10-25 RX ADMIN — MICONAZOLE NITRATE: 20 POWDER TOPICAL at 20:30

## 2023-10-25 RX ADMIN — Medication 0.25 MG: at 20:29

## 2023-10-25 RX ADMIN — DIVALPROEX SODIUM 250 MG: 125 CAPSULE, COATED PELLETS ORAL at 20:29

## 2023-10-25 RX ADMIN — ATROPINE SULFATE 2 DROP: 10 SOLUTION/ DROPS OPHTHALMIC at 20:33

## 2023-10-25 RX ADMIN — NAPROXEN 250 MG: 250 TABLET ORAL at 17:47

## 2023-10-25 RX ADMIN — GABAPENTIN 300 MG: 300 CAPSULE ORAL at 14:33

## 2023-10-25 RX ADMIN — LORAZEPAM 0.5 MG: 0.5 TABLET ORAL at 08:54

## 2023-10-25 RX ADMIN — CYANOCOBALAMIN TAB 1000 MCG 1000 MCG: 1000 TAB at 08:50

## 2023-10-25 RX ADMIN — TAMSULOSIN HYDROCHLORIDE 0.4 MG: 0.4 CAPSULE ORAL at 08:52

## 2023-10-25 RX ADMIN — OLANZAPINE 15 MG: 10 TABLET, ORALLY DISINTEGRATING ORAL at 20:28

## 2023-10-25 RX ADMIN — DIVALPROEX SODIUM 250 MG: 125 CAPSULE, COATED PELLETS ORAL at 06:29

## 2023-10-25 RX ADMIN — POLYETHYLENE GLYCOL 3350 17 G: 17 POWDER, FOR SOLUTION ORAL at 08:49

## 2023-10-25 RX ADMIN — Medication 0.25 MG: at 14:34

## 2023-10-25 RX ADMIN — PROPRANOLOL HYDROCHLORIDE 20 MG: 10 TABLET ORAL at 08:55

## 2023-10-25 RX ADMIN — Medication 10 MG: at 20:29

## 2023-10-25 RX ADMIN — POLYETHYLENE GLYCOL 3350 17 G: 17 POWDER, FOR SOLUTION ORAL at 20:28

## 2023-10-25 RX ADMIN — ATROPINE SULFATE 2 DROP: 10 SOLUTION/ DROPS OPHTHALMIC at 14:34

## 2023-10-25 RX ADMIN — MICONAZOLE NITRATE: 20 POWDER TOPICAL at 11:23

## 2023-10-25 RX ADMIN — FLUOXETINE 10 MG: 10 CAPSULE ORAL at 08:54

## 2023-10-25 RX ADMIN — OLANZAPINE 15 MG: 10 TABLET, ORALLY DISINTEGRATING ORAL at 08:50

## 2023-10-25 RX ADMIN — PROPRANOLOL HYDROCHLORIDE 20 MG: 10 TABLET ORAL at 17:47

## 2023-10-25 RX ADMIN — ATROPINE SULFATE 2 DROP: 10 SOLUTION/ DROPS OPHTHALMIC at 08:49

## 2023-10-25 RX ADMIN — SENNOSIDES 2 TABLET: 8.6 TABLET ORAL at 08:53

## 2023-10-25 RX ADMIN — CLOZAPINE 700 MG: 200 TABLET ORAL at 11:19

## 2023-10-25 RX ADMIN — PROPRANOLOL HYDROCHLORIDE 20 MG: 10 TABLET ORAL at 11:19

## 2023-10-25 ASSESSMENT — ACTIVITIES OF DAILY LIVING (ADL)
ADLS_ACUITY_SCORE: 92
LAUNDRY: UNABLE TO COMPLETE
HYGIENE/GROOMING: WITH SUPERVISION;WITH ASSISTANCE
ADLS_ACUITY_SCORE: 92
DRESS: WITH ASSISTANCE
ADLS_ACUITY_SCORE: 92
ADLS_ACUITY_SCORE: 92
ORAL_HYGIENE: WITH SUPERVISION;WITH ASSISTANCE
ADLS_ACUITY_SCORE: 92
HYGIENE/GROOMING: WITH ASSISTANCE
ADLS_ACUITY_SCORE: 92
ORAL_HYGIENE: WITH ASSISTANCE
ADLS_ACUITY_SCORE: 92
DRESS: WITH ASSISTANCE
ADLS_ACUITY_SCORE: 92
LAUNDRY: UNABLE TO COMPLETE

## 2023-10-25 NOTE — PROGRESS NOTES
"Patient seen, chart reviewed, care discussed with staff.  Blood pressure 125/64, pulse 80, temperature 97.4  F (36.3  C), temperature source Oral, resp. rate 18, height 1.753 m (5' 9\"), weight 87.7 kg (193 lb 5.5 oz), SpO2 96%.  \"They don't want me in group, I stink\".  \"They don't like me\"    Continued psychosis.l  He has beenj impulsive but not aggressive.  Tremor less.    General appearance: fair  Alert.   Affect: fair  Mood: fair    Speech:  sp[ontaneous speech less.   Eye contact:  good.    Psychomotor behavior: mild tremor  Gait: steady   Abnormal movements:  none  Delusions: present  Hallucinations:  continue  Thoughts: linear  Associations: intact  Judgement: poor  Insight: low  Cognitions: impaired  Not suicidal.    Imp:  Paranoid Schizophrenia  Neurocognitive  And:         Patient Active Problem List   Diagnosis    Urinary retention    Schizophrenia, unspecified type (H)    Altered mental status, unspecified altered mental status type    Mood changes    Paranoid schizophrenia, chronic condition with acute exacerbation (H)    Neuroleptic-induced parkinsonism     Agitation       Plan:  EKG for clozaril is done weekly, no change yesterday  2.  Increase Clozaril to 700 mg at noon  3.  Platelet re-check with labs Friday  4.  Decrease Lorazepam with Clozaril increase    Current Facility-Administered Medications   Medication    acetaminophen (TYLENOL) tablet 650 mg    albuterol (PROVENTIL HFA/VENTOLIN HFA) inhaler    atropine 1 % ophthalmic solution 1 drop    atropine 1 % sublingual (ophthalmic drops for sublingual use) 2 drop    benzonatate (TESSALON) capsule 100 mg    bisacodyl (DULCOLAX) suppository 10 mg    cloZAPine (CLOZARIL) tablet 700 mg    cyanocobalamin (VITAMIN B-12) tablet 1,000 mcg    haloperidol (HALDOL) tablet 5 mg    And    diphenhydrAMINE (BENADRYL) capsule 50 mg    haloperidol lactate (HALDOL) injection 5 mg    And    diphenhydrAMINE (BENADRYL) injection 50 mg    divalproex sodium delayed-release " (DEPAKOTE SPRINKLE) DR capsule 250 mg    FLUoxetine (PROzac) capsule 10 mg    gabapentin (NEURONTIN) capsule 100 mg    gabapentin (NEURONTIN) capsule 300 mg    lidocaine (XYLOCAINE) 2 % external gel    LORazepam (ATIVAN) tablet 0.5 mg    melatonin tablet 10 mg    miconazole (MICATIN) 2 % powder    mineral oil-white petrolatum (EUCERIN/MINERIN) cream    naproxen (NAPROSYN) tablet 250 mg    OLANZapine zydis (zyPREXA) ODT tab 15 mg    Or    OLANZapine (zyPREXA) injection 10 mg    OLANZapine (zyPREXA) tablet 5-10 mg    Or    OLANZapine (zyPREXA) injection 5-10 mg    polyethylene glycol (MIRALAX) Packet 17 g    prazosin (MINIPRESS) capsule 1 mg    propranolol (INDERAL) tablet 20 mg    senna-docusate (SENOKOT-S/PERICOLACE) 8.6-50 MG per tablet 1 tablet    sennosides (SENOKOT) tablet 2 tablet    simethicone (MYLICON) chewable tablet 80 mg    tamsulosin (FLOMAX) capsule 0.4 mg    traZODone (DESYREL) tablet 50 mg    Vitamin D3 (CHOLECALCIFEROL) tablet 25 mcg     Recent Results (from the past 168 hour(s))   CBC with platelets and differential    Collection Time: 10/20/23  8:07 AM   Result Value Ref Range    WBC Count 11.6 (H) 4.0 - 11.0 10e3/uL    RBC Count 4.49 4.40 - 5.90 10e6/uL    Hemoglobin 12.4 (L) 13.3 - 17.7 g/dL    Hematocrit 39.1 (L) 40.0 - 53.0 %    MCV 87 78 - 100 fL    MCH 27.6 26.5 - 33.0 pg    MCHC 31.7 31.5 - 36.5 g/dL    RDW 15.1 (H) 10.0 - 15.0 %    Platelet Count 148 (L) 150 - 450 10e3/uL    % Neutrophils 78 %    % Lymphocytes 12 %    % Monocytes 9 %    Mids % (Monos, Eos, Basos)      % Eosinophils 0 %    % Basophils 1 %    % Immature Granulocytes 0 %    NRBCs per 100 WBC 0 <1 /100    Absolute Neutrophils 9.1 (H) 1.6 - 8.3 10e3/uL    Absolute Lymphocytes 1.4 0.8 - 5.3 10e3/uL    Absolute Monocytes 1.1 0.0 - 1.3 10e3/uL    Mids Abs (Monos, Eos, Basos)      Absolute Eosinophils 0.0 0.0 - 0.7 10e3/uL    Absolute Basophils 0.1 0.0 - 0.2 10e3/uL    Absolute Immature Granulocytes 0.0 <=0.4 10e3/uL    Absolute  NRBCs 0.0 10e3/uL   EKG 12-lead, complete    Collection Time: 10/24/23  9:59 AM   Result Value Ref Range    Systolic Blood Pressure  mmHg    Diastolic Blood Pressure  mmHg    Ventricular Rate 72 BPM    Atrial Rate 72 BPM    NY Interval 164 ms    QRS Duration 166 ms     ms    QTc 527 ms    P Axis 48 degrees    R AXIS 2 degrees    T Axis 123 degrees    Interpretation ECG       Sinus rhythm with sinus arrhythmia  Left bundle branch block  Abnormal ECG  When compared with ECG of 17-OCT-2023 09:20,  No significant change was found

## 2023-10-25 NOTE — PROVIDER NOTIFICATION
10/25/23 1015   Individualization/Patient Specific Goals   Patient Personal Strengths community support;coping skills;expressive of emotions;family/social support;humor;insight into illness/situation;interests/hobbies;relationship stability;resilient;resourceful   Patient Vulnerabilities poor physical health;traumatic event   Anxieties, Fears or Concerns Vera DeLay OT   Individualized Care Needs SIO   Interprofessional Rounds   Summary Pt has been accepted to [facility]. Awaiting completion of [employer] assessment for CADI waiver prior to moving in   Participants CTC;psychiatrist;nursing;OT   Behavioral Team Discussion   Participants Dr. Garth Melo MD; Sarah Kim MSW, LGSW; Chasidy Baker RN; Mila Durham OT   Progress Pt has been accepted to [facility]. Awaiting completion of [employer] assessment for CADI waiver prior to moving in   Anticipated length of stay 3 weeks   Continued Stay Criteria/Rationale [employer] assessment   Medical/Physical See H&P   Precautions See below   Plan Pt has been accepted to [facility]. Awaiting completion of [employer] assessment for CADI waiver prior to moving in   Rationale for change in precautions or plan No change   Safety Plan 1:1   Anticipated Discharge Disposition assisted living     PRECAUTIONS AND SAFETY    Behavioral Orders   Procedures    Assault precautions    Code 1 - Restrict to Unit    Code 2    Code 2 - 1:1 Staff Supervision     For ECT only    Electroconvulsive therapy     Series of up to 12 treatments. Begin Date: 8/2/23     Treating Psychiatrist providing ECT:  unknown     Notified on:  7/28/23 via this order  NOTE: We received verbal confirmation from [facility] that Lorenzo Pavon has been approved but awaiting official court order and risk management's review  Initial consult was completed by Dr. Hicks on 7/18/23    Electroconvulsive therapy     Series of up to 12 treatments. Begin Date: 8/2/23     Treating Psychiatrist providing ECT:  Dominga     Notified on:  8/2/23    Elopement  precautions    Fall precautions    Occupational Therapy on the Unit     Order Specific Question:   Reason for Consult     Answer:   Eval of thought process, functional skills and behavior     Order Specific Question:   Course of Action:     Answer:   Evaluation only     Order Specific Question:   Treatment Prescription:     Answer:   CPT requested    Routine Programming     As clinically indicated    Self Injury Precaution    Status 15     Every 15 minutes.    Status Individual Observation     Patient SIO status reviewed with team/RN.  Please also refer to RN/team documentation for add'l detail.    -SIO staff to monitor following which have contributed to patient being on SIO:  Agitation  Pt is impulsive   Pt has ran out of his room naked  Pt has Parkinson symptoms place him in a high fall risk  Pt has verbal outburst of sexual and threaten statements  Pt requires immediate redirection when masturbating    -Possible interventions SIO staff could use to support patient's treatment progress:  Engage pt in activities  - 1:1 staff to assist in eating meals and other ADL's    -When following observed, team will review discontinuation of SIO:  Absence of aggression toward others for > 24 hours    Comments: SIO 1:1     Order Specific Question:   CONTINUOUS 24 hours / day     Answer:   5 feet     Order Specific Question:   Indications for SIO     Answer:   Severe intrusiveness     Order Specific Question:   Indications for SIO     Answer:   Assault risk    Suicide precautions     Patients on Suicide Precautions should have a Combination Diet ordered that includes a Diet selection(s) AND a Behavioral Tray selection for Safe Tray - with utensils, or Safe Tray - NO utensils         Safety  Safety WDL: WDL  Patient Location: patient room, own  Observed Behavior: calm  Observed Behavior (Comment): calm, sitting, eating snacks  Airway Safety Measures: all equipment/monitors on and audible  Safety Measures: 1:1 observation  maintained  Diversional Activity:  (resting in bed)  Suicidality: Status 15, Behavioral scrubs (pajamas), Minimal furniture in room, Minimal personal belongings in room, SIO (Status Individual Observation)  (NOTE - order will specify distance, Optimize communication / relationship to minimize opportunity for self-harm, Promote patient engagement with treatment process, Identify and strengthen protective factors  Seizure precautions: clutter free environment  Assault: status 15  Elopement Assessment: Hallucinations directing behavior  Elopement Interventions: status 15  Sexual: status 15, private room  Additional Documentation:  (SIB, Fall Precaution)

## 2023-10-25 NOTE — PLAN OF CARE
BEH IP Unit Acuity Rating Score (UARS)  Patient is given one point for every criteria they meet.    CRITERIA SCORING   On a 72 hour hold, court hold, committed, stay of commitment, or revocation 1    Patient LOS on BEH unit exceeds 20 days 1  LOS: 152   Patient under guardianship, 55+, otherwise medically complex, or under age 11 1   Suicide ideation without relief of precipitating factors 0   Current plan for suicide 0   Current plan for homicide 0   Imminent risk or actual attempt to seriously harm another without relief of factors precipitating the attempt 0   Severe dysfunction in daily living (ex: complete neglect for self care, extreme disruption in vegetative function, extreme deterioration in social interactions) 1   Recent (last 7 days) or current physical aggression in the ED or on unit 0   Restraints or seclusion episode in past 72 hours 0   Recent (last 7 days) or current verbal aggression, agitation, yelling, etc., while in the ED or unit 1   Active psychosis 0   Need for constant or near constant redirection (from leaving, from others, etc).  1   Intrusive or disruptive behaviors 1   TOTAL 7

## 2023-10-25 NOTE — PLAN OF CARE
Problem: Psychotic Symptoms  Goal: Psychotic Symptoms  Description: Signs and symptoms of listed problems will be absent or manageable.  Outcome: Progressing   Goal Outcome Evaluation:         Received in bed sleeping with HOB elevated at 30 degrees, Pt remains on SIO for hx of Agitation,impulsive behavior,self injury risk due to Parkinson symptoms and hx ofverbal outburst of sexual and threaten statements , non of which were observed tonight. Calm and cooperative with taking his scheduled medication    Slept for  9.25 hours

## 2023-10-25 NOTE — PLAN OF CARE
"Goal Outcome Evaluation:    Plan of Care Reviewed With: patient          Pt visible in lounge for dinner. Ate approx 80% of dinner with assistance. Tremors noted. Also required assistance taking medications with pudding due to tremors an difficulty feeding self. Utilized incentive spirometer effectively when prompted. Voided without problems several times this shift. No BM. Pericare done at  and miconazole powder applied. Remains on SIO for impulsivity and risk for self-injury due to impaired judgment. Gait steady. Med-compliant. Pt declined to shower stating \"the hot water will scald me.\" Also stated he had been feeling \"sick\" today due to \"probably poison--bleach or ammonia.\" Stated he was not sure that cups at bedside contained water. Pleasant, cooperative. Continue with current treatment plan and recommendations. Continue to monitor and reassess symptoms. Monitor response to medications. Monitor progress towards treatment goals. Encourage groups and participation.          "

## 2023-10-25 NOTE — PLAN OF CARE
Assessment/Intervention/Current Symtoms and Care Coordination:  Reviewed chart. Met with team to review patient's current functioning, needs, progress, and impediments to discharge. Pt on SIO for safety. Pt's affect is flat. Pt has placement secured, awaiting [employer] assessment for CADI funding. Pt continues to be confused but is appropriate and redirectable. Pt continues to experience tremor, OT ordered weighted cups and bowl to assist with this. Although, pt has been resistive to adaptive equipment. Pt also endorses embarrassment with tremor. Per provider, pt going through minor medication adjustments for paranoia and delusions. Pt has [employer] assessment on 10/27 at 10:00am.     Writer received email from [employer] , Alexa ([email or web address] reporting that the fax she received of the ICD 10 form was a black piece of paper. Alexa requests it be resent.    Writer sent ICD 10 form to Alexa via fax (F: [phone]).     Discharge Plan or Goal:  Will discharge to [facility]: [address] [place] [address]     Barriers to Discharge:  Discharge pending completion of [employer] assessment and negotiation process with [facility]. [employer] assessment scheduled for 10/27 at 10:00am    Referral Status:  Pt accepted at [facility]. For more comprehensive list of referrals see CTC note from 10/6  Pt will need Clozaril lab appointments to be scheduled prior to discharge  Psychiatry appointment requested  PCP appointment requested  Psychiatrist - Dr. Santana at [facility]  P: [phone]   PCP - Attila Urena    Legal Status:  Committed MI with ITP  [facility]: [place]  File Number: [id number]  ITP includes: Clozaril, Zyprexa, Seroquel, and Risperdal  Expires: 2024    Contacts:  Vicky Marin CM with [facility] (KING per commitment)  P: [phone]  Regina SANTIAGO CM with [facility] ([facility] per commitment)  P: [phone]   E: [email or web address]  [place] - Mom (KING signed)  P: [phone] (H) [phone] (M)   Ion - Dad (KING signed)  P: [phone] (H)  Sandra Treadwell - Sister (KING signed)  P: [phone]  [facility]'s Office   F: [phone]  Jovana Sánchez - [email or web address] [facility]  P: [phone]  E: [email or web address]  Alexa Arteaga - [employer]    F: [phone]  E: [email or web address]    Upcoming Meetings and Dates/Important Information and next steps:  CTC to coordinate with CADI CM and Jovana regarding placement needs  Pt has [employer] assessment on 10/27 at 10:00am

## 2023-10-25 NOTE — PLAN OF CARE
"Goal Outcome Evaluation:    Plan of Care Reviewed With: patient        Flat affect, pleasant and cooperative, visible in lounge for meals, social with SIO staff. Tremors noted. Pt became agitated waiting for this writer to open the medication packets, pt verbalized \"I feel like running out of here or chocking someone\", this writer left to the room to get medications ready, SIO staff redirected pt to use his coping skills, pt redirectable, pt rook all scheduled medication with assistance in apple sauce without issue. No BM reported this shift, pt passing gas and had BM smear. Hannah cares done and miconazole powder applied. Using IS with prompts. Gait slow and steady, pt denies feeling dizzy or lightheaded. Pt ate 3 apple sauce and a few bits from his lunch. Pt denies questions/concerns at this time. Will continue to monitor.    Remains on SIO for impulsivity and rsik for self-injury due to judgment.     /61   Pulse 72   Temp 97.4  F (36.3  C) (Oral)   Resp 18   Ht 1.753 m (5' 9\")   Wt 87.7 kg (193 lb 5.5 oz)   SpO2 96%   BMI 28.55 kg/m       Plan:  Continue to encourage fluids- pt believes the water is poisoned, pt does well with reassurance.   Continue to encourage oral nutrition  Continue to encourage mediation compliance   Continue to encourage increased activity  Continue to encourage group participation  Continue to encourage choices and independence  Continue to encourage non-pharmacological coping skills   "

## 2023-10-26 PROCEDURE — 99232 SBSQ HOSP IP/OBS MODERATE 35: CPT | Performed by: PSYCHIATRY & NEUROLOGY

## 2023-10-26 PROCEDURE — 250N000013 HC RX MED GY IP 250 OP 250 PS 637: Performed by: STUDENT IN AN ORGANIZED HEALTH CARE EDUCATION/TRAINING PROGRAM

## 2023-10-26 PROCEDURE — 124N000002 HC R&B MH UMMC

## 2023-10-26 PROCEDURE — 250N000013 HC RX MED GY IP 250 OP 250 PS 637: Performed by: PSYCHIATRY & NEUROLOGY

## 2023-10-26 PROCEDURE — 250N000013 HC RX MED GY IP 250 OP 250 PS 637: Performed by: PHYSICIAN ASSISTANT

## 2023-10-26 PROCEDURE — 250N000009 HC RX 250: Performed by: PSYCHIATRY & NEUROLOGY

## 2023-10-26 RX ADMIN — SENNOSIDES 2 TABLET: 8.6 TABLET ORAL at 08:22

## 2023-10-26 RX ADMIN — Medication 25 MCG: at 08:28

## 2023-10-26 RX ADMIN — PROPRANOLOL HYDROCHLORIDE 20 MG: 10 TABLET ORAL at 17:29

## 2023-10-26 RX ADMIN — CLOZAPINE 700 MG: 200 TABLET ORAL at 11:56

## 2023-10-26 RX ADMIN — DIVALPROEX SODIUM 250 MG: 125 CAPSULE, COATED PELLETS ORAL at 13:59

## 2023-10-26 RX ADMIN — Medication 0.25 MG: at 13:59

## 2023-10-26 RX ADMIN — DIVALPROEX SODIUM 250 MG: 125 CAPSULE, COATED PELLETS ORAL at 19:47

## 2023-10-26 RX ADMIN — TAMSULOSIN HYDROCHLORIDE 0.4 MG: 0.4 CAPSULE ORAL at 08:23

## 2023-10-26 RX ADMIN — OLANZAPINE 15 MG: 10 TABLET, ORALLY DISINTEGRATING ORAL at 08:22

## 2023-10-26 RX ADMIN — DIVALPROEX SODIUM 250 MG: 125 CAPSULE, COATED PELLETS ORAL at 05:39

## 2023-10-26 RX ADMIN — Medication 0.25 MG: at 19:47

## 2023-10-26 RX ADMIN — POLYETHYLENE GLYCOL 3350 17 G: 17 POWDER, FOR SOLUTION ORAL at 08:21

## 2023-10-26 RX ADMIN — PROPRANOLOL HYDROCHLORIDE 20 MG: 10 TABLET ORAL at 11:56

## 2023-10-26 RX ADMIN — NAPROXEN 250 MG: 250 TABLET ORAL at 17:29

## 2023-10-26 RX ADMIN — OLANZAPINE 15 MG: 10 TABLET, ORALLY DISINTEGRATING ORAL at 19:47

## 2023-10-26 RX ADMIN — MICONAZOLE NITRATE: 20 POWDER TOPICAL at 11:43

## 2023-10-26 RX ADMIN — ATROPINE SULFATE 2 DROP: 10 SOLUTION/ DROPS OPHTHALMIC at 19:47

## 2023-10-26 RX ADMIN — NAPROXEN 250 MG: 250 TABLET ORAL at 08:22

## 2023-10-26 RX ADMIN — Medication 0.25 MG: at 08:21

## 2023-10-26 RX ADMIN — ATROPINE SULFATE 2 DROP: 10 SOLUTION/ DROPS OPHTHALMIC at 14:00

## 2023-10-26 RX ADMIN — GABAPENTIN 300 MG: 300 CAPSULE ORAL at 19:47

## 2023-10-26 RX ADMIN — GABAPENTIN 300 MG: 300 CAPSULE ORAL at 08:22

## 2023-10-26 RX ADMIN — FLUOXETINE 10 MG: 10 CAPSULE ORAL at 08:22

## 2023-10-26 RX ADMIN — PRAZOSIN HYDROCHLORIDE 1 MG: 1 CAPSULE ORAL at 19:48

## 2023-10-26 RX ADMIN — GABAPENTIN 300 MG: 300 CAPSULE ORAL at 13:59

## 2023-10-26 RX ADMIN — MICONAZOLE NITRATE: 20 POWDER TOPICAL at 19:48

## 2023-10-26 RX ADMIN — CYANOCOBALAMIN TAB 1000 MCG 1000 MCG: 1000 TAB at 08:22

## 2023-10-26 RX ADMIN — Medication 10 MG: at 19:47

## 2023-10-26 RX ADMIN — PROPRANOLOL HYDROCHLORIDE 20 MG: 10 TABLET ORAL at 08:23

## 2023-10-26 ASSESSMENT — ACTIVITIES OF DAILY LIVING (ADL)
ADLS_ACUITY_SCORE: 95
LAUNDRY: UNABLE TO COMPLETE
ADLS_ACUITY_SCORE: 92
HYGIENE/GROOMING: WITH ASSISTANCE
ADLS_ACUITY_SCORE: 95
ADLS_ACUITY_SCORE: 92
ORAL_HYGIENE: WITH ASSISTANCE
ADLS_ACUITY_SCORE: 95
DRESS: SCRUBS (BEHAVIORAL HEALTH);WITH ASSISTANCE
ADLS_ACUITY_SCORE: 92

## 2023-10-26 NOTE — CARE PLAN
10/26/23 1158   Group Therapy Session   Group Attendance attended group session   Time Session Began 1015   Time Session Ended 1115   Total Time (minutes) 5  (no charge)   Total # Attendees 3-4   Group Type expressive therapy;recreation;task skill   Group Topic Covered coping skills/lifestyle management;problem-solving;leisure exploration/use of leisure time;cognitive activities   Group Session Detail OT: Education on healthy activity engagement and creative hands-on endeavor (OT clinic) to increase concentration, focus, attention to task/detail, decision making, problem solving, frustration tolerance, task follow through, coping with stress, healthy leisure engagement, creative expression, and social engagement   Patient Participation Detail pt arrived to group with SIO present. pt was provided with orientation to group topic-pt was provided with previous creative endeavor. pt declined partcipation. pt was provided with positive reinforcement and encouragement by staff and peers-pt continued to decline participation-pt left shortly after. pt did return wit SIO staff-but left after a couple minutes and did not return

## 2023-10-26 NOTE — PLAN OF CARE
BEH IP Unit Acuity Rating Score (UARS)  Patient is given one point for every criteria they meet.    CRITERIA SCORING   On a 72 hour hold, court hold, committed, stay of commitment, or revocation 1    Patient LOS on BEH unit exceeds 20 days 1  LOS: 153   Patient under guardianship, 55+, otherwise medically complex, or under age 11 1   Suicide ideation without relief of precipitating factors 0   Current plan for suicide 0   Current plan for homicide 0   Imminent risk or actual attempt to seriously harm another without relief of factors precipitating the attempt 0   Severe dysfunction in daily living (ex: complete neglect for self care, extreme disruption in vegetative function, extreme deterioration in social interactions) 1   Recent (last 7 days) or current physical aggression in the ED or on unit 0   Restraints or seclusion episode in past 72 hours 0   Recent (last 7 days) or current verbal aggression, agitation, yelling, etc., while in the ED or unit 1   Active psychosis 0   Need for constant or near constant redirection (from leaving, from others, etc).  1   Intrusive or disruptive behaviors 1   TOTAL 7

## 2023-10-26 NOTE — PLAN OF CARE
Problem: Psychotic Symptoms  Goal: Psychotic Symptoms  Description: Signs and symptoms of listed problems will be absent or manageable.  Outcome: Progressing   Goal Outcome Evaluation:    Patient continues to be on SIO due to impulsivity and assault risk. Patient has been calm this shift and has not displayed any aggression/agitation. He came out for dinner and ate 75 percent with adequate fluid intake. Patient is also compliant with taking scheduled medications. He endorses moderate anxiety and depression stating it is about 5 in a scale of 1-10. Patient denies any thoughts of HI/SI/SIB.

## 2023-10-26 NOTE — PLAN OF CARE
Problem: Psychotic Symptoms  Goal: Psychotic Symptoms  Description: Signs and symptoms of listed problems will be absent or manageable.  Outcome: Progressing   Goal Outcome Evaluation:       Received in bed sleeping, pt remains on SIO for hx of Agitation    impulsive behavior, sexually inappropriate behavior and risk for self injury due to unsteadiness r/t Parkinsons.  Easily awoken for his medication and sat in bed. Calm and cooperative  Slept for 8.5 hours.

## 2023-10-26 NOTE — PLAN OF CARE
"Goal Outcome Evaluation:    Plan of Care Reviewed With: patient        Problem: Psychotic Symptoms  Goal: Psychotic Symptoms  Description: Signs and symptoms of listed problems will be absent or manageable.  Outcome: Progressing     Problem: Confusion Chronic  Goal: Optimal Cognitive Function  Outcome: Progressing     Patient was visible in the milieu during shift, came out for meals ate 100% of his breakfast with assistance from SIO staff. Patient took all his AM medication except atropine drops which were not available and pharmacy is yet to send. Patient was receptive of assessment, denies SI/SIB/ denies depression and anxiety. Patient verbalized thoughts of \" strangling people especially beautiful women\" this is chronic for this patient. Patient also verbalized seeing things on the wall and hearing voices, cannot recall what they are telling him. Patient continues to have SIO 5 feet for agitation, impulsivity and parkinson's symptoms.  No angry or behavioral outbursts during shift.  Patient has been having loose stool, maybe we can hold stool softeners.  Blood pressure 130/85, pulse 82, temperature 98.6  F (37  C), temperature source Oral, resp. rate 18, height 1.753 m (5' 9\"), weight 86.3 kg (190 lb 4.1 oz), SpO2 98%.    "

## 2023-10-26 NOTE — PLAN OF CARE
Assessment/Intervention/Current Symtoms and Care Coordination:  Reviewed chart. Met with team to review patient's current functioning, needs, progress, and impediments to discharge. Pt on SIO for safety. Pt's affect is flat. Pt has placement secured, awaiting [employer] assessment for [facility] funding. Pt continues to be confused but is appropriate and redirectable. Pt has been able to verbalize his violent thoughts and not act on them. Pt continues to experience tremor, OT ordered weighted cups and bowl to assist with this. Although, pt has been resistive to adaptive equipment. Pt also endorses embarrassment with tremor. Per provider, pt going through minor medication adjustments for paranoia and delusions. Pt receiving weekly EKG's to manage his high Clozaril dose. Pt has M[facility]hoices assessment on 10/27 at 10:00am.     Discharge Plan or Goal:  Will discharge to [facility]: [address] [place] [address]     Barriers to Discharge:  Discharge pending completion of MNChoices assessment and negotiation process with [facility]. M[facility]hoices assessment scheduled for 10/27 at 10:00am    Referral Status:  Pt accepted at [facility]. For more comprehensive list of referrals see CTC note from 10/6  Pt will need Clozaril lab appointments to be scheduled prior to discharge  Psychiatry appointment requested  PCP appointment requested  Psychiatrist - Dr. Santana at [facility]  P: [phone]   PCP - Attila Urena    Legal Status:  Committed [place] with ITP  [facility]: [place]  File Number: [id number]  ITP includes: Clozaril, Zyprexa, Seroquel, and Risperdal  Expires: 2024    Contacts:  Vicky Marin CM with [facility] (KING per commitment)  P: [phone]  Regina SANTIAGO CM with [facility] (KING per commitment)  P: [phone]  E: [email or web address]  Alberta - Mom (KING signed)  P: [phone] (H) [phone] (M)   Ino Gallagher (KING  signed)  P: [phone] (H)  Sandra Treadwell - Sister (KING signed)  P: [phone]  [facility]'s Office   F: [phone]  Jovana Sánchez - [employer] Alexandra  P: [phone]  E: [email or web address]  Alexa Arteaga - [employer]    F: [phone]  E: [email or web address]    Upcoming Meetings and Dates/Important Information and next steps:  CTC to coordinate with CADI CM and Jovana regarding placement needs  Pt has MNChoices assessment on 10/27 at 10:00am

## 2023-10-27 LAB
ATRIAL RATE - MUSE: 72 BPM
BASOPHILS # BLD AUTO: 0.1 10E3/UL (ref 0–0.2)
BASOPHILS NFR BLD AUTO: 1 %
DIASTOLIC BLOOD PRESSURE - MUSE: NORMAL MMHG
EOSINOPHIL # BLD AUTO: 0 10E3/UL (ref 0–0.7)
EOSINOPHIL NFR BLD AUTO: 0 %
ERYTHROCYTE [DISTWIDTH] IN BLOOD BY AUTOMATED COUNT: 15.1 % (ref 10–15)
HCT VFR BLD AUTO: 35.2 % (ref 40–53)
HGB BLD-MCNC: 11.6 G/DL (ref 13.3–17.7)
IMM GRANULOCYTES # BLD: 0.1 10E3/UL
IMM GRANULOCYTES NFR BLD: 1 %
INTERPRETATION ECG - MUSE: NORMAL
LYMPHOCYTES # BLD AUTO: 1.6 10E3/UL (ref 0.8–5.3)
LYMPHOCYTES NFR BLD AUTO: 18 %
MCH RBC QN AUTO: 28 PG (ref 26.5–33)
MCHC RBC AUTO-ENTMCNC: 33 G/DL (ref 31.5–36.5)
MCV RBC AUTO: 85 FL (ref 78–100)
MONOCYTES # BLD AUTO: 1.1 10E3/UL (ref 0–1.3)
MONOCYTES NFR BLD AUTO: 12 %
NEUTROPHILS # BLD AUTO: 6.1 10E3/UL (ref 1.6–8.3)
NEUTROPHILS NFR BLD AUTO: 68 %
NRBC # BLD AUTO: 0 10E3/UL
NRBC BLD AUTO-RTO: 0 /100
P AXIS - MUSE: 48 DEGREES
PLATELET # BLD AUTO: 151 10E3/UL (ref 150–450)
PR INTERVAL - MUSE: 164 MS
QRS DURATION - MUSE: 166 MS
QT - MUSE: 482 MS
QTC - MUSE: 527 MS
R AXIS - MUSE: 2 DEGREES
RBC # BLD AUTO: 4.14 10E6/UL (ref 4.4–5.9)
SYSTOLIC BLOOD PRESSURE - MUSE: NORMAL MMHG
T AXIS - MUSE: 123 DEGREES
VENTRICULAR RATE- MUSE: 72 BPM
WBC # BLD AUTO: 9 10E3/UL (ref 4–11)

## 2023-10-27 PROCEDURE — 36415 COLL VENOUS BLD VENIPUNCTURE: CPT | Performed by: PSYCHIATRY & NEUROLOGY

## 2023-10-27 PROCEDURE — 250N000013 HC RX MED GY IP 250 OP 250 PS 637: Performed by: PSYCHIATRY & NEUROLOGY

## 2023-10-27 PROCEDURE — 85025 COMPLETE CBC W/AUTO DIFF WBC: CPT | Performed by: PSYCHIATRY & NEUROLOGY

## 2023-10-27 PROCEDURE — 99232 SBSQ HOSP IP/OBS MODERATE 35: CPT | Performed by: PSYCHIATRY & NEUROLOGY

## 2023-10-27 PROCEDURE — 250N000013 HC RX MED GY IP 250 OP 250 PS 637: Performed by: STUDENT IN AN ORGANIZED HEALTH CARE EDUCATION/TRAINING PROGRAM

## 2023-10-27 PROCEDURE — 124N000002 HC R&B MH UMMC

## 2023-10-27 PROCEDURE — 250N000013 HC RX MED GY IP 250 OP 250 PS 637: Performed by: PHYSICIAN ASSISTANT

## 2023-10-27 RX ADMIN — GABAPENTIN 300 MG: 300 CAPSULE ORAL at 08:24

## 2023-10-27 RX ADMIN — MICONAZOLE NITRATE: 20 POWDER TOPICAL at 19:54

## 2023-10-27 RX ADMIN — DIVALPROEX SODIUM 250 MG: 125 CAPSULE, COATED PELLETS ORAL at 13:31

## 2023-10-27 RX ADMIN — FLUOXETINE 10 MG: 10 CAPSULE ORAL at 08:23

## 2023-10-27 RX ADMIN — Medication 0.25 MG: at 13:31

## 2023-10-27 RX ADMIN — CLOZAPINE 700 MG: 200 TABLET ORAL at 12:57

## 2023-10-27 RX ADMIN — OLANZAPINE 15 MG: 10 TABLET, ORALLY DISINTEGRATING ORAL at 19:53

## 2023-10-27 RX ADMIN — Medication 25 MCG: at 08:23

## 2023-10-27 RX ADMIN — ATROPINE SULFATE 2 DROP: 10 SOLUTION/ DROPS OPHTHALMIC at 19:52

## 2023-10-27 RX ADMIN — Medication 0.25 MG: at 08:23

## 2023-10-27 RX ADMIN — DIVALPROEX SODIUM 250 MG: 125 CAPSULE, COATED PELLETS ORAL at 05:55

## 2023-10-27 RX ADMIN — ATROPINE SULFATE 2 DROP: 10 SOLUTION/ DROPS OPHTHALMIC at 13:45

## 2023-10-27 RX ADMIN — OLANZAPINE 15 MG: 10 TABLET, ORALLY DISINTEGRATING ORAL at 08:23

## 2023-10-27 RX ADMIN — CYANOCOBALAMIN TAB 1000 MCG 1000 MCG: 1000 TAB at 08:23

## 2023-10-27 RX ADMIN — GABAPENTIN 300 MG: 300 CAPSULE ORAL at 19:53

## 2023-10-27 RX ADMIN — TAMSULOSIN HYDROCHLORIDE 0.4 MG: 0.4 CAPSULE ORAL at 08:23

## 2023-10-27 RX ADMIN — SENNOSIDES 2 TABLET: 8.6 TABLET ORAL at 08:23

## 2023-10-27 RX ADMIN — PROPRANOLOL HYDROCHLORIDE 20 MG: 10 TABLET ORAL at 12:57

## 2023-10-27 RX ADMIN — DIVALPROEX SODIUM 250 MG: 125 CAPSULE, COATED PELLETS ORAL at 19:52

## 2023-10-27 RX ADMIN — GABAPENTIN 300 MG: 300 CAPSULE ORAL at 13:31

## 2023-10-27 RX ADMIN — PRAZOSIN HYDROCHLORIDE 1 MG: 1 CAPSULE ORAL at 19:52

## 2023-10-27 RX ADMIN — PROPRANOLOL HYDROCHLORIDE 20 MG: 10 TABLET ORAL at 08:23

## 2023-10-27 RX ADMIN — NAPROXEN 250 MG: 250 TABLET ORAL at 08:23

## 2023-10-27 RX ADMIN — Medication 10 MG: at 19:53

## 2023-10-27 RX ADMIN — NAPROXEN 250 MG: 250 TABLET ORAL at 17:48

## 2023-10-27 RX ADMIN — PROPRANOLOL HYDROCHLORIDE 20 MG: 10 TABLET ORAL at 17:48

## 2023-10-27 RX ADMIN — ATROPINE SULFATE 2 DROP: 10 SOLUTION/ DROPS OPHTHALMIC at 08:33

## 2023-10-27 RX ADMIN — Medication 0.25 MG: at 19:52

## 2023-10-27 ASSESSMENT — ACTIVITIES OF DAILY LIVING (ADL)
ADLS_ACUITY_SCORE: 95
ADLS_ACUITY_SCORE: 95
HYGIENE/GROOMING: PROMPTS;WITH ASSISTANCE
ADLS_ACUITY_SCORE: 95
DRESS: WITH ASSISTANCE;SCRUBS (BEHAVIORAL HEALTH)
LAUNDRY: UNABLE TO COMPLETE
ADLS_ACUITY_SCORE: 95
ORAL_HYGIENE: PROMPTS;WITH ASSISTANCE
ADLS_ACUITY_SCORE: 95

## 2023-10-27 NOTE — PLAN OF CARE
Problem: Adult Behavioral Health Plan of Care  Goal: Individualized Daily Interaction Plan (IDIP)  Outcome: Progressing     Problem: Psychotic Symptoms  Goal: Psychotic Symptoms  Description: Signs and symptoms of listed problems will be absent or manageable.  Outcome: Progressing  Goal Outcome Evaluation:    Plan of Care Reviewed With: patient        Patient presents with a flat affect, mood is calm, pleasant and cooperative, was visible at the lounge shortly for dinner and snack time ate 100% of his meal, does not socialize with other peers.  Continue to have hand tremors and need help with eating, reports mild anxiety, denies depression and all other MH symptoms, no complains of pain during the shift and no PRN's given. Patient did not make any delusional statements tonight and no outburst behavior noted, pt is easily redirectable, took the scheduled medications in pudding with no concerns, no loose stools reported this shift,  stool softeners held tonight, alejandro care done and miconazole powder applied. is independent with walking slow steady gait. Will continue with the plan of care.

## 2023-10-27 NOTE — CARE PLAN
Occupational Therapy     10/27/23 1400   Group Therapy Session   Group Attendance attended group session   Time Session Began 1100   Time Session Ended 1200   Total Time (minutes) 20   Total # Attendees 5   Group Type recreation   Group Topic Covered coping skills/lifestyle management;leisure exploration/use of leisure time;structured socialization   Group Session Detail OT: Education on physical and social wellness with physical movement (gentle exercise) and interactive social activity (Chat Pack) to increase concentration, attention to task, symptom management, coping with stress, sharing information about self, listening to others, reminiscing, physical wellness, social wellness, and overall well-being   Patient Response/Contribution cooperative with task;other (see comments)  (concrete; engaged)   Patient Participation Detail Pt arrived late to group and stayed for remainder. Pt sat among peers to complete presented activity but did not engage in reciprocal social interactions with peers. Pt engaged in all physical movement activities and answered all questions about himself. Pt appeared semi-concrete as all his responses to questions were connected to fishing. Pt able to identify three things he is grateful for in the moment. Pt verbalized understanding of importance of social and physical wellness in a healthy lifestyle. SIO present for remainder.

## 2023-10-27 NOTE — PLAN OF CARE
Problem: Psychotic Signs/Symptoms  Goal: Improved Behavioral Control (Psychotic Signs/Symptoms)  Outcome: Progressing     Problem: Behavioral Disturbance  Goal: Behavioral Disturbance  Outcome: Progressing  Goal Outcome Evaluation:    Plan of Care Reviewed With: patient      Patient has been isolative and socially withdrawn most of the shift but was visible at the milieu shortly for meals, ate 100% of his breakfast and lunch. Continue making paranoid statements that the water and the food are poisoned but easily redirectable, endorsed anxiety and  depression, denied SI and contracts for safety. Did not make any outburst behaviors and was compliant with his scheduled medications. Patient hygiene is poor, did not want to  shower this shift, no complains of pain and PRN's given, will continue with the current care plan.

## 2023-10-27 NOTE — PLAN OF CARE
Assessment/Intervention/Current Symtoms and Care Coordination:  Reviewed chart. Met with team to review patient's current functioning, needs, progress, and impediments to discharge. Pt on SIO for safety. Pt's affect is flat. Pt has placement secured, awaiting [employer] assessment for [facility] funding. Pt continues to be confused but is appropriate and redirectable. Pt has been able to verbalize his violent thoughts and not act on them. Pt continues to experience tremor, OT ordered weighted cups and bowl to assist with this. Although, pt has been resistive to adaptive equipment. Pt also endorses embarrassment with tremor. Per provider, pt going through minor medication adjustments for paranoia and delusions. Pt receiving weekly EKG's to manage his high Clozaril dose. Pt has MNChoices assessment on 10/27 at 10:00am.     Writer set up pt, SIO, and MNChoice , Alexa in the quiet room. Pt verbalized his anxiety was too high and preferred to complete the assessment in his room. Alexa and pt accompanied by SIO completed assessment in pt's room.    Writer received email from [facility] with [email or web address] ([email or web address]) inquiring if assessment was completed today. Writer put out return email confirming.    Discharge Plan or Goal:  [facility] to [facility] [place] [address]     Barriers to Discharge:  Discharge pending completion of MNChoices assessment and negotiation process with [facility]. M[facility]hoices assessment scheduled for 10/27 at 10:00am    Referral Status:  Pt accepted at [facility]. For more comprehensive list of referrals see CTC note from 10/6  Pt will need Clozaril lab appointments to be scheduled prior to discharge  Psychiatry appointment requested  PCP appointment requested  Psychiatrist - Dr. Santana at [facility]  P: [phone]   PCP - Attila Urena    Legal Status:  Committed [place] with ITP  [facility]: [facility]  File Number:  [id number]  ITP includes: Clozaril, Zyprexa, Seroquel, and Risperdal  Expires: 01/06/2024    Contacts:  Vicky Valdez - CM with [facility] (KING per commitment)  P: [phone]  Regina Wong - PAMELA CM with [facility] (KING per commitment)  P: [phone]  E: [email or web address]  Alberta - Mom (KING signed)  P: [phone] (H) [phone] (M)   Ino - Dad (KING signed)  P: [phone] (H)  Sandra Treadwell - Sister (KING signed)  P: [phone]  [facility]'s Office   F: [phone]  Jovana Sánchez - [facility]  P: [phone]  E: [email or web address]  Alexa Arteaga - [employer]    F: [phone]  E: [email or web address]    Upcoming Meetings and Dates/Important Information and next steps:  CTC to coordinate with CADI CM and Jovana regarding placement needs  Pt has MNChoices assessment on 10/27 at 10:00am

## 2023-10-27 NOTE — PLAN OF CARE
BEH IP Unit Acuity Rating Score (UARS)  Patient is given one point for every criteria they meet.    CRITERIA SCORING   On a 72 hour hold, court hold, committed, stay of commitment, or revocation 1    Patient LOS on BEH unit exceeds 20 days 1  LOS: 154   Patient under guardianship, 55+, otherwise medically complex, or under age 11 1   Suicide ideation without relief of precipitating factors 0   Current plan for suicide 0   Current plan for homicide 0   Imminent risk or actual attempt to seriously harm another without relief of factors precipitating the attempt 0   Severe dysfunction in daily living (ex: complete neglect for self care, extreme disruption in vegetative function, extreme deterioration in social interactions) 1   Recent (last 7 days) or current physical aggression in the ED or on unit 0   Restraints or seclusion episode in past 72 hours 0   Recent (last 7 days) or current verbal aggression, agitation, yelling, etc., while in the ED or unit 1   Active psychosis 0   Need for constant or near constant redirection (from leaving, from others, etc).  1   Intrusive or disruptive behaviors 1   TOTAL 7

## 2023-10-27 NOTE — PROGRESS NOTES
PSYCHIATRY PROGRESS NOTE         DATE OF SERVICE:   10/27/2023         CHIEF COMPLAINT:     Suicidal thoughts, muscle twitching, response to medications          OBJECTIVE:     Nursing reports : takes medications, on SIO for impulsive behavior which may lead to self injurious behavior, slept through the night      reports working on outpatient referrals, patient is under commitment with shya [facility]    Contacts:  Assisted Living: [facility], [phone]  Per H&P: Vicky Gallego , [phone], psychiatric provider Dr. Santana at [facility], [phone], primary care provider Attila Urena DO    Medicine consult by JUSTINA Gibson  6/17/2023  Brief Medicine Note  Medicine consulted by Dr. Rhodes of Psychiatry for right toe fracture. Patient injured his right toe while banging his foot into the wall or door of his room.    X-ray of the right foot yesterday revealed an acute comminuted and placed fracture throughout the first right distal phalanx with intra-articular extension.  Orthopedics consulted yesterday and evaluated the patient.  They recommend a postop shoe with weightbearing as tolerated.  They will be asking podiatry to see the patient either during this hospitalization or in clinic.  Orthopedics is additionally recommending a 7-day course of prophylactic Augmentin or clindamycin for his open blister near the fracture site.   Plan:   - Agree with plan of care as already in place by Orthopedics   - Patient received post-op shoe yesterday   - I ordered Augmentin twice daily to complete a 7-day course    - He is overdue for his tetanus vaccination, this was due in 2010.  He additionally is in need of a booster due to his foot injury as recommended by orthopedics.  Tdap booster ordered, please administer as able.     Medicine consult by Eva Morgan CNP on 8/4/2023  Assessment & Plan  Vinod Lee is a 63 year old male  "admitted on 5/26/2023. He has a past medical history of schizophrenia, Parkinson's Disease, who was initially admitted on 5/18/23 for AMS, aggression. Broad workup negative, and ultimately transferred to station [facility] on 5/26/23 for further psychiatric monitoring and management. Medicine has been involved intermittently throughout his hospitalization and is most recently being consulted on 8/3 for evaluation of skin changes of R foot and purplish discoloration of both feet below the ankles.  Medicine will sign off at this time.  Please contact us if patient develops assisted systemic symptoms, increasing rash, or new discoloration of feet. Please also consider a dermatology consult if patient does not improve on therapy as below.  # Rash, right foot concerning for psoriasis  # Psoriasis hx  # Purplish discoloration of B/LE feet-resolved   Discussed with Dr. Rene and bedside RN regarding rash on right foot that appears and appears to be purplish in color and almost \"petechiae.\" It was noted during interview, but seemed to be resolving upon walking. Within the past 24 hrs, pt has had ECT and seemed more confused than usual. Pt seems to have had a right great toe wound with recent right great toe fracture seen by Medicine on 7/16. Seen on 8/4 with improving color on B/L legs at rest and appears to have small, scaly patches of varying coin sizes that have not grown past marked borders. See photos. Per further chart review, has had psoriasis history. WBC WNL, no fevers, HDS. This appears to more consistent with a psoriasis like picture.   - Start triamcinolone topical 0.025%   -Apply twice daily until lesions for 2 weeks or until lesions resolve/  -Monitor for skin thinning, striae, rebound lesion flares.   - Start Eucerin cream              - Apply 30 minutes PRIOR to triamcinolone topical cream above or PRN as needed if dry skin/after bathing   - Medicine will sign off.   - For worsening pain, spread, itchiness, " fevers, please contact Medicine and possibly Dermatology.     ECT # 11 by Deja Hicks on 8/25/2023   Type of ECT:  Bilateral, standard   ECT Strip Summary:    Motor Seizure Duration: 46  seconds    EEG Seizure Duration: 78 seconds - low amplitude  Give klonopin 0.5 mg as he is ready to leave the PACU (helps agitation once back on unit)  Complication:  poor quality, low amplitude seizures, with poor anti-seizure response - despite max ECT dosing, etomidate, hyperventilation, caffeine (to prolong).           SUBJECTIVE:      Vinod says that he wants to kill himself, but he says he would not do that. He denies urges to hurt people today. He says those thoughts come and go. He says he would not do that, but he has obsessive thoughts. He denies hallucinations today.He has chronic paranoia. He says he slept ok, appetite ok, energy fluctuates, concentration improved since admission.He says he is never going to get better. I remind him that I evaluated him upon admission and I saw him when he was on unit 12, and that he has had improvement since then. He had [facility] intake.  will continue to work on placement. He reports off and on drooling on Clozaril, but he can tolerate it. He has muscle twitching during the assessment. He denies other medical problems. He spends most of the time in the room. He responds well to redirection.         MEDICATIONS:      atropine  2 drop Sublingual TID    cloZAPine  700 mg Oral Daily with lunch    cyanocobalamin  1,000 mcg Oral Daily    divalproex sodium delayed-release  250 mg Oral Q8H KIKI    FLUoxetine  10 mg Oral Daily    gabapentin  300 mg Oral TID    LORazepam  0.25 mg Oral TID    melatonin  10 mg Oral At Bedtime    miconazole   Topical BID    naproxen  250 mg Oral BID w/meals    OLANZapine zydis  15 mg Oral BID    Or    OLANZapine  10 mg Intramuscular BID    polyethylene glycol  17 g Oral BID    prazosin  1 mg Oral At Bedtime    propranolol  20 mg Oral TID AC     "sennosides  2 tablet Oral BID    tamsulosin  0.4 mg Oral Daily    cholecalciferol  25 mcg Oral Daily     acetaminophen, albuterol, atropine, benzonatate, bisacodyl, haloperidol **AND** [DISCONTINUED] LORazepam **AND** diphenhydrAMINE, haloperidol lactate **AND** [DISCONTINUED] LORazepam **AND** diphenhydrAMINE, gabapentin, lidocaine, mineral oil-white petrolatum, OLANZapine **OR** OLANZapine, senna-docusate, simethicone, traZODone    Medication adherence: Yes  Medication side effects: Prolonged QT QTc 430/514 on 8/8/2023, patient has had multiple as needed neuroleptics, which can further contribute to prolongation of QT QTc  Benefit: Limited symptom reduction on Clozaril, Zyprexa and Depakote, now on ECT due to side effects of medications, and limited response          ROS:   Review of systems is negative for: General, eyes, ears, nose, throat, neck, respiratory, cardiovascular, gastrointestinal, genitourinary, musculoskeletal, neurological, hematological and endocrine system.         MENTAL STATUS EXAM:   /74   Pulse 79   Temp 97.7  F (36.5  C) (Temporal)   Resp 18   Ht 1.753 m (5' 9\")   Wt 86.3 kg (190 lb 4.1 oz)   SpO2 95%   BMI 28.10 kg/m      Appearance: Fair hygiene  Orientation: to self , place and fully in time   Speech: Normal in rate and tone  Language ability: Normal syntax and vocabulary  Thought process: concrete   Thought content: chronic delusions , denies hallucinations now  Associations: Connected  Suicidal Ideation: yes , says he would like to kill himself   Homicidal Ideation: off and on, has urges, can control it   Mood: Depressed  Affect: less irritable   Intellectual functioning:average  Fund of Knowledge: consistent with education and experience   Attention/Concentration: likely at his baseline   Memory: intact  Psychomotor Behavior: not agitated during the interview   Muscle Strength and Tone: no atrophy or involuntary movement  Gait and Station: steady  Insight and " judgement:impaired          LABS:   personally reviewed.     Lab Results   Component Value Date     06/07/2023     05/25/2023     05/24/2023    CO2 26 06/07/2023    CO2 26 05/25/2023    CO2 27 05/24/2023    BUN 17.7 06/07/2023    BUN 20.0 05/25/2023    BUN 18.8 05/24/2023     No results found for: CKTOTAL, CKMB, TROPONINI  Lab Results   Component Value Date    WBC 9.0 06/22/2023    WBC 8.8 06/20/2023    WBC 8.3 06/14/2023    HGB 12.4 06/22/2023    HGB 12.5 06/20/2023    HGB 12.0 06/14/2023    HCT 38.5 06/22/2023    HCT 39.6 06/20/2023    HCT 37.3 06/14/2023    MCV 87 06/22/2023    MCV 88 06/20/2023    MCV 85 06/14/2023     06/22/2023     06/20/2023     06/14/2023       [facility] Reference Range & Units 05/25/23 05:25 05/26/23 13:49 05/26/23 18:02   Sodium 136 - 145 mmol/L 141       Potassium 3.4 - 5.3 mmol/L 3.5       Chloride 98 - 107 mmol/L 106       Carbon Dioxide (CO2) 22 - 29 mmol/L 26       Urea Nitrogen 8.0 - 23.0 mg/dL 20.0       Creatinine 0.67 - 1.17 mg/dL 1.03       GFR Estimate >60 mL/min/1.73m2 82       Calcium 8.8 - 10.2 mg/dL 8.7 (L)       Anion Gap 7 - 15 mmol/L 9       Magnesium 1.7 - 2.3 mg/dL 2.1       Phosphorus 2.5 - 4.5 mg/dL 3.8       Albumin 3.5 - 5.2 g/dL 3.7       Protein Total 6.4 - 8.3 g/dL 5.1 (L)       Alkaline Phosphatase 40 - 129 U/L 62       ALT 10 - 50 U/L 11       AST 10 - 50 U/L 18       Bilirubin Total <=1.2 mg/dL 0.3       Glucose 70 - 99 mg/dL 104 (H)       GLUCOSE BY METER POCT 70 - 99 mg/dL   127 (H) 102 (H)   WBC 4.0 - 11.0 10e3/uL 8.7       Hemoglobin 13.3 - 17.7 g/dL 11.3 (L)       Hematocrit 40.0 - 53.0 % 34.5 (L)       Platelet Count 150 - 450 10e3/uL 133 (L)       RBC Count 4.40 - 5.90 10e6/uL 4.02 (L)       MCV 78 - 100 fL 86       MCH 26.5 - 33.0 pg 28.1       MCHC 31.5 - 36.5 g/dL 32.8       RDW 10.0 - 15.0 % 15.3 (H)          10/27/2023   WBC 9.0  ANC 6.1    EKG on 6/16/2023  QT QTc 434/539, sinus rhythm with premature atrial  complexes, left bundle branch block    EKG on 6/23/2023  QT QTc 414/506, sinus rhythm, left bundle branch block    EKG on 8/7/2023   QT Qtc 430/514, sinus rhythm with premature atrial complexes, left bundle branch block    10/17/2023  Qt qtc 440/523           Sinus rhythm , left bundle branch           block ,abnormal ECG,when           compared with ECG of 10-OCT-2023,             No significant change was found            Confirmed by MD MATTA DAVID     10/24/2023  Qt qtc 482/527Sinus rhythm with sinus arrhythmia .Left bundle branch block   Abnormal ECG . When compared with ECG of 17-OCT-2023 09:20, no significant change was found   Confirmed by fellow Kranthi Calvillo ([id number]) on 10/25/2023              CT HEAD W/O CONTRAST 5/25/2023 12:39 AM   Provided History: Patient running in hallway, fell forward, difficult  to tell if he hit his head. Acutely psychotic, unable to get history   Comparison: CT head 5/20/2023.  Technique: Using multidetector thin collimation helical acquisition  technique, axial, coronal and sagittal CT images from the skull base  to the vertex were obtained without intravenous contrast.    Findings:    No intracranial hemorrhage. No mass effect. No midline shift. No  extra-axial fluid collection. The gray to white matter differentiation  of the cerebral hemispheres is preserved. Ventricles are proportionate  to the sulci. No sulcal effacement. The basal cisterns are patent.  Mild diffuse cerebral atrophy.   Polypoid mucosal thickening of the right maxillary sinus.The  visualized paranasal sinuses are otherwise clear. The mastoid air  cells are clear. Orbits appear unremarkable. No acute fracture   Impression: No acute intracranial pathology.           DIAGNOSIS:     Paranoid schizophrenia chronic with acute exacerbation   Neuroleptic induced Parkinson's disease       Patient Active Problem List   Diagnosis    Urinary retention    Schizophrenia, unspecified type (H)    Altered mental status,  unspecified altered mental status type    Mood changes    Paranoid schizophrenia, chronic condition with acute exacerbation (H)    Neuroleptic-induced parkinsonism     Agitation          PLAN:   Patient  has diagnosis of schizophrenia since 1980s.  He was on Clozaril for 25 years.  It was tapered and discontinued in May 7, 2023 due to prolonged QT QTc of 527.  His psychotic symptoms have worsened since then.   He is under commitment , requested [facility] and forced blood draw for Clozaril.  He is under commitment with [facility] neuroleptic and [facility] ECT order.  He has severe persistent mental illness.  He is on Clozaril.  He has abnormal EKG, no symptoms.  He is on multiple neuroleptics, but he is so symptomatic that he needs these medications.  He had 11 ECT sessions, the last on 8/25/2023.  He is on SIO for safety.  He is followed by medicine for nonpsychiatric problems.  These are recommendations for treatment:    Medications:  Clozapine 700 mg daily for schizophrenia Zyprexa 15 mg bid for schizophrenia  Atropine 1% ophthalmic solution 1 drop twice daily as needed for secretion  Depakote  mg 3 times daily for mood stabilization, give after ECT on Monday, Wednesday and Friday   Gabapentin 300 mg 3 times daily for anxiety, give after ECT on Monday, Wednesday and Friday  Ativan 0.25 mg 3 times daily for anxiety,   Propranolol 20 mg tid for anxiety, may cause hypotension with CLozaril  Trazodone 50 -100 mg nightly as needed for sleep  Melatonin 10 mg nightly for sleep  Miralax 17 g daily constipation  Senokot 8.6 mg twice daily for constipation  Hannah-Colace 1 p.o. twice daily as needed for constipation  Dulcolax suppository 10 mg rectally daily as needed for constipation  Flomax 0.4 mg for urinary retention  Tylenol 650 mg every 4 hours as needed for pain  Haldol 5 mg every 6 hours as needed with Benadryl 50 mg every 6 hours as needed for agitation  Precautions:   Suicide precautions  SIB precautions    Assault precautions  Elopement precautions  Fall precautions   SIO for safety  No roommate  Medical bad   Legal: Commitment with [facility] neuroleptic order and [facility]ay ECT order  Full code   will collect collateral information and make outpatient referrals  Staff to provide emotional support and redirect as needed  Patient encouraged to attend groups  Lab results: Reviewed personally  Consultation: medicine consult completed  Continue SIO for safety    Risk Assessment: commitment with [facility] and for forced blood draw and ECT recommended for safety, stabilization and medication management, chronic persistent mental illness patient with limited response to medication, side effects of medications, undergoing ECT    Coordination of Care:   Patient seen, medical record reviewed, care coordinated with the team.    This document is created with the help of Dragon dictation system.  All grammatical/typing errors or context distortion are unintentional and inherent to software.    Misty Portillo MD        Re-Certification I certify that the inpatient psychiatric facility services furnished since the previous certification were, and continue to be, medically necessary for, either, treatment which could reasonably be expected to improve the patient s condition or diagnostic study and that the hospital records indicate that the services furnished were, either, intensive treatment services, admission and related services necessary for diagnostic study, or equivalent services.     I certify that the patient continues to need, on a daily basis, active treatment furnished directly by or requiring the supervision of inpatient psychiatric facility personnel.   I estimate TBD days of hospitalization is necessary for proper treatment of the patient. My plans for post-hospital care for this patient are : Medications, appointments     Misty Portillo MD

## 2023-10-27 NOTE — PROGRESS NOTES
PSYCHIATRY PROGRESS NOTE         DATE OF SERVICE:   10/26/2023         CHIEF COMPLAINT:     Intrusive thoughts and urges  to hurt SIO, but would not do that, thoughts of jumping of the yann, but afraid of          OBJECTIVE:     Nursing reports : On SIO, able to process his thoughts, takes medications     reports working on outpatient referrals, patient is under commitment with [facility]  Contacts:  Assisted Living: [facility], [phone]  Per H&P: Vicky Gallego , [phone], psychiatric provider Dr. Santana at [facility], [phone], primary care provider Attila Urena,     Medicine consult by JUSTINA Gibson  6/17/2023  Brief Medicine Note  Medicine consulted by Dr. Rhodes of Psychiatry for right toe fracture. Patient injured his right toe while banging his foot into the wall or door of his room.    X-ray of the right foot yesterday revealed an acute comminuted and placed fracture throughout the first right distal phalanx with intra-articular extension.  Orthopedics consulted yesterday and evaluated the patient.  They recommend a postop shoe with weightbearing as tolerated.  They will be asking podiatry to see the patient either during this hospitalization or in clinic.  Orthopedics is additionally recommending a 7-day course of prophylactic Augmentin or clindamycin for his open blister near the fracture site.   Plan:   - Agree with plan of care as already in place by Orthopedics   - Patient received post-op shoe yesterday   - I ordered Augmentin twice daily to complete a 7-day course    - He is overdue for his tetanus vaccination, this was due in 2010.  He additionally is in need of a booster due to his foot injury as recommended by orthopedics.  Tdap booster ordered, please administer as able.     Medicine consult by Eva Morgan CNP on 8/4/2023  Assessment & Plan  Vinod Lee is a 63 year old male admitted on 5/26/2023. He has a past  "medical history of schizophrenia, Parkinson's Disease, who was initially admitted on 5/18/23 for AMS, aggression. Broad workup negative, and ultimately transferred to station 12N on 5/26/23 for further psychiatric monitoring and management. Medicine has been involved intermittently throughout his hospitalization and is most recently being consulted on 8/3 for evaluation of skin changes of R foot and purplish discoloration of both feet below the ankles.  Medicine will sign off at this time.  Please contact us if patient develops assisted systemic symptoms, increasing rash, or new discoloration of feet. Please also consider a dermatology consult if patient does not improve on therapy as below.  # Rash, right foot concerning for psoriasis  # Psoriasis hx  # Purplish discoloration of B/LE feet-resolved   Discussed with Dr. Rene and bedside RN regarding rash on right foot that appears and appears to be purplish in color and almost \"petechiae.\" It was noted during interview, but seemed to be resolving upon walking. Within the past 24 hrs, pt has had ECT and seemed more confused than usual. Pt seems to have had a right great toe wound with recent right great toe fracture seen by Medicine on 7/16. Seen on 8/4 with improving color on B/L legs at rest and appears to have small, scaly patches of varying coin sizes that have not grown past marked borders. See photos. Per further chart review, has had psoriasis history. WBC WNL, no fevers, HDS. This appears to more consistent with a psoriasis like picture.   - Start triamcinolone topical 0.025%   -Apply twice daily until lesions for 2 weeks or until lesions resolve/  -Monitor for skin thinning, striae, rebound lesion flares.   - Start Eucerin cream              - Apply 30 minutes PRIOR to triamcinolone topical cream above or PRN as needed if dry skin/after bathing   - Medicine will sign off.   - For worsening pain, spread, itchiness, fevers, please contact Medicine and " possibly Dermatology.     ECT # 11 by Deja Hicks on8/25/2023   Type of ECT:  Bilateral, standard   ECT Strip Summary:   Motor Seizure Duration: 46  seconds    EEG Seizure Duration: 78 seconds - low amplitude  Give klonopin 0.5 mg as he is ready to leave the PACU (helps agitation once back on unit)  Complication:  poor quality, low amplitude seizures, with poor anti-seizure response - despite max ECT dosing, etomidate, hyperventilation, caffeine (to prolong).            SUBJECTIVE:    Vinod has chronic persistent mental illness.      He has been hospitalized since May 26, 2023.he was onClozaril for 25 years and it was discontinued due to prolongation of QT QTc.  He decompensated.  He is under commitment with S[facility]chez and Amie.  He completed 11 ECT sessions in August 2023.  He is now on Clozaril 700 mg daily and Zyprexa 15 mg twice daily.  He had prolonged QT QTc.  She has EKG weekly.  On October 24, 2023 QT QTc was 482/527, on October 17, 2023 it was 440/523.  He will continue to have weekly EKG. given with this risk, benefit outweighs the risks.  I did evaluate this patient for initial history and physical.  He has improved on Clozaril.  She is on Zyprexa 15 mg twice daily, and it could be slowly decreased to see if patient can tolerate it.    He says that he has urges to harm his SIO, but he does not want to do that and he is able to process his thought with his SIO. He has thoughts of jumping of the yann, but he says he is afraid of heights.He says he is depressed. Denies hallucinations, always paranoid. He tries to go to groups, but he can not tolerate it much. He denies other medical problems .           MEDICATIONS:      atropine  2 drop Sublingual TID    cloZAPine  700 mg Oral Daily with lunch    cyanocobalamin  1,000 mcg Oral Daily    divalproex sodium delayed-release  250 mg Oral Q8H KIKI    FLUoxetine  10 mg Oral Daily    gabapentin  300 mg Oral TID    LORazepam  0.25 mg Oral TID    melatonin  10 mg  "Oral At Bedtime    miconazole   Topical BID    naproxen  250 mg Oral BID w/meals    OLANZapine zydis  15 mg Oral BID    Or    OLANZapine  10 mg Intramuscular BID    polyethylene glycol  17 g Oral BID    prazosin  1 mg Oral At Bedtime    propranolol  20 mg Oral TID AC    sennosides  2 tablet Oral BID    tamsulosin  0.4 mg Oral Daily    cholecalciferol  25 mcg Oral Daily     acetaminophen, albuterol, atropine, benzonatate, bisacodyl, haloperidol **AND** [DISCONTINUED] LORazepam **AND** diphenhydrAMINE, haloperidol lactate **AND** [DISCONTINUED] LORazepam **AND** diphenhydrAMINE, gabapentin, lidocaine, mineral oil-white petrolatum, OLANZapine **OR** OLANZapine, senna-docusate, simethicone, traZODone    Medication adherence: Yes  Medication side effects: Prolonged QT QTc 430/514 on 8/8/2023, patient has had multiple as needed neuroleptics, which can further contribute to prolongation of QT QTc  Benefit: Limited symptom reduction on Clozaril, Zyprexa and Depakote,          ROS:   Review of systems is negative for: General, eyes, ears, nose, throat, neck, respiratory, cardiovascular, gastrointestinal, genitourinary, musculoskeletal, neurological, hematological and endocrine system.         MENTAL STATUS EXAM:   /64   Pulse 78   Temp 98.3  F (36.8  C)   Resp 18   Ht 1.753 m (5' 9\")   Wt 86.3 kg (190 lb 4.1 oz)   SpO2 96%   BMI 28.10 kg/m      Appearance: Fair hygiene  Orientation: x 3  Speech: Normal in rate and tone  Language ability: Normal syntax and vocabulary  Thought process: concrete   Thought content: chronic delusions , denies   hallucinations  Associations: Connected  Suicidal Ideation: thoughts of jumping of the yann  Homicidal Ideation: thoughts and urges to hurt SIO , but he would not do that  Mood: Depressed  Affect: less irritable   Intellectual functioning:average  Fund of Knowledge: consistent with education and experience   Attention/Concentration: likely at his baseline   Memory: " intact  Psychomotor Behavior: not agitated during the interview   Muscle Strength and Tone: no atrophy or involuntary movement  Gait and Station: steady  Insight and judgement:impaired          LABS:   personally reviewed.     Lab Results   Component Value Date     06/07/2023     05/25/2023     05/24/2023    CO2 26 06/07/2023    CO2 26 05/25/2023    CO2 27 05/24/2023    BUN 17.7 06/07/2023    BUN 20.0 05/25/2023    BUN 18.8 05/24/2023     No results found for: CKTOTAL, CKMB, TROPONINI  Lab Results   Component Value Date    WBC 9.0 06/22/2023    WBC 8.8 06/20/2023    WBC 8.3 06/14/2023    HGB 12.4 06/22/2023    HGB 12.5 06/20/2023    HGB 12.0 06/14/2023    HCT 38.5 06/22/2023    HCT 39.6 06/20/2023    HCT 37.3 06/14/2023    MCV 87 06/22/2023    MCV 88 06/20/2023    MCV 85 06/14/2023     06/22/2023     06/20/2023     06/14/2023       Latest Reference Range & Units 05/25/23 05:25 05/26/23 13:49 05/26/23 18:02   Sodium 136 - 145 mmol/L 141       Potassium 3.4 - 5.3 mmol/L 3.5       Chloride 98 - 107 mmol/L 106       Carbon Dioxide (CO2) 22 - 29 mmol/L 26       Urea Nitrogen 8.0 - 23.0 mg/dL 20.0       Creatinine 0.67 - 1.17 mg/dL 1.03       GFR Estimate >60 mL/min/1.73m2 82       Calcium 8.8 - 10.2 mg/dL 8.7 (L)       Anion Gap 7 - 15 mmol/L 9       Magnesium 1.7 - 2.3 mg/dL 2.1       Phosphorus 2.5 - 4.5 mg/dL 3.8       Albumin 3.5 - 5.2 g/dL 3.7       Protein Total 6.4 - 8.3 g/dL 5.1 (L)       Alkaline Phosphatase 40 - 129 U/L 62       ALT 10 - 50 U/L 11       AST 10 - 50 U/L 18       Bilirubin Total <=1.2 mg/dL 0.3       Glucose 70 - 99 mg/dL 104 (H)       GLUCOSE BY METER POCT 70 - 99 mg/dL   127 (H) 102 (H)   WBC 4.0 - 11.0 10e3/uL 8.7       Hemoglobin 13.3 - 17.7 g/dL 11.3 (L)       Hematocrit 40.0 - 53.0 % 34.5 (L)       Platelet Count 150 - 450 10e3/uL 133 (L)       RBC Count 4.40 - 5.90 10e6/uL 4.02 (L)       MCV 78 - 100 fL 86       MCH 26.5 - 33.0 pg 28.1       MCHC 31.5  - 36.5 g/dL 32.8       RDW 10.0 - 15.0 % 15.3 (H)          10/20/2023   Wbc 11.6  ANC 9.1    EKG  5/23/23  4:44 PM 5/19/23  8:27 AM         Systolic Blood Pressure mmHg        Diastolic Blood Pressure mmHg        Ventricular Rate BPM 80  73     Atrial Rate BPM 80  73     WA Interval ms 152  142     QRS Duration ms 152  156     QT ms 430  458     QTc ms 495  504     P Pioneer degrees 59  67     R AXIS degrees -21  -48     T Axis degrees 111  81     Interpretation ECG   Sinus rhythm   Left bundle branch block   Abnormal ECG   When compared with ECG of 19-MAY-2023 08:27,   No significant change was found   Confirmed by fellow Mckay Dennis ([id number]) on 5/24/2023 10:35:48       EKG on 6/16/2023  QT QTc 434/539, sinus rhythm with premature atrial complexes, left bundle branch block  EKG on 6/23/2023  QT QTc 414/506, sinus rhythm, left bundle branch block    EKG on 8/7/2023   QT Qtc 430/514, sinus rhythm with premature atrial complexes, left bundle branch block  8/8/23 10:16 AM 7/31/23 10:05 AM       Systolic Blood Pressure mmHg      Diastolic Blood Pressure mmHg      Ventricular Rate BPM 86 87    Atrial Rate BPM 86 87    WA Interval ms 144 140    QRS Duration ms 154 156    QT ms 430 422    QTc ms 514 507    P Axis degrees 59 56    R AXIS degrees 1 -48    T Axis degrees 104 95    Interpretation ECG  Sinus rhythm with Premature atrial complexes  Left bundle branch block  Abnormal ECG  When compared with ECG of 31-JUL-2023 10:05,  Premature atrial complexes are now Present  QRS axis Shifted right      10/17/2023  Qt qtc 440/523           Sinus rhythm , left bundle branch           block ,abnormal ECG,when           compared with ECG of 10-OCT-2023,             No significant change was found            Confirmed by MD MATTA DAVID      10/24/2023  Qt qtc 482/527Sinus rhythm with sinus arrhythmia .Left bundle branch block   Abnormal ECG . When compared with ECG of 17-OCT-2023 09:20, no significant change was found   Confirmed  by fellow Kranthi Calvillo ([id number]) on 10/25/2023       CT HEAD W/O CONTRAST 5/25/2023 12:39 AM   Provided History: Patient running in hallway, fell forward, difficult  to tell if he hit his head. Acutely psychotic, unable to get history   Comparison: CT head 5/20/2023.  Technique: Using multidetector thin collimation helical acquisition  technique, axial, coronal and sagittal CT images from the skull base  to the vertex were obtained without intravenous contrast.    Findings:    No intracranial hemorrhage. No mass effect. No midline shift. No  extra-axial fluid collection. The gray to white matter differentiation  of the cerebral hemispheres is preserved. Ventricles are proportionate  to the sulci. No sulcal effacement. The basal cisterns are patent.  Mild diffuse cerebral atrophy.   Polypoid mucosal thickening of the right maxillary sinus.The  visualized paranasal sinuses are otherwise clear. The mastoid air  cells are clear. Orbits appear unremarkable. No acute fracture   Impression: No acute intracranial pathology.           DIAGNOSIS:     Paranoid schizophrenia chronic with acute exacerbation   Neuroleptic induced Parkinson's disease       Patient Active Problem List   Diagnosis    Urinary retention    Schizophrenia, unspecified type (H)    Altered mental status, unspecified altered mental status type    Mood changes    Paranoid schizophrenia, chronic condition with acute exacerbation (H)    Neuroleptic-induced parkinsonism     Agitation          PLAN:   Patient  has diagnosis of schizophrenia since 1980s.  He was on Clozaril for 25 years.  It was tapered and discontinued in May 7, 2023 due to prolonged QT QTc of 527.  His psychotic symptoms have worsened since then.   He is under commitment , requested [facility] and forced blood draw for Clozaril.  He is under commitment with [facility] neuroleptic and Lorenzo Holloway, ECT order.  He has severe persistent mental illness.  He is on Clozaril.  He has prolonged qt qtc,no  symptoms. EKG ordered weekly..  He is on multiple neuroleptics, but he is so symptomatic that he needs these medications.  He completed  11 ECT sessions under [facility]  These are recommendations for treatment:    Medications:  Clozapine 700 mg daily for schizophrenia Zyprexa 15 mg bid for schizophrenia  Atropine 1% ophthalmic solution 1 drop twice daily as needed for secretion  Depakote  mg 3 times daily for mood stabilization, give after ECT on Monday, Wednesday and Friday   Gabapentin 300 mg 3 times daily for anxiety  Ativan 0.25 mg 3 times daily for anxiety, give after ECT on Monday, Wednesday and Friday  Trazodone 50 -100 mg nightly as needed for sleep  Melatonin 10 mg nightly for sleep  Miralax 17 g daily constipation  Senokot 8.6 mg twice daily for constipation  Hannah-Colace 1 p.o. twice daily as needed for constipation  Dulcolax suppository 10 mg rectally daily as needed for constipation  Flomax 0.4 mg for urinary retention  Tylenol 650 mg every 4 hours as needed for pain  Haldol 5 mg every 6 hours as needed with Benadryl 50 mg every 6 hours as needed for agitation  Precautions:   Suicide precautions  SIB precautions   Assault precautions  Elopement precautions  Fall precautions   SIO for safety  No roommate  Medical bad   Legal: Commitment with [facility] neuroleptic order and Lorenzo Holloway ECT order  Full code   will collect collateral information and make outpatient referrals  Staff to provide emotional support and redirect as needed  Patient encouraged to attend groups  Lab results: Reviewed personally  Consultation: medicine consult completed  Continue SIO for safety    Risk Assessment: commitment with [facility] and for forced blood draw and ECT recommended for safety, stabilization and medication management, chronic persistent mental illness patient with limited response to medication, side effects of medications, undergoing ECT    Coordination of Care:   Patient seen, medical record  reviewed, care coordinated with the team.    This document is created with the help of Dragon dictation system.  All grammatical/typing errors or context distortion are unintentional and inherent to software.    Misty Portillo MD        Re-Certification I certify that the inpatient psychiatric facility services furnished since the previous certification were, and continue to be, medically necessary for, either, treatment which could reasonably be expected to improve the patient s condition or diagnostic study and that the hospital records indicate that the services furnished were, either, intensive treatment services, admission and related services necessary for diagnostic study, or equivalent services.     I certify that the patient continues to need, on a daily basis, active treatment furnished directly by or requiring the supervision of inpatient psychiatric facility personnel.   I estimate TBD days of hospitalization is necessary for proper treatment of the patient. My plans for post-hospital care for this patient are : Medications, appointments     Misty Portillo MD

## 2023-10-27 NOTE — PLAN OF CARE
Problem: Psychotic Symptoms  Goal: Psychotic Symptoms  Description: Signs and symptoms of listed problems will be absent or manageable.  Outcome: Progressing  Goal: Social and Therapeutic (Psychotic Symptoms)  Description: Signs and symptoms of listed problems will be absent or manageable.  Outcome: Progressing   Patient spent most of the time in his room. He came out for dinner and with staff assistance he ate 100% of his food. Presented with a flat affect. Mood was calm.  Patient denied SI/SIB/AVH. He denied depression. He endorsed anxiety which he rated at a 5/10. He was medication compliant.   Staff aissted patient with pericare and miconazole powder was applied.    He remains on SIO for severe intrusiveness  and assault risk. He took his HS medications which were administered with pudding. He refused Miralax and senna tonight, stated he had a bowel movement this morning. Patient was noted to have a lot of secretions/increased drooling, atropine drops administered per doctor's order. Stafff will continue to monitor.

## 2023-10-27 NOTE — PLAN OF CARE
Problem: Sleep Disturbance  Goal: Adequate Sleep/Rest  Outcome: Progressing   Goal Outcome Evaluation:         Received asleep with HOB slightly elevated with a wedge pillow. pt remains on SIO for hx of Agitation ,impulsive behavior, sexually inappropriate behavior and risk for self injury due to unsteadiness r/t Parkinsons. Non of which were observed this shift.  Calm, cooperative with taking his scheduled medication with pudding.  Slept for 9.25 hours

## 2023-10-28 PROCEDURE — 250N000013 HC RX MED GY IP 250 OP 250 PS 637: Performed by: PSYCHIATRY & NEUROLOGY

## 2023-10-28 PROCEDURE — 250N000013 HC RX MED GY IP 250 OP 250 PS 637: Performed by: STUDENT IN AN ORGANIZED HEALTH CARE EDUCATION/TRAINING PROGRAM

## 2023-10-28 PROCEDURE — 250N000013 HC RX MED GY IP 250 OP 250 PS 637: Performed by: PHYSICIAN ASSISTANT

## 2023-10-28 PROCEDURE — 124N000002 HC R&B MH UMMC

## 2023-10-28 RX ADMIN — DIVALPROEX SODIUM 250 MG: 125 CAPSULE, COATED PELLETS ORAL at 19:45

## 2023-10-28 RX ADMIN — FLUOXETINE 10 MG: 10 CAPSULE ORAL at 08:25

## 2023-10-28 RX ADMIN — SENNOSIDES 2 TABLET: 8.6 TABLET ORAL at 19:45

## 2023-10-28 RX ADMIN — MICONAZOLE NITRATE: 20 POWDER TOPICAL at 12:28

## 2023-10-28 RX ADMIN — Medication 0.25 MG: at 13:31

## 2023-10-28 RX ADMIN — DIVALPROEX SODIUM 250 MG: 125 CAPSULE, COATED PELLETS ORAL at 13:31

## 2023-10-28 RX ADMIN — ATROPINE SULFATE 2 DROP: 10 SOLUTION/ DROPS OPHTHALMIC at 13:31

## 2023-10-28 RX ADMIN — GABAPENTIN 300 MG: 300 CAPSULE ORAL at 19:46

## 2023-10-28 RX ADMIN — TAMSULOSIN HYDROCHLORIDE 0.4 MG: 0.4 CAPSULE ORAL at 08:23

## 2023-10-28 RX ADMIN — POLYETHYLENE GLYCOL 3350 17 G: 17 POWDER, FOR SOLUTION ORAL at 19:46

## 2023-10-28 RX ADMIN — SENNOSIDES 2 TABLET: 8.6 TABLET ORAL at 08:27

## 2023-10-28 RX ADMIN — PROPRANOLOL HYDROCHLORIDE 20 MG: 10 TABLET ORAL at 17:41

## 2023-10-28 RX ADMIN — PROPRANOLOL HYDROCHLORIDE 20 MG: 10 TABLET ORAL at 08:25

## 2023-10-28 RX ADMIN — NAPROXEN 250 MG: 250 TABLET ORAL at 08:24

## 2023-10-28 RX ADMIN — PRAZOSIN HYDROCHLORIDE 1 MG: 1 CAPSULE ORAL at 19:46

## 2023-10-28 RX ADMIN — GABAPENTIN 300 MG: 300 CAPSULE ORAL at 13:31

## 2023-10-28 RX ADMIN — OLANZAPINE 15 MG: 10 TABLET, ORALLY DISINTEGRATING ORAL at 19:45

## 2023-10-28 RX ADMIN — CYANOCOBALAMIN TAB 1000 MCG 1000 MCG: 1000 TAB at 08:24

## 2023-10-28 RX ADMIN — ATROPINE SULFATE 2 DROP: 10 SOLUTION/ DROPS OPHTHALMIC at 19:46

## 2023-10-28 RX ADMIN — NAPROXEN 250 MG: 250 TABLET ORAL at 17:41

## 2023-10-28 RX ADMIN — Medication 10 MG: at 19:45

## 2023-10-28 RX ADMIN — CLOZAPINE 700 MG: 200 TABLET ORAL at 12:27

## 2023-10-28 RX ADMIN — DIVALPROEX SODIUM 250 MG: 125 CAPSULE, COATED PELLETS ORAL at 06:33

## 2023-10-28 RX ADMIN — PROPRANOLOL HYDROCHLORIDE 20 MG: 10 TABLET ORAL at 12:28

## 2023-10-28 RX ADMIN — OLANZAPINE 15 MG: 10 TABLET, ORALLY DISINTEGRATING ORAL at 08:24

## 2023-10-28 RX ADMIN — Medication 25 MCG: at 08:24

## 2023-10-28 RX ADMIN — POLYETHYLENE GLYCOL 3350 17 G: 17 POWDER, FOR SOLUTION ORAL at 08:34

## 2023-10-28 RX ADMIN — Medication 0.25 MG: at 08:24

## 2023-10-28 RX ADMIN — Medication 0.25 MG: at 19:45

## 2023-10-28 RX ADMIN — GABAPENTIN 300 MG: 300 CAPSULE ORAL at 08:24

## 2023-10-28 RX ADMIN — MICONAZOLE NITRATE: 20 POWDER TOPICAL at 19:46

## 2023-10-28 RX ADMIN — ATROPINE SULFATE 2 DROP: 10 SOLUTION/ DROPS OPHTHALMIC at 08:25

## 2023-10-28 ASSESSMENT — ACTIVITIES OF DAILY LIVING (ADL)
ORAL_HYGIENE: PROMPTS;INDEPENDENT
HYGIENE/GROOMING: WITH ASSISTANCE
ADLS_ACUITY_SCORE: 95
DRESS: WITH ASSISTANCE;SCRUBS (BEHAVIORAL HEALTH)
ADLS_ACUITY_SCORE: 95
ORAL_HYGIENE: PROMPTS;INDEPENDENT
HYGIENE/GROOMING: PROMPTS;INDEPENDENT
ADLS_ACUITY_SCORE: 95
LAUNDRY: UNABLE TO COMPLETE
LAUNDRY: UNABLE TO COMPLETE
ADLS_ACUITY_SCORE: 95
DRESS: WITH ASSISTANCE

## 2023-10-28 NOTE — PLAN OF CARE
Problem: Sleep Disturbance  Goal: Adequate Sleep/Rest  Outcome: Progressing    Patient slept approximately 8 hours. Safety checks and precautions in place. Patient continues on SIO. No prn given or requested. This writer will continue to monitor patient as needed for the rest of the shift.

## 2023-10-28 NOTE — PLAN OF CARE
Problem: Psychotic Symptoms  Goal: Psychotic Symptoms  Description: Signs and symptoms of listed problems will be absent or manageable.  Outcome: Progressing  Goal Outcome Evaluation:    Plan of Care Reviewed With: patient      Patient has been isolative in his room but come out for meals, is social with staff but not other peers, has a good appetite, ate 100% of breakfast and lunch with good fluid intake. Reports hearing voices telling him to eat all the lotion that was in his room so we took it out, he also told the SIO staff that he feels like he needs to strangle him, patient is easily redirectable and sometimes it works if staff switches around when he is having those kind of thoughts, still paranoid about the water being poisoned and might make him sick, endorsed anxiety and depression, denied thoughts of harming himself or others. Patient did not complain of pain this shift and no PRN's given, patient is unkempt and unshaven, said he cannot remember the last time he had a shower and declined one this shift, alejandro care was done and was medication compliant.

## 2023-10-29 PROCEDURE — 250N000013 HC RX MED GY IP 250 OP 250 PS 637: Performed by: PSYCHIATRY & NEUROLOGY

## 2023-10-29 PROCEDURE — 250N000013 HC RX MED GY IP 250 OP 250 PS 637: Performed by: PHYSICIAN ASSISTANT

## 2023-10-29 PROCEDURE — 124N000002 HC R&B MH UMMC

## 2023-10-29 PROCEDURE — 250N000013 HC RX MED GY IP 250 OP 250 PS 637: Performed by: STUDENT IN AN ORGANIZED HEALTH CARE EDUCATION/TRAINING PROGRAM

## 2023-10-29 RX ADMIN — PROPRANOLOL HYDROCHLORIDE 20 MG: 10 TABLET ORAL at 08:06

## 2023-10-29 RX ADMIN — TAMSULOSIN HYDROCHLORIDE 0.4 MG: 0.4 CAPSULE ORAL at 08:06

## 2023-10-29 RX ADMIN — PROPRANOLOL HYDROCHLORIDE 20 MG: 10 TABLET ORAL at 12:40

## 2023-10-29 RX ADMIN — OLANZAPINE 15 MG: 10 TABLET, ORALLY DISINTEGRATING ORAL at 20:15

## 2023-10-29 RX ADMIN — MICONAZOLE NITRATE: 20 POWDER TOPICAL at 20:16

## 2023-10-29 RX ADMIN — Medication 0.25 MG: at 13:07

## 2023-10-29 RX ADMIN — MICONAZOLE NITRATE: 20 POWDER TOPICAL at 10:28

## 2023-10-29 RX ADMIN — SENNOSIDES 2 TABLET: 8.6 TABLET ORAL at 20:15

## 2023-10-29 RX ADMIN — GABAPENTIN 300 MG: 300 CAPSULE ORAL at 13:07

## 2023-10-29 RX ADMIN — CLOZAPINE 700 MG: 200 TABLET ORAL at 12:40

## 2023-10-29 RX ADMIN — SENNOSIDES 2 TABLET: 8.6 TABLET ORAL at 08:05

## 2023-10-29 RX ADMIN — DIVALPROEX SODIUM 250 MG: 125 CAPSULE, COATED PELLETS ORAL at 06:05

## 2023-10-29 RX ADMIN — DIVALPROEX SODIUM 250 MG: 125 CAPSULE, COATED PELLETS ORAL at 13:07

## 2023-10-29 RX ADMIN — Medication 0.25 MG: at 08:06

## 2023-10-29 RX ADMIN — Medication 25 MCG: at 08:06

## 2023-10-29 RX ADMIN — GABAPENTIN 300 MG: 300 CAPSULE ORAL at 08:06

## 2023-10-29 RX ADMIN — CYANOCOBALAMIN TAB 1000 MCG 1000 MCG: 1000 TAB at 08:06

## 2023-10-29 RX ADMIN — DIVALPROEX SODIUM 250 MG: 125 CAPSULE, COATED PELLETS ORAL at 20:16

## 2023-10-29 RX ADMIN — GABAPENTIN 300 MG: 300 CAPSULE ORAL at 20:14

## 2023-10-29 RX ADMIN — ATROPINE SULFATE 2 DROP: 10 SOLUTION/ DROPS OPHTHALMIC at 20:15

## 2023-10-29 RX ADMIN — POLYETHYLENE GLYCOL 3350 17 G: 17 POWDER, FOR SOLUTION ORAL at 20:15

## 2023-10-29 RX ADMIN — PROPRANOLOL HYDROCHLORIDE 20 MG: 10 TABLET ORAL at 17:28

## 2023-10-29 RX ADMIN — ATROPINE SULFATE 1 DROP: 10 SOLUTION/ DROPS OPHTHALMIC at 17:32

## 2023-10-29 RX ADMIN — POLYETHYLENE GLYCOL 3350 17 G: 17 POWDER, FOR SOLUTION ORAL at 08:06

## 2023-10-29 RX ADMIN — NAPROXEN 250 MG: 250 TABLET ORAL at 08:06

## 2023-10-29 RX ADMIN — Medication 10 MG: at 20:14

## 2023-10-29 RX ADMIN — PRAZOSIN HYDROCHLORIDE 1 MG: 1 CAPSULE ORAL at 20:16

## 2023-10-29 RX ADMIN — ACETAMINOPHEN 650 MG: 325 TABLET, FILM COATED ORAL at 10:19

## 2023-10-29 RX ADMIN — FLUOXETINE 10 MG: 10 CAPSULE ORAL at 08:06

## 2023-10-29 RX ADMIN — OLANZAPINE 15 MG: 10 TABLET, ORALLY DISINTEGRATING ORAL at 08:05

## 2023-10-29 RX ADMIN — ATROPINE SULFATE 2 DROP: 10 SOLUTION/ DROPS OPHTHALMIC at 13:07

## 2023-10-29 RX ADMIN — NAPROXEN 250 MG: 250 TABLET ORAL at 17:27

## 2023-10-29 RX ADMIN — Medication 0.25 MG: at 20:15

## 2023-10-29 RX ADMIN — ATROPINE SULFATE 2 DROP: 10 SOLUTION/ DROPS OPHTHALMIC at 08:05

## 2023-10-29 ASSESSMENT — ACTIVITIES OF DAILY LIVING (ADL)
ADLS_ACUITY_SCORE: 95
ADLS_ACUITY_SCORE: 95
HYGIENE/GROOMING: WITH ASSISTANCE
DRESS: SCRUBS (BEHAVIORAL HEALTH);WITH ASSISTANCE
ADLS_ACUITY_SCORE: 95
ORAL_HYGIENE: WITH ASSISTANCE
ADLS_ACUITY_SCORE: 95
LAUNDRY: UNABLE TO COMPLETE

## 2023-10-29 NOTE — PLAN OF CARE
Problem: Adult Behavioral Health Plan of Care  Goal: Individualized Daily Interaction Plan (IDIP)  Outcome: Progressing     Problem: Psychotic Signs/Symptoms  Goal: Improved Behavioral Control (Psychotic Signs/Symptoms)  Outcome: Progressing  Goal Outcome Evaluation:    Plan of Care Reviewed With: patient      Patient was up visible at the milieu for meals but otherwise socially withdrawn to self most of the time and does not socialize or engage with peers. Affect is flat blunted, mood has been calm, endorsed low anxiety and depression, denied hallucinations, SI/SIB and responds yes to being safe here. Patient complained of pain to the leg, had his shoes on today but they help much per the patient, was given PRN tylenol with some relief. Ate 100% of his breakfast and lunch with good fluid intake, got a bed bath and alejandro care was done as he refuses showers, clothes and linens changed, took all his scheduled medications with no concerns, will continue with the current care plan.

## 2023-10-29 NOTE — PLAN OF CARE
Patient ate 100% of his dinner he did not make any paranoid or delusional statements this shift. He did not make remarks of wanting to strangle staff. He denied SI/SIB/AVH.  He answered all nursing assessmne questions appropriately. Was able to give correct day, month, year and name of the hospital. Pericare completed, no redness noted.  Patient remains on SIO.

## 2023-10-29 NOTE — PLAN OF CARE
Problem: Psychotic Symptoms  Goal: Psychotic Symptoms  Description: Signs and symptoms of listed problems will be absent or manageable.  Outcome: Progressing   Goal Outcome Evaluation:                    Received in bed sleeping, pt is on SIO for self injury risk due to Parkinson's, impulsive behavior and hx of making sexually inappropriate comments and gestures, non of which were observed tonight.  Independent with ADL, voided freely  Medication complaint  Calm and appropriate  Slept for 7 hours

## 2023-10-30 PROCEDURE — 250N000013 HC RX MED GY IP 250 OP 250 PS 637: Performed by: PSYCHIATRY & NEUROLOGY

## 2023-10-30 PROCEDURE — 250N000013 HC RX MED GY IP 250 OP 250 PS 637: Performed by: PHYSICIAN ASSISTANT

## 2023-10-30 PROCEDURE — 250N000013 HC RX MED GY IP 250 OP 250 PS 637: Performed by: STUDENT IN AN ORGANIZED HEALTH CARE EDUCATION/TRAINING PROGRAM

## 2023-10-30 PROCEDURE — 124N000002 HC R&B MH UMMC

## 2023-10-30 RX ORDER — PROPRANOLOL HYDROCHLORIDE 10 MG/1
20 TABLET ORAL 4 TIMES DAILY
Status: DISCONTINUED | OUTPATIENT
Start: 2023-10-30 | End: 2023-11-10

## 2023-10-30 RX ADMIN — GABAPENTIN 300 MG: 300 CAPSULE ORAL at 08:32

## 2023-10-30 RX ADMIN — ATROPINE SULFATE 2 DROP: 10 SOLUTION/ DROPS OPHTHALMIC at 14:01

## 2023-10-30 RX ADMIN — GABAPENTIN 300 MG: 300 CAPSULE ORAL at 20:18

## 2023-10-30 RX ADMIN — OLANZAPINE 15 MG: 10 TABLET, ORALLY DISINTEGRATING ORAL at 20:18

## 2023-10-30 RX ADMIN — OLANZAPINE 15 MG: 10 TABLET, ORALLY DISINTEGRATING ORAL at 08:32

## 2023-10-30 RX ADMIN — DIVALPROEX SODIUM 250 MG: 125 CAPSULE, COATED PELLETS ORAL at 05:13

## 2023-10-30 RX ADMIN — Medication 10 MG: at 20:18

## 2023-10-30 RX ADMIN — Medication 25 MCG: at 08:33

## 2023-10-30 RX ADMIN — POLYETHYLENE GLYCOL 3350 17 G: 17 POWDER, FOR SOLUTION ORAL at 20:19

## 2023-10-30 RX ADMIN — ATROPINE SULFATE 2 DROP: 10 SOLUTION/ DROPS OPHTHALMIC at 20:19

## 2023-10-30 RX ADMIN — NAPROXEN 250 MG: 250 TABLET ORAL at 08:32

## 2023-10-30 RX ADMIN — PRAZOSIN HYDROCHLORIDE 1 MG: 1 CAPSULE ORAL at 20:18

## 2023-10-30 RX ADMIN — SENNOSIDES 2 TABLET: 8.6 TABLET ORAL at 20:18

## 2023-10-30 RX ADMIN — CLOZAPINE 700 MG: 200 TABLET ORAL at 12:32

## 2023-10-30 RX ADMIN — TAMSULOSIN HYDROCHLORIDE 0.4 MG: 0.4 CAPSULE ORAL at 08:33

## 2023-10-30 RX ADMIN — PROPRANOLOL HYDROCHLORIDE 20 MG: 10 TABLET ORAL at 16:23

## 2023-10-30 RX ADMIN — GABAPENTIN 300 MG: 300 CAPSULE ORAL at 13:59

## 2023-10-30 RX ADMIN — ALBUTEROL SULFATE 2 PUFF: 90 AEROSOL, METERED RESPIRATORY (INHALATION) at 17:10

## 2023-10-30 RX ADMIN — NAPROXEN 250 MG: 250 TABLET ORAL at 17:33

## 2023-10-30 RX ADMIN — POLYETHYLENE GLYCOL 3350 17 G: 17 POWDER, FOR SOLUTION ORAL at 08:32

## 2023-10-30 RX ADMIN — DIVALPROEX SODIUM 250 MG: 125 CAPSULE, COATED PELLETS ORAL at 20:18

## 2023-10-30 RX ADMIN — PROPRANOLOL HYDROCHLORIDE 20 MG: 10 TABLET ORAL at 08:33

## 2023-10-30 RX ADMIN — DIVALPROEX SODIUM 250 MG: 125 CAPSULE, COATED PELLETS ORAL at 13:59

## 2023-10-30 RX ADMIN — PROPRANOLOL HYDROCHLORIDE 20 MG: 10 TABLET ORAL at 12:32

## 2023-10-30 RX ADMIN — Medication 0.25 MG: at 20:18

## 2023-10-30 RX ADMIN — FLUOXETINE 10 MG: 10 CAPSULE ORAL at 08:32

## 2023-10-30 RX ADMIN — MICONAZOLE NITRATE: 20 POWDER TOPICAL at 11:14

## 2023-10-30 RX ADMIN — ATROPINE SULFATE 2 DROP: 10 SOLUTION/ DROPS OPHTHALMIC at 08:34

## 2023-10-30 RX ADMIN — CYANOCOBALAMIN TAB 1000 MCG 1000 MCG: 1000 TAB at 08:31

## 2023-10-30 RX ADMIN — Medication 0.25 MG: at 13:59

## 2023-10-30 RX ADMIN — MICONAZOLE NITRATE: 20 POWDER TOPICAL at 20:19

## 2023-10-30 RX ADMIN — Medication 0.25 MG: at 08:32

## 2023-10-30 RX ADMIN — SENNOSIDES 2 TABLET: 8.6 TABLET ORAL at 08:33

## 2023-10-30 RX ADMIN — PROPRANOLOL HYDROCHLORIDE 20 MG: 10 TABLET ORAL at 20:18

## 2023-10-30 ASSESSMENT — ACTIVITIES OF DAILY LIVING (ADL)
HYGIENE/GROOMING: WITH ASSISTANCE
ADLS_ACUITY_SCORE: 95
HYGIENE/GROOMING: WITH ASSISTANCE
LAUNDRY: UNABLE TO COMPLETE
ADLS_ACUITY_SCORE: 95
ADLS_ACUITY_SCORE: 95
ADLS_ACUITY_SCORE: 84
ORAL_HYGIENE: WITH ASSISTANCE
ORAL_HYGIENE: WITH ASSISTANCE
ADLS_ACUITY_SCORE: 84
ADLS_ACUITY_SCORE: 95
DRESS: SCRUBS (BEHAVIORAL HEALTH)
ADLS_ACUITY_SCORE: 95
DRESS: SCRUBS (BEHAVIORAL HEALTH)
ADLS_ACUITY_SCORE: 84
ADLS_ACUITY_SCORE: 95
ADLS_ACUITY_SCORE: 84

## 2023-10-30 NOTE — CARE PLAN
"   10/30/23 1502   Group Therapy Session   Group Attendance attended group session   Time Session Began 1305   Time Session Ended 1410   Total Time (minutes) 10  (no charge)   Total # Attendees 6   Group Type psychotherapeutic   Group Topic Covered coping skills/lifestyle management;relapse prevention;cognitive therapy techniques;structured socialization;balanced lifestyle   Group Session Detail Participated in an activity focused on Stress management, identifying negative and positive aspects of stress and ways to manage it. Discussed affirmation words and was invited to choose one to focus on for the day.   Patient Response/Contribution listened actively   Patient Participation Detail Made attempts to stay and participate though after a few minutes explained he needed to leave and could not stay. Declined offer to try and stay even for 5 minutes. \"I don't think I can stay even for those few minutes\". Was offered to do what he needed to be comfortable and he chose to leave and apologized. During the time present in group at the start, offered a supportive comments to a peer in a thoughtful manner.        "

## 2023-10-30 NOTE — PLAN OF CARE
"  Problem: Psychotic Signs/Symptoms  Goal: Improved Psychomotor Symptoms (Psychotic Signs/Symptoms)  Intervention: Manage Psychomotor Movement  Recent Flowsheet Documentation  Taken 10/30/2023 1521 by Geri Boyce RN  Patient Performed Hygiene:   dressed   incontinence care  Activity (Behavioral Health): up ad jose     Problem: Plan of Care - These are the overarching goals to be used throughout the patient stay.    Goal: Absence of Hospital-Acquired Illness or Injury  Intervention: Prevent Skin Injury  Recent Flowsheet Documentation  Taken 10/30/2023 1521 by Geri Boyce RN  Body Position: position changed independently   Goal Outcome Evaluation:    Plan of Care Reviewed With: patient      Pt continues to withdraw to his room. Pt declined group as he reports that he believes people do not want him to go to group. Pt reassured that this was not the case but he declined. Pt has been out for meals.  Pt oriented to October, 2023 and [place]. Pt unable to identify the day of the week and thought it to be the \"beginning\" of the month.   Pt denied pain but then said he had pain sometimes in his left lower leg that comes and goes..   Pt assisted with changing his clothing, brushing his teeth and pericare.   Pt remains on a SIO for safety due to impulsiveness and poor judgement.     Pt assisted with eating due to tremors.    Pt medication compliant. Pt does well when his medications are given in applesauce or pudding.                    "

## 2023-10-30 NOTE — PLAN OF CARE
BEH IP Unit Acuity Rating Score (UARS)  Patient is given one point for every criteria they meet.    CRITERIA SCORING   On a 72 hour hold, court hold, committed, stay of commitment, or revocation 1    Patient LOS on BEH unit exceeds 20 days 1  LOS: 157   Patient under guardianship, 55+, otherwise medically complex, or under age 11 1   Suicide ideation without relief of precipitating factors 0   Current plan for suicide 0   Current plan for homicide 0   Imminent risk or actual attempt to seriously harm another without relief of factors precipitating the attempt 0   Severe dysfunction in daily living (ex: complete neglect for self care, extreme disruption in vegetative function, extreme deterioration in social interactions) 1   Recent (last 7 days) or current physical aggression in the ED or on unit 0   Restraints or seclusion episode in past 72 hours 0   Recent (last 7 days) or current verbal aggression, agitation, yelling, etc., while in the ED or unit 1   Active psychosis 0   Need for constant or near constant redirection (from leaving, from others, etc).  1   Intrusive or disruptive behaviors 1   TOTAL 7

## 2023-10-30 NOTE — PLAN OF CARE
Problem: Sleep Disturbance  Goal: Adequate Sleep/Rest  Outcome: Progressing   Goal Outcome Evaluation:               Received asleep with HOB slightly elevated with a wedge pillow. pt remains on SIO for hx of Agitation ,impulsive behavior, sexually inappropriate behavior and risk for self injury due to unsteadiness r/t Parkinsons. Non of which were observed this shift.Monitored for nanda  Calm, cooperative with taking his scheduled medication with pudding.  Slept for  9  hours

## 2023-10-30 NOTE — PLAN OF CARE
Assessment/Intervention/Current Symtoms and Care Coordination:  Reviewed chart. Met with team to review patient's current functioning, needs, progress, and impediments to discharge. Pt on SIO for safety. Pt's affect is flat. Pt has placement secured, awaiting [employer] assessment for [facility] funding. Pt continues to be confused but is appropriate and redirectable. Pt has been able to verbalize his violent thoughts and not act on them. Pt continues to experience tremor, OT ordered weighted cups and bowl to assist with this. Although, pt has been resistive to adaptive equipment. Pt also endorses embarrassment with tremor. Per provider, pt going through minor medication adjustments for paranoia and delusions.     Writer put out email to [employer] , Alexa ([email or web address] to inquire about timeline and next steps for negotiating.     Writer received VM from pt's mother, Alberta ([phone]) requesting update.    Writer spoke with Alberta and provided update regarding discharge plan and timeline.    Writer received email from Alexa reporting that once she completes the paperwork then [facility] takes over the case. Alexa relays plan to inform writer once this is complete.     Discharge Plan or Goal:  Will discharge to [facility]: [address] [place] [address]     Barriers to Discharge:  Discharge pending completion of [employer] assessment and negotiation process with [facility]. Pt completed [employer] assessment on 10/27    Referral Status:  Pt accepted at [facility]. For more comprehensive list of referrals see CTC note from 10/6  Pt will need Clozaril lab appointments to be scheduled prior to discharge  Psychiatry appointment scheduled  PCP appointment scheduled  Psychiatrist - Dr. Santana at [facility]  P: [phone]   PCP - Attila Urena    Legal Status:  Committed [place] with ITP  [facility]: [place]  File Number: [id number]  ITP  includes: Clozaril, Zyprexa, Seroquel, and Risperdal  Expires: 01/06/2024    Contacts:  Vicky Valdez - CM with [facility] (KING per commitment)  P: [phone]  Regina Wong - CADI CM with [facility] (KING per commitment)  P: [phone]  E: [email or web address]  Alberta - Mom (KING signed)  P: [phone] (H) [phone] (M)   Ino - Dad (KING signed)  P: [phone] (H)  Sandra Treadwell - Sister (KING signed)  P: [phone]  [facility]'s Office   F: [phone]  Jovana Sánchez - [facility]  P: [phone]  E: [email or web address]  Alexa Arteaga - [facility]    F: [phone]  E: [email or web address].    Upcoming Meetings and Dates/Important Information and next steps:  CTC to coordinate with LATONIAI CM and Jovana regarding placement needs  CTC to coordinate with Alexa regarding rate negotiation

## 2023-10-30 NOTE — PROGRESS NOTES
"Patient seen, chart reviewed, care discussed with staff.  Blood pressure 123/69, pulse 82, temperature (!) 96.7  F (35.9  C), temperature source Temporal, resp. rate 16, height 1.753 m (5' 9\"), weight 87.3 kg (192 lb 7.4 oz), SpO2 98%.  Some increased secretions noted by staff.  Still has delusions and hallucinations, but these are less bizzare.    General appearance: good  Alert.   Affect: good  Mood: fair, he rates it as low.  Speech:  spontaneous production low  Eye contact:  good.    Psychomotor behavior: mild tremors, in bed  Gait: steady   Abnormal movements:  none  Delusions: present  Hallucinations:  auditory  Thoughts: a bit disjointed  Associations: a bit loose  Judgement: poor  Insight: poor  Cognitions: impaired  Not suicidal.    Imp:  Paranoid Schizophrenia  Neurocognitive  And:   Patient Active Problem List   Diagnosis    Urinary retention    Schizophrenia, unspecified type (H)    Altered mental status, unspecified altered mental status type    Mood changes    Paranoid schizophrenia, chronic condition with acute exacerbation (H)    Neuroleptic-induced parkinsonism     Agitation     Plan:  increase Propranolol    Current Facility-Administered Medications   Medication    acetaminophen (TYLENOL) tablet 650 mg    albuterol (PROVENTIL HFA/VENTOLIN HFA) inhaler    atropine 1 % ophthalmic solution 1 drop    atropine 1 % sublingual (ophthalmic drops for sublingual use) 2 drop    benzonatate (TESSALON) capsule 100 mg    bisacodyl (DULCOLAX) suppository 10 mg    cloZAPine (CLOZARIL) tablet 700 mg    cyanocobalamin (VITAMIN B-12) tablet 1,000 mcg    haloperidol (HALDOL) tablet 5 mg    And    diphenhydrAMINE (BENADRYL) capsule 50 mg    haloperidol lactate (HALDOL) injection 5 mg    And    diphenhydrAMINE (BENADRYL) injection 50 mg    divalproex sodium delayed-release (DEPAKOTE SPRINKLE) DR capsule 250 mg    FLUoxetine (PROzac) capsule 10 mg    gabapentin (NEURONTIN) capsule 100 mg    gabapentin (NEURONTIN) capsule " 300 mg    lidocaine (XYLOCAINE) 2 % external gel    LORazepam (ATIVAN) half-tab 0.25 mg    melatonin tablet 10 mg    miconazole (MICATIN) 2 % powder    mineral oil-white petrolatum (EUCERIN/MINERIN) cream    naproxen (NAPROSYN) tablet 250 mg    OLANZapine zydis (zyPREXA) ODT tab 15 mg    Or    OLANZapine (zyPREXA) injection 10 mg    OLANZapine (zyPREXA) tablet 5-10 mg    Or    OLANZapine (zyPREXA) injection 5-10 mg    polyethylene glycol (MIRALAX) Packet 17 g    prazosin (MINIPRESS) capsule 1 mg    propranolol (INDERAL) tablet 20 mg    senna-docusate (SENOKOT-S/PERICOLACE) 8.6-50 MG per tablet 1 tablet    sennosides (SENOKOT) tablet 2 tablet    simethicone (MYLICON) chewable tablet 80 mg    tamsulosin (FLOMAX) capsule 0.4 mg    traZODone (DESYREL) tablet 50 mg    Vitamin D3 (CHOLECALCIFEROL) tablet 25 mcg     Recent Results (from the past 168 hour(s))   EKG 12-lead, complete    Collection Time: 10/24/23  9:59 AM   Result Value Ref Range    Systolic Blood Pressure  mmHg    Diastolic Blood Pressure  mmHg    Ventricular Rate 72 BPM    Atrial Rate 72 BPM    MA Interval 164 ms    QRS Duration 166 ms     ms    QTc 527 ms    P Axis 48 degrees    R AXIS 2 degrees    T Axis 123 degrees    Interpretation ECG       Sinus rhythm with sinus arrhythmia  Left bundle branch block  Abnormal ECG  When compared with ECG of 17-OCT-2023 09:20,  No significant change was found  Confirmed by fellow Kranthi Calvillo ([id number]) on 10/25/2023 9:49:08 AM  Confirmed by MD PAXTON, RIO ([id number]) on 10/27/2023 3:38:33 PM     CBC with platelets and differential    Collection Time: 10/27/23  7:36 AM   Result Value Ref Range    WBC Count 9.0 4.0 - 11.0 10e3/uL    RBC Count 4.14 (L) 4.40 - 5.90 10e6/uL    Hemoglobin 11.6 (L) 13.3 - 17.7 g/dL    Hematocrit 35.2 (L) 40.0 - 53.0 %    MCV 85 78 - 100 fL    MCH 28.0 26.5 - 33.0 pg    MCHC 33.0 31.5 - 36.5 g/dL    RDW 15.1 (H) 10.0 - 15.0 %    Platelet Count 151 150 - 450 10e3/uL    % Neutrophils 68 %    %  Lymphocytes 18 %    % Monocytes 12 %    % Eosinophils 0 %    % Basophils 1 %    % Immature Granulocytes 1 %    NRBCs per 100 WBC 0 <1 /100    Absolute Neutrophils 6.1 1.6 - 8.3 10e3/uL    Absolute Lymphocytes 1.6 0.8 - 5.3 10e3/uL    Absolute Monocytes 1.1 0.0 - 1.3 10e3/uL    Absolute Eosinophils 0.0 0.0 - 0.7 10e3/uL    Absolute Basophils 0.1 0.0 - 0.2 10e3/uL    Absolute Immature Granulocytes 0.1 <=0.4 10e3/uL    Absolute NRBCs 0.0 10e3/uL

## 2023-10-30 NOTE — PLAN OF CARE
Problem: Adult Behavioral Health Plan of Care  Goal: Individualized Daily Interaction Plan (IDIP)  Outcome: Progressing     Problem: Psychotic Signs/Symptoms  Goal: Improved Behavioral Control (Psychotic Signs/Symptoms)  Outcome: Progressing  Goal Outcome Evaluation:    Plan of Care Reviewed With: patient      Patient affect is flat blunted, mood is calm, was visible at the lounge for dinner and ate 100% of his meal, does not engage or socialize with other peers, reports mild anxiety, denies depression and all other MH symptoms, Patient did not make any delusional statements tonight and no outburst behavior noted, is easily redirectable, Continue to have hand tremors and need help with eating and other cares. Been having a lot of secretions this shift, PRN atropine was given and then got the scheduled one at bedtime, took the rest of the scheduled medications in pudding with no concerns, alejandro care done and miconazole powder applied but then later patient requested to have a shower so he got a shower tonight, changed his clothes and bed linens. Said pain to the foot was better this evening, uses his shoes as needed. Will continue with the plan of care.

## 2023-10-31 PROCEDURE — 250N000013 HC RX MED GY IP 250 OP 250 PS 637: Performed by: PSYCHIATRY & NEUROLOGY

## 2023-10-31 PROCEDURE — 124N000002 HC R&B MH UMMC

## 2023-10-31 PROCEDURE — 93005 ELECTROCARDIOGRAM TRACING: CPT

## 2023-10-31 PROCEDURE — 250N000013 HC RX MED GY IP 250 OP 250 PS 637: Performed by: PHYSICIAN ASSISTANT

## 2023-10-31 PROCEDURE — 250N000013 HC RX MED GY IP 250 OP 250 PS 637: Performed by: STUDENT IN AN ORGANIZED HEALTH CARE EDUCATION/TRAINING PROGRAM

## 2023-10-31 RX ORDER — OLANZAPINE 10 MG/2ML
10 INJECTION, POWDER, FOR SOLUTION INTRAMUSCULAR 2 TIMES DAILY
Status: DISCONTINUED | OUTPATIENT
Start: 2023-10-31 | End: 2023-11-01

## 2023-10-31 RX ORDER — OLANZAPINE 10 MG/1
10 TABLET, ORALLY DISINTEGRATING ORAL 2 TIMES DAILY
Status: DISCONTINUED | OUTPATIENT
Start: 2023-10-31 | End: 2023-11-01

## 2023-10-31 RX ADMIN — GABAPENTIN 300 MG: 300 CAPSULE ORAL at 13:47

## 2023-10-31 RX ADMIN — CYANOCOBALAMIN TAB 1000 MCG 1000 MCG: 1000 TAB at 08:58

## 2023-10-31 RX ADMIN — PROPRANOLOL HYDROCHLORIDE 20 MG: 10 TABLET ORAL at 17:24

## 2023-10-31 RX ADMIN — SENNOSIDES 2 TABLET: 8.6 TABLET ORAL at 20:23

## 2023-10-31 RX ADMIN — OLANZAPINE 10 MG: 10 TABLET, ORALLY DISINTEGRATING ORAL at 20:23

## 2023-10-31 RX ADMIN — DIVALPROEX SODIUM 250 MG: 125 CAPSULE, COATED PELLETS ORAL at 05:36

## 2023-10-31 RX ADMIN — Medication 0.25 MG: at 20:23

## 2023-10-31 RX ADMIN — Medication 10 MG: at 20:23

## 2023-10-31 RX ADMIN — ATROPINE SULFATE 2 DROP: 10 SOLUTION/ DROPS OPHTHALMIC at 20:24

## 2023-10-31 RX ADMIN — PROPRANOLOL HYDROCHLORIDE 20 MG: 10 TABLET ORAL at 20:23

## 2023-10-31 RX ADMIN — Medication 0.25 MG: at 08:57

## 2023-10-31 RX ADMIN — OLANZAPINE 15 MG: 10 TABLET, ORALLY DISINTEGRATING ORAL at 08:57

## 2023-10-31 RX ADMIN — PROPRANOLOL HYDROCHLORIDE 20 MG: 10 TABLET ORAL at 11:54

## 2023-10-31 RX ADMIN — SENNOSIDES 2 TABLET: 8.6 TABLET ORAL at 08:57

## 2023-10-31 RX ADMIN — POLYETHYLENE GLYCOL 3350 17 G: 17 POWDER, FOR SOLUTION ORAL at 20:22

## 2023-10-31 RX ADMIN — GABAPENTIN 300 MG: 300 CAPSULE ORAL at 08:57

## 2023-10-31 RX ADMIN — NAPROXEN 250 MG: 250 TABLET ORAL at 08:58

## 2023-10-31 RX ADMIN — ATROPINE SULFATE 2 DROP: 10 SOLUTION/ DROPS OPHTHALMIC at 13:59

## 2023-10-31 RX ADMIN — PRAZOSIN HYDROCHLORIDE 1 MG: 1 CAPSULE ORAL at 20:23

## 2023-10-31 RX ADMIN — CLOZAPINE 700 MG: 200 TABLET ORAL at 11:55

## 2023-10-31 RX ADMIN — PROPRANOLOL HYDROCHLORIDE 20 MG: 10 TABLET ORAL at 08:58

## 2023-10-31 RX ADMIN — GABAPENTIN 300 MG: 300 CAPSULE ORAL at 20:23

## 2023-10-31 RX ADMIN — NAPROXEN 250 MG: 250 TABLET ORAL at 17:24

## 2023-10-31 RX ADMIN — DIVALPROEX SODIUM 250 MG: 125 CAPSULE, COATED PELLETS ORAL at 13:47

## 2023-10-31 RX ADMIN — MICONAZOLE NITRATE: 20 POWDER TOPICAL at 11:56

## 2023-10-31 RX ADMIN — ATROPINE SULFATE 2 DROP: 10 SOLUTION/ DROPS OPHTHALMIC at 08:59

## 2023-10-31 RX ADMIN — FLUOXETINE 10 MG: 10 CAPSULE ORAL at 08:57

## 2023-10-31 RX ADMIN — DIVALPROEX SODIUM 250 MG: 125 CAPSULE, COATED PELLETS ORAL at 20:23

## 2023-10-31 RX ADMIN — POLYETHYLENE GLYCOL 3350 17 G: 17 POWDER, FOR SOLUTION ORAL at 08:58

## 2023-10-31 RX ADMIN — Medication 0.25 MG: at 13:46

## 2023-10-31 RX ADMIN — TAMSULOSIN HYDROCHLORIDE 0.4 MG: 0.4 CAPSULE ORAL at 08:57

## 2023-10-31 RX ADMIN — Medication 25 MCG: at 08:58

## 2023-10-31 ASSESSMENT — ACTIVITIES OF DAILY LIVING (ADL)
ADLS_ACUITY_SCORE: 84
HYGIENE/GROOMING: WITH ASSISTANCE
ADLS_ACUITY_SCORE: 84
HYGIENE/GROOMING: PROMPTS;INDEPENDENT
ORAL_HYGIENE: WITH ASSISTANCE
ADLS_ACUITY_SCORE: 84
ORAL_HYGIENE: PROMPTS;INDEPENDENT

## 2023-10-31 NOTE — PLAN OF CARE
BEH IP Unit Acuity Rating Score (UARS)  Patient is given one point for every criteria they meet.    CRITERIA SCORING   On a 72 hour hold, court hold, committed, stay of commitment, or revocation 1    Patient LOS on BEH unit exceeds 20 days 1  LOS: 158   Patient under guardianship, 55+, otherwise medically complex, or under age 11 1   Suicide ideation without relief of precipitating factors 0   Current plan for suicide 0   Current plan for homicide 0   Imminent risk or actual attempt to seriously harm another without relief of factors precipitating the attempt 0   Severe dysfunction in daily living (ex: complete neglect for self care, extreme disruption in vegetative function, extreme deterioration in social interactions) 1   Recent (last 7 days) or current physical aggression in the ED or on unit 0   Restraints or seclusion episode in past 72 hours 0   Recent (last 7 days) or current verbal aggression, agitation, yelling, etc., while in the ED or unit 1   Active psychosis 0   Need for constant or near constant redirection (from leaving, from others, etc).  1   Intrusive or disruptive behaviors 1   TOTAL 7

## 2023-10-31 NOTE — CARE PLAN
10/31/23 1616   Group Therapy Session   Group Attendance attended group session   Time Session Began 1015   Time Session Ended 1115   Total Time (minutes) 20  (no charge)   Total # Attendees 5   Group Type expressive therapy;task skill;psychotherapeutic   Group Topic Covered problem-solving;balanced lifestyle;coping skills/lifestyle management;cognitive activities;structured socialization   Group Session Detail [facility] group to facilitate coping skill exploration, use of cognitive skills and problem solving, creative expression, clinical observation and facilitation of social, cognitive, and kinesthetic performance skills.    Patient Response/Contribution cooperative with task;listened actively   Patient Participation Detail With encouragement and task set up and also assistance on 1:1 with helping to manage steps needed for more fine motor coordination, he made decisions, and followed through on work task. This OT author assisted in holding some supplies and he took turns completing details. Stated the need to leave at the time of approx 20 minutes. Pleasant while present.

## 2023-10-31 NOTE — PLAN OF CARE
Problem: Psychotic Signs/Symptoms  Goal: Improved Mood Symptoms  Outcome: Progressing   Goal Outcome Evaluation:               Received  sleeping comfortably in bed, breathing unlabored with HOB elevated at 30 degree angle. Pt remains on SIO for hx of Agitation,impulsive behavior,self injury risk due to Parkinson symptoms and hx ofverbal outburst of sexual and threaten statements. No paranoid or delusional statements made this shift.  Medication compliant  Slept for 8 hours

## 2023-10-31 NOTE — PLAN OF CARE
Problem: Psychotic Signs/Symptoms  Goal: Decreased Sensory Symptoms (Psychotic Signs/Symptoms)  Outcome: Progressing   Goal Outcome Evaluation:    Patient alert and oriented, vitals were stable. Affect flat, mood was calmed and cooperative. Spent most of the shift in his room, was seen occasionally in the milieu walking around with SIO staff. Was out for breakfast and lunch and ate well.    Patient denied all mental health symptoms and pain. Medication compliant, patient didn't attend group. No acute concern or any behavior issues noted on this shift.  Patient continues on SIO for safety.

## 2023-10-31 NOTE — PLAN OF CARE
"  Problem: Psychotic Symptoms  Goal: Psychotic Symptoms  Description: Signs and symptoms of listed problems will be absent or manageable.  Outcome: Not Progressing     Problem: Confusion Chronic  Goal: Optimal Cognitive Function  Outcome: Not Progressing   Goal Outcome Evaluation:    Plan of Care Reviewed With: patient      Patient affect is flat, mood is calm. Patient is isolative and withdrawn, visible for meals, not social with peers. Patient is disoriented to situation, continues to make paranoid statements about food and medications being poisoned. Patient still eats well and takes medications without issue, but states they fear retaliation if they do not. Patient reoriented to environment and reassured of safety. Patient reports AH, \"its that one voice, that tells me not to eat the food because its poisoned\". Patient is intermittently confused this shift, thoughts can be illogical and disorganized.  Denies, SI/SIB/anxiety/depression, concentration was impaired during MH check in. Reported SOB, denies any other symptoms, prn albuterol given with relief, patient also encouraged to use incentive spirometer daily but patient refuses. /64 (BP Location: Left arm, Patient Position: Sitting)   Pulse 72   Temp 97.9  F (36.6  C) (Oral)   Resp 16   Ht 1.753 m (5' 9\")   Wt 87.3 kg (192 lb 7.4 oz)   SpO2 97%   BMI 28.42 kg/m        "

## 2023-10-31 NOTE — PLAN OF CARE
Assessment/Intervention/Current Symtoms and Care Coordination:  Reviewed chart. Met with team to review patient's current functioning, needs, progress, and impediments to discharge. Pt on SIO for safety. Pt's affect is flat. Pt has placement secured, awaiting [employer] assessment for [facility] funding. Pt continues to be confused but is appropriate and redirectable. Pt has been able to verbalize his violent thoughts and not act on them. Pt continues to experience tremor, OT ordered weighted cups and bowl to assist with this. Although, pt has been resistive to adaptive equipment. Pt also endorses embarrassment with tremor. Pt has been attending groups but cannot stay for the whole time.    Writer received email from [facility] with [facility] ([email or web address]) requesting update and progress notes. Writer put out return email with update and progress notes attached.    Discharge Plan or Goal:  Will discharge to [facility] [place] [id number]     Barriers to Discharge:  Discharge pending completion of [employer] assessment and negotiation process with [facility]. Pt completed [employer] assessment on 10/27    Referral Status:  Pt accepted at [facility]. For more comprehensive list of referrals see CTC note from 10/6  Pt will need Clozaril lab appointments to be scheduled prior to discharge  Psychiatry appointment scheduled  PCP appointment scheduled  Psychiatrist - Dr. Santana at [facility]  P: [phone]   PCP - Attila Urena    Legal Status:  Committed [place] with ITP  [facility]: [place]  File Number: [id number]  ITP includes: Clozaril, Zyprexa, Seroquel, and Risperdal  Expires: 2024    Contacts:  Vicky Valdez - CM with [facility] (KING per commitment)  P: [phone]  Regina SANTIAGO CM with [facility] (KING per commitment)  P: [phone]  E: [email or web address] (KING signed)  P: [phone] (H)  [phone] (M)   Ino Gallagher (KING signed)  P: [phone] (H)  Sandra Treadwell - Sister (KING signed)  P: [phone]  [facility]'s Office   F: [phone]  Jovana Sánchez - [facility]  P: [phone]  E: [email or web address]  Alexa Arteaga [facility]    F: [phone]  E: [email or web address].    Upcoming Meetings and Dates/Important Information and next steps:  CTC to coordinate with CADI CM and Jovana regarding placement needs  CTC to coordinate with Alexa regarding paperwork finalization

## 2023-10-31 NOTE — PROGRESS NOTES
"Patient seen, chart reviewed, care discussed with staff.  Blood pressure 124/76, pulse 75, temperature 97.4  F (36.3  C), temperature source Temporal, resp. rate 16, height 1.753 m (5' 9\"), weight 87.3 kg (192 lb 7.4 oz), SpO2 100%.  Overall, slow improvement continues.    Eating and sleeping well.    General appearance: good  Alert.   Affect: fair  Mood: fair    Speech:  normal, but low spontaneous production   Eye contact:  good.    Psychomotor behavior: moderate tremors now  Gait: steady   Abnormal movements:  none  Delusions: continue  Hallucinations:  continue  Thoughts: linear today  Associations: intact today  Judgement: poor  Insight: poor  Cognitions: impaired  Not suicidal.    Imp:  Paranoid Schizophrenia  Neurocognitive  And:       Patient Active Problem List   Diagnosis    Urinary retention    Schizophrenia, unspecified type (H)    Altered mental status, unspecified altered mental status type    Mood changes    Paranoid schizophrenia, chronic condition with acute exacerbation (H)    Neuroleptic-induced parkinsonism     Agitation   Plan:  attempt to decrease Olanazpine due to tremors    Current Facility-Administered Medications   Medication    acetaminophen (TYLENOL) tablet 650 mg    albuterol (PROVENTIL HFA/VENTOLIN HFA) inhaler    atropine 1 % ophthalmic solution 1 drop    atropine 1 % sublingual (ophthalmic drops for sublingual use) 2 drop    benzonatate (TESSALON) capsule 100 mg    bisacodyl (DULCOLAX) suppository 10 mg    cloZAPine (CLOZARIL) tablet 700 mg    cyanocobalamin (VITAMIN B-12) tablet 1,000 mcg    haloperidol (HALDOL) tablet 5 mg    And    diphenhydrAMINE (BENADRYL) capsule 50 mg    haloperidol lactate (HALDOL) injection 5 mg    And    diphenhydrAMINE (BENADRYL) injection 50 mg    divalproex sodium delayed-release (DEPAKOTE SPRINKLE) DR capsule 250 mg    FLUoxetine (PROzac) capsule 10 mg    gabapentin (NEURONTIN) capsule 100 mg    gabapentin (NEURONTIN) capsule 300 mg    lidocaine " (XYLOCAINE) 2 % external gel    LORazepam (ATIVAN) half-tab 0.25 mg    melatonin tablet 10 mg    miconazole (MICATIN) 2 % powder    mineral oil-white petrolatum (EUCERIN/MINERIN) cream    naproxen (NAPROSYN) tablet 250 mg    OLANZapine zydis (zyPREXA) ODT tab 10 mg    Or    OLANZapine (zyPREXA) injection 10 mg    OLANZapine (zyPREXA) tablet 5-10 mg    Or    OLANZapine (zyPREXA) injection 5-10 mg    polyethylene glycol (MIRALAX) Packet 17 g    prazosin (MINIPRESS) capsule 1 mg    propranolol (INDERAL) tablet 20 mg    senna-docusate (SENOKOT-S/PERICOLACE) 8.6-50 MG per tablet 1 tablet    sennosides (SENOKOT) tablet 2 tablet    simethicone (MYLICON) chewable tablet 80 mg    tamsulosin (FLOMAX) capsule 0.4 mg    traZODone (DESYREL) tablet 50 mg    Vitamin D3 (CHOLECALCIFEROL) tablet 25 mcg

## 2023-11-01 ENCOUNTER — TELEPHONE (OUTPATIENT)
Dept: BEHAVIORAL HEALTH | Facility: CLINIC | Age: 63
End: 2023-11-01
Payer: COMMERCIAL

## 2023-11-01 PROCEDURE — 250N000013 HC RX MED GY IP 250 OP 250 PS 637: Performed by: PSYCHIATRY & NEUROLOGY

## 2023-11-01 PROCEDURE — 124N000002 HC R&B MH UMMC

## 2023-11-01 PROCEDURE — 250N000013 HC RX MED GY IP 250 OP 250 PS 637: Performed by: STUDENT IN AN ORGANIZED HEALTH CARE EDUCATION/TRAINING PROGRAM

## 2023-11-01 PROCEDURE — 250N000013 HC RX MED GY IP 250 OP 250 PS 637: Performed by: PHYSICIAN ASSISTANT

## 2023-11-01 RX ORDER — OLANZAPINE 10 MG/2ML
10 INJECTION, POWDER, FOR SOLUTION INTRAMUSCULAR 2 TIMES DAILY
Status: DISCONTINUED | OUTPATIENT
Start: 2023-11-01 | End: 2023-11-13 | Stop reason: HOSPADM

## 2023-11-01 RX ORDER — BUSPIRONE HYDROCHLORIDE 5 MG/1
5 TABLET ORAL 3 TIMES DAILY
Status: DISCONTINUED | OUTPATIENT
Start: 2023-11-01 | End: 2023-11-03

## 2023-11-01 RX ADMIN — PROPRANOLOL HYDROCHLORIDE 20 MG: 10 TABLET ORAL at 08:27

## 2023-11-01 RX ADMIN — Medication 0.25 MG: at 08:28

## 2023-11-01 RX ADMIN — PRAZOSIN HYDROCHLORIDE 1 MG: 1 CAPSULE ORAL at 19:20

## 2023-11-01 RX ADMIN — PROPRANOLOL HYDROCHLORIDE 20 MG: 10 TABLET ORAL at 13:01

## 2023-11-01 RX ADMIN — SENNOSIDES 2 TABLET: 8.6 TABLET ORAL at 08:28

## 2023-11-01 RX ADMIN — PROPRANOLOL HYDROCHLORIDE 20 MG: 10 TABLET ORAL at 19:19

## 2023-11-01 RX ADMIN — DIVALPROEX SODIUM 250 MG: 125 CAPSULE, COATED PELLETS ORAL at 19:20

## 2023-11-01 RX ADMIN — ATROPINE SULFATE 2 DROP: 10 SOLUTION/ DROPS OPHTHALMIC at 15:12

## 2023-11-01 RX ADMIN — Medication 25 MCG: at 08:28

## 2023-11-01 RX ADMIN — DIVALPROEX SODIUM 250 MG: 125 CAPSULE, COATED PELLETS ORAL at 13:01

## 2023-11-01 RX ADMIN — DIVALPROEX SODIUM 250 MG: 125 CAPSULE, COATED PELLETS ORAL at 06:02

## 2023-11-01 RX ADMIN — Medication 0.25 MG: at 19:19

## 2023-11-01 RX ADMIN — PROPRANOLOL HYDROCHLORIDE 20 MG: 10 TABLET ORAL at 17:00

## 2023-11-01 RX ADMIN — OLANZAPINE 10 MG: 10 TABLET, ORALLY DISINTEGRATING ORAL at 08:27

## 2023-11-01 RX ADMIN — POLYETHYLENE GLYCOL 3350 17 G: 17 POWDER, FOR SOLUTION ORAL at 08:27

## 2023-11-01 RX ADMIN — OLANZAPINE 15 MG: 10 TABLET, ORALLY DISINTEGRATING ORAL at 19:20

## 2023-11-01 RX ADMIN — OLANZAPINE 5 MG: 5 TABLET, FILM COATED ORAL at 14:17

## 2023-11-01 RX ADMIN — NAPROXEN 250 MG: 250 TABLET ORAL at 08:28

## 2023-11-01 RX ADMIN — MICONAZOLE NITRATE: 20 POWDER TOPICAL at 11:23

## 2023-11-01 RX ADMIN — FLUOXETINE 10 MG: 10 CAPSULE ORAL at 08:28

## 2023-11-01 RX ADMIN — Medication 0.25 MG: at 13:01

## 2023-11-01 RX ADMIN — SENNOSIDES 2 TABLET: 8.6 TABLET ORAL at 19:19

## 2023-11-01 RX ADMIN — GABAPENTIN 300 MG: 300 CAPSULE ORAL at 19:20

## 2023-11-01 RX ADMIN — BUSPIRONE HYDROCHLORIDE 5 MG: 5 TABLET ORAL at 19:21

## 2023-11-01 RX ADMIN — Medication 10 MG: at 19:20

## 2023-11-01 RX ADMIN — CYANOCOBALAMIN TAB 1000 MCG 1000 MCG: 1000 TAB at 08:28

## 2023-11-01 RX ADMIN — GABAPENTIN 300 MG: 300 CAPSULE ORAL at 13:01

## 2023-11-01 RX ADMIN — BUSPIRONE HYDROCHLORIDE 5 MG: 5 TABLET ORAL at 14:17

## 2023-11-01 RX ADMIN — CLOZAPINE 675 MG: 200 TABLET ORAL at 13:00

## 2023-11-01 RX ADMIN — NAPROXEN 250 MG: 250 TABLET ORAL at 17:00

## 2023-11-01 ASSESSMENT — ACTIVITIES OF DAILY LIVING (ADL)
ADLS_ACUITY_SCORE: 84
ADLS_ACUITY_SCORE: 90
ADLS_ACUITY_SCORE: 90
ORAL_HYGIENE: PROMPTS;INDEPENDENT
ADLS_ACUITY_SCORE: 90
ADLS_ACUITY_SCORE: 84
HYGIENE/GROOMING: PROMPTS;INDEPENDENT
ADLS_ACUITY_SCORE: 84
ORAL_HYGIENE: PROMPTS;INDEPENDENT
DRESS: SCRUBS (BEHAVIORAL HEALTH)
LAUNDRY: UNABLE TO COMPLETE
DRESS: SCRUBS (BEHAVIORAL HEALTH)
ADLS_ACUITY_SCORE: 84
LAUNDRY: UNABLE TO COMPLETE
ADLS_ACUITY_SCORE: 84
HYGIENE/GROOMING: PROMPTS;INDEPENDENT

## 2023-11-01 NOTE — CARE PLAN
"Occupational Therapy     11/01/23 1300   Group Therapy Session   Group Attendance attended group session   Time Session Began 1015   Time Session Ended 1115   Total Time (minutes) 25   Total # Attendees 3-4   Group Type expressive therapy   Group Topic Covered cognitive activities;coping skills/lifestyle management;leisure exploration/use of leisure time;problem-solving;structured socialization   Group Session Detail OT: Education on the importance of overall wellness and interactive social activity (Wellness Bingo) to increase concentration, attention to task, visual scanning skills, symptom management, coping with stress, sharing information about self, listening to others, insight development, physical wellness, social wellness, and overall well-being   Patient Response/Contribution cooperative with task;left the group on several occasions;other (see comments)  (made negative comments about self; self-talk)   Patient Participation Detail Pt arrived late to group and elected to sit away from peers and did not engage in social interactions with peers; pt engaged in brief social exchanges with therapist when initiated by therapist. Pt reluctantly accepted creative hands on endeavor from a previous session. Pt required encouragement and task set up; pt required max A from therapist to apply paint as therapist held the project, applied paint to the brush, and verbally encouraged pt apply paint to the project. Throughout group pt made several negative comments about the project he was completing (\"This won't turn out.\" \"Oh, I don't think this will look very nice.\"); each time therapist attempted to redirect pt to identify something positive about the project or challenged his negative thinking. Pt brighten briefly when discussing the hope to store some rings in the completed box. Pt left group and returned multiple times. SIO present for duration.       "
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
## 2023-11-01 NOTE — PLAN OF CARE
"  Problem: Psychotic Signs/Symptoms  Goal: Increased Participation and Engagement (Psychotic Signs/Symptoms)  Note: Patient VS. /75   Pulse 83   Temp 97.9  F (36.6  C) (Temporal)   Resp 18   Ht 1.753 m (5' 9\")   Wt 87.3 kg (192 lb 7.4 oz)   SpO2 98%   BMI 28.42 kg/m  . Patient continues to be isolative to his room for most of this shift. He is isolative to himself in the milieu and does not interact with peers. Medications given with thin liquids with no signs of aspiration. No tremors at rest. He is an SIO within 5 feet due to fall risk and behaviors. He has not been sexually inappropriate this shift. He is voiding without issues and denied feeling full or unable to empty his bladder. No new behavioral or safety concerns to report this shift.                    "

## 2023-11-01 NOTE — PROVIDER NOTIFICATION
11/01/23 1032   Individualization/Patient Specific Goals   Patient Personal Strengths community support;coping skills;expressive of emotions;family/social support;humor;insight into illness/situation;interests/hobbies;relationship stability;resilient;resourceful   Patient Vulnerabilities poor physical health;traumatic event   Anxieties, Fears or Concerns Pt has been behaviorally appropriate   Individualized Care Needs SIO   Interprofessional Rounds   Summary Pt has been accepted to [facility]. Awaiting completion of rate negotiation prior to moving in   Participants CTC;psychiatrist;nursing;OT   Behavioral Team Discussion   Participants Dr. Garth Melo MD; Sarah Kim MSW, LGSW; Chasidy Baker RN; Mila Durham OT   Progress Pt has been accepted to [facility]. Awaiting completion of rate negotiation prior to moving in   Anticipated length of stay 2 weeks   Continued Stay Criteria/Rationale Rate negotiation for CADI waiver   Medical/Physical See H&P   Precautions See below   Plan Pt has been accepted to [facility]. Awaiting completion of rate negotiation prior to moving in   Rationale for change in precautions or plan No change   Safety Plan 1:1   Anticipated Discharge Disposition assisted living     PRECAUTIONS AND SAFETY    Behavioral Orders   Procedures    Assault precautions    Cheeking Precautions (behavioral units)     Patient Observed swallowing PO medications; Patient asked to drink water after swallowing medication; Patient in Staff line of sight for 15 minutes after medication given; Mouth checks after PO administration (patient asked to open mouth and stick out their tongue).    Code 1 - Restrict to Unit    Code 2    Code 2 - 1:1 Staff Supervision     For ECT only    Electroconvulsive therapy     Series of up to 12 treatments. Begin Date: 8/2/23     Treating Psychiatrist providing ECT:  unknown     Notified on:  7/28/23 via this order  NOTE: We received verbal confirmation from [facility] that Lorenzo Pavon has been approved but  awaiting official court order and risk management's review  Initial consult was completed by Dr. Hicks on 7/18/23    Electroconvulsive therapy     Series of up to 12 treatments. Begin Date: 8/2/23     Treating Psychiatrist providing ECT:  Dominga     Notified on:  8/2/23    Elopement precautions    Fall precautions    Occupational Therapy on the Unit     Order Specific Question:   Reason for Consult     Answer:   Eval of thought process, functional skills and behavior     Order Specific Question:   Course of Action:     Answer:   Evaluation only     Order Specific Question:   Treatment Prescription:     Answer:   CPT requested    Routine Programming     As clinically indicated    Self Injury Precaution    Status 15     Every 15 minutes.    Status Individual Observation     Patient SIO status reviewed with team/RN.  Please also refer to RN/team documentation for add'l detail.    -SIO staff to monitor following which have contributed to patient being on SIO:  Agitation  Pt is impulsive   Pt has ran out of his room naked  Pt has Parkinson symptoms place him in a high fall risk  Pt has verbal outburst of sexual and threaten statements  Pt requires immediate redirection when masturbating    -Possible interventions SIO staff could use to support patient's treatment progress:  Engage pt in activities  - 1:1 staff to assist in eating meals and other ADL's    -When following observed, team will review discontinuation of SIO:  Absence of aggression toward others for > 24 hours    Comments: SIO 1:1     Order Specific Question:   CONTINUOUS 24 hours / day     Answer:   5 feet     Order Specific Question:   Indications for SIO     Answer:   Severe intrusiveness     Order Specific Question:   Indications for SIO     Answer:   Assault risk    Suicide precautions     Patients on Suicide Precautions should have a Combination Diet ordered that includes a Diet selection(s) AND a Behavioral Tray selection for Safe Tray - with utensils,  or Safe Tray - NO utensils         Safety  Safety WDL: (P) WDL  Patient Location: patient room, own  Observed Behavior: calm  Observed Behavior (Comment): calm, sitting, eating snacks  Airway Safety Measures: all equipment/monitors on and audible  Safety Measures: 1:1 observation maintained  Diversional Activity:  (resting in bed)  Suicidality: (P) Status 15  Seizure precautions: clutter free environment  Assault: private room  Elopement Assessment: Hallucinations directing behavior  Elopement Interventions: status 15, status continuous sight  Sexual: status 15, status continuous sight  Additional Documentation:  (SIB, Fall Precaution)

## 2023-11-01 NOTE — PROGRESS NOTES
"Patient seen, chart reviewed, care discussed with staff.  Blood pressure 121/74, pulse 82, temperature 97.3  F (36.3  C), temperature source Temporal, resp. rate 16, height 1.753 m (5' 9\"), weight 87.3 kg (192 lb 7.4 oz), SpO2 99%.  By report, more secretions and drooling. Discomfort around people noted.    General appearance: good,   Alert.   Affect: fair, more congruent  Mood: fair    Speech:  normal.   Eye contact:  good.    Psychomotor behavior: mouth tremors noted  Gait: steady, decreased arm swing  Abnormal movements:  none  Delusions: continue  Hallucinations:  continue  Thoughts: inear  Associations: intact today  Judgement: low  Insight:low  Cognitions: impaired  Not suicidal      Patient seen, chart reviewed, care discussed with staff.  Blood pressure 124/76, pulse 75, temperature 97.4  F (36.3  C), temperature source Temporal, resp. rate 16, height 1.753 m (5' 9\"), weight 87.3 kg (192 lb 7.4 oz), SpO2 100%.  Overall, slow improvement continues.    Eating and sleeping well.     General appearance: good  Alert.   Affect: fair  Mood: fair    Speech:  normal, but low spontaneous production   Eye contact:  good.    Psychomotor behavior: moderate tremors now  Gait: steady   Abnormal movements:  none  Delusions: continue  Hallucinations:  continue  Thoughts: linear today  Associations: intact today  Judgement: poor  Insight: poor  Cognitions: impaired  Not suicidal.     Imp:  Paranoid Schizophrenia  Neurocognitive  I feel the discomfort around prople has a social phobic component.  And:         Patient Active Problem List   Diagnosis    Urinary retention    Schizophrenia, unspecified type (H)    Altered mental status, unspecified altered mental status type    Mood changes    Paranoid schizophrenia, chronic condition with acute exacerbation (H)    Neuroleptic-induced parkinsonism     Agitation      Plan:  Decrease Clozaril to 675mg daily  2.  Buspar for social anxiety  "

## 2023-11-01 NOTE — PLAN OF CARE
Problem: Sleep Disturbance  Goal: Adequate Sleep/Rest  Outcome: Progressing   Goal Outcome Evaluation:             Received in bed sleeping, pt is on SIO for self injury risk due to Parkinson's, impulsive behavior and hx of making sexually inappropriate comments and gestures, non of which were observed tonight.  Independent with ADL, voided freely  No behavioral issues encountered this shift  Slept for 9 hours  Voided freely  Medication compliant, took meds with puddhailey

## 2023-11-01 NOTE — PLAN OF CARE
BEH IP Unit Acuity Rating Score (UARS)  Patient is given one point for every criteria they meet.    CRITERIA SCORING   On a 72 hour hold, court hold, committed, stay of commitment, or revocation 1    Patient LOS on BEH unit exceeds 20 days 1  LOS: 159   Patient under guardianship, 55+, otherwise medically complex, or under age 11 1   Suicide ideation without relief of precipitating factors 0   Current plan for suicide 0   Current plan for homicide 0   Imminent risk or actual attempt to seriously harm another without relief of factors precipitating the attempt 0   Severe dysfunction in daily living (ex: complete neglect for self care, extreme disruption in vegetative function, extreme deterioration in social interactions) 1   Recent (last 7 days) or current physical aggression in the ED or on unit 0   Restraints or seclusion episode in past 72 hours 0   Recent (last 7 days) or current verbal aggression, agitation, yelling, etc., while in the ED or unit 0   Active psychosis 0   Need for constant or near constant redirection (from leaving, from others, etc).  0   Intrusive or disruptive behaviors 0   TOTAL 4
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
## 2023-11-01 NOTE — PLAN OF CARE
Assessment/Intervention/Current Symtoms and Care Coordination:  Reviewed chart. Met with team to review patient's current functioning, needs, progress, and impediments to discharge. Pt on SIO for safety. Pt's affect is flat. Pt has placement secured, awaiting negotiations to be completed between [facility] and [facility]. Pt continues to be confused but is appropriate and redirectable. Pt has been able to verbalize his violent thoughts and not act on them. Pt continues to experience tremor, OT ordered weighted cups and bowl to assist with this. Although, pt has been resistive to adaptive equipment. Pt also endorses embarrassment with tremor. Pt has been attending groups but cannot stay for the whole time.    Writer received email from [employer] , Alexa ([email or web address] reporting that she completed the paperwork and sent it to [facility] who will handle the case moving forward.    Writer put out call to Angie with [facility] ([phone]) to inquire about person assigned to pt's case for negotiations. Writer LVM and requested CB.    Discharge Plan or Goal:  Will discharge to [facility] [place] [address]     Barriers to Discharge:  Discharge pending completion of [employer] assessment and negotiation process with [facility]. Pt completed [employer] assessment on 10/27    Referral Status:  Pt accepted at [facility]. For more comprehensive list of referrals see CTC note from 10/6  Pt will need Clozaril lab appointments to be scheduled prior to discharge  Psychiatry appointment scheduled  PCP appointment scheduled  Psychiatrist - Dr. Santana at [facility]  P: [phone]   PCP - Attila Urena    Legal Status:  Committed [place] with ITP  [facility]: [place]  File Number: [id number]  ITP includes: Clozaril, Zyprexa, Seroquel, and Risperdal  Expires: 2024    Contacts:  Vicky Marin CM with [facility] (KING per  commitment)  P: [phone]  Regina Wong - PAMELA MALONE with [facility] (KING per commitment)  P: [phone]  E: [email or web address]  [place] - Mom (KING signed)  P: [phone] (H) [phone] (M)   Ino - Dad (KING signed)  P: [phone] (H)  Sandra Treadwell - Sister (KING signed)  P: [phone]  [facility]'s Office   F: [phone]  Jovana Sánchez - [employer]  P: [phone]  E: [email or web address]  Alexa Arteaga - [facility]    F: [phone]  E: [email or web address]    Upcoming Meetings and Dates/Important Information and next steps:  CTC to coordinate with PAMELA CM and Jovana regarding placement needs  CTC to coordinate with [facility] regarding rate negotiation

## 2023-11-02 ENCOUNTER — TELEPHONE (OUTPATIENT)
Dept: BEHAVIORAL HEALTH | Facility: CLINIC | Age: 63
End: 2023-11-02
Payer: COMMERCIAL

## 2023-11-02 LAB
ATRIAL RATE - MUSE: 84 BPM
DIASTOLIC BLOOD PRESSURE - MUSE: NORMAL MMHG
INTERPRETATION ECG - MUSE: NORMAL
P AXIS - MUSE: 52 DEGREES
PR INTERVAL - MUSE: 156 MS
QRS DURATION - MUSE: 164 MS
QT - MUSE: 444 MS
QTC - MUSE: 524 MS
R AXIS - MUSE: -4 DEGREES
SYSTOLIC BLOOD PRESSURE - MUSE: NORMAL MMHG
T AXIS - MUSE: 122 DEGREES
VENTRICULAR RATE- MUSE: 84 BPM

## 2023-11-02 PROCEDURE — 250N000013 HC RX MED GY IP 250 OP 250 PS 637: Performed by: STUDENT IN AN ORGANIZED HEALTH CARE EDUCATION/TRAINING PROGRAM

## 2023-11-02 PROCEDURE — 250N000013 HC RX MED GY IP 250 OP 250 PS 637: Performed by: PHYSICIAN ASSISTANT

## 2023-11-02 PROCEDURE — 124N000002 HC R&B MH UMMC

## 2023-11-02 PROCEDURE — 250N000009 HC RX 250: Performed by: PSYCHIATRY & NEUROLOGY

## 2023-11-02 PROCEDURE — 250N000013 HC RX MED GY IP 250 OP 250 PS 637: Performed by: PSYCHIATRY & NEUROLOGY

## 2023-11-02 RX ADMIN — Medication 0.25 MG: at 20:29

## 2023-11-02 RX ADMIN — FLUOXETINE 10 MG: 10 CAPSULE ORAL at 09:06

## 2023-11-02 RX ADMIN — ATROPINE SULFATE 2 DROP: 10 SOLUTION/ DROPS OPHTHALMIC at 20:49

## 2023-11-02 RX ADMIN — Medication 0.25 MG: at 14:05

## 2023-11-02 RX ADMIN — POLYETHYLENE GLYCOL 3350 17 G: 17 POWDER, FOR SOLUTION ORAL at 09:05

## 2023-11-02 RX ADMIN — GABAPENTIN 300 MG: 300 CAPSULE ORAL at 20:31

## 2023-11-02 RX ADMIN — DIVALPROEX SODIUM 250 MG: 125 CAPSULE, COATED PELLETS ORAL at 05:49

## 2023-11-02 RX ADMIN — TAMSULOSIN HYDROCHLORIDE 0.4 MG: 0.4 CAPSULE ORAL at 09:05

## 2023-11-02 RX ADMIN — DIVALPROEX SODIUM 250 MG: 125 CAPSULE, COATED PELLETS ORAL at 15:12

## 2023-11-02 RX ADMIN — NAPROXEN 250 MG: 250 TABLET ORAL at 09:06

## 2023-11-02 RX ADMIN — PROPRANOLOL HYDROCHLORIDE 20 MG: 10 TABLET ORAL at 15:11

## 2023-11-02 RX ADMIN — BUSPIRONE HYDROCHLORIDE 5 MG: 5 TABLET ORAL at 15:12

## 2023-11-02 RX ADMIN — SENNOSIDES 2 TABLET: 8.6 TABLET ORAL at 09:06

## 2023-11-02 RX ADMIN — ATROPINE SULFATE 2 DROP: 10 SOLUTION/ DROPS OPHTHALMIC at 12:02

## 2023-11-02 RX ADMIN — PROPRANOLOL HYDROCHLORIDE 20 MG: 10 TABLET ORAL at 20:30

## 2023-11-02 RX ADMIN — MICONAZOLE NITRATE: 20 POWDER TOPICAL at 20:49

## 2023-11-02 RX ADMIN — PROPRANOLOL HYDROCHLORIDE 20 MG: 10 TABLET ORAL at 09:05

## 2023-11-02 RX ADMIN — Medication 0.25 MG: at 09:05

## 2023-11-02 RX ADMIN — POLYETHYLENE GLYCOL 3350 17 G: 17 POWDER, FOR SOLUTION ORAL at 20:31

## 2023-11-02 RX ADMIN — DIVALPROEX SODIUM 250 MG: 125 CAPSULE, COATED PELLETS ORAL at 20:30

## 2023-11-02 RX ADMIN — PRAZOSIN HYDROCHLORIDE 1 MG: 1 CAPSULE ORAL at 20:29

## 2023-11-02 RX ADMIN — Medication 10 MG: at 20:48

## 2023-11-02 RX ADMIN — CLOZAPINE 675 MG: 200 TABLET ORAL at 11:58

## 2023-11-02 RX ADMIN — ATROPINE SULFATE 2 DROP: 10 SOLUTION/ DROPS OPHTHALMIC at 15:11

## 2023-11-02 RX ADMIN — SENNOSIDES 2 TABLET: 8.6 TABLET ORAL at 20:31

## 2023-11-02 RX ADMIN — OLANZAPINE 15 MG: 10 TABLET, ORALLY DISINTEGRATING ORAL at 09:05

## 2023-11-02 RX ADMIN — SENNOSIDES AND DOCUSATE SODIUM 1 TABLET: 50; 8.6 TABLET ORAL at 20:29

## 2023-11-02 RX ADMIN — MICONAZOLE NITRATE: 20 POWDER TOPICAL at 11:58

## 2023-11-02 RX ADMIN — GABAPENTIN 300 MG: 300 CAPSULE ORAL at 09:05

## 2023-11-02 RX ADMIN — GABAPENTIN 300 MG: 300 CAPSULE ORAL at 15:13

## 2023-11-02 RX ADMIN — PROPRANOLOL HYDROCHLORIDE 20 MG: 10 TABLET ORAL at 11:59

## 2023-11-02 RX ADMIN — Medication 25 MCG: at 09:05

## 2023-11-02 RX ADMIN — CYANOCOBALAMIN TAB 1000 MCG 1000 MCG: 1000 TAB at 09:05

## 2023-11-02 RX ADMIN — BUSPIRONE HYDROCHLORIDE 5 MG: 5 TABLET ORAL at 20:31

## 2023-11-02 RX ADMIN — BUSPIRONE HYDROCHLORIDE 5 MG: 5 TABLET ORAL at 09:05

## 2023-11-02 RX ADMIN — NAPROXEN 250 MG: 250 TABLET ORAL at 17:46

## 2023-11-02 RX ADMIN — OLANZAPINE 15 MG: 10 TABLET, ORALLY DISINTEGRATING ORAL at 20:30

## 2023-11-02 ASSESSMENT — ACTIVITIES OF DAILY LIVING (ADL)
ADLS_ACUITY_SCORE: 90

## 2023-11-02 NOTE — PLAN OF CARE
Assessment/Intervention/Current Symtoms and Care Coordination:  Reviewed chart. Met with team to review patient's current functioning, needs, progress, and impediments to discharge. Pt on SIO for safety. Pt's affect is flat. Pt has placement secured, awaiting negotiations to be completed between [facility] and [facility]. Pt continues to be confused but is appropriate and redirectable. Pt has been able to verbalize his violent thoughts and not act on them. Pt continues to experience tremor, OT ordered weighted cups and bowl to assist with this. Although, pt has been resistive to adaptive equipment. Pt also endorses embarrassment with tremor. Pt has been attending groups but cannot stay for the whole time.    Writer received VM from Angie with [facility] ([phone]) reporting that pt's PAMELA MALONE will complete the negotiations.     Writer put out email to Regina SANTIAGO CM ([email or web address]) to inquire about rate negotiations.    Discharge Plan or Goal:  Will discharge to [facility]: [address] [place] [address]     Barriers to Discharge:  Discharge pending completion of [employer] assessment and negotiation process with [facility]. Pt completed [employer] assessment on 10/27    Referral Status:  Pt accepted at [facility]. For more comprehensive list of referrals see CTC note from 10/6  Pt will need Clozaril lab appointments to be scheduled prior to discharge  Psychiatry appointment scheduled  PCP appointment scheduled  Psychiatrist - Dr. Santana at [facility]  P: [phone]   PCP - Attila Urena    Legal Status:  Committed MI with ITP  [facility]: [place]  File Number: [id number]  ITP includes: Clozaril, Zyprexa, Seroquel, and Risperdal  Expires: 2024    Contacts:  Vicky Marin CM with [facility] (KING per commitment)  P: [phone]  Regina SANTIAGO CM with [facility] (KING per commitment)  P: [phone]  E:  [email or web address]  [place] - Mom (KING signed)  P: [phone] (H) [phone] (M)   Ino - Dad (KING signed)  P: [phone] (H)  Sandra Treadwell - Sister (KING signed)  P: [phone]  [facility]'s Office   F: [phone]  Jovana Sánchez - [facility]  P: [phone]  E: [email or web address]  Alexa [employer]ices    F: [phone]  E: [email or web address]    Upcoming Meetings and Dates/Important Information and next steps:  CTC to coordinate with CADI CM and Jovana regarding placement needs  CTC to coordinate with [facility] CM regarding rate negotiation
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
## 2023-11-02 NOTE — PLAN OF CARE
"Goal Outcome Evaluation:    Plan of Care Reviewed With: patient    /63   Pulse 84   Temp 98.1  F (36.7  C) (Temporal)   Resp 17   Ht 1.753 m (5' 9\")   Wt 87.3 kg (192 lb 7.4 oz)   SpO2 98%   BMI 28.42 kg/m       Pt on SIO 2:1 r/t severe intrusiveness, Assault risk. Alert. Affect flat with calm mood. Remains to be isolative and withdrawn. Partially participated in assessment. Denied SI/SIB, pt said \"I am thinking\" when asked about AVH. Denied anxiety and depression. Denied pain. Out for dinner, had 90%, staff assisted with feeding. Pt compliant with medications Refused Atropine 1 % optithalmic solution for secretion. Pt was educated about the use of the medication, but still refused that it does not work. Also refused miralax and miconazole powder. Pt with tremors and drooling.All precautions in place and maintained. Continue to monitor.               "

## 2023-11-02 NOTE — PLAN OF CARE
Problem: Sleep Disturbance  Goal: Adequate Sleep/Rest  Outcome: Progressing   Goal Outcome Evaluation:    Patient continues to be on SIO (2:1) due to risk for self-injury related to Parkinson's symptoms, agitation, impulsive behavior and history of making of sexually inappropriate remarks and gestures. Patient got up once and voided freely. Independent with ADL's. Patient verbalized that he has been drooling a lot.  Mild shakiness of both hands were also noted.     Took his Depakote without any difficulty.     Slept for a total of 9.25 hours. No behavioral issus noted this shift.

## 2023-11-02 NOTE — PLAN OF CARE
BEH IP Unit Acuity Rating Score (UARS)  Patient is given one point for every criteria they meet.    CRITERIA SCORING   On a 72 hour hold, court hold, committed, stay of commitment, or revocation 1    Patient LOS on BEH unit exceeds 20 days 1  LOS: 160   Patient under guardianship, 55+, otherwise medically complex, or under age 11 1   Suicide ideation without relief of precipitating factors 0   Current plan for suicide 0   Current plan for homicide 0   Imminent risk or actual attempt to seriously harm another without relief of factors precipitating the attempt 0   Severe dysfunction in daily living (ex: complete neglect for self care, extreme disruption in vegetative function, extreme deterioration in social interactions) 1   Recent (last 7 days) or current physical aggression in the ED or on unit 1   Restraints or seclusion episode in past 72 hours 0   Recent (last 7 days) or current verbal aggression, agitation, yelling, etc., while in the ED or unit 0   Active psychosis 0   Need for constant or near constant redirection (from leaving, from others, etc).  1   Intrusive or disruptive behaviors 0   TOTAL 6

## 2023-11-02 NOTE — TELEPHONE ENCOUNTER
Bed search ([facility]) @ 1:27AM:    [facility]: No appropriate beds available    Pt remains on wait list pending appropriate placement availability   
patient

## 2023-11-02 NOTE — TELEPHONE ENCOUNTER
R:  Pt on  Unit awaiting availability to transfer to unit 12.       Currently unit 12 has 2 pts in queue to admit while awaiting a pt to transfer ;OFF' the unit so others can admit.   Once current pt transfers off unit -   and others admit, Intake will then present for unit 12 and initiate Doc to Doc between [facility].       Pt to remain on worklist pendingcurrent transfers and admissions to and from unit 12.

## 2023-11-02 NOTE — PLAN OF CARE
Problem: Adult Behavioral Health Plan of Care  Goal: Adheres to Safety Considerations for Self and Others  Outcome: Progressing   Goal Outcome Evaluation:  Pt continue on a 2:1 for for intrusiveness and assault risk . Pt is isolative and withdrawn needs lots of encouragement to come out his room . He presents with a clam mood , falt affect and slow to respond when asked a question.  He is assists to feeding and medication  complaint   . Pt refused to participate in MH assessment will continue POC

## 2023-11-02 NOTE — PROGRESS NOTES
Pt. Observed to have a good night with 2 to 1 observation status.  Pt. Was up to the bathroom once and asked for a pull-up.  No threatening behaviors observed during the night shift.

## 2023-11-03 ENCOUNTER — TELEPHONE (OUTPATIENT)
Dept: BEHAVIORAL HEALTH | Facility: CLINIC | Age: 63
End: 2023-11-03
Payer: COMMERCIAL

## 2023-11-03 LAB
BASOPHILS # BLD AUTO: 0.1 10E3/UL (ref 0–0.2)
BASOPHILS NFR BLD AUTO: 1 %
EOSINOPHIL # BLD AUTO: 0 10E3/UL (ref 0–0.7)
EOSINOPHIL NFR BLD AUTO: 0 %
ERYTHROCYTE [DISTWIDTH] IN BLOOD BY AUTOMATED COUNT: 15.2 % (ref 10–15)
HCT VFR BLD AUTO: 39.1 % (ref 40–53)
HGB BLD-MCNC: 12.6 G/DL (ref 13.3–17.7)
HOLD SPECIMEN: NORMAL
IMM GRANULOCYTES # BLD: 0.1 10E3/UL
IMM GRANULOCYTES NFR BLD: 1 %
LYMPHOCYTES # BLD AUTO: 1.5 10E3/UL (ref 0.8–5.3)
LYMPHOCYTES NFR BLD AUTO: 15 %
MCH RBC QN AUTO: 27.8 PG (ref 26.5–33)
MCHC RBC AUTO-ENTMCNC: 32.2 G/DL (ref 31.5–36.5)
MCV RBC AUTO: 86 FL (ref 78–100)
MONOCYTES # BLD AUTO: 1.2 10E3/UL (ref 0–1.3)
MONOCYTES NFR BLD AUTO: 12 %
NEUTROPHILS # BLD AUTO: 7.5 10E3/UL (ref 1.6–8.3)
NEUTROPHILS NFR BLD AUTO: 71 %
NRBC # BLD AUTO: 0 10E3/UL
NRBC BLD AUTO-RTO: 0 /100
PLATELET # BLD AUTO: 156 10E3/UL (ref 150–450)
RBC # BLD AUTO: 4.53 10E6/UL (ref 4.4–5.9)
WBC # BLD AUTO: 10.5 10E3/UL (ref 4–11)

## 2023-11-03 PROCEDURE — 250N000013 HC RX MED GY IP 250 OP 250 PS 637: Performed by: PSYCHIATRY & NEUROLOGY

## 2023-11-03 PROCEDURE — 250N000013 HC RX MED GY IP 250 OP 250 PS 637: Performed by: STUDENT IN AN ORGANIZED HEALTH CARE EDUCATION/TRAINING PROGRAM

## 2023-11-03 PROCEDURE — 124N000002 HC R&B MH UMMC

## 2023-11-03 PROCEDURE — 36415 COLL VENOUS BLD VENIPUNCTURE: CPT | Performed by: PSYCHIATRY & NEUROLOGY

## 2023-11-03 PROCEDURE — 85025 COMPLETE CBC W/AUTO DIFF WBC: CPT | Performed by: PSYCHIATRY & NEUROLOGY

## 2023-11-03 PROCEDURE — 250N000013 HC RX MED GY IP 250 OP 250 PS 637: Performed by: PHYSICIAN ASSISTANT

## 2023-11-03 RX ORDER — DIVALPROEX SODIUM 125 MG/1
250 CAPSULE, COATED PELLETS ORAL
Status: DISCONTINUED | OUTPATIENT
Start: 2023-11-03 | End: 2023-11-13 | Stop reason: HOSPADM

## 2023-11-03 RX ORDER — BUSPIRONE HYDROCHLORIDE 10 MG/1
10 TABLET ORAL 3 TIMES DAILY
Status: DISCONTINUED | OUTPATIENT
Start: 2023-11-03 | End: 2023-11-07

## 2023-11-03 RX ORDER — BUSPIRONE HYDROCHLORIDE 10 MG/1
10 TABLET ORAL 3 TIMES DAILY
Status: DISCONTINUED | OUTPATIENT
Start: 2023-11-03 | End: 2023-11-03

## 2023-11-03 RX ADMIN — NAPROXEN 250 MG: 250 TABLET ORAL at 17:59

## 2023-11-03 RX ADMIN — CLOZAPINE 675 MG: 200 TABLET ORAL at 12:30

## 2023-11-03 RX ADMIN — DIVALPROEX SODIUM 250 MG: 125 CAPSULE, COATED PELLETS ORAL at 06:39

## 2023-11-03 RX ADMIN — BUSPIRONE HYDROCHLORIDE 10 MG: 10 TABLET ORAL at 16:24

## 2023-11-03 RX ADMIN — DIVALPROEX SODIUM 250 MG: 125 CAPSULE, COATED PELLETS ORAL at 17:59

## 2023-11-03 RX ADMIN — GABAPENTIN 300 MG: 300 CAPSULE ORAL at 20:19

## 2023-11-03 RX ADMIN — Medication 500 MCG: at 14:42

## 2023-11-03 RX ADMIN — Medication 0.25 MG: at 20:20

## 2023-11-03 RX ADMIN — Medication 0.25 MG: at 14:40

## 2023-11-03 RX ADMIN — BUSPIRONE HYDROCHLORIDE 10 MG: 10 TABLET ORAL at 20:19

## 2023-11-03 RX ADMIN — MICONAZOLE NITRATE: 20 POWDER TOPICAL at 12:30

## 2023-11-03 RX ADMIN — GABAPENTIN 300 MG: 300 CAPSULE ORAL at 14:41

## 2023-11-03 RX ADMIN — BUSPIRONE HYDROCHLORIDE 10 MG: 10 TABLET ORAL at 14:40

## 2023-11-03 RX ADMIN — ATROPINE SULFATE 2 DROP: 10 SOLUTION/ DROPS OPHTHALMIC at 14:42

## 2023-11-03 RX ADMIN — POLYETHYLENE GLYCOL 3350 17 G: 17 POWDER, FOR SOLUTION ORAL at 09:08

## 2023-11-03 RX ADMIN — SENNOSIDES 2 TABLET: 8.6 TABLET ORAL at 20:19

## 2023-11-03 RX ADMIN — ATROPINE SULFATE 2 DROP: 10 SOLUTION/ DROPS OPHTHALMIC at 20:18

## 2023-11-03 RX ADMIN — DIVALPROEX SODIUM 250 MG: 125 CAPSULE, COATED PELLETS ORAL at 14:40

## 2023-11-03 RX ADMIN — Medication 10 MG: at 20:20

## 2023-11-03 RX ADMIN — PROPRANOLOL HYDROCHLORIDE 20 MG: 10 TABLET ORAL at 16:23

## 2023-11-03 RX ADMIN — PRAZOSIN HYDROCHLORIDE 1 MG: 1 CAPSULE ORAL at 20:30

## 2023-11-03 RX ADMIN — ATROPINE SULFATE 2 DROP: 10 SOLUTION/ DROPS OPHTHALMIC at 09:07

## 2023-11-03 RX ADMIN — ATROPINE SULFATE 1 DROP: 10 SOLUTION/ DROPS OPHTHALMIC at 16:34

## 2023-11-03 RX ADMIN — MICONAZOLE NITRATE: 20 POWDER TOPICAL at 20:36

## 2023-11-03 RX ADMIN — PROPRANOLOL HYDROCHLORIDE 20 MG: 10 TABLET ORAL at 12:30

## 2023-11-03 RX ADMIN — PROPRANOLOL HYDROCHLORIDE 20 MG: 10 TABLET ORAL at 20:31

## 2023-11-03 RX ADMIN — OLANZAPINE 15 MG: 10 TABLET, ORALLY DISINTEGRATING ORAL at 20:18

## 2023-11-03 RX ADMIN — POLYETHYLENE GLYCOL 3350 17 G: 17 POWDER, FOR SOLUTION ORAL at 20:18

## 2023-11-03 ASSESSMENT — ACTIVITIES OF DAILY LIVING (ADL)
LAUNDRY: UNABLE TO COMPLETE
ADLS_ACUITY_SCORE: 90
DRESS: SCRUBS (BEHAVIORAL HEALTH)
DRESS: SCRUBS (BEHAVIORAL HEALTH);INDEPENDENT;PROMPTS
ORAL_HYGIENE: PROMPTS;INDEPENDENT
HYGIENE/GROOMING: PROMPTS;INDEPENDENT
ADLS_ACUITY_SCORE: 90
ADLS_ACUITY_SCORE: 90
HYGIENE/GROOMING: INDEPENDENT;PROMPTS
ADLS_ACUITY_SCORE: 90
ADLS_ACUITY_SCORE: 90
LAUNDRY: UNABLE TO COMPLETE
ADLS_ACUITY_SCORE: 90
ORAL_HYGIENE: PROMPTS;INDEPENDENT
ADLS_ACUITY_SCORE: 98
ADLS_ACUITY_SCORE: 98
ADLS_ACUITY_SCORE: 90

## 2023-11-03 NOTE — PLAN OF CARE
Problem: Psychotic Symptoms  Goal: Psychotic Symptoms  Description: Signs and symptoms of listed problems will be absent or manageable.  Outcome: Progressing   Goal Outcome Evaluation:    Problem: Psychotic Signs/Symptoms  Goal: Improved Behavioral Control (Psychotic Signs/Symptoms)  Outcome: Progressing     Plan of Care Reviewed With: patient      Vinod spent most of the day in his room, but he became restless, frequently exiting his room after lunch. Despite appearing flat and depressed, the Pt denied depression, SI, HI, AVHs, constipation, and poor sleep patterns. He endorsed anxiety and said he was not doing well. He would not elaborate. He ate with the assistance of one. He spat his morning medicines but took them the rest of the shift. He c/o having difficulty swallowing them, so Dr. Barker made some changes in their form. His 2:1 SIO order has changed to 1:1. SIO sitter. They had no aggressive or assaultive behavior this shift.

## 2023-11-03 NOTE — PLAN OF CARE
Problem: Sleep Disturbance  Goal: Adequate Sleep/Rest  11/3/2023 0227 by Lesly Snell RN  Outcome: Progressing   Goal Outcome Evaluation:    Patient remains on SIO (2:1) 10 ft. due  to risk for self-injury related to Parkinson's symptoms, agitation, impulsiveness and history of making sexually inappropriate remarks and gestures.     No complaints of pain and behavioral issues noted.     Slept for a total of 9.75 hours. Took his Depakote mixed with vanilla pudding readily as scheduled.

## 2023-11-03 NOTE — PROGRESS NOTES
Pt. Observed to sleep the entire night shift with 2 to 1 staffing with no direct contacts with the night staff.

## 2023-11-03 NOTE — PLAN OF CARE
Problem: Adult Behavioral Health Plan of Care  Goal: Individualized Daily Interaction Plan (IDIP)  Outcome: Progressing     Problem: Psychotic Signs/Symptoms  Goal: Improved Behavioral Control (Psychotic Signs/Symptoms)  Outcome: Progressing   Goal Outcome Evaluation:       Pt continues to be on a 2:1 SIO monitor for fall, assault, elopement, and cheeking precautions. Pt denies pain, anxiety, depression, SI/SIB/HI/AVH, and contracts for safety. Pt is isolative and withdrawn to room all shift, but was out with encouragement for dinner. Pt is pleasant with a flat and blunted affect. Judgment and insight not appropriate to situation. He continues to be pleasant, calm, cooperative, and medication compliant. No safety concerns noted. Will continue with same plan of care.

## 2023-11-03 NOTE — CARE PLAN
"   11/03/23 1534   Group Therapy Session   Group Attendance attended group session   Time Session Began 1315   Time Session Ended 1410   Total Time (minutes) 15  (no charge)   Total # Attendees 6   Group Type psychotherapeutic   Group Topic Covered coping skills/lifestyle management;relapse prevention;cognitive therapy techniques;structured socialization;balanced lifestyle   Group Session Detail Participated in an activity focused on Stress management, identifying areas on one's life that are balanced and areas to chosen to focus on by setting helpful goals.   Patient Response/Contribution cooperative with task   Patient Participation Detail Began participation sitting with the group of peers, pulled himself up to the table and made attempts to participate with a couple questions he asked. He stated concern of his difficulty writing or even circling answers,. He was offered no need to even write anything that he could offer answers verbally. He chose to do some circling of answers instead. Hands were noted with tremors. He stood up and stated he was \"feeling anxious and I need to leave\". Pt was supported in being reminded he had choices and did not need to stay if he felt he needed to walk or leave group. At that time, he did leave the group with his SIO.       "

## 2023-11-03 NOTE — TELEPHONE ENCOUNTER
R: [place]  Access Inpatient [place] Call Log  11/2/2023 11:49 PM  Intake has called facilities that have not updated their bed status within the last 12 hours.??      ADULTS:     *METRO  [place] [facility]: @ cap per website.     Pt remains on waitlist pending appropriate placement availability.

## 2023-11-03 NOTE — PLAN OF CARE
BEH IP Unit Acuity Rating Score (UARS)  Patient is given one point for every criteria they meet.    CRITERIA SCORING   On a 72 hour hold, court hold, committed, stay of commitment, or revocation 1    Patient LOS on BEH unit exceeds 20 days 1  LOS: 161   Patient under guardianship, 55+, otherwise medically complex, or under age 11 1   Suicide ideation without relief of precipitating factors 0   Current plan for suicide 0   Current plan for homicide 0   Imminent risk or actual attempt to seriously harm another without relief of factors precipitating the attempt 0   Severe dysfunction in daily living (ex: complete neglect for self care, extreme disruption in vegetative function, extreme deterioration in social interactions) 1   Recent (last 7 days) or current physical aggression in the ED or on unit 1   Restraints or seclusion episode in past 72 hours 0   Recent (last 7 days) or current verbal aggression, agitation, yelling, etc., while in the ED or unit 0   Active psychosis 0   Need for constant or near constant redirection (from leaving, from others, etc).  1   Intrusive or disruptive behaviors 0   TOTAL 6

## 2023-11-03 NOTE — PLAN OF CARE
Assessment/Intervention/Current Symtoms and Care Coordination:  Reviewed chart. Met with team to review patient's current functioning, needs, progress, and impediments to discharge. Pt on SIO for safety. Pt's affect is flat. Pt has placement secured, awaiting negotiations to be completed between [facility] and [facility]. Pt continues to be confused but is appropriate and redirectable. Pt has been able to verbalize his violent thoughts and not act on them. Pt continues to experience tremor, OT ordered weighted cups and bowl to assist with this. Although, pt has been resistive to adaptive equipment. Pt also endorses embarrassment with tremor. Pt has been attending groups but cannot stay for the whole time. Per provider, 2:1 to possible stepdown to 1:1 during the day/evening only.    Writer received VM from Regina SANTIAGO CM ([phone]) reporting that she is coordinating with Alexa to input pt's MNChoices assessment paperwork into the [facility] system. Regina reports that she will also be reaching out to [facility] today to coordinate a possible discharge date.    Writer received email from Vicky MALONE ([email or web address]) requesting update. Writer put out return email with discharge plan update.    Discharge Plan or Goal:  Will discharge to [facility] [place] [address]     Barriers to Discharge:  Discharge pending completion of MNChoices assessment and negotiation process with [facility]. Pt completed MNChoices assessment on 10/27    Referral Status:  Pt accepted at [facility]. For more comprehensive list of referrals see CTC note from 10/6  Pt will need Clozaril lab appointments to be scheduled prior to discharge  Psychiatry appointment scheduled  PCP appointment scheduled  Psychiatrist - Dr. Santana at [facility]  P: [phone]   PCP - Attila Urena    Legal Status:  Committed MI with ITP  [facility]: [place]  File Number:  [id number]  ITP includes: Clozaril, Zyprexa, Seroquel, and Risperdal  Expires: 01/06/2024    Contacts:  Vicky Valdez - CM with [facility] (KING per commitment)  P: [phone]  Regina Wong - PAMELA CM with [facility] (KING per commitment)  P: [phone]  E: [email or web address]  Alberta - Mom (KING signed)  P: [phone] (H) [phone] (M)   Ino - Dad (KING signed)  P: [phone] (H)  Sandra Treadwell - Sister (KING signed)  P: [phone]  [facility]'s Office   F: [phone]  Jovana Sánchez - [employer] [facility]  P: [phone]  E: [email or web address]  Alexa Arteaga - [facility]    F: [phone]  E: [email or web address].    Upcoming Meetings and Dates/Important Information and next steps:  CTC to coordinate with CADI CM and Jovana regarding placement needs  CTC to coordinate with [facility] CM regarding rate negotiation

## 2023-11-03 NOTE — PROGRESS NOTES
"Patient seen, chart reviewed, care discussed with staff.  Blood pressure 132/85, pulse 78, temperature 97.5  F (36.4  C), temperature source Temporal, resp. rate 16, height 1.753 m (5' 9\"), weight 87.3 kg (192 lb 7.4 oz), SpO2 97%.  I discussed with him his placing hands around the psych associates neck without squeezing.  He states bills has thoughts of strangling people but did not want to hurt anyone.  He states he could not swallow the am meds.    General appearance: good  Alert.   Affect: fair  Mood: fair    Speech:  normal.   Eye contact:  good.    Psychomotor behavior: moderate tremors  Gait: steady   Abnormal movements:  none  Delusions: continue  Hallucinations:  continue  Thoughts: linear; I feel his thoughts of strangling are obsessive in nature now, possible worse on less Prozac  Associations: intact  Judgement: poor  Insight: fair  Cognitions: intact in conversation  Memory:  intact in conversation  Orientation: normal    Not suicidal.    Imp: Paranoid Schizophrenia  Neurocognitive  I feel the discomfort around prople has a social phobic component.  And:         Patient Active Problem List   Diagnosis    Urinary retention    Schizophrenia, unspecified type (H)    Altered mental status, unspecified altered mental status type    Mood changes    Paranoid schizophrenia, chronic condition with acute exacerbation (H)    Neuroleptic-induced parkinsonism     Agitation   Plan:  change Depakote sprinkles to be sprinkled on foor tid  Change B12 to sublingual  Move Buspar doses back to start at noon  "

## 2023-11-04 PROCEDURE — 250N000013 HC RX MED GY IP 250 OP 250 PS 637: Performed by: PHYSICIAN ASSISTANT

## 2023-11-04 PROCEDURE — 124N000002 HC R&B MH UMMC

## 2023-11-04 PROCEDURE — 250N000013 HC RX MED GY IP 250 OP 250 PS 637: Performed by: PSYCHIATRY & NEUROLOGY

## 2023-11-04 PROCEDURE — 250N000013 HC RX MED GY IP 250 OP 250 PS 637: Performed by: STUDENT IN AN ORGANIZED HEALTH CARE EDUCATION/TRAINING PROGRAM

## 2023-11-04 RX ADMIN — MICONAZOLE NITRATE: 20 POWDER TOPICAL at 12:09

## 2023-11-04 RX ADMIN — BUSPIRONE HYDROCHLORIDE 10 MG: 10 TABLET ORAL at 21:16

## 2023-11-04 RX ADMIN — ATROPINE SULFATE 2 DROP: 10 SOLUTION/ DROPS OPHTHALMIC at 08:46

## 2023-11-04 RX ADMIN — PRAZOSIN HYDROCHLORIDE 1 MG: 1 CAPSULE ORAL at 21:16

## 2023-11-04 RX ADMIN — ATROPINE SULFATE 2 DROP: 10 SOLUTION/ DROPS OPHTHALMIC at 21:17

## 2023-11-04 RX ADMIN — NAPROXEN 250 MG: 250 TABLET ORAL at 17:57

## 2023-11-04 RX ADMIN — GABAPENTIN 300 MG: 300 CAPSULE ORAL at 21:17

## 2023-11-04 RX ADMIN — GABAPENTIN 300 MG: 300 CAPSULE ORAL at 08:45

## 2023-11-04 RX ADMIN — CLOZAPINE 675 MG: 200 TABLET ORAL at 12:08

## 2023-11-04 RX ADMIN — DIVALPROEX SODIUM 250 MG: 125 CAPSULE, COATED PELLETS ORAL at 12:08

## 2023-11-04 RX ADMIN — SENNOSIDES 2 TABLET: 8.6 TABLET ORAL at 21:16

## 2023-11-04 RX ADMIN — PROPRANOLOL HYDROCHLORIDE 20 MG: 10 TABLET ORAL at 21:16

## 2023-11-04 RX ADMIN — SENNOSIDES 2 TABLET: 8.6 TABLET ORAL at 08:44

## 2023-11-04 RX ADMIN — NAPROXEN 250 MG: 250 TABLET ORAL at 08:46

## 2023-11-04 RX ADMIN — Medication 10 MG: at 21:16

## 2023-11-04 RX ADMIN — POLYETHYLENE GLYCOL 3350 17 G: 17 POWDER, FOR SOLUTION ORAL at 21:41

## 2023-11-04 RX ADMIN — Medication 500 MCG: at 08:46

## 2023-11-04 RX ADMIN — PROPRANOLOL HYDROCHLORIDE 20 MG: 10 TABLET ORAL at 17:04

## 2023-11-04 RX ADMIN — OLANZAPINE 15 MG: 10 TABLET, ORALLY DISINTEGRATING ORAL at 08:44

## 2023-11-04 RX ADMIN — Medication 0.25 MG: at 08:46

## 2023-11-04 RX ADMIN — Medication 25 MCG: at 08:45

## 2023-11-04 RX ADMIN — Medication 0.25 MG: at 13:54

## 2023-11-04 RX ADMIN — BUSPIRONE HYDROCHLORIDE 10 MG: 10 TABLET ORAL at 12:08

## 2023-11-04 RX ADMIN — TAMSULOSIN HYDROCHLORIDE 0.4 MG: 0.4 CAPSULE ORAL at 08:45

## 2023-11-04 RX ADMIN — PROPRANOLOL HYDROCHLORIDE 20 MG: 10 TABLET ORAL at 12:07

## 2023-11-04 RX ADMIN — ATROPINE SULFATE 2 DROP: 10 SOLUTION/ DROPS OPHTHALMIC at 13:55

## 2023-11-04 RX ADMIN — Medication 0.25 MG: at 21:16

## 2023-11-04 RX ADMIN — POLYETHYLENE GLYCOL 3350 17 G: 17 POWDER, FOR SOLUTION ORAL at 08:44

## 2023-11-04 RX ADMIN — PROPRANOLOL HYDROCHLORIDE 20 MG: 10 TABLET ORAL at 08:45

## 2023-11-04 RX ADMIN — DIVALPROEX SODIUM 250 MG: 125 CAPSULE, COATED PELLETS ORAL at 08:45

## 2023-11-04 RX ADMIN — FLUOXETINE 20 MG: 20 CAPSULE ORAL at 08:45

## 2023-11-04 RX ADMIN — DIVALPROEX SODIUM 250 MG: 125 CAPSULE, COATED PELLETS ORAL at 17:57

## 2023-11-04 RX ADMIN — OLANZAPINE 15 MG: 10 TABLET, ORALLY DISINTEGRATING ORAL at 21:17

## 2023-11-04 RX ADMIN — BUSPIRONE HYDROCHLORIDE 10 MG: 10 TABLET ORAL at 17:04

## 2023-11-04 RX ADMIN — GABAPENTIN 300 MG: 300 CAPSULE ORAL at 13:54

## 2023-11-04 RX ADMIN — MICONAZOLE NITRATE: 20 POWDER TOPICAL at 21:17

## 2023-11-04 ASSESSMENT — ACTIVITIES OF DAILY LIVING (ADL)
HYGIENE/GROOMING: PROMPTS;INDEPENDENT
ADLS_ACUITY_SCORE: 98
ADLS_ACUITY_SCORE: 88
ADLS_ACUITY_SCORE: 98
ORAL_HYGIENE: PROMPTS;INDEPENDENT
DRESS: SCRUBS (BEHAVIORAL HEALTH)
ADLS_ACUITY_SCORE: 88
ADLS_ACUITY_SCORE: 88
ADLS_ACUITY_SCORE: 98
LAUNDRY: UNABLE TO COMPLETE

## 2023-11-04 NOTE — PLAN OF CARE
Goal Outcome Evaluation:  Pt sleeping at report. Had 7.75 hours sleep. Remains on SIO 5ft  r/t severe intrusiveness, Assault risk. No behavioral issue this shift. No concern reported, nor noted. No medications administered. Will observe and monitor per POC.

## 2023-11-04 NOTE — PLAN OF CARE
Problem: Behavioral Disturbance  Goal: Behavioral Disturbance  Description: Signs and symptoms of listed problems will be absent or manageable by discharge or transition of care.  Outcome: Progressing   Goal Outcome Evaluation:      D: Patient intermittently visible in the milieu. Isolative and withdrawn. Patient is medication compliant. Appears confused. He reports some anxiety and depression.  Patient denies SI/SIB/HI. Denies hallucinations.     A: Staff engaged in therapeutic communication. Monitored patient for safety every 15 minutes. Medications administered as offered and assessed for effectiveness and side effects. Patient continues to be on SIO, elopement, assault and cheeking precautions.     R: Pt evaluation continues. Patient is medication compliant. Assessed mood, anxiety, thoughts and behavior. Is progressing toward goals. Encourage participation in groups and developing healthy coping skills. Will continue current plan of care. Refer to daily team meeting notes for individualized plan of care.
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
## 2023-11-04 NOTE — PLAN OF CARE
Problem: Adult Behavioral Health Plan of Care  Goal: Plan of Care Review  Recent Flowsheet Documentation  Taken 11/3/2023 1934 by Katie Moss, RN  Patient Agreement with Plan of Care: agrees     Problem: Psychotic Symptoms  Goal: Psychotic Symptoms  Description: Signs and symptoms of listed problems will be absent or manageable.  Flowsheets (Taken 11/3/2023 2002)  Psychotic Symptoms Assessed:   affect   mood   anxiety   thought process   psychomotor activity   orientation   speech   suicidality   insight  Psychotic Symptoms Present:   affect   mood   anxiety   psychomotor activity   thought process   orientation   Problem: Adult Behavioral Health Plan of Care  Goal: Adheres to Safety Considerations for Self and Others  Intervention: Develop and Maintain Individualized Safety Plan  Recent Flowsheet Documentation  Taken 11/3/2023 1934 by Katie Moss, RN  Safety Measures: 1:1 observation maintained     Patient was on SIO 10 feet for assault risk and severe intrusiveness. He was resting in bed majority of the shift. Flat, blunted affect. Pt endorsed moderate anxiety. Denied SI, SIB. Endorsed AH. He said that the voice was telling him to leave the hospital. Denied VH and HI. He was out of his bedroom for supper. Ate 75% with staff's assistance. He took all his medications with pudding and sips of water. He denied pain and side effects of medications. However, pt has visible tremors and drooling. Towel placed in his pillow and staff changed his scrub top. Applied miconazole powder to bilateral groin after alejandro-cares. Will continue to monitor and assess pt.

## 2023-11-05 PROCEDURE — 250N000013 HC RX MED GY IP 250 OP 250 PS 637: Performed by: PSYCHIATRY & NEUROLOGY

## 2023-11-05 PROCEDURE — 250N000013 HC RX MED GY IP 250 OP 250 PS 637: Performed by: STUDENT IN AN ORGANIZED HEALTH CARE EDUCATION/TRAINING PROGRAM

## 2023-11-05 PROCEDURE — 250N000013 HC RX MED GY IP 250 OP 250 PS 637: Performed by: PHYSICIAN ASSISTANT

## 2023-11-05 PROCEDURE — 124N000002 HC R&B MH UMMC

## 2023-11-05 RX ADMIN — MICONAZOLE NITRATE: 20 POWDER TOPICAL at 11:59

## 2023-11-05 RX ADMIN — ATROPINE SULFATE 2 DROP: 10 SOLUTION/ DROPS OPHTHALMIC at 20:20

## 2023-11-05 RX ADMIN — OLANZAPINE 15 MG: 10 TABLET, ORALLY DISINTEGRATING ORAL at 20:19

## 2023-11-05 RX ADMIN — Medication 0.25 MG: at 20:19

## 2023-11-05 RX ADMIN — OLANZAPINE 15 MG: 10 TABLET, ORALLY DISINTEGRATING ORAL at 08:25

## 2023-11-05 RX ADMIN — ATROPINE SULFATE 1 DROP: 10 SOLUTION/ DROPS OPHTHALMIC at 15:09

## 2023-11-05 RX ADMIN — GABAPENTIN 300 MG: 300 CAPSULE ORAL at 13:05

## 2023-11-05 RX ADMIN — Medication 0.25 MG: at 08:25

## 2023-11-05 RX ADMIN — POLYETHYLENE GLYCOL 3350 17 G: 17 POWDER, FOR SOLUTION ORAL at 08:26

## 2023-11-05 RX ADMIN — Medication 25 MCG: at 08:25

## 2023-11-05 RX ADMIN — Medication 500 MCG: at 08:25

## 2023-11-05 RX ADMIN — NAPROXEN 250 MG: 250 TABLET ORAL at 19:27

## 2023-11-05 RX ADMIN — MICONAZOLE NITRATE: 20 POWDER TOPICAL at 20:20

## 2023-11-05 RX ADMIN — SENNOSIDES 2 TABLET: 8.6 TABLET ORAL at 08:25

## 2023-11-05 RX ADMIN — SENNOSIDES 2 TABLET: 8.6 TABLET ORAL at 20:17

## 2023-11-05 RX ADMIN — PROPRANOLOL HYDROCHLORIDE 20 MG: 10 TABLET ORAL at 11:52

## 2023-11-05 RX ADMIN — CLOZAPINE 675 MG: 200 TABLET ORAL at 11:52

## 2023-11-05 RX ADMIN — TAMSULOSIN HYDROCHLORIDE 0.4 MG: 0.4 CAPSULE ORAL at 08:25

## 2023-11-05 RX ADMIN — PROPRANOLOL HYDROCHLORIDE 20 MG: 10 TABLET ORAL at 08:25

## 2023-11-05 RX ADMIN — GABAPENTIN 300 MG: 300 CAPSULE ORAL at 08:25

## 2023-11-05 RX ADMIN — DIVALPROEX SODIUM 250 MG: 125 CAPSULE, COATED PELLETS ORAL at 11:53

## 2023-11-05 RX ADMIN — GABAPENTIN 300 MG: 300 CAPSULE ORAL at 20:19

## 2023-11-05 RX ADMIN — PROPRANOLOL HYDROCHLORIDE 20 MG: 10 TABLET ORAL at 20:19

## 2023-11-05 RX ADMIN — BUSPIRONE HYDROCHLORIDE 10 MG: 10 TABLET ORAL at 20:19

## 2023-11-05 RX ADMIN — FLUOXETINE 20 MG: 20 CAPSULE ORAL at 08:25

## 2023-11-05 RX ADMIN — NAPROXEN 250 MG: 250 TABLET ORAL at 08:25

## 2023-11-05 RX ADMIN — DIVALPROEX SODIUM 250 MG: 125 CAPSULE, COATED PELLETS ORAL at 19:30

## 2023-11-05 RX ADMIN — BUSPIRONE HYDROCHLORIDE 10 MG: 10 TABLET ORAL at 11:53

## 2023-11-05 RX ADMIN — Medication 10 MG: at 20:18

## 2023-11-05 RX ADMIN — DIVALPROEX SODIUM 250 MG: 125 CAPSULE, COATED PELLETS ORAL at 08:25

## 2023-11-05 RX ADMIN — PRAZOSIN HYDROCHLORIDE 1 MG: 1 CAPSULE ORAL at 20:19

## 2023-11-05 RX ADMIN — ATROPINE SULFATE 2 DROP: 10 SOLUTION/ DROPS OPHTHALMIC at 13:06

## 2023-11-05 RX ADMIN — PROPRANOLOL HYDROCHLORIDE 20 MG: 10 TABLET ORAL at 16:32

## 2023-11-05 RX ADMIN — BUSPIRONE HYDROCHLORIDE 10 MG: 10 TABLET ORAL at 16:32

## 2023-11-05 RX ADMIN — ATROPINE SULFATE 2 DROP: 10 SOLUTION/ DROPS OPHTHALMIC at 08:26

## 2023-11-05 RX ADMIN — POLYETHYLENE GLYCOL 3350 17 G: 17 POWDER, FOR SOLUTION ORAL at 20:19

## 2023-11-05 RX ADMIN — Medication 0.25 MG: at 13:04

## 2023-11-05 ASSESSMENT — ACTIVITIES OF DAILY LIVING (ADL)
ADLS_ACUITY_SCORE: 88

## 2023-11-05 NOTE — PLAN OF CARE
Problem: Sleep Disturbance  Goal: Adequate Sleep/Rest  Outcome: Progressing   Goal Outcome Evaluation:       Pt sleeping at the start of this shift. Slept uninterruptedly for 10.0 hours. Remains on SIO 10 ft r/t severe intrusiveness and assault risk. No concern noted or reported. No medications given this shift. Denied pain. Will continue to monitor.

## 2023-11-05 NOTE — PLAN OF CARE
"Goal Outcome Evaluation:    Problem: Plan of Care - These are the overarching goals to be used throughout the patient stay.    Goal: Optimal Comfort and Wellbeing  Outcome: Progressing     Patient has been partially visible in the milieu. He continues to be withdrawn and guarded. Patient continues to be disorganized and paranoid.     Patient is on SIO for agitation, impulsiveness, and high fall risk.    He was medication compliant this morning; taking his medications with pudding and water. SE: EPSE. Patient continues to drool; atropine was administered x3 (2 scheduled doses and 1 PRN dose at 1509).    Staff came and informed this RN at around 12:50pm that the patient was getting agitated. Patient was given scheduled ativan and zyprexa. Before he took his medications, he stated, \"I have the urge of hitting you.\" Patient verbalized his thoughts, but did not act on it.    /89   Pulse 79   Temp 97.6  F (36.4  C) (Oral)   Resp 16   Ht 1.753 m (5' 9\")   Wt 85.7 kg (188 lb 15 oz)   SpO2 97%   BMI 27.90 kg/m      We'll continue to monitor.  "

## 2023-11-06 PROCEDURE — 250N000009 HC RX 250: Performed by: PSYCHIATRY & NEUROLOGY

## 2023-11-06 PROCEDURE — 250N000013 HC RX MED GY IP 250 OP 250 PS 637: Performed by: PHYSICIAN ASSISTANT

## 2023-11-06 PROCEDURE — 250N000013 HC RX MED GY IP 250 OP 250 PS 637: Performed by: PSYCHIATRY & NEUROLOGY

## 2023-11-06 PROCEDURE — 99232 SBSQ HOSP IP/OBS MODERATE 35: CPT | Performed by: PSYCHIATRY & NEUROLOGY

## 2023-11-06 PROCEDURE — 124N000002 HC R&B MH UMMC

## 2023-11-06 PROCEDURE — 250N000013 HC RX MED GY IP 250 OP 250 PS 637: Performed by: STUDENT IN AN ORGANIZED HEALTH CARE EDUCATION/TRAINING PROGRAM

## 2023-11-06 RX ADMIN — SENNOSIDES 2 TABLET: 8.6 TABLET ORAL at 19:12

## 2023-11-06 RX ADMIN — DIVALPROEX SODIUM 250 MG: 125 CAPSULE, COATED PELLETS ORAL at 17:22

## 2023-11-06 RX ADMIN — POLYETHYLENE GLYCOL 3350 17 G: 17 POWDER, FOR SOLUTION ORAL at 19:11

## 2023-11-06 RX ADMIN — NAPROXEN 250 MG: 250 TABLET ORAL at 17:23

## 2023-11-06 RX ADMIN — NAPROXEN 250 MG: 250 TABLET ORAL at 07:52

## 2023-11-06 RX ADMIN — DIVALPROEX SODIUM 250 MG: 125 CAPSULE, COATED PELLETS ORAL at 07:51

## 2023-11-06 RX ADMIN — OLANZAPINE 15 MG: 10 TABLET, ORALLY DISINTEGRATING ORAL at 07:51

## 2023-11-06 RX ADMIN — GABAPENTIN 300 MG: 300 CAPSULE ORAL at 07:52

## 2023-11-06 RX ADMIN — PROPRANOLOL HYDROCHLORIDE 20 MG: 10 TABLET ORAL at 07:52

## 2023-11-06 RX ADMIN — CLOZAPINE 675 MG: 200 TABLET ORAL at 11:48

## 2023-11-06 RX ADMIN — SENNOSIDES 2 TABLET: 8.6 TABLET ORAL at 07:52

## 2023-11-06 RX ADMIN — DIVALPROEX SODIUM 250 MG: 125 CAPSULE, COATED PELLETS ORAL at 11:49

## 2023-11-06 RX ADMIN — Medication 0.25 MG: at 13:30

## 2023-11-06 RX ADMIN — OLANZAPINE 10 MG: 5 TABLET, FILM COATED ORAL at 15:58

## 2023-11-06 RX ADMIN — PROPRANOLOL HYDROCHLORIDE 20 MG: 10 TABLET ORAL at 11:49

## 2023-11-06 RX ADMIN — GABAPENTIN 300 MG: 300 CAPSULE ORAL at 19:12

## 2023-11-06 RX ADMIN — BUSPIRONE HYDROCHLORIDE 10 MG: 10 TABLET ORAL at 11:49

## 2023-11-06 RX ADMIN — Medication 25 MCG: at 07:51

## 2023-11-06 RX ADMIN — ATROPINE SULFATE 2 DROP: 10 SOLUTION/ DROPS OPHTHALMIC at 19:25

## 2023-11-06 RX ADMIN — ATROPINE SULFATE 2 DROP: 10 SOLUTION/ DROPS OPHTHALMIC at 07:55

## 2023-11-06 RX ADMIN — PRAZOSIN HYDROCHLORIDE 1 MG: 1 CAPSULE ORAL at 19:12

## 2023-11-06 RX ADMIN — OLANZAPINE 15 MG: 10 TABLET, ORALLY DISINTEGRATING ORAL at 19:11

## 2023-11-06 RX ADMIN — PROPRANOLOL HYDROCHLORIDE 20 MG: 10 TABLET ORAL at 15:58

## 2023-11-06 RX ADMIN — POLYETHYLENE GLYCOL 3350 17 G: 17 POWDER, FOR SOLUTION ORAL at 07:53

## 2023-11-06 RX ADMIN — Medication 0.25 MG: at 19:11

## 2023-11-06 RX ADMIN — GABAPENTIN 300 MG: 300 CAPSULE ORAL at 13:30

## 2023-11-06 RX ADMIN — PROPRANOLOL HYDROCHLORIDE 20 MG: 10 TABLET ORAL at 19:12

## 2023-11-06 RX ADMIN — TAMSULOSIN HYDROCHLORIDE 0.4 MG: 0.4 CAPSULE ORAL at 07:52

## 2023-11-06 RX ADMIN — ATROPINE SULFATE 1 DROP: 10 SOLUTION/ DROPS OPHTHALMIC at 17:37

## 2023-11-06 RX ADMIN — FLUOXETINE 20 MG: 20 CAPSULE ORAL at 07:52

## 2023-11-06 RX ADMIN — Medication 500 MCG: at 07:53

## 2023-11-06 RX ADMIN — Medication 10 MG: at 19:12

## 2023-11-06 RX ADMIN — BUSPIRONE HYDROCHLORIDE 10 MG: 10 TABLET ORAL at 19:11

## 2023-11-06 RX ADMIN — Medication 0.25 MG: at 07:53

## 2023-11-06 RX ADMIN — BUSPIRONE HYDROCHLORIDE 10 MG: 10 TABLET ORAL at 15:58

## 2023-11-06 RX ADMIN — ATROPINE SULFATE 2 DROP: 10 SOLUTION/ DROPS OPHTHALMIC at 13:46

## 2023-11-06 RX ADMIN — MICONAZOLE NITRATE: 20 POWDER TOPICAL at 11:25

## 2023-11-06 ASSESSMENT — ACTIVITIES OF DAILY LIVING (ADL)
ADLS_ACUITY_SCORE: 88
ADLS_ACUITY_SCORE: 88
ORAL_HYGIENE: INDEPENDENT
ADLS_ACUITY_SCORE: 88
HYGIENE/GROOMING: HANDWASHING;INDEPENDENT
DRESS: SCRUBS (BEHAVIORAL HEALTH);INDEPENDENT;PROMPTS;WITH ASSISTANCE
ADLS_ACUITY_SCORE: 88
LAUNDRY: WITH SUPERVISION

## 2023-11-06 NOTE — CARE PLAN
11/06/23 1531   Group Therapy Session   Group Attendance attended group session   Time Session Began 1315   Time Session Ended 1415   Total Time (minutes) 10  (no charge)   Total # Attendees 5   Group Type expressive therapy;psychotherapeutic   Group Topic Covered coping skills/lifestyle management;relapse prevention;cognitive therapy techniques;balanced lifestyle;structured socialization   Group Session Detail Participated in a group focused on the topic of identifying progress noted since admission, coping strategies that were helpful in the past, what is helping currently and setting a goal for the day.      Patient Response/Contribution cooperative with task   Patient Participation Detail Attempted to participate though his drooling was so prevalent, he seemed self conscious about it, even with using a paper towel and left the room with his 1:1 staff.
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
## 2023-11-06 NOTE — PLAN OF CARE
Assessment/Intervention/Current Symtoms and Care Coordination:  Reviewed chart. Met with team to review patient's current functioning, needs, progress, and impediments to discharge. Pt on SIO for safety. Pt's affect is flat. Pt has placement secured, awaiting negotiations to be completed between [facility] and [facility]. Pt continues to be confused but is appropriate and redirectable. Pt has been able to verbalize his violent thoughts and not act on them. Pt continues to experience tremor, OT ordered weighted cups and bowl to assist with this. Although, pt has been resistive to adaptive equipment. Pt also endorses embarrassment with tremor. Pt has been attending groups but cannot stay for the whole time. Pt now on 1:1.    Writer put out email to Regina SANTIAGO CM ([email or web address] to inquire about updates.    Discharge Plan or Goal:  Will discharge to [facility] [place]: [address] [place] [id number]     Barriers to Discharge:  Discharge pending completion of MNChoices assessment and negotiation process with [facility]. Pt completed MNChoices assessment on 10/27    Referral Status:  Pt accepted at [facility]. For more comprehensive list of referrals see CTC note from 10/6  Pt will need Clozaril lab appointments to be scheduled prior to discharge  Psychiatry appointment scheduled  PCP appointment scheduled  Psychiatrist - Dr. Santana at [facility]  P: [phone]   PCP - Attila Urena    Legal Status:  Committed MI with ITP  [facility]: [place]  File Number: [id number]  ITP includes: Clozaril, Zyprexa, Seroquel, and Risperdal  Expires: 2024    Contacts:  Vicky Marin CM with [facility] (KING per commitment)  P: [phone]  Regina SANTIAGO CM with [facility] (KING per commitment)  P: [phone]  E: [email or web address]  Alberta - Mom (KING signed)  P: [phone] (H) [phone] (M)   Ino - Elliott (KING signed)  P:  [phone] (H)  Sandra Treadwell - Sister (KING signed)  P: [phone]  [facility]'s Office   F: [phone]  Jovana Sánchez - [employer] [facility]  P: [phone]  E: [email or web address]  Alexa Arteaga - [employer]    F: [phone]  E: [email or web address].    Upcoming Meetings and Dates/Important Information and next steps:  CTC to coordinate with CADI CM and Jovana regarding placement needs  CTC to coordinate with [facility]I CM regarding rate negotiation
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
## 2023-11-06 NOTE — PLAN OF CARE
Problem: Sleep Disturbance  Goal: Adequate Sleep/Rest  Outcome: Progressing   Goal Outcome Evaluation:       Received sleeping in bed at report. Slept for a total of 10.0 hours.  Pt remains on SIO 10 ft r/t severe intrusiveness and assault risk. No medication given this shift. No concern reported. No behavioral activity tonight. Monitor per plan of care.

## 2023-11-06 NOTE — PLAN OF CARE
BEH IP Unit Acuity Rating Score (UARS)  Patient is given one point for every criteria they meet.    CRITERIA SCORING   On a 72 hour hold, court hold, committed, stay of commitment, or revocation 1    Patient LOS on BEH unit exceeds 20 days 1  LOS: 164   Patient under guardianship, 55+, otherwise medically complex, or under age 11 1   Suicide ideation without relief of precipitating factors 0   Current plan for suicide 0   Current plan for homicide 0   Imminent risk or actual attempt to seriously harm another without relief of factors precipitating the attempt 0   Severe dysfunction in daily living (ex: complete neglect for self care, extreme disruption in vegetative function, extreme deterioration in social interactions) 1   Recent (last 7 days) or current physical aggression in the ED or on unit 1   Restraints or seclusion episode in past 72 hours 0   Recent (last 7 days) or current verbal aggression, agitation, yelling, etc., while in the ED or unit 0   Active psychosis 0   Need for constant or near constant redirection (from leaving, from others, etc).  1   Intrusive or disruptive behaviors 0   TOTAL 6

## 2023-11-07 PROBLEM — T88.7XXA SIDE EFFECT OF MEDICATION: Status: ACTIVE | Noted: 2023-11-07

## 2023-11-07 PROCEDURE — 250N000013 HC RX MED GY IP 250 OP 250 PS 637: Performed by: PSYCHIATRY & NEUROLOGY

## 2023-11-07 PROCEDURE — 93010 ELECTROCARDIOGRAM REPORT: CPT | Performed by: INTERNAL MEDICINE

## 2023-11-07 PROCEDURE — 124N000002 HC R&B MH UMMC

## 2023-11-07 PROCEDURE — 250N000013 HC RX MED GY IP 250 OP 250 PS 637: Performed by: PHYSICIAN ASSISTANT

## 2023-11-07 PROCEDURE — 250N000013 HC RX MED GY IP 250 OP 250 PS 637: Performed by: STUDENT IN AN ORGANIZED HEALTH CARE EDUCATION/TRAINING PROGRAM

## 2023-11-07 PROCEDURE — 93005 ELECTROCARDIOGRAM TRACING: CPT

## 2023-11-07 RX ORDER — BUSPIRONE HYDROCHLORIDE 15 MG/1
15 TABLET ORAL 3 TIMES DAILY
Status: DISCONTINUED | OUTPATIENT
Start: 2023-11-07 | End: 2023-11-13 | Stop reason: HOSPADM

## 2023-11-07 RX ORDER — RISPERIDONE 0.5 MG/1
0.5 TABLET, ORALLY DISINTEGRATING ORAL 2 TIMES DAILY
Status: DISCONTINUED | OUTPATIENT
Start: 2023-11-07 | End: 2023-11-13 | Stop reason: HOSPADM

## 2023-11-07 RX ADMIN — ATROPINE SULFATE 2 DROP: 10 SOLUTION/ DROPS OPHTHALMIC at 08:11

## 2023-11-07 RX ADMIN — Medication 0.25 MG: at 13:56

## 2023-11-07 RX ADMIN — Medication 0.25 MG: at 19:30

## 2023-11-07 RX ADMIN — BUSPIRONE HYDROCHLORIDE 15 MG: 15 TABLET ORAL at 16:59

## 2023-11-07 RX ADMIN — PRAZOSIN HYDROCHLORIDE 1 MG: 1 CAPSULE ORAL at 19:30

## 2023-11-07 RX ADMIN — NAPROXEN 250 MG: 250 TABLET ORAL at 08:11

## 2023-11-07 RX ADMIN — PROPRANOLOL HYDROCHLORIDE 20 MG: 10 TABLET ORAL at 08:10

## 2023-11-07 RX ADMIN — DIVALPROEX SODIUM 250 MG: 125 CAPSULE, COATED PELLETS ORAL at 08:10

## 2023-11-07 RX ADMIN — DIVALPROEX SODIUM 250 MG: 125 CAPSULE, COATED PELLETS ORAL at 17:00

## 2023-11-07 RX ADMIN — OLANZAPINE 15 MG: 10 TABLET, ORALLY DISINTEGRATING ORAL at 19:30

## 2023-11-07 RX ADMIN — POLYETHYLENE GLYCOL 3350 17 G: 17 POWDER, FOR SOLUTION ORAL at 19:29

## 2023-11-07 RX ADMIN — MICONAZOLE NITRATE: 20 POWDER TOPICAL at 10:20

## 2023-11-07 RX ADMIN — NAPROXEN 250 MG: 250 TABLET ORAL at 17:00

## 2023-11-07 RX ADMIN — Medication 25 MCG: at 08:11

## 2023-11-07 RX ADMIN — SENNOSIDES 2 TABLET: 8.6 TABLET ORAL at 08:11

## 2023-11-07 RX ADMIN — FLUOXETINE 20 MG: 20 CAPSULE ORAL at 08:10

## 2023-11-07 RX ADMIN — OLANZAPINE 15 MG: 10 TABLET, ORALLY DISINTEGRATING ORAL at 08:11

## 2023-11-07 RX ADMIN — PROPRANOLOL HYDROCHLORIDE 20 MG: 10 TABLET ORAL at 11:58

## 2023-11-07 RX ADMIN — RISPERIDONE 0.5 MG: 0.5 TABLET, ORALLY DISINTEGRATING ORAL at 19:30

## 2023-11-07 RX ADMIN — Medication 500 MCG: at 08:10

## 2023-11-07 RX ADMIN — CLOZAPINE 650 MG: 200 TABLET ORAL at 11:57

## 2023-11-07 RX ADMIN — PROPRANOLOL HYDROCHLORIDE 20 MG: 10 TABLET ORAL at 19:30

## 2023-11-07 RX ADMIN — RISPERIDONE 0.5 MG: 0.5 TABLET, ORALLY DISINTEGRATING ORAL at 10:19

## 2023-11-07 RX ADMIN — Medication 10 MG: at 19:29

## 2023-11-07 RX ADMIN — ATROPINE SULFATE 2 DROP: 10 SOLUTION/ DROPS OPHTHALMIC at 14:00

## 2023-11-07 RX ADMIN — SENNOSIDES 2 TABLET: 8.6 TABLET ORAL at 19:29

## 2023-11-07 RX ADMIN — PROPRANOLOL HYDROCHLORIDE 20 MG: 10 TABLET ORAL at 16:59

## 2023-11-07 RX ADMIN — GABAPENTIN 300 MG: 300 CAPSULE ORAL at 08:10

## 2023-11-07 RX ADMIN — GABAPENTIN 400 MG: 100 CAPSULE ORAL at 19:30

## 2023-11-07 RX ADMIN — GABAPENTIN 400 MG: 100 CAPSULE ORAL at 13:57

## 2023-11-07 RX ADMIN — TAMSULOSIN HYDROCHLORIDE 0.4 MG: 0.4 CAPSULE ORAL at 08:11

## 2023-11-07 RX ADMIN — BUSPIRONE HYDROCHLORIDE 15 MG: 15 TABLET ORAL at 11:58

## 2023-11-07 RX ADMIN — BUSPIRONE HYDROCHLORIDE 15 MG: 15 TABLET ORAL at 19:30

## 2023-11-07 RX ADMIN — Medication 0.25 MG: at 08:10

## 2023-11-07 RX ADMIN — DIVALPROEX SODIUM 250 MG: 125 CAPSULE, COATED PELLETS ORAL at 11:58

## 2023-11-07 RX ADMIN — POLYETHYLENE GLYCOL 3350 17 G: 17 POWDER, FOR SOLUTION ORAL at 08:10

## 2023-11-07 ASSESSMENT — ACTIVITIES OF DAILY LIVING (ADL)
ADLS_ACUITY_SCORE: 90
ADLS_ACUITY_SCORE: 88
ADLS_ACUITY_SCORE: 90
ADLS_ACUITY_SCORE: 88
HYGIENE/GROOMING: WITH ASSISTANCE;PROMPTS
HYGIENE/GROOMING: WITH ASSISTANCE
ORAL_HYGIENE: PROMPTS;INDEPENDENT
DRESS: INDEPENDENT
DRESS: INDEPENDENT
ADLS_ACUITY_SCORE: 90
LAUNDRY: UNABLE TO COMPLETE
ADLS_ACUITY_SCORE: 88
ADLS_ACUITY_SCORE: 88
ORAL_HYGIENE: PROMPTS;INDEPENDENT
ADLS_ACUITY_SCORE: 90
ADLS_ACUITY_SCORE: 88

## 2023-11-07 NOTE — CARE PLAN
Occupational Therapy     11/07/23 1400   Group Therapy Session   Group Attendance attended group session   Time Session Began 1015   Time Session Ended 1115   Total Time (minutes) 25   Total # Attendees 3-4   Group Type expressive therapy;task skill   Group Topic Covered cognitive activities;coping skills/lifestyle management;leisure exploration/use of leisure time;problem-solving;structured socialization   Group Session Detail OT: Education on healthy activity engagement and creative hands-on endeavor (OT clinic) to increase concentration, focus, attention to task/detail, decision making, problem solving, frustration tolerance, task follow through, coping with stress, healthy leisure engagement, creative expression, and social engagement   Patient Response/Contribution other (see comments);refused to participate  (negative; semi-argumentative; irritable)   Patient Participation Detail Pt sat among peers and was asked to continue to work on creative hands on endeavor from a previous session. Pt refused multiple times to initiate and engage in task. Pt made several negative comments about the project and his ability to complete the project. Therapist and SIO staff encouraged pt to make positive comments about his project and the group, as a way to change his mindset. Pt resistant to suggestions from staff and increasingly became irritable with staff. Pt abruptly stood up and left group area; pt did not return. Pt started with 1:1 SIO and changed to 2:1 SIO during group.
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
## 2023-11-07 NOTE — PLAN OF CARE
Assessment/Intervention/Current Symtoms and Care Coordination:  Reviewed chart. Met with team to review patient's current functioning, needs, progress, and impediments to discharge. Pt on SIO for safety. Pt's affect is flat. Pt has placement secured, awaiting negotiations to be completed between [facility] and [facility]. Pt continues to be confused but is appropriate and redirectable. Pt endorses embarrassment with tremor. Pt has been attending groups but cannot stay for the whole time. Pt to switch to 2:1 due to increase in aggressive behavior.    Writer received VM and email from Regina MALONE, relaying that pt's paperwork is fully complete and writer can coordinate with [facility] to schedule a discharge date/time.    Writer put out email to [facility] with [facility] ([email or web address]) inquiring about discharge date/time, discharge needs, and pharmacy for Clozaril.    Writer put out email to Vicky MALONE ([email or web address]) with discharge plan update.    Writer received email from [facility] reporting that pt will need medical bed at discharge. Writer informed MD. [facility] requests updated notes and inquires about transportation plan. Jovana relays the pharmacy they use. Writer put out return email with records attached and relayed plan to request medical bed.    Writer spoke with [facility] ([phone]) regarding medical bed. [facility] inquires if [facility] CM could assist with helping pt rent a medical bed. [facility] relays if not, CTC could initiate process and pt could move in prior to medical bed being delivered. [facility] reports that if all discharge needs are completed/initiated by Friday, pt could move in Friday or next Monday. Please DO NOT inform pt of this until discharge date is fully confirmed. Jovana relayed plan to send email with comprehensive list of discharge needs.    Writer received email from [facility] with list of discharge needs.    Discharge Plan or Goal:  Will discharge to [facility] [place] [address]  [place] [address]     Barriers to Discharge:  Jovana with [facility] requesting admission tasks be completed prior to move-in. CTC to follow up    Referral Status:  Pt accepted at [facility]. For more comprehensive list of referrals see CTC note from 10/6  Pt will need Clozaril lab appointments to be scheduled prior to discharge  Psychiatry appointment scheduled  PCP appointment scheduled  Psychiatrist - Dr. Santana at [facility]  P: [phone]   PCP - Attila Urena  [facility] uses [facility] [address] [place] [address]    Legal Status:  Committed MI with ITP  [facility]: [place]  File Number: [id number]  ITP includes: Clozaril, Zyprexa, Seroquel, and Risperdal  Expires: 01/06/2024    Contacts:  Vicky Marin CM with [facility] (KING per commitment)  P: [phone]  Regina SANTIAGO CM with [facility] (KING per commitment)  P: [phone]  E: [email or web address]  [place] - Mom (KING signed)  P: [phone] (H) [phone] (M)   [place] - Dad (KING signed)  P: [phone] (H)  Sandra Treadwell - Sister (KING signed)  P: [phone]  [facility]'s Office   F: [phone]  [facility]  P: [phone]  E: [email or web address]  [employer]    F: [phone]  E: [email or web address]    Upcoming Meetings and Dates/Important Information and next steps:  CTC to coordinate DME (medical bed)  CTC to coordinate with Jovana regarding other discharge needs

## 2023-11-07 NOTE — PLAN OF CARE
Problem: Sleep Disturbance  Goal: Adequate Sleep/Rest  Outcome: Progressing     Problem: Behavioral Disturbance  Goal: Behavioral Disturbance  Description: Signs and symptoms of listed problems will be absent or manageable by discharge or transition of care.  Outcome: Progressing  Flowsheets (Taken 11/7/2023 0212)  Behavioral Disturbance Assessed:   suicidality   self injury   anxiety  Behavioral Disturbance Present: none   Goal Outcome Evaluation:         Pt observed during q 15 minutes safety rounds check to have a restful night. Pt slept approximately 9 hours overnight. Respiration even and unlabored. Pt awakened and assist to the bathroom x 1 with large void. No signs of any acute distress or reports of pain.

## 2023-11-07 NOTE — PROGRESS NOTES
PSYCHIATRY PROGRESS NOTE         DATE OF SERVICE:   11/6/2023         CHIEF COMPLAINT:     Suicidal thoughts, muscle twitching, response to medications          OBJECTIVE:     Nursing reports : takes medications, on SIO for impulsive behavior which may lead to self injurious behavior, slept through the night      reports working on outpatient referrals, patient is under commitment with shay Pozo  Will see Dr Santana on the OP basis, accepted at [facility]  Contacts:  Assisted Living: [facility], [phone]  Per H&P: Vicky Valdez , [phone], psychiatric provider Dr. Santana at [facility], [phone], primary care provider Attila Urena,     Medicine consult by JUSTINA Gibson  6/17/2023  Brief Medicine Note  Medicine consulted by Dr. Rhodes of Psychiatry for right toe fracture. Patient injured his right toe while banging his foot into the wall or door of his room.    X-ray of the right foot yesterday revealed an acute comminuted and placed fracture throughout the first right distal phalanx with intra-articular extension.  Orthopedics consulted yesterday and evaluated the patient.  They recommend a postop shoe with weightbearing as tolerated.  They will be asking podiatry to see the patient either during this hospitalization or in clinic.  Orthopedics is additionally recommending a 7-day course of prophylactic Augmentin or clindamycin for his open blister near the fracture site.   Plan:   - Agree with plan of care as already in place by Orthopedics   - Patient received post-op shoe yesterday   - I ordered Augmentin twice daily to complete a 7-day course    - He is overdue for his tetanus vaccination, this was due in 2010.  He additionally is in need of a booster due to his foot injury as recommended by orthopedics.  Tdap booster ordered, please administer as able.     Medicine consult by Eva Morgan CNP on  "8/4/2023  Assessment & Plan  Vinod Lee is a 63 year old male admitted on 5/26/2023. He has a past medical history of schizophrenia, Parkinson's Disease, who was initially admitted on 5/18/23 for AMS, aggression. Broad workup negative, and ultimately transferred to station [facility] on 5/26/23 for further psychiatric monitoring and management. Medicine has been involved intermittently throughout his hospitalization and is most recently being consulted on 8/3 for evaluation of skin changes of R foot and purplish discoloration of both feet below the ankles.  Medicine will sign off at this time.  Please contact us if patient develops assisted systemic symptoms, increasing rash, or new discoloration of feet. Please also consider a dermatology consult if patient does not improve on therapy as below.  # Rash, right foot concerning for psoriasis  # Psoriasis hx  # Purplish discoloration of B/LE feet-resolved   Discussed with Dr. Rene and bedside RN regarding rash on right foot that appears and appears to be purplish in color and almost \"petechiae.\" It was noted during interview, but seemed to be resolving upon walking. Within the past 24 hrs, pt has had ECT and seemed more confused than usual. Pt seems to have had a right great toe wound with recent right great toe fracture seen by Medicine on 7/16. Seen on 8/4 with improving color on B/L legs at rest and appears to have small, scaly patches of varying coin sizes that have not grown past marked borders. See photos. Per further chart review, has had psoriasis history. WBC WNL, no fevers, HDS. This appears to more consistent with a psoriasis like picture.   - Start triamcinolone topical 0.025%   -Apply twice daily until lesions for 2 weeks or until lesions resolve/  -Monitor for skin thinning, striae, rebound lesion flares.   - Start Eucerin cream              - Apply 30 minutes PRIOR to triamcinolone topical cream above or PRN as needed if dry skin/after bathing   - " Medicine will sign off.   - For worsening pain, spread, itchiness, fevers, please contact Medicine and possibly Dermatology.     ECT # 11 by Deja Hicks on 8/25/2023   Type of ECT:  Bilateral, standard   ECT Strip Summary:    Motor Seizure Duration: 46  seconds    EEG Seizure Duration: 78 seconds - low amplitude  Give klonopin 0.5 mg as he is ready to leave the PACU (helps agitation once back on unit)  Complication:  poor quality, low amplitude seizures, with poor anti-seizure response - despite max ECT dosing, etomidate, hyperventilation, caffeine (to prolong).           SUBJECTIVE:      Vinod reports thoughts of wanting to hurt SIO, but he is restraining himself.  No thoughts of hurting himself.  Presently denies hallucinations, denies paranoia. Denies depression. Irritable from drooling  He is focused on severe drooling.  He agrees to take atropine.  Drooling is a side effect of Clozaril.  I have seen this patient multiple times in the past and  I cannot see in this much drooling as today. It is disturbing to him.  He denies other medical problems.  He does not go to groups.  He is on one-to-one SIO for safety.  He did act out last week on his thoughts to hurt SIO, and staff reported that he put his hands around SIO's neck.  says that she is close to arranging future placement  for him.          MEDICATIONS:      atropine  2 drop Sublingual TID    busPIRone  10 mg Oral TID    cloZAPine  675 mg Oral Daily with lunch    cyanocobalamin  500 mcg Sublingual Daily    divalproex sodium delayed-release  250 mg Oral TID w/meals    FLUoxetine  20 mg Oral Daily    gabapentin  300 mg Oral TID    LORazepam  0.25 mg Oral TID    melatonin  10 mg Oral At Bedtime    miconazole   Topical BID    naproxen  250 mg Oral BID w/meals    OLANZapine zydis  15 mg Oral BID    Or    OLANZapine  10 mg Intramuscular BID    polyethylene glycol  17 g Oral BID    prazosin  1 mg Oral At Bedtime    propranolol  20 mg Oral 4x Daily     "sennosides  2 tablet Oral BID    tamsulosin  0.4 mg Oral Daily    cholecalciferol  25 mcg Oral Daily     acetaminophen, albuterol, atropine, benzonatate, bisacodyl, haloperidol **AND** [DISCONTINUED] LORazepam **AND** diphenhydrAMINE, haloperidol lactate **AND** [DISCONTINUED] LORazepam **AND** diphenhydrAMINE, gabapentin, lidocaine, mineral oil-white petrolatum, OLANZapine **OR** OLANZapine, senna-docusate, simethicone, traZODone    Medication adherence: Yes  Medication side effects: severe siallorhea/drooling,Prolonged QT QTc 444/524 on 10/31/2023, patient has had multiple as needed neuroleptics, which can further contribute to prolongation of QT QTc  Benefit: Limited symptom reduction on Clozaril, Zyprexa and Depakote, had ECT due to side effects of medications, and limited response , but there has been improvement in intensity and frequency of reported hallucinations and delusions,         ROS:   As per history of present illness, otherwise review of systems is negative for: General, eyes, ears, nose, throat, neck, respiratory, cardiovascular, gastrointestinal, genitourinary, musculoskeletal, neurological, hematological and endocrine system.         MENTAL STATUS EXAM:   /66   Pulse 80   Temp 97.3  F (36.3  C) (Oral)   Resp 16   Ht 1.753 m (5' 9\")   Wt 85.7 kg (188 lb 15 oz)   SpO2 96%   BMI 27.90 kg/m      Appearance: Fair hygiene  Orientation: to self , place and fully in time   Speech: Normal in rate and tone  Language ability: Normal syntax and vocabulary  Thought process: concrete   Thought content: chronic delusions , denies hallucinations now  Associations: Connected  Suicidal Ideation: yes , says he would like to kill himself   Homicidal Ideation: off and on, has urges, can control it   Mood: Depressed  Affect:  irritable from drooling   Intellectual functioning:average  Fund of Knowledge: consistent with education and experience   Attention/Concentration: likely at his baseline   Memory: " intact  Psychomotor Behavior: mild agitation   Muscle Strength and Tone: no atrophy or involuntary movement  Gait and Station: steady  Insight and judgement:impaired          LABS:   personally reviewed.     Lab Results   Component Value Date     06/07/2023     05/25/2023     05/24/2023    CO2 26 06/07/2023    CO2 26 05/25/2023    CO2 27 05/24/2023    BUN 17.7 06/07/2023    BUN 20.0 05/25/2023    BUN 18.8 05/24/2023     No results found for: CKTOTAL, CKMB, TROPONINI  Lab Results   Component Value Date    WBC 9.0 06/22/2023    WBC 8.8 06/20/2023    WBC 8.3 06/14/2023    HGB 12.4 06/22/2023    HGB 12.5 06/20/2023    HGB 12.0 06/14/2023    HCT 38.5 06/22/2023    HCT 39.6 06/20/2023    HCT 37.3 06/14/2023    MCV 87 06/22/2023    MCV 88 06/20/2023    MCV 85 06/14/2023     06/22/2023     06/20/2023     06/14/2023       [facility] Reference Range & Units 05/25/23 05:25 05/26/23 13:49 05/26/23 18:02   Sodium 136 - 145 mmol/L 141       Potassium 3.4 - 5.3 mmol/L 3.5       Chloride 98 - 107 mmol/L 106       Carbon Dioxide (CO2) 22 - 29 mmol/L 26       Urea Nitrogen 8.0 - 23.0 mg/dL 20.0       Creatinine 0.67 - 1.17 mg/dL 1.03       GFR Estimate >60 mL/min/1.73m2 82       Calcium 8.8 - 10.2 mg/dL 8.7 (L)       Anion Gap 7 - 15 mmol/L 9       Magnesium 1.7 - 2.3 mg/dL 2.1       Phosphorus 2.5 - 4.5 mg/dL 3.8       Albumin 3.5 - 5.2 g/dL 3.7       Protein Total 6.4 - 8.3 g/dL 5.1 (L)       Alkaline Phosphatase 40 - 129 U/L 62       ALT 10 - 50 U/L 11       AST 10 - 50 U/L 18       Bilirubin Total <=1.2 mg/dL 0.3       Glucose 70 - 99 mg/dL 104 (H)       GLUCOSE BY METER POCT 70 - 99 mg/dL   127 (H) 102 (H)   WBC 4.0 - 11.0 10e3/uL 8.7       Hemoglobin 13.3 - 17.7 g/dL 11.3 (L)       Hematocrit 40.0 - 53.0 % 34.5 (L)       Platelet Count 150 - 450 10e3/uL 133 (L)       RBC Count 4.40 - 5.90 10e6/uL 4.02 (L)       MCV 78 - 100 fL 86       MCH 26.5 - 33.0 pg 28.1       MCHC 31.5 - 36.5 g/dL 32.8        RDW 10.0 - 15.0 % 15.3 (H)          10/27/2023   WBC 9.0  ANC 6.1    EKG on 6/16/2023  QT QTc 434/539, sinus rhythm with premature atrial complexes, left bundle branch block    EKG on 6/23/2023  QT QTc 414/506, sinus rhythm, left bundle branch block    EKG on 8/7/2023   QT Qtc 430/514, sinus rhythm with premature atrial complexes, left bundle branch block    10/17/2023  Qt qtc 440/523           Sinus rhythm , left bundle branch           block ,abnormal ECG,when           compared with ECG of 10-OCT-2023,             No significant change was found            Confirmed by MD MATTA DAVID     10/24/2023  Qt qtc 482/527Sinus rhythm with sinus arrhythmia .Left bundle branch block   Abnormal ECG . When compared with ECG of 17-OCT-2023 09:20, no significant change was found   Confirmed by fellow Kranthi Calvillo ([id number]) on 10/25/2023       10/31/2023  QT /524  Sinus rhythm   Left bundle branch block   Abnormal ECG   When compared with ECG of 24-OCT-2023 09:59,   No significant change was found            CT HEAD W/O CONTRAST 5/25/2023 12:39 AM   Provided History: Patient running in hallway, fell forward, difficult  to tell if he hit his head. Acutely psychotic, unable to get history   Comparison: CT head 5/20/2023.  Technique: Using multidetector thin collimation helical acquisition  technique, axial, coronal and sagittal CT images from the skull base  to the vertex were obtained without intravenous contrast.    Findings:    No intracranial hemorrhage. No mass effect. No midline shift. No  extra-axial fluid collection. The gray to white matter differentiation  of the cerebral hemispheres is preserved. Ventricles are proportionate  to the sulci. No sulcal effacement. The basal cisterns are patent.  Mild diffuse cerebral atrophy.   Polypoid mucosal thickening of the right maxillary sinus.The  visualized paranasal sinuses are otherwise clear. The mastoid air  cells are clear. Orbits appear unremarkable. No acute  fracture   Impression: No acute intracranial pathology.           DIAGNOSIS:     Paranoid schizophrenia chronic with acute exacerbation   Neuroleptic induced Parkinson's disease  Side effects of medications     Patient Active Problem List   Diagnosis    Urinary retention    Schizophrenia, unspecified type (H)    Altered mental status, unspecified altered mental status type    Mood changes    Paranoid schizophrenia, chronic condition with acute exacerbation (H)    Neuroleptic-induced parkinsonism     Agitation          PLAN:   Patient  has diagnosis of schizophrenia since 1980s.  He was on Clozaril for 25 years.  It was tapered and discontinued in May 7, 2023 due to prolonged QT QTc of 527.  His psychotic symptoms have worsened since then.   He is under commitment , requested [facility] and forced blood draw for Clozaril.  He is under commitment with Pozo neuroleptic and Lorenzo Barriospherd, ECT order.  He has severe persistent mental illness.  He is on Clozaril.  He has abnormal EKG, no symptoms.  He is on multiple neuroleptics, but he is so symptomatic that he needs these medications.  He had 11 ECT sessions, the last on 8/25/2023.  He is on SIO for safety.  He is followed by medicine for nonpsychiatric problems.  These are recommendations for treatment:    Medications:  Discontinue prn Zyprexa, pt is already on 30 mg daily, which should be max dose  Clozapine 675 mg daily for schizophrenia Zyprexa 15 mg bid for schizophrenia  Atropine 1% ophthalmic solution 1 drop tid for secretion, and 1 drop bid prn  Depakote  mg 3 times daily for mood stabilization  Prozac 20 mg daily for depression    Gabapentin 300 mg 3 times daily for anxiety  Ativan 0.25 mg 3 times daily for anxiety,   Propranolol 20 mg tid for anxiety, may cause hypotension with CLozaril  Trazodone 50 -100 mg nightly as needed for sleep  Melatonin 10 mg nightly for sleep  Prazosin 1 mg at bedtime for nightmares  Miralax 17 g daily constipation  Senokot 8.6  mg twice daily for constipation  Vitamin D 1000 unit(s) daily  Hannah-Colace 1 p.o. twice daily as needed for constipation  Dulcolax suppository 10 mg rectally daily as needed for constipation  Flomax 0.4 mg for urinary retention  Tylenol 650 mg every 4 hours as needed for pain  Haldol 5 mg every 6 hours as needed with Benadryl 50 mg every 6 hours as needed for agitation  Precautions:   Suicide precautions  SIB precautions   Assault precautions  Elopement precautions  Fall precautions   SIO for safety  No roommate  Medical bad   Legal: Commitment with Pozo neuroleptic order and Lorenzo Holloway ECT order  Full code   will collect collateral information and make outpatient referrals  Staff to provide emotional support and redirect as needed  Patient encouraged to attend groups  Lab results: Reviewed personally  Consultation: medicine consult completed  Continue SIO for safety    Risk Assessment: commitment with Pozo and for forced blood draw and ECT recommended for safety, stabilization and medication management, chronic persistent mental illness patient with limited response to medication, side effects of medications, undergoing ECT    Coordination of Care:   Patient seen, medical record reviewed, care coordinated with the team.    Total time: More than 35 minutes spent on this visit with more than 50% of time spent with coordination of care with staff, team meeting, reviewing the chart, psychoeducation of this patient, providing supportive therapy regarding above symptoms, orders and documentation.    This document is created with the help of Dragon dictation system.  All grammatical/typing errors or context distortion are unintentional and inherent to software.    Misty Portillo MD        Re-Certification I certify that the inpatient psychiatric facility services furnished since the previous certification were, and continue to be, medically necessary for, either, treatment which could reasonably be  expected to improve the patient s condition or diagnostic study and that the hospital records indicate that the services furnished were, either, intensive treatment services, admission and related services necessary for diagnostic study, or equivalent services.     I certify that the patient continues to need, on a daily basis, active treatment furnished directly by or requiring the supervision of inpatient psychiatric facility personnel.   I estimate TBD days of hospitalization is necessary for proper treatment of the patient. My plans for post-hospital care for this patient are : Medications, appointments     Misty Portillo MD

## 2023-11-07 NOTE — PLAN OF CARE
"  Problem: Psychotic Symptoms  Goal: Psychotic Symptoms  Description: Signs and symptoms of listed problems will be absent or manageable.  Outcome: Not Progressing     Problem: Psychotic Signs/Symptoms  Goal: Improved Mood Symptoms  Outcome: Not Progressing   Goal Outcome Evaluation:    Plan of Care Reviewed With: patient      Patient is calm, affect is flat. Patient is medication compliant, denies pain. Patient visible for meals, appetite is good. Isolated and withdrawn this shift, was encouraged to attend OT group, which patient did. Patient endorses anxiety but denies SI/SIB/AVH/thoughts to harm self or others. Patient continues to believe food is poisoned.  Risperidone added   Gabapentin and Buspar increased  Clozapine decreased    Patient had EKG preliminary results available:     \"** Poor data quality, interpretation may be adversely affected   Sinus rhythm with Premature supraventricular complexes   Left bundle branch block   Abnormal ECG   When compared with ECG of 31-OCT-2023 09:11,   Premature supraventricular complexes are now Present \"     /76   Pulse 85   Temp (!) 96.5  F (35.8  C) (Temporal)   Resp 16   Ht 1.753 m (5' 9\")   Wt 85.7 kg (188 lb 15 oz)   SpO2 95%   BMI 27.90 kg/m      "

## 2023-11-07 NOTE — PLAN OF CARE
BEH IP Unit Acuity Rating Score (UARS)  Patient is given one point for every criteria they meet.    CRITERIA SCORING   On a 72 hour hold, court hold, committed, stay of commitment, or revocation 1    Patient LOS on BEH unit exceeds 20 days 1  LOS: 165   Patient under guardianship, 55+, otherwise medically complex, or under age 11 1   Suicide ideation without relief of precipitating factors 0   Current plan for suicide 0   Current plan for homicide 0   Imminent risk or actual attempt to seriously harm another without relief of factors precipitating the attempt 0   Severe dysfunction in daily living (ex: complete neglect for self care, extreme disruption in vegetative function, extreme deterioration in social interactions) 1   Recent (last 7 days) or current physical aggression in the ED or on unit 1   Restraints or seclusion episode in past 72 hours 0   Recent (last 7 days) or current verbal aggression, agitation, yelling, etc., while in the ED or unit 0   Active psychosis 0   Need for constant or near constant redirection (from leaving, from others, etc).  1   Intrusive or disruptive behaviors 1   TOTAL 7

## 2023-11-08 LAB
ATRIAL RATE - MUSE: 80 BPM
DIASTOLIC BLOOD PRESSURE - MUSE: NORMAL MMHG
INTERPRETATION ECG - MUSE: NORMAL
P AXIS - MUSE: 64 DEGREES
PR INTERVAL - MUSE: 140 MS
QRS DURATION - MUSE: 164 MS
QT - MUSE: 458 MS
QTC - MUSE: 528 MS
R AXIS - MUSE: -2 DEGREES
SYSTOLIC BLOOD PRESSURE - MUSE: NORMAL MMHG
T AXIS - MUSE: 131 DEGREES
VENTRICULAR RATE- MUSE: 80 BPM

## 2023-11-08 PROCEDURE — 250N000013 HC RX MED GY IP 250 OP 250 PS 637: Performed by: INTERNAL MEDICINE

## 2023-11-08 PROCEDURE — 99207 PR NO BILLABLE SERVICE THIS VISIT: CPT | Performed by: INTERNAL MEDICINE

## 2023-11-08 PROCEDURE — 250N000013 HC RX MED GY IP 250 OP 250 PS 637: Performed by: PSYCHIATRY & NEUROLOGY

## 2023-11-08 PROCEDURE — 250N000011 HC RX IP 250 OP 636: Mod: JZ | Performed by: PSYCHIATRY & NEUROLOGY

## 2023-11-08 PROCEDURE — 124N000002 HC R&B MH UMMC

## 2023-11-08 PROCEDURE — 250N000013 HC RX MED GY IP 250 OP 250 PS 637: Performed by: PHYSICIAN ASSISTANT

## 2023-11-08 PROCEDURE — 250N000013 HC RX MED GY IP 250 OP 250 PS 637: Performed by: STUDENT IN AN ORGANIZED HEALTH CARE EDUCATION/TRAINING PROGRAM

## 2023-11-08 RX ORDER — LOPERAMIDE HCL 2 MG
2 CAPSULE ORAL 4 TIMES DAILY PRN
Status: DISCONTINUED | OUTPATIENT
Start: 2023-11-08 | End: 2023-11-13 | Stop reason: HOSPADM

## 2023-11-08 RX ADMIN — SENNOSIDES 2 TABLET: 8.6 TABLET ORAL at 08:46

## 2023-11-08 RX ADMIN — DIVALPROEX SODIUM 250 MG: 125 CAPSULE, COATED PELLETS ORAL at 17:52

## 2023-11-08 RX ADMIN — BUSPIRONE HYDROCHLORIDE 15 MG: 15 TABLET ORAL at 12:30

## 2023-11-08 RX ADMIN — OLANZAPINE 15 MG: 10 TABLET, ORALLY DISINTEGRATING ORAL at 08:46

## 2023-11-08 RX ADMIN — GABAPENTIN 400 MG: 100 CAPSULE ORAL at 08:47

## 2023-11-08 RX ADMIN — ATROPINE SULFATE 2 DROP: 10 SOLUTION/ DROPS OPHTHALMIC at 13:35

## 2023-11-08 RX ADMIN — ATROPINE SULFATE 2 DROP: 10 SOLUTION/ DROPS OPHTHALMIC at 09:03

## 2023-11-08 RX ADMIN — MICONAZOLE NITRATE: 20 POWDER TOPICAL at 11:47

## 2023-11-08 RX ADMIN — FLUOXETINE 20 MG: 20 CAPSULE ORAL at 08:47

## 2023-11-08 RX ADMIN — PROPRANOLOL HYDROCHLORIDE 20 MG: 10 TABLET ORAL at 16:18

## 2023-11-08 RX ADMIN — POLYETHYLENE GLYCOL 3350 17 G: 17 POWDER, FOR SOLUTION ORAL at 08:46

## 2023-11-08 RX ADMIN — CLOZAPINE 650 MG: 200 TABLET ORAL at 12:29

## 2023-11-08 RX ADMIN — PROPRANOLOL HYDROCHLORIDE 20 MG: 10 TABLET ORAL at 08:47

## 2023-11-08 RX ADMIN — GABAPENTIN 400 MG: 100 CAPSULE ORAL at 13:36

## 2023-11-08 RX ADMIN — POLYETHYLENE GLYCOL 3350 17 G: 17 POWDER, FOR SOLUTION ORAL at 20:47

## 2023-11-08 RX ADMIN — TAMSULOSIN HYDROCHLORIDE 0.4 MG: 0.4 CAPSULE ORAL at 08:46

## 2023-11-08 RX ADMIN — DIVALPROEX SODIUM 250 MG: 125 CAPSULE, COATED PELLETS ORAL at 12:30

## 2023-11-08 RX ADMIN — MICONAZOLE NITRATE: 20 POWDER TOPICAL at 20:55

## 2023-11-08 RX ADMIN — Medication 0.25 MG: at 08:46

## 2023-11-08 RX ADMIN — SENNOSIDES AND DOCUSATE SODIUM 1 TABLET: 50; 8.6 TABLET ORAL at 20:49

## 2023-11-08 RX ADMIN — RISPERIDONE 0.5 MG: 0.5 TABLET, ORALLY DISINTEGRATING ORAL at 08:46

## 2023-11-08 RX ADMIN — BUSPIRONE HYDROCHLORIDE 15 MG: 15 TABLET ORAL at 16:18

## 2023-11-08 RX ADMIN — ATROPINE SULFATE 2 DROP: 10 SOLUTION/ DROPS OPHTHALMIC at 20:55

## 2023-11-08 RX ADMIN — NAPROXEN 250 MG: 250 TABLET ORAL at 17:52

## 2023-11-08 RX ADMIN — DIVALPROEX SODIUM 250 MG: 125 CAPSULE, COATED PELLETS ORAL at 08:46

## 2023-11-08 RX ADMIN — Medication 0.25 MG: at 13:36

## 2023-11-08 RX ADMIN — Medication 500 MCG: at 08:46

## 2023-11-08 RX ADMIN — PROPRANOLOL HYDROCHLORIDE 20 MG: 10 TABLET ORAL at 12:30

## 2023-11-08 RX ADMIN — LOPERAMIDE HYDROCHLORIDE 2 MG: 2 CAPSULE ORAL at 01:13

## 2023-11-08 RX ADMIN — NAPROXEN 250 MG: 250 TABLET ORAL at 08:47

## 2023-11-08 RX ADMIN — OLANZAPINE 10 MG: 10 INJECTION, POWDER, FOR SOLUTION INTRAMUSCULAR at 22:43

## 2023-11-08 RX ADMIN — Medication 25 MCG: at 08:46

## 2023-11-08 ASSESSMENT — ACTIVITIES OF DAILY LIVING (ADL)
ADLS_ACUITY_SCORE: 90

## 2023-11-08 NOTE — PLAN OF CARE
Assessment/Intervention/Current Symtoms and Care Coordination:  Reviewed chart. Met with team to review patient's current functioning, needs, progress, and impediments to discharge. Pt on SIO for safety. Pt's affect is flat. Pt has placement secured, awaiting admission list to be completed prior to admission. Pt continues to be confused but is appropriate and redirectable. Pt endorses embarrassment with tremor. Pt has been attending groups but cannot stay for the whole time. Pt continues on 2:1. Per staff and provider, pt does not need a medical bed.     Writer spoke with Jovana ([phone]) regarding discharge needs. Writer and Jovana confirmed pt does not need medical bed. Jovana relays the JOLENE will take care of coordinating Clozaril blood draws. Writer relayed plan to finish admission paperwork and schedule transportation. Jovana schedules admission for  at noon.    Discharge Plan or Goal:  Will discharge to [facility]: [address] [place] [address] on       Barriers to Discharge:  Jovana with [facility] requesting admission tasks be completed prior to move-in. CTC to follow up    Referral Status:  Pt accepted at [facility]. For more comprehensive list of referrals see CTC note from 10/6  [facility] coordinating Clozaril lab appointments  Psychiatry appointment scheduled  PCP appointment scheduled  [facility] uses [facility] [address] [place] [address]    Legal Status:  Committed MI with ITP  [facility]: [place]  File Number: [id number]  ITP includes: Clozaril, Zyprexa, Seroquel, and Risperdal  Expires: 2024    Contacts:  Vicky Marin CM with [facility] (KING per commitment)  P: [phone]  Regina SANTIAGO CM with [facility] ([facility] per commitment)  P: [phone]  E: [email or web address]  Alberta - Mom (KING signed)  P: [phone] (H) [phone] (M)   Ino - Dad (KING signed)  P: [phone] (H)  Sandra Treadwell - Sister  (KING signed)  P: [phone]  [facility]'s Office   F: [phone]  Jovana Sánchez - [facility] [place]  P: [phone]  E: [email or web address]  Alexa Davison    F: [phone]  E: [email or web address]    Upcoming Meetings and Dates/Important Information and next steps:  CTC to complete admission check-list  CTC to schedule transportation  Pt in need of PD and COS at discharge  Pt discharging on 11/13 at 11:30am

## 2023-11-08 NOTE — PROGRESS NOTES
Paged by KENNY Grace that patient has had 6 loose bowel movements this evening and is requesting imodium. I ordered this.     - Imodium 2mg PO 4 times daily PRN for diarrhea    Jose Ivy MD

## 2023-11-08 NOTE — PLAN OF CARE
"Goal Outcome Evaluation:    Plan of Care Reviewed With: patient        Problem: Psychotic Symptoms  Goal: Psychotic Symptoms  Description: Signs and symptoms of listed problems will be absent or manageable.  Outcome: Progressing     Problem: Psychotic Signs/Symptoms  Goal: Improved Behavioral Control (Psychotic Signs/Symptoms)  Outcome: Progressing       Patient was visible in the milieu during shift, came out for meals ate 100% of his breakfast with assistance from SIO staff. Patient took all his AM medications. Patient was receptive of assessment, denies SI/SIB/ denies depression and anxiety. Patient continues to be paranoid about his food being poisoned and staff not following hygiene. Patient also verbalized seeing things on the wall and hearing voices, cannot recall what they are telling him. Patient  SIO was changed to 1:1 5 feet for agitation, impulsivity and parkinson's symptoms. Patient received a phone call from stewart, reported that phone call went on well. Staff to continue with plan of care.  No angry or behavioral outbursts during shift.   Patient presented with increased anxiety and drooling at around 2 pm, scheduled medications were administered and resident appeared to calm down after some time.  Blood pressure 117/75, pulse 96, temperature 96.9  F (36.1  C), temperature source Temporal, resp. rate 16, height 1.753 m (5' 9\"), weight 85.7 kg (188 lb 15 oz), SpO2 99%.   "

## 2023-11-08 NOTE — PLAN OF CARE
Problem: Sleep Disturbance  Goal: Adequate Sleep/Rest  Outcome: Progressing   Goal Outcome Evaluation:    Patient remains on SIO 2:1 due to risk for self-injury related to agitation, impulsiveness and history of making sexually inappropriate remarks and gestures.     Patient woke up @ 0040 and  had one episode of loose bowel movement. He claimed that he had five other episodes of loose bowel movements in the evening shift. Night House Officer Dr. Jose Ivy was informed about the current status. Immodium 2 mg. capsule was given @ 0100. No more loose bowel movements noted till the end of the shift.     Patient also complained about swelling on his right hand. No pain noted. Right hand elevated and an ice pack was applied on the site. Swelling has lessened.    No behavioral issues noted this shift. Slept for a total of 7.5 hours.

## 2023-11-08 NOTE — PLAN OF CARE
BEH IP Unit Acuity Rating Score (UARS)  Patient is given one point for every criteria they meet.    CRITERIA SCORING   On a 72 hour hold, court hold, committed, stay of commitment, or revocation 1    Patient LOS on BEH unit exceeds 20 days 1  LOS: 166   Patient under guardianship, 55+, otherwise medically complex, or under age 11 1   Suicide ideation without relief of precipitating factors 0   Current plan for suicide 0   Current plan for homicide 0   Imminent risk or actual attempt to seriously harm another without relief of factors precipitating the attempt 0   Severe dysfunction in daily living (ex: complete neglect for self care, extreme disruption in vegetative function, extreme deterioration in social interactions) 1   Recent (last 7 days) or current physical aggression in the ED or on unit 1   Restraints or seclusion episode in past 72 hours 0   Recent (last 7 days) or current verbal aggression, agitation, yelling, etc., while in the ED or unit 0   Active psychosis 0   Need for constant or near constant redirection (from leaving, from others, etc).  1   Intrusive or disruptive behaviors 1   TOTAL 7
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
## 2023-11-09 PROCEDURE — 250N000013 HC RX MED GY IP 250 OP 250 PS 637: Performed by: PSYCHIATRY & NEUROLOGY

## 2023-11-09 PROCEDURE — 124N000002 HC R&B MH UMMC

## 2023-11-09 PROCEDURE — 250N000013 HC RX MED GY IP 250 OP 250 PS 637: Performed by: STUDENT IN AN ORGANIZED HEALTH CARE EDUCATION/TRAINING PROGRAM

## 2023-11-09 PROCEDURE — 250N000013 HC RX MED GY IP 250 OP 250 PS 637: Performed by: PHYSICIAN ASSISTANT

## 2023-11-09 RX ADMIN — GABAPENTIN 400 MG: 100 CAPSULE ORAL at 14:09

## 2023-11-09 RX ADMIN — Medication 0.25 MG: at 20:46

## 2023-11-09 RX ADMIN — MICONAZOLE NITRATE: 20 POWDER TOPICAL at 21:00

## 2023-11-09 RX ADMIN — OLANZAPINE 15 MG: 10 TABLET, ORALLY DISINTEGRATING ORAL at 20:46

## 2023-11-09 RX ADMIN — PRAZOSIN HYDROCHLORIDE 1 MG: 1 CAPSULE ORAL at 20:47

## 2023-11-09 RX ADMIN — FLUOXETINE 20 MG: 20 CAPSULE ORAL at 08:17

## 2023-11-09 RX ADMIN — PROPRANOLOL HYDROCHLORIDE 20 MG: 10 TABLET ORAL at 12:44

## 2023-11-09 RX ADMIN — PROPRANOLOL HYDROCHLORIDE 20 MG: 10 TABLET ORAL at 17:09

## 2023-11-09 RX ADMIN — ATROPINE SULFATE 2 DROP: 10 SOLUTION/ DROPS OPHTHALMIC at 20:58

## 2023-11-09 RX ADMIN — GABAPENTIN 400 MG: 100 CAPSULE ORAL at 08:16

## 2023-11-09 RX ADMIN — Medication 25 MCG: at 08:17

## 2023-11-09 RX ADMIN — POLYETHYLENE GLYCOL 3350 17 G: 17 POWDER, FOR SOLUTION ORAL at 08:16

## 2023-11-09 RX ADMIN — GABAPENTIN 400 MG: 100 CAPSULE ORAL at 20:47

## 2023-11-09 RX ADMIN — Medication 0.25 MG: at 14:08

## 2023-11-09 RX ADMIN — OLANZAPINE 15 MG: 10 TABLET, ORALLY DISINTEGRATING ORAL at 08:17

## 2023-11-09 RX ADMIN — NAPROXEN 250 MG: 250 TABLET ORAL at 17:09

## 2023-11-09 RX ADMIN — RISPERIDONE 0.5 MG: 0.5 TABLET, ORALLY DISINTEGRATING ORAL at 20:48

## 2023-11-09 RX ADMIN — BUSPIRONE HYDROCHLORIDE 15 MG: 15 TABLET ORAL at 17:09

## 2023-11-09 RX ADMIN — BUSPIRONE HYDROCHLORIDE 15 MG: 15 TABLET ORAL at 20:48

## 2023-11-09 RX ADMIN — RISPERIDONE 0.5 MG: 0.5 TABLET, ORALLY DISINTEGRATING ORAL at 08:17

## 2023-11-09 RX ADMIN — SENNOSIDES 2 TABLET: 8.6 TABLET ORAL at 20:46

## 2023-11-09 RX ADMIN — Medication 500 MCG: at 08:17

## 2023-11-09 RX ADMIN — DIVALPROEX SODIUM 250 MG: 125 CAPSULE, COATED PELLETS ORAL at 08:17

## 2023-11-09 RX ADMIN — DIVALPROEX SODIUM 250 MG: 125 CAPSULE, COATED PELLETS ORAL at 12:43

## 2023-11-09 RX ADMIN — PROPRANOLOL HYDROCHLORIDE 20 MG: 10 TABLET ORAL at 08:17

## 2023-11-09 RX ADMIN — ATROPINE SULFATE 2 DROP: 10 SOLUTION/ DROPS OPHTHALMIC at 08:18

## 2023-11-09 RX ADMIN — SENNOSIDES 2 TABLET: 8.6 TABLET ORAL at 08:17

## 2023-11-09 RX ADMIN — TAMSULOSIN HYDROCHLORIDE 0.4 MG: 0.4 CAPSULE ORAL at 08:17

## 2023-11-09 RX ADMIN — NAPROXEN 250 MG: 250 TABLET ORAL at 08:17

## 2023-11-09 RX ADMIN — Medication 10 MG: at 20:46

## 2023-11-09 RX ADMIN — ATROPINE SULFATE 2 DROP: 10 SOLUTION/ DROPS OPHTHALMIC at 14:09

## 2023-11-09 RX ADMIN — PROPRANOLOL HYDROCHLORIDE 20 MG: 10 TABLET ORAL at 20:47

## 2023-11-09 RX ADMIN — Medication 0.25 MG: at 08:17

## 2023-11-09 RX ADMIN — BUSPIRONE HYDROCHLORIDE 15 MG: 15 TABLET ORAL at 12:45

## 2023-11-09 RX ADMIN — DIVALPROEX SODIUM 250 MG: 125 CAPSULE, COATED PELLETS ORAL at 17:09

## 2023-11-09 RX ADMIN — CLOZAPINE 650 MG: 200 TABLET ORAL at 12:43

## 2023-11-09 ASSESSMENT — ACTIVITIES OF DAILY LIVING (ADL)
ADLS_ACUITY_SCORE: 90
ADLS_ACUITY_SCORE: 90
ORAL_HYGIENE: PROMPTS;INDEPENDENT
ADLS_ACUITY_SCORE: 90
LAUNDRY: UNABLE TO COMPLETE
ADLS_ACUITY_SCORE: 90
HYGIENE/GROOMING: WITH ASSISTANCE
ADLS_ACUITY_SCORE: 90
DRESS: INDEPENDENT
ADLS_ACUITY_SCORE: 90

## 2023-11-09 NOTE — PLAN OF CARE
BEH IP Unit Acuity Rating Score (UARS)  Patient is given one point for every criteria they meet.    CRITERIA SCORING   On a 72 hour hold, court hold, committed, stay of commitment, or revocation 1    Patient LOS on BEH unit exceeds 20 days 1  LOS: 167   Patient under guardianship, 55+, otherwise medically complex, or under age 11 1   Suicide ideation without relief of precipitating factors 0   Current plan for suicide 0   Current plan for homicide 0   Imminent risk or actual attempt to seriously harm another without relief of factors precipitating the attempt 0   Severe dysfunction in daily living (ex: complete neglect for self care, extreme disruption in vegetative function, extreme deterioration in social interactions) 1   Recent (last 7 days) or current physical aggression in the ED or on unit 1   Restraints or seclusion episode in past 72 hours 0   Recent (last 7 days) or current verbal aggression, agitation, yelling, etc., while in the ED or unit 0   Active psychosis 0   Need for constant or near constant redirection (from leaving, from others, etc).  1   Intrusive or disruptive behaviors 1   TOTAL 7
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
## 2023-11-09 NOTE — CARE PLAN
11/09/23 1246   Group Therapy Session   Group Attendance attended group session   Time Session Began 1000   Time Session Ended 1105   Total Time (minutes) 30  (no charge)   Total # Attendees 8   Group Type expressive therapy;task skill;psychotherapeutic   Group Topic Covered problem-solving;cognitive activities;balanced lifestyle;structured socialization   Group Session Detail Occupational Therapy Clinic group to facilitate coping skill exploration, use of cognitive skills and problem solving, creative expression, clinical observation and facilitation of social, cognitive, and kinesthetic performance skills.    Patient Participation Detail see noteWith 1:1 support and highly structured directions, he followed through with ideas presented. He explained his hands were quite shaky during this session and asked for assistance from staff. He appeared to happily accept help from staff. He was social, elaborated on some comments with conversation directed to him, and watched his 1:1 staff assist with the work he wanted to accomplish. Left after finishing his work and chatting for some time seeming comfortable with engaging in conversation.
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
## 2023-11-09 NOTE — PLAN OF CARE
Assessment/Intervention/Current Symtoms and Care Coordination:  Reviewed chart. Met with team to review patient's current functioning, needs, progress, and impediments to discharge. Pt's affect is flat. Pt has placement secured, awaiting admission list to be completed prior to admission. Pt continues to be confused but is appropriate and redirectable. Pt has been attending groups but cannot stay for the whole time. Pt stepped down to 1:1.     Writer put out email to Jovana with snf ([email or web address]) with most of the admission check-list items. Currently waiting for provider to complete medication reconciliation to complete check-list.    Writer met with pt and informed him of discharge plan. Pt reports excitement to discharge. Pt asked appropriate questions regarding discharge. Writer relayed plan to coordinate with JOLENE. Pt signed PD.    Writer sent PD to Vicky MALONE, via email ([email or web address]     Writer spoke with pt's mother, Alberta ([phone]) and informed her of discharge plan.    Writer put out email to [employer] ([email or web address]) to request transportation at discharge. Patricio confirmed transportation is scheduled.    Discharge Plan or Goal:  Will discharge to [facility]: [address] [place] [address] on       Barriers to Discharge:  Jovana with snf requesting admission tasks be completed prior to move-in. CTC to follow up    Referral Status:  Pt accepted at [facility]. For more comprehensive list of referrals see CTC note from 10/6  snf coordinating Clozaril lab appointments  Psychiatry appointment scheduled  PCP appointment scheduled  snf uses [facility] [address] [place] [address]    Legal Status:  Committed MI with ITP  [facility]: [place]  File Number: [id number]  ITP includes: Clozaril, Zyprexa, Seroquel, and Risperdal  Expires: 2024    Contacts:  Vicky Marin CM with [facility] (KING per  commitment)  P: [phone]  E: [email or web address].  Regina SANTIAGO CM with [facility] (KING per commitment)  P: [phone]  E: [email or web address].  Alberta - Mom (KING signed)  P: [phone] (H) [phone] (M)   Ino - Dad (KING signed)  P: [phone] (H)  Sandra Treadwell - Sister (KING signed)  P: [phone]  [facility]'s Office   F: [phone]  [facility]  P: [phone]  E: [email or web address]  Alexa Marin [facility]    F: [phone]  E: [email or web address].    Upcoming Meetings and Dates/Important Information and next steps:  CTC to complete admission check-list  Pt in need of PD and COS at discharge  Pt discharging on 11/13 at 11:30am via [employer]

## 2023-11-09 NOTE — PLAN OF CARE
"  Problem: Psychotic Symptoms  Goal: Psychotic Symptoms  Description: Signs and symptoms of listed problems will be absent or manageable.  Outcome: Not Progressing     Problem: Confusion Chronic  Goal: Optimal Cognitive Function  Outcome: Not Progressing     Problem: Psychotic Signs/Symptoms  Goal: Improved Mood Symptoms  Outcome: Progressing   Goal Outcome Evaluation:    Patient is calm and cooperative, medication compliant. Patient denies anxiety/depression/SI/SIB/AVH. Denies urge to strangle anyone. Patient is paranoid and thinks the food is poison but appetite is good. Patient was visible for meals, ate 100%. Not social with peers, attended at least one group this shift.   /73 (BP Location: Left arm, Patient Position: Sitting, Cuff Size: Adult Regular)   Pulse 68   Temp 97.5  F (36.4  C) (Temporal)   Resp 18   Ht 1.753 m (5' 9\")   Wt 85.7 kg (188 lb 15 oz)   SpO2 99%   BMI 27.90 kg/m      "
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
## 2023-11-10 LAB
BASOPHILS # BLD AUTO: 0.1 10E3/UL (ref 0–0.2)
BASOPHILS NFR BLD AUTO: 1 %
EOSINOPHIL # BLD AUTO: 0 10E3/UL (ref 0–0.7)
EOSINOPHIL NFR BLD AUTO: 0 %
ERYTHROCYTE [DISTWIDTH] IN BLOOD BY AUTOMATED COUNT: 15.3 % (ref 10–15)
HCT VFR BLD AUTO: 40.2 % (ref 40–53)
HGB BLD-MCNC: 12.8 G/DL (ref 13.3–17.7)
IMM GRANULOCYTES # BLD: 0.1 10E3/UL
IMM GRANULOCYTES NFR BLD: 1 %
LYMPHOCYTES # BLD AUTO: 1.5 10E3/UL (ref 0.8–5.3)
LYMPHOCYTES NFR BLD AUTO: 18 %
MCH RBC QN AUTO: 27.8 PG (ref 26.5–33)
MCHC RBC AUTO-ENTMCNC: 31.8 G/DL (ref 31.5–36.5)
MCV RBC AUTO: 87 FL (ref 78–100)
MONOCYTES # BLD AUTO: 0.8 10E3/UL (ref 0–1.3)
MONOCYTES NFR BLD AUTO: 9 %
NEUTROPHILS # BLD AUTO: 5.8 10E3/UL (ref 1.6–8.3)
NEUTROPHILS NFR BLD AUTO: 71 %
NRBC # BLD AUTO: 0 10E3/UL
NRBC BLD AUTO-RTO: 0 /100
PLATELET # BLD AUTO: 166 10E3/UL (ref 150–450)
RBC # BLD AUTO: 4.61 10E6/UL (ref 4.4–5.9)
WBC # BLD AUTO: 8.3 10E3/UL (ref 4–11)

## 2023-11-10 PROCEDURE — 250N000013 HC RX MED GY IP 250 OP 250 PS 637: Performed by: STUDENT IN AN ORGANIZED HEALTH CARE EDUCATION/TRAINING PROGRAM

## 2023-11-10 PROCEDURE — 36415 COLL VENOUS BLD VENIPUNCTURE: CPT | Performed by: PSYCHIATRY & NEUROLOGY

## 2023-11-10 PROCEDURE — 250N000013 HC RX MED GY IP 250 OP 250 PS 637: Performed by: PSYCHIATRY & NEUROLOGY

## 2023-11-10 PROCEDURE — 85025 COMPLETE CBC W/AUTO DIFF WBC: CPT | Performed by: PSYCHIATRY & NEUROLOGY

## 2023-11-10 PROCEDURE — 124N000002 HC R&B MH UMMC

## 2023-11-10 PROCEDURE — 250N000013 HC RX MED GY IP 250 OP 250 PS 637: Performed by: PHYSICIAN ASSISTANT

## 2023-11-10 PROCEDURE — 250N000011 HC RX IP 250 OP 636: Mod: JZ | Performed by: PSYCHIATRY & NEUROLOGY

## 2023-11-10 RX ORDER — LORAZEPAM 0.5 MG/1
0.25 TABLET ORAL 3 TIMES DAILY
Qty: 90 TABLET | Refills: 1 | Status: SHIPPED | OUTPATIENT
Start: 2023-11-10 | End: 2023-11-13

## 2023-11-10 RX ORDER — POLYETHYLENE GLYCOL 3350 17 G/17G
17 POWDER, FOR SOLUTION ORAL 2 TIMES DAILY
Qty: 60 PACKET | Refills: 3 | Status: SHIPPED | OUTPATIENT
Start: 2023-11-10 | End: 2023-11-13

## 2023-11-10 RX ORDER — VITAMIN B COMPLEX
25 TABLET ORAL DAILY
Qty: 30 TABLET | Refills: 3 | Status: SHIPPED | OUTPATIENT
Start: 2023-11-11 | End: 2023-11-13

## 2023-11-10 RX ORDER — PHENOL 1.4 %
10 AEROSOL, SPRAY (ML) MUCOUS MEMBRANE AT BEDTIME
Qty: 30 TABLET | Refills: 3 | Status: SHIPPED | OUTPATIENT
Start: 2023-11-10 | End: 2023-11-13

## 2023-11-10 RX ORDER — AMOXICILLIN 250 MG
1 CAPSULE ORAL 2 TIMES DAILY PRN
Qty: 60 TABLET | Refills: 3 | Status: SHIPPED | OUTPATIENT
Start: 2023-11-10 | End: 2023-11-13

## 2023-11-10 RX ORDER — TAMSULOSIN HYDROCHLORIDE 0.4 MG/1
0.4 CAPSULE ORAL DAILY
Qty: 30 CAPSULE | Refills: 3 | Status: SHIPPED | OUTPATIENT
Start: 2023-11-10 | End: 2023-11-13

## 2023-11-10 RX ORDER — PROPRANOLOL HYDROCHLORIDE 10 MG/1
20 TABLET ORAL 3 TIMES DAILY
Status: DISCONTINUED | OUTPATIENT
Start: 2023-11-10 | End: 2023-11-13 | Stop reason: HOSPADM

## 2023-11-10 RX ORDER — ALBUTEROL SULFATE 90 UG/1
2 AEROSOL, METERED RESPIRATORY (INHALATION) EVERY 6 HOURS PRN
Qty: 18 G | Refills: 1 | Status: SHIPPED | OUTPATIENT
Start: 2023-11-10 | End: 2023-11-13

## 2023-11-10 RX ORDER — PROPRANOLOL HYDROCHLORIDE 10 MG/1
30 TABLET ORAL DAILY
Status: DISCONTINUED | OUTPATIENT
Start: 2023-11-11 | End: 2023-11-13 | Stop reason: HOSPADM

## 2023-11-10 RX ORDER — PRAZOSIN HYDROCHLORIDE 1 MG/1
1 CAPSULE ORAL AT BEDTIME
Qty: 30 CAPSULE | Refills: 3 | Status: SHIPPED | OUTPATIENT
Start: 2023-11-10 | End: 2023-11-13

## 2023-11-10 RX ORDER — NAPROXEN 250 MG/1
250 TABLET ORAL 2 TIMES DAILY WITH MEALS
Qty: 60 TABLET | Refills: 3 | Status: SHIPPED | OUTPATIENT
Start: 2023-11-10 | End: 2023-11-13

## 2023-11-10 RX ORDER — BUSPIRONE HYDROCHLORIDE 15 MG/1
15 TABLET ORAL 3 TIMES DAILY
Qty: 90 TABLET | Refills: 3 | Status: SHIPPED | OUTPATIENT
Start: 2023-11-10 | End: 2023-11-13

## 2023-11-10 RX ORDER — CLOZAPINE 50 MG/1
650 TABLET ORAL
Qty: 500 TABLET | Refills: 3 | Status: SHIPPED | OUTPATIENT
Start: 2023-11-11 | End: 2023-11-13

## 2023-11-10 RX ORDER — SENNOSIDES 8.6 MG
2 TABLET ORAL 2 TIMES DAILY
Qty: 60 TABLET | Refills: 3 | Status: SHIPPED | OUTPATIENT
Start: 2023-11-10 | End: 2023-11-13

## 2023-11-10 RX ORDER — PROPRANOLOL HYDROCHLORIDE 10 MG/1
30 TABLET ORAL DAILY
Qty: 30 TABLET | Refills: 3 | Status: SHIPPED | OUTPATIENT
Start: 2023-11-11 | End: 2023-11-13

## 2023-11-10 RX ORDER — RISPERIDONE 0.5 MG/1
0.5 TABLET, ORALLY DISINTEGRATING ORAL 2 TIMES DAILY
Qty: 60 TABLET | Refills: 3 | Status: SHIPPED | OUTPATIENT
Start: 2023-11-10 | End: 2023-11-13

## 2023-11-10 RX ORDER — OLANZAPINE 15 MG/1
15 TABLET, ORALLY DISINTEGRATING ORAL 2 TIMES DAILY
Qty: 60 TABLET | Refills: 3 | Status: SHIPPED | OUTPATIENT
Start: 2023-11-10 | End: 2023-11-13

## 2023-11-10 RX ORDER — PANTOPRAZOLE SODIUM 40 MG/1
40 TABLET, DELAYED RELEASE ORAL DAILY
Qty: 30 TABLET | Refills: 3 | Status: SHIPPED | OUTPATIENT
Start: 2023-11-10 | End: 2024-06-05

## 2023-11-10 RX ORDER — GABAPENTIN 400 MG/1
400 CAPSULE ORAL 3 TIMES DAILY
Qty: 90 CAPSULE | Refills: 3 | Status: SHIPPED | OUTPATIENT
Start: 2023-11-10 | End: 2023-11-13

## 2023-11-10 RX ORDER — BENZONATATE 100 MG/1
100 CAPSULE ORAL 3 TIMES DAILY PRN
Qty: 30 CAPSULE | Refills: 1 | Status: SHIPPED | OUTPATIENT
Start: 2023-11-10 | End: 2023-11-13

## 2023-11-10 RX ORDER — SIMETHICONE 80 MG
80 TABLET,CHEWABLE ORAL EVERY 6 HOURS PRN
Qty: 30 TABLET | Refills: 3 | Status: SHIPPED | OUTPATIENT
Start: 2023-11-10 | End: 2023-11-13

## 2023-11-10 RX ORDER — ACETAMINOPHEN 325 MG/1
650 TABLET ORAL EVERY 4 HOURS PRN
Qty: 60 TABLET | Refills: 1 | Status: SHIPPED | OUTPATIENT
Start: 2023-11-10 | End: 2023-11-13

## 2023-11-10 RX ORDER — DIVALPROEX SODIUM 125 MG/1
250 CAPSULE, COATED PELLETS ORAL
Qty: 360 CAPSULE | Refills: 3 | Status: SHIPPED | OUTPATIENT
Start: 2023-11-10 | End: 2023-11-14

## 2023-11-10 RX ORDER — PROPRANOLOL HYDROCHLORIDE 20 MG/1
20 TABLET ORAL 3 TIMES DAILY
Qty: 90 TABLET | Refills: 3 | Status: SHIPPED | OUTPATIENT
Start: 2023-11-10 | End: 2023-11-13

## 2023-11-10 RX ADMIN — NAPROXEN 250 MG: 250 TABLET ORAL at 17:43

## 2023-11-10 RX ADMIN — BUSPIRONE HYDROCHLORIDE 15 MG: 15 TABLET ORAL at 17:43

## 2023-11-10 RX ADMIN — SENNOSIDES 2 TABLET: 8.6 TABLET ORAL at 08:29

## 2023-11-10 RX ADMIN — Medication 500 MCG: at 08:30

## 2023-11-10 RX ADMIN — RISPERIDONE 0.5 MG: 0.5 TABLET, ORALLY DISINTEGRATING ORAL at 08:30

## 2023-11-10 RX ADMIN — Medication 25 MCG: at 08:29

## 2023-11-10 RX ADMIN — DIVALPROEX SODIUM 250 MG: 125 CAPSULE, COATED PELLETS ORAL at 08:28

## 2023-11-10 RX ADMIN — PROPRANOLOL HYDROCHLORIDE 20 MG: 10 TABLET ORAL at 11:35

## 2023-11-10 RX ADMIN — GABAPENTIN 400 MG: 100 CAPSULE ORAL at 13:13

## 2023-11-10 RX ADMIN — MICONAZOLE NITRATE: 20 POWDER TOPICAL at 20:39

## 2023-11-10 RX ADMIN — PROPRANOLOL HYDROCHLORIDE 10 MG: 10 TABLET ORAL at 17:42

## 2023-11-10 RX ADMIN — DIVALPROEX SODIUM 250 MG: 125 CAPSULE, COATED PELLETS ORAL at 17:42

## 2023-11-10 RX ADMIN — CLOZAPINE 650 MG: 200 TABLET ORAL at 11:35

## 2023-11-10 RX ADMIN — OLANZAPINE 10 MG: 10 INJECTION, POWDER, FOR SOLUTION INTRAMUSCULAR at 21:04

## 2023-11-10 RX ADMIN — PROPRANOLOL HYDROCHLORIDE 20 MG: 10 TABLET ORAL at 08:30

## 2023-11-10 RX ADMIN — MICONAZOLE NITRATE: 20 POWDER TOPICAL at 13:12

## 2023-11-10 RX ADMIN — TAMSULOSIN HYDROCHLORIDE 0.4 MG: 0.4 CAPSULE ORAL at 08:29

## 2023-11-10 RX ADMIN — DIVALPROEX SODIUM 250 MG: 125 CAPSULE, COATED PELLETS ORAL at 11:35

## 2023-11-10 RX ADMIN — ATROPINE SULFATE 2 DROP: 10 SOLUTION/ DROPS OPHTHALMIC at 20:42

## 2023-11-10 RX ADMIN — FLUOXETINE 20 MG: 20 CAPSULE ORAL at 08:30

## 2023-11-10 RX ADMIN — Medication 0.25 MG: at 08:30

## 2023-11-10 RX ADMIN — NAPROXEN 250 MG: 250 TABLET ORAL at 08:30

## 2023-11-10 RX ADMIN — POLYETHYLENE GLYCOL 3350 17 G: 17 POWDER, FOR SOLUTION ORAL at 08:28

## 2023-11-10 RX ADMIN — OLANZAPINE 15 MG: 10 TABLET, ORALLY DISINTEGRATING ORAL at 08:29

## 2023-11-10 RX ADMIN — Medication 0.25 MG: at 13:13

## 2023-11-10 RX ADMIN — BUSPIRONE HYDROCHLORIDE 15 MG: 15 TABLET ORAL at 11:36

## 2023-11-10 RX ADMIN — ATROPINE SULFATE 2 DROP: 10 SOLUTION/ DROPS OPHTHALMIC at 13:13

## 2023-11-10 RX ADMIN — ATROPINE SULFATE 2 DROP: 10 SOLUTION/ DROPS OPHTHALMIC at 08:39

## 2023-11-10 RX ADMIN — POLYETHYLENE GLYCOL 3350 17 G: 17 POWDER, FOR SOLUTION ORAL at 20:41

## 2023-11-10 RX ADMIN — GABAPENTIN 400 MG: 100 CAPSULE ORAL at 08:30

## 2023-11-10 ASSESSMENT — ACTIVITIES OF DAILY LIVING (ADL)
ADLS_ACUITY_SCORE: 100
ADLS_ACUITY_SCORE: 90
ORAL_HYGIENE: WITH SUPERVISION
ADLS_ACUITY_SCORE: 90
HYGIENE/GROOMING: WITH ASSISTANCE
ADLS_ACUITY_SCORE: 90
ADLS_ACUITY_SCORE: 90
ADLS_ACUITY_SCORE: 100
ADLS_ACUITY_SCORE: 90
ADLS_ACUITY_SCORE: 90
DRESS: WITH ASSISTANCE
ADLS_ACUITY_SCORE: 90

## 2023-11-10 NOTE — PLAN OF CARE
Assessment/Intervention/Current Symtoms and Care Coordination:  Reviewed chart. Met with team to review patient's current functioning, needs, progress, and impediments to discharge. Pt's affect is flat. Pt has been attending groups but cannot stay for the whole time. Pt stepped down to 1:1. Pt will be discharging on  at 11:30am.    Writer put out email to hospitals with [facility] ([email or web address]) with the rest of the admission check-list items.     Discharge Plan or Goal:  Will discharge to [facility]: [address] [place] [address] on  at 11:30am     Barriers to Discharge:  Jovana with [facility] requesting admission tasks be completed prior to move-in. CTC to follow up    Referral Status:  Pt accepted at [facility]. For more comprehensive list of referrals see CTC note from 10/6  [facility] coordinating Clozaril lab appointments  Psychiatry appointment scheduled  PCP appointment scheduled  [facility] uses [facility] [address] [place] [address]    Legal Status:  Committed MI with ITP  [facility]: [place]  File Number: [id number]  ITP includes: Clozaril, Zyprexa, Seroquel, and Risperdal  Expires: 2024    Contacts:  Vicky Marin CM with [facility] (KING per commitment)  P: [phone]  E: [email or web address].  Regina SANTIAGO CM with [facility] (KING per commitment)  P: [phone]  E: [email or web address].  Alberta - Mom (KING signed)  P: [phone] (H) [phone] (M)   Gilbert - Dad (KING signed)  P: [phone] (H)  Sandra Treadwell - Sister (KING signed)  P: [phone]  [facility]'s Office   F: [phone]  hospitals [facility]  P: [phone]  E: [email or web address]  Alexa Arteaga - [employer]    F: [phone]  E: [email or web address].    Upcoming Meetings and Dates/Important Information and next steps:  Pt in need of PD and COS at discharge  Pt discharging on  at  11:30am via [employer]

## 2023-11-10 NOTE — PLAN OF CARE
BEH IP Unit Acuity Rating Score (UARS)  Patient is given one point for every criteria they meet.    CRITERIA SCORING   On a 72 hour hold, court hold, committed, stay of commitment, or revocation 1    Patient LOS on BEH unit exceeds 20 days 1  LOS: 168   Patient under guardianship, 55+, otherwise medically complex, or under age 11 1   Suicide ideation without relief of precipitating factors 0   Current plan for suicide 0   Current plan for homicide 0   Imminent risk or actual attempt to seriously harm another without relief of factors precipitating the attempt 0   Severe dysfunction in daily living (ex: complete neglect for self care, extreme disruption in vegetative function, extreme deterioration in social interactions) 1   Recent (last 7 days) or current physical aggression in the ED or on unit 1   Restraints or seclusion episode in past 72 hours 0   Recent (last 7 days) or current verbal aggression, agitation, yelling, etc., while in the ED or unit 0   Active psychosis 0   Need for constant or near constant redirection (from leaving, from others, etc).  1   Intrusive or disruptive behaviors 1   TOTAL 7
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
## 2023-11-10 NOTE — PLAN OF CARE
Problem: Psychotic Signs/Symptoms  Goal: Improved Behavioral Control (Psychotic Signs/Symptoms)  Outcome: Progressing   Goal Outcome Evaluation:       Pt.continues to be on status individual observation (SIO) with assigned staff. He was isolative and withdrawn to his room sleeping/napping most of the evening. Behavior improved this shift as evidenced by no assault or aggressive behavior toward staff or peers. Did not require prn medications or seclusion/restraint to manage behavior. Continues to show great appetite. Ate 100% of his dinner. Fluid intake was adequate. He was compliant with his scheduled medications and care. No medication adverse side effects reported or observed. Will continue to monitor.

## 2023-11-10 NOTE — PROGRESS NOTES
"Patient seen, chart reviewed, care discussed with staff.  Blood pressure 119/73, pulse 79, temperature 98  F (36.7  C), temperature source Oral, resp. rate 16, height 1.753 m (5' 9\"), weight 85.7 kg (188 lb 15 oz), SpO2 95%.  Vinod is a nice man with urges that disturb him.  He complains of restlessness.    General appearance: good  Alert.   Affect: fair  Mood: fair    Speech:  normal.   Eye contact:  good.    Psychomotor behavior: moderate tremors  Gait: steady   Abnormal movements:  none  Delusions: continue  Hallucinations:  continue  Thoughts: linear  Associations: intact  Judgement: fair  Insight: fair  Cognitions: impaired  Not suicidal.    Imp:  Paranoid Schizophrenia  Neurocognitive  I feel the discomfort around prople has a social phobic component. He is tolerating Risperdal  And:         Patient Active Problem List   Diagnosis    Urinary retention    Schizophrenia, unspecified type (H)    Altered mental status, unspecified altered mental status type    Mood changes    Paranoid schizophrenia, chronic condition with acute exacerbation (H)    Neuroleptic-induced parkinsonism     Agitation     Planb:  increase Propranolol    Current Facility-Administered Medications   Medication    acetaminophen (TYLENOL) tablet 650 mg    albuterol (PROVENTIL HFA/VENTOLIN HFA) inhaler    atropine 1 % ophthalmic solution 1 drop    atropine 1 % sublingual (ophthalmic drops for sublingual use) 2 drop    benzonatate (TESSALON) capsule 100 mg    bisacodyl (DULCOLAX) suppository 10 mg    busPIRone (BUSPAR) tablet 15 mg    cloZAPine (CLOZARIL) tablet 650 mg    cyanocobalamin (VITAMIN B-12) sublingual tablet 500 mcg    haloperidol (HALDOL) tablet 5 mg    And    diphenhydrAMINE (BENADRYL) capsule 50 mg    haloperidol lactate (HALDOL) injection 5 mg    And    diphenhydrAMINE (BENADRYL) injection 50 mg    divalproex sodium delayed-release (DEPAKOTE SPRINKLE) DR capsule 250 mg    FLUoxetine (PROzac) capsule 20 mg    gabapentin (NEURONTIN) " capsule 100 mg    gabapentin (NEURONTIN) capsule 400 mg    lidocaine (XYLOCAINE) 2 % external gel    loperamide (IMODIUM) capsule 2 mg    LORazepam (ATIVAN) half-tab 0.25 mg    melatonin tablet 10 mg    miconazole (MICATIN) 2 % powder    mineral oil-white petrolatum (EUCERIN/MINERIN) cream    naproxen (NAPROSYN) tablet 250 mg    OLANZapine zydis (zyPREXA) ODT tab 15 mg    Or    OLANZapine (zyPREXA) injection 10 mg    polyethylene glycol (MIRALAX) Packet 17 g    prazosin (MINIPRESS) capsule 1 mg    propranolol (INDERAL) tablet 20 mg    risperiDONE (risperDAL M-TABS) ODT tab 0.5 mg    senna-docusate (SENOKOT-S/PERICOLACE) 8.6-50 MG per tablet 1 tablet    sennosides (SENOKOT) tablet 2 tablet    simethicone (MYLICON) chewable tablet 80 mg    tamsulosin (FLOMAX) capsule 0.4 mg    traZODone (DESYREL) tablet 50 mg    Vitamin D3 (CHOLECALCIFEROL) tablet 25 mcg     Recent Results (from the past 168 hour(s))   EKG 12-lead, complete    Collection Time: 11/07/23  9:06 AM   Result Value Ref Range    Systolic Blood Pressure  mmHg    Diastolic Blood Pressure  mmHg    Ventricular Rate 80 BPM    Atrial Rate 80 BPM    FL Interval 140 ms    QRS Duration 164 ms     ms    QTc 528 ms    P Axis 64 degrees    R AXIS -2 degrees    T Axis 131 degrees    Interpretation ECG       ** Poor data quality, interpretation may be adversely affected  Sinus rhythm with Premature supraventricular complexes  Left bundle branch block  Abnormal ECG  When compared with ECG of 31-OCT-2023 09:11,  Premature supraventricular complexes are now Present  Confirmed by MD JUAN, GENNA ([id number]) on 11/8/2023 4:23:45 PM     CBC with platelets and differential    Collection Time: 11/10/23  7:14 AM   Result Value Ref Range    WBC Count 8.3 4.0 - 11.0 10e3/uL    RBC Count 4.61 4.40 - 5.90 10e6/uL    Hemoglobin 12.8 (L) 13.3 - 17.7 g/dL    Hematocrit 40.2 40.0 - 53.0 %    MCV 87 78 - 100 fL    MCH 27.8 26.5 - 33.0 pg    MCHC 31.8 31.5 - 36.5 g/dL    RDW 15.3 (H) 10.0  - 15.0 %    Platelet Count 166 150 - 450 10e3/uL    % Neutrophils 71 %    % Lymphocytes 18 %    % Monocytes 9 %    % Eosinophils 0 %    % Basophils 1 %    % Immature Granulocytes 1 %    NRBCs per 100 WBC 0 <1 /100    Absolute Neutrophils 5.8 1.6 - 8.3 10e3/uL    Absolute Lymphocytes 1.5 0.8 - 5.3 10e3/uL    Absolute Monocytes 0.8 0.0 - 1.3 10e3/uL    Absolute Eosinophils 0.0 0.0 - 0.7 10e3/uL    Absolute Basophils 0.1 0.0 - 0.2 10e3/uL    Absolute Immature Granulocytes 0.1 <=0.4 10e3/uL    Absolute NRBCs 0.0 10e3/uL

## 2023-11-10 NOTE — PLAN OF CARE
Problem: Sleep Disturbance  Goal: Adequate Sleep/Rest  Outcome: Progressing   Goal Outcome Evaluation:    Patient remains on SIO due to risk for self-injury related to intrusiveness, Parkinson 's symptoms, and history of making sexually inappropriate remarks and gestures.     Voided once towards the end of the shift with standby assist.     Slept for a total of 8.75 hours. No behavioral issues noted this shift.

## 2023-11-11 PROCEDURE — 250N000013 HC RX MED GY IP 250 OP 250 PS 637: Performed by: PSYCHIATRY & NEUROLOGY

## 2023-11-11 PROCEDURE — 250N000013 HC RX MED GY IP 250 OP 250 PS 637: Performed by: STUDENT IN AN ORGANIZED HEALTH CARE EDUCATION/TRAINING PROGRAM

## 2023-11-11 PROCEDURE — 124N000002 HC R&B MH UMMC

## 2023-11-11 PROCEDURE — 250N000013 HC RX MED GY IP 250 OP 250 PS 637: Performed by: PHYSICIAN ASSISTANT

## 2023-11-11 RX ADMIN — SENNOSIDES 2 TABLET: 8.6 TABLET ORAL at 09:08

## 2023-11-11 RX ADMIN — POLYETHYLENE GLYCOL 3350 17 G: 17 POWDER, FOR SOLUTION ORAL at 09:08

## 2023-11-11 RX ADMIN — Medication 25 MCG: at 09:07

## 2023-11-11 RX ADMIN — Medication 500 MCG: at 09:06

## 2023-11-11 RX ADMIN — NAPROXEN 250 MG: 250 TABLET ORAL at 17:13

## 2023-11-11 RX ADMIN — ATROPINE SULFATE 2 DROP: 10 SOLUTION/ DROPS OPHTHALMIC at 14:08

## 2023-11-11 RX ADMIN — ATROPINE SULFATE 2 DROP: 10 SOLUTION/ DROPS OPHTHALMIC at 19:39

## 2023-11-11 RX ADMIN — POLYETHYLENE GLYCOL 3350 17 G: 17 POWDER, FOR SOLUTION ORAL at 19:34

## 2023-11-11 RX ADMIN — Medication 10 MG: at 19:18

## 2023-11-11 RX ADMIN — PRAZOSIN HYDROCHLORIDE 1 MG: 1 CAPSULE ORAL at 19:18

## 2023-11-11 RX ADMIN — Medication 0.25 MG: at 09:10

## 2023-11-11 RX ADMIN — ATROPINE SULFATE 1 DROP: 10 SOLUTION/ DROPS OPHTHALMIC at 14:07

## 2023-11-11 RX ADMIN — GABAPENTIN 400 MG: 100 CAPSULE ORAL at 19:18

## 2023-11-11 RX ADMIN — MICONAZOLE NITRATE: 20 POWDER TOPICAL at 19:40

## 2023-11-11 RX ADMIN — Medication 0.25 MG: at 19:16

## 2023-11-11 RX ADMIN — FLUOXETINE 20 MG: 20 CAPSULE ORAL at 09:06

## 2023-11-11 RX ADMIN — GABAPENTIN 400 MG: 100 CAPSULE ORAL at 14:07

## 2023-11-11 RX ADMIN — NAPROXEN 250 MG: 250 TABLET ORAL at 09:08

## 2023-11-11 RX ADMIN — DIVALPROEX SODIUM 250 MG: 125 CAPSULE, COATED PELLETS ORAL at 17:12

## 2023-11-11 RX ADMIN — OLANZAPINE 15 MG: 10 TABLET, ORALLY DISINTEGRATING ORAL at 19:16

## 2023-11-11 RX ADMIN — PROPRANOLOL HYDROCHLORIDE 20 MG: 10 TABLET ORAL at 11:39

## 2023-11-11 RX ADMIN — Medication 0.25 MG: at 14:07

## 2023-11-11 RX ADMIN — CLOZAPINE 650 MG: 200 TABLET ORAL at 11:40

## 2023-11-11 RX ADMIN — GABAPENTIN 400 MG: 100 CAPSULE ORAL at 09:09

## 2023-11-11 RX ADMIN — RISPERIDONE 0.5 MG: 0.5 TABLET, ORALLY DISINTEGRATING ORAL at 09:07

## 2023-11-11 RX ADMIN — OLANZAPINE 15 MG: 10 TABLET, ORALLY DISINTEGRATING ORAL at 09:08

## 2023-11-11 RX ADMIN — BUSPIRONE HYDROCHLORIDE 15 MG: 15 TABLET ORAL at 19:18

## 2023-11-11 RX ADMIN — BUSPIRONE HYDROCHLORIDE 15 MG: 15 TABLET ORAL at 11:41

## 2023-11-11 RX ADMIN — RISPERIDONE 0.5 MG: 0.5 TABLET, ORALLY DISINTEGRATING ORAL at 19:16

## 2023-11-11 RX ADMIN — TAMSULOSIN HYDROCHLORIDE 0.4 MG: 0.4 CAPSULE ORAL at 09:06

## 2023-11-11 RX ADMIN — DIVALPROEX SODIUM 250 MG: 125 CAPSULE, COATED PELLETS ORAL at 11:38

## 2023-11-11 RX ADMIN — MICONAZOLE NITRATE: 20 POWDER TOPICAL at 11:41

## 2023-11-11 RX ADMIN — PROPRANOLOL HYDROCHLORIDE 20 MG: 10 TABLET ORAL at 16:23

## 2023-11-11 RX ADMIN — ACETAMINOPHEN 650 MG: 325 TABLET, FILM COATED ORAL at 00:28

## 2023-11-11 RX ADMIN — DIVALPROEX SODIUM 250 MG: 125 CAPSULE, COATED PELLETS ORAL at 09:09

## 2023-11-11 RX ADMIN — PROPRANOLOL HYDROCHLORIDE 30 MG: 10 TABLET ORAL at 09:07

## 2023-11-11 RX ADMIN — ATROPINE SULFATE 2 DROP: 10 SOLUTION/ DROPS OPHTHALMIC at 09:09

## 2023-11-11 RX ADMIN — BUSPIRONE HYDROCHLORIDE 15 MG: 15 TABLET ORAL at 16:23

## 2023-11-11 ASSESSMENT — ACTIVITIES OF DAILY LIVING (ADL)
ADLS_ACUITY_SCORE: 100
ADLS_ACUITY_SCORE: 100
HYGIENE/GROOMING: WITH ASSISTANCE
ADLS_ACUITY_SCORE: 100
LAUNDRY: UNABLE TO COMPLETE
ADLS_ACUITY_SCORE: 100
ORAL_HYGIENE: WITH ASSISTANCE
ADLS_ACUITY_SCORE: 100
HYGIENE/GROOMING: WITH ASSISTANCE
ADLS_ACUITY_SCORE: 100
DRESS: WITH ASSISTANCE
ADLS_ACUITY_SCORE: 100
LAUNDRY: UNABLE TO COMPLETE
ORAL_HYGIENE: WITH ASSISTANCE
DRESS: WITH ASSISTANCE
ADLS_ACUITY_SCORE: 100
ADLS_ACUITY_SCORE: 100

## 2023-11-11 NOTE — PLAN OF CARE
"Goal Outcome Evaluation:         Pt ate 100% of dinner independently. Compliant with taking medications crushed in pudding at that time. At HS pt stated he wanted meds crushed in pudding again. However, when brought to pt, he began spitting them out into the garbage. Stated \"I'm not going to be able to swallow them.\" Stated he preferred \"a shot in the butt.\" Zyprexa 10 mg IM given. Pt cooperative during process, but afterward he made several delusional, paranoid statements about \"you broke a needle off in my bone\" and \"I don't trust you at all!\" He was provided with reassurance but continued to make remarks. He told a female SIO staff x 1 that he wanted to strangle her. SIO staff provided with duress beeper. Pt made no attempts to do so. On SIO 10 ft due to assault risk. Gait steady this evening. Bilateral hand tremors. Continue with current treatment plan and recommendations. Continue to monitor and reassess symptoms. Monitor response to medications. Monitor progress towards treatment goals. Encourage groups and participation.       (Pt received only 10 mg propanolol which had been scheduled at 1600 instead of 20 mg because only 10 mg was available in [facility] and remainder not received from pharmacy until after 1800, and it was scheduled again at 2000. BP/P WDL.)         "

## 2023-11-11 NOTE — PLAN OF CARE
Problem: Sleep Disturbance  Goal: Adequate Sleep/Rest  Outcome: Progressing   Goal Outcome Evaluation:    Patient remains on SIO due to risk for self-injury related to impulsiveness, agitation and history of making sexually inappropriate remarks and gestures. He complained of pain on his left calf and rated the pain @ 8 from a range of 1-10, 10 being the highest. Tylenol 650 mg. given @ 0028 PRN for pain. Remained awake the following hour and slept later. Voided once during the shift.     Slept for a total of 6 hours. No behavioral issues noted this shift.

## 2023-11-11 NOTE — PLAN OF CARE
Problem: Behavioral Disturbance  Goal: Behavioral Disturbance  Description: Signs and symptoms of listed problems will be absent or manageable by discharge or transition of care.  Outcome: Progressing   Goal Outcome Evaluation:    Patient's mood was calm throughout the shift. Patient has been isolative to room only coming out during meal times. No aggression or irritability noted this shift and patient is medication complaint. Patient continues to be on SIO related to  aggression. Patient denies anxiety, depression, HI and SI/SIB.

## 2023-11-12 VITALS
BODY MASS INDEX: 27.98 KG/M2 | TEMPERATURE: 97.9 F | HEART RATE: 75 BPM | WEIGHT: 188.93 LBS | DIASTOLIC BLOOD PRESSURE: 61 MMHG | OXYGEN SATURATION: 96 % | HEIGHT: 69 IN | RESPIRATION RATE: 16 BRPM | SYSTOLIC BLOOD PRESSURE: 113 MMHG

## 2023-11-12 PROCEDURE — 124N000002 HC R&B MH UMMC

## 2023-11-12 PROCEDURE — 250N000013 HC RX MED GY IP 250 OP 250 PS 637: Performed by: PHYSICIAN ASSISTANT

## 2023-11-12 PROCEDURE — 250N000013 HC RX MED GY IP 250 OP 250 PS 637: Performed by: PSYCHIATRY & NEUROLOGY

## 2023-11-12 PROCEDURE — 250N000013 HC RX MED GY IP 250 OP 250 PS 637: Performed by: STUDENT IN AN ORGANIZED HEALTH CARE EDUCATION/TRAINING PROGRAM

## 2023-11-12 PROCEDURE — 250N000009 HC RX 250: Performed by: PSYCHIATRY & NEUROLOGY

## 2023-11-12 RX ADMIN — OLANZAPINE 15 MG: 10 TABLET, ORALLY DISINTEGRATING ORAL at 08:44

## 2023-11-12 RX ADMIN — RISPERIDONE 0.5 MG: 0.5 TABLET, ORALLY DISINTEGRATING ORAL at 08:44

## 2023-11-12 RX ADMIN — TAMSULOSIN HYDROCHLORIDE 0.4 MG: 0.4 CAPSULE ORAL at 08:44

## 2023-11-12 RX ADMIN — POLYETHYLENE GLYCOL 3350 17 G: 17 POWDER, FOR SOLUTION ORAL at 21:33

## 2023-11-12 RX ADMIN — PROPRANOLOL HYDROCHLORIDE 30 MG: 10 TABLET ORAL at 08:43

## 2023-11-12 RX ADMIN — ATROPINE SULFATE 2 DROP: 10 SOLUTION/ DROPS OPHTHALMIC at 13:17

## 2023-11-12 RX ADMIN — DIVALPROEX SODIUM 250 MG: 125 CAPSULE, COATED PELLETS ORAL at 08:44

## 2023-11-12 RX ADMIN — RISPERIDONE 0.5 MG: 0.5 TABLET, ORALLY DISINTEGRATING ORAL at 21:34

## 2023-11-12 RX ADMIN — GABAPENTIN 400 MG: 100 CAPSULE ORAL at 21:34

## 2023-11-12 RX ADMIN — BUSPIRONE HYDROCHLORIDE 15 MG: 15 TABLET ORAL at 17:19

## 2023-11-12 RX ADMIN — CLOZAPINE 650 MG: 200 TABLET ORAL at 13:16

## 2023-11-12 RX ADMIN — DIVALPROEX SODIUM 250 MG: 125 CAPSULE, COATED PELLETS ORAL at 13:16

## 2023-11-12 RX ADMIN — SENNOSIDES 2 TABLET: 8.6 TABLET ORAL at 08:44

## 2023-11-12 RX ADMIN — FLUOXETINE 20 MG: 20 CAPSULE ORAL at 08:44

## 2023-11-12 RX ADMIN — NAPROXEN 250 MG: 250 TABLET ORAL at 08:44

## 2023-11-12 RX ADMIN — Medication 500 MCG: at 08:44

## 2023-11-12 RX ADMIN — SENNOSIDES 2 TABLET: 8.6 TABLET ORAL at 21:35

## 2023-11-12 RX ADMIN — Medication 0.25 MG: at 21:33

## 2023-11-12 RX ADMIN — PROPRANOLOL HYDROCHLORIDE 20 MG: 10 TABLET ORAL at 17:20

## 2023-11-12 RX ADMIN — POLYETHYLENE GLYCOL 3350 17 G: 17 POWDER, FOR SOLUTION ORAL at 08:45

## 2023-11-12 RX ADMIN — ATROPINE SULFATE 2 DROP: 10 SOLUTION/ DROPS OPHTHALMIC at 08:45

## 2023-11-12 RX ADMIN — GABAPENTIN 400 MG: 100 CAPSULE ORAL at 08:44

## 2023-11-12 RX ADMIN — Medication 0.25 MG: at 08:43

## 2023-11-12 RX ADMIN — ATROPINE SULFATE 2 DROP: 10 SOLUTION/ DROPS OPHTHALMIC at 21:46

## 2023-11-12 RX ADMIN — Medication 0.25 MG: at 13:16

## 2023-11-12 RX ADMIN — PROPRANOLOL HYDROCHLORIDE 20 MG: 10 TABLET ORAL at 21:36

## 2023-11-12 RX ADMIN — Medication 25 MCG: at 08:44

## 2023-11-12 RX ADMIN — BUSPIRONE HYDROCHLORIDE 15 MG: 15 TABLET ORAL at 21:34

## 2023-11-12 RX ADMIN — PROPRANOLOL HYDROCHLORIDE 20 MG: 10 TABLET ORAL at 13:17

## 2023-11-12 RX ADMIN — PRAZOSIN HYDROCHLORIDE 1 MG: 1 CAPSULE ORAL at 21:36

## 2023-11-12 RX ADMIN — DIVALPROEX SODIUM 250 MG: 125 CAPSULE, COATED PELLETS ORAL at 17:19

## 2023-11-12 RX ADMIN — OLANZAPINE 15 MG: 10 TABLET, ORALLY DISINTEGRATING ORAL at 21:35

## 2023-11-12 RX ADMIN — GABAPENTIN 400 MG: 100 CAPSULE ORAL at 13:16

## 2023-11-12 RX ADMIN — Medication 10 MG: at 21:35

## 2023-11-12 RX ADMIN — ACETAMINOPHEN 650 MG: 325 TABLET, FILM COATED ORAL at 08:58

## 2023-11-12 RX ADMIN — MICONAZOLE NITRATE: 20 POWDER TOPICAL at 21:46

## 2023-11-12 RX ADMIN — NAPROXEN 250 MG: 250 TABLET ORAL at 17:19

## 2023-11-12 RX ADMIN — BUSPIRONE HYDROCHLORIDE 15 MG: 15 TABLET ORAL at 13:16

## 2023-11-12 ASSESSMENT — ACTIVITIES OF DAILY LIVING (ADL)
ADLS_ACUITY_SCORE: 80
LAUNDRY: UNABLE TO COMPLETE
ADLS_ACUITY_SCORE: 100
ADLS_ACUITY_SCORE: 100
ADLS_ACUITY_SCORE: 80
ADLS_ACUITY_SCORE: 80
ADLS_ACUITY_SCORE: 100
ADLS_ACUITY_SCORE: 100
ADLS_ACUITY_SCORE: 80
ORAL_HYGIENE: INDEPENDENT
LAUNDRY: UNABLE TO COMPLETE
ORAL_HYGIENE: PROMPTS;INDEPENDENT
ADLS_ACUITY_SCORE: 100
ADLS_ACUITY_SCORE: 100
ADLS_ACUITY_SCORE: 90
HYGIENE/GROOMING: INDEPENDENT;PROMPTS;WITH ASSISTANCE
DRESS: INDEPENDENT
ADLS_ACUITY_SCORE: 90
DRESS: INDEPENDENT
HYGIENE/GROOMING: INDEPENDENT

## 2023-11-12 NOTE — PLAN OF CARE
Problem: Psychotic Signs/Symptoms  Goal: Improved Behavioral Control (Psychotic Signs/Symptoms)  Outcome: Progressing   Goal Outcome Evaluation:    Patient was calm through out the shift and did not display any episodes of agitation or aggression. He was able to consume 100% of his meals with the assistance of staff. Patient denied anxiety, depression, hallucinations, HI and SI/SIB this shift. Patient complained of calf pain, no redness or tenderness noted, pt requested and was given PRN acetaminophen with reported decrease in pain. Patient continues to be on SIO for aggression and assault risk.       Addendum     SIO staff reported pt making comments about having thoughts to strangle staff. Pt easily redirectable and states he can be safe. Pt also continues to complain of pain in his calf, IM notified, pt stated acetaminophen is no longer helping with the pain.

## 2023-11-12 NOTE — PLAN OF CARE
Problem: Psychotic Symptoms  Goal: Psychotic Symptoms  Description: Signs and symptoms of listed problems will be absent or manageable.  Outcome: Progressing     Problem: Confusion Chronic  Goal: Optimal Cognitive Function  Outcome: Progressing  Goal Outcome Evaluation:    Plan of Care Reviewed With: patient      Patient presents with calm, hypo verbal, mood is flat blunted. Patient is alert and oriented x 3, disorganized. Patient is isolative and withdrawn, continues with SIO, due to his impulsiveness, agitation and verbal outbursts. minimally social, denies pain, denies anxiety and depression. No SI/SIB/AVH during this shift. Patient's BP was 117/72 at 1600, 91/57 at HS and 114/68 after re-check. Patient eating adequately, medication compliant and no stated side effects. Will continue to monitor.

## 2023-11-12 NOTE — PLAN OF CARE
Problem: Sleep Disturbance  Goal: Adequate Sleep/Rest  11/12/2023 0130 by Lesly Snell RN  Outcome: Progressing   Goal Outcome Evaluation:    Patient remains on SIO due to risk for self-injury related to impulsiveness and making sexually inappropriate remarks and gestures.     Patient was awake from 0015 till about 0030 and went back to sleep.     Patient voided once upon waking up in the morning. No behavioral issues noted this shift.     Slept for a total of 8 hours.

## 2023-11-12 NOTE — PROGRESS NOTES
Brief Medicine Progress Note  November 12, 2023     Paged regarding L calf pain. Called nurse who stated she just came on shift and has not seen the patient, but got report about pt having pain in his L calf. I asked the nurse to please assess the patient for other symptoms (SOB, chest pain, etc) and to look at the patient's leg and call me back.     Spoke w/ the nurse about 1 hour later and she stated that pt does not have any SOB or chest pain and his leg is not swollen, no skin changes.     On my chart review, pt c/o L calf pain in Nov 2022 and a LE US was ordered which showed a possible Baker's cyst.    Pt VSS, no hx of DVT on my review, normal exam per nurse. Encourage APAP use, heat, ice for pain. Pt placed on D1 follow up list.    Plan:  - Tylenol PRN  - Heat/ice PRN  - Day team to follow up on patient symptoms      John Mckinnon PA-C  [facility] Hospitalist Service  Page via [employer] or [employer]

## 2023-11-13 PROCEDURE — 250N000013 HC RX MED GY IP 250 OP 250 PS 637: Performed by: PHYSICIAN ASSISTANT

## 2023-11-13 PROCEDURE — 250N000013 HC RX MED GY IP 250 OP 250 PS 637: Performed by: STUDENT IN AN ORGANIZED HEALTH CARE EDUCATION/TRAINING PROGRAM

## 2023-11-13 PROCEDURE — 99231 SBSQ HOSP IP/OBS SF/LOW 25: CPT | Performed by: PHYSICIAN ASSISTANT

## 2023-11-13 PROCEDURE — 250N000013 HC RX MED GY IP 250 OP 250 PS 637: Performed by: PSYCHIATRY & NEUROLOGY

## 2023-11-13 RX ORDER — BUSPIRONE HYDROCHLORIDE 15 MG/1
15 TABLET ORAL 3 TIMES DAILY
Qty: 90 TABLET | Refills: 3 | Status: SHIPPED | OUTPATIENT
Start: 2023-11-13 | End: 2024-05-21

## 2023-11-13 RX ORDER — SIMETHICONE 80 MG
80 TABLET,CHEWABLE ORAL EVERY 6 HOURS PRN
Qty: 30 TABLET | Refills: 3 | Status: SHIPPED | OUTPATIENT
Start: 2023-11-13 | End: 2024-01-03

## 2023-11-13 RX ORDER — PROPRANOLOL HYDROCHLORIDE 20 MG/1
20 TABLET ORAL 3 TIMES DAILY
Qty: 90 TABLET | Refills: 3 | Status: SHIPPED | OUTPATIENT
Start: 2023-11-13 | End: 2024-03-28

## 2023-11-13 RX ORDER — CLOZAPINE 50 MG/1
650 TABLET ORAL
Qty: 500 TABLET | Refills: 3 | Status: SHIPPED | OUTPATIENT
Start: 2023-11-13 | End: 2024-03-01

## 2023-11-13 RX ORDER — ACETAMINOPHEN 325 MG/1
650 TABLET ORAL EVERY 4 HOURS PRN
Qty: 60 TABLET | Refills: 1 | Status: SHIPPED | OUTPATIENT
Start: 2023-11-13 | End: 2024-01-03

## 2023-11-13 RX ORDER — PRAZOSIN HYDROCHLORIDE 1 MG/1
1 CAPSULE ORAL AT BEDTIME
Qty: 30 CAPSULE | Refills: 3 | Status: SHIPPED | OUTPATIENT
Start: 2023-11-13 | End: 2024-03-01

## 2023-11-13 RX ORDER — VITAMIN B COMPLEX
25 TABLET ORAL DAILY
Qty: 30 TABLET | Refills: 3 | Status: SHIPPED | OUTPATIENT
Start: 2023-11-13 | End: 2023-11-14

## 2023-11-13 RX ORDER — TAMSULOSIN HYDROCHLORIDE 0.4 MG/1
0.4 CAPSULE ORAL DAILY
Qty: 30 CAPSULE | Refills: 3 | Status: SHIPPED | OUTPATIENT
Start: 2023-11-13 | End: 2024-01-03

## 2023-11-13 RX ORDER — PROPRANOLOL HYDROCHLORIDE 10 MG/1
30 TABLET ORAL DAILY
Qty: 30 TABLET | Refills: 3 | Status: SHIPPED | OUTPATIENT
Start: 2023-11-13 | End: 2024-03-01

## 2023-11-13 RX ORDER — RISPERIDONE 0.5 MG/1
0.5 TABLET, ORALLY DISINTEGRATING ORAL 2 TIMES DAILY
Qty: 60 TABLET | Refills: 3 | Status: SHIPPED | OUTPATIENT
Start: 2023-11-13 | End: 2024-05-21 | Stop reason: DRUGHIGH

## 2023-11-13 RX ORDER — ALBUTEROL SULFATE 90 UG/1
2 AEROSOL, METERED RESPIRATORY (INHALATION) EVERY 6 HOURS PRN
Qty: 18 G | Refills: 1 | Status: SHIPPED | OUTPATIENT
Start: 2023-11-13 | End: 2024-01-03

## 2023-11-13 RX ORDER — SENNOSIDES 8.6 MG
2 TABLET ORAL 2 TIMES DAILY
Qty: 60 TABLET | Refills: 3 | Status: SHIPPED | OUTPATIENT
Start: 2023-11-13 | End: 2024-01-03

## 2023-11-13 RX ORDER — BENZONATATE 100 MG/1
100 CAPSULE ORAL 3 TIMES DAILY PRN
Qty: 30 CAPSULE | Refills: 1 | Status: SHIPPED | OUTPATIENT
Start: 2023-11-13 | End: 2024-01-03

## 2023-11-13 RX ORDER — POLYETHYLENE GLYCOL 3350 17 G/17G
17 POWDER, FOR SOLUTION ORAL 2 TIMES DAILY
Qty: 60 PACKET | Refills: 3 | Status: SHIPPED | OUTPATIENT
Start: 2023-11-13 | End: 2024-01-03

## 2023-11-13 RX ORDER — GABAPENTIN 400 MG/1
400 CAPSULE ORAL 3 TIMES DAILY
Qty: 90 CAPSULE | Refills: 3 | Status: SHIPPED | OUTPATIENT
Start: 2023-11-13 | End: 2024-06-05

## 2023-11-13 RX ORDER — AMOXICILLIN 250 MG
1 CAPSULE ORAL 2 TIMES DAILY PRN
Qty: 60 TABLET | Refills: 3 | Status: SHIPPED | OUTPATIENT
Start: 2023-11-13 | End: 2024-01-03

## 2023-11-13 RX ORDER — OLANZAPINE 15 MG/1
15 TABLET, ORALLY DISINTEGRATING ORAL 2 TIMES DAILY
Qty: 60 TABLET | Refills: 3 | Status: SHIPPED | OUTPATIENT
Start: 2023-11-13 | End: 2024-05-21 | Stop reason: DRUGHIGH

## 2023-11-13 RX ORDER — LORAZEPAM 0.5 MG/1
0.25 TABLET ORAL 3 TIMES DAILY
Qty: 90 TABLET | Refills: 1 | Status: SHIPPED | OUTPATIENT
Start: 2023-11-13 | End: 2024-05-21 | Stop reason: DRUGHIGH

## 2023-11-13 RX ORDER — NAPROXEN 250 MG/1
250 TABLET ORAL 2 TIMES DAILY WITH MEALS
Qty: 60 TABLET | Refills: 3 | Status: SHIPPED | OUTPATIENT
Start: 2023-11-13 | End: 2024-05-02

## 2023-11-13 RX ORDER — PHENOL 1.4 %
10 AEROSOL, SPRAY (ML) MUCOUS MEMBRANE AT BEDTIME
Qty: 30 TABLET | Refills: 3 | Status: SHIPPED | OUTPATIENT
Start: 2023-11-13 | End: 2023-11-14

## 2023-11-13 RX ADMIN — Medication 25 MCG: at 08:21

## 2023-11-13 RX ADMIN — SENNOSIDES 2 TABLET: 8.6 TABLET ORAL at 08:22

## 2023-11-13 RX ADMIN — BUSPIRONE HYDROCHLORIDE 15 MG: 15 TABLET ORAL at 11:47

## 2023-11-13 RX ADMIN — TAMSULOSIN HYDROCHLORIDE 0.4 MG: 0.4 CAPSULE ORAL at 08:22

## 2023-11-13 RX ADMIN — MICONAZOLE NITRATE: 20 POWDER TOPICAL at 11:47

## 2023-11-13 RX ADMIN — PROPRANOLOL HYDROCHLORIDE 20 MG: 10 TABLET ORAL at 11:46

## 2023-11-13 RX ADMIN — RISPERIDONE 0.5 MG: 0.5 TABLET, ORALLY DISINTEGRATING ORAL at 08:21

## 2023-11-13 RX ADMIN — Medication 0.25 MG: at 08:22

## 2023-11-13 RX ADMIN — DIVALPROEX SODIUM 250 MG: 125 CAPSULE, COATED PELLETS ORAL at 11:46

## 2023-11-13 RX ADMIN — TRAZODONE HYDROCHLORIDE 50 MG: 50 TABLET ORAL at 00:26

## 2023-11-13 RX ADMIN — ATROPINE SULFATE 2 DROP: 10 SOLUTION/ DROPS OPHTHALMIC at 08:25

## 2023-11-13 RX ADMIN — Medication 0.25 MG: at 13:43

## 2023-11-13 RX ADMIN — Medication 500 MCG: at 08:21

## 2023-11-13 RX ADMIN — ATROPINE SULFATE 2 DROP: 10 SOLUTION/ DROPS OPHTHALMIC at 13:44

## 2023-11-13 RX ADMIN — NAPROXEN 250 MG: 250 TABLET ORAL at 08:23

## 2023-11-13 RX ADMIN — POLYETHYLENE GLYCOL 3350 17 G: 17 POWDER, FOR SOLUTION ORAL at 08:21

## 2023-11-13 RX ADMIN — GABAPENTIN 400 MG: 100 CAPSULE ORAL at 13:43

## 2023-11-13 RX ADMIN — OLANZAPINE 15 MG: 10 TABLET, ORALLY DISINTEGRATING ORAL at 08:22

## 2023-11-13 RX ADMIN — GABAPENTIN 400 MG: 100 CAPSULE ORAL at 08:22

## 2023-11-13 RX ADMIN — FLUOXETINE 20 MG: 20 CAPSULE ORAL at 08:23

## 2023-11-13 RX ADMIN — PROPRANOLOL HYDROCHLORIDE 30 MG: 10 TABLET ORAL at 08:22

## 2023-11-13 RX ADMIN — CLOZAPINE 650 MG: 200 TABLET ORAL at 11:45

## 2023-11-13 RX ADMIN — DIVALPROEX SODIUM 250 MG: 125 CAPSULE, COATED PELLETS ORAL at 08:21

## 2023-11-13 ASSESSMENT — ACTIVITIES OF DAILY LIVING (ADL)
ADLS_ACUITY_SCORE: 90

## 2023-11-13 NOTE — PLAN OF CARE
"Mood and Affect: Patient describes mood as \"Neutral.\" Affect is flat.     LOC and Orientation: Alert. Oriented to person and place. Disoriented to situation.    Behavior and Interaction: Patient spent most of the shift in his room. Patient on space restriction with other patients.     Patient is pleasant, jovial, calm and cooperative during assessment.     Mental Health Symptoms: Patient denies all mental health symptoms. , states, \"I wish I was dead, but I don't want to kill myself.\"     Medical Concerns:No new concerns.     Other Concerns: None.    Medication Compliant: Patient is compliant with all scheduled medication.    PRN: None given.    Medication Side Effects: None observed.    Food Intake: Good appetite.     Elimination: Denies problems, last bowel movement today.    Self Care: Assist of one with cares.     Vital Signs: Reports left calf pain, pain gets worse with walking, internal medicine notified.   /61   Pulse 75   Temp 97.9  F (36.6  C) (Temporal)   Resp 16   Ht 1.753 m (5' 9\")   Wt 85.7 kg (188 lb 15 oz)   SpO2 96%   BMI 27.90 kg/m        Problem: Psychotic Symptoms  Goal: Psychotic Symptoms  Description: Signs and symptoms of listed problems will be absent or manageable.  Outcome: Progressing     Problem: Confusion Chronic  Goal: Optimal Cognitive Function  Outcome: Progressing     Problem: Psychotic Signs/Symptoms  Goal: Improved Behavioral Control (Psychotic Signs/Symptoms)  Outcome: Progressing   Goal Outcome Evaluation:    Plan of Care Reviewed With: patient                   "

## 2023-11-13 NOTE — DISCHARGE SUMMARY
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 here.      Dose: 15 mg  Take 1 tablet (15 mg) by mouth 2 times daily  Quantity: 60 tablet  Refills: 3     polyethylene glycol 17 g packet  Commonly known as: MIRALAX  This may have changed: when to take this  Used for: Constipation, unspecified constipation type      Dose: 17 g  Take 17 g by mouth 2 times daily  Quantity: 60 packet  Refills: 3            CONTINUE these medicines which have NOT CHANGED        Dose / Directions   senna-docusate 8.6-50 MG tablet  Commonly known as: SENOKOT-S/PERICOLACE  Used for: Urinary retention      Dose: 1 tablet  Take 1 tablet by mouth 2 times daily as needed for constipation  Quantity: 60 tablet  Refills: 3     tamsulosin 0.4 MG capsule  Commonly known as: FLOMAX  Used for: Urinary retention      Dose: 0.4 mg  Take 1 capsule (0.4 mg) by mouth daily  Quantity: 30 capsule  Refills: 3            STOP taking      cyanocobalamin 1000 MCG tablet  Commonly known as: VITAMIN B-12  Replaced by: cyanocobalamin 500 MCG Subl sublingual tablet        nitroFURantoin macrocrystal-monohydrate 100 MG capsule  Commonly known as: MACROBID        sertraline 50 MG tablet  Commonly known as: ZOLOFT                  Where to get your medicines        These medications were sent to [facility]  [place], [place] - [address]  [address], [facility] [address]      Phone: [phone]   acetaminophen 325 MG tablet  albuterol 108 (90 Base) MCG/ACT inhaler  benzonatate 100 MG capsule  busPIRone 15 MG tablet  cloZAPine 50 MG tablet  cyanocobalamin 500 MCG Subl sublingual tablet  FLUoxetine 20 MG capsule  gabapentin 400 MG capsule  LORazepam 0.5 MG tablet  Melatonin 10 MG Tabs tablet  miconazole 2 % external powder  mineral oil-white petrolatum cream  naproxen 250 MG tablet  OLANZapine zydis 15 MG ODT  polyethylene glycol 17 g packet  prazosin 1 MG capsule  propranolol 10 MG tablet  propranolol 20 MG tablet  risperiDONE 0.5 MG ODT  senna-docusate 8.6-50 MG tablet  sennosides 8.6 MG tablet  simethicone 80 MG chewable tablet  tamsulosin 0.4 MG capsule  Vitamin D3 25 mcg (1000 units) tablet       These medications were sent to [facility] [address] [place], [place] [id number]      Phone: [phone]   divalproex sodium delayed-release 125 MG DR capsule  pantoprazole 40 MG EC tablet       Cognitive eval:   JOSE ALBERTO COGNITIVE ASSESSMENT (MoCA)    VERSION  __8.1 attempted   The Jose Alberto Cognitive Assessment (MoCA) was designed as a rapid screening instrument for mild cognitive dysfunction. It assesses different cognitive domains: attention and concentration, executive functions, memory, language, visuoconstructional skills, conceptual thinking, calculations, and orientation. The total possible score is 30 points; a score of 26 or above is considered normal.     JOSE ALBERTO COGNITIVE ASSESSMENT SCORE:  INCOMPLETE : WITH AGREEING TO ANSWER ONLY 6 QUESTIONS / 30 which WOULD BE EXPECTED TO SCORE below the normal range      Draw a Clock Subtest: Score:    1  / 3 Made attempts to draw a [place] and [place] though with hand tremors, was quite a shaky appearance (1 point was provided for the [place] and [place]. Also attempted to write a couple numbers inside the [place] though  "unable to recognize any resemblance to an appearance of a number. When offered for this author to write the numbers in, he stated it being  too hard  and did not know how to do this.  TOTAL CPT TASK 5 AVERAGE SCORE:   4.0 - 4.2  / 5.6  which is well below the normal range.     11/13: Blood pressure 113/61, pulse 75, temperature 97.9  F (36.6  C), temperature source Temporal, resp. rate 16, height 1.753 m (5' 9\"), weight 85.7 kg (188 lb 15 oz), SpO2 96%.  11/13: General appearance: good  Alert.   Affect: fair  Mood: fair    Speech:  normal.   Eye contact:  good.    Psychomotor behavior: mild tremors today  Gait: steady   Abnormal movements:  none  Delusions: persist  Hallucinations:  persist  Thoughts: linear  Associations: intact  Judgement: poor  Insight: poor  Cognitions: impaired  Not suicidal.    mp:  1.  Paranoid Schizophrenia  2.  Major Neurocognitive Disorder  I feel the discomfort around prople has a social phobic component. He is tolerating Risperdal  And:         Patient Active Problem List   Diagnosis    Urinary retention    Schizophrenia, unspecified type (H)    Altered mental status, unspecified altered mental status type    Mood changes    Paranoid schizophrenia, chronic condition with acute exacerbation (H)    Neuroleptic-induced parkinsonism     Agitation          "

## 2023-11-13 NOTE — PROGRESS NOTES
Discharge order entered YES    Patient discharging 11/13/2023 accompanied by staff and destination is [facility]. Discharge paperwork and medications reviewed with Patient who verbalizes understanding.     Patient denies current or recent suicidal ideation    SIB denies    HI: denies current or recent homicidal ideation or behaviors.    Copies provided: AVS YES      SUICIDE ASSESS (NURSING NAVIGATOR)  YES    DISCHARGE FLOW SHEET:YES    CARE PLAN COMPLETE: YES    EDUCATION COMPLETE: YES    Patient completed Personal Plan of Care, identifying reasons for hospitalization and goals for discharge.     Survey provided.

## 2023-11-13 NOTE — PROGRESS NOTES
"Brief medicine follow up note:  - Pt c/o L calf pain he states developed \"weeks\" ago but has been waxing and waning since. States pain increased yesterday. Denies trauma or etiology. Has been taking prn Tylenol with relief. Is weight bearing and able to ambulate. Denies pain elsewhere and other acute physical concerns at this time.     GEN: In NAD  Blood pressure 113/61, pulse 75, temperature 97.9  F (36.6  C), temperature source Temporal, resp. rate 16, height 1.753 m (5' 9\"), weight 85.7 kg (188 lb 15 oz), SpO2 96%.   EXT: LLE with mild mid calf tenderness without other abnormal findings including edema or erythema.    ASSESSMENT: Subacute LLE calf pain, likely due to mild muscle strain. Exam unremarkable.     PLAN:  Start topical diclofenac QID. Monitor for resolution. Medicine signing off. Please feel free to call with questions.         Jack Richmond PA-C  Internal Medicine Hospitalist   [facility]ist group  [phone]   "

## 2023-11-13 NOTE — PLAN OF CARE
Problem: Sleep Disturbance  Goal: Adequate Sleep/Rest  Outcome: Progressing   Goal Outcome Evaluation:    Patient remains on SIO due to risk for self-injury related to intrusiveness, agitatio and history of making sexually inappropriate remarks and gestures. He reported that he was having trouble sleeping and requested for a sleeping medication. Trazodone 50 mg. given @ 0026 PRN for sleep with vanilla pudding. He consumed the remaining amount of pudding. He appeared very sleepy and finally fell asleep @ 0045.     Slept for a total of 7.5 hours. No behavioral issues noted his shift. He is scheduled for discharge to [facility] @ 11:30 today.

## 2023-11-13 NOTE — PLAN OF CARE
Assessment/Intervention/Current Symtoms and Care Coordination:  Reviewed chart. Met with team to review patient's current functioning, needs, progress, and impediments to discharge. Pt will be discharging on today at 11:30am, pending medications are prepared in time.    Writer received email from [facility] with [facility] ([email or web address]) requesting the standard admission orders signed by provider. [facility] also reports issue with [facility] and requests provider send pt with a 30-day supply at discharge. Writer informed MD.    Writer spoke with [facility] ([phone]) and confirmed pt will be discharged with 30-day supply of medications. Writer also relayed plan to send signed admission orders.     Writer sent signed admission orders to [facility] via email ([email or web address]).    Writer provided pt copy of PD. Writer placed PD in the chart. Writer sent COS to [facility] via fax (F: [phone]).    Writer was informed by RN that pt's medications will not be ready until 1:00pm today. Writer informed Jovana.    Writer spoke with Patricio from [facility] ([phone]) and rescheduled ride for 1:30pm today.    Writer spoke with [facility] who inquires where pt's medications are. Writer relayed plan to check with nursing and get back to [facility]. Writer was informed by RN that pt discharged without medications due to them not being prepared in time. RN informed writer that he will call [facility] and  medications to [facility].    Discharge Plan or Goal:  Will discharge to [facility] [place] [address] today at 1:30pm     Barriers to Discharge:  Pt discharging today at 1:30pm to [facility] via [facility]    Referral Status:  Pt accepted at [facility]. For more comprehensive list of referrals see CTC note from 10/6  [facility] coordinating Clozaril lab appointments  Psychiatry appointment scheduled  PCP appointment scheduled  [facility] uses [facility] [address] [place]  [address]    Legal Status:  Provisionally discharged    Contacts:  Vicky Marin CM with [facility] (KING per commitment)  P: [phone]  E: [email or web address].  Regina SANTIAGO CM with [facility] (KING per commitment)  P: [phone]  E: [email or web address].  Alberta - Mom (KING signed)  P: [phone] (H) [phone] (M)   Ino - Dad (KING signed)  P: [phone] (H)  Sandra Treadwell - Sister (KING signed)  P: [phone]  [facility]'s Office   F: [phone]  [facility]  P: [phone]  E: [email or web address]  Alexa Marin [facility]    F: [phone]  E: [email or web address].    Upcoming Meetings and Dates/Important Information and next steps:  Pt discharging on 11/13 at 1:30pm via [facility]

## 2023-11-13 NOTE — PLAN OF CARE
BEH IP Unit Acuity Rating Score (UARS)  Patient is given one point for every criteria they meet.    CRITERIA SCORING   On a 72 hour hold, court hold, committed, stay of commitment, or revocation 1    Patient LOS on BEH unit exceeds 20 days 1  LOS: 171   Patient under guardianship, 55+, otherwise medically complex, or under age 11 1   Suicide ideation without relief of precipitating factors 0   Current plan for suicide 0   Current plan for homicide 0   Imminent risk or actual attempt to seriously harm another without relief of factors precipitating the attempt 0   Severe dysfunction in daily living (ex: complete neglect for self care, extreme disruption in vegetative function, extreme deterioration in social interactions) 1   Recent (last 7 days) or current physical aggression in the ED or on unit 1   Restraints or seclusion episode in past 72 hours 0   Recent (last 7 days) or current verbal aggression, agitation, yelling, etc., while in the ED or unit 0   Active psychosis 0   Need for constant or near constant redirection (from leaving, from others, etc).  1   Intrusive or disruptive behaviors 0   TOTAL 6

## 2023-11-14 RX ORDER — DIVALPROEX SODIUM 125 MG/1
250 CAPSULE, COATED PELLETS ORAL
Qty: 360 CAPSULE | Refills: 3 | Status: SHIPPED | OUTPATIENT
Start: 2023-11-14 | End: 2024-06-05

## 2023-11-14 RX ORDER — UBIDECARENONE 75 MG
100 CAPSULE ORAL DAILY
Qty: 30 TABLET | Refills: 1 | Status: SHIPPED | OUTPATIENT
Start: 2023-11-14 | End: 2023-11-24

## 2023-11-14 RX ORDER — PANTOPRAZOLE SODIUM 40 MG/1
40 TABLET, DELAYED RELEASE ORAL DAILY
Qty: 30 TABLET | Refills: 1 | Status: SHIPPED | OUTPATIENT
Start: 2023-11-14 | End: 2024-01-03

## 2023-11-14 RX ORDER — VITAMIN B COMPLEX
25 TABLET ORAL DAILY
Qty: 30 TABLET | Refills: 3 | Status: SHIPPED | OUTPATIENT
Start: 2023-11-14 | End: 2024-01-03

## 2023-11-14 RX ORDER — PHENOL 1.4 %
10 AEROSOL, SPRAY (ML) MUCOUS MEMBRANE AT BEDTIME
Qty: 30 TABLET | Refills: 3 | Status: SHIPPED | OUTPATIENT
Start: 2023-11-14 | End: 2024-06-05

## 2023-11-16 ENCOUNTER — LAB (OUTPATIENT)
Dept: LAB | Facility: CLINIC | Age: 63
End: 2023-11-16
Payer: COMMERCIAL

## 2023-11-16 ENCOUNTER — DOCUMENTATION ONLY (OUTPATIENT)
Dept: FAMILY MEDICINE | Facility: CLINIC | Age: 63
End: 2023-11-16

## 2023-11-16 DIAGNOSIS — Z13.1 SCREENING FOR DIABETES MELLITUS: Primary | ICD-10-CM

## 2023-11-16 DIAGNOSIS — Z12.5 SCREENING FOR PROSTATE CANCER: ICD-10-CM

## 2023-11-16 DIAGNOSIS — Z13.220 SCREENING FOR HYPERLIPIDEMIA: ICD-10-CM

## 2023-11-16 DIAGNOSIS — Z13.1 SCREENING FOR DIABETES MELLITUS: ICD-10-CM

## 2023-11-16 LAB — HBA1C MFR BLD: 5.1 % (ref 0–5.6)

## 2023-11-16 PROCEDURE — 36415 COLL VENOUS BLD VENIPUNCTURE: CPT

## 2023-11-16 PROCEDURE — 80061 LIPID PANEL: CPT

## 2023-11-16 PROCEDURE — G0103 PSA SCREENING: HCPCS

## 2023-11-16 PROCEDURE — 83036 HEMOGLOBIN GLYCOSYLATED A1C: CPT | Mod: GZ

## 2023-11-16 NOTE — PROGRESS NOTES
Vinod Lee has an upcoming lab appointment:    Future Appointments   Date Time Provider Department Center   11/16/2023  3:00 PM [facility]   11/24/2023  9:00 AM Franklyn Estrella MD [facility]     Patient is scheduled for the following lab(s): none. OV, 11/24      There is no order available. Please review and place either future orders or HMPO (Review of Health Maintenance Protocol Orders), as appropriate.    Health Maintenance Due   Topic    ANNUAL REVIEW OF HM ORDERS     HEPATITIS C SCREENING     LIPID      Tameka Robertson

## 2023-11-17 LAB
CHOLEST SERPL-MCNC: 178 MG/DL
HDLC SERPL-MCNC: 55 MG/DL
LDLC SERPL CALC-MCNC: 98 MG/DL
NONHDLC SERPL-MCNC: 123 MG/DL
PSA SERPL DL<=0.01 NG/ML-MCNC: 1.39 NG/ML (ref 0–4.5)
TRIGL SERPL-MCNC: 125 MG/DL

## 2023-11-20 ENCOUNTER — TELEPHONE (OUTPATIENT)
Dept: FAMILY MEDICINE | Facility: CLINIC | Age: 63
End: 2023-11-20
Payer: COMMERCIAL

## 2023-11-20 NOTE — TELEPHONE ENCOUNTER
Marianna is calling from [facility]. Address updated.    Patient moved in to Assisted Living a week ago from the hospital with a week worth of supply.    Patient is not established here.  Patient has an upcoming visit with Dr. Estrella on Nov 24th.     Meds can either be requested from a previous provider from [facility] or coming to [facility].    Alaina Pagan RN, BSN  [facility]

## 2023-11-24 ENCOUNTER — OFFICE VISIT (OUTPATIENT)
Dept: FAMILY MEDICINE | Facility: CLINIC | Age: 63
End: 2023-11-24
Payer: COMMERCIAL

## 2023-11-24 VITALS
BODY MASS INDEX: 26.43 KG/M2 | DIASTOLIC BLOOD PRESSURE: 79 MMHG | WEIGHT: 184.6 LBS | OXYGEN SATURATION: 100 % | HEIGHT: 70 IN | SYSTOLIC BLOOD PRESSURE: 126 MMHG | HEART RATE: 90 BPM | RESPIRATION RATE: 14 BRPM

## 2023-11-24 DIAGNOSIS — R91.1 PULMONARY NODULE LESS THAN 1 CM IN DIAMETER WITH MODERATE TO HIGH RISK FOR MALIGNANT NEOPLASM: ICD-10-CM

## 2023-11-24 DIAGNOSIS — R94.31 PROLONGED QT INTERVAL: ICD-10-CM

## 2023-11-24 DIAGNOSIS — G21.11 NEUROLEPTIC-INDUCED PARKINSONISM (H): ICD-10-CM

## 2023-11-24 DIAGNOSIS — T43.505A NEUROLEPTIC-INDUCED PARKINSONISM (H): ICD-10-CM

## 2023-11-24 DIAGNOSIS — J44.9 CHRONIC OBSTRUCTIVE PULMONARY DISEASE, UNSPECIFIED COPD TYPE (H): Primary | ICD-10-CM

## 2023-11-24 DIAGNOSIS — Z23 NEED FOR PROPHYLACTIC VACCINATION AND INOCULATION AGAINST INFLUENZA: ICD-10-CM

## 2023-11-24 DIAGNOSIS — Z91.89 PULMONARY NODULE LESS THAN 1 CM IN DIAMETER WITH MODERATE TO HIGH RISK FOR MALIGNANT NEOPLASM: ICD-10-CM

## 2023-11-24 DIAGNOSIS — Z51.81 ENCOUNTER FOR THERAPEUTIC DRUG MONITORING: ICD-10-CM

## 2023-11-24 DIAGNOSIS — Z12.11 SCREEN FOR COLON CANCER: ICD-10-CM

## 2023-11-24 DIAGNOSIS — G47.00 INSOMNIA, UNSPECIFIED TYPE: ICD-10-CM

## 2023-11-24 DIAGNOSIS — Z23 NEED FOR TDAP VACCINATION: ICD-10-CM

## 2023-11-24 DIAGNOSIS — Z11.59 NEED FOR HEPATITIS C SCREENING TEST: ICD-10-CM

## 2023-11-24 DIAGNOSIS — F20.0 PARANOID SCHIZOPHRENIA, CHRONIC CONDITION WITH ACUTE EXACERBATION (H): ICD-10-CM

## 2023-11-24 LAB
ANION GAP SERPL CALCULATED.3IONS-SCNC: 9 MMOL/L (ref 7–15)
BASOPHILS # BLD AUTO: 0.1 10E3/UL (ref 0–0.2)
BASOPHILS NFR BLD AUTO: 1 %
BUN SERPL-MCNC: 31.6 MG/DL (ref 8–23)
CALCIUM SERPL-MCNC: 9.1 MG/DL (ref 8.8–10.2)
CHLORIDE SERPL-SCNC: 108 MMOL/L (ref 98–107)
CREAT SERPL-MCNC: 0.96 MG/DL (ref 0.67–1.17)
DEPRECATED HCO3 PLAS-SCNC: 27 MMOL/L (ref 22–29)
EGFRCR SERPLBLD CKD-EPI 2021: 88 ML/MIN/1.73M2
EOSINOPHIL # BLD AUTO: 0 10E3/UL (ref 0–0.7)
EOSINOPHIL NFR BLD AUTO: 0 %
ERYTHROCYTE [DISTWIDTH] IN BLOOD BY AUTOMATED COUNT: 15.3 % (ref 10–15)
GLUCOSE SERPL-MCNC: 106 MG/DL (ref 70–99)
HCT VFR BLD AUTO: 43.4 % (ref 40–53)
HCV AB SERPL QL IA: NONREACTIVE
HGB BLD-MCNC: 13.2 G/DL (ref 13.3–17.7)
IMM GRANULOCYTES # BLD: 0 10E3/UL
IMM GRANULOCYTES NFR BLD: 0 %
LYMPHOCYTES # BLD AUTO: 1.6 10E3/UL (ref 0.8–5.3)
LYMPHOCYTES NFR BLD AUTO: 17 %
MCH RBC QN AUTO: 27 PG (ref 26.5–33)
MCHC RBC AUTO-ENTMCNC: 30.4 G/DL (ref 31.5–36.5)
MCV RBC AUTO: 89 FL (ref 78–100)
MONOCYTES # BLD AUTO: 0.9 10E3/UL (ref 0–1.3)
MONOCYTES NFR BLD AUTO: 10 %
NEUTROPHILS # BLD AUTO: 6.9 10E3/UL (ref 1.6–8.3)
NEUTROPHILS NFR BLD AUTO: 72 %
PLATELET # BLD AUTO: 194 10E3/UL (ref 150–450)
POTASSIUM SERPL-SCNC: 4.5 MMOL/L (ref 3.4–5.3)
RBC # BLD AUTO: 4.88 10E6/UL (ref 4.4–5.9)
SODIUM SERPL-SCNC: 144 MMOL/L (ref 135–145)
WBC # BLD AUTO: 9.5 10E3/UL (ref 4–11)

## 2023-11-24 PROCEDURE — 99205 OFFICE O/P NEW HI 60 MIN: CPT | Mod: 25 | Performed by: INTERNAL MEDICINE

## 2023-11-24 PROCEDURE — 90686 IIV4 VACC NO PRSV 0.5 ML IM: CPT | Performed by: INTERNAL MEDICINE

## 2023-11-24 PROCEDURE — 85025 COMPLETE CBC W/AUTO DIFF WBC: CPT | Performed by: INTERNAL MEDICINE

## 2023-11-24 PROCEDURE — 90480 ADMN SARSCOV2 VAC 1/ONLY CMP: CPT | Performed by: INTERNAL MEDICINE

## 2023-11-24 PROCEDURE — G0008 ADMIN INFLUENZA VIRUS VAC: HCPCS | Performed by: INTERNAL MEDICINE

## 2023-11-24 PROCEDURE — 80048 BASIC METABOLIC PNL TOTAL CA: CPT | Performed by: INTERNAL MEDICINE

## 2023-11-24 PROCEDURE — 91320 SARSCV2 VAC 30MCG TRS-SUC IM: CPT | Performed by: INTERNAL MEDICINE

## 2023-11-24 PROCEDURE — 90677 PCV20 VACCINE IM: CPT | Performed by: INTERNAL MEDICINE

## 2023-11-24 PROCEDURE — G0009 ADMIN PNEUMOCOCCAL VACCINE: HCPCS | Performed by: INTERNAL MEDICINE

## 2023-11-24 PROCEDURE — 36415 COLL VENOUS BLD VENIPUNCTURE: CPT | Performed by: INTERNAL MEDICINE

## 2023-11-24 PROCEDURE — 86803 HEPATITIS C AB TEST: CPT | Performed by: INTERNAL MEDICINE

## 2023-11-24 RX ORDER — RESPIRATORY SYNCYTIAL VIRUS VACCINE 120MCG/0.5
0.5 KIT INTRAMUSCULAR ONCE
Qty: 1 EACH | Refills: 0 | Status: CANCELLED | OUTPATIENT
Start: 2023-11-24 | End: 2023-11-24

## 2023-11-24 RX ORDER — PHENOL 1.4 %
10 AEROSOL, SPRAY (ML) MUCOUS MEMBRANE
Start: 2023-11-24 | End: 2024-03-28

## 2023-11-24 ASSESSMENT — PATIENT HEALTH QUESTIONNAIRE - PHQ9
SUM OF ALL RESPONSES TO PHQ QUESTIONS 1-9: 22
10. IF YOU CHECKED OFF ANY PROBLEMS, HOW DIFFICULT HAVE THESE PROBLEMS MADE IT FOR YOU TO DO YOUR WORK, TAKE CARE OF THINGS AT HOME, OR GET ALONG WITH OTHER PEOPLE: EXTREMELY DIFFICULT
SUM OF ALL RESPONSES TO PHQ QUESTIONS 1-9: 22

## 2023-11-24 NOTE — NURSING NOTE
Prior to immunization administration, verified patients identity using patient s name and date of birth. Please see Immunization Activity for additional information.     Screening Questionnaire for Adult Immunization    Are you sick today?   No   Do you have allergies to medications, food, a vaccine component or latex?   No   Have you ever had a serious reaction after receiving a vaccination?   No   Do you have a long-term health problem with heart, lung, kidney, or metabolic disease (e.g., diabetes), asthma, a blood disorder, no spleen, complement component deficiency, a cochlear implant, or a spinal fluid leak?  Are you on long-term aspirin therapy?   No   Do you have cancer, leukemia, HIV/AIDS, or any other immune system problem?   No   Do you have a parent, brother, or sister with an immune system problem?   No   In the past 3 months, have you taken medications that affect  your immune system, such as prednisone, other steroids, or anticancer drugs; drugs for the treatment of rheumatoid arthritis, Crohn s disease, or psoriasis; or have you had radiation treatments?   No   Have you had a seizure, or a brain or other nervous system problem?   No   During the past year, have you received a transfusion of blood or blood    products, or been given immune (gamma) globulin or antiviral drug?   No   For women: Are you pregnant or is there a chance you could become       pregnant during the next month?   No   Have you received any vaccinations in the past 4 weeks?   No     Immunization questionnaire answers were all negative.      Patient instructed to remain in clinic for 15 minutes afterwards, and to report any adverse reactions.     Screening performed by Poonam Estrada MA on 11/24/2023 at 9:42 AM.

## 2023-11-24 NOTE — LETTER
November 27, 2023      Vinod Lee  [address] [facility] [address] [facility] [address]        Dear ,    We are writing to inform you of your test results.    Mr. Lee,   Your complete blood count shows slight anemia however other parameters are normal   Your kidney function tests are normal except for his urea which can be seen with dehydration   Please make sure you drink plenty of fluids   Hepatitis screen is negative     Resulted Orders   Hepatitis C Screen Reflex to HCV RNA Quant and Genotype   Result Value Ref Range    Hepatitis C Antibody Nonreactive Nonreactive    Narrative    Assay performance characteristics have not been established for newborns, infants, and children.   Basic metabolic panel  (Ca, Cl, CO2, Creat, Gluc, K, Na, BUN)   Result Value Ref Range    Sodium 144 135 - 145 mmol/L      Comment:      Reference intervals for this test were updated on 09/26/2023 to more accurately reflect our healthy population. There may be differences in the flagging of prior results with similar values performed with this method. Interpretation of those prior results can be made in the context of the updated reference intervals.     Potassium 4.5 3.4 - 5.3 mmol/L    Chloride 108 (H) 98 - 107 mmol/L    Carbon Dioxide (CO2) 27 22 - 29 mmol/L    Anion Gap 9 7 - 15 mmol/L    Urea Nitrogen 31.6 (H) 8.0 - 23.0 mg/dL    Creatinine 0.96 0.67 - 1.17 mg/dL    GFR Estimate 88 >60 mL/min/1.73m2    Calcium 9.1 8.8 - 10.2 mg/dL    Glucose 106 (H) 70 - 99 mg/dL   CBC with platelets and differential   Result Value Ref Range    WBC Count 9.5 4.0 - 11.0 10e3/uL    RBC Count 4.88 4.40 - 5.90 10e6/uL    Hemoglobin 13.2 (L) 13.3 - 17.7 g/dL    Hematocrit 43.4 40.0 - 53.0 %    MCV 89 78 - 100 fL    MCH 27.0 26.5 - 33.0 pg    MCHC 30.4 (L) 31.5 - 36.5 g/dL    RDW 15.3 (H) 10.0 - 15.0 %    Platelet Count 194 150 - 450 10e3/uL    % Neutrophils 72 %    % Lymphocytes 17 %    % Monocytes 10 %    % Eosinophils 0 %    % Basophils 1 %     % Immature Granulocytes 0 %    Absolute Neutrophils 6.9 1.6 - 8.3 10e3/uL    Absolute Lymphocytes 1.6 0.8 - 5.3 10e3/uL    Absolute Monocytes 0.9 0.0 - 1.3 10e3/uL    Absolute Eosinophils 0.0 0.0 - 0.7 10e3/uL    Absolute Basophils 0.1 0.0 - 0.2 10e3/uL    Absolute Immature Granulocytes 0.0 <=0.4 10e3/uL       If you have any questions or concerns, please call the clinic at the number listed above.       Sincerely,      Franklyn Estrella MD

## 2023-11-24 NOTE — PATIENT INSTRUCTIONS
At [facility], we strive to deliver an exceptional experience to you, every time we see you. If you receive a survey, please complete it as we do value your feedback.  If you have MyChart, you can expect to receive results automatically within 24 hours of their completion.  Your provider will send a note interpreting your results as well.   If you do not have MyChart, you should receive your results in about a week by mail.    Your care team:                            Family Medicine Internal Medicine   MD Markel Willard MD Shantel Branch-Fleming, MD Srinivasa Vaka, MD Katya Belousova, PAPEREZ Thomas, MD Pediatrics   Aneesh Olson, PAMD Winnie Dhillon MD Amelia Massimini APRN CNP   PRESTON Bowman CNP, MD Charanya Pasupathi, MD Kathleen Widmer, NP coming October 2023 Same-Day (No follow up visit)    MAILE Workman PA coming Oct 2023     Clinic hours: Monday - Thursday 7 am-6 pm; Fridays 7 am-5 pm.   Urgent care: Monday - Friday 10 am- 8 pm; Saturday and Sunday 9 am-5 pm.    Clinic: [phone]       [facility]: Monday - Thursday 8 am - 7 pm; Friday 8 am - 6 pm  [facility]: [phone]

## 2024-01-03 ENCOUNTER — VIRTUAL VISIT (OUTPATIENT)
Dept: FAMILY MEDICINE | Facility: CLINIC | Age: 64
End: 2024-01-03
Payer: COMMERCIAL

## 2024-01-03 ENCOUNTER — TELEPHONE (OUTPATIENT)
Dept: FAMILY MEDICINE | Facility: CLINIC | Age: 64
End: 2024-01-03

## 2024-01-03 DIAGNOSIS — F20.0 PARANOID SCHIZOPHRENIA, CHRONIC CONDITION WITH ACUTE EXACERBATION (H): ICD-10-CM

## 2024-01-03 DIAGNOSIS — R52 PAIN: ICD-10-CM

## 2024-01-03 DIAGNOSIS — E53.8 VITAMIN B12 DEFICIENCY (NON ANEMIC): ICD-10-CM

## 2024-01-03 DIAGNOSIS — K21.00 GASTROESOPHAGEAL REFLUX DISEASE WITH ESOPHAGITIS, UNSPECIFIED WHETHER HEMORRHAGE: ICD-10-CM

## 2024-01-03 DIAGNOSIS — J44.9 CHRONIC OBSTRUCTIVE PULMONARY DISEASE, UNSPECIFIED COPD TYPE (H): Primary | ICD-10-CM

## 2024-01-03 DIAGNOSIS — R21 RASH: ICD-10-CM

## 2024-01-03 DIAGNOSIS — K59.00 CONSTIPATION, UNSPECIFIED CONSTIPATION TYPE: ICD-10-CM

## 2024-01-03 DIAGNOSIS — R33.9 URINARY RETENTION: ICD-10-CM

## 2024-01-03 DIAGNOSIS — F20.9 SCHIZOPHRENIA, UNSPECIFIED TYPE (H): ICD-10-CM

## 2024-01-03 DIAGNOSIS — E55.9 VITAMIN D DEFICIENCY: ICD-10-CM

## 2024-01-03 PROCEDURE — 99214 OFFICE O/P EST MOD 30 MIN: CPT | Mod: 93 | Performed by: INTERNAL MEDICINE

## 2024-01-03 RX ORDER — ALBUTEROL SULFATE 90 UG/1
2 AEROSOL, METERED RESPIRATORY (INHALATION) EVERY 6 HOURS PRN
Qty: 18 G | Refills: 1 | Status: SHIPPED | OUTPATIENT
Start: 2024-01-03 | End: 2024-06-05

## 2024-01-03 RX ORDER — POLYETHYLENE GLYCOL 3350 17 G/17G
17 POWDER, FOR SOLUTION ORAL DAILY
Qty: 510 G | Refills: 1 | Status: SHIPPED | OUTPATIENT
Start: 2024-01-03 | End: 2024-03-28

## 2024-01-03 RX ORDER — SENNOSIDES 8.6 MG
2 TABLET ORAL 2 TIMES DAILY
Qty: 60 TABLET | Refills: 3 | Status: SHIPPED | OUTPATIENT
Start: 2024-01-03 | End: 2024-06-05

## 2024-01-03 RX ORDER — VITAMIN B COMPLEX
25 TABLET ORAL DAILY
Qty: 30 TABLET | Refills: 3 | Status: SHIPPED | OUTPATIENT
Start: 2024-01-03 | End: 2024-06-05

## 2024-01-03 RX ORDER — SIMETHICONE 80 MG
80 TABLET,CHEWABLE ORAL EVERY 6 HOURS PRN
Qty: 30 TABLET | Refills: 3 | Status: SHIPPED | OUTPATIENT
Start: 2024-01-03 | End: 2024-03-28

## 2024-01-03 RX ORDER — ACETAMINOPHEN 325 MG/1
650 TABLET ORAL EVERY 4 HOURS PRN
Qty: 60 TABLET | Refills: 1 | Status: SHIPPED | OUTPATIENT
Start: 2024-01-03 | End: 2024-03-28

## 2024-01-03 RX ORDER — PANTOPRAZOLE SODIUM 40 MG/1
40 TABLET, DELAYED RELEASE ORAL DAILY
Qty: 30 TABLET | Refills: 1 | Status: SHIPPED | OUTPATIENT
Start: 2024-01-03 | End: 2024-03-28

## 2024-01-03 RX ORDER — TAMSULOSIN HYDROCHLORIDE 0.4 MG/1
0.4 CAPSULE ORAL DAILY
Qty: 30 CAPSULE | Refills: 3 | Status: SHIPPED | OUTPATIENT
Start: 2024-01-03 | End: 2024-03-01

## 2024-01-03 RX ORDER — BENZONATATE 100 MG/1
100 CAPSULE ORAL 3 TIMES DAILY PRN
Qty: 30 CAPSULE | Refills: 1 | Status: SHIPPED | OUTPATIENT
Start: 2024-01-03 | End: 2024-03-10

## 2024-01-03 RX ORDER — AMOXICILLIN 250 MG
1 CAPSULE ORAL 2 TIMES DAILY PRN
Qty: 60 TABLET | Refills: 3 | Status: SHIPPED | OUTPATIENT
Start: 2024-01-03 | End: 2024-03-28

## 2024-01-03 NOTE — PROGRESS NOTES
Vinod is a 63 year old who is being evaluated via a billable telephone visit.      What phone number would you like to be contacted at? [phone]   How would you like to obtain your AVS? Mail a copy    Distant Location (provider location):  Off-site    Assessment & Plan     Chronic obstructive pulmonary disease, unspecified COPD type (H)  He takes albuterol inhaler as needed  He also takes Tessalon Perles for cough as needed  He will need a formal pulmonary function tests   We will arrange that  - benzonatate (TESSALON) 100 MG capsule; Take 1 capsule (100 mg) by mouth 3 times daily as needed for cough  - albuterol (PROAIR HFA/PROVENTIL HFA/VENTOLIN HFA) 108 (90 Base) MCG/ACT inhaler; Inhale 2 puffs into the lungs every 6 hours as needed for wheezing, shortness of breath or cough    Pain  Takes Tylenol for pain as needed  - acetaminophen (TYLENOL) 325 MG tablet; Take 2 tablets (650 mg) by mouth every 4 hours as needed for mild pain (to moderate pain)    Vitamin B12 deficiency (non anemic)    - cyanocobalamin (VITAMIN B-12) 500 MCG SUBL sublingual tablet; Place 1 tablet (500 mcg) under the tongue daily    Gastroesophageal reflux disease with esophagitis, unspecified whether hemorrhage    - pantoprazole (PROTONIX) 40 MG EC tablet; Take 1 tablet (40 mg) by mouth daily    Constipation, unspecified constipation type  He takes senna docusate as needed and senna on a regular basis  - polyethylene glycol (MIRALAX) 17 GM/Dose powder; Take 17 g by mouth daily  - sennosides (SENOKOT) 8.6 MG tablet; Take 2 tablets by mouth 2 times daily  - simethicone (MYLICON) 80 MG chewable tablet; Take 1 tablet (80 mg) by mouth every 6 hours as needed for cramping    Urinary retention    - senna-docusate (SENOKOT-S/PERICOLACE) 8.6-50 MG tablet; Take 1 tablet by mouth 2 times daily as needed for constipation  - tamsulosin (FLOMAX) 0.4 MG capsule; Take 1 capsule (0.4 mg) by mouth daily    Vitamin D deficiency    - Vitamin D3 (CHOLECALCIFEROL)  25 mcg (1000 units) tablet; Take 1 tablet (25 mcg) by mouth daily    Rash  He takes Eucerin cream for dry skin  - mineral oil-white petrolatum (EUCERIN/MINERIN) cream; Apply topically every hour as needed for dry skin or itching      Schizophrenia, unspecified type (H)  Follows up with psychiatrist  He is on multiple medications including Risperdal/Clozaril/divalproex  He gets all his medications including Inderal and Prozac from his psychiatrist      30 minutes spent by me on the date of the encounter doing chart review, history and exam, documentation and further activities per the note           Franklyn Estrella MD  [facility] KAILA Brock is a 63 year old, presenting for the following health issues:  Recheck Medication  - Rechecking Fluoxetine medication dosage      1/3/2024     9:31 AM   Additional Questions   Roomed by Leobardo ONEILL   Accompanied by        History of Present Illness       Reason for visit:  Follow Up with Medication Dosage        Medication Followup of Fluoxetine  Taking Medication as prescribed: yes  Side Effects:  None  Medication Helping Symptoms:  yes        Review of Systems   Constitutional, HEENT, cardiovascular, pulmonary, gi and gu systems are negative, except as otherwise noted.      Objective           Vitals:  No vitals were obtained today due to virtual visit.    Physical Exam   healthy, alert, and no distress  PSYCH: Alert and oriented times 3; coherent speech, normal   rate and volume, able to articulate logical thoughts, able   to abstract reason, no tangential thoughts, no hallucinations   or delusions  His affect is normal  RESP: No cough, no audible wheezing, able to talk in full sentences  Remainder of exam unable to be completed due to telephone visits                Phone call duration: 15 minutes

## 2024-01-31 ENCOUNTER — TELEPHONE (OUTPATIENT)
Dept: FAMILY MEDICINE | Facility: CLINIC | Age: 64
End: 2024-01-31
Payer: COMMERCIAL

## 2024-01-31 NOTE — TELEPHONE ENCOUNTER
Reason for Call:  Appointment Request    Patient requesting this type of appt:   Office Visit    Requested provider: Franklyn Estrella    Reason patient unable to be scheduled: Not within requested timeframe    When does patient want to be seen/preferred time: 1-2 days    Comments: Couch, no appetite    Okay to leave a detailed message?: Yes at Other phone number:  [phone]    Call taken on 1/31/2024 at 8:44 AM by Vicky Ortiz

## 2024-01-31 NOTE — TELEPHONE ENCOUNTER
RN called Jovana with patient's group home.     She notes patient is in his room right now.     She states symptoms began Friday evening. He has been coughing a lot and has very little appetite.     She denies difficulty breathing, wheezing, confusion, fever, vomiting, or any known sick contacts.     She is encouraging him to drink water.     They do not have covid tests at the group home. He has not been tested.     RN scheduled patient for visit tomorrow, 2/1/24 at 3 pm (arrive 2:40 pm) with Dr. Estrella and advised her to call back with worsening symptoms. 911 and ED precautions provided. She verbalized understanding and denies further questions or concerns at this time.     Tameka Marie RN, BSN, PHN  [facility]  Nurse Triage, [facility]

## 2024-02-01 ENCOUNTER — OFFICE VISIT (OUTPATIENT)
Dept: FAMILY MEDICINE | Facility: CLINIC | Age: 64
End: 2024-02-01
Payer: COMMERCIAL

## 2024-02-01 VITALS
HEART RATE: 86 BPM | OXYGEN SATURATION: 98 % | DIASTOLIC BLOOD PRESSURE: 70 MMHG | RESPIRATION RATE: 28 BRPM | BODY MASS INDEX: 27.77 KG/M2 | WEIGHT: 194 LBS | SYSTOLIC BLOOD PRESSURE: 130 MMHG | HEIGHT: 70 IN | TEMPERATURE: 98.2 F

## 2024-02-01 DIAGNOSIS — R91.8 PULMONARY NODULES: ICD-10-CM

## 2024-02-01 DIAGNOSIS — T43.505A NEUROLEPTIC-INDUCED PARKINSONISM (H): ICD-10-CM

## 2024-02-01 DIAGNOSIS — J40 BRONCHITIS: ICD-10-CM

## 2024-02-01 DIAGNOSIS — J44.9 CHRONIC OBSTRUCTIVE PULMONARY DISEASE, UNSPECIFIED COPD TYPE (H): Primary | ICD-10-CM

## 2024-02-01 DIAGNOSIS — K59.00 CONSTIPATION, UNSPECIFIED CONSTIPATION TYPE: ICD-10-CM

## 2024-02-01 DIAGNOSIS — G47.00 INSOMNIA, UNSPECIFIED TYPE: ICD-10-CM

## 2024-02-01 DIAGNOSIS — F20.0 PARANOID SCHIZOPHRENIA, CHRONIC CONDITION WITH ACUTE EXACERBATION (H): ICD-10-CM

## 2024-02-01 DIAGNOSIS — F20.9 SCHIZOPHRENIA, UNSPECIFIED TYPE (H): ICD-10-CM

## 2024-02-01 DIAGNOSIS — G21.11 NEUROLEPTIC-INDUCED PARKINSONISM (H): ICD-10-CM

## 2024-02-01 PROCEDURE — 99214 OFFICE O/P EST MOD 30 MIN: CPT | Performed by: INTERNAL MEDICINE

## 2024-02-01 RX ORDER — AZITHROMYCIN 250 MG/1
TABLET, FILM COATED ORAL
Qty: 6 TABLET | Refills: 0 | Status: SHIPPED | OUTPATIENT
Start: 2024-02-01 | End: 2024-02-01

## 2024-02-01 RX ORDER — GUAIFENESIN/DEXTROMETHORPHAN 100-10MG/5
10 SYRUP ORAL 4 TIMES DAILY PRN
Qty: 354 ML | Refills: 0 | Status: SHIPPED | OUTPATIENT
Start: 2024-02-01 | End: 2024-03-10

## 2024-02-01 RX ORDER — RESPIRATORY SYNCYTIAL VIRUS VACCINE 120MCG/0.5
0.5 KIT INTRAMUSCULAR ONCE
Qty: 1 EACH | Refills: 0 | Status: CANCELLED | OUTPATIENT
Start: 2024-02-01 | End: 2024-02-01

## 2024-02-01 RX ORDER — DOXYCYCLINE HYCLATE 100 MG
100 TABLET ORAL 2 TIMES DAILY
Qty: 14 TABLET | Refills: 0 | Status: SHIPPED | OUTPATIENT
Start: 2024-02-01 | End: 2024-03-01

## 2024-02-01 ASSESSMENT — ENCOUNTER SYMPTOMS: COUGH: 1

## 2024-02-01 NOTE — PROGRESS NOTES
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  He has tremor and rigidity on examination  His tremor is worse on the left side    Pulmonary nodules  Last CT scan of the chest done at [facility] in 2022 showed that he has got some spiculated nodule in the left upper lobe  He needs repeat CT chest  - CT Chest w/o Contrast; Future    Bronchitis  He has been feeling weak for the last few days  Complaining of cough  Initially was dry now bringing up some phlegm  Cannot do Z-Khadar because he is got prolonged QT  - guaiFENesin-dextromethorphan (ROBITUSSIN DM) 100-10 MG/5ML syrup; Take 10 mLs by mouth 4 times daily as needed for cough  - doxycycline hyclate (VIBRA-TABS) 100 MG tablet; Take 1 tablet (100 mg) by mouth 2 times daily    Constipation, unspecified constipation type  He is on senna regularly    Insomnia, unspecified type  He is on lorazepam    Schizophrenia, unspecified type (H)  He is on multiple medications including Clozaril/risperidone/Depakote  Follows closely with psychiatry      30 minutes spent by me on the date of the encounter doing chart review, history and exam, documentation and further activities per the note            Subjective   Vinod is a 63 year old, presenting for the following health issues:  Cough      2/1/2024     2:52 PM   Additional Questions   Roomed by Arrtyrese   Accompanied by ILEANA     Cough    History of Present Illness       Reason for visit:  Weakness  Symptom onset:  3-7 days ago  Symptoms include:  Weakness  Symptom intensity:  Moderate  Symptom progression:  Staying the same  Had these symptoms before:   "No  What makes it worse:  Rest  What makes it better:  Rest    He eats 4 or more servings of fruits and vegetables daily.He consumes 3 sweetened beverage(s) daily.He exercises with enough effort to increase his heart rate 9 or less minutes per day.  He exercises with enough effort to increase his heart rate 3 or less days per week.   He is taking medications regularly.                 Review of Systems  Constitutional, HEENT, cardiovascular, pulmonary, gi and gu systems are negative, except as otherwise noted.      Objective    /70 (BP Location: Left arm, Patient Position: Sitting, Cuff Size: Adult Regular)   Pulse 86   Temp 98.2  F (36.8  C) (Oral)   Resp 28   Ht 1.77 m (5' 9.69\")   Wt 88 kg (194 lb)   SpO2 98%   BMI 28.08 kg/m    Body mass index is 28.08 kg/m .  Physical Exam   GENERAL: alert and no distress  EYES: Eyes grossly normal to inspection, PERRL and conjunctivae and sclerae normal  NECK: no adenopathy, no asymmetry, masses, or scars  RESP: lungs clear to auscultation - no rales, rhonchi or wheezes  CV: regular rate and rhythm, normal S1 S2, no S3 or S4, no murmur, click or rub, no peripheral edema  ABDOMEN: soft, nontender, no hepatosplenomegaly, no masses and bowel sounds normal  MS: no gross musculoskeletal defects noted, no edema  NEURO: Rigidity and resting tremor noted on left arm  PSYCH: concentration poor, inattentive, tangential, affect flat, and fatigued            Signed Electronically by: Franklyn Estrella MD    "

## 2024-02-13 ENCOUNTER — TELEPHONE (OUTPATIENT)
Dept: FAMILY MEDICINE | Facility: CLINIC | Age: 64
End: 2024-02-13
Payer: COMMERCIAL

## 2024-02-13 NOTE — TELEPHONE ENCOUNTER
I cannot comment on this because the prazosin is being prescribed by his psychiatrist for his mental health  So that has to be addressed by his psychiatrist  I am aware that taking prazosin and Flomax does cause hypotension  From my perspective the patient has to take Flomax for his BPH    his blood pressures have been stable and we never had any issues with hypotension so he has been tolerating these 2 medications

## 2024-02-13 NOTE — TELEPHONE ENCOUNTER
RN spoke with pharmacy staff to relay provider message. Pharmacist verbalized understanding, no further questions.     Theodora Mcgill RN

## 2024-02-13 NOTE — TELEPHONE ENCOUNTER
Jovana, nurse at patient's group home, calling to confirm if patient is to continue taking Prazosin 1 mg daily and Tamsulosin 0.4 mg daily. They received call from [facility] pharmacy to question if he should be taking both medications at the same time.     RN called [facility] pharmacy to further clarify. Pharmacy staff states that both medications can cause a drop in blood pressure if taken at the same time. Pharmacy needing confirmation if this medication is okay for patient to take knowing this information.     Routing to provider to review and advise.    KENNY Stern  [facility] Primary Care Triage

## 2024-02-21 ENCOUNTER — TELEPHONE (OUTPATIENT)
Dept: FAMILY MEDICINE | Facility: CLINIC | Age: 64
End: 2024-02-21
Payer: COMMERCIAL

## 2024-02-21 NOTE — TELEPHONE ENCOUNTER
Jovana, nurse at patient's group home, calling to report that after provider made changes to his Clonazepam schedule they have noticed that he's become more weak during the day time. States that the previous Clonazepam of 200 mg at noon and 400 mg at bedtime worked better.     Upon chart review, Dr. Estrella does not prescribe this medication for patient. Joavna then confirmed that this medication is prescribed by a different provider, Dr. Norman, and they will reach out to that provider regarding medication concerns and patient's symptoms.     KENNY Stern  [facility] Primary Care Triage

## 2024-02-26 ENCOUNTER — ANCILLARY PROCEDURE (OUTPATIENT)
Dept: CT IMAGING | Facility: CLINIC | Age: 64
End: 2024-02-26
Attending: INTERNAL MEDICINE
Payer: COMMERCIAL

## 2024-02-26 DIAGNOSIS — R91.8 PULMONARY NODULES: ICD-10-CM

## 2024-02-26 PROCEDURE — 71250 CT THORAX DX C-: CPT | Performed by: RADIOLOGY

## 2024-03-01 ENCOUNTER — TELEPHONE (OUTPATIENT)
Dept: FAMILY MEDICINE | Facility: CLINIC | Age: 64
End: 2024-03-01

## 2024-03-01 ENCOUNTER — OFFICE VISIT (OUTPATIENT)
Dept: FAMILY MEDICINE | Facility: CLINIC | Age: 64
End: 2024-03-01
Payer: COMMERCIAL

## 2024-03-01 ENCOUNTER — TELEPHONE (OUTPATIENT)
Dept: UROLOGY | Facility: CLINIC | Age: 64
End: 2024-03-01

## 2024-03-01 VITALS
TEMPERATURE: 98.6 F | DIASTOLIC BLOOD PRESSURE: 71 MMHG | HEART RATE: 84 BPM | SYSTOLIC BLOOD PRESSURE: 130 MMHG | WEIGHT: 191 LBS | BODY MASS INDEX: 27.65 KG/M2 | OXYGEN SATURATION: 95 % | RESPIRATION RATE: 14 BRPM

## 2024-03-01 DIAGNOSIS — J44.9 CHRONIC OBSTRUCTIVE PULMONARY DISEASE, UNSPECIFIED COPD TYPE (H): Primary | ICD-10-CM

## 2024-03-01 DIAGNOSIS — F20.9 SCHIZOPHRENIA, UNSPECIFIED TYPE (H): ICD-10-CM

## 2024-03-01 DIAGNOSIS — R33.9 URINARY RETENTION: ICD-10-CM

## 2024-03-01 DIAGNOSIS — E87.1 HYPONATREMIA: ICD-10-CM

## 2024-03-01 DIAGNOSIS — R91.8 PULMONARY NODULES: ICD-10-CM

## 2024-03-01 LAB
ANION GAP SERPL CALCULATED.3IONS-SCNC: 11 MMOL/L (ref 7–15)
BUN SERPL-MCNC: 17.7 MG/DL (ref 8–23)
CALCIUM SERPL-MCNC: 8.9 MG/DL (ref 8.8–10.2)
CHLORIDE SERPL-SCNC: 97 MMOL/L (ref 98–107)
CREAT SERPL-MCNC: 0.91 MG/DL (ref 0.67–1.17)
DEPRECATED HCO3 PLAS-SCNC: 24 MMOL/L (ref 22–29)
EGFRCR SERPLBLD CKD-EPI 2021: >90 ML/MIN/1.73M2
GLUCOSE SERPL-MCNC: 105 MG/DL (ref 70–99)
POTASSIUM SERPL-SCNC: 5.6 MMOL/L (ref 3.4–5.3)
SODIUM SERPL-SCNC: 132 MMOL/L (ref 135–145)

## 2024-03-01 PROCEDURE — 80048 BASIC METABOLIC PNL TOTAL CA: CPT | Performed by: INTERNAL MEDICINE

## 2024-03-01 PROCEDURE — 36415 COLL VENOUS BLD VENIPUNCTURE: CPT | Performed by: INTERNAL MEDICINE

## 2024-03-01 PROCEDURE — 99214 OFFICE O/P EST MOD 30 MIN: CPT | Performed by: INTERNAL MEDICINE

## 2024-03-01 RX ORDER — TAMSULOSIN HYDROCHLORIDE 0.4 MG/1
0.8 CAPSULE ORAL DAILY
Qty: 30 CAPSULE | Refills: 3 | Status: SHIPPED | OUTPATIENT
Start: 2024-03-01 | End: 2024-06-05

## 2024-03-01 RX ORDER — BENZTROPINE MESYLATE 1 MG/1
1 TABLET ORAL 2 TIMES DAILY
Status: SHIPPED
Start: 2024-03-01

## 2024-03-01 RX ORDER — RESPIRATORY SYNCYTIAL VIRUS VACCINE 120MCG/0.5
0.5 KIT INTRAMUSCULAR ONCE
Qty: 1 EACH | Refills: 0 | Status: CANCELLED | OUTPATIENT
Start: 2024-03-01 | End: 2024-03-01

## 2024-03-01 RX ORDER — CLOZAPINE 50 MG/1
400 TABLET ORAL AT BEDTIME
Status: SHIPPED
Start: 2024-03-01 | End: 2024-05-21 | Stop reason: DRUGHIGH

## 2024-03-01 RX ORDER — CLOZAPINE 50 MG/1
200 TABLET ORAL
Status: SHIPPED
Start: 2024-03-01 | End: 2024-05-21 | Stop reason: DRUGHIGH

## 2024-03-01 ASSESSMENT — PAIN SCALES - GENERAL: PAINLEVEL: NO PAIN (0)

## 2024-03-01 NOTE — TELEPHONE ENCOUNTER
[facility] Call Center    Phone Message    May a detailed message be left on voicemail: no     Reason for Call: Other: Jovana that works at the home care called saying patient has cathter on and needs to be taken out as oon as possible and can't wait until April appointment with Dr. Perez. Please call Jovana at  [phone]. Thank you.    Action Taken: [facility]    Travel Screening: Not Applicable                                                                    .

## 2024-03-01 NOTE — TELEPHONE ENCOUNTER
----- Message from Franklyn Estrella MD sent at 3/1/2024  3:52 PM CST -----  Please send letter to patient  Mr. Lee,  Sodium is stable at 132  Your potassium is slightly high but that is because the specimen is hemolyzed  Please stay at 1.8 L of fluid restriction per day      Please contact the clinic if you have additional questions.  Thank you.    Sincerely,    Franklyn Estrella MD

## 2024-03-01 NOTE — LETTER
March 1, 2024      Vinod Lee  [id number]  [facility] [address]        Mr. Lee,   Sodium is stable at 132   Your potassium is slightly high but that is because the specimen is hemolyzed   Please stay at 1.8 L of fluid restriction per day       Please contact the clinic if you have additional questions.  Thank you.     Sincerely,     Franklyn Estrella MD       Resulted Orders   Basic metabolic panel  (Ca, Cl, CO2, Creat, Gluc, K, Na, BUN)   Result Value Ref Range    Sodium 132 (L) 135 - 145 mmol/L      Comment:      Reference intervals for this test were updated on 09/26/2023 to more accurately reflect our healthy population. There may be differences in the flagging of prior results with similar values performed with this method. Interpretation of those prior results can be made in the context of the updated reference intervals.     Potassium 5.6 (H) 3.4 - 5.3 mmol/L      Comment:      Specimen slightly hemolyzed. The reported potassium value may be falsely elevated. Analysis of a non-hemolyzed specimen (i.e. re-draw) may result in a lower potassium value.    Chloride 97 (L) 98 - 107 mmol/L    Carbon Dioxide (CO2) 24 22 - 29 mmol/L    Anion Gap 11 7 - 15 mmol/L    Urea Nitrogen 17.7 8.0 - 23.0 mg/dL    Creatinine 0.91 0.67 - 1.17 mg/dL    GFR Estimate >90 >60 mL/min/1.73m2    Calcium 8.9 8.8 - 10.2 mg/dL    Glucose 105 (H) 70 - 99 mg/dL       If you have any questions or concerns, please call the clinic at the number listed above.

## 2024-03-01 NOTE — PROGRESS NOTES
Declined AWV  Assessment & Plan     Chronic obstructive pulmonary disease, unspecified COPD type (H)  Denies any shortness of breath  He only takes albuterol inhaler as needed    Urinary retention  He went into urinary retention in the hospital  Previously was only taking Flomax 0.4 mg daily  This was increased to 0.8 milligrams  He was placed on a Barrios catheter  He needs a trial without catheter which was supposed to be done in the outpatient urology clinic  He was asked to follow-up with [facility] but they want to follow-up with [facility]  I placed a consult  - tamsulosin (FLOMAX) 0.4 MG capsule; Take 2 capsules (0.8 mg) by mouth daily  - Adult Urology  Referral; Future    Schizophrenia, unspecified type (H)  Follows with psychiatrist  Apparently the psychiatrist told him that he does not need to be on prazosin or propranolol  I will stop his prazosin and wean his propranolol down  - cloZAPine (CLOZARIL) 50 MG tablet; Take 8 tablets (400 mg) by mouth at bedtime  - cloZAPine (CLOZARIL) 50 MG tablet; Take 4 tablets (200 mg) by mouth daily at 2 pm  - benztropine (COGENTIN) 1 MG tablet; Take 1 tablet (1 mg) by mouth 2 times daily    Hyponatremia  He had altered mental status from hyponatremia which cause metabolic encephalopathy  His sodium was as low as 122   It has improved to 134 at discharge  Currently it is 132  The cause of hyponatremia is multifactorial including SIADH/psychogenic polydipsia  We will place a nephrology referral  - Basic metabolic panel  (Ca, Cl, CO2, Creat, Gluc, K, Na, BUN); Future  - Adult Nephrology  Referral; Future  - Basic metabolic panel  (Ca, Cl, CO2, Creat, Gluc, K, Na, BUN)    Pulmonary nodules  Found to have some pulmonary nodules in the CT scan done in the past  As per the recommendations I reviewed the CT chest which showed that he has got some pulmonary nodules and some thickening of the pleura  Radiologist recommended PET scan  I will order  that  With his mental health issues I am not sure if he will be able to follow the directions to get the PET scan done however we will try  - PET Oncology Whole Body; Future      35 minutes spent by me on the date of the encounter doing chart review, history and exam, documentation and further activities per the note    MED REC REQUIRED  Post Medication Reconciliation Status: discharge medications reconciled and changed, per note/orders        Case Brock is a 63 year old, presenting for the following health issues:  Hospital F/U        3/1/2024     7:17 AM   Additional Questions   Roomed by mekhi     History of Present Illness       Reason for visit:  Hospital follow up    He eats 4 or more servings of fruits and vegetables daily.He consumes 3 sweetened beverage(s) daily.He exercises with enough effort to increase his heart rate 10 to 19 minutes per day.  He exercises with enough effort to increase his heart rate 4 days per week.   He is taking medications regularly.           Hospital Follow-up Visit:    Hospital/Nursing Home/IP Rehab Facility:  [facility]   Date of Admission: 02/22/2024  Date of Discharge: 02/24/2024  Reason(s) for Admission: Hyponatremia     Was your hospitalization related to COVID-19? No   Problems taking medications regularly:  None  Medication changes since discharge: None  Problems adhering to non-medication therapy:  None    Summary of hospitalization:  CareEverywhere information obtained and reviewed  Diagnostic Tests/Treatments reviewed.  Follow up needed: Urology  Other Healthcare Providers Involved in Patient s Care:          Urology  Update since discharge: improved.         Plan of care communicated with patient and caregiver                       Objective    /71   Pulse 84   Temp 98.6  F (37  C) (Oral)   Resp 14   Wt 86.6 kg (191 lb)   SpO2 95%   BMI 27.65 kg/m    Body mass index is 27.65 kg/m .  Physical Exam   GENERAL: alert and no distress  NECK: no  adenopathy, no asymmetry, masses, or scars  RESP: lungs clear to auscultation - no rales, rhonchi or wheezes  CV: regular rate and rhythm, normal S1 S2, no S3 or S4, no murmur, click or rub, no peripheral edema  ABDOMEN: soft, nontender, no hepatosplenomegaly, no masses and bowel sounds normal  MS: no gross musculoskeletal defects noted, no edema  PSYCH: concentration poor, inattentive, and tangential            Signed Electronically by: Franklyn Estrella MD

## 2024-03-04 NOTE — TELEPHONE ENCOUNTER
Patient is established with [facility] and is reaching out to them to schedule. Closing encounter at this time.      Yvonne Watters LPN on 3/4/2024 at 1:49 PM

## 2024-03-05 ENCOUNTER — TELEPHONE (OUTPATIENT)
Dept: UROLOGY | Facility: CLINIC | Age: 64
End: 2024-03-05
Payer: COMMERCIAL

## 2024-03-05 NOTE — TELEPHONE ENCOUNTER
Writer received an encounter for scheduling request, reviewed chart. Patient is already established with [facility] clinic, writer called patient to obtain more information. Per caregiver patient has appt for catheter removal post hospitalization on 3/13/24. Caregiver informed if she would like to transfer care recommended scheduling appt to establish with a provider. Caregiver voiced understanding.     Thank you,  ANABELL JenningsN RN-Triage-Urology

## 2024-03-09 DIAGNOSIS — J44.9 CHRONIC OBSTRUCTIVE PULMONARY DISEASE, UNSPECIFIED COPD TYPE (H): ICD-10-CM

## 2024-03-09 DIAGNOSIS — J40 BRONCHITIS: ICD-10-CM

## 2024-03-10 RX ORDER — BENZONATATE 100 MG/1
100 CAPSULE ORAL 3 TIMES DAILY PRN
Qty: 30 CAPSULE | Refills: 0 | Status: SHIPPED | OUTPATIENT
Start: 2024-03-10 | End: 2024-04-21

## 2024-03-10 RX ORDER — DEXTROMETHORPHAN HYDROBROMIDE, GUAIFENESIN 10; 100 MG/5ML; MG/5ML
LIQUID ORAL
Qty: 354 ML | Refills: 0 | Status: SHIPPED | OUTPATIENT
Start: 2024-03-10 | End: 2024-03-28

## 2024-03-19 DIAGNOSIS — F20.0 PARANOID SCHIZOPHRENIA, CHRONIC CONDITION WITH ACUTE EXACERBATION (H): ICD-10-CM

## 2024-03-20 RX ORDER — GABAPENTIN 400 MG/1
400 CAPSULE ORAL 3 TIMES DAILY
Qty: 90 CAPSULE | Refills: 3 | OUTPATIENT
Start: 2024-03-20

## 2024-03-28 ENCOUNTER — HOSPITAL ENCOUNTER (INPATIENT)
Facility: CLINIC | Age: 64
LOS: 8 days | Discharge: HOME HEALTH | DRG: 291 | End: 2024-04-05
Attending: FAMILY MEDICINE | Admitting: FAMILY MEDICINE
Payer: COMMERCIAL

## 2024-03-28 ENCOUNTER — APPOINTMENT (OUTPATIENT)
Dept: GENERAL RADIOLOGY | Facility: CLINIC | Age: 64
DRG: 291 | End: 2024-03-28
Payer: COMMERCIAL

## 2024-03-28 ENCOUNTER — APPOINTMENT (OUTPATIENT)
Dept: CARDIOLOGY | Facility: CLINIC | Age: 64
DRG: 291 | End: 2024-03-28
Payer: COMMERCIAL

## 2024-03-28 ENCOUNTER — TELEPHONE (OUTPATIENT)
Dept: FAMILY MEDICINE | Facility: CLINIC | Age: 64
End: 2024-03-28

## 2024-03-28 DIAGNOSIS — T43.505A NEUROLEPTIC-INDUCED PARKINSONISM (H): ICD-10-CM

## 2024-03-28 DIAGNOSIS — J18.9 COMMUNITY ACQUIRED PNEUMONIA, UNSPECIFIED LATERALITY: ICD-10-CM

## 2024-03-28 DIAGNOSIS — Z11.52 ENCOUNTER FOR SCREENING FOR COVID-19: Primary | ICD-10-CM

## 2024-03-28 DIAGNOSIS — R33.9 URINARY RETENTION: ICD-10-CM

## 2024-03-28 DIAGNOSIS — R06.02 SHORTNESS OF BREATH: ICD-10-CM

## 2024-03-28 DIAGNOSIS — G21.19 OTHER DRUG-INDUCED SECONDARY PARKINSONISM (H): ICD-10-CM

## 2024-03-28 DIAGNOSIS — I50.22 CHRONIC SYSTOLIC HEART FAILURE (H): ICD-10-CM

## 2024-03-28 DIAGNOSIS — G21.11 NEUROLEPTIC-INDUCED PARKINSONISM (H): ICD-10-CM

## 2024-03-28 DIAGNOSIS — F20.0 PARANOID SCHIZOPHRENIA (H): ICD-10-CM

## 2024-03-28 LAB
ALBUMIN SERPL BCG-MCNC: 3.5 G/DL (ref 3.5–5.2)
ALBUMIN UR-MCNC: 10 MG/DL
ALP SERPL-CCNC: 76 U/L (ref 40–150)
ALT SERPL W P-5'-P-CCNC: 7 U/L (ref 0–70)
AMPHETAMINES UR QL SCN: NORMAL
ANION GAP SERPL CALCULATED.3IONS-SCNC: 7 MMOL/L (ref 7–15)
APPEARANCE UR: CLEAR
AST SERPL W P-5'-P-CCNC: 12 U/L (ref 0–45)
ATRIAL RATE - MUSE: 92 BPM
BARBITURATES UR QL SCN: NORMAL
BASOPHILS # BLD AUTO: 0.1 10E3/UL (ref 0–0.2)
BASOPHILS NFR BLD AUTO: 1 %
BENZODIAZ UR QL SCN: NORMAL
BILIRUB SERPL-MCNC: 0.4 MG/DL
BILIRUB UR QL STRIP: NEGATIVE
BUN SERPL-MCNC: 17.7 MG/DL (ref 8–23)
BZE UR QL SCN: NORMAL
CA-I BLD-MCNC: 4.3 MG/DL (ref 4.4–5.2)
CALCIUM SERPL-MCNC: 8.3 MG/DL (ref 8.8–10.2)
CANNABINOIDS UR QL SCN: NORMAL
CHLORIDE SERPL-SCNC: 96 MMOL/L (ref 98–107)
CHOLEST SERPL-MCNC: 144 MG/DL
COLOR UR AUTO: YELLOW
CREAT SERPL-MCNC: 0.92 MG/DL (ref 0.67–1.17)
CRP SERPL-MCNC: 14.36 MG/L
DEPRECATED HCO3 PLAS-SCNC: 27 MMOL/L (ref 22–29)
DIASTOLIC BLOOD PRESSURE - MUSE: NORMAL MMHG
EGFRCR SERPLBLD CKD-EPI 2021: >90 ML/MIN/1.73M2
EOSINOPHIL # BLD AUTO: 0 10E3/UL (ref 0–0.7)
EOSINOPHIL NFR BLD AUTO: 0 %
ERYTHROCYTE [DISTWIDTH] IN BLOOD BY AUTOMATED COUNT: 13.1 % (ref 10–15)
FENTANYL UR QL: NORMAL
FERRITIN SERPL-MCNC: 58 NG/ML (ref 31–409)
FLUAV RNA SPEC QL NAA+PROBE: NEGATIVE
FLUBV RNA RESP QL NAA+PROBE: NEGATIVE
GLUCOSE SERPL-MCNC: 101 MG/DL (ref 70–99)
GLUCOSE UR STRIP-MCNC: NEGATIVE MG/DL
HCT VFR BLD AUTO: 39.7 % (ref 40–53)
HDLC SERPL-MCNC: 44 MG/DL
HGB BLD-MCNC: 13.4 G/DL (ref 13.3–17.7)
HGB UR QL STRIP: NEGATIVE
HIV 1+2 AB+HIV1 P24 AG SERPL QL IA: NONREACTIVE
IMM GRANULOCYTES # BLD: 0.1 10E3/UL
IMM GRANULOCYTES NFR BLD: 1 %
INTERPRETATION ECG - MUSE: NORMAL
IRON BINDING CAPACITY (ROCHE): 248 UG/DL (ref 240–430)
IRON SATN MFR SERPL: 10 % (ref 15–46)
IRON SERPL-MCNC: 24 UG/DL (ref 61–157)
KETONES UR STRIP-MCNC: NEGATIVE MG/DL
LACTATE SERPL-SCNC: 1.2 MMOL/L (ref 0.7–2)
LDLC SERPL CALC-MCNC: 84 MG/DL
LEUKOCYTE ESTERASE UR QL STRIP: NEGATIVE
LVEF ECHO: NORMAL
LYMPHOCYTES # BLD AUTO: 1 10E3/UL (ref 0.8–5.3)
LYMPHOCYTES NFR BLD AUTO: 7 %
MAGNESIUM SERPL-MCNC: 1.7 MG/DL (ref 1.7–2.3)
MCH RBC QN AUTO: 28.9 PG (ref 26.5–33)
MCHC RBC AUTO-ENTMCNC: 33.8 G/DL (ref 31.5–36.5)
MCV RBC AUTO: 86 FL (ref 78–100)
MONOCYTES # BLD AUTO: 1.2 10E3/UL (ref 0–1.3)
MONOCYTES NFR BLD AUTO: 8 %
MUCOUS THREADS #/AREA URNS LPF: PRESENT /LPF
NEUTROPHILS # BLD AUTO: 12.3 10E3/UL (ref 1.6–8.3)
NEUTROPHILS NFR BLD AUTO: 83 %
NITRATE UR QL: NEGATIVE
NONHDLC SERPL-MCNC: 100 MG/DL
NRBC # BLD AUTO: 0 10E3/UL
NRBC BLD AUTO-RTO: 0 /100
NT-PROBNP SERPL-MCNC: 1410 PG/ML (ref 0–900)
OPIATES UR QL SCN: NORMAL
P AXIS - MUSE: 59 DEGREES
PCP QUAL URINE (ROCHE): NORMAL
PH UR STRIP: 7 [PH] (ref 5–7)
PLATELET # BLD AUTO: 167 10E3/UL (ref 150–450)
POTASSIUM SERPL-SCNC: 4.9 MMOL/L (ref 3.4–5.3)
PR INTERVAL - MUSE: 168 MS
PROT SERPL-MCNC: 5.2 G/DL (ref 6.4–8.3)
QRS DURATION - MUSE: 174 MS
QT - MUSE: 434 MS
QTC - MUSE: 536 MS
R AXIS - MUSE: -26 DEGREES
RBC # BLD AUTO: 4.63 10E6/UL (ref 4.4–5.9)
RBC URINE: <1 /HPF
RSV RNA SPEC NAA+PROBE: NEGATIVE
SARS-COV-2 RNA RESP QL NAA+PROBE: NEGATIVE
SODIUM SERPL-SCNC: 130 MMOL/L (ref 135–145)
SP GR UR STRIP: 1.02 (ref 1–1.03)
SQUAMOUS EPITHELIAL: <1 /HPF
SYSTOLIC BLOOD PRESSURE - MUSE: NORMAL MMHG
T AXIS - MUSE: 125 DEGREES
TRIGL SERPL-MCNC: 79 MG/DL
TROPONIN T SERPL HS-MCNC: 17 NG/L
TROPONIN T SERPL HS-MCNC: 17 NG/L
TSH SERPL DL<=0.005 MIU/L-ACNC: 1.51 UIU/ML (ref 0.3–4.2)
UROBILINOGEN UR STRIP-MCNC: NORMAL MG/DL
VENTRICULAR RATE- MUSE: 92 BPM
WBC # BLD AUTO: 14.7 10E3/UL (ref 4–11)
WBC URINE: 1 /HPF

## 2024-03-28 PROCEDURE — 80053 COMPREHEN METABOLIC PANEL: CPT

## 2024-03-28 PROCEDURE — 80061 LIPID PANEL: CPT

## 2024-03-28 PROCEDURE — 87389 HIV-1 AG W/HIV-1&-2 AB AG IA: CPT | Performed by: STUDENT IN AN ORGANIZED HEALTH CARE EDUCATION/TRAINING PROGRAM

## 2024-03-28 PROCEDURE — 250N000013 HC RX MED GY IP 250 OP 250 PS 637

## 2024-03-28 PROCEDURE — 87637 SARSCOV2&INF A&B&RSV AMP PRB: CPT

## 2024-03-28 PROCEDURE — 99207 PR NOT IN PERSON INPATIENT CONSULT STATISTICAL MARKER: CPT | Performed by: INTERNAL MEDICINE

## 2024-03-28 PROCEDURE — 84145 PROCALCITONIN (PCT): CPT

## 2024-03-28 PROCEDURE — 93005 ELECTROCARDIOGRAM TRACING: CPT | Performed by: FAMILY MEDICINE

## 2024-03-28 PROCEDURE — 99285 EMERGENCY DEPT VISIT HI MDM: CPT | Mod: 25 | Performed by: FAMILY MEDICINE

## 2024-03-28 PROCEDURE — 83880 ASSAY OF NATRIURETIC PEPTIDE: CPT

## 2024-03-28 PROCEDURE — 83550 IRON BINDING TEST: CPT | Performed by: STUDENT IN AN ORGANIZED HEALTH CARE EDUCATION/TRAINING PROGRAM

## 2024-03-28 PROCEDURE — 85025 COMPLETE CBC W/AUTO DIFF WBC: CPT

## 2024-03-28 PROCEDURE — 80321 ALCOHOLS BIOMARKERS 1OR 2: CPT | Performed by: STUDENT IN AN ORGANIZED HEALTH CARE EDUCATION/TRAINING PROGRAM

## 2024-03-28 PROCEDURE — 71046 X-RAY EXAM CHEST 2 VIEWS: CPT

## 2024-03-28 PROCEDURE — 83605 ASSAY OF LACTIC ACID: CPT

## 2024-03-28 PROCEDURE — 93306 TTE W/DOPPLER COMPLETE: CPT | Mod: 26 | Performed by: INTERNAL MEDICINE

## 2024-03-28 PROCEDURE — 82728 ASSAY OF FERRITIN: CPT | Performed by: STUDENT IN AN ORGANIZED HEALTH CARE EDUCATION/TRAINING PROGRAM

## 2024-03-28 PROCEDURE — 93010 ELECTROCARDIOGRAM REPORT: CPT | Performed by: FAMILY MEDICINE

## 2024-03-28 PROCEDURE — 36415 COLL VENOUS BLD VENIPUNCTURE: CPT | Performed by: STUDENT IN AN ORGANIZED HEALTH CARE EDUCATION/TRAINING PROGRAM

## 2024-03-28 PROCEDURE — 84443 ASSAY THYROID STIM HORMONE: CPT | Performed by: STUDENT IN AN ORGANIZED HEALTH CARE EDUCATION/TRAINING PROGRAM

## 2024-03-28 PROCEDURE — 84466 ASSAY OF TRANSFERRIN: CPT | Performed by: STUDENT IN AN ORGANIZED HEALTH CARE EDUCATION/TRAINING PROGRAM

## 2024-03-28 PROCEDURE — 86140 C-REACTIVE PROTEIN: CPT | Performed by: STUDENT IN AN ORGANIZED HEALTH CARE EDUCATION/TRAINING PROGRAM

## 2024-03-28 PROCEDURE — 83735 ASSAY OF MAGNESIUM: CPT

## 2024-03-28 PROCEDURE — 80307 DRUG TEST PRSMV CHEM ANLYZR: CPT | Performed by: STUDENT IN AN ORGANIZED HEALTH CARE EDUCATION/TRAINING PROGRAM

## 2024-03-28 PROCEDURE — 255N000002 HC RX 255 OP 636: Performed by: INTERNAL MEDICINE

## 2024-03-28 PROCEDURE — 84484 ASSAY OF TROPONIN QUANT: CPT

## 2024-03-28 PROCEDURE — 999N000208 ECHOCARDIOGRAM COMPLETE

## 2024-03-28 PROCEDURE — 99222 1ST HOSP IP/OBS MODERATE 55: CPT | Mod: AI

## 2024-03-28 PROCEDURE — 36415 COLL VENOUS BLD VENIPUNCTURE: CPT

## 2024-03-28 PROCEDURE — 81003 URINALYSIS AUTO W/O SCOPE: CPT

## 2024-03-28 PROCEDURE — 120N000002 HC R&B MED SURG/OB UMMC

## 2024-03-28 PROCEDURE — 82330 ASSAY OF CALCIUM: CPT

## 2024-03-28 RX ORDER — TAMSULOSIN HYDROCHLORIDE 0.4 MG/1
0.8 CAPSULE ORAL DAILY
Status: DISCONTINUED | OUTPATIENT
Start: 2024-03-29 | End: 2024-04-05 | Stop reason: HOSPADM

## 2024-03-28 RX ORDER — CLOZAPINE 200 MG/1
200 TABLET ORAL DAILY
Status: DISCONTINUED | OUTPATIENT
Start: 2024-03-29 | End: 2024-04-05 | Stop reason: HOSPADM

## 2024-03-28 RX ORDER — LIDOCAINE 40 MG/G
CREAM TOPICAL
Status: DISCONTINUED | OUTPATIENT
Start: 2024-03-28 | End: 2024-04-05 | Stop reason: HOSPADM

## 2024-03-28 RX ORDER — ALBUTEROL SULFATE 90 UG/1
2 AEROSOL, METERED RESPIRATORY (INHALATION) EVERY 6 HOURS PRN
Status: DISCONTINUED | OUTPATIENT
Start: 2024-03-28 | End: 2024-04-05 | Stop reason: HOSPADM

## 2024-03-28 RX ORDER — SENNOSIDES 8.6 MG
2 TABLET ORAL 2 TIMES DAILY
Status: DISCONTINUED | OUTPATIENT
Start: 2024-03-28 | End: 2024-04-05 | Stop reason: HOSPADM

## 2024-03-28 RX ORDER — BENZTROPINE MESYLATE 1 MG/1
1 TABLET ORAL 2 TIMES DAILY
Status: DISCONTINUED | OUTPATIENT
Start: 2024-03-28 | End: 2024-04-05 | Stop reason: HOSPADM

## 2024-03-28 RX ORDER — DIVALPROEX SODIUM 125 MG/1
250 CAPSULE, COATED PELLETS ORAL
Status: DISCONTINUED | OUTPATIENT
Start: 2024-03-28 | End: 2024-04-05 | Stop reason: HOSPADM

## 2024-03-28 RX ORDER — OLANZAPINE 5 MG/1
15 TABLET, ORALLY DISINTEGRATING ORAL 2 TIMES DAILY
Status: DISCONTINUED | OUTPATIENT
Start: 2024-03-28 | End: 2024-04-05 | Stop reason: HOSPADM

## 2024-03-28 RX ORDER — LORAZEPAM 1 MG/1
1 TABLET ORAL 3 TIMES DAILY PRN
Status: DISCONTINUED | OUTPATIENT
Start: 2024-03-28 | End: 2024-03-29

## 2024-03-28 RX ORDER — CLOZAPINE 200 MG/1
400 TABLET ORAL AT BEDTIME
Status: DISCONTINUED | OUTPATIENT
Start: 2024-03-28 | End: 2024-04-05 | Stop reason: HOSPADM

## 2024-03-28 RX ORDER — RISPERIDONE 0.5 MG/1
0.5 TABLET, ORALLY DISINTEGRATING ORAL 2 TIMES DAILY
Status: DISCONTINUED | OUTPATIENT
Start: 2024-03-28 | End: 2024-04-05 | Stop reason: HOSPADM

## 2024-03-28 RX ORDER — CALCIUM CARBONATE 500 MG/1
1000 TABLET, CHEWABLE ORAL 4 TIMES DAILY PRN
Status: DISCONTINUED | OUTPATIENT
Start: 2024-03-28 | End: 2024-04-05 | Stop reason: HOSPADM

## 2024-03-28 RX ORDER — METOPROLOL SUCCINATE 25 MG/1
25 TABLET, EXTENDED RELEASE ORAL EVERY EVENING
Status: DISCONTINUED | OUTPATIENT
Start: 2024-03-28 | End: 2024-04-05 | Stop reason: HOSPADM

## 2024-03-28 RX ORDER — VITAMIN B COMPLEX
25 TABLET ORAL DAILY
Status: DISCONTINUED | OUTPATIENT
Start: 2024-03-28 | End: 2024-04-05 | Stop reason: HOSPADM

## 2024-03-28 RX ORDER — PROPRANOLOL HYDROCHLORIDE 10 MG/1
30 TABLET ORAL EVERY MORNING
Status: ON HOLD | COMMUNITY
End: 2024-04-03

## 2024-03-28 RX ORDER — NAPROXEN 250 MG/1
250 TABLET ORAL 2 TIMES DAILY WITH MEALS
Status: DISCONTINUED | OUTPATIENT
Start: 2024-03-28 | End: 2024-03-29

## 2024-03-28 RX ORDER — GABAPENTIN 400 MG/1
400 CAPSULE ORAL 3 TIMES DAILY
Status: DISCONTINUED | OUTPATIENT
Start: 2024-03-28 | End: 2024-04-05 | Stop reason: HOSPADM

## 2024-03-28 RX ORDER — PANTOPRAZOLE SODIUM 40 MG/1
40 TABLET, DELAYED RELEASE ORAL DAILY
Status: DISCONTINUED | OUTPATIENT
Start: 2024-03-28 | End: 2024-04-05 | Stop reason: HOSPADM

## 2024-03-28 RX ADMIN — CALCIUM CARBONATE (ANTACID) CHEW TAB 500 MG 1000 MG: 500 CHEW TAB at 20:49

## 2024-03-28 RX ADMIN — Medication 12.5 MG: at 22:18

## 2024-03-28 RX ADMIN — CLOZAPINE 400 MG: 200 TABLET ORAL at 22:03

## 2024-03-28 RX ADMIN — BUSPIRONE HYDROCHLORIDE 15 MG: 10 TABLET ORAL at 20:48

## 2024-03-28 RX ADMIN — NAPROXEN 250 MG: 250 TABLET ORAL at 22:05

## 2024-03-28 RX ADMIN — RISPERIDONE 0.5 MG: 0.5 TABLET, ORALLY DISINTEGRATING ORAL at 22:06

## 2024-03-28 RX ADMIN — SENNOSIDES 2 TABLET: 8.6 TABLET, FILM COATED ORAL at 22:02

## 2024-03-28 RX ADMIN — METOPROLOL SUCCINATE 25 MG: 25 TABLET, FILM COATED, EXTENDED RELEASE ORAL at 22:02

## 2024-03-28 RX ADMIN — Medication 25 MCG: at 20:48

## 2024-03-28 RX ADMIN — GABAPENTIN 400 MG: 400 CAPSULE ORAL at 20:48

## 2024-03-28 RX ADMIN — DIVALPROEX SODIUM 250 MG: 125 CAPSULE, COATED PELLETS ORAL at 22:04

## 2024-03-28 RX ADMIN — PANTOPRAZOLE SODIUM 40 MG: 40 TABLET, DELAYED RELEASE ORAL at 20:48

## 2024-03-28 RX ADMIN — BENZTROPINE MESYLATE 1 MG: 1 TABLET ORAL at 20:48

## 2024-03-28 RX ADMIN — HUMAN ALBUMIN MICROSPHERES AND PERFLUTREN 6 ML: 10; .22 INJECTION, SOLUTION INTRAVENOUS at 13:41

## 2024-03-28 RX ADMIN — FLUOXETINE HYDROCHLORIDE 20 MG: 20 CAPSULE ORAL at 20:48

## 2024-03-28 RX ADMIN — OLANZAPINE 15 MG: 10 TABLET, ORALLY DISINTEGRATING ORAL at 20:49

## 2024-03-28 ASSESSMENT — ACTIVITIES OF DAILY LIVING (ADL)
ADLS_ACUITY_SCORE: 42
ADLS_ACUITY_SCORE: 38
ADLS_ACUITY_SCORE: 42
ADLS_ACUITY_SCORE: 38
ADLS_ACUITY_SCORE: 42

## 2024-03-28 ASSESSMENT — COLUMBIA-SUICIDE SEVERITY RATING SCALE - C-SSRS
3. HAVE YOU BEEN THINKING ABOUT HOW YOU MIGHT KILL YOURSELF?: YES
1. IN THE PAST MONTH, HAVE YOU WISHED YOU WERE DEAD OR WISHED YOU COULD GO TO SLEEP AND NOT WAKE UP?: YES
2. HAVE YOU ACTUALLY HAD ANY THOUGHTS OF KILLING YOURSELF IN THE PAST MONTH?: YES
6. HAVE YOU EVER DONE ANYTHING, STARTED TO DO ANYTHING, OR PREPARED TO DO ANYTHING TO END YOUR LIFE?: NO
4. HAVE YOU HAD THESE THOUGHTS AND HAD SOME INTENTION OF ACTING ON THEM?: NO
5. HAVE YOU STARTED TO WORK OUT OR WORKED OUT THE DETAILS OF HOW TO KILL YOURSELF? DO YOU INTEND TO CARRY OUT THIS PLAN?: YES

## 2024-03-28 NOTE — ED NOTES
Bed: [facility]-  Expected date: 3/28/24  Expected time: 9:15 AM  Means of arrival: Ambulance  Comments:  [place] [address][age]yo male, from group home, more tired than normal

## 2024-03-28 NOTE — ED TRIAGE NOTES
Fatigue  staff told Ems pt appeared weak and SOB, needed an assist of two (change from baseline)         Triage Assessment (Adult)       Row Name 03/28/24 0921          Triage Assessment    Airway WDL WDL        Respiratory WDL    Respiratory WDL WDL        Skin Circulation/Temperature WDL    Skin Circulation/Temperature WDL WDL        Cardiac WDL    Cardiac WDL WDL        Peripheral/Neurovascular WDL    Peripheral Neurovascular WDL WDL        Cognitive/Neuro/Behavioral WDL    Cognitive/Neuro/Behavioral WDL WDL

## 2024-03-28 NOTE — PHARMACY-ADMISSION MEDICATION HISTORY
Pharmacy Intern Admission Medication History    Admission medication history is complete. The information provided in this note is only as accurate as the sources available at the time of the update.    Information Source(s):   Patient's group home (Doretha Morris, Jovana: [phone])  Dispense Report    Pertinent Information:   Reported medications are consistent with dispense report.    Changes made to PTA medication list:  Added: None  Deleted:   Patient is no longer taking, per patient:  Miconazole 2% external powder  Polyethylene glycol 17 g/dose powder  Senna-docusate 8.6-50 mg tablet  Simethicone 80 mg chewable tablet  Changed: None    Allergies reviewed with patient and updates made in EHR: yes    Medication History Completed By: Kianna Albarran 3/28/2024 4:46 PM    PTA Med List   Medication Sig Last Dose    albuterol (PROAIR HFA/PROVENTIL HFA/VENTOLIN HFA) 108 (90 Base) MCG/ACT inhaler Inhale 2 puffs into the lungs every 6 hours as needed for wheezing, shortness of breath or cough Unknown at PRN    benzonatate (TESSALON) 100 MG capsule Take 1 capsule by mouth three times daily as needed for cough Unknown at PRN    benztropine (COGENTIN) 1 MG tablet Take 1 tablet (1 mg) by mouth 2 times daily 3/27/2024 at PM    busPIRone (BUSPAR) 15 MG tablet Take 1 tablet (15 mg) by mouth 3 times daily 3/27/2024 at PM    cloZAPine (CLOZARIL) 50 MG tablet Take 8 tablets (400 mg) by mouth at bedtime 3/27/2024 at PM    cloZAPine (CLOZARIL) 50 MG tablet Take 4 tablets (200 mg) by mouth daily at 2 pm 3/27/2024 at PM    cyanocobalamin (VITAMIN B-12) 500 MCG SUBL sublingual tablet Place 1 tablet (500 mcg) under the tongue daily 3/27/2024 at AM    divalproex sodium delayed-release (DEPAKOTE SPRINKLE) 125 MG DR capsule Take 250 mg by mouth 3 times daily (with meals) 3/27/2024 at PM    FLUoxetine (PROZAC) 20 MG capsule Take 1 capsule (20 mg) by mouth daily 3/27/2024 at AM    gabapentin (NEURONTIN) 400 MG capsule Take 1 capsule (400 mg)  by mouth 3 times daily 3/27/2024 at PM    LORazepam (ATIVAN) 0.5 MG tablet Take 0.5 tablets (0.25 mg) by mouth 3 times daily (Patient taking differently: Take 1 mg by mouth 3 times daily) 3/27/2024 at PM    Melatonin 10 MG TABS tablet Take 1 tablet (10 mg) by mouth at bedtime 3/27/2024 at PM    naproxen (NAPROSYN) 250 MG tablet Take 1 tablet (250 mg) by mouth 2 times daily (with meals) 3/27/2024 at PM    OLANZapine zydis (ZYPREXA) 15 MG ODT Take 1 tablet (15 mg) by mouth 2 times daily 3/27/2024 at PM    pantoprazole (PROTONIX) 40 MG EC tablet Take 1 tablet (40 mg) by mouth daily 3/27/2024 at AM    propranolol (INDERAL) 10 MG tablet Take 30 mg by mouth every morning 3/27/2024 at AM    risperiDONE (RISPERDAL M-TABS) 0.5 MG ODT Take 1 tablet (0.5 mg) by mouth 2 times daily 3/27/2024 at PM    sennosides (SENOKOT) 8.6 MG tablet Take 2 tablets by mouth 2 times daily 3/27/2024 at PM    tamsulosin (FLOMAX) 0.4 MG capsule Take 2 capsules (0.8 mg) by mouth daily 3/27/2024 at PM    Vitamin D3 (CHOLECALCIFEROL) 25 mcg (1000 units) tablet Take 1 tablet (25 mcg) by mouth daily 3/27/2024 at AM

## 2024-03-28 NOTE — CONSULTS
Cardiology Consult   03/28/2024    Vinod Lee MRN# [id number]   Age: 63 year old YOB: 1960        HPI   Vinod Lee is a pleasant 63 year old male with a history of schizophrenia. Cardiology is consulted for assistance in management of newly discovered cardiomyopathy.    On interview, with the patient he is unable to tell me why he is here. He reports he lives in a group home but gives no other meaningful history. He denies any chest pain, dyspnea, orthopnea, or PND though this is of questionable reliability however given he is unaware why he is in the hospital. Further collateral information will need to be obtained.     On chart review, he has no past cardiac history. On review his EKGs have revealed a longstanding LBBB that is in sinus rhythm. He has not had a prior echocardiogram though his echo from here reveals new cardiomyopathy with an EF ~25% with global hypokinesis with most of his contractility arising from the basal lateral segment. His Hs-trop are negative. His BNP is > 1000. His LDL is 84. He has no coronary calcification on prior cross sectional imaging but has had no prior ischemic evaluation.     Assessment and Plan:   Assessment:  Vinod Lee is a pleasant 63 year old male with a history of schizophrenia. Cardiology is consulted for assistance in management of newly discovered cardiomyopathy.Our consultation today addressed the following medical issues:    HFrEF, new diagnosis  HTN  LBBB    Discussion:    Suspect his cardiomyopathy is secondary to his LBBB and dyssynchrony. Will also send serologic studies that are routine for new cardiomyopathy. No current concern for an ACS event. Receommend stress cardiac MRI vs. coronary CTA to exclude obstructive CAD. Please reach out when he is stable from a psychiatric perspective to pursue this testing (will need to follow breath holding commands for MRI and need to lay still for the coronary CTA). In the meantime, he can be uptitrated  on GDMT as outlined below:    Recommendations:  Ischemia evaluation when more clinically stable from a psychiatric perspective  Stress cardiac MRI vs. coronary CTA  Warrants up-titration of GDMT  Please start Losartan 12.5mg daily  Please start Metoprolol Succinate 25mg daily  Please ensure he has cardiology follow up on discharge   Follow up serologic markers (ordered for you)  HIV, TSH, iron studies, CRP, Utox, PeTH    Thank you for the opportunity to participate in the care of Vinod Lee. This plan was discussed with the staff physician. Please feel free to contact me with any additional questions or concerns.    This note was dictated with voice recognition software and then edited. Please excuse any unintentional errors.      Vinod Beasley MD  Cardiology Fellow      Past Medical History     Patient Active Problem List   Diagnosis    Urinary retention    Schizophrenia, unspecified type (H)    Altered mental status, unspecified altered mental status type    Mood changes    Paranoid schizophrenia, chronic condition with acute exacerbation (H)    Neuroleptic-induced parkinsonism  (H24)    Agitation    Side effect of medication    Pulmonary nodule less than 1 cm in diameter with moderate to high risk for malignant neoplasm    Shortness of breath    Chronic systolic heart failure (H)    Paranoid schizophrenia (H)       Social History     Social History     Tobacco Use    Smoking status: Never     Passive exposure: Never    Smokeless tobacco: Never        Family History   Patient unaware     Past Surgical History   Patient unaware    Prior to Admission Medications:     Current Outpatient Medications   Medication Sig Dispense Refill    benzonatate (TESSALON) 100 MG capsule Take 1 capsule by mouth three times daily as needed for cough 30 capsule 0    benztropine (COGENTIN) 1 MG tablet Take 1 tablet (1 mg) by mouth 2 times daily      busPIRone (BUSPAR) 15 MG tablet Take 1 tablet (15 mg) by mouth 3 times daily 90 tablet 3     cloZAPine (CLOZARIL) 50 MG tablet Take 8 tablets (400 mg) by mouth at bedtime      cloZAPine (CLOZARIL) 50 MG tablet Take 4 tablets (200 mg) by mouth daily at 2 pm      cyanocobalamin (VITAMIN B-12) 500 MCG SUBL sublingual tablet Place 1 tablet (500 mcg) under the tongue daily 90 tablet 1    divalproex sodium delayed-release (DEPAKOTE SPRINKLE) 125 MG DR capsule Take 250 mg by mouth 3 times daily (with meals) 360 capsule 3    FLUoxetine (PROZAC) 20 MG capsule Take 1 capsule (20 mg) by mouth daily 30 capsule 3    gabapentin (NEURONTIN) 400 MG capsule Take 1 capsule (400 mg) by mouth 3 times daily 90 capsule 3    LORazepam (ATIVAN) 0.5 MG tablet Take 0.5 tablets (0.25 mg) by mouth 3 times daily 90 tablet 1    Melatonin 10 MG TABS tablet Take 1 tablet (10 mg) by mouth nightly as needed for sleep      Melatonin 10 MG TABS tablet Take 1 tablet (10 mg) by mouth at bedtime 30 tablet 3    miconazole (MICATIN) 2 % external powder Apply topically 2 times daily 71 g 1    naproxen (NAPROSYN) 250 MG tablet Take 1 tablet (250 mg) by mouth 2 times daily (with meals) 60 tablet 3    OLANZapine zydis (ZYPREXA) 15 MG ODT Take 1 tablet (15 mg) by mouth 2 times daily 60 tablet 3    pantoprazole (PROTONIX) 40 MG EC tablet Take 1 tablet (40 mg) by mouth daily 30 tablet 1    pantoprazole (PROTONIX) 40 MG EC tablet Take 1 tablet (40 mg) by mouth daily 30 tablet 3    polyethylene glycol (MIRALAX) 17 GM/Dose powder Take 17 g by mouth daily 510 g 1    propranolol (INDERAL) 20 MG tablet Take 1 tablet (20 mg) by mouth 3 times daily 90 tablet 3    risperiDONE (RISPERDAL M-TABS) 0.5 MG ODT Take 1 tablet (0.5 mg) by mouth 2 times daily 60 tablet 3    senna-docusate (SENOKOT-S/PERICOLACE) 8.6-50 MG tablet Take 1 tablet by mouth 2 times daily as needed for constipation 60 tablet 3    sennosides (SENOKOT) 8.6 MG tablet Take 2 tablets by mouth 2 times daily 60 tablet 3    simethicone (MYLICON) 80 MG chewable tablet Take 1 tablet (80 mg) by mouth  every 6 hours as needed for cramping 30 tablet 3    tamsulosin (FLOMAX) 0.4 MG capsule Take 2 capsules (0.8 mg) by mouth daily 30 capsule 3    Vitamin D3 (CHOLECALCIFEROL) 25 mcg (1000 units) tablet Take 1 tablet (25 mcg) by mouth daily 30 tablet 3    acetaminophen (TYLENOL) 325 MG tablet Take 2 tablets (650 mg) by mouth every 4 hours as needed for mild pain (to moderate pain) 60 tablet 1    albuterol (PROAIR HFA/PROVENTIL HFA/VENTOLIN HFA) 108 (90 Base) MCG/ACT inhaler Inhale 2 puffs into the lungs every 6 hours as needed for wheezing, shortness of breath or cough 18 g 1    CHEST CONGESTION RELIEF DM  MG/5ML syrup TAKE 10 ML BY MOUTH  4 TIMES DAILY AS NEEDED FOR COUGH 354 mL 0    mineral oil-white petrolatum (EUCERIN/MINERIN) cream Apply topically every hour as needed for dry skin or itching 240 g 3        Physical Exam:   Ranges for vital signs:    Temp:  [97.9  F (36.6  C)] 97.9  F (36.6  C)  Pulse:  [95] 95  Resp:  [16] 16  BP: (135)/(71) 135/71  SpO2:  [99 %] 99 %    Intake/Output Summary (Last 24 hours) at 3/28/2024 1606  Last data filed at 3/28/2024 1348  Gross per 24 hour   Intake --   Output 1000 ml   Net -1000 ml       Exam:  Unable to be examined due to the virtual nature of the visit       Labs:     CMP  Recent Labs   Lab 03/28/24  1008   *   POTASSIUM 4.9   CHLORIDE 96*   CO2 27   ANIONGAP 7   *   BUN 17.7   CR 0.92   GFRESTIMATED >90   BRENDA 8.3*   PROTTOTAL 5.2*   ALBUMIN 3.5   BILITOTAL 0.4   ALKPHOS 76   AST 12   ALT 7     CBC  Recent Labs   Lab 03/28/24  1008   WBC 14.7*   RBC 4.63   HGB 13.4   HCT 39.7*   MCV 86   MCH 28.9   MCHC 33.8   RDW 13.1        INRNo lab results found in last 7 days.  Arterial Blood GasNo lab results found in last 7 days.      Recent Cardiac Testing:   TTE 3/2024  Moderate left ventricular dilation is present. LV function 20-25% with LV dyssynchrony from LBBB.  Right ventricular function, chamber size, wall motion, and thickness are normal.  The  inferior vena cava was normal in size with preserved respiratory variability.  Trivial pericardial effusion is present.     There is no prior study for direct comparison.    Physician Attestation   I agree with the resident/fellow's findings and plan of care as documented in the note.    Key findings:   Severe cardiomyopathy in the setting of LBBB. Will initiate medical therapy now and evaluate for CAD when medically stable from a psychiatric perspective.   Jelly Moore MD  Date of Service (when I saw the patient): March 28, 2024

## 2024-03-28 NOTE — TELEPHONE ENCOUNTER
----- Message from Franklyn Estrella MD sent at 3/28/2024  4:03 PM CDT -----  Regarding: FW: PET scan scheduled for 3/29  Got this below message from PET scan team  Please call Group home and ask them to reshedule this PET SCAN after he is discharged from the [facility]  ----- Message -----  From: Andreas Ennis, ARRT  Sent: 3/28/2024   2:44 PM CDT  To: Franklyn Estrella MD  Subject: PET scan scheduled for 3/29                      Hello Dr. Estrella, I work on the mobile PET truck and we have your patient Vinod Lee scheduled for a PET scan tomorrow. Unfortunately he is currently an inpatient at the [facility] emergency room and we will not be able to complete his PET scan tomorrow. I am just reaching out to let you know the situation, so your team can reach out to the patient to reschedule to another day. If you have any questions you can reach us on the mobile PET truck at [phone].     Thanks,  MATEO Johns

## 2024-03-28 NOTE — ED NOTES
"Patient called this RN the N-word; told patient that was uncalled for and stated who was he calling that. Patient stated \"Everyone here.\" When asked why, he stated that \"aren't you going to cut me open in the stomach?\"; pt told that we aren't planning to do that here and we are here to help him. O2 sats were hanging around 91-92%, RR 18-20 so placed on 2 lpm per NC; MD aware.   "

## 2024-03-28 NOTE — ED NOTES
Informed by group home stuff pt has PET scan tomorrow noon and asking to coordinate with the ED if pt is still in the Emergency Room.

## 2024-03-28 NOTE — ED NOTES
Bed: ED05  Expected date:   Expected time:   Means of arrival:   Comments:  Hold for margo GAFFNEY

## 2024-03-28 NOTE — LETTER
Recipient:    Jess Morris      Sender:    Vicky Gonzalez, RN  Inpatient Care Coordinator  6 Med Surg  [phone], pager [phone]      For weekend social work needs, contact information below and available on [facility]:     SW Weekend [place] Pager ED, 5 MS, 5 ortho : [phone]  SW Weekend 6MS, 8A, 10ICU- Pager: [phone]     For weekend RN care coordinator needs (home discharge with needs including home care, assisted living facility returns, durable medical equip, IV antibiotics)   5 med/surg, 5 ortho, ED pager: [phone]  6 med/surg, 8 med/surg, 10 ICU pager: [phone]        Date: April 5, 2024  Patient Name:  Vinod Lee  Patient YOB: 1960  Routing Message:          The documents accompanying this notice contain confidential information belonging to the sender.  This information is intended only for the use of the individual or entity named above.  The authorized recipient of this information is prohibited from disclosing this information to any other party and is required to destroy the information after its stated need has been fulfilled, unless otherwise required by state law.    If you are not the intended recipient, you are hereby notified that any disclosure, copy, distribution or action taken in reliance on the contents of these documents is strictly prohibited.  If you have received this document in error, please return it by fax to [phone] with a note on the cover sheet explaining why you are returning it (e.g. not your patient, not your provider, etc.).  If you need further assistance, please call .  Documents may also be returned by mail to "[employer] Management, , [facility] [address] [place], [place] [address].

## 2024-03-28 NOTE — TELEPHONE ENCOUNTER
Patient is currently in the ER. Will have to reach patient a different day.  [facility]   Primary Care

## 2024-03-28 NOTE — ED PROVIDER NOTES
[facility] EMERGENCY DEPARTMENT ([facility])    3/28/24      ED PROVIDER NOTE   Hallway C    History     Chief Complaint   Patient presents with    Fatigue     Gh staff told Ems pt appeared weak and SOB, needed an assist of two (change from baseline)      HPI  Vinod Lee is a 63 year old male with history of COPD, obstructive sleep apnea , Parkinson's due to medications, asthma, hypertension, GERD, cavitary lesion of the lung, schizoaffective schizophrenia who was brought in by ambulance with generalized weakness, shortness of breath and decreased ability to perform ADLs.  He resides in a group home.     [facility] records reviewed, he had recent hospitalization at [facility] from 2/22-2/24/2024 after presenting with lethargy and altered mental status.  He is found to have hyponatremia with sodium of 122.  This was felt to be due to multifactorial-suspected SIADH, psychogenic polydipsia, psych medication side effect, possible dehydration.  He was treated with IV fluids and fluid restriction of 1.5 L/day and his sodium was 132 at time of discharge.    Past Medical History  Past Medical History:   Diagnosis Date    LBBB (left bundle branch block)     Parkinson's disease     Schizophrenia (H)      No past surgical history on file.  albuterol (PROAIR HFA/PROVENTIL HFA/VENTOLIN HFA) 108 (90 Base) MCG/ACT inhaler  benzonatate (TESSALON) 100 MG capsule  benztropine (COGENTIN) 1 MG tablet  busPIRone (BUSPAR) 15 MG tablet  cloZAPine (CLOZARIL) 50 MG tablet  cloZAPine (CLOZARIL) 50 MG tablet  cyanocobalamin (VITAMIN B-12) 500 MCG SUBL sublingual tablet  divalproex sodium delayed-release (DEPAKOTE SPRINKLE) 125 MG DR capsule  FLUoxetine (PROZAC) 20 MG capsule  gabapentin (NEURONTIN) 400 MG capsule  LORazepam (ATIVAN) 0.5 MG tablet  Melatonin 10 MG TABS tablet  naproxen (NAPROSYN) 250 MG tablet  OLANZapine zydis (ZYPREXA) 15 MG ODT  pantoprazole (PROTONIX) 40 MG EC tablet  propranolol (INDERAL) 10 MG  tablet  risperiDONE (RISPERDAL M-TABS) 0.5 MG ODT  sennosides (SENOKOT) 8.6 MG tablet  tamsulosin (FLOMAX) 0.4 MG capsule  Vitamin D3 (CHOLECALCIFEROL) 25 mcg (1000 units) tablet      Allergies   Allergen Reactions    Bee Venom Unknown    Montelukast Unknown    Phenothiazines Unknown     Family History  No family history on file.  Social History   Social History     Tobacco Use    Smoking status: Never     Passive exposure: Never    Smokeless tobacco: Never      Past medical history, past surgical history, medications, allergies, family history, and social history were reviewed with the patient. No additional pertinent items.      A medically appropriate review of systems was performed with pertinent positives and negatives noted in the HPI, and all other systems negative.    Physical Exam   BP: 135/71  Pulse: 95  Temp: 97.9  F (36.6  C)  Resp: 16  SpO2: 99 %  Physical Exam  Vitals and nursing note reviewed.   Constitutional:       General: He is in acute distress.      Appearance: He is ill-appearing.   HENT:      Head: Normocephalic and atraumatic.      Right Ear: External ear normal.      Left Ear: External ear normal.      Nose: Nose normal.      Mouth/Throat:      Mouth: Mucous membranes are moist.      Pharynx: Oropharynx is clear.   Eyes:      Extraocular Movements: Extraocular movements intact.      Pupils: Pupils are equal, round, and reactive to light.   Cardiovascular:      Rate and Rhythm: Normal rate and regular rhythm.   Pulmonary:      Effort: No respiratory distress.      Breath sounds: No stridor. Rales present.   Abdominal:      General: There is no distension.      Palpations: Abdomen is soft.      Tenderness: There is no abdominal tenderness.   Musculoskeletal:         General: No swelling or tenderness.      Cervical back: Normal range of motion.   Skin:     Capillary Refill: Capillary refill takes less than 2 seconds.      Coloration: Skin is not jaundiced.      Findings: No bruising.    Neurological:      General: No focal deficit present.      Mental Status: He is alert.   Psychiatric:      Comments: Patient with ongoing thoughts of wishing to be killed and wishing to be dead.  Not aggressive.       GENERAL APPEARANCE: alert, fatigued     EYES: PERRL, EOMI, sclera non-icteric     NECK: no adenopathy or asymmetry, thyroid normal to palpation     RESP: lungs clear to auscultation - no rales, rhonchi or wheezes     CV: regular rates and rhythm, no murmurs, rubs, or gallop     MS: extremities normal- no gross deformities noted; normal muscle tone.     SKIN: no suspicious lesions or rashes       ED Course, Procedures, & Data      Procedures            EKG Interpretation:      Interpreted by Joes Ordonez MD  Time reviewed: 950  Symptoms at time of EKG: sob   Rhythm: normal sinus   Rate: normal  Axis: normal  Ectopy: none  Conduction:old lbbb  ST Segments/ T Waves: Nonspecific ST-T wave changes  Q Waves: none  Comparison to prior: Unchanged    Clinical Impression: old LBBB          Results for orders placed or performed during the hospital encounter of 03/28/24   XR Chest 2 Views     Status: None    Narrative    CHEST TWO VIEWS March 28, 2024 11:42 AM     HISTORY: History of chronic obstructive pulmonary disease, patient  able to say he is having difficulty swallowing/some shortness of  breath but not able to provide more history.    COMPARISON: 9/30/2023.      Impression    IMPRESSION: No acute cardiopulmonary disease. Hypoinflated lungs.    ZION BENJAMIN MD         SYSTEM ID:  [id number]   Comprehensive metabolic panel     Status: Abnormal   Result Value Ref Range    Sodium 130 (L) 135 - 145 mmol/L    Potassium 4.9 3.4 - 5.3 mmol/L    Carbon Dioxide (CO2) 27 22 - 29 mmol/L    Anion Gap 7 7 - 15 mmol/L    Urea Nitrogen 17.7 8.0 - 23.0 mg/dL    Creatinine 0.92 0.67 - 1.17 mg/dL    GFR Estimate >90 >60 mL/min/1.73m2    Calcium 8.3 (L) 8.8 - 10.2 mg/dL    Chloride 96 (L) 98 - 107 mmol/L    Glucose 101  (H) 70 - 99 mg/dL    Alkaline Phosphatase 76 40 - 150 U/L    AST 12 0 - 45 U/L    ALT 7 0 - 70 U/L    Protein Total 5.2 (L) 6.4 - 8.3 g/dL    Albumin 3.5 3.5 - 5.2 g/dL    Bilirubin Total 0.4 <=1.2 mg/dL   UA with Microscopic reflex to Culture     Status: Abnormal    Specimen: Urine, Clean Catch   Result Value Ref Range    Color Urine Yellow Colorless, Straw, Light Yellow, Yellow    Appearance Urine Clear Clear    Glucose Urine Negative Negative mg/dL    Bilirubin Urine Negative Negative    Ketones Urine Negative Negative mg/dL    Specific Gravity Urine 1.021 1.003 - 1.035    Blood Urine Negative Negative    pH Urine 7.0 5.0 - 7.0    Protein Albumin Urine 10 (A) Negative mg/dL    Urobilinogen Urine Normal Normal, 2.0 mg/dL    Nitrite Urine Negative Negative    Leukocyte Esterase Urine Negative Negative    Mucus Urine Present (A) None Seen /LPF    RBC Urine <1 <=2 /HPF    WBC Urine 1 <=5 /HPF    Squamous Epithelials Urine <1 <=1 /HPF    Narrative    Urine Culture not indicated   CBC with platelets and differential     Status: Abnormal   Result Value Ref Range    WBC Count 14.7 (H) 4.0 - 11.0 10e3/uL    RBC Count 4.63 4.40 - 5.90 10e6/uL    Hemoglobin 13.4 13.3 - 17.7 g/dL    Hematocrit 39.7 (L) 40.0 - 53.0 %    MCV 86 78 - 100 fL    MCH 28.9 26.5 - 33.0 pg    MCHC 33.8 31.5 - 36.5 g/dL    RDW 13.1 10.0 - 15.0 %    Platelet Count 167 150 - 450 10e3/uL    % Neutrophils 83 %    % Lymphocytes 7 %    % Monocytes 8 %    % Eosinophils 0 %    % Basophils 1 %    % Immature Granulocytes 1 %    NRBCs per 100 WBC 0 <1 /100    Absolute Neutrophils 12.3 (H) 1.6 - 8.3 10e3/uL    Absolute Lymphocytes 1.0 0.8 - 5.3 10e3/uL    Absolute Monocytes 1.2 0.0 - 1.3 10e3/uL    Absolute Eosinophils 0.0 0.0 - 0.7 10e3/uL    Absolute Basophils 0.1 0.0 - 0.2 10e3/uL    Absolute Immature Granulocytes 0.1 <=0.4 10e3/uL    Absolute NRBCs 0.0 10e3/uL   Symptomatic Influenza A/B, RSV, & SARS-CoV2 PCR (COVID-19) Nasopharyngeal     Status: Normal     Specimen: Nasopharyngeal; Swab   Result Value Ref Range    Influenza A PCR Negative Negative    Influenza B PCR Negative Negative    RSV PCR Negative Negative    SARS CoV2 PCR Negative Negative    Narrative    Testing was performed using the Xpert Xpress CoV2/Flu/RSV Assay on the Cepheid GeneXpert Instrument. This test should be ordered for the detection of SARS-CoV-2, influenza, and RSV viruses in individuals who meet clinical and/or epidemiological criteria. Test performance is unknown in asymptomatic patients. This test is for in vitro diagnostic use under the FDA EUA for laboratories certified under CLIA to perform high or moderate complexity testing. This test has not been FDA cleared or approved. A negative result does not rule out the presence of PCR inhibitors in the specimen or target RNA in concentration below the limit of detection for the assay. If only one viral target is positive but coinfection with multiple targets is suspected, the sample should be re-tested with another FDA cleared, approved, or authorized test, if coinfection would change clinical management. This test was validated by the [facility]. These laboratories are certified under the Clinical Laboratory Improvement Amendments of 1988 (CLIA-88) as qualified to perform high complexity laboratory testing.   Troponin T, High Sensitivity     Status: Normal   Result Value Ref Range    Troponin T, High Sensitivity 17 <=22 ng/L   Nt probnp inpatient     Status: Abnormal   Result Value Ref Range    N terminal Pro BNP Inpatient 1,410 (H) 0 - 900 pg/mL   Troponin T, High Sensitivity (second draw)     Status: Normal   Result Value Ref Range    Troponin T, High Sensitivity 17 <=22 ng/L   Lipid Profile     Status: Normal   Result Value Ref Range    Cholesterol 144 <200 mg/dL    Triglycerides 79 <150 mg/dL    Direct Measure HDL 44 >=40 mg/dL    LDL Cholesterol Calculated 84 <=100 mg/dL    Non HDL Cholesterol 100 <130 mg/dL     Narrative    Cholesterol  Desirable:  <200 mg/dL    Triglycerides  Normal:  Less than 150 mg/dL  Borderline High:  150-199 mg/dL  High:  200-499 mg/dL  Very High:  Greater than or equal to 500 mg/dL    Direct Measure HDL  Female:  Greater than or equal to 50 mg/dL   Male:  Greater than or equal to 40 mg/dL    LDL Cholesterol  Desirable:  <100mg/dL  Above Desirable:  100-129 mg/dL   Borderline High:  130-159 mg/dL   High:  160-189 mg/dL   Very High:  >= 190 mg/dL    Non HDL Cholesterol  Desirable:  130 mg/dL  Above Desirable:  130-159 mg/dL  Borderline High:  160-189 mg/dL  High:  190-219 mg/dL  Very High:  Greater than or equal to 220 mg/dL   TSH with free T4 reflex     Status: Normal   Result Value Ref Range    TSH 1.51 0.30 - 4.20 uIU/mL   CRP inflammation     Status: Abnormal   Result Value Ref Range    CRP Inflammation 14.36 (H) <5.00 mg/L   HIV Antigen Antibody Combo Cascade     Status: Normal   Result Value Ref Range    HIV Antigen Antibody Combo Nonreactive Nonreactive   Ferritin     Status: Normal   Result Value Ref Range    Ferritin 58 31 - 409 ng/mL   Iron and iron binding capacity     Status: Abnormal   Result Value Ref Range    Iron 24 (L) 61 - 157 ug/dL    Iron Binding Capacity 248 240 - 430 ug/dL    Iron Sat Index 10 (L) 15 - 46 %   EKG 12-lead, tracing only     Status: None   Result Value Ref Range    Systolic Blood Pressure  mmHg    Diastolic Blood Pressure  mmHg    Ventricular Rate 92 BPM    Atrial Rate 92 BPM    UT Interval 168 ms    QRS Duration 174 ms     ms    QTc 536 ms    P Axis 59 degrees    R AXIS -26 degrees    T Axis 125 degrees    Interpretation ECG       Sinus rhythm  Left bundle branch block  Abnormal ECG  Unconfirmed report - interpretation of this ECG is computer generated - see medical record for final interpretation  Confirmed by - EMERGENCY ROOM, PHYSICIAN ([id number]),  AG VELÁZQUEZ ([id number]) on 3/28/2024 11:26:48 AM     Echo Complete     Status: None   Result Value Ref  Range    LVEF  25-30%     [facility]    [id number]  [id number]  [id number]  [id number]^YESENIA^MONI^FLORY                                                                       Version 2     [facility],[place]  Echocardiography Laboratory  [address] [place] [address]     Name: TAMMY BERUMEN  MRN: [id number]  : 1960  Study Date: 2024 12:16 PM  Age: 63 yrs  Gender: Male  Patient Location: [facility]  Reason For Study: Dyspnea, CHF  Ordering Physician: MONI PATTERSON  Performed By: Gregory Nguyen     BP: 111/70 mmHg  ______________________________________________________________________________  Procedure  Complete Portable Echo Adult. Contrast Optison. Patient was given 6 ml mixture  of 3 ml Optison and 6 ml saline. 3 ml wasted.  ______________________________________________________________________________  Interpretation Summary  Moderate left ventricular dilation is present. The visual ejection fraction is  25-30%. There is global hypokinesis, most contractility is in the basal  lateral walls.  Right ventricular function, chamber size, wall motion, and thickness are  normal.  The inferior vena cava was normal in size with preserved respiratory  variability.  Trivial pericardial effusion is present.     There is no prior study for direct comparison.  ______________________________________________________________________________  Left Ventricle  Left ventricular wall thickness is normal. Moderate left ventricular dilation  is present. The visual ejection fraction is 25-30%. Left ventricular diastolic  function is not assessable. There is global hypokinesis, most contractility is  in the basal lateral walls.     Right Ventricle  Right ventricular function, chamber size, wall motion, and thickness are  normal.     Atria  Both atria appear normal.     Mitral Valve  The mitral valve is normal.     Aortic Valve  The aortic valve cannot be assessed.     Tricuspid Valve  The  tricuspid valve is normal. Trace tricuspid insufficiency is present. The  peak velocity of the tricuspid regurgitant jet is not obtainable.     Pulmonic Valve  The pulmonic valve cannot be assessed.     Vessels  The aorta root is normal. The inferior vena cava was normal in size with  preserved respiratory variability.     Pericardium  Trivial pericardial effusion is present.     Compared to Previous Study  There is no prior study for direct comparison.     ______________________________________________________________________________  MMode/2D Measurements & Calculations  EF Biplane: 19.1 %  LA Volume (BP): 37.9 ml  TAPSE: 1.8 cm     Doppler Measurements & Calculations  RV S Cedrick: 14.0 cm/sec     ______________________________________________________________________________  Report approved by: Melissa MARCOS 03/28/2024 03:22 PM         CBC with platelets differential     Status: Abnormal    Narrative    The following orders were created for panel order CBC with platelets differential.  Procedure                               Abnormality         Status                     ---------                               -----------         ------                     CBC with platelets and d...[[id number]]  Abnormal            Final result                 Please view results for these tests on the individual orders.     Medications   albuterol (PROVENTIL HFA/VENTOLIN HFA) inhaler (has no administration in time range)   benztropine (COGENTIN) tablet 1 mg (has no administration in time range)   busPIRone (BUSPAR) tablet 15 mg (has no administration in time range)   cloZAPine (CLOZARIL) tablet 400 mg (has no administration in time range)   cloZAPine (CLOZARIL) tablet 200 mg (has no administration in time range)   cyanocobalamin (VITAMIN B-12) sublingual tablet 500 mcg (has no administration in time range)   divalproex sodium delayed-release (DEPAKOTE SPRINKLE) DR capsule 250 mg (has no administration in time range)    FLUoxetine (PROzac) capsule 20 mg (has no administration in time range)   gabapentin (NEURONTIN) capsule 400 mg (has no administration in time range)   LORazepam (ATIVAN) tablet 1 mg (has no administration in time range)   naproxen (NAPROSYN) tablet 250 mg (has no administration in time range)   OLANZapine zydis (zyPREXA) ODT tab 15 mg (has no administration in time range)   pantoprazole (PROTONIX) EC tablet 40 mg (has no administration in time range)   propranolol (INDERAL) tablet 30 mg (has no administration in time range)   risperiDONE (risperDAL M-TABS) ODT tab 0.5 mg (has no administration in time range)   sennosides (SENOKOT) tablet 2 tablet (has no administration in time range)   tamsulosin (FLOMAX) capsule 0.8 mg (has no administration in time range)   Vitamin D3 (CHOLECALCIFEROL) tablet 25 mcg (has no administration in time range)   lidocaine 1 % 0.1-1 mL (has no administration in time range)   lidocaine (LMX4) cream (has no administration in time range)   sodium chloride (PF) 0.9% PF flush 3 mL (has no administration in time range)   sodium chloride (PF) 0.9% PF flush 3 mL (has no administration in time range)   calcium carbonate (TUMS) chewable tablet 1,000 mg (has no administration in time range)   losartan (COZAAR) half-tab 12.5 mg (has no administration in time range)   metoprolol succinate ER (TOPROL XL) 24 hr tablet 25 mg (has no administration in time range)   - MEDICATION INSTRUCTIONS - (has no administration in time range)   perflutren diluted 1mL to 2mL with saline (OPTISON) diluted injection 6 mL (6 mLs Intravenous $Given 3/28/24 1341)     Labs Ordered and Resulted from Time of ED Arrival to Time of ED Departure   COMPREHENSIVE METABOLIC PANEL - Abnormal       Result Value    Sodium 130 (*)     Potassium 4.9      Carbon Dioxide (CO2) 27      Anion Gap 7      Urea Nitrogen 17.7      Creatinine 0.92      GFR Estimate >90      Calcium 8.3 (*)     Chloride 96 (*)     Glucose 101 (*)     Alkaline  Phosphatase 76      AST 12      ALT 7      Protein Total 5.2 (*)     Albumin 3.5      Bilirubin Total 0.4     ROUTINE UA WITH MICROSCOPIC REFLEX TO CULTURE - Abnormal    Color Urine Yellow      Appearance Urine Clear      Glucose Urine Negative      Bilirubin Urine Negative      Ketones Urine Negative      Specific Gravity Urine 1.021      Blood Urine Negative      pH Urine 7.0      Protein Albumin Urine 10 (*)     Urobilinogen Urine Normal      Nitrite Urine Negative      Leukocyte Esterase Urine Negative      Mucus Urine Present (*)     RBC Urine <1      WBC Urine 1      Squamous Epithelials Urine <1     CBC WITH PLATELETS AND DIFFERENTIAL - Abnormal    WBC Count 14.7 (*)     RBC Count 4.63      Hemoglobin 13.4      Hematocrit 39.7 (*)     MCV 86      MCH 28.9      MCHC 33.8      RDW 13.1      Platelet Count 167      % Neutrophils 83      % Lymphocytes 7      % Monocytes 8      % Eosinophils 0      % Basophils 1      % Immature Granulocytes 1      NRBCs per 100 WBC 0      Absolute Neutrophils 12.3 (*)     Absolute Lymphocytes 1.0      Absolute Monocytes 1.2      Absolute Eosinophils 0.0      Absolute Basophils 0.1      Absolute Immature Granulocytes 0.1      Absolute NRBCs 0.0     NT PROBNP INPATIENT - Abnormal    N terminal Pro BNP Inpatient 1,410 (*)    CRP INFLAMMATION - Abnormal    CRP Inflammation 14.36 (*)    IRON AND IRON BINDING CAPACITY - Abnormal    Iron 24 (*)     Iron Binding Capacity 248      Iron Sat Index 10 (*)    INFLUENZA A/B, RSV, & SARS-COV2 PCR - Normal    Influenza A PCR Negative      Influenza B PCR Negative      RSV PCR Negative      SARS CoV2 PCR Negative     TROPONIN T, HIGH SENSITIVITY - Normal    Troponin T, High Sensitivity 17     TROPONIN T, HIGH SENSITIVITY - Normal    Troponin T, High Sensitivity 17     LIPID PROFILE - Normal    Cholesterol 144      Triglycerides 79      Direct Measure HDL 44      LDL Cholesterol Calculated 84      Non HDL Cholesterol 100     TSH WITH FREE T4 REFLEX  - Normal    TSH 1.51     HIV ANTIGEN ANTIBODY COMBO - Normal    HIV Antigen Antibody Combo Nonreactive     FERRITIN - Normal    Ferritin 58     PHOSPHATIDYLETHANOL (PETH), WHOLE BLOOD   TRANSFERRIN     Echo Complete   Final Result      XR Chest 2 Views   Final Result   IMPRESSION: No acute cardiopulmonary disease. Hypoinflated lungs.      ZION BENJAMIN MD            SYSTEM ID:  [id number]             Critical care was not performed.     Medical Decision Making  The patient's presentation was of high complexity (an acute health issue posing potential threat to life or bodily function).    The patient's evaluation involved:  review of external note(s) from 3+ sources (see separate area of note for details)  review of 3+ test result(s) ordered prior to this encounter (see separate area of note for details)  ordering and/or review of 3+ test(s) in this encounter (see separate area of note for details)  discussion of management or test interpretation with another health professional (see separate area of note for details)    The patient's management necessitated high risk (a decision regarding hospitalization).    Assessment & Plan   63 male history of schizophrenia presented ER with shortness of breath.  Evaluation in the ER revealed COVID-negative influenza negative.  EKG showed old left bundle branch block.  Patient had a formal echo as BNP was 1400 all troponin was stable at 16.  Chest x-ray showed no major signs of fluid overload etc.  Echo done as noted with ejection fraction 25%.  Unclear if acute or not.  Patient is not on any diuretics etc.  No history of documented heart failure patient with chronic schizophrenia etc.  Plan to admit to medicine cardiology was consulted initially they will see the patient in consultation admit on the [facility] for further evaluation HFrEF with shortness of breath and schizophrenic.       I have reviewed the nursing notes. I have reviewed the findings, diagnosis, plan and need for  follow up with the patient.    New Prescriptions    No medications on file       Final diagnoses:   Chronic systolic heart failure (H)   Paranoid schizophrenia (H)   Shortness of breath         [facility] EMERGENCY DEPARTMENT  3/28/2024    This note was created at least in part by the use of dragon voice dictation system. Inadvertent typographical errors may still exist.  Jose Ordonez MD.  Patient evaluated in the emergency department during the COVID-19 pandemic period. Careful attention to patients safety was addressed throughout the evaluation. Evaluation and treatment management was initiated with disposition made efficiently and appropriate as possible to minimize any risk of potential exposure to patient during this evaluation.       Jose Ordonez MD  03/28/24 2034

## 2024-03-28 NOTE — ED NOTES
"Patient makes very dark comments when this RN approaches the bedside, like, \"Is the 's department here yet?\" THen would say he wishes they were so they could \"help\" him.  "

## 2024-03-28 NOTE — LETTER
Transition Communication Hand-off for Care Transitions to Next Level of Care Provider    Name: Vinod Lee  : 1960  MRN #: [id number]  Primary Care Provider: Franklyn Estrella     Primary Clinic: [address]  [facility] [address]     Reason for Hospitalization:  Shortness of breath [R06.02]  Chronic systolic heart failure (H) [I50.22]  Paranoid schizophrenia (H) [F20.0]  Admit Date/Time: 3/28/2024  9:20 AM  Discharge Date: 24  Payor Source: Payor: [employer] / Plan: [employer] MEDICARE ADVANTAGE / Product Type: Medicare /              Reason for Communication Hand-off Referral: Fragility    Discharge Plan:  Discharge home to group home with  home care     Concern for non-adherence with plan of care:   Y/N N  Discharge Needs Assessment:      Follow-up plan:    Future Appointments   Date Time Provider Department Center   2024  7:45 PM Zabrina Flores OT UROT [place]   2024 10:15 AM LAB FIRST FLOOR [facility]   2024  4:00 PM Roma Dumont, DO [facility]       Any outstanding tests or procedures:        Referrals       Future Labs/Procedures    Adult Cardiology Eval  Referral     Process Instructions:    Consider Adult E-Consult to Cardiology for the following conditions: bradycardia, chest pain, heart failure, palpitations, syncope.  E-Consult Cost: 0-$50.    Comments:    Please be aware that coverage of these services is subject to the terms and limitations of your health insurance plan.  Call member services at your health plan with any benefit or coverage questions.  [facility] will call you to coordinate your care as prescribed by your provider. If you don't hear from a representative within 2 business days, please call [phone].      Adult Urology  Referral     Process Instructions:    Consider Adult E-Consult to Urology for the following conditions: asymptomatic hematuria, BPH, erectile dysfunction, recurrent UTI.    E-Consult Cost: 0-$50.     Comments:    Please be aware that coverage of these services is subject to the terms and limitations of your health insurance plan.  Call member services at your health plan with any benefit or coverage questions.  [employer] will call you to coordinate care as prescribed your provider. If you don t hear from a representative within 2 business days, please call [phone].      Home Care Referral     Comments:    Your provider has ordered home health services. If you have not been contacted within 2 days of your discharge please call the selected Home Care agency listed on your Discharge document.            Supplies       Future Labs/Procedures    Hospital Bed Order for DME - ONLY FOR DME     Comments:    I, the undersigned, certify that the above prescribed supplies are medically necessary for this patient and is both reasonable and necessary in reference to accepted standards of medical and necessary in reference to accepted standards of medical practice in the treatment of this patient's condition and is not prescribed as a convenience.     Wheelchair Order for DME - ONLY FOR DME     Comments:    I, the undersigned, certify that the above prescribed supplies are medically necessary for this patient and is both reasonable and necessary in reference to accepted standards of medical and necessary in reference to accepted standards of medical practice in the treatment of this patient's condition and is not prescribed as a convenience.             Key Recommendations:      Vicky Gonzalez RN    AVS/Discharge Summary is the source of truth; this is a helpful guide for improved communication of patient story

## 2024-03-29 ENCOUNTER — APPOINTMENT (OUTPATIENT)
Dept: OCCUPATIONAL THERAPY | Facility: CLINIC | Age: 64
DRG: 291 | End: 2024-03-29
Payer: COMMERCIAL

## 2024-03-29 ENCOUNTER — TRANSFERRED RECORDS (OUTPATIENT)
Dept: HEALTH INFORMATION MANAGEMENT | Facility: CLINIC | Age: 64
End: 2024-03-29

## 2024-03-29 LAB
ALBUMIN SERPL BCG-MCNC: 3.5 G/DL (ref 3.5–5.2)
ALP SERPL-CCNC: 80 U/L (ref 40–150)
ALT SERPL W P-5'-P-CCNC: 9 U/L (ref 0–70)
ANION GAP SERPL CALCULATED.3IONS-SCNC: 11 MMOL/L (ref 7–15)
AST SERPL W P-5'-P-CCNC: 11 U/L (ref 0–45)
BILIRUB SERPL-MCNC: 0.7 MG/DL
BUN SERPL-MCNC: 15.1 MG/DL (ref 8–23)
CALCIUM SERPL-MCNC: 8.6 MG/DL (ref 8.8–10.2)
CHLORIDE SERPL-SCNC: 94 MMOL/L (ref 98–107)
CREAT SERPL-MCNC: 0.82 MG/DL (ref 0.67–1.17)
DEPRECATED HCO3 PLAS-SCNC: 24 MMOL/L (ref 22–29)
EGFRCR SERPLBLD CKD-EPI 2021: >90 ML/MIN/1.73M2
ERYTHROCYTE [DISTWIDTH] IN BLOOD BY AUTOMATED COUNT: 13.1 % (ref 10–15)
GLUCOSE SERPL-MCNC: 118 MG/DL (ref 70–99)
HCT VFR BLD AUTO: 40.6 % (ref 40–53)
HGB BLD-MCNC: 13.7 G/DL (ref 13.3–17.7)
HOLD SPECIMEN: NORMAL
MAGNESIUM SERPL-MCNC: 2.4 MG/DL (ref 1.7–2.3)
MCH RBC QN AUTO: 29 PG (ref 26.5–33)
MCHC RBC AUTO-ENTMCNC: 33.7 G/DL (ref 31.5–36.5)
MCV RBC AUTO: 86 FL (ref 78–100)
OSMOLALITY SERPL: 268 MMOL/KG (ref 280–301)
PLATELET # BLD AUTO: 193 10E3/UL (ref 150–450)
POTASSIUM SERPL-SCNC: 4.8 MMOL/L (ref 3.4–5.3)
PROCALCITONIN SERPL IA-MCNC: 0.05 NG/ML
PROT SERPL-MCNC: 5.5 G/DL (ref 6.4–8.3)
RBC # BLD AUTO: 4.73 10E6/UL (ref 4.4–5.9)
SODIUM SERPL-SCNC: 129 MMOL/L (ref 135–145)
TRANSFERRIN SERPL-MCNC: 201 MG/DL (ref 200–360)
WBC # BLD AUTO: 15.6 10E3/UL (ref 4–11)

## 2024-03-29 PROCEDURE — 250N000013 HC RX MED GY IP 250 OP 250 PS 637

## 2024-03-29 PROCEDURE — 120N000002 HC R&B MED SURG/OB UMMC

## 2024-03-29 PROCEDURE — 36415 COLL VENOUS BLD VENIPUNCTURE: CPT

## 2024-03-29 PROCEDURE — 97166 OT EVAL MOD COMPLEX 45 MIN: CPT | Mod: GO

## 2024-03-29 PROCEDURE — 99232 SBSQ HOSP IP/OBS MODERATE 35: CPT | Mod: GC

## 2024-03-29 PROCEDURE — 85014 HEMATOCRIT: CPT

## 2024-03-29 PROCEDURE — 250N000013 HC RX MED GY IP 250 OP 250 PS 637: Performed by: STUDENT IN AN ORGANIZED HEALTH CARE EDUCATION/TRAINING PROGRAM

## 2024-03-29 PROCEDURE — 97535 SELF CARE MNGMENT TRAINING: CPT | Mod: GO

## 2024-03-29 PROCEDURE — 80053 COMPREHEN METABOLIC PANEL: CPT

## 2024-03-29 PROCEDURE — 83930 ASSAY OF BLOOD OSMOLALITY: CPT

## 2024-03-29 PROCEDURE — 83735 ASSAY OF MAGNESIUM: CPT

## 2024-03-29 RX ORDER — LORAZEPAM 0.5 MG/1
0.25 TABLET ORAL 2 TIMES DAILY PRN
Status: DISCONTINUED | OUTPATIENT
Start: 2024-03-29 | End: 2024-04-05 | Stop reason: HOSPADM

## 2024-03-29 RX ORDER — LORAZEPAM 0.5 MG/1
0.25 TABLET ORAL 3 TIMES DAILY PRN
Status: DISCONTINUED | OUTPATIENT
Start: 2024-03-29 | End: 2024-03-29

## 2024-03-29 RX ORDER — ACETAMINOPHEN 325 MG/1
975 TABLET ORAL EVERY 6 HOURS PRN
Status: DISCONTINUED | OUTPATIENT
Start: 2024-03-29 | End: 2024-04-05 | Stop reason: HOSPADM

## 2024-03-29 RX ORDER — FUROSEMIDE 20 MG
20 TABLET ORAL ONCE
Status: COMPLETED | OUTPATIENT
Start: 2024-03-29 | End: 2024-03-29

## 2024-03-29 RX ORDER — FUROSEMIDE 20 MG
20 TABLET ORAL
Status: DISCONTINUED | OUTPATIENT
Start: 2024-03-29 | End: 2024-04-02

## 2024-03-29 RX ORDER — LORAZEPAM 0.5 MG/1
0.25 TABLET ORAL 2 TIMES DAILY
Status: DISCONTINUED | OUTPATIENT
Start: 2024-03-29 | End: 2024-04-05 | Stop reason: HOSPADM

## 2024-03-29 RX ADMIN — Medication 25 MCG: at 08:31

## 2024-03-29 RX ADMIN — EMPAGLIFLOZIN 10 MG: 10 TABLET, FILM COATED ORAL at 17:29

## 2024-03-29 RX ADMIN — BUSPIRONE HYDROCHLORIDE 15 MG: 10 TABLET ORAL at 21:30

## 2024-03-29 RX ADMIN — CLOZAPINE 200 MG: 200 TABLET ORAL at 13:18

## 2024-03-29 RX ADMIN — SENNOSIDES 2 TABLET: 8.6 TABLET, FILM COATED ORAL at 21:31

## 2024-03-29 RX ADMIN — GABAPENTIN 400 MG: 400 CAPSULE ORAL at 13:18

## 2024-03-29 RX ADMIN — DIVALPROEX SODIUM 250 MG: 125 CAPSULE, COATED PELLETS ORAL at 17:30

## 2024-03-29 RX ADMIN — GABAPENTIN 400 MG: 400 CAPSULE ORAL at 08:32

## 2024-03-29 RX ADMIN — RISPERIDONE 0.5 MG: 0.5 TABLET, ORALLY DISINTEGRATING ORAL at 08:32

## 2024-03-29 RX ADMIN — METOPROLOL SUCCINATE 25 MG: 25 TABLET, FILM COATED, EXTENDED RELEASE ORAL at 21:32

## 2024-03-29 RX ADMIN — Medication 500 MCG: at 08:31

## 2024-03-29 RX ADMIN — SENNOSIDES 2 TABLET: 8.6 TABLET, FILM COATED ORAL at 08:32

## 2024-03-29 RX ADMIN — TAMSULOSIN HYDROCHLORIDE 0.8 MG: 0.4 CAPSULE ORAL at 08:32

## 2024-03-29 RX ADMIN — RISPERIDONE 0.5 MG: 0.5 TABLET, ORALLY DISINTEGRATING ORAL at 21:31

## 2024-03-29 RX ADMIN — Medication 0.25 MG: at 21:30

## 2024-03-29 RX ADMIN — PANTOPRAZOLE SODIUM 40 MG: 40 TABLET, DELAYED RELEASE ORAL at 08:32

## 2024-03-29 RX ADMIN — BUSPIRONE HYDROCHLORIDE 15 MG: 10 TABLET ORAL at 08:32

## 2024-03-29 RX ADMIN — CLOZAPINE 400 MG: 200 TABLET ORAL at 21:31

## 2024-03-29 RX ADMIN — NAPROXEN 250 MG: 250 TABLET ORAL at 06:28

## 2024-03-29 RX ADMIN — OLANZAPINE 15 MG: 10 TABLET, ORALLY DISINTEGRATING ORAL at 08:31

## 2024-03-29 RX ADMIN — BUSPIRONE HYDROCHLORIDE 15 MG: 10 TABLET ORAL at 13:18

## 2024-03-29 RX ADMIN — FUROSEMIDE 20 MG: 20 TABLET ORAL at 04:18

## 2024-03-29 RX ADMIN — BENZTROPINE MESYLATE 1 MG: 1 TABLET ORAL at 21:31

## 2024-03-29 RX ADMIN — DIVALPROEX SODIUM 250 MG: 125 CAPSULE, COATED PELLETS ORAL at 08:31

## 2024-03-29 RX ADMIN — DIVALPROEX SODIUM 250 MG: 125 CAPSULE, COATED PELLETS ORAL at 13:18

## 2024-03-29 RX ADMIN — FUROSEMIDE 20 MG: 20 TABLET ORAL at 17:30

## 2024-03-29 RX ADMIN — OLANZAPINE 15 MG: 10 TABLET, ORALLY DISINTEGRATING ORAL at 21:32

## 2024-03-29 RX ADMIN — Medication 12.5 MG: at 21:31

## 2024-03-29 RX ADMIN — BENZTROPINE MESYLATE 1 MG: 1 TABLET ORAL at 08:31

## 2024-03-29 RX ADMIN — FLUOXETINE HYDROCHLORIDE 20 MG: 20 CAPSULE ORAL at 08:32

## 2024-03-29 RX ADMIN — GABAPENTIN 400 MG: 400 CAPSULE ORAL at 21:32

## 2024-03-29 ASSESSMENT — ACTIVITIES OF DAILY LIVING (ADL)
ADLS_ACUITY_SCORE: 55
ADLS_ACUITY_SCORE: 57
ADLS_ACUITY_SCORE: 61
ADLS_ACUITY_SCORE: 42
ADLS_ACUITY_SCORE: 55
ADLS_ACUITY_SCORE: 61
ADLS_ACUITY_SCORE: 61
ADLS_ACUITY_SCORE: 55
ADLS_ACUITY_SCORE: 42
ADLS_ACUITY_SCORE: 42
ADLS_ACUITY_SCORE: 55
ADLS_ACUITY_SCORE: 55
ADLS_ACUITY_SCORE: 61
ADLS_ACUITY_SCORE: 61
ADLS_ACUITY_SCORE: 55
ADLS_ACUITY_SCORE: 55
ADLS_ACUITY_SCORE: 57

## 2024-03-29 NOTE — CONSULTS
Discharge Pharmacy Test Claim    Jardiance is covered with $0 copay through patient's [employer] Medicare advantage plan.    Test Claim Copay   jardiance 0.00     Kianna Sorensen  [facility] Pharmacy Liaison  Ph: [phone]  Fax [phone]  Available on Vocera (learn more here)

## 2024-03-29 NOTE — PROGRESS NOTES
03/29/24 0900   Appointment Info   Signing Clinician's Name / Credentials (OT) Neha Jerez, OTR/L   Living Environment   People in Home facility resident   Current Living Arrangements group home   Living Environment Comments Pt lives in group home. Unable to obtain further information at this time as pt is unable to answer questions appropriately.   Self-Care   Usual Activity Tolerance fair   Current Activity Tolerance poor   Activity/Exercise/Self-Care Comment Pt reports that he walks with someone. It is unclear of baseline at this time.   General Information   Onset of Illness/Injury or Date of Surgery 03/28/24   Referring Physician Dr. Hall   Patient/Family Therapy Goal Statement (OT) none stated   Additional Occupational Profile Info/Pertinent History of Current Problem per chart,  63 year old male who has a history of asthma, COPD, SOPHIA, Cavitory lesion of lung, Paranoid Schizophrenia, Medication induced Parkinsonism, HTN, GERD, BPH and urinary retention who was admitted for new onset heart failure in setting of suicidal ideation, AMS, electrolyte abnormalities and meeting SIRS criteria.   Existing Precautions/Restrictions fall   Left Upper Extremity (Weight-bearing Status) full weight-bearing (FWB)   Right Upper Extremity (Weight-bearing Status) full weight-bearing (FWB)   Left Lower Extremity (Weight-bearing Status) full weight-bearing (FWB)   Right Lower Extremity (Weight-bearing Status) full weight-bearing (FWB)   General Observations and Info new heart failure   Cognitive Status Examination   Orientation Status person   Follows Commands follows one-step commands;0-24% accuracy;delayed response/completion;increased processing time needed;initiation impaired;physical/tactile prompts required;repetition of directions required;verbal cues/prompting required   Safety Deficit impulsivity   Range of Motion Comprehensive   General Range of Motion no range of motion deficits identified   Strength  Comprehensive (MMT)   General Manual Muscle Testing (MMT) Assessment no strength deficits identified   Muscle Tone Assessment   Muscle Tone Quick Adds No deficits were identified   Coordination   Upper Extremity Coordination No deficits were identified   Bed Mobility   Bed Mobility supine-sit;sit-supine   Supine-Sit [place] (Bed Mobility) supervision   Sit-Supine [place] (Bed Mobility) supervision   Transfers   Transfers sit-stand transfer   Sit-Stand Transfer   Sit-Stand [place] (Transfers) contact guard   Balance   Balance Assessment standing static balance;standing dynamic balance   Activities of Daily Living   BADL Assessment/Intervention grooming   Grooming Assessment/Training   [place] Level (Grooming) dependent (less than 25% patient effort)   Clinical Impression   Criteria for Skilled Therapeutic Interventions Met (OT) Yes, treatment indicated   OT Diagnosis decreased ADL ind   Influenced by the following impairments AMS, CHF   OT Problem List-Impairments impacting ADL problems related to;activity tolerance impaired;balance;cognition   Assessment of Occupational Performance 3-5 Performance Deficits   Identified Performance Deficits dressing, toileting, bed mob, func mob   Planned Therapy Interventions (OT) ADL retraining;cognition   Clinical Decision Making Complexity (OT) detailed assessment/moderate complexity   Risk & Benefits of therapy have been explained evaluation/treatment results reviewed;patient   OT Total Evaluation Time   OT Eval, Moderate Complexity Minutes (27340) 5   OT Goals   Therapy Frequency (OT) 3 times/week   OT Predicted Duration/Target Date for Goal Attainment 04/12/24   OT Goals Hygiene/Grooming;Lower Body Dressing;Bed Mobility;Transfers;Toilet Transfer/Toileting;Cognition;Cardiac Phase 1   OT: Hygiene/Grooming modified independent;using adaptive equipment   OT: Lower Body Dressing Modified independent;using adaptive equipment   OT: Bed Mobility Modified  "independent;supine to/from sitting   OT: Transfer Modified independent   OT: Toilet Transfer/Toileting Modified independent   OT: Cognitive Patient/caregiver will verbalize understanding of cognitive assessment results/recommendations as needed for safe discharge planning   OT: Understanding of cardiac education to maximize quality of life, condition management, and health outcomes Patient   OT: Perform aerobic activity with stable cardiovascular response 10 minutes;ambulation   OT: Functional/aerobic ambulation tolerance with stable cardiovascular response in order to return to home and community environment Modified independent;200 feet;Assistive device   Interventions   Interventions Quick Adds Self-Care/Home Management   Self-Care/Home Management   Self-Care/Home Mgmt/ADL, Compensatory, Meal Prep Minutes (75485) 23   Symptoms Noted During/After Treatment (Meal Preparation/Planning Training) none   Treatment Detail/Skilled Intervention Pt supine in bed upon OT arrival and agreeable to therapy with encouragement. Pt able to answer a few questions appropriately otherwise pt requires repetition, redirection and cueing t/o session. Pt SBA for supine<>sit bed mob. Pt laying in bottom portion of mattress at end of session. Pt unable to scoot to HOB while supine, sitting EOB and standing with FWW. Pt reports \"I'm fine\" and is unable to reposition with verbal ad tactile cues. Pt CGA for STS and bed transfers with FWW. Pt appears unsteady when standing w/o AD, trial FWW. Pt CGA for ambulation bed<>BR with FWW. Pt observed to be impulsive and unaware of safety t/o session. Pt requires verbal/tactile cues, redirection and repetition t/o session for safety. While standing at sink, pt unable to follow instructions to wash face. When OT handing wash cloth to pt, pt tried to bend forward to put his face into the OT hand that was holding the wash cloth. Pt handed cloth and throws the wash cloth onto the sink and unable to follow " instructions and cueing. Pt impulsively attempts to return to bed by leaving the FWW at the sink and got tangled in the O2 tubing and oximeter cord. Pt safely returns to bed with A of OT and 1:1 sitting to manage tubing/cords. Pt stopped responding to and acknowledging OT. Pt left laying at foot of bed with 1:1 and MD present. Increased time and effort needed t/o session d/t decreased cog and cueing needed. Pt on 2 L O2 with O2 sats at 95% at rest and during activity. CHF handouts left in room and unable to provide educ at this time d/t AMS.   OT Discharge Planning   OT Plan check-in on AMS, if pt still unable to follow commands consider holding?- transfers, simple tasks/commands, safety, CHF educ once cog clears   OT Discharge Recommendation (DC Rec) home with assist;Transitional Care Facility   OT Rationale for DC Rec Pt is below ADL baseline at this time and is limited by AMS and unable to follow commands and cueing for safety. Anticipate that once AMS clears, pt will be able to dc home with A. If AMS does not clear or pt continues to be below baseline, pt may benefit from TCU stay to increase safety, ADL ind and act tolerance.   OT Brief overview of current status close CGA for safety during all movement. Pt unable to follow commands   Total Session Time   Timed Code Treatment Minutes 23   Total Session Time (sum of timed and untimed services) 28

## 2024-03-29 NOTE — PROGRESS NOTES
"CLINICAL NUTRITION SERVICES - BRIEF NOTE      Reason for RD note:    Received RD consult for CHF diet education.     New Findings/Chart Review:  Noted pt is from a  where he has all meals provided for him.  Unclear of his usual cognitive status. However currently he is described as having AMS.  Per interdisciplinary care rounds, care management notes were documented \"TBD\" re: disposition (discharge location).       Interventions:  Will leave CHF diet handouts (low sodium diet, nutrition fact reading tips, fluid restriction, etc) in paper chart with note to pass along to accepting facility at time of discharge off campus.     Future/Additional Recommendations:     Nutrition will continue to follow per protocol.    Maddy Hammer RD, LD   6&8 Med/surg   NO LONGER USING UNIT PAGER  Mon-Fri [place] contact: My name, or 6 [OR] 8 Med Surg Clin... Dietitian  Weekend pager: [phone] (goes to email-expect delayed response)        "

## 2024-03-29 NOTE — TELEPHONE ENCOUNTER
Called Group Home and relayed message from PET Scan team. Group Home coordinator will call back and reschedule this appt. Also they will reschedule occupational therapy appt that was set for 3/29/24 as well.

## 2024-03-29 NOTE — PROGRESS NOTES
2115    D: Patient admitted from EB-ED via gurney for SI, generalized weakness and SOB.     I: Upon arrival to the unit patient was oriented to room, unit, and call light. Patient s weight, and vital signs were obtained. Allergies reviewed and allergy band applied. Provider notified of patient s arrival on the unit. Unable to complete admission questions, patient is confuse. Care plan initiated.    A: Vital signs stable upon admission. GRACE pain. Two RN skin assessment completed, Second RN was Juana GAFFNEY. Significant Skin Findings include redness on sacrum area. [facility] Nurse Consult Ordered, NO . Bed Algorithm can be found in PCS flow sheets (Support Surface Algorithm) and on IP [facility] NURSE RESOURCE TAB, was this used during this assessment? Yes. Was a bariatric bed frame ordered? No. Was an air pump added to the Isoflex mattress? Yes    P: Continue to monitor patient and intervene as needed. Continue with plan of care. Notify provider with any concerns or changes in patient status.

## 2024-03-29 NOTE — PROGRESS NOTES
Cardiology Daily Progress Note   03/29/2024    Vinod Lee MRN# [id number]   Age: 63 year old YOB: 1960       Assessment and Plan:   Assessment:  Vinod Lee is a pleasant 63 year old male with a history of schizophrenia. Cardiology is consulted for assistance in management of newly discovered cardiomyopathy.Our consultation today addressed the following medical issues:     HFrEF, new diagnosis  HTN  LBBB     Discussion:     Suspect his cardiomyopathy is secondary to his LBBB and dyssynchrony. Serologic studies unremarkable so far. Clozapine induced cardiomyopathy is a possibility but not the most likely culprit. If clozapine is felt to be necessary from a psychiatric perspective its ongoing use would be reasonable while completing his evaluation though would ultimately defer to psychiatry. Regardless, he should have continued workup for his cardiomyopathy with an ischemic evaluation though this can be completed as an outpatient.  Receommend stress cardiac MRI vs. coronary CTA to exclude obstructive CAD. Prior to ordering this test, he wwill need to follow breath holding commands for MRI and need to lay still for the coronary CTA. In the meantime, he can be uptitrated on GDMT as outlined below:     Recommendations:  Ischemia evaluation when more clinically stable from a psychiatric perspective; can be done as an outpatient  Stress cardiac MRI vs. coronary CTA  Warrants up-titration of GDMT  Continue Losartan 12.5mg daily  Continue Metoprolol Succinate 25mg daily  Start empagliflozin 10mg daily today  If renal function (and lytes) stable tomorrow; would start spironolactone 25mg daily  Can be discharged on this regimen and follow up with cardiology as an outpatient  Please ensure he has cardiology follow up on discharge     Thank you for the opportunity to participate in the care of Vinod Lee. This plan was discussed with the staff physician. Please feel free to contact me with any additional  questions or concerns.    This note was dictated with voice recognition software and then edited. Please excuse any unintentional errors.      Vinod Beasley MD  Cardiology Fellow     Subjective / Interval:   NAOE     Physical Exam:   Ranges for vital signs:    Temp:  [97.8  F (36.6  C)-99  F (37.2  C)] 98.7  F (37.1  C)  Pulse:  [] 105  Resp:  [15-20] 17  BP: (105-163)/() 121/68  SpO2:  [92 %-99 %] 95 %    Intake/Output Summary (Last 24 hours) at 3/29/2024 0916  Last data filed at 3/29/2024 0822  Gross per 24 hour   Intake 665 ml   Output 1000 ml   Net -335 ml          Labs:   Personally Reviewed    Current Medications:   Losartan 12.5mg daily  Succinate 25mg daily    Relevant Imaging:   Personally Reviewed

## 2024-03-29 NOTE — PLAN OF CARE
[id number]    Patient is confuse, alert to self. Bedside attendant present inside the room for safety. Needs assistance x2 when OOB. Primofit in place. LBM unable to recall.    On continuous O2 at 2LPM via NC with humidifier. Low Sat Fat Na <2400mg, no caffeine diet. 1800ml fluid restriction. Pills crushed to apple sauce. PIV on L lower forearm, SL. C/o L calf pain, naproxen given, provider notified. Denies SOB, chest pain and n/v. Numbness and tingling on extremities. Frequent productive cough noted with whitish to yellowish phlegm. Crackles heard upon auscultation. Unable to expectorate phlegm, suction at bedside.     Redness on sacrum/coccyx area. Tremors on BUE. Patient is suspicious most of the times, claims attendant or nurse wanted to torture him and poison him with food and water. Reoriented patient and informed that he is safe in his room.     Patient stated he just wanted to die. On tele with RUTHIE. RN managed K and Mg, AM lab draws placed. Continue with POC.

## 2024-03-29 NOTE — PROGRESS NOTES
[facility]    Progress Note - [facility]       Date of Admission:  3/28/2024    Main Plans for Today   - Cards consulted, recs: Start SGLT2 today, consider MRI tomorrow  - Pharm consult for investigation of clozapine vs other agents contributing to new HFrEF  - Plan for psych consult tomorrow  - Repeat EKG tomorrow AM  - Consider CXR tomorrow AM, pending infectious symptoms    Patient does not have medical decision-making capacity    Assessment & Plan   Vinod Lee is a 63 year old male who has a history of asthma, COPD, SOPHIA, Cavitory lesion of lung, Paranoid Schizophrenia, Medication induced Parkinsonism, HTN, GERD, BPH and urinary retention who was admitted for new onset heart failure in setting of suicidal ideation, AMS, electrolyte abnormalities and meeting SIRS criteria.      # New HFrEF (3/28, EF 25-30%)  # LBBB  # HTN  # Prolonged Qtc (536 3/28)  # Dyspnea  Presented from group home with dyspnea, fatigue, weakness, cough. Echo on admission w/newly reduced EF 25-30% & global hypokinesis, but euvolemic per echo. BNP 1410, trops negative, EKG with LBBB but reassuring against ischemic changes, CRP unremarkable, mild leukocytosis. Exact etiology unclear, but in discussion with cardiology, highest suspicion is progression of chronic LBB. If cardiac work-up rules out LBB as cause, could consider medication side-effect of clozapine or infection as etiology, but cardiology feels these much less likely. Noting mild pulm edema, mild hypoxia, cough but euvolemic on echo - no indication for aggressive diuresis, but could consider orals if concerning exam or symptoms. Will initiate GDMT and initial ischemic work-up inpatient.  - Pharm consult, recs appreciated re: Clozapine  - Cardiology consult, recs:  - Ischemia evaluation when psych stable; OK to do as outpatient  - Stress cardiac MRI vs. coronary CTA  - Up-titration of GDMT  - Continue Losartan  12.5mg daily  - Continue Metoprolol Succinate 25mg daily  - Start empagliflozin 10mg daily today  - If renal function (and lytes) stable tomorrow; would start spironolactone 25mg daily  - Can be discharged on this regimen and follow up with cardiology as an outpatient  - Place cards referral on discharge  - Furosemide 20mg BID  - Repeat EKG 3/30 AM  - Diet: Heart failure, salt restricted, fluid restricted 1.5 L  - Nursing: Vitals q4h, tele, continuous pulse ox, strict I/O's  - Mag & Phos standard replacement protocols     # Paranoid Schizophrenia  # Medication Induced Parkinsonism  # Psychosis  # Suicidal Ideation  # Homicidal Ideation  Follows with an out-of-system psychiatrist, but per chart review, patient has a well-documented history of significant mental illness including paranoid schizophrenia on numerous therapies including clozapine and ECT. On this admission, patient has endorsed active suicidal and homicidal thoughts.   - Psychiatry consult to be placed tomorrow  - Suicide Precautions  - 1:1 sitter              - No metal utensils with food  - PTA Risperdal 0.5mg BID  - PTA Zyprexa 15 mg BID  - PTA Gabapentin 400 mg TID  - PTA Fluoxetine 20 mg daily  - PTA Depakote 250 mg TID  - PTA Clozapine 200mg Q2pm, 400 mg Qpm  - PTA Benztropine 1 mg BID  - PTA Buspar 15mg TID  - PTA Lorazepam 1 mg TID PRN  - HOLD PTA Propanolol 30 mg Qam  - OT consult    # Cough  # Leukocytosis  # Tachycardia  # Elevated CRP  Met SIRS criteria on admit with a leukocytosis of 14.7 -> 15.6 & neutrophil predominance, but without clear source of infection. RSV, flu, COVID negative, UA unremarkable, procal, lactate negative. CXR without evidence of PNA, however image was poor quality given low inspiratory effort and portable film. Only notable symptoms on ROS cough, dyspnea, weakness - no belly pain, chest pain, head ache, open wounds. At present, in context of above cardiomyopathy, suspicion is more towards cardiovascular stress leading  to leukocytosis over infection, but will keep differential open.  - Consider repeat CXR     # BPH  # Acute on Chronic Urinary Retention  Hx of requiring outpatient lowery for urinary retention and referrals placed for urology follow-up that did not occur. On 3/29, patient declined need to void, but bladder scan with 688ml requiring straight cath.  - PTA Flomax 0.8 mg daily  - PRN Bladder Scan  - Straight cath for scans >400mL    # Hyponatremia  # Hypochloremia  Has a history of severe, symptomatic hyponatremia to 122 (2/22-2/24 admission). Here with mild hyponatremia to 130, suspect chronic of mixed etiology, most likely SIADH vs medication side effect vs volume overload from CHF.  - Serum Osm, Urine Na, Urine Osm  - Diuresis as above (assuming volume up, pending diagnostic studies)    Chronic/Stable:   # Hypocalcemia/Hypoalbuminemia: Ca corrected to WNL in context of hypoalbuminemia.  # Vitamin D Deficiency: PTA Vitamin D 25 mcg daily  # Arthritis: PTA Naproxen 250 mg BID  # GERD: PTA Protonix 40 mg daily  # Constipation: PTA Senna 2 tabs BID  # SOPHIA/COPD/Asthma: PTA Albuterol 2 puffs Q6hr         Diet: Fluid restriction 1800 ML FLUID (and additional linked orders)  Combination Diet Safe Tray - with utensils; Low Saturated Fat Na <2400mg Diet, No Caffeine Diet    DVT Prophylaxis: Pneumatic Compression Devices  Lowery Catheter: Not present  Fluids: PO  Lines: None     Cardiac Monitoring: ACTIVE order. Indication: Acute decompensated heart failure (48 hours)  Code Status: Full Code      Clinically Significant Risk Factors         # Hyponatremia: Lowest Na = 129 mmol/L in last 2 days, will monitor as appropriate  # Hypocalcemia: Lowest Ca = 8.3 mg/dL in last 2 days, will monitor and replace as appropriate            # Chronic heart failure with reduced ejection fraction: last echo with EF <40%                   The patient's care was discussed with the Attending Physician, Dr. Isabel DO .    Jose Barnes,  DO [facility]  [facility]  Securely message with [employer] (more info)  Text page via [facility] Paging/Directory   See signed in provider for up to date coverage information  ______________________________________________________________________    Interval History   Overnight: Admitted    Subj: This AM patient with very poor insight, but able to cooperate partially with exam. He stated that he has had a cough for several days, has felt weak and short of breath, but otherwise denied any other symptoms. He endorsed simultaneous homicidal and suicidal ideation, but without plan. He also repeatedly stated paranoid concerns that someone was trying to castrate him.    Physical Exam   Vital Signs: Temp: 98.2  F (36.8  C) Temp src: Oral BP: 92/64 Pulse: 105   Resp: 17 SpO2: 97 % O2 Device: Nasal cannula Oxygen Delivery: 2 LPM  Weight: 190 lbs 4.11 oz    Vitals reviewed.  Constitutional: awake, alert, in NAD  HENT: NCAT without lesions  Respiratory: Lungs with mild crackles at bilateral bases, but without wheezing, rales, or respiratory distress  Cardiovascular: RRR without murmurs or extra sounds, radial pulses 2+ bilaterally  Abdomen: Soft, non-distended, non-tender, positive bowel sounds  Skin: Warm & dry, without lesions, erythema, rashes, or ecchymosis  Musculoskeletal: No joint/extremity redness, warmth, swelling, or tenderness  Neurologic: A&Ox2 (self & time of day), without focal deficit, cranial nerves II-XII grossly intact  Psychiatric:      Attention and Perception: abnormal perception, perceiving both auditory and visual hallucinations. None of them are command hallucinations, cannot identify specific individuals but does state he is seeing people who aren't there     Mood and Affect: Flat affect     Speech: Slowed, monotone     Behavior: Withdrawn, occasionally aggressive.     Thought Content: Positive for paranoia, homicidal, and suicidal thoughts.  Disorganized thought process.     Cognition and Memory: Impaired    Medical Decision Making       Please see A&P for additional details of medical decision making.      Data   ------------------------- PAST 24 HR DATA REVIEWED -----------------------------------------------    I have personally reviewed the following data over the past 24 hrs:    15.6 (H)  \   13.7   / 193     129 (L) 94 (L) 15.1 /  118 (H)   4.8 24 0.82 \     ALT: 9 AST: 11 AP: 80 TBILI: 0.7   ALB: 3.5 TOT PROTEIN: 5.5 (L) LIPASE: N/A     TSH: N/A T4: N/A A1C: N/A     Procal: N/A CRP: N/A Lactic Acid: 1.2       Ferritin:  N/A % Retic:  N/A LDH:  N/A       Imaging results reviewed over the past 24 hrs:   No results found for this or any previous visit (from the past 24 hour(s)).

## 2024-03-29 NOTE — H&P
[facility]    History and Physical - [facility]       Date of Admission:  3/28/2024    Assessment & Plan      Vinod Lee is a 63 year old male who has a history of asthma, COPD, SOPHIA, Cavitory lesion of lung, Paranoid Schizophrenia, Medication induced Parkinsonism, HTN, GERD, BPH and urinary retention who was admitted for new onset heart failure in setting of suicidal ideation, AMS, electrolyte abnormalities and meeting SIRS criteria.     HFrEF, new diagnosis  Systolic Murmur  Hypoxia  HTN  LBBB  Dyspnea  3/28 Echo EF 25-30% notable for global hypokinesis, most contractility is in the basal lateral walls. BNP 1400s. Appreciating 2/6 systolic murmur with faint basilar crackles and no appreciable lower extremity edema. This is all consistent with new onset heart failure. Has significantly reduced ejection fraction -- diagnosis of unclear etiology. Differential includes prior/untreated heart attack vs genetic vs alcohol induced, now appearing more likely inflammatory /infectious process leading to LBBB. This could represent myocarditis with elevated CRP and Leukocytosis. Labs ruled out iron toxicity and thyroid abnormality. Euvolemic per Echo, reassuringly compensated on admission, but decreasing oxygen saturations on room air is concerning that patient may be decompensating. O2 saturations 90% at nider -- patient is hypoxic with normal limit pro-amber, representing most likely pulmonary edema. Starting diuresis with single dose of 20 mg lasix. Will need GDMT therapy up titrated with close follow up with Cardiology. Do not appreciate much benefit for further ischemic workup given stable serial trop and EKG without acute ischemic changes. Discussed Clozapine dosing as possible etiology with cardiology, but it was deemed a rare potential side effect, to consider down titration only if considering leading etiology and benefits of continuing treatment  for schizophrenia outweigh risks    -Cardiology consult, appreciate reccs  -GDMT: up titrate as able    -Start Losartan 12.5 mg daily    Start Metoprolol 25 mg daily  -Obtain Cardiac MRI vs Coronary CTA as soon as able  -Ischemic evaluation  -Pending HIV, TSH, Peth, Utox  -Cardiac monitoring  -Q4hr Vitals  -Continuous Pulse Ox  -Oxygen for Saturations >95%  -PIV in place  -Heart failure Diet, salt restricted, fluid restricted 1.5 L  -Strict Output/Input  -One time Lasix 20 mg, further diuresis as needed  -Magnesium/Potassium Std Replacement Protocols  -OT consult    Paranoid Schizophrenia  Medication Induced Parkinsonism  Psychosis  Altered mental status  Suicidal Ideation  Homicidal Ideation  Altered mental status without clear (severe enough) metabolic or clear infectious component. Most likely represents baseline with schizophrenia, as the patient has a very complicated medical regimen. He clearly lacks capacity. Concerned that patient is psychotic and a risk to self and others, and medication list is too complicated. Does not have a clear plan for SI/HI actions nor appears agitated on exam to suggest this patient needs high risk suicide precautions. Patient takes Ativan 1 mg TID vs 0.5 mg TID so will make this PRN dosing. Will hold propanolol given concern for over beta blocking patient with start of metoprolol.     -Psychiatry consult  -1: 1 sitter  -Moderate Suicide Precautions   -No utensils with food   -Search Belongings  PTA Risperdal 0.5mg BID  PTA Zyprexa 15 mg BID  PTA Gabapentin 400 mg TID  PTA Fluoxetine 20 mg daily  PTA Depakote 250 mg TID  PTA Clozapine 200mg Q2pm, 400 mg Qpm  PTA Benztropine 1 mg BID  PTA Buspar 15mg TID  PTA Lorazepam 1 mg TID PRN  Hold PTA Propanolol 30 mg Qam    Prolonged Qtc  Qtc 536. Will need to avoid Qtc prolonging medications as able.  -Discuss with Pharmacist in the morning    Hypochloremic Hyponatremia  Has a history of symptomatic (severe) hyponatremia to 122 over  2/22-2/24 admission. Here with mild chronic hyponatremia, mixed etiology, most likely SIADH vs polygenic polydipsia vs medication induced vs heart failure. Risks of discontinuing selective serotonin reuptake inhibitor outweigh benefit of possible etiology of SIADH when hyponatremia is quite mild.    -Water restriction  -Trend electrolytes per BMP    Hypocalcemia  Hypoalbunemia  History of Vitamin D Deficiency  CA 8.3, Albumin 3.5. Calcium almost corrects to normal limit. Given cardiac history, may benefit from Ca2+ supplementation.   -ical  -PTA Vitamin D 25 mcg daily  -Nutritional Services    SIRS  Leukocytosis  Tachycardia  Elevated CRP  Cough  Meets SIRs criteria. CRP 14 down trending from 6 months prior (45) which represents a chronic infectious or inflammatory process. No clear source of infection identified. Covid, RSV, influenza negative on admission. CXR, UA are unremarkable for etiologies like pneumonia or UTI. No abdominal pain or tenderness on exam. Possible myocarditis from inflammatory or infectious process. ProCal and Lactate will provide more information -- both not elevated so even less concern for bacterial infection or sepsis. Cough developed on first night of admission which is most likely from pulmonary edema vs losartan, and will need suctioning. At this time, the patient is most likely having a stress response to current heart failure leading to leukocytosis and tachycardia.   -lactate  -procal  -Consider broader RVP  -suctioning, consider sputum culture    Arthritis  PTA Naproxen 250 mg BID    GERD  PTA Protonix 40 mg daily    Constipation  PTA Senna 2 tabs BID    BPH  Urinary Retention  Urinary retention  Per 3/1 note, Flomax was increased to 0.8 mg daily given urinary retention during admission. He was placed on a lowery catheter at discharge for 2 weeks with follow up with urology for trial without catheter which never occurred. No catheter appreciated on admission, and the patient is  voiding urine. Will do bladder scan before considering Barrios.   -PTA Flomax 0.8 mg daily  -Bladder Scan  -Consider Barrios catheter    SOPHIA  COPD  Asthma  -PTA Albuterol 2 puffs Q6hr   -Consider CPAP at night        Diet:  Heart Failure Diet  DVT Prophylaxis: Pneumatic Compression Devices  Barrios Catheter: Not present  Fluids: 1.5 L Restricted  Lines: None     Cardiac Monitoring: ACTIVE order. Indication: Acute decompensated heart failure (48 hours)  Code Status:  Full Code    Clinically Significant Risk Factors Present on Admission          # Hypocalcemia: Lowest Ca = 8.3 mg/dL in last 2 days, will monitor and replace as appropriate          # Chronic heart failure with reduced ejection fraction: last echo with EF <40%                Disposition Plan      Expected Discharge Date: 03/30/2024                The patient's care was discussed with the Attending Physician, Dr. Dumont .      Kranthi Varghese MD  [facility]  [facility]  Securely message with ZALP (more info)  Text page via [facility] Paging/Directory   See signed in provider for up to date coverage information  ______________________________________________________________________    Chief Complaint   Dypnea    Unable to obtain a history from the patient due to confusion    History of Present Illness   Vinod Lee is a 63 year old male who has a history of asthma, COPD, SOPHIA, Cavitory lesion of lung, Paranoid Schizophrenia, Medication induced Parkinsonism, HTN, GERD  who presented to the ED 3/28 via EMS for dyspnea, weakness and tiredness from his group home. The patient has stated that he wants someone to shoot him in the back of his head with a pistol and real ammo, are staff going to cut him open and per sitter he wanted to cut his balls off and jump from bed. He states he sees people besides provider and sitter in room, pointing to multiple areas. Reports he is suicidal and wants to hurt others.  Thinks he is here because he is a pervert. He endorses shortness of breath and orthopnea sleeping with pillows behind back but is unable to provide history of how long this has been going on. Does not respond about history of alcohol, coke or meth use. Does not respond when asked about a history of heart failure or heart conditions in family.     He was recently hospitalized 2/22-2/24 for lethargy and AMS, found to have hyponatremia (2/2 SIADH vs polygenic polydipsia vs medication effect) to 122 which improved to 132 before discharge.     In the ED, the patient presented with vitals: 135/71, HR 95, Afebrile, RR 16, Sat 97 RA.   Labs: CBC with WBC 14.7. CMP with , Cl 96, Judd 8.3. CRP 14, BNP 1410, Trop 17 (x2)  EKG: Sinus Rhythm with LBBB  Imaging: ECHO EF 25% with global hypokinesis  CXR: No acute pulmonary disease, Hypoinflated lungs.       Past Medical History    Past Medical History:   Diagnosis Date    LBBB (left bundle branch block)     Parkinson's disease     Schizophrenia (H)        Past Surgical History   No past surgical history on file.    Prior to Admission Medications   Prior to Admission Medications   Prescriptions Last Dose Informant Patient Reported? Taking?   FLUoxetine (PROZAC) 20 MG capsule 3/27/2024 at AM  No Yes   Sig: Take 1 capsule (20 mg) by mouth daily   LORazepam (ATIVAN) 0.5 MG tablet 3/27/2024 at PM  No Yes   Sig: Take 0.5 tablets (0.25 mg) by mouth 3 times daily   Patient taking differently: Take 1 mg by mouth 3 times daily   Melatonin 10 MG TABS tablet 3/27/2024 at PM  No Yes   Sig: Take 1 tablet (10 mg) by mouth at bedtime   OLANZapine zydis (ZYPREXA) 15 MG ODT 3/27/2024 at PM  No Yes   Sig: Take 1 tablet (15 mg) by mouth 2 times daily   Vitamin D3 (CHOLECALCIFEROL) 25 mcg (1000 units) tablet 3/27/2024 at AM  No Yes   Sig: Take 1 tablet (25 mcg) by mouth daily   albuterol (PROAIR HFA/PROVENTIL HFA/VENTOLIN HFA) 108 (90 Base) MCG/ACT inhaler Unknown at PRN  No Yes   Sig: Inhale 2  puffs into the lungs every 6 hours as needed for wheezing, shortness of breath or cough   benzonatate (TESSALON) 100 MG capsule Unknown at PRN  No Yes   Sig: Take 1 capsule by mouth three times daily as needed for cough   benztropine (COGENTIN) 1 MG tablet 3/27/2024 at PM  No Yes   Sig: Take 1 tablet (1 mg) by mouth 2 times daily   busPIRone (BUSPAR) 15 MG tablet 3/27/2024 at PM  No Yes   Sig: Take 1 tablet (15 mg) by mouth 3 times daily   cloZAPine (CLOZARIL) 50 MG tablet 3/27/2024 at PM  No Yes   Sig: Take 8 tablets (400 mg) by mouth at bedtime   cloZAPine (CLOZARIL) 50 MG tablet 3/27/2024 at PM  No Yes   Sig: Take 4 tablets (200 mg) by mouth daily at 2 pm   cyanocobalamin (VITAMIN B-12) 500 MCG SUBL sublingual tablet 3/27/2024 at AM  No Yes   Sig: Place 1 tablet (500 mcg) under the tongue daily   divalproex sodium delayed-release (DEPAKOTE SPRINKLE) 125 MG DR capsule 3/27/2024 at PM  No Yes   Sig: Take 250 mg by mouth 3 times daily (with meals)   gabapentin (NEURONTIN) 400 MG capsule 3/27/2024 at PM  No Yes   Sig: Take 1 capsule (400 mg) by mouth 3 times daily   naproxen (NAPROSYN) 250 MG tablet 3/27/2024 at PM  No Yes   Sig: Take 1 tablet (250 mg) by mouth 2 times daily (with meals)   pantoprazole (PROTONIX) 40 MG EC tablet 3/27/2024 at AM  No Yes   Sig: Take 1 tablet (40 mg) by mouth daily   propranolol (INDERAL) 10 MG tablet 3/27/2024 at AM  Yes Yes   Sig: Take 30 mg by mouth every morning   risperiDONE (RISPERDAL M-TABS) 0.5 MG ODT 3/27/2024 at PM  No Yes   Sig: Take 1 tablet (0.5 mg) by mouth 2 times daily   sennosides (SENOKOT) 8.6 MG tablet 3/27/2024 at PM  No Yes   Sig: Take 2 tablets by mouth 2 times daily   tamsulosin (FLOMAX) 0.4 MG capsule 3/27/2024 at PM  No Yes   Sig: Take 2 capsules (0.8 mg) by mouth daily      Facility-Administered Medications: None        Review of Systems    Review of systems not obtained due to patient factors - mental status    Social History   I have reviewed this patient's  social history and updated it with pertinent information if needed.  Social History     Tobacco Use    Smoking status: Never     Passive exposure: Never    Smokeless tobacco: Never         Family History     Unable to obtain due to: mental status      Allergies   Allergies   Allergen Reactions    Bee Venom Unknown    Montelukast Unknown    Phenothiazines Unknown        Physical Exam   Vital Signs: Temp: 97.9  F (36.6  C) Temp src: Oral BP: (!) 140/103 Pulse: 110   Resp: 15 SpO2: 95 % O2 Device: Nasal cannula Oxygen Delivery: 2 LPM  Weight: 0 lbs 0 oz    Constitutional: awake, cooperative, no apparent distress, and appears stated age  Eyes: Lids and lashes normal, pupils equal, round and reactive to light, extra ocular muscles intact, sclera clear, conjunctiva normal  ENT: Normocephalic, without obvious abnormality, atraumatic. FROM Neck, supple. dry MMM, tonsils without erythema or exudates, poor dentition  Hematologic / Lymphatic: no cervical or supraclavicular lymphadenopathy  Respiratory: No increased work of breathing, good air exchange, Faint Lower lobe crackles, symmetric lung sounds bilaterally, no rhonchi or rales.   Cardiovascular: Tachycardic rate, regular rhythm, 2/6 systolic murmur, no S3/S4 sound appreciated, peripheral pulses intact bilaterally, no lower extremity pitting edema or concerning peripheral edema  GI: No scars, normal bowel sounds, soft, non-distended, non-tender, no masses palpated, no hepatosplenomegaly  Skin: normal skin color, texture, turgor. No acute rashes or lesions appreciated.   Musculoskeletal: There is no redness, warmth, or swelling of the joints.  Full range of motion noted.  Motor strength is 5 out of 5 all extremities bilaterally.  Tone is normal.  : Condom catheter in place  Neurologic: Alert, oriented to name and time. Cranial nerves II-XII are grossly intact.  Motor: 5/5  strength, elbow flexion/extension, dorsiflexion/plantar flexion.   Neuropsychiatric: General:  calm and poor eye contact  Level of consciousness: Altered  Affect: flat  Orientation: oriented to self and place and needs hints and coaching  Memory and insight: impaired  Tracking to internal stimuli  Disorganized thought process    Medical Decision Making       Please see A&P for additional details of medical decision making.      Data     I have personally reviewed the following data over the past 24 hrs:    14.7 (H)  \   13.4   / 167     130 (L) 96 (L) 17.7 /  101 (H)   4.9 27 0.92 \     ALT: 7 AST: 12 AP: 76 TBILI: 0.4   ALB: 3.5 TOT PROTEIN: 5.2 (L) LIPASE: N/A     Trop: 17 BNP: 1,410 (H)     TSH: 1.51 T4: N/A A1C: N/A     Procal: N/A CRP: 14.36 (H) Lactic Acid: 1.2       Ferritin:  58 % Retic:  N/A LDH:  N/A       Imaging results reviewed over the past 24 hrs:   Recent Results (from the past 24 hour(s))   XR Chest 2 Views    Narrative    CHEST TWO VIEWS 2024 11:42 AM     HISTORY: History of chronic obstructive pulmonary disease, patient  able to say he is having difficulty swallowing/some shortness of  breath but not able to provide more history.    COMPARISON: 2023.      Impression    IMPRESSION: No acute cardiopulmonary disease. Hypoinflated lungs.    ZION BENJAMIN MD         SYSTEM ID:  [id number]   Echo Complete   Result Value    LVEF  25-30%    Narrative    [id number]  [id number]  [id number]  [id number]^YESENIA^MONI^FLORY                                                                       Version 2     [facility],[place]  Echocardiography Laboratory  [address] [place] [id number]     Name: TAMMY BERUMEN  MRN: [id number]  : 1960  Study Date: 2024 12:16 PM  Age: 63 yrs  Gender: Male  [facility]  Reason For Study: Dyspnea, CHF  Ordering Physician: MONI PATTERSON  Performed By: Gregory Nguyen     BP: 111/70 mmHg  ______________________________________________________________________________  Procedure  Complete Portable Echo  Adult. Contrast Optison. Patient was given 6 ml mixture  of 3 ml Optison and 6 ml saline. 3 ml wasted.  ______________________________________________________________________________  Interpretation Summary  Moderate left ventricular dilation is present. The visual ejection fraction is  25-30%. There is global hypokinesis, most contractility is in the basal  lateral walls.  Right ventricular function, chamber size, wall motion, and thickness are  normal.  The inferior vena cava was normal in size with preserved respiratory  variability.  Trivial pericardial effusion is present.     There is no prior study for direct comparison.  ______________________________________________________________________________  Left Ventricle  Left ventricular wall thickness is normal. Moderate left ventricular dilation  is present. The visual ejection fraction is 25-30%. Left ventricular diastolic  function is not assessable. There is global hypokinesis, most contractility is  in the basal lateral walls.     Right Ventricle  Right ventricular function, chamber size, wall motion, and thickness are  normal.     Atria  Both atria appear normal.     Mitral Valve  The mitral valve is normal.     Aortic Valve  The aortic valve cannot be assessed.     Tricuspid Valve  The tricuspid valve is normal. Trace tricuspid insufficiency is present. The  peak velocity of the tricuspid regurgitant jet is not obtainable.     Pulmonic Valve  The pulmonic valve cannot be assessed.     Vessels  The aorta root is normal. The inferior vena cava was normal in size with  preserved respiratory variability.     Pericardium  Trivial pericardial effusion is present.     Compared to Previous Study  There is no prior study for direct comparison.     ______________________________________________________________________________  MMode/2D Measurements & Calculations  EF Biplane: 19.1 %  LA Volume (BP): 37.9 ml  TAPSE: 1.8 cm     Doppler Measurements & Calculations  RV  S Cedrick: 14.0 cm/sec     ______________________________________________________________________________  Report approved by: Melissa MARCOS 03/28/2024 03:22 PM

## 2024-03-29 NOTE — PLAN OF CARE
[id number]    Plan of Care Reviewed With: patient    Overall Patient Progress: no change    Outcome Evaluation: Pt is A & O to self on 2L of O2 via NC w/ humidification. Pt denies pain, nausea and chest pain. Pt exhibits SOB, suctioning offered but pt refusing most of shift. Pt has L PIV - SL. Pt is incontinent - external catheter in place. Pt is not oob, assist x2 w/ lift. Bedside attendant and continuous pulse ox maintained throughout shift.    Shift Updates  Pt still exhibiting active SI - 1:1 in place & supplies taken out of room.  Pt is on an 1,800 ML fluid restriction - see chart for details.  Oral and nasopharyngeal suctioning offered to patient numerous times throughout shift & even after providing education on importance, pt refused stating he would rather choke on his own saliva. Suctioning still offered various times throughout shift.  Pt is on tele - strip analyzed during shift.  Bladder scan at 1422 was 688 - per orders, straight cath'd pt with output of 610. Re-scan in 6 hours per orders.    Vitals: /68 (BP Location: Right arm)   Pulse 105   Temp 98.7  F (37.1  C) (Oral)   Resp 17   Wt 86.3 kg (190 lb 4.1 oz)   SpO2 95%   BMI 27.54 kg/m      Plan: TBD. Continue w/ plan of care.

## 2024-03-29 NOTE — UTILIZATION REVIEW
"  Admission Status; Secondary Review Determination         Under the authority of the Utilization Management Committee, the utilization review process indicated a secondary review on the above patient.  The review outcome is based on review of the medical records, discussions with staff, and applying clinical experience noted on the date of the review.        (x)      Inpatient Status Appropriate - This patient's medical care is consistent with medical management for inpatient care and reasonable inpatient medical practice.      () Observation Status Appropriate - This patient does not meet hospital inpatient criteria and is placed in observation status. If this patient's primary payer is Medicare and was admitted as an inpatient, Condition Code 44 should be used and patient status changed to \"observation\".   () Admission Status NOT Appropriate - This patient's medical care is not consistent with medical management for Inpatient or Observation Status.          RATIONALE FOR DETERMINATION   The patient is a 63-year-old male with severe COPD along with congestive heart failure.  Echocardiogram done on this admission shows ejection fraction of 25 to 30% with global hypokinesis.  The patient also has history of schizophrenia and Parkinson's disease.  He has had increased generalized weakness and shortness of breath and comes in by ambulance being unable to take care of his activities of daily living.  He lives in a group home setting.  Patient has an old left bundle branch block proBNP is 1400.  Patient also has hyponatremia on admission with a sodium of 129.  Patient multiple psychoactive drugs.  His elevated white count of 14.7 which went up to 15.6 today.  He is currently on 2 L of O2 via nasal cannula.  Pulse is in the 100 range. Cardiology was consulted with recommendations to improve baseline function and respiratory situation.  Cardiology follow-up can be done as a outpatient.  Patient had 2 midnight stay to " manage severe CHF on O2 with hypoxia, agree with current inpatient status.      The severity of illness, intensity of service provided, expected LOS and risk for adverse outcome make the care complex, high risk and appropriate for hospital admission.        The information on this document is developed by the utilization review team in order for the business office to ensure compliance.  This only denotes the appropriateness of proper admission status and does not reflect the quality of care rendered.         The definitions of Inpatient Status and Observation Status used in making the determination above are those provided in the CMS Coverage Manual, Chapter 1 and Chapter 6, section 70.4.      Sincerely,     Donovan Chang MD  Physician Advisor  Utilization Review/ Case Management  [facility].

## 2024-03-30 ENCOUNTER — APPOINTMENT (OUTPATIENT)
Dept: GENERAL RADIOLOGY | Facility: CLINIC | Age: 64
DRG: 291 | End: 2024-03-30
Attending: STUDENT IN AN ORGANIZED HEALTH CARE EDUCATION/TRAINING PROGRAM
Payer: COMMERCIAL

## 2024-03-30 LAB
ALBUMIN SERPL BCG-MCNC: 3.2 G/DL (ref 3.5–5.2)
ALP SERPL-CCNC: 74 U/L (ref 40–150)
ALT SERPL W P-5'-P-CCNC: 7 U/L (ref 0–70)
ANION GAP SERPL CALCULATED.3IONS-SCNC: 11 MMOL/L (ref 7–15)
AST SERPL W P-5'-P-CCNC: 12 U/L (ref 0–45)
BILIRUB SERPL-MCNC: 0.5 MG/DL
BUN SERPL-MCNC: 19.1 MG/DL (ref 8–23)
CALCIUM SERPL-MCNC: 8.7 MG/DL (ref 8.8–10.2)
CHLORIDE SERPL-SCNC: 94 MMOL/L (ref 98–107)
CREAT SERPL-MCNC: 0.88 MG/DL (ref 0.67–1.17)
DEPRECATED HCO3 PLAS-SCNC: 24 MMOL/L (ref 22–29)
EGFRCR SERPLBLD CKD-EPI 2021: >90 ML/MIN/1.73M2
ERYTHROCYTE [DISTWIDTH] IN BLOOD BY AUTOMATED COUNT: 13.4 % (ref 10–15)
GLUCOSE SERPL-MCNC: 104 MG/DL (ref 70–99)
HCT VFR BLD AUTO: 39.3 % (ref 40–53)
HGB BLD-MCNC: 13.1 G/DL (ref 13.3–17.7)
MAGNESIUM SERPL-MCNC: 1.7 MG/DL (ref 1.7–2.3)
MCH RBC QN AUTO: 28.9 PG (ref 26.5–33)
MCHC RBC AUTO-ENTMCNC: 33.3 G/DL (ref 31.5–36.5)
MCV RBC AUTO: 87 FL (ref 78–100)
OSMOLALITY UR: 614 MMOL/KG (ref 100–1200)
PHOSPHATE SERPL-MCNC: 4.7 MG/DL (ref 2.5–4.5)
PLATELET # BLD AUTO: 174 10E3/UL (ref 150–450)
POTASSIUM SERPL-SCNC: 4.4 MMOL/L (ref 3.4–5.3)
PROT SERPL-MCNC: 5.5 G/DL (ref 6.4–8.3)
RBC # BLD AUTO: 4.53 10E6/UL (ref 4.4–5.9)
SODIUM SERPL-SCNC: 129 MMOL/L (ref 135–145)
SODIUM UR-SCNC: 35 MMOL/L
WBC # BLD AUTO: 15.3 10E3/UL (ref 4–11)

## 2024-03-30 PROCEDURE — 84100 ASSAY OF PHOSPHORUS: CPT | Performed by: STUDENT IN AN ORGANIZED HEALTH CARE EDUCATION/TRAINING PROGRAM

## 2024-03-30 PROCEDURE — 83735 ASSAY OF MAGNESIUM: CPT | Performed by: STUDENT IN AN ORGANIZED HEALTH CARE EDUCATION/TRAINING PROGRAM

## 2024-03-30 PROCEDURE — 99232 SBSQ HOSP IP/OBS MODERATE 35: CPT | Mod: GC | Performed by: STUDENT IN AN ORGANIZED HEALTH CARE EDUCATION/TRAINING PROGRAM

## 2024-03-30 PROCEDURE — 71046 X-RAY EXAM CHEST 2 VIEWS: CPT | Mod: 26 | Performed by: STUDENT IN AN ORGANIZED HEALTH CARE EDUCATION/TRAINING PROGRAM

## 2024-03-30 PROCEDURE — 84300 ASSAY OF URINE SODIUM: CPT

## 2024-03-30 PROCEDURE — 250N000013 HC RX MED GY IP 250 OP 250 PS 637: Performed by: STUDENT IN AN ORGANIZED HEALTH CARE EDUCATION/TRAINING PROGRAM

## 2024-03-30 PROCEDURE — 93005 ELECTROCARDIOGRAM TRACING: CPT

## 2024-03-30 PROCEDURE — 83935 ASSAY OF URINE OSMOLALITY: CPT

## 2024-03-30 PROCEDURE — 120N000002 HC R&B MED SURG/OB UMMC

## 2024-03-30 PROCEDURE — 71046 X-RAY EXAM CHEST 2 VIEWS: CPT

## 2024-03-30 PROCEDURE — 250N000013 HC RX MED GY IP 250 OP 250 PS 637

## 2024-03-30 PROCEDURE — 36415 COLL VENOUS BLD VENIPUNCTURE: CPT

## 2024-03-30 PROCEDURE — 250N000011 HC RX IP 250 OP 636: Performed by: STUDENT IN AN ORGANIZED HEALTH CARE EDUCATION/TRAINING PROGRAM

## 2024-03-30 PROCEDURE — 80053 COMPREHEN METABOLIC PANEL: CPT

## 2024-03-30 PROCEDURE — 85027 COMPLETE CBC AUTOMATED: CPT

## 2024-03-30 RX ORDER — SPIRONOLACTONE 25 MG
25 TABLET ORAL DAILY
Status: DISCONTINUED | OUTPATIENT
Start: 2024-03-30 | End: 2024-04-05 | Stop reason: HOSPADM

## 2024-03-30 RX ORDER — FUROSEMIDE 10 MG/ML
40 INJECTION INTRAMUSCULAR; INTRAVENOUS ONCE
Status: COMPLETED | OUTPATIENT
Start: 2024-03-30 | End: 2024-03-30

## 2024-03-30 RX ADMIN — FUROSEMIDE 40 MG: 10 INJECTION, SOLUTION INTRAMUSCULAR; INTRAVENOUS at 12:46

## 2024-03-30 RX ADMIN — BUSPIRONE HYDROCHLORIDE 15 MG: 10 TABLET ORAL at 08:00

## 2024-03-30 RX ADMIN — FUROSEMIDE 20 MG: 20 TABLET ORAL at 08:01

## 2024-03-30 RX ADMIN — DIVALPROEX SODIUM 250 MG: 125 CAPSULE, COATED PELLETS ORAL at 12:46

## 2024-03-30 RX ADMIN — Medication 12.5 MG: at 20:14

## 2024-03-30 RX ADMIN — BUSPIRONE HYDROCHLORIDE 15 MG: 10 TABLET ORAL at 20:15

## 2024-03-30 RX ADMIN — Medication 500 MCG: at 08:01

## 2024-03-30 RX ADMIN — BUSPIRONE HYDROCHLORIDE 15 MG: 10 TABLET ORAL at 15:35

## 2024-03-30 RX ADMIN — GABAPENTIN 400 MG: 400 CAPSULE ORAL at 08:00

## 2024-03-30 RX ADMIN — SENNOSIDES 2 TABLET: 8.6 TABLET, FILM COATED ORAL at 20:14

## 2024-03-30 RX ADMIN — TAMSULOSIN HYDROCHLORIDE 0.8 MG: 0.4 CAPSULE ORAL at 08:00

## 2024-03-30 RX ADMIN — PANTOPRAZOLE SODIUM 40 MG: 40 TABLET, DELAYED RELEASE ORAL at 08:00

## 2024-03-30 RX ADMIN — DIVALPROEX SODIUM 250 MG: 125 CAPSULE, COATED PELLETS ORAL at 08:01

## 2024-03-30 RX ADMIN — SPIRONOLACTONE 25 MG: 25 TABLET ORAL at 12:47

## 2024-03-30 RX ADMIN — GABAPENTIN 400 MG: 400 CAPSULE ORAL at 20:16

## 2024-03-30 RX ADMIN — OLANZAPINE 15 MG: 10 TABLET, ORALLY DISINTEGRATING ORAL at 20:14

## 2024-03-30 RX ADMIN — Medication 25 MCG: at 08:01

## 2024-03-30 RX ADMIN — EMPAGLIFLOZIN 10 MG: 10 TABLET, FILM COATED ORAL at 08:01

## 2024-03-30 RX ADMIN — Medication 0.25 MG: at 08:01

## 2024-03-30 RX ADMIN — CLOZAPINE 200 MG: 200 TABLET ORAL at 15:35

## 2024-03-30 RX ADMIN — RISPERIDONE 0.5 MG: 0.5 TABLET, ORALLY DISINTEGRATING ORAL at 20:22

## 2024-03-30 RX ADMIN — RISPERIDONE 0.5 MG: 0.5 TABLET, ORALLY DISINTEGRATING ORAL at 08:01

## 2024-03-30 RX ADMIN — FLUOXETINE HYDROCHLORIDE 20 MG: 20 CAPSULE ORAL at 08:00

## 2024-03-30 RX ADMIN — SENNOSIDES 2 TABLET: 8.6 TABLET, FILM COATED ORAL at 08:01

## 2024-03-30 RX ADMIN — BENZTROPINE MESYLATE 1 MG: 1 TABLET ORAL at 20:14

## 2024-03-30 RX ADMIN — OLANZAPINE 15 MG: 10 TABLET, ORALLY DISINTEGRATING ORAL at 08:01

## 2024-03-30 RX ADMIN — METOPROLOL SUCCINATE 25 MG: 25 TABLET, FILM COATED, EXTENDED RELEASE ORAL at 20:14

## 2024-03-30 RX ADMIN — BENZTROPINE MESYLATE 1 MG: 1 TABLET ORAL at 08:01

## 2024-03-30 RX ADMIN — Medication 0.25 MG: at 20:14

## 2024-03-30 RX ADMIN — GABAPENTIN 400 MG: 400 CAPSULE ORAL at 15:35

## 2024-03-30 ASSESSMENT — ACTIVITIES OF DAILY LIVING (ADL)
ADLS_ACUITY_SCORE: 51
ADLS_ACUITY_SCORE: 51
ADLS_ACUITY_SCORE: 55
ADLS_ACUITY_SCORE: 51

## 2024-03-30 NOTE — PROGRESS NOTES
BRIEF NOTE    Writer called group home. Spoke with Jovana nurse.    Discussed baseline feeding.  She notes that he requires a one-to-one and is fed, prefers soft foods.  There has not been a prior concern for swallowing ability.  Updated her that there was a concern today per the nurses for his ability to swallow and this will be evaluated.    Asked about any baseline chronic cough.  She states that he has had a cough lately, was not productive, but does not have a chronic cough.    She does note that he was supposed to have an outpatient CT for some pulmonary nodules and this was was to be today.    Asked about baseline psych status.  He does follow with psychiatrist outpatient, outside the system.  She states that he sounds to be at his current psychiatric baseline.      Em Shaw MD  [facility], PGY-3

## 2024-03-30 NOTE — PROVIDER NOTIFICATION
0950-  Writer was sitting at the  and noticed that the tele monitor began reading that the patients heart rate was in the 130's and then began intermittently alarming vtach/vfib. When the writer went into the patient's room to assess, the patient was irritable and actively attempting to get out of bed. Thus, this writer and the 1:1 NST helped situate and reposition the patient. The writer noticed that the monitor in the patients room was reading the patients HR around 88-90 bpm which was accurate with a R radial pulse check. The writer then asked how the patient felt and the pt stated that he had a weird feeling in his head. When the writer asked the pt to describe the feeling, he again, repeated that it was just a weird feeling that he could not describe. Furthermore, this writer increased the amount of O2 the patient was receiving to 3L and suctioned the patient to increase the patients O2 sat to 91-92%. MD notified. MD came to assess pt at bedside and after reviewing tele strip, determined that the patient was not truly in vtach/vfib rhythm.

## 2024-03-30 NOTE — PLAN OF CARE
Problem: Adult Inpatient Plan of Care  Goal: Absence of Hospital-Acquired Illness or Injury  Intervention: Prevent Infection  Recent Flowsheet Documentation  Taken 3/30/2024 0130 by Ambrosio Maradiaga, RN  Infection Prevention:   hand hygiene promoted   rest/sleep promoted     Problem: Suicide Risk  Goal: Absence of Self-Harm  Outcome: Not Progressing  Intervention: Assess Risk to Self and Maintain Safety  Recent Flowsheet Documentation  Taken 3/30/2024 0130 by Ambrosio Maradiaga, RN  Behavior Management: security enhancements provided  Self-Harm Prevention: observed one-to-one     Problem: Heart Failure  Goal: Optimal Coping  Outcome: Progressing  Goal: Optimal Cardiac Output  Outcome: Not Progressing  Goal: Stable Heart Rate and Rhythm  Outcome: Not Progressing  Goal: Optimal Functional Ability  Outcome: Not Progressing  Goal: Fluid and Electrolyte Balance  Outcome: Not Progressing  Goal: Improved Oral Intake  Outcome: Not Progressing  Goal: Effective Oxygenation and Ventilation  Outcome: Not Progressing  Goal: Effective Breathing Pattern During Sleep  Outcome: Not Progressing     Problem: Comorbidity Management  Goal: Maintenance of Behavioral Health Symptom Control  Outcome: Not Progressing  Goal: Maintenance of Heart Failure Symptom Control  Outcome: Not Progressing  Goal: Blood Pressure in Desired Range  Outcome: Progressing    VS:  Temp: 98.6  F (37  C) Temp src: Axillary BP: 99/63       SpO2: 94 % O2 Device: Nasal cannula Oxygen Delivery: 2 LPM    O2: SpO2 > 94 and stable on 2 LPM via NC.  Crackles  Denies chest pain and SOB.    Output:  External cath   With history of retention   Activity:  SBA / Ax2   CMS:  (+) SI in the previous shift   Per MD notes  Attention and Perception: abnormal perception, perceiving both auditory and visual hallucinations. None of them are command hallucinations, cannot identify specific individuals but does state he is seeing people who aren't there     Mood and Affect:  Flat affect     Speech: Slowed, monotone     Behavior: Withdrawn, occasionally aggressive.     Thought Content: Positive for paranoia, homicidal, and suicidal thoughts. Disorganized thought process.     Cognition and Memory: Impaired   Dressing: none   Diet:  Low saturated fat, Na < 2400mg, no caffeine  Limit fluid intake   CRUSH PILLS WITH APPLESAUCE   LDA: L PIV SL   Equipment: IV pole, personal belongings, tele   Plan: standard precautions maintained / Continue with plan of care. Call light within reach  Plan: stabilize psych    Additional Info:  With sitter   On RN managed Mag and K         0126 DE 0.20;  QRS 0.14; QT 0.44; RR 0.73; Qtc 0.51    0520 bladder scan is 509 mL; straight cath the patient with an output of 400 mL. Urine sample sent to lab  RN was able to suction the patient orally     0624 updated Dr GUSTAFSON and Dr BURROUGHS regarding the sodium results (serum 129 and urine 35)

## 2024-03-30 NOTE — PROGRESS NOTES
[facility]    Progress Note - [place]       Date of Admission:  3/28/2024    Main Plans for Today   - Lasix 40 mg IV once  - Hold PO lasix  - Repeat CXR - 2 view  - Continue tele  - Start spironolactone 25 mg daily, monitor electrolytes  - Add on phosphorus level  - Turn down room temp from 80 degrees to 74 (unclear why set so high; patient noted to be diaphoretic)  - Supplemental O2 ordered updated to state keep sats between 88-92%  - Straight cath if scans for >500 ml   - Writer to call patient's [facility] today to determine baseline mental health    Patient does not have medical decision-making capacity    Assessment & Plan   Vinod Lee is a 63 year old male who has a history of asthma, COPD, SOPHIA, cavitary lesion of lung, paranoid schizophrenia, medication-induced Parkinsonism, HTN, GERD, BPH with urinary retention who was admitted for new onset heart failure in setting of suicidal ideation, AMS, electrolyte abnormalities and meeting SIRS criteria.      # New HFrEF (3/28, EF 25-30%)  # Volume overload  # LBBB  # HTN  # Prolonged Qtc (536 3/28)  # Dyspnea  # Sinus vs SVT  Presented from group home with dyspnea, fatigue, weakness, cough. Echo on admission w/ newly reduced EF 25-30% & global hypokinesis, but euvolemic per echo. BNP 1410, trops negative, EKG with LBBB but reassuring against ischemic changes, CRP unremarkable, mild leukocytosis. No LE edema but CXR and lung exam demonstrates bilateral fluid/rales. Exact etiology unclear, but in discussion with cardiology, highest suspicion is progression of chronic LBBB. If cardiac work-up rules out LBB as cause, could consider medication side-effect of clozapine or infection as etiology, but cardiology and team pharmD feel this is much less likely. Will initiate GDMT and initial ischemic work-up inpatient.  Note: 3/30 telemetry flagged for v-tach - writer reviewed with attending - P waves noted,  appears like wide-complex tachycardia, sinus vs SVT, associated with movement. Spontaneously improves and suspect 2/2 LBB.   - Cardiology consult, recs:  - Ischemia evaluation when psych stable; OK to do as outpatient  - Stress cardiac MRI vs. coronary CTA  - Up-titration of GDMT  - Continue Losartan 12.5mg daily  - Continue Metoprolol Succinate 25mg daily  - Start empagliflozin 10mg daily today  - Start spironolactone 25 mg daily  - Can be discharged on this regimen and follow up with cardiology as an outpatient  - Place cards referral on discharge  - Pharm consult, recs appreciated re: Clozapine  - Give furosemide IV 40 mg once, assess response  - Repeat CXR to assess interval change  - HOLD Furosemide PO 20 mg BID  - Repeat EKG 3/30 AM - Qtc slightly improved. Repeat as needed.  - Diet: Heart failure, salt restricted, fluid restricted 1.5 L  - Nursing: Vitals q4h, tele, continuous pulse ox, strict I/O's  - Mag & Phos standard replacement protocols (add on phos level)     # Paranoid Schizophrenia  # Medication Induced Parkinsonism  # Psychosis  # Suicidal Ideation  # Homicidal Ideation  Follows with an out-of-system psychiatrist, but per chart review, patient has a well-documented history of significant mental illness including paranoid schizophrenia on numerous therapies including clozapine and ECT. On this admission, patient has endorsed active suicidal and homicidal thoughts.  - Will call pt's group home to assess psychiatric baseline   - Consider psychiatry consult if new questions  - Suicide Precautions  - 1:1 sitter              - Safe utensils with food  - PTA Risperdal 0.5mg BID  - PTA Zyprexa 15 mg BID  - PTA Gabapentin 400 mg TID  - PTA Fluoxetine 20 mg daily  - PTA Depakote 250 mg TID  - PTA Clozapine 200mg Q2pm, 400 mg Qpm  - PTA Benztropine 1 mg BID  - PTA Buspar 15mg TID  - PTA Lorazepam 1 mg TID PRN  - HOLD PTA Propanolol 30 mg Qam  - OT consult    # Cough  # Leukocytosis  # Tachycardia  # Elevated  CRP  # Medication-induced Parkinsonism  Met SIRS criteria on admit with a leukocytosis of 14.7 -> 15.6 & neutrophil predominance, but without clear source of infection. RSV, flu, COVID negative, UA unremarkable, procal, lactate negative. CXR without evidence of PNA, however image was poor quality given low inspiratory effort and portable film. Only notable symptoms on ROS cough, dyspnea, weakness - no belly pain, chest pain, head ache, open wounds, and not endorsing these sx 3/30. Report of baseline cough in the setting of Parkinson's disease. At present, in context of above cardiomyopathy, suspicion is more towards cardiovascular stress leading to leukocytosis over infection, but will keep differential open.  - Repeat CXR  - Trend CBC     # BPH  # Acute on Chronic Urinary Retention  Hx of requiring outpatient lowery for urinary retention and referrals placed for urology follow-up that did not occur. On 3/29, patient declined need to void, but bladder scan with 688ml requiring straight cath. Again early 3/30 scanned for 509, cathed for 400.  - PTA Flomax 0.8 mg daily  - PRN Bladder Scan  - Straight cath for scans >500mL  - Consider Lowery placement/urology consult if needed    # Hyponatremia, chronic  # Hypochloremia  Has a history of severe, symptomatic hyponatremia to 122 (2/22-2/24 [facility] admission). Na 132 at outpatient hospital follow up 3/1/24. Here with mild hyponatremia to 130, suspect chronic of mixed etiology, predominately CHF. Volume status appears overloaded. Urine Na/osm returned at 35/614 relatively, serum osms 268.   - Treat volume overload as above  - Daily BMP    Chronic/Stable:   # Hypocalcemia/Hypoalbuminemia: Ca corrected to WNL in context of hypoalbuminemia.  # Vitamin D Deficiency: PTA Vitamin D 25 mcg daily  # Arthritis: PTA Naproxen 250 mg BID  # GERD: PTA Protonix 40 mg daily  # Constipation: PTA Senna 2 tabs BID  # SOPHIA/COPD/Asthma: PTA Albuterol 2 puffs Q6hr           Diet: Fluid  restriction 1800 ML FLUID (and additional linked orders)  Combination Diet Safe Tray - with utensils; Low Saturated Fat Na <2400mg Diet, No Caffeine Diet    DVT Prophylaxis: Pneumatic Compression Devices  Barrios Catheter: Not present  Fluids: PO  Lines: None     Cardiac Monitoring: ACTIVE order. Indication: Acute decompensated heart failure (48 hours)  Code Status: Full Code      Clinically Significant Risk Factors         # Hyponatremia: Lowest Na = 129 mmol/L in last 2 days, will monitor as appropriate  # Hypocalcemia: Lowest iCa = 4.3 mg/dL in last 2 days, will monitor and replace as appropriate     # Hypoalbuminemia: Lowest albumin = 3.2 g/dL at 3/30/2024  6:51 AM, will monitor as appropriate        # Acute heart failure with reduced ejection fraction: last echo with EF <40% and receiving IV diuretics                   The patient's care was discussed with the Attending Physician, Dr. Isabel DO .    Em Shaw MD  [facility]  [facility]  Securely message with [employer] (more info)  Text page via [facility] Paging/Directory   See signed in provider for up to date coverage information  ______________________________________________________________________    Interval History   Overnight: Urinary retention requiring straight cath.    This morning, patient states he is doing okay.  He endorses some left upper quadrant abdominal pain that he describes as throbbing.  It occurred twice, first last night and again this morning.  He denies any chest pain or dyspnea.  He continues to have abnormal perception and delusions.    1:1 note spontaneous void this morning, has had small bites of breakfast.    Noted room temp was set to 80 degrees.    Physical Exam   Vital Signs: Temp: 99.8  F (37.7  C) Temp src: Axillary BP: 112/59 Pulse: 89   Resp: 28 SpO2: 93 % O2 Device: Nasal cannula with humidification Oxygen Delivery: 3 LPM  Weight: 198 lbs 13.68  oz    Vitals reviewed.  Constitutional: awake, alert, appears ill and mildly diaphoretic  HENT: NCAT without lesions  Respiratory: Lungs with crackles at bilateral bases, but without wheezing, rales, or respiratory distress  Cardiovascular: RRR without murmurs or extra sounds, radial pulses 2+ bilaterally  Abdomen: Soft, non-distended, non-tender, positive bowel sounds  Skin: Mild diaphoresis, warm, without lesions, erythema, rashes, or ecchymosis  Musculoskeletal: No joint/extremity redness, warmth, swelling, or tenderness.   Neurologic: A&Ox2 (self & time of day), without focal deficit, cranial nerves II-XII grossly intact  Psychiatric:      Attention and Perception: abnormal perception, perceiving both auditory and visual hallucinations. None of them are command hallucinations, cannot identify specific individuals but does state he is seeing people who aren't there     Mood and Affect: Flat affect     Speech: Slowed, monotone     Behavior: Withdrawn, occasionally aggressive.     Thought Content: Positive for paranoia, homicidal, and suicidal thoughts. Disorganized thought process.     Cognition and Memory: Impaired    Medical Decision Making       Please see A&P for additional details of medical decision making.      Data   ------------------------- PAST 24 HR DATA REVIEWED -----------------------------------------------    I have personally reviewed the following data over the past 24 hrs:    15.3 (H)  \   13.1 (L)   / 174     129 (L) 94 (L) 19.1 /  104 (H)   4.4 24 0.88 \     ALT: 7 AST: 12 AP: 74 TBILI: 0.5   ALB: 3.2 (L) TOT PROTEIN: 5.5 (L) LIPASE: N/A       Imaging results reviewed over the past 24 hrs:   Recent Results (from the past 24 hour(s))   XR Chest 2 Views    Narrative    Exam: XR CHEST 2 VIEWS, 3/30/2024 1:28 PM    Comparison: Chest x-ray 3/28/2024    History: 62 y/o w/ COPD, parkinsoniasm 2/2 psych drugs, here w/ new  HFrEF. Assessing interval CXR.    Findings:  2 views of chest. Trachea is  midline. Cardiomediastinal silhouette is  within normal limits. Low lung volumes. Mild interstitial opacities.  No pneumothorax or pleural effusion. The visualized upper abdomen is  unremarkable. No acute osseous abnormalities.      Impression    Impression: Mild interstitial opacities, likely representing edema.    I have personally reviewed the examination and initial interpretation  and I agree with the findings.    ALDEN GORE DO         SYSTEM ID:  [id number]

## 2024-03-30 NOTE — PLAN OF CARE
"[id number]  Major Shift Events:    - S/I needing sitter at bedside   - bladder scanned  at 9 pm revealing 297 ml , after bladder scanning patient urinated in bed, changed bed sheet and chucks, rechecked bladder scanned 219  - FR 1800  - confused  only alert oriented x 2-3, able to tell stories about his work, however verbalized that he wanted to die \" Is their any one here who can just Kill me\", offered if he wanted to speak to his family members and patient states I will speak to my mom tomorrow  Plan: Mg and k protocol         : safety         :A1- 2 as per report unsteady in feet.         : bladder scan at 1 am   For vital signs and complete assessments, please see documentation flowsheets.   Goal Outcome Evaluation:      Plan of Care Reviewed With: patient    Overall Patient Progress: no changeOverall Patient Progress: no change           "

## 2024-03-30 NOTE — PLAN OF CARE
[id number]    Plan of Care Reviewed With: patient    Overall Patient Progress: no change    Outcome Evaluation: Pt is A & O to self on 1 L of O2 via NC w/ humidification. Pt denies pain, nausea and chest pain. Pt exhibits SOB, suctioning offered and completed various times throughout shift. Pt has L PIV - SL. Pt is incontinent - external catheter in place. Pt is not oob, assist x2 w/ lift. Bedside attendant and continuous pulse ox maintained throughout shift. Pt refusing frequent repositioning even after writer and sitter have offered and attempted numerous times throughout shift.    Shift Updates  Pt still exhibiting active SI - 1:1 in place.  Pt is on an 1,800 ML fluid restriction - see chart for details.   Pt is on tele - strip analyzed during shift.   Pt was able to intermittently void spontaneously throughout shift. Bladder scans were completed as needed. Straight cath was performed at 1621 with 300 ml of output.    Vitals: /64 (BP Location: Right arm)   Pulse 89   Temp 98.3  F (36.8  C) (Axillary)   Resp 28   Wt 90.2 kg (198 lb 13.7 oz)   SpO2 93%   BMI 28.79 kg/m      Plan: TBD. Continue w/ plan of care.

## 2024-03-31 ENCOUNTER — APPOINTMENT (OUTPATIENT)
Dept: SPEECH THERAPY | Facility: CLINIC | Age: 64
DRG: 291 | End: 2024-03-31
Attending: STUDENT IN AN ORGANIZED HEALTH CARE EDUCATION/TRAINING PROGRAM
Payer: COMMERCIAL

## 2024-03-31 LAB
ALBUMIN SERPL BCG-MCNC: 3.5 G/DL (ref 3.5–5.2)
ALP SERPL-CCNC: 74 U/L (ref 40–150)
ALT SERPL W P-5'-P-CCNC: 9 U/L (ref 0–70)
ANION GAP SERPL CALCULATED.3IONS-SCNC: 10 MMOL/L (ref 7–15)
AST SERPL W P-5'-P-CCNC: 12 U/L (ref 0–45)
BILIRUB SERPL-MCNC: 0.5 MG/DL
BUN SERPL-MCNC: 28.6 MG/DL (ref 8–23)
CALCIUM SERPL-MCNC: 8.8 MG/DL (ref 8.8–10.2)
CHLORIDE SERPL-SCNC: 95 MMOL/L (ref 98–107)
CREAT SERPL-MCNC: 1.05 MG/DL (ref 0.67–1.17)
DEPRECATED HCO3 PLAS-SCNC: 28 MMOL/L (ref 22–29)
EGFRCR SERPLBLD CKD-EPI 2021: 79 ML/MIN/1.73M2
ERYTHROCYTE [DISTWIDTH] IN BLOOD BY AUTOMATED COUNT: 13.6 % (ref 10–15)
GLUCOSE SERPL-MCNC: 99 MG/DL (ref 70–99)
HCT VFR BLD AUTO: 40.4 % (ref 40–53)
HGB BLD-MCNC: 13.3 G/DL (ref 13.3–17.7)
LABORATORY REPORT: NORMAL
MAGNESIUM SERPL-MCNC: 2.8 MG/DL (ref 1.7–2.3)
MCH RBC QN AUTO: 28.6 PG (ref 26.5–33)
MCHC RBC AUTO-ENTMCNC: 32.9 G/DL (ref 31.5–36.5)
MCV RBC AUTO: 87 FL (ref 78–100)
PETH INTERPRETATION: NORMAL
PLATELET # BLD AUTO: 184 10E3/UL (ref 150–450)
PLPETH BLD-MCNC: <10 NG/ML
POPETH BLD-MCNC: <10 NG/ML
POTASSIUM SERPL-SCNC: 4.1 MMOL/L (ref 3.4–5.3)
PROT SERPL-MCNC: 5.9 G/DL (ref 6.4–8.3)
RBC # BLD AUTO: 4.65 10E6/UL (ref 4.4–5.9)
SODIUM SERPL-SCNC: 133 MMOL/L (ref 135–145)
WBC # BLD AUTO: 9.9 10E3/UL (ref 4–11)

## 2024-03-31 PROCEDURE — 250N000013 HC RX MED GY IP 250 OP 250 PS 637: Performed by: STUDENT IN AN ORGANIZED HEALTH CARE EDUCATION/TRAINING PROGRAM

## 2024-03-31 PROCEDURE — 80053 COMPREHEN METABOLIC PANEL: CPT

## 2024-03-31 PROCEDURE — 250N000013 HC RX MED GY IP 250 OP 250 PS 637

## 2024-03-31 PROCEDURE — 99232 SBSQ HOSP IP/OBS MODERATE 35: CPT | Mod: GC

## 2024-03-31 PROCEDURE — 36415 COLL VENOUS BLD VENIPUNCTURE: CPT

## 2024-03-31 PROCEDURE — 85027 COMPLETE CBC AUTOMATED: CPT

## 2024-03-31 PROCEDURE — 83735 ASSAY OF MAGNESIUM: CPT | Performed by: STUDENT IN AN ORGANIZED HEALTH CARE EDUCATION/TRAINING PROGRAM

## 2024-03-31 PROCEDURE — 120N000002 HC R&B MED SURG/OB UMMC

## 2024-03-31 PROCEDURE — 93005 ELECTROCARDIOGRAM TRACING: CPT

## 2024-03-31 PROCEDURE — 250N000011 HC RX IP 250 OP 636

## 2024-03-31 PROCEDURE — 92610 EVALUATE SWALLOWING FUNCTION: CPT | Mod: GN

## 2024-03-31 RX ORDER — FUROSEMIDE 10 MG/ML
40 INJECTION INTRAMUSCULAR; INTRAVENOUS ONCE
Status: COMPLETED | OUTPATIENT
Start: 2024-03-31 | End: 2024-03-31

## 2024-03-31 RX ADMIN — BUSPIRONE HYDROCHLORIDE 15 MG: 10 TABLET ORAL at 07:56

## 2024-03-31 RX ADMIN — CLOZAPINE 400 MG: 200 TABLET ORAL at 23:19

## 2024-03-31 RX ADMIN — SENNOSIDES 2 TABLET: 8.6 TABLET, FILM COATED ORAL at 20:38

## 2024-03-31 RX ADMIN — OLANZAPINE 15 MG: 10 TABLET, ORALLY DISINTEGRATING ORAL at 20:37

## 2024-03-31 RX ADMIN — DIVALPROEX SODIUM 250 MG: 125 CAPSULE, COATED PELLETS ORAL at 18:00

## 2024-03-31 RX ADMIN — Medication 25 MCG: at 07:57

## 2024-03-31 RX ADMIN — RISPERIDONE 0.5 MG: 0.5 TABLET, ORALLY DISINTEGRATING ORAL at 20:37

## 2024-03-31 RX ADMIN — SPIRONOLACTONE 25 MG: 25 TABLET ORAL at 07:57

## 2024-03-31 RX ADMIN — METOPROLOL SUCCINATE 25 MG: 25 TABLET, FILM COATED, EXTENDED RELEASE ORAL at 20:38

## 2024-03-31 RX ADMIN — BUSPIRONE HYDROCHLORIDE 15 MG: 10 TABLET ORAL at 20:38

## 2024-03-31 RX ADMIN — Medication 12.5 MG: at 20:37

## 2024-03-31 RX ADMIN — Medication 500 MCG: at 07:57

## 2024-03-31 RX ADMIN — BUSPIRONE HYDROCHLORIDE 15 MG: 10 TABLET ORAL at 13:26

## 2024-03-31 RX ADMIN — SENNOSIDES 2 TABLET: 8.6 TABLET, FILM COATED ORAL at 07:56

## 2024-03-31 RX ADMIN — OLANZAPINE 15 MG: 10 TABLET, ORALLY DISINTEGRATING ORAL at 07:57

## 2024-03-31 RX ADMIN — CLOZAPINE 200 MG: 200 TABLET ORAL at 13:26

## 2024-03-31 RX ADMIN — Medication 0.25 MG: at 07:56

## 2024-03-31 RX ADMIN — DIVALPROEX SODIUM 250 MG: 125 CAPSULE, COATED PELLETS ORAL at 13:26

## 2024-03-31 RX ADMIN — BENZTROPINE MESYLATE 1 MG: 1 TABLET ORAL at 07:57

## 2024-03-31 RX ADMIN — BENZTROPINE MESYLATE 1 MG: 1 TABLET ORAL at 20:38

## 2024-03-31 RX ADMIN — FUROSEMIDE 40 MG: 10 INJECTION, SOLUTION INTRAMUSCULAR; INTRAVENOUS at 13:23

## 2024-03-31 RX ADMIN — RISPERIDONE 0.5 MG: 0.5 TABLET, ORALLY DISINTEGRATING ORAL at 07:57

## 2024-03-31 RX ADMIN — EMPAGLIFLOZIN 10 MG: 10 TABLET, FILM COATED ORAL at 07:56

## 2024-03-31 RX ADMIN — DIVALPROEX SODIUM 250 MG: 125 CAPSULE, COATED PELLETS ORAL at 07:56

## 2024-03-31 RX ADMIN — GABAPENTIN 400 MG: 400 CAPSULE ORAL at 20:37

## 2024-03-31 RX ADMIN — PANTOPRAZOLE SODIUM 40 MG: 40 TABLET, DELAYED RELEASE ORAL at 07:57

## 2024-03-31 RX ADMIN — TAMSULOSIN HYDROCHLORIDE 0.8 MG: 0.4 CAPSULE ORAL at 07:56

## 2024-03-31 RX ADMIN — GABAPENTIN 400 MG: 400 CAPSULE ORAL at 13:26

## 2024-03-31 RX ADMIN — Medication 0.25 MG: at 20:37

## 2024-03-31 RX ADMIN — GABAPENTIN 400 MG: 400 CAPSULE ORAL at 07:57

## 2024-03-31 RX ADMIN — FLUOXETINE HYDROCHLORIDE 20 MG: 20 CAPSULE ORAL at 07:57

## 2024-03-31 ASSESSMENT — ACTIVITIES OF DAILY LIVING (ADL)
ADLS_ACUITY_SCORE: 51

## 2024-03-31 NOTE — PROGRESS NOTES
[facility]    Progress Note - [facility]       Date of Admission:  3/28/2024    Main Plans for Today   - Refusing meds/spitting out - will continue trying PO today, may need to consider alternatives routes  - Straight cath if scans for >500 ml   - Continue tele  - Repeating Lasix 40mg IV @1200  - discontinue PTA Propanolol 30 mg Qam (in setting of new metoprolol)  - SLP for swallow eval    Patient does not have medical decision-making capacity    Assessment & Plan   Vinod Lee is a 63 year old male who has a history of asthma, COPD, SOPHIA, cavitary lesion of lung, paranoid schizophrenia, medication-induced Parkinsonism, HTN, GERD, BPH with urinary retention who was admitted for new onset heart failure in setting of suicidal ideation, AMS, electrolyte abnormalities and meeting SIRS criteria.      # New HFrEF (3/28, EF 25-30%)  # Volume overload  # LBBB  # HTN  # Prolonged Qtc (536 3/28)  # Dyspnea  # Sinus vs SVT  Presented from group home with dyspnea, fatigue, weakness, cough. Echo on admission w/ newly reduced EF 25-30% & global hypokinesis, but euvolemic per echo. BNP 1410, trops negative, EKG with LBBB but reassuring against ischemic changes, CRP unremarkable, mild leukocytosis. No LE edema but CXR and lung exam demonstrates bilateral fluid/rales. Exact etiology unclear, but in discussion with cardiology, highest suspicion is progression of chronic LBBB. If cardiac work-up rules out LBB as cause, could consider medication side-effect of clozapine or infection as etiology, but cardiology and team pharmD feel this is much less likely. Will initiate GDMT and initial ischemic work-up inpatient. Of note, tele has flagged for V-tach, but consistent with LBB & sinus tach, similar to prior EKG. As of 3/31, persistent O2 requirement new from baseline, with very faint ronchi, mild wheeze on exam, mild pulm edema on CXR - continuing gentle  diuresis.  - Cardiology consult, recs:  - Ischemia evaluation when psych stable; OK to do as outpatient  - Stress cardiac MRI vs. coronary CTA  - Up-titration of GDMT  - Continue Losartan 12.5mg daily  - Continue Metoprolol Succinate 25mg daily  - Start empagliflozin 10mg daily today  - Start spironolactone 25 mg daily  - Can be discharged on this regimen and follow up with cardiology as an outpatient  - Place cards referral on discharge  - Pharm consult, recs appreciated re: Clozapine  - Repeat furosemide IV 40 mg once, assess response  - HOLD Furosemide PO 20 mg BID  - Diet: Heart failure, salt restricted, fluid restricted 1.5 L  - Nursing: Vitals q4h, tele, continuous pulse ox, strict I/O's  - Mag & Phos standard replacement protocols (add on phos level)     # Paranoid Schizophrenia  # Medication Induced Parkinsonism  # Psychosis  # Suicidal Ideation  # Homicidal Ideation  Follows with an out-of-system psychiatrist, but per chart review, patient has a well-documented history of significant mental illness including paranoid schizophrenia on numerous therapies including clozapine and ECT. On this admission, patient has endorsed active suicidal and homicidal thoughts.  - Consider psychiatry consult if new questions  - Suicide Precautions  - 1:1 sitter              - Safe utensils with food  - PTA Risperdal 0.5mg BID  - PTA Zyprexa 15 mg BID  - PTA Gabapentin 400 mg TID  - PTA Fluoxetine 20 mg daily  - PTA Depakote 250 mg TID  - PTA Clozapine 200mg Q2pm, 400 mg Qpm  - PTA Benztropine 1 mg BID  - PTA Buspar 15mg TID  - PTA Lorazepam 1 mg TID PRN  - discontinue PTA Propanolol 30 mg Qam (in setting of new metoprolol)  - OT consult    # Cough  # Leukocytosis  # Tachycardia  # Elevated CRP  # Medication-induced Parkinsonism  Met SIRS criteria on admit with a leukocytosis of 14.7 -> 15.6 -> 9.9 & neutrophil predominance, but without clear source of infection. RSV, flu, COVID negative, UA unremarkable, procal, lactate  negative. CXRs without evidence of PNA, but with some mild pulm edema. Only notable symptoms on ROS cough, dyspnea, weakness - no belly pain, chest pain, head ache, open wounds. At present, in context of above cardiomyopathy, suspicion is more towards cardiovascular stress leading to leukocytosis over infection, but will keep differential open.  - Trend CBC     # BPH  # Acute on Chronic Urinary Retention  Hx of requiring outpatient lowery for urinary retention and referrals placed for urology follow-up that did not occur. On 3/29, patient declined need to void, but bladder scan with 688ml requiring straight cath. Again early 3/30 scanned for 509, cathed for 400.  - PTA Flomax 0.8 mg daily  - PRN Bladder Scan  - Straight cath for scans >500mL  - Consider Lowery placement/urology consult if needed    # Hyponatremia, chronic  # Hypochloremia  Has a history of severe, symptomatic hyponatremia to 122 (2/22-2/24 [facility] admission). Na 132 at outpatient hospital follow up 3/1/24. Here with mild hyponatremia to 130, suspect chronic of mixed etiology, predominately CHF. Volume status appears overloaded. Urine Na/osm returned at 35/614 relatively, serum osms 268.   - Treat volume overload as above  - Daily BMP    Chronic/Stable:   # Hypocalcemia/Hypoalbuminemia: Ca corrected to WNL in context of hypoalbuminemia.  # Vitamin D Deficiency: PTA Vitamin D 25 mcg daily  # Arthritis: PTA Naproxen 250 mg BID  # GERD: PTA Protonix 40 mg daily  # Constipation: PTA Senna 2 tabs BID  # SOPHIA/COPD/Asthma: PTA Albuterol 2 puffs Q6hr         Diet: Fluid restriction 1800 ML FLUID (and additional linked orders)  Combination Diet Safe Tray - with utensils; Low Saturated Fat Na <2400mg Diet, No Caffeine Diet    DVT Prophylaxis: Pneumatic Compression Devices  Lowery Catheter: Not present  Fluids: PO  Lines: None     Cardiac Monitoring: ACTIVE order. Indication: Acute decompensated heart failure (48 hours)  Code Status: Full Code   "    Clinically Significant Risk Factors         # Hyponatremia: Lowest Na = 129 mmol/L in last 2 days, will monitor as appropriate      # Hypoalbuminemia: Lowest albumin = 3.2 g/dL at 3/30/2024  6:51 AM, will monitor as appropriate        # Acute heart failure with reduced ejection fraction: last echo with EF <40% and receiving IV diuretics                   The patient's care was discussed with the Attending Physician, Dr. Isabel DO .    DO [facility]  [facility]  Securely message with [employer] (more info)  Text page via [facility] Paging/Directory   See signed in provider for up to date coverage information  ______________________________________________________________________    Interval History   Overnight: ANGELICA    Subj: From RN, patient refusing PO medications/spitting them out and continues to be inappropriate and threatening to staff (\"I'm going to punch you in the face if you keep trying [to give me pills]\".     This morning, patient continues to appear ill on exam, and stated \"I am about to die\" but when asked to clarify, repeats reports that he is worried people are trying to poison him. States he still has a mild cough and feels very weak.    Physical Exam   Vital Signs: Temp: 97.6  F (36.4  C) Temp src: Axillary BP: 99/61 Pulse: 89   Resp: 28 SpO2: 97 % O2 Device: Nasal cannula with humidification Oxygen Delivery: 1 LPM  Weight: 198 lbs 13.68 oz    Vitals reviewed.  Constitutional: awake, alert, appears ill and mildly diaphoretic  HENT: NCAT without lesions  Respiratory: Lungs with faint ronchi at right base, but without rales, or respiratory distress  Cardiovascular: RRR without murmurs or extra sounds, radial pulses 2+ bilaterally  Abdomen: Soft, non-distended, non-tender, positive bowel sounds  Skin: Mild diaphoresis, warm, without lesions, erythema, rashes, or ecchymosis  Musculoskeletal: No joint/extremity " redness, warmth, swelling, or tenderness.   Neurologic: A&Ox2 (self & time of day), without focal deficit, cranial nerves II-XII grossly intact  Psychiatric:      Attention and Perception: abnormal perception, perceiving both auditory and visual hallucinations. None of them are command hallucinations, cannot identify specific individuals but does state he is seeing people who aren't there     Mood and Affect: Flat affect     Speech: Slowed, monotone     Behavior: Withdrawn, occasionally aggressive.     Thought Content: Positive for paranoia, homicidal, and suicidal thoughts. Disorganized thought process.     Cognition and Memory: Impaired    Medical Decision Making       Please see A&P for additional details of medical decision making.      Data   ------------------------- PAST 24 HR DATA REVIEWED -----------------------------------------------    I have personally reviewed the following data over the past 24 hrs:    15.3 (H)  \   13.1 (L)   / 174     129 (L) 94 (L) 19.1 /  104 (H)   4.4 24 0.88 \     ALT: 7 AST: 12 AP: 74 TBILI: 0.5   ALB: 3.2 (L) TOT PROTEIN: 5.5 (L) LIPASE: N/A       Imaging results reviewed over the past 24 hrs:   Recent Results (from the past 24 hour(s))   XR Chest 2 Views    Narrative    Exam: XR CHEST 2 VIEWS, 3/30/2024 1:28 PM    Comparison: Chest x-ray 3/28/2024    History: 62 y/o w/ COPD, parkinsoniasm 2/2 psych drugs, here w/ new  HFrEF. Assessing interval CXR.    Findings:  2 views of chest. Trachea is midline. Cardiomediastinal silhouette is  within normal limits. Low lung volumes. Mild interstitial opacities.  No pneumothorax or pleural effusion. The visualized upper abdomen is  unremarkable. No acute osseous abnormalities.      Impression    Impression: Mild interstitial opacities, likely representing edema.    I have personally reviewed the examination and initial interpretation  and I agree with the findings.    ALDEN GORE DO         SYSTEM ID:  [id number]

## 2024-03-31 NOTE — PROVIDER NOTIFICATION
"0805-  Writer attempted to administer patients scheduled morning medications crushed in apple sauce. As writer brought the spoon full of medications mixed in apple sauce to the patients mouth and placed it inside, the patient began spitting out the mixture stating that the staff is \"trying to poison him and he does not want to take any of the medications or drink the poison water\". The patient then stated he was going to punch this writer and the NST 1:1 in the face if we did not go away. Thus, the writer, again, educated the patient on the importance of taking the prescribed medications and possible risks of not doing so. The writer provided this education and attempted multiple times and was successfully able to get the patient to take some spoonfuls of the medication mixture. However, unsure of the actual amount he consumed as some of the mixture was spit up on his gown. MD notified & aware.     1222-  Tele alarming that the patients heart rhythm was vtach/vfib. Writer and another RN went to assess pt. Pt did not appear to be in distress and denied any s/s. Pt then looked at other RN and stated \"you need to get out of my room or I will harm you\". Other RN proceeded to leave the room and the pt stated \"hallelujah\". MD notified & aware. MD then assessed tele strip. Order for a repeat EKG placed.    1335-  Pt has not voided since prior to writers arrival this morning at 0700. Patient has been bladder scanned x2 thus far with the most recent showing 315 ml. Although, the patient has barely consumed any liquids throughout the shift as he believes they contain poison. MD notified & aware. Per conversation with MD, rescan pt at 1600 if does not spontaneously void beforehand.  "

## 2024-03-31 NOTE — PROGRESS NOTES
BRIEF NOTE    Spoke with Jovana Pozo, Director of Nursing at [facility], who had come to visit Vinod in the hospital. Jovana knows Vinod very well and was able to provide a great deal of collateral information.    Of highest importance, was her clarification of his missed imaging appointment. She shared that he had an outpatient visit with his PCP, Dr. Estrella, on 03/01/24 following up on a CT chest that found pulmonary nodules/pleural thickening. Radiology recommended whole body PET scan - which was originally scheduled for 3/29. Will try to get this assessed inpatient, if possible.    She also shared that, regarding his mental status, he appears to be more or less at his baseline. He regularly has verbal outbursts with violent or inappropriate content, is often suspicious/paranoid that someone has poisoned him, and regularly experiences visual and auditory hallucinations. What is unusual is his refusing/spitting out medications and not eating.    Lastly, he has had issues with urinary retention in the past, but has only ever had a semi-permanent indwelling lowery for about 3 weeks following a hospitalization in February of earlier this year. Usually is able to ambulate and void on his own without too much difficulty.    ____________________________  Jose Barnes DO - PGY2  [facility]  Pronouns: He/Him/His

## 2024-03-31 NOTE — PLAN OF CARE
Goal Outcome Evaluation:    1900-0730      Plan of Care Reviewed With: patient    Overall Patient Progress: no changeOverall Patient Progress: no change    Outcome Evaluation: patient was calm at moments of shift and then would say bad words towards myself and also the other staftt. Making racial remarks, and telling me he's going to knock me out. Crushed his pills and gave in applesause. Tolerated okay. Had slight crackles on inspirtations, sound like hes built up with fluid. Urinated around 130AM with an outpul of 525 ML, and had only 2ML left in bladder when post urine bladder scan. No acute events on shift tongiht. currently on 1 L O2. vitals stable.

## 2024-03-31 NOTE — PROGRESS NOTES
03/31/24 1144   Appointment Info   Signing Clinician's Name / Credentials (SLP) Rosalia Kaplan MS CCC-SLP   General Information   Onset of Illness/Injury or Date of Surgery 03/28/24   Referring Physician Em Shaw MD   Patient/Family Therapy Goal Statement (SLP) None stated   Pertinent History of Current Problem The pt is a 63 year old male who has a history of asthma, COPD, SOPHIA, cavitary lesion of lung, paranoid schizophrenia, medication-induced Parkinsonism, HTN, GERD, BPH with urinary retention who was admitted for new onset heart failure in setting of suicidal ideation, AMS, electrolyte abnormalities and meeting SIRS criteria. Swallow evaluation completed per MD order.   General Observations The pt is alert but fatigued. 1:1 sitter in place for safety. Pt refused AM medications and spit them out.   Pain Assessment   Patient Currently in Pain No   Type of Evaluation   Type of Evaluation Swallow Evaluation   Oral Motor   Oral Musculature generally intact   Structural Abnormalities none present   Mucosal Quality good   Dentition (Oral Motor)   Comment, Dentition (Oral Motor) Unable to assess as pt did not comply with directions   Dentition (Oral Motor) other (see comments)   Facial Symmetry (Oral Motor)   Facial Symmetry (Oral Motor) WNL   Lip Function (Oral Motor)   Lip Range of Motion (Oral Motor) protrusion impairment   Protrusion, Lip Range of Motion bilateral;minimal impairment   Tongue Function (Oral Motor)   Tongue ROM (Oral Motor) unable/difficult to assess   Jaw Function (Oral Motor)   Jaw Function (Oral Motor) WNL   Cough/Swallow/Gag Reflex (Oral Motor)   Volitional Throat Clear/Cough (Oral Motor) reduced strength   Volitional Swallow (Oral Motor) unable/difficult to assess   Vocal Quality/Secretion Management (Oral Motor)   Vocal Quality (Oral Motor) breathy;hoarse   Secretion Management (Oral Motor) other (see comments)   Comment, Vocal Quality/Secretion Management (Oral Motor) Pt with  "productive coughing this morning when attempted, clear breathing/voicing later this AM.   General Swallowing Observations   Past History of Dysphagia Pt reportedly prefers soft solids at baseline. No hx of dysphagia per group home. Concern with food/meds per RN with coughing and congested breathing noted. The pt did undergo VFSS in August 2022 which was WFL with no aspiration.   Respiratory Support nasal cannula  (1 lpm)   Current Diet/Method of Nutritional Intake (General Swallowing Observations, NIS) thin liquids (level 0);regular diet   Swallowing Evaluation Clinical swallow evaluation   Clinical Swallow Evaluation   Feeding Assistance dependent   Clinical Swallow Evaluation Textures Trialed thin liquids   Clinical Swallow Eval: Thin Liquid Texture Trial   Mode of Presentation, Thin Liquids straw;self-fed   Volume of Liquid or Food Presented 4 oz   Oral Phase of Swallow WFL;residue in oral cavity;delayed AP movement   Pharyngeal Phase of Swallow intact   Diagnostic Statement No s/s of aspiration with initial consecutive sips. On subsequent trials, the pt had bolus holding for 5 seconds and then spit out entire sip intentionally, stating it was \"poison\".   Esophageal Phase of Swallow   Patient reports or presents with symptoms of esophageal dysphagia Yes   Esophageal comments Hx of GERD   Swallowing Recommendations   Diet Consistency Recommendations thin liquids (level 0);soft & bite-sized (level 6)   Supervision Level for Intake 1:1 supervision needed   Mode of Delivery Recommendations slow rate of intake;bolus size, small   Monitoring/Assistance Required (Eating/Swallowing) stop eating activities when fatigue is present;monitor for cough or change in vocal quality with intake   Recommended Feeding/Eating Techniques (Swallow Eval) maintain upright sitting position for eating;provide assist with feeding   Medication Administration Recommendations, Swallowing (SLP) with puree   Instrumental Assessment " "Recommendations instrumental evaluation not recommended at this time   General Therapy Interventions   Planned Therapy Interventions Dysphagia Treatment   Dysphagia treatment Modified diet education;Instruction of safe swallow strategies;Compensatory strategies for swallowing   Clinical Impression   Criteria for Skilled Therapeutic Interventions Met (SLP Liliana) Yes, treatment indicated   SLP Diagnosis Limited assessment, however suspect at least mild acute on chronic oropharyngeal dysphagia   Risks & Benefits of therapy have been explained evaluation/treatment results reviewed;care plan/treatment goals reviewed;risks/benefits reviewed;current/potential barriers reviewed;participants voiced agreement with care plan;participants included;patient   Clinical Impression Comments Limited clinical swallow evaluation completed per MD order. The pt presents with mild oropharyngeal dysphagia, likely acute on chronic. Oral mech limited d/t poor command following. Vocal quality was variable with intermittent clear voicing following by breathy quality. Pt had productive cough in absence of PO, however unable to expectorate secretions. Pt assessed with thin liquids via straw. Pt initially tolerated consecutive sips with timely oral phase and no s/s of aspiration. With subsequent trials, the pt had bolus holding for ~5 seconds and then proceeded to spit out entire bolus and stated it was \"poison\". This is consistent with report from RN. Pt then refused all other trials of PO. He did nod \"yes\" to endorsing sensation of liquids \"going the wrong way\", however then when he elaborated it was tangential and unrelated. Pt reportedly prefers \"softer foods\" at baseline per . Although solids were not assessed d/t pt refusal, suspect he would benefit from softer foods d/t weakness at this time if he is agreeable to PO. Recommend downgrade to soft & bite-sized solids (6) and thin liquids (0) with 1:1 assist. Upright with PO, encourage single " "sips, and slow rate. Pt is exhibiting behaviors including bolus holding and spitting PO d/t stating it is \"poison\" despite encouragement. No overt s/s of aspiraiton noted with limited trials of liquids. SLP will follow for diet tolerance and assess solids as pt is agreeable.   SLP Total Evaluation Time   Eval: oral/pharyngeal swallow function, clinical swallow Minutes (77201) 19   SLP Discharge Planning   SLP Plan Diet tolerance, assess solids, ADAT (baseline is \"softer foods\" per preference)   SLP Discharge Recommendation adult foster care/group home   SLP Rationale for DC Rec Anticipate pt will meet goals prior to d/c. Could consider home care SLP if dysphagia persists.   SLP Brief overview of current status  Pt reportedly prefers \"softer foods\" at baseline per GH. Although solids were not assessed d/t pt refusal, suspect he would benefit from softer foods d/t weakness at this time if he is agreeable to PO. Recommend downgrade to soft & bite-sized solids (6) and thin liquids (0) with 1:1 assist. Upright with PO, encourage single sips, and slow rate. Pt is exhibiting behaviors including bolus holding and spitting PO d/t stating it is \"poison\" despite encouragement. No overt s/s of aspiration noted with limited trials of liquids. VFSS was WFL in August 2022. SLP will follow for diet tolerance and assess solids as pt is agreeable.   Total Session Time   Total Session Time (sum of timed and untimed services) 19     "

## 2024-03-31 NOTE — PLAN OF CARE
[id number]    Plan of Care Reviewed With: patient    Overall Patient Progress: no change    Outcome Evaluation:  Pt is A & O to self on 1 L of O2 via NC w/ humidification. Pt denies pain, nausea and chest pain. Pt exhibits SOB, suctioning offered and completed various times throughout shift. Pt has L PIV - SL. Pt is incontinent - external catheter in place. Pt is not oob, assist x2 w/ lift. Bedside attendant and continuous pulse ox maintained throughout shift. Pt refusing frequent repositioning even after writer and sitter have offered and attempted numerous times throughout shift.     Shift Updates  Pt still exhibiting active SI - 1:1 in place.  Pt is on an 1,800 ML fluid restriction - see chart for details.   Pt is on tele - strip analyzed during shift.   Pt has been bladder scanned various times throughout shift. Bladder scan at 1623 showed roughly 961 ml of urine in his bladder. Thus, patient was straight cath'd with a total of 800 ml of output. MD updated.    Vitals: /60 (BP Location: Right arm)   Pulse 89   Temp 97.4  F (36.3  C) (Axillary)   Resp 28   Wt 87.2 kg (192 lb 3.9 oz)   SpO2 95%   BMI 27.83 kg/m      Plan: TBD. Continue w/ plan of care.

## 2024-04-01 ENCOUNTER — APPOINTMENT (OUTPATIENT)
Dept: CT IMAGING | Facility: CLINIC | Age: 64
DRG: 291 | End: 2024-04-01
Attending: STUDENT IN AN ORGANIZED HEALTH CARE EDUCATION/TRAINING PROGRAM
Payer: COMMERCIAL

## 2024-04-01 ENCOUNTER — APPOINTMENT (OUTPATIENT)
Dept: OCCUPATIONAL THERAPY | Facility: CLINIC | Age: 64
DRG: 291 | End: 2024-04-01
Payer: COMMERCIAL

## 2024-04-01 ENCOUNTER — APPOINTMENT (OUTPATIENT)
Dept: SPEECH THERAPY | Facility: CLINIC | Age: 64
DRG: 291 | End: 2024-04-01
Payer: COMMERCIAL

## 2024-04-01 LAB
ALBUMIN SERPL BCG-MCNC: 3.5 G/DL (ref 3.5–5.2)
ALP SERPL-CCNC: 75 U/L (ref 40–150)
ALT SERPL W P-5'-P-CCNC: 9 U/L (ref 0–70)
ANION GAP SERPL CALCULATED.3IONS-SCNC: 12 MMOL/L (ref 7–15)
AST SERPL W P-5'-P-CCNC: 14 U/L (ref 0–45)
ATRIAL RATE - MUSE: 86 BPM
ATRIAL RATE - MUSE: 89 BPM
BILIRUB SERPL-MCNC: 0.4 MG/DL
BUN SERPL-MCNC: 29.3 MG/DL (ref 8–23)
CALCIUM SERPL-MCNC: 8.9 MG/DL (ref 8.8–10.2)
CHLORIDE SERPL-SCNC: 92 MMOL/L (ref 98–107)
CREAT SERPL-MCNC: 0.99 MG/DL (ref 0.67–1.17)
DEPRECATED HCO3 PLAS-SCNC: 29 MMOL/L (ref 22–29)
DIASTOLIC BLOOD PRESSURE - MUSE: NORMAL MMHG
DIASTOLIC BLOOD PRESSURE - MUSE: NORMAL MMHG
EGFRCR SERPLBLD CKD-EPI 2021: 85 ML/MIN/1.73M2
ERYTHROCYTE [DISTWIDTH] IN BLOOD BY AUTOMATED COUNT: 13.3 % (ref 10–15)
GLUCOSE SERPL-MCNC: 113 MG/DL (ref 70–99)
HCT VFR BLD AUTO: 40.7 % (ref 40–53)
HGB BLD-MCNC: 13.6 G/DL (ref 13.3–17.7)
INTERPRETATION ECG - MUSE: NORMAL
INTERPRETATION ECG - MUSE: NORMAL
MAGNESIUM SERPL-MCNC: 1.9 MG/DL (ref 1.7–2.3)
MCH RBC QN AUTO: 28.8 PG (ref 26.5–33)
MCHC RBC AUTO-ENTMCNC: 33.4 G/DL (ref 31.5–36.5)
MCV RBC AUTO: 86 FL (ref 78–100)
P AXIS - MUSE: 38 DEGREES
P AXIS - MUSE: 42 DEGREES
PLATELET # BLD AUTO: 215 10E3/UL (ref 150–450)
POTASSIUM SERPL-SCNC: 4.1 MMOL/L (ref 3.4–5.3)
PR INTERVAL - MUSE: 156 MS
PR INTERVAL - MUSE: 158 MS
PROT SERPL-MCNC: 5.8 G/DL (ref 6.4–8.3)
QRS DURATION - MUSE: 168 MS
QRS DURATION - MUSE: 170 MS
QT - MUSE: 436 MS
QT - MUSE: 444 MS
QTC - MUSE: 521 MS
QTC - MUSE: 540 MS
R AXIS - MUSE: -23 DEGREES
R AXIS - MUSE: -29 DEGREES
RBC # BLD AUTO: 4.72 10E6/UL (ref 4.4–5.9)
SODIUM SERPL-SCNC: 133 MMOL/L (ref 135–145)
SYSTOLIC BLOOD PRESSURE - MUSE: NORMAL MMHG
SYSTOLIC BLOOD PRESSURE - MUSE: NORMAL MMHG
T AXIS - MUSE: 119 DEGREES
T AXIS - MUSE: 134 DEGREES
VENTRICULAR RATE- MUSE: 86 BPM
VENTRICULAR RATE- MUSE: 89 BPM
WBC # BLD AUTO: 10.8 10E3/UL (ref 4–11)

## 2024-04-01 PROCEDURE — 250N000009 HC RX 250: Performed by: FAMILY MEDICINE

## 2024-04-01 PROCEDURE — 250N000013 HC RX MED GY IP 250 OP 250 PS 637

## 2024-04-01 PROCEDURE — 74177 CT ABD & PELVIS W/CONTRAST: CPT

## 2024-04-01 PROCEDURE — 80053 COMPREHEN METABOLIC PANEL: CPT

## 2024-04-01 PROCEDURE — 250N000013 HC RX MED GY IP 250 OP 250 PS 637: Performed by: STUDENT IN AN ORGANIZED HEALTH CARE EDUCATION/TRAINING PROGRAM

## 2024-04-01 PROCEDURE — 92526 ORAL FUNCTION THERAPY: CPT | Mod: GN | Performed by: SPEECH-LANGUAGE PATHOLOGIST

## 2024-04-01 PROCEDURE — 97535 SELF CARE MNGMENT TRAINING: CPT | Mod: GO

## 2024-04-01 PROCEDURE — 74177 CT ABD & PELVIS W/CONTRAST: CPT | Mod: 26 | Performed by: RADIOLOGY

## 2024-04-01 PROCEDURE — 120N000002 HC R&B MED SURG/OB UMMC

## 2024-04-01 PROCEDURE — 99232 SBSQ HOSP IP/OBS MODERATE 35: CPT | Mod: GC

## 2024-04-01 PROCEDURE — 36415 COLL VENOUS BLD VENIPUNCTURE: CPT

## 2024-04-01 PROCEDURE — 83735 ASSAY OF MAGNESIUM: CPT | Performed by: FAMILY MEDICINE

## 2024-04-01 PROCEDURE — 250N000011 HC RX IP 250 OP 636: Performed by: FAMILY MEDICINE

## 2024-04-01 PROCEDURE — 250N000011 HC RX IP 250 OP 636: Performed by: STUDENT IN AN ORGANIZED HEALTH CARE EDUCATION/TRAINING PROGRAM

## 2024-04-01 PROCEDURE — 85014 HEMATOCRIT: CPT

## 2024-04-01 RX ORDER — IOPAMIDOL 755 MG/ML
100 INJECTION, SOLUTION INTRAVASCULAR ONCE
Status: COMPLETED | OUTPATIENT
Start: 2024-04-01 | End: 2024-04-01

## 2024-04-01 RX ORDER — FUROSEMIDE 10 MG/ML
40 INJECTION INTRAMUSCULAR; INTRAVENOUS ONCE
Status: COMPLETED | OUTPATIENT
Start: 2024-04-01 | End: 2024-04-01

## 2024-04-01 RX ADMIN — Medication 0.25 MG: at 20:05

## 2024-04-01 RX ADMIN — Medication 25 MCG: at 09:19

## 2024-04-01 RX ADMIN — OLANZAPINE 15 MG: 10 TABLET, ORALLY DISINTEGRATING ORAL at 09:20

## 2024-04-01 RX ADMIN — SPIRONOLACTONE 25 MG: 25 TABLET ORAL at 09:20

## 2024-04-01 RX ADMIN — BUSPIRONE HYDROCHLORIDE 15 MG: 10 TABLET ORAL at 14:12

## 2024-04-01 RX ADMIN — BUSPIRONE HYDROCHLORIDE 15 MG: 10 TABLET ORAL at 20:05

## 2024-04-01 RX ADMIN — BENZTROPINE MESYLATE 1 MG: 1 TABLET ORAL at 20:06

## 2024-04-01 RX ADMIN — FUROSEMIDE 40 MG: 10 INJECTION, SOLUTION INTRAMUSCULAR; INTRAVENOUS at 10:40

## 2024-04-01 RX ADMIN — GABAPENTIN 400 MG: 400 CAPSULE ORAL at 09:20

## 2024-04-01 RX ADMIN — SENNOSIDES 2 TABLET: 8.6 TABLET, FILM COATED ORAL at 09:20

## 2024-04-01 RX ADMIN — BUSPIRONE HYDROCHLORIDE 15 MG: 10 TABLET ORAL at 09:20

## 2024-04-01 RX ADMIN — SENNOSIDES 2 TABLET: 8.6 TABLET, FILM COATED ORAL at 20:06

## 2024-04-01 RX ADMIN — Medication 500 MCG: at 09:19

## 2024-04-01 RX ADMIN — PANTOPRAZOLE SODIUM 40 MG: 40 TABLET, DELAYED RELEASE ORAL at 09:19

## 2024-04-01 RX ADMIN — RISPERIDONE 0.5 MG: 0.5 TABLET, ORALLY DISINTEGRATING ORAL at 09:20

## 2024-04-01 RX ADMIN — BENZTROPINE MESYLATE 1 MG: 1 TABLET ORAL at 09:20

## 2024-04-01 RX ADMIN — FLUOXETINE HYDROCHLORIDE 20 MG: 20 CAPSULE ORAL at 09:19

## 2024-04-01 RX ADMIN — Medication 0.25 MG: at 09:20

## 2024-04-01 RX ADMIN — Medication 12.5 MG: at 20:05

## 2024-04-01 RX ADMIN — RISPERIDONE 0.5 MG: 0.5 TABLET, ORALLY DISINTEGRATING ORAL at 20:05

## 2024-04-01 RX ADMIN — OLANZAPINE 15 MG: 10 TABLET, ORALLY DISINTEGRATING ORAL at 20:05

## 2024-04-01 RX ADMIN — SODIUM CHLORIDE 49 ML: 9 INJECTION, SOLUTION INTRAVENOUS at 11:52

## 2024-04-01 RX ADMIN — EMPAGLIFLOZIN 10 MG: 10 TABLET, FILM COATED ORAL at 09:20

## 2024-04-01 RX ADMIN — IOPAMIDOL 94 ML: 755 INJECTION, SOLUTION INTRAVENOUS at 11:50

## 2024-04-01 RX ADMIN — CLOZAPINE 200 MG: 200 TABLET ORAL at 14:12

## 2024-04-01 RX ADMIN — METOPROLOL SUCCINATE 25 MG: 25 TABLET, FILM COATED, EXTENDED RELEASE ORAL at 20:06

## 2024-04-01 RX ADMIN — TAMSULOSIN HYDROCHLORIDE 0.8 MG: 0.4 CAPSULE ORAL at 09:20

## 2024-04-01 RX ADMIN — DIVALPROEX SODIUM 250 MG: 125 CAPSULE, COATED PELLETS ORAL at 12:20

## 2024-04-01 RX ADMIN — GABAPENTIN 400 MG: 400 CAPSULE ORAL at 20:05

## 2024-04-01 RX ADMIN — CLOZAPINE 400 MG: 200 TABLET ORAL at 22:14

## 2024-04-01 RX ADMIN — GABAPENTIN 400 MG: 400 CAPSULE ORAL at 14:12

## 2024-04-01 RX ADMIN — DIVALPROEX SODIUM 250 MG: 125 CAPSULE, COATED PELLETS ORAL at 09:20

## 2024-04-01 ASSESSMENT — ACTIVITIES OF DAILY LIVING (ADL)
ADLS_ACUITY_SCORE: 55
ADLS_ACUITY_SCORE: 51
ADLS_ACUITY_SCORE: 55
ADLS_ACUITY_SCORE: 51
ADLS_ACUITY_SCORE: 51
ADLS_ACUITY_SCORE: 55
ADLS_ACUITY_SCORE: 51
ADLS_ACUITY_SCORE: 51
ADLS_ACUITY_SCORE: 55
ADLS_ACUITY_SCORE: 51
ADLS_ACUITY_SCORE: 55
ADLS_ACUITY_SCORE: 51

## 2024-04-01 NOTE — PLAN OF CARE
Goal Outcome Evaluation:      Plan of Care Reviewed With: patient          Outcome Evaluation: calm and cooperative right now.      VS: /70 (BP Location: Right arm, Patient Position: Supine, Cuff Size: Adult Regular)   Pulse 80   Temp 97.5  F (36.4  C) (Axillary)   Resp 22   Wt 87.3 kg (192 lb 7.4 oz)   SpO2 95%   BMI 27.86 kg/m      O2: Stable on room air, no shortness of breath observed, O2 turned off this morning by [place]. Goal is to keep pt between 88-92%.    Output: Incontinent, primofit on when good urine output. 500ml around 11am and 150 at 2 PM. Urine is gopi color and clear.    Last BM: Unknown, pt unable to recall.    Activity: Up with 2 assist using walker or liko. Pt did not get out of bed this shift. Can turn side-to side- on his own. Sat on the edge of the bed today with OT but said he was turned and did not want to get up and move around.    Skin: Intact- some redness to bottom.    Pain: Denied pain   CMS: Was A&Ox3 this morning. Pt was able to tell RN where he was, what date it was, and his name and . Did not know why he was here.    - Has a lot of mistrust and suspicion. Insist that we are trying to poison him.    Dressing: N/A   Diet: Safe Tray- Soft/bite size, thin liquids, Low saturated fat <2400mg, No caffeine. Staff from group home came to get pt to eat. Pt ate lunch. Order in place for staff to taste pt food first before giving it, only way pt would eat. Meds crushed with applesauce but pt asked not to crush meds, pt actually took 2 pm meds whole.    LDA: Left PIV- SL   Equipment: None   Plan: TBD, possible returning to group home   Additional Info: Had CT done this morning, Sitter at bedside for safety  - RN managed Mag( 1.9) and K+ (4.1)- no replacement needed.

## 2024-04-01 NOTE — PLAN OF CARE
Goal Outcome Evaluation:  Pt still makes suicidal remarks occasionally. Coughing still present. Straight cath was done on pt with an output of 500mL around 0445. Fluid restriction lifted.    Orientation: Ao to self  Bowel: Incontinent  Bladder: primo fit, straight cath  Ambulation/Transfers: Not oob  Diet/ Liquids: Combination diet, safe tray, level 6 food, thin liq  Tubes/ Lines/ Drains: L PIV SL  Tube Feeding: n/a   Oxygen: 1-2L to maintain 88-92%  Skin: Redness on buttocks

## 2024-04-01 NOTE — PROGRESS NOTES
[facility]    Progress Note - [facility]       Date of Admission:  3/28/2024    Main Plans for Today   - CT A/P today to assess for occult malignancy  - Continue diuresis, 40mg IV lasix  - Straight cath if scans for >500 ml  - discontinue tele  - discontinue fluid restriction  - To encourage eating, per patient request, OK for RN staff to take a bite of food to prove its not poison  - Will connect with group home to clarify their capacity to assist (given increased weakness) and confirm patient's medical decision-maker    Patient does not have medical decision-making capacity    Assessment & Plan   Vinod Lee is a 63 year old male who has a history of asthma, COPD, SOPHIA, cavitary lesion of lung, paranoid schizophrenia, medication-induced Parkinsonism, HTN, GERD, BPH with urinary retention who was admitted for new onset heart failure in setting of suicidal ideation, AMS, electrolyte abnormalities and meeting SIRS criteria.      # New HFrEF (3/28, EF 25-30%)  # Volume overload  # LBBB  # HTN  # Prolonged Qtc (536 3/28)  # Dyspnea  # Sinus vs SVT  Presented from group home with dyspnea, fatigue, weakness, cough. Echo on admission w/ newly reduced EF 25-30% & global hypokinesis, but euvolemic per echo. BNP 1410, trops negative, EKG with LBBB but reassuring against ischemic changes, CRP unremarkable, mild leukocytosis. No LE edema but CXR and lung exam demonstrates bilateral fluid/rales. Exact etiology unclear, but in discussion with cardiology, highest suspicion is progression of chronic LBBB. If cardiac work-up rules out LBB as cause, could consider medication side-effect of clozapine or infection as etiology, but cardiology and team pharmD feel this is much less likely. Will initiate GDMT and initial ischemic work-up inpatient. Of note, tele has flagged for V-tach, but consistent with LBB & sinus tach, similar to prior EKG. As of 3/31,  persistent O2 requirement new from baseline, with very faint ronchi, mild wheeze on exam, mild pulm edema on CXR - continuing gentle diuresis.  - Cardiology consult, recs:  - Ischemia evaluation when psych stable; OK to do as outpatient  - Stress cardiac MRI vs. coronary CTA  - Up-titration of GDMT  - Continue Losartan 12.5mg daily  - Continue Metoprolol Succinate 25mg daily  - Start empagliflozin 10mg daily today  - Start spironolactone 25 mg daily  - Can be discharged on this regimen and follow up with cardiology as an outpatient  - Place cards referral on discharge  - Pharm consult, recs appreciated re: Clozapine  - Repeat furosemide IV 40 mg   - HOLD Furosemide PO 20 mg BID  - Diet: Heart failure, salt restricted  - Nursing: Vitals q4h, tele, continuous pulse ox, strict I/O's, daily weights  - Mag & Phos standard replacement protocols    # Pulmonary nodules  # COPD  # Cough  # Leukocytosis  # Elevated CRP  # Medication-induced Parkinsonism  Met SIRS criteria on admit with a leukocytosis of 14.7 -> 15.6 -> 9.9 & neutrophil predominance, but without clear source of infection. RSV, flu, COVID negative, UA unremarkable, procal, lactate negative. CXRs without evidence of PNA, but with some mild pulm edema. Only notable symptoms on ROS cough, dyspnea, weakness - no belly pain, chest pain, head ache, open wounds. Of note, a CT chest (2/26) completed to assess pulmonary nodules with some concerning findings & recommended follow-up with PET CT for assessment of possible malignancy. Given the above findings, without clear evidence of infection or other clear cause of new HFrEF, malignancy cannot be excluded. Proceeding with CT A/P for assessment of possible occult abdominopelvic malignancy as, at present, he is too ill to tolerate a pleural biopsy, but not ill enough to meet criteria for inpatient PET CT.  - Heme/Onc curbsided, recs:   - Too sick for biopsy of suspicious pleural nodule   - Encourage review of prior cancer  screenings   - Obtain CT A/P w/con to assess for occult malignancy  - Radiology curbsided; very strict criteria for inpatient PET:   (1) Must change inpatient management (ie plan to start chemo)   (2) Must be too sick to discharge home & complete PET CT outpatient  - Trend CBC  - Titrate O2 between 88-92%     # Paranoid Schizophrenia  # Medication Induced Parkinsonism  # Psychosis  # Suicidal Ideation  # Homicidal Ideation  Follows with an out-of-system psychiatrist, but per chart review, patient has a well-documented history of significant mental illness including paranoid schizophrenia on numerous therapies including clozapine and ECT. On this admission, patient has endorsed active suicidal and homicidal thoughts.  - Consider psychiatry consult if new questions  - To encourage eating, per patient request, OK for RN staff to take a bite of food to prove its not poison  - Suicide Precautions  - 1:1 sitter              - Safe utensils with food  - PTA Risperdal 0.5mg BID  - PTA Zyprexa 15 mg BID  - PTA Gabapentin 400 mg TID  - PTA Fluoxetine 20 mg daily  - PTA Depakote 250 mg TID  - PTA Clozapine 200mg Q2pm, 400 mg Qpm  - PTA Benztropine 1 mg BID  - PTA Buspar 15mg TID  - PTA Lorazepam 1 mg TID PRN  - discontinue PTA Propanolol 30 mg Qam (in setting of new metoprolol)  - OT consult     # BPH  # Acute on Chronic Urinary Retention  Hx of requiring outpatient lowery for urinary retention and referrals placed for urology follow-up that did not occur. Has intermittently required straight cathing for scans >500ml, but has also had some output with the  - PTA Flomax 0.8 mg daily  - PRN Bladder Scan  - Straight cath for scans >500mL  - Consider Lowery placement/urology consult if needed    # Hyponatremia, chronic  # Hypochloremia  Has a history of severe, symptomatic hyponatremia to 122 (2/22-2/24 [facility] admission). Na 132 at outpatient hospital follow up 3/1/24. Here with mild hyponatremia to 130, suspect chronic of  mixed etiology, predominately CHF. Volume status appears overloaded. Urine Na/osm returned at 35/614 respectively, serum osms 268.   - Treat volume overload as above  - Daily BMP    Chronic/Stable:   # Hypocalcemia/Hypoalbuminemia: Ca corrected to WNL in context of hypoalbuminemia.  # Vitamin D Deficiency: PTA Vitamin D 25 mcg daily  # Arthritis: PTA Naproxen 250 mg BID  # GERD: PTA Protonix 40 mg daily  # Constipation: PTA Senna 2 tabs BID  # SOPHIA/COPD/Asthma: PTA Albuterol 2 puffs Q6hr         Diet: Combination Diet Safe Tray - with utensils; Soft and Bite Sized Diet (level 6); Thin Liquids (level 0); Low Saturated Fat Na <2400mg Diet, No Caffeine Diet    DVT Prophylaxis: Pneumatic Compression Devices  Barrios Catheter: Not present  Fluids: PO  Lines: None     Cardiac Monitoring: None  Code Status: Full Code      Clinically Significant Risk Factors              # Hypoalbuminemia: Lowest albumin = 3.2 g/dL at 3/30/2024  6:51 AM, will monitor as appropriate        # Acute heart failure with reduced ejection fraction: last echo with EF <40% and receiving IV diuretics                   The patient's care was discussed with the Attending Physician, Dr. Isabel DO .    DO [facility]  [facility]  Securely message with [employer] (more info)  Text page via [employer] Paging/Directory   See signed in provider for up to date coverage information  ______________________________________________________________________    Interval History   Overnight: NAEON    Subj: Continuing to refuse food, continues to repeat paranoid statements, and continues to say intermittently aggressive/inappropriate comments. Cough does not appear to have changed, still not productive.    Physical Exam   Vital Signs: Temp: 97.5  F (36.4  C) Temp src: Axillary BP: 117/70 Pulse: 80   Resp: 22 SpO2: 95 % O2 Device: Nasal cannula Oxygen Delivery: 1 LPM  Weight: 192 lbs  7.39 oz    Vitals reviewed.  Constitutional: awake, alert, appears ill and mildly diaphoretic  HENT: NCAT without lesions  Respiratory: Lungs with bilateral rhonchi, but without rales, or respiratory distress. Notably poor inspiratory effort.  Cardiovascular: RRR without murmurs or extra sounds, radial pulses 2+ bilaterally  Abdomen: Soft, non-distended, non-tender, positive bowel sounds  Skin: Mild diaphoresis, warm, without lesions, erythema, rashes, or ecchymosis  Musculoskeletal: No joint/extremity redness, warmth, swelling, or tenderness.   Neurologic: A&Ox2 (self & time of day), without focal deficit, cranial nerves II-XII grossly intact  Psychiatric:      Attention and Perception: abnormal perception, perceiving both auditory and visual hallucinations. None of them are command hallucinations, cannot identify specific individuals but does state he is seeing people who aren't there     Mood and Affect: Flat affect     Speech: Slowed, monotone     Behavior: Withdrawn, occasionally aggressive.     Thought Content: Positive for paranoia, homicidal, and suicidal thoughts. Disorganized thought process.     Cognition and Memory: Impaired    Medical Decision Making       Please see A&P for additional details of medical decision making.      Data   ------------------------- PAST 24 HR DATA REVIEWED -----------------------------------------------    I have personally reviewed the following data over the past 24 hrs:    10.8  \   13.6   / 215     133 (L) 92 (L) 29.3 (H) /  113 (H)   4.1 29 0.99 \     ALT: 9 AST: 14 AP: 75 TBILI: 0.4   ALB: 3.5 TOT PROTEIN: 5.8 (L) LIPASE: N/A       Imaging results reviewed over the past 24 hrs:   No results found for this or any previous visit (from the past 24 hour(s)).

## 2024-04-02 ENCOUNTER — APPOINTMENT (OUTPATIENT)
Dept: OCCUPATIONAL THERAPY | Facility: CLINIC | Age: 64
DRG: 291 | End: 2024-04-02
Payer: COMMERCIAL

## 2024-04-02 ENCOUNTER — APPOINTMENT (OUTPATIENT)
Dept: SPEECH THERAPY | Facility: CLINIC | Age: 64
DRG: 291 | End: 2024-04-02
Payer: COMMERCIAL

## 2024-04-02 LAB
ALBUMIN SERPL BCG-MCNC: 3.5 G/DL (ref 3.5–5.2)
ALP SERPL-CCNC: 74 U/L (ref 40–150)
ALT SERPL W P-5'-P-CCNC: 6 U/L (ref 0–70)
ANION GAP SERPL CALCULATED.3IONS-SCNC: 10 MMOL/L (ref 7–15)
AST SERPL W P-5'-P-CCNC: 10 U/L (ref 0–45)
BILIRUB SERPL-MCNC: 0.2 MG/DL
BUN SERPL-MCNC: 32.5 MG/DL (ref 8–23)
C PNEUM DNA SPEC QL NAA+PROBE: NOT DETECTED
CALCIUM SERPL-MCNC: 8.8 MG/DL (ref 8.8–10.2)
CHLORIDE SERPL-SCNC: 96 MMOL/L (ref 98–107)
CREAT SERPL-MCNC: 1.09 MG/DL (ref 0.67–1.17)
DEPRECATED HCO3 PLAS-SCNC: 30 MMOL/L (ref 22–29)
EGFRCR SERPLBLD CKD-EPI 2021: 76 ML/MIN/1.73M2
ERYTHROCYTE [DISTWIDTH] IN BLOOD BY AUTOMATED COUNT: 13.4 % (ref 10–15)
ERYTHROCYTE [SEDIMENTATION RATE] IN BLOOD BY WESTERGREN METHOD: 35 MM/HR (ref 0–20)
FLUAV H1 2009 PAND RNA SPEC QL NAA+PROBE: NOT DETECTED
FLUAV H1 RNA SPEC QL NAA+PROBE: NOT DETECTED
FLUAV H3 RNA SPEC QL NAA+PROBE: NOT DETECTED
FLUAV RNA SPEC QL NAA+PROBE: NEGATIVE
FLUAV RNA SPEC QL NAA+PROBE: NOT DETECTED
FLUBV RNA RESP QL NAA+PROBE: NEGATIVE
FLUBV RNA SPEC QL NAA+PROBE: NOT DETECTED
GLUCOSE SERPL-MCNC: 114 MG/DL (ref 70–99)
HADV DNA SPEC QL NAA+PROBE: NOT DETECTED
HCOV PNL SPEC NAA+PROBE: NOT DETECTED
HCT VFR BLD AUTO: 41.7 % (ref 40–53)
HGB BLD-MCNC: 13.6 G/DL (ref 13.3–17.7)
HMPV RNA SPEC QL NAA+PROBE: NOT DETECTED
HPIV1 RNA SPEC QL NAA+PROBE: NOT DETECTED
HPIV2 RNA SPEC QL NAA+PROBE: NOT DETECTED
HPIV3 RNA SPEC QL NAA+PROBE: NOT DETECTED
HPIV4 RNA SPEC QL NAA+PROBE: NOT DETECTED
M PNEUMO DNA SPEC QL NAA+PROBE: NOT DETECTED
MAGNESIUM SERPL-MCNC: 3 MG/DL (ref 1.7–2.3)
MCH RBC QN AUTO: 28.6 PG (ref 26.5–33)
MCHC RBC AUTO-ENTMCNC: 32.6 G/DL (ref 31.5–36.5)
MCV RBC AUTO: 88 FL (ref 78–100)
PLATELET # BLD AUTO: 255 10E3/UL (ref 150–450)
POTASSIUM SERPL-SCNC: 4.1 MMOL/L (ref 3.4–5.3)
PROCALCITONIN SERPL IA-MCNC: 0.06 NG/ML
PROT SERPL-MCNC: 5.8 G/DL (ref 6.4–8.3)
RBC # BLD AUTO: 4.76 10E6/UL (ref 4.4–5.9)
RSV RNA SPEC NAA+PROBE: NEGATIVE
RSV RNA SPEC QL NAA+PROBE: NOT DETECTED
RSV RNA SPEC QL NAA+PROBE: NOT DETECTED
RV+EV RNA SPEC QL NAA+PROBE: NOT DETECTED
SARS-COV-2 RNA RESP QL NAA+PROBE: NEGATIVE
SODIUM SERPL-SCNC: 136 MMOL/L (ref 135–145)
WBC # BLD AUTO: 8.3 10E3/UL (ref 4–11)

## 2024-04-02 PROCEDURE — 83735 ASSAY OF MAGNESIUM: CPT | Performed by: INTERNAL MEDICINE

## 2024-04-02 PROCEDURE — 93010 ELECTROCARDIOGRAM REPORT: CPT | Performed by: INTERNAL MEDICINE

## 2024-04-02 PROCEDURE — 36415 COLL VENOUS BLD VENIPUNCTURE: CPT | Performed by: INTERNAL MEDICINE

## 2024-04-02 PROCEDURE — 94667 MNPJ CHEST WALL 1ST: CPT

## 2024-04-02 PROCEDURE — 93005 ELECTROCARDIOGRAM TRACING: CPT

## 2024-04-02 PROCEDURE — 85652 RBC SED RATE AUTOMATED: CPT | Performed by: STUDENT IN AN ORGANIZED HEALTH CARE EDUCATION/TRAINING PROGRAM

## 2024-04-02 PROCEDURE — 999N000157 HC STATISTIC RCP TIME EA 10 MIN

## 2024-04-02 PROCEDURE — 84155 ASSAY OF PROTEIN SERUM: CPT

## 2024-04-02 PROCEDURE — 94668 MNPJ CHEST WALL SBSQ: CPT

## 2024-04-02 PROCEDURE — 250N000013 HC RX MED GY IP 250 OP 250 PS 637

## 2024-04-02 PROCEDURE — 97535 SELF CARE MNGMENT TRAINING: CPT | Mod: GO

## 2024-04-02 PROCEDURE — 85027 COMPLETE CBC AUTOMATED: CPT

## 2024-04-02 PROCEDURE — 250N000013 HC RX MED GY IP 250 OP 250 PS 637: Performed by: STUDENT IN AN ORGANIZED HEALTH CARE EDUCATION/TRAINING PROGRAM

## 2024-04-02 PROCEDURE — 99232 SBSQ HOSP IP/OBS MODERATE 35: CPT | Mod: GC

## 2024-04-02 PROCEDURE — 84145 PROCALCITONIN (PCT): CPT

## 2024-04-02 PROCEDURE — 120N000002 HC R&B MED SURG/OB UMMC

## 2024-04-02 PROCEDURE — 36415 COLL VENOUS BLD VENIPUNCTURE: CPT | Performed by: STUDENT IN AN ORGANIZED HEALTH CARE EDUCATION/TRAINING PROGRAM

## 2024-04-02 PROCEDURE — 94640 AIRWAY INHALATION TREATMENT: CPT

## 2024-04-02 PROCEDURE — 87633 RESP VIRUS 12-25 TARGETS: CPT

## 2024-04-02 PROCEDURE — 250N000009 HC RX 250: Performed by: STUDENT IN AN ORGANIZED HEALTH CARE EDUCATION/TRAINING PROGRAM

## 2024-04-02 PROCEDURE — 250N000011 HC RX IP 250 OP 636

## 2024-04-02 PROCEDURE — 92526 ORAL FUNCTION THERAPY: CPT | Mod: GN

## 2024-04-02 PROCEDURE — 87637 SARSCOV2&INF A&B&RSV AMP PRB: CPT

## 2024-04-02 RX ORDER — POLYETHYLENE GLYCOL 3350 17 G/17G
17 POWDER, FOR SOLUTION ORAL DAILY
Status: DISCONTINUED | OUTPATIENT
Start: 2024-04-02 | End: 2024-04-05 | Stop reason: HOSPADM

## 2024-04-02 RX ORDER — DOXYCYCLINE 100 MG/10ML
100 INJECTION, POWDER, LYOPHILIZED, FOR SOLUTION INTRAVENOUS EVERY 12 HOURS
Status: DISCONTINUED | OUTPATIENT
Start: 2024-04-02 | End: 2024-04-03

## 2024-04-02 RX ORDER — IPRATROPIUM BROMIDE AND ALBUTEROL SULFATE 2.5; .5 MG/3ML; MG/3ML
3 SOLUTION RESPIRATORY (INHALATION)
Status: COMPLETED | OUTPATIENT
Start: 2024-04-02 | End: 2024-04-02

## 2024-04-02 RX ORDER — AMPICILLIN AND SULBACTAM 2; 1 G/1; G/1
3 INJECTION, POWDER, FOR SOLUTION INTRAMUSCULAR; INTRAVENOUS EVERY 6 HOURS
Status: DISCONTINUED | OUTPATIENT
Start: 2024-04-02 | End: 2024-04-03

## 2024-04-02 RX ADMIN — BUSPIRONE HYDROCHLORIDE 15 MG: 10 TABLET ORAL at 20:01

## 2024-04-02 RX ADMIN — BUSPIRONE HYDROCHLORIDE 15 MG: 10 TABLET ORAL at 13:44

## 2024-04-02 RX ADMIN — Medication 0.25 MG: at 10:30

## 2024-04-02 RX ADMIN — AMPICILLIN SODIUM AND SULBACTAM SODIUM 3 G: 2; 1 INJECTION, POWDER, FOR SOLUTION INTRAMUSCULAR; INTRAVENOUS at 17:11

## 2024-04-02 RX ADMIN — CLOZAPINE 400 MG: 200 TABLET ORAL at 22:26

## 2024-04-02 RX ADMIN — IPRATROPIUM BROMIDE AND ALBUTEROL SULFATE 3 ML: .5; 3 SOLUTION RESPIRATORY (INHALATION) at 12:12

## 2024-04-02 RX ADMIN — GABAPENTIN 400 MG: 400 CAPSULE ORAL at 20:01

## 2024-04-02 RX ADMIN — Medication 25 MCG: at 10:30

## 2024-04-02 RX ADMIN — DOXYCYCLINE 100 MG: 100 INJECTION, POWDER, LYOPHILIZED, FOR SOLUTION INTRAVENOUS at 23:13

## 2024-04-02 RX ADMIN — BENZTROPINE MESYLATE 1 MG: 1 TABLET ORAL at 20:02

## 2024-04-02 RX ADMIN — GABAPENTIN 400 MG: 400 CAPSULE ORAL at 13:45

## 2024-04-02 RX ADMIN — Medication 12.5 MG: at 20:02

## 2024-04-02 RX ADMIN — PANTOPRAZOLE SODIUM 40 MG: 40 TABLET, DELAYED RELEASE ORAL at 10:29

## 2024-04-02 RX ADMIN — TAMSULOSIN HYDROCHLORIDE 0.8 MG: 0.4 CAPSULE ORAL at 10:29

## 2024-04-02 RX ADMIN — EMPAGLIFLOZIN 10 MG: 10 TABLET, FILM COATED ORAL at 10:29

## 2024-04-02 RX ADMIN — RISPERIDONE 0.5 MG: 0.5 TABLET, ORALLY DISINTEGRATING ORAL at 20:02

## 2024-04-02 RX ADMIN — ACETAMINOPHEN 975 MG: 325 TABLET, FILM COATED ORAL at 13:44

## 2024-04-02 RX ADMIN — DIVALPROEX SODIUM 250 MG: 125 CAPSULE, COATED PELLETS ORAL at 13:45

## 2024-04-02 RX ADMIN — OLANZAPINE 15 MG: 10 TABLET, ORALLY DISINTEGRATING ORAL at 10:29

## 2024-04-02 RX ADMIN — DIVALPROEX SODIUM 250 MG: 125 CAPSULE, COATED PELLETS ORAL at 10:29

## 2024-04-02 RX ADMIN — DIVALPROEX SODIUM 250 MG: 125 CAPSULE, COATED PELLETS ORAL at 17:12

## 2024-04-02 RX ADMIN — GABAPENTIN 400 MG: 400 CAPSULE ORAL at 10:31

## 2024-04-02 RX ADMIN — METOPROLOL SUCCINATE 25 MG: 25 TABLET, FILM COATED, EXTENDED RELEASE ORAL at 20:02

## 2024-04-02 RX ADMIN — AMPICILLIN SODIUM AND SULBACTAM SODIUM 3 G: 2; 1 INJECTION, POWDER, FOR SOLUTION INTRAMUSCULAR; INTRAVENOUS at 22:26

## 2024-04-02 RX ADMIN — OLANZAPINE 15 MG: 10 TABLET, ORALLY DISINTEGRATING ORAL at 20:01

## 2024-04-02 RX ADMIN — Medication 0.25 MG: at 20:02

## 2024-04-02 RX ADMIN — AMPICILLIN SODIUM AND SULBACTAM SODIUM 3 G: 2; 1 INJECTION, POWDER, FOR SOLUTION INTRAMUSCULAR; INTRAVENOUS at 10:49

## 2024-04-02 RX ADMIN — DOXYCYCLINE 100 MG: 100 INJECTION, POWDER, LYOPHILIZED, FOR SOLUTION INTRAVENOUS at 11:29

## 2024-04-02 RX ADMIN — SENNOSIDES 2 TABLET: 8.6 TABLET, FILM COATED ORAL at 20:02

## 2024-04-02 RX ADMIN — BENZTROPINE MESYLATE 1 MG: 1 TABLET ORAL at 10:30

## 2024-04-02 RX ADMIN — BUSPIRONE HYDROCHLORIDE 15 MG: 10 TABLET ORAL at 10:31

## 2024-04-02 RX ADMIN — CLOZAPINE 200 MG: 200 TABLET ORAL at 13:45

## 2024-04-02 RX ADMIN — RISPERIDONE 0.5 MG: 0.5 TABLET, ORALLY DISINTEGRATING ORAL at 10:30

## 2024-04-02 RX ADMIN — FLUOXETINE HYDROCHLORIDE 20 MG: 20 CAPSULE ORAL at 10:29

## 2024-04-02 RX ADMIN — SPIRONOLACTONE 25 MG: 25 TABLET ORAL at 10:29

## 2024-04-02 RX ADMIN — Medication 500 MCG: at 10:30

## 2024-04-02 RX ADMIN — POLYETHYLENE GLYCOL 3350 17 G: 17 POWDER, FOR SOLUTION ORAL at 10:45

## 2024-04-02 ASSESSMENT — ACTIVITIES OF DAILY LIVING (ADL)
DEPENDENT_IADLS:: CLEANING;COOKING;LAUNDRY;SHOPPING;MEAL PREPARATION;MEDICATION MANAGEMENT;MONEY MANAGEMENT;TRANSPORTATION
ADLS_ACUITY_SCORE: 57
ADLS_ACUITY_SCORE: 55
ADLS_ACUITY_SCORE: 63
ADLS_ACUITY_SCORE: 55
ADLS_ACUITY_SCORE: 55
ADLS_ACUITY_SCORE: 63
ADLS_ACUITY_SCORE: 57
ADLS_ACUITY_SCORE: 55
ADLS_ACUITY_SCORE: 63
ADLS_ACUITY_SCORE: 55
ADLS_ACUITY_SCORE: 63
ADLS_ACUITY_SCORE: 55
ADLS_ACUITY_SCORE: 63

## 2024-04-02 NOTE — PLAN OF CARE
Goal Outcome Evaluation:       Shift: 5880-9676  /66 (BP Location: Left arm)   Pulse 74   Temp 97.6  F (36.4  C) (Axillary)   Resp 16   Wt 74.2 kg (163 lb 9.3 oz)   SpO2 94%   BMI 23.68 kg/m       Pain: Complained of pain once to the sitter earlier in the day, received prn tylenol  Neuro: A&Ox1-2, pt disoriented about time & situation  Resp: Sats 88-92% on room air at baseline, is on cont pulse ox. No chest pain reported  Diet: soft bite sized diet, no caffeine, low sat fat diet. 1800 fluid restriction  Skin: redness noted on bottom, barrier cream applied  LDA: L PIV infusing Abx intermittently  Activity: Pivot to chair when Oob with 2 people, gait belt and walker  Output: Uses primafit, incontinent of bowel, LBM 04.02  - On RN managed protocol, labs WNL. Daily weight done, bed zeroed. Pt was in chair for 1 hour. Pt was re-evaluated for sp  Call light within reach   1:1 did not chart accurate outputs during day shift, unaware of his intake/outputs  Plan of care ongoing

## 2024-04-02 NOTE — PLAN OF CARE
Pt is A/O x4, A2 with liko lift. Soft bite size diet with thin liquid. No caffeine, 2400 fat or less. Pills whole in apple sauce. RN manage mag and potassium, L PIV. Continent of B/B, urinal at bedside. Pt continue to be on 1:1 due to outer mental status.

## 2024-04-02 NOTE — PROGRESS NOTES
Care Management Follow Up    Length of Stay (days): 5    Expected Discharge Date: 04/04/2024     Concerns to be Addressed:  commitment and decision making information  Patient plan of care discussed at interdisciplinary rounds: Yes    Anticipated Discharge Disposition:  Group Home     Anticipated Discharge Services:  TBD  Anticipated Discharge DME:  TBD - please see RNCC note    Patient/family educated on Medicare website which has current facility and service quality ratings:  N/A  Education Provided on the Discharge Plan:    Patient/Family in Agreement with the Plan:      Referrals Placed by CM/SW:    Private pay costs discussed: Not applicable    Additional Information:  ZAIRA received VM from pt commitment , Vicky Abel with [facility] [employer]. Vicky requested call back for update and to provide pt's commitment paperwork. ZAIRA unable to hear the last digit of Vicky's phone number in VM.     ZAIRA called [facility] [employer] (Ph: [phone]) to attempt to get Vicky's contact information. ZAIRA left  explaining purpose of call and requesting call back.     ZAIRA called pt mother (Alberta) to see if she had contact information for pt's commitment . Alberta confirmed that she did have a phone number listed and provided ZAIRA with [facility] [employer] phone number and reported this was the only phone number she had. ZAIRA thanked Alberta for the information. Alberta had no other questions for AZIRA at this time.    ZAIRA received internal hand off from Unit RNCC with Vicky's phone number from pt's group home.     ZAIRA spoke with Vicky via phone call. ZAIRA provided brief update on pt status noting that pt would likely have more information after rounds. Vicky reported that pt has passive SI at baseline. ZAIRA provided Vicky with  email address for Vicky to send commitment paperwork to.      ZAIRA received email from Vicky with pt's commitment paperwork attached. ZAIRA faxed paperwork to HIM and placed hard  copy in pt's hard chart.     SW and RNCC spoke with Kassy's Attending about team's concerns with pt not having medical decision making capacity and next steps for family to follow for more legal decision making should this be needed in the future. Per [facility] policy, should pt not have a health care directive, pt parents would be next of kin and then pt siblings. Pt father currently in hospital and pt mother deferred decision making to pt sister (Sandra, ph: [phone]). SW to follow up with Sandra later today about guardianship.     ADDENDUM 1519: ZAIRA spoke with Sandra over the phone to discuss the guardianship process. SW provided information on what guardianship and conservatorship are. SW discussed that the first step to starting this process would be getting an . SW answered Sandra's other questions about the process. Sandra reported that she lives in [place] but is in town until likely the end of this week. Sandra reported that she plans to bring pt mother to hospital tomorrow. Sandra had no other questions for SW at this time.    Civil Commitment Information:  Type of Commitment (MI, CD, MICD): [facility]: [place]   Court File Number: [id number]  Commitment :   [employer] Commitment   Vicky Abel  Ph: [phone]  Email: [email or web address]    CLEMENT Hamilton   M [facility]   Covering 6MS SW   6MS SW Ph: [phone]

## 2024-04-02 NOTE — PROGRESS NOTES
[facility]    Progress Note - [facility]       Date of Admission:  3/28/2024    Main Plans for Today   - Start IV unasyn & doxy for empiric coverage of CAP  - discontinue lasix in setting of contraction alkalosis  - Repeat COVID/Flu/RSV + RVP  - Repeat EKG to assess QTc  - Chest physiotherapy  - x1 duoneb  - Scheduled Miralax daily    Patient does not have medical decision-making capacity    Assessment & Plan   Vinod Lee is a 63 year old male who has a history of asthma, COPD, SOPHIA, cavitary lesion of lung, paranoid schizophrenia, medication-induced Parkinsonism, HTN, GERD, BPH with urinary retention who was admitted for new onset heart failure in setting of suicidal ideation, AMS, electrolyte abnormalities and meeting SIRS criteria.    # Dispo planning  # Surrogate decision making given lack of full capacity  In collateral collected from the Nursing Director of the patient's group home (Jovana) and conversation with his sister (Sandra) and mother (Alberta), some important context was gained regarding the patient's history. Firstly, Vinod has historically been kind, personable, and functionally independent (own apartment, hobbies, etc.) with well-managed symptoms of his schizophrenia that was initially diagnosed in his 20s. Approximately a year ago, following a medication change, his mental health deteriorated, leading to a 5-month hospitalization, civil commitment, determination that he did not have medical decision-making capacity by psych emiliano 7/18/23, and eventual disposition to a group home. In the setting of his court-ordered commitment, he has a CADI, Mental Health, & commitment . He does not currently have an advanced directive appointing a surrogate decision-maker, but in discussion with both his mother and his sister, Sandra, on 4/2 - Sandra agreed that she was best suited to represent his medical interests and agreed to be a  surrogate decision-maker on Vinod's behalf.   Per Jovana, his current mental status is more or less consistent with this new baseline. This group home knows him well, is able to provide skilled care (employs CNAs), and has demonstrated commitment to meeting new skilled needs, should he have them on discharge (including increased assistance for transfers, higher staffing ratios, etc.).  - Strongly recommend discharge back to this group home (with additional supports if needed)  - RN CC working to secure resources to support group home  - SW consulted, in contact with  listed above to help facilitate process of formalizing Sandra as surrogate    # New HFrEF (3/28, EF 25-30%)  # LBBB  # HTN  # Mild contraction alkalosis  # Prolonged Qtc (536 3/28)  Presented from group home with dyspnea, fatigue, weakness, cough. Echo on admission w/ newly reduced EF 25-30% & global hypokinesis, but euvolemic per echo. BNP 1410, trops negative, EKG with LBBB but reassuring against ischemic changes, CRP unremarkable, mild leukocytosis. No LE edema but CXR and lung exam demonstrates bilateral fluid/rales. Exact etiology unclear, but in discussion with cardiology, highest suspicion is progression of chronic LBBB. Was initiated on GDMT and diuresed with IV lasix 40mg IV daily x3 days. He developed a mild contraction alkalosis 4/2 with improvement in clinical volume assessment, though cough was still persistent.   - Cardiology consult, recs:  - Ischemia evaluation when psych stable; OK to do as outpatient  - Stress cardiac MRI vs. coronary CTA  - Up-titration of GDMT  - Continue Losartan 12.5mg daily  - Continue Metoprolol Succinate 25mg daily  - Start empagliflozin 10mg daily today  - Start spironolactone 25 mg daily  - Can be discharged on this regimen and follow up with cardiology as an outpatient  - Place cards referral on discharge  - Pharm consult, recs appreciated re: Clozapine  - Diet: Heart failure, salt restricted  -  Nursing: Vitals q8h, strict I/O's, daily weights  - Mag & Phos standard replacement protocols    # CAP  # Pulmonary nodules  # COPD  # Cough/Dyspnea  # Leukocytosis  # Elevated CRP  # Medication-induced Parkinsonism  Met SIRS criteria on admit with a leukocytosis of 14.7 -> 15.6 -> 9.9 & neutrophil predominance, but without clear source of infection. RSV, flu, COVID negative, UA unremarkable, procal, lactate negative. CXRs without evidence of PNA, but with some mild pulm edema. Only notable symptoms on ROS cough, dyspnea, weakness - no belly pain, chest pain, head ache, open wounds. Of note, a CT chest (2/26) completed to assess pulmonary nodules with some concerning findings & recommended follow-up with PET CT for assessment of possible malignancy. The wet cough did improve with diuresis, but dry cough still present with incidental lung findings on CT A/P concerning for possible infection - empiric treatment for CAP started with IV unasyn, doxy given resistance to PO medications.  - Unasyn 3g q6h IV; doxy 100mg q12h IV (plan for 5 days total antibiotics, given repeat negative procal)  - Radiology curbsided; very strict criteria for inpatient PET:   (1) Must change inpatient management (ie plan to start chemo)   (2) Must be too sick to discharge home & complete PET CT outpatient  - daily CBC  - Titrate O2 between 88-92%     # Paranoid Schizophrenia  # Medication Induced Parkinsonism  # Psychosis  # Suicidal Ideation  # Homicidal Ideation  Follows with an out-of-system psychiatrist, but per chart review, patient has a well-documented history of significant mental illness including paranoid schizophrenia on numerous therapies including clozapine and ECT. On this admission, patient has endorsed active suicidal and homicidal thoughts.  - Consider psychiatry consult if new questions  - To encourage eating, per patient request, OK for RN staff to take a bite of food to prove its not poison  - Suicide Precautions  - 1:1  sitter              - Safe utensils with food  - PTA Risperdal 0.5mg BID  - PTA Zyprexa 15 mg BID  - PTA Gabapentin 400 mg TID  - PTA Fluoxetine 20 mg daily  - PTA Depakote 250 mg TID  - PTA Clozapine 200mg Q2pm, 400 mg Qpm  - PTA Benztropine 1 mg BID  - PTA Buspar 15mg TID  - PTA Lorazepam 0.25mg BID PRN  - discontinue PTA Propanolol 30 mg Qam (in setting of new metoprolol)  - OT consult     # BPH  # Acute on Chronic Urinary Retention  Hx of requiring outpatient lowery for urinary retention and referrals placed for urology follow-up that did not occur. Has intermittently required straight cathing for scans >500ml, but has also had some output with the  - PTA Flomax 0.8 mg daily  - PRN Bladder Scan  - Straight cath for scans >500mL  - Consider Lowery placement/urology consult if needed    # Hyponatremia, chronic  # Hypochloremia  Has a history of severe, symptomatic hyponatremia to 122 (2/22-2/24 [facility] admission). Na 132 at outpatient hospital follow up 3/1/24. Here with mild hyponatremia to 130, suspect chronic of mixed etiology, predominately CHF. Volume status appears overloaded. Urine Na/osm returned at 35/614 respectively, serum osms 268.   - Treat volume overload as above  - Daily BMP    Chronic/Stable:   # Hypocalcemia/Hypoalbuminemia: Ca corrected to WNL in context of hypoalbuminemia.  # Vitamin D Deficiency: PTA Vitamin D 25 mcg daily  # Arthritis: PTA Naproxen 250 mg BID  # GERD: PTA Protonix 40 mg daily  # Constipation: PTA Senna 2 tabs BID  # SOPHIA/COPD/Asthma: PTA Albuterol 2 puffs Q6hr         Diet: Combination Diet Safe Tray - with utensils; Soft and Bite Sized Diet (level 6); Thin Liquids (level 0); Low Saturated Fat Na <2400mg Diet, No Caffeine Diet    DVT Prophylaxis: Pneumatic Compression Devices  Lowery Catheter: Not present  Fluids: PO  Lines: None     Cardiac Monitoring: None  Code Status: Full Code      Clinically Significant Risk Factors              # Hypoalbuminemia: Lowest albumin =  "3.2 g/dL at 3/30/2024  6:51 AM, will monitor as appropriate        # Acute heart failure with reduced ejection fraction: last echo with EF <40% and receiving IV diuretics           # Financial/Environmental Concerns: none          The patient's care was discussed with the Attending Physician, Dr. Isabel DO .    [facility]  [facility]  Securely message with [employer] (more info)  Text page via [facility] Paging/Directory   See signed in provider for up to date coverage information  ______________________________________________________________________    Interval History   Overnight: NAEON    Subj: More pleasant and interactive this AM. Reports concerns that he \"peed the bed\" last night (Primo Fit in place) and also states that his cough is not getting better. Otherwise shares no new concerns, does not relate paranoid thoughts this AM regarding poisoned food or fears of being harmed by staff.     Spoke with group home Nursing Director, Jovana, at bedside and sister, Sandra, on the phone in a conversation detailed above.    Physical Exam   Vital Signs: Temp: 98.1  F (36.7  C) Temp src: Oral BP: 100/58 Pulse: 80   Resp: 17 SpO2: 92 % O2 Device: None (Room air)    Weight: 163 lbs 9.3 oz    Vitals reviewed.  Constitutional: awake, alert, appears ill  HENT: NCAT without lesions  Respiratory: Lungs with faint bilateral rales, but without respiratory distress. Notably poor inspiratory effort.  Cardiovascular: RRR without murmurs or extra sounds, radial pulses 2+ bilaterally  Abdomen: Soft, non-distended, non-tender, positive bowel sounds  Skin: Mild diaphoresis, warm, without lesions, erythema, rashes, or ecchymosis  Musculoskeletal: No joint/extremity redness, warmth, swelling, or tenderness.   Neurologic: A&Ox2 (self & time of day), without focal deficit, cranial nerves II-XII grossly intact  Psychiatric:      Attention and Perception: " abnormal perception, perceiving both auditory and visual hallucinations     Mood and Affect: Flat affect     Speech: Slowed, monotone     Behavior: Withdrawn     Thought Content: Positive for paranoia. Disorganized thought process.     Cognition and Memory: Impaired    Medical Decision Making       Please see A&P for additional details of medical decision making.      Data   ------------------------- PAST 24 HR DATA REVIEWED -----------------------------------------------    I have personally reviewed the following data over the past 24 hrs:    8.3  \   13.6   / 255     136 96 (L) 32.5 (H) /  114 (H)   4.1 30 (H) 1.09 \     ALT: 6 AST: 10 AP: 74 TBILI: 0.2   ALB: 3.5 TOT PROTEIN: 5.8 (L) LIPASE: N/A     Procal: 0.06 CRP: N/A Lactic Acid: N/A         Imaging results reviewed over the past 24 hrs:   No results found for this or any previous visit (from the past 24 hour(s)).

## 2024-04-02 NOTE — DISCHARGE SUMMARY
[facility]  Discharge Summary - Medicine & Pediatrics       Date of Admission:  3/28/2024  Date of Discharge:  4/5/2024  Discharging Provider: Angela Younger DO  Discharge Service: [facility]    Discharge Diagnoses   # New HFrEF (3/28, EF 25-30%)  # LBBB  # HTN  # Prolonged Qtc (536 3/28)  # CAP, presumed  # COPD  # Cough/Dyspnea  # Pulmonary nodules  # Paranoid Schizophrenia  # Medication Induced Parkinsonism  # Suicidal Ideation  # Homicidal Ideation  # BPH  # Acute on Chronic Urinary Retention  # Hyponatremia, acute on chronic  # Hypochloremia    Clinically Significant Risk Factors          Follow-ups Needed After Discharge   Follow up with primary care provider, Franklyn Estrella, within 7 days for hospital follow- up.  The following labs/tests are recommended: CBC, BMP.    Patient missed his outpatient PET scan while in the hospital, indicated for concerning findings on imaging for malignancy. Please help coordinating with patient to get this re-scheduled.    Needs cardiac stress test ordered outpatient.    Follow up with Cardiology for new heart failure. A referral was placed at discharge.      Will need uptitration of his goal directed medical therapy for heart failure.    Follow up with Urology for urinary retention. A referral was placed at discharge.      Discharge Disposition   Discharged to home  Condition at discharge: Stable    Hospital Course   Vinod Lee was admitted on 3/28/2024 for weakness, cough, and dyspnea in the setting of new onset HFrEF.  The following problems were addressed during his hospitalization:    # New HFrEF (3/28, EF 25-30%)  # LBBB  # HTN  # Mild contraction alkalosis  # Prolonged Qtc (536 3/28)  Presented from group home with dyspnea, fatigue, weakness, cough. Echo on admission w/ newly reduced EF 25-30% & global hypokinesis, but euvolemic per echo. BNP 1410, trops negative, EKG with LBBB but reassuring  against ischemic changes, CRP unremarkable, mild leukocytosis. No LE edema but CXR and lung exam demonstrates bilateral fluid/rales. Cardiology was consulted and their highest suspicion for etiology was progression of chronic LBBB; did not favor medication side effect or infection. Was initiated on GDMT.   - Cardiology recs:  - Ischemia evaluation, OK to do as outpatient  - Stress cardiac MRI vs. coronary CTA  - Goal directed medical therapy  - Continue Losartan 12.5mg daily  - Continue Metoprolol Succinate 50mg daily  - Continue empagliflozin 10mg daily today  - Continue spironolactone 25 mg daily  - Will need uptitration of losartan, metoprolol and spironolactone to meet GDMT  - Outpatient cariology referral placed at discharge    # CAP, presumed  # COPD  # Cough/Dyspnea  Met SIRS criteria on admit with a leukocytosis of 14.7 -> 15.6 -> 9.9 & neutrophil predominance, but without clear source of infection. RSV, flu, COVID negative, UA unremarkable, procal, lactate negative. CTAP 4/1 showed new bilateral groundglass and nodular opacities c/f infx versus aspiration. Symptoms improved with antibiotics and duonebs.  Antibiotics:  -PO Augmentin 875-125 BID (4/3 - ), should be continued through 4/6  -PO doxycycline 100mg BID (4/3 - ), should be continued through 4/6  -Unasyn 3g TID (4/2 - 4/3)  -IV doxycycline 100mg BID (4/2)    # Pulmonary nodules  Of note, a CT chest was completed 2/26 to assess pulmonary nodules and came back with some concerning findings. Radiology recommended follow-up with PET CT for assessment of possible malignancy, which his PCP ordered on 3/1, and he unfortunately missed as he was hospitalized during the scheduled study.   -Will need outpatient PET CT on discharge, as this was not able to be completed inpatient.     # Paranoid Schizophrenia  # Medication Induced Parkinsonism  # Suicidal Ideation  # Homicidal Ideation  Follows with an out-of-system psychiatrist, but per chart review, patient has  a well-documented history of significant mental illness including paranoid schizophrenia on numerous therapies including clozapine and ECT. On this admission, patient has endorsed active suicidal and homicidal thoughts. Qtc prolonged to 523 in context of multiple Qtc prolonging medications, stable.Of note, he did struggle with accepting food (fear of poison) and working with staff (fear of being maimed/murdered) while here, but according to his group home nurse - this is mostly consistent with his baseline. While he was here, the following interventions and medications were utilized:  - To encourage eating, per patient request, OK for RN staff to take a bite of food to prove its not poison  - Suicide Precautions  - 1:1 sitter              - Safe utensils with food  - PTA Risperdal 0.5mg BID  - PTA Zyprexa 15 mg BID  - PTA Gabapentin 400 mg TID  - PTA Fluoxetine 20 mg daily  - PTA Depakote 250 mg TID  - PTA Clozapine 200mg Q2pm, 400 mg Qpm  - PTA Benztropine 1 mg BID  - PTA Buspar 15mg TID  - PTA Lorazepam 0.25mg BID PRN  - DISCONTINUED PTA Propanolol 30 mg Qam (in setting of new metoprolol)     # BPH  # Acute on Chronic Urinary Retention  Has a history of urinary retention, once requiring three weeks of lowery catheterization following a hospitalization. A referral to urology was placed for follow-up on last admission, but he was unable to make it to that appointment. While here, he has intermittently required straight cathing for scans >500ml, but has had decent output with the Primo Fit device. His PTA flomax was continued.    # Hyponatremia, acute on chronic  # Hypochloremia  Has a history of severe, symptomatic hyponatremia to 122 (2/22-2/24 [facility] admission). During this admission, had a mild hyponatremia to 130, acute on chronic of mixed etiology, predominately volume overload due to CHF. Na improved with volume management and diuresis.    # Surrogate decision making given lack of full capacity  In  discussion with the Nursing Director of the patient's group home ([facility]), his sister (Sadnra) and mother (Alberta), important context was clarified regarding the patient's history and legal status. Vinod used to be fully independent with well-controlled schizophrenia, but had a significant decline about a year ago following a series of medication changes. Soon after, he endured a 5-month hospitalization, civil commitment, determination that he did not have medical decision-making capacity by psych eval 7/18/23, and eventual disposition to his current group home. While he has a number of legal  involved in his care, it was discovered this admission that it was unclear who his surrogate medical decision-maker would be.    In discussion with both his mother and his sister, Sandra, on 04/02/2024 - Sandra agreed to the role of surrogate medical decision-maker.     Consultations This Hospital Stay   CARDIOLOGY HEART FAILURE (HF) IP CONSULT  CARDIOLOGY GENERAL ADULT IP CONSULT  CORE CLINIC EVALUATION IP CONSULT  OCCUPATIONAL THERAPY ADULT IP CONSULT  NUTRITION SERVICES ADULT IP CONSULT  CARE MANAGEMENT / SOCIAL WORK IP CONSULT  CARDIOLOGY HEART FAILURE (HF) IP CONSULT  PHARMACY LIAISON FOR MEDICATION COVERAGE CONSULT  SPEECH LANGUAGE PATH ADULT IP CONSULT    Code Status   Full Code       The patient was discussed with MD Kassy Carmona's Service  [facility] MED SURG  [address] [place] [id number]  Phone: [phone]  Fax: [phone]  ______________________________________________________________________    Physical Exam   Vital Signs: Temp: 97.6  F (36.4  C) Temp src: Axillary BP: 109/60 Pulse: 69   Resp: 16 SpO2: 92 % O2 Device: None (Room air)    Weight: 163 lbs 9.3 oz  Constitutional: awake, alert, in no distress  HENT: NCAT without lesions  Respiratory: Minimal air movement heard secondary to poor inspiratory effort by patient. No crackles, rales,  rhonchi or wheezing  Cardiovascular: RRR without murmurs or extra sounds, radial pulses 2+ bilaterally  Abdomen: Soft, non-distended, non-tender, positive bowel sounds  Skin: Mild diaphoresis, warm, without lesions, erythema, rashes, or ecchymosis  Musculoskeletal: No joint/extremity redness, warmth, swelling, or tenderness.   Psychiatric:      Mood and Affect: Flat affect     Speech: Slowed, monotone     Behavior: Withdrawn     Thought Content: Positive for paranoia. Disorganized thought process.     Cognition and Memory: Impaired      Primary Care Physician   Franklyn Estrella    Discharge Orders      Adult Cardiology al [facility] Referral      Patient care order    Please alert medical team if patient has not urinated by end of your shift     Reason for your hospital stay    You were seen in the hospital for heart failure and a pneumonia. You were treated with antibiotics and medications for your heart. You felt better and went home.     Activity    Your activity upon discharge: activity as tolerated     Adult [facility] Follow-up and recommended labs and tests    Follow up with primary care provider, Franklyn Estrella, within 7 days for hospital follow- up.  The following labs/tests are recommended: CBC, BMP.   Patient missed his outpatient PET scan while in the hospital, indicated for concerning findings on imaging for malignancy. Please help coordinating with patient to get this re-scheduled.    Follow up with cardiology for new heart failure. A referral was placed at discharge.      Appointments on [facility] and/or [facility] (with [facility] or [facility] provider or service). Call [phone] if you haven't heard regarding these appointments within 7 days of discharge.     Wheelchair Order for DME - ONLY FOR DME    I, the undersigned, certify that the above prescribed supplies are medically necessary for this patient and is both reasonable and necessary in reference to accepted standards of medical and necessary in  reference to accepted standards of medical practice in the treatment of this patient's condition and is not prescribed as a convenience.      Hospital Bed Order for DME - ONLY FOR DME    I, the undersigned, certify that the above prescribed supplies are medically necessary for this patient and is both reasonable and necessary in reference to accepted standards of medical and necessary in reference to accepted standards of medical practice in the treatment of this patient's condition and is not prescribed as a convenience.      Miscellaneous DME Order    DME Documentation:   Describe the reason for need to support medical necessity: Medication induced parkinsoniasm and weakness secondary to new severe heart failure.     I, the undersigned, certify that the above prescribed supplies are medically necessary for this patient and is both reasonable and necessary in reference to accepted standards of medical and necessary in reference to accepted standards of medical practice in the treatment of this patient's condition and is not prescribed as a convenience.     Diet    Follow this diet upon discharge: Orders Placed This Encounter      Combination Diet Safe Tray - with utensils; Soft and Bite Sized Diet (level 6); Thin Liquids (level 0); Low Saturated Fat Na <2400mg Diet, No Caffeine Diet       Significant Results and Procedures   Results for orders placed or performed during the hospital encounter of 03/28/24   XR Chest 2 Views    Narrative    CHEST TWO VIEWS March 28, 2024 11:42 AM     HISTORY: History of chronic obstructive pulmonary disease, patient  able to say he is having difficulty swallowing/some shortness of  breath but not able to provide more history.    COMPARISON: 9/30/2023.      Impression    IMPRESSION: No acute cardiopulmonary disease. Hypoinflated lungs.    ZION BENJAMIN MD         SYSTEM ID:  [id number]   XR Chest 2 Views    Narrative    Exam: XR CHEST 2 VIEWS, 3/30/2024 1:28 PM    Comparison: Chest x-ray  3/28/2024    History: 62 y/o w/ COPD, parkinsoniasm 2/2 psych drugs, here w/ new  HFrEF. Assessing interval CXR.    Findings:  2 views of chest. Trachea is midline. Cardiomediastinal silhouette is  within normal limits. Low lung volumes. Mild interstitial opacities.  No pneumothorax or pleural effusion. The visualized upper abdomen is  unremarkable. No acute osseous abnormalities.      Impression    Impression: Mild interstitial opacities, likely representing edema.    I have personally reviewed the examination and initial interpretation  and I agree with the findings.    ALDEN GORE DO         SYSTEM ID:  [id number]   CT Abdomen Pelvis w Contrast    Narrative    EXAMINATION: CT ABDOMEN PELVIS W CONTRAST, 4/1/2024 11:59 AM    TECHNIQUE: Axial CT images from the lung bases through the symphysis  pubis were obtained  with IV contrast. Coronal and sagittal reformats  also provided. Contrast dose: 94mL Isovue 370    COMPARISON: Chest CT 2/26/20241    HISTORY: 62 y/o w/ hx of paranoid schizophrenia on clozapine, hx of  pulm nodules, here w/ new acute HFrEF. Concern for occult malignancy -  onc recommended CT AP.    FINDINGS:    Lung Bases:   Groundglass and nodular opacities in the lung bases bilaterally are  new since prior chest CT.    ABDOMEN:  Liver: Normal size. No focal lesions.    Biliary/Gallbladder: No CT evidence of calculi, wall thickening or  pericholecystic fluid. No intra or extrahepatic biliary dilatation.    Spleen: Normal size. No focal lesions.    Pancreas: No evidence of pancreatic mass.    Adrenals: Normal.    Kidneys: No hydronephrosis, calculi or mass.    Urinary bladder: Overdistended urinary bladder.    Reproductive organs: Prostate and seminal vesicles are unremarkable.    Gastrointestinal: The stomach and duodenum are unremarkable. Small and  large bowel are normal in caliber and without abnormal wall  thickening. The appendix is normal. Large amount of stool in  the  rectum.    Mesentery/Peritoneum: No ascites or pneumoperitoneum.    Lymph nodes: No lymphadenopathy.    Vasculature: Major abdominal arteries are patent.    Bones and soft tissues: No aggressive lytic or sclerotic lesions.      Impression    IMPRESSION:   1.  No evidence of malignancy or lymphadenopathy in the  abdomen/pelvis.  2.  Bilateral groundglass and nodular opacities in the lung bases is  new since prior chest CT, concerning for infection/aspiration.  3.  Large volume rectal stool and overdistended urinary bladder.    JUNG ALCANTAR MD         SYSTEM ID:  [id number]   Echo Complete     Value    LVEF  25-30%    [facility]    [id number]  [id number]  [id number]  [id number]^YESENIA^MONI^FLORY                                                                       Version 2     [facility],[place]  Echocardiography Laboratory  [address] [place] [address]     Name: TAMMY BERUMEN  MRN: [id number]  : 1960  Study Date: 2024 12:16 PM  Age: 63 yrs  Gender: Male  Patient Location: [facility]  Reason For Study: Dyspnea, CHF  Ordering Physician: MONI PATTERSON  Performed By: Gregory Nguyen     BP: 111/70 mmHg  ______________________________________________________________________________  Procedure  Complete Portable Echo Adult. Contrast Optison. Patient was given 6 ml mixture  of 3 ml Optison and 6 ml saline. 3 ml wasted.  ______________________________________________________________________________  Interpretation Summary  Moderate left ventricular dilation is present. The visual ejection fraction is  25-30%. There is global hypokinesis, most contractility is in the basal  lateral walls.  Right ventricular function, chamber size, wall motion, and thickness are  normal.  The inferior vena cava was normal in size with preserved respiratory  variability.  Trivial pericardial effusion is present.     There is no prior study for direct  comparison.  ______________________________________________________________________________  Left Ventricle  Left ventricular wall thickness is normal. Moderate left ventricular dilation  is present. The visual ejection fraction is 25-30%. Left ventricular diastolic  function is not assessable. There is global hypokinesis, most contractility is  in the basal lateral walls.     Right Ventricle  Right ventricular function, chamber size, wall motion, and thickness are  normal.     Atria  Both atria appear normal.     Mitral Valve  The mitral valve is normal.     Aortic Valve  The aortic valve cannot be assessed.     Tricuspid Valve  The tricuspid valve is normal. Trace tricuspid insufficiency is present. The  peak velocity of the tricuspid regurgitant jet is not obtainable.     Pulmonic Valve  The pulmonic valve cannot be assessed.     Vessels  The aorta root is normal. The inferior vena cava was normal in size with  preserved respiratory variability.     Pericardium  Trivial pericardial effusion is present.     Compared to Previous Study  There is no prior study for direct comparison.     ______________________________________________________________________________  MMode/2D Measurements & Calculations  EF Biplane: 19.1 %  LA Volume (BP): 37.9 ml  TAPSE: 1.8 cm     Doppler Measurements & Calculations  RV S Cedrick: 14.0 cm/sec     ______________________________________________________________________________  Report approved by: Melissa MARCOS 03/28/2024 03:22 PM             Discharge Medications   Current Discharge Medication List        START taking these medications    Details   empagliflozin (JARDIANCE) 10 MG TABS tablet Take 1 tablet (10 mg) by mouth daily  Qty: 30 tablet, Refills: 0    Associated Diagnoses: Chronic systolic heart failure (H)      losartan (COZAAR) 25 MG tablet Take 0.5 tablets (12.5 mg) by mouth every evening  Qty: 30 tablet, Refills: 0    Associated Diagnoses: Chronic systolic heart failure (H)       metoprolol succinate ER (TOPROL XL) 25 MG 24 hr tablet Take 1 tablet (25 mg) by mouth every evening for 30 days  Qty: 30 tablet, Refills: 0    Associated Diagnoses: Chronic systolic heart failure (H)      spironolactone (ALDACTONE) 25 MG tablet Take 1 tablet (25 mg) by mouth daily  Qty: 30 tablet, Refills: 0    Associated Diagnoses: Chronic systolic heart failure (H)           CONTINUE these medications which have NOT CHANGED    Details   albuterol (PROAIR HFA/PROVENTIL HFA/VENTOLIN HFA) 108 (90 Base) MCG/ACT inhaler Inhale 2 puffs into the lungs every 6 hours as needed for wheezing, shortness of breath or cough  Qty: 18 g, Refills: 1    Comments: Pharmacy may dispense brand covered by insurance (Proair, or proventil or ventolin or generic albuterol inhaler)  Associated Diagnoses: Chronic obstructive pulmonary disease, unspecified COPD type (H)      benzonatate (TESSALON) 100 MG capsule Take 1 capsule by mouth three times daily as needed for cough  Qty: 30 capsule, Refills: 0    Associated Diagnoses: Chronic obstructive pulmonary disease, unspecified COPD type (H)      benztropine (COGENTIN) 1 MG tablet Take 1 tablet (1 mg) by mouth 2 times daily    Associated Diagnoses: Schizophrenia, unspecified type (H)      busPIRone (BUSPAR) 15 MG tablet Take 1 tablet (15 mg) by mouth 3 times daily  Qty: 90 tablet, Refills: 3    Associated Diagnoses: Paranoid schizophrenia, chronic condition with acute exacerbation (H)      !! cloZAPine (CLOZARIL) 50 MG tablet Take 8 tablets (400 mg) by mouth at bedtime    Associated Diagnoses: Schizophrenia, unspecified type (H)      !! cloZAPine (CLOZARIL) 50 MG tablet Take 4 tablets (200 mg) by mouth daily at 2 pm    Associated Diagnoses: Schizophrenia, unspecified type (H)      cyanocobalamin (VITAMIN B-12) 500 MCG SUBL sublingual tablet Place 1 tablet (500 mcg) under the tongue daily  Qty: 90 tablet, Refills: 1    Associated Diagnoses: Vitamin B12 deficiency (non anemic)       divalproex sodium delayed-release (DEPAKOTE SPRINKLE) 125 MG DR capsule Take 250 mg by mouth 3 times daily (with meals)  Qty: 360 capsule, Refills: 3    Associated Diagnoses: Paranoid schizophrenia, chronic condition with acute exacerbation (H)      FLUoxetine (PROZAC) 20 MG capsule Take 1 capsule (20 mg) by mouth daily  Qty: 30 capsule, Refills: 3    Associated Diagnoses: Paranoid schizophrenia, chronic condition with acute exacerbation (H)      gabapentin (NEURONTIN) 400 MG capsule Take 1 capsule (400 mg) by mouth 3 times daily  Qty: 90 capsule, Refills: 3    Associated Diagnoses: Paranoid schizophrenia, chronic condition with acute exacerbation (H)      LORazepam (ATIVAN) 0.5 MG tablet Take 0.5 tablets (0.25 mg) by mouth 3 times daily  Qty: 90 tablet, Refills: 1    Associated Diagnoses: Paranoid schizophrenia, chronic condition with acute exacerbation (H)      Melatonin 10 MG TABS tablet Take 1 tablet (10 mg) by mouth at bedtime  Qty: 30 tablet, Refills: 3    Associated Diagnoses: Paranoid schizophrenia, chronic condition with acute exacerbation (H)      naproxen (NAPROSYN) 250 MG tablet Take 1 tablet (250 mg) by mouth 2 times daily (with meals)  Qty: 60 tablet, Refills: 3    Associated Diagnoses: Pain      OLANZapine zydis (ZYPREXA) 15 MG ODT Take 1 tablet (15 mg) by mouth 2 times daily  Qty: 60 tablet, Refills: 3    Associated Diagnoses: Paranoid schizophrenia, chronic condition with acute exacerbation (H)      pantoprazole (PROTONIX) 40 MG EC tablet Take 1 tablet (40 mg) by mouth daily  Qty: 30 tablet, Refills: 3    Associated Diagnoses: Side effect of medication      risperiDONE (RISPERDAL M-TABS) 0.5 MG ODT Take 1 tablet (0.5 mg) by mouth 2 times daily  Qty: 60 tablet, Refills: 3    Associated Diagnoses: Paranoid schizophrenia, chronic condition with acute exacerbation (H)      sennosides (SENOKOT) 8.6 MG tablet Take 2 tablets by mouth 2 times daily  Qty: 60 tablet, Refills: 3    Associated Diagnoses:  Constipation, unspecified constipation type      tamsulosin (FLOMAX) 0.4 MG capsule Take 2 capsules (0.8 mg) by mouth daily  Qty: 30 capsule, Refills: 3    Associated Diagnoses: Urinary retention      Vitamin D3 (CHOLECALCIFEROL) 25 mcg (1000 units) tablet Take 1 tablet (25 mcg) by mouth daily  Qty: 30 tablet, Refills: 3    Associated Diagnoses: Vitamin D deficiency       !! - Potential duplicate medications found. Please discuss with provider.        STOP taking these medications       propranolol (INDERAL) 10 MG tablet Comments:   Reason for Stopping:             Allergies   Allergies   Allergen Reactions    Bee Venom Unknown    Montelukast Unknown    Phenothiazines Unknown

## 2024-04-02 NOTE — PLAN OF CARE
Problem: Adult Inpatient Plan of Care  Goal: Plan of Care Review  Description: The Plan of Care Review/Shift note should be completed every shift.  The Outcome Evaluation is a brief statement about your assessment that the patient is improving, declining, or no change.  This information will be displayed automatically on your shift  note.  Flowsheets (Taken 4/2/2024 8952)  Outcome Evaluation: Discharge back to group home (if at basline) or TCU if not a baseline  Plan of Care Reviewed With: (group home)   patient   other (see comments)

## 2024-04-03 ENCOUNTER — APPOINTMENT (OUTPATIENT)
Dept: SPEECH THERAPY | Facility: CLINIC | Age: 64
DRG: 291 | End: 2024-04-03
Payer: COMMERCIAL

## 2024-04-03 ENCOUNTER — APPOINTMENT (OUTPATIENT)
Dept: OCCUPATIONAL THERAPY | Facility: CLINIC | Age: 64
DRG: 291 | End: 2024-04-03
Payer: COMMERCIAL

## 2024-04-03 LAB
ALBUMIN SERPL BCG-MCNC: 3.3 G/DL (ref 3.5–5.2)
ALP SERPL-CCNC: 65 U/L (ref 40–150)
ALT SERPL W P-5'-P-CCNC: 9 U/L (ref 0–70)
ANION GAP SERPL CALCULATED.3IONS-SCNC: 7 MMOL/L (ref 7–15)
AST SERPL W P-5'-P-CCNC: 14 U/L (ref 0–45)
ATRIAL RATE - MUSE: 80 BPM
BILIRUB SERPL-MCNC: 0.3 MG/DL
BUN SERPL-MCNC: 30.2 MG/DL (ref 8–23)
CALCIUM SERPL-MCNC: 8.5 MG/DL (ref 8.8–10.2)
CHLORIDE SERPL-SCNC: 99 MMOL/L (ref 98–107)
CREAT SERPL-MCNC: 1.01 MG/DL (ref 0.67–1.17)
DEPRECATED HCO3 PLAS-SCNC: 31 MMOL/L (ref 22–29)
DIASTOLIC BLOOD PRESSURE - MUSE: NORMAL MMHG
EGFRCR SERPLBLD CKD-EPI 2021: 83 ML/MIN/1.73M2
ERYTHROCYTE [DISTWIDTH] IN BLOOD BY AUTOMATED COUNT: 13.3 % (ref 10–15)
GLUCOSE SERPL-MCNC: 110 MG/DL (ref 70–99)
HCT VFR BLD AUTO: 38.7 % (ref 40–53)
HGB BLD-MCNC: 12.7 G/DL (ref 13.3–17.7)
INTERPRETATION ECG - MUSE: NORMAL
MAGNESIUM SERPL-MCNC: 2 MG/DL (ref 1.7–2.3)
MCH RBC QN AUTO: 29 PG (ref 26.5–33)
MCHC RBC AUTO-ENTMCNC: 32.8 G/DL (ref 31.5–36.5)
MCV RBC AUTO: 88 FL (ref 78–100)
P AXIS - MUSE: 45 DEGREES
PLATELET # BLD AUTO: 247 10E3/UL (ref 150–450)
POTASSIUM SERPL-SCNC: 3.9 MMOL/L (ref 3.4–5.3)
PR INTERVAL - MUSE: 160 MS
PROT SERPL-MCNC: 5.2 G/DL (ref 6.4–8.3)
QRS DURATION - MUSE: 174 MS
QT - MUSE: 454 MS
QTC - MUSE: 523 MS
R AXIS - MUSE: -19 DEGREES
RBC # BLD AUTO: 4.38 10E6/UL (ref 4.4–5.9)
SODIUM SERPL-SCNC: 137 MMOL/L (ref 135–145)
SYSTOLIC BLOOD PRESSURE - MUSE: NORMAL MMHG
T AXIS - MUSE: 138 DEGREES
VENTRICULAR RATE- MUSE: 80 BPM
WBC # BLD AUTO: 9 10E3/UL (ref 4–11)

## 2024-04-03 PROCEDURE — 92526 ORAL FUNCTION THERAPY: CPT | Mod: GN

## 2024-04-03 PROCEDURE — 94640 AIRWAY INHALATION TREATMENT: CPT | Mod: 76

## 2024-04-03 PROCEDURE — 250N000013 HC RX MED GY IP 250 OP 250 PS 637

## 2024-04-03 PROCEDURE — 83735 ASSAY OF MAGNESIUM: CPT

## 2024-04-03 PROCEDURE — 250N000009 HC RX 250

## 2024-04-03 PROCEDURE — 250N000011 HC RX IP 250 OP 636

## 2024-04-03 PROCEDURE — 999N000157 HC STATISTIC RCP TIME EA 10 MIN

## 2024-04-03 PROCEDURE — 97535 SELF CARE MNGMENT TRAINING: CPT | Mod: GO

## 2024-04-03 PROCEDURE — 99232 SBSQ HOSP IP/OBS MODERATE 35: CPT | Mod: GC

## 2024-04-03 PROCEDURE — 36415 COLL VENOUS BLD VENIPUNCTURE: CPT

## 2024-04-03 PROCEDURE — 94640 AIRWAY INHALATION TREATMENT: CPT

## 2024-04-03 PROCEDURE — 250N000013 HC RX MED GY IP 250 OP 250 PS 637: Performed by: STUDENT IN AN ORGANIZED HEALTH CARE EDUCATION/TRAINING PROGRAM

## 2024-04-03 PROCEDURE — 85027 COMPLETE CBC AUTOMATED: CPT

## 2024-04-03 PROCEDURE — 80053 COMPREHEN METABOLIC PANEL: CPT

## 2024-04-03 PROCEDURE — 120N000002 HC R&B MED SURG/OB UMMC

## 2024-04-03 PROCEDURE — 94668 MNPJ CHEST WALL SBSQ: CPT

## 2024-04-03 RX ORDER — SPIRONOLACTONE 25 MG/1
25 TABLET ORAL DAILY
Qty: 30 TABLET | Refills: 0 | Status: SHIPPED | OUTPATIENT
Start: 2024-04-04 | End: 2024-04-24

## 2024-04-03 RX ORDER — LOSARTAN POTASSIUM 25 MG/1
12.5 TABLET ORAL EVERY EVENING
Qty: 30 TABLET | Refills: 0 | Status: SHIPPED | OUTPATIENT
Start: 2024-04-03 | End: 2024-04-24

## 2024-04-03 RX ORDER — IPRATROPIUM BROMIDE AND ALBUTEROL SULFATE 2.5; .5 MG/3ML; MG/3ML
3 SOLUTION RESPIRATORY (INHALATION)
Status: DISCONTINUED | OUTPATIENT
Start: 2024-04-03 | End: 2024-04-05 | Stop reason: HOSPADM

## 2024-04-03 RX ORDER — IPRATROPIUM BROMIDE AND ALBUTEROL SULFATE 2.5; .5 MG/3ML; MG/3ML
3 SOLUTION RESPIRATORY (INHALATION)
Status: COMPLETED | OUTPATIENT
Start: 2024-04-03 | End: 2024-04-03

## 2024-04-03 RX ORDER — DOXYCYCLINE 100 MG/1
100 CAPSULE ORAL EVERY 12 HOURS SCHEDULED
Qty: 8 CAPSULE | Refills: 0 | Status: DISCONTINUED | OUTPATIENT
Start: 2024-04-03 | End: 2024-04-05 | Stop reason: HOSPADM

## 2024-04-03 RX ORDER — AMPICILLIN AND SULBACTAM 2; 1 G/1; G/1
3 INJECTION, POWDER, FOR SOLUTION INTRAMUSCULAR; INTRAVENOUS EVERY 6 HOURS
Status: COMPLETED | OUTPATIENT
Start: 2024-04-03 | End: 2024-04-03

## 2024-04-03 RX ORDER — METOPROLOL SUCCINATE 25 MG/1
25 TABLET, EXTENDED RELEASE ORAL EVERY EVENING
Qty: 30 TABLET | Refills: 0 | Status: SHIPPED | OUTPATIENT
Start: 2024-04-03 | End: 2024-04-05

## 2024-04-03 RX ADMIN — BENZTROPINE MESYLATE 1 MG: 1 TABLET ORAL at 21:02

## 2024-04-03 RX ADMIN — Medication 25 MCG: at 08:40

## 2024-04-03 RX ADMIN — Medication 0.25 MG: at 21:02

## 2024-04-03 RX ADMIN — DIVALPROEX SODIUM 250 MG: 125 CAPSULE, COATED PELLETS ORAL at 18:27

## 2024-04-03 RX ADMIN — IPRATROPIUM BROMIDE AND ALBUTEROL SULFATE 3 ML: .5; 3 SOLUTION RESPIRATORY (INHALATION) at 16:41

## 2024-04-03 RX ADMIN — EMPAGLIFLOZIN 10 MG: 10 TABLET, FILM COATED ORAL at 08:37

## 2024-04-03 RX ADMIN — BUSPIRONE HYDROCHLORIDE 15 MG: 10 TABLET ORAL at 21:02

## 2024-04-03 RX ADMIN — SENNOSIDES 2 TABLET: 8.6 TABLET, FILM COATED ORAL at 08:42

## 2024-04-03 RX ADMIN — FLUOXETINE HYDROCHLORIDE 20 MG: 20 CAPSULE ORAL at 08:43

## 2024-04-03 RX ADMIN — IPRATROPIUM BROMIDE AND ALBUTEROL SULFATE 3 ML: .5; 3 SOLUTION RESPIRATORY (INHALATION) at 11:41

## 2024-04-03 RX ADMIN — PANTOPRAZOLE SODIUM 40 MG: 40 TABLET, DELAYED RELEASE ORAL at 08:40

## 2024-04-03 RX ADMIN — CLOZAPINE 200 MG: 200 TABLET ORAL at 13:19

## 2024-04-03 RX ADMIN — TAMSULOSIN HYDROCHLORIDE 0.8 MG: 0.4 CAPSULE ORAL at 08:43

## 2024-04-03 RX ADMIN — OLANZAPINE 15 MG: 10 TABLET, ORALLY DISINTEGRATING ORAL at 08:43

## 2024-04-03 RX ADMIN — Medication 12.5 MG: at 21:02

## 2024-04-03 RX ADMIN — GABAPENTIN 400 MG: 400 CAPSULE ORAL at 21:05

## 2024-04-03 RX ADMIN — AMPICILLIN SODIUM AND SULBACTAM SODIUM 3 G: 2; 1 INJECTION, POWDER, FOR SOLUTION INTRAMUSCULAR; INTRAVENOUS at 10:44

## 2024-04-03 RX ADMIN — GABAPENTIN 400 MG: 400 CAPSULE ORAL at 08:38

## 2024-04-03 RX ADMIN — DOXYCYCLINE HYCLATE 100 MG: 100 CAPSULE ORAL at 13:19

## 2024-04-03 RX ADMIN — DIVALPROEX SODIUM 250 MG: 125 CAPSULE, COATED PELLETS ORAL at 11:52

## 2024-04-03 RX ADMIN — RISPERIDONE 0.5 MG: 0.5 TABLET, ORALLY DISINTEGRATING ORAL at 21:02

## 2024-04-03 RX ADMIN — AMOXICILLIN AND CLAVULANATE POTASSIUM 1 TABLET: 875; 125 TABLET, FILM COATED ORAL at 21:06

## 2024-04-03 RX ADMIN — BUSPIRONE HYDROCHLORIDE 15 MG: 10 TABLET ORAL at 08:41

## 2024-04-03 RX ADMIN — RISPERIDONE 0.5 MG: 0.5 TABLET, ORALLY DISINTEGRATING ORAL at 08:38

## 2024-04-03 RX ADMIN — GABAPENTIN 400 MG: 400 CAPSULE ORAL at 13:19

## 2024-04-03 RX ADMIN — CLOZAPINE 400 MG: 200 TABLET ORAL at 22:37

## 2024-04-03 RX ADMIN — SPIRONOLACTONE 25 MG: 25 TABLET ORAL at 08:40

## 2024-04-03 RX ADMIN — BUSPIRONE HYDROCHLORIDE 15 MG: 10 TABLET ORAL at 13:19

## 2024-04-03 RX ADMIN — AMPICILLIN SODIUM AND SULBACTAM SODIUM 3 G: 2; 1 INJECTION, POWDER, FOR SOLUTION INTRAMUSCULAR; INTRAVENOUS at 04:54

## 2024-04-03 RX ADMIN — BENZTROPINE MESYLATE 1 MG: 1 TABLET ORAL at 08:39

## 2024-04-03 RX ADMIN — AMPICILLIN SODIUM AND SULBACTAM SODIUM 3 G: 2; 1 INJECTION, POWDER, FOR SOLUTION INTRAMUSCULAR; INTRAVENOUS at 17:09

## 2024-04-03 RX ADMIN — IPRATROPIUM BROMIDE AND ALBUTEROL SULFATE 3 ML: .5; 3 SOLUTION RESPIRATORY (INHALATION) at 20:26

## 2024-04-03 RX ADMIN — DIVALPROEX SODIUM 250 MG: 125 CAPSULE, COATED PELLETS ORAL at 08:41

## 2024-04-03 RX ADMIN — Medication 0.25 MG: at 08:37

## 2024-04-03 RX ADMIN — Medication 500 MCG: at 08:39

## 2024-04-03 RX ADMIN — OLANZAPINE 15 MG: 10 TABLET, ORALLY DISINTEGRATING ORAL at 21:02

## 2024-04-03 ASSESSMENT — ACTIVITIES OF DAILY LIVING (ADL)
ADLS_ACUITY_SCORE: 59
ADLS_ACUITY_SCORE: 63
ADLS_ACUITY_SCORE: 59
ADLS_ACUITY_SCORE: 59
ADLS_ACUITY_SCORE: 63
ADLS_ACUITY_SCORE: 59
ADLS_ACUITY_SCORE: 63
ADLS_ACUITY_SCORE: 59
ADLS_ACUITY_SCORE: 63
ADLS_ACUITY_SCORE: 63

## 2024-04-03 NOTE — PLAN OF CARE
Patient requires a standard 18x18 wheelchair with a cushion due to impaired mobility and increased risk of falls. Pt needs the wheelchair in order to complete safe mobility and participate in ADL.       Lara Shen MS, OTR/L, CLT

## 2024-04-03 NOTE — PROGRESS NOTES
"VS:  /77 (BP Location: Right arm)   Pulse 76   Temp 97.6  F (36.4  C) (Axillary)   Resp 16   Wt 74.2 kg (163 lb 9.3 oz)   SpO2 90%   BMI 23.68 kg/m     O2:  SOB sitting and Standing    Output:  Wet Brief / Dribble of Urine    Last BM:  Incontinent of Bowel and Bladder    Activity:  Assist 1 / Walker / Gait Belt   Up for meals?  Poor Appetite    Skin:  Redness on Coccyx / Barrier Cream applied   Pain:  Denies    CMS:  A&OX 2-3   Dressing:  None    Diet:  Low Saturated Fat / Low Sodium / No Caffeine    LDA:  Left PIV / SL   Equipment:  Personal Belongings / IV Pole   Plan:  POC   Additional Info:  Sitter 1:1      Provider ordered Orthostatic BP    Cough / Clear Sputum     Bladder Scan    External Cath / With History of retention    Withdrawn, Occasionally Aggressive    Positive for Paranoia, Homicidal, and Suicidal thoughts Patient states has \"No plans\"    Disorganized thought process.     Impaired Memory   "

## 2024-04-03 NOTE — PROGRESS NOTES
Care Management Follow Up    Length of Stay (days): 6    Expected Discharge Date: 04/04/2024     Concerns to be Addressed: discharge planning     Patient plan of care discussed at interdisciplinary rounds: Yes    Anticipated Discharge Disposition: Home, Transitional Care, Home Care     Anticipated Discharge Services: County Worker, Other (see comment) (Mental Health Committment Manager)  Anticipated Discharge DME: Curtis    Patient/family educated on Medicare website which has current facility and service quality ratings: no  Education Provided on the Discharge Plan: Yes  Patient/Family in Agreement with the Plan: unable to assess    Referrals Placed by CM/SW:    Private pay costs discussed: Not applicable    Additional Information:    Call to [facility] about Vinod's need for a hospital bed and a wheelchair.  They do work with his insurance.    Vicky Gonzalez, RN  Inpatient Care Coordinator  6 Med Surg  [phone], pager [phone]      For weekend social work needs, contact information below and available on [employer]:     SW Weekend [place] Pager ED, 5 MS, 5 ortho : [phone]  SW Weekend 6MS, 8A, 10ICU- Pager: [phone]     For weekend RN care coordinator needs (home discharge with needs including home care, assisted living facility returns, durable medical equip, IV antibiotics)   5 med/surg, 5 ortho, ED pager: [phone]  6 med/surg, 8 med/surg, 10 ICU pager: [phone]

## 2024-04-03 NOTE — PLAN OF CARE
Goal Outcome Evaluation:    Shift [id number]    Please see flowsheets and MAR for pt full assessment    VS: /63 (BP Location: Left arm)   Pulse 75   Temp 97.1  F (36.2  C) (Oral)   Resp 16   Wt 74.2 kg (163 lb 9.3 oz)   SpO2 93%   BMI 23.68 kg/m   Aferbile   O2: Stable on RA, SPO2 >90 % No SOB, No CP. Pt has been weaned off O2. Goal is to keep pt between 88-92%.    Output: Incontinent, Pt was bladder scanned at 2am for 720ml. Writer went to straight cath pt and pt had one episode of incon wet brief  and primofit urine output was 250ml at 3am and post void bladder scan was 290ml. Urine is gopi color and clear.    Last BM: 24 incon, ABD is soft round non-tender pt is passing flatus   Activity: Up with 2 assist using walker GB and pivots to chair. Pt Can turn side-to side on his own.   Skin: Intact- some redness to buttocks barrier cream applied   Pain: Denied pain on writers shift   CMS: A&Ox3 this morning. Pt was able to tell RN where he was, what date it was, and his name and . Pt is confused on situation. Denies NT   Dressing: N/A   Diet: Safe Tray- Soft/bite size, thin liquids, Low saturated fat <2400mg, No caffeine. Meds crushed with applesauce but pt asked not to crush meds   LDA: L PIV is CDI infusing NS TKO at 10ml/hr   Equipment: None   Plan: TBD, possible returning to group home   Additional Info: Sitter at bedside for safety, call light is within reach and pt is able to make he needs known. Pt is on RN managed Mag( 3.0) and K+ (4.1) no replacement needed on writers shift next lab draw is 6am          Plan of Care Reviewed With: patient    Overall Patient Progress: no change    Dakota Rangel RN

## 2024-04-03 NOTE — PROGRESS NOTES
"[facility]    [facility]       Date of Admission:  3/28/2024    Main Plans for Today   - discontinue IV unasyn and doxy, start PO augmentin and doxy  - Repeat duoneb  - Appreciate PT/OT assessment for discharge needs  - Encourage PO intake  - Prep discharge DME orders with plan for patient to return to group home with increased assistance needs    Patient does not have medical decision-making capacity    Assessment & Plan   Vinod Lee is a 63 year old male who has a history of asthma, COPD, SOPHIA, cavitary lesion of lung, paranoid schizophrenia, medication-induced Parkinsonism, HTN, GERD, BPH with urinary retention who was admitted for new onset heart failure in setting of suicidal ideation, AMS, electrolyte abnormalities and hypoxia, meeting SIRS criteria. Now improved, continues to have lingering weakness and cough.    # CAP with concern for aspiration  # COPD  # Cough/Dyspnea  # Hypoxia--resolved  # Leukocytosis--resolved  # Elevated CRP  # Medication-induced Parkinsonism  Met SIRS criteria on admit with a leukocytosis of 14.7 -> 15.6 -> 9.9 & neutrophil predominance, but without clear source of infection. RSV, flu, COVID negative, UA unremarkable, procal, lactate negative. CXRs without evidence of PNA, but with some mild pulm edema. Only notable symptoms on ROS cough, dyspnea, weakness - no belly pain, chest pain, head ache, open wounds. CTAP obtained 4/1 for concern for occult malignancy on prior imaging showed \"Bilateral groundglass and nodular opacities in the lung bases is new since prior chest CT, concerning for infection/aspiration.\" Aspiration risk present secondary to parkinsonism, antibiotic coverage started 4/2. Previously hypoxic requiring 3L O2, now satting 90% in context of COPD-of note hypoxia improved prior to starting antibiotics 4/2. Continues to have lingering cough. Will trial duoneb, consider saline nebs " if difficulty clearing secretions.  Antibiotics:  -PO Augmentin 875-125 BID (4/3 - )  -PO doxycycline 100mg BID (4/3 - )  -Unasyn 3g TID (4/2 - 4/3)  -IV doxycycline 100mg BID (4/2)  - Trial duoneb, continue saline neb  - daily CBC  - Titrate O2 between 88-92%    # New HFrEF (3/28, EF 25-30%)  # LBBB  # HTN  # Mild contraction alkalosis  # Prolonged Qtc (536 3/28)  Presented from group home with dyspnea, fatigue, weakness, cough. Echo on admission w/ newly reduced EF 25-30% & global hypokinesis, but euvolemic per echo. BNP 1410, trops negative, EKG with LBBB but reassuring against ischemic changes, CRP unremarkable, mild leukocytosis. No LE edema but CXR 3/30 and lung exam demonstrates bilateral fluid/rales. Exact etiology unclear, but in discussion with cardiology, highest suspicion is progression of chronic LBBB. Was initiated on GDMT and diuresed with IV lasix 40mg IV daily x3 days. He developed a mild contraction alkalosis 4/2 with improvement in clinical volume assessment, though cough was still persistent--diuresis discontinued.   - Cardiology consult, recs:  - Ischemia evaluation when psych stable; OK to do as outpatient  - Stress cardiac MRI vs. coronary CTA  - Up-titration of GDMT  - Continue Losartan 12.5mg daily  - Continue Metoprolol Succinate 25mg daily  - Start empagliflozin 10mg daily today  - Start spironolactone 25 mg daily  - Can be discharged on this regimen and follow up with cardiology as an outpatient  - Place cards referral on discharge  - Diet: Heart failure, salt restricted  - Nursing: Vitals q8h, strict I/O's, daily weights  - Mag & Phos standard replacement protocols    # Pulmonary nodules  CT chest (2/26) completed to assess pulmonary nodules with some concerning findings & recommended follow-up with PET CT for assessment of possible malignancy.  - Unasyn 3g q6h IV; doxy 100mg q12h IV (plan for 5 days total antibiotics, given repeat negative procal)  - Radiology curbsided; very strict  criteria for inpatient PET:   (1) Must change inpatient management (ie plan to start chemo)   (2) Must be too sick to discharge home & complete PET CT outpatient     # Paranoid Schizophrenia  # Medication Induced Parkinsonism  # Psychosis  # Suicidal Ideation  # Homicidal Ideation  # Prolonged QTc  Follows with an out-of-system psychiatrist, but per chart review, patient has a well-documented history of significant mental illness including paranoid schizophrenia on numerous therapies including clozapine and ECT. On this admission, patient has endorsed active suicidal and homicidal thoughts. Qtc prolonged to 523 in context of multiple Qtc prolonging medications, stable.  - Consider psychiatry consult if new questions  - To encourage eating, per patient request, OK for RN staff to take a bite of food to prove its not poison  - Suicide Precautions  - 1:1 sitter              - Safe utensils with food  - PTA Risperdal 0.5mg BID  - PTA Zyprexa 15 mg BID  - PTA Gabapentin 400 mg TID  - PTA Fluoxetine 20 mg daily  - PTA Depakote 250 mg TID  - PTA Clozapine 200mg Q2pm, 400 mg Qpm  - PTA Benztropine 1 mg BID  - PTA Buspar 15mg TID  - PTA Lorazepam 0.25mg BID PRN  - discontinue PTA Propanolol 30 mg Qam (in setting of new metoprolol)  - OT consult     # BPH  # Acute on Chronic Urinary Retention  Hx of requiring outpatient lowery for urinary retention and referrals placed for urology follow-up that did not occur. Has intermittently required straight cathing for scans >500ml, but has also had some output with the  - PTA Flomax 0.8 mg daily  - PRN Bladder Scan  - Straight cath for scans >500mL  - Consider Lowery placement/urology consult if needed    # Hyponatremia, chronic  # Hypochloremia  Has a history of severe, symptomatic hyponatremia to 122 (2/22-2/24 [facility] admission). Na 132 at outpatient hospital follow up 3/1/24. Here with mild hyponatremia to 130, suspect chronic of mixed etiology, predominately CHF. Volume  status appears overloaded. Urine Na/osm returned at 35/614 respectively, serum osms 268.   - Treat volume overload as above  - Daily BMP    # Dispo planning  # Surrogate decision making given lack of full capacity  In collateral collected from the Nursing Director of the patient's group home (Jovana) and conversation with his sister (Sandra) and mother (Alberta), some important context was gained regarding the patient's history. Firstly, Vinod has historically been kind, personable, and functionally independent (own apartment, hobbies, etc.) with well-managed symptoms of his schizophrenia that was initially diagnosed in his 20s. Approximately a year ago, following a medication change, his mental health deteriorated, leading to a 5-month hospitalization, civil commitment, determination that he did not have medical decision-making capacity by psych emiliano 7/18/23, and eventual disposition to a group home. In the setting of his court-ordered commitment, he has a CADI, Mental Health, & commitment . He does not currently have an advanced directive appointing a surrogate decision-maker, but in discussion with both his mother and his sister, Sandra, on 4/2 - Sandra agreed that she was best suited to represent his medical interests and agreed to be a surrogate decision-maker on Vinod's behalf.   Per Jovana, his current mental status is more or less consistent with this new baseline. This group home knows him well, is able to provide skilled care (employs CNAs), and has demonstrated commitment to meeting new skilled needs, should he have them on discharge (including increased assistance for transfers, higher staffing ratios, etc.).  - Strongly recommend discharge back to this group home (with additional supports if needed)  - RN CC working to secure resources to support group home  - SW consulted, in contact with  listed above to help facilitate process of formalizing Sandra as surrogate  - Hospital bed and wheelchair  ordered for discharge    Chronic/Stable:   # Hypocalcemia/Hypoalbuminemia: Ca corrected to WNL in context of hypoalbuminemia.  # Vitamin D Deficiency: PTA Vitamin D 25 mcg daily  # Arthritis: PTA Naproxen 250 mg BID  # GERD: PTA Protonix 40 mg daily  # Constipation: PTA Senna 2 tabs BID  # SOPHIA/COPD/Asthma: PTA Albuterol 2 puffs Q6hr         Diet: Combination Diet Safe Tray - with utensils; Soft and Bite Sized Diet (level 6); Thin Liquids (level 0); Low Saturated Fat Na <2400mg Diet, No Caffeine Diet    DVT Prophylaxis: Pneumatic Compression Devices  Barrios Catheter: Not present  Fluids: PO  Lines: None     Cardiac Monitoring: None  Code Status: Full Code      Clinically Significant Risk Factors              # Hypoalbuminemia: Lowest albumin = 3.2 g/dL at 3/30/2024  6:51 AM, will monitor as appropriate        # Acute heart failure with reduced ejection fraction: last echo with EF <40% and receiving IV diuretics           # Financial/Environmental Concerns: none          The patient's care was discussed with the Attending Physician, Dr. Angela Younger DO .    Farida Lindsay MD  [facility]  [facility]  Securely message with [employer]ENT[employer] (more info)  Text page via [facility] Paging/Directory   See signed in provider for up to date coverage information  ______________________________________________________________________    Interval History   Overnight: NAEON    Subj: Slightly more agitated this morning, threw a cup at nursing assistance. Expresses concerns about incontinence. Also reports dizziness with rolling over while working with OT.     Physical Exam   Vital Signs: Temp: 97.1  F (36.2  C) Temp src: Oral BP: 108/63 Pulse: 75   Resp: 16 SpO2: 93 % O2 Device: None (Room air)    Weight: 163 lbs 9.3 oz    Vitals reviewed.  Constitutional: awake, alert, in no distress  HENT: NCAT without lesions  Respiratory: Lungs clear and good air movement in the apexes,  slightly diminished breath sounds in the bases. No crackles or rales.   Cardiovascular: RRR without murmurs or extra sounds, radial pulses 2+ bilaterally  Abdomen: Soft, non-distended, non-tender, positive bowel sounds  Skin: Mild diaphoresis, warm, without lesions, erythema, rashes, or ecchymosis  Musculoskeletal: No joint/extremity redness, warmth, swelling, or tenderness.   Psychiatric:      Mood and Affect: Flat affect     Speech: Slowed, monotone     Behavior: Withdrawn     Thought Content: Positive for paranoia. Disorganized thought process.     Cognition and Memory: Impaired    Medical Decision Making       Please see A&P for additional details of medical decision making.      Data   ------------------------- PAST 24 HR DATA REVIEWED -----------------------------------------------    I have personally reviewed the following data over the past 24 hrs:    9.0  \   12.7 (L)   / 247     N/A N/A N/A /  N/A   N/A N/A N/A \     ALT: N/A AST: N/A AP: N/A TBILI: N/A   ALB: N/A TOT PROTEIN: N/A LIPASE: N/A     Procal: N/A CRP: N/A Lactic Acid: N/A         Imaging results reviewed over the past 24 hrs:   No results found for this or any previous visit (from the past 24 hour(s)).    Face-to-Face for Wheelchair DME Order:    1. The patient has mobility limitations that impairs their ability to participate in one or more mobility related activities: Yes, patient has mobility limitations that impairs ability to participate in mobility-related activities.  2. The patient's mobility limitations cannot be safely resolved by using a cane/walker: Yes, the patient's mobility limitations cannot be safely resolved with the use of a cane or walker.  3. The patients home has adequate access to use a manual wheelchair: Yes, patient's place of residence has adequate access for the use of a wheelchair, and doorways are wide enough.  4. The use of a manual wheelchair on a regular basis will improve the patients ability to participate in  mobility related ADL's at home: Yes, a wheelchair will improve patient's ability to participate in ADLs and mobility in home.  5. The patient is willing to use a manual wheelchair at home: Yes, the patient is willing to use a manual wheelchair at home.  6. The patient has adequate upper body strength and the mental capability to safely use a manual wheelchair and/or has a caregiver that is able to assist: Yes  7. Does the patient have a lower extremity injury or edema? No    The patient requires a standard due to weighing 163 pounds.      I, the undersigned, certify that a wheelchair is medically necessary for this patient and is both reasonable and necessary in reference to accepted standards of medical practice in the treatment of this patient's condition and is not prescribed as a convenience.    --------------------------------  Face-to-Face for Hospital Bed DME Order:  Hospital Bed Documentation:     Hospital bed is required for body positioning, to allow for safe transfers to wheelchair and standing and frequent changes in body position, not feasible in an ordinary bed.     1. Does the patient require positioning of the body in ways not feasible with an ordinary bed due to a medical condition that is expected to last at least 1 month? Yes (Please explain): Yes    2. Does the patient require, for the alleviation of pain, positioning of the body in ways not feasible with an ordinary bed? No     3. Does the patient require the head of the bed to be elevated more than 30 degrees most of the time due to congestive heart failure, chronic pulmonary disease, or aspiration? Yes    4. Does the patient require traction that can only be attached to a hospital bed? Yes    Additional Criteria:    Does the patient require frequent changes in body position and/or have an immediate need for change in body position? No      Trapeze Criteria:   1. Does patient need this device to sit up because of a respiratory condition, for  change in body position for other medical reasons, or to get in or out of bed? Yes    I, the undersigned, certify that the above prescribed supplies are medically necessary for this patient and is both reasonable and necessary in reference to accepted standards of medical practice in the treatment of this patient's condition and is not prescribed as a convenience.

## 2024-04-04 ENCOUNTER — TELEPHONE (OUTPATIENT)
Dept: CARDIOLOGY | Facility: CLINIC | Age: 64
End: 2024-04-04
Payer: COMMERCIAL

## 2024-04-04 LAB
ALBUMIN SERPL BCG-MCNC: 3.4 G/DL (ref 3.5–5.2)
ALP SERPL-CCNC: 69 U/L (ref 40–150)
ALT SERPL W P-5'-P-CCNC: 5 U/L (ref 0–70)
ANION GAP SERPL CALCULATED.3IONS-SCNC: 9 MMOL/L (ref 7–15)
AST SERPL W P-5'-P-CCNC: 13 U/L (ref 0–45)
BASOPHILS # BLD AUTO: 0.1 10E3/UL (ref 0–0.2)
BASOPHILS NFR BLD AUTO: 1 %
BILIRUB SERPL-MCNC: 0.3 MG/DL
BUN SERPL-MCNC: 19.6 MG/DL (ref 8–23)
CALCIUM SERPL-MCNC: 8.6 MG/DL (ref 8.8–10.2)
CHLORIDE SERPL-SCNC: 101 MMOL/L (ref 98–107)
CREAT SERPL-MCNC: 0.96 MG/DL (ref 0.67–1.17)
DEPRECATED HCO3 PLAS-SCNC: 28 MMOL/L (ref 22–29)
EGFRCR SERPLBLD CKD-EPI 2021: 88 ML/MIN/1.73M2
EOSINOPHIL # BLD AUTO: 0 10E3/UL (ref 0–0.7)
EOSINOPHIL NFR BLD AUTO: 0 %
ERYTHROCYTE [DISTWIDTH] IN BLOOD BY AUTOMATED COUNT: 13.2 % (ref 10–15)
GLUCOSE SERPL-MCNC: 95 MG/DL (ref 70–99)
HCT VFR BLD AUTO: 39.9 % (ref 40–53)
HGB BLD-MCNC: 12.9 G/DL (ref 13.3–17.7)
IMM GRANULOCYTES # BLD: 0.2 10E3/UL
IMM GRANULOCYTES NFR BLD: 3 %
LYMPHOCYTES # BLD AUTO: 1.3 10E3/UL (ref 0.8–5.3)
LYMPHOCYTES NFR BLD AUTO: 17 %
MAGNESIUM SERPL-MCNC: 2 MG/DL (ref 1.7–2.3)
MCH RBC QN AUTO: 28.7 PG (ref 26.5–33)
MCHC RBC AUTO-ENTMCNC: 32.3 G/DL (ref 31.5–36.5)
MCV RBC AUTO: 89 FL (ref 78–100)
MONOCYTES # BLD AUTO: 0.9 10E3/UL (ref 0–1.3)
MONOCYTES NFR BLD AUTO: 12 %
NEUTROPHILS # BLD AUTO: 5.4 10E3/UL (ref 1.6–8.3)
NEUTROPHILS NFR BLD AUTO: 67 %
NRBC # BLD AUTO: 0 10E3/UL
NRBC BLD AUTO-RTO: 0 /100
PLATELET # BLD AUTO: 225 10E3/UL (ref 150–450)
POTASSIUM SERPL-SCNC: 4.2 MMOL/L (ref 3.4–5.3)
PROT SERPL-MCNC: 5.3 G/DL (ref 6.4–8.3)
RBC # BLD AUTO: 4.49 10E6/UL (ref 4.4–5.9)
SODIUM SERPL-SCNC: 138 MMOL/L (ref 135–145)
WBC # BLD AUTO: 8 10E3/UL (ref 4–11)
WBC # BLD AUTO: 8 10E3/UL (ref 4–11)

## 2024-04-04 PROCEDURE — 94640 AIRWAY INHALATION TREATMENT: CPT

## 2024-04-04 PROCEDURE — 250N000013 HC RX MED GY IP 250 OP 250 PS 637

## 2024-04-04 PROCEDURE — 83735 ASSAY OF MAGNESIUM: CPT | Performed by: INTERNAL MEDICINE

## 2024-04-04 PROCEDURE — 250N000013 HC RX MED GY IP 250 OP 250 PS 637: Performed by: STUDENT IN AN ORGANIZED HEALTH CARE EDUCATION/TRAINING PROGRAM

## 2024-04-04 PROCEDURE — 85048 AUTOMATED LEUKOCYTE COUNT: CPT | Performed by: STUDENT IN AN ORGANIZED HEALTH CARE EDUCATION/TRAINING PROGRAM

## 2024-04-04 PROCEDURE — 80053 COMPREHEN METABOLIC PANEL: CPT

## 2024-04-04 PROCEDURE — 94640 AIRWAY INHALATION TREATMENT: CPT | Mod: 76

## 2024-04-04 PROCEDURE — 36415 COLL VENOUS BLD VENIPUNCTURE: CPT

## 2024-04-04 PROCEDURE — 85018 HEMOGLOBIN: CPT

## 2024-04-04 PROCEDURE — 120N000002 HC R&B MED SURG/OB UMMC

## 2024-04-04 PROCEDURE — 250N000009 HC RX 250

## 2024-04-04 PROCEDURE — 99232 SBSQ HOSP IP/OBS MODERATE 35: CPT | Mod: GC

## 2024-04-04 PROCEDURE — 94668 MNPJ CHEST WALL SBSQ: CPT

## 2024-04-04 PROCEDURE — 999N000157 HC STATISTIC RCP TIME EA 10 MIN

## 2024-04-04 RX ORDER — DOXYCYCLINE 100 MG/1
100 CAPSULE ORAL EVERY 12 HOURS
Qty: 8 CAPSULE | Refills: 0 | Status: SHIPPED | OUTPATIENT
Start: 2024-04-04 | End: 2024-04-05

## 2024-04-04 RX ADMIN — EMPAGLIFLOZIN 10 MG: 10 TABLET, FILM COATED ORAL at 08:50

## 2024-04-04 RX ADMIN — DIVALPROEX SODIUM 250 MG: 125 CAPSULE, COATED PELLETS ORAL at 17:41

## 2024-04-04 RX ADMIN — BENZTROPINE MESYLATE 1 MG: 1 TABLET ORAL at 08:51

## 2024-04-04 RX ADMIN — OLANZAPINE 15 MG: 10 TABLET, ORALLY DISINTEGRATING ORAL at 21:12

## 2024-04-04 RX ADMIN — DOXYCYCLINE HYCLATE 100 MG: 100 CAPSULE ORAL at 08:50

## 2024-04-04 RX ADMIN — BENZTROPINE MESYLATE 1 MG: 1 TABLET ORAL at 21:13

## 2024-04-04 RX ADMIN — AMOXICILLIN AND CLAVULANATE POTASSIUM 1 TABLET: 875; 125 TABLET, FILM COATED ORAL at 08:51

## 2024-04-04 RX ADMIN — DIVALPROEX SODIUM 250 MG: 125 CAPSULE, COATED PELLETS ORAL at 13:13

## 2024-04-04 RX ADMIN — SENNOSIDES 2 TABLET: 8.6 TABLET, FILM COATED ORAL at 21:12

## 2024-04-04 RX ADMIN — Medication 25 MCG: at 08:51

## 2024-04-04 RX ADMIN — TAMSULOSIN HYDROCHLORIDE 0.8 MG: 0.4 CAPSULE ORAL at 08:50

## 2024-04-04 RX ADMIN — BUSPIRONE HYDROCHLORIDE 15 MG: 10 TABLET ORAL at 08:51

## 2024-04-04 RX ADMIN — BUSPIRONE HYDROCHLORIDE 15 MG: 10 TABLET ORAL at 13:13

## 2024-04-04 RX ADMIN — GABAPENTIN 400 MG: 400 CAPSULE ORAL at 13:13

## 2024-04-04 RX ADMIN — Medication 0.25 MG: at 08:49

## 2024-04-04 RX ADMIN — RISPERIDONE 0.5 MG: 0.5 TABLET, ORALLY DISINTEGRATING ORAL at 08:50

## 2024-04-04 RX ADMIN — CLOZAPINE 200 MG: 200 TABLET ORAL at 13:13

## 2024-04-04 RX ADMIN — SPIRONOLACTONE 25 MG: 25 TABLET ORAL at 08:51

## 2024-04-04 RX ADMIN — IPRATROPIUM BROMIDE AND ALBUTEROL SULFATE 3 ML: .5; 3 SOLUTION RESPIRATORY (INHALATION) at 12:57

## 2024-04-04 RX ADMIN — FLUOXETINE HYDROCHLORIDE 20 MG: 20 CAPSULE ORAL at 08:50

## 2024-04-04 RX ADMIN — Medication 0.25 MG: at 21:12

## 2024-04-04 RX ADMIN — BUSPIRONE HYDROCHLORIDE 15 MG: 10 TABLET ORAL at 21:13

## 2024-04-04 RX ADMIN — Medication 500 MCG: at 08:51

## 2024-04-04 RX ADMIN — CLOZAPINE 400 MG: 200 TABLET ORAL at 21:12

## 2024-04-04 RX ADMIN — IPRATROPIUM BROMIDE AND ALBUTEROL SULFATE 3 ML: .5; 3 SOLUTION RESPIRATORY (INHALATION) at 19:26

## 2024-04-04 RX ADMIN — OLANZAPINE 15 MG: 10 TABLET, ORALLY DISINTEGRATING ORAL at 08:49

## 2024-04-04 RX ADMIN — AMOXICILLIN AND CLAVULANATE POTASSIUM 1 TABLET: 875; 125 TABLET, FILM COATED ORAL at 21:12

## 2024-04-04 RX ADMIN — PANTOPRAZOLE SODIUM 40 MG: 40 TABLET, DELAYED RELEASE ORAL at 08:51

## 2024-04-04 RX ADMIN — Medication 12.5 MG: at 21:12

## 2024-04-04 RX ADMIN — IPRATROPIUM BROMIDE AND ALBUTEROL SULFATE 3 ML: .5; 3 SOLUTION RESPIRATORY (INHALATION) at 17:28

## 2024-04-04 RX ADMIN — GABAPENTIN 400 MG: 400 CAPSULE ORAL at 21:13

## 2024-04-04 RX ADMIN — METOPROLOL SUCCINATE 25 MG: 25 TABLET, FILM COATED, EXTENDED RELEASE ORAL at 21:13

## 2024-04-04 RX ADMIN — GABAPENTIN 400 MG: 400 CAPSULE ORAL at 08:51

## 2024-04-04 RX ADMIN — SENNOSIDES 2 TABLET: 8.6 TABLET, FILM COATED ORAL at 08:50

## 2024-04-04 RX ADMIN — IPRATROPIUM BROMIDE AND ALBUTEROL SULFATE 3 ML: .5; 3 SOLUTION RESPIRATORY (INHALATION) at 09:10

## 2024-04-04 RX ADMIN — RISPERIDONE 0.5 MG: 0.5 TABLET, ORALLY DISINTEGRATING ORAL at 21:13

## 2024-04-04 RX ADMIN — DOXYCYCLINE HYCLATE 100 MG: 100 CAPSULE ORAL at 21:13

## 2024-04-04 RX ADMIN — DIVALPROEX SODIUM 250 MG: 125 CAPSULE, COATED PELLETS ORAL at 08:49

## 2024-04-04 ASSESSMENT — ACTIVITIES OF DAILY LIVING (ADL)
ADLS_ACUITY_SCORE: 63
ADLS_ACUITY_SCORE: 63
ADLS_ACUITY_SCORE: 58
ADLS_ACUITY_SCORE: 63
ADLS_ACUITY_SCORE: 58
ADLS_ACUITY_SCORE: 58
ADLS_ACUITY_SCORE: 63
ADLS_ACUITY_SCORE: 58
ADLS_ACUITY_SCORE: 63
ADLS_ACUITY_SCORE: 58
ADLS_ACUITY_SCORE: 63
ADLS_ACUITY_SCORE: 63

## 2024-04-04 NOTE — TELEPHONE ENCOUNTER
[facility] Call Center    Phone Message    May a detailed message be left on voicemail: yes     Reason for Call: Appointment Intake    Referring Provider Name:     Farida Lindsay MD     Diagnosis and/or Symptoms: Chronic systolic heart failure (H) [I50.22]     NEW HF, please assist patient for an appointment.     Action Taken: Message routed to:  Adult Clinics: Cardiology p [id number]    Travel Screening: Not Applicable

## 2024-04-04 NOTE — PROGRESS NOTES
Care Management Follow Up    Length of Stay (days): 7    Expected Discharge Date: 04/04/2024     Concerns to be Addressed: discharge planning     Patient plan of care discussed at interdisciplinary rounds: Yes    Anticipated Discharge Disposition: Home, Transitional Care, Home Care     Anticipated Discharge Services: County Worker, Other (see comment) (Mental Health Committment Manager)  Anticipated Discharge DME: [facility]    Patient/family educated on Medicare website which has current facility and service quality ratings: no  Education Provided on the Discharge Plan: Yes  Patient/Family in Agreement with the Plan: unable to assess    Referrals Placed by CM/ZAIRA:    Private pay costs discussed: Not applicable    Additional Information:    Call to Latosha to inform her that we are working on getting the equipment for Vinod. She did say that it does not need to be in place in order for him to discharge.    Contacts for Vinod:  [facility] [phone], SHIV Reina    Orders emailed (encrypted) over to [facility] with supporting documentation for a hospital bed and a wheelchair.  Call from [facility] and they have all they need for the wheelchair and hospital bed.  They will coordinate with KENNY Reina Care Coordinator with the group [place].    Sent referral to [facility] for PT/OT.  Accepted by [facility] for home care.   Orders added.    Stretcher ride scheduled for  between 10:08-10:55 am.        Vicky Gonzalez RN  Inpatient Care Coordinator  6 Med Surg  [phone], pager [phone]      For weekend social work needs, contact information below and available on [facility]:     SW Weekend [place] Pager ED, 5 MS, 5 ortho : [phone]   Weekend 6MS, 8A, 10ICU- Pager: [phone]     For weekend RN care coordinator needs (home discharge with needs including home care, assisted living facility returns, durable medical equip, IV antibiotics)   5 med/surg, 5 ortho, ED pager: [phone]  6 med/surg, 8 med/surg,  10 ICU pager: [phone]

## 2024-04-04 NOTE — PROGRESS NOTES
Shift [id number]:History of COPD SOPHIA Paranoid Schizophrenia, HTN, GERD, BPH with Urinary Retention Admitted with Hypoxia(history of COPD) which has improved O2 saturations in the 90's while on RA. Now has continued weakness and cough    Summary:Hypoxia improved No 02 required   VS:       Pt A/O X 3. Afebrile. VSS. Lungs- Clear bilaterally . Denies nausea, shortness of breath, and chest pain.     Output:       Bowels- + in all four quadrants.Incontinent of urine.   Uses a brief History of urinary retention  Bladder scanned for 300cc @0200 and Zero @0600 Incontinent of a large amount of urine @0600   Activity:       Pt uses a wheelchair .Uses 1-2 assist with walker or gait belt  Pt can turn side to side by himself   Skin:   No skin issues Weight shifting .Redness on Coccyx      Pain:       No pain issues.      CMS:       CMS and Neuro's are intact. Denies numbness and tingling in all extremities.   Has a history of Paranoia, Suicidal Thoughts and Homicidal thoughts Pt has not expressed any of the above No Aggressive behavior Pt has a Flat affect slow monotone speech  Cooperative with cares   Dressing:       No dressings.      Diet:       Pt is on a Low Sodium diet/Low Saturated Fat/No Caffeine Limited fluid restriction        LDA:       PIV is patent in the Left Lower Arm .      Equipment:        PCD's on BLE's. Bilateral heels are elevated off the bed.     Plan:       Pt is able to make needs known with the 1:1  Continue to monitor.    Pt has a 1:1 at bedside    Additional Info:        .

## 2024-04-04 NOTE — PROGRESS NOTES
"[facility]    Progress Note - [facility]       Date of Admission:  3/28/2024    Main Plans for Today   - Continue antibiotics, duonebs  - Medically ready for discharge, organizing appropriate discharge orders (wheelchair, hospital bed for safe discharge)    Patient does not have medical decision-making capacity    Assessment & Plan   Vinod Lee is a 63 year old male who has a history of asthma, COPD, SOPHIA, cavitary lesion of lung, paranoid schizophrenia, medication-induced Parkinsonism, HTN, GERD, BPH with urinary retention who was admitted for new onset heart failure in setting of suicidal ideation, AMS, electrolyte abnormalities and hypoxia, meeting SIRS criteria. Now improved, continues to have lingering weakness and cough.    Medically ready for discharge pending safe discharge plan.    # CAP with concern for aspiration  # COPD  # Cough/Dyspnea  # Hypoxia--resolved  # Leukocytosis--resolved  # Elevated CRP  # Medication-induced Parkinsonism  Met SIRS criteria on admit with a leukocytosis of 14.7 -> 15.6 -> 9.9 & neutrophil predominance, but without clear source of infection. RSV, flu, COVID negative, UA unremarkable, procal, lactate negative. CXRs without evidence of PNA, but with some mild pulm edema. Only notable symptoms on ROS cough, dyspnea, weakness - no belly pain, chest pain, head ache, open wounds. CTAP obtained 4/1 for concern for occult malignancy on prior imaging showed \"Bilateral groundglass and nodular opacities in the lung bases is new since prior chest CT, concerning for infection/aspiration.\" Aspiration risk present secondary to parkinsonism, antibiotic coverage started 4/2. Previously hypoxic requiring 3L O2, now satting 90% in context of COPD. Lingering cough improved with duoneb.   Antibiotics:  -PO Augmentin 875-125 BID (4/3 - ), should be continued until 4/6  -PO doxycycline 100mg BID (4/3 - ), should be continued " until 4/6  -Unasyn 3g TID (4/2 - 4/3)  -IV doxycycline 100mg BID (4/2)  - Duoneb q6h  - daily CBC  - Titrate O2 between 88-92%    # New HFrEF (3/28, EF 25-30%)  # LBBB  # HTN  # Mild contraction alkalosis--resolved  # Prolonged Qtc (536 3/28)  Presented from group home with dyspnea, fatigue, weakness, cough. Echo on admission w/ newly reduced EF 25-30% & global hypokinesis, but euvolemic per echo. BNP 1410, trops negative, EKG with LBBB but reassuring against ischemic changes, CRP unremarkable, mild leukocytosis. No LE edema but CXR 3/30 and lung exam demonstrates bilateral fluid/rales. Exact etiology unclear, but in discussion with cardiology, highest suspicion is progression of chronic LBBB. Was initiated on GDMT and diuresed with IV lasix 40mg IV daily x3 days. He developed a mild contraction alkalosis 4/2 with improvement in clinical volume assessment, though cough was still persistent--diuresis discontinued.   - Cardiology consult, recs:  - Ischemia evaluation when psych stable; OK to do as outpatient  - Stress cardiac MRI vs. coronary CTA  - GDMT  - Continue Losartan 12.5mg daily  - Continue Metoprolol Succinate 25mg daily  - empagliflozin 10mg daily   - spironolactone 25 mg daily   - Can be discharged on this regimen and follow up with cardiology as an outpatient  - Place cards referral on discharge  - Diet: Heart failure, salt restricted  - Nursing: Vitals q8h, strict I/O's, daily weights  - Mag & Phos standard replacement protocols    # Pulmonary nodules  CT chest (2/26) completed to assess pulmonary nodules with some concerning findings & recommended follow-up with PET CT for assessment of possible malignancy. Not a candidate for inpt PET.   -PET scan outpatient     # Paranoid Schizophrenia  # Medication Induced Parkinsonism  # Psychosis  # Suicidal Ideation  # Homicidal Ideation  # Prolonged QTc  Follows with an out-of-system psychiatrist, but per chart review, patient has a well-documented history of  significant mental illness including paranoid schizophrenia on numerous therapies including clozapine and ECT. On this admission, patient has endorsed active suicidal and homicidal thoughts. Qtc prolonged to 523 in context of multiple Qtc prolonging medications, stable.  - Consider psychiatry consult if new questions  - To encourage eating, per patient request, OK for RN staff to take a bite of food to prove its not poison  - Suicide Precautions  - 1:1 sitter              - Safe utensils with food  - PTA Risperdal 0.5mg BID  - PTA Zyprexa 15 mg BID  - PTA Gabapentin 400 mg TID  - PTA Fluoxetine 20 mg daily  - PTA Depakote 250 mg TID  - PTA Clozapine 200mg Q2pm, 400 mg Qpm  - PTA Benztropine 1 mg BID  - PTA Buspar 15mg TID  - PTA Lorazepam 0.25mg BID PRN  - discontinue PTA Propanolol 30 mg Qam (in setting of new metoprolol)  - OT consult     # BPH  # Acute on Chronic Urinary Retention  Hx of requiring outpatient lowery for urinary retention and referrals placed for urology follow-up that did not occur. Has intermittently required straight cathing for scans >500ml, none in past several days.   - PTA Flomax 0.8 mg daily  - PRN Bladder Scan  - Straight cath for scans >500mL  - Consider Lowery placement/urology consult if needed    # Hyponatremia, chronic, resolved   Has a history of symptomatic hyponatremia to 122 (2/22-2/24 [facility] admission). Admitted with mild hyponatremia to 130, suspect chronic of mixed etiology, predominately CHF.   - Treat volume overload as above  - Daily BMP    # Dispo planning  # Surrogate decision making given lack of full capacity  In collateral collected from the Nursing Director of the patient's group home (\[facility]") and conversation with his sister (Sandra) and mother (Alberta), some important context was gained regarding the patient's history. Firstly, Vinod has historically been kind, personable, and functionally independent (own apartment, hobbies, etc.) with well-managed symptoms of his  schizophrenia that was initially diagnosed in his 20s. Approximately a year ago, following a medication change, his mental health deteriorated, leading to a 5-month hospitalization, civil commitment, determination that he did not have medical decision-making capacity by rafi cummings 7/18/23, and eventual disposition to a group home. In the setting of his court-ordered commitment, he has a CADI, Mental Health, & commitment . He does not currently have an advanced directive appointing a surrogate decision-maker, but in discussion with both his mother and his sister, Sandra, on 4/2 - Sandra agreed that she was best suited to represent his medical interests and agreed to be a surrogate decision-maker on Vinod's behalf.   Per Jovana, his current mental status is more or less consistent with this new baseline. This group home knows him well, is able to provide skilled care (employs CNAs), and has demonstrated commitment to meeting new skilled needs, should he have them on discharge (including increased assistance for transfers, higher staffing ratios, etc.).  - Strongly recommend discharge back to this group home (with additional supports if needed)  - SW consulted, in contact with  listed above to help facilitate process of formalizing Sandra as surrogate  - Hospital bed and wheelchair ordered for discharge    Chronic/Stable:   # Hypocalcemia/Hypoalbuminemia: Ca corrected to WNL in context of hypoalbuminemia.  # Vitamin D Deficiency: PTA Vitamin D 25 mcg daily  # Arthritis: PTA Naproxen 250 mg BID  # GERD: PTA Protonix 40 mg daily  # Constipation: PTA Senna 2 tabs BID  # SOPHIA/COPD/Asthma: PTA Albuterol 2 puffs Q6hr         Diet: Combination Diet Safe Tray - with utensils; Soft and Bite Sized Diet (level 6); Thin Liquids (level 0); Low Saturated Fat Na <2400mg Diet, No Caffeine Diet  Diet    DVT Prophylaxis: Pneumatic Compression Devices  Barrios Catheter: Not present  Fluids: PO  Lines: None     Cardiac  Monitoring: None  Code Status: Full Code      Face-to-Face for Wheelchair DME Order:     1. The patient has mobility limitations that impairs their ability to participate in one or more mobility related activities: Yes, patient has mobility limitations that impairs ability to participate in mobility-related activities.  2. The patient's mobility limitations cannot be safely resolved by using a cane/walker: Yes, the patient's mobility limitations cannot be safely resolved with the use of a cane or walker.  3. The patients home has adequate access to use a manual wheelchair: Yes, patient's place of residence has adequate access for the use of a wheelchair, and doorways are wide enough.  4. The use of a manual wheelchair on a regular basis will improve the patients ability to participate in mobility related ADL's at home: Yes, a wheelchair will improve patient's ability to participate in ADLs and mobility in home.  5. The patient is willing to use a manual wheelchair at home: Yes, the patient is willing to use a manual wheelchair at home.  6. The patient has adequate upper body strength and the mental capability to safely use a manual wheelchair and/or has a caregiver that is able to assist: Yes  7. Does the patient have a lower extremity injury or edema? No     The patient requires a standard due to weighing 163 pounds.        I, the undersigned, certify that a wheelchair is medically necessary for this patient and is both reasonable and necessary in reference to accepted standards of medical practice in the treatment of this patient's condition and is not prescribed as a convenience.     --------------------------------  Face-to-Face for Hospital Bed DME Order:  Hospital Bed Documentation:     Hospital bed is required for body positioning, to allow for safe transfers to wheelchair and standing and frequent changes in body position, not feasible in an ordinary bed.     1. Does the patient require positioning of the  body in ways not feasible with an ordinary bed due to a medical condition that is expected to last at least 1 month? Yes (Please explain): Yes    2. Does the patient require, for the alleviation of pain, positioning of the body in ways not feasible with an ordinary bed? No     3. Does the patient require the head of the bed to be elevated more than 30 degrees most of the time due to congestive heart failure, chronic pulmonary disease, or aspiration? Yes     4. Does the patient require traction that can only be attached to a hospital bed? Yes    Additional Criteria:    Does the patient require frequent changes in body position and/or have an immediate need for change in body position? No      Trapeze Criteria:   1. Does patient need this device to sit up because of a respiratory condition, for change in body position for other medical reasons, or to get in or out of bed? Yes    I, the undersigned, certify that the above prescribed supplies are medically necessary for this patient and is both reasonable and necessary in reference to accepted standards of medical practice in the treatment of this patient's condition and is not prescribed as a convenience.              Clinically Significant Risk Factors              # Hypoalbuminemia: Lowest albumin = 3.2 g/dL at 3/30/2024  6:51 AM, will monitor as appropriate        # Chronic heart failure with reduced ejection fraction: last echo with EF <40%           # Financial/Environmental Concerns: none          The patient's care was discussed with the Attending Physician, Dr. Angela Younger DO .    Farida Lindsay MD  [facility]  [facility]  Securely message with [employer] (more info)  Text page via [employer] Paging/Directory   See signed in provider for up to date coverage information  ______________________________________________________________________    Interval History   Overnight: Refused doxycycline and  "losartan overnight. Did take augmentin. Took antibiotics this morning    Subj: Feeling sleep this morning. Reports his breathing feels \"heavy.\" Otherwise feeling well. Looking forward to discharging to his group home.    Physical Exam   Vital Signs: Temp: 97.6  F (36.4  C) Temp src: Oral BP: 118/65 Pulse: 76   Resp: 17 SpO2: 92 % O2 Device: None (Room air)    Weight: 163 lbs 9.3 oz    Vitals reviewed.  Constitutional: awake, alert, in no distress  HENT: NCAT without lesions  Respiratory: Minimal air movement heard secondary to poor inspiratory effort by patient. No crackles, rales, rhonchi or wheezing  Cardiovascular: RRR without murmurs or extra sounds, radial pulses 2+ bilaterally  Abdomen: Soft, non-distended, non-tender, positive bowel sounds  Skin: Mild diaphoresis, warm, without lesions, erythema, rashes, or ecchymosis  Musculoskeletal: No joint/extremity redness, warmth, swelling, or tenderness.   Psychiatric:      Mood and Affect: Flat affect     Speech: Slowed, monotone     Behavior: Withdrawn     Thought Content: Positive for paranoia. Disorganized thought process.     Cognition and Memory: Impaired    Medical Decision Making       Please see A&P for additional details of medical decision making.      Data   ------------------------- PAST 24 HR DATA REVIEWED -----------------------------------------------    I have personally reviewed the following data over the past 24 hrs:    8.0; 8.0  \   12.9 (L)   / 225     138 101 19.6 /  95   4.2 28 0.96 \     ALT: 5 AST: 13 AP: 69 TBILI: 0.3   ALB: 3.4 (L) TOT PROTEIN: 5.3 (L) LIPASE: N/A       Imaging results reviewed over the past 24 hrs:   No results found for this or any previous visit (from the past 24 hour(s)).    "

## 2024-04-04 NOTE — PLAN OF CARE
Goal Outcome Evaluation:      Plan of Care Reviewed With: patient    Overall Patient Progress: no changeOverall Patient Progress: no change     Discharge Plan: TBD       VS: /64 (BP Location: Right arm)   Pulse 75   Temp 97.8  F (36.6  C) (Axillary)   Resp 17   Wt 74.2 kg (163 lb 9.3 oz)   SpO2 91%   BMI 23.68 kg/m       O2: Sating >95% on RA, denies SOB/Chest pain. Denies N/V. Afebrile this shift   Output: Incontinent of B/B, Bladder scan x2 this shift    Last BM: 4/3, bowel sounds normoactive x4   Activity: Did not get OOB   Up for meals? No   Skin: All visible skin intact   Pain: Denies pain   CMS: AO x3, disoriented to situation. Denies N/T   Dressing: None   Diet: Regular diet, thin liquids    LDA: L. PIV/SL   Equipment:  IV pole, personal belongings   Plan: Call light within reach, bed in a low position. Able to make needs known. Continue to monitor with POC.   Additional Info:  Continues to be 1:1, No behaviors noted.

## 2024-04-04 NOTE — PROGRESS NOTES
Occupational Therapy Discharge Summary    Reason for therapy discharge:    Discharged to home.    Progress towards therapy goal(s). See goals on Care Plan in [facility] electronic health record for goal details.  Goals not met.  Barriers to achieving goals:   limited tolerance for therapy.    Therapy recommendation(s):    No further therapy is recommended.

## 2024-04-04 NOTE — DISCHARGE INSTRUCTIONS
A hospital bed and a wheelchair were ordered for you from:    [facility]  [address] [place] [address], [place] [address]     [phone]    Please take your antibiotics, Augmentin and doxycycline, for today 4/5 and tomorrow 4/6. Take one dose of each antibiotic this evening, one dose of each antibiotic tomorrow morning, and your last dose of each antibiotic tomorrow evening.    Your propranolol was discontinued and you were started on 4 new medications for your heart: metoprolol, losartan, empagliflozin and spironolactone.  Please follow up with cardiology regarding your new heart failure. A referral was placed for you.    A referral was placed to urology to follow up regarding your urinary retention. Please follow up with them.

## 2024-04-04 NOTE — PLAN OF CARE
"  Problem: Adult Inpatient Plan of Care  Goal: Plan of Care Review  Description: The Plan of Care Review/Shift note should be completed every shift.  The Outcome Evaluation is a brief statement about your assessment that the patient is improving, declining, or no change.  This information will be displayed automatically on your shift  note.  Outcome: Progressing  Flowsheets (Taken 4/3/2024 1959)  Outcome Evaluation: Pt remains 1:1 C-SSRS continues to be low. Pt is A&O X 3 forgetful. Flat affect. Pt had some trouble sitting up in bed unassisted and needed to lay down. Pt was able to take medication whole and refused to have crushed. incontenent of B&B. Plan is to d/c back to group home 4/4/24. no new conders. continue POC  Plan of Care Reviewed With: patient  Overall Patient Progress: no change  Goal: Patient-Specific Goal (Individualized)  Description: You can add care plan individualizations to a care plan. Examples of Individualization might be:  \"Parent requests to be called daily at 9am for status\", \"I have a hard time hearing out of my right ear\", or \"Do not touch me to wake me up as it startles  me\".  Outcome: Progressing  Goal: Absence of Hospital-Acquired Illness or Injury  Outcome: Progressing  Intervention: Identify and Manage Fall Risk  Recent Flowsheet Documentation  Taken 4/3/2024 1615 by Belkys Becerra, RN  Safety Promotion/Fall Prevention:   activity supervised   assistive device/personal items within reach   bedside attendant  Intervention: Prevent Skin Injury  Recent Flowsheet Documentation  Taken 4/3/2024 1615 by Belkys Becerra, RN  Body Position: weight shifting  Skin Protection:   adhesive use limited   pulse oximeter probe site changed   skin to device areas padded  Device Skin Pressure Protection: absorbent pad utilized/changed  Goal: Optimal Comfort and Wellbeing  Outcome: Progressing  Goal: Readiness for Transition of Care  Outcome: Progressing   Goal Outcome Evaluation:      Plan of Care " "Reviewed With: patient    Overall Patient Progress: no changeOverall Patient Progress: no change    Outcome Evaluation: Pt remains 1:1 C-SSRS continues to be low. Pt is A&O X 3 forgetful. Flat affect. Pt had some trouble sitting up in bed unassisted and needed to lay down. Pt was able to take medication whole and refused to have crushed. incontenent of B&B. Plan is to d/c back to group home 4/4/24. no new conders. continue POC.At 2106 patient stated \"I just think you are trying to kill me with all this poison medication\" writer stated \" sounds like you are unsure about the safety of your medication\" Pt stated \"only them ones\" Pt refused BP medication, antibiotic and stool softener. Writer provided education on the importance of staying on antibiotics pt replied \" Well you aren't going to kill me\" Writer stated \" I would never give you something that could harm you\" Pt stated time for you to go. Will continue POC.      "

## 2024-04-04 NOTE — PROGRESS NOTES
Face-to-Face for Wheelchair DME Order:     1. The patient has mobility limitations that impairs their ability to participate in one or more mobility related activities: Yes, patient has mobility limitations that impairs ability to participate in mobility-related activities.  2. The patient's mobility limitations cannot be safely resolved by using a cane/walker: Yes, the patient's mobility limitations cannot be safely resolved with the use of a cane or walker.  3. The patients home has adequate access to use a manual wheelchair: Yes, patient's place of residence has adequate access for the use of a wheelchair, and doorways are wide enough.  4. The use of a manual wheelchair on a regular basis will improve the patients ability to participate in mobility related ADL's at home: Yes, a wheelchair will improve patient's ability to participate in ADLs and mobility in home.  5. The patient is willing to use a manual wheelchair at home: Yes, the patient is willing to use a manual wheelchair at home.  6. The patient has adequate upper body strength and the mental capability to safely use a manual wheelchair and/or has a caregiver that is able to assist: Yes  7. Does the patient have a lower extremity injury or edema? No     The patient requires a standard due to weighing 163 pounds.        I, the undersigned, certify that a wheelchair is medically necessary for this patient and is both reasonable and necessary in reference to accepted standards of medical practice in the treatment of this patient's condition and is not prescribed as a convenience.     --------------------------------  Face-to-Face for Hospital Bed DME Order:  Hospital Bed Documentation:     Hospital bed is required for body positioning, to allow for safe transfers to wheelchair and standing and frequent changes in body position, not feasible in an ordinary bed.     1. Does the patient require positioning of the body in ways not feasible with an ordinary bed  due to a medical condition that is expected to last at least 1 month? Yes (Please explain): Yes    2. Does the patient require, for the alleviation of pain, positioning of the body in ways not feasible with an ordinary bed? No     3. Does the patient require the head of the bed to be elevated more than 30 degrees most of the time due to congestive heart failure, chronic pulmonary disease, or aspiration? Yes     4. Does the patient require traction that can only be attached to a hospital bed? Yes    Additional Criteria:    Does the patient require frequent changes in body position and/or have an immediate need for change in body position? No      Trapeze Criteria:   1. Does patient need this device to sit up because of a respiratory condition, for change in body position for other medical reasons, or to get in or out of bed? Yes    I, the undersigned, certify that the above prescribed supplies are medically necessary for this patient and is both reasonable and necessary in reference to accepted standards of medical practice in the treatment of this patient's condition and is not prescribed as a convenience.

## 2024-04-05 ENCOUNTER — APPOINTMENT (OUTPATIENT)
Dept: SPEECH THERAPY | Facility: CLINIC | Age: 64
DRG: 291 | End: 2024-04-05
Payer: COMMERCIAL

## 2024-04-05 VITALS
WEIGHT: 166.67 LBS | HEART RATE: 83 BPM | DIASTOLIC BLOOD PRESSURE: 65 MMHG | OXYGEN SATURATION: 90 % | SYSTOLIC BLOOD PRESSURE: 112 MMHG | TEMPERATURE: 98 F | RESPIRATION RATE: 22 BRPM | BODY MASS INDEX: 24.13 KG/M2

## 2024-04-05 LAB
ALBUMIN SERPL BCG-MCNC: 3.4 G/DL (ref 3.5–5.2)
ALP SERPL-CCNC: 68 U/L (ref 40–150)
ALT SERPL W P-5'-P-CCNC: 5 U/L (ref 0–70)
ANION GAP SERPL CALCULATED.3IONS-SCNC: 8 MMOL/L (ref 7–15)
AST SERPL W P-5'-P-CCNC: 11 U/L (ref 0–45)
BILIRUB SERPL-MCNC: 0.3 MG/DL
BUN SERPL-MCNC: 16.8 MG/DL (ref 8–23)
CALCIUM SERPL-MCNC: 8.9 MG/DL (ref 8.8–10.2)
CHLORIDE SERPL-SCNC: 101 MMOL/L (ref 98–107)
CREAT SERPL-MCNC: 0.92 MG/DL (ref 0.67–1.17)
DEPRECATED HCO3 PLAS-SCNC: 27 MMOL/L (ref 22–29)
EGFRCR SERPLBLD CKD-EPI 2021: >90 ML/MIN/1.73M2
ERYTHROCYTE [DISTWIDTH] IN BLOOD BY AUTOMATED COUNT: 13.2 % (ref 10–15)
GLUCOSE SERPL-MCNC: 94 MG/DL (ref 70–99)
HCT VFR BLD AUTO: 42.9 % (ref 40–53)
HGB BLD-MCNC: 14 G/DL (ref 13.3–17.7)
MAGNESIUM SERPL-MCNC: 1.9 MG/DL (ref 1.7–2.3)
MCH RBC QN AUTO: 28.9 PG (ref 26.5–33)
MCHC RBC AUTO-ENTMCNC: 32.6 G/DL (ref 31.5–36.5)
MCV RBC AUTO: 89 FL (ref 78–100)
PLATELET # BLD AUTO: 229 10E3/UL (ref 150–450)
POTASSIUM SERPL-SCNC: 4.6 MMOL/L (ref 3.4–5.3)
PROT SERPL-MCNC: 5.5 G/DL (ref 6.4–8.3)
RBC # BLD AUTO: 4.85 10E6/UL (ref 4.4–5.9)
SODIUM SERPL-SCNC: 136 MMOL/L (ref 135–145)
WBC # BLD AUTO: 10.4 10E3/UL (ref 4–11)

## 2024-04-05 PROCEDURE — 83735 ASSAY OF MAGNESIUM: CPT | Performed by: STUDENT IN AN ORGANIZED HEALTH CARE EDUCATION/TRAINING PROGRAM

## 2024-04-05 PROCEDURE — 999N000157 HC STATISTIC RCP TIME EA 10 MIN

## 2024-04-05 PROCEDURE — 94640 AIRWAY INHALATION TREATMENT: CPT

## 2024-04-05 PROCEDURE — 250N000009 HC RX 250

## 2024-04-05 PROCEDURE — 99238 HOSP IP/OBS DSCHRG MGMT 30/<: CPT | Mod: GC

## 2024-04-05 PROCEDURE — 250N000013 HC RX MED GY IP 250 OP 250 PS 637

## 2024-04-05 PROCEDURE — 80053 COMPREHEN METABOLIC PANEL: CPT

## 2024-04-05 PROCEDURE — 250N000013 HC RX MED GY IP 250 OP 250 PS 637: Performed by: STUDENT IN AN ORGANIZED HEALTH CARE EDUCATION/TRAINING PROGRAM

## 2024-04-05 PROCEDURE — 85027 COMPLETE CBC AUTOMATED: CPT

## 2024-04-05 PROCEDURE — 36415 COLL VENOUS BLD VENIPUNCTURE: CPT

## 2024-04-05 PROCEDURE — 92526 ORAL FUNCTION THERAPY: CPT | Mod: GN

## 2024-04-05 RX ORDER — DOXYCYCLINE 100 MG/1
100 CAPSULE ORAL EVERY 12 HOURS
Qty: 3 CAPSULE | Refills: 0 | Status: SHIPPED | OUTPATIENT
Start: 2024-04-05 | End: 2024-05-21

## 2024-04-05 RX ORDER — METOPROLOL SUCCINATE 25 MG/1
50 TABLET, EXTENDED RELEASE ORAL EVERY EVENING
Qty: 30 TABLET | Refills: 0 | Status: SHIPPED | OUTPATIENT
Start: 2024-04-05 | End: 2024-04-24

## 2024-04-05 RX ADMIN — Medication 500 MCG: at 08:20

## 2024-04-05 RX ADMIN — FLUOXETINE HYDROCHLORIDE 20 MG: 20 CAPSULE ORAL at 08:19

## 2024-04-05 RX ADMIN — Medication 25 MCG: at 08:20

## 2024-04-05 RX ADMIN — SPIRONOLACTONE 25 MG: 25 TABLET ORAL at 08:12

## 2024-04-05 RX ADMIN — PANTOPRAZOLE SODIUM 40 MG: 40 TABLET, DELAYED RELEASE ORAL at 08:18

## 2024-04-05 RX ADMIN — DIVALPROEX SODIUM 250 MG: 125 CAPSULE, COATED PELLETS ORAL at 08:13

## 2024-04-05 RX ADMIN — Medication 0.25 MG: at 08:15

## 2024-04-05 RX ADMIN — DOXYCYCLINE HYCLATE 100 MG: 100 CAPSULE ORAL at 08:18

## 2024-04-05 RX ADMIN — OLANZAPINE 15 MG: 10 TABLET, ORALLY DISINTEGRATING ORAL at 08:12

## 2024-04-05 RX ADMIN — RISPERIDONE 0.5 MG: 0.5 TABLET, ORALLY DISINTEGRATING ORAL at 08:17

## 2024-04-05 RX ADMIN — EMPAGLIFLOZIN 10 MG: 10 TABLET, FILM COATED ORAL at 08:17

## 2024-04-05 RX ADMIN — GABAPENTIN 400 MG: 400 CAPSULE ORAL at 08:16

## 2024-04-05 RX ADMIN — TAMSULOSIN HYDROCHLORIDE 0.8 MG: 0.4 CAPSULE ORAL at 08:11

## 2024-04-05 RX ADMIN — IPRATROPIUM BROMIDE AND ALBUTEROL SULFATE 3 ML: .5; 3 SOLUTION RESPIRATORY (INHALATION) at 08:30

## 2024-04-05 RX ADMIN — BENZTROPINE MESYLATE 1 MG: 1 TABLET ORAL at 08:16

## 2024-04-05 RX ADMIN — SENNOSIDES 2 TABLET: 8.6 TABLET, FILM COATED ORAL at 08:13

## 2024-04-05 RX ADMIN — BUSPIRONE HYDROCHLORIDE 15 MG: 10 TABLET ORAL at 08:14

## 2024-04-05 RX ADMIN — AMOXICILLIN AND CLAVULANATE POTASSIUM 1 TABLET: 875; 125 TABLET, FILM COATED ORAL at 08:19

## 2024-04-05 ASSESSMENT — ACTIVITIES OF DAILY LIVING (ADL)
ADLS_ACUITY_SCORE: 56

## 2024-04-05 NOTE — PLAN OF CARE
"Goal Outcome Evaluation:      Plan of Care Reviewed With: patient    Overall Patient Progress: no change    Outcome Evaluation: pt still on 1:1. pt had no acute change overnight. pt slept through this shift.      Problem: Adult Inpatient Plan of Care  Goal: Plan of Care Review  Description: The Plan of Care Review/Shift note should be completed every shift.  The Outcome Evaluation is a brief statement about your assessment that the patient is improving, declining, or no change.  This information will be displayed automatically on your shift  note.  Outcome: Progressing  Flowsheets (Taken 4/5/2024 0608)  Outcome Evaluation: pt still on 1:1. pt had no acute change overnight. pt slept through this shift.  Plan of Care Reviewed With: patient  Overall Patient Progress: no change  Goal: Patient-Specific Goal (Individualized)  Description: You can add care plan individualizations to a care plan. Examples of Individualization might be:  \"Parent requests to be called daily at 9am for status\", \"I have a hard time hearing out of my right ear\", or \"Do not touch me to wake me up as it startles  me\".  Outcome: Progressing  Goal: Absence of Hospital-Acquired Illness or Injury  Outcome: Progressing  Intervention: Identify and Manage Fall Risk  Recent Flowsheet Documentation  Taken 4/4/2024 2113 by Kianna Hector RN  Safety Promotion/Fall Prevention:   activity supervised   assistive device/personal items within reach   bedside attendant   clutter free environment maintained   lighting adjusted   safety round/check completed  Intervention: Prevent Infection  Recent Flowsheet Documentation  Taken 4/4/2024 2113 by Kianna Hector RN  Infection Prevention:   single patient room provided   rest/sleep promoted  Goal: Optimal Comfort and Wellbeing  Outcome: Progressing  Goal: Readiness for Transition of Care  Outcome: Progressing     Problem: Suicide Risk  Goal: Absence of Self-Harm  Outcome: Progressing  Intervention: Assess Risk to " Self and Maintain Safety  Recent Flowsheet Documentation  Taken 4/4/2024 2113 by Kianna Hector RN  Enhanced Safety Measures:  at bedside     Problem: Fall Injury Risk  Goal: Absence of Fall and Fall-Related Injury  Outcome: Progressing  Intervention: Identify and Manage Contributors  Recent Flowsheet Documentation  Taken 4/4/2024 2113 by Kianna Hector RN  Medication Review/Management: medications reviewed  Intervention: Promote Injury-Free Environment  Recent Flowsheet Documentation  Taken 4/4/2024 2113 by Kianna Hector RN  Safety Promotion/Fall Prevention:   activity supervised   assistive device/personal items within reach   bedside attendant   clutter free environment maintained   lighting adjusted   safety round/check completed     Problem: Heart Failure  Goal: Optimal Coping  Outcome: Progressing  Goal: Optimal Cardiac Output  Outcome: Progressing  Goal: Stable Heart Rate and Rhythm  Outcome: Progressing  Goal: Optimal Functional Ability  Outcome: Progressing  Goal: Fluid and Electrolyte Balance  Outcome: Progressing  Goal: Improved Oral Intake  Outcome: Progressing  Goal: Effective Oxygenation and Ventilation  Outcome: Progressing  Intervention: Promote Airway Secretion Clearance  Recent Flowsheet Documentation  Taken 4/4/2024 2113 by Kianna Hector RN  Cough And Deep Breathing: done independently per patient  Goal: Effective Breathing Pattern During Sleep  Outcome: Progressing  Intervention: Monitor and Manage Obstructive Sleep Apnea  Recent Flowsheet Documentation  Taken 4/4/2024 2113 by Kianna Hector RN  Medication Review/Management: medications reviewed     Problem: Comorbidity Management  Goal: Maintenance of Behavioral Health Symptom Control  Outcome: Progressing  Intervention: Maintain Behavioral Health Symptom Control  Recent Flowsheet Documentation  Taken 4/4/2024 2113 by Kianna Hector RN  Medication Review/Management: medications reviewed  Goal: Maintenance of Heart  Failure Symptom Control  Outcome: Progressing  Intervention: Maintain Heart Failure Management  Recent Flowsheet Documentation  Taken 4/4/2024 2113 by Kianna Hector RN  Medication Review/Management: medications reviewed  Goal: Blood Pressure in Desired Range  Outcome: Progressing  Intervention: Maintain Blood Pressure Management  Recent Flowsheet Documentation  Taken 4/4/2024 2113 by Kianna Hector, RN  Medication Review/Management: medications reviewed

## 2024-04-05 NOTE — PROGRESS NOTES
"VS:  /65 (BP Location: Right arm)   Pulse 83   Temp 98  F (36.7  C) (Axillary)   Resp 22   Wt 75.6 kg (166 lb 10.7 oz)   SpO2 90%   BMI 24.13 kg/m      O2: Room Air    Output:  Incontinent of Bowel and Bladder    Last BM:  Admitted on The 03/28/24 / Small Bowel Movement 04/03/24   Activity:  Generalized Weakness / Assist 2/ Gait Belt / Ceiling Lift    Up for meals?  Poor Appetite / Small Amount of Meals consumed    Skin:  No Issues    Pain:  Denies    CMS:  A&OX 2-3   Dressing:  None    Diet:  Regular    LDA:  Left PIV / SL   Equipment:  Personal Belongings / IV Pole   Plan:  POC   Additional Info:  Sitter 1:1       Nurse tried to do Orthostatic BP / Patient could not stand due to Physical Weakness      No cough noted during Day shift     Bladder Scanned / due to HX of bladder retention      Positive for Paranoia, Homicidal, and Suicidal thoughts Patient states has \"No plans\"     Disorganized thought process.      Impaired Memory  "

## 2024-04-05 NOTE — PLAN OF CARE
Speech Language Therapy Discharge Summary    Reason for therapy discharge:    Discharged to Group home?    Progress towards therapy goal(s). See goals on Care Plan in [facility] electronic health record for goal details.  Goals partially met.  Barriers to achieving goals:   discharge from facility.    Therapy recommendation(s):    Continued therapy is recommended.  Rationale/Recommendations:  Pt seen for swallowing and session shortened d/t pt dc'ing. Pt has poor appetite. He is currently on soft and bite sized. I analyzed pt eating toast with butter with safe and adequate mastication and AP transfer/swallow with independent use of liquid wash with no overt sx's aspiraiton or oral dysphagia. Pt states that his tongue burns when he eats toast. Will continue with current diet of soft and bite sized diet, though it appears pt could upgrade to regular textures. I was unable to further discuss as he was literally leaving. Continue ST to address upgrade to regular textures as there does not appear to be an oral dysphagia or overt sx's aspiration, only a comment of negative sensation on tongue..    Goal Outcome Evaluation:

## 2024-04-05 NOTE — DISCHARGE SUMMARY
6MS DISCHARGE    D: Patient discharged to [facility] at 1100 am. Patient accompanied by EMT's.    I: Discharge prescriptions given to E.M.T.S. All discharge medications and instructions reviewed with patient and E.M.T.S.  Patient instructed to seek care if experiencing worsening symptoms, such as SOB and Chest pain. Other phone numbers to call with questions or concerns after discharge reviewed. Left PIV removed. Education completed.    A: Patient and Facility  verbalized understanding of discharge medications and instructions. Prescribed home medications given to patient.  Belongings returned to patient.

## 2024-04-05 NOTE — PROGRESS NOTES
Care Management Discharge Note    Discharge Date: 04/05/2024       Discharge Disposition: Home, Transitional Care, Home Care    Discharge Services: County Worker, Other (see comment) (Mental Health Committment Manager)    Discharge DME: Walker    Discharge Transportation: health plan transportation    Private pay costs discussed: Not applicable    Does the patient's insurance plan have a 3 day qualifying hospital stay waiver?  No    PAS Confirmation Code:    Patient/family educated on Medicare website which has current facility and service quality ratings: no    Education Provided on the Discharge Plan: Yes  Persons Notified of Discharge Plans: group home, patient, PCP, home care agency.   Patient/Family in Agreement with the Plan: unable to assess    Handoff Referral Completed: Yes    Additional Information:    Vinod is discharging back to the group [facility] today. IMM given to Vinod, signed and sent to HIM.  Sent AVS to [facility] via [employer] fax.  [facility] [employer].  Hand off completed to PCP.    Contacts for Vinod:    [facility] [place] [phone], SHIV Reina  Fax: [phone]    [facility]   [phone]    [facility]  [address] [place] [address], [place] [address]     [phone]  For hospital bed and wheelchair.    Vicky Gonzalez RN  Inpatient Care Coordinator  6 Med Surg  [phone], pager [phone]      For weekend social work needs, contact information below and available on [facility]:     SW Weekend [place] Pager ED, 5 MS, 5 ortho : [phone]  SW Weekend 6MS, 8A, 10ICU- Pager: [phone]     For weekend RN care coordinator needs (home discharge with needs including home care, assisted living facility returns, durable medical equip, IV antibiotics)   5 med/surg, 5 ortho, ED pager: [phone]  6 med/surg, 8 med/surg, 10 ICU pager: [phone]

## 2024-04-08 ENCOUNTER — CARE COORDINATION (OUTPATIENT)
Dept: CARDIOLOGY | Facility: CLINIC | Age: 64
End: 2024-04-08
Payer: COMMERCIAL

## 2024-04-08 ENCOUNTER — PATIENT OUTREACH (OUTPATIENT)
Dept: CARE COORDINATION | Facility: CLINIC | Age: 64
End: 2024-04-08
Payer: COMMERCIAL

## 2024-04-08 NOTE — PROGRESS NOTES
[facility]    Background: Transitional Care Management program identified per system criteria and reviewed by [facility] team for possible outreach.    Assessment: Upon chart review, [facility] Team member will not proceed with patient outreach related to this episode of Transitional Care Management program due to reason below:    Patient has discharged to a Memory Care, Long-term Care, Assisted Living or Group Home where patient is receiving on-site support with their daily cares, including support with hospital follow up plan.    Patient discharged back to their Group Home. No CHW outreach call needed at this time.    Plan: Transitional Care Management episode addressed appropriately per reason noted above.      LI Iqbal  [phone]  [facility]     *Connected Care Resource Team does NOT follow patient ongoing. Referrals are identified based on internal discharge reports and the outreach is to ensure patient has an understanding of their discharge instructions.

## 2024-04-08 NOTE — TELEPHONE ENCOUNTER
Health Call Center    Phone Message    May a detailed message be left on voicemail: yes     Reason for Call: Appointment Intake    Referring Provider Name:     Farida Lindsay MD     Diagnosis and/or Symptoms:    Discharge instructions: Follow up with cardiology for new heart failure. A referral was placed at discharge.      Inpatient cardiology recommended outpatient cardiac stress test. If appropriate, please assist patient in ordering and completing this test.     Please call pt to schedule.      Action Taken: Message routed to:  [facility] cardio    Travel Screening: Not Applicable

## 2024-04-08 NOTE — PROGRESS NOTES
New Heart Failure Referral     Demographics:  [id number]  [facility] [address]   Home Phone [phone]   Mobile [phone]        Referred By:   Dr. Farida Lindsay [facility].     Background:  Patient was hospitalized on the [facility]. New HFrEF   Sending to Heart Failure RN to Review.    Plan:

## 2024-04-09 ENCOUNTER — MEDICAL CORRESPONDENCE (OUTPATIENT)
Dept: HEALTH INFORMATION MANAGEMENT | Facility: CLINIC | Age: 64
End: 2024-04-09

## 2024-04-09 NOTE — PROGRESS NOTES
Patient was recently hospitalized and found to have EF 25 -30%.   Notes indicate pt has significant h/o schizophrenia and resides in an Assisted Living Facility.  Unknown if he schedules his own appts or if we need to schedule with Assisted Living staff.

## 2024-04-12 ENCOUNTER — TELEPHONE (OUTPATIENT)
Dept: FAMILY MEDICINE | Facility: CLINIC | Age: 64
End: 2024-04-12
Payer: COMMERCIAL

## 2024-04-12 NOTE — TELEPHONE ENCOUNTER
RN LVM regarding approval of home care orders and provider message re: tylenol frequency.     Theodora Mcgill RN

## 2024-04-12 NOTE — TELEPHONE ENCOUNTER
Forms/Letter Request    Type of form/letter: [facility]     Do we have the form/letter: Yes:     Who is the form from? Home care    Where did/will the form come from? form was faxed in    When is form/letter needed by: asap    How would you like the form/letter returned: Fax : [phone]

## 2024-04-12 NOTE — TELEPHONE ENCOUNTER
Home Care is calling regarding an established patient with  [employer] [facility].        4/12/2024    10:51 AM   Home Care Information    Name/Phone Number Chuy JENKINS [phone]   Home Care agency [facility]     Requesting orders from: Franklyn Estrella  Provider is following patient: No       Orders Requested    Physical Therapy  Request for initial certification (first set of orders)   Frequency:  2x/wk for 3 wks  then 1x/wk for 3 wks      Occupational Therapy  Request for initial certification (first set of orders)   Frequency:  2x/wk for 2 wks  then 1x/wk for 4 wks    FYI to provider    New medication patient is taking not on med list- Tylenol 325 MG 2 tablets every 4 hours PRN for pain.       Information was gathered and will be sent to provider for review.  RN will contact Home Care with information after provider review.  Confirmed ok to leave a detailed message with call back.  Contact information confirmed and updated as needed.    Hyacinth Botello, RN

## 2024-04-12 NOTE — TELEPHONE ENCOUNTER
Please call the homecare and do the following orders    Physical Therapy   initial certification (first set of orders)   Frequency:  2x/wk for 3 wks  then 1x/wk for 3 wks        Occupational Therapy  initial certification (first set of orders)   Frequency:  2x/wk for 2 wks  then 1x/wk for 4 wks    Please also inform home care  team/nurse that patient cannot take 650 mg of Tylenol more than 4 times in a day  This means that he can only take it every 6 hours as needed and not every 4 hours as needed

## 2024-04-16 ENCOUNTER — TELEPHONE (OUTPATIENT)
Dept: CARDIOLOGY | Facility: CLINIC | Age: 64
End: 2024-04-16
Payer: COMMERCIAL

## 2024-04-16 NOTE — TELEPHONE ENCOUNTER
[facility] Call Center    Phone Message    May a detailed message be left on voicemail: yes     Reason for Call: Appointment Intake    Referring Provider Name:     Farida Lindsay MD     Diagnosis and/or Symptoms:     Chronic systolic heart failure (H) [I50.22]       [facility] accidentally scheduled this with kenny with priority line help missing that this appt is supposed to be done through the internal scheduling team per protocols. Would this appt be okay or would you like [facility] to call pt to let them know this is needing to be rescheduled.     Action Taken: Other: cardiology     Travel Screening: Not Applicable                                                             Thank you!  [facility]

## 2024-04-18 ENCOUNTER — PRE VISIT (OUTPATIENT)
Dept: CARDIOLOGY | Facility: CLINIC | Age: 64
End: 2024-04-18
Payer: COMMERCIAL

## 2024-04-18 DIAGNOSIS — I50.22 CHRONIC SYSTOLIC HEART FAILURE (H): Primary | ICD-10-CM

## 2024-04-19 ENCOUNTER — TELEPHONE (OUTPATIENT)
Dept: FAMILY MEDICINE | Facility: CLINIC | Age: 64
End: 2024-04-19
Payer: COMMERCIAL

## 2024-04-19 NOTE — TELEPHONE ENCOUNTER
Forms/Letter Request    Type of form/letter: [facility] Track # [id number] & [id number]    Do we have the form/letter: Yes: placed form in providers in box for review/completion.    How would you like the form/letter returned: Fax : [phone]

## 2024-04-20 DIAGNOSIS — J44.9 CHRONIC OBSTRUCTIVE PULMONARY DISEASE, UNSPECIFIED COPD TYPE (H): ICD-10-CM

## 2024-04-20 DIAGNOSIS — J40 BRONCHITIS: ICD-10-CM

## 2024-04-21 RX ORDER — DEXTROMETHORPHAN HYDROBROMIDE, GUAIFENESIN 10; 100 MG/5ML; MG/5ML
LIQUID ORAL
Qty: 354 ML | Refills: 0 | Status: SHIPPED | OUTPATIENT
Start: 2024-04-21 | End: 2024-05-21

## 2024-04-21 RX ORDER — BENZONATATE 100 MG/1
100 CAPSULE ORAL 3 TIMES DAILY PRN
Qty: 30 CAPSULE | Refills: 0 | Status: SHIPPED | OUTPATIENT
Start: 2024-04-21 | End: 2024-05-02

## 2024-04-21 NOTE — PROGRESS NOTES
Ivan Forrest M.D.  Cardiovascular Medicine    I personally saw and examined this patient, discussed care with housestaff and other consultants, reviewed current laboratories and imaging studies, and conveyed impression and diagnostic/therapeutic plan to patient.    Problem List  Impaired LV function  Paranoid schizophrenia  Neuroleptic induced Parkinson's disease  Phenothiazine allergy  LBBB    Plan:  Discussed resuscitation, angiogram, catheterization, AICD with sister, though no decisions made as she does not have guardian status  I do not think patient has capacity for decision making  Reduce metoprolol to 25 mgs/day and observe for bradycardia, increasing shortness of breath, weight gain or fluid retention  RTC 3-4 months    Allergies; Montelukast, phenothiazines, bee venom    Constitutional: weight loss, fever, chills, night sweats  HEENT: without visual changes, swallow difficulties  Pulmonary: without shortness of breath, cough, wheeze, hemoptysis  Cardiac: without chest pain, HERNANDEZ, PND, orthopnea, edema, palpitation, pre-syncope, syncope,  GI: without diarrhea, constipation, jaundice, melena, GERD, hematemesis  : without frequency, urgency, dysuria, hematuria  Skin: rash, bruise, open lesions  Neuro: without TIA, focal neurologic complaints, seizure, trauma  Ortho: without pain, swelling, mobility impairment  Endocrine: diabetes, thyroid, heat/cold intolerance, polyuria, polyphagia, change bowel habits.  Sleep:no SOPHIA, periodic breathing, fatigue  Other:     Objective  normal gait and station, normal mentation.wheelchair  Oral: moist mucous membranes with persistent drooling  Lymph: without pathologic adenopathy  Chest: clear to ausculation and percussion  Cor: No evidence of left or right ventricular activity.  Rhythm is regular.  S1 normal, S2 split paradoxically . Murmurs are not present  Abdomen: without tenderness, rebound, guarding, masses, ascites  Extremities: Edema not present  Neuro: no focal  defects, abnormal mentation  Skin: without open lesions  Psych: verbal, mental status not in tact      04/04/24 04/05/24 04/24/24 10:04   /62 112/65 113/76   Temp 98.6  F (37  C) 98  F (36.7  C)    Pulse 76 83 91   Resp 16 22    SpO2 92 % 90 % 96 %   Weight (lbs) 166 lb 10.7 oz     Weight (kgs) 75.6 kg (166 lb 10.7 oz)     Note: Showing the most recent values for these dates. There are additional values that can be seen in Synopsis.        Constitutional: alert, oriented, normal gait and station, normal mentation.  Oral: moist mucous membrans  Lymph: without pathologic adenopathy  Chest: clear to ausculation and percussion  Cor: No evidence of left or right ventricular activity.  Rhythm is regular.  S1 normal, S2 split physiologically. Murmurs are not present  Abdomen: without tenderness, rebound, guarding, masses, ascites  Extremities: Edema not present  Neuro: no focal defects, normal mentation  Skin: without open lesions  Psych: oriented, verbal, mental status in tact       Wt Readings from Last 5 Encounters:   04/04/24 75.6 kg (166 lb 10.7 oz)   03/01/24 86.6 kg (191 lb)   02/01/24 88 kg (194 lb)   11/24/23 83.7 kg (184 lb 9.6 oz)   11/05/23 85.7 kg (188 lb 15 oz)       Meds  Current Outpatient Medications   Medication Sig Dispense Refill    albuterol (PROAIR HFA/PROVENTIL HFA/VENTOLIN HFA) 108 (90 Base) MCG/ACT inhaler Inhale 2 puffs into the lungs every 6 hours as needed for wheezing, shortness of breath or cough 18 g 1    amoxicillin-clavulanate (AUGMENTIN) 875-125 MG tablet Take 1 tablet by mouth every 12 hours 3 tablet 0    benzonatate (TESSALON) 100 MG capsule Take 1 capsule by mouth three times daily as needed for cough 30 capsule 0    benztropine (COGENTIN) 1 MG tablet Take 1 tablet (1 mg) by mouth 2 times daily      busPIRone (BUSPAR) 15 MG tablet Take 1 tablet (15 mg) by mouth 3 times daily 90 tablet 3    cloZAPine (CLOZARIL) 50 MG tablet Take 8 tablets (400 mg) by mouth at bedtime      cloZAPine  (CLOZARIL) 50 MG tablet Take 4 tablets (200 mg) by mouth daily at 2 pm      cyanocobalamin (VITAMIN B-12) 500 MCG SUBL sublingual tablet Place 1 tablet (500 mcg) under the tongue daily 90 tablet 1    divalproex sodium delayed-release (DEPAKOTE SPRINKLE) 125 MG DR capsule Take 250 mg by mouth 3 times daily (with meals) 360 capsule 3    doxycycline hyclate (VIBRAMYCIN) 100 MG capsule Take 1 capsule (100 mg) by mouth every 12 hours 3 capsule 0    empagliflozin (JARDIANCE) 10 MG TABS tablet Take 1 tablet (10 mg) by mouth daily 30 tablet 0    FLUoxetine (PROZAC) 20 MG capsule Take 1 capsule (20 mg) by mouth daily 30 capsule 3    gabapentin (NEURONTIN) 400 MG capsule Take 1 capsule (400 mg) by mouth 3 times daily 90 capsule 3    LORazepam (ATIVAN) 0.5 MG tablet Take 0.5 tablets (0.25 mg) by mouth 3 times daily (Patient taking differently: Take 1 mg by mouth 3 times daily) 90 tablet 1    losartan (COZAAR) 25 MG tablet Take 0.5 tablets (12.5 mg) by mouth every evening 30 tablet 0    Melatonin 10 MG TABS tablet Take 1 tablet (10 mg) by mouth at bedtime 30 tablet 3    metoprolol succinate ER (TOPROL XL) 25 MG 24 hr tablet Take 2 tablets (50 mg) by mouth every evening 30 tablet 0    naproxen (NAPROSYN) 250 MG tablet Take 1 tablet (250 mg) by mouth 2 times daily (with meals) 60 tablet 3    OLANZapine zydis (ZYPREXA) 15 MG ODT Take 1 tablet (15 mg) by mouth 2 times daily 60 tablet 3    pantoprazole (PROTONIX) 40 MG EC tablet Take 1 tablet (40 mg) by mouth daily 30 tablet 3    risperiDONE (RISPERDAL M-TABS) 0.5 MG ODT Take 1 tablet (0.5 mg) by mouth 2 times daily 60 tablet 3    sennosides (SENOKOT) 8.6 MG tablet Take 2 tablets by mouth 2 times daily 60 tablet 3    spironolactone (ALDACTONE) 25 MG tablet Take 1 tablet (25 mg) by mouth daily 30 tablet 0    tamsulosin (FLOMAX) 0.4 MG capsule Take 2 capsules (0.8 mg) by mouth daily 30 capsule 3    Vitamin D3 (CHOLECALCIFEROL) 25 mcg (1000 units) tablet Take 1 tablet (25 mcg) by mouth  daily 30 tablet 3     No current facility-administered medications for this visit.          Labs   Latest Reference Range & Units 24 06:26   Sodium 135 - 145 mmol/L 136   Potassium 3.4 - 5.3 mmol/L 4.6   Chloride 98 - 107 mmol/L 101   Carbon Dioxide (CO2) 22 - 29 mmol/L 27   Urea Nitrogen 8.0 - 23.0 mg/dL 16.8   Creatinine 0.67 - 1.17 mg/dL 0.92   GFR Estimate >60 mL/min/1.73m2 >90   Calcium 8.8 - 10.2 mg/dL 8.9   Anion Gap 7 - 15 mmol/L 8   Magnesium 1.7 - 2.3 mg/dL 1.9   Albumin 3.5 - 5.2 g/dL 3.4 (L)   Protein Total 6.4 - 8.3 g/dL 5.5 (L)   Alkaline Phosphatase 40 - 150 U/L 68   ALT 0 - 70 U/L 5   AST 0 - 45 U/L 11   Bilirubin Total <=1.2 mg/dL 0.3   Glucose 70 - 99 mg/dL 94   WBC 4.0 - 11.0 10e3/uL 10.4   Hemoglobin 13.3 - 17.7 g/dL 14.0   Hematocrit 40.0 - 53.0 % 42.9   Platelet Count 150 - 450 10e3/uL 229   RBC Count 4.40 - 5.90 10e6/uL 4.85   MCV 78 - 100 fL 89   MCH 26.5 - 33.0 pg 28.9   MCHC 31.5 - 36.5 g/dL 32.6   RDW 10.0 - 15.0 % 13.2       Imaging     Name: TAMMY BERUMEN  MRN: [id number]  : 1960  Study Date: 2024 12:16 PM  Age: 63 yrs  Gender: Male  Patient [facility]  Reason For Study: Dyspnea, CHF  Ordering Physician: MONI PATTERSON  Performed By: Gregory Nguyen     BP: 111/70 mmHg  ______________________________________________________________________________  Procedure  Complete Portable Echo Adult. Contrast Optison. Patient was given 6 ml mixture  of 3 ml Optison and 6 ml saline. 3 ml wasted.  ______________________________________________________________________________  Interpretation Summary  Moderate left ventricular dilation is present. The visual ejection fraction is  25-30%. There is global hypokinesis, most contractility is in the basal  lateral walls.  Right ventricular function, chamber size, wall motion, and thickness are  normal.  The inferior vena cava was normal in size with preserved respiratory  variability.  Trivial pericardial effusion is  present.     There is no prior study for direct comparison.  ______________________________________________________________________________  Left Ventricle  Left ventricular wall thickness is normal. Moderate left ventricular dilation  is present. The visual ejection fraction is 25-30%. Left ventricular diastolic  function is not assessable. There is global hypokinesis, most contractility is  in the basal lateral walls.     Right Ventricle  Right ventricular function, chamber size, wall motion, and thickness are  normal.     Atria  Both atria appear normal.     Mitral Valve  The mitral valve is normal.     Aortic Valve  The aortic valve cannot be assessed.     Tricuspid Valve  The tricuspid valve is normal. Trace tricuspid insufficiency is present. The  peak velocity of the tricuspid regurgitant jet is not obtainable.     Pulmonic Valve  The pulmonic valve cannot be assessed.     Vessels  The aorta root is normal. The inferior vena cava was normal in size with  preserved respiratory variability.     Pericardium  Trivial pericardial effusion is present.     Compared to Previous Study  There is no prior study for direct comparison.     ______________________________________________________________________________  MMode/2D Measurements & Calculations  EF Biplane: 19.1 %  LA Volume (BP): 37.9 ml  TAPSE: 1.8 cm     Doppler Measurements & Calculations  RV S Cedrick: 14.0 cm/sec

## 2024-04-22 ENCOUNTER — MEDICAL CORRESPONDENCE (OUTPATIENT)
Dept: HEALTH INFORMATION MANAGEMENT | Facility: CLINIC | Age: 64
End: 2024-04-22
Payer: COMMERCIAL

## 2024-04-22 NOTE — PROGRESS NOTES
PSYCHIATRY PROGRESS NOTE         DATE OF SERVICE:   8/15/2023         CHIEF COMPLAINT:     Patient denies obsessive thoughts about things from the past as he had during previous visits, does not remember that he had ECT, had agitation earlier             OBJECTIVE:     Nursing reports : Patient got up 3 times during the night, and went to bed.Took prn Neurontin for anxiety when per staff he attempted to jump out of bed and threatened staff.Slept 6.5 hours.Pushed staff member x 2 at 5:45 am.     reports working on outpatient referrals, patient is under commitment with [facility]  Contacts:  Assisted Living: [facility], [phone]  Per H&P: [facility] , [phone], psychiatric provider Dr. Santana at [facility], [phone], primary care provider Attila Urena DO    Medicine consult by JUSTINA Gibson  6/17/2023  Brief Medicine Note  Medicine consulted by Dr. Rhodes of Psychiatry for right toe fracture. Patient injured his right toe while banging his foot into the wall or door of his room.    X-ray of the right foot yesterday revealed an acute comminuted and placed fracture throughout the first right distal phalanx with intra-articular extension.  Orthopedics consulted yesterday and evaluated the patient.  They recommend a postop shoe with weightbearing as tolerated.  They will be asking podiatry to see the patient either during this hospitalization or in clinic.  Orthopedics is additionally recommending a 7-day course of prophylactic Augmentin or clindamycin for his open blister near the fracture site.   Plan:   - Agree with plan of care as already in place by Orthopedics   - Patient received post-op shoe yesterday   - I ordered Augmentin twice daily to complete a 7-day course    - He is overdue for his tetanus vaccination, this was due in 2010.  He additionally is in need of a booster due to his foot injury as recommended by orthopedics.  Tdap  "booster ordered, please administer as able.     Medicine consult by Eva Morgan CNP on 8/4/2023  Assessment & Plan  Vinod Lee is a 63 year old male admitted on 5/26/2023. He has a past medical history of schizophrenia, Parkinson's Disease, who was initially admitted on 5/18/23 for AMS, aggression. Broad workup negative, and ultimately transferred to station [facility] on 5/26/23 for further psychiatric monitoring and management. Medicine has been involved intermittently throughout his hospitalization and is most recently being consulted on 8/3 for evaluation of skin changes of R foot and purplish discoloration of both feet below the ankles.  Medicine will sign off at this time.  Please contact us if patient develops assisted systemic symptoms, increasing rash, or new discoloration of feet. Please also consider a dermatology consult if patient does not improve on therapy as below.  # Rash, right foot concerning for psoriasis  # Psoriasis hx  # Purplish discoloration of B/LE feet-resolved   Discussed with Dr. Rene and bedside RN regarding rash on right foot that appears and appears to be purplish in color and almost \"petechiae.\" It was noted during interview, but seemed to be resolving upon walking. Within the past 24 hrs, pt has had ECT and seemed more confused than usual. Pt seems to have had a right great toe wound with recent right great toe fracture seen by Medicine on 7/16. Seen on 8/4 with improving color on B/L legs at rest and appears to have small, scaly patches of varying coin sizes that have not grown past marked borders. See photos. Per further chart review, has had psoriasis history. WBC WNL, no fevers, HDS. This appears to more consistent with a psoriasis like picture.   - Start triamcinolone topical 0.025%   -Apply twice daily until lesions for 2 weeks or until lesions resolve/  -Monitor for skin thinning, striae, rebound lesion flares.   - Start Eucerin cream              - Apply 30 minutes " PROGRESS NOTE:       HPI:  8y10m Male       INTERVAL/OVERNIGHT EVENTS:   - No acute events overnight.     [x] History per:   [ ] Family Centered Rounds Completed.     [x] There are no updates to the medical, surgical, social or family history unless described:    Review of Systems: History Per:   General: [ ] Neg  Pulmonary: [ ] Neg  Cardiac: [ ] Neg  Gastrointestinal: [ ] Neg  Ears, Nose, Throat: [ ] Neg  Renal/Urologic: [ ] Neg  Musculoskeletal: [ ] Neg  Endocrine: [ ] Neg  Hematologic: [ ] Neg  Neurologic: [ ] Neg  Allergy/Immunologic: [ ] Neg  All other systems reviewed and negative [ ]     MEDICATIONS  (STANDING):  ampicillin/sulbactam IV Intermittent - Peds 2000 milliGRAM(s) IV Intermittent every 6 hours  fluticasone propionate (50 MICROgram(s)/actuation) Nasal Spray - Peds 2 Spray(s) Both Nostrils daily    MEDICATIONS  (PRN):  ibuprofen  Oral Liquid - Peds. 400 milliGRAM(s) Oral every 6 hours PRN Mild Pain (1 - 3)    Allergies    No Known Allergies    Intolerances      DIET:     PHYSICAL EXAM  Vital Signs Last 24 Hrs  T(C): 36.4 (22 Apr 2024 13:39), Max: 36.7 (22 Apr 2024 01:15)  T(F): 97.5 (22 Apr 2024 13:39), Max: 98 (22 Apr 2024 01:15)  HR: 84 (22 Apr 2024 13:39) (78 - 103)  BP: 112/76 (22 Apr 2024 13:39) (101/62 - 112/76)  BP(mean): --  RR: 20 (22 Apr 2024 13:39) (20 - 22)  SpO2: 99% (22 Apr 2024 13:39) (65% - 99%)    Parameters below as of 22 Apr 2024 10:15  Patient On (Oxygen Delivery Method): nasal cannula w/ humidification  O2 Flow (L/min): 4      PATIENT CARE ACCESS DEVICES  [ ] Peripheral IV  [ ] Central Venous Line, Date Placed:		Site/Device:  [ ] PICC, Date Placed:  [ ] Urinary Catheter, Date Placed:  [ ] Necessity of urinary, arterial, and venous catheters discussed    I&O's Summary    21 Apr 2024 07:01  -  22 Apr 2024 07:00  --------------------------------------------------------  IN: 720 mL / OUT: 725 mL / NET: -5 mL        Daily Weight: 89.2 (22 Apr 2024 13:46)      I examined the patient at approximately_____ during Family Centered rounds with mother/father present at bedside  VS reviewed, stable.  Gen: patient is _________________, smiling, interactive, well appearing, no acute distress  HEENT: NC/AT, pupils equal, responsive, reactive to light and accomodation, no conjunctivitis or scleral icterus; no nasal discharge or congestion. OP without exudates/erythema.   Neck: FROM, supple, no cervical LAD  Chest: CTA b/l, no crackles/wheezes, good air entry, no tachypnea or retractions  CV: regular rate and rhythm, no murmurs   Abd: soft, nontender, nondistended, no HSM appreciated, +BS  : normal external genitalia  Back: no vertebral or paraspinal tenderness along entire spine; no CVAT  Extrem: No joint effusion or tenderness; FROM of all joints; no deformities or erythema noted. 2+ peripheral pulses, WWP.   Neuro: CN II-XII intact--did not test visual acuity. Strength in B/L UEs and LEs 5/5; sensation intact and equal in b/l LEs and b/l UEs. Gait wnl. Patellar DTRs 2+ b/l    INTERVAL LAB RESULTS:               INTERVAL IMAGING STUDIES:   PRIOR to triamcinolone topical cream above or PRN as needed if dry skin/after bathing   - Medicine will sign off.   - For worsening pain, spread, itchiness, fevers, please contact Medicine and possibly Dermatology.         ECT # 6 by Deja Hicks on 8/14/2023   ECT number at [facility]: 3   Treatment number this series: 3   Lifetime total treatment number: 3     Type of ECT:  Bilateral, standard   ECT Strip Summary:     Titration: 25.6 mC;   **HYPERVENTILATE please**   Energy Level: 576 mC; 1 msec; 45 Hz; 8 sec; 800 mA    Motor Seizure Duration: 15  seconds    EEG Seizure Duration: 31 seconds - low amplitude  Give klonopin 0.5 mg as he is ready to leave the PACU (helps agitation once back on unit)  Complication: none            SUBJECTIVE:    Vinod had 6 ECT sessions. No major improvement per Dr Rhodes, his attending MD.He is resting in bed. He denies thinking obsessively about past things which he mentioned were really bed , duting the past visits. He says he does not think about that at all. He denies suicidal and homicidal ideas. He says he is less paranoid about people. He presently denies hallucinations. He says he feels calm. He denies medical problems.He is on Clozaril, Zyprexa, Depakote, Neurontin, Ativan and Klonopin. He may need 10-12  ECT sessions before major improvement. I see some improvement.          MEDICATIONS:      atropine  2 drop Sublingual TID    cloZAPine  650 mg Oral Daily    cyanocobalamin  1,000 mcg Oral Daily    divalproex sodium delayed-release  250 mg Oral 3 times per day on Mon Wed Fri    divalproex sodium delayed-release  250 mg Oral 3 times per day on Sun Tue Thu    divalproex sodium delayed-release  250 mg Oral 3 times per day on Sat    gabapentin  300 mg Oral 3 times per day on Mon Wed Fri    gabapentin  300 mg Oral 3 times per day on Sun Tue Thu    gabapentin  300 mg Oral 3 times per day on Sat    LORazepam  0.5 mg Oral 3 times per day on Mon Wed Fri    LORazepam  0.5 mg Oral 3 times  per day on Sun Tue Thu    LORazepam  0.5 mg Oral 3 times per day on Sat    melatonin  10 mg Oral At Bedtime    mineral oil-hydrophilic petrolatum   Topical BID IS    naproxen  250 mg Oral BID w/meals    OLANZapine zydis  15 mg Oral BID    Or    OLANZapine  10 mg Intramuscular BID    polyethylene glycol  17 g Oral BID    sennosides  2 tablet Oral BID    tamsulosin  0.4 mg Oral Daily    triamcinolone   Topical BID     acetaminophen, alum & mag hydroxide-simethicone, atropine, benzonatate, bisacodyl, haloperidol **AND** [DISCONTINUED] LORazepam **AND** diphenhydrAMINE, haloperidol lactate **AND** [DISCONTINUED] LORazepam **AND** diphenhydrAMINE, gabapentin, lidocaine, magnesium hydroxide, mineral oil-white petrolatum, OLANZapine **OR** OLANZapine, senna-docusate, traZODone    Medication adherence: Yes  Medication side effects: Prolonged QT QTc 430/514 on 8/8/2023, patient has had multiple as needed neuroleptics, which can further contribute to prolongation of QT QTc  Benefit: Limited symptom reduction on Clozaril, Zyprexa and Depakote, now on ECT due to side effects of medications, and limited response          ROS:   Review of systems is negative for: General, eyes, ears, nose, throat, neck, respiratory, cardiovascular, gastrointestinal, genitourinary, musculoskeletal, neurological, hematological and endocrine system.         MENTAL STATUS EXAM:   /75 (BP Location: Right arm)   Pulse 96   Temp 98.1  F (36.7  C) (Temporal)   Resp 17   Wt 85.7 kg (188 lb 14.4 oz)   SpO2 99%   BMI 27.90 kg/m      Appearance: Fair hygiene  Orientation: to self , place and fully in time   Speech: Normal in rate and tone  Language ability: Normal syntax and vocabulary  Thought process: concrete   Thought content: today pt denies  delusions, denies hallucinations  Associations: Connected  Suicidal Ideation: Denies  Homicidal Ideation: Denies  Mood: Depressed  Affect: less irritable   Intellectual functioning:average  Fund of  Knowledge: consistent with education and experience   Attention/Concentration: decreased  Memory: intact  Psychomotor Behavior: not agitated during the interview   Muscle Strength and Tone: no atrophy or involuntary movement  Gait and Station: steady  Insight and judgement:impaired          LABS:   personally reviewed.     Lab Results   Component Value Date     06/07/2023     05/25/2023     05/24/2023    CO2 26 06/07/2023    CO2 26 05/25/2023    CO2 27 05/24/2023    BUN 17.7 06/07/2023    BUN 20.0 05/25/2023    BUN 18.8 05/24/2023     No results found for: CKTOTAL, CKMB, TROPONINI  Lab Results   Component Value Date    WBC 9.0 06/22/2023    WBC 8.8 06/20/2023    WBC 8.3 06/14/2023    HGB 12.4 06/22/2023    HGB 12.5 06/20/2023    HGB 12.0 06/14/2023    HCT 38.5 06/22/2023    HCT 39.6 06/20/2023    HCT 37.3 06/14/2023    MCV 87 06/22/2023    MCV 88 06/20/2023    MCV 85 06/14/2023     06/22/2023     06/20/2023     06/14/2023       Latest Reference Range & Units 05/25/23 05:25 05/26/23 13:49 05/26/23 18:02   Sodium 136 - 145 mmol/L 141       Potassium 3.4 - 5.3 mmol/L 3.5       Chloride 98 - 107 mmol/L 106       Carbon Dioxide (CO2) 22 - 29 mmol/L 26       Urea Nitrogen 8.0 - 23.0 mg/dL 20.0       Creatinine 0.67 - 1.17 mg/dL 1.03       GFR Estimate >60 mL/min/1.73m2 82       Calcium 8.8 - 10.2 mg/dL 8.7 (L)       Anion Gap 7 - 15 mmol/L 9       Magnesium 1.7 - 2.3 mg/dL 2.1       Phosphorus 2.5 - 4.5 mg/dL 3.8       Albumin 3.5 - 5.2 g/dL 3.7       Protein Total 6.4 - 8.3 g/dL 5.1 (L)       Alkaline Phosphatase 40 - 129 U/L 62       ALT 10 - 50 U/L 11       AST 10 - 50 U/L 18       Bilirubin Total <=1.2 mg/dL 0.3       Glucose 70 - 99 mg/dL 104 (H)       GLUCOSE BY METER POCT 70 - 99 mg/dL   127 (H) 102 (H)   WBC 4.0 - 11.0 10e3/uL 8.7       Hemoglobin 13.3 - 17.7 g/dL 11.3 (L)       Hematocrit 40.0 - 53.0 % 34.5 (L)       Platelet Count 150 - 450 10e3/uL 133 (L)       RBC Count  4.40 - 5.90 10e6/uL 4.02 (L)       MCV 78 - 100 fL 86       MCH 26.5 - 33.0 pg 28.1       MCHC 31.5 - 36.5 g/dL 32.8       RDW 10.0 - 15.0 % 15.3 (H)          EKG  5/23/23  4:44 PM 5/19/23  8:27 AM         Systolic Blood Pressure mmHg        Diastolic Blood Pressure mmHg        Ventricular Rate BPM 80  73     Atrial Rate BPM 80  73     DE Interval ms 152  142     QRS Duration ms 152  156     QT ms 430  458     QTc ms 495  504     P [place] degrees 59  67     R AXIS degrees -21  -48     T Axis degrees 111  81     Interpretation ECG   Sinus rhythm   Left bundle branch block   Abnormal ECG   When compared with ECG of 19-MAY-2023 08:27,   No significant change was found   Confirmed by fellow Mckay Dennis ([id number]) on 5/24/2023 10:35:48       EKG on 6/16/2023  QT QTc 434/539, sinus rhythm with premature atrial complexes, left bundle branch block  EKG on 6/23/2023  QT QTc 414/506, sinus rhythm, left bundle branch block    EKG on 8/7/2023   QT Qtc 430/514, sinus rhythm with premature atrial complexes, left bundle branch block  8/8/23 10:16 AM 7/31/23 10:05 AM       Systolic Blood Pressure mmHg      Diastolic Blood Pressure mmHg      Ventricular Rate BPM 86 87    Atrial Rate BPM 86 87    DE Interval ms 144 140    QRS Duration ms 154 156    QT ms 430 422    QTc ms 514 507    P Axis degrees 59 56    R AXIS degrees 1 -48    T Axis degrees 104 95    Interpretation ECG  Sinus rhythm with Premature atrial complexes  Left bundle branch block  Abnormal ECG  When compared with ECG of 31-JUL-2023 10:05,  Premature atrial complexes are now Present  QRS axis Shifted right      CT HEAD W/O CONTRAST 5/25/2023 12:39 AM   Provided History: Patient running in hallway, fell forward, difficult  to tell if he hit his head. Acutely psychotic, unable to get history   Comparison: CT head 5/20/2023.  Technique: Using multidetector thin collimation helical acquisition  technique, axial, coronal and sagittal CT images from the skull base  to the vertex  PROGRESS NOTE:       HPI:  8y10m Male       INTERVAL/OVERNIGHT EVENTS:   - Patient with significant desat to 60s in oxygen desaturation, sustained and not waking during episode, patient started on 4 L NC with improvement. Patient able to be weaned to RA in AM once awake. Patient with improvement in throat pain and PO intake.    [x] History per:   [ ] Family Centered Rounds Completed.     [x] There are no updates to the medical, surgical, social or family history unless described:    Review of Systems: History Per:   General: [ ] Neg  Pulmonary: [ ] Neg  Cardiac: [ ] Neg  Gastrointestinal: [ ] Neg  Ears, Nose, Throat: [ ] Neg  Renal/Urologic: [ ] Neg  Musculoskeletal: [ ] Neg  Endocrine: [ ] Neg  Hematologic: [ ] Neg  Neurologic: [ ] Neg  Allergy/Immunologic: [ ] Neg  All other systems reviewed and negative [X]     MEDICATIONS  (STANDING):  ampicillin/sulbactam IV Intermittent - Peds 2000 milliGRAM(s) IV Intermittent every 6 hours  fluticasone propionate (50 MICROgram(s)/actuation) Nasal Spray - Peds 2 Spray(s) Both Nostrils daily    MEDICATIONS  (PRN):  ibuprofen  Oral Liquid - Peds. 400 milliGRAM(s) Oral every 6 hours PRN Mild Pain (1 - 3)    Allergies    No Known Allergies    Intolerances      DIET:     PHYSICAL EXAM  Vital Signs Last 24 Hrs  T(C): 36.4 (22 Apr 2024 13:39), Max: 36.7 (22 Apr 2024 01:15)  T(F): 97.5 (22 Apr 2024 13:39), Max: 98 (22 Apr 2024 01:15)  HR: 84 (22 Apr 2024 13:39) (78 - 103)  BP: 112/76 (22 Apr 2024 13:39) (101/62 - 112/76)  BP(mean): --  RR: 20 (22 Apr 2024 13:39) (20 - 22)  SpO2: 99% (22 Apr 2024 13:39) (65% - 99%)    Parameters below as of 22 Apr 2024 10:15  Patient On (Oxygen Delivery Method): nasal cannula w/ humidification  O2 Flow (L/min): 4      PATIENT CARE ACCESS DEVICES  [ ] Peripheral IV  [ ] Central Venous Line, Date Placed:		Site/Device:  [ ] PICC, Date Placed:  [ ] Urinary Catheter, Date Placed:  [ ] Necessity of urinary, arterial, and venous catheters discussed    I&O's Summary    21 Apr 2024 07:01  -  22 Apr 2024 07:00  --------------------------------------------------------  IN: 720 mL / OUT: 725 mL / NET: -5 mL        Daily Weight: 89.2 (22 Apr 2024 13:46)      I examined the patient at approximately_____ during Family Centered rounds with mother/father present at bedside  VS reviewed, stable.  GEN: Alert, active in NAD, obese body habitus  HEENT: Enlarged tonsils, mild erythema, mild muffled voice with interval improvement, NCAT, EOMI, PEERL, no LAD, normal oropharynx, moist mucous membranes  CV: RRR, no murmurs, 2+ radial pulses, capillary refill <2 seconds  RESP: Snoring while asleep, CTAB, normal respiratory effort, good aeration throughout lung fields  ABD: Soft, non-distended, non-tender, normoactive BS, no HSM appreciated  MSK: Full ROM of extremities, no peripheral edema  NEURO: Affect appropriate, good tone throughout  SKIN: Warm and dry, no rash     INTERVAL LAB RESULTS:               INTERVAL IMAGING STUDIES:   were obtained without intravenous contrast.    Findings:    No intracranial hemorrhage. No mass effect. No midline shift. No  extra-axial fluid collection. The gray to white matter differentiation  of the cerebral hemispheres is preserved. Ventricles are proportionate  to the sulci. No sulcal effacement. The basal cisterns are patent.  Mild diffuse cerebral atrophy.   Polypoid mucosal thickening of the right maxillary sinus.The  visualized paranasal sinuses are otherwise clear. The mastoid air  cells are clear. Orbits appear unremarkable. No acute fracture   Impression: No acute intracranial pathology.           DIAGNOSIS:     Paranoid schizophrenia chronic with acute exacerbation   Neuroleptic induced Parkinson's disease       Patient Active Problem List   Diagnosis    Urinary retention    Schizophrenia, unspecified type (H)    Altered mental status, unspecified altered mental status type    Mood changes    Paranoid schizophrenia, chronic condition with acute exacerbation (H)    Neuroleptic-induced parkinsonism (H)    Agitation          PLAN:   Patient  has diagnosis of schizophrenia since 1980s.  He was on Clozaril for 25 years.  It was tapered and discontinued in May 7, 2023 due to prolonged QT QTc of 527.  His psychotic symptoms have worsened since then.   He is under commitment , requested [facility] and forced blood draw for Clozaril.  He is under commitment with [facility] neuroleptic and [facility] ECT order.  He has severe persistent mental illness.  He is on Clozaril.  He had EKG on June 23, 2023.  QT QTc is 414/506.  He has left bundle branch block.  QT QTc was 434/539 on June 16.  He had EKG this morning and it continues to show prolonged QT QTc of 430/514.  He is on multiple neuroleptics, but he is so symptomatic that he needs these medications.  He is undergoing. ECT , since it does not seem that he responds to neuroleptics and his qt qtc is raising again.  He is on SIO for safety. He had 6 ECT sessions.   He is scheduled for ECT tomorrow.  He is followed by medicine for nonpsychiatric problems.  These are recommendations for treatment:    Medications:  Clozapine 650 mg daily for schizophrenia Zyprexa 15 mg bid for schizophrenia  Atropine 1% ophthalmic solution 1 drop twice daily as needed for secretion  Depakote  mg 3 times daily for mood stabilization, give after ECT on Monday, Wednesday and Friday   Gabapentin 300 mg 3 times daily for anxiety, give after ECT on Monday, Wednesday and Friday  Ativan 0.5 mg 3 times daily for anxiety, give after ECT on Monday, Wednesday and Friday  Trazodone 50 -100 mg nightly as needed for sleep  Melatonin 10 mg nightly for sleep  Miralax 17 g daily constipation  Senokot 8.6 mg twice daily for constipation  Hannah-Colace 1 p.o. twice daily as needed for constipation  Dulcolax suppository 10 mg rectally daily as needed for constipation  Flomax 0.4 mg for urinary retention  Tylenol 650 mg every 4 hours as needed for pain  Haldol 5 mg every 6 hours as needed with Benadryl 50 mg every 6 hours as needed for agitation  Precautions:   Suicide precautions  SIB precautions   Assault precautions  Elopement precautions  Fall precautions   SIO for safety  No roommate  Medical bad   Legal: Commitment with [facility] neuroleptic order and [facility] ECT order  Full code   will collect collateral information and make outpatient referrals  Staff to provide emotional support and redirect as needed  Patient encouraged to attend groups  Lab results: Reviewed personally  Consultation: medicine consult completed  Continue SIO for safety    Risk Assessment: commitment with [facility] and for forced blood draw and ECT recommended for safety, stabilization and medication management, chronic persistent mental illness patient with limited response to medication, side effects of medications, undergoing ECT    Coordination of Care:   Patient seen, medical record reviewed, care coordinated with the  team.    This document is created with the help of Dragon dictation system.  All grammatical/typing errors or context distortion are unintentional and inherent to software.    Misty Portillo MD        Re-Certification I certify that the inpatient psychiatric facility services furnished since the previous certification were, and continue to be, medically necessary for, either, treatment which could reasonably be expected to improve the patient s condition or diagnostic study and that the hospital records indicate that the services furnished were, either, intensive treatment services, admission and related services necessary for diagnostic study, or equivalent services.     I certify that the patient continues to need, on a daily basis, active treatment furnished directly by or requiring the supervision of inpatient psychiatric facility personnel.   I estimate TBD days of hospitalization is necessary for proper treatment of the patient. My plans for post-hospital care for this patient are : Medications, appointments     Misty Portillo MD

## 2024-04-24 ENCOUNTER — LAB (OUTPATIENT)
Dept: LAB | Facility: CLINIC | Age: 64
End: 2024-04-24
Payer: COMMERCIAL

## 2024-04-24 ENCOUNTER — OFFICE VISIT (OUTPATIENT)
Dept: CARDIOLOGY | Facility: CLINIC | Age: 64
End: 2024-04-24
Attending: INTERNAL MEDICINE
Payer: COMMERCIAL

## 2024-04-24 VITALS — OXYGEN SATURATION: 96 % | HEART RATE: 91 BPM | SYSTOLIC BLOOD PRESSURE: 113 MMHG | DIASTOLIC BLOOD PRESSURE: 76 MMHG

## 2024-04-24 DIAGNOSIS — I50.22 CHRONIC SYSTOLIC HEART FAILURE (H): ICD-10-CM

## 2024-04-24 LAB
ALBUMIN SERPL BCG-MCNC: 3.8 G/DL (ref 3.5–5.2)
ALP SERPL-CCNC: 72 U/L (ref 40–150)
ALT SERPL W P-5'-P-CCNC: <5 U/L (ref 0–70)
ANION GAP SERPL CALCULATED.3IONS-SCNC: 12 MMOL/L (ref 7–15)
AST SERPL W P-5'-P-CCNC: 10 U/L (ref 0–45)
BILIRUB SERPL-MCNC: 0.3 MG/DL
BUN SERPL-MCNC: 16.4 MG/DL (ref 8–23)
CALCIUM SERPL-MCNC: 9 MG/DL (ref 8.8–10.2)
CHLORIDE SERPL-SCNC: 102 MMOL/L (ref 98–107)
CREAT SERPL-MCNC: 0.94 MG/DL (ref 0.67–1.17)
DEPRECATED HCO3 PLAS-SCNC: 24 MMOL/L (ref 22–29)
EGFRCR SERPLBLD CKD-EPI 2021: >90 ML/MIN/1.73M2
ERYTHROCYTE [DISTWIDTH] IN BLOOD BY AUTOMATED COUNT: 14 % (ref 10–15)
GLUCOSE SERPL-MCNC: 115 MG/DL (ref 70–99)
HCT VFR BLD AUTO: 42.1 % (ref 40–53)
HGB BLD-MCNC: 14 G/DL (ref 13.3–17.7)
MCH RBC QN AUTO: 28.6 PG (ref 26.5–33)
MCHC RBC AUTO-ENTMCNC: 33.3 G/DL (ref 31.5–36.5)
MCV RBC AUTO: 86 FL (ref 78–100)
NT-PROBNP SERPL-MCNC: 1206 PG/ML (ref 0–900)
PLATELET # BLD AUTO: 204 10E3/UL (ref 150–450)
POTASSIUM SERPL-SCNC: 4.5 MMOL/L (ref 3.4–5.3)
PROT SERPL-MCNC: 5.9 G/DL (ref 6.4–8.3)
RBC # BLD AUTO: 4.9 10E6/UL (ref 4.4–5.9)
SODIUM SERPL-SCNC: 138 MMOL/L (ref 135–145)
WBC # BLD AUTO: 11.2 10E3/UL (ref 4–11)

## 2024-04-24 PROCEDURE — 36415 COLL VENOUS BLD VENIPUNCTURE: CPT | Performed by: PATHOLOGY

## 2024-04-24 PROCEDURE — 83880 ASSAY OF NATRIURETIC PEPTIDE: CPT | Performed by: PATHOLOGY

## 2024-04-24 PROCEDURE — 99204 OFFICE O/P NEW MOD 45 MIN: CPT | Performed by: INTERNAL MEDICINE

## 2024-04-24 PROCEDURE — 80053 COMPREHEN METABOLIC PANEL: CPT | Performed by: PATHOLOGY

## 2024-04-24 PROCEDURE — G0463 HOSPITAL OUTPT CLINIC VISIT: HCPCS | Performed by: INTERNAL MEDICINE

## 2024-04-24 PROCEDURE — 85027 COMPLETE CBC AUTOMATED: CPT | Performed by: PATHOLOGY

## 2024-04-24 RX ORDER — LOSARTAN POTASSIUM 25 MG/1
12.5 TABLET ORAL EVERY EVENING
Qty: 90 TABLET | Refills: 3 | Status: SHIPPED | OUTPATIENT
Start: 2024-04-24 | End: 2024-06-05

## 2024-04-24 RX ORDER — SPIRONOLACTONE 25 MG/1
25 TABLET ORAL DAILY
Qty: 90 TABLET | Refills: 3 | Status: SHIPPED | OUTPATIENT
Start: 2024-04-24 | End: 2024-06-05

## 2024-04-24 RX ORDER — METOPROLOL SUCCINATE 25 MG/1
25 TABLET, EXTENDED RELEASE ORAL EVERY EVENING
Qty: 90 TABLET | Refills: 3 | Status: SHIPPED | OUTPATIENT
Start: 2024-04-24 | End: 2024-06-05

## 2024-04-24 ASSESSMENT — PAIN SCALES - GENERAL: PAINLEVEL: NO PAIN (0)

## 2024-04-24 NOTE — LETTER
4/24/2024      RE: Vinod Lee  [id number]  [facility] [address]       Dear Colleague,    Thank you for the opportunity to participate in the care of your patient, Vinod Lee, at the [facility] [place] [facility]. Please see a copy of my visit note below.    Ivan Forrest M.D.  Cardiovascular Medicine    I personally saw and examined this patient, discussed care with housestaff and other consultants, reviewed current laboratories and imaging studies, and conveyed impression and diagnostic/therapeutic plan to patient.    Problem List  Impaired LV function  Paranoid schizophrenia  Neuroleptic induced Parkinson's disease  Phenothiazine allergy  LBBB    Plan:  Discussed resuscitation, angiogram, catheterization, AICD with sister, though no decisions made as she does not have guardian status  I do not think patient has capacity for decision making  Reduce metoprolol to 25 mgs/day and observe for bradycardia, increasing shortness of breath, weight gain or fluid retention  RTC 3-4 months    Allergies; Montelukast, phenothiazines, bee venom    Constitutional: weight loss, fever, chills, night sweats  HEENT: without visual changes, swallow difficulties  Pulmonary: without shortness of breath, cough, wheeze, hemoptysis  Cardiac: without chest pain, HERNANDEZ, PND, orthopnea, edema, palpitation, pre-syncope, syncope,  GI: without diarrhea, constipation, jaundice, melena, GERD, hematemesis  : without frequency, urgency, dysuria, hematuria  Skin: rash, bruise, open lesions  Neuro: without TIA, focal neurologic complaints, seizure, trauma  Ortho: without pain, swelling, mobility impairment  Endocrine: diabetes, thyroid, heat/cold intolerance, polyuria, polyphagia, change bowel habits.  Sleep:no SOPHIA, periodic breathing, fatigue  Other:     Objective  normal gait and station, normal mentation.wheelchair  Oral: moist mucous membranes with  persistent drooling  Lymph: without pathologic adenopathy  Chest: clear to ausculation and percussion  Cor: No evidence of left or right ventricular activity.  Rhythm is regular.  S1 normal, S2 split paradoxically . Murmurs are not present  Abdomen: without tenderness, rebound, guarding, masses, ascites  Extremities: Edema not present  Neuro: no focal defects, abnormal mentation  Skin: without open lesions  Psych: verbal, mental status not in tact      04/04/24 04/05/24 04/24/24 10:04   /62 112/65 113/76   Temp 98.6  F (37  C) 98  F (36.7  C)    Pulse 76 83 91   Resp 16 22    SpO2 92 % 90 % 96 %   Weight (lbs) 166 lb 10.7 oz     Weight (kgs) 75.6 kg (166 lb 10.7 oz)     Note: Showing the most recent values for these dates. There are additional values that can be seen in Synopsis.        Constitutional: alert, oriented, normal gait and station, normal mentation.  Oral: moist mucous membrans  Lymph: without pathologic adenopathy  Chest: clear to ausculation and percussion  Cor: No evidence of left or right ventricular activity.  Rhythm is regular.  S1 normal, S2 split physiologically. Murmurs are not present  Abdomen: without tenderness, rebound, guarding, masses, ascites  Extremities: Edema not present  Neuro: no focal defects, normal mentation  Skin: without open lesions  Psych: oriented, verbal, mental status in tact       Wt Readings from Last 5 Encounters:   04/04/24 75.6 kg (166 lb 10.7 oz)   03/01/24 86.6 kg (191 lb)   02/01/24 88 kg (194 lb)   11/24/23 83.7 kg (184 lb 9.6 oz)   11/05/23 85.7 kg (188 lb 15 oz)       Meds  Current Outpatient Medications   Medication Sig Dispense Refill     albuterol (PROAIR HFA/PROVENTIL HFA/VENTOLIN HFA) 108 (90 Base) MCG/ACT inhaler Inhale 2 puffs into the lungs every 6 hours as needed for wheezing, shortness of breath or cough 18 g 1     amoxicillin-clavulanate (AUGMENTIN) 875-125 MG tablet Take 1 tablet by mouth every 12 hours 3 tablet 0     benzonatate (TESSALON) 100  MG capsule Take 1 capsule by mouth three times daily as needed for cough 30 capsule 0     benztropine (COGENTIN) 1 MG tablet Take 1 tablet (1 mg) by mouth 2 times daily       busPIRone (BUSPAR) 15 MG tablet Take 1 tablet (15 mg) by mouth 3 times daily 90 tablet 3     cloZAPine (CLOZARIL) 50 MG tablet Take 8 tablets (400 mg) by mouth at bedtime       cloZAPine (CLOZARIL) 50 MG tablet Take 4 tablets (200 mg) by mouth daily at 2 pm       cyanocobalamin (VITAMIN B-12) 500 MCG SUBL sublingual tablet Place 1 tablet (500 mcg) under the tongue daily 90 tablet 1     divalproex sodium delayed-release (DEPAKOTE SPRINKLE) 125 MG DR capsule Take 250 mg by mouth 3 times daily (with meals) 360 capsule 3     doxycycline hyclate (VIBRAMYCIN) 100 MG capsule Take 1 capsule (100 mg) by mouth every 12 hours 3 capsule 0     empagliflozin (JARDIANCE) 10 MG TABS tablet Take 1 tablet (10 mg) by mouth daily 30 tablet 0     FLUoxetine (PROZAC) 20 MG capsule Take 1 capsule (20 mg) by mouth daily 30 capsule 3     gabapentin (NEURONTIN) 400 MG capsule Take 1 capsule (400 mg) by mouth 3 times daily 90 capsule 3     LORazepam (ATIVAN) 0.5 MG tablet Take 0.5 tablets (0.25 mg) by mouth 3 times daily (Patient taking differently: Take 1 mg by mouth 3 times daily) 90 tablet 1     losartan (COZAAR) 25 MG tablet Take 0.5 tablets (12.5 mg) by mouth every evening 30 tablet 0     Melatonin 10 MG TABS tablet Take 1 tablet (10 mg) by mouth at bedtime 30 tablet 3     metoprolol succinate ER (TOPROL XL) 25 MG 24 hr tablet Take 2 tablets (50 mg) by mouth every evening 30 tablet 0     naproxen (NAPROSYN) 250 MG tablet Take 1 tablet (250 mg) by mouth 2 times daily (with meals) 60 tablet 3     OLANZapine zydis (ZYPREXA) 15 MG ODT Take 1 tablet (15 mg) by mouth 2 times daily 60 tablet 3     pantoprazole (PROTONIX) 40 MG EC tablet Take 1 tablet (40 mg) by mouth daily 30 tablet 3     risperiDONE (RISPERDAL M-TABS) 0.5 MG ODT Take 1 tablet (0.5 mg) by mouth 2 times  daily 60 tablet 3     sennosides (SENOKOT) 8.6 MG tablet Take 2 tablets by mouth 2 times daily 60 tablet 3     spironolactone (ALDACTONE) 25 MG tablet Take 1 tablet (25 mg) by mouth daily 30 tablet 0     tamsulosin (FLOMAX) 0.4 MG capsule Take 2 capsules (0.8 mg) by mouth daily 30 capsule 3     Vitamin D3 (CHOLECALCIFEROL) 25 mcg (1000 units) tablet Take 1 tablet (25 mcg) by mouth daily 30 tablet 3     No current facility-administered medications for this visit.          Labs   Latest Reference Range & Units 24 06:26   Sodium 135 - 145 mmol/L 136   Potassium 3.4 - 5.3 mmol/L 4.6   Chloride 98 - 107 mmol/L 101   Carbon Dioxide (CO2) 22 - 29 mmol/L 27   Urea Nitrogen 8.0 - 23.0 mg/dL 16.8   Creatinine 0.67 - 1.17 mg/dL 0.92   GFR Estimate >60 mL/min/1.73m2 >90   Calcium 8.8 - 10.2 mg/dL 8.9   Anion Gap 7 - 15 mmol/L 8   Magnesium 1.7 - 2.3 mg/dL 1.9   Albumin 3.5 - 5.2 g/dL 3.4 (L)   Protein Total 6.4 - 8.3 g/dL 5.5 (L)   Alkaline Phosphatase 40 - 150 U/L 68   ALT 0 - 70 U/L 5   AST 0 - 45 U/L 11   Bilirubin Total <=1.2 mg/dL 0.3   Glucose 70 - 99 mg/dL 94   WBC 4.0 - 11.0 10e3/uL 10.4   Hemoglobin 13.3 - 17.7 g/dL 14.0   Hematocrit 40.0 - 53.0 % 42.9   Platelet Count 150 - 450 10e3/uL 229   RBC Count 4.40 - 5.90 10e6/uL 4.85   MCV 78 - 100 fL 89   MCH 26.5 - 33.0 pg 28.9   MCHC 31.5 - 36.5 g/dL 32.6   RDW 10.0 - 15.0 % 13.2       Imaging     Name: TAMMY BERUMEN  MRN: [id number]  : 1960  Study Date: 2024 12:16 PM  Age: 63 yrs  Gender: Male  Patient Location: [facility]  Reason For Study: Dyspnea, CHF  Ordering Physician: MONI PATTERSON  Performed By: Gregory Nguyen     BP: 111/70 mmHg  ______________________________________________________________________________  Procedure  Complete Portable Echo Adult. Contrast Optison. Patient was given 6 ml mixture  of 3 ml Optison and 6 ml saline. 3 ml wasted.  ______________________________________________________________________________  Interpretation  Summary  Moderate left ventricular dilation is present. The visual ejection fraction is  25-30%. There is global hypokinesis, most contractility is in the basal  lateral walls.  Right ventricular function, chamber size, wall motion, and thickness are  normal.  The inferior vena cava was normal in size with preserved respiratory  variability.  Trivial pericardial effusion is present.     There is no prior study for direct comparison.  ______________________________________________________________________________  Left Ventricle  Left ventricular wall thickness is normal. Moderate left ventricular dilation  is present. The visual ejection fraction is 25-30%. Left ventricular diastolic  function is not assessable. There is global hypokinesis, most contractility is  in the basal lateral walls.     Right Ventricle  Right ventricular function, chamber size, wall motion, and thickness are  normal.     Atria  Both atria appear normal.     Mitral Valve  The mitral valve is normal.     Aortic Valve  The aortic valve cannot be assessed.     Tricuspid Valve  The tricuspid valve is normal. Trace tricuspid insufficiency is present. The  peak velocity of the tricuspid regurgitant jet is not obtainable.     Pulmonic Valve  The pulmonic valve cannot be assessed.     Vessels  The aorta root is normal. The inferior vena cava was normal in size with  preserved respiratory variability.     Pericardium  Trivial pericardial effusion is present.     Compared to Previous Study  There is no prior study for direct comparison.     ______________________________________________________________________________  MMode/2D Measurements & Calculations  EF Biplane: 19.1 %  LA Volume (BP): 37.9 ml  TAPSE: 1.8 cm     Doppler Measurements & Calculations  RV S Cedrick: 14.0 cm/sec            [facility]      Please do not hesitate to contact me if you have any questions/concerns.     Sincerely,     Ivan Forrest MD

## 2024-04-24 NOTE — NURSING NOTE
Chief Complaint   Patient presents with    New Patient      New HF; 63yr old male with a h/o new systolic heart failure presenting for HF evaluation with labs prior.     Vitals were taken and medications reconciled.    Andrés Masters, EMT  10:11 AM

## 2024-04-24 NOTE — PATIENT INSTRUCTIONS
"You were seen today in the Cardiovascular Clinic at the [facility].      Cardiology Providers you saw during your visit:  Dr. Ivan Forrest     Recommendations:   Please restart your metoprolol to 25mg daily (you were previously on 50mg)  Please follow-up with Dr. Forrest in 3 months labs prior        Eat a heart healthy, low sodium diet.  Get 20 to 30 minutes of aerobic exercise 4 to 5 times per week as tolerated. (Examples of aerobic exercise include: walking, bicycling, swimming, running).     Thank you for your visit today!   Please MyChart message or call if you have any questions or concerns.      During Business Hours:  [phone], option # 1 ([place])       After hours, weekends or holidays:   [phone], Option #4  Ask to speak to the On-Call Cardiologist. Inform them you are a heart failure patient at the [place].      Rula Stockton RN BSN CHFN  Cardiology Care Coordinator - [facility]  Questions and [phone]  First press #1 for the University and then press #4 for \"Your Care Team\" to reach us Cardiology Nurses.                "

## 2024-04-25 ENCOUNTER — TELEPHONE (OUTPATIENT)
Dept: FAMILY MEDICINE | Facility: CLINIC | Age: 64
End: 2024-04-25
Payer: COMMERCIAL

## 2024-04-25 NOTE — TELEPHONE ENCOUNTER
Forms/Letter Request    Type of form/letter: [facility] Home Health Cert Plan of Care Cert period 4/9-6/7    Do we have the form/letter: Yes: placed form in providers in box for review/completion.    How would you like the form/letter returned: Fax : [phone]

## 2024-04-26 ENCOUNTER — ANCILLARY PROCEDURE (OUTPATIENT)
Dept: PET IMAGING | Facility: CLINIC | Age: 64
End: 2024-04-26
Attending: INTERNAL MEDICINE
Payer: COMMERCIAL

## 2024-04-26 DIAGNOSIS — R91.8 PULMONARY NODULES: ICD-10-CM

## 2024-04-26 PROCEDURE — 78816 PET IMAGE W/CT FULL BODY: CPT | Mod: GC | Performed by: RADIOLOGY

## 2024-04-26 PROCEDURE — 74177 CT ABD & PELVIS W/CONTRAST: CPT | Mod: GC | Performed by: RADIOLOGY

## 2024-04-26 PROCEDURE — 71260 CT THORAX DX C+: CPT | Mod: GC | Performed by: RADIOLOGY

## 2024-04-26 PROCEDURE — A9552 F18 FDG: HCPCS | Performed by: RADIOLOGY

## 2024-04-26 RX ORDER — IOPAMIDOL 755 MG/ML
10-135 INJECTION, SOLUTION INTRAVASCULAR ONCE
Status: COMPLETED | OUTPATIENT
Start: 2024-04-26 | End: 2024-04-26

## 2024-04-26 RX ORDER — FLUDEOXYGLUCOSE F 18 200 MCI/ML
10-18 INJECTION, SOLUTION INTRAVENOUS ONCE
Status: COMPLETED | OUTPATIENT
Start: 2024-04-26 | End: 2024-04-26

## 2024-04-26 RX ADMIN — FLUDEOXYGLUCOSE F 18 9.9 MILLICURIE: 200 INJECTION, SOLUTION INTRAVENOUS at 14:36

## 2024-04-26 RX ADMIN — IOPAMIDOL 81 ML: 755 INJECTION, SOLUTION INTRAVASCULAR at 14:37

## 2024-04-29 ENCOUNTER — TELEPHONE (OUTPATIENT)
Dept: FAMILY MEDICINE | Facility: CLINIC | Age: 64
End: 2024-04-29
Payer: COMMERCIAL

## 2024-04-29 NOTE — TELEPHONE ENCOUNTER
Forms/Letter Request    Type of form/letter: Home Health Certification    Do we have the form/letter: Yes: Will place form in provider's bin    Who is the form from? [facility] Cert. 4/9 - 6/07    Where did/will the form come from? form was faxed in    How would you like the form/letter returned: Fax : [phone]

## 2024-04-30 DIAGNOSIS — R52 PAIN: ICD-10-CM

## 2024-04-30 RX ORDER — NAPROXEN 250 MG/1
250 TABLET ORAL 2 TIMES DAILY WITH MEALS
Qty: 60 TABLET | Refills: 3 | OUTPATIENT
Start: 2024-04-30

## 2024-05-02 ENCOUNTER — OFFICE VISIT (OUTPATIENT)
Dept: FAMILY MEDICINE | Facility: CLINIC | Age: 64
End: 2024-05-02
Payer: COMMERCIAL

## 2024-05-02 ENCOUNTER — PRE VISIT (OUTPATIENT)
Dept: OTHER | Age: 64
End: 2024-05-02

## 2024-05-02 ENCOUNTER — PATIENT OUTREACH (OUTPATIENT)
Dept: ONCOLOGY | Facility: CLINIC | Age: 64
End: 2024-05-02

## 2024-05-02 VITALS
RESPIRATION RATE: 14 BRPM | TEMPERATURE: 97.8 F | HEART RATE: 87 BPM | OXYGEN SATURATION: 99 % | SYSTOLIC BLOOD PRESSURE: 109 MMHG | DIASTOLIC BLOOD PRESSURE: 75 MMHG

## 2024-05-02 DIAGNOSIS — R91.8 PULMONARY NODULES: Primary | ICD-10-CM

## 2024-05-02 DIAGNOSIS — I50.22 CHRONIC SYSTOLIC HEART FAILURE (H): ICD-10-CM

## 2024-05-02 DIAGNOSIS — J44.9 CHRONIC OBSTRUCTIVE PULMONARY DISEASE, UNSPECIFIED COPD TYPE (H): ICD-10-CM

## 2024-05-02 DIAGNOSIS — R05.3 CHRONIC COUGH: ICD-10-CM

## 2024-05-02 DIAGNOSIS — Z12.11 SCREEN FOR COLON CANCER: Primary | ICD-10-CM

## 2024-05-02 DIAGNOSIS — R91.1 PULMONARY NODULE 1 CM OR GREATER IN DIAMETER: ICD-10-CM

## 2024-05-02 DIAGNOSIS — F20.9 SCHIZOPHRENIA, UNSPECIFIED TYPE (H): ICD-10-CM

## 2024-05-02 DIAGNOSIS — E04.2 MULTINODULAR GOITER: ICD-10-CM

## 2024-05-02 DIAGNOSIS — Z79.899 POLYPHARMACY: ICD-10-CM

## 2024-05-02 PROCEDURE — 99215 OFFICE O/P EST HI 40 MIN: CPT | Performed by: INTERNAL MEDICINE

## 2024-05-02 PROCEDURE — G2212 PROLONG OUTPT/OFFICE VIS: HCPCS | Performed by: INTERNAL MEDICINE

## 2024-05-02 RX ORDER — BENZONATATE 200 MG/1
200 CAPSULE ORAL 3 TIMES DAILY PRN
Qty: 90 CAPSULE | Refills: 1 | Status: SHIPPED | OUTPATIENT
Start: 2024-05-02 | End: 2024-06-05

## 2024-05-02 RX ORDER — RESPIRATORY SYNCYTIAL VIRUS VACCINE 120MCG/0.5
0.5 KIT INTRAMUSCULAR ONCE
Qty: 1 EACH | Refills: 0 | Status: CANCELLED | OUTPATIENT
Start: 2024-05-02 | End: 2024-05-02

## 2024-05-02 RX ORDER — GUAIFENESIN/DEXTROMETHORPHAN 100-10MG/5
10 SYRUP ORAL 4 TIMES DAILY PRN
Qty: 354 ML | Refills: 1 | Status: SHIPPED | OUTPATIENT
Start: 2024-05-02 | End: 2024-06-05

## 2024-05-02 NOTE — PROGRESS NOTES
New IP (Interventional Pulmonology) referral rec'd.  Chart reviewed.       New Patient: Interventional Pulmonary (Lung nodule) Nurse Navigator Note    Referring provider: Franklyn Estrella MDBk /[place]    Referred to (specialty): Interventional Pulmonary (Lung nodule)    Requested provider (if applicable): n/a    Date Referral Received: 5/2/2024    Evaluation for :  Lung nodule    Clinical History (per Nurse review of records provided):    **BOOK MARKED**    5/2/2024:  Complete PFT's        Combined Report of: PET and CT on 4/26/2024 3:42 PM:     1. PET of the neck, chest, abdomen, and pelvis.   2. PET CT Fusion for Attenuation Correction and Anatomical   Localization.   3. Diagnostic CT of the chest, abdomen and pelvis with intravenous   contrast obtained for diagnostic interpretation.   4. 3D MIP and PET-CT fused images were processed on an independent   workstation and archived to PACS and reviewed by a radiologist.    ... Impression IMPRESSION:   In this patient with a history of a left apical spiculated nodule:     1. Left upper lobe spiculated 1 cm nodule not hypermetabolic. Present   as far back as 12/18/2019 and marginally larger on current study than   on 3/31/2020.  Favor scar, but cannot rule out very slow-growing   malignancy. Recommend continued surveillance.     2. Nodular opacity along the major fissure in the left lower lobe.   Sequela from infection from 8/8/2022, unchanged from 11/26/2022 likely ...  Records Location: Epic     RECORDS NEEDED:  Last FIVE years CHEST imaging pushed to PACS from [facility]---thank you!!       Additional testing needed prior to consult: none

## 2024-05-02 NOTE — TELEPHONE ENCOUNTER
RECORDS STATUS - ALL OTHER DIAGNOSIS      Action May 2, 2024 3:48 PM    Action Taken - Req img from [facility]     Imaging Received  May 3, 2024 8:44 AM    Action: Images from [facility] received and resolved to PACS.     RECORDS NEEDED: Last FIVE years CHEST imaging pushed to PACS from [facility]   RECORDS RECEIVED FROM: [facility]   NOTES STATUS DETAILS   OFFICE NOTE from referring provider Epic 5/2/24: Dr. Franklyn Estrella   OFFICE NOTE from other specialist [facility]- [place] 8/10/21: Dr. Marisa Jean   MEDICATION LIST [facility]    LABS     ANYTHING RELATED TO DIAGNOSIS Epic Most recent 6/13/24   IMAGING (NEED IMAGES & REPORT)     CT SCANS PACS/ (Img req from [facility]) PACS:  4/26/24, 8/8/22: CT Chest Abd Pel  11/26/22, 3/31/20, 12/18/19: CT Chest    [facility]:  8/8/22, 7/21/22: CT Head Brain   MRI (Img req from [facility]) [facility]:  6/19/21: MR Brain   XRAYS PACS/ (Img req from [facility]) PACS:  3/30/24-5/23/23: XR Chest    [facility]:  3/22/23, 11/30/22, 8/9/22, 7/11/20: XR Chest    [facility]:  5/18/22, 5/13/22, 8/4/20, 12/12/19, 6/18/19: XR Chest   PET PACS 4/26/24: PET Whole Body

## 2024-05-02 NOTE — PROGRESS NOTES
"  Assessment & Plan     Screen for colon cancer    - Fecal colorectal cancer screen FIT - Future (S+30); Future  - Fecal colorectal cancer screen FIT - Future (S+30)    Pulmonary nodule 1 cm or greater in diameter  He has been a smoker since the age of 15 years however he is unable to tell me exactly when he stopped smoking  He is also unable to tell me how many pack years exactly he smoked but he tells me that he has not been smoking at least for the last \"few years\"  He had some pulmonary nodules and a PET scan was recommended  PET scan did show spiculated lesion of 1 cm in the upper lobe  The recommended follow-up with pulmonary  He might need biopsy  - Adult Pulmonary Medicine  Referral; Future    Multinodular goiter  PET scan also picked up a multinodular goiter with a an avid nodule  The recommended ultrasound of the thyroid with going to get that  Recent TSH normal  - US Thyroid; Future    Polypharmacy  The group home wants to know if they can crush the medications however he is also on some capsules so I do not know which medications can be crushed and which cannot be  I will do a MTM referral for this  - Med Therapy Management Referral    Schizophrenia, unspecified type (H)  Closely follows up with psychiatrist and he is on Cogentin/BuSpar/Clozaril/Depakote sprinkle/gabapentin/risperidone/olanzapine    Chronic systolic heart failure (H)  Recently was admitted to the hospital with decompensated CHF  Was found to have a EF of around 25 to 30%   He is  currently on goal-directed medical therapy  He is on beta-blocker/losartan/spironolactone/Jardiance  He is currently not on any diuretic as he is euvolemic  They are planning to get an ICD done but they are having issues with that because of his guardianship issues    Chronic cough  This from his COPD  - benzonatate (TESSALON) 200 MG capsule; Take 1 capsule (200 mg) by mouth 3 times daily as needed for cough  - guaiFENesin-dextromethorphan (ROBITUSSIN " DM) 100-10 MG/5ML syrup; Take 10 mLs by mouth 4 times daily as needed for cough    Chronic obstructive pulmonary disease, unspecified COPD type (H)  He never had formal PFTs done  Currently only on albuterol inhaler and tolerates that very well  - General PFT Lab (Please always keep checked); Future  - Pulmonary Function Test; Future    I did a POLST for this patient  Even though this patient does not have any guardian or surrogate and he cannot make his own decisions because of his mental health issues I had to do a POLST because I believe he should be a full code till we get a guardian      65 minutes spent by me on the date of the encounter doing chart review, history and exam, documentation and further activities per the note    MED REC REQUIRED  Post Medication Reconciliation Status: discharge medications reconciled and changed, per note/orders        Subjective   Vinod is a 63 year old, presenting for the following health issues:  Hospital F/U      5/2/2024    10:14 AM   Additional Questions   Roomed by kale   Accompanied by zahida- nurse         5/2/2024    10:14 AM   Patient Reported Additional Medications   Patient reports taking the following new medications no     HPI         Hospital Follow-up Visit:    Hospital/Nursing Home/IP Rehab Facility: [facility]  Date of Admission: 03/28/2024  Date of Discharge: 04/05/2024  Reason(s) for Admission: Shortness of breath and weakness  Was the patient in the ICU or did the patient experience delirium during hospitalization?  No  Do you have any other stressors you would like to discuss with your provider? No    Problems taking medications regularly:  difficulty swallowing capsule   Medication changes since discharge: metoprolol and losartan- given by cardiologist   Problems adhering to non-medication therapy:  None    Summary of hospitalization:  [facility] discharge summary reviewed  Diagnostic  Tests/Treatments reviewed.  Follow up needed: Cardiologist and psychiatrist  Other Healthcare Providers Involved in Patient s Care:         Specialist appointment -    Update since discharge: improved.         Plan of care communicated with patient and caregiver                   Review of Systems  Constitutional, HEENT, cardiovascular, pulmonary, gi and gu systems are negative, except as otherwise noted.      Objective    /75 (BP Location: Left arm, Patient Position: Sitting, Cuff Size: Adult Regular)   Pulse 87   Temp 97.8  F (36.6  C) (Oral)   Resp 14   SpO2 99%   There is no height or weight on file to calculate BMI.  Physical Exam   GENERAL: alert and no distress  EYES: Eyes grossly normal to inspection, PERRL and conjunctivae and sclerae normal  HENT: ear canals and TM's normal, nose and mouth without ulcers or lesions  NECK: no adenopathy, no asymmetry, masses, or scars  RESP: lungs clear to auscultation - no rales, rhonchi or wheezes  CV: regular rate and rhythm, normal S1 S2, no S3 or S4, no murmur, click or rub, no peripheral edema  ABDOMEN: soft, nontender, no hepatosplenomegaly, no masses and bowel sounds normal  MS: no gross musculoskeletal defects noted, no edema  SKIN: no suspicious lesions or rashes            Signed Electronically by: Franklyn Estrella MD

## 2024-05-08 ENCOUNTER — TELEPHONE (OUTPATIENT)
Dept: FAMILY MEDICINE | Facility: CLINIC | Age: 64
End: 2024-05-08
Payer: COMMERCIAL

## 2024-05-08 NOTE — TELEPHONE ENCOUNTER
Home Care is calling regarding an established patient with [facility].        4/12/2024    10:51 AM   Home Care Information    Name/Phone Number Chuy JENKINS [phone]   Home Care agency [facility]     Requesting orders from: Franklyn Estrella  Provider is following patient: Yes  Is this a 60-day recertification request?  No    Orders Requested    Physical Therapy  Request for discontinuation of care   Goals have not been met/not progressing.  Barriers to care:  Reports that patient has hit a plateau with physical therapy. Reports that when they go out to see patient, he is not really participating and declines most sessions.       Confirmed ok to leave a detailed message with call back.  Contact information confirmed and updated as needed.    Ivory Barajas RN     no

## 2024-05-08 NOTE — TELEPHONE ENCOUNTER
Franklyn Estrella MD   to [facility]     5/8/24  9:07 AM  Please call homecare and give orders to discontinue care      This writer attempted to contact Jessica on 05/08/24      Reason for call orders and left detailed message.      If patient calls back:   Registered Nurse called.     Left message in confidential VM.    ANABELL CochranN, RN  [facility]

## 2024-05-08 NOTE — TELEPHONE ENCOUNTER
Placed call to Chuy home care RN and informed of message below. Verbalized understanding.    Kianna Bermudez, RN, BSN  [facility]

## 2024-05-08 NOTE — TELEPHONE ENCOUNTER
Home Care is calling regarding an established patient with [facility].        4/12/2024    10:51 AM   Home Care Information    Name/Phone Number Chuy JENKINS [phone]   Home Care agency [facility]     Requesting orders from: Franklyn Estrella  Provider is following patient: Yes  Is this a 60-day recertification request?  No    Orders Requested    Occupational Therapy  Request for discontinuation of care   Goals have not been met/not progressing.  Barriers to care:  Lack of participation from pt. RN says pt does not wish to continue therapy. Would like discharge effective today.      Information was gathered and will be sent to provider for review.  RN will contact Home Care with information after provider review.  Confirmed ok to leave a detailed message with call back.  Contact information confirmed and updated as needed.    Kianna Bermudez RN, BSN  [facility]

## 2024-05-10 ENCOUNTER — TELEPHONE (OUTPATIENT)
Dept: FAMILY MEDICINE | Facility: CLINIC | Age: 64
End: 2024-05-10
Payer: COMMERCIAL

## 2024-05-10 NOTE — TELEPHONE ENCOUNTER
Forms/Letter Request    Type of form/letter: OTHER: [facility] verbal order,PT discharge and Discharge summary       Do we have the form/letter: Yes: Placed in providers in box for review    Where did/will the form come from? form was faxed in    When is form/letter needed by: ASAP    How would you like the form/letter returned:  [phone]

## 2024-05-13 ENCOUNTER — MEDICAL CORRESPONDENCE (OUTPATIENT)
Dept: HEALTH INFORMATION MANAGEMENT | Facility: CLINIC | Age: 64
End: 2024-05-13

## 2024-05-13 DIAGNOSIS — Z53.9 DIAGNOSIS NOT YET DEFINED: Primary | ICD-10-CM

## 2024-05-13 PROCEDURE — G0180 MD CERTIFICATION HHA PATIENT: HCPCS | Performed by: INTERNAL MEDICINE

## 2024-05-20 NOTE — PROGRESS NOTES
Medication Therapy Management (MTM) Encounter    ASSESSMENT:                            Medication Adherence/Access: Pt having difficulty swallowing pills and would like to know which ones he can crush.    Mental Health: stable. Pt to continuing following with psych.     Cough: stable on current regimen    HFrEF (</=40%): Stable. Pt is on appropriate ARB, beta blocker, MRA and SGLT2i. Recommend continuing to follow with cardiology as directed    GERD: controlled on current regimen    Supplements: stable    Urinary retention: controlled on current regimen    PLAN:                            1. All medications in med list are crushable except those called out below:     Divalproex sprinkles - can be opened and sprinkled over soft food but don't bite the sprinkles  Benzonatate - can't crush  Metoprolol succinate - can't crush  Pantoprazole - can't crush  Tamsulosin - can't crush, can open and sprinkle contents on soft food  Vitamin B12 sublingual tab - must be dissolved on tongue, you can switch to a regular tablet (not sublingual) and crush that  Fluoxetine - can be opened and sprinkled or you can switch it to a tablet and crush that  Gabapentin - can be switched to 800 mg tablet., give half tablet per dose and crush    Follow-up:   Appointments in Next Year      May 30, 2024  9:00 AM  (Arrive by 8:45 AM)  New Patient with Kailash Rico DO  [facility] ([facility] ) [phone]     Jul 24, 2024 11:00 AM  (Arrive by 10:45 AM)  RETURN HEART FAILURE with Ivan Forrest MD  [facility] ([facility] ) [phone]     Aug 05, 2024  4:00 PM  (Arrive by 3:45 PM)  New Nephrology with Roma Dumont DO  [facility] ([facility]) [phone]            SUBJECTIVE/OBJECTIVE:                          Vinod Lee is a 63 year old male  called for an initial visit. He was referred to me from Tristan Estrella MD. Pt was accompanied by his nurse Shazia who sets up and administeres his medications.    Reason for visit: wants to know which medications can be crushed. Having trouble with med administration. Pills getting stuck in throat    Allergies/ADRs: Reviewed in chart  Past Medical History: Reviewed in chart  Tobacco: He reports that he has never smoked. He has never been exposed to tobacco smoke. He has never used smokeless tobacco.  Alcohol: none    Medication Adherence/Access: Patient takes medications directly from bottles but has them set up by nurse. Switching to [facility] soon to get pill packs.  Mental Health   Benztropine 1 mg twice daily   Clozapine 200 mg at 2pm and 400 mg at bedtime  Divalproex DR sprinkles 250 mg three times daily  Fluoxetine 20 mg once daily  Gabapentin 400 mg three times daily   Lorazepam 0.5 mg three times daily  Olanzapine 15 mg twice daily   Risperidone 0.5 mg twice daily    Patient reports he follows with Dr. Dolly Thomas  Feels current regimen is helping stabilize his mood  He has fairly frequent visits with psych   Denies concerns at this time     Cough:  Albuterol 2 puffs every 6 hours as needed for cough  Benzonatate 200 mg as needed  Mucinex DM syrup - 10 ml four times daily if needed  He does require benzonatate most days  Feels it helps with cough/phlegm    HFrEF (</=40%)   Beta Blocker: metoprolol ER 25 mg daily  ACEi/ARB/ARNI: losartan 12.5 mg daily  SGLT2i: Jardiance 10 mg daily  MRA: spironolactone 25 mg daily     Patient reports no HF symptoms  Follows with cardiology   Patient is measuring daily weights  and reports they are stable.   Patient is following a low sodium diet and is avoiding alcohol.    GERD:   Pantoprazole 40 mg daily  Working well for acid reflux  Notices if he misses a dose    Supplements:  Melatonin 10 mg at bedtime  Vitamin D 1000 units daily  Senna 2 tabs twice daily  Vitamin  B12 500 mcg sublingual daily  No concerns with supplements    Urinary retention:  Tamsulosin 0.8 mg daily   Working for urinary leakage  Denies dizziness/lightheadedness currently    ----------------  I spent 32 minutes with this patient today. All changes were made via collaborative practice agreement with Franklyn Estrella MD. A copy of the visit note was provided to the patient's provider(s).    A summary of these recommendations was mailed to the patient.    Andrzej WorleyD, [facility]  Medication Therapy Management Provider  [phone]    Telemedicine Visit Details  Type of service:  Telephone visit  Start Time:  10:00 AM  End Time:  10:32 AM     Medication Therapy Recommendations  Paranoid schizophrenia (H)    Current Medication: gabapentin (NEURONTIN) 400 MG capsule   Rationale: Dosage form inappropriate - Ineffective medication - Effectiveness   Recommendation: Change Medication - gabapentin 800 MG tablet   Status: Declined per Patient

## 2024-05-21 ENCOUNTER — VIRTUAL VISIT (OUTPATIENT)
Dept: PHARMACY | Facility: CLINIC | Age: 64
End: 2024-05-21
Attending: INTERNAL MEDICINE
Payer: COMMERCIAL

## 2024-05-21 DIAGNOSIS — R33.9 URINARY RETENTION: ICD-10-CM

## 2024-05-21 DIAGNOSIS — I50.22 CHRONIC SYSTOLIC HEART FAILURE (H): ICD-10-CM

## 2024-05-21 DIAGNOSIS — F20.0 PARANOID SCHIZOPHRENIA, CHRONIC CONDITION WITH ACUTE EXACERBATION (H): Primary | ICD-10-CM

## 2024-05-21 DIAGNOSIS — Z78.9 TAKES DIETARY SUPPLEMENTS: ICD-10-CM

## 2024-05-21 DIAGNOSIS — K21.00 GASTROESOPHAGEAL REFLUX DISEASE WITH ESOPHAGITIS, UNSPECIFIED WHETHER HEMORRHAGE: ICD-10-CM

## 2024-05-21 DIAGNOSIS — R05.3 CHRONIC COUGH: ICD-10-CM

## 2024-05-21 PROCEDURE — 99207 PR NO CHARGE LOS: CPT | Mod: 93 | Performed by: PHARMACIST

## 2024-05-21 RX ORDER — OLANZAPINE 15 MG/1
15 TABLET ORAL 2 TIMES DAILY
COMMUNITY

## 2024-05-21 RX ORDER — CLOZAPINE 200 MG/1
200 TABLET ORAL DAILY
COMMUNITY

## 2024-05-21 RX ORDER — RISPERIDONE 0.5 MG/1
0.5 TABLET ORAL 2 TIMES DAILY
COMMUNITY

## 2024-05-21 RX ORDER — LORAZEPAM 1 MG/1
1 TABLET ORAL 3 TIMES DAILY PRN
COMMUNITY

## 2024-05-21 NOTE — Clinical Note
Pal Estrella, I sent a list to the patient and nurse for which medications can't be crushed. They are fine with most of the capsules because they can be opened, but may want to switch gabapentin capsule to a tablet so they can crush. They said they would reach out to you if they decide to make this change. Thanks for referral! Eh

## 2024-05-21 NOTE — LETTER
May 22, 2024  Vinod ONEILL Rosa  [id number]  [facility] [id number]    Dear Mr. Lee, ABELARDO [facility]     Thank you for talking with me on May 21, 2024 about your health and medications. As a follow-up to our conversation, I have included two documents:      Your Recommended To-Do List has steps you should take to get the best results from your medications.  Your Medication List will help you keep track of your medications and how to take them.    If you want to talk about these documents, please call Eh Tavarez RPH at phone: [phone], Monday-Friday 8-4:30pm.    I look forward to working with you and your doctors to make sure your medications work well for you.    Sincerely,  Eh Tavarez RPH  [facility] Pharmacist, [facility]

## 2024-05-22 NOTE — PATIENT INSTRUCTIONS
Pal Brock, it was great talking with you today!     Recommendations from today's MTM visit:                                                      Wilner Mccray and Vinod, all your medications are crushable except those called out below:     Divalproex sprinkles - can be opened and sprinkled over soft food but don't bite the sprinkles  Benzonatate - can't crush  Metoprolol succinate - can't crush  Pantoprazole - can't crush  Tamsulosin - can't crush, can open and sprinkle contents on soft food  Vitamin B12 sublingual tab - must be dissolved on tongue, you can switch to a regular tablet (not sublingual) and crush that  Fluoxetine - can be opened and sprinkled or you can switch it to a tablet and crush that  Gabapentin - can be switched to 800 mg tablet - give half tablet per dose and crush    I value your experience and would be very grateful for your time with providing feedback on our clinic survey. You may receive a survey via email or text message in the next few days.     To schedule another MTM appointment, please call the clinic directly or you may call the MTM scheduling line at [phone] or toll-free at [phone].     My Clinical Pharmacist's contact information:                                                      Please feel free to contact me with any questions or concerns you have.      Eh Tavarez PharmD, [facility]  Phone: [phone]

## 2024-05-28 PROBLEM — E87.1 HYPONATREMIA: Status: ACTIVE | Noted: 2024-02-22

## 2024-05-28 PROBLEM — F29 PSYCHOSIS, UNSPECIFIED PSYCHOSIS TYPE (H): Status: ACTIVE | Noted: 2023-04-10

## 2024-05-28 PROBLEM — F25.9 SCHIZO-AFFECTIVE SCHIZOPHRENIA (H): Status: ACTIVE | Noted: 2024-05-28

## 2024-05-28 PROBLEM — J11.1 FLU: Status: ACTIVE | Noted: 2022-11-30

## 2024-05-28 PROBLEM — J10.1 INFLUENZA A: Status: ACTIVE | Noted: 2022-11-30

## 2024-05-28 PROBLEM — F33.9 RECURRENT MAJOR DEPRESSIVE DISORDER (H): Status: ACTIVE | Noted: 2019-12-18

## 2024-05-28 PROBLEM — R09.02 HYPOXIA: Status: ACTIVE | Noted: 2022-11-30

## 2024-05-28 PROBLEM — K52.9 CHRONIC DIARRHEA: Status: ACTIVE | Noted: 2022-08-08

## 2024-05-28 PROBLEM — J98.4 CAVITARY LESION OF LUNG: Status: ACTIVE | Noted: 2022-08-08

## 2024-05-28 PROBLEM — F41.9 ANXIETY: Status: ACTIVE | Noted: 2024-05-28

## 2024-05-28 PROBLEM — M25.569 CHRONIC KNEE PAIN: Status: ACTIVE | Noted: 2024-05-28

## 2024-05-28 PROBLEM — G47.30 SLEEP APNEA: Status: ACTIVE | Noted: 2023-04-11

## 2024-05-28 PROBLEM — G89.29 CHRONIC KNEE PAIN: Status: ACTIVE | Noted: 2024-05-28

## 2024-05-28 PROBLEM — D64.9 NORMOCYTIC ANEMIA: Status: ACTIVE | Noted: 2022-08-08

## 2024-05-28 PROBLEM — N40.0 BENIGN PROSTATIC HYPERPLASIA: Status: ACTIVE | Noted: 2019-12-12

## 2024-05-28 PROBLEM — J96.01 ACUTE HYPOXEMIC RESPIRATORY FAILURE (H): Status: ACTIVE | Noted: 2022-11-30

## 2024-05-28 RX ORDER — BUSPIRONE HYDROCHLORIDE 15 MG/1
TABLET ORAL
COMMUNITY
Start: 2024-05-21 | End: 2024-07-11

## 2024-05-28 RX ORDER — PRAZOSIN HYDROCHLORIDE 1 MG/1
CAPSULE ORAL
COMMUNITY
Start: 2024-05-21 | End: 2024-09-07

## 2024-05-28 NOTE — PROGRESS NOTES
Pre-visit planning and chart review completed.     NEW patient appointment:    5/30 with Dr. Rico  4/26 PET/CT CAP    - No anticoagulation.  - Non smoker.    CE updated. Medications, allergies, problem list, and immunizations reconciled.

## 2024-06-05 DIAGNOSIS — R05.3 CHRONIC COUGH: ICD-10-CM

## 2024-06-05 DIAGNOSIS — R33.9 URINARY RETENTION: ICD-10-CM

## 2024-06-05 DIAGNOSIS — E53.8 VITAMIN B12 DEFICIENCY (NON ANEMIC): ICD-10-CM

## 2024-06-05 DIAGNOSIS — E55.9 VITAMIN D DEFICIENCY: ICD-10-CM

## 2024-06-05 DIAGNOSIS — J44.9 CHRONIC OBSTRUCTIVE PULMONARY DISEASE, UNSPECIFIED COPD TYPE (H): ICD-10-CM

## 2024-06-05 DIAGNOSIS — K59.00 CONSTIPATION, UNSPECIFIED CONSTIPATION TYPE: ICD-10-CM

## 2024-06-05 DIAGNOSIS — F20.0 PARANOID SCHIZOPHRENIA, CHRONIC CONDITION WITH ACUTE EXACERBATION (H): ICD-10-CM

## 2024-06-05 DIAGNOSIS — T88.7XXA SIDE EFFECT OF MEDICATION: ICD-10-CM

## 2024-06-05 DIAGNOSIS — I50.22 CHRONIC SYSTOLIC HEART FAILURE (H): ICD-10-CM

## 2024-06-05 NOTE — TELEPHONE ENCOUNTER
Ishaan, nurse for patient who sets up medications, calling and states they recently switched pharmacies for patient's medications.    Writer advised calling [facility] and having them transfer over scripts from previous pharmacy.    Ishaan states she has already done this, but [facility] is missing several rxs that don't have refills available including senna, b12, vit d, gabapentin, prozac, protonix.    Ishaan asking if all pended medications can be sent to new pharmacy.    Jasmin Bateman RN    [facility]- Primary Care

## 2024-06-06 RX ORDER — METOPROLOL SUCCINATE 25 MG/1
25 TABLET, EXTENDED RELEASE ORAL EVERY EVENING
Qty: 90 TABLET | Refills: 0 | Status: SHIPPED | OUTPATIENT
Start: 2024-06-06 | End: 2024-09-07

## 2024-06-06 RX ORDER — DIVALPROEX SODIUM 125 MG/1
250 CAPSULE, COATED PELLETS ORAL
Qty: 360 CAPSULE | Refills: 0 | Status: SHIPPED | OUTPATIENT
Start: 2024-06-06 | End: 2024-08-14

## 2024-06-06 RX ORDER — VITAMIN B COMPLEX
25 TABLET ORAL DAILY
Qty: 30 TABLET | Refills: 3 | Status: SHIPPED | OUTPATIENT
Start: 2024-06-06

## 2024-06-06 RX ORDER — LOSARTAN POTASSIUM 25 MG/1
12.5 TABLET ORAL EVERY EVENING
Qty: 90 TABLET | Refills: 3 | Status: ON HOLD | OUTPATIENT
Start: 2024-06-06 | End: 2024-09-09

## 2024-06-06 RX ORDER — SPIRONOLACTONE 25 MG/1
25 TABLET ORAL DAILY
Qty: 90 TABLET | Refills: 0 | Status: ON HOLD | OUTPATIENT
Start: 2024-06-06 | End: 2024-09-09

## 2024-06-06 RX ORDER — PANTOPRAZOLE SODIUM 40 MG/1
40 TABLET, DELAYED RELEASE ORAL DAILY
Qty: 30 TABLET | Refills: 3 | Status: SHIPPED | OUTPATIENT
Start: 2024-06-06 | End: 2024-09-13

## 2024-06-06 RX ORDER — ALBUTEROL SULFATE 90 UG/1
2 AEROSOL, METERED RESPIRATORY (INHALATION) EVERY 6 HOURS PRN
Qty: 18 G | Refills: 1 | Status: SHIPPED | OUTPATIENT
Start: 2024-06-06

## 2024-06-06 RX ORDER — BENZONATATE 200 MG/1
200 CAPSULE ORAL 3 TIMES DAILY PRN
Qty: 90 CAPSULE | Refills: 1 | Status: SHIPPED | OUTPATIENT
Start: 2024-06-06

## 2024-06-06 RX ORDER — SENNOSIDES 8.6 MG
2 TABLET ORAL 2 TIMES DAILY
Qty: 60 TABLET | Refills: 3 | Status: SHIPPED | OUTPATIENT
Start: 2024-06-06

## 2024-06-06 RX ORDER — TAMSULOSIN HYDROCHLORIDE 0.4 MG/1
0.8 CAPSULE ORAL DAILY
Qty: 30 CAPSULE | Refills: 3 | Status: SHIPPED | OUTPATIENT
Start: 2024-06-06 | End: 2024-08-14

## 2024-06-06 RX ORDER — GUAIFENESIN/DEXTROMETHORPHAN 100-10MG/5
10 SYRUP ORAL 4 TIMES DAILY PRN
Qty: 354 ML | Refills: 1 | Status: SHIPPED | OUTPATIENT
Start: 2024-06-06 | End: 2024-09-07

## 2024-06-06 RX ORDER — PHENOL 1.4 %
10 AEROSOL, SPRAY (ML) MUCOUS MEMBRANE AT BEDTIME
Qty: 30 TABLET | Refills: 3 | Status: SHIPPED | OUTPATIENT
Start: 2024-06-06

## 2024-06-06 RX ORDER — GABAPENTIN 400 MG/1
400 CAPSULE ORAL 3 TIMES DAILY
Qty: 90 CAPSULE | Refills: 1 | Status: SHIPPED | OUTPATIENT
Start: 2024-06-06 | End: 2024-07-24

## 2024-06-06 NOTE — TELEPHONE ENCOUNTER
Routing refill requests for jardiance, losartan, metoprolol, and spironolactone to cardiology, these meds were last prescribed by pt's cardiologist, Dr. Forrest.    Routing the rest of meds to Dr. Estrella- they either do not have a protocol or were last filled when patient was in the hospital.    Jasmin Bateman RN    [facility]- Primary Care

## 2024-06-12 NOTE — CARE PLAN
"Pt lays in bed for 5-10 minutes, then gets up and bangs on the maglock door and tries to come out. Writer told pt that the way to come out is calm and he said \"I don't believe you, I don't believe you\" over and over.   " Detail Level: Zone Render In Strict Bullet Format?: No Initiate Treatment: Nail care solution

## 2024-06-17 PROBLEM — R33.9 RETENTION OF URINE: Status: ACTIVE | Noted: 2023-05-23

## 2024-06-17 PROBLEM — A41.9 SEVERE SEPSIS WITHOUT SEPTIC SHOCK (H): Status: ACTIVE | Noted: 2024-06-12

## 2024-06-17 PROBLEM — R65.20 SEVERE SEPSIS WITHOUT SEPTIC SHOCK (H): Status: ACTIVE | Noted: 2024-06-12

## 2024-06-17 PROBLEM — J15.9 PNEUMONIA, BACTERIAL: Status: ACTIVE | Noted: 2024-06-12

## 2024-06-17 PROBLEM — J69.0 ASPIRATION PNEUMONIA OF BOTH LOWER LOBES, UNSPECIFIED ASPIRATION PNEUMONIA TYPE (H): Status: ACTIVE | Noted: 2024-06-11

## 2024-06-17 PROBLEM — G93.41 SEPTIC ENCEPHALOPATHY: Status: ACTIVE | Noted: 2024-06-12

## 2024-07-11 ENCOUNTER — VIRTUAL VISIT (OUTPATIENT)
Dept: FAMILY MEDICINE | Facility: CLINIC | Age: 64
End: 2024-07-11
Payer: COMMERCIAL

## 2024-07-11 DIAGNOSIS — J18.9 PNEUMONIA OF RIGHT LOWER LOBE DUE TO INFECTIOUS ORGANISM: Primary | ICD-10-CM

## 2024-07-11 DIAGNOSIS — J69.0 ASPIRATION PNEUMONITIS (H): ICD-10-CM

## 2024-07-11 PROBLEM — J96.01 ACUTE HYPOXEMIC RESPIRATORY FAILURE (H): Status: RESOLVED | Noted: 2022-11-30 | Resolved: 2024-07-11

## 2024-07-11 PROCEDURE — 99441 PR PHYSICIAN TELEPHONE EVALUATION 5-10 MIN: CPT | Mod: 93 | Performed by: FAMILY MEDICINE

## 2024-07-11 NOTE — PROGRESS NOTES
"Vinod is a 64 year old who is being evaluated via a billable telephone visit.      If patient has telephone visit, have they been educated on video visit as preferred visit method and offered to change to video visit? N/A        Instructions Relayed to Patient by Virtual Roomer:     If patient is not active on [employer]:  Relayed the following to patient: \"Would you like us to text or email you a link to join your appointment now or when your provider is ready to initiate the virtual visit?\"      Patient Confirmed they will join visit via: Text Link to Cell Phone  Reminded patient to ensure they were logged on to virtual visit by arrival time listed.   Asked if patient has flexibility to initiate visit sooner than arrival time: patient is unable to initiate visit earlier than arrival time     If pediatric virtual visit, ensured pediatric patient along with parent/guardian will be present for video visit.     Patient offered the website [email or web address] and/or phone number to [employer] Help line: [phone]   What phone number would you like to be contacted at? [phone]  How would you like to obtain your AVS? Mail a copy  Originating Location (pt. Location): Home    Distant Location (provider location):  On-site    Assessment & Plan     (J18.9) Pneumonia of right lower lobe due to infectious organism  (primary encounter diagnosis)  (J69.0) Aspiration pneumonitis (H)  Comment: Resolved.   Plan: Nothing needed immediately, but he will have follow up with PCP and Pulmonology soon.            MED REC REQUIRED  Post Medication Reconciliation Status: discharge medications reconciled and changed, per note/orders      23 minutes spent by me on the date of the encounter doing chart review, review of outside records, patient visit, and documentation       Subjective   Vinod is a 64 year old, presenting for the following health issues:  Hospital F/U        7/11/2024     8:53 AM   Additional Questions "   Roomed by Shagufta BRAVO   Pt refused to answer PhQ 9 questions    Hospital Follow-up Visit:    Hospital/Nursing Home/IP Rehab Facility:  [facility]   Date of Admission: 06/11/2024  Date of Discharge: 06/15/2024  Reason(s) for Admission: RLL Pneumonia and aspiration pneumonitis   Was the patient in the ICU or did the patient experience delirium during hospitalization?  No  Do you have any other stressors you would like to discuss with your provider? No    Problems taking medications regularly:  None  Medication changes since discharge: Antibiotic - given 7 days of cefdinir and 7 days of doxycycline  Problems adhering to non-medication therapy:  None    Summary of hospitalization:  CareEverywhere information obtained and reviewed  Diagnostic Tests/Treatments reviewed.  Follow up needed: none  Other Healthcare Providers Involved in Patient s Care:         Specialist appointment - Cardiology 7/24/24, Nephrology 8/5/25, Pulmonology 8/29/24  Update since discharge: improved. Group home staff have no concerns at this time.         Plan of care communicated with caregiver (Nurse) Ishaan: Yes                       Objective           Vitals:  No vitals were obtained today due to virtual visit.    Physical Exam   General: Alert and no distress //Respiratory: No audible wheeze, cough, or shortness of breath // Psychiatric:  Appropriate affect, tone, and pace of words          Phone call duration: 4 minutes    Signed Electronically by: Jun Rosa MD      The information in this document, created by the medical scribe for me, accurately reflects the services I personally performed and the decisions made by me. I have reviewed and approved this document for accuracy prior to signature.  Ryan Vann

## 2024-07-16 ENCOUNTER — TELEPHONE (OUTPATIENT)
Dept: CARDIOLOGY | Facility: CLINIC | Age: 64
End: 2024-07-16
Payer: COMMERCIAL

## 2024-07-16 NOTE — TELEPHONE ENCOUNTER
Left Voicemail (1st Attempt) for the patient to call back and schedule the following:    Appointment type: rtn ph  Provider: kenny  Return date: 07/24/24  Specialty phone number: [phone] opt 1  Additional appointment(s) needed: n/a   Additonal Notes: please reschedule with Constantin next available

## 2024-07-22 ENCOUNTER — TELEPHONE (OUTPATIENT)
Dept: CARDIOLOGY | Facility: CLINIC | Age: 64
End: 2024-07-22
Payer: COMMERCIAL

## 2024-07-22 DIAGNOSIS — F20.0 PARANOID SCHIZOPHRENIA, CHRONIC CONDITION WITH ACUTE EXACERBATION (H): ICD-10-CM

## 2024-07-22 RX ORDER — GABAPENTIN 400 MG/1
400 CAPSULE ORAL 3 TIMES DAILY
Qty: 90 CAPSULE | Refills: 1 | OUTPATIENT
Start: 2024-07-22

## 2024-07-22 NOTE — TELEPHONE ENCOUNTER
7/22 Patient's agency confirmed scheduled appointment:  Date: 8/7/2024  Time: 9:00 am  Visit type: Return Heart Failure  Provider: Adriane  [place] [facility]  Testing/imaging: labs prior   Additional notes: n/a

## 2024-07-22 NOTE — CONFIDENTIAL NOTE
Medication Question or Refill        What medication are you calling about (include dose and sig)?: gabapentin (NEURONTIN) 400 MG capsule    Preferred Pharmacy:     [facility] [place] [address] - [place], [place] - [address]  [address]  [place] [address]  Phone: [phone] Fax: [phone]      Controlled Substance Agreement on file:   CSA -- Patient Level:    CSA: None found at the patient level.       Who prescribed the medication?: Dr. Estrella    Do you need a refill? Yes      Patient offered an appointment? No    Do you have any questions or concerns?  No      Okay to leave a detailed message?: Yes at Home number on file [phone] (home)

## 2024-07-23 ENCOUNTER — DOCUMENTATION ONLY (OUTPATIENT)
Dept: LAB | Facility: CLINIC | Age: 64
End: 2024-07-23
Payer: COMMERCIAL

## 2024-07-23 ENCOUNTER — TELEPHONE (OUTPATIENT)
Dept: FAMILY MEDICINE | Facility: CLINIC | Age: 64
End: 2024-07-23
Payer: COMMERCIAL

## 2024-07-23 NOTE — TELEPHONE ENCOUNTER
Reason for Call:  Appointment Request    Patient requesting this type of appt:  Janneth Lalosteve Whaley called and would like patient to be reschedule with only Delores Lepe at [phone]  next week.    Reason:  Post E/R followup; Pneumonia.  [facility]    Please contact her back.  Thank you.    Requested provider: Franklyn Estrella    Reason patient unable to be scheduled: Needs to be scheduled by clinic    When does patient want to be seen/preferred time: 3-7 days    Comments: na    Okay to leave a detailed message?: Yes at Other phone number:  [phone] *    Call taken on 7/23/2024 at 11:22 AM by Sheyla Ca

## 2024-07-23 NOTE — TELEPHONE ENCOUNTER
Please schedule this patient for a same-day appointment with me next week for hospital follow-up  You can take at [place] at [place] slot

## 2024-07-23 NOTE — PROGRESS NOTES
Spoke with Jovana nurse at [facility] regarding a lab appointment scheduled for 7/29 prior to 8/5 appointment with Dr Dumont. Per Dr Dumont no lab prior to a new nephrology appointment. Lab appointment cancelled and Jovana stated an understanding.  KENNY Godoy Care Coordinator  Nephrology

## 2024-07-23 NOTE — TELEPHONE ENCOUNTER
Called and scheduled patient requested appointment on 08/01/2024 with requested provider.    Mohini Coto

## 2024-07-23 NOTE — PROGRESS NOTES
Vinod Lee has an upcoming lab appointment:    Future Appointments   Date Time Provider Department Center   7/25/2024  8:00 AM Franklyn Estrella MD [facility]   7/29/2024 10:15 AM LAB FIRST FLOOR [facility] MGLABR [place]   8/5/2024  4:00 PM Roma Dumont,  MGNEPH [place]   8/7/2024  8:30 AM  LAB [facility]   8/7/2024  9:00 AM Ivan Forrest MD [facility]   8/27/2024 10:40 AM MGCT1 MGCT [place]   8/29/2024  9:45 AM Tameka Alfaro MD [facility]          Patient is requesting labs from Dr. Dumont there are no orders  available from Dr. Dumont. Please review and place future orders as appropriate.    There are no preventive care reminders to display for this patient.    Thank you,  Marni Baez

## 2024-07-24 RX ORDER — GABAPENTIN 400 MG/1
400 CAPSULE ORAL 3 TIMES DAILY
Qty: 90 CAPSULE | Refills: 1 | Status: SHIPPED | OUTPATIENT
Start: 2024-07-24 | End: 2024-09-13

## 2024-07-24 NOTE — TELEPHONE ENCOUNTER
Patient Education     Risk Factors for Heart Disease   Heart disease includes coronary artery disease which involves damage to the heart arteries. It also includes congestive heart failure and other heart issues. The coronary arteries provide the oxygen your heart needs to pump blood to the rest of your body.   A risk factor is something that increases your chance of having a disease. Risk factors such as smoking or high cholesterol levels can damage arteries. You can’t control some heart risk factors. These include your age or having a family history of heart disease. But there are many heart risk factors you can control. This can reduce your risk for heart disease.   Unhealthy cholesterol levels   Cholesterol is a fat-like substance in your blood. It can build up along the artery walls. This is called plaque. Over time, plaque narrows the arteries. This reduces blood flow to your heart or brain. If a blood clot forms or a piece of plaque breaks off, it can block the artery. This can cause a heart attack or stroke. Your risk of heart disease goes up if you have high levels of LDL (\"bad\") cholesterol. Or if you have high levels of triglycerides. This is another fatty substance that can build up. You’re also at risk if you have low HDL (\"good\") cholesterol. HDL helps clear the bad cholesterol away. You're at risk if you have any of these:    · HDL cholesterol of 50 mg/dL or lower  · LDL cholesterol of 100 mg/dL or higher  · Triglycerides of 150 mg/dL or higher  Unhealthy diet  Diets high in saturated fats, trans fats, and cholesterol have been linked to heart disease and coronary artery disease. By cutting back on saturated fat and trans fat, you can lower your LDL (\"bad\") cholesterol and triglyceride levels. LDL is one of the main substances that causes heart attacks. Stay away from most trans fatty acids by eating less of these foods:   · Margarine  · Cookies  · Crackers  · French fries  · Doughnuts  · Other  Pharmacy called stating they do not have any refills on file they have 12 tablets remaining as pt takes 3 a day. They are asking for refill ASAP   snack foods that have partially hydrogenated oils    Replace less healthy foods by eating a diet with a lot of:   · Fruits  · Legumes  · Vegetables  · Whole grains  · Nuts  · Lean fish or lean animal protein    Drinking too much alcohol also raises the risk for heart disease. It can raise blood pressure levels. And it raises triglyceride levels.   Smoking  This is the most important risk factor you can change. You’re at risk if you use any kind of tobacco or nicotine. This includes:   · Cigarettes  · E-cigarettes  · Chew tobacco  · Cigars  · Pipe    If you smoke, it's never too late to help your heart. Ask your healthcare provider about nicotine replacement products and smoking cessation support. Quitting smoking is the single biggest way to reduce your risk of coronary artery disease.   High blood pressure   High blood pressure occurs when blood pushes too hard against artery walls. This damages the artery walls. Scar tissue forms as it heals. This makes the arteries stiff and weak. Plaque sticks to the scarred tissue. This narrows and hardens the arteries. High blood pressure also makes your heart work harder to get blood out to the body. It raises your risk of heart attack and especially stroke. The brain tissue is very sensitive to high blood pressure damage. You're at risk if your blood pressure is 120/80 or higher. Experts now label blood pressure between 130 to 139/80 to 89 as stage 1 hypertension.   Chronic kidney disease  Chronic kidney disease is another cardiac risk factor. Talk with your healthcare provider about ways to control kidney disease if you have it.   Negative emotions   Chronic stress, pent-up anger, and other negative emotions have been linked to heart disease. This is because stress increases the levels of a hormone that increase the demand on your heart. Over time, these emotions could raise your heart disease risk.   Metabolic syndrome   This is caused by a mix of certain risk factors. It  puts you at extra high risk of heart disease, stroke, and diabetes. You have metabolic syndrome if you have 3 or more of these:   · Low HDL cholesterol  · High triglycerides  · High blood pressure  · High blood sugar  · Extra weight around the waist    Diabetes  Diabetes occurs when you have high levels of sugar (glucose) in your blood. This can damage arteries if not kept under control. Having diabetes also makes you more likely to have a silent heart attack--one without any symptoms.   The A1C test is a common blood test that diagnoses type 1 and type 2 diabetes. It can also check how well you are managing diabetes. If your A1C level is between 5.7 and 6.4, you have prediabetes. Once your A1C reaches 6.5, you have diabetes. Your healthcare provider will help you figure out what your A1C should be. Your target number will depend on your age, general health, and other factors. Your treatment plan may need changes if your current number is too high.   Extra weight   Extra weight makes other risk factors, such as diabetes, more likely. Extra weight around the waist or stomach increases your heart disease risk the most.   You're at risk if your:   · Waist measures more than 35 inches (women) or 40 inches (men)  · Body mass index (BMI) is higher than 25.    Lack of physical activity   You're at risk if you exercise less than 40 minutes per day, on fewer than 3 to 4 days a week.   When you’re not active, you’re more likely to develop diabetes, high blood pressure, high cholesterol levels, and extra weight.   Most people with heart disease have more than one risk factor. As your number of risk factors increases, so does your risk for heart disease and coronary artery disease. Talk with your healthcare provider about your heart risk factors. Find out how you can improve them or remove them completely.   [employer] last reviewed this educational content on 7/1/2019  © 8943-8795 [employer] [address], [place] [address]. All rights reserved. This information is not intended as a substitute for professional medical care. Always follow your healthcare professional's instructions.           Patient Education     Controlling High Blood Pressure  High blood pressure (hypertension) is often called the silent killer. This is because many people who have it, don’t know it. It can be very dangerous. High blood pressure can raise your risk of heart attack, stroke, heart disease, and heart failure. Controlling your blood pressure can decrease your risk of these problems. It's important to know the appropriate blood pressure range and remember to check your blood pressure regularly. Doing so can save your life.  Blood pressure measurements are given as 2 numbers. Systolic blood pressure is the upper number. This is the pressure when the heart contracts. Diastolic blood pressure is the lower number. This is the pressure when the heart relaxes between beats.  Blood pressure is categorized as normal, elevated, or stage 1 or stage 2 high blood pressure:  · Normal blood pressure is systolic of less than 120 and diastolic of less than 80 (120/80)  · Elevated blood pressure is systolic of 120 to 129 and diastolic less than 80  · Stage 1 high blood pressure is systolic of 130 to 139 or diastolic between 80 to 89  · Stage 2 high blood pressure is when systolic is 140 or higher or the diastolic is 90 or higher  A heart-healthy lifestyle can help you control your blood pressure without medicines. Here are some things you can do to pursue a heart-healthy lifestyle:    Choose heart-healthy foods  · Select low-salt, low-fat foods. Limit sodium intake to 2,400 mg per day or the amount suggested by your healthcare provider.  · Limit canned, dried, cured, packaged, and fast foods. These can contain a lot of salt.  · Eat 8 to 10 servings of fruits and vegetables every day.  · Choose lean meats, fish, or chicken.  · Eat whole-grain pasta,  brown rice, and beans.  · Eat 2 to 3 servings of low-fat or fat-free dairy products.  · Ask your doctor about the DASH eating plan. This plan helps reduce blood pressure.  · When you go to a restaurant, ask that your meal be prepared with no added salt.    Stay at a healthy weight  · Ask your healthcare provider how many calories to eat a day. Then stick to that number.  · Ask your healthcare provider what weight range is healthiest for you. If you are overweight, a weight loss of only 3% to 5% of your body weight can help lower blood pressure. Generally, a good weight loss goal is to lose 10% of your body weight in a year.  · Limit snacks and sweets.  · Get regular exercise.    Get up and get active  · Find activities you enjoy that can be done alone or with friends or family. Such activities might include bicycling, dancing, walking, or jogging.  · Park farther away from building entrances to walk more.  · Use stairs instead of the elevator.  · When you can, walk or bike instead of driving.  · Coleman leaves, garden, or do household repairs.  · Be active at a moderate to vigorous level of physical activity for at least 40 minutes for a minimum of 3 to 4 days a week.     Manage stress  · Make time to relax and enjoy life. Find time to laugh.  · Communicate your concerns with your loved ones and your healthcare provider.  · Visit with family and friends, and keep up with hobbies.    Limit alcohol and quit smoking  · Men should have no more than 2 drinks per day.  · Women should have no more than 1 drink per day.  · Talk with your healthcare provider about quitting smoking. Smoking significantly increases your risk for heart disease and stroke. Ask your healthcare provider about community smoking cessation programs and other options.    Medicines  If lifestyle changes aren’t enough, your healthcare provider may prescribe high blood pressure medicine. Take all medicines as prescribed. If you have any questions about your  medicines, ask your healthcare provider before stopping or changing them.  [employer] last reviewed this educational content on 6/1/2019 © 2000-2020 [employer] [address], [place] [address]. All rights reserved. This information is not intended as a substitute for professional medical care. Always follow your healthcare professional's instructions.           Patient Education     What Is High Blood Pressure?  High blood pressure (hypertension) is known as the “silent killer.” This is because most of the time it doesn’t cause symptoms. In fact, many people don’t know they have it until other problems develop. In most cases, high blood pressure often requires lifelong treatment.  Understanding blood pressure  The circulatory system is made up of the heart and blood vessels that carry blood through the body. Your heart is the pump for this system. With each heartbeat (contraction), the heart sends blood out through large blood vessels called arteries. Blood pressure is a measure of how hard the moving blood pushes against the walls of the arteries.  High blood pressure can harm your health  In a healthy blood vessel, the blood moves smoothly through the vessel and puts normal pressure on the vessel walls.    High blood pressure occurs when blood pushes too hard against artery walls. This causes damage to the artery walls and then the formation of scar tissue as it heals. This makes the arteries stiff and weak. Plaque sticks to the scarred tissue narrowing and hardening the arteries. High blood pressure also causes your heart to work harder to get blood out to the body. High blood pressure raises your risk of heart attack, also known as acute myocardial infarction, or AMI, heart failure, and stroke. It can also lead to kidney disease, and blindness. In general, if you have high blood pressure, keeping your blood pressure below 130/80 mmHg may help prevent these problems. Your healthcare  provider may prescribe medicine to help control blood pressure if lifestyle changes are not enough.  It's important to know your blood pressure numbers. Blood pressure measurements are given as 2 numbers. Systolic blood pressure is the upper number. This is the pressure when the heart contracts. Diastolic blood pressure is the lower number. This is the pressure when the heart relaxes between beats.  Blood pressure is categorized as normal, elevated, or stage 1 or stage 2 high blood pressure:  · Normal blood pressure is systolic of less than 120 and diastolic of less than 80 (120/80)  · Elevated blood pressure is systolic of 120 to 129 and diastolic less than 80  · Stage 1 high blood pressure is systolic is 130 to 139 or diastolic between 80 to 89  · Stage 2 high blood pressure is when systolic is 140 or higher or the diastolic is 90 or higher  High blood pressure is diagnosed when multiple, separate readings show blood pressure above 130/80 mmHg. Talk with your healthcare provider if you have questions or concerns about your blood pressure readings.  Measuring blood pressure  An example of a blood pressure measurement is 120/70. The top number is the pressure of blood against the artery walls during a heartbeat (systolic). The bottom number is the pressure of blood against artery walls between heartbeats (diastolic). Talk with your healthcare provider to find out what your blood pressure goals should be.   Controlling blood pressure  If your blood pressure is too high, work with your doctor on a plan for lowering it. Below are steps you can take that will help lower your blood pressure.  · Choose heart-healthy foods. Eating healthier meals helps you control your blood pressure. Ask your doctor about the DASH eating plan. This plan helps reduce blood pressure by limiting the amount of sodium (salt) you have in your diet. DASH also encourages eating plenty of fruits and vegetables, low-fat or non-fat dairy,  whole-grains, and foods high in fiber, and low in fat. This also provides an enhanced amount of potassium which can also help lower blood pressure.  · Reduce sodium. Reducing sodium in your diet reduces fluid retention. Fluid retention caused by too much salt increases blood volume and blood pressure. The American Heart Association (AHA) advises an \"ideal\" amount of sodium: no more than 1,500 mg a day.  But because Americans eat so much salt, the AHA says a positive change can occur by cutting back to even 2,300 mg a day.  · Stay at a healthy weight. Being overweight makes you more likely to have high blood pressure. Losing excess weight helps lower blood pressure.  · Exercise regularly. Daily exercise helps your heart and blood vessels work better and stay healthier. It can help lower your blood pressure.  · Stop smoking. Smoking increases blood pressure and damages blood vessels.  · Limit alcohol. Drinking too much alcohol can raise blood pressure. Men should have no more than 2 drinks a day. Women should have no more than 1. A drink is equal to 1 beer, or a small glass of wine, or a shot of liquor.  · Control stress. Stress makes your heart work harder and beat faster. Controlling stress helps you control your blood pressure.  Facts about high blood pressure  · Feeling OK does not mean your blood pressure is under control. Likewise, feeling bad doesn’t mean it’s out of control. The only way to know for sure is to check your pressure regularly.  · Medicine is only one part of controlling high blood pressure. You also need to manage your weight, get regular exercise, and adjust your eating habits.  · High blood pressure is usually a lifelong problem. But it can be controlled with healthy lifestyle changes and medicine.  · Hypertension is not the same as stress. Although stress may be a factor in high blood pressure, it’s only one part of the story.  · Blood pressure medicines need to be taken every day. Stopping  suddenly may cause a dangerous increase in pressure.    [employer] last reviewed this educational content on 4/1/2019 © 2000-2020 [employer]. [address] [place] [address], [place] [address]. All rights reserved. This information is not intended as a substitute for professional medical care. Always follow your healthcare professional's instructions.           Patient Education     Eating Heart-Healthy Food: Using the DASH Plan    Eating for your heart doesn’t have to be hard or boring. You just need to know how to make healthier choices. The DASH eating plan has been developed to help you do just that. DASH stands for Dietary Approaches to Stop Hypertension. It is a plan that has been proven to be healthier for your heart and to lower your risk for high blood pressure. It can also help lower your risk for cancer, heart disease, osteoporosis, and diabetes.  Choosing from each food group  Choose foods from each of the food groups below each day. Try to get the recommended number of servings for each food group. The serving numbers are based on a diet of 2,000 calories a day. Talk with your healthcare provider if you’re not sure about your calorie needs. Along with getting the correct servings, the DASH plan also advises less than 2,300 mg of salt (sodium) per day. Lowering sodium intake to 1,500 mg per day lowers blood pressure even more. (There's about 2,300 mg of sodium in 1 teaspoon of salt.)      Grains  Servings: 6 to 8 a day  A serving is:  · 1 slice bread  · 1 ounce dry cereal  · Half a cup cooked rice, pasta or cereal  Best choices: Whole grains and any grains high in fiber. Vegetables  Servings: 4 to 5 a day  A serving is:  · 1 cup raw leafy vegetable  · Half a cup cut-up raw or cooked vegetable  · Half a cup vegetable juice  Best choices: Fresh or frozen vegetables prepared without added salt or fat.   Fruits  Servings: 4 to 5 a day  A serving is:  · 1 medium fruit  · One-quarter cup dried  fruit  · Half a cup fresh, frozen, or canned fruit  · Half a cup of 100% fruit juices  Best choices: A variety of fresh fruits of different colors. Whole fruits are a better choice than fruit juices. Low-fat or fat-free dairy  Servings: 2 to 3 a day  A serving is:  · 1 cup milk  · 1 cup yogurt  · One and a half ounces cheese  Best choices: Skim or 1% milk, low-fat or fat-free yogurt or buttermilk, and low-fat cheeses.         Lean meats, poultry, fish  Servings: 6 or fewer a day  A serving is:  · 1 ounce cooked meats, poultry, or fish  · 1 egg  Best choices: Lean poultry and fish. Trim away visible fat. Broil, grill, roast, or boil instead of frying. Remove skin from poultry before eating. Limit how much red meat you eat.  Nuts, seeds, beans  Servings: 4 to 5 a week  A serving is:  · One-third cup nuts (one and a half ounces)  · 2 tablespoons nut butter or seeds  · Half a cup cooked dry beans or legumes  Best choices: Dry roasted nuts with no salt added, lentils, kidney beans, garbanzo beans, and whole rayo beans.   Fats and oils  Servings: 2 to 3 a day  A serving is:  · 1 teaspoon vegetable oil  · 1 teaspoon soft margarine  · 1 tablespoon mayonnaise  · 2 tablespoons salad dressing  Best choices: Nut and vegetable oils (nontropical vegetable oils), such as olive and canola oil. Sweets  Servings: 5 a week or fewer  A serving is:  · 1 tablespoon sugar, maple syrup, or honey  · 1 tablespoon jam or jelly  · 1 half-ounce jelly beans (about 15)  · 1 cup lemonade  Best choices: Dried fruit can be a satisfying sweet. Choose low-fat sweets. And watch your serving sizes!      For more on the DASH eating plan, visit:  www.nhlbi.nih.gov/health/health-topics/topics/dash   [employer] last reviewed this educational content on 7/1/2019 © 2000-2020 [employer] [address] [place] [address], [place] [address]. All rights reserved. This information is not intended as a substitute for professional medical care. Always follow  your healthcare professional's instructions.           Patient Education     Eating Heart-Healthy Foods  Eating has a big impact on your heart health. In fact, eating healthier can improve several of your heart risks at once. For instance, it helps you manage weight, cholesterol, and blood pressure. Here are ideas to help you make heart-healthy changes without giving up all the foods and flavors you love.  Getting started  · Talk with your healthcare provider about eating plans, such as the DASH or Mediterranean diet. You may also be referred to a dietitian.  · Change a few things at a time. Give yourself time to get used to a few eating changes before adding more.  · Work to create a tasty, healthy eating plan that you can stick to for the rest of your life.    Goals for healthy eating  Below are some tips to improve your eating habits:  · Limit saturated fats and trans fats. Saturated fats raise your levels of cholesterol, so keep these fats to a minimum. They are found in foods such as fatty meats, whole milk, cheese, and palm and coconut oils. Avoid trans fats because they lower good cholesterol as well as raise bad cholesterol. Trans fats are most often found in processed foods.  · Reduce sodium (salt) intake. Eating too much salt may increase your blood pressure. Limit your sodium intake to 2,300 milligrams (mg) per day (the amount in 1 teaspoon of salt), or less if your healthcare provider recommends it. Dining out less often and eating fewer processed foods are two great ways to decrease the amount of salt you consume.  · Managing calories. A calorie is a unit of energy. Your body burns calories for fuel, but if you eat more calories than your body burns, the extras are stored as fat. Your healthcare provider can help you create a diet plan to manage your calories. This will likely include eating healthier foods as well as exercising regularly. To help you track your progress, keep a diary to record what you  eat and how often you exercise.  Choose the right foods  Aim to make these foods staples of your diet. If you have diabetes, you may have different recommendations than what is listed here:  · Fruits and vegetables provide plenty of nutrients without a lot of calories. At meals, fill half your plate with these foods. Split the other half of your plate between whole grains and lean protein.  · Whole grains are high in fiber and rich in vitamins and nutrients. Good choices include whole-wheat bread, pasta, and brown rice.  · Lean proteins give you nutrition with less fat. Good choices include fish, skinless chicken, and beans.  · Low-fat or nonfat dairy provides nutrients without a lot of fat. Try low-fat or nonfat milk, cheese, or yogurt.  · Healthy fats can be good for you in small amounts. These are unsaturated fats, such as olive oil, nuts, and fish. Try to have at least 2 servings per week of fatty fish, such as salmon, sardines, mackerel, rainbow trout, and albacore tuna. These contain omega-3 fatty acids, which are good for your heart. Flaxseed is another source of a heart-healthy fat.  More on heart-healthy eating  Read food labels  Healthy eating starts at the grocery store. Be sure to pay attention to food labels on packaged foods. Look for products that are high in fiber and protein, and low in saturated fat, cholesterol, and sodium. Avoid products that contain trans fat. And pay close attention to serving size. For instance, if you plan to eat two servings, double all the numbers on the label.  Prepare food right  A key part of healthy cooking is cutting down on added fat and salt. Look on the internet for lower-fat, lower-sodium recipes. Also, try these tips:  · Remove fat from meat and skin from poultry before cooking.  · Skim fat from the surface of soups and sauces.  · Broil, boil, bake, steam, grill, and microwave food without added fats.  · Choose ingredients that spice up your food without adding  calories, fat, or sodium. Try these items: horseradish, hot sauce, lemon, mustard, nonfat salad dressings, and vinegar. For salt-free herbs and spices, try basil, cilantro, cinnamon, pepper, and rosemary.  Date Last Reviewed: 10/1/2017  © 7740-6622 [employer]. [address] [place] [address]. All rights reserved. This information is not intended as a substitute for professional medical care. Always follow your healthcare professional's instructions.

## 2024-07-31 ENCOUNTER — TELEPHONE (OUTPATIENT)
Dept: FAMILY MEDICINE | Facility: CLINIC | Age: 64
End: 2024-07-31
Payer: COMMERCIAL

## 2024-07-31 NOTE — TELEPHONE ENCOUNTER
Home Care is calling regarding an established patient with [facility].        4/12/2024    10:51 AM   Home Care Information    Name/Phone Number Chuy JENKINS [phone]   Home Care agency [facility]     Requesting orders from: Franklyn Estrella  Provider is following patient: No       Orders Requested    Admission to hospice starting today.    Request for       Information was gathered and will be sent to provider for review.  RN will contact Home Care with information after provider review.  Confirmed ok to leave a detailed message with call back.  Contact information confirmed and updated as needed.    Michelle Morgan RN

## 2024-08-01 NOTE — TELEPHONE ENCOUNTER
This writer attempted to contact Nia  on 08/01/24      Reason for call orders and left detailed message.      If patient calls back:   Registered Nurse called        JOCE Cochran, RN  [facility]

## 2024-08-05 ENCOUNTER — TELEPHONE (OUTPATIENT)
Dept: NEPHROLOGY | Facility: CLINIC | Age: 64
End: 2024-08-05

## 2024-08-05 NOTE — TELEPHONE ENCOUNTER
Dr. Dumont reviewed patient's chart prior to scheduled nephrology consult. Patient is hospice status.    Contacted patient's residence. Spoke to Marilou, care team member who stated that patient is not available. Discussed hospice status and Marilou forwarded call to coordinator RN, Jovana. Jovana stated that patient is on hospice and all future appointments were to be cancelled. She stated that he will not be at the visit today and asked that I cancel all future visits.    Cancelled future appointments given hospice status.

## 2024-08-09 ENCOUNTER — TELEPHONE (OUTPATIENT)
Dept: FAMILY MEDICINE | Facility: CLINIC | Age: 64
End: 2024-08-09
Payer: COMMERCIAL

## 2024-08-09 NOTE — TELEPHONE ENCOUNTER
Forms/Letter Request     Type of form/letter: Home Health Certification     Do we have the form/letter: Yes: Will place form in provider's bin     Who is the form from? [employer] [id number]     Where did/will the form come from? form was faxed in     How would you like the form/letter returned: Fax : [phone]

## 2024-08-12 ENCOUNTER — MEDICAL CORRESPONDENCE (OUTPATIENT)
Dept: HEALTH INFORMATION MANAGEMENT | Facility: CLINIC | Age: 64
End: 2024-08-12
Payer: COMMERCIAL

## 2024-08-13 ENCOUNTER — MEDICAL CORRESPONDENCE (OUTPATIENT)
Dept: HEALTH INFORMATION MANAGEMENT | Facility: CLINIC | Age: 64
End: 2024-08-13
Payer: COMMERCIAL

## 2024-08-14 DIAGNOSIS — R33.9 URINARY RETENTION: ICD-10-CM

## 2024-08-14 DIAGNOSIS — F20.0 PARANOID SCHIZOPHRENIA, CHRONIC CONDITION WITH ACUTE EXACERBATION (H): ICD-10-CM

## 2024-08-14 RX ORDER — DIVALPROEX SODIUM 125 MG/1
250 CAPSULE, COATED PELLETS ORAL
Qty: 360 CAPSULE | Refills: 0 | Status: SHIPPED | OUTPATIENT
Start: 2024-08-14

## 2024-08-14 RX ORDER — TAMSULOSIN HYDROCHLORIDE 0.4 MG/1
0.8 CAPSULE ORAL DAILY
Qty: 180 CAPSULE | Refills: 0 | Status: SHIPPED | OUTPATIENT
Start: 2024-08-14

## 2024-08-27 ENCOUNTER — MEDICAL CORRESPONDENCE (OUTPATIENT)
Dept: HEALTH INFORMATION MANAGEMENT | Facility: CLINIC | Age: 64
End: 2024-08-27

## 2024-09-04 ENCOUNTER — MEDICAL CORRESPONDENCE (OUTPATIENT)
Dept: HEALTH INFORMATION MANAGEMENT | Facility: CLINIC | Age: 64
End: 2024-09-04
Payer: COMMERCIAL

## 2024-09-04 ENCOUNTER — TELEPHONE (OUTPATIENT)
Dept: FAMILY MEDICINE | Facility: CLINIC | Age: 64
End: 2024-09-04
Payer: COMMERCIAL

## 2024-09-04 NOTE — TELEPHONE ENCOUNTER
Forms/Letter Request    Type of form/letter: [facility] Order # [id number]    Do we have the form/letter: Yes:     Who is the form from? Home care    Where did/will the form come from? form was faxed in    When is form/letter needed by: asap    How would you like the form/letter returned: Fax : [phone]

## 2024-09-07 ENCOUNTER — HOSPITAL ENCOUNTER (INPATIENT)
Facility: CLINIC | Age: 64
LOS: 2 days | Discharge: HOSPICE | DRG: 951 | End: 2024-09-09
Attending: EMERGENCY MEDICINE | Admitting: STUDENT IN AN ORGANIZED HEALTH CARE EDUCATION/TRAINING PROGRAM
Payer: COMMERCIAL

## 2024-09-07 DIAGNOSIS — Z66 DNR (DO NOT RESUSCITATE): ICD-10-CM

## 2024-09-07 DIAGNOSIS — R06.02 SOB (SHORTNESS OF BREATH): ICD-10-CM

## 2024-09-07 DIAGNOSIS — G20.A1 PARKINSON'S DISEASE, UNSPECIFIED WHETHER DYSKINESIA PRESENT, UNSPECIFIED WHETHER MANIFESTATIONS FLUCTUATE (H): ICD-10-CM

## 2024-09-07 DIAGNOSIS — R09.02 HYPOXIA: ICD-10-CM

## 2024-09-07 PROCEDURE — 99285 EMERGENCY DEPT VISIT HI MDM: CPT | Mod: GW | Performed by: EMERGENCY MEDICINE

## 2024-09-07 PROCEDURE — 250N000011 HC RX IP 250 OP 636: Performed by: EMERGENCY MEDICINE

## 2024-09-07 PROCEDURE — 120N000002 HC R&B MED SURG/OB UMMC

## 2024-09-07 PROCEDURE — 99222 1ST HOSP IP/OBS MODERATE 55: CPT | Mod: FS | Performed by: STUDENT IN AN ORGANIZED HEALTH CARE EDUCATION/TRAINING PROGRAM

## 2024-09-07 PROCEDURE — 99207 PR APP CREDIT; MD BILLING SHARED VISIT: CPT | Performed by: NURSE PRACTITIONER

## 2024-09-07 PROCEDURE — 99285 EMERGENCY DEPT VISIT HI MDM: CPT | Mod: 25 | Performed by: EMERGENCY MEDICINE

## 2024-09-07 RX ORDER — OXYCODONE HCL 5 MG/5 ML
5 SOLUTION, ORAL ORAL EVERY 4 HOURS PRN
COMMUNITY

## 2024-09-07 RX ORDER — PANTOPRAZOLE SODIUM 40 MG/1
40 TABLET, DELAYED RELEASE ORAL DAILY
Status: DISCONTINUED | OUTPATIENT
Start: 2024-09-08 | End: 2024-09-09 | Stop reason: HOSPADM

## 2024-09-07 RX ORDER — BENZTROPINE MESYLATE 1 MG/1
1 TABLET ORAL 2 TIMES DAILY
Status: DISCONTINUED | OUTPATIENT
Start: 2024-09-07 | End: 2024-09-09 | Stop reason: HOSPADM

## 2024-09-07 RX ORDER — CLOZAPINE 200 MG/1
200 TABLET ORAL DAILY
Status: DISCONTINUED | OUTPATIENT
Start: 2024-09-08 | End: 2024-09-08

## 2024-09-07 RX ORDER — METOPROLOL TARTRATE 1 MG/ML
12.5 INJECTION, SOLUTION INTRAVENOUS 2 TIMES DAILY
Status: DISCONTINUED | OUTPATIENT
Start: 2024-09-07 | End: 2024-09-09 | Stop reason: HOSPADM

## 2024-09-07 RX ORDER — ACETAMINOPHEN 325 MG/1
650 TABLET ORAL EVERY 6 HOURS PRN
Status: CANCELLED | OUTPATIENT
Start: 2024-09-07

## 2024-09-07 RX ORDER — GLYCOPYRROLATE 0.2 MG/ML
0.2 INJECTION, SOLUTION INTRAMUSCULAR; INTRAVENOUS EVERY 4 HOURS
Status: DISCONTINUED | OUTPATIENT
Start: 2024-09-07 | End: 2024-09-09 | Stop reason: HOSPADM

## 2024-09-07 RX ORDER — POLYETHYLENE GLYCOL 3350 17 G/17G
1 POWDER, FOR SOLUTION ORAL 2 TIMES DAILY
COMMUNITY

## 2024-09-07 RX ORDER — GUAIFENESIN 600 MG/1
600 TABLET, EXTENDED RELEASE ORAL 2 TIMES DAILY
Status: DISCONTINUED | OUTPATIENT
Start: 2024-09-07 | End: 2024-09-09 | Stop reason: HOSPADM

## 2024-09-07 RX ORDER — POLYETHYLENE GLYCOL 3350 17 G/17G
17 POWDER, FOR SOLUTION ORAL DAILY
Status: DISCONTINUED | OUTPATIENT
Start: 2024-09-08 | End: 2024-09-09 | Stop reason: HOSPADM

## 2024-09-07 RX ORDER — NALOXONE HYDROCHLORIDE 0.4 MG/ML
0.2 INJECTION, SOLUTION INTRAMUSCULAR; INTRAVENOUS; SUBCUTANEOUS
Status: DISCONTINUED | OUTPATIENT
Start: 2024-09-07 | End: 2024-09-09 | Stop reason: HOSPADM

## 2024-09-07 RX ORDER — RISPERIDONE 0.5 MG/1
0.5 TABLET, ORALLY DISINTEGRATING ORAL 2 TIMES DAILY
Status: DISCONTINUED | OUTPATIENT
Start: 2024-09-07 | End: 2024-09-08

## 2024-09-07 RX ORDER — TAMSULOSIN HYDROCHLORIDE 0.4 MG/1
0.8 CAPSULE ORAL DAILY
Status: CANCELLED | OUTPATIENT
Start: 2024-09-08

## 2024-09-07 RX ORDER — CLOZAPINE 200 MG/1
200 TABLET ORAL DAILY
Status: CANCELLED | OUTPATIENT
Start: 2024-09-08

## 2024-09-07 RX ORDER — MORPHINE SULFATE 20 MG/ML
5 SOLUTION ORAL
Status: DISCONTINUED | OUTPATIENT
Start: 2024-09-07 | End: 2024-09-09 | Stop reason: HOSPADM

## 2024-09-07 RX ORDER — BENZTROPINE MESYLATE 1 MG/1
1 TABLET ORAL 2 TIMES DAILY
Status: CANCELLED | OUTPATIENT
Start: 2024-09-08

## 2024-09-07 RX ORDER — SIMETHICONE 80 MG
80 TABLET,CHEWABLE ORAL EVERY 6 HOURS PRN
COMMUNITY

## 2024-09-07 RX ORDER — DIVALPROEX SODIUM 125 MG/1
250 CAPSULE, COATED PELLETS ORAL
Status: CANCELLED | OUTPATIENT
Start: 2024-09-08

## 2024-09-07 RX ORDER — ALBUTEROL SULFATE 90 UG/1
2 AEROSOL, METERED RESPIRATORY (INHALATION) EVERY 6 HOURS PRN
Status: DISCONTINUED | OUTPATIENT
Start: 2024-09-07 | End: 2024-09-09 | Stop reason: HOSPADM

## 2024-09-07 RX ORDER — ACETAMINOPHEN 325 MG/1
650 TABLET ORAL EVERY 4 HOURS PRN
COMMUNITY

## 2024-09-07 RX ORDER — METOPROLOL SUCCINATE 25 MG/1
25 TABLET, EXTENDED RELEASE ORAL DAILY
Status: ON HOLD | COMMUNITY
End: 2024-09-09

## 2024-09-07 RX ORDER — ONDANSETRON 2 MG/ML
4 INJECTION INTRAMUSCULAR; INTRAVENOUS EVERY 6 HOURS PRN
Status: DISCONTINUED | OUTPATIENT
Start: 2024-09-07 | End: 2024-09-09 | Stop reason: HOSPADM

## 2024-09-07 RX ORDER — CLOZAPINE 200 MG/1
400 TABLET, ORALLY DISINTEGRATING ORAL AT BEDTIME
Status: DISCONTINUED | OUTPATIENT
Start: 2024-09-07 | End: 2024-09-09 | Stop reason: HOSPADM

## 2024-09-07 RX ORDER — CARBOXYMETHYLCELLULOSE SODIUM 5 MG/ML
1-2 SOLUTION/ DROPS OPHTHALMIC
Status: DISCONTINUED | OUTPATIENT
Start: 2024-09-07 | End: 2024-09-09 | Stop reason: HOSPADM

## 2024-09-07 RX ORDER — SPIRONOLACTONE 25 MG/1
25 TABLET ORAL DAILY
Status: CANCELLED | OUTPATIENT
Start: 2024-09-08

## 2024-09-07 RX ORDER — TAMSULOSIN HYDROCHLORIDE 0.4 MG/1
0.8 CAPSULE ORAL DAILY
Status: DISCONTINUED | OUTPATIENT
Start: 2024-09-08 | End: 2024-09-09 | Stop reason: HOSPADM

## 2024-09-07 RX ORDER — CALCIUM CARBONATE 500 MG/1
1000 TABLET, CHEWABLE ORAL 4 TIMES DAILY PRN
Status: DISCONTINUED | OUTPATIENT
Start: 2024-09-07 | End: 2024-09-09 | Stop reason: HOSPADM

## 2024-09-07 RX ORDER — ATROPINE SULFATE 10 MG/ML
2 SOLUTION/ DROPS OPHTHALMIC
Status: DISCONTINUED | OUTPATIENT
Start: 2024-09-07 | End: 2024-09-09 | Stop reason: HOSPADM

## 2024-09-07 RX ORDER — METOPROLOL SUCCINATE 25 MG/1
25 TABLET, EXTENDED RELEASE ORAL DAILY
Status: DISCONTINUED | OUTPATIENT
Start: 2024-09-08 | End: 2024-09-09 | Stop reason: HOSPADM

## 2024-09-07 RX ORDER — BENZONATATE 100 MG/1
100 CAPSULE ORAL 3 TIMES DAILY
Status: CANCELLED | OUTPATIENT
Start: 2024-09-08

## 2024-09-07 RX ORDER — BENZONATATE 100 MG/1
100 CAPSULE ORAL 3 TIMES DAILY
Status: DISCONTINUED | OUTPATIENT
Start: 2024-09-07 | End: 2024-09-09 | Stop reason: HOSPADM

## 2024-09-07 RX ORDER — RISPERIDONE 0.5 MG/1
0.5 TABLET ORAL 2 TIMES DAILY
Status: DISCONTINUED | OUTPATIENT
Start: 2024-09-07 | End: 2024-09-09 | Stop reason: HOSPADM

## 2024-09-07 RX ORDER — SPIRONOLACTONE 25 MG/1
25 TABLET ORAL DAILY
Status: DISCONTINUED | OUTPATIENT
Start: 2024-09-08 | End: 2024-09-08

## 2024-09-07 RX ORDER — MINERAL OIL/HYDROPHIL PETROLAT
OINTMENT (GRAM) TOPICAL
Status: DISCONTINUED | OUTPATIENT
Start: 2024-09-07 | End: 2024-09-09 | Stop reason: HOSPADM

## 2024-09-07 RX ORDER — KETOROLAC TROMETHAMINE 15 MG/ML
15 INJECTION, SOLUTION INTRAMUSCULAR; INTRAVENOUS EVERY 6 HOURS
Status: DISCONTINUED | OUTPATIENT
Start: 2024-09-07 | End: 2024-09-09 | Stop reason: HOSPADM

## 2024-09-07 RX ORDER — LORAZEPAM 1 MG/1
1 TABLET ORAL 3 TIMES DAILY PRN
Status: DISCONTINUED | OUTPATIENT
Start: 2024-09-07 | End: 2024-09-09 | Stop reason: HOSPADM

## 2024-09-07 RX ORDER — MORPHINE SULFATE 20 MG/ML
10 SOLUTION ORAL
Status: DISCONTINUED | OUTPATIENT
Start: 2024-09-07 | End: 2024-09-09 | Stop reason: HOSPADM

## 2024-09-07 RX ORDER — METOPROLOL SUCCINATE 25 MG/1
25 TABLET, EXTENDED RELEASE ORAL DAILY
Status: CANCELLED | OUTPATIENT
Start: 2024-09-08

## 2024-09-07 RX ORDER — CLOZAPINE 200 MG/1
200 TABLET, ORALLY DISINTEGRATING ORAL DAILY
Status: DISCONTINUED | OUTPATIENT
Start: 2024-09-08 | End: 2024-09-09 | Stop reason: HOSPADM

## 2024-09-07 RX ORDER — RISPERIDONE 0.5 MG/1
0.5 TABLET, ORALLY DISINTEGRATING ORAL 2 TIMES DAILY
Status: DISCONTINUED | OUTPATIENT
Start: 2024-09-07 | End: 2024-09-09 | Stop reason: HOSPADM

## 2024-09-07 RX ORDER — CLOZAPINE 200 MG/1
400 TABLET ORAL AT BEDTIME
COMMUNITY

## 2024-09-07 RX ORDER — SIMETHICONE 80 MG
80 TABLET,CHEWABLE ORAL EVERY 6 HOURS PRN
Status: DISCONTINUED | OUTPATIENT
Start: 2024-09-07 | End: 2024-09-09 | Stop reason: HOSPADM

## 2024-09-07 RX ORDER — AMOXICILLIN 250 MG
2 CAPSULE ORAL 2 TIMES DAILY PRN
Status: DISCONTINUED | OUTPATIENT
Start: 2024-09-07 | End: 2024-09-09 | Stop reason: HOSPADM

## 2024-09-07 RX ORDER — ACETAMINOPHEN 325 MG/1
650 TABLET ORAL EVERY 6 HOURS PRN
Status: DISCONTINUED | OUTPATIENT
Start: 2024-09-07 | End: 2024-09-09 | Stop reason: HOSPADM

## 2024-09-07 RX ORDER — LIDOCAINE 40 MG/G
CREAM TOPICAL
Status: DISCONTINUED | OUTPATIENT
Start: 2024-09-07 | End: 2024-09-09 | Stop reason: HOSPADM

## 2024-09-07 RX ORDER — MORPHINE SULFATE 2 MG/ML
2 INJECTION, SOLUTION INTRAMUSCULAR; INTRAVENOUS ONCE
Status: COMPLETED | OUTPATIENT
Start: 2024-09-07 | End: 2024-09-07

## 2024-09-07 RX ORDER — BENZTROPINE MESYLATE 1 MG/ML
1 INJECTION, SOLUTION INTRAMUSCULAR; INTRAVENOUS 2 TIMES DAILY
Status: DISCONTINUED | OUTPATIENT
Start: 2024-09-07 | End: 2024-09-08

## 2024-09-07 RX ORDER — LORAZEPAM 2 MG/ML
1 INJECTION INTRAMUSCULAR 3 TIMES DAILY PRN
Status: DISCONTINUED | OUTPATIENT
Start: 2024-09-07 | End: 2024-09-09 | Stop reason: HOSPADM

## 2024-09-07 RX ORDER — SENNOSIDES 8.6 MG
1 TABLET ORAL 2 TIMES DAILY PRN
Status: DISCONTINUED | OUTPATIENT
Start: 2024-09-07 | End: 2024-09-08

## 2024-09-07 RX ORDER — AMOXICILLIN 250 MG
1 CAPSULE ORAL 2 TIMES DAILY PRN
Status: DISCONTINUED | OUTPATIENT
Start: 2024-09-07 | End: 2024-09-09 | Stop reason: HOSPADM

## 2024-09-07 RX ORDER — DIVALPROEX SODIUM 125 MG/1
250 CAPSULE, COATED PELLETS ORAL
Status: DISCONTINUED | OUTPATIENT
Start: 2024-09-08 | End: 2024-09-09 | Stop reason: HOSPADM

## 2024-09-07 RX ORDER — ONDANSETRON 4 MG/1
4 TABLET, ORALLY DISINTEGRATING ORAL EVERY 6 HOURS PRN
Status: DISCONTINUED | OUTPATIENT
Start: 2024-09-07 | End: 2024-09-07

## 2024-09-07 RX ORDER — GUAIFENESIN 600 MG/1
600 TABLET, EXTENDED RELEASE ORAL 2 TIMES DAILY
Status: CANCELLED | OUTPATIENT
Start: 2024-09-08

## 2024-09-07 RX ORDER — GUAIFENESIN 600 MG/1
600 TABLET, EXTENDED RELEASE ORAL 2 TIMES DAILY
COMMUNITY

## 2024-09-07 RX ORDER — ATROPINE SULFATE 1 MG/ML
0.2 INJECTION, SOLUTION INTRAVENOUS ONCE
Status: DISCONTINUED | OUTPATIENT
Start: 2024-09-07 | End: 2024-09-07

## 2024-09-07 RX ORDER — AMOXICILLIN 250 MG
1 CAPSULE ORAL 2 TIMES DAILY PRN
COMMUNITY

## 2024-09-07 RX ORDER — BISACODYL 10 MG
10 SUPPOSITORY, RECTAL RECTAL
Status: DISCONTINUED | OUTPATIENT
Start: 2024-09-10 | End: 2024-09-09 | Stop reason: HOSPADM

## 2024-09-07 RX ORDER — OLANZAPINE 5 MG/1
5 TABLET, ORALLY DISINTEGRATING ORAL EVERY 6 HOURS PRN
Status: DISCONTINUED | OUTPATIENT
Start: 2024-09-07 | End: 2024-09-09 | Stop reason: HOSPADM

## 2024-09-07 RX ORDER — NALOXONE HYDROCHLORIDE 0.4 MG/ML
0.1 INJECTION, SOLUTION INTRAMUSCULAR; INTRAVENOUS; SUBCUTANEOUS
Status: DISCONTINUED | OUTPATIENT
Start: 2024-09-07 | End: 2024-09-09 | Stop reason: HOSPADM

## 2024-09-07 RX ORDER — CLOZAPINE 200 MG/1
400 TABLET ORAL AT BEDTIME
Status: DISCONTINUED | OUTPATIENT
Start: 2024-09-07 | End: 2024-09-08

## 2024-09-07 RX ORDER — CLOZAPINE 200 MG/1
400 TABLET ORAL AT BEDTIME
Status: CANCELLED | OUTPATIENT
Start: 2024-09-07

## 2024-09-07 RX ADMIN — MORPHINE SULFATE 2 MG: 2 INJECTION, SOLUTION INTRAMUSCULAR; INTRAVENOUS at 19:19

## 2024-09-07 ASSESSMENT — COLUMBIA-SUICIDE SEVERITY RATING SCALE - C-SSRS
1. IN THE PAST MONTH, HAVE YOU WISHED YOU WERE DEAD OR WISHED YOU COULD GO TO SLEEP AND NOT WAKE UP?: YES
6. HAVE YOU EVER DONE ANYTHING, STARTED TO DO ANYTHING, OR PREPARED TO DO ANYTHING TO END YOUR LIFE?: YES
3. HAVE YOU BEEN THINKING ABOUT HOW YOU MIGHT KILL YOURSELF?: YES
5. HAVE YOU STARTED TO WORK OUT OR WORKED OUT THE DETAILS OF HOW TO KILL YOURSELF? DO YOU INTEND TO CARRY OUT THIS PLAN?: YES
2. HAVE YOU ACTUALLY HAD ANY THOUGHTS OF KILLING YOURSELF IN THE PAST MONTH?: YES
4. HAVE YOU HAD THESE THOUGHTS AND HAD SOME INTENTION OF ACTING ON THEM?: NO

## 2024-09-07 ASSESSMENT — ACTIVITIES OF DAILY LIVING (ADL)
ADLS_ACUITY_SCORE: 38

## 2024-09-07 NOTE — ED TRIAGE NOTES
Triage Assessment (Adult)       Row Name 09/07/24 1754          Triage Assessment    Airway WDL WDL        Respiratory WDL    Respiratory WDL WDL        Skin Circulation/Temperature WDL    Skin Circulation/Temperature WDL WDL        Cardiac WDL    Cardiac WDL WDL        Peripheral/Neurovascular WDL    Peripheral Neurovascular WDL WDL        Cognitive/Neuro/Behavioral WDL    Cognitive/Neuro/Behavioral WDL WDL

## 2024-09-08 PROCEDURE — 120N000002 HC R&B MED SURG/OB UMMC

## 2024-09-08 PROCEDURE — 250N000013 HC RX MED GY IP 250 OP 250 PS 637: Performed by: NURSE PRACTITIONER

## 2024-09-08 PROCEDURE — 99223 1ST HOSP IP/OBS HIGH 75: CPT | Mod: GW

## 2024-09-08 PROCEDURE — 99418 PROLNG IP/OBS E/M EA 15 MIN: CPT | Mod: GW

## 2024-09-08 PROCEDURE — 250N000013 HC RX MED GY IP 250 OP 250 PS 637: Performed by: INTERNAL MEDICINE

## 2024-09-08 PROCEDURE — 99232 SBSQ HOSP IP/OBS MODERATE 35: CPT | Mod: GW | Performed by: INTERNAL MEDICINE

## 2024-09-08 PROCEDURE — 250N000011 HC RX IP 250 OP 636: Performed by: STUDENT IN AN ORGANIZED HEALTH CARE EDUCATION/TRAINING PROGRAM

## 2024-09-08 PROCEDURE — 250N000009 HC RX 250: Performed by: STUDENT IN AN ORGANIZED HEALTH CARE EDUCATION/TRAINING PROGRAM

## 2024-09-08 PROCEDURE — 250N000013 HC RX MED GY IP 250 OP 250 PS 637: Performed by: STUDENT IN AN ORGANIZED HEALTH CARE EDUCATION/TRAINING PROGRAM

## 2024-09-08 PROCEDURE — 250N000011 HC RX IP 250 OP 636: Performed by: NURSE PRACTITIONER

## 2024-09-08 PROCEDURE — 258N000003 HC RX IP 258 OP 636: Performed by: STUDENT IN AN ORGANIZED HEALTH CARE EDUCATION/TRAINING PROGRAM

## 2024-09-08 PROCEDURE — 999N000226 HC STATISTIC SLP IP EVAL DEFER

## 2024-09-08 RX ORDER — MORPHINE SULFATE 2 MG/ML
1-2 INJECTION, SOLUTION INTRAMUSCULAR; INTRAVENOUS
Status: DISCONTINUED | OUTPATIENT
Start: 2024-09-08 | End: 2024-09-09 | Stop reason: HOSPADM

## 2024-09-08 RX ADMIN — KETOROLAC TROMETHAMINE 15 MG: 15 INJECTION, SOLUTION INTRAMUSCULAR; INTRAVENOUS at 03:07

## 2024-09-08 RX ADMIN — BENZTROPINE MESYLATE 1 MG: 1 TABLET ORAL at 21:15

## 2024-09-08 RX ADMIN — GLYCOPYRROLATE 0.2 MG: 0.2 INJECTION, SOLUTION INTRAMUSCULAR; INTRAVENOUS at 08:36

## 2024-09-08 RX ADMIN — GLYCOPYRROLATE 0.2 MG: 0.2 INJECTION, SOLUTION INTRAMUSCULAR; INTRAVENOUS at 12:57

## 2024-09-08 RX ADMIN — GLYCOPYRROLATE 0.2 MG: 0.2 INJECTION, SOLUTION INTRAMUSCULAR; INTRAVENOUS at 21:19

## 2024-09-08 RX ADMIN — GLYCOPYRROLATE 0.2 MG: 0.2 INJECTION, SOLUTION INTRAMUSCULAR; INTRAVENOUS at 05:20

## 2024-09-08 RX ADMIN — GUAIFENESIN 600 MG: 600 TABLET ORAL at 21:10

## 2024-09-08 RX ADMIN — KETOROLAC TROMETHAMINE 15 MG: 15 INJECTION, SOLUTION INTRAMUSCULAR; INTRAVENOUS at 14:57

## 2024-09-08 RX ADMIN — VALPROATE SODIUM 250 MG: 100 INJECTION, SOLUTION INTRAVENOUS at 00:46

## 2024-09-08 RX ADMIN — DIVALPROEX SODIUM 250 MG: 125 CAPSULE, COATED PELLETS ORAL at 17:52

## 2024-09-08 RX ADMIN — BENZONATATE 100 MG: 100 CAPSULE ORAL at 21:15

## 2024-09-08 RX ADMIN — BENZTROPINE MESYLATE 1 MG: 1 INJECTION INTRAMUSCULAR; INTRAVENOUS at 00:46

## 2024-09-08 RX ADMIN — CLOZAPINE 400 MG: 200 TABLET, ORALLY DISINTEGRATING ORAL at 21:12

## 2024-09-08 RX ADMIN — RISPERIDONE 0.5 MG: 0.5 TABLET, ORALLY DISINTEGRATING ORAL at 08:57

## 2024-09-08 RX ADMIN — GABAPENTIN 400 MG: 300 CAPSULE ORAL at 21:10

## 2024-09-08 RX ADMIN — KETOROLAC TROMETHAMINE 15 MG: 15 INJECTION, SOLUTION INTRAMUSCULAR; INTRAVENOUS at 08:36

## 2024-09-08 RX ADMIN — RISPERIDONE 0.5 MG: 0.5 TABLET, ORALLY DISINTEGRATING ORAL at 21:10

## 2024-09-08 RX ADMIN — CLOZAPINE 200 MG: 200 TABLET, ORALLY DISINTEGRATING ORAL at 08:58

## 2024-09-08 RX ADMIN — BENZTROPINE MESYLATE 1 MG: 1 INJECTION INTRAMUSCULAR; INTRAVENOUS at 08:37

## 2024-09-08 RX ADMIN — PANTOPRAZOLE SODIUM 40 MG: 40 TABLET, DELAYED RELEASE ORAL at 08:58

## 2024-09-08 RX ADMIN — KETOROLAC TROMETHAMINE 15 MG: 15 INJECTION, SOLUTION INTRAMUSCULAR; INTRAVENOUS at 21:23

## 2024-09-08 RX ADMIN — GLYCOPYRROLATE 0.2 MG: 0.2 INJECTION, SOLUTION INTRAMUSCULAR; INTRAVENOUS at 00:52

## 2024-09-08 RX ADMIN — VALPROATE SODIUM 250 MG: 100 INJECTION, SOLUTION INTRAVENOUS at 07:00

## 2024-09-08 ASSESSMENT — ACTIVITIES OF DAILY LIVING (ADL)
ADLS_ACUITY_SCORE: 43
ADLS_ACUITY_SCORE: 44
ADLS_ACUITY_SCORE: 38
ADLS_ACUITY_SCORE: 40
ADLS_ACUITY_SCORE: 44
ADLS_ACUITY_SCORE: 42
ADLS_ACUITY_SCORE: 40
ADLS_ACUITY_SCORE: 40
ADLS_ACUITY_SCORE: 44
ADLS_ACUITY_SCORE: 38
ADLS_ACUITY_SCORE: 43
ADLS_ACUITY_SCORE: 40
ADLS_ACUITY_SCORE: 40
ADLS_ACUITY_SCORE: 38
ADLS_ACUITY_SCORE: 43
ADLS_ACUITY_SCORE: 44
ADLS_ACUITY_SCORE: 43
ADLS_ACUITY_SCORE: 42
ADLS_ACUITY_SCORE: 44
ADLS_ACUITY_SCORE: 43
ADLS_ACUITY_SCORE: 40
ADLS_ACUITY_SCORE: 43
ADLS_ACUITY_SCORE: 40

## 2024-09-08 NOTE — PROGRESS NOTES
[facility]    Medicine Progress Note - Hospitalist Service, GOLD TEAM 16    Date of Admission:  9/7/2024    Assessment & Plan      Vinod Lee is a 64 year old male admitted on 9/7/2024. 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  Recent hospital stay from 7/21 through 7/31/2024 for pneumonia and discharged on hospice care.  Presented to ED on 9/7 with complaints of worsening dyspnea/unmanaged symptoms and was admitted to hospitalist team for further care.    Today's changes: 9/8/2024  Patient continues to decline labs or any active treatment, declined to go back to group home. Says he wants to die here. Endorses SI. Did not want us to work up for infection. Confirmed DNR, DNI, comfort measures.   - Psychiatry consult  - Sw consult  - Palliative consult.   - continue pta medications except bp medications as soft bp    # encounter for hospice care  # acute on chronic hypoxic respiratory failure  # dyspnea  Patient presented from outside facility where he was under hospice care. Patient was dyspneic and hypoxic.  Patient presented seeking definitive symptom management.    -Palliative care and social work consultations    -O2 for comfort    -Albuterol nebs as needed    -Benzonatate 3 times daily for cough    -Add glycopyrrolate every 4 hours    -Guaifenesin 600 mg twice daily    -Add morphine sulfate for pain and air hunger    # chronic pain    -Continue with as needed acetaminophen    -Scheduled Toradol    -Morphine sulfate solution as above    # schizophrenia    -Continue PTA medications for schizophrenia including Clozaril, risperidone, fluoxetine, Depakote, benztropine (for EPS)    # Chronic systolic heart failure with reduced  ejection fraction  - stable   - hold pta medications.     # Urinary retention    -Continue Barrios catheter and daily tamsulosin          Diet: Regular Diet Adult    DVT Prophylaxis: none. Hospice care.   Barrios Catheter: PRESENT, indication: End of life  Lines: None     Cardiac Monitoring: None  Code Status: No CPR- Do NOT Intubate      Clinically Significant Risk Factors Present on Admission                  # Hypertension: Noted on problem list  # Chronic heart failure with reduced ejection fraction: last echo with EF <40%            # Financial/Environmental Concerns:                 Disposition Plan     Medically Ready for Discharge: Anticipated in 2-4 Days             Mynor Cruz MD  Hospitalist Service, GOLD TEAM 16  [facility]  Securely message with [employer] (more info)  Text page via [facility] Paging/Directory   See signed in provider for up to date coverage information  ______________________________________________________________________    Interval History   See above  Endorses SI. No fever or cp or sob reported. Prefers to die in hospital. Said No for active work up or treatment for infection and others.     Physical Exam   Vital Signs: Temp: 98.6  F (37  C) Temp src: Oral BP: 101/60 Pulse: 92   Resp: 16 SpO2: 98 % O2 Device: Nasal cannula Oxygen Delivery: 4 LPM  Weight: 0 lbs 0 oz    General Appearance: Awake, interactive, ill appearing.   HEENT: AT/NC, dry MM  Respiratory: Normal work of breathing. CTA BL. NC oxygen.   Cardiovascular: S1 S2 Regular.  GI/Abd: Soft. NT. ND. No g/r.   Extremities: Distally wwp.   Neuro: alert. Interactive. Moving all extremities.   Skin: dry skin  Psychiatry: flat affect.     Medical Decision Making       46 MINUTES SPENT BY ME on the date of service doing chart review, history, exam, documentation & further activities per the note.      Data         Imaging results reviewed over the past 24 hrs:   No results found for this or  any previous visit (from the past 24 hour(s)).  No lab results found in last 7 days.

## 2024-09-08 NOTE — PLAN OF CARE
Admission Note    Reason for admission: Complaints of worsening dyspnea/unmanaged symptoms   Primary team notified of pt arrival. Yes   Admitted from: ED  Via: Bed  Accompanied by: Bedside sitter.  Belongings: Placed in closet; valuables sent home with family  Admission Required Doc Completed: Yes/No  Teaching: Orientation to unit and call light- call light within reach, use of console, meal times, when to call for the RN, and enforced importance of safety.  IV Access: Yes  Telemetry: Yes/No  Ht./Wt.: Completed  Code Status verified on armband: Yes/No  2 RN Skin Assessment Completed with: Bindu Mcgraw. No skin issues .   Suction/Ambu bag/Flowmeter at bedside: Yes/No    Pt status: Anxious and restless.      Temp:  [99.2  F (37.3  C)-100.8  F (38.2  C)] 100.8  F (38.2  C)  Pulse:  [104-112] 104  Resp:  [14-16] 16  BP: (106-115)/(65-66) 106/65  SpO2:  [92 %] 92 %

## 2024-09-08 NOTE — PLAN OF CARE
Pt did need some encourage to take PO medication. Talked with group home pt came from; pt usually takes PO meds crushed and in applesauce. Pt did end up taking the PO medication with pudding.    Pt remains on sitter per request by provider. Pt has made suicidal statements and appears to mention that he does not have a plan. Possibility discontinue sitter this shift.    Pt is AO to self. Pt does appear to have intermittent confusion, pt will stare into space and not answer questions at times.    Continue POC.

## 2024-09-08 NOTE — PROGRESS NOTES
Pt unable to pass bedside swallow with c/f aspiration. Will defer SLP consult to AM team if within GOC. Held PO meds, changed clozapine and risperidone to disintegrating tablets. IV prns ordered.

## 2024-09-08 NOTE — PROGRESS NOTES
Writer just received a call from RN supervisor. Wanted an update and was able to clear up some confusion on the reason he was brought to the hospital for suspicion of UTI after pt's started to have increase of tremors. Provider notified.    RN supervisor Ishaan  [phone]

## 2024-09-08 NOTE — H&P
[facility]    History and Physical - Hospitalist Service, GOLD TEAM        Date of Admission:  9/7/2024    Assessment & Plan      Vinod Lee is a 64 year old male admitted on 9/7/2024. 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  Recent hospital stay from 7/21 through 7/31/2024 for pneumonia and discharged on hospice care.  Presented to ED on 9/7 with complaints of worsening dyspnea/unmanaged symptoms and was admitted to hospitalist team for further care.    # encounter for hospice care  # acute on chronic hypoxic respiratory failure  # dyspnea  Patient presented from outside facility where he was under hospice care. Patient was dyspneic and hypoxic.  Patient presented seeking definitive symptom management.    -Palliative care and social work consultations    -O2 for comfort    -Albuterol nebs as needed    -Benzonatate 3 times daily for cough    -Add glycopyrrolate every 4 hours    -Guaifenesin 600 mg twice daily    -Add morphine sulfate for pain and air hunger    # chronic pain    -Continue with as needed acetaminophen    -Scheduled Toradol    -Morphine sulfate solution as above    # schizophrenia    -Continue PTA medications for schizophrenia including Clozaril, risperidone, fluoxetine, Depakote, benztropine (for EPS)    # Chronic systolic heart failure with reduced ejection fraction    -Continue heart failure meds as an exacerbation of his heart failure may make it more difficult for him to breathe.  Therefore opted to continue his empagliflozin daily, spironolactone daily, and metoprolol daily    # Urinary retention    -Continue Barrios catheter and daily tamsulosin        Diet: Regular Diet Adult    DVT Prophylaxis: None-comfort  care  Barrios Catheter: Not present  Lines: None     Cardiac Monitoring: None  Code Status: No CPR- Do NOT Intubate      Clinically Significant Risk Factors Present on Admission                  # Hypertension: Noted on problem list  # Chronic heart failure with reduced ejection fraction: last echo with EF <40%            # Financial/Environmental Concerns:             Disposition Plan     Medically Ready for Discharge: Anticipated in 2-4 Days       The patient's care was discussed with the Attending Physician, Dr. Pate, Care Coordinator/, and Patient.    PRESTON Orellana [facility]  Hospitalist Service, [facility]  Securely message with [employer] (more info)  Text page via 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  In addition, he has history of pressure ulcer, chronic systolic HFrEF, extrapyramidal symptoms on Cogentin.    Patient had a hospital stay in late July and was discharged on hospice care.  Per EMS report, patient felt that he was dying and wanted to die at the hospital.  He expressed dyspnea but denied any pain on exam.  He states things have gotten much worse.  He and I discussed in brief his POLST form stating that he does not want CPR or intubation but only medications or interventions for comfort.  The patient himself  did not sign the POLST form when it was signed by the physician.  However, the patient and I personally discussed this and confirmed that he does not want CPR or intubation and only wants to receive comfort-focused measures at the hospital this evening.  There is therefore no current plan to workup or treat him for pneumonia unless the patient were to change his mind.  He has rattling on inspiration and states that is difficult to breathe.  This is a symptom most bothering him.  He denies pain at time of assessment.    I did speak to the on-call  regarding his admission as well.      Past Medical History    Past Medical History:   Diagnosis Date    LBBB (left bundle branch block)     Parkinson's disease (H)     Schizophrenia (H)        Past Surgical History   No past surgical history on file.    Prior to Admission Medications   Prior to Admission Medications   Prescriptions Last Dose Informant Patient Reported? Taking?   FLUoxetine (PROZAC) 20 MG capsule 9/7/2024  No Yes   Sig: Take 1 capsule (20 mg) by mouth daily   LORazepam (ATIVAN) 1 MG tablet 9/7/2024  Yes Yes   Sig: Take 1 mg by mouth 3 times daily as needed.   MINERAL OIL-HYDROPHIL PETROLAT EX prn  Yes Yes   Sig: Externally apply topically every hour as needed.   Melatonin 10 MG TABS tablet 9/6/2024  No Yes   Sig: Take 1 tablet (10 mg) by mouth at bedtime   OLANZapine (ZYPREXA) 15 MG tablet 9/7/2024 x1  Yes Yes   Sig: Take 15 mg by mouth 2 times daily   Vitamin D3 (CHOLECALCIFEROL) 25 mcg (1000 units) tablet 9/7/2024  No Yes   Sig: Take 1 tablet (25 mcg) by mouth daily   acetaminophen (TYLENOL) 325 MG tablet prn  Yes Yes   Sig: Take 650 mg by mouth every 4 hours as needed for mild pain.   albuterol (PROAIR HFA/PROVENTIL HFA/VENTOLIN HFA) 108 (90 Base) MCG/ACT inhaler prn  No Yes   Sig: Inhale 2 puffs into the lungs every 6 hours as needed for wheezing, shortness of breath or cough   benzonatate (TESSALON) 200 MG capsule prn  No Yes   Sig: Take 1  capsule (200 mg) by mouth 3 times daily as needed for cough   benztropine (COGENTIN) 1 MG tablet 9/7/2024 x1  No Yes   Sig: Take 1 tablet (1 mg) by mouth 2 times daily   cloZAPine (CLOZARIL) 200 MG tablet 9/7/2024  Yes Yes   Sig: Take 200 mg by mouth daily. At 2pm   cloZAPine (CLOZARIL) 200 MG tablet 9/6/2024  Yes Yes   Sig: Take 400 mg by mouth at bedtime.   cyanocobalamin (VITAMIN B-12) 500 MCG SUBL sublingual tablet 9/7/2024  No Yes   Sig: Place 1 tablet (500 mcg) under the tongue daily   divalproex sodium delayed-release (DEPAKOTE SPRINKLE) 125 MG DR capsule 9/7/2024 x2  No Yes   Sig: Take 250 mg by mouth 3 times daily (with meals)   empagliflozin (JARDIANCE) 10 MG TABS tablet 9/7/2024  No Yes   Sig: Take 1 tablet (10 mg) by mouth daily   gabapentin (NEURONTIN) 400 MG capsule 9/7/2024x2  No Yes   Sig: Take 1 capsule (400 mg) by mouth 3 times daily   guaiFENesin (MUCINEX) 600 MG 12 hr tablet 9/7/2024 x1  Yes Yes   Sig: Take 600 mg by mouth 2 times daily.   losartan (COZAAR) 25 MG tablet 9/7/2024 at 1600  No Yes   Sig: Take 0.5 tablets (12.5 mg) by mouth every evening   metoprolol succinate ER (TOPROL XL) 25 MG 24 hr tablet 9/7/2024  Yes Yes   Sig: Take 25 mg by mouth daily.   miconazole (MICATIN) 2 % external powder 9/7/2024 x1  Yes Yes   Sig: Apply topically 2 times daily.   oxyCODONE (ROXICODONE) 5 MG/5ML solution prn  Yes Yes   Sig: Take 5 mg by mouth every 4 hours as needed for severe pain.   pantoprazole (PROTONIX) 40 MG EC tablet 9/7/2024  No Yes   Sig: Take 1 tablet (40 mg) by mouth daily   polyethylene glycol (MIRALAX) 17 g packet 9/7/2024 x1  Yes Yes   Sig: Take 1 packet by mouth 2 times daily.   risperiDONE (RISPERDAL) 0.5 MG tablet 9/7/2024 x1  Yes Yes   Sig: Take 0.5 mg by mouth 2 times daily   senna-docusate (SENOKOT-S/PERICOLACE) 8.6-50 MG tablet prn  Yes Yes   Sig: Take 1 tablet by mouth 2 times daily as needed for constipation.   sennosides (SENOKOT) 8.6 MG tablet 9/7/2024 x1  No Yes   Sig: Take 2  tablets by mouth 2 times daily   simethicone (MYLICON) 80 MG chewable tablet prn  Yes Yes   Sig: Take 80 mg by mouth every 6 hours as needed for flatulence or cramping.   spironolactone (ALDACTONE) 25 MG tablet 9/7/2024  No Yes   Sig: Take 1 tablet (25 mg) by mouth daily   tamsulosin (FLOMAX) 0.4 MG capsule 9/7/2024 x1  No Yes   Sig: Take 2 capsules (0.8 mg) by mouth daily      Facility-Administered Medications: None           Physical Exam   Vital Signs:     BP: 115/66 Pulse: 112   Resp: 14 SpO2: 92 %        Physical Exam  Vitals and nursing note reviewed.   Constitutional:       General: He is not in acute distress.     Appearance: He is ill-appearing.   Cardiovascular:      Rate and Rhythm: Regular rhythm. Tachycardia present.      Pulses: Normal pulses.      Heart sounds: Normal heart sounds. No murmur heard.  Pulmonary:      Effort: Respiratory distress present.      Breath sounds: Rhonchi and rales present.   Abdominal:      General: There is no distension.      Palpations: Abdomen is soft.   Skin:     General: Skin is warm and dry.      Coloration: Skin is pale.   Neurological:      Mental Status: He is alert.      GCS: GCS eye subscore is 3. GCS verbal subscore is 4. GCS motor subscore is 6.      Comments: Tardive dyskinesias   Psychiatric:         Attention and Perception: He is inattentive.         Mood and Affect: Affect is flat.         Behavior: Behavior is slowed. Behavior is cooperative.           Medical Decision Making       60 MINUTES SPENT BY ME on the date of service doing chart review, history, exam, documentation & further activities per the note.      Data

## 2024-09-08 NOTE — PLAN OF CARE
Goal Outcome Evaluation:       Patient spat out all the ODT medications given to him and stated he did not want it anymore, although he said he wanted it earlier.    1:1 bedside attendant for safety due to SI.     Right PIV infusing    Repositioned every 2 hours.     Not out of bed.     Alert and oriented. With some gabled speech.     Continue plan of care.

## 2024-09-08 NOTE — ED PROVIDER NOTES
"ED Provider Note  [facility]      History     Chief Complaint   Patient presents with    Shortness of Breath     Pt on hospice and feels he is actively dying and doesn't want to die in his group home and asked to be sent to the hospital.  Pt states to EMS, \"I want to die at the hospital and ready for my maker to take me.\" Pt arrived with POLST initiating comfort measures only and allow a natural death.       HPI  Vinod Lee is a 64 year old male who presents from a facility called [facility] in [place], [place], with a provider order for life sustaining treatments/POLST signed by a physician, Dr. Lesvia Packer on July 31, 2024, which orders comfort focused treatment only allowing for natural death, and a DNR. This has been discussed with the patient and confirmed with paperwork in our possession. Patient had noted that he wanted to die in a hospital setting rather than in his group home.    This part of the medical record was transcribed by Willem Ojeda, Medical Scribe, from a dictation done by Juan Alberto Saavedra MD.     Past Medical History  Past Medical History:   Diagnosis Date    LBBB (left bundle branch block)     Parkinson's disease (H)     Schizophrenia (H)      No past surgical history on file.  No current outpatient medications on file.    Allergies   Allergen Reactions    Bee Venom Anaphylaxis    Montelukast     Phenothiazines      Family History  No family history on file.  Social History   Social History     Tobacco Use    Smoking status: Never     Passive exposure: Never    Smokeless tobacco: Never      A medically appropriate review of systems was performed with pertinent positives and negatives noted in the HPI, and all other systems negative.    Physical Exam   BP: 115/66  Pulse: 112  Temp: 99.2  F (37.3  C)  Resp: 14  SpO2: 92 %  Physical Exam  Patient opens eyes to voice. Atraumatic head. Is tachycardic on exam, with mild tachypnea, with " gurgling breath sounds, on 2 L of oxygen at 92%. Abdomen, soft nontender.    ED Course, Procedures, & Data      Procedures              No results found for any visits on 09/07/24.  Medications   naloxone (NARCAN) injection 0.1 mg (has no administration in time range)   naloxone (NARCAN) injection 0.2 mg (has no administration in time range)   bisacodyl (DULCOLAX) suppository 10 mg (has no administration in time range)   sennosides (SENOKOT) tablet 1 tablet ( Oral Held by provider 9/7/24 2220)   carboxymethylcellulose PF (REFRESH PLUS) 0.5 % ophthalmic solution 1-2 drop (has no administration in time range)   mineral oil-hydrophilic petrolatum (AQUAPHOR) (has no administration in time range)   lidocaine 1 % 1 mL (has no administration in time range)   lidocaine (LMX4) cream (has no administration in time range)   sodium chloride (PF) 0.9% PF flush 3 mL (has no administration in time range)   sodium chloride (PF) 0.9% PF flush 3 mL (has no administration in time range)   morphine sulfate (ROXANOL) 20 mg/mL (HIGH CONC) soln 5 mg (has no administration in time range)     Or   morphine sulfate (ROXANOL) 20 mg/mL (HIGH CONC) soln 10 mg (has no administration in time range)   acetaminophen (TYLENOL) tablet 650 mg ( Oral Held by provider 9/7/24 2220)   OLANZapine zydis (zyPREXA) ODT tab 5 mg ( Oral Held by provider 9/7/24 2220)   ondansetron (ZOFRAN) injection 4 mg (has no administration in time range)   glycopyrrolate (ROBINUL) injection 0.2 mg (has no administration in time range)   ketorolac (TORADOL) injection 15 mg (has no administration in time range)   atropine 1 % sublingual (ophthalmic drops for sublingual use) 2 drop (has no administration in time range)   senna-docusate (SENOKOT-S/PERICOLACE) 8.6-50 MG per tablet 1 tablet ( Oral Held by provider 9/7/24 2220)     Or   senna-docusate (SENOKOT-S/PERICOLACE) 8.6-50 MG per tablet 2 tablet ( Oral Held by provider 9/7/24 0888)   calcium carbonate (TUMS) chewable tablet  1,000 mg ( Oral Held by provider 9/7/24 2220)   Contraindications to both pharmacological and mechanical prophylaxis (must document contraindications for both in this order) (has no administration in time range)   polyethylene glycol (MIRALAX) Packet 17 g (has no administration in time range)   albuterol (PROVENTIL HFA/VENTOLIN HFA) inhaler (has no administration in time range)   benzonatate (TESSALON) capsule 100 mg ( Oral Automatically Held 9/10/24 2000)   benztropine (COGENTIN) tablet 1 mg ( Oral Automatically Held 9/10/24 2000)   cloZAPine (CLOZARIL) tablet 200 mg ( Oral Automatically Held 9/11/24 1400)   cloZAPine (CLOZARIL) tablet 400 mg ( Oral Automatically Held 9/10/24 2200)   divalproex sodium delayed-release (DEPAKOTE SPRINKLE) DR capsule 250 mg ( Oral Automatically Held 9/11/24 1800)   empagliflozin (JARDIANCE) tablet 10 mg ( Oral Automatically Held 9/11/24 0800)   FLUoxetine (PROzac) capsule 20 mg ( Oral Automatically Held 9/11/24 0800)   gabapentin (NEURONTIN) capsule 400 mg ( Oral Automatically Held 9/10/24 2000)   guaiFENesin (MUCINEX) 12 hr tablet 600 mg ( Oral Automatically Held 9/10/24 2000)   LORazepam (ATIVAN) tablet 1 mg ( Oral Held by provider 9/7/24 2220)   metoprolol succinate ER (TOPROL XL) 24 hr tablet 25 mg ( Oral Automatically Held 9/11/24 0800)   pantoprazole (PROTONIX) EC tablet 40 mg (has no administration in time range)   risperiDONE (risperDAL) tablet 0.5 mg ( Oral Automatically Held 9/10/24 2000)   simethicone (MYLICON) chewable tablet 80 mg ( Oral Held by provider 9/7/24 2220)   spironolactone (ALDACTONE) tablet 25 mg ( Oral Automatically Held 9/11/24 0800)   tamsulosin (FLOMAX) capsule 0.8 mg ( Oral Automatically Held 9/11/24 0800)   Clozapine (FAZACLO) ODT tab TBDP 200 mg (has no administration in time range)   Clozapine (FAZACLO) ODT tab TBDP 400 mg (has no administration in time range)   benztropine mesylate (COGENTIN) injection 1 mg (has no administration in time range)    valproate (DEPACON) 250 mg in sodium chloride 0.9 % 50 mL intermittent infusion (has no administration in time range)   metoprolol (LOPRESSOR) injection 12.5 mg (has no administration in time range)   risperiDONE (risperDAL M-TABS) ODT tab 0.5 mg (has no administration in time range)   risperiDONE (risperDAL M-TABS) ODT tab 0.5 mg (has no administration in time range)   LORazepam (ATIVAN) injection 1 mg (has no administration in time range)   morphine (PF) injection 2 mg (2 mg Intravenous $Given 9/7/24 1919)     Labs Ordered and Resulted from Time of ED Arrival to Time of ED Departure - No data to display  No orders to display          Critical care was not performed.     Medical Decision Making  The patient's presentation was of high complexity (an acute health issue posing potential threat to life or bodily function).    The patient's evaluation involved:  history and exam without other MDM data elements    The patient's management necessitated high risk (a decision regarding hospitalization).    Assessment & Plan    Confirmed comfort care only, DNR/DNI status, with a history of paranoid schizophrenia and Parkinson's.   Will treat pain with low-dose morphine, provide one-to-one for comfort, oxygen given he has mild hypoxia, and admit for comfort care measures with impending death    After initial treatments and discussion, I admitted to hospital medicine with understanding that this is likely for end-of-life comfort care, who agreed to hospital admission    I have reviewed the nursing notes. I have reviewed the findings, diagnosis, plan and need for follow up with the patient.    Current Discharge Medication List          Final diagnoses:   SOB (shortness of breath)   Parkinson's disease, unspecified whether dyskinesia present, unspecified whether manifestations fluctuate (H)   Hypoxia   DNR (do not resuscitate)       Juan Alberto Saavedra MD  [facility] EMERGENCY DEPARTMENT  9/7/2024     Juan Alberto Saavedra,  MD  09/07/24 4508

## 2024-09-08 NOTE — PHARMACY-ADMISSION MEDICATION HISTORY
Pharmacy Intern Admission Medication History    Admission medication history is complete. The information provided in this note is only as accurate as the sources available at the time of the update.    Information Source(s): Caregiver and Facility (TCU/NH/) medication list/MAR via phone    Pertinent Information: PTA list was verified with Med list from [facility]. Last doses were verified with caregiver Sri ([phone])    Changes made to PTA medication list:  Added: miconazole, mineral oil, simethicone.  Deleted: guaifenesin-dextromethorphan 100-10 mg/5mL syrup, prazosin 1mg capsule.  Changed:   Lorazepam: sig was 0.5 mg, added PRN to sig.    Allergies reviewed with patient and updates made in EHR: no    Medication History Completed By: Lianna Manjarrez 9/7/2024 8:24 PM    PTA Med List   Medication Sig Last Dose    acetaminophen (TYLENOL) 325 MG tablet Take 650 mg by mouth every 4 hours as needed for mild pain. prn    albuterol (PROAIR HFA/PROVENTIL HFA/VENTOLIN HFA) 108 (90 Base) MCG/ACT inhaler Inhale 2 puffs into the lungs every 6 hours as needed for wheezing, shortness of breath or cough prn    benzonatate (TESSALON) 200 MG capsule Take 1 capsule (200 mg) by mouth 3 times daily as needed for cough prn    benztropine (COGENTIN) 1 MG tablet Take 1 tablet (1 mg) by mouth 2 times daily 9/7/2024 x1    cloZAPine (CLOZARIL) 200 MG tablet Take 400 mg by mouth at bedtime. 9/6/2024    cloZAPine (CLOZARIL) 200 MG tablet Take 200 mg by mouth daily. At 2pm 9/7/2024    cyanocobalamin (VITAMIN B-12) 500 MCG SUBL sublingual tablet Place 1 tablet (500 mcg) under the tongue daily 9/7/2024    divalproex sodium delayed-release (DEPAKOTE SPRINKLE) 125 MG DR capsule Take 250 mg by mouth 3 times daily (with meals) 9/7/2024 x2    empagliflozin (JARDIANCE) 10 MG TABS tablet Take 1 tablet (10 mg) by mouth daily 9/7/2024    FLUoxetine (PROZAC) 20 MG capsule Take 1 capsule (20 mg) by mouth daily 9/7/2024    gabapentin (NEURONTIN) 400  MG capsule Take 1 capsule (400 mg) by mouth 3 times daily 9/7/2024x2    guaiFENesin (MUCINEX) 600 MG 12 hr tablet Take 600 mg by mouth 2 times daily. 9/7/2024 x1    LORazepam (ATIVAN) 1 MG tablet Take 1 mg by mouth 3 times daily as needed. 9/7/2024    losartan (COZAAR) 25 MG tablet Take 0.5 tablets (12.5 mg) by mouth every evening 9/7/2024 at 1600    Melatonin 10 MG TABS tablet Take 1 tablet (10 mg) by mouth at bedtime 9/6/2024    metoprolol succinate ER (TOPROL XL) 25 MG 24 hr tablet Take 25 mg by mouth daily. 9/7/2024    miconazole (MICATIN) 2 % external powder Apply topically 2 times daily. 9/7/2024 x1    MINERAL OIL-HYDROPHIL PETROLAT EX Externally apply topically every hour as needed. prn    OLANZapine (ZYPREXA) 15 MG tablet Take 15 mg by mouth 2 times daily 9/7/2024 x1    oxyCODONE (ROXICODONE) 5 MG/5ML solution Take 5 mg by mouth every 4 hours as needed for severe pain. prn    pantoprazole (PROTONIX) 40 MG EC tablet Take 1 tablet (40 mg) by mouth daily 9/7/2024    polyethylene glycol (MIRALAX) 17 g packet Take 1 packet by mouth 2 times daily. 9/7/2024 x1    risperiDONE (RISPERDAL) 0.5 MG tablet Take 0.5 mg by mouth 2 times daily 9/7/2024 x1    senna-docusate (SENOKOT-S/PERICOLACE) 8.6-50 MG tablet Take 1 tablet by mouth 2 times daily as needed for constipation. prn    sennosides (SENOKOT) 8.6 MG tablet Take 2 tablets by mouth 2 times daily 9/7/2024 x1    simethicone (MYLICON) 80 MG chewable tablet Take 80 mg by mouth every 6 hours as needed for flatulence or cramping. prn    spironolactone (ALDACTONE) 25 MG tablet Take 1 tablet (25 mg) by mouth daily 9/7/2024    tamsulosin (FLOMAX) 0.4 MG capsule Take 2 capsules (0.8 mg) by mouth daily 9/7/2024 x1    Vitamin D3 (CHOLECALCIFEROL) 25 mcg (1000 units) tablet Take 1 tablet (25 mcg) by mouth daily 9/7/2024

## 2024-09-08 NOTE — PROGRESS NOTES
Brief Social Work Note    Care Management following for patient's return to group home and resumption of hospice care services at group home. SW initially approached by provider to start discharge planning - provider told SW patient is not engaging in treatments and insists on wanting to pass away in hospital. Per clarification from  nurse, patient was sent to hospital for a potential UTI. Provider agreed to keep patient in hospital overnight to see if his perspective for treatment for UTI changes tomorrow.    Noel contacts:    Jess [facility]  [address]  [place], [place], [address]  House phone: [phone]  Director of [phone]  House supervisor (Ishaan): [phone]  Fax: [phone]    [facility]  On call supervisor (León): [phone]    Gaby Farias, MSCLAUDIA   2024       Social Work and Care Management Department       SEARCHABLE in [employer] - search SOCIAL WORK       [place] (0800 - 1630) Saturday and      Units: 4A Vocera, 4C Vocera, & 4E Vocera        Units: 5A 9311-8249 Vocera, 5A 4026-6785 Vocera , BMT SW 1 BMT SW 2, BMT SW 3 & BMT SW 4  5C Off Service 5401 - 5416  5C Off Service [id number]     Units: 6A Vocera & 6B Vocera      Units: 6C Vocera     Units: 7A Vocera & 7B Vocera      Units: 7C Med Surg 7401 thru 7418 and 7C Med Surg 7502 thru 7521      Unit: [place] ED Vocera & [place] Obs Vocera     [facility] (5428-4403) Saturday and       Units: 5 Ortho Vocera, 5 Med Surg Vocera & WB ED Vocera     Units: 6 Med Surg Vocera, 8 Med Surg Vocera, & 10 ICU Vocera      After hours Vocera [facility] and After Hours Vocera [place]     Saturday &  (1630 - 0000)    Mon-Fri (9292-4097)     [facility] Recognized Holidays  (0748-0493)    Units: ALL   - see above VOCERA links to units and after hours

## 2024-09-08 NOTE — PROGRESS NOTES
"Speech-Language Pathology  SLP orders received. Chart reviewed and discussed with care team. Patient with previous hx of VFSS, with most recent completed on 7/2024   \"MBSS 7/24 reporting SILENT aspiration of thin liquids and significant pharyngeal stasis of advanced solids (that is also eventually aspirated). SLP placed recommendations for modified PO diet [(pureed diet & mildly thickened liquids)] and implementation of behavioral swallow rehabilitation. Pt with poor participation in skilled therapy across the last week. Pt's diet modified by MD 7/26 to level 5/level 0. Documentation this date reporting pt to transition to hospice cares.\" SLP then discharged pt d/t transition to hospice cares.    During this admission, SLP spoke with pt in his room. Pt reports he has been consuming \"whatever food he wants and has been eating too much\". SLP reviewed previous VFSS and recommendations and pt continues to decline SLP recommendations of modified food textures & mildly thickened liquids still in agreement to continue eating and drinking as he wishes acknowledging silent aspiration. Via EMR review, on 9/7 pt indicated he \"only wants to receive comfort-focused measures at the hospital this evening\" and \"patient felt that he was dying and wanted to die at the hospital \", which prompted the call to EMS and admission. Discussed with MD and in agreement to defer speech-language pathology evaluation at this time. Pt can continue regular diet and thin liquids per his wishes, defer to MD for diet recommendation. Defer discharge recommendations to treatment team. SLP will complete orders. Please reconsult if goals of care or medical approach changes.   "

## 2024-09-08 NOTE — ED NOTES
On call  called stating that someone paged on call . Writer apologized since this was not passed on from the previous shift. Writer went and checked on the patient. Pt refuses to see either a  or a  tonight. This message was relayed to on call .

## 2024-09-09 VITALS
OXYGEN SATURATION: 97 % | SYSTOLIC BLOOD PRESSURE: 97 MMHG | RESPIRATION RATE: 15 BRPM | TEMPERATURE: 97.3 F | HEART RATE: 77 BPM | DIASTOLIC BLOOD PRESSURE: 56 MMHG

## 2024-09-09 PROCEDURE — 250N000013 HC RX MED GY IP 250 OP 250 PS 637: Performed by: NURSE PRACTITIONER

## 2024-09-09 PROCEDURE — 250N000013 HC RX MED GY IP 250 OP 250 PS 637: Performed by: INTERNAL MEDICINE

## 2024-09-09 PROCEDURE — 99239 HOSP IP/OBS DSCHRG MGMT >30: CPT | Mod: GW | Performed by: INTERNAL MEDICINE

## 2024-09-09 PROCEDURE — 250N000013 HC RX MED GY IP 250 OP 250 PS 637: Performed by: STUDENT IN AN ORGANIZED HEALTH CARE EDUCATION/TRAINING PROGRAM

## 2024-09-09 PROCEDURE — 250N000011 HC RX IP 250 OP 636: Performed by: NURSE PRACTITIONER

## 2024-09-09 RX ORDER — GLYCOPYRROLATE 0.2 MG/ML
0.2 INJECTION, SOLUTION INTRAMUSCULAR; INTRAVENOUS EVERY 4 HOURS
COMMUNITY
Start: 2024-09-09

## 2024-09-09 RX ORDER — MORPHINE SULFATE 20 MG/ML
10 SOLUTION ORAL
COMMUNITY
Start: 2024-09-09

## 2024-09-09 RX ORDER — CARBOXYMETHYLCELLULOSE SODIUM 5 MG/ML
1-2 SOLUTION/ DROPS OPHTHALMIC
COMMUNITY
Start: 2024-09-09

## 2024-09-09 RX ADMIN — POLYETHYLENE GLYCOL 3350 17 G: 17 POWDER, FOR SOLUTION ORAL at 09:25

## 2024-09-09 RX ADMIN — GUAIFENESIN 600 MG: 600 TABLET ORAL at 09:25

## 2024-09-09 RX ADMIN — GLYCOPYRROLATE 0.2 MG: 0.2 INJECTION, SOLUTION INTRAMUSCULAR; INTRAVENOUS at 00:55

## 2024-09-09 RX ADMIN — CLOZAPINE 200 MG: 200 TABLET, ORALLY DISINTEGRATING ORAL at 09:27

## 2024-09-09 RX ADMIN — BENZONATATE 100 MG: 100 CAPSULE ORAL at 09:26

## 2024-09-09 RX ADMIN — GLYCOPYRROLATE 0.2 MG: 0.2 INJECTION, SOLUTION INTRAMUSCULAR; INTRAVENOUS at 03:31

## 2024-09-09 RX ADMIN — GABAPENTIN 400 MG: 300 CAPSULE ORAL at 09:25

## 2024-09-09 RX ADMIN — KETOROLAC TROMETHAMINE 15 MG: 15 INJECTION, SOLUTION INTRAMUSCULAR; INTRAVENOUS at 03:31

## 2024-09-09 RX ADMIN — GLYCOPYRROLATE 0.2 MG: 0.2 INJECTION, SOLUTION INTRAMUSCULAR; INTRAVENOUS at 09:27

## 2024-09-09 RX ADMIN — FLUOXETINE HYDROCHLORIDE 20 MG: 20 CAPSULE ORAL at 09:25

## 2024-09-09 RX ADMIN — BENZTROPINE MESYLATE 1 MG: 1 TABLET ORAL at 09:26

## 2024-09-09 RX ADMIN — RISPERIDONE 0.5 MG: 0.5 TABLET, ORALLY DISINTEGRATING ORAL at 09:27

## 2024-09-09 RX ADMIN — PANTOPRAZOLE SODIUM 40 MG: 40 TABLET, DELAYED RELEASE ORAL at 09:26

## 2024-09-09 RX ADMIN — KETOROLAC TROMETHAMINE 15 MG: 15 INJECTION, SOLUTION INTRAMUSCULAR; INTRAVENOUS at 09:27

## 2024-09-09 RX ADMIN — DIVALPROEX SODIUM 250 MG: 125 CAPSULE, COATED PELLETS ORAL at 09:25

## 2024-09-09 ASSESSMENT — ACTIVITIES OF DAILY LIVING (ADL)
ADLS_ACUITY_SCORE: 43

## 2024-09-09 NOTE — PLAN OF CARE
Goal Outcome Evaluation:       Alert  with garbled speech.     Denies pain. Given scheduled toradol.     1:1 for safety.    Right PIV saline locked. Has urethral catheter draining.     Continue plan of care

## 2024-09-09 NOTE — PROGRESS NOTES
Blood pressure 97/56, pulse 77, temperature 97.3  F (36.3  C), temperature source Axillary, resp. rate 15, SpO2 97%.    Patient alert and oriented, intermittent confusion, able to make needs known, on 4 liters oxygen with SpO2 above 95%. Denies pain, SOB and chest discomfort/pain. Assist of 2 with ceiling lift, not oob during the shift, 1:1 sitter in place for safety measure. Expresses no other needs at this time, in bed resting, call light within reach.     Plan: Possible discharge to group home 09/09

## 2024-09-09 NOTE — DISCHARGE SUMMARY
[facility]  Hospitalist Discharge Summary      Date of Admission:  9/7/2024  Date of Discharge:  9/9/2024  Discharging Provider: Mynor Cruz MD  Discharge Service: Hospitalist Service, GOLD TEAM 16    Discharge Diagnoses   Hospice care.     Clinically Significant Risk Factors          Follow-ups Needed After Discharge   Follow-up Appointments     Follow Up and recommended labs and tests      Per Hospice Team.            Discharge Disposition   Discharged to -hospice facility  Condition at discharge: Poor    Hospital Course       Vinod Lee is a 64 year old male admitted on 9/7/2024. 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  Recent hospital stay from 7/21 through 7/31/2024 for pneumonia and discharged on hospice care.  Presented to ED on 9/7 with complaints of worsening dyspnea/unmanaged symptoms and was admitted to hospitalist team for further care.    However, since admission:  Patient continues to decline labs or any active treatment, declined to go back to group home. Says he wants to die here. Endorses SI. Did not want us to work up for infection. Confirmed DNR, DNI, comfort measures.   Psychiatry,  consulted.     Per psychiatry:     1.  Continue his current dose of Clozaril, fluoxetine, Depakote, Cogentin, gabapentin, risperidone, lorazepam as needed 3 times daily  2.  Patient came from hospice care, transfer back to hospice care    There was some concern for UTI, however patient declined any testing.  Patient continued on nasal cannula oxygen for chronic hypoxic respiratory failure, dyspnea.  Supportive cares with albuterol nebs, cough medications as needed.  Morphine for pain/shortness of breath.   Continue with his PTA medications.  Discontinues his PTA blood pressure medications given soft blood pressure, diuretics.  Patient agreed with going back to his group home, hospice care.  Refer to  note for the further details    Consultations This Hospital Stay   SPIRITUAL HEALTH SERVICES IP CONSULT  PALLIATIVE CARE ADULT IP CONSULT  SPEECH LANGUAGE PATH ADULT IP CONSULT  CARE MANAGEMENT / SOCIAL WORK IP CONSULT  PSYCHIATRY IP CONSULT    Code Status   No CPR- Do NOT Intubate    Time Spent on this Encounter   IMynor MD, personally saw the patient today and spent greater than 30 minutes discharging this patient.       Mynor Cruz MD  [facility] MED SURG  [address] [place] [id number]  Phone: [phone]  Fax: [phone]  ______________________________________________________________________    Physical Exam   Vital Signs: Temp: 97.3  F (36.3  C) Temp src: Axillary BP: 97/56 Pulse: 77   Resp: 15 SpO2: 97 % O2 Device: Nasal cannula Oxygen Delivery: 4 LPM  Weight: 0 lbs 0 oz    General Appearance:  Awake, interactive, ill appearing.   HEENT: AT/NC, dry MM  Respiratory: Normal work of breathing. CTA BL. NC oxygen.   Cardiovascular: S1 S2 Regular.  GI/Abd: Soft. NT. ND. No g/r.   Extremities: Distally wwp.   Neuro: alert. Interactive. Moving all extremities.   Skin: dry skin  Psychiatry: flat affect.            Primary Care Physician   Franklyn Estrella    Discharge Orders      Follow Up and recommended labs and tests    Per Hospice Team.     Reason for your hospital stay    Hospice care     Activity - Up with nursing assistance     No CPR- Do NOT Intubate     Fall precautions     Diet    Follow this diet upon discharge: Current Diet:Orders Placed This Encounter      Regular Diet Adult       Significant Results and Procedures   Most Recent 3 CBC's:  Recent Labs   Lab Test 04/24/24  0953 04/05/24  0626 04/04/24  0711   WBC 11.2* 10.4 8.0  8.0   HGB 14.0 14.0  12.9*   MCV 86 89 89    229 225     Most Recent 3 BMP's:  Recent Labs   Lab Test 04/24/24  0953 04/05/24  0626 04/04/24  0711    136 138   POTASSIUM 4.5 4.6 4.2   CHLORIDE 102 101 101   CO2 24 27 28   BUN 16.4 16.8 19.6   CR 0.94 0.92 0.96   ANIONGAP 12 8 9   BRENDA 9.0 8.9 8.6*   * 94 95     Most Recent 2 LFT's:  Recent Labs   Lab Test 04/24/24  0953 04/05/24  0626   AST 10 11   ALT <5 5   ALKPHOS 72 68   BILITOTAL 0.3 0.3     Most Recent 6 glucoses:  Recent Labs   Lab Test 04/24/24  0953 04/05/24  0626 04/04/24  0711 04/03/24  0619 04/02/24  0706 04/01/24  0605   * 94 95 110* 114* 113*   ,   Results for orders placed or performed in visit on 04/26/24   PET Oncology Whole Body    Narrative    Combined Report of: PET and CT on 4/26/2024 3:42 PM:    1. PET of the neck, chest, abdomen, and pelvis.  2. PET CT Fusion for Attenuation Correction and Anatomical  Localization.  3. Diagnostic CT of the chest, abdomen and pelvis with intravenous  contrast obtained for diagnostic interpretation.  4. 3D MIP and PET-CT fused images were processed on an independent  workstation and archived to PACS and reviewed by a radiologist.    Technique:  1. PET: The patient received 9.9 mCi of F-18-FDG. The serum glucose  was 109 mg/dL prior to administration. Body weight was 75.6 kg. Images  were evaluated in the axial, sagittal, and coronal planes as well as  the rotational whole body MIP. Images were acquired from cranial  vertex to feet.    UPTAKE WAS MEASURED AT 60 MINUTES.     2. CT: Volumetric acquisition for clinical interpretation of the  chest, abdomen, and pelvis acquired at 3 mm sections. The chest,  abdomen, and pelvis were evaluated at 5 mm sections in bone, soft  tissue, and lung windows.    Contrast and Medications:  IV contrast: 101 mL of Isovue 370 intravenously.  PO contrast: 500 mL H20  Additional Medications: None.    3. 3D MIP and PET-CT fused images were processed on an independent  workstation  "and archived to PACS and reviewed by a radiologist.    INDICATION: Pulmonary nodules.    ADDITIONAL INFORMATION OBTAINED FROM EMR: 63-year-old male, CT chest  from 2/26/2024 demonstrated \"Irregular 8 mm left apical nodular  pleural tethering.\"     PET for further evaluation.    COMPARISON: CT chest 2/26/2024. CT abdomen and pelvis 4/1/2024.,  outside CT chests were obtained for  11/26/2022, 8/8/2022, 3/1/2020,  12/18/2019    FINDINGS:     BACKGROUND: Liver SUV max = 3.37, Aorta Blood SUV max = 2.59.     HEAD/NECK:    Pharyngeal mucosal spaces: Nasopharynx and palatine tonsils are within  normal limits. Tongue base is normal. Oral tongue and buccal mucosa of  the oral cavity is normal. Oropharynx, hypopharynx and larynx are  within normal limits.    Sinonasal region: Paranasal sinuses are clear. No mass within the  nasal cavity.    Lymph nodes: No FDG avid or abnormally enlarged lymph nodes.    Salivary glands: The major salivary glands are within normal limits.     Thyroid gland: Multiple subcentimeter nodules throughout the thyroid.  Within the posterior left lobe of thyroid there is a 9 x 7 mm nodule  with increased FDG avidity with a maximum SUV of 6.67 (4/97).     Vascular structures: Transient compression of the cranial right  internal jugular vein by the transverse process of the C1 vertebrae  (4/63). Iatrogenic air within the left internal jugular vein.    Brain: No abnormal FDG avid lesion or abnormal enhancement.     Physiologic uptake within the paraspinous muscles.    CHEST:  Increased uptake within the left sternoclavicular joint, max SUV of  4.45 cm.    Lymph nodes: No FDG avid lymph nodes. Prominent right paratracheal  lymph node with a short axis of 1.0 cm (4/124), not FDG avid.    Lungs: The central tracheobronchial tree is clear. Sequelae of  previous granulomatous infection with multiple mediastinal calcified  lymph nodes in the right perihilar calcified nodule measuring up to  1.1 cm " (10/57).    Left apical spiculated nodule measuring 10.1 mm with linear appendages  projecting towards the pleura, demonstrates no FDG activity (10/27).  Marginally larger than on 3/31/2020.     Nodular thickening along the left major fissure in apex of left lower  lobe suspect this is residual from pneumonia 8/8/2022, not  significantly changed from 11/26/2022.  Heart and great vessels: Heart size is within normal limits. No  pericardial effusion. The thoracic aorta and main pulmonary artery are  within normal limits. Mild coronary artery calcific effusions. Bubbly  debris filled esophagus.    ABDOMEN AND PELVIS:    Liver: No FDG avid lesion. No intrahepatic or extrahepatic biliary  ductal dilatation. Gallbladder is within normal limits.     Pancreas: The pancreas is within normal limits. No pancreatic ductal  dilatation.     Spleen: No FDG avid lesion. Scattered focal calcifications throughout  the spleen.    Adrenal glands: No FDG avid foci.    Kidneys: No FDG avid lesion. No hydronephrosis. The urinary bladder is  unremarkable.     Reproductive organs are within normal limits.    Gastrointestinal system: Normal caliber of the small and large bowel.  Surgical clips abutting the proximal duodenum.     Lymph nodes: No FDG avid or abnormally enlarged lymph nodes.    Vascular structures: Normal caliber of the abdominal aorta.    No free air, free fluid, or fluid collection.     EXTREMITIES:     No abnormal FDG uptake in the visualized extremities.    BONES AND SOFT TISSUES: Left fifth rib lateral cortex defect on CT  2/26/2024 series 4 image 133 is not significantly changed from CT  3/31/2020 and not hypermetabolic.   No abnormal FDG uptake in the skeleton. No abnormal lytic or blastic  osseous lesions. Mild scoliotic curvature of the lumbar spine.    SPINAL CANAL:     No evident canal compromise. No abnormal FDG avid lesion.      Impression    IMPRESSION:   In this patient with a history of a left apical spiculated  nodule:     1. Left upper lobe spiculated 1 cm nodule not hypermetabolic. Present  as far back as 12/18/2019 and marginally larger on current study than  on 3/31/2020.  Favor scar, but cannot rule out very slow-growing  malignancy. Recommend continued surveillance.     2. Nodular opacity along the major fissure in the left lower lobe.  Sequela from infection from 8/8/2022, unchanged from 11/26/2022 likely  benign.    3. Left 5th rib lateral cortical defect is unchanged from 3/31/2020  and therefore benign.    4. Multinodular thyroid with focal FDG avid nodule. Recommend thyroid  ultrasound for further characterization.    5. Sequelae of previous granulomatous infection.      I have personally reviewed the examination and initial interpretation  and I agree with the findings.    SAL GIPSON MD         SYSTEM ID:  [id number]   CT Chest/Abdomen/Pelvis w Contrast    Narrative    Combined Report of: PET and CT on 4/26/2024 3:42 PM:    1. PET of the neck, chest, abdomen, and pelvis.  2. PET CT Fusion for Attenuation Correction and Anatomical  Localization.  3. Diagnostic CT of the chest, abdomen and pelvis with intravenous  contrast obtained for diagnostic interpretation.  4. 3D MIP and PET-CT fused images were processed on an independent  workstation and archived to PACS and reviewed by a radiologist.    Technique:  1. PET: The patient received 9.9 mCi of F-18-FDG. The serum glucose  was 109 mg/dL prior to administration. Body weight was 75.6 kg. Images  were evaluated in the axial, sagittal, and coronal planes as well as  the rotational whole body MIP. Images were acquired from cranial  vertex to feet.    UPTAKE WAS MEASURED AT 60 MINUTES.     2. CT: Volumetric acquisition for clinical interpretation of the  chest, abdomen, and pelvis acquired at 3 mm sections. The chest,  abdomen, and pelvis were evaluated at 5 mm sections in bone, soft  tissue, and lung windows.    Contrast and Medications:  IV contrast: 101 mL  "of Isovue 370 intravenously.  PO contrast: 500 mL H20  Additional Medications: None.    3. 3D MIP and PET-CT fused images were processed on an independent  workstation and archived to PACS and reviewed by a radiologist.    INDICATION: Pulmonary nodules.    ADDITIONAL INFORMATION OBTAINED FROM EMR: 63-year-old male, CT chest  from 2/26/2024 demonstrated \"Irregular 8 mm left apical nodular  pleural tethering.\"     PET for further evaluation.    COMPARISON: CT chest 2/26/2024. CT abdomen and pelvis 4/1/2024.,  outside CT chests were obtained for  11/26/2022, 8/8/2022, 3/1/2020,  12/18/2019    FINDINGS:     BACKGROUND: Liver SUV max = 3.37, Aorta Blood SUV max = 2.59.     HEAD/NECK:    Pharyngeal mucosal spaces: Nasopharynx and palatine tonsils are within  normal limits. Tongue base is normal. Oral tongue and buccal mucosa of  the oral cavity is normal. Oropharynx, hypopharynx and larynx are  within normal limits.    Sinonasal region: Paranasal sinuses are clear. No mass within the  nasal cavity.    Lymph nodes: No FDG avid or abnormally enlarged lymph nodes.    Salivary glands: The major salivary glands are within normal limits.     Thyroid gland: Multiple subcentimeter nodules throughout the thyroid.  Within the posterior left lobe of thyroid there is a 9 x 7 mm nodule  with increased FDG avidity with a maximum SUV of 6.67 (4/97).     Vascular structures: Transient compression of the cranial right  internal jugular vein by the transverse process of the C1 vertebrae  (4/63). Iatrogenic air within the left internal jugular vein.    Brain: No abnormal FDG avid lesion or abnormal enhancement.     Physiologic uptake within the paraspinous muscles.    CHEST:  Increased uptake within the left sternoclavicular joint, max SUV of  4.45 cm.    Lymph nodes: No FDG avid lymph nodes. Prominent right paratracheal  lymph node with a short axis of 1.0 cm (4/124), not FDG avid.    Lungs: The central tracheobronchial tree is clear. " Sequelae of  previous granulomatous infection with multiple mediastinal calcified  lymph nodes in the right perihilar calcified nodule measuring up to  1.1 cm (10/57).    Left apical spiculated nodule measuring 10.1 mm with linear appendages  projecting towards the pleura, demonstrates no FDG activity (10/27).  Marginally larger than on 3/31/2020.     Nodular thickening along the left major fissure in apex of left lower  lobe suspect this is residual from pneumonia 8/8/2022, not  significantly changed from 11/26/2022.  Heart and great vessels: Heart size is within normal limits. No  pericardial effusion. The thoracic aorta and main pulmonary artery are  within normal limits. Mild coronary artery calcific effusions. Bubbly  debris filled esophagus.    ABDOMEN AND PELVIS:    Liver: No FDG avid lesion. No intrahepatic or extrahepatic biliary  ductal dilatation. Gallbladder is within normal limits.     Pancreas: The pancreas is within normal limits. No pancreatic ductal  dilatation.     Spleen: No FDG avid lesion. Scattered focal calcifications throughout  the spleen.    Adrenal glands: No FDG avid foci.    Kidneys: No FDG avid lesion. No hydronephrosis. The urinary bladder is  unremarkable.     Reproductive organs are within normal limits.    Gastrointestinal system: Normal caliber of the small and large bowel.  Surgical clips abutting the proximal duodenum.     Lymph nodes: No FDG avid or abnormally enlarged lymph nodes.    Vascular structures: Normal caliber of the abdominal aorta.    No free air, free fluid, or fluid collection.     EXTREMITIES:     No abnormal FDG uptake in the visualized extremities.    BONES AND SOFT TISSUES: Left fifth rib lateral cortex defect on CT  2/26/2024 series 4 image 133 is not significantly changed from CT  3/31/2020 and not hypermetabolic.   No abnormal FDG uptake in the skeleton. No abnormal lytic or blastic  osseous lesions. Mild scoliotic curvature of the lumbar spine.    SPINAL  CANAL:     No evident canal compromise. No abnormal FDG avid lesion.      Impression    IMPRESSION:   In this patient with a history of a left apical spiculated nodule:     1. Left upper lobe spiculated 1 cm nodule not hypermetabolic. Present  as far back as 12/18/2019 and marginally larger on current study than  on 3/31/2020.  Favor scar, but cannot rule out very slow-growing  malignancy. Recommend continued surveillance.     2. Nodular opacity along the major fissure in the left lower lobe.  Sequela from infection from 8/8/2022, unchanged from 11/26/2022 likely  benign.    3. Left 5th rib lateral cortical defect is unchanged from 3/31/2020  and therefore benign.    4. Multinodular thyroid with focal FDG avid nodule. Recommend thyroid  ultrasound for further characterization.    5. Sequelae of previous granulomatous infection.      I have personally reviewed the examination and initial interpretation  and I agree with the findings.    SAL GIPSON MD         SYSTEM ID:  [id number]       Discharge Medications   Current Discharge Medication List        START taking these medications    Details   carboxymethylcellulose PF (REFRESH PLUS) 0.5 % ophthalmic solution Place 1-2 drops into both eyes every hour as needed for dry eyes.      glycopyrrolate (ROBINUL) 0.2 MG/ML injection Inject 1 mL (0.2 mg) over 1-2 minutes into the vein every 4 hours.      morphine sulfate (ROXANOL) 20 mg/mL (HIGH CONC) soln Place 0.5 mLs (10 mg) under the tongue every 2 hours as needed for shortness of breath or moderate to severe pain.           CONTINUE these medications which have NOT CHANGED    Details   acetaminophen (TYLENOL) 325 MG tablet Take 650 mg by mouth every 4 hours as needed for mild pain.      albuterol (PROAIR HFA/PROVENTIL HFA/VENTOLIN HFA) 108 (90 Base) MCG/ACT inhaler Inhale 2 puffs into the lungs every 6 hours as needed for wheezing, shortness of breath or cough  Qty: 18 g, Refills: 1    Comments: Pharmacy may  dispense brand covered by insurance (Proair, or proventil or ventolin or generic albuterol inhaler)  Associated Diagnoses: Chronic obstructive pulmonary disease, unspecified COPD type (H)      benzonatate (TESSALON) 200 MG capsule Take 1 capsule (200 mg) by mouth 3 times daily as needed for cough  Qty: 90 capsule, Refills: 1    Associated Diagnoses: Chronic cough      benztropine (COGENTIN) 1 MG tablet Take 1 tablet (1 mg) by mouth 2 times daily    Associated Diagnoses: Schizophrenia, unspecified type (H)      !! cloZAPine (CLOZARIL) 200 MG tablet Take 400 mg by mouth at bedtime.      !! cloZAPine (CLOZARIL) 200 MG tablet Take 200 mg by mouth daily. At 2pm      cyanocobalamin (VITAMIN B-12) 500 MCG SUBL sublingual tablet Place 1 tablet (500 mcg) under the tongue daily  Qty: 90 tablet, Refills: 2    Associated Diagnoses: Vitamin B12 deficiency (non anemic)      divalproex sodium delayed-release (DEPAKOTE SPRINKLE) 125 MG DR capsule Take 250 mg by mouth 3 times daily (with meals)  Qty: 360 capsule, Refills: 0    Associated Diagnoses: Paranoid schizophrenia, chronic condition with acute exacerbation (H)      empagliflozin (JARDIANCE) 10 MG TABS tablet Take 1 tablet (10 mg) by mouth daily  Qty: 90 tablet, Refills: 0    Associated Diagnoses: Chronic systolic heart failure (H)      FLUoxetine (PROZAC) 20 MG capsule Take 1 capsule (20 mg) by mouth daily  Qty: 30 capsule, Refills: 3    Associated Diagnoses: Paranoid schizophrenia, chronic condition with acute exacerbation (H)      gabapentin (NEURONTIN) 400 MG capsule Take 1 capsule (400 mg) by mouth 3 times daily  Qty: 90 capsule, Refills: 1    Associated Diagnoses: Paranoid schizophrenia, chronic condition with acute exacerbation (H)      guaiFENesin (MUCINEX) 600 MG 12 hr tablet Take 600 mg by mouth 2 times daily.      LORazepam (ATIVAN) 1 MG tablet Take 1 mg by mouth 3 times daily as needed.      Melatonin 10 MG TABS tablet Take 1 tablet (10 mg) by mouth at  bedtime  Qty: 30 tablet, Refills: 3    Associated Diagnoses: Paranoid schizophrenia, chronic condition with acute exacerbation (H)      miconazole (MICATIN) 2 % external powder Apply topically 2 times daily.      MINERAL OIL-HYDROPHIL PETROLAT EX Externally apply topically every hour as needed.      OLANZapine (ZYPREXA) 15 MG tablet Take 15 mg by mouth 2 times daily      oxyCODONE (ROXICODONE) 5 MG/5ML solution Take 5 mg by mouth every 4 hours as needed for severe pain.      pantoprazole (PROTONIX) 40 MG EC tablet Take 1 tablet (40 mg) by mouth daily  Qty: 30 tablet, Refills: 3    Associated Diagnoses: Side effect of medication      polyethylene glycol (MIRALAX) 17 g packet Take 1 packet by mouth 2 times daily.      risperiDONE (RISPERDAL) 0.5 MG tablet Take 0.5 mg by mouth 2 times daily      senna-docusate (SENOKOT-S/PERICOLACE) 8.6-50 MG tablet Take 1 tablet by mouth 2 times daily as needed for constipation.      sennosides (SENOKOT) 8.6 MG tablet Take 2 tablets by mouth 2 times daily  Qty: 60 tablet, Refills: 3    Associated Diagnoses: Constipation, unspecified constipation type      simethicone (MYLICON) 80 MG chewable tablet Take 80 mg by mouth every 6 hours as needed for flatulence or cramping.      tamsulosin (FLOMAX) 0.4 MG capsule Take 2 capsules (0.8 mg) by mouth daily  Qty: 180 capsule, Refills: 0    Associated Diagnoses: Urinary retention      Vitamin D3 (CHOLECALCIFEROL) 25 mcg (1000 units) tablet Take 1 tablet (25 mcg) by mouth daily  Qty: 30 tablet, Refills: 3    Associated Diagnoses: Vitamin D deficiency       !! - Potential duplicate medications found. Please discuss with provider.        STOP taking these medications       losartan (COZAAR) 25 MG tablet Comments:   Reason for Stopping:         metoprolol succinate ER (TOPROL XL) 25 MG 24 hr tablet Comments:   Reason for Stopping:         spironolactone (ALDACTONE) 25 MG tablet Comments:   Reason for Stopping:             Allergies   Allergies    Allergen Reactions    Bee Venom Anaphylaxis    Montelukast     Phenothiazines

## 2024-09-10 ENCOUNTER — MEDICAL CORRESPONDENCE (OUTPATIENT)
Dept: HEALTH INFORMATION MANAGEMENT | Facility: CLINIC | Age: 64
End: 2024-09-10
Payer: COMMERCIAL

## 2024-09-10 ENCOUNTER — TELEPHONE (OUTPATIENT)
Dept: FAMILY MEDICINE | Facility: CLINIC | Age: 64
End: 2024-09-10
Payer: COMMERCIAL

## 2024-09-10 NOTE — TELEPHONE ENCOUNTER
Forms/Letter Request     Type of form/letter: Home Health Certification     Do we have the form/letter: Yes: Will place form in provider's bin     Who is the form from? [facility] [id number]     Where did/will the form come from? form was faxed in     How would you like the form/letter returned: Fax : [phone]

## 2024-09-11 ENCOUNTER — MEDICAL CORRESPONDENCE (OUTPATIENT)
Dept: HEALTH INFORMATION MANAGEMENT | Facility: CLINIC | Age: 64
End: 2024-09-11
Payer: COMMERCIAL

## 2024-09-13 DIAGNOSIS — T88.7XXA SIDE EFFECT OF MEDICATION: ICD-10-CM

## 2024-09-13 DIAGNOSIS — I50.22 CHRONIC SYSTOLIC HEART FAILURE (H): ICD-10-CM

## 2024-09-13 DIAGNOSIS — F20.0 PARANOID SCHIZOPHRENIA, CHRONIC CONDITION WITH ACUTE EXACERBATION (H): ICD-10-CM

## 2024-09-13 RX ORDER — PANTOPRAZOLE SODIUM 40 MG/1
40 TABLET, DELAYED RELEASE ORAL DAILY
Qty: 30 TABLET | Refills: 3 | Status: SHIPPED | OUTPATIENT
Start: 2024-09-13

## 2024-09-13 RX ORDER — GABAPENTIN 400 MG/1
400 CAPSULE ORAL 3 TIMES DAILY
Qty: 90 CAPSULE | Refills: 0 | Status: SHIPPED | OUTPATIENT
Start: 2024-09-13

## 2024-09-13 NOTE — TELEPHONE ENCOUNTER
Medication passed protocol, however, refill RN could not approve because  last A1C > 6 months.  Provider, please approve or deny the prescription.

## 2024-09-20 ENCOUNTER — TELEPHONE (OUTPATIENT)
Dept: FAMILY MEDICINE | Facility: CLINIC | Age: 64
End: 2024-09-20
Payer: COMMERCIAL

## 2024-09-20 NOTE — TELEPHONE ENCOUNTER
Forms/Letter Request    Type of form/letter: Nursing Home/Assisted Living Orders      Do we have the form/letter: Yes: [facility], Order Number: [id number]    Who is the form from? Home care    Where did/will the form come from? form was faxed in    When is form/letter needed by:     How would you like the form/letter returned: Fax : [phone]

## 2024-09-23 NOTE — TELEPHONE ENCOUNTER
Looks like this form was put in for the wrong pt due to the name on form and also the order number match that other pt. Will create an encounter for that person

## 2024-09-24 ENCOUNTER — MEDICAL CORRESPONDENCE (OUTPATIENT)
Dept: HEALTH INFORMATION MANAGEMENT | Facility: CLINIC | Age: 64
End: 2024-09-24
Payer: COMMERCIAL

## 2024-10-01 ENCOUNTER — TELEPHONE (OUTPATIENT)
Dept: FAMILY MEDICINE | Facility: CLINIC | Age: 64
End: 2024-10-01
Payer: COMMERCIAL

## 2024-10-01 NOTE — TELEPHONE ENCOUNTER
Forms/Letter Request    Type of form/letter: Nursing Home/Assisted Living Orders      Do we have the form/letter: Yes:  [facility], Order Number: [id number]    Who is the form from? Home care    Where did/will the form come from? form was faxed in    When is form/letter needed by:     How would you like the form/letter returned: Fax : [phone]

## 2024-10-02 ENCOUNTER — MEDICAL CORRESPONDENCE (OUTPATIENT)
Dept: HEALTH INFORMATION MANAGEMENT | Facility: CLINIC | Age: 64
End: 2024-10-02
Payer: COMMERCIAL

## 2024-10-08 ENCOUNTER — MEDICAL CORRESPONDENCE (OUTPATIENT)
Dept: HEALTH INFORMATION MANAGEMENT | Facility: CLINIC | Age: 64
End: 2024-10-08
Payer: COMMERCIAL

## 2024-10-15 DIAGNOSIS — F20.0 PARANOID SCHIZOPHRENIA, CHRONIC CONDITION WITH ACUTE EXACERBATION (H): ICD-10-CM

## 2024-10-28 ENCOUNTER — TELEPHONE (OUTPATIENT)
Dept: FAMILY MEDICINE | Facility: CLINIC | Age: 64
End: 2024-10-28
Payer: COMMERCIAL

## 2024-10-28 NOTE — TELEPHONE ENCOUNTER
Forms/Letter Request     Type of form/letter: Nursing Home/Assisted Living Orders        Do we have the form/letter: Yes:  [facility], Order Number: [id number]     Who is the form from? Home care     Where did/will the form come from? form was faxed in     When is form/letter needed by:      How would you like the form/letter returned: Fax : [phone]

## 2024-10-30 ENCOUNTER — MEDICAL CORRESPONDENCE (OUTPATIENT)
Dept: HEALTH INFORMATION MANAGEMENT | Facility: CLINIC | Age: 64
End: 2024-10-30
Payer: COMMERCIAL

## 2024-11-18 ENCOUNTER — TELEPHONE (OUTPATIENT)
Dept: FAMILY MEDICINE | Facility: CLINIC | Age: 64
End: 2024-11-18

## 2024-11-18 DIAGNOSIS — F20.0 PARANOID SCHIZOPHRENIA, CHRONIC CONDITION WITH ACUTE EXACERBATION (H): ICD-10-CM

## 2024-11-18 RX ORDER — DIVALPROEX SODIUM 125 MG/1
250 CAPSULE, COATED PELLETS ORAL
Qty: 360 CAPSULE | Refills: 0 | Status: SHIPPED | OUTPATIENT
Start: 2024-11-18

## 2024-11-18 RX ORDER — GABAPENTIN 400 MG/1
400 CAPSULE ORAL 3 TIMES DAILY
Qty: 90 CAPSULE | Refills: 0 | Status: SHIPPED | OUTPATIENT
Start: 2024-11-18

## 2024-11-18 NOTE — TELEPHONE ENCOUNTER
Forms/Letter Request    Type of form/letter: Nursing Home/Assisted Living Orders      Do we have the form/letter: Yes: [facility], Order Number: [id number]    Who is the form from? Home care    Where did/will the form come from? form was faxed in    When is form/letter needed by:     How would you like the form/letter returned: Fax : [phone]

## 2024-11-19 ENCOUNTER — MEDICAL CORRESPONDENCE (OUTPATIENT)
Dept: HEALTH INFORMATION MANAGEMENT | Facility: CLINIC | Age: 64
End: 2024-11-19
Payer: COMMERCIAL

## 2024-11-20 ENCOUNTER — MEDICAL CORRESPONDENCE (OUTPATIENT)
Dept: HEALTH INFORMATION MANAGEMENT | Facility: CLINIC | Age: 64
End: 2024-11-20
Payer: COMMERCIAL

## 2024-12-09 DIAGNOSIS — F20.0 PARANOID SCHIZOPHRENIA, CHRONIC CONDITION WITH ACUTE EXACERBATION (H): ICD-10-CM

## 2024-12-09 RX ORDER — GABAPENTIN 400 MG/1
400 CAPSULE ORAL 3 TIMES DAILY
Qty: 90 CAPSULE | Refills: 0 | Status: SHIPPED | OUTPATIENT
Start: 2024-12-09

## 2024-12-17 ENCOUNTER — MEDICAL CORRESPONDENCE (OUTPATIENT)
Dept: HEALTH INFORMATION MANAGEMENT | Facility: CLINIC | Age: 64
End: 2024-12-17
Payer: COMMERCIAL

## 2025-01-03 DIAGNOSIS — I50.22 CHRONIC SYSTOLIC HEART FAILURE (H): ICD-10-CM

## 2025-01-03 DIAGNOSIS — K59.00 CONSTIPATION, UNSPECIFIED CONSTIPATION TYPE: ICD-10-CM

## 2025-01-03 DIAGNOSIS — F20.0 PARANOID SCHIZOPHRENIA, CHRONIC CONDITION WITH ACUTE EXACERBATION (H): ICD-10-CM

## 2025-01-03 NOTE — TELEPHONE ENCOUNTER
RN called [facility] and spoke with Farida, Health . She reports patient is enrolled in Hospice with them. However, the Jardiance, Depakote, and Gabapentin are not covered by Hospice as they are not related to hospice diagnosis.   She reports they do not have the Sennosides on their med list.     RN spoke with patient. He is at [facility] and requesting we speak with a nurse to schedule an appointment. He did not know a phone number for nurse line. Pt is aware an appointment is needed.     Called the nurse number in contacts and no answer. Left a general message with no patient information for call back.   Called the [facility] phone number and no answer. Did not leave a message.       Hyacinth Botello RN    [facility]

## 2025-01-03 NOTE — TELEPHONE ENCOUNTER
This patient is on hospice  I am not sure if he is still at the group home or he is at hospice somewhere elsea   Please check on that  He also has not seen me since May  So if he wants these medications I can prescribe them for 30 days as long as he is scheduled for a visit but again if he is on hospice he cannot see me so the medications should be taken care of by hospice doctor

## 2025-01-08 RX ORDER — SENNOSIDES 8.6 MG
2 TABLET ORAL 2 TIMES DAILY
Qty: 120 TABLET | Refills: 0 | Status: SHIPPED | OUTPATIENT
Start: 2025-01-08

## 2025-01-08 RX ORDER — GABAPENTIN 400 MG/1
400 CAPSULE ORAL 3 TIMES DAILY
Qty: 90 CAPSULE | Refills: 0 | Status: SHIPPED | OUTPATIENT
Start: 2025-01-08

## 2025-01-08 RX ORDER — DIVALPROEX SODIUM 125 MG/1
250 CAPSULE, COATED PELLETS ORAL
Qty: 180 CAPSULE | Refills: 0 | Status: SHIPPED | OUTPATIENT
Start: 2025-01-08

## 2025-01-08 NOTE — TELEPHONE ENCOUNTER
Got a hold of assisted living [facility] and she stated that pt still needs Jardiance, Depakote, Gabapentin, and Sennosides. Per Farida, Health  at [facility], these meds are NOT covered by hospice.     Attempted to get pt scheduled for a visit but pt has [employer] insurance which is no longer in-network for [facility]. This change happened on Jan 2025, therefore, Dr. Estrella cannot see pt.     Assisted living nurse stated she will notify family and have them call insurance to see where pt can be seen. However, she is requesting Dr. Estrella to prescribe a 30 day supply for the pended meds below.     Routing to provider to review.    Terrie Goodrich RN on 1/8/2025 at 4:27 PM

## 2025-01-14 ENCOUNTER — MEDICAL CORRESPONDENCE (OUTPATIENT)
Dept: HEALTH INFORMATION MANAGEMENT | Facility: CLINIC | Age: 65
End: 2025-01-14
Payer: MEDICAID

## 2025-01-20 ENCOUNTER — TELEPHONE (OUTPATIENT)
Dept: FAMILY MEDICINE | Facility: CLINIC | Age: 65
End: 2025-01-20
Payer: MEDICAID

## 2025-01-20 NOTE — TELEPHONE ENCOUNTER
Forms/Letter Request    Type of form/letter: OTHER: Recertification of hospice services-order #[id number]        Do we have the form/letter: Yes: Placed in provider's in basket    Who is the form from? [facility]  (if other please explain)    Where did/will the form come from? form was faxed in    When is form/letter needed by: ASAP    How would you like the form/letter returned: Fax : [phone]

## 2025-02-03 DIAGNOSIS — I50.22 CHRONIC SYSTOLIC HEART FAILURE (H): ICD-10-CM

## 2025-02-03 DIAGNOSIS — F20.0 PARANOID SCHIZOPHRENIA, CHRONIC CONDITION WITH ACUTE EXACERBATION (H): ICD-10-CM

## 2025-02-03 RX ORDER — DIVALPROEX SODIUM 125 MG/1
250 CAPSULE, COATED PELLETS ORAL
Qty: 270 CAPSULE | Refills: 0 | OUTPATIENT
Start: 2025-02-03

## 2025-02-03 RX ORDER — GABAPENTIN 400 MG/1
400 CAPSULE ORAL 3 TIMES DAILY
Qty: 90 CAPSULE | Refills: 0 | OUTPATIENT
Start: 2025-02-03

## 2025-02-11 ENCOUNTER — MEDICAL CORRESPONDENCE (OUTPATIENT)
Dept: HEALTH INFORMATION MANAGEMENT | Facility: CLINIC | Age: 65
End: 2025-02-11
Payer: MEDICAID

## 2025-03-11 ENCOUNTER — MEDICAL CORRESPONDENCE (OUTPATIENT)
Dept: HEALTH INFORMATION MANAGEMENT | Facility: CLINIC | Age: 65
End: 2025-03-11
Payer: MEDICAID

## 2025-03-14 ENCOUNTER — TELEPHONE (OUTPATIENT)
Dept: FAMILY MEDICINE | Facility: CLINIC | Age: 65
End: 2025-03-14

## 2025-03-17 ENCOUNTER — MEDICAL CORRESPONDENCE (OUTPATIENT)
Dept: HEALTH INFORMATION MANAGEMENT | Facility: CLINIC | Age: 65
End: 2025-03-17
Payer: MEDICAID

## 2025-03-25 ENCOUNTER — MEDICAL CORRESPONDENCE (OUTPATIENT)
Dept: HEALTH INFORMATION MANAGEMENT | Facility: CLINIC | Age: 65
End: 2025-03-25
Payer: MEDICAID

## 2025-03-27 ENCOUNTER — TELEPHONE (OUTPATIENT)
Dept: FAMILY MEDICINE | Facility: CLINIC | Age: 65
End: 2025-03-27
Payer: MEDICAID

## 2025-03-27 DIAGNOSIS — K59.00 CONSTIPATION, UNSPECIFIED CONSTIPATION TYPE: ICD-10-CM

## 2025-03-27 RX ORDER — SENNOSIDES 8.6 MG
2 TABLET ORAL 2 TIMES DAILY
Qty: 120 TABLET | Refills: 1 | Status: SHIPPED | OUTPATIENT
Start: 2025-03-27

## 2025-03-27 NOTE — TELEPHONE ENCOUNTER
Forms/Letter Request    Type of form/letter: Home Health Certification      Do we have the form/letter: Yes: [facility], Order Number: [id number]    Who is the form from? Home care    Where did/will the form come from? form was faxed in    When is form/letter needed by:     How would you like the form/letter returned: Fax : [phone]    Will place form on providers desk for review/signature  NEHAL Sexton

## 2025-03-31 ENCOUNTER — MEDICAL CORRESPONDENCE (OUTPATIENT)
Dept: HEALTH INFORMATION MANAGEMENT | Facility: CLINIC | Age: 65
End: 2025-03-31
Payer: MEDICAID

## 2025-04-08 ENCOUNTER — MEDICAL CORRESPONDENCE (OUTPATIENT)
Dept: HEALTH INFORMATION MANAGEMENT | Facility: CLINIC | Age: 65
End: 2025-04-08
Payer: MEDICAID

## 2025-04-22 ENCOUNTER — MEDICAL CORRESPONDENCE (OUTPATIENT)
Dept: HEALTH INFORMATION MANAGEMENT | Facility: CLINIC | Age: 65
End: 2025-04-22
Payer: MEDICAID

## 2025-04-25 DIAGNOSIS — J44.9 CHRONIC OBSTRUCTIVE PULMONARY DISEASE, UNSPECIFIED COPD TYPE (H): ICD-10-CM

## 2025-04-28 RX ORDER — ALBUTEROL SULFATE 90 UG/1
2 INHALANT RESPIRATORY (INHALATION) EVERY 6 HOURS PRN
Qty: 18 G | Refills: 1 | Status: SHIPPED | OUTPATIENT
Start: 2025-04-28

## 2025-05-16 ENCOUNTER — MEDICAL CORRESPONDENCE (OUTPATIENT)
Dept: HEALTH INFORMATION MANAGEMENT | Facility: CLINIC | Age: 65
End: 2025-05-16
Payer: MEDICAID

## 2025-05-23 ENCOUNTER — TELEPHONE (OUTPATIENT)
Dept: FAMILY MEDICINE | Facility: CLINIC | Age: 65
End: 2025-05-23
Payer: MEDICAID

## 2025-05-23 NOTE — TELEPHONE ENCOUNTER
Forms/Letter Request     Type of form/letter: Home Health Certification        Do we have the form/letter: Yes: [facility], Order Number: [id number]     Who is the form from? Home care     Where did/will the form come from? form was faxed in     When is form/letter needed by:      How would you like the form/letter returned: Fax : [phone]     Will place form on providers desk for review/signature  NEHAL Sexton

## 2025-05-28 ENCOUNTER — MEDICAL CORRESPONDENCE (OUTPATIENT)
Dept: HEALTH INFORMATION MANAGEMENT | Facility: CLINIC | Age: 65
End: 2025-05-28
Payer: MEDICAID

## 2025-06-03 ENCOUNTER — MEDICAL CORRESPONDENCE (OUTPATIENT)
Dept: HEALTH INFORMATION MANAGEMENT | Facility: CLINIC | Age: 65
End: 2025-06-03
Payer: MEDICAID

## 2025-06-17 ENCOUNTER — MEDICAL CORRESPONDENCE (OUTPATIENT)
Dept: HEALTH INFORMATION MANAGEMENT | Facility: CLINIC | Age: 65
End: 2025-06-17
Payer: MEDICAID

## 2025-07-15 ENCOUNTER — MEDICAL CORRESPONDENCE (OUTPATIENT)
Dept: HEALTH INFORMATION MANAGEMENT | Facility: CLINIC | Age: 65
End: 2025-07-15
Payer: MEDICAID

## 2025-07-17 DIAGNOSIS — K59.00 CONSTIPATION, UNSPECIFIED CONSTIPATION TYPE: ICD-10-CM

## 2025-07-17 RX ORDER — SENNOSIDES 8.6 MG
2 TABLET ORAL 2 TIMES DAILY
Qty: 120 TABLET | Refills: 1 | Status: SHIPPED | OUTPATIENT
Start: 2025-07-17

## 2025-07-29 ENCOUNTER — MEDICAL CORRESPONDENCE (OUTPATIENT)
Dept: HEALTH INFORMATION MANAGEMENT | Facility: CLINIC | Age: 65
End: 2025-07-29
Payer: MEDICAID

## 2025-08-20 DIAGNOSIS — J44.9 CHRONIC OBSTRUCTIVE PULMONARY DISEASE, UNSPECIFIED COPD TYPE (H): ICD-10-CM

## 2025-08-20 RX ORDER — ALBUTEROL SULFATE 90 UG/1
2 INHALANT RESPIRATORY (INHALATION) EVERY 6 HOURS PRN
Qty: 18 G | Refills: 2 | Status: SHIPPED | OUTPATIENT
Start: 2025-08-20

## (undated) RX ORDER — CAFFEINE AND SODIUM BENZOATE 125 MG/ML
INJECTION, SOLUTION INTRAMUSCULAR; INTRAVENOUS
Status: DISPENSED
Start: 2023-08-16

## (undated) RX ORDER — CLONAZEPAM 0.5 MG/1
TABLET ORAL
Status: DISPENSED
Start: 2023-08-09

## (undated) RX ORDER — PHENYLEPHRINE HCL IN 0.9% NACL 50MG/250ML
PLASTIC BAG, INJECTION (ML) INTRAVENOUS
Status: DISPENSED
Start: 2023-06-19

## (undated) RX ORDER — ETOMIDATE 2 MG/ML
INJECTION INTRAVENOUS
Status: DISPENSED
Start: 2023-08-14

## (undated) RX ORDER — LABETALOL HYDROCHLORIDE 5 MG/ML
INJECTION, SOLUTION INTRAVENOUS
Status: DISPENSED
Start: 2023-08-11

## (undated) RX ORDER — ETOMIDATE 2 MG/ML
INJECTION INTRAVENOUS
Status: DISPENSED
Start: 2023-08-23

## (undated) RX ORDER — CLONAZEPAM 0.5 MG/1
TABLET ORAL
Status: DISPENSED
Start: 2023-08-11

## (undated) RX ORDER — ETOMIDATE 2 MG/ML
INJECTION INTRAVENOUS
Status: DISPENSED
Start: 2023-08-25

## (undated) RX ORDER — CAFFEINE AND SODIUM BENZOATE 125 MG/ML
INJECTION, SOLUTION INTRAMUSCULAR; INTRAVENOUS
Status: DISPENSED
Start: 2023-08-18

## (undated) RX ORDER — ETOMIDATE 2 MG/ML
INJECTION INTRAVENOUS
Status: DISPENSED
Start: 2023-08-18

## (undated) RX ORDER — PROPOFOL 10 MG/ML
INJECTION, EMULSION INTRAVENOUS
Status: DISPENSED
Start: 2023-08-14

## (undated) RX ORDER — FLUMAZENIL 0.1 MG/ML
INJECTION, SOLUTION INTRAVENOUS
Status: DISPENSED
Start: 2023-08-04

## (undated) RX ORDER — CAFFEINE AND SODIUM BENZOATE 125 MG/ML
INJECTION, SOLUTION INTRAMUSCULAR; INTRAVENOUS
Status: DISPENSED
Start: 2023-08-25

## (undated) RX ORDER — NICARDIPINE HCL-0.9% SOD CHLOR 1 MG/10 ML
SYRINGE (ML) INTRAVENOUS
Status: DISPENSED
Start: 2023-08-23

## (undated) RX ORDER — FLUMAZENIL 0.1 MG/ML
INJECTION, SOLUTION INTRAVENOUS
Status: DISPENSED
Start: 2023-08-11

## (undated) RX ORDER — NICARDIPINE HCL-0.9% SOD CHLOR 1 MG/10 ML
SYRINGE (ML) INTRAVENOUS
Status: DISPENSED
Start: 2023-08-16

## (undated) RX ORDER — PROPOFOL 10 MG/ML
INJECTION, EMULSION INTRAVENOUS
Status: DISPENSED
Start: 2023-08-09

## (undated) RX ORDER — ETOMIDATE 2 MG/ML
INJECTION INTRAVENOUS
Status: DISPENSED
Start: 2023-08-09

## (undated) RX ORDER — FLUMAZENIL 0.1 MG/ML
INJECTION, SOLUTION INTRAVENOUS
Status: DISPENSED
Start: 2023-08-21

## (undated) RX ORDER — ETOMIDATE 2 MG/ML
INJECTION INTRAVENOUS
Status: DISPENSED
Start: 2023-08-16

## (undated) RX ORDER — CAFFEINE AND SODIUM BENZOATE 125 MG/ML
INJECTION, SOLUTION INTRAMUSCULAR; INTRAVENOUS
Status: DISPENSED
Start: 2023-08-21

## (undated) RX ORDER — CAFFEINE AND SODIUM BENZOATE 125 MG/ML
INJECTION, SOLUTION INTRAMUSCULAR; INTRAVENOUS
Status: DISPENSED
Start: 2023-08-23

## (undated) RX ORDER — FLUMAZENIL 0.1 MG/ML
INJECTION, SOLUTION INTRAVENOUS
Status: DISPENSED
Start: 2023-08-16

## (undated) RX ORDER — NICARDIPINE HCL-0.9% SOD CHLOR 1 MG/10 ML
SYRINGE (ML) INTRAVENOUS
Status: DISPENSED
Start: 2023-08-07

## (undated) RX ORDER — FLUMAZENIL 0.1 MG/ML
INJECTION, SOLUTION INTRAVENOUS
Status: DISPENSED
Start: 2023-08-25

## (undated) RX ORDER — PROPOFOL 10 MG/ML
INJECTION, EMULSION INTRAVENOUS
Status: DISPENSED
Start: 2023-08-11

## (undated) RX ORDER — FLUMAZENIL 0.1 MG/ML
INJECTION, SOLUTION INTRAVENOUS
Status: DISPENSED
Start: 2023-08-14

## (undated) RX ORDER — NICARDIPINE HCL-0.9% SOD CHLOR 1 MG/10 ML
SYRINGE (ML) INTRAVENOUS
Status: DISPENSED
Start: 2023-08-11

## (undated) RX ORDER — NICARDIPINE HCL-0.9% SOD CHLOR 1 MG/10 ML
SYRINGE (ML) INTRAVENOUS
Status: DISPENSED
Start: 2023-08-14

## (undated) RX ORDER — FLUMAZENIL 0.1 MG/ML
INJECTION, SOLUTION INTRAVENOUS
Status: DISPENSED
Start: 2023-08-07

## (undated) RX ORDER — OXYTOCIN/0.9 % SODIUM CHLORIDE 30/500 ML
PLASTIC BAG, INJECTION (ML) INTRAVENOUS
Status: DISPENSED
Start: 2023-06-19

## (undated) RX ORDER — NICARDIPINE HCL-0.9% SOD CHLOR 1 MG/10 ML
SYRINGE (ML) INTRAVENOUS
Status: DISPENSED
Start: 2023-08-09

## (undated) RX ORDER — NICARDIPINE HCL-0.9% SOD CHLOR 1 MG/10 ML
SYRINGE (ML) INTRAVENOUS
Status: DISPENSED
Start: 2023-08-18

## (undated) RX ORDER — FLUMAZENIL 0.1 MG/ML
INJECTION, SOLUTION INTRAVENOUS
Status: DISPENSED
Start: 2023-08-18

## (undated) RX ORDER — FUROSEMIDE 10 MG/ML
INJECTION INTRAMUSCULAR; INTRAVENOUS
Status: DISPENSED
Start: 2023-06-19

## (undated) RX ORDER — FLUMAZENIL 0.1 MG/ML
INJECTION, SOLUTION INTRAVENOUS
Status: DISPENSED
Start: 2023-08-09